# Patient Record
Sex: MALE | Race: BLACK OR AFRICAN AMERICAN | NOT HISPANIC OR LATINO | Employment: UNEMPLOYED | URBAN - METROPOLITAN AREA
[De-identification: names, ages, dates, MRNs, and addresses within clinical notes are randomized per-mention and may not be internally consistent; named-entity substitution may affect disease eponyms.]

---

## 2017-07-02 ENCOUNTER — HOSPITAL ENCOUNTER (INPATIENT)
Facility: HOSPITAL | Age: 57
LOS: 9 days | Discharge: HOME | DRG: 885 | End: 2017-07-11
Attending: PSYCHIATRY & NEUROLOGY | Admitting: PSYCHIATRY & NEUROLOGY
Payer: COMMERCIAL

## 2017-07-02 ENCOUNTER — HOSPITAL ENCOUNTER (EMERGENCY)
Facility: HOSPITAL | Age: 57
End: 2017-07-02
Attending: EMERGENCY MEDICINE | Admitting: EMERGENCY MEDICINE
Payer: COMMERCIAL

## 2017-07-02 VITALS
WEIGHT: 175.5 LBS | TEMPERATURE: 96.8 F | HEART RATE: 68 BPM | DIASTOLIC BLOOD PRESSURE: 83 MMHG | SYSTOLIC BLOOD PRESSURE: 130 MMHG | OXYGEN SATURATION: 97 % | RESPIRATION RATE: 16 BRPM

## 2017-07-02 DIAGNOSIS — T14.91XA SUICIDE ATTEMPT (HCC): Primary | ICD-10-CM

## 2017-07-02 DIAGNOSIS — F31.9 BIPOLAR AFFECTIVE DISORDER (HCC): ICD-10-CM

## 2017-07-02 DIAGNOSIS — B20 HUMAN IMMUNODEFICIENCY VIRUS (HIV) INFECTION (HCC): Primary | ICD-10-CM

## 2017-07-02 PROBLEM — Z21 HUMAN IMMUNODEFICIENCY VIRUS (HIV) INFECTION (HCC): Status: ACTIVE | Noted: 2017-02-16

## 2017-07-02 PROBLEM — Z86.73 HISTORY OF TRANSIENT CEREBRAL ISCHEMIA: Status: ACTIVE | Noted: 2017-02-16

## 2017-07-02 LAB
ALBUMIN SERPL BCP-MCNC: 3.3 G/DL (ref 3.5–5)
ALP SERPL-CCNC: 107 U/L (ref 46–116)
ALT SERPL W P-5'-P-CCNC: 19 U/L (ref 12–78)
AMPHETAMINES SERPL QL SCN: NEGATIVE
ANION GAP SERPL CALCULATED.3IONS-SCNC: 4 MMOL/L (ref 4–13)
AST SERPL W P-5'-P-CCNC: 19 U/L (ref 5–45)
BACTERIA UR QL AUTO: NORMAL /HPF
BARBITURATES UR QL: NEGATIVE
BASOPHILS # BLD AUTO: 0.02 THOUSANDS/ΜL (ref 0–0.1)
BASOPHILS NFR BLD AUTO: 1 % (ref 0–1)
BENZODIAZ UR QL: NEGATIVE
BILIRUB SERPL-MCNC: 0.33 MG/DL (ref 0.2–1)
BILIRUB UR QL STRIP: NEGATIVE
BUN SERPL-MCNC: 9 MG/DL (ref 5–25)
CALCIUM SERPL-MCNC: 9.4 MG/DL (ref 8.3–10.1)
CHLORIDE SERPL-SCNC: 103 MMOL/L (ref 100–108)
CLARITY UR: CLEAR
CO2 SERPL-SCNC: 33 MMOL/L (ref 21–32)
COCAINE UR QL: POSITIVE
COLOR UR: YELLOW
CREAT SERPL-MCNC: 1.11 MG/DL (ref 0.6–1.3)
EOSINOPHIL # BLD AUTO: 0.06 THOUSAND/ΜL (ref 0–0.61)
EOSINOPHIL NFR BLD AUTO: 2 % (ref 0–6)
ERYTHROCYTE [DISTWIDTH] IN BLOOD BY AUTOMATED COUNT: 14.1 % (ref 11.6–15.1)
ETHANOL EXG-MCNC: 0 MG/DL
GFR SERPL CREATININE-BSD FRML MDRD: >60 ML/MIN/1.73SQ M
GLUCOSE SERPL-MCNC: 83 MG/DL (ref 65–140)
GLUCOSE UR STRIP-MCNC: NEGATIVE MG/DL
HCT VFR BLD AUTO: 46.5 % (ref 36.5–49.3)
HGB BLD-MCNC: 15 G/DL (ref 12–17)
HGB UR QL STRIP.AUTO: NEGATIVE
KETONES UR STRIP-MCNC: NEGATIVE MG/DL
LEUKOCYTE ESTERASE UR QL STRIP: NEGATIVE
LYMPHOCYTES # BLD AUTO: 0.71 THOUSANDS/ΜL (ref 0.6–4.47)
LYMPHOCYTES NFR BLD AUTO: 18 % (ref 14–44)
MCH RBC QN AUTO: 29.5 PG (ref 26.8–34.3)
MCHC RBC AUTO-ENTMCNC: 32.3 G/DL (ref 31.4–37.4)
MCV RBC AUTO: 91 FL (ref 82–98)
METHADONE UR QL: NEGATIVE
MONOCYTES # BLD AUTO: 0.49 THOUSAND/ΜL (ref 0.17–1.22)
MONOCYTES NFR BLD AUTO: 13 % (ref 4–12)
MUCOUS THREADS UR QL AUTO: NORMAL
NEUTROPHILS # BLD AUTO: 2.59 THOUSANDS/ΜL (ref 1.85–7.62)
NEUTS SEG NFR BLD AUTO: 66 % (ref 43–75)
NITRITE UR QL STRIP: NEGATIVE
NON-SQ EPI CELLS URNS QL MICRO: NORMAL /HPF
NRBC BLD AUTO-RTO: 0 /100 WBCS
OPIATES UR QL SCN: NEGATIVE
PCP UR QL: NEGATIVE
PH UR STRIP.AUTO: 7 [PH] (ref 4.5–8)
PLATELET # BLD AUTO: 208 THOUSANDS/UL (ref 149–390)
PMV BLD AUTO: 8.2 FL (ref 8.9–12.7)
POTASSIUM SERPL-SCNC: 3.9 MMOL/L (ref 3.5–5.3)
PROT SERPL-MCNC: 8.1 G/DL (ref 6.4–8.2)
PROT UR STRIP-MCNC: ABNORMAL MG/DL
RBC # BLD AUTO: 5.09 MILLION/UL (ref 3.88–5.62)
RBC #/AREA URNS AUTO: NORMAL /HPF
SODIUM SERPL-SCNC: 140 MMOL/L (ref 136–145)
SP GR UR STRIP.AUTO: 1.02 (ref 1–1.03)
SPECIMEN SOURCE: NORMAL
THC UR QL: NEGATIVE
TROPONIN I BLD-MCNC: 0 NG/ML (ref 0–0.08)
TSH SERPL DL<=0.05 MIU/L-ACNC: 1.05 UIU/ML (ref 0.36–3.74)
UROBILINOGEN UR QL STRIP.AUTO: 4 E.U./DL
WBC # BLD AUTO: 3.87 THOUSAND/UL (ref 4.31–10.16)
WBC #/AREA URNS AUTO: NORMAL /HPF

## 2017-07-02 PROCEDURE — 81001 URINALYSIS AUTO W/SCOPE: CPT | Performed by: EMERGENCY MEDICINE

## 2017-07-02 PROCEDURE — 80307 DRUG TEST PRSMV CHEM ANLYZR: CPT | Performed by: EMERGENCY MEDICINE

## 2017-07-02 PROCEDURE — 82075 ASSAY OF BREATH ETHANOL: CPT | Performed by: EMERGENCY MEDICINE

## 2017-07-02 PROCEDURE — 80053 COMPREHEN METABOLIC PANEL: CPT | Performed by: EMERGENCY MEDICINE

## 2017-07-02 PROCEDURE — 99285 EMERGENCY DEPT VISIT HI MDM: CPT

## 2017-07-02 PROCEDURE — 84484 ASSAY OF TROPONIN QUANT: CPT

## 2017-07-02 PROCEDURE — 36415 COLL VENOUS BLD VENIPUNCTURE: CPT | Performed by: EMERGENCY MEDICINE

## 2017-07-02 PROCEDURE — 93005 ELECTROCARDIOGRAM TRACING: CPT | Performed by: EMERGENCY MEDICINE

## 2017-07-02 PROCEDURE — 84443 ASSAY THYROID STIM HORMONE: CPT | Performed by: EMERGENCY MEDICINE

## 2017-07-02 PROCEDURE — 85025 COMPLETE CBC W/AUTO DIFF WBC: CPT | Performed by: EMERGENCY MEDICINE

## 2017-07-02 RX ORDER — ASPIRIN 81 MG/1
81 TABLET, CHEWABLE ORAL DAILY
Status: CANCELLED | OUTPATIENT
Start: 2017-07-03

## 2017-07-02 RX ORDER — SULFAMETHOXAZOLE AND TRIMETHOPRIM 800; 160 MG/1; MG/1
1 TABLET ORAL EVERY 12 HOURS SCHEDULED
Status: ON HOLD | COMMUNITY
End: 2017-07-11

## 2017-07-02 RX ORDER — LORAZEPAM 1 MG/1
1 TABLET ORAL EVERY 6 HOURS PRN
Status: DISCONTINUED | OUTPATIENT
Start: 2017-07-02 | End: 2017-07-11 | Stop reason: HOSPADM

## 2017-07-02 RX ORDER — BENZTROPINE MESYLATE 0.5 MG/1
1 TABLET ORAL EVERY 8 HOURS PRN
Status: CANCELLED | OUTPATIENT
Start: 2017-07-02

## 2017-07-02 RX ORDER — LORAZEPAM 1 MG/1
1 TABLET ORAL EVERY 6 HOURS PRN
Status: CANCELLED | OUTPATIENT
Start: 2017-07-02

## 2017-07-02 RX ORDER — RISPERIDONE 1 MG/1
1 TABLET, ORALLY DISINTEGRATING ORAL EVERY 8 HOURS PRN
Status: CANCELLED | OUTPATIENT
Start: 2017-07-02

## 2017-07-02 RX ORDER — RISPERIDONE 1 MG/1
1 TABLET, ORALLY DISINTEGRATING ORAL EVERY 8 HOURS PRN
Status: DISCONTINUED | OUTPATIENT
Start: 2017-07-02 | End: 2017-07-11 | Stop reason: HOSPADM

## 2017-07-02 RX ORDER — HYDROXYZINE HYDROCHLORIDE 25 MG/1
25 TABLET, FILM COATED ORAL EVERY 6 HOURS PRN
Status: DISCONTINUED | OUTPATIENT
Start: 2017-07-02 | End: 2017-07-11 | Stop reason: HOSPADM

## 2017-07-02 RX ORDER — NICOTINE 21 MG/24HR
1 PATCH, TRANSDERMAL 24 HOURS TRANSDERMAL DAILY
Status: DISCONTINUED | OUTPATIENT
Start: 2017-07-03 | End: 2017-07-11 | Stop reason: HOSPADM

## 2017-07-02 RX ORDER — BENZTROPINE MESYLATE 1 MG/1
1 TABLET ORAL EVERY 8 HOURS PRN
Status: DISCONTINUED | OUTPATIENT
Start: 2017-07-02 | End: 2017-07-11 | Stop reason: HOSPADM

## 2017-07-02 RX ORDER — ACETAMINOPHEN 325 MG/1
650 TABLET ORAL EVERY 4 HOURS PRN
Status: DISCONTINUED | OUTPATIENT
Start: 2017-07-02 | End: 2017-07-11 | Stop reason: HOSPADM

## 2017-07-02 RX ORDER — NICOTINE 21 MG/24HR
1 PATCH, TRANSDERMAL 24 HOURS TRANSDERMAL DAILY
Status: CANCELLED | OUTPATIENT
Start: 2017-07-03

## 2017-07-02 RX ORDER — ASPIRIN 81 MG/1
81 TABLET, CHEWABLE ORAL DAILY
Status: DISCONTINUED | OUTPATIENT
Start: 2017-07-03 | End: 2017-07-11 | Stop reason: HOSPADM

## 2017-07-02 RX ORDER — SULFAMETHOXAZOLE AND TRIMETHOPRIM 800; 160 MG/1; MG/1
1 TABLET ORAL EVERY 12 HOURS SCHEDULED
Status: CANCELLED | OUTPATIENT
Start: 2017-07-02

## 2017-07-02 RX ORDER — ACETAMINOPHEN 325 MG/1
650 TABLET ORAL EVERY 4 HOURS PRN
Status: CANCELLED | OUTPATIENT
Start: 2017-07-02

## 2017-07-02 RX ORDER — HYDROXYZINE HYDROCHLORIDE 25 MG/1
25 TABLET, FILM COATED ORAL EVERY 6 HOURS PRN
Status: CANCELLED | OUTPATIENT
Start: 2017-07-02

## 2017-07-02 RX ORDER — TRAZODONE HYDROCHLORIDE 50 MG/1
50 TABLET ORAL
Status: DISCONTINUED | OUTPATIENT
Start: 2017-07-02 | End: 2017-07-11 | Stop reason: HOSPADM

## 2017-07-02 RX ORDER — TRAZODONE HYDROCHLORIDE 50 MG/1
50 TABLET ORAL
Status: CANCELLED | OUTPATIENT
Start: 2017-07-02

## 2017-07-02 RX ORDER — ASPIRIN 81 MG/1
81 TABLET ORAL DAILY
COMMUNITY

## 2017-07-02 RX ORDER — SULFAMETHOXAZOLE AND TRIMETHOPRIM 800; 160 MG/1; MG/1
1 TABLET ORAL EVERY 12 HOURS SCHEDULED
Status: DISCONTINUED | OUTPATIENT
Start: 2017-07-03 | End: 2017-07-03

## 2017-07-03 PROBLEM — Z21 POSITIVE LABORATORY TESTING FOR HUMAN IMMUNODEFICIENCY VIRUS (HCC): Status: ACTIVE | Noted: 2017-07-03

## 2017-07-03 PROBLEM — I10 HYPERTENSION: Status: ACTIVE | Noted: 2017-02-27

## 2017-07-03 LAB
ATRIAL RATE: 71 BPM
P AXIS: 63 DEGREES
PR INTERVAL: 162 MS
QRS AXIS: 25 DEGREES
QRSD INTERVAL: 92 MS
QT INTERVAL: 414 MS
QTC INTERVAL: 449 MS
T WAVE AXIS: -13 DEGREES
VENTRICULAR RATE: 71 BPM

## 2017-07-03 RX ORDER — SULFAMETHOXAZOLE AND TRIMETHOPRIM 800; 160 MG/1; MG/1
1 TABLET ORAL EVERY 12 HOURS SCHEDULED
Status: DISCONTINUED | OUTPATIENT
Start: 2017-07-03 | End: 2017-07-11 | Stop reason: HOSPADM

## 2017-07-03 RX ORDER — ASPIRIN 81 MG/1
81 TABLET ORAL DAILY
Status: DISCONTINUED | OUTPATIENT
Start: 2017-07-03 | End: 2017-07-04

## 2017-07-03 RX ORDER — ASPIRIN 81 MG/1
81 TABLET ORAL DAILY
COMMUNITY
Start: 2017-02-16 | End: 2017-07-11 | Stop reason: HOSPADM

## 2017-07-03 RX ORDER — QUETIAPINE FUMARATE 25 MG/1
25 TABLET, FILM COATED ORAL
Status: DISCONTINUED | OUTPATIENT
Start: 2017-07-03 | End: 2017-07-05

## 2017-07-03 RX ORDER — SULFAMETHOXAZOLE AND TRIMETHOPRIM 800; 160 MG/1; MG/1
1 TABLET ORAL DAILY
COMMUNITY
Start: 2017-02-16 | End: 2017-07-11 | Stop reason: HOSPADM

## 2017-07-03 RX ADMIN — SULFAMETHOXAZOLE AND TRIMETHOPRIM 1 TABLET: 800; 160 TABLET ORAL at 09:52

## 2017-07-03 RX ADMIN — ASPIRIN 81 MG 81 MG: 81 TABLET ORAL at 09:52

## 2017-07-03 RX ADMIN — SULFAMETHOXAZOLE AND TRIMETHOPRIM 1 TABLET: 800; 160 TABLET ORAL at 21:06

## 2017-07-03 RX ADMIN — QUETIAPINE FUMARATE 25 MG: 25 TABLET, FILM COATED ORAL at 21:06

## 2017-07-04 RX ADMIN — SULFAMETHOXAZOLE AND TRIMETHOPRIM 1 TABLET: 800; 160 TABLET ORAL at 08:54

## 2017-07-04 RX ADMIN — QUETIAPINE FUMARATE 25 MG: 25 TABLET, FILM COATED ORAL at 21:09

## 2017-07-04 RX ADMIN — ASPIRIN 81 MG 81 MG: 81 TABLET ORAL at 08:54

## 2017-07-04 RX ADMIN — SULFAMETHOXAZOLE AND TRIMETHOPRIM 1 TABLET: 800; 160 TABLET ORAL at 21:09

## 2017-07-05 RX ORDER — DULOXETIN HYDROCHLORIDE 30 MG/1
30 CAPSULE, DELAYED RELEASE ORAL DAILY
Status: DISCONTINUED | OUTPATIENT
Start: 2017-07-05 | End: 2017-07-10

## 2017-07-05 RX ORDER — LAMIVUDINE 150 MG/1
300 TABLET, FILM COATED ORAL DAILY
Status: DISCONTINUED | OUTPATIENT
Start: 2017-07-05 | End: 2017-07-11 | Stop reason: HOSPADM

## 2017-07-05 RX ORDER — ABACAVIR 300 MG/1
600 TABLET ORAL DAILY
Status: DISCONTINUED | OUTPATIENT
Start: 2017-07-05 | End: 2017-07-11 | Stop reason: HOSPADM

## 2017-07-05 RX ADMIN — ABACAVIR 600 MG: 300 TABLET, FILM COATED ORAL at 14:59

## 2017-07-05 RX ADMIN — TRAZODONE HYDROCHLORIDE 50 MG: 50 TABLET ORAL at 21:54

## 2017-07-05 RX ADMIN — LAMIVUDINE 300 MG: 150 TABLET, FILM COATED ORAL at 14:59

## 2017-07-05 RX ADMIN — ASPIRIN 81 MG 81 MG: 81 TABLET ORAL at 08:56

## 2017-07-05 RX ADMIN — SULFAMETHOXAZOLE AND TRIMETHOPRIM 1 TABLET: 800; 160 TABLET ORAL at 08:56

## 2017-07-05 RX ADMIN — DULOXETINE HYDROCHLORIDE 30 MG: 30 CAPSULE, DELAYED RELEASE ORAL at 17:55

## 2017-07-05 RX ADMIN — DOLUTEGRAVIR SODIUM 50 MG: 50 TABLET, FILM COATED ORAL at 14:59

## 2017-07-05 RX ADMIN — SULFAMETHOXAZOLE AND TRIMETHOPRIM 1 TABLET: 800; 160 TABLET ORAL at 21:53

## 2017-07-05 RX ADMIN — TUBERCULIN PURIFIED PROTEIN DERIVATIVE 5 UNITS: 5 INJECTION, SOLUTION INTRADERMAL at 15:59

## 2017-07-06 RX ORDER — ACETAMINOPHEN 325 MG/1
650 TABLET ORAL EVERY 4 HOURS PRN
Status: DISCONTINUED | OUTPATIENT
Start: 2017-07-06 | End: 2017-07-11 | Stop reason: HOSPADM

## 2017-07-06 RX ORDER — ACETAMINOPHEN 325 MG/1
975 TABLET ORAL EVERY 6 HOURS PRN
Status: DISCONTINUED | OUTPATIENT
Start: 2017-07-06 | End: 2017-07-11 | Stop reason: HOSPADM

## 2017-07-06 RX ORDER — ACETAMINOPHEN 325 MG/1
650 TABLET ORAL EVERY 6 HOURS PRN
Status: DISCONTINUED | OUTPATIENT
Start: 2017-07-06 | End: 2017-07-11 | Stop reason: HOSPADM

## 2017-07-06 RX ADMIN — DOLUTEGRAVIR SODIUM 50 MG: 50 TABLET, FILM COATED ORAL at 09:26

## 2017-07-06 RX ADMIN — ABACAVIR 600 MG: 300 TABLET, FILM COATED ORAL at 09:26

## 2017-07-06 RX ADMIN — LAMIVUDINE 300 MG: 150 TABLET, FILM COATED ORAL at 09:26

## 2017-07-06 RX ADMIN — SULFAMETHOXAZOLE AND TRIMETHOPRIM 1 TABLET: 800; 160 TABLET ORAL at 09:02

## 2017-07-06 RX ADMIN — ASPIRIN 81 MG 81 MG: 81 TABLET ORAL at 09:02

## 2017-07-06 RX ADMIN — DULOXETINE HYDROCHLORIDE 30 MG: 30 CAPSULE, DELAYED RELEASE ORAL at 09:02

## 2017-07-06 RX ADMIN — SULFAMETHOXAZOLE AND TRIMETHOPRIM 1 TABLET: 800; 160 TABLET ORAL at 21:15

## 2017-07-07 RX ADMIN — SULFAMETHOXAZOLE AND TRIMETHOPRIM 1 TABLET: 800; 160 TABLET ORAL at 21:28

## 2017-07-07 RX ADMIN — NICOTINE 1 PATCH: 14 PATCH, EXTENDED RELEASE TRANSDERMAL at 08:15

## 2017-07-07 RX ADMIN — LAMIVUDINE 300 MG: 150 TABLET, FILM COATED ORAL at 08:14

## 2017-07-07 RX ADMIN — DOLUTEGRAVIR SODIUM 50 MG: 50 TABLET, FILM COATED ORAL at 08:14

## 2017-07-07 RX ADMIN — SULFAMETHOXAZOLE AND TRIMETHOPRIM 1 TABLET: 800; 160 TABLET ORAL at 08:15

## 2017-07-07 RX ADMIN — ABACAVIR 600 MG: 300 TABLET, FILM COATED ORAL at 08:14

## 2017-07-07 RX ADMIN — DULOXETINE HYDROCHLORIDE 30 MG: 30 CAPSULE, DELAYED RELEASE ORAL at 08:14

## 2017-07-07 RX ADMIN — ASPIRIN 81 MG 81 MG: 81 TABLET ORAL at 08:14

## 2017-07-08 LAB
ATRIAL RATE: 77 BPM
P AXIS: 68 DEGREES
PR INTERVAL: 166 MS
QRS AXIS: 36 DEGREES
QRSD INTERVAL: 96 MS
QT INTERVAL: 394 MS
QTC INTERVAL: 445 MS
T WAVE AXIS: 8 DEGREES
VENTRICULAR RATE: 77 BPM

## 2017-07-08 PROCEDURE — 93005 ELECTROCARDIOGRAM TRACING: CPT | Performed by: PHYSICIAN ASSISTANT

## 2017-07-08 RX ORDER — LANOLIN ALCOHOL/MO/W.PET/CERES
3 CREAM (GRAM) TOPICAL
Status: DISCONTINUED | OUTPATIENT
Start: 2017-07-08 | End: 2017-07-09

## 2017-07-08 RX ADMIN — DULOXETINE HYDROCHLORIDE 30 MG: 30 CAPSULE, DELAYED RELEASE ORAL at 08:24

## 2017-07-08 RX ADMIN — ABACAVIR 600 MG: 300 TABLET, FILM COATED ORAL at 08:24

## 2017-07-08 RX ADMIN — TRAZODONE HYDROCHLORIDE 50 MG: 50 TABLET ORAL at 02:41

## 2017-07-08 RX ADMIN — DOLUTEGRAVIR SODIUM 50 MG: 50 TABLET, FILM COATED ORAL at 08:24

## 2017-07-08 RX ADMIN — LAMIVUDINE 300 MG: 150 TABLET, FILM COATED ORAL at 08:31

## 2017-07-08 RX ADMIN — SULFAMETHOXAZOLE AND TRIMETHOPRIM 1 TABLET: 800; 160 TABLET ORAL at 21:21

## 2017-07-08 RX ADMIN — ASPIRIN 81 MG 81 MG: 81 TABLET ORAL at 08:24

## 2017-07-08 RX ADMIN — SULFAMETHOXAZOLE AND TRIMETHOPRIM 1 TABLET: 800; 160 TABLET ORAL at 08:24

## 2017-07-08 RX ADMIN — MELATONIN TAB 3 MG 3 MG: 3 TAB at 21:21

## 2017-07-09 RX ORDER — LANOLIN ALCOHOL/MO/W.PET/CERES
6 CREAM (GRAM) TOPICAL
Status: DISCONTINUED | OUTPATIENT
Start: 2017-07-09 | End: 2017-07-11 | Stop reason: HOSPADM

## 2017-07-09 RX ADMIN — LAMIVUDINE 300 MG: 150 TABLET, FILM COATED ORAL at 08:38

## 2017-07-09 RX ADMIN — ACETAMINOPHEN 975 MG: 325 TABLET, FILM COATED ORAL at 09:18

## 2017-07-09 RX ADMIN — DULOXETINE HYDROCHLORIDE 30 MG: 30 CAPSULE, DELAYED RELEASE ORAL at 08:38

## 2017-07-09 RX ADMIN — ABACAVIR 600 MG: 300 TABLET, FILM COATED ORAL at 08:38

## 2017-07-09 RX ADMIN — SULFAMETHOXAZOLE AND TRIMETHOPRIM 1 TABLET: 800; 160 TABLET ORAL at 21:48

## 2017-07-09 RX ADMIN — DOLUTEGRAVIR SODIUM 50 MG: 50 TABLET, FILM COATED ORAL at 08:38

## 2017-07-09 RX ADMIN — SULFAMETHOXAZOLE AND TRIMETHOPRIM 1 TABLET: 800; 160 TABLET ORAL at 08:38

## 2017-07-09 RX ADMIN — MELATONIN TAB 3 MG 6 MG: 3 TAB at 21:48

## 2017-07-09 RX ADMIN — ASPIRIN 81 MG 81 MG: 81 TABLET ORAL at 08:38

## 2017-07-10 RX ORDER — DULOXETIN HYDROCHLORIDE 60 MG/1
60 CAPSULE, DELAYED RELEASE ORAL DAILY
Status: DISCONTINUED | OUTPATIENT
Start: 2017-07-11 | End: 2017-07-11 | Stop reason: HOSPADM

## 2017-07-10 RX ADMIN — LAMIVUDINE 300 MG: 150 TABLET, FILM COATED ORAL at 09:07

## 2017-07-10 RX ADMIN — ACETAMINOPHEN 650 MG: 325 TABLET, FILM COATED ORAL at 09:09

## 2017-07-10 RX ADMIN — ABACAVIR 600 MG: 300 TABLET, FILM COATED ORAL at 09:07

## 2017-07-10 RX ADMIN — NICOTINE 1 PATCH: 14 PATCH, EXTENDED RELEASE TRANSDERMAL at 09:07

## 2017-07-10 RX ADMIN — SULFAMETHOXAZOLE AND TRIMETHOPRIM 1 TABLET: 800; 160 TABLET ORAL at 09:07

## 2017-07-10 RX ADMIN — ASPIRIN 81 MG 81 MG: 81 TABLET ORAL at 09:07

## 2017-07-10 RX ADMIN — DOLUTEGRAVIR SODIUM 50 MG: 50 TABLET, FILM COATED ORAL at 09:07

## 2017-07-10 RX ADMIN — DULOXETINE HYDROCHLORIDE 30 MG: 30 CAPSULE, DELAYED RELEASE ORAL at 09:07

## 2017-07-10 RX ADMIN — SULFAMETHOXAZOLE AND TRIMETHOPRIM 1 TABLET: 800; 160 TABLET ORAL at 21:39

## 2017-07-11 VITALS
RESPIRATION RATE: 16 BRPM | SYSTOLIC BLOOD PRESSURE: 135 MMHG | TEMPERATURE: 97.8 F | WEIGHT: 183.2 LBS | HEIGHT: 70 IN | BODY MASS INDEX: 26.23 KG/M2 | OXYGEN SATURATION: 99 % | HEART RATE: 75 BPM | DIASTOLIC BLOOD PRESSURE: 64 MMHG

## 2017-07-11 RX ORDER — SULFAMETHOXAZOLE AND TRIMETHOPRIM 800; 160 MG/1; MG/1
1 TABLET ORAL EVERY 12 HOURS SCHEDULED
Qty: 60 TABLET | Refills: 0 | Status: SHIPPED | OUTPATIENT
Start: 2017-07-11 | End: 2017-08-10

## 2017-07-11 RX ORDER — DULOXETIN HYDROCHLORIDE 60 MG/1
60 CAPSULE, DELAYED RELEASE ORAL DAILY
Qty: 30 CAPSULE | Refills: 0 | Status: SHIPPED | OUTPATIENT
Start: 2017-07-11

## 2017-07-11 RX ADMIN — DULOXETINE HYDROCHLORIDE 60 MG: 60 CAPSULE, DELAYED RELEASE ORAL at 08:28

## 2017-07-11 RX ADMIN — ABACAVIR 600 MG: 300 TABLET, FILM COATED ORAL at 08:27

## 2017-07-11 RX ADMIN — LAMIVUDINE 300 MG: 150 TABLET, FILM COATED ORAL at 08:28

## 2017-07-11 RX ADMIN — SULFAMETHOXAZOLE AND TRIMETHOPRIM 1 TABLET: 800; 160 TABLET ORAL at 08:28

## 2017-07-11 RX ADMIN — DOLUTEGRAVIR SODIUM 50 MG: 50 TABLET, FILM COATED ORAL at 08:28

## 2017-07-11 RX ADMIN — ASPIRIN 81 MG 81 MG: 81 TABLET ORAL at 08:28

## 2017-08-14 ENCOUNTER — GENERIC CONVERSION - ENCOUNTER (OUTPATIENT)
Dept: OTHER | Facility: OTHER | Age: 57
End: 2017-08-14

## 2017-08-15 ENCOUNTER — ALLSCRIPTS OFFICE VISIT (OUTPATIENT)
Dept: OTHER | Facility: CLINIC | Age: 57
End: 2017-08-15

## 2017-08-15 DIAGNOSIS — B20 HUMAN IMMUNODEFICIENCY VIRUS (HIV) DISEASE (HCC): ICD-10-CM

## 2017-08-21 ENCOUNTER — APPOINTMENT (OUTPATIENT)
Dept: LAB | Facility: CLINIC | Age: 57
End: 2017-08-21
Payer: COMMERCIAL

## 2017-08-21 DIAGNOSIS — Z11.3 ENCOUNTER FOR SCREENING FOR INFECTIONS WITH PREDOMINANTLY SEXUAL MODE OF TRANSMISSION: ICD-10-CM

## 2017-08-21 DIAGNOSIS — B20 HUMAN IMMUNODEFICIENCY VIRUS (HIV) DISEASE (HCC): ICD-10-CM

## 2017-08-21 LAB
ALBUMIN SERPL BCP-MCNC: 3.9 G/DL (ref 3.5–5)
ALP SERPL-CCNC: 118 U/L (ref 46–116)
ALT SERPL W P-5'-P-CCNC: 24 U/L (ref 12–78)
ANION GAP SERPL CALCULATED.3IONS-SCNC: 9 MMOL/L (ref 4–13)
AST SERPL W P-5'-P-CCNC: 23 U/L (ref 5–45)
BASOPHILS # BLD AUTO: 0.02 THOUSANDS/ΜL (ref 0–0.1)
BASOPHILS NFR BLD AUTO: 1 % (ref 0–1)
BILIRUB SERPL-MCNC: 0.3 MG/DL (ref 0.2–1)
BUN SERPL-MCNC: 12 MG/DL (ref 5–25)
CALCIUM SERPL-MCNC: 9.3 MG/DL (ref 8.3–10.1)
CHLAMYDIA DNA CVX QL NAA+PROBE: NORMAL
CHLORIDE SERPL-SCNC: 104 MMOL/L (ref 100–108)
CO2 SERPL-SCNC: 26 MMOL/L (ref 21–32)
CREAT SERPL-MCNC: 1.1 MG/DL (ref 0.6–1.3)
EOSINOPHIL # BLD AUTO: 0.1 THOUSAND/ΜL (ref 0–0.61)
EOSINOPHIL NFR BLD AUTO: 3 % (ref 0–6)
ERYTHROCYTE [DISTWIDTH] IN BLOOD BY AUTOMATED COUNT: 14.6 % (ref 11.6–15.1)
GFR SERPL CREATININE-BSD FRML MDRD: 87 ML/MIN/1.73SQ M
GLUCOSE P FAST SERPL-MCNC: 152 MG/DL (ref 65–99)
HCT VFR BLD AUTO: 47.1 % (ref 36.5–49.3)
HGB BLD-MCNC: 15.5 G/DL (ref 12–17)
LYMPHOCYTES # BLD AUTO: 1.3 THOUSANDS/ΜL (ref 0.6–4.47)
LYMPHOCYTES NFR BLD AUTO: 33 % (ref 14–44)
MCH RBC QN AUTO: 29.5 PG (ref 26.8–34.3)
MCHC RBC AUTO-ENTMCNC: 32.9 G/DL (ref 31.4–37.4)
MCV RBC AUTO: 90 FL (ref 82–98)
MONOCYTES # BLD AUTO: 0.47 THOUSAND/ΜL (ref 0.17–1.22)
MONOCYTES NFR BLD AUTO: 12 % (ref 4–12)
N GONORRHOEA DNA GENITAL QL NAA+PROBE: NORMAL
NEUTROPHILS # BLD AUTO: 2.06 THOUSANDS/ΜL (ref 1.85–7.62)
NEUTS SEG NFR BLD AUTO: 51 % (ref 43–75)
NRBC BLD AUTO-RTO: 0 /100 WBCS
PLATELET # BLD AUTO: 308 THOUSANDS/UL (ref 149–390)
PMV BLD AUTO: 8.4 FL (ref 8.9–12.7)
POTASSIUM SERPL-SCNC: 3.8 MMOL/L (ref 3.5–5.3)
PROT SERPL-MCNC: 8.8 G/DL (ref 6.4–8.2)
RBC # BLD AUTO: 5.25 MILLION/UL (ref 3.88–5.62)
SODIUM SERPL-SCNC: 139 MMOL/L (ref 136–145)
WBC # BLD AUTO: 3.95 THOUSAND/UL (ref 4.31–10.16)

## 2017-08-21 PROCEDURE — 87901 NFCT AGT GNTYP ALYS HIV1 REV: CPT

## 2017-08-21 PROCEDURE — 87900 PHENOTYPE INFECT AGENT DRUG: CPT

## 2017-08-21 PROCEDURE — 87491 CHLMYD TRACH DNA AMP PROBE: CPT

## 2017-08-21 PROCEDURE — 86592 SYPHILIS TEST NON-TREP QUAL: CPT

## 2017-08-21 PROCEDURE — 87536 HIV-1 QUANT&REVRSE TRNSCRPJ: CPT

## 2017-08-21 PROCEDURE — 86480 TB TEST CELL IMMUN MEASURE: CPT

## 2017-08-21 PROCEDURE — 85025 COMPLETE CBC W/AUTO DIFF WBC: CPT

## 2017-08-21 PROCEDURE — 80053 COMPREHEN METABOLIC PANEL: CPT

## 2017-08-21 PROCEDURE — 86361 T CELL ABSOLUTE COUNT: CPT

## 2017-08-21 PROCEDURE — 87591 N.GONORRHOEAE DNA AMP PROB: CPT

## 2017-08-21 PROCEDURE — 36415 COLL VENOUS BLD VENIPUNCTURE: CPT

## 2017-08-21 PROCEDURE — 86803 HEPATITIS C AB TEST: CPT

## 2017-08-22 LAB
HCV AB SER QL: NORMAL
RPR SER QL: NORMAL

## 2017-08-23 LAB
ANNOTATION COMMENT IMP: NORMAL
BASOPHILS # BLD AUTO: 0.1 X10E3/UL (ref 0–0.2)
BASOPHILS NFR BLD AUTO: 2 %
CD3+CD4+ CELLS # BLD: 168 /UL (ref 359–1519)
CD3+CD4+ CELLS NFR BLD: 14 % (ref 30.8–58.5)
EOSINOPHIL # BLD AUTO: 0.1 X10E3/UL (ref 0–0.4)
EOSINOPHIL NFR BLD AUTO: 3 %
ERYTHROCYTE [DISTWIDTH] IN BLOOD BY AUTOMATED COUNT: 15.5 % (ref 12.3–15.4)
GAMMA INTERFERON BACKGROUND BLD IA-ACNC: 0.04 IU/ML
HCT VFR BLD AUTO: 54.4 % (ref 37.5–51)
HGB BLD-MCNC: 17.7 G/DL (ref 12.6–17.7)
HIV1 RNA # SERPL NAA+PROBE: NORMAL COPIES/ML
HIV1 RNA SERPL NAA+PROBE-LOG#: 4.3 LOG10COPY/ML
IMM GRANULOCYTES # BLD: 0 X10E3/UL (ref 0–0.1)
IMM GRANULOCYTES NFR BLD: 1 %
LYMPHOCYTES # BLD AUTO: 1.2 X10E3/UL (ref 0.7–3.1)
LYMPHOCYTES NFR BLD AUTO: 35 %
M TB IFN-G BLD-IMP: NEGATIVE
M TB IFN-G CD4+ BCKGRND COR BLD-ACNC: 0.01 IU/ML
M TB IFN-G CD4+ T-CELLS BLD-ACNC: 0.05 IU/ML
MCH RBC QN AUTO: 28.9 PG (ref 26.6–33)
MCHC RBC AUTO-ENTMCNC: 32.5 G/DL (ref 31.5–35.7)
MCV RBC AUTO: 89 FL (ref 79–97)
MITOGEN IGNF BLD-ACNC: 1.42 IU/ML
MONOCYTES # BLD AUTO: 0.4 X10E3/UL (ref 0.1–0.9)
MONOCYTES NFR BLD AUTO: 12 %
NEUTROPHILS # BLD AUTO: 1.7 X10E3/UL (ref 1.4–7)
NEUTROPHILS NFR BLD AUTO: 47 %
PLATELET # BLD AUTO: 223 X10E3/UL (ref 150–379)
QUANTIFERON-TB GOLD IN TUBE: NORMAL
RBC # BLD AUTO: 6.12 X10E6/UL (ref 4.14–5.8)
SERVICE CMNT-IMP: NORMAL
WBC # BLD AUTO: 3.6 X10E3/UL (ref 3.4–10.8)

## 2017-08-29 ENCOUNTER — GENERIC CONVERSION - ENCOUNTER (OUTPATIENT)
Dept: OTHER | Facility: OTHER | Age: 57
End: 2017-08-29

## 2017-08-29 LAB
HIV GENOSURE: NORMAL
HIV RT+PR MUT DET ISLT: NORMAL

## 2017-08-30 ENCOUNTER — GENERIC CONVERSION - ENCOUNTER (OUTPATIENT)
Dept: OTHER | Facility: OTHER | Age: 57
End: 2017-08-30

## 2017-09-09 ENCOUNTER — APPOINTMENT (EMERGENCY)
Dept: RADIOLOGY | Facility: HOSPITAL | Age: 57
End: 2017-09-09
Payer: COMMERCIAL

## 2017-09-09 ENCOUNTER — HOSPITAL ENCOUNTER (EMERGENCY)
Facility: HOSPITAL | Age: 57
Discharge: HOME | End: 2017-09-09
Admitting: EMERGENCY MEDICINE
Payer: COMMERCIAL

## 2017-09-09 VITALS
BODY MASS INDEX: 27.92 KG/M2 | DIASTOLIC BLOOD PRESSURE: 68 MMHG | WEIGHT: 195 LBS | HEIGHT: 70 IN | SYSTOLIC BLOOD PRESSURE: 151 MMHG | TEMPERATURE: 96.7 F | RESPIRATION RATE: 16 BRPM | OXYGEN SATURATION: 97 % | HEART RATE: 89 BPM

## 2017-09-09 DIAGNOSIS — M25.511 ACUTE PAIN OF RIGHT SHOULDER: Primary | ICD-10-CM

## 2017-09-09 PROCEDURE — 73030 X-RAY EXAM OF SHOULDER: CPT

## 2017-09-09 PROCEDURE — 99283 EMERGENCY DEPT VISIT LOW MDM: CPT

## 2017-09-09 RX ORDER — IBUPROFEN 600 MG/1
TABLET ORAL
Status: COMPLETED
Start: 2017-09-09 | End: 2017-09-09

## 2017-09-09 RX ORDER — IBUPROFEN 600 MG/1
600 TABLET ORAL ONCE
Status: COMPLETED | OUTPATIENT
Start: 2017-09-09 | End: 2017-09-09

## 2017-09-09 RX ORDER — IBUPROFEN 600 MG/1
600 TABLET ORAL EVERY 6 HOURS PRN
Qty: 30 TABLET | Refills: 0 | Status: SHIPPED | OUTPATIENT
Start: 2017-09-09 | End: 2019-10-27 | Stop reason: HOSPADM

## 2017-09-09 RX ORDER — HYDROCODONE BITARTRATE AND ACETAMINOPHEN 5; 325 MG/1; MG/1
1 TABLET ORAL EVERY 6 HOURS PRN
Qty: 12 TABLET | Refills: 0 | Status: SHIPPED | OUTPATIENT
Start: 2017-09-09 | End: 2017-09-12

## 2017-09-09 RX ADMIN — IBUPROFEN 600 MG: 600 TABLET, FILM COATED ORAL at 11:22

## 2017-09-09 RX ADMIN — IBUPROFEN 600 MG: 600 TABLET, FILM COATED ORAL at 11:24

## 2017-09-20 ENCOUNTER — ALLSCRIPTS OFFICE VISIT (OUTPATIENT)
Dept: OTHER | Facility: CLINIC | Age: 57
End: 2017-09-20

## 2017-09-20 DIAGNOSIS — B20 HUMAN IMMUNODEFICIENCY VIRUS (HIV) DISEASE (HCC): ICD-10-CM

## 2017-09-21 ENCOUNTER — GENERIC CONVERSION - ENCOUNTER (OUTPATIENT)
Dept: OTHER | Facility: OTHER | Age: 57
End: 2017-09-21

## 2017-11-06 ENCOUNTER — HOSPITAL ENCOUNTER (EMERGENCY)
Facility: HOSPITAL | Age: 57
Discharge: HOME | End: 2017-11-06
Payer: COMMERCIAL

## 2017-11-06 VITALS
BODY MASS INDEX: 27.84 KG/M2 | WEIGHT: 194 LBS | DIASTOLIC BLOOD PRESSURE: 89 MMHG | HEART RATE: 90 BPM | RESPIRATION RATE: 16 BRPM | SYSTOLIC BLOOD PRESSURE: 121 MMHG | OXYGEN SATURATION: 96 % | TEMPERATURE: 98.3 F

## 2017-11-06 DIAGNOSIS — G89.29 CHRONIC RIGHT SHOULDER PAIN: Primary | ICD-10-CM

## 2017-11-06 DIAGNOSIS — M25.511 CHRONIC RIGHT SHOULDER PAIN: Primary | ICD-10-CM

## 2017-11-06 PROCEDURE — 99283 EMERGENCY DEPT VISIT LOW MDM: CPT

## 2017-11-06 RX ORDER — LIDOCAINE 50 MG/G
1 PATCH TOPICAL ONCE
Status: DISCONTINUED | OUTPATIENT
Start: 2017-11-06 | End: 2017-11-06 | Stop reason: HOSPADM

## 2017-11-06 RX ORDER — NAPROXEN 500 MG/1
500 TABLET ORAL 2 TIMES DAILY WITH MEALS
Qty: 14 TABLET | Refills: 0 | Status: SHIPPED | OUTPATIENT
Start: 2017-11-06 | End: 2019-10-27 | Stop reason: HOSPADM

## 2017-11-06 RX ADMIN — LIDOCAINE 1 PATCH: 50 PATCH CUTANEOUS at 09:59

## 2017-11-06 NOTE — DISCHARGE INSTRUCTIONS
Arthralgia   WHAT YOU NEED TO KNOW:   Arthralgia is pain in one or more joints, with no inflammation  It may be short-term and get better within 6 to 8 weeks  Arthralgia can be an early sign of arthritis  Arthralgia may be caused by a medical condition, such as a hormone disorder or a tumor  It may also be caused by an infection or injury  DISCHARGE INSTRUCTIONS:   Medicines: The following medicines may  be ordered for you:  · Acetaminophen  decreases pain  Ask how much to take and how often to take it  Follow directions  Acetaminophen can cause liver damage if not taken correctly  · NSAIDs  decrease pain and prevent swelling  Ask your healthcare provider which medicine is right for you  Ask how much to take and when to take it  Take as directed  NSAIDs can cause stomach bleeding and kidney problems if not taken correctly  · Pain relief cream  decreases pain  Use this cream as directed  · Take your medicine as directed  Contact your healthcare provider if you think your medicine is not helping or if you have side effects  Tell him of her if you are allergic to any medicine  Keep a list of the medicines, vitamins, and herbs you take  Include the amounts, and when and why you take them  Bring the list or the pill bottles to follow-up visits  Carry your medicine list with you in case of an emergency  Follow up with your healthcare provider or specialist as directed:  Write down your questions so you remember to ask them during your visits  Self-care:   · Apply heat  to help decrease pain  Use a heating pad or heat wrap  Apply heat for 20 to 30 minutes every 2 hours for as many days as directed  · Rest  as much as possible  Avoid activities that cause joint pain  · Apply ice  to help decrease swelling and pain  Ice may also help prevent tissue damage  Use an ice pack, or put crushed ice in a plastic bag   Cover it with a towel and place it on your painful joint for 15 to 20 minutes every hour or as directed  · Support  the joint with a brace or elastic wrap as directed  · Elevate  your joint above the level of your heart as often as you can to help decrease swelling and pain  Prop your painful joint on pillows or blankets to keep it elevated comfortably  · Lose weight  if you are overweight  Extra weight can put pressure on your joints and cause more pain  Ask your healthcare provider how much you should weigh  Ask him to help you create a weight loss plan  · Exercise  regularly to help improve joint movement and to decrease pain  Ask about the best exercise plan for you  Low-impact exercises can help take the pressure off your joints  Examples are walking, swimming, and water aerobics  Physical therapy:  A physical therapist teaches you exercises to help improve movement and strength, and to decrease pain  Ask your healthcare provider if physical therapy is right for you  Contact your healthcare provider or specialist if:   · You have a fever  · You continue to have joint pain that cannot be relieved with heat, ice, or medicine  · You have pain and inflammation around your joint  · You have questions or concerns about your condition or care  Return to the emergency department if:   · You have sudden, severe pain when you move your joint  · You have a fever and shaking chills  · You cannot move your joint  · You lose feeling on the side of your body where you have the painful joint  © 2017 [employer] [employer]  Information is for End User's use only and may not be sold, redistributed or otherwise used for commercial purposes  All illustrations and images included in CareNotes® are the copyrighted property of A D A M , Inc  or Jasiel Solis  The above information is an  only  It is not intended as medical advice for individual conditions or treatments   Talk to your doctor, nurse or pharmacist before following any medical regimen to see if it is safe and effective for you  REST  ICE/HEAT  STRETCHING, MASSAGE AND PRACTICE RANGE OF MOTION OF SHOULDER      F/U WITH ORTHO

## 2017-11-07 NOTE — ED PROVIDER NOTES
History  Chief Complaint   Patient presents with    Shoulder Pain     Pt preports right shoulder for the past few months  Pt reports pain progressively getting worse  Pt denies new injury  Pt reports woke up this morning had numbness to hand  Pt with no complaints of numbness at this time        61-year-old male who presents today complaining of right shoulder pain x2 months  He states pain originated from activities he was doing at work  He was supposed to f/u with ortho specialist but did not because of his insurance  He states he has had sharp, throbbing pain along his right shoulder that radiates to the forearm  The pain is intermittent and waxes and wanes, worse with use of his right arm and overhead activities  He presents today stating the pain has worsened d/t activities he was doing about 4 days ago, requiring recurrent overhead movements  He denies any new or worsening symptoms from his chronic pain  There is no associated chest pain, shortness of breath, headache, dizziness, lightheadedness, arm weakness, facial numbness, change in speech or gait  When he awakens his right fingers feel numb but it resolves throughout the day and he has no symptoms now  He is right-hand dominant  He states he was seen previously, had Xrays with arthritis, and was given a prescription for Vicodin and motrin which did not alleviate the pain  Prior to Admission Medications   Prescriptions Last Dose Informant Patient Reported? Taking?    DULoxetine (CYMBALTA) 60 mg delayed release capsule   No No   Sig: Take 1 capsule by mouth daily   abacavir-dolutegravir-lamiVUDine (TRIUMEQ) 600- mg per tablet   Yes Yes   Sig: Take 1 tablet by mouth daily   aspirin (ECOTRIN LOW STRENGTH) 81 mg EC tablet   Yes No   Sig: Take 81 mg by mouth daily   dolutegravir (TIVICAY) 50 MG TABS   No Yes   Sig: Take 1 tablet by mouth daily for 30 days   ibuprofen (MOTRIN) 600 mg tablet   No No   Sig: Take 1 tablet by mouth every 6 (six) hours as needed for mild pain for up to 10 days      Facility-Administered Medications: None       Past Medical History:   Diagnosis Date    Anxiety     Depression     HIV disease ([facility] [address] )     Substance abuse     Suicide attempt        History reviewed  No pertinent surgical history  Family History   Problem Relation Age of Onset    Diabetes Mother     Heart attack Mother     Heart attack Father      I have reviewed and agree with the history as documented  Social History   Substance Use Topics    Smoking status: Current Some Day Smoker     Packs/day: 1 00    Smokeless tobacco: Never Used    Alcohol use Yes        Review of Systems   Constitutional: Negative for chills and fever  Respiratory: Negative for cough, chest tightness, shortness of breath and wheezing  Cardiovascular: Negative for chest pain and palpitations  Musculoskeletal: Positive for arthralgias  Negative for gait problem and joint swelling  Skin: Negative for color change and wound  Neurological: Positive for numbness (in the morning, resolved)  Negative for weakness  Physical Exam  ED Triage Vitals   Temperature Pulse Respirations Blood Pressure SpO2   11/06/17 0831 11/06/17 0829 11/06/17 0829 11/06/17 0829 11/06/17 0829   98 3 °F (36 8 °C) 90 16 121/89 96 %      Temp Source Heart Rate Source Patient Position - Orthostatic VS BP Location FiO2 (%)   11/06/17 0831 11/06/17 0829 -- 11/06/17 0829 --   Oral Monitor  Right arm       Pain Score       11/06/17 0829       9           Orthostatic Vital Signs  Vitals:    11/06/17 0829   BP: 121/89   Pulse: 90       Physical Exam   Constitutional: He is oriented to person, place, and time  Vital signs are normal  He appears well-developed and well-nourished  Non-toxic appearance  He does not have a sickly appearance  He does not appear ill  No distress  Well appearing male   HENT:   Head: Normocephalic and atraumatic     Right Ear: Hearing and external ear normal    Left Ear: Hearing and external ear normal    Nose: Nose normal    Mouth/Throat: Oropharynx is clear and moist    Eyes: Conjunctivae and EOM are normal  Pupils are equal, round, and reactive to light  Neck: Normal range of motion  Neck supple  Cardiovascular: Normal rate, regular rhythm and normal heart sounds  Exam reveals no gallop and no friction rub  No murmur heard  Pulmonary/Chest: Effort normal and breath sounds normal  No respiratory distress  He has no wheezes  He has no rales  Musculoskeletal: Normal range of motion  Right shoulder: He exhibits tenderness, bony tenderness and pain  He exhibits normal range of motion, no swelling, no effusion, no crepitus, no deformity, no laceration, no spasm, normal pulse and normal strength  Arms:  Right shoulder is normal in appearance  +TTP at [facility] [place] by [facility] joint and over anterior joint space  Reproducible pain with active ROM greater than 110 degrees  Upper extremity strength 5/5 bilaterally  Radial pulses 2+ bilaterally  Distal sensation and circulation inatct  Negative empty can test  +lift off test    Neurological: He is alert and oriented to person, place, and time  Skin: Skin is warm and dry  No rash noted  He is not diaphoretic  Psychiatric: He has a normal mood and affect  Nursing note and vitals reviewed  ED Medications  Medications - No data to display    Diagnostic Studies  Results Reviewed     None                 No orders to display              Procedures  Procedures       Phone Contacts  ED Phone Contact    ED Course  ED Course                                MDM  Number of Diagnoses or Management Options  Chronic right shoulder pain: established and worsening  Diagnosis management comments:   64 yo M presents with exacerbation of chronic right shoulder pain  Seen previously for same complaint on 9/9/17, had XR which was WNL  lidoderm patch applied, recommended rest, ice/heat and naproxen for pain  Ortho f/u given   RTED precautions discussed  Patient verbalizes understanding and agrees with plan  Amount and/or Complexity of Data Reviewed  Tests in the radiology section of CPT®: reviewed  Decide to obtain previous medical records or to obtain history from someone other than the patient: yes  Review and summarize past medical records: yes    Patient Progress  Patient progress: stable    CritCare Time    Disposition  Final diagnoses:   Chronic right shoulder pain     Time reflects when diagnosis was documented in both MDM as applicable and the Disposition within this note     Time User Action Codes Description Comment    11/6/2017  9:58 AM Jonny Us Add [I08 [id number]  G89 29] Chronic right shoulder pain       ED Disposition     ED Disposition Condition Comment    Discharge  Doar Kate discharge to home/self care      Condition at discharge: Good        Follow-up Information     Follow up With Specialties Details Why Contact Info Additional [facility]  Schedule an appointment as soon as possible for a visit SHOULDER PAIN FOLLOW UP [place] [facility]  Schedule an appointment as soon as possible for a visit  [facility] AT [address] Emergency Department Emergency Medicine  If symptoms worsen - CHEST PAIN OR [facility] [address] [facility]  [phone] [place] ED, [address], [place], [address]        Discharge Medication List as of 11/6/2017  9:59 AM      START taking these medications    Details   naproxen (EC NAPROSYN) 500 MG EC tablet Take 1 tablet by mouth 2 (two) times a day with meals for 7 days, Starting Mon 11/6/2017, Until Mon 11/13/2017, Print         CONTINUE these medications which have NOT CHANGED    Details   abacavir-dolutegravir-lamiVUDine (TRIUMEQ) 600- mg per tablet Take 1 tablet by mouth daily, Historical Med      dolutegravir (TIVICAY) 50 MG TABS Take 1 tablet by mouth daily for 30 days, Starting Tue 7/11/2017, Until Mon 11/6/2017, Print      aspirin (ECOTRIN LOW STRENGTH) 81 mg EC tablet Take 81 mg by mouth daily, Historical Med      DULoxetine (CYMBALTA) 60 mg delayed release capsule Take 1 capsule by mouth daily, Starting Tue 7/11/2017, Print      ibuprofen (MOTRIN) 600 mg tablet Take 1 tablet by mouth every 6 (six) hours as needed for mild pain for up to 10 days, Starting Sat 9/9/2017, Until Tue 9/19/2017, Print           No discharge procedures on file      ED Provider  Electronically Signed by           Pola Ni PA-C  11/07/17 3121

## 2017-11-17 ENCOUNTER — ALLSCRIPTS OFFICE VISIT (OUTPATIENT)
Dept: OTHER | Facility: CLINIC | Age: 57
End: 2017-11-17

## 2017-11-20 DIAGNOSIS — B20 HUMAN IMMUNODEFICIENCY VIRUS (HIV) DISEASE (HCC): ICD-10-CM

## 2017-11-20 DIAGNOSIS — I10 ESSENTIAL (PRIMARY) HYPERTENSION: ICD-10-CM

## 2017-12-21 ENCOUNTER — ALLSCRIPTS OFFICE VISIT (OUTPATIENT)
Dept: OTHER | Facility: OTHER | Age: 57
End: 2017-12-21

## 2017-12-21 ENCOUNTER — GENERIC CONVERSION - ENCOUNTER (OUTPATIENT)
Dept: OTHER | Facility: OTHER | Age: 57
End: 2017-12-21

## 2017-12-21 ENCOUNTER — APPOINTMENT (OUTPATIENT)
Dept: RADIOLOGY | Facility: CLINIC | Age: 57
End: 2017-12-21
Payer: COMMERCIAL

## 2017-12-21 DIAGNOSIS — M75.101 RIGHT ROTATOR CUFF TEAR: ICD-10-CM

## 2017-12-21 DIAGNOSIS — R20.2 PARESTHESIA OF SKIN: ICD-10-CM

## 2017-12-21 DIAGNOSIS — M25.511 PAIN IN RIGHT SHOULDER: ICD-10-CM

## 2017-12-21 PROCEDURE — 73030 X-RAY EXAM OF SHOULDER: CPT

## 2018-01-09 NOTE — RESULT NOTES
Verified Results  (1) HIV GENOTYPE [id number] 12:39PM [place] FORTUNE Order Number: [id number]     Test Name Result Flag Reference   HIV GENOSURE Comment     Ramakrishna gene amplicon adequate for sequencing   HIV GENOSURE(R) Comment     The HIV-1 GenoSure(R) MG for this specimen has been completed      Performed at:  [address]  [id number]  : Otf Cerna MD, Phone:  [phone]  Performed at:  [address] [address]  : Di Drummond MD, Phone:  [phone]

## 2018-01-10 NOTE — PROGRESS NOTES
Assessment    1  HIV disease (042) (B20)   2  Cocaine abuse (305 60) (F14 10)   3  Nicotine dependence (305 1) (F17 200)    Plan    1  (1) CBC/PLT/DIFF; Status:Active; Requested for:38Jju8164;    2  (1) COMPREHENSIVE METABOLIC PANEL; Status:Active; Requested for:39Gmh3174;    3  (1) HIV-1 RNA QUANTITATIVE; [Do Not Release]; Status:Active; Requested   for:37Vez7629;    4  (1) T LYMPH SUBSET (CD4); Status:Active; Requested for:56Rxw0161;     5  (1) LIPID PANEL, FASTING; Status:Active; Requested AKZ:40PTQ9056;     6  Follow-up visit in 1 month Evaluation and Treatment  Follow-up  Status: Hold For -   Scheduling  Requested for: 93UDD0570    3  *1 -  [facility] )   Co-Management  *  Status: Active  Requested for: 98NHE3200  Care Summary provided  : Yes    Discussion/Summary    HIV-patient now being restarted in clinic and has reinitiated Triumeq  He has been almost 100% adherent  He has completed almost a month of treatment  We'll recheck labs in 2 weeks and follow-up in 4 weeks  Continue Bactrim pneumocystis prophylaxis  Stressed adherence  Nicotine dependence-patient not really interested in quitting  Stressed the importance of smoking cessation  He has met with our tobacco cessation Tomas  Cocaine abuse-status post rehabilitation stay  We'll continue to have the patient may with behavioral health  Possible side effects of new medications were reviewed with the patient/guardian today  The treatment plan was reviewed with the patient/guardian  The patient/guardian understands and agrees with the treatment plan   The patient was counseled regarding diagnostic results, instructions for management, prognosis, risks and benefits of treatment options, importance of compliance with treatment  Education   general HIV education  adherence  prevention care  Chief Complaint  Patient here for an HIV f/u  Patient needs refills SPNS 13      History of Present Marielle Sanchez is here today for HIV follow-up  He has recently moved back into the area and has reestablished care with [facility]  As far as treatment he is currently on Triumeq and reports compliance with no missed doses except for approximately a one month period in July when he was without his medication due to an incorrect prescription being given to him at his [facility] discharge  Since then he reports complete compliance with no missed doses  His CD4 count is 168 and his viral load is 20,040, creatinine is 1 10  He is on Bactrim prophylaxis until his CD4 is above and remains >200  He denies fever or shaking chills, nausea, vomiting, diarrhea, night sweats, vivid dreams or other complaints  He is a current everyday smoker and is in the precontemplative phase of quitting  (HK)     Pain Assessment   the patient states they have pain  The pain is located in the r shoulder  The patient describes the pain as sharp, dull, aching, throbbing and Pain started 2 weeks ago  (on a scale of 0 to 10, the patient rates the pain at 7 )   Abuse And Domestic Violence Screen    Yes, the patient is safe at home  The patient states no one is hurting them  Depression And Suicide Screen  No, the patient has not had thoughts of hurting themself  No, the patient has not felt depressed in the past 7 days  Review of Systems    Constitutional: no fever, not feeling poorly, no chills and not feeling tired  ENT: no complaints of earache, no hearing loss, no nosebleeds, no nasal discharge, no sore throat, no hoarseness  Cardiovascular: No complaints of slow heart rate, no fast heart rate, no chest pain, no palpitations, no leg claudication, no lower extremity  Respiratory: No complaints of shortness of breath, no wheezing, no cough, no SOB on exertion, no orthopnea or PND  Genitourinary: No complaints of dysuria, no incontinence, no hesitancy, no nocturia, no genital lesion, no testicular pain     Musculoskeletal: No complaints of arthralgia, no myalgias, no joint swelling or stiffness, no limb pain or swelling  Hematologic/Lymphatic: no swollen glands and no tendency for easy bruising  ROS reviewed  Active Problems    1  Acquired immune deficiency syndrome (AIDS) (042) (B20)   2  Agitation (307 9) (R45 1)   3  Bipolar affective disorder (296 80) (F31 9)   4  Cocaine abuse (305 60) (F14 10)   5  Diarrhea (787 91) (R19 7)   6  Dizziness (780 4) (R42)   7  HIV disease (042) (B20)   8  HTN (hypertension) (401 9) (I10)   9  Need for pneumocystis prophylaxis (V07 8) (Z29 8)   10  Non-adherence to medical treatment (V15 81) (Z91 19)   11  Onychomycosis (110 1) (B35 1)   12  Pain in foot (729 5) (M79 673)   13  Peripheral neuropathy (356 9) (G62 9)   14  Plantar porokeratosis, 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)   15  Plantar warts (078 12) (B07 0)   16  Routine screening for STI (sexually transmitted infection) (V74 5) (Z11 3)   17  Smoking (305 1) (F17 200)   18  Tinea cruris (110 3) (B35 6)   19  Tinea pedis of both feet (110 4) (B35 3)    Past Medical History    1  History of Bipolar 1 disorder (296 7) (F31 9)   2  History of depression (V11 8) (Z86 59)   3  History of TIA (transient ischemic attack) (V12 54) (Z86 73)   4  History of Lithium use (V58 69) (Z79 899)   5  History of Warts of foot (078 10) (B07 9)    Family History  Mother    1  Family history of Coronary arteriosclerosis   2  Family history of diabetes mellitus (V18 0) (Z83 3)  Father    3  Family history of Coronary arteriosclerosis    Social History    · Current every day smoker (305 1) (F17 200)   · History of abuse of recreational drug (305 93)   · Smoking (305 1) (F17 200)    Current Meds   1  Aspirin EC Low Dose 81 MG Oral Tablet Delayed Release; take 1 tablet by mouth once   daily; Therapy: 37Qmv2641 to (Eder Bidding)  Requested for: 85XFH3212; Last   Rx:06Sep2016 Ordered   2  Bactrim -160 MG Oral Tablet; Therapy: 13HPW0186 to Recorded   3   Cymbalta 60 MG Oral Capsule Delayed Release Particles; Therapy: 61BMC0998 to Recorded   4  Triumeq 600- MG Oral Tablet; Take 1 tablet daily; Therapy: 27JZC8223 to (Eliezer Markel)  Requested for: 26Mii0052; Last   Rx:12Sej2490; Status: ACTIVE - Renewal Denied, Retrospective By Protocol   Authorization Ordered    Allergies    1  No Known Drug Allergies    Vitals  Signs   Recorded: 45EYZ9132 04:22PM   Temperature: 98 F  Heart Rate: 73  Systolic: 461  Diastolic: 69  Height: 5 ft 10 in  Weight: 195 lb 5 22 oz  BMI Calculated: 28 03  BSA Calculated: 2 07  O2 Saturation: 97    Physical Exam    Constitutional   General appearance: No acute distress, well appearing and well nourished  Eyes   Conjunctiva and lids: No swelling, erythema or discharge  Ears, Nose, Mouth, and Throat   External inspection of ears and nose: Normal     Nasal mucosa, septum, and turbinates: Normal without edema or erythema  Oropharynx: Normal with no erythema, edema, exudate or lesions  Pulmonary   Respiratory effort: No increased work of breathing or signs of respiratory distress  Auscultation of lungs: Clear to auscultation  Cardiovascular   Palpation of heart: Normal PMI, no thrills  Auscultation of heart: Normal rate and rhythm, normal S1 and S2, without murmurs  Examination of extremities for edema and/or varicosities: Normal     Abdomen   Abdomen: Non-tender, no masses  Liver and spleen: No hepatomegaly or splenomegaly  Lymphatic   Palpation of lymph nodes in neck: No lymphadenopathy  Musculoskeletal   Gait and station: Normal     Digits and nails: Normal without clubbing or cyanosis  Inspection/palpation of joints, bones, and muscles: Normal     Skin   Skin and subcutaneous tissue: Normal without rashes or lesions  Neurologic   Cranial nerves: Cranial nerves 2-12 intact      Psychiatric   Orientation to person, place, and time: Normal     Mood and affect: Normal     Lips, Teeth and Gums: The lips were normal with no lesions  Examination of the teeth revealed normal dentition  Examination of the gingiva showed no abnormalities  Attending Note  Attending Note: I interviewed and examined the patient, the staff discussed the patient on the day of the visit, I discussed the case with the Resident and reviewed the Resident's note, I supervised the Resident and I agree with the Resident management plan as it was presented to me  Level of Participation: I was present in clinic and examined the patient  I agree with the Resident's note        Future Appointments    Date/Time Provider Specialty Site   11/17/2017 11:30 AM Angella Martínez, [facility]     Signatures   Electronically signed by : Eliot Moon MD; Sep 20 2017  5:12PM EST                       (Author)

## 2018-01-11 NOTE — MISCELLANEOUS
Provider Comments  Provider Comments:   Patient did not call or show up for his appt        Signatures   Electronically signed by : Madison Collazo MD; Oct  5 2016  6:05PM EST                       (Author)

## 2018-01-11 NOTE — PROGRESS NOTES
Assessment    1  HIV disease (042) (B20)   2  Bipolar affective disorder (296 80) (F31 9)   3  Agitation (307 9) (R45 1)   4  Non-adherence to medical treatment (V15 81) (Z91 19)   5  Pain in foot (729 5) (M79 673)   6  HTN (hypertension) (401 9) (I10)   7  Need for pneumocystis prophylaxis (V07 8) (Z29 8)   8  Encounter for preventive health examination (V70 0) (Z00 00)   9  Cocaine abuse (305 60) (F14 10)    Plan  Health Maintenance    · *1 -  DENTAL CLINIC Co-Management  *  Status: Active  Requested for: [id number]  Care Summary provided  : Yes   · Ophthalmology Follow Up Evaluation and Treatment  Follow-up  Status: Hold For -  Scheduling  Requested for: 25Wgp8138  HIV disease    · (1) CBC/PLT/DIFF; Status:Active; Requested for:66Let5927;    · (1) COMPREHENSIVE METABOLIC PANEL; Status:Active; Requested for:76Isg8960;    · (1) HEP C ANTIBODY; Status:Active; Requested for:96Rrg3633;    · (1) HIV GENOTYPE; [Do Not Release]; Status:Active; Requested for:96Yur8385;    · (1) HIV-1 RNA QUANTITATIVE; [Do Not Release]; Status:Active; Requested  for:14Jks5274;    · (1) T LYMPH SUBSET (CD4); Status:Active; Requested for:62Spg6457;    · (Q) HIV1 INTEGRASE GENOTYPE; [Do Not Release]; Status:Active; Requested  for:22Rda8285;    · *1 - [facility] BEHAVIORAL HEALTH CONSULTANT Co-Management  *  Status: Hold For -  Scheduling  Requested for: 02GMT4619  Care Summary provided  : Yes   · 1 - Rachel Leonard RD (Registered Dietitian) Co-Management  *  Status: Hold For -  Scheduling  Requested for: 42WXK5029  Care Summary provided  : Yes   · 1 - Cristal Rogel MD  (Infectious Disease) Co-Management  HIV care  Status: Hold For -  Scheduling  Requested for: 82NYV4997  Care Summary provided  : Yes    Discussion/Summary    1  HIV - ASC team looked through patient's hospital records and no recent HIV lab work was found  Staff will call the hospital alb to confirm this   He reports his last CD4 was <200 nd that it was drawn by his [address] jules TRUONG earlier this year  He's been very intermittent with HAART adherence over the past year  Warned patient about risk for resistance due to non-adherence and he verbalized understanding  Patient's hospital scripts were not correct - he was given Tivicay instead of Triumeq  Patient has not filled scripts regardless of the hospital error  CHARLIE told patient not to fill the Tivicay as this would mean he is on mono-therapy  He reports that he doesn't think he can fill any of them because his insurance is not active  Patient's  present for visit and again told patient that this is not the case and that he needs to bring his access card to the pharmacy and they will be able to fill his prescriptions  Patient verbalizes understanding but states that he believes he still has refills left from his [facility] provider  He utilized [employer] while seeing his [facility] provider and plans to call them to see if they can just ship his medications because they already have his insurance information  He states he cannot make this call until Thursday, 8/17/17 because of his work schedule  Patient states he will call the clinic on Thursday 8/17/17 to update staff on whether his medications were shipped or not  CHARLIE also instructed patient to complete lab work  It is unclear if [facility] accepts his [employer] at this point so patient was told about [facility] on [place] [facility]  He was given a lab slip and told to complete asap  He was supposed to meet with Dr Drake Harrell tomorrow but patient has no labs completed and reports he never knew about that appointment  Instructed patient to reschedule right away  2  Need for PCP Prophylaxis - Patient reports that he was on Bactrim from his previous provider  He states the hospital restarted it for him but he was unable to fill the script  Patient has a script with him that he should be able to fill at the [address] [place] [address] right away   He states he will do this unless [facility] can ship it to him  Reminded him to continue this pill, everyday, for PCP prophylaxis, and will continue this until his CD4 is >200 x3 months  3  Bipolar Affective Disorder - Patient was given a script for Cymbalta when he left the hospital but he states he will not fill this  He states that he does not need medication now, only while he was in the hospital  He has not made a follow up appointment with outpatient mental health services because he feels he doesn't need this  He brought a paper from [facility] which he wants CHARLIE to sign stating he doesn't need Cymbalta anymore  CHARLIE did not address this paper at this time  ANDREWNP instructed patient to take the script for Cymbalta to the [facility] and fill it and take the medication  Patient is emotionally unstable and continues to have relapses of drugs  He should have both routine therapy appointments as well as routine meetings with a psychiatrist  Patient will meet with [facility] at his first Juan [facility] follow up and discuss connection to [facility] or [facility], as he was told upon his hospital discharge  Patient denies SI/HI  He should seek medical care immediately for SI/HI  4  Cocaine Abuse - Patient reports active sobriety  He continues to reside at [facility]  5  HTN - Patient has history of HTN but is stable at today's visit and was during his hospitalization as well  He reports that he hasn't taken blood pressure medications in years and doesn't feel he needs them  [address] will continue to monitor and will recommend antihypertensive medications in the future as needed  6  B/L foot pain - Did not address at today's visit  Will address with patient's next follow up  Possible side effects of new medications were reviewed with the patient/guardian today  The treatment plan was reviewed with the patient/guardian   The patient/guardian understands and agrees with the treatment plan   The patient was counseled regarding instructions for management, risk factor reductions, impressions, importance of compliance with treatment  total time of encounter was 60 minutes and 30 minutes was spent counseling  Counseling   The patient was not counseled on risk of mother to child HIV transmission  Patient reports there are people in their life who should be tested for HIV  Education   general HIV education  adherence  prevention care  Chief Complaint  Patient here for a f/u  Patient c/o of feet pain  History of Present Illness  The patient presents for re-intake into primary care with CHARLIE at [facility]  The patient was previously open to [facility] services from 1/2016-8/2016  He left services due to crack cocaine relapse and a stay in inpatient rehab  When he left rehab he was homeless and then he had a brief hospitalization in the mental health unit at [facility] (7/2/17) for unstable bipolar affective disorder  He was discharged on 7/11/17 with a prescription for Cymbalta and instructions to follow up with mental health services at [facility] or [facility]  He was also given information for the [facility] clinic  He got a bed at [facility] and continues to reside there  He would like to re-engage with [facility] for both primary care and HIV management  Since leaving our services in 2016 the patient reports he was engaged in [address] [place] [address] [address] [facility] with an ID specialist in [facility]  His last prescribed treatment from [facility]  (March 2016) was Triumeq and states his [facility] MD kept him on the same thing  He admits to being on/off treatment several times over the past year due to moving frequently and relapsing on drugs  During his recent [facility] mental health hospitalization the Triumeq was continued and he took a split dose of Tivicay/Epzicom during his hospital stay  He reports that his [facility] doctor also had him on Bactrim because his CD4 was low so the hospital also continued his Bactrim  He was given scripts for HAART and Bactrim upon his hospital discharge  He reports he's had no medication since arriving at [facility] because [facility] could not fill his scripts  He states they told him he has no insurance coverage  Patient reports he doesn't know what his insurance situation is right now  He reports in the past he got his medication through [facility] and wants his meds to come from their instead of [facility]  Patient's past HAART includes Combivir, Viramune, and Triumeq (Tivicay/Abacavir/Lamivudine)  A previous genotype run at [facility] showed resistance to Rescriptor, Sustiva, and Viramune  He tested HLA  negative at [facility] in [facility]  He does not know his previous viral load but believs his CD4 was around 160 when his [facility] MD checked it last  He has not done any updated outpatient HIV lab work since returning to [place] [address] earlier this summer  He reports his HIV labs were drawn during his mental health hospitalization  The patient reports that he is currently abstaining from all drugs  He wants to remain clean and hopes that one day his wife will allow him to move back to [place] with her and they can be reunited  He reports that staying at [facility] requires him to remain sober, or else he loses his bed  He reports that his mood has been fine since leaving the hospital  He has not been taking the prescribed Cymbalta and does not plan on filling that script  He feels that he does not need it  He has not made appointments for outpatient mental health services as he was instructed to upon his hospital discharge  Pain Assessment   the patient states they do not have pain  The pain is located in the feet  The patient describes the pain as burning and throbbing  (on a scale of 0 to 10, the patient rates the pain at 7 )   Abuse And Domestic Violence Screen    Yes, the patient is safe at home  The patient states no one is hurting them  Depression And Suicide Screen  No, the patient has not had thoughts of hurting themself     No, the patient has not felt depressed in the past 7 days  The patient is being seen for an initial evaluation of an existing diagnosis of HIV infection  The patient is currently asymptomatic  No associated symptoms are reported  (Is prescribed Triumeq but doesn't have any  Was also on Bactrim during hospitalization but states he doesn't have any of these pills left over)     CD4: unknown  VL: unknown  Time spent: 10    Strength: previously experienced no side effects on Triumeq  Weakness: unclear insurance coverage, mental health, substabce abuse, lack of committment to care  Plan of Action: patient needs to stay in contact with both his social work at [employer] and his [employer] from [facility] so everyone is aware of his medication coverage, he needs to complete lab work asap  SUBSTANCE ABUSE: not using ETOH  not using drugs  SMOKING: He is a current smoker, uses cigarettes, 1 pk daily packs per day, for 40+ years and has thought about quitting  SEXUALLY ACTIVE: He is not sexually active  HOUSING: He has stable housing  There are 38( [place]) people living in the household (including children)  The household income is $250 00/weekly  HEALTH MAINTENANCE: His last dental exam was 5/1/2016  His last eye exam was 2/1/2014  Review of Systems    Constitutional: No fever or chills, feels well, no tiredness, no recent weight gain or weight loss  Eyes: No complaints of eye pain, no red eyes, no discharge from eyes, no itchy eyes  ENT: no complaints of earache, no hearing loss, no nosebleeds, no nasal discharge, no sore throat, no hoarseness  Cardiovascular: Hx of HTN, patient does not recall last time he took blood pressure medication, but No complaints of slow heart rate, no fast heart rate, no chest pain, no palpitations, no leg claudication, no lower extremity  Respiratory: No complaints of shortness of breath, no wheezing, no cough, no SOB on exertion, no orthopnea or PND     Gastrointestinal: No complaints of abdominal pain, no constipation, no nausea or vomiting, no diarrhea or bloody stools  Genitourinary: No complaints of dysuria, no incontinence, no hesitancy, no nocturia, no genital lesion, no testicular pain  Musculoskeletal: B/L Plantar Foot Pain  Integumentary: No complaints of skin rash or skin lesions, no itching, no skin wound, no dry skin  Neurological: patient has history of peripheral neuropathy in his [address] records, but No compliants of headache, no confusion, no convulsions, no numbness or tingling, no dizziness or fainting, no limb weakness, no difficulty walking  Psychiatric: emotional problems and s/p hospitalization for unstable bipolar affective disorder, but not suicidal    Endocrine: No complaints of proptosis, no hot flashes, no muscle weakness, no erectile dysfunction, no deepening of the voice, no feelings of weakness  Hematologic/Lymphatic: No complaints of swollen glands, no swollen glands in the neck, does not bleed easily, no easy bruising  ROS reviewed  Active Problems    1  Acquired immune deficiency syndrome (AIDS) (042) (B20)   2  Diarrhea (787 91) (R19 7)   3  Dizziness (780 4) (R42)   4  HIV disease (042) (B20)   5  Onychomycosis (110 1) (B35 1)   6  Peripheral neuropathy (356 9) (G62 9)   7  Plantar porokeratosis, 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)   8  Plantar warts (078 12) (B07 0)   9  Routine screening for STI (sexually transmitted infection) (V74 5) (Z11 3)   10  Smoking (305 1) (F17 200)   11  Tinea cruris (110 3) (B35 6)   12  Tinea pedis of both feet (110 4) (B35 3)    Past Medical History    1  History of Bipolar 1 disorder (296 7) (F31 9)   2  History of depression (V11 8) (Z86 59)   3  History of TIA (transient ischemic attack) (V12 54) (Z86 73)   4  History of Lithium use (V58 69) (Z79 899)   5  History of Warts of foot (078 10) (B07 9)    The active problems and past medical history were reviewed and updated today        Surgical History    The surgical history was reviewed and updated today  Family History  Mother    1  Family history of Coronary arteriosclerosis   2  Family history of diabetes mellitus (V18 0) (Z83 3)  Father    3  Family history of Coronary arteriosclerosis    The family history was reviewed and updated today  Social History    · Current every day smoker (305 1) (F17 200)   · History of abuse of recreational drug (305 93)   · Smoking (305 1) (F17 200)  The social history was reviewed and updated today  The social history was reviewed and is unchanged  Current Meds   1  Aspirin EC Low Dose 81 MG Oral Tablet Delayed Release; take 1 tablet by mouth once   daily; Therapy: 09Qfu9620 to (Bismark Rendon)  Requested for: 62MWR9511; Last   Rx:[id number] Ordered   2  Bactrim -160 MG Oral Tablet; Therapy: 11RCH8339 to Recorded   3  Cymbalta 60 MG Oral Capsule Delayed Release Particles; Therapy: 04PAA9540 to Recorded   4  Triumeq 600- MG Oral Tablet; Take 1 tablet daily; Therapy: 26WOZ9802 to (Kerrie Nobles)  Requested for: 01Xid2266; Last   Rx:[id number]; Status: ACTIVE - Retrospective By Protocol Authorization Ordered    The medication list was reviewed and updated today  Allergies    1  No Known Drug Allergies    Vitals  Signs   Recorded: 38Baw7889 02:20PM   Temperature: 97 5 F  Heart Rate: 89  Systolic: 589  Diastolic: 74  Height: 5 ft 10 in  Weight: 208 lb 8 86 oz  BMI Calculated: 29 92  BSA Calculated: 2 12  O2 Saturation: 97    Physical Exam    Constitutional   General appearance: No acute distress, well appearing and well nourished  Ears, Nose, Mouth, and Throat   Oropharynx: Normal with no erythema, edema, exudate or lesions  Pulmonary   Respiratory effort: No increased work of breathing or signs of respiratory distress  Auscultation of lungs: Clear to auscultation  Cardiovascular   Auscultation of heart: Normal rate and rhythm, normal S1 and S2, without murmurs  Examination of extremities for edema and/or varicosities: Normal     Abdomen   Abdomen: Non-tender, no masses  Liver and spleen: No hepatomegaly or splenomegaly  Lymphatic   Palpation of lymph nodes in neck: No lymphadenopathy  Psychiatric   Orientation to person, place, and time: Normal     Mood and affect: Abnormal   Mood and Affect: frustrated  Attending Note  Collaborating Physician: I agree with the Advanced Practitioner note  Future Appointments    Date/Time Provider Specialty Site   09/20/2017 04:15 PM Kiya Ge MD Infectious Disease [facility] AT [address] [facility]   Electronically signed by : Bill Bundy;  Aug 16 2017  5:49PM EST                       (Author)    Electronically signed by : Fuad Rose DO; Aug 23 2017  5:52PM EST                       (Author)

## 2018-01-12 VITALS
OXYGEN SATURATION: 97 % | HEART RATE: 73 BPM | TEMPERATURE: 98 F | WEIGHT: 195.33 LBS | HEIGHT: 70 IN | BODY MASS INDEX: 27.96 KG/M2 | SYSTOLIC BLOOD PRESSURE: 131 MMHG | DIASTOLIC BLOOD PRESSURE: 69 MMHG

## 2018-01-12 NOTE — PROGRESS NOTES
Assessment    1  HIV disease (042) (B20)   2  Diarrhea (787 91) (R19 7)   3  Plantar warts (078 12) (B07 0)   4  Tinea cruris (110 3) (B35 6)   5  Current every day smoker (305 1) (F17 200)   6  Smoking (305 1) (F17 200)    Plan  Health Maintenance    · Follow-up visit in 3 months Evaluation and Treatment  Follow-up  Status: Hold For -  Scheduling  Requested for: [id number]   · Ophthalmology Follow Up Evaluation and Treatment  Follow-up  Status: Hold For -  Scheduling  Requested for: 41Rrz6685  Health Maintenance,     · Aspirin EC 81 MG Oral Tablet Delayed Release; Take one tablet once daily  HIV disease    · Triumeq 600- MG Oral Tablet; take 1 tablet by mouth daily   · Rafiq Rogel MD, Lily Pires  (Infectious Disease) Physician Referral  Consult  Status: Hold For  - Scheduling  Requested for: 50FEQ6883  Care Summary provided  : Yes  Plantar warts    · *[address] - [address] [place] [address] Physician Referral  Consult  Status: Hold For - Scheduling   Requested for: 38RKP7369  Care Summary provided  : Yes  Tinea cruris    · Clotrimazole 1 % External Cream; Apply to affected skin twice daily    Discussion/Summary    1  HIV/AIDS - Last CD4 stable, viral load = 760  Patient now taking Triumeq, 1 tab Po daily, stressed adherence  Patient due for labwork in 4 weeks and will see Dr Kesha Pulliam again in 6 weeks  2  Diarrhea - Encouraged patient to reconsider completing stool studies as this will help determine if the diarrhea is being cause by an infectious process  CRNP explained that infectious diarrhea needs to be treated very differently  CRNP explained options for treatment if diarrhea is not infections such as immodium which can be called into the pharmacy for him  Patient has also never had a colonoscopy  Discussed procedure and importance of screening for cancer  Patient agreed that he will do the stool studies, lab slip given, patient will return to lab in the morning to  collection kit   Patient also agreeable to completing a colonoscopy this year before he heads back to [place]  3  Plantar Warts - Patient has several large plantar warts on he right plantar feet, some of which have callused over  Will give patient podiatry referral for first available appointment      -Patient is given opthalmology referral, patient can work with patient navigator to set up a provider who is covered by his insurance      -Patient has a lot of questions about his insurance, co-pays, and prescription coverage  Patient has enrolled in case management services at [facility] and is encouraged to reach out to his  Deborah Enrique to set up a visit in which all his questions can be addressed  Possible side effects of new medications were reviewed with the patient/guardian today  The treatment plan was reviewed with the patient/guardian  The patient/guardian understands and agrees with the treatment plan   The patient was counseled regarding diagnostic results, instructions for management, risk factor reductions, prognosis, patient and family education, impressions, risks and benefits of treatment options, importance of compliance with treatment  total time of encounter was 60 minutes and 45 minutes was spent counseling  Counseling   The patient was not counseled on risk of mother to child HIV transmission  Patient reports there are no people in their life who should be tested for HIV  Education   general HIV education  adherence  prevention care  Chief Complaint  Patient here for a new appointment  Patient c/o of diarrhea for the past 2 months  History of Present Illness  The patient presents for initial intake with CHARLIE for primary care  He is transfering care to our clinic after relocating to the [facility] from [address] [place] [address]  He reports that he lived in the [facility] for most of his life  He met a [place] woman online a few years ago and  her and moved to [facility])   He reports that his marriage fell apart because of his drug abuse and he ended up coming back to [place]  He states that he has been in and out of rehab facilities but has been stable the past few months  He intends to return to [place] by the end of the year to repair his marriage now that he is not using drugs anymore  He is temporarily residing at a half way house  The patient reports that he has been very non-compliant with his medications for HIV in the past  He states that he doesn't want this to be the case anymore  He started services with Dr Chandler Yadav for HIV care last week and has been taking his new antiretroviral medication, Triumeq, correctly since then  He denies missed or late doses  He is able to receive mail at the [facility] and would like to switch to [facility]  Reports that his only medical concern is diarrhea  He reports that he's had daily diarrhea for the past 2 months  Reports that it has a strong odor and is thin liquid  He states that he hasn't tried anything OTC to manage the diarrhea because he can't spend a lot of money right now  Reports that Dr Elvia Mccain told him to do stool studies but he doesn't want to  He states that he has tried to eat different things to see if anything triggers the diarrhea or if anything makes it lessen but he reports that he hasn't found anything helpful  CHARLIE reviewed records released from [place] as well as old [facility] records from when he resided in the [facility] before  The patient had multiple psychiatric admissions and has a history of depression, bipolar 1 disorder, suicide attempts, gambling addiction, and lithium management  The patient initially denied a psychiatric history to the clinic team other than his drug addiction history  When asked about these previous admissions he became annoyed and defensive  He states that during those years he was homeless a lot because he spent his money on drugs   He states that he needed a safe place to sleep so he'd go to the ED and tell them he wanted to kill himself and then he would have a bed in the hospital for a few days  He reports that all of that was in his past and that right now he feels very stable  Reports that he goes to [facility] every day  He denies SI/HI  He denies depressed mood  He denies anxiety  Reports that he also would like CRNP to see a rash in his groin  He reports that it is intensely itchy, especially in the cold weather  He states the rash has caused his skin to darken and he's worried it's permanent  Reports that it has been present for over 6 months and at times gets flaky  He's tried to use different soaps and laundry detergents to make sure he isn't allergic to anything and he hasn't had any luck stopping the rash  -He is requesting a podiatry referral for right plantar warts      Pain Assessment   the patient states they do not have pain  Abuse And Domestic Violence Screen    Yes, the patient is safe at home  The patient states no one is hurting them  Depression And Suicide Screen  No, the patient has not had thoughts of hurting themself  No, the patient has not felt depressed in the past 7 days  The patient is being seen for a routine clinic follow-up of HIV infection  The patient is currently experiencing symptoms  The patient presents with complaints of sudden onset of frequent episodes of moderate diarrhea, described as loose, watery and foul-smelling  Risk Factors: no travel, no ill contact, no suspicious food, no antibiotic use, no food allergy, no dietary intolerance, no well water consumption, no laxative abuse and no alcohol abuse  Pertinent Medical History: HIV, but not irritable bowel syndrome, inflammatory bowel disease, cholecystitis, diverticulitis, celiac disease, diabetes, thyroid disorder, colon cancer, alcohol abuse, vagotomy, cholecystectomy, appendectomy and bowel resection  Family History: diabetes  CD4: 238  VL: 760  Time spent: 10 minutes  Strength: no side effects     Weakness: hx or drug abuse and mental health concerns  Plan of Action: stay at [facility], utilize [facility] and adherence RN PRN  SUBSTANCE ABUSE: not using ETOH  not using drugs  SMOKING: He is a current smoker, uses cigarettes, 1/2 pk packs per day, for since he was 16 yrs old years and has not thought about quitting  [facility]  SEXUALLY ACTIVE: He is not sexually active  HOUSING: He has stable housing  There are 39(care home house) people living in the household (including children)  The household income is $0    HEALTH MAINTENANCE: His last dental exam was 12/1/2015  His last eye exam was 2/1/2014  Review of Systems    Constitutional: No fever or chills, feels well, no tiredness, no recent weight gain or weight loss  Eyes: last eye exam over 5 years ago, but No complaints of eye pain, no red eyes, no discharge from eyes, no itchy eyes  ENT: reports that [facility] is working on a partial plate for his upper teeth, but no complaints of earache, no hearing loss, no nosebleeds, no nasal discharge, no sore throat, no hoarseness  Cardiovascular: No complaints of slow heart rate, no fast heart rate, no chest pain, no palpitations, no leg claudication, no lower extremity  Respiratory: No complaints of shortness of breath, no wheezing, no cough, no SOB on exertion, no orthopnea or PND  Gastrointestinal: diarrhea, but as noted in HPI  Genitourinary: No complaints of dysuria, no incontinence, no hesitancy, no nocturia, no genital lesion, no testicular pain  Musculoskeletal: No complaints of arthralgia, no myalgias, no joint swelling or stiffness, no limb pain or swelling  Psychiatric: hx of crack cocaine abuse, but as noted in HPI  Endocrine: No complaints of proptosis, no hot flashes, no muscle weakness, no erectile dysfunction, no deepening of the voice, no feelings of weakness     Hematologic/Lymphatic: No complaints of swollen glands, no swollen glands in the neck, does not bleed easily, no easy bruising  Active Problems    1  Acquired immune deficiency syndrome (AIDS) (042) (B20)   2  Diarrhea (787 91) (R19 7)   3  HIV disease (042) (B20)    Past Medical History    1  History of Bipolar 1 disorder (296 7) (F31 9)   2  History of depression (V11 8) (Z86 59)   3  History of TIA (transient ischemic attack) (V12 54) (Z86 73)   4  History of Lithium use (V58 69) (Z79 899)   5  History of Warts of foot (078 10) (B07 9)    The active problems and past medical history were reviewed and updated today  Surgical History    The surgical history was reviewed and updated today  Family History    1  Family history of Coronary arteriosclerosis   2  Family history of diabetes mellitus (V18 0) (Z83 3)    3  Family history of Coronary arteriosclerosis    The family history was reviewed and updated today  Social History    · Current every day smoker (305 1) (F17 200)   · History of abuse of recreational drug (305 93)   · Smoking (305 1) (F17 200)  The social history was reviewed and updated today  The social history was reviewed and is unchanged  Current Meds   1  Aspirin EC 81 MG Oral Tablet Delayed Release; TAKE 1 TABLET DAILY; Therapy: 65RMN0784 to Recorded   2  Triumeq 600- MG Oral Tablet; take 1 tablet by mouth daily; Therapy: 35GOQ6372 to (Meghana Ceballos)  Requested for: 32ZQJ8786; Last   Rx:07Jan2016 Ordered    The medication list was reviewed and updated today  Allergies    1  No Known Drug Allergies    Vitals  Signs [Data Includes: Current Encounter]   Recorded: 13BWC7207 03:00PM   Temperature: 92 8 F  Heart Rate: 78  Respiration: 18  Systolic: 649  Diastolic: 81  Height: 5 ft 9 in  Weight: 204 lb 9 38 oz  BMI Calculated: 30 21  BSA Calculated: 2 09  O2 Saturation: 97  Pain Scale: 0    Physical Exam    Constitutional   General appearance: No acute distress, well appearing and well nourished      Eyes   Conjunctiva and lids: No swelling, erythema or discharge  Pupils and irises: Equal, round and reactive to light  Ears, Nose, Mouth, and Throat   External inspection of ears and nose: Normal     Otoscopic examination: Tympanic membranes translucent with normal light reflex  Canals patent without erythema  Nasal mucosa, septum, and turbinates: Normal without edema or erythema  Oropharynx: Normal with no erythema, edema, exudate or lesions  Pulmonary   Respiratory effort: No increased work of breathing or signs of respiratory distress  Auscultation of lungs: Clear to auscultation  Cardiovascular   Auscultation of heart: Normal rate and rhythm, normal S1 and S2, without murmurs  Examination of extremities for edema and/or varicosities: Normal     Carotid pulses: Normal     Abdomen   Abdomen: Non-tender, no masses  Liver and spleen: No hepatomegaly or splenomegaly  Lymphatic   Palpation of lymph nodes in neck: No lymphadenopathy  Musculoskeletal   Gait and station: Normal     Skin   Skin and subcutaneous tissue: Abnormal   darkened skin in B/L groins with occasional dry flat flaky patches, darkened area very symmetric and extends from edge of the genitals, across the groin, and onto the B/L inner thighs  Neurologic   Cranial nerves: Cranial nerves 2-12 intact  Psychiatric   Orientation to person, place, and time: Normal        Attending Note  Collaborating Physician: I supervised the Advanced Practitioner and I agree with the Advanced Practitioner note        Signatures   Electronically signed by : Wilian Garcia; Jan 12 2016  3:51PM EST                       (Author)    Electronically signed by : Seb Portillo DO; Jan 13 2016  7:50AM EST                       (Review)

## 2018-01-12 NOTE — PROGRESS NOTES
Discussion/Summary    Intervention Diet Prescription   Energy 2075 kcal   25kcal /83kg   Protein 83g   1g /83kg   Fluid 2075ml   25ml /83kg Based on IBW 83kg  His current intake is meeting estimated nutrition needs  Nutrition Recommendations:   General healthy eating for HIV - balanced meals, limit soda, encourage fruits and vegetables  Goal #1 - Improve/Maintain  Healthy eating for HIV  Intervention Nutrition Education Provided  Person Educated: patient   Topics Discussed: Balanced diet and healthy eating   Barriers To Learning: none  Readiness to Learn: Receptive  Teaching Method: verbal   Evaluation Of Learning: verbalized/demonstrated understanding       History of Present Illness  Assessment: Clinical Data/Client History HIV - Yes  His socio-economic status includes  cooks and eats out  He lives in a(n) [address] [facility]   Living Environment - He has access to refrigerator, stove and microwave  Psycho-Social factors are  depression  His functional status is  able to food shop and prepares own meals  His activity level is  normal    He eats breakfast at  [employer], granola bar, coffee w/ flavored creamer  He eats lunch at  At work, sandwich, takeout  He eats dinner at   Drinks soda, ~1 qt water/day  Prefers small meals throughout day  His appetite is  good   He does not take supplements  He has problems with  Reviewed [place] questions w/ pt  Pt states no mouth pain, cavities or trouble chewing  States he has not seen a dentist in over 1 yr but cannot make a dental appt right now as he just started a new job  States once he is past his 90 days at new job, he will be open to making dental appt  His CM is Neli Lucas  Nutrition Diagnosis   Problem related to no nutrition diagnosis at this time  Pt's wt 195#, BMI 28  Lost 13# since last visit  Will continue to monitor wt and labs  Active Problems    1  Acquired immune deficiency syndrome (AIDS) (042) (B20)   2   Agitation (307  9) (R45 1)   3  Bipolar affective disorder (296 80) (F31 9)   4  Cocaine abuse (305 60) (F14 10)   5  Diarrhea (787 91) (R19 7)   6  Dizziness (780 4) (R42)   7  HIV disease (042) (B20)   8  HTN (hypertension) (401 9) (I10)   9  Need for pneumocystis prophylaxis (V07 8) (Z29 8)   10  Nicotine dependence (305 1) (F17 200)   11  Non-adherence to medical treatment (V15 81) (Z91 19)   12  Onychomycosis (110 1) (B35 1)   13  Pain in foot (729 5) (M79 673)   14  Peripheral neuropathy (356 9) (G62 9)   15  Plantar porokeratosis, 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)   16  Plantar warts (078 12) (B07 0)   17  Right shoulder pain (719 41) (M25 511)   18  Routine screening for STI (sexually transmitted infection) (V74 5) (Z11 3)   19  Smoking (305 1) (F17 200)   20  Tinea cruris (110 3) (B35 6)   21  Tinea pedis of both feet (110 4) (B35 3)    Past Medical History    1  History of Bipolar 1 disorder (296 7) (F31 9)   2  History of depression (V11 8) (Z86 59)   3  History of TIA (transient ischemic attack) (V12 54) (Z86 73)   4  History of Lithium use (V58 69) (Z79 899)   5  History of Warts of foot (078 10) (B07 9)    Family History  Mother    1  Family history of Coronary arteriosclerosis   2  Family history of diabetes mellitus (V18 0) (Z83 3)  Father    3  Family history of Coronary arteriosclerosis    Social History    · Current every day smoker (305 1) (F17 200)   · History of abuse of recreational drug (305 93)   · Smoking (305 1) (F17 200)    Current Meds   1  Aspirin EC Low Dose 81 MG Oral Tablet Delayed Release; take 1 tablet by mouth once daily; Therapy: 04Ruj5240 to (Elisa White)  Requested for: 91NPN5108; Last Rx:00Zwt2583   Ordered   2  Bactrim -160 MG Oral Tablet (Sulfamethoxazole-Trimethoprim); Therapy: 83XVZ3579 to Recorded   3  Cymbalta 60 MG Oral Capsule Delayed Release Particles (DULoxetine HCl); Therapy: 04SOJ5853 to Recorded   4  Triumeq 600- MG Oral Tablet;  Take 1 tablet daily; Therapy: 83MBI7496 to (Evaluate:12Anm4763)  Requested for: 64DTT1867; Last Rx:00Ayo5603   Ordered    Allergies    1   No Known Drug Allergies    Vitals  Vitals   Recorded: 52XEV8103 29:68NV   Systolic: 436  Diastolic: 69  Temperature: 98 F  Heart Rate: 73  Height: 5 ft 10 in  Weight: 195 lb 5 22 oz  BMI Calculated: 28 03  BSA Calculated: 2 07  O2 Saturation: 97  Recorded: 35APJ3016 66:14II   Systolic: 871  Diastolic: 74  Temperature: 97 5 F  Heart Rate: 89  Height: 5 ft 10 in  Weight: 208 lb 8 86 oz  BMI Calculated: 29 92  BSA Calculated: 2 12  O2 Saturation: 97  Recorded: 28NIY5729 46:15PS   Systolic: 618  Diastolic: 82  Temperature: 97 6 F  Heart Rate: 84  Respiration: 18  Height: 5 ft 8 in  Weight: 208 lb 15 92 oz  BMI Calculated: 31 78  BSA Calculated: 2 08  O2 Saturation: 96  Recorded: 34ZPZ5826 45:42GZ   Systolic: 263  Diastolic: 81  Temperature: 97 6 F  Heart Rate: 95  Respiration: 18  Height: 5 ft 8 in  Weight: 205 lb 0 42 oz  BMI Calculated: 31 17  BSA Calculated: 2 07  Pain Scale: 5  O2 Saturation: 96  Recorded: 16ZAL2739 42:79QN   Systolic: 521  Diastolic: 98  Temperature: 97 9 F  Heart Rate: 76  Height: 5 ft 8 90 in  Weight: 205 lb 0 42 oz  BMI Calculated: 30 37  BSA Calculated: 2 09  Recorded: 12IXM4916 37:96BO   Systolic: 084  Diastolic: 81  Temperature: 92 8 F  Heart Rate: 78  Respiration: 18  Height: 5 ft 9 in  Weight: 204 lb 9 38 oz  BMI Calculated: 30 21  BSA Calculated: 2 09  Pain Scale: 0  O2 Saturation: 97  Recorded: 58IBT6446 84:18HM   Systolic: 059  Diastolic: 83  Temperature: 97 5 F  Heart Rate: 88  Respiration: 18  Height: 5 ft 9 in  Weight: 201 lb 7 98 oz  BMI Calculated: 29 76  BSA Calculated: 2 07  Pain Scale: 0    Future Appointments    Date/Time Provider Specialty Site   11/17/2017 11:30 AM Doug Bradford, [address] [facility] AT [address] [facility]   Electronically signed by : PIO Winter; Sep 21 2017  9:44AM EST                       (Author)

## 2018-01-13 VITALS
HEART RATE: 89 BPM | BODY MASS INDEX: 29.86 KG/M2 | OXYGEN SATURATION: 97 % | SYSTOLIC BLOOD PRESSURE: 119 MMHG | WEIGHT: 208.56 LBS | TEMPERATURE: 97.5 F | DIASTOLIC BLOOD PRESSURE: 74 MMHG | HEIGHT: 70 IN

## 2018-01-13 NOTE — MISCELLANEOUS
Provider Comments  Provider Comments:   Patient did not call or show up for appt        Signatures   Electronically signed by : Kevin Grewal; Jul 25 2016  3:16PM EST                       (Author)

## 2018-01-13 NOTE — PROGRESS NOTES
History of Present Illness  [facility]:   He is being seen in follow-up  The patient is being seen regarding wellness screener, smoking cessation, depression   Support/Coping: preparing to start seeing a therapist at [employer]  The patient states the problem is mild  [facility]:         1  I like who I am  Yes, describes me exactly (12)   2  I am not an easy person to get along with    No, doesn't describe me at all (22)   3  I am basically a healthy person    Yes, describes me exactly (32)   4  I give up to easily    No, doesn't describe me at all (42)   5  I have difficulty concentrating    No, doesn't describe me at all (52)   6  I am happy with my family relationships    Yes, describes me exactly (62)   7  I am comfortable being around people    Yes, describes me exactly (72)     8  Walking up a flight of stairs    None (82)   9  Running the length of a football field    Some (91)     10  Sleeping    Natali Evelyn A Lot (100)   11  Hurting or aching in any part of your body    Natali Sh[place]sbury A Lot (110)   12  Getting tired easily    None (122)   13  Feeling depressed or sad    Some (131)   14  Nervousness    None (142)     15  Socialize with other people (talk or visit with friends or relatives A Lot ()   12  Take part in social, Alevism, or recreation activities (meetings, Gnosticism, movies, sports, parties)    Natali Evelyn A Lot (162)   17   Stay in your home, nursing home, or hospital because of sickness, injury, or other health problem None (172)   [address]     8 = 2   9 = 1   10 = 0   11 = 0   12 = 2   Sum = 5   PHYSICAL HEALTH SCORE 50      1 = 2   4 = 2   5 = 2   13 = 1 14 = 2   Sum = 9   MENTAL HEALTH SCORE 90      2 = 2   6 = 2   7 = 2   15 = 2   16 = 2   Sum = 10   SOCIAL HEALTH SCORE 100  Physical Health score = 50   Mental Health score = 90   Social Health score = 100   Sum = 240   GENERAL HEALTH SCORE 80      3 = 2   PERCEIVED HEALTH SCORE 100      1 = 2   2 = 2   4 = 2   6 = 2   7 = 2   Sum = 10   SELF-ESTEEM SCORE 100            2 = 2 and 0   5 = 2 and 0   7 = 2 and 0   10 = 0 and 2   12 = 2 and 0   14 = 2 and 0   Sum = 2   ANXIETY SCORE 16 666      4 = 2 and 0   5 = 2 and 0   10 = 0 and 2   12 = 2 and 0   13 = 1 and 1   Sum = 3   DEPRESSION SCORE 30      4 = 2, 0, 2 and 0   7 = 2 and 0   10 = 0 and 2   12 = 2 and 0   13 = 1 and 1   14 = 2 and 0   Sum = 3   ANXIETY-DEPRESSION ([facility]) SCORE 21 429      11 = 0 and 2   PAIN SCORE 100      17 = 2 and 0   DISABILITY SCORE 0  Smoking Cessation (Brief): The patient is being seen for clinic follow-up for and PT stated that he smokes 3/4 pack per day and is not ready to quit smoking  He has cut back "some" because it "tastes bad" but continues to smoke because it's "a habit " [facility] encouraged PT to work with [facility] to reduce the habit by finding replacement behaviors and/or by starting NRT  PT declined and said, "I'll quit when I'm damn well ready to and I'll do it cold turkey on my own " [facility] again offered support and PT again declined  tobacco cessation assistance  The patient has no interest in quitting  Patient perceived smoking benefits include Habit  Physical Exam    Objective: Orientation: oriented to person, oriented to place and oriented to time  Appearance: well nourished and appearance reflects stated age  Observed mood and affect: appropriate  Harm to self or others: None reported or observed  Substance abuse: None reported or observed  Assessment    1  Cocaine abuse (305 60) (F14 10)   2  HIV disease (042) (B20)   3  Nicotine dependence (305 1) (F17 200)    Plan    1   Triumeq 600- MG Oral Tablet; Take 1 tablet daily   2  (1) CBC/PLT/DIFF; Status:Active; Requested TDP:11NMK7706;    3  (1) COMPREHENSIVE METABOLIC PANEL; Status:Active; Requested VZE:[id number];    4  (1) HIV-1 RNA QUANTITATIVE; [Do Not Release]; Status:Active; Requested for:45Cxk9705;      5  (1) T LYMPH SUBSET (CD4); Status:Active; Requested [id number];     6  (1) LIPID PANEL, FASTING; Status:Active; Requested ECR:97ZAC1148;     7  Follow-up visit in 1 month Evaluation and Treatment  Follow-up  Status: Hold For -   Scheduling  Requested for: 14UPW1178    6  *1 -  [facility] ([facility] )   Co-Management  *  Status: Active  Requested for: 13DVW9837  Care Summary provided  : Yes    [address] [facility]: Today, patient presents with mild depression related to "sexual concerns"  Patient will likely benefit from following through with connecting to ongoing [place] [address] services  The stage of change is preparation  Behavioral recommendations: 1  F/U with [facility] at next visit  2  Following through with connecting to ongoing [place] [address] services  3  Consider working with [facility] on smoking cessation  Discussion Summary [facility]:   PT was seen for his behavioral health consultation with [facility] services were reviewed  PT completed his [facility] screener  PT denies any major concerns but did mention that he has called [facility] to start therapy for "sexual issues" that are causing him mild depression  PT declined to elaborate as he is "already working on getting help " [facility] encouraged PT to follow through with [facility] but also offered to help connect him elsewhere if the wait is too long or he is unhappy with their services  PT agreed to call [facility] if help is needed  PT is still smoking (see other portion of note)  He was very clear in his decision to only stop "cold turkey" and on his own  He is aware that he smokes out of habit and that it is unhealthy   He stated that he "should quit" for his health but that he will only do so when he's "damn well ready to," and nothing NADIA SUJATHA [facility] says or does will change his mind  ERNESTO SOLARES [facility] still offered to give support and/or start NRT, but PT strongly declined        Future Appointments    Date/Time Provider Specialty Site   11/17/2017 11:30 AM Jac[facility] Ruth, [facility] AT [place]     Signatures   Electronically signed by : David Moctezuma MS; Sep 21 2017 10:40AM EST                       (Author)

## 2018-01-14 NOTE — PROGRESS NOTES
Assessment    1  HIV disease (042) (B20)   2  Dizziness (780 4) (R42)   3  Current every day smoker (305 1) (F17 200)    Plan    1  Aspirin EC 81 MG Oral Tablet Delayed Release; Take one tablet once daily    2  Triumeq 600- MG Oral Tablet; take 1 tablet by mouth daily    3  Clotrimazole 1 % External Cream; Apply to affected skin twice daily    Discussion/Summary  Discussion Summary:   1  HIV - Last labs from fall 2015 showed XN2=595, VL= 760  Patient now taking Triumeq for approximately 6 weeks, will continue  Patient due for HIV RNA now, encouraged patient to complete as soon as possible so that the results are received in time for his HIV follow up appointment later this month  Stressed adherence  Patient to continue routine f/u with Dr Rogel  2  Headache/Dizziness - Patient possibly having BBPV vs orthostatic hypotension related to his dehydration  BP is stable at today's visit  Possible cause is dehydration  Recommended patient eat a salty snack when he gets home and increase his fluid intake  Patient due to CBC/CMP for Dr Les Stuart, encouraged him to have this drawn as soon as possible  Patient also on Triumeq for HIV care now, with possible side effect of headache  Discussed how medication side effects usually resolve within the first 6-10 weeks of taking an HIV medication  Patient will try Meclizine for symptoms if they continue for over a week  3  Diarrhea - Reviewed stool studies with patient, patient ok to use OTC Imodium as his diarrhea is not infectious  Counseling Documentation With Imm: The patient was counseled regarding diagnostic results, instructions for management, risk factor reductions, prognosis, patient and family education, impressions, risks and benefits of treatment options, importance of compliance with treatment  total time of encounter was 25 minutes and 15 minutes was spent counseling     Medication SE Review and Pt Understands Tx: Possible side effects of new medications were reviewed with the patient/guardian today  The treatment plan was reviewed with the patient/guardian  The patient/guardian understands and agrees with the treatment plan      Chief Complaint  Chief Complaint Free Text Note Form: Patient here for symptoms of vertigo which started 4 days ago    Patient also c/o of headaches  History of Present Illness  HPI: The patient presents for a sick visit  He reports that he has been experiencing what he thinks is vertigo for the past 4 days  The patient reports that he had vertigo a few years ago when he was living in [place]  He reports that he was unable to get medication due to being uninsured  He reports that he let it go and then after a while it corrected itself and he felt better  The patient now states the symptoms started again on Friday, March 4th  He reports a "woozy" feeling  He reports that he feels off balance but denies feeling like he is spinning or that the room is spinning around him  He states that when it happens he occasionally feels like his vision "shakes" for a minute but he denies floaters, blurry vision  He denies neck pain and stiffness  He denies problems hearing or ringing in the ears  He reports that the dizziness lasts about 45 seconds and it is happening about 4 times a day  He notices it is worse when he is laying down and goes to move  He reports that the feeling causes him to be slightly nauseous but it does not cause him to vomit  Right now he reports that he has a right headache  He states that it is right over his right eye, feels dull, like a constant ache that doesn't go away  He reports that the headache began on Friday when the vertigo did and has not really subsided  He denies past trauma to his head  When asked about hydration the patient admits to cutting way back on fluid because he feels it helps with his diarrhea  States he's noticed less diarrhea over the past month since cutting back   (Has also been on new HIV medications since mid-January 2015)  Hospital Based Practices Required Assessment:   Pain Assessment   the patient states they have pain  The pain is located in the headache  (on a scale of 0 to 10, the patient rates the pain at 5 )   Abuse And Domestic Violence Screen    Yes, the patient is safe at home  The patient states no one is hurting them  Depression And Suicide Screen  No, the patient has not had thoughts of hurting themself  No, the patient has not felt depressed in the past 7 days  Review of Systems  Focused-Male:   Constitutional: feeling tired, but as noted in HPI    ENT: no complaints of earache, no loss of hearing, no nosebleeds or nasal discharge, no sore throat or hoarseness  Cardiovascular: no complaints of slow or fast heart rate, no chest pain, no palpitations, no leg claudication or lower extremity edema  Respiratory: no complaints of shortness of breath, no wheezing or cough, no dyspnea on exertion, no orthopnea or PND  Gastrointestinal: as noted in HPI    The patient presents with complaints of sudden onset of occasional episodes of moderate diarrhea, described as watery (watery, minimal odor)  Genitourinary: no complaints of dysuria or incontinence, no hesitancy, no nocturia, no genital lesion, no inadequacy of penile erection  Musculoskeletal: no complaints of arthralgia, no myalgia, no joint swelling or stiffness, no limb pain or swelling  Integumentary: no complaints of skin rash or lesion, no itching or dry skin, no skin wounds  Neurological: as noted in HPI    The patient presents with complaints of sudden onset of occasional episodes of moderate right frontal headache, described as dull, non-radiating  Episodes started 4 days ago  His symptoms are caused by no known event  ROS Reviewed:   ROS reviewed  Active Problems    1  Acquired immune deficiency syndrome (AIDS) (042) (B20)   2  Diarrhea (257 91) (R19 7)   3   HIV disease (042) (B20)   4  Onychomycosis (110 1) (B35 1)   5  Peripheral neuropathy (356 9) (G62 9)   6  Plantar porokeratosis, 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)   7  Plantar warts (078 12) (B07 0)   8  Smoking (305 1) (F17 200)   9  Tinea cruris (110 3) (B35 6)   10  Tinea pedis of both feet (110 4) (B35 3)    Past Medical History    1  History of Bipolar 1 disorder (296 7) (F31 9)   2  History of depression (V11 8) (Z86 59)   3  History of TIA (transient ischemic attack) (V12 54) (Z86 73)   4  History of Lithium use (V58 69) (Z79 899)   5  History of Warts of foot (078 10) (B07 9)  Active Problems And Past Medical History Reviewed: The active problems and past medical history were reviewed and updated today  Family History    1  Family history of Coronary arteriosclerosis   2  Family history of diabetes mellitus (V18 0) (Z83 3)    3  Family history of Coronary arteriosclerosis  Family History Reviewed: The family history was reviewed and updated today  Social History    · Current every day smoker (305 1) (F17 200)   · History of abuse of recreational drug (305 93)   · Smoking (305 1) (F17 200)  Social History Reviewed: The social history was reviewed and updated today  Current Meds   1  Aspirin EC 81 MG Oral Tablet Delayed Release; Take one tablet once daily; Therapy: 03LRQ8715 to (Evaluate:65Vss5145)  Requested for: 12Jan2016; Last   Rx:11Jan2016 Ordered   2  Clotrimazole 1 % External Cream; Apply to affected skin twice daily; Therapy: 80DBW6067 to (Last Rx:11Jan2016)  Requested for: 29XGU7872 Ordered   3  Triumeq 600- MG Oral Tablet; take 1 tablet by mouth daily; Therapy: 89KJX6057 to (Evaluate:36Pzy0890)  Requested for: 12Jan2016; Last   Rx:11Jan2016 Ordered  Medication List Reviewed: The medication list was reviewed and updated today  Allergies    1   No Known Drug Allergies    Vitals  Signs [Data Includes: Current Encounter]   Recorded: 84TPE6003 01:46PM   Temperature: 97 6 F  Heart Rate: 95  Respiration: 18  Systolic: 021  Diastolic: 81  Height: 5 ft 8 in  Weight: 205 lb 0 42 oz  BMI Calculated: 31 17  BSA Calculated: 2 07  O2 Saturation: 96  Pain Scale: 5    Physical Exam    Constitutional   General appearance: No acute distress, well appearing and well nourished  Eyes   Pupils and irises: Equal, round and reactive to light  Neurologic   Cranial nerves: Cranial nerves 2-12 intact  Reflexes: 2+ and symmetric  Sensation: No sensory loss  Psychiatric   Orientation to person, place, and time: Normal     Mood and affect: Normal     Additional Exam:  Negative [place]  Attending Note  Collaborating Physician Note: Collaborating Physician: I supervised the Advanced Practitioner and I agree with the Advanced Practitioner note        Future Appointments    Date/Time Provider Specialty Site   03/16/2016 04:30 PM Aristeo Rogel MD Internal Medicine [facility] [place]   06/10/2016 10:30 AM Kalli Sloan [address] [place] [address] [facility] [place]     Signatures   Electronically signed by : Razia Arias; Mar  7 2016  3:00PM EST                       (Author)    Electronically signed by : Xu Gonzalez DO; Mar  7 2016  3:22PM EST                       (Review)

## 2018-01-15 NOTE — PROGRESS NOTES
Assessment    1  HIV disease (042) (B20)    Discussion/Summary    Intervention Diet Prescription   Energy 1825 kcal   24kcal /76kg   Protein 80g   1 1g /76kg   Fluid 1825ml   24ml /76kg   Snack/Supplement Recommendations: cut back on junk food Estimated Intake: 2000kcal 85g Protein   His current intake is meeting estimated nutrition needs  Goal #1 - Improve/Maintain  weight  Goal initiated  Time Frame For Accomplishment: By next follow-up  Intervention Nutrition Education Provided  Person Educated: patient   Topics Discussed: Weight management and healthy eating   Barriers To Learning: none  Readiness to Learn: Marginal Reception  Teaching Method: verbal   Evaluation Of Learning: verbalized/demonstrated understanding       History of Present Illness  Assessment: Clinical Data/Client History HIV - Yes  His socio-economic status includes  cooks  He lives in [facility] pt  reside @ a Rehab/ treatment center, residential    [address] [place] [address],[address] - He has access to refrigerator, stove and microwave  Psycho-Social factors are  substance abuse  His functional status is  ambulatory, prepares own meals and & other residents prepare meals  His activity level is  normal    He eats breakfast at  [employer], banana, milk, OJ  He eats lunch at  [employer], turkey or tuna, water or juice  He eats dinner at  [employer], water or juice   "a lot of fruit, but also there is junk food around"  His appetite is  good   He does not take supplements  Nutrition Diagnosis   Problem related to overweight/obesity  As evidenced by  excess calorie intake  Signs/Symptoms:  Patient Interview  Pt  states "I am eating a lot better now, but not getting enough exercise>"  Active Problems    1  Acquired immune deficiency syndrome (AIDS) (042) (B20)   2  Diarrhea (787 91) (R19 7)   3  Dizziness (780 4) (R42)   4  HIV disease (042) (B20)   5  Onychomycosis (110 1) (B35 1)   6   Peripheral neuropathy (356 9) (G62 9)   7  Plantar porokeratosis, 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)   8  Plantar warts (078 12) (B07 0)   9  Smoking (305 1) (F17 200)   10  Tinea cruris (110 3) (B35 6)   11  Tinea pedis of both feet (110 4) (B35 3)    Past Medical History    1  History of Bipolar 1 disorder (296 7) (F31 9)   2  History of depression (V11 8) (Z86 59)   3  History of TIA (transient ischemic attack) (V12 54) (Z86 73)   4  History of Lithium use (V58 69) (Z79 899)   5  History of Warts of foot (078 10) (B07 9)    Family History  Mother    1  Family history of Coronary arteriosclerosis   2  Family history of diabetes mellitus (V18 0) (Z83 3)  Father    3  Family history of Coronary arteriosclerosis    Social History    · Current every day smoker (305 1) (F17 200)   · History of abuse of recreational drug (305 93)   · Smoking (305 1) (F17 200)    Current Meds   1  Aspirin EC 81 MG Oral Tablet Delayed Release; Take one tablet once daily; Therapy: 98YMB7312 to (Evaluate:05Zbr5074)  Requested for: 08LOF2970; Last Rx:11Jan2016   Ordered   2  Aspirin EC Low Dose 81 MG Oral Tablet Delayed Release; take 1 tablet by mouth once daily; Therapy: 97Cln3644 to (Evaluate:77Ufm1310)  Requested for: 22Dly1341; Last Rx:14Apr2016   Ordered   3  Clotrimazole 1 % External Cream; Apply to affected skin twice daily; Therapy: 59TGK5719 to (Last Rx:11Jan2016)  Requested for: 33TDY0225 Ordered   4  Loperamide HCl - 2 MG Oral Capsule; TAKE 1 CAPSULE Daily PRN as needed after loose stool; Therapy: 66Ivm1963 to (Evaluate:70Nbh6416)  Requested for: 90Zcr6016; Last Rx:28Wwp1014   Ordered   5  Meclizine HCl - 12 5 MG Oral Tablet; TAKE 1 TABLET 3 TIMES DAILY AS NEEDED; Therapy: 63HRK9351 to (Evaluate:18Mar2016)  Requested for: 21QVV4799; Last Rx:14Mar2016   Ordered   6  Triumeq 600- MG Oral Tablet; take 1 tablet by mouth daily;    Therapy: 41LTU3192 to (Evaluate:63Ikg4607)  Requested for: 30Noo9066; Last Rx:14Apr2016;   Status: ACTIVE - Renewal Denied Ordered    Allergies    1  No Known Drug Allergies    Vitals  Vitals   Recorded: 16KCH7309 19:18RK   Systolic: 348  Diastolic: 82  Temperature: 97 6 F  Heart Rate: 84  Respiration: 18  Height: 5 ft 8 in  Weight: 208 lb 15 92 oz  BMI Calculated: 31 78  BSA Calculated: 2 08  O2 Saturation: 96  Recorded: 70VUF8456 45:33XH   Systolic: 508  Diastolic: 81  Temperature: 97 6 F  Heart Rate: 95  Respiration: 18  Height: 5 ft 8 in  Weight: 205 lb 0 42 oz  BMI Calculated: 31 17  BSA Calculated: 2 07  Pain Scale: 5  O2 Saturation: 96  Recorded: 37AHY7015 12:99RO   Systolic: 849  Diastolic: 98  Temperature: 97 9 F  Heart Rate: 76  Height: 5 ft 8 90 in  Weight: 205 lb 0 42 oz  BMI Calculated: 30 37  BSA Calculated: 2 09  Recorded: 30KXK7648 81:06XY   Systolic: 572  Diastolic: 81  Temperature: 92 8 F  Heart Rate: 78  Respiration: 18  Height: 5 ft 9 in  Weight: 204 lb 9 38 oz  BMI Calculated: 30 21  BSA Calculated: 2 09  Pain Scale: 0  O2 Saturation: 97  Recorded: 58SAH0271 53:97LM   Systolic: 487  Diastolic: 83  Temperature: 97 5 F  Heart Rate: 88  Respiration: 18  Height: 5 ft 9 in  Weight: 201 lb 7 98 oz  BMI Calculated: 29 76  BSA Calculated: 2 07  Pain Scale: 0    Future Appointments    Date/Time Provider Specialty Site   09/28/2016 04:45 PM Rossi Burgos MD Internal Medicine [facility]   11/09/2016 11:30 AM Erick Kong, [address] [place] [address] [facility] AT [facility]     Signatures   Electronically signed by : TERRY Woods,MATTHEWE,LDN;  Aug  8 2016  2:01PM EST                       (Author)

## 2018-01-15 NOTE — MISCELLANEOUS
Provider Comments  Provider Comments:   no call or show from patient      Signatures   Electronically signed by : Evon Barahona; Nov 17 2017  2:30PM EST                       (Author)

## 2018-01-15 NOTE — MISCELLANEOUS
Provider Comments  Provider Comments:   no calls from patient      Signatures   Electronically signed by : Yevgeniy Womack MD; Sep 28 2016  6:22PM EST                       (Author)

## 2018-01-17 NOTE — PROGRESS NOTES
Assessment    1  HIV disease (042) (B20)   2  Plantar warts (078 12) (B07 0)   3  Diarrhea (787 91) (R19 7)   4  Current every day smoker (305 1) (F17 200)   5  History of abuse of recreational drug (305 93)    Plan   Diarrhea    · Loperamide HCl - 2 MG Oral Capsule; TAKE 1 CAPSULE Daily PRN as needed  after loose stool   · COLONOSCOPY; Status:Active; Requested for:61Dth6246;    · *1 - 1010 [facility] Physician Referral  Consult  Status: Hold For -  Scheduling  Requested for: 57Eys5572  Care Summary provided  : Yes  Health Maintenance    · Aspirin EC Low Dose 81 MG Oral Tablet Delayed Release; take 1 tablet by  mouth once daily  HIV disease    · Triumeq 600- MG Oral Tablet; take 1 tablet by mouth daily   · (1) HIV-1 RNA QUANTITATIVE; [Do Not Release]; Status:Active; Requested  for:09Rqt8047;    · 1 - Ender MD, Sherryle El Doradoelfego  (Infectious Disease) Physician Referral  Consult  Status: Hold For  - Scheduling  Requested for: 81Soo4256  Care Summary provided  : Yes  Plantar warts    · *1 - 1501 [facility] Physician Referral  Consult  Status: Hold For - Scheduling   Requested for: 01Pzy5191  Care Summary provided  : Yes    HIV disease (042) (B20)        (1) CBC/PLT/DIFF; Status:Resulted - Requires Verification;   Done: 32UFJ0992 12:00AM  UZY:[id number];XCGLBFA;    For:HIV disease; Ordered By:Stacy Khan;   (1) COMPREHENSIVE METABOLIC PANEL; Status:Resulted - Requires Verification;   Done: 87GOG2387 12:00AM  SJY:78NZM8956;SPIUXJJ;    For:HIV disease; Ordered By:Stacy Khan;   (1) T LYMPH SUBSET (CD4); Status:Resulted - Requires Verification;   Done: 90AHV4102 12:00AM  XQT:70BQW8824;YDGRUNN;    For:HIV disease; Ordered By:Stacy Khan;   (1) LIPID PANEL, FASTING; Status:Resulted - Requires Verification;   Done: 36KOA0187 12:00AM  AHS:31JBO2888;GXJICIS;    For:HIV disease; Ordered By:Stacy Khan; Discussion/Summary    1  HIV - Last CD4 stable, viral load undetectable   However, these labs are not reflective of recent missed medications  Also, patient's fill record does not match his reports of adherence  Will have patient repeat his CBCD, CMP, Viral Load and CD4 as soon as possible  Patient has also never complete a fasted lipid panel as part of his routine HIV work up, will add to lab slip and have patient complete tomorrow am  Continue Triumeq, 1 tab PO daily  Stressed adherence  Patient will continue routine f/u with Dr Teresita Jose  2  Plantar Warts - will refer patient back to podiatry  Patient notified of new no-show policies at [facility] and to have his facility call to cancel if they cannot bring him rather than him having a no-show  3  Diarrhea - Patient refusing repeat stool studies as his studies earlier this year were negative and he doesn't feel he can complete the studies and get them back to a lab on time due to his supervised living situation  Patient can try Imodium but educated patient to use sparingly  Sent small supply to [employer] for patient to  before returning to his facility  He should monitor for abdominal pain, fever, and distention and stop the Imodium and seek medical care immediately if any of these occur  Patient should have a screening colonoscopy and is agreeable, will have him work with patient navigator on setting up  Patient should also speak to the colorectal team here at clinic, will place referral      4  Smoking - Patient requesting assistance with cessation  States he has "tons" of patches at home but doesn't feel that they will help him  He is asking for a referral to a hypnotist  Explained to patient that unfortunately this service is not offered locally and that it is not covered by his insurance  Encouraged him to meet with [address] [place] [address] to discuss this further as she can help him work on motivation and starting him on the patches  He is unsatisfied with this answer and wants to call his insurance to report this   Encouraged patient to ask insurance to provide us with a list of providers that we could refer him to and if there is a place available we will gladly refer him     -Patient is due for [facility] Wellness screener, will have him meet with [facility] today to complete  -Patient is due for nutritional screener, will have him meet with  today to complete  Possible side effects of new medications were reviewed with the patient/guardian today  The treatment plan was reviewed with the patient/guardian  The patient/guardian understands and agrees with the treatment plan   The patient was counseled regarding diagnostic results, instructions for management, risk factor reductions, prognosis, patient and family education, impressions, risks and benefits of treatment options, importance of compliance with treatment  total time of encounter was 35 minutes and 20 minutes was spent counseling  Chief Complaint  Patient here for a f/u visit  Patient is here today for follow up of chronic conditions described in HPI  History of Present Illness  The patient arrived for routine follow up  He has been out of care since March 2016 and reports this was because he went back to using drugs and has been in and out of different rehabilitation facilities  He reports that right now he is settled in a supervised living facility in [facility] and believes he'll remain living there for the next 3-6 months  He states after this he is planning on returning to [facility] to get back together with his wife  He wants to commit to care now so that he can be healthy when he meets back up with her  He reports that despite his drug use he has remained on the Triumeq  Reports he missed a week of meds last week due to a problem with his pharmacy  This does not match his medication refill orders as he's only had three bottles over the past 5 months  He missed much more than a week's worth of medication   He should not have been receiving refills while out of care but the mail order pharmacy reports they were unaware and sent him the remaining three bottles  His last labs were completed in June, this is before the time he reports that he was out of medications  He reports ongoing issues with diarrhea  He reports he cannot complete repeat stool studies because of the living situation he is in right now and states he is on "lock down" for behavioral issues and is not allowed to leave the facility on a routine basis  Reports they gave him "trouble" about his appointment today and is being accompanied by a facility representative who is out in the waiting room waiting for him to finish  He states the diarrhea is heavy at times  He reports that some days he is going 5-6 times and other days he bowel movements are completely normal  States the diarrhea causes a lot of gas and cramping pains  He wants to use Imodium but is unable to afford it because he has no income right now  He is also requesting to be reconnected to podiatry  He states the wart debridement were helping a lot but he fell out of care when he went back to using drugs  He would also like to review the blood work that he completed in June  He is very angry that nobody called him with his results  Pain Assessment   the patient states they have pain  The pain is located in the feet  (on a scale of 0 to 10, the patient rates the pain at 6 )   Abuse And Domestic Violence Screen    Yes, the patient is safe at home  The patient states no one is hurting them  Depression And Suicide Screen  No, the patient has not had thoughts of hurting themself  No, the patient has not felt depressed in the past 7 days  The patient is being seen for a routine clinic follow-up of HIV infection  The patient is currently asymptomatic  No associated symptoms are reported  Current treatment includes antiretroviral regimen  By report, there is fair compliance with treatment, good tolerance of treatment and good symptom control  (Triumeq - reports a full week of missed pills last week  However, pill fill record shows patient is two months behind on meds so multiple other doses must have been missed over the past 5 months)     CD4: 372  VL: <20  Time spent: 10 minutes  Strength: no side effects  Weakness: recent drug abuse  Plan of Action: stay connected to supervised living home, strive for sobriety  Review of Systems    Constitutional: No fever or chills, feels well, no tiredness, no recent weight gain or weight loss  Eyes: No complaints of eye pain, no red eyes, no discharge from eyes, no itchy eyes  ENT: no complaints of earache, no hearing loss, no nosebleeds, no nasal discharge, no sore throat, no hoarseness  Cardiovascular: No complaints of slow heart rate, no fast heart rate, no chest pain, no palpitations, no leg claudication, no lower extremity  Respiratory: No complaints of shortness of breath, no wheezing, no cough, no SOB on exertion, no orthopnea or PND  Gastrointestinal: no nausea and no constipation    The patient presents with complaints of gradual onset of intermittent episodes of moderate lower abdominal pain, described as crampy, non-radiating (described as "cramping", associated with his episodes of diarrhea)  The patient presents with complaints of sudden onset of intermittent episodes of moderate diarrhea, described as watery and foul-smelling  Symptoms are improved by antidiarrheals  ROS reviewed  Active Problems     1  Acquired immune deficiency syndrome (AIDS) (042) (B20)   2  Diarrhea (787 91) (R19 7)   3  Dizziness (780 4) (R42)   4  Onychomycosis (110 1) (B35 1)   5  Peripheral neuropathy (356 9) (G62 9)   6  Plantar porokeratosis, 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)   7  Plantar warts (078 12) (B07 0)   8  Smoking (305 1) (F17 200)   9  Tinea cruris (110 3) (B35 6)   10  Tinea pedis of both feet (110 4) (B35 3)    HIV disease (042) (B20)          Past Medical History    1   History of Bipolar 1 disorder (296 7) (F31 9)   2  History of depression (V11 8) (Z86 59)   3  History of TIA (transient ischemic attack) (V12 54) (Z86 73)   4  History of Lithium use (V58 69) (Z79 899)   5  History of Warts of foot (078 10) (B07 9)    The active problems and past medical history were reviewed and updated today  Surgical History    The surgical history was reviewed and updated today  Family History  Mother    1  Family history of Coronary arteriosclerosis   2  Family history of diabetes mellitus (V18 0) (Z83 3)  Father    3  Family history of Coronary arteriosclerosis    The family history was reviewed and updated today  Social History    · Current every day smoker (305 1) (F17 200)   · History of abuse of recreational drug (305 93)   · Smoking (305 1) (F17 200)  The social history was reviewed and updated today  The social history was reviewed and is unchanged  Current Meds   1  Aspirin EC Low Dose 81 MG Oral Tablet Delayed Release; take 1 tablet by mouth once   daily; Therapy: 40Idu5839 to (Evaluate:28Mti2253)  Requested for: 14Apr2016; Last   Rx:[id number] Ordered   2  Triumeq 600- MG Oral Tablet; take 1 tablet by mouth daily; Therapy: 97TSK6565 to (Evaluate:85Kqv2088)  Requested for: 12Bgl5163; Last   Rx:69Xhk4159; Status: ACTIVE - Renewal Denied Ordered    The medication list was reviewed and updated today  Allergies    1  No Known Drug Allergies    Vitals  Signs   Recorded: 65DOP3622 09:64TW   Systolic: 227  Diastolic: 82  Heart Rate: 84  Respiration: 18  Temperature: 97 6 F  O2 Saturation: 96  Height: 5 ft 8 in  Weight: 208 lb 15 92 oz  BMI Calculated: 31 78  BSA Calculated: 2 08    Physical Exam    Constitutional   General appearance: No acute distress, well appearing and well nourished  Eyes   Conjunctiva and lids: No swelling, erythema or discharge  Pupils and irises: Equal, round and reactive to light      Ears, Nose, Mouth, and Throat   External inspection of ears and nose: Normal     Otoscopic examination: Tympanic membranes translucent with normal light reflex  Canals patent without erythema  Nasal mucosa, septum, and turbinates: Normal without edema or erythema  Oropharynx: Normal with no erythema, edema, exudate or lesions  Pulmonary   Respiratory effort: No increased work of breathing or signs of respiratory distress  Auscultation of lungs: Clear to auscultation  Cardiovascular   Auscultation of heart: Normal rate and rhythm, normal S1 and S2, without murmurs  Examination of extremities for edema and/or varicosities: Normal     Abdomen   Abdomen: Non-tender, no masses  The abdomen was flat  Bowel sounds were normal  Skin findings: no collateral vein dilation and no striae  The abdomen was soft and nontender  The abdomen was not firm and not rigid  No rebound tenderness  No guarding  There was a negative Mera's sign  no masses palpated  The abdomen was normal to percussion  no CVA tenderness   Liver and spleen: No hepatomegaly or splenomegaly  Lymphatic   Palpation of lymph nodes in neck: No lymphadenopathy  Skin did not have patient remove shoes as he reported his visit was going too long  Neurologic   Cranial nerves: Cranial nerves 2-12 intact      Psychiatric   Orientation to person, place, and time: Normal     Mood and affect: Normal        Results/Data  (1) CBC/PLT/DIFF 18BQG8159 10:27AM Bethany Rogel Reasonelfego     Test Name Result Flag Reference   WBC COUNT 7 39 Thousand/uL  4 31-10 16   RBC COUNT 5 31 Million/uL  3 88-5 62   HEMOGLOBIN 15 5 g/dL  12 0-17 0   HEMATOCRIT 48 2 %  36 5-49 3   MCV 91 fL  82-98   MCH 29 2 pg  26 8-34 3   MCHC 32 2 g/dL  31 4-37 4   RDW 13 6 %  11 6-15 1   MPV 8 7 fL L 8 9-12 7   PLATELET COUNT 627 Thousands/uL  149-390   nRBC AUTOMATED 0 /100 WBCs     NEUTROPHILS RELATIVE PERCENT 55 %  43-75   LYMPHOCYTES RELATIVE PERCENT 27 %  14-44   MONOCYTES RELATIVE PERCENT 12 %  4-12   EOSINOPHILS RELATIVE PERCENT 5 %  0-6 BASOPHILS RELATIVE PERCENT 1 %  0-1   NEUTROPHILS ABSOLUTE COUNT 4 04 Thousands/?L  1 85-7 62   LYMPHOCYTES ABSOLUTE COUNT 1 97 Thousands/?L  0 60-4 47   MONOCYTES ABSOLUTE COUNT 0 92 Thousand/?L  0 17-1 22   EOSINOPHILS ABSOLUTE COUNT 0 38 Thousand/?L  0 00-0 61   BASOPHILS ABSOLUTE COUNT 0 07 Thousands/?L  0 00-0 10     (1) COMPREHENSIVE METABOLIC PANEL 07VCU9751 35:45FN Eren Rogel     Test Name Result Flag Reference   GLUCOSE,RANDM 81 mg/dL     If the patient is fasting, the ADA then defines impaired fasting glucose as > 100 mg/dL and diabetes as > or equal to 123 mg/dL  SODIUM 140 mmol/L  136-145   POTASSIUM 3 9 mmol/L  3 5-5 3   CHLORIDE 104 mmol/L  100-108   CARBON DIOXIDE 28 mmol/L  21-32   ANION GAP (CALC) 8 mmol/L  4-13   BLOOD UREA NITROGEN 9 mg/dL  5-25   CREATININE 0 97 mg/dL  0 60-1 30   Standardized to IDMS reference method   CALCIUM 9 4 mg/dL  8 3-10 1   BILI, TOTAL 0 22 mg/dL  0 20-1 00   ALK PHOSPHATAS 98 U/L     ALT (SGPT) 47 U/L  12-78   AST(SGOT) 28 U/L  5-45   ALBUMIN 3 6 g/dL  3 5-5 0   TOTAL PROTEIN 8 7 g/dL H 6 4-8 2   eGFR Non-African American      >60 0 ml/min/1 73sq m   [facility] Disease Education Program recommendations are as follows:  GFR calculation is accurate only with a steady state creatinine  Chronic Kidney disease less than 60 ml/min/1 73 sq  meters  Kidney failure less than 15 ml/min/1 73 sq  meters       (1) T LYMPH SUBSET (CD4) 89EHD0228 10:27AM Marianne Rogel     Test Name Result Flag Reference   CD4 T CELL ABSOLUTE 372 /uL  359 - 1519   CD4 % HELPER T CELL 16 9 % L 30 8 - 58 5   WBC (CD4/8) 7 4 x10E3/uL  3 4 - 10 8   RBC 5 18 x10E6/uL  4 14 - 5 80   HGB (CD4/8) 14 8 g/dL  12 6 - 17 7   HCT (CD4/8) 46 5 %  37 5 - 51 0   MCV (CD4/8) 90 fL  79 - 97   MCH (CD4/8) 28 6 pg  26 6 - 33 0   MCHC (CD4/8) 31 8 g/dL  31 5 - 35 7   RDW (CD4/8) 14 1 %  12 3 - 15 4   PLT (CD4/8) 281 x10E3/uL  150 - 379   NEUTS (CD4/8) 52 %     LYMPHS (CD4/8) 29 %     MONOS (CD4/8) 12 % EOS (CD4/8) 6 %     BASOS (CD4/8) 1 %     NEUTS,ABS  (CD4/8) 3 9 x10E3/uL  1 4 - 7 0   LYMP,ABS (CD4/8) 2 2 x10E3/uL  0 7 - 3 1   MONOS,ABS (CD4/8) 0 9 x10E3/uL  0 1 - 0 9   EOS, ABS (CD4/8) 0 4 x10E3/uL  0 0 - 0 4   BASO, ABS (CD4/8) 0 1 x10E3/uL  0 0 - 0 2   IIMM  GRANS,ABS (CD4/8) 0 0 x10E3/uL  0 0 - 0 1   IMM  GRANULOCYTES (CD4/8) 0 %     Performed at:  [facility] [address]  [id number]  : Gerson Fagan MD, Phone:  [phone]     (1) HIV-1 RNA QUANTITATIVE 41DIM9746 10:27AM Josiane Rogel     Test Name Result Flag Reference   HIV-1 RNA QUANT <20 copies/mL     HIV-1 RNA detectedThe reportable range for this assay is 20 to 10,000,000copies HIV-1 RNA/mL  HIV-1 RNA VIRAL LOAD LOG COMMNT she98csms/mL     Unable to calculate result since non-numeric result obtained forcomponent test   Performed at:  [facility] [address]  [id number]  : Gerson Fagan MD, Phone:  [phone]     Attending Note  Collaborating Physician: I agree with the Advanced Practitioner note  Future Appointments    Date/Time Provider Specialty Site   09/28/2016 04:45 PM Josiane López MD Internal Medicine ACS AT [place]   11/09/2016 11:30 AM Henry Hall, [facility] ACS AT [address] [facility]   Electronically signed by : Wilian Garcia;  Aug  9 2016  1:35PM EST                       (Author)    Electronically signed by : Shannan Hernandez DO; Aug 11 2016 12:05PM EST                       (Author)

## 2018-01-17 NOTE — PROGRESS NOTES
History of Present Illness  [facility]:   He is being seen in follow-up  The patient is being seen regarding wellness screener, smoking cessation   Support/Coping: rehab for addiction, has nicotine patches but is not currently using them  [facility]:         1  I like who I am  Somewhat describes me (11)   2  I am not an easy person to get along with    No, doesn't describe me at all (22)   3  I am basically a healthy person    Somewhat describes me (31)   4  I give up to easily    No, doesn't describe me at all (42)   5  I have difficulty concentrating    No, doesn't describe me at all (52)   6  I am happy with my family relationships    Somewhat describes me (61)   7  I am comfortable being around people    No, doesn't describe me at all (70)     8  Walking up a flight of stairs    None (82)   9  Running the length of a football field    Some (91)     10  Sleeping    Some (101)   11  Hurting or aching in any part of your body    Dora Organ A Lot (110)   12  Getting tired easily    Some (121)   13  Feeling depressed or sad    Some (131)   14  Nervousness    None (142)     15  Socialize with other people (talk or visit with friends or relatives A Lot ()   12  Take part in social, Amish, or recreation activities (meetings, Rastafari, movies, sports, parties)    Dora Organ A Lot (162)   17   Stay in your home, nursing home, or hospital because of sickness, injury, or other health problem None (172)   [address]     8 = 2   9 = 1   10 = 1   11 = 0   12 = 1   Sum = 5   PHYSICAL HEALTH SCORE 50      1 = 1   4 = 2   5 = 2   13 = 1   14 = 2   Sum = 8   MENTAL HEALTH SCORE 80      2 = 2   6 = 1   7 = 0   15 = 2   16 = 2   Sum = 7   SOCIAL HEALTH SCORE 70  Physical Health score = 50   Mental Health score = 80   Social Health score = 70   Sum = 200   GENERAL HEALTH SCORE 66 666      3 = 1   PERCEIVED HEALTH SCORE 50      1 = 1   2 = 2   4 = 2   6 = 1   7 = 0   Sum = 6   SELF-ESTEEM SCORE 60            2 = 2 and 0   5 = 2 and 0   7 = 0 and 2   10 = 1 and 1   12 = 1 and 1   14 = 2 and 0   Sum = 4   ANXIETY SCORE 33 332      4 = 2 and 0   5 = 2 and 0   10 = 1 and 1   12 = 1 and 1   13 = 1 and 1   Sum = 3   DEPRESSION SCORE 30      4 = 2, 0, 2 and 0   7 = 0 and 2   10 = 1 and 1   12 = 1 and 1   13 = 1 and 1   14 = 2 and 0   Sum = 5   ANXIETY-DEPRESSION ([facility]-AD) SCORE 35 715      11 = 0 and 2   PAIN SCORE 100      17 = 2 and 0   DISABILITY SCORE 0  Physical Exam    Objective: Orientation: oriented to person, oriented to place and oriented to time  Appearance: well developed, well nourished and appearance reflects stated age  Observed mood and affect: appropriate  Harm to self or others: None reported or observed  Substance abuse: None reported or observed  Assessment    1  HIV disease (042) (B20)   2  Plantar warts (078 12) (B07 0)   3  Diarrhea (787 91) (R19 7)   4  Current every day smoker (305 1) (F17 200)   5  History of abuse of recreational drug (305 93)    Plan    1  Loperamide HCl - 2 MG Oral Capsule; TAKE 1 CAPSULE Daily PRN as needed   after loose stool   2  COLONOSCOPY; Status:Active; Requested for:02Amv9817;    3  *1 -  CLIN[facility] COLON RECTAL Physician Referral  Consult  Status: Hold For -   Scheduling  Requested for: 75IWS7110  Care Summary provided  : Yes    4  Aspirin EC Low Dose 81 MG Oral Tablet Delayed Release; take 1 tablet by   mouth once daily    5  Triumeq 600- MG Oral Tablet; take 1 tablet by mouth daily   6  (1) CBC/PLT/DIFF; Status:Active;  Requested for:00Lao6209;    7  (1) COMPREHENSIVE METABOLIC PANEL; Status:Active; Requested for:83Pzq6841;    8  (1) HIV-1 RNA QUANTITATIVE; [Do Not Release]; Status:Active; Requested   for:40Psh7093;    9  (1) LIPID PANEL, FASTING; Status:Active; Requested for:63Cdl7128;    10  (1) T LYMPH SUBSET (CD4); Status:Active; Requested for:64Gxk5802;    6  1 - Juan F TRUONG, Nkechi Ayala  (Infectious Disease) Physician Referral  Consult  Status: Hold For    - Scheduling  Requested for: 31Itn8351  Care Summary provided  : Yes    12  *1 - [facility] Physician Referral  Consult  Status: Hold For - Scheduling     Requested for: 77Kim9863  Care Summary provided  : Yes    [address] [facility]: Today, patient presents with no major concerns but does have a desire to quit smoking  Patient will likely benefit from utilizing available nicotine patches for smoking cessation and calling the quit line for further assistance, if needed  The stage of change is contemplation     Behavioral recommendations: 1  F/U with [facility] as needed  2  Utilize available nicotine patches  3  Call quit line for further assistance with smoking cessation, if needed    Discussion Summary [facility]:   PT was seen for his behavioral health consultation with [facility] were reviewed  PT completed his yearly [facility] screener  PT denies any major concerns but did say that he is interested in quitting smoking  PT currently has nicotine patches but refuses to use them, although he couldn't explain why beyond that he "wasn't ready" right now  However, PT said he would be ready if he could get hypnotized  [facility] explained that hypnosis was outside of the [facility] scope of practice, and as CHARLIE explained, there are no known smoking cessation hypnotists within [address], as PT requested  However, [facility] advised PT to call the quit line to get free counselling sessions, supplies, and possible information re: hypnosis   However, PT was told that insurance would most likely not cover hypnosis and that he would be responsible to pay out of pocket  PT appeared hesitant to call quit line as he doesn't feel that he need counseling or supplies  However, he agreed to contact them to see if thy had local resources for hypnosis        Future Appointments    Date/Time Provider Specialty Site   09/28/2016 04:45 PM Feroz Coronado MD Internal Medicine [facility] AT [facility]   11/09/2016 11:30 AM Jeannette Granger, [address] [place] [address] Family Medicine [facility] AT [facility]     Signatures   Electronically signed by : Clotilde Cotto Jermaine 87; Aug  9 2016  2:14PM EST                       (Author)

## 2018-01-18 NOTE — MISCELLANEOUS
Provider Comments  Provider Comments:   no calls from patient      Signatures   Electronically signed by : Toribio Loredo; Jun 13 2016  1:06PM EST                       (Author)    Electronically signed by : Benjie Rodrigez DO; Jun 13 2016  3:48PM EST                       (Review)

## 2018-01-23 NOTE — MISCELLANEOUS
Message  Return to work or school:   Karen Rangel is under my professional care   He was seen in my office on 12/21/17             Signatures   Electronically signed by : Berenice Montiel DO; Dec 21 2017  1:15PM EST                       (Author)

## 2018-01-24 VITALS
SYSTOLIC BLOOD PRESSURE: 131 MMHG | DIASTOLIC BLOOD PRESSURE: 70 MMHG | WEIGHT: 195.31 LBS | HEART RATE: 70 BPM | BODY MASS INDEX: 27.96 KG/M2 | HEIGHT: 70 IN

## 2018-03-06 RX ORDER — ABACAVIR SULFATE, DOLUTEGRAVIR SODIUM, LAMIVUDINE 600; 50; 300 MG/1; MG/1; MG/1
1 TABLET, FILM COATED ORAL DAILY
Qty: 30 TABLET | OUTPATIENT
Start: 2018-03-06

## 2018-03-20 DIAGNOSIS — B20 HIV (HUMAN IMMUNODEFICIENCY VIRUS INFECTION) (HCC): Primary | ICD-10-CM

## 2018-03-20 RX ORDER — ABACAVIR SULFATE, DOLUTEGRAVIR SODIUM, LAMIVUDINE 600; 50; 300 MG/1; MG/1; MG/1
1 TABLET, FILM COATED ORAL DAILY
Qty: 30 TABLET | Refills: 3 | Status: SHIPPED | OUTPATIENT
Start: 2018-03-20

## 2018-05-29 ENCOUNTER — TELEPHONE (OUTPATIENT)
Dept: SURGERY | Facility: CLINIC | Age: 58
End: 2018-05-29

## 2018-05-29 NOTE — TELEPHONE ENCOUNTER
Returned call to Tiffanie Congress doctor Sima Aguilar [phone]  Who had called to verify Sunil's last visit at [facility]  Last visit was in 2017

## 2019-08-29 ENCOUNTER — TRANSCRIBE ORDERS (OUTPATIENT)
Dept: ADMINISTRATIVE | Age: 59
End: 2019-08-29

## 2019-08-29 ENCOUNTER — APPOINTMENT (OUTPATIENT)
Dept: LAB | Age: 59
End: 2019-08-29
Payer: COMMERCIAL

## 2019-08-29 DIAGNOSIS — R97.20 ELEVATED PSA: Primary | ICD-10-CM

## 2019-08-29 DIAGNOSIS — R97.20 ELEVATED PSA: ICD-10-CM

## 2019-08-29 PROCEDURE — 84154 ASSAY OF PSA FREE: CPT

## 2019-08-29 PROCEDURE — 36415 COLL VENOUS BLD VENIPUNCTURE: CPT

## 2019-08-29 PROCEDURE — 84153 ASSAY OF PSA TOTAL: CPT

## 2019-08-30 LAB
PSA FREE MFR SERPL: 7.3 %
PSA FREE SERPL-MCNC: 0.95 NG/ML
PSA SERPL-MCNC: 13.1 NG/ML (ref 0–4)

## 2019-10-26 ENCOUNTER — APPOINTMENT (EMERGENCY)
Dept: RADIOLOGY | Facility: HOSPITAL | Age: 59
DRG: 058 | End: 2019-10-26
Payer: COMMERCIAL

## 2019-10-26 ENCOUNTER — HOSPITAL ENCOUNTER (INPATIENT)
Facility: HOSPITAL | Age: 59
LOS: 1 days | Discharge: HOME | DRG: 058 | End: 2019-10-27
Attending: EMERGENCY MEDICINE | Admitting: INTERNAL MEDICINE
Payer: COMMERCIAL

## 2019-10-26 ENCOUNTER — APPOINTMENT (INPATIENT)
Dept: CT IMAGING | Facility: HOSPITAL | Age: 59
DRG: 058 | End: 2019-10-26
Payer: COMMERCIAL

## 2019-10-26 ENCOUNTER — APPOINTMENT (EMERGENCY)
Dept: CT IMAGING | Facility: HOSPITAL | Age: 59
DRG: 058 | End: 2019-10-26
Payer: COMMERCIAL

## 2019-10-26 DIAGNOSIS — I63.9 CEREBROVASCULAR ACCIDENT (CVA) (HCC): ICD-10-CM

## 2019-10-26 DIAGNOSIS — Z21 ASYMPTOMATIC HIV INFECTION (HCC): Primary | ICD-10-CM

## 2019-10-26 DIAGNOSIS — Z86.73 HISTORY OF TRANSIENT CEREBRAL ISCHEMIA: ICD-10-CM

## 2019-10-26 DIAGNOSIS — R56.9 SEIZURE (HCC): ICD-10-CM

## 2019-10-26 DIAGNOSIS — L03.90 CELLULITIS: ICD-10-CM

## 2019-10-26 PROBLEM — Z72.0 TOBACCO ABUSE: Status: ACTIVE | Noted: 2019-10-26

## 2019-10-26 LAB
AMPHETAMINES SERPL QL SCN: NEGATIVE
ANION GAP BLD CALC-SCNC: 13 MMOL/L (ref 4–13)
ANION GAP SERPL CALCULATED.3IONS-SCNC: 9 MMOL/L (ref 4–13)
APTT PPP: 25 SECONDS (ref 23–37)
BARBITURATES UR QL: NEGATIVE
BENZODIAZ UR QL: NEGATIVE
BUN BLD-MCNC: 12 MG/DL (ref 5–25)
BUN SERPL-MCNC: 12 MG/DL (ref 5–25)
CA-I BLD-SCNC: 1.27 MMOL/L (ref 1.12–1.32)
CALCIUM SERPL-MCNC: 9.7 MG/DL (ref 8.3–10.1)
CHLORIDE BLD-SCNC: 104 MMOL/L (ref 100–108)
CHLORIDE SERPL-SCNC: 104 MMOL/L (ref 100–108)
CO2 SERPL-SCNC: 33 MMOL/L (ref 21–32)
COCAINE UR QL: NEGATIVE
CREAT BLD-MCNC: 1 MG/DL (ref 0.6–1.3)
CREAT SERPL-MCNC: 1.09 MG/DL (ref 0.6–1.3)
ERYTHROCYTE [DISTWIDTH] IN BLOOD BY AUTOMATED COUNT: 14.7 % (ref 11.6–15.1)
GFR SERPL CREATININE-BSD FRML MDRD: 86 ML/MIN/1.73SQ M
GFR SERPL CREATININE-BSD FRML MDRD: 95 ML/MIN/1.73SQ M
GLUCOSE SERPL-MCNC: 88 MG/DL (ref 65–140)
GLUCOSE SERPL-MCNC: 90 MG/DL (ref 65–140)
HCT VFR BLD AUTO: 51.5 % (ref 36.5–49.3)
HCT VFR BLD CALC: 49 % (ref 36.5–49.3)
HGB BLD-MCNC: 15.9 G/DL (ref 12–17)
HGB BLDA-MCNC: 16.7 G/DL (ref 12–17)
INR PPP: 1.01 (ref 0.84–1.19)
MAGNESIUM SERPL-MCNC: 1.7 MG/DL (ref 1.6–2.6)
MCH RBC QN AUTO: 29 PG (ref 26.8–34.3)
MCHC RBC AUTO-ENTMCNC: 30.9 G/DL (ref 31.4–37.4)
MCV RBC AUTO: 94 FL (ref 82–98)
METHADONE UR QL: NEGATIVE
OPIATES UR QL SCN: NEGATIVE
PCO2 BLD: 33 MMOL/L (ref 21–32)
PCP UR QL: NEGATIVE
PHOSPHATE SERPL-MCNC: 3.1 MG/DL (ref 2.7–4.5)
PLATELET # BLD AUTO: 236 THOUSANDS/UL (ref 149–390)
PMV BLD AUTO: 8.2 FL (ref 8.9–12.7)
POTASSIUM BLD-SCNC: 5.6 MMOL/L (ref 3.5–5.3)
POTASSIUM SERPL-SCNC: 5.3 MMOL/L (ref 3.5–5.3)
PROTHROMBIN TIME: 12.7 SECONDS (ref 11.6–14.5)
RBC # BLD AUTO: 5.48 MILLION/UL (ref 3.88–5.62)
SODIUM BLD-SCNC: 144 MMOL/L (ref 136–145)
SODIUM SERPL-SCNC: 146 MMOL/L (ref 136–145)
SPECIMEN SOURCE: ABNORMAL
SPECIMEN SOURCE: NORMAL
THC UR QL: POSITIVE
TROPONIN I BLD-MCNC: 0 NG/ML (ref 0–0.08)
TROPONIN I SERPL-MCNC: <0.02 NG/ML
VALPROATE SERPL-MCNC: 110 UG/ML (ref 50–100)
WBC # BLD AUTO: 6.81 THOUSAND/UL (ref 4.31–10.16)

## 2019-10-26 PROCEDURE — 99222 1ST HOSP IP/OBS MODERATE 55: CPT | Performed by: INTERNAL MEDICINE

## 2019-10-26 PROCEDURE — 85014 HEMATOCRIT: CPT

## 2019-10-26 PROCEDURE — 84484 ASSAY OF TROPONIN QUANT: CPT | Performed by: EMERGENCY MEDICINE

## 2019-10-26 PROCEDURE — 99253 IP/OBS CNSLTJ NEW/EST LOW 45: CPT | Performed by: PSYCHIATRY & NEUROLOGY

## 2019-10-26 PROCEDURE — 99285 EMERGENCY DEPT VISIT HI MDM: CPT

## 2019-10-26 PROCEDURE — 93005 ELECTROCARDIOGRAM TRACING: CPT

## 2019-10-26 PROCEDURE — 70498 CT ANGIOGRAPHY NECK: CPT

## 2019-10-26 PROCEDURE — 71045 X-RAY EXAM CHEST 1 VIEW: CPT

## 2019-10-26 PROCEDURE — 83735 ASSAY OF MAGNESIUM: CPT | Performed by: EMERGENCY MEDICINE

## 2019-10-26 PROCEDURE — 84484 ASSAY OF TROPONIN QUANT: CPT

## 2019-10-26 PROCEDURE — 85610 PROTHROMBIN TIME: CPT | Performed by: EMERGENCY MEDICINE

## 2019-10-26 PROCEDURE — 80048 BASIC METABOLIC PNL TOTAL CA: CPT | Performed by: EMERGENCY MEDICINE

## 2019-10-26 PROCEDURE — 80164 ASSAY DIPROPYLACETIC ACD TOT: CPT | Performed by: EMERGENCY MEDICINE

## 2019-10-26 PROCEDURE — 70496 CT ANGIOGRAPHY HEAD: CPT

## 2019-10-26 PROCEDURE — 99285 EMERGENCY DEPT VISIT HI MDM: CPT | Performed by: EMERGENCY MEDICINE

## 2019-10-26 PROCEDURE — 85730 THROMBOPLASTIN TIME PARTIAL: CPT | Performed by: EMERGENCY MEDICINE

## 2019-10-26 PROCEDURE — 80047 BASIC METABLC PNL IONIZED CA: CPT

## 2019-10-26 PROCEDURE — 85027 COMPLETE CBC AUTOMATED: CPT | Performed by: EMERGENCY MEDICINE

## 2019-10-26 PROCEDURE — 36415 COLL VENOUS BLD VENIPUNCTURE: CPT | Performed by: EMERGENCY MEDICINE

## 2019-10-26 PROCEDURE — 84100 ASSAY OF PHOSPHORUS: CPT | Performed by: EMERGENCY MEDICINE

## 2019-10-26 PROCEDURE — 80307 DRUG TEST PRSMV CHEM ANLYZR: CPT | Performed by: EMERGENCY MEDICINE

## 2019-10-26 RX ORDER — CALCIUM CARBONATE 300MG(750)
1 TABLET,CHEWABLE ORAL
COMMUNITY

## 2019-10-26 RX ORDER — SODIUM CHLORIDE 9 MG/ML
100 INJECTION, SOLUTION INTRAVENOUS CONTINUOUS
Status: DISCONTINUED | OUTPATIENT
Start: 2019-10-26 | End: 2019-10-27 | Stop reason: HOSPADM

## 2019-10-26 RX ORDER — LANOLIN ALCOHOL/MO/W.PET/CERES
6 CREAM (GRAM) TOPICAL
Status: DISCONTINUED | OUTPATIENT
Start: 2019-10-26 | End: 2019-10-27 | Stop reason: HOSPADM

## 2019-10-26 RX ORDER — NICOTINE 21 MG/24HR
1 PATCH, TRANSDERMAL 24 HOURS TRANSDERMAL DAILY
Status: DISCONTINUED | OUTPATIENT
Start: 2019-10-27 | End: 2019-10-27 | Stop reason: HOSPADM

## 2019-10-26 RX ORDER — CLOPIDOGREL BISULFATE 75 MG/1
75 TABLET ORAL DAILY
Status: DISCONTINUED | OUTPATIENT
Start: 2019-10-27 | End: 2019-10-27 | Stop reason: HOSPADM

## 2019-10-26 RX ORDER — DIVALPROEX SODIUM 500 MG/1
500 TABLET, EXTENDED RELEASE ORAL EVERY 8 HOURS SCHEDULED
Status: DISCONTINUED | OUTPATIENT
Start: 2019-10-26 | End: 2019-10-27 | Stop reason: HOSPADM

## 2019-10-26 RX ORDER — TAMSULOSIN HYDROCHLORIDE 0.4 MG/1
0.4 CAPSULE ORAL
COMMUNITY

## 2019-10-26 RX ORDER — TAMSULOSIN HYDROCHLORIDE 0.4 MG/1
0.4 CAPSULE ORAL
Status: DISCONTINUED | OUTPATIENT
Start: 2019-10-26 | End: 2019-10-27 | Stop reason: HOSPADM

## 2019-10-26 RX ORDER — DIVALPROEX SODIUM 500 MG/1
500 TABLET, EXTENDED RELEASE ORAL EVERY 8 HOURS SCHEDULED
COMMUNITY

## 2019-10-26 RX ORDER — CLOPIDOGREL BISULFATE 75 MG/1
300 TABLET ORAL ONCE
Status: COMPLETED | OUTPATIENT
Start: 2019-10-26 | End: 2019-10-26

## 2019-10-26 RX ORDER — ASPIRIN 81 MG/1
81 TABLET, CHEWABLE ORAL DAILY
Status: DISCONTINUED | OUTPATIENT
Start: 2019-10-27 | End: 2019-10-27 | Stop reason: HOSPADM

## 2019-10-26 RX ORDER — SERTRALINE HYDROCHLORIDE 25 MG/1
25 TABLET, FILM COATED ORAL DAILY
Status: DISCONTINUED | OUTPATIENT
Start: 2019-10-27 | End: 2019-10-27 | Stop reason: HOSPADM

## 2019-10-26 RX ORDER — LOSARTAN POTASSIUM 50 MG/1
100 TABLET ORAL DAILY
Status: DISCONTINUED | OUTPATIENT
Start: 2019-10-27 | End: 2019-10-27 | Stop reason: HOSPADM

## 2019-10-26 RX ORDER — ATORVASTATIN CALCIUM 40 MG/1
80 TABLET, FILM COATED ORAL
Status: DISCONTINUED | OUTPATIENT
Start: 2019-10-26 | End: 2019-10-27 | Stop reason: HOSPADM

## 2019-10-26 RX ORDER — ATORVASTATIN CALCIUM 80 MG/1
80 TABLET, FILM COATED ORAL DAILY
COMMUNITY

## 2019-10-26 RX ORDER — LOSARTAN POTASSIUM 100 MG/1
100 TABLET ORAL DAILY
COMMUNITY

## 2019-10-26 RX ORDER — SERTRALINE HYDROCHLORIDE 25 MG/1
25 TABLET, FILM COATED ORAL DAILY
COMMUNITY

## 2019-10-26 RX ADMIN — CLOPIDOGREL BISULFATE 300 MG: 75 TABLET ORAL at 17:56

## 2019-10-26 RX ADMIN — TAMSULOSIN HYDROCHLORIDE 0.4 MG: 0.4 CAPSULE ORAL at 17:56

## 2019-10-26 RX ADMIN — IOHEXOL 85 ML: 350 INJECTION, SOLUTION INTRAVENOUS at 10:56

## 2019-10-26 RX ADMIN — MELATONIN 6 MG: at 22:13

## 2019-10-26 RX ADMIN — DIVALPROEX SODIUM 500 MG: 500 TABLET, FILM COATED, EXTENDED RELEASE ORAL at 17:56

## 2019-10-26 RX ADMIN — ATORVASTATIN CALCIUM 80 MG: 40 TABLET, FILM COATED ORAL at 17:56

## 2019-10-26 RX ADMIN — SODIUM CHLORIDE 100 ML/HR: 0.9 INJECTION, SOLUTION INTRAVENOUS at 20:48

## 2019-10-26 NOTE — ASSESSMENT & PLAN NOTE
· Patient on Losartan 100 mg daily  · Current blood pressure 133/71  · Per neurology, permissive hypertension is recommended (-200)

## 2019-10-26 NOTE — ASSESSMENT & PLAN NOTE
· Patient has a past history of cocaine abuse  · Admits to taking marijuana daily  · Urine tox positive for [facility]

## 2019-10-26 NOTE — CONSULTS
Consultation - Neurology   Zaria Kim 63 y o  male MRN: [id number]  Unit/Bed#: -01 Encounter: [id number]      Physician Requesting Consult: Myrna Oglesby MD  Consult to Neurology  Consult performed by: Sunita Cordero MD  Consult ordered by: Devon Tyler MD        Reason for Consult / Principal Problem: stroke alert    Assessment:  Strong suspicion for breakthrough seizure in context of prior cva vs from an acute left hemispheric cva  Currently appears back to neurologic baseline  No clinical evidence of encephalopathy  He states since his prior stroke he has had trouble with his speech and some rt sided weakness which per exam is expressive aphasia and some coordination difficulty with his rue as he is slower with finger to nose testing, and cannot alternative fingers with rt hand finger to nose testing  He states he had a seizure this morning, he was smoking cigarettes with his friends listening to his usual morning music when his speech suddenly worsened and his rue was shaking uncontrollably then couldn't move it for many hours  I spoke to his best friend Ruthie Bosworth who also was wondering if he just had a seizure  Interestingly ED notes don't mention any shaking  Actual types of seizures and frequency unclear  Pt has severe cognitive impairment on exam, states his seizures started after his stroke 3 months ago and he has had only 3 seizures  He doesn't know the name of his med which is depakoate  It appears to be that he is fully complying with the medication given his level of 110  Breakthrough seizure in context of a new acute cva is also possible however his cardiac loop monitor has not shown any afib and it has been in place for 6 months  No known afib history  Lower suspicion for acute cva given this information and the fact that he has clinically recovered  It is unclear how well controlled these seizures are and may need a second agent  Positive THC in urine   No evidence of leukocytosis or electrolyte abnormality  Unlikely drug use contributing to current symptomology  Plan:  Admit to SLIM on stroke pathway  Continue aspirin 81 mg daily  Load plavix 300 mg daily then 75 mg daily from tomorrow  Dual antiplatelet for 3 weeks then switch to plavix  If no acute cva then would then just continue the baby aspirin  Tele  2-D echo  Mri brain w/o contrast  Loop monitor interrogation IF acute cva on mri brain scan  He has had it for 6 months with no afib thus far  I would recommend keeping it much longer  Flp, tsh, hba1c, vit b12, vit D  sbp 140-200  Thrombosis panel for am given young age and hx of strokes unclear source  Stat CT scan with any neuro change  May consider adding a second AED, will re-evaluate tomorrow after mri scan result  Goal is to get him back to his group home as quickly as possible as he gets agitated easily and needs constant attention  HPI: Sonya Morillo is a 63 y o  male who presented as a stroke alert this morning at 10:35 am, neurology call back at 10:35 am  NIh stroke scale stated to be 6  Pt not given IV tpa as stronger suspicion for breakthrough seizure per review of information as similar episodes in the past and full recovery  Also unclear when his chronic strokes had developed and cannot exclude may have occurred in past three months  He has baseline dysarthria/dysphasia from prior cva and about and hour after waking up this morning at 7 am he developed severe rue weakness/numbness  He states he was smoking a cigarette, listening to his usual chill music and hanging out with a friend when he suddenly couldn't get the words out and his rue started shaking uncontrollably for like 30 min  He stated it was a seizure  He has normal function in rue at baseline    Hx of asymptomatic HIV diagnosed in 1992 for which he is on [address] per recent [facility] note, cocaine abuse, rt shoulder pain history from rotator cuff impingment vs partial tear per notes however currently not in pain  He takes baby aspirin at home  He has a cardiac loop monitor placed recently  Also noted to have cognitive impairment  Extensive psychiatric hx including admissions at [facility] for MDD and suicidal thoughts  ROS:  Per 12 point review, aside from those in HPI, anger, trouble speaking, rt sided coordination issues, seizures, rest negative  Historical Information   Past Medical History:   Diagnosis Date    Anxiety     Depression     [facility] disease ([facility] )     Substance abuse     Suicide attempt ([facility] )      No past surgical history on file  Social History   Social History     Tobacco Use   Smoking Status Current Some Day Smoker    Packs/day: 1 00   Smokeless Tobacco Never Used     Social History     Substance and Sexual Activity   Alcohol Use Yes     Social History     Substance and Sexual Activity   Drug Use Yes    Types: "Crack" cocaine, Marijuana       Family History: non-contributory      Meds/Allergies     Allergies   Allergen Reactions    No Active Allergies        all current active meds have been reviewed    Objective   Vitals:Blood pressure 116/61, pulse 82, temperature 97 6 °F (36 4 °C), temperature source Oral, resp  rate 18, weight 93 kg (205 lb 0 4 oz), SpO2 92 %  ,Body mass index is 29 42 kg/m²  No intake or output data in the 24 hours ending 10/26/19 1644        Physical Exam:   Physical Exam General appearance: agitated  Walking around half naked, says noone put his gown on properly and that he hasn't eaten all day  Head: Normocephalic, without obvious abnormality, atraumatic  Lungs: clear to auscultation bilaterally  Heart: regular rate and rhythm    Neurologic:  Cognitive:  Mental status: Alert, oriented to self, , knows he's in a hospital but not which one  Can describe what happened this morning and label it  Not sure of the day of the week, month, or year  He has clear expressive>receptive aphasia   Word finding difficulties, paraphrasic errors  He cannot speak a clear sentence, is speaking in phrases and easily agitated, gets worked up and speech worsens  With attention he calm down and easy to examine  Giving incorrect quarters in $1 75, responded twice with the answer "50 cents"  Right/left confusion with multistep commands and cannot complete the full command such as taking rt thumb to left ear to nose  When asking what he did for a living but is using his hands to describe actions such as sawing  He couldn't verbalize the time on the clock but correctly wrote it on the paper  Also he stated 7 when I held up 10 fingers however on the paper wrote "10"  Attention/concentration intact  CN: CNII-XII normal  Fundoscopy wnl  Motor: normal tone and bulk  5 power ue/le bilat  Sensory: distal rue is numb to light touch and temperature  Cannot properly assess proprioception as he is moving his toes  Vibration appears to be decreased distal LE bilat  Cerebellar: ue/le no dysmetria or ataxia bilat except rue finger to nose is slower  Also finger tapping speed is equal both hands however he cannot alternate fingers with the right hand  DTR's: 2 ue/le bilat  Plantars: downgoing bilat        Lab Results: I have personally reviewed pertinent reports        Imaging Studies: I have personally reviewed pertinent films in PACS    EKG, Pathology, and Other Studies: I have personally reviewed pertinent films in PACS

## 2019-10-26 NOTE — ASSESSMENT & PLAN NOTE
· Patient with a history of CVA in the L MCA territory in May 2018  · Baseline expressive aphasia  · Baseline R arm weakness (?rotator cuff syndrome, pain improved)  · Seizure in 07/2019, likely due to existing encephalomalacia in the left MCA territory consistent with his prior infarct (MRI in 07/2019)  · Following up with neurology at [facility]  · Presented with worsening R arm weakness and numbness  · CT findings of  subacute left mca cortical CVA affecting the lateral temporal and parietal lobes, therefor he was not a candidate for tPA  · History of HIV, compliant with medications, last CD4 count in May 2019 >400  · Likely acute CVA with the patients risk factors (history of stroke, hypertension, tobacco smoking, substance abuse, pre diabetes, and HIV), however, HIV related complications cannot be excluded  Plan  1  Stroke pathway  2  Lipid panel, HbA1C, BMP, CBC, thrombosis panel, vit B12, vit D, TSH  3  MRI brain  4  2D echo  5  Inpatient consult to neuro (already seen on admission)  6  PT/OP eval and treat  7  CD4 and HIV viral load  8  Depakote 500 mg tid  9  Atorvastatin 80 mg daily  10   Aspirin 81 mg daily

## 2019-10-26 NOTE — ASSESSMENT & PLAN NOTE
· Patient with a history of CVA in the L MCA territory in May 2018  · Baseline expressive aphasia  · Baseline R arm weakness (?rotator cuff syndrome, pain improved)  · Seizure in 07/2019, likely due to existing encephalomalacia in the left MCA territory consistent with his prior infarct (MRI in 07/2019)  · Following up with neurology at [facility]  · Presented with worsening R arm weakness and numbness  · CT findings of  subacute left mca cortical CVA affecting the lateral temporal and parietal lobes, therefor he was not a candidate for tPA  · History of HIV, compliant with medications, last CD4 count in May 2019 >400  · Likely acute CVA with the patients risk factors (history of stroke, hypertension, tobacco smoking, substance abuse, pre diabetes, and HIV), however, HIV related complications cannot be excluded  · Per neurology, permissive hypertension -200  Will start the patient on IVF, and if the blood pressure is still below 140, will discontinue Losartan    Plan  1  Stroke pathway/Pending MRI brain and Echo  2  Reports involuntary shaking RUE before RUE weakness   Possible Dalton's paralysis/seizure  3  Impaired glucose tolerance /A1C borderline 6 4   Outpt f/u with PCP

## 2019-10-26 NOTE — ASSESSMENT & PLAN NOTE
· Patient admits to smoking 1/2 pack of cigarettes a day  · Currently not open for cessation counseling    Plan  1   Nicotine patch

## 2019-10-26 NOTE — ASSESSMENT & PLAN NOTE
· History of HIV, was followed up by ID  · Currently on  [id number] (-25) daily  · Patient compliant with medications  · Last CD4 count was 428 on 05/20/2019, with HIV 1 RNA 32 copies per mL  · New neurological symptom could be HIV related, however, CVA is more likely due to multiple cascular risk factors    Plan  1  Pending CD4 cell count /HIV viral load  2   Known to HIV clinic in [facility]

## 2019-10-26 NOTE — QUICK NOTE
Stroke alert 10:35 am  Neurology call back 10:35 am  NIH ss 6 per ED team    Stated last normal 7 am with onset of symptoms at 8 am   Pt would've been IV tpa candidate however unclear when his chronic left mca cortical cva seen on ct scan actually occurred as this may be a contraindication if in past three months seen and he has had episodes of the speech fluctuations and rue weakness before per ED team info obtained from caretaker thus concern for breakthrough seizures is high  No hemorrage detected  cta head/neck personally reviewed, no large vessel clot  Pt is on aspirin at home  I would continue just the aspirin, add lipitor 80 mg daily  Admit to slim on stroke pathway  Tele  2-D echo  Mri brain w/o contrast  Flp, tsh, hba1c, vit b12, vit D  sbp 140-200  Patient will be evaluated by neurology team this evening

## 2019-10-26 NOTE — ED PROVIDER NOTES
History  Chief Complaint   Patient presents with    Numbness     Pt  c/o right hand/arm numbness since this morning  Pt  has hx  of aphasia, CVA, numbness  HPI   61-year-old male with history of TBI, CVA, seizure, aphasia presenting to the emergency department for acute onset of right upper extremity weakness and numbness  Caretaker states that patient awoke at his baseline at 7:00 a m  Today and suddenly at 8:00 a m , developed severe weakness and numbness of the year right upper extremity  Patient is right handed  Caretaker states that he has had episodes like this in the past, but does not know what the outcome was for those cases  History is limited due to patient's baseline dysarthria/aphasia  Patient indicates that he is not in any pain, but has numbness and weakness of the right upper extremity  To the caretakers knowledge, there has been no recent history of fevers, vomiting, abdominal pain, urinary symptoms, diarrhea, rash  No sick contacts or recent travel  Prior to Admission Medications   Prescriptions Last Dose Informant Patient Reported? Taking? DULoxetine (CYMBALTA) 60 mg delayed release capsule   No No   Sig: Take 1 capsule by mouth daily   TRIUMEQ 600- MG per tablet   No No   Sig: TAKE 1 TABLET BY MOUTH DAILY   aspirin (ECOTRIN LOW STRENGTH) 81 mg EC tablet   Yes No   Sig: Take 81 mg by mouth daily   dolutegravir (TIVICAY) 50 MG TABS   No No   Sig: Take 1 tablet by mouth daily for 30 days   ibuprofen (MOTRIN) 600 mg tablet   No No   Sig: Take 1 tablet by mouth every 6 (six) hours as needed for mild pain for up to 10 days   naproxen (EC NAPROSYN) 500 MG EC tablet   No No   Sig: Take 1 tablet by mouth 2 (two) times a day with meals for 7 days      Facility-Administered Medications: None       Past Medical History:   Diagnosis Date    Anxiety     Depression     [facility]     Substance abuse     Suicide attempt [facility] )        No past surgical history on file      Family History   Problem Relation Age of Onset    Diabetes Mother     Heart attack Mother     Heart attack Father      I have reviewed and agree with the history as documented  Social History     Tobacco Use    Smoking status: Current Some Day Smoker     Packs/day: 1 00    Smokeless tobacco: Never Used   Substance Use Topics    Alcohol use: Yes    Drug use: Yes     Types: "Crack" cocaine, Marijuana        Review of Systems   Unable to perform ROS: Other   Neurological: Positive for speech difficulty, weakness and numbness  Physical Exam  Physical Exam   Constitutional: He appears well-developed and well-nourished  He appears distressed  Crying, frustrated   HENT:   Head: Normocephalic and atraumatic  Right Ear: External ear normal    Left Ear: External ear normal    Nose: Nose normal    Mouth/Throat: Oropharynx is clear and moist  No oropharyngeal exudate  Eyes: Pupils are equal, round, and reactive to light  Conjunctivae and EOM are normal  Right eye exhibits no discharge  Left eye exhibits no discharge  Neck: Normal range of motion  Neck supple  Cardiovascular: Normal rate, regular rhythm, normal heart sounds and intact distal pulses  Exam reveals no gallop and no friction rub  Pulmonary/Chest: Effort normal and breath sounds normal  No stridor  No respiratory distress  He has no wheezes  He has no rales  He exhibits no tenderness  Abdominal: Soft  Bowel sounds are normal    Musculoskeletal: Normal range of motion  He exhibits no tenderness  Neurological: He is alert  A sensory deficit is present  No cranial nerve deficit  He exhibits abnormal muscle tone  GCS eye subscore is 4  GCS verbal subscore is 3   GCS motor subscore is 6    (+) prominent pronator drift on right upper extremity before 5 seconds, weakness in right arm, mild sensation difference in the right arm   (+) dysmetria with right hand on finger to nose testing  Unable to assess for dysdiadochokinesia  Ambulatory, steady gait  No facial droop  Normal speech when able to form words  Prominent aphasia (reportedly at baseline per caretaker)   Skin: Skin is warm and dry  Capillary refill takes less than 2 seconds  He is not diaphoretic  Psychiatric: He has a normal mood and affect  Nursing note and vitals reviewed  Vital Signs  ED Triage Vitals   Temperature Pulse Respirations BP SpO2   10/26/19 1014 10/26/19 1011 10/26/19 1011 -- 10/26/19 1011   98 1 °F (36 7 °C) 82 18  95 %      Temp Source Heart Rate Source Patient Position - Orthostatic VS BP Location FiO2 (%)   10/26/19 1014 -- -- -- --   Oral          Pain Score       10/26/19 1011       No Pain           Vitals:    10/26/19 1011   Pulse: 82         Visual Acuity      ED Medications  Medications - No data to display    Diagnostic Studies  Results Reviewed     Procedure Component Value Units Date/Time    POCT Chem 8+ [[id number]] Collected:  10/26/19 1043    Lab Status: In process Specimen:  Venous Updated:  10/26/19 1047    Rapid drug screen, urine [[id number]]     Lab Status:  No result Specimen:  Urine     CBC and Platelet [[id number]]  (Abnormal) Collected:  10/26/19 1038    Lab Status:  Final result Specimen:  Blood from Arm, Right Updated:  10/26/19 1045     WBC 6 81 Thousand/uL      RBC 5 48 Million/uL      Hemoglobin 15 9 g/dL      Hematocrit 51 5 %      MCV 94 fL      MCH 29 0 pg      MCHC 30 9 g/dL      RDW 14 7 %      Platelets 653 Thousands/uL      MPV 8 2 fL     Troponin I [[id number]] Collected:  10/26/19 1038    Lab Status: In process Specimen:  Blood from Arm, Right Updated:  10/26/19 2830    Basic metabolic panel [[id number]] Collected:  10/26/19 1038    Lab Status: In process Specimen:  Blood from Arm, Right Updated:  10/26/19 1042    Valproic acid level, total [[id number]] Collected:  10/26/19 1038    Lab Status: In process Specimen:  Blood from Arm, Right Updated:  10/26/19 [address] [[id number]] Collected:  10/26/19 1038    Lab Status:   In process Specimen:  Blood from Arm, Right Updated:  10/26/19 1042    APTT [[id number]] Collected:  10/26/19 1038    Lab Status: In process Specimen:  Blood from Arm, Right Updated:  10/26/19 1042                 CT stroke alert brain    (Results Pending)   CTA stroke alert (head/neck)    (Results Pending)   X-ray chest 1 view portable    (Results Pending)   CT head without contrast    (Results Pending)              Procedures  Procedures       ED Course  ED Course as of Oct 26 1337   Sat Oct 26, 2019   1035 Stroke Code Called for NIH Stroke Scale of 6  (+) prominent right sided pronator drift      1046 Neurology consulted and will follow patient  Labwork pending  1056 Case discussed with radiology  CT and CTA show evidence of old MCA infarct but no acute evolving findings  Neurology recommends against tPA  1130 Reassessment shows no improvement in symptoms of right arm  Patient is able to stand and ambulate  1152 Serum chemistry shows K 5 3   POTASSIUM,I-STAT(!): 5 6   1152 Noted  Sodium(!): 146   1152 EKG shows NSR 77 bpm, QRS 90, Qtc 396, no terminal R, (+) ST depression aVR (old), (-) GIOVANNA, (+) biphasic T wave V3-V5 (improved from prior)   ECG 12 lead   1316 ECG 12 lead                               MDM  Number of Diagnoses or Management Options  Diagnosis management comments: 78-year-old male with a past medical history of TBI, CVA, aphasia, presenting to the emergency department with acute onset of right upper extremity weakness, numbness at 8:00 a m  Today  Vital signs normal in triage  Physical exam notable for prominent pronator drift in right arm less than 5 seconds and right upper extremity weakness and right-sided dysmetria  Caretaker at bedside adamant that patient normally has full use of his right arm despite previous CVA  Differential diagnosis includes ischemic stroke, intracranial hemorrhage, seizure, meningitis    Code stroke activated within 25 minutes of arrival in hospital, 5 minutes after provider saw patient  Lab work drawn and sent  CT and CTA without evidence of acute clot or stroke patient  Case discussed with Neurology who agrees to inpatient admission  General medicine consulted for admission  Amount and/or Complexity of Data Reviewed  Clinical lab tests: ordered and reviewed  Tests in the radiology section of CPT®: ordered and reviewed  Tests in the medicine section of CPT®: ordered and reviewed  Discussion of test results with the performing providers: yes  Decide to obtain previous medical records or to obtain history from someone other than the patient: yes  Obtain history from someone other than the patient: yes  Review and summarize past medical records: yes  Discuss the patient with other providers: yes  Independent visualization of images, tracings, or specimens: yes    Risk of Complications, Morbidity, and/or Mortality  Presenting problems: high  Diagnostic procedures: low  Management options: minimal        Disposition  Final diagnoses:   None     ED Disposition     None      Follow-up Information    None         Patient's Medications   Discharge Prescriptions    No medications on file     No discharge procedures on file      ED Provider  Electronically Signed by           Marcus Sneed MD  10/26/19 0312

## 2019-10-26 NOTE — ASSESSMENT & PLAN NOTE
· Patient has a past history of cocaine abuse  · Admits to taking marijuana daily  · Urine tox positive for [facility]

## 2019-10-26 NOTE — ASSESSMENT & PLAN NOTE
· Patient on sertraline 75 mg daily  · Currently no symptoms of depression, denies suicidal ideations    Plan  1   Continue sertraline 75 mg daily

## 2019-10-26 NOTE — ASSESSMENT & PLAN NOTE
· History of HIV, was followed up by ID  · Currently on  [id number]-[id number]-[id number] (-25) daily  · Patient compliant with medications  · Last CD4 count was 428 on 05/20/2019, with HIV 1 RNA 32 copies per mL  · New neurological symptom could be HIV related, however, CVA is more likely due to multiple cascular risk factors    Plan  1  CD4 cell count   2  HIV viral load  3   May need ID referral if cell counts are low

## 2019-10-26 NOTE — H&P
H&P- Sonya Morillo 1960, 63 y o  male MRN: [id number]    Unit/Bed#: -01 Encounter: [id number]    Primary Care Provider: No primary care provider on file  Date and time admitted to hospital: 10/26/2019 10:04 AM        Cerebrovascular accident (CVA) [facility])  Assessment & Plan  · Patient with a history of CVA in the L MCA territory in May 2018  · Baseline expressive aphasia  · Baseline R arm weakness (?rotator cuff syndrome, pain improved)  · Seizure in 07/2019, likely due to existing encephalomalacia in the left MCA territory consistent with his prior infarct (MRI in 07/2019)  · Following up with neurology at [facility]  · Presented with worsening R arm weakness and numbness  · CT findings of  subacute left mca cortical CVA affecting the lateral temporal and parietal lobes, therefor he was not a candidate for tPA  · History of HIV, compliant with medications, last CD4 count in May 2019 >400  · Likely acute CVA with the patients risk factors (history of stroke, hypertension, tobacco smoking, substance abuse, pre diabetes, and HIV), however, HIV related complications cannot be excluded  · Per neurology, permissive hypertension -200  Will start the patient on IVF, and if the blood pressure is still below 140, will discontinue Losartan    Plan  1  Stroke pathway  2  Lipid panel, HbA1C, BMP, CBC, thrombosis panel, vit B12, vit D, TSH  3  MRI brain  4  2D echo  5  Inpatient consult to neuro (already seen on admission)  6  PT/OP eval and treat  7  CD4 and HIV viral load  8  Depakote 500 mg tid  9  Atorvastatin 80 mg daily  10  Aspirin 81 mg daily  11  Monitor blood pressure, -200  12  IV normal saline 100cc/hr  13   If SBP continuously <140 following IV fluids, will discontinue losartan    Human immunodeficiency virus (HIV) infection ([address] )  Assessment & Plan  · History of HIV, was followed up by ID  · Currently on  frwttgfi-juvccqhi-gkzjeae (-25) daily  · Patient compliant with medications  · Last CD4 count was 428 on 05/20/2019, with HIV 1 RNA 32 copies per mL  · New neurological symptom could be HIV related, however, CVA is more likely due to multiple cascular risk factors    Plan  2  CD4 cell count   3  HIV viral load  4  May need ID referral if cell counts are low    Benign essential hypertension  Assessment & Plan  · Patient on Losartan 100 mg daily  · Current blood pressure 133/71  · Per neurology, permissive hypertension is recommended (-200)    Tobacco abuse  Assessment & Plan  · Patient admits to smoking 1/2 pack of cigarettes a day  · Currently not open for cessation counseling    Plan  3  Nicotine patch     Unspecified vitamin D deficiency  Assessment & Plan  · Patient on vitamin D 10475 IU once a week    Substance abuse ([address] )  Assessment & Plan  · Patient has a past history of cocaine abuse  · Admits to taking marijuana daily  · Urine tox positive for THC      Bipolar affective disorder ([facility] )  Assessment & Plan  · Patient on sertraline 75 mg daily  · Currently no symptoms of depression, denies suicidal ideations    Plan  4  Continue sertraline 75 mg daily      VTE Prophylaxis: Enoxaparin (Lovenox)  / sequential compression device   Code Status:  Level 1 full code  POLST: POLST is not applicable to this patient    Anticipated Length of Stay:  Patient will be admitted on an Inpatient basis with an anticipated length of stay of  approximately 2 midnights  Justification for Hospital Stay:  Evaluation for right arm weakness, likely stroke, on stroke pathway    Chief Complaint:   Increased right arm weakness    History of Present Illness:    Cat Chun is a 63 y o  male with history of left MCA CVA in May 2018 (baseline expressive aphasia with right arm weakness), generalized tonic colonic seizure (July 2019, on Depakote 500 t i d ), HIV infection (on BIKTARVY), bipolar affective disorder 1, hypertension, tobacco abuse, and substance abuse    He presents to the emergency department with worsening right arm weakness  (history was limited due to patients baseline expressive aphasia)    Patient was last noted to be normal at 7:00 a m  This morning  Around 8:00 a m  While he was awake he developed a sudden worsening of his right arm  He denied facial asymmetry, droop, lower extremity weakness  Did not notice any change in his aphasia  No double vision, however he had diffuse headache  No loss of consciousness, confusion  She denies fever, chills  Patient has a previous history of stroke, and has been following up with [facility] neurology  MRI brain on 07/30/2019 done following seizures revealed encephalomalacia in the left MCA territory consistent with his prior infarct however without acute findings  EEG revealed left temporal slowing without seizures or epileptiform discharges  Patient is taking Depakote 500mg tid, increased to t i d  since she is at therapeutic level  Patient does not have a history of MI, denies chest pain, palpitations, shortness of breath, orthopnea, or paroxysmal nocturnal dyspnea  No history of DVT, peripheral arterial disease  Patient states he does not have diabetes mellitus  However reviewing his health records, he is found to be prediabetic  He is on statin therapy for hyperlipidemia  Social history  Patient smokes half a pack of cigarettes daily, smokes marijuana, and has a history of cocaine abuse  He is HIV positive, is on triple therapy with BIKTARVY, following up with Infectious Disease in [facility]  Currently denies fever, night sweats, loss of appetite, loss of weight, visual disturbances  His last CD4 count was 428 on 05/20/2019, with HIV 1 RNA 32 copies per mL  Admission to the emergency department, stroke alert was initiated  Seen by Neurology  Also patient was within the window for tPA administration, CT head revealed a subacute infarct of the left MCA territory    Therefore patient was not a candidate for tPA   His urine toxicology was positive for THC  Review of Systems:    Review of Systems   Constitutional: Negative  Negative for activity change, appetite change, diaphoresis, fatigue, fever and unexpected weight change  HENT: Negative  Negative for drooling, hearing loss, tinnitus, trouble swallowing and voice change  Eyes: Negative for photophobia and visual disturbance  Respiratory: Negative  Negative for cough, chest tightness, shortness of breath and wheezing  Cardiovascular: Negative  Negative for chest pain, palpitations and leg swelling  Gastrointestinal: Negative  Negative for abdominal distention, abdominal pain, diarrhea, nausea and vomiting  Endocrine: Negative  Genitourinary: Negative  Negative for difficulty urinating, frequency, hematuria and urgency  Musculoskeletal: Positive for arthralgias ( right shoulder pain now resolved  )  Negative for back pain, joint swelling and neck stiffness  Skin: Negative  Neurological: Positive for seizures (right arm history, seizures free since July 2019), weakness ( right arm), numbness and headaches ( on and off migraine type headaches)  Negative for dizziness, syncope and facial asymmetry  Hematological: Negative  Psychiatric/Behavioral: Negative for confusion and suicidal ideas  The patient is nervous/anxious  All other systems reviewed and are negative  Past Medical and Surgical History:     Past Medical History:   Diagnosis Date    Anxiety     Depression     HIV disease ([facility] )     Substance abuse     Suicide attempt ([facility] )        No past surgical history on file  Meds/Allergies:    Prior to Admission medications    Medication Sig Start Date End Date Taking?  Authorizing Provider   atorvastatin (LIPITOR) 80 mg tablet Take 80 mg by mouth daily   Yes Historical Provider, MD   bictegravir-emtricitab-tenofovir alafenamide (BIKTARVY) -25 MG tablet Take 1 tablet by mouth daily with breakfast   Yes Historical Provider, MD   Cholecalciferol (VITAMIN D3) 20092 units TABS Take 1 tablet by mouth once a week   Yes Historical Provider, MD   divalproex sodium (DEPAKOTE ER) 500 mg 24 hr tablet Take 500 mg by mouth every 8 (eight) hours   Yes Historical Provider, MD   losartan (COZAAR) 100 MG tablet Take 100 mg by mouth daily   Yes Historical Provider, MD   Melatonin 10 MG TBDP Take 1 tablet by mouth daily at bedtime   Yes Historical Provider, MD   sertraline (ZOLOFT) 25 mg tablet Take 25 mg by mouth daily Take with 50 mg tablet (total 75mg daily)   Yes Historical Provider, MD   sertraline (ZOLOFT) 50 mg tablet Take 50 mg by mouth daily Take with 25 mg tablet (total 75 mg daily)   Yes Historical Provider, MD   tamsulosin (FLOMAX) 0 4 mg Take 0 4 mg by mouth daily with dinner   Yes Historical Provider, MD   aspirin (ECOTRIN LOW STRENGTH) 81 mg EC tablet Take 81 mg by mouth daily    Historical Provider, MD   dolutegravir (TIVICAY) 50 MG TABS Take 1 tablet by mouth daily for 30 days 7/11/17 11/6/17  Fior Geronimo MD   DULoxetine (CYMBALTA) 60 mg delayed release capsule Take 1 capsule by mouth daily  Patient not taking: Reported on 10/26/2019 7/11/17   Fior Geronimo MD   ibuprofen (MOTRIN) 600 mg tablet Take 1 tablet by mouth every 6 (six) hours as needed for mild pain for up to 10 days 9/9/17 9/19/17  Rico Hernández MD   naproxen (EC NAPROSYN) 500 MG EC tablet Take 1 tablet by mouth 2 (two) times a day with meals for 7 days 11/6/17 11/13/17  Shan Crooked, [address] [facility]- MG per tablet TAKE 1 TABLET BY MOUTH DAILY  Patient not taking: Reported on 10/26/2019 3/20/18   Shubham Freeman MD     I have reviewed home medications with patient personally  Allergies:    Allergies   Allergen Reactions    No Active Allergies        Social History:     Marital Status: /Civil Union   Occupation:   Patient Pre-hospital Living Situation:  Group living, 2 friends  Patient Pre-hospital Level of Mobility:  Able to ambulate without restrictions  Patient Pre-hospital Diet Restrictions:  None  Substance Use History:   Social History     Substance and Sexual Activity   Alcohol Use Yes     Social History     Tobacco Use   Smoking Status Current Some Day Smoker    Packs/day: 1 00   Smokeless Tobacco Never Used     Social History     Substance and Sexual Activity   Drug Use Yes    Types: "Crack" cocaine, Marijuana       Family History:    Family History   Problem Relation Age of Onset    Diabetes Mother     Heart attack Mother     Heart attack Father        Physical Exam:     Vitals:   Blood Pressure: 133/71 (10/26/19 1730)  Pulse: 66 (10/26/19 1730)  Temperature: 97 6 °F (36 4 °C) (10/26/19 1500)  Temp Source: Oral (10/26/19 1500)  Respirations: 18 (10/26/19 1500)  Weight - Scale: 93 kg (205 lb 0 4 oz) (10/26/19 1011)  SpO2: 92 % (10/26/19 1500)    Physical Exam   Constitutional: He appears well-developed and well-nourished  No distress  HENT:   Head: Normocephalic and atraumatic  Mouth/Throat: Oropharynx is clear and moist    Eyes: Pupils are equal, round, and reactive to light  EOM are normal    Neck: Normal range of motion  Neck supple  Cardiovascular: Normal rate, regular rhythm, normal heart sounds and intact distal pulses  No murmur heard  No carotid bruits   Pulmonary/Chest: Effort normal  No stridor  No respiratory distress  He has no wheezes  He has rales (Occasional bilateral)  Abdominal: Soft  Bowel sounds are normal  He exhibits no distension  There is no tenderness  Musculoskeletal: Normal range of motion  He exhibits no edema  Neurological: He has an abnormal Finger-Nose-Finger Test (past pointing present on the right side)  Reflex Scores:       Bicep reflexes are 3+ on the right side and 2+ on the left side  Brachioradialis reflexes are 3+ on the right side and 2+ on the left side  Patellar reflexes are 3+ on the right side and 3+ on the left side         Achilles reflexes are 2+ on the right side and 2+ on the left side  Skin: Skin is warm and dry  Capillary refill takes less than 2 seconds  He is not diaphoretic  Psychiatric: He has a normal mood and affect  His speech is slurred  Nursing note and vitals reviewed  Neurologic Exam     Mental Status   Oriented to person  Disoriented to place  Disoriented to area  (Unreliable as the patient has word-finding difficulty)  Disoriented to time  Disoriented to month  (Unreliable as the patient has word-finding difficulty)  Registration: recalls 2 of 3 objects  Recall of objects at 5 minutes: Recalls 0 of 3 objects  Follows 1 step commands  Attention: normal  Concentration: decreased  Speech: slurred (With expressive aphasia)  Level of consciousness: alert  Unable to name object  Unable to read  Able to repeat  Normal comprehension  Cranial Nerves     CN II   Visual acuity: normal  Right visual field deficit: none  Left visual field deficit: none     CN III, IV, VI   Pupils are equal, round, and reactive to light  Extraocular motions are normal    CN III: no CN III palsy  CN VI: no CN VI palsy  Nystagmus: bilateral   Nystagmus type: horizontal (fine )  Diplopia: none  Ophthalmoparesis: none  Upgaze: normal  Downgaze: normal    CN V   Facial sensation intact  CN VII   Facial expression full, symmetric     Right facial weakness: none  Left facial weakness: none    CN VIII   CN VIII normal    Hearing: intact    CN XI   Right sternocleidomastoid strength: normal  Left sternocleidomastoid strength: normal  Right trapezius strength: normal  Left trapezius strength: normal    CN XII   Tongue: not atrophic  Fasciculations: absent  Tongue deviation: none    Motor Exam   Muscle bulk: normal  Overall muscle tone: normal    Strength   Right strength: Upper arm muscle power 5-, all other muscle groups 5/5    Sensory Exam   Vibration normal    Pinprick normal      Gait, Coordination, and Reflexes     Coordination   Finger to nose coordination: abnormal (past pointing present on the right side)    Reflexes   Right brachioradialis: 3+  Left brachioradialis: 2+  Right biceps: 3+  Left biceps: 2+  Right patellar: 3+  Left patellar: 3+  Right achilles: 2+  Left achilles: 2+      Additional Data:     Lab Results: I have personally reviewed pertinent reports  Results from last 7 days   Lab Units 10/26/19  1043 10/26/19  1038   WBC Thousand/uL  --  6 81   HEMOGLOBIN g/dL  --  15 9   I STAT HEMOGLOBIN g/dl 16 7  --    HEMATOCRIT %  --  51 5*   HEMATOCRIT, ISTAT % 49  --    PLATELETS Thousands/uL  --  236     Results from last 7 days   Lab Units 10/26/19  1043 10/26/19  1038   POTASSIUM mmol/L  --  5 3   CHLORIDE mmol/L  --  104   CO2 mmol/L  --  33*   CO2, I-STAT mmol/L 33*  --    BUN mg/dL  --  12   CREATININE mg/dL  --  1 09   CALCIUM mg/dL  --  9 7   GLUCOSE, ISTAT mg/dl 90  --      Results from last 7 days   Lab Units 10/26/19  1038   INR  1 01       Imaging: I have personally reviewed pertinent reports  X-ray Chest 1 View Portable    Result Date: 10/26/2019  Narrative: CHEST INDICATION:   Stroke  COMPARISON:  May 11, 2014 EXAM PERFORMED/VIEWS:  XR CHEST PORTABLE 1 FINDINGS:  Event recorder is visible on the left  Cardiomediastinal silhouette appears unremarkable  The lungs are clear  No pneumothorax or pleural effusion  Osseous structures appear within normal limits for patient age  Impression: No acute cardiopulmonary disease  Workstation performed: [id number]     Ct Stroke Alert Brain    Result Date: 10/26/2019  Narrative: CT BRAIN - STROKE ALERT PROTOCOL INDICATION:   Neuro deficit, acute, stroke suspected  History of old left MCA territory infarction, traumatic brain injury, seizure and aphasia  Presents with acute onset of right upper extremity weakness and numbness  As per the caretaker, this is new from the patient's baseline  Past medical history significant for bipolar affective disorder, cocaine abuse and HIV   COMPARISON:  Outside MRI brain report September 30, 2019 and outside CT angiogram of the head and neck report September 27, 2019  TECHNIQUE:  CT examination of the brain was performed  In addition to axial images, coronal reformatted images were created and submitted for interpretation  Radiation dose length product (DLP) for this visit:  1051 mGy-cm   This examination, like all CT scans performed in the [facility], was performed utilizing techniques to minimize radiation dose exposure, including the use of iterative reconstruction and automated exposure control  IMAGE QUALITY:  Diagnostic  FINDINGS: PARENCHYMA:  No acute intraparenchymal hemorrhage or extra-axial fluid collections  No acute infarctions  Large area of encephalomalacia and gliosis in the left temporal and parietal lobes, consistent with history of prior infarction  Decreased attenuation in the periventricular regions and centrum semiovale bilaterally, consistent with chronic small vessel ischemic change  Normal intracranial vasculature  VENTRICLES AND EXTRA-AXIAL SPACES:  Ventricles and cerebral sulci mildly dilated  VISUALIZED ORBITS AND PARANASAL SINUSES:  Unremarkable  CALVARIUM AND EXTRACRANIAL SOFT TISSUES:   Normal      Impression: 1  Chronic left MCA territory infarction  2   Mild cerebral atrophy with chronic small vessel ischemic change  If there is concern for acute ischemia, consider follow-up MRI of the brain with diffusion-weighted imaging  Findings were directly discussed with Mely Chavez on 10/26/2019 10:50 AM  Workstation performed: [id number]     Cta Stroke Alert (head/neck)    Result Date: 10/26/2019  Narrative: CTA NECK AND BRAIN WITH CONTRAST INDICATION: Neuro deficit, acute, stroke suspected History of old left MCA territory infarction, traumatic brain injury, seizure and aphasia  Presents with acute onset of right upper extremity weakness and numbness  As per the caretaker, this is new from the patient's baseline    Past medical history significant for bipolar affective disorder, cocaine abuse and HIV  COMPARISON:  Outside MRI brain report September 30, 2019 and outside CT angiogram of the head and neck report September 27, 2019  TECHNIQUE:   Post contrast imaging was performed after administration of iodinated contrast through the neck and brain  Post contrast axial 0 625 mm images timed to opacify the arterial system  3D rendering was performed on an independent workstation  MIP reconstructions performed  Coronal reconstructions were performed of the noncontrast portion of the brain  Radiation dose length product (DLP) for this visit:  588 mGy/cm  This examination, like all CT scans performed in the [facility], was performed utilizing techniques to minimize radiation dose exposure, including the use of iterative reconstruction and automated exposure control  IV Contrast:  85 mL Omnipaque 350  IMAGE QUALITY:   Beam hardening artifact and respiratory motion artifact  FINDINGS: CERVICAL VASCULATURE AORTIC ARCH AND GREAT VESSELS:  Mild athersclerotic disease of the arch, proximal great vessels and visualized subclavian vessels  No significant stenosis  RIGHT VERTEBRAL ARTERY CERVICAL SEGMENT:  Normal origin  There is segmental visualization of the proximal cervical right vertebral artery, likely due to artifact  The mid and distal cervical right vertebral artery is normal to the level of the skull base  LEFT VERTEBRAL ARTERY CERVICAL SEGMENT:  Normal origin  There is segmental visualization of the proximal cervical left vertebral artery, likely due to artifact  The mid and distal cervical left vertebral artery is normal  RIGHT EXTRACRANIAL CAROTID SEGMENT:  Mild atherosclerotic disease of the distal common carotid artery and proximal cervical internal carotid artery without significant stenosis compared to the more distal ICA   LEFT EXTRACRANIAL CAROTID SEGMENT:  Mild atherosclerotic disease of the distal common carotid artery and proximal cervical internal carotid artery without significant stenosis compared to the more distal ICA  NASCET criteria was used to determine the degree of internal carotid artery diameter stenosis  INTRACRANIAL VASCULATURE INTERNAL CAROTID ARTERIES:  Normal enhancement of the intracranial portions of the internal carotid arteries  Normal ophthalmic artery origins  Normal ICA terminus  ANTERIOR CIRCULATION:  The A1 segment of the right anterior cerebral artery is hypoplastic but patent  The right A2 segment is normal  The left anterior cerebral artery is normal  The anterior communicating artery is normal  MIDDLE CEREBRAL ARTERY CIRCULATION:  M1 segment and middle cerebral artery branches demonstrate normal enhancement bilaterally  DISTAL VERTEBRAL ARTERIES:  The intracranial segment of the right vertebral artery is hypoplastic but patent  The intracranial segment of the left vertebral artery is normal   Posterior inferior cerebellar artery origins are normal  Normal vertebral basilar junction  BASILAR ARTERY:  Basilar artery is normal in caliber  Normal superior cerebellar arteries  POSTERIOR CEREBRAL ARTERIES: The P1 segment of the right posterior cerebral artery is congenitally absent  There is a dominant right posterior communicating artery, consistent with fetal origin  The left posterior cerebral artery is normal   The left posterior communicating artery is congenitally absent  DURAL VENOUS SINUSES:  Filling defect in the left internal jugular vein, likely flow related artifact  Filling defect in the superior sagittal sinus, likely prominent pacchionian granulation  NON VASCULAR ANATOMY BONY STRUCTURES:  Straightening of the cervical lordotic curvature  Disc space narrowing diffusely throughout the cervical spine  Anterior osteophytic spur formation C4-C7  SOFT TISSUES OF THE NECK: Prominence of the palatine tonsils bilaterally    Calcifications in the palatine tonsils bilaterally, consistent with concretions from prior infection  There is a 9 mm low-density lesion arising from the inferior aspect of the left lobe of the thyroid gland  Incidental discovery of one or more thyroid nodule(s) measuring less than 1 5 cm and without suspicious features is noted in this patient who is above 28years old; according to guidelines published in the February 2015 white paper on incidental thyroid nodules in the Journal of the [employer] of Radiology [facility]), no further evaluation is recommended  THORACIC INLET:  Unremarkable  Impression: 1  Intact [place] of [place]  No evidence of aneurysm, vascular malformation or vascular cut off  Congenital variations as described above  2   Mild atherosclerotic disease of the internal carotid arteries bilaterally without evidence of flow-limiting stenosis  3   Somewhat limited examination of the proximal vertebral arteries bilaterally, secondary to respiratory motion as well as beam hardening  Findings were directly discussed with Karson Vides on 10/26/2019 10:55 AM  Workstation performed: [id number]       EKG, Pathology, and Other Studies Reviewed on Admission:       [facility] Everywhere Records Reviewed: Yes     ** Please Note: This note has been constructed using a voice recognition system   **

## 2019-10-27 VITALS
TEMPERATURE: 98 F | OXYGEN SATURATION: 94 % | DIASTOLIC BLOOD PRESSURE: 71 MMHG | SYSTOLIC BLOOD PRESSURE: 128 MMHG | HEART RATE: 68 BPM | RESPIRATION RATE: 18 BRPM | BODY MASS INDEX: 29.42 KG/M2 | WEIGHT: 205.03 LBS

## 2019-10-27 LAB
25(OH)D3 SERPL-MCNC: 38.9 NG/ML (ref 30–100)
ANION GAP SERPL CALCULATED.3IONS-SCNC: 8 MMOL/L (ref 4–13)
ATRIAL RATE: 77 BPM
BUN SERPL-MCNC: 15 MG/DL (ref 5–25)
CALCIUM SERPL-MCNC: 8.7 MG/DL (ref 8.3–10.1)
CHLORIDE SERPL-SCNC: 105 MMOL/L (ref 100–108)
CHOLEST SERPL-MCNC: 115 MG/DL (ref 50–200)
CO2 SERPL-SCNC: 27 MMOL/L (ref 21–32)
CREAT SERPL-MCNC: 0.95 MG/DL (ref 0.6–1.3)
DEPRECATED AT III PPP: 89 % OF NORMAL (ref 92–136)
EST. AVERAGE GLUCOSE BLD GHB EST-MCNC: 137 MG/DL
GFR SERPL CREATININE-BSD FRML MDRD: 102 ML/MIN/1.73SQ M
GLUCOSE SERPL-MCNC: 82 MG/DL (ref 65–140)
HBA1C MFR BLD: 6.4 % (ref 4.2–6.3)
HDLC SERPL-MCNC: 34 MG/DL
LDLC SERPL CALC-MCNC: 61 MG/DL (ref 0–100)
P AXIS: 75 DEGREES
PLATELET # BLD AUTO: 225 THOUSANDS/UL (ref 149–390)
PMV BLD AUTO: 8.4 FL (ref 8.9–12.7)
POTASSIUM SERPL-SCNC: 4.2 MMOL/L (ref 3.5–5.3)
PR INTERVAL: 152 MS
QRS AXIS: 21 DEGREES
QRSD INTERVAL: 90 MS
QT INTERVAL: 350 MS
QTC INTERVAL: 396 MS
SODIUM SERPL-SCNC: 140 MMOL/L (ref 136–145)
T WAVE AXIS: 16 DEGREES
TRIGL SERPL-MCNC: 102 MG/DL
TSH SERPL DL<=0.05 MIU/L-ACNC: 3.08 UIU/ML (ref 0.36–3.74)
VENTRICULAR RATE: 77 BPM
VIT B12 SERPL-MCNC: 1155 PG/ML (ref 100–900)

## 2019-10-27 PROCEDURE — 85303 CLOT INHIBIT PROT C ACTIVITY: CPT | Performed by: PSYCHIATRY & NEUROLOGY

## 2019-10-27 PROCEDURE — 81241 F5 GENE: CPT | Performed by: PSYCHIATRY & NEUROLOGY

## 2019-10-27 PROCEDURE — G8980 MOBILITY D/C STATUS: HCPCS

## 2019-10-27 PROCEDURE — 85732 THROMBOPLASTIN TIME PARTIAL: CPT | Performed by: PSYCHIATRY & NEUROLOGY

## 2019-10-27 PROCEDURE — 86147 CARDIOLIPIN ANTIBODY EA IG: CPT | Performed by: PSYCHIATRY & NEUROLOGY

## 2019-10-27 PROCEDURE — NC001 PR NO CHARGE: Performed by: PSYCHIATRY & NEUROLOGY

## 2019-10-27 PROCEDURE — 84443 ASSAY THYROID STIM HORMONE: CPT | Performed by: INTERNAL MEDICINE

## 2019-10-27 PROCEDURE — 85300 ANTITHROMBIN III ACTIVITY: CPT | Performed by: PSYCHIATRY & NEUROLOGY

## 2019-10-27 PROCEDURE — 97163 PT EVAL HIGH COMPLEX 45 MIN: CPT

## 2019-10-27 PROCEDURE — 93010 ELECTROCARDIOGRAM REPORT: CPT | Performed by: INTERNAL MEDICINE

## 2019-10-27 PROCEDURE — 99232 SBSQ HOSP IP/OBS MODERATE 35: CPT | Performed by: PSYCHIATRY & NEUROLOGY

## 2019-10-27 PROCEDURE — 87536 HIV-1 QUANT&REVRSE TRNSCRPJ: CPT | Performed by: INTERNAL MEDICINE

## 2019-10-27 PROCEDURE — 82607 VITAMIN B-12: CPT | Performed by: INTERNAL MEDICINE

## 2019-10-27 PROCEDURE — 85670 THROMBIN TIME PLASMA: CPT | Performed by: PSYCHIATRY & NEUROLOGY

## 2019-10-27 PROCEDURE — 85049 AUTOMATED PLATELET COUNT: CPT | Performed by: INTERNAL MEDICINE

## 2019-10-27 PROCEDURE — 85306 CLOT INHIBIT PROT S FREE: CPT | Performed by: PSYCHIATRY & NEUROLOGY

## 2019-10-27 PROCEDURE — 85613 RUSSELL VIPER VENOM DILUTED: CPT | Performed by: PSYCHIATRY & NEUROLOGY

## 2019-10-27 PROCEDURE — 99239 HOSP IP/OBS DSCHRG MGMT >30: CPT | Performed by: INTERNAL MEDICINE

## 2019-10-27 PROCEDURE — G8978 MOBILITY CURRENT STATUS: HCPCS

## 2019-10-27 PROCEDURE — 86146 BETA-2 GLYCOPROTEIN ANTIBODY: CPT | Performed by: PSYCHIATRY & NEUROLOGY

## 2019-10-27 PROCEDURE — G8979 MOBILITY GOAL STATUS: HCPCS

## 2019-10-27 PROCEDURE — 81240 F2 GENE: CPT | Performed by: PSYCHIATRY & NEUROLOGY

## 2019-10-27 PROCEDURE — 85705 THROMBOPLASTIN INHIBITION: CPT | Performed by: PSYCHIATRY & NEUROLOGY

## 2019-10-27 PROCEDURE — 80048 BASIC METABOLIC PNL TOTAL CA: CPT | Performed by: INTERNAL MEDICINE

## 2019-10-27 PROCEDURE — 83036 HEMOGLOBIN GLYCOSYLATED A1C: CPT | Performed by: INTERNAL MEDICINE

## 2019-10-27 PROCEDURE — 85305 CLOT INHIBIT PROT S TOTAL: CPT | Performed by: PSYCHIATRY & NEUROLOGY

## 2019-10-27 PROCEDURE — 82306 VITAMIN D 25 HYDROXY: CPT | Performed by: INTERNAL MEDICINE

## 2019-10-27 PROCEDURE — 80061 LIPID PANEL: CPT | Performed by: INTERNAL MEDICINE

## 2019-10-27 PROCEDURE — 86361 T CELL ABSOLUTE COUNT: CPT | Performed by: INTERNAL MEDICINE

## 2019-10-27 RX ORDER — ZONISAMIDE 100 MG/1
100 CAPSULE ORAL DAILY
Qty: 30 CAPSULE | Refills: 0 | Status: SHIPPED | OUTPATIENT
Start: 2019-10-28

## 2019-10-27 RX ORDER — ZONISAMIDE 100 MG/1
100 CAPSULE ORAL DAILY
Status: DISCONTINUED | OUTPATIENT
Start: 2019-10-28 | End: 2019-10-27 | Stop reason: HOSPADM

## 2019-10-27 RX ADMIN — DIVALPROEX SODIUM 500 MG: 500 TABLET, FILM COATED, EXTENDED RELEASE ORAL at 06:10

## 2019-10-27 RX ADMIN — LOSARTAN POTASSIUM 100 MG: 50 TABLET, FILM COATED ORAL at 08:19

## 2019-10-27 RX ADMIN — ATORVASTATIN CALCIUM 80 MG: 40 TABLET, FILM COATED ORAL at 16:13

## 2019-10-27 RX ADMIN — ENOXAPARIN SODIUM 40 MG: 40 INJECTION SUBCUTANEOUS at 08:20

## 2019-10-27 RX ADMIN — CLOPIDOGREL BISULFATE 75 MG: 75 TABLET ORAL at 08:20

## 2019-10-27 RX ADMIN — ASPIRIN 81 MG 81 MG: 81 TABLET ORAL at 08:19

## 2019-10-27 RX ADMIN — SERTRALINE HYDROCHLORIDE 50 MG: 50 TABLET ORAL at 08:20

## 2019-10-27 RX ADMIN — SERTRALINE HYDROCHLORIDE 25 MG: 25 TABLET ORAL at 08:23

## 2019-10-27 RX ADMIN — DIVALPROEX SODIUM 500 MG: 500 TABLET, FILM COATED, EXTENDED RELEASE ORAL at 14:04

## 2019-10-27 RX ADMIN — SODIUM CHLORIDE 100 ML/HR: 0.9 INJECTION, SOLUTION INTRAVENOUS at 06:10

## 2019-10-27 RX ADMIN — TAMSULOSIN HYDROCHLORIDE 0.4 MG: 0.4 CAPSULE ORAL at 16:13

## 2019-10-27 NOTE — PHYSICAL THERAPY NOTE
PHYSICAL THERAPY EVALUATION  NAME:  Crescencio Morrell  DATE: 10/27/19    AGE:   63 y o  Mrn:   [id number]  ADMIT DX:  Numbness [R20 0]    Past Medical History:   Diagnosis Date    Anxiety     Depression     HIV disease ([facility] )     Substance abuse     Suicide attempt ([facility] )    No past surgical history on file  Length Of Stay: 1  Performed at least 2 patient identifiers during session: Name and Birthday  PHYSICAL THERAPY EVALUATION :    10/27/19 1470   Note Type   Note type Eval only   Pain Assessment   Pain Assessment FLACC   Pain Type Acute pain   Pain Location Head   Pain Orientation Frontal;Bilateral   Effect of Pain on Daily Activities limits speed of mobility, reports feeling "off"   Patient's Stated Pain Goal No pain   Hospital Pain Intervention(s) Repositioned; Ambulation/increased activity; Emotional support   Pain Rating: FLACC (Rest) - Face 0   Pain Rating: FLACC (Rest) - Legs 0   Pain Rating: FLACC (Rest) - Activity 0   Pain Rating: FLACC (Rest) - Cry 0   Pain Rating: FLACC (Rest) - Consolability 0   Score: FLACC (Rest) 0   Pain Rating: FLACC (Activity) - Face 0   Pain Rating: FLACC (Activity) - Legs 0   Pain Rating: FLACC (Activity) - Activity 0   Pain Rating: FLACC (Activity) - Cry 1   Pain Rating: FLACC (Activity) - Consolability 0   Score: FLACC (Activity) 1   Home Living   Type of Home Group Home   Home Layout One level  (no GIOVANNA per pt)   Home Equipment   (none prior to admit)   Prior Function   Level of [place] Independent with ADLs and functional mobility   Lives With Other (Comment)  (2 others)   ADL Assistance Independent   Falls in the last 6 months   (Pt not consistent w/yes/no responses)   Restrictions/Precautions   Other Precautions Bed Alarm; Chair Alarm   General   Family/Caregiver Present No   Cognition   Overall Cognitive Status Impaired   Arousal/Participation Alert   Memory Within functional limits   Following Commands Follows one step commands with increased time or repetition   RUE Strength   RUE Overall Strength Within Functional Limits - able to perform ADL tasks with strength   LUE Strength   LUE Overall Strength Within Functional Limits - able to perform ADL tasks with strength   Strength RLE   R Hip Flexion 4+/5   R Knee Extension 4+/5   R Ankle Dorsiflexion 4+/5   Strength LLE   L Hip Flexion 4+/5   L Knee Extension 4+/5   L Ankle Dorsiflexion 4+/5   Coordination   Movements are Fluid and Coordinated 0   Light Touch   RLE Light Touch Grossly intact   LLE Light Touch Grossly intact   Bed Mobility   Supine to Sit 6  Modified independent   Additional items HOB elevated   Sit to Supine 6  Modified independent   Additional items HOB elevated   Transfers   Sit to Stand 6  Modified independent   Additional items Increased time required   Stand to Sit 6  Modified independent   Additional items Increased time required   Ambulation/Elevation   Gait pattern   (B LE internal rotation)   Gait Assistance 5  Supervision for impulsivity   Assistive Device None   Distance 600'   Stair Management Assistance 5  Supervision  (for IV management only)   Additional items Assist x 1;Verbal cues   Stair Management Technique One rail R;Alternating pattern; Foreward   Number of Stairs 4   Balance   Static Sitting Normal   Static Standing Good   Ambulatory Fair +   Higher level balance   (4 point DGI: 11/12)   Endurance Deficit   Endurance Deficit No   Activity Tolerance   Activity Tolerance Patient limited by pain   Medical Staff Made Aware spoke to [facility] - MEETA BRICE from case management   Nurse Made Aware spoke to Cherry Santillan RN   Assessment   Prognosis Good   Problem List Decreased endurance; Impaired balance;Decreased coordination;Decreased mobility; Impaired judgement  (limited )   Goals   Patient Goals to be DCed by tomorrow   PT Treatment Day 0   Plan   Treatment/Interventions   (DC from IPPT services)   Recommendation   Recommendation   (return to one floor set up)   Equipment Recommended   (none)   Barthel Index   Feeding 10 Bathing 0   Grooming Score 5   Dressing Score 10   Bladder Score 5   Bowels Score 10   Toilet Use Score 10   Transfers (Bed/Chair) Score 15   Mobility (Level Surface) Score 15   Stairs Score 5   Barthel Index Score 85   4 Item Dynamic Gait Index  2/3 Gait level surface  3/3 Change in gait speed  3/3 Gait with horizontal head turns  3/3 Gait with vertical head turns  11/12 total score (<10/12 indicates increased risk of fall)  (Please find full objective findings from PT assessment regarding body systems outlined above)  Assessment: Pt is a 63 y o  male seen for PT evaluation s/p admit to [facility] on 10/26/2019 w/ Cerebrovascular accident (CVA) ([facility] )  Neuro consulted w/impression of possible breakthrough seizure, MRI pending  Order placed for PT  Upon evaluation: Pt requires modified I assistance for bed mobility and transfers, no physical assistance for ambulation with out device and S for stairs for line management  Pt's clinical presentation is currently unstable/unpredictable given the functional mobility deficits above, especially impaired coordination and gait deviations, coupled with fall risks including impaired balance, impaired coordination and decreased safety awareness, and combined with medical complications of multiple readmissions, abnormal sodium values, abnormal CO2 values and impulsivity during admission  Pt to benefit from restorative ambulation w/nursing in halls while in hospital to address deficits as defined above and maximize level of functional mobility  From PT/mobility standpoint, recommendation at time of d/c would be return to one floor environment in order to maximize pt's functional independence and consistency w/ mobility in order to facilitate return to PLOF  Recommend OT consult for cognition and case management consult as pt has prior documentation that he lived in group home   No further IPPT indicated at this time as pt's mobility appears to be close to his reported baseline  Comorbidities affecting pt's physical performance at time of assessment include: obesity, CVA and HIV disease, SA  Personal factors affecting pt at time of IE include: anxiety, depression, behavioral pattern and limited insight into impairments       Sadie Mcbride, PT, DPT

## 2019-10-27 NOTE — UTILIZATION REVIEW
Initial Clinical Review    Admission: Date/Time/Statement: Inpatient Admission Orders (From admission, onward)     Ordered        10/26/19 1142  Inpatient Admission  Once                   Orders Placed This Encounter   Procedures    Inpatient Admission     Standing Status:   Standing     Number of Occurrences:   1     Order Specific Question:   Admitting Physician     Answer:   Susan Halsted [[id number]]     Order Specific Question:   Level of Care     Answer:   Med Surg [[id number]]     Order Specific Question:   Estimated length of stay     Answer:   More than 2 Midnights     Order Specific Question:   Certification     Answer:   I certify that inpatient services are medically necessary for this patient for a duration of greater than two midnights  See H&P and MD Progress Notes for additional information about the patient's course of treatment  ED Arrival Information     Expected Arrival Acuity Means of Arrival Escorted By Service Admission Type    - 10/26/2019 10:04 Emergent Ambulance [address] Emergency    Arrival Complaint    Numbness        Chief Complaint   Patient presents with    Numbness     Pt  c/o right hand/arm numbness since this morning  Pt  has hx  of aphasia, CVA, numbness  Assessment/Plan:   62 yo male, to ER via EMS,  Admitted INPT status @ [facility] level of care,  for workup/treatment  of  Possible breakthrough seizure VS new, acute CVA  Pt w/h/o  CVA in L MCA territory in May 2018 w/ residual cognitive impairment, expressive aphasia, Rt sided weakness w/coordination difficulties  H/o seizures which started May 2019, reportedly only 3 seizures since being started on Depakoate  Pt has Loop Recorder which shows no Afib & has been in place for 6 mo     HIV last CD4 count 428 in May   Pt reportedly had a seizure this am while smoking w/friends - speech suddenly worsened while RUE was shaking uncontrollable (followed by not being able to move this extremity for several hrs)  CT findings of  subacute left mca cortical CVA affecting the lateral temporal and parietal lobes, therefor he was not a candidate for tPA  Will place pt on telemetry, stroke pathway including serial neuro cks, neuro consult, MRI brain, ECHO, PT/OT evals  Will initiate IVF - Allow permissive HTN (-200)  If SBP continuously <140 following IV fluids, will discontinue losartan    10/27    Per Neurology,  Breakthrough seizure most likely  New acute cva is possible however his cardiac loop monitor has not shown any afib and pt has clinically recovered  Positive THC in urine,  Unlikely drug use contributing to current symptomology  ED Triage Vitals   Temperature Pulse Respirations Blood Pressure SpO2   10/26/19 1014 10/26/19 1011 10/26/19 1011 10/26/19 1112 10/26/19 1011   98 1 °F (36 7 °C) 82 18 114/61 95 %      Temp Source Heart Rate Source Patient Position - Orthostatic VS BP Location FiO2 (%)   10/26/19 1014 10/26/19 1406 10/26/19 1406 10/26/19 1406 --   Oral Monitor Lying Left arm       Pain Score       10/26/19 1011       No Pain        Wt Readings from Last 1 Encounters:   10/26/19 93 kg (205 lb 0 4 oz)     Additional Vital Signs:   10/27/19 0700  97 7 °F (36 5 °C)  66  18  128/84    96 %       Pertinent Labs/Diagnostic Test Results:   10/26  cxr - nad  10/26  ekg -  NSR    10/26/2019  VALPROIC ACID TOTAL  110   Reference range     10/26  CT brain -  Chronic left MCA territory infarction  Mild cerebral atrophy with chronic small vessel ischemic change  10/26  CTA head/neck -     Intact [place]   No evidence of aneurysm, vascular malformation or vascular cut off   Congenital variations as described above  Mild atherosclerotic disease of the internal carotid arteries bilaterally without evidence of flow-limiting stenosis      Results from last 7 days   Lab Units 10/27/19  0616 10/26/19  1043 10/26/19  1038   WBC Thousand/uL  --   --  6 81   HEMOGLOBIN g/dL  --   --  15 9   I STAT HEMOGLOBIN g/dl  --  16 7  --    HEMATOCRIT %  --   --  51 5*   HEMATOCRIT, ISTAT %  --  49  --    PLATELETS Thousands/uL 225  --  236         Results from last 7 days   Lab Units 10/27/19  0615 10/26/19  1043 10/26/19  1038   SODIUM mmol/L 140  --  146*   POTASSIUM mmol/L 4 2  --  5 3   CHLORIDE mmol/L 105  --  104   CO2 mmol/L 27  --  33*   CO2, I-STAT mmol/L  --  33*  --    AGAP mmol/L  --  13  --    ANION GAP mmol/L 8  --  9   BUN mg/dL 15  --  12   CREATININE mg/dL 0 95  --  1 09   EGFR ml/min/1 73sq m 102 95 86   CALCIUM mg/dL 8 7  --  9 7   CALCIUM, IONIZED, ISTAT mmol/L  --  1 27  --    MAGNESIUM mg/dL  --   --  1 7   PHOSPHORUS mg/dL  --   --  3 1     Results from last 7 days   Lab Units 10/27/19  0615 10/26/19  1038   GLUCOSE RANDOM mg/dL 82 88     Results from last 7 days   Lab Units 10/27/19  0616   HEMOGLOBIN A1C % 6 4*   EAG mg/dl 137     Results from last 7 days   Lab Units 10/26/19  1038   TROPONIN I ng/mL <0 02     Results from last 7 days   Lab Units 10/26/19  1038   PROTIME seconds 12 7   INR  1 01   PTT seconds 25     Results from last 7 days   Lab Units 10/27/19  0615   TSH 3RD GENERATON uIU/mL 3 082     Results from last 7 days   Lab Units 10/26/19  1115   AMPH/METH  Negative   BARBITURATE UR  Negative   BENZODIAZEPINE UR  Negative   COCAINE UR  Negative   METHADONE URINE  Negative   OPIATE UR  Negative   PCP UR  Negative   THC UR  Positive*       Past Medical History:   Diagnosis Date    Anxiety     Depression     HIV disease ([facility] )     Substance abuse     Suicide attempt ([place] [address] )      Present on Admission:   Bipolar affective disorder ([facility] )   Human immunodeficiency virus (HIV) infection ([facility] )   Benign essential hypertension   Unspecified vitamin D deficiency   Substance abuse ([facility] )      Admitting Diagnosis: Numbness [R20 0]  Age/Sex: 63 y o  male  Admission Orders:  Admit  Tele ,  IVF NS @ 100 cc/hr;   Consult neurology;  PT/OT/speech   evals & treat;  VS/Neuro cks q 1 hr x4;  q 2 hr x4;  q 4 hrs ;     Dysphagia assessment prior to po;   Baseline NIH scale  MRI brain;  ECHO ;  SCD's;   SEIZURE precautions      Medications:  aspirin 81 mg Oral Daily   atorvastatin 80 mg Oral Daily With Dinner   bictegravir-emtricitab-tenofovir alafenamide 1 tablet Oral Daily With Breakfast   clopidogrel 75 mg Oral Daily   divalproex sodium 500 mg Oral Q8H DAVINA   enoxaparin 40 mg Subcutaneous Daily   losartan 100 mg Oral Daily   melatonin 6 mg Oral HS   nicotine 1 patch Transdermal Daily   sertraline 25 mg Oral Daily   sertraline 50 mg Oral Daily   tamsulosin 0 4 mg Oral Daily With Dinner       sodium chloride 100 mL/hr Intravenous Continuous           Network Utilization Review Department  [email or web address]  ATTENTION: Please call with any questions or concerns to [phone] and carefully listen to the prompts so that you are directed to the right person  All voicemails are confidential   Evans Hernandez all requests for admission clinical reviews, approved or denied determinations and any other requests to dedicated fax number below belonging to the campus where the patient is receiving treatment    FACILITY NAME UR FAX NUMBER   ADMISSION DENIALS (Administrative/Medical Necessity) [address] [facility] (Maternity/NICU/Pediatrics) [phone]   [facility] [address] [facility] [phone]   [facility] 1525 [facility] [phone]   [facility] [address] [phone]

## 2019-10-27 NOTE — DISCHARGE SUMMARY
Discharge Summary - [address] Internal Medicine    Patient Information: Skip Quezada 63 y o  male MRN: [id number]  Unit/Bed#: -01 Encounter: [id number]    Discharging Physician / Practitioner: Phil Conde MD  PCP: No primary care provider on file  Admission Date: 10/26/2019  Discharge Date: 10/27/19    Disposition:     Home     Reason for Admission: RUE weakness    Discharge Diagnoses:     RUE weakness/Dalton's paralysis    Prior Cerebrovascular accident (CVA) ([facility] )    Bipolar affective disorder ([facility] )    Substance abuse ([facility] )    Human immunodeficiency virus (HIV) infection ([place] [id number] )        Consultations During Hospital Stay:  · Neurology      Hospital Course:     Skip Quezada is a 63 y o  male patient with PMHx of left MCA CVA with resultant aphasia, seizure disorder, history of substance abuse, HIV, bipolar disorder and hypertension who originally presented to the hospital on 10/26/2019 due to RUE weakness  Patient is a  somewhat history limited historian due to his chronic aphasia  Initial concern was possible stroke  However upon further investigation patient reports involuntary movement of his right upper extremity followed by weakness  This is more concerning for seizure  Unfortunately patient has a loop recorder which we do not have detailed information of  Recommend outpatient MRI  Natalie Lezama will be added  Patient is known to Dr Aleah Washington at [facility]   We recommend an outpatient MRI and close follow-up with his neurologist   Above planned is explained in detail to the patient's son  Discharge Day Visit / Exam:     * Please refer to separate progress note for these details *    Discussion with Family: Pt's son       Discharge instructions/Information to patient and family:   See after visit summary for information provided to patient and family        Provisions for Follow-Up Care:  See after visit summary for information related to follow-up care and any pertinent home health orders  Planned Readmission: No    Discharge Statement:  I spent 30  minutes discharging the patient  This time was spent on the day of discharge  I had direct contact with the patient on the day of discharge  Greater than 50% of the total time was spent examining patient, answering all patient questions, arranging and discussing plan of care with patient as well as directly providing post-discharge instructions  Additional time then spent on discharge activities  Discharge Medications:  See after visit summary for reconciled discharge medications provided to patient and family  ** Please Note: This note has been constructed using a voice recognition system   **

## 2019-10-27 NOTE — PROGRESS NOTES
Progress Note - Kulwinder Payan 1960, 63 y o  male MRN: [id number]    Unit/Bed#: -01 Encounter: [id number]    Primary Care Provider: No primary care provider on file  Date and time admitted to hospital: 10/26/2019 10:04 AM        * Cerebrovascular accident (CVA) [facility])  Assessment & Plan  · Patient with a history of CVA in the L MCA territory in May 2018  · Baseline expressive aphasia  · Baseline R arm weakness (?rotator cuff syndrome, pain improved)  · Seizure in 07/2019, likely due to existing encephalomalacia in the left MCA territory consistent with his prior infarct (MRI in 07/2019)  · Following up with neurology at [facility]  · Presented with worsening R arm weakness and numbness  · CT findings of  subacute left mca cortical CVA affecting the lateral temporal and parietal lobes, therefor he was not a candidate for tPA  · History of HIV, compliant with medications, last CD4 count in May 2019 >400  · Likely acute CVA with the patients risk factors (history of stroke, hypertension, tobacco smoking, substance abuse, pre diabetes, and HIV), however, HIV related complications cannot be excluded  · Per neurology, permissive hypertension -200  Will start the patient on IVF, and if the blood pressure is still below 140, will discontinue Losartan    Plan  1  Stroke pathway/Pending MRI brain and Echo  2  Reports involuntary shaking RUE before RUE weakness   Possible Dalton's paralysis/seizure  3  Impaired glucose tolerance /A1C borderline 6 4  Outpt f/u with PCP    Tobacco abuse  Assessment & Plan  · Patient admits to smoking 1/2 pack of cigarettes a day  · Currently not open for cessation counseling    Plan  4   Nicotine patch     Unspecified vitamin D deficiency  Assessment & Plan  · Patient on vitamin D 99074 IU once a week    Benign essential hypertension  Assessment & Plan  · Patient on Losartan 100 mg daily  · Current blood pressure 133/71  · Per neurology, permissive hypertension is recommended (SBP 140-200)    [facility] immunodeficiency virus [facility] ([facility] )  Assessment & Plan  · History of HIV, was followed up by ID  · Currently on  kqrebgyi-[id number] (-25) daily  · Patient compliant with medications  · Last CD4 count was 428 on 2019, with HIV 1 RNA 32 copies per mL  · New neurological symptom could be HIV related, however, CVA is more likely due to multiple cascular risk factors    Plan  2  Pending CD4 cell count /HIV viral load  3  Known to HIV clinic in [facility]    Substance abuse [facility])  Assessment & Plan  · Patient has a past history of cocaine abuse  · Admits to taking marijuana daily  · Urine tox positive for THC      Bipolar affective disorder ([facility] )  Assessment & Plan  · Patient on sertraline 75 mg daily  · Currently no symptoms of depression, denies suicidal ideations    Plan  3  Continue sertraline 75 mg daily          VTE Pharmacologic Prophylaxis:   Pharmacologic: Enoxaparin (Lovenox)  Mechanical VTE Prophylaxis in Place: Yes    Patient Centered Rounds:     Discussions with Specialists or Other Care Team Provider:     Education and Discussions with Family / Patient:     Time Spent for Care: 20 minutes  More than 50% of total time spent on counseling and coordination of care as described above  Current Length of Stay: 1 day(s)    Current Patient Status: Inpatient   Certification Statement: The patient will continue to require additional inpatient hospital stay due to Pending neuro workup    Discharge Plan / Estimated Discharge Date: Pending neurology workup    Code Status: Level 1 - Full Code      Subjective:   Pt denies overnight issues   States that his RUE weakness has improved but feels still not completely back to usual     Objective:     Vitals:   Temp (24hrs), Av 9 °F (36 6 °C), Min:97 6 °F (36 4 °C), Max:98 2 °F (36 8 °C)    Temp:  [97 6 °F (36 4 °C)-98 2 °F (36 8 °C)] 97 7 °F (36 5 °C)  HR:  [63-88] 66  Resp:  [18-19] 18  BP: (110-140)/(53-84) 128/84  SpO2: [92 %-98 %] 96 %  Body mass index is 29 42 kg/m²  Input and Output Summary (last 24 hours): Intake/Output Summary (Last 24 hours) at 10/27/2019 1257  Last data filed at 10/27/2019 0610  Gross per 24 hour   Intake 1116 67 ml   Output    Net 1116 67 ml       Physical Exam:     Physical Exam   Constitutional: He is oriented to person, place, and time  No distress  Eyes: Conjunctivae are normal    Cardiovascular: Normal rate and regular rhythm  Pulmonary/Chest: Effort normal and breath sounds normal  No respiratory distress  Abdominal: Soft  Bowel sounds are normal  He exhibits no distension  Musculoskeletal: He exhibits no edema  Neurological: He is alert and oriented to person, place, and time  Skin: Skin is warm and dry  He is not diaphoretic  Psychiatric: He has a normal mood and affect           Additional Data:     Labs:    Results from last 7 days   Lab Units 10/27/19  0616 10/26/19  1043 10/26/19  1038   WBC Thousand/uL  --   --  6 81   HEMOGLOBIN g/dL  --   --  15 9   I STAT HEMOGLOBIN g/dl  --  16 7  --    HEMATOCRIT %  --   --  51 5*   HEMATOCRIT, ISTAT %  --  49  --    PLATELETS Thousands/uL 225  --  236     Results from last 7 days   Lab Units 10/27/19  0615 10/26/19  1043   POTASSIUM mmol/L 4 2  --    CHLORIDE mmol/L 105  --    CO2 mmol/L 27  --    CO2, I-STAT mmol/L  --  33*   BUN mg/dL 15  --    CREATININE mg/dL 0 95  --    CALCIUM mg/dL 8 7  --    GLUCOSE, ISTAT mg/dl  --  90     Results from last 7 days   Lab Units 10/26/19  1038   INR  1 01       Recent Cultures (last 7 days):           Last 24 Hours Medication List:     Current Facility-Administered Medications:  aspirin 81 mg Oral Daily Mason Dupree MD    atorvastatin 80 mg Oral Daily With Li Billingsley MD    bictegravir-emtricitab-tenofovir alafenamide 1 tablet Oral Daily With Breakfast Mason Dupree MD    clopidogrel 75 mg Oral Daily Guanakito Cordero MD    divalproex sodium 500 mg Oral Q8H [address] [place] Harley Davis MD    enoxaparin 40 mg Subcutaneous Daily Darling Fuchs MD    losartan 100 mg Oral Daily Darling Fuchs, MD    melatonin 6 mg Oral HS Darling Fuchs MD    nicotine 1 patch Transdermal Daily Darling Fuchs MD    sertraline 25 mg Oral Daily Darling Fuchs MD    sertraline 50 mg Oral Daily Darling Fuchs MD    sodium chloride 100 mL/hr Intravenous Continuous Darling Fuchs MD Last Rate: 100 mL/hr (10/27/19 0610)   tamsulosin 0 4 mg Oral Daily With Robinson Welsh MD         Today, Patient Was Seen By: Iftikhar Pro MD    ** Please Note: This note has been constructed using a voice recognition system   **

## 2019-10-27 NOTE — PLAN OF CARE
Problem: Prexisting or High Potential for Compromised Skin Integrity  Goal: Skin integrity is maintained or improved  Description  INTERVENTIONS:  - Identify patients at risk for skin breakdown  - Assess and monitor skin integrity  - Assess and monitor nutrition and hydration status  - Monitor labs   - Assess for incontinence   - Turn and reposition patient  - Assist with mobility/ambulation  - Relieve pressure over bony prominences  - Avoid friction and shearing  - Provide appropriate hygiene as needed including keeping skin clean and dry  - Evaluate need for skin moisturizer/barrier cream  - Collaborate with interdisciplinary team   - Patient/family teaching  - Consider wound care consult   Outcome: Progressing     Problem: Potential for Falls  Goal: Patient will remain free of falls  Description  INTERVENTIONS:  - Assess patient frequently for physical needs  -  Identify cognitive and physical deficits and behaviors that affect risk of falls    -  [place] fall precautions as indicated by assessment   - Educate patient/family on patient safety including physical limitations  - Instruct patient to call for assistance with activity based on assessment  - Modify environment to reduce risk of injury  - Consider OT/PT consult to assist with strengthening/mobility  Outcome: Progressing     Problem: NEUROSENSORY - ADULT  Goal: Achieves stable or improved neurological status  Description  INTERVENTIONS  - Monitor and report changes in neurological status  - Monitor vital signs such as temperature, blood pressure, glucose, and any other labs ordered   - Initiate measures to prevent increased intracranial pressure  - Monitor for seizure activity and implement precautions if appropriate      Outcome: Progressing  Goal: Remains free of injury related to seizures activity  Description  INTERVENTIONS  - Maintain airway, patient safety  and administer oxygen as ordered  - Monitor patient for seizure activity, document and report duration and description of seizure to physician/advanced practitioner  - If seizure occurs,  ensure patient safety during seizure  - Reorient patient post seizure  - Seizure pads on all 4 side rails  - Instruct patient/family to notify RN of any seizure activity including if an aura is experienced  - Instruct patient/family to call for assistance with activity based on nursing assessment  - Administer anti-seizure medications if ordered    Outcome: Progressing  Goal: Achieves maximal functionality and self care  Description  INTERVENTIONS  - Monitor swallowing and airway patency with patient fatigue and changes in neurological status  - Encourage and assist patient to increase activity and self care     - Encourage visually impaired, hearing impaired and aphasic patients to use assistive/communication devices  Outcome: Progressing     Problem: NEUROSENSORY - ADULT  Goal: Remains free of injury related to seizures activity  Description  INTERVENTIONS  - Maintain airway, patient safety  and administer oxygen as ordered  - Monitor patient for seizure activity, document and report duration and description of seizure to physician/advanced practitioner  - If seizure occurs,  ensure patient safety during seizure  - Reorient patient post seizure  - Seizure pads on all 4 side rails  - Instruct patient/family to notify RN of any seizure activity including if an aura is experienced  - Instruct patient/family to call for assistance with activity based on nursing assessment  - Administer anti-seizure medications if ordered    Outcome: Progressing     Problem: MUSCULOSKELETAL - ADULT  Goal: Maintain or return mobility to safest level of function  Description  INTERVENTIONS:  - Assess patient's ability to carry out ADLs; assess patient's baseline for ADL function and identify physical deficits which impact ability to perform ADLs (bathing, care of mouth/teeth, toileting, grooming, dressing, etc )  - Assess/evaluate cause of self-care deficits   - Assess range of motion  - Assess patient's mobility  - Assess patient's need for assistive devices and provide as appropriate  - Encourage maximum independence but intervene and supervise when necessary  - Involve family in performance of ADLs  - Assess for home care needs following discharge   - Consider OT consult to assist with ADL evaluation and planning for discharge  - Provide patient education as appropriate  Outcome: Progressing

## 2019-10-27 NOTE — SOCIAL WORK
CM called patient's son, Myrna Perea at [phone] who has come to  patient and take him back home  CM attempted to reach out to patient's group home at [phone] but [facility] is not open (hours 8:30am-5pm)    CM called patient's  at [phone] and left  requesting return call  CM also called supports coordinator at [phone] and was unable to get a response  CM spoke to Myrna Perea and relayed above information  Myrna Perea wishes to transport patient back home  CM provided Myrna Perea with patient's  phone number  CM called nursing to make aware

## 2019-10-27 NOTE — UTILIZATION REVIEW
Notification of Inpatient Admission/Inpatient Authorization Request  This is a Notification of Inpatient Admission/Request for Inpatient Authorization for our facility [address]  Be advised that this patient was admitted to our facility under Inpatient Status  Please contact the Utilization Review Department where the patient is receiving care services for additional admission information  Facility: [address] [place] [address]  Place of Service Code: 21   Place of Service Name: [address]  Presentation Date & Time: 10/26/2019 10:04 AM  Inpatient Admission Date & Time: 10/26/19 1142  Discharge Date & Time: No discharge date for patient encounter  Discharge Disposition (if discharged): Home/Self Care  Attending Physician and NPI#: Kate Bolivar, [address] [[id number]]  Admission Orders (From admission, onward)     Ordered        10/26/19 1142  Inpatient Admission  Once                 [facility] Utilization Review Department  Phone: [phone]; Fax [phone]  [email or web address]  ATTENTION: Please call with any questions or concerns to [phone] and carefully listen to the prompts so that you are directed to the right person  All voicemails are confidential   Susan White all requests for admission clinical reviews, approved or denied determinations and any other requests to dedicated fax number below belonging to the campus where the patient is receiving treatment

## 2019-10-27 NOTE — PLAN OF CARE
Problem: Prexisting or High Potential for Compromised Skin Integrity  Goal: Skin integrity is maintained or improved  Description  INTERVENTIONS:  - Identify patients at risk for skin breakdown  - Assess and monitor skin integrity  - Assess and monitor nutrition and hydration status  - Monitor labs   - Assess for incontinence   - Turn and reposition patient  - Assist with mobility/ambulation  - Relieve pressure over bony prominences  - Avoid friction and shearing  - Provide appropriate hygiene as needed including keeping skin clean and dry  - Evaluate need for skin moisturizer/barrier cream  - Collaborate with interdisciplinary team   - Patient/family teaching  - Consider wound care consult   Outcome: Progressing     Problem: NEUROSENSORY - ADULT  Goal: Achieves stable or improved neurological status  Description  INTERVENTIONS  - Monitor and report changes in neurological status  - Monitor vital signs such as temperature, blood pressure, glucose, and any other labs ordered   - Initiate measures to prevent increased intracranial pressure  - Monitor for seizure activity and implement precautions if appropriate      Outcome: Progressing

## 2019-10-27 NOTE — SOCIAL WORK
LENY called patient's son, Vega Lux at [phone]  Patient's son will be able to  at [place]  Vega Lux will call back with group home information for LENY

## 2019-10-27 NOTE — SPEECH THERAPY NOTE
Speech Language/Pathology    Consult received  Chart reviewed  Patient admitted with worsening RUE weakness  Patient has a history of stroke and seizures with resultant expressive aphasia  Attempted to see patient for swallowing evaluation and patient was sleeping  Patient was able to awaken with verbal and tactile stimulation however verbalized "I just want to sleep  I Just want to sleep "  Spoke with RN Arik Padilla who reports patient has passed dysphagia screening and ate breakfast and took meds today without difficulty  No need for formal evaluation at this time  If there is a change in patient status please reconsult

## 2019-10-27 NOTE — PROGRESS NOTES
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  No further events  No clinical encephalopathy  At likely baseline  Low clinical suspicion for acute cva  In sinus rhythm here  Plan:  Continue home depakoate regimen  Add zonisamide 100 mg po qhs and in two weeks increase to 200 mg qhs  outpt mri brain w/o contrast at [address] [employer] neurology follow up        ROS:  Per 12 point review states rt hand is numb, baseline trouble speaking, rest negative    Vitals: Blood pressure 128/71, pulse 68, temperature 98 °F (36 7 °C), temperature source Oral, resp  rate 18, weight 93 kg (205 lb 0 4 oz), SpO2 94 %  ,Body mass index is 29 42 kg/m²  Physical Exam:    General appearance: alert, appears stated age and cooperative  Head: Normocephalic, without obvious abnormality, atraumatic  Lungs: clear to auscultation bilaterally  Heart: regular rate and rhythm    Neurologic: Mental status: Alert, oriented to self  Recognized me from yesterday  Clear expressive>receptive aphasia  thought content appropriate  He perseverated a couple times  Speaking in phrases  Left hand and able to write [facility] when looking at my badge but off a letter  Motor: full 5 power ue/le bilat    Sensory: states distal rue is slightly numb rest intact for light touch  Lab, Imaging and other studies: I have personally reviewed pertinent reports  Counseling / Coordination of Care  Total 23 min spent evaluating patient and coming up with assessment/plan

## 2019-10-27 NOTE — CONSULTS
Consultation - Neurology   Chivo Chavez 63 y o  male MRN: [id number]  Unit/Bed#: -01 Encounter: [id number]      Physician Requesting Consult: Slime Esteves MD  Inpatient consult to Neurology  Consult performed by: Sheree Pleitez MD  Consult ordered by: Stephon Castaneda MD        Consult completed

## 2019-10-28 LAB — PROT C AG ACT/NOR PPP IA: 98 % OF NORMAL (ref 60–150)

## 2019-10-29 ENCOUNTER — TELEPHONE (OUTPATIENT)
Dept: CASE MANAGEMENT | Facility: HOSPITAL | Age: 59
End: 2019-10-29

## 2019-10-29 LAB
BASOPHILS # BLD AUTO: 0 X10E3/UL (ref 0–0.2)
BASOPHILS NFR BLD AUTO: 0 %
CARDIOLIPIN IGA SER IA-ACNC: <9 APL U/ML (ref 0–11)
CARDIOLIPIN IGG SER IA-ACNC: <9 GPL U/ML (ref 0–14)
CARDIOLIPIN IGM SER IA-ACNC: <9 MPL U/ML (ref 0–12)
CD3+CD4+ CELLS # BLD: 383 /UL (ref 359–1519)
CD3+CD4+ CELLS NFR BLD: 22.5 % (ref 30.8–58.5)
EOSINOPHIL # BLD AUTO: 0.1 X10E3/UL (ref 0–0.4)
EOSINOPHIL NFR BLD AUTO: 2 %
ERYTHROCYTE [DISTWIDTH] IN BLOOD BY AUTOMATED COUNT: 15.8 % (ref 12.3–15.4)
HCT VFR BLD AUTO: 47.9 % (ref 37.5–51)
HGB BLD-MCNC: 15.1 G/DL (ref 13–17.7)
IMM GRANULOCYTES # BLD: 0 X10E3/UL (ref 0–0.1)
IMM GRANULOCYTES NFR BLD: 0 %
LYMPHOCYTES # BLD AUTO: 1.7 X10E3/UL (ref 0.7–3.1)
LYMPHOCYTES NFR BLD AUTO: 32 %
MCH RBC QN AUTO: 29.1 PG (ref 26.6–33)
MCHC RBC AUTO-ENTMCNC: 31.5 G/DL (ref 31.5–35.7)
MCV RBC AUTO: 92 FL (ref 79–97)
MONOCYTES # BLD AUTO: 0.6 X10E3/UL (ref 0.1–0.9)
MONOCYTES NFR BLD AUTO: 11 %
NEUTROPHILS # BLD AUTO: 2.9 X10E3/UL (ref 1.4–7)
NEUTROPHILS NFR BLD AUTO: 55 %
PLATELET # BLD AUTO: 211 X10E3/UL (ref 150–450)
RBC # BLD AUTO: 5.19 X10E6/UL (ref 4.14–5.8)
WBC # BLD AUTO: 5.3 X10E3/UL (ref 3.4–10.8)

## 2019-10-29 NOTE — TELEPHONE ENCOUNTER
Received call from [facility], nurse at [facility] (group home) [phone] because patient's Vinod Evans needs a prior auth  RX was sent to [employer]  [facility] also requested patient's pharmacy be changed to [facility] in [place]  Change was made in Epic for future encounters  CM called [facility] and was informed the RX needed a prior auth and there isn't a denial reason  [facility] # [phone] provided by [facility] for CM to call to initiate prior auth  Called and through automated prompts, [employer] to fax over a prior auth form to be completed and faxed back  Form received and CM attempting to locate provider to sign-off    [facility] [facility]  [id number], [address], [place] Phone:  [phone] Fax:  [phone]     LENY CAMPBELL

## 2019-10-29 NOTE — TELEPHONE ENCOUNTER
LENY s/w Deuce Schultz at [facility] office who states she received approved prior auth for patient Zonegran 100mg capsules at bedtime  CM notified group home nurse Oliver that medication was approved and could be picked up at [facility] on [address] today  LENY explained that any future fills can be transferred to patient's preferred pharmacy  Oliver agreeable  Leny s/w Ting at [facility] who states they received the auth as well and would fill the medication for  today

## 2019-10-29 NOTE — UTILIZATION REVIEW
Notification of Discharge  This is a Notification of Discharge from our facility [address]  Please be advised that this patient has been discharge from our facility  Below you will find the admission and discharge date and time including the patients disposition  PRESENTATION DATE: 10/26/2019 10:04 AM  OBS ADMISSION DATE: n/a  IP ADMISSION DATE: 10/26/19 1142   DISCHARGE DATE: 10/27/2019  7:04 PM  DISPOSITION: Home/Self Care Home/Self Care   Admission Orders listed below:  Admission Orders (From admission, onward)     Ordered        10/26/19 1142  Inpatient Admission  Once                 Please contact the UR Department if additional information is required to close this patient's authorization/case  Network Utilization Review Department  [email or web address]  Main: [phone]  ATTENTION: Please call with any questions or concerns to [phone] and carefully listen to the prompts so that you are directed to the right person  All voicemails are confidential   Fredi Partida all requests for admission clinical reviews, approved or denied determinations and any other requests to dedicated fax number below belonging to the campus where the patient is receiving treatment    FACILITY NAME UR FAX NUMBER   ADMISSION DENIALS (Administrative/Medical Necessity) [address] [facility] (Maternity/NICU/Pediatrics) [phone]   [facility] [address] [facility][note 2] [phone]   [facility] [address] [facility] [phone]   [facility]st[facility] Bitter [address] [phone]

## 2019-10-30 LAB
B2 GLYCOPROT1 IGA SER-ACNC: <9 GPI IGA UNITS (ref 0–25)
B2 GLYCOPROT1 IGG SER-ACNC: <9 GPI IGG UNITS (ref 0–20)
B2 GLYCOPROT1 IGM SER-ACNC: <9 GPI IGM UNITS (ref 0–32)
HIV1 RNA # SERPL NAA+PROBE: 230 COPIES/ML
HIV1 RNA SERPL NAA+PROBE-LOG#: 2.36 LOG10COPY/ML
PROT S ACT/NOR PPP: 114 % (ref 63–140)
PROT S ACT/NOR PPP: 140 % (ref 57–157)
PROT S PPP-ACNC: 102 % (ref 60–150)

## 2019-10-31 LAB
APTT SCREEN TO CONFIRM RATIO: 1.18 RATIO (ref 0–1.4)
CONFIRM APTT/NORMAL: 46 SEC (ref 0–55)
LA PPP-IMP: NORMAL
SCREEN APTT: 32.8 SEC (ref 0–51.9)
SCREEN DRVVT: 38.9 SEC (ref 0–47)
THROMBIN TIME: 19.4 SEC (ref 0–23)

## 2019-11-04 LAB
F2 GENE MUT ANL BLD/T: NORMAL
F5 GENE MUT ANL BLD/T: NORMAL

## 2022-05-31 ENCOUNTER — APPOINTMENT (OUTPATIENT)
Dept: LAB | Age: 62
End: 2022-05-31
Payer: COMMERCIAL

## 2022-05-31 DIAGNOSIS — N39.0 URINARY TRACT INFECTION WITHOUT HEMATURIA, SITE UNSPECIFIED: ICD-10-CM

## 2022-05-31 PROCEDURE — 87086 URINE CULTURE/COLONY COUNT: CPT

## 2022-06-01 LAB — BACTERIA UR CULT: NORMAL

## 2023-01-11 ENCOUNTER — HOSPITAL ENCOUNTER (EMERGENCY)
Facility: HOSPITAL | Age: 63
Discharge: HOME | End: 2023-01-12
Attending: EMERGENCY MEDICINE

## 2023-01-11 VITALS
TEMPERATURE: 98.6 F | WEIGHT: 203.93 LBS | OXYGEN SATURATION: 98 % | RESPIRATION RATE: 18 BRPM | BODY MASS INDEX: 29.26 KG/M2 | HEART RATE: 62 BPM | SYSTOLIC BLOOD PRESSURE: 164 MMHG | DIASTOLIC BLOOD PRESSURE: 94 MMHG

## 2023-01-11 DIAGNOSIS — Z00.00 ENCOUNTER FOR PHYSICAL EXAMINATION: Primary | ICD-10-CM

## 2023-01-11 RX ORDER — LAMOTRIGINE 25 MG/1
25 TABLET ORAL DAILY
COMMUNITY

## 2023-01-11 RX ORDER — ATORVASTATIN CALCIUM 40 MG/1
80 TABLET, FILM COATED ORAL
Status: DISCONTINUED | OUTPATIENT
Start: 2023-01-12 | End: 2023-01-12 | Stop reason: HOSPADM

## 2023-01-11 RX ORDER — MIRTAZAPINE 15 MG/1
15 TABLET, FILM COATED ORAL
COMMUNITY

## 2023-01-11 RX ORDER — MIRTAZAPINE 15 MG/1
15 TABLET, FILM COATED ORAL
Status: DISCONTINUED | OUTPATIENT
Start: 2023-01-11 | End: 2023-01-12 | Stop reason: HOSPADM

## 2023-01-11 RX ORDER — LEVOCARNITINE 1 G/10ML
330 SOLUTION ORAL ONCE
Status: COMPLETED | OUTPATIENT
Start: 2023-01-11 | End: 2023-01-12

## 2023-01-11 RX ORDER — DIVALPROEX SODIUM 250 MG/1
250 TABLET, DELAYED RELEASE ORAL DAILY
COMMUNITY

## 2023-01-11 RX ORDER — LAMOTRIGINE 25 MG/1
25 TABLET ORAL ONCE
Status: COMPLETED | OUTPATIENT
Start: 2023-01-11 | End: 2023-01-11

## 2023-01-11 RX ORDER — LEVOCARNITINE 330 MG/1
330 TABLET ORAL 3 TIMES DAILY
COMMUNITY

## 2023-01-11 RX ADMIN — MIRTAZAPINE 15 MG: 15 TABLET, FILM COATED ORAL at 23:47

## 2023-01-11 RX ADMIN — LAMOTRIGINE 25 MG: 25 TABLET ORAL at 23:47

## 2023-01-12 RX ADMIN — LEVOCARNITINE 330 MG: 1 SOLUTION ORAL at 00:53

## 2023-01-12 NOTE — ED NOTES
Transport still has not arrived  TIM Del Rosario contacted [place] at this time for a update        Karely Baez RN  01/12/23 4624

## 2023-01-12 NOTE — ED NOTES
Called Maile Raza x2 in regards to pt transport which was supposed to arrive between 0700 and 0730  No answer       Amilcar Montaño RN  01/12/23 Galileo Rios Caro, RN  01/12/23 2051

## 2023-01-12 NOTE — ED NOTES
Karine Yo called this RN back and stated that transport will be here within 30 minutes  Pt updated       Amilcar Montaño, ASHWIN  01/12/23 4952

## 2023-01-12 NOTE — ED NOTES
Kan Godwin called this RN back   Kan Godwin stated "can you just send him home because we are shot staffed and I do not have anyone right now " To which this RN stated "he has been discharged since 0100 and we were told thatr you guys were going to provide transport between 0700 and 0730 " Kan Godwin then stated "give me a few minutes to see if I can find someone "      Danny Flores, ASHWIN  01/12/23 3585

## 2023-01-12 NOTE — ED NOTES
Patient is resting comfortably  Sleeping     Felice Diamond [facility]  01/12/23 7818

## 2023-01-12 NOTE — ED NOTES
Pt in [facility] stating that he is extremely upset that he is still here and the group home hasn't picked him up yet  Updated pt and informed him that Wyatt Novoa is going to call back with pickup information        Ladon Duverney, RN  01/12/23 9247

## 2023-01-12 NOTE — ED NOTES
Margoth Chahal updated on pt status and that he will be getting discharged  Dee Dee Smallwood stated that she is arranging for a  to come and  the patient        Renetta Escamilla RN  01/12/23 8718

## 2023-01-12 NOTE — ED NOTES
Liz White called for an update on pickup time  She said her  will be able to  the pt around 7-730 and he is not allowed to take a lyft back to the facility        Uriel Morfin RN  01/12/23 4972

## 2023-01-12 NOTE — ED PROVIDER NOTES
History  Chief Complaint   Patient presents with   • Medical Problem     EMS states patient became argumentative over taking night time medication  Staff called EMS for evaluation  Patient is a 64-year-old male with history of TBI that lives at a group home and was brought to the emergency department by EMS after he became agitated with group home staff today  Group home staff reports that he recently acquired a vape pen and staff is unsure if he is vaping nicotine or cannabis  Group home staff was in control of the pen and would let him use it when he asked, however later this evening he refused to return the pen to the staff  He then subsequently refused to take his evening medications  Staff waited for the patient to calm down, but he continued to refuse to take his medications or return the pen  After this patient began to yell at staff and appear very agitated so they called EMS  Patient at this time admits to becoming frustrated but is willing to take evening medications at this time, although he refuses his magnesium oxide because gives him a headache  At this time patient denies any suicidal or homicidal ideation  He denies any visual or auditory hallucinations  Patient denies any pain or other symptoms at this time  Prior to Admission Medications   Prescriptions Last Dose Informant Patient Reported? Taking?    Cholecalciferol (VITAMIN D3) 16660 units TABS   Yes No   Sig: Take 1 tablet by mouth once a week   DULoxetine (CYMBALTA) 60 mg delayed release capsule Not Taking  No No   Sig: Take 1 capsule by mouth daily   Patient not taking: Reported on 10/26/2019   Magnesium Oxide (MAG- PO)   Yes Yes   Sig: Take 400 mg by mouth 2 (two) times a day Taken 7am and 7pm   Melatonin 10 MG TBDP   Yes No   Sig: Take 1 tablet by mouth daily at bedtime   TRIUMEQ 600- MG per tablet   No No   Sig: TAKE 1 TABLET BY MOUTH DAILY   Patient not taking: Reported on 10/26/2019   aspirin (ECOTRIN LOW STRENGTH) 81 mg EC tablet 1/11/2023  Yes Yes   Sig: Take 81 mg by mouth daily   atorvastatin (LIPITOR) 80 mg tablet 1/10/2023 at 2100  Yes Yes   Sig: Take 80 mg by mouth daily   bictegravir-emtricitab-tenofovir alafenamide (BIKTARVY) -25 MG tablet   Yes No   Sig: Take 1 tablet by mouth daily with breakfast   divalproex sodium (DEPAKOTE ER) 500 mg 24 hr tablet 1/11/2023 at 0700  Yes Yes   Sig: Take 500 mg by mouth daily Taken at 7am   divalproex sodium (DEPAKOTE) 250 mg EC tablet 1/11/2023  Yes Yes   Sig: Take 250 mg by mouth daily Taken at 1pm   dolutegravir (TIVICAY) 50 MG TABS   No No   Sig: Take 1 tablet by mouth daily for 30 days   lamoTRIgine (LaMICtal) 25 mg tablet   Yes Yes   Sig: Take 25 mg by mouth daily 7am and 7pm   levOCARNitine (CARNITOR) 330 MG tablet   Yes Yes   Sig: Take 330 mg by mouth 3 (three) times a day 7am, 1pm, and 7pm   losartan (COZAAR) 100 MG tablet 1/11/2023  Yes Yes   Sig: Take 50 mg by mouth daily   mirtazapine (REMERON) 15 mg tablet   Yes Yes   Sig: Take 15 mg by mouth daily at bedtime   sertraline (ZOLOFT) 25 mg tablet   Yes No   Sig: Take 25 mg by mouth daily Take with 50 mg tablet (total 75mg daily)    sertraline (ZOLOFT) 50 mg tablet   Yes No   Sig: Take 50 mg by mouth daily Take with 25 mg tablet (total 75 mg daily)    tamsulosin (FLOMAX) 0 4 mg   Yes No   Sig: Take 0 4 mg by mouth daily with dinner   zonisamide (ZONEGRAN) 100 mg capsule   No No   Sig: Take 1 capsule (100 mg total) by mouth daily      Facility-Administered Medications: None       Past Medical History:   Diagnosis Date   • Anxiety    • Depression    • [facility] disease [facility] )    • Substance abuse    • Suicide attempt ([facility] )        No past surgical history on file  Family History   Problem Relation Age of Onset   • Diabetes Mother    • Heart attack Mother    • Heart attack Father      I have reviewed and agree with the history as documented      E-Cigarette/Vaping     E-Cigarette/Vaping Substances     Social History Tobacco Use   • Smoking status: Some Days     Packs/day: 1 00     Types: Cigarettes   • Smokeless tobacco: Never   Substance Use Topics   • Alcohol use: Yes   • Drug use: Yes     Types: "Crack" cocaine, Marijuana        Review of Systems   Constitutional: Negative for chills and fever  Eyes: Negative for pain and visual disturbance  Respiratory: Negative for cough and shortness of breath  Cardiovascular: Negative for chest pain and palpitations  Gastrointestinal: Negative for abdominal pain, nausea and vomiting  Musculoskeletal: Negative for arthralgias and back pain  Skin: Negative for color change and rash  Neurological: Positive for seizures (Hx of)  Negative for syncope and headaches  Psychiatric/Behavioral: Positive for agitation (resolved)  Negative for decreased concentration, hallucinations and suicidal ideas  All other systems reviewed and are negative  Physical Exam  ED Triage Vitals [01/11/23 2152]   Temperature Pulse Respirations Blood Pressure SpO2   98 6 °F (37 °C) 62 18 164/94 98 %      Temp Source Heart Rate Source Patient Position - Orthostatic VS BP Location FiO2 (%)   Oral Monitor Sitting Right arm --      Pain Score       No Pain             Orthostatic Vital Signs  Vitals:    01/11/23 2152   BP: 164/94   Pulse: 62   Patient Position - Orthostatic VS: Sitting       Physical Exam  Vitals and nursing note reviewed  Constitutional:       General: He is not in acute distress  Appearance: He is well-developed  He is not ill-appearing  HENT:      Head: Normocephalic and atraumatic  Eyes:      Extraocular Movements: Extraocular movements intact  Conjunctiva/sclera: Conjunctivae normal    Cardiovascular:      Rate and Rhythm: Normal rate and regular rhythm  Pulses: Normal pulses  Heart sounds: Normal heart sounds  No murmur heard  Pulmonary:      Effort: Pulmonary effort is normal  No respiratory distress  Breath sounds: Normal breath sounds   No wheezing, rhonchi or rales  Abdominal:      General: Abdomen is flat  Palpations: Abdomen is soft  Tenderness: There is no abdominal tenderness  There is no guarding or rebound  Musculoskeletal:         General: No swelling, tenderness or deformity  Normal range of motion  Cervical back: Normal range of motion and neck supple  Right lower leg: No edema  Left lower leg: No edema  Skin:     General: Skin is warm and dry  Capillary Refill: Capillary refill takes less than 2 seconds  Neurological:      Mental Status: He is alert and oriented to person, place, and time  Mental status is at baseline  Motor: No weakness  Psychiatric:         Mood and Affect: Mood normal          ED Medications  Medications   atorvastatin (LIPITOR) tablet 80 mg (has no administration in time range)   mirtazapine (REMERON) tablet 15 mg (15 mg Oral Given 1/11/23 2347)   levOCARNitine (CARNITOR) oral solution 330 mg (330 mg Oral Given 1/12/23 0053)   lamoTRIgine (LaMICtal) tablet 25 mg (25 mg Oral Given 1/11/23 2347)       Diagnostic Studies  Results Reviewed     None                 No orders to display         Procedures  Procedures      ED Course                                       Medical Decision Making  46P with history of TBI living at group home is sent to the emergency department after he became agitated with group home staff when he refused to take his medications  Care staff also wants to make sure that the patient's hemp vape pen would not interact with his home medications  Patient has normal physical exam in the emergency department  Long discussion is had with the patient regarding importance of taking nightly medications and he reports that he will take them going forward  He does report feeling headaches when taking his magnesium oxide and he is advised to discuss this with his primary care provider for potential change in medications    Patient denies any suicidal or homicidal ideations  He denies any visual or auditory hallucinations  Patient is appropriate for discharge back to his group home at this time  Return precautions are discussed with the patient and they demonstrate understanding of the plan  The patient's questions are all answered to the their satisfaction and the patient is discharged home  Clear instructions are written on the patient's AVS for care staff  Encounter for physical examination: acute illness or injury  Risk  Prescription drug management  Disposition  Final diagnoses:   Encounter for physical examination     Time reflects when diagnosis was documented in both MDM as applicable and the Disposition within this note     Time User Action Codes Description Comment    1/12/2023  1:15 AM Selina Dobbs Add [Z00 00] Encounter for physical examination       ED Disposition     ED Disposition   Discharge    Condition   Stable    Date/Time   Thu Jan 12, 2023  1:15 AM    Comment   Isacc Mccall discharge to home/self care  Follow-up Information     Follow up With Specialties Details Why [address], [address] [facility] Nurse Practitioner Call  To discuss management of of chronic medications [address] [facility] [id number] [phone]            Patient's Medications   Discharge Prescriptions    No medications on file     No discharge procedures on file  PDMP Review     None           ED Provider  Attending physically available and evaluated Isacc Mccall I managed the patient along with the ED Attending      Electronically Signed by         Jasiel Campbell DO  01/12/23 9121

## 2023-01-12 NOTE — ED NOTES
Received update from group home staff  Per staff member patient did not receive any of his 200 and 2100 meds        Awa Henderson RN  01/11/23 0185

## 2023-01-12 NOTE — DISCHARGE INSTRUCTIONS
- Patient received all nightly medications other than magnesium oxide  - Patient will need discussion with his primary care provider for long-term management of home medications

## 2023-01-12 NOTE — ED ATTENDING ATTESTATION
1/11/2023  IPreet MD, saw and evaluated the patient  I have discussed the patient with the resident/non-physician practitioner and agree with the resident's/non-physician practitioner's findings, Plan of Care, and MDM as documented in the resident's/non-physician practitioner's note, except where noted  All available labs and Radiology studies were reviewed  I was present for key portions of any procedure(s) performed by the resident/non-physician practitioner and I was immediately available to provide assistance  At this point I agree with the current assessment done in the Emergency Department  I have conducted an independent evaluation of this patient a history and physical is as follows: This was a 52-year-old male patient resident of [facility] presenting to the ED today for a complaint of agitation, refusing to take his medications  Patient became involved in a verbal argument with staff at his group home, and refused to take his medications  Patient upon arrival is calm, cool, collected, he does have some distrust of the medical system, but is appreciative of our role  He denies any symptoms right now, and does not appear agitated to us  After our evaluation patient did not seem agitated or combative, received his medications here in the ED, and was cleared to go back to his group home after speaking with group [facility] staff      ED Course         Critical Care Time  Procedures

## 2023-01-12 NOTE — ED NOTES
Per Laurie Yates, transport to arrive within 20-30 minutes        Cristobal Lewis, ASHWIN  01/12/23 1007

## 2024-05-16 ENCOUNTER — APPOINTMENT (EMERGENCY)
Dept: CT IMAGING | Facility: HOSPITAL | Age: 64
End: 2024-05-16
Payer: MEDICARE

## 2024-05-16 ENCOUNTER — HOSPITAL ENCOUNTER (EMERGENCY)
Facility: HOSPITAL | Age: 64
Discharge: HOME | End: 2024-05-16
Attending: EMERGENCY MEDICINE
Payer: MEDICARE

## 2024-05-16 VITALS
DIASTOLIC BLOOD PRESSURE: 95 MMHG | OXYGEN SATURATION: 100 % | RESPIRATION RATE: 22 BRPM | SYSTOLIC BLOOD PRESSURE: 155 MMHG | TEMPERATURE: 97.5 F | HEART RATE: 79 BPM

## 2024-05-16 DIAGNOSIS — R25.2 JERKING MOVEMENTS OF EXTREMITIES: Primary | ICD-10-CM

## 2024-05-16 DIAGNOSIS — R56.9 CONVULSION, NON-EPILEPTIC (HCC): ICD-10-CM

## 2024-05-16 LAB
2HR DELTA HS TROPONIN: 6 NG/L
ALBUMIN SERPL BCP-MCNC: 4.5 G/DL (ref 3.5–5)
ALP SERPL-CCNC: 99 U/L (ref 34–104)
ALT SERPL W P-5'-P-CCNC: 36 U/L (ref 7–52)
ANION GAP SERPL CALCULATED.3IONS-SCNC: 15 MMOL/L (ref 4–13)
APTT PPP: 24 SECONDS (ref 23–37)
AST SERPL W P-5'-P-CCNC: 34 U/L (ref 13–39)
BACTERIA UR QL AUTO: ABNORMAL /HPF
BASOPHILS # BLD AUTO: 0.05 THOUSANDS/ÂΜL (ref 0–0.1)
BASOPHILS NFR BLD AUTO: 1 % (ref 0–1)
BILIRUB SERPL-MCNC: 0.32 MG/DL (ref 0.2–1)
BILIRUB UR QL STRIP: NEGATIVE
BUN SERPL-MCNC: 17 MG/DL (ref 5–25)
CALCIUM SERPL-MCNC: 9.7 MG/DL (ref 8.4–10.2)
CARDIAC TROPONIN I PNL SERPL HS: 13 NG/L
CARDIAC TROPONIN I PNL SERPL HS: 19 NG/L
CHLORIDE SERPL-SCNC: 106 MMOL/L (ref 96–108)
CK SERPL-CCNC: 632 U/L (ref 39–308)
CLARITY UR: CLEAR
CO2 SERPL-SCNC: 18 MMOL/L (ref 21–32)
COLOR UR: ABNORMAL
CREAT SERPL-MCNC: 1.3 MG/DL (ref 0.6–1.3)
EOSINOPHIL # BLD AUTO: 0.06 THOUSAND/ÂΜL (ref 0–0.61)
EOSINOPHIL NFR BLD AUTO: 1 % (ref 0–6)
ERYTHROCYTE [DISTWIDTH] IN BLOOD BY AUTOMATED COUNT: 13.8 % (ref 11.6–15.1)
GFR SERPL CREATININE-BSD FRML MDRD: 58 ML/MIN/1.73SQ M
GLUCOSE SERPL-MCNC: 115 MG/DL (ref 65–140)
GLUCOSE SERPL-MCNC: 122 MG/DL (ref 65–140)
GLUCOSE UR STRIP-MCNC: NEGATIVE MG/DL
HCT VFR BLD AUTO: 48.7 % (ref 36.5–49.3)
HGB BLD-MCNC: 15.5 G/DL (ref 12–17)
HGB UR QL STRIP.AUTO: ABNORMAL
HYALINE CASTS #/AREA URNS LPF: ABNORMAL /LPF
IMM GRANULOCYTES # BLD AUTO: 0.03 THOUSAND/UL (ref 0–0.2)
IMM GRANULOCYTES NFR BLD AUTO: 0 % (ref 0–2)
INR PPP: 0.98 (ref 0.84–1.19)
KETONES UR STRIP-MCNC: NEGATIVE MG/DL
LEUKOCYTE ESTERASE UR QL STRIP: NEGATIVE
LYMPHOCYTES # BLD AUTO: 1.15 THOUSANDS/ÂΜL (ref 0.6–4.47)
LYMPHOCYTES NFR BLD AUTO: 16 % (ref 14–44)
MAGNESIUM SERPL-MCNC: 2.1 MG/DL (ref 1.9–2.7)
MCH RBC QN AUTO: 28 PG (ref 26.8–34.3)
MCHC RBC AUTO-ENTMCNC: 31.8 G/DL (ref 31.4–37.4)
MCV RBC AUTO: 88 FL (ref 82–98)
MONOCYTES # BLD AUTO: 0.78 THOUSAND/ÂΜL (ref 0.17–1.22)
MONOCYTES NFR BLD AUTO: 11 % (ref 4–12)
NEUTROPHILS # BLD AUTO: 5.27 THOUSANDS/ÂΜL (ref 1.85–7.62)
NEUTS SEG NFR BLD AUTO: 71 % (ref 43–75)
NITRITE UR QL STRIP: NEGATIVE
NON-SQ EPI CELLS URNS QL MICRO: ABNORMAL /HPF
NRBC BLD AUTO-RTO: 0 /100 WBCS
PH UR STRIP.AUTO: 6 [PH]
PLATELET # BLD AUTO: 302 THOUSANDS/UL (ref 149–390)
PMV BLD AUTO: 8.4 FL (ref 8.9–12.7)
POTASSIUM SERPL-SCNC: 4 MMOL/L (ref 3.5–5.3)
PROT SERPL-MCNC: 8.5 G/DL (ref 6.4–8.4)
PROT UR STRIP-MCNC: ABNORMAL MG/DL
PROTHROMBIN TIME: 13.6 SECONDS (ref 11.6–14.5)
RBC # BLD AUTO: 5.54 MILLION/UL (ref 3.88–5.62)
RBC #/AREA URNS AUTO: ABNORMAL /HPF
SODIUM SERPL-SCNC: 139 MMOL/L (ref 135–147)
SP GR UR STRIP.AUTO: 1.04 (ref 1–1.03)
TSH SERPL DL<=0.05 MIU/L-ACNC: 3.34 UIU/ML (ref 0.45–4.5)
UROBILINOGEN UR STRIP-ACNC: <2 MG/DL
WBC # BLD AUTO: 7.34 THOUSAND/UL (ref 4.31–10.16)
WBC #/AREA URNS AUTO: ABNORMAL /HPF

## 2024-05-16 PROCEDURE — 83735 ASSAY OF MAGNESIUM: CPT

## 2024-05-16 PROCEDURE — 86480 TB TEST CELL IMMUN MEASURE: CPT

## 2024-05-16 PROCEDURE — 36415 COLL VENOUS BLD VENIPUNCTURE: CPT

## 2024-05-16 PROCEDURE — 96372 THER/PROPH/DIAG INJ SC/IM: CPT

## 2024-05-16 PROCEDURE — 80053 COMPREHEN METABOLIC PANEL: CPT

## 2024-05-16 PROCEDURE — 82948 REAGENT STRIP/BLOOD GLUCOSE: CPT

## 2024-05-16 PROCEDURE — 86361 T CELL ABSOLUTE COUNT: CPT

## 2024-05-16 PROCEDURE — 85025 COMPLETE CBC W/AUTO DIFF WBC: CPT

## 2024-05-16 PROCEDURE — 85730 THROMBOPLASTIN TIME PARTIAL: CPT

## 2024-05-16 PROCEDURE — 93005 ELECTROCARDIOGRAM TRACING: CPT

## 2024-05-16 PROCEDURE — 96360 HYDRATION IV INFUSION INIT: CPT

## 2024-05-16 PROCEDURE — 84484 ASSAY OF TROPONIN QUANT: CPT

## 2024-05-16 PROCEDURE — 84443 ASSAY THYROID STIM HORMONE: CPT

## 2024-05-16 PROCEDURE — 70470 CT HEAD/BRAIN W/O & W/DYE: CPT

## 2024-05-16 PROCEDURE — 99284 EMERGENCY DEPT VISIT MOD MDM: CPT

## 2024-05-16 PROCEDURE — 82550 ASSAY OF CK (CPK): CPT

## 2024-05-16 PROCEDURE — 81001 URINALYSIS AUTO W/SCOPE: CPT

## 2024-05-16 PROCEDURE — 99285 EMERGENCY DEPT VISIT HI MDM: CPT | Performed by: EMERGENCY MEDICINE

## 2024-05-16 PROCEDURE — 85610 PROTHROMBIN TIME: CPT

## 2024-05-16 RX ORDER — LORAZEPAM 0.5 MG/1
0.5 TABLET ORAL ONCE
Status: DISCONTINUED | OUTPATIENT
Start: 2024-05-16 | End: 2024-05-16

## 2024-05-16 RX ORDER — DROPERIDOL 2.5 MG/ML
2.5 INJECTION, SOLUTION INTRAMUSCULAR; INTRAVENOUS ONCE
Status: COMPLETED | OUTPATIENT
Start: 2024-05-16 | End: 2024-05-16

## 2024-05-16 RX ORDER — MIDAZOLAM HYDROCHLORIDE 2 MG/2ML
2 INJECTION, SOLUTION INTRAMUSCULAR; INTRAVENOUS ONCE
Status: COMPLETED | OUTPATIENT
Start: 2024-05-16 | End: 2024-05-16

## 2024-05-16 RX ADMIN — IOHEXOL 85 ML: 350 INJECTION, SOLUTION INTRAVENOUS at 15:12

## 2024-05-16 RX ADMIN — SODIUM CHLORIDE 500 ML: 0.9 INJECTION, SOLUTION INTRAVENOUS at 14:56

## 2024-05-16 RX ADMIN — DROPERIDOL 2.5 MG: 2.5 INJECTION, SOLUTION INTRAMUSCULAR; INTRAVENOUS at 12:33

## 2024-05-16 RX ADMIN — MIDAZOLAM 2 MG: 1 INJECTION INTRAMUSCULAR; INTRAVENOUS at 12:33

## 2024-05-16 NOTE — ED NOTES
Unable to obtain BP in triage due to movement from patient.         Jennifer Camarena RN  05/16/24 6031
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
## 2024-05-17 LAB
ATRIAL RATE: 133 BPM
ATRIAL RATE: 156 BPM
GAMMA INTERFERON BACKGROUND BLD IA-ACNC: 0.03 IU/ML
M TB IFN-G BLD-IMP: NEGATIVE
M TB IFN-G CD4+ BCKGRND COR BLD-ACNC: 0.01 IU/ML
M TB IFN-G CD4+ BCKGRND COR BLD-ACNC: 0.07 IU/ML
MITOGEN IGNF BCKGRD COR BLD-ACNC: 3.25 IU/ML
P AXIS: 81 DEGREES
P AXIS: 82 DEGREES
PR INTERVAL: 128 MS
PR INTERVAL: 132 MS
QRS AXIS: 60 DEGREES
QRS AXIS: 61 DEGREES
QRSD INTERVAL: 80 MS
QRSD INTERVAL: 84 MS
QT INTERVAL: 244 MS
QT INTERVAL: 304 MS
QTC INTERVAL: 393 MS
QTC INTERVAL: 452 MS
T WAVE AXIS: 13 DEGREES
T WAVE AXIS: 56 DEGREES
VENTRICULAR RATE: 133 BPM
VENTRICULAR RATE: 156 BPM

## 2024-05-17 PROCEDURE — 93010 ELECTROCARDIOGRAM REPORT: CPT | Performed by: INTERNAL MEDICINE

## 2024-05-20 LAB
BASOPHILS # BLD AUTO: NORMAL X10E3/UL
BASOPHILS NFR BLD AUTO: NORMAL %
CD3+CD4+ CELLS # BLD: NORMAL /UL
CD3+CD4+ CELLS NFR BLD: NORMAL %
EOSINOPHIL # BLD AUTO: NORMAL X10E3/UL
EOSINOPHIL NFR BLD AUTO: NORMAL %
ERYTHROCYTE [DISTWIDTH] IN BLOOD BY AUTOMATED COUNT: NORMAL %
HCT VFR BLD AUTO: NORMAL %
HGB BLD-MCNC: NORMAL G/DL
IMM GRANULOCYTES # BLD: NORMAL X10E3/UL
IMM GRANULOCYTES NFR BLD: NORMAL %
LYMPHOCYTES # BLD AUTO: NORMAL X10E3/UL
LYMPHOCYTES NFR BLD AUTO: NORMAL %
MCH RBC QN AUTO: NORMAL PG
MCHC RBC AUTO-ENTMCNC: NORMAL G/DL
MCV RBC AUTO: NORMAL FL
MONOCYTES # BLD AUTO: NORMAL X10E3/UL
MONOCYTES NFR BLD AUTO: NORMAL %
NEUTROPHILS # BLD AUTO: NORMAL X10E3/UL
NEUTROPHILS NFR BLD AUTO: NORMAL %
PLATELET # BLD AUTO: NORMAL X10E3/UL
RBC # BLD AUTO: NORMAL X10E6/UL
WBC # BLD AUTO: NORMAL X10E3/UL

## 2024-06-07 ENCOUNTER — HOSPITAL ENCOUNTER (EMERGENCY)
Facility: HOSPITAL | Age: 64
Discharge: STILL ADMITTED OR ADMINISTRATIVE | End: 2024-06-07
Attending: EMERGENCY MEDICINE
Payer: OTHER GOVERNMENT

## 2024-06-07 ENCOUNTER — ANESTHESIA EVENT (OUTPATIENT)
Dept: PERIOP | Facility: HOSPITAL | Age: 64
DRG: 169 | End: 2024-06-07
Payer: OTHER GOVERNMENT

## 2024-06-07 ENCOUNTER — ANESTHESIA (OUTPATIENT)
Dept: PERIOP | Facility: HOSPITAL | Age: 64
DRG: 169 | End: 2024-06-07
Payer: OTHER GOVERNMENT

## 2024-06-07 ENCOUNTER — HOSPITAL ENCOUNTER (INPATIENT)
Facility: HOSPITAL | Age: 64
LOS: 29 days | DRG: 169 | End: 2024-07-06
Attending: SURGERY | Admitting: SURGERY
Payer: OTHER GOVERNMENT

## 2024-06-07 ENCOUNTER — APPOINTMENT (EMERGENCY)
Dept: CT IMAGING | Facility: HOSPITAL | Age: 64
End: 2024-06-07
Payer: OTHER GOVERNMENT

## 2024-06-07 VITALS
DIASTOLIC BLOOD PRESSURE: 78 MMHG | SYSTOLIC BLOOD PRESSURE: 151 MMHG | TEMPERATURE: 97.4 F | HEART RATE: 96 BPM | BODY MASS INDEX: 27.14 KG/M2 | OXYGEN SATURATION: 92 % | WEIGHT: 189.15 LBS | RESPIRATION RATE: 18 BRPM

## 2024-06-07 DIAGNOSIS — F31.9 BIPOLAR AFFECTIVE DISORDER, REMISSION STATUS UNSPECIFIED (HCC): ICD-10-CM

## 2024-06-07 DIAGNOSIS — I10 HYPERTENSION, UNSPECIFIED TYPE: ICD-10-CM

## 2024-06-07 DIAGNOSIS — Z72.0 TOBACCO ABUSE: ICD-10-CM

## 2024-06-07 DIAGNOSIS — E71.40 CARNITINE DEFICIENCY (HCC): ICD-10-CM

## 2024-06-07 DIAGNOSIS — I51.3 LEFT VENTRICULAR THROMBUS: ICD-10-CM

## 2024-06-07 DIAGNOSIS — I63.9 CEREBROVASCULAR ACCIDENT (CVA), UNSPECIFIED MECHANISM (HCC): ICD-10-CM

## 2024-06-07 DIAGNOSIS — I99.8 ACUTE LOWER LIMB ISCHEMIA: ICD-10-CM

## 2024-06-07 DIAGNOSIS — B20 HIV INFECTION, UNSPECIFIED SYMPTOM STATUS (HCC): ICD-10-CM

## 2024-06-07 DIAGNOSIS — R41.89 COGNITIVE DECLINE: ICD-10-CM

## 2024-06-07 DIAGNOSIS — I74.5 EMBOLISM AND THROMBOSIS OF ILIAC ARTERY (HCC): Primary | ICD-10-CM

## 2024-06-07 DIAGNOSIS — F19.10 SUBSTANCE ABUSE (HCC): ICD-10-CM

## 2024-06-07 DIAGNOSIS — L89.310 PRESSURE INJURY OF RIGHT BUTTOCK, UNSTAGEABLE (HCC): ICD-10-CM

## 2024-06-07 DIAGNOSIS — I99.8 ACUTE LOWER LIMB ISCHEMIA: Primary | ICD-10-CM

## 2024-06-07 DIAGNOSIS — I10 HYPERTENSION: ICD-10-CM

## 2024-06-07 DIAGNOSIS — R56.9 SEIZURES (HCC): ICD-10-CM

## 2024-06-07 LAB
ABO GROUP BLD: NORMAL
ALBUMIN SERPL BCP-MCNC: 3.3 G/DL (ref 3.5–5)
ALP SERPL-CCNC: 104 U/L (ref 34–104)
ALT SERPL W P-5'-P-CCNC: 209 U/L (ref 7–52)
ANION GAP SERPL CALCULATED.3IONS-SCNC: 11 MMOL/L (ref 4–13)
APTT PPP: 103 SECONDS (ref 23–37)
APTT PPP: 28 SECONDS (ref 23–37)
AST SERPL W P-5'-P-CCNC: 187 U/L (ref 13–39)
ATRIAL RATE: 94 BPM
BASOPHILS # BLD AUTO: 0.05 THOUSANDS/ÂΜL (ref 0–0.1)
BASOPHILS NFR BLD AUTO: 0 % (ref 0–1)
BILIRUB SERPL-MCNC: 0.44 MG/DL (ref 0.2–1)
BLD GP AB SCN SERPL QL: NEGATIVE
BLD GP AB SCN SERPL QL: NEGATIVE
BUN SERPL-MCNC: 58 MG/DL (ref 5–25)
CALCIUM ALBUM COR SERPL-MCNC: 9.4 MG/DL (ref 8.3–10.1)
CALCIUM SERPL-MCNC: 8.8 MG/DL (ref 8.4–10.2)
CHLORIDE SERPL-SCNC: 97 MMOL/L (ref 96–108)
CK SERPL-CCNC: ABNORMAL U/L (ref 39–308)
CO2 SERPL-SCNC: 24 MMOL/L (ref 21–32)
CREAT SERPL-MCNC: 1.33 MG/DL (ref 0.6–1.3)
EOSINOPHIL # BLD AUTO: 0.02 THOUSAND/ÂΜL (ref 0–0.61)
EOSINOPHIL NFR BLD AUTO: 0 % (ref 0–6)
ERYTHROCYTE [DISTWIDTH] IN BLOOD BY AUTOMATED COUNT: 13 % (ref 11.6–15.1)
ERYTHROCYTE [DISTWIDTH] IN BLOOD BY AUTOMATED COUNT: 13.2 % (ref 11.6–15.1)
GFR SERPL CREATININE-BSD FRML MDRD: 56 ML/MIN/1.73SQ M
GLUCOSE SERPL-MCNC: 172 MG/DL (ref 65–140)
HCT VFR BLD AUTO: 35.9 % (ref 36.5–49.3)
HCT VFR BLD AUTO: 42.9 % (ref 36.5–49.3)
HGB BLD-MCNC: 11.3 G/DL (ref 12–17)
HGB BLD-MCNC: 13.7 G/DL (ref 12–17)
HOLD SPECIMEN: NORMAL
IMM GRANULOCYTES # BLD AUTO: 0.15 THOUSAND/UL (ref 0–0.2)
IMM GRANULOCYTES NFR BLD AUTO: 1 % (ref 0–2)
INR PPP: 1.08 (ref 0.84–1.19)
INR PPP: 1.35 (ref 0.84–1.19)
LACTATE SERPL-SCNC: 2.2 MMOL/L (ref 0.5–2)
LYMPHOCYTES # BLD AUTO: 1.29 THOUSANDS/ÂΜL (ref 0.6–4.47)
LYMPHOCYTES NFR BLD AUTO: 7 % (ref 14–44)
MCH RBC QN AUTO: 28.2 PG (ref 26.8–34.3)
MCH RBC QN AUTO: 28.3 PG (ref 26.8–34.3)
MCHC RBC AUTO-ENTMCNC: 31.5 G/DL (ref 31.4–37.4)
MCHC RBC AUTO-ENTMCNC: 31.9 G/DL (ref 31.4–37.4)
MCV RBC AUTO: 89 FL (ref 82–98)
MCV RBC AUTO: 90 FL (ref 82–98)
MONOCYTES # BLD AUTO: 0.89 THOUSAND/ÂΜL (ref 0.17–1.22)
MONOCYTES NFR BLD AUTO: 5 % (ref 4–12)
NEUTROPHILS # BLD AUTO: 17.17 THOUSANDS/ÂΜL (ref 1.85–7.62)
NEUTS SEG NFR BLD AUTO: 87 % (ref 43–75)
NRBC BLD AUTO-RTO: 0 /100 WBCS
P AXIS: 68 DEGREES
PLATELET # BLD AUTO: 338 THOUSANDS/UL (ref 149–390)
PLATELET # BLD AUTO: 384 THOUSANDS/UL (ref 149–390)
PMV BLD AUTO: 8.7 FL (ref 8.9–12.7)
PMV BLD AUTO: 8.9 FL (ref 8.9–12.7)
POTASSIUM SERPL-SCNC: 4.5 MMOL/L (ref 3.5–5.3)
PR INTERVAL: 142 MS
PROT SERPL-MCNC: 8 G/DL (ref 6.4–8.4)
PROTHROMBIN TIME: 14.7 SECONDS (ref 11.6–14.5)
PROTHROMBIN TIME: 16.5 SECONDS (ref 11.6–14.5)
QRS AXIS: 38 DEGREES
QRSD INTERVAL: 98 MS
QT INTERVAL: 452 MS
QTC INTERVAL: 565 MS
RBC # BLD AUTO: 4 MILLION/UL (ref 3.88–5.62)
RBC # BLD AUTO: 4.85 MILLION/UL (ref 3.88–5.62)
RH BLD: POSITIVE
SODIUM SERPL-SCNC: 132 MMOL/L (ref 135–147)
SPECIMEN EXPIRATION DATE: NORMAL
SPECIMEN EXPIRATION DATE: NORMAL
T WAVE AXIS: 168 DEGREES
VENTRICULAR RATE: 94 BPM
WBC # BLD AUTO: 19.57 THOUSAND/UL (ref 4.31–10.16)
WBC # BLD AUTO: 20.99 THOUSAND/UL (ref 4.31–10.16)

## 2024-06-07 PROCEDURE — 99223 1ST HOSP IP/OBS HIGH 75: CPT | Performed by: SURGERY

## 2024-06-07 PROCEDURE — 96365 THER/PROPH/DIAG IV INF INIT: CPT

## 2024-06-07 PROCEDURE — 96375 TX/PRO/DX INJ NEW DRUG ADDON: CPT

## 2024-06-07 PROCEDURE — 27590 AMPUTATE LEG AT THIGH: CPT | Performed by: SURGERY

## 2024-06-07 PROCEDURE — 85027 COMPLETE CBC AUTOMATED: CPT | Performed by: STUDENT IN AN ORGANIZED HEALTH CARE EDUCATION/TRAINING PROGRAM

## 2024-06-07 PROCEDURE — 0Y6D0Z3 DETACHMENT AT LEFT UPPER LEG, LOW, OPEN APPROACH: ICD-10-PCS | Performed by: SURGERY

## 2024-06-07 PROCEDURE — 85730 THROMBOPLASTIN TIME PARTIAL: CPT | Performed by: STUDENT IN AN ORGANIZED HEALTH CARE EDUCATION/TRAINING PROGRAM

## 2024-06-07 PROCEDURE — 75635 CT ANGIO ABDOMINAL ARTERIES: CPT

## 2024-06-07 PROCEDURE — 99291 CRITICAL CARE FIRST HOUR: CPT | Performed by: EMERGENCY MEDICINE

## 2024-06-07 PROCEDURE — 88304 TISSUE EXAM BY PATHOLOGIST: CPT | Performed by: PATHOLOGY

## 2024-06-07 PROCEDURE — 04CJ0ZZ EXTIRPATION OF MATTER FROM LEFT EXTERNAL ILIAC ARTERY, OPEN APPROACH: ICD-10-PCS | Performed by: SURGERY

## 2024-06-07 PROCEDURE — 36415 COLL VENOUS BLD VENIPUNCTURE: CPT | Performed by: EMERGENCY MEDICINE

## 2024-06-07 PROCEDURE — 83605 ASSAY OF LACTIC ACID: CPT

## 2024-06-07 PROCEDURE — 85610 PROTHROMBIN TIME: CPT | Performed by: STUDENT IN AN ORGANIZED HEALTH CARE EDUCATION/TRAINING PROGRAM

## 2024-06-07 PROCEDURE — 88300 SURGICAL PATH GROSS: CPT | Performed by: PATHOLOGY

## 2024-06-07 PROCEDURE — 93005 ELECTROCARDIOGRAM TRACING: CPT

## 2024-06-07 PROCEDURE — 86850 RBC ANTIBODY SCREEN: CPT | Performed by: SURGERY

## 2024-06-07 PROCEDURE — 93010 ELECTROCARDIOGRAM REPORT: CPT | Performed by: INTERNAL MEDICINE

## 2024-06-07 PROCEDURE — 85730 THROMBOPLASTIN TIME PARTIAL: CPT

## 2024-06-07 PROCEDURE — 34201 REMOVAL OF ARTERY CLOT: CPT | Performed by: SURGERY

## 2024-06-07 PROCEDURE — 85025 COMPLETE CBC W/AUTO DIFF WBC: CPT | Performed by: EMERGENCY MEDICINE

## 2024-06-07 PROCEDURE — 86900 BLOOD TYPING SEROLOGIC ABO: CPT | Performed by: SURGERY

## 2024-06-07 PROCEDURE — 86901 BLOOD TYPING SEROLOGIC RH(D): CPT

## 2024-06-07 PROCEDURE — 86850 RBC ANTIBODY SCREEN: CPT

## 2024-06-07 PROCEDURE — 86901 BLOOD TYPING SEROLOGIC RH(D): CPT | Performed by: SURGERY

## 2024-06-07 PROCEDURE — 99285 EMERGENCY DEPT VISIT HI MDM: CPT

## 2024-06-07 PROCEDURE — 82550 ASSAY OF CK (CPK): CPT

## 2024-06-07 PROCEDURE — 86900 BLOOD TYPING SEROLOGIC ABO: CPT

## 2024-06-07 PROCEDURE — 99204 OFFICE O/P NEW MOD 45 MIN: CPT | Performed by: PHYSICIAN ASSISTANT

## 2024-06-07 PROCEDURE — 85347 COAGULATION TIME ACTIVATED: CPT

## 2024-06-07 PROCEDURE — 80053 COMPREHEN METABOLIC PANEL: CPT | Performed by: EMERGENCY MEDICINE

## 2024-06-07 PROCEDURE — 04CL0ZZ EXTIRPATION OF MATTER FROM LEFT FEMORAL ARTERY, OPEN APPROACH: ICD-10-PCS | Performed by: SURGERY

## 2024-06-07 PROCEDURE — NC001 PR NO CHARGE: Performed by: SURGERY

## 2024-06-07 PROCEDURE — 96376 TX/PRO/DX INJ SAME DRUG ADON: CPT

## 2024-06-07 PROCEDURE — 85610 PROTHROMBIN TIME: CPT

## 2024-06-07 RX ORDER — ONDANSETRON 2 MG/ML
4 INJECTION INTRAMUSCULAR; INTRAVENOUS EVERY 6 HOURS PRN
Status: DISCONTINUED | OUTPATIENT
Start: 2024-06-07 | End: 2024-07-06 | Stop reason: HOSPADM

## 2024-06-07 RX ORDER — HEPARIN SODIUM 1000 [USP'U]/ML
6800 INJECTION, SOLUTION INTRAVENOUS; SUBCUTANEOUS ONCE
Status: COMPLETED | OUTPATIENT
Start: 2024-06-07 | End: 2024-06-07

## 2024-06-07 RX ORDER — DIVALPROEX SODIUM 250 MG/1
250 TABLET, DELAYED RELEASE ORAL DAILY
Status: DISCONTINUED | OUTPATIENT
Start: 2024-06-07 | End: 2024-06-07 | Stop reason: SDUPTHER

## 2024-06-07 RX ORDER — MAGNESIUM HYDROXIDE 1200 MG/15ML
LIQUID ORAL AS NEEDED
Status: DISCONTINUED | OUTPATIENT
Start: 2024-06-07 | End: 2024-06-07 | Stop reason: HOSPADM

## 2024-06-07 RX ORDER — OXYCODONE HYDROCHLORIDE 5 MG/1
5 TABLET ORAL EVERY 6 HOURS PRN
Status: DISCONTINUED | OUTPATIENT
Start: 2024-06-07 | End: 2024-07-06 | Stop reason: HOSPADM

## 2024-06-07 RX ORDER — LAMOTRIGINE 25 MG/1
25 TABLET ORAL DAILY
Status: DISCONTINUED | OUTPATIENT
Start: 2024-06-08 | End: 2024-06-09

## 2024-06-07 RX ORDER — SODIUM CHLORIDE, SODIUM LACTATE, POTASSIUM CHLORIDE, CALCIUM CHLORIDE 600; 310; 30; 20 MG/100ML; MG/100ML; MG/100ML; MG/100ML
INJECTION, SOLUTION INTRAVENOUS CONTINUOUS PRN
Status: DISCONTINUED | OUTPATIENT
Start: 2024-06-07 | End: 2024-06-07

## 2024-06-07 RX ORDER — HEPARIN SODIUM 10000 [USP'U]/100ML
3-30 INJECTION, SOLUTION INTRAVENOUS
Status: DISCONTINUED | OUTPATIENT
Start: 2024-06-07 | End: 2024-06-11

## 2024-06-07 RX ORDER — CEFAZOLIN SODIUM 1 G/3ML
INJECTION, POWDER, FOR SOLUTION INTRAMUSCULAR; INTRAVENOUS AS NEEDED
Status: DISCONTINUED | OUTPATIENT
Start: 2024-06-07 | End: 2024-06-07

## 2024-06-07 RX ORDER — MIDAZOLAM HYDROCHLORIDE 2 MG/2ML
INJECTION, SOLUTION INTRAMUSCULAR; INTRAVENOUS AS NEEDED
Status: DISCONTINUED | OUTPATIENT
Start: 2024-06-07 | End: 2024-06-07

## 2024-06-07 RX ORDER — OXYCODONE HYDROCHLORIDE 10 MG/1
10 TABLET ORAL EVERY 6 HOURS PRN
Status: DISCONTINUED | OUTPATIENT
Start: 2024-06-07 | End: 2024-07-06 | Stop reason: HOSPADM

## 2024-06-07 RX ORDER — DEXAMETHASONE SODIUM PHOSPHATE 10 MG/ML
INJECTION, SOLUTION INTRAMUSCULAR; INTRAVENOUS AS NEEDED
Status: DISCONTINUED | OUTPATIENT
Start: 2024-06-07 | End: 2024-06-07

## 2024-06-07 RX ORDER — KETAMINE HCL IN NACL, ISO-OSM 100MG/10ML
SYRINGE (ML) INJECTION AS NEEDED
Status: DISCONTINUED | OUTPATIENT
Start: 2024-06-07 | End: 2024-06-07

## 2024-06-07 RX ORDER — SODIUM CHLORIDE, SODIUM LACTATE, POTASSIUM CHLORIDE, CALCIUM CHLORIDE 600; 310; 30; 20 MG/100ML; MG/100ML; MG/100ML; MG/100ML
75 INJECTION, SOLUTION INTRAVENOUS CONTINUOUS
Status: DISCONTINUED | OUTPATIENT
Start: 2024-06-07 | End: 2024-06-08

## 2024-06-07 RX ORDER — HEPARIN SODIUM 1000 [USP'U]/ML
6800 INJECTION, SOLUTION INTRAVENOUS; SUBCUTANEOUS EVERY 6 HOURS PRN
Status: DISCONTINUED | OUTPATIENT
Start: 2024-06-07 | End: 2024-06-07 | Stop reason: HOSPADM

## 2024-06-07 RX ORDER — ROCURONIUM BROMIDE 10 MG/ML
INJECTION, SOLUTION INTRAVENOUS AS NEEDED
Status: DISCONTINUED | OUTPATIENT
Start: 2024-06-07 | End: 2024-06-07

## 2024-06-07 RX ORDER — FENTANYL CITRATE 50 UG/ML
50 INJECTION, SOLUTION INTRAMUSCULAR; INTRAVENOUS ONCE
Status: COMPLETED | OUTPATIENT
Start: 2024-06-07 | End: 2024-06-07

## 2024-06-07 RX ORDER — ONDANSETRON 2 MG/ML
INJECTION INTRAMUSCULAR; INTRAVENOUS AS NEEDED
Status: DISCONTINUED | OUTPATIENT
Start: 2024-06-07 | End: 2024-06-07

## 2024-06-07 RX ORDER — HEPARIN SODIUM 1000 [USP'U]/ML
INJECTION, SOLUTION INTRAVENOUS; SUBCUTANEOUS AS NEEDED
Status: DISCONTINUED | OUTPATIENT
Start: 2024-06-07 | End: 2024-06-07

## 2024-06-07 RX ORDER — CALCIUM CHLORIDE 100 MG/ML
INJECTION INTRAVENOUS; INTRAVENTRICULAR AS NEEDED
Status: DISCONTINUED | OUTPATIENT
Start: 2024-06-07 | End: 2024-06-07

## 2024-06-07 RX ORDER — FENTANYL CITRATE 50 UG/ML
INJECTION, SOLUTION INTRAMUSCULAR; INTRAVENOUS AS NEEDED
Status: DISCONTINUED | OUTPATIENT
Start: 2024-06-07 | End: 2024-06-07

## 2024-06-07 RX ORDER — LANOLIN ALCOHOL/MO/W.PET/CERES
6 CREAM (GRAM) TOPICAL
Status: DISCONTINUED | OUTPATIENT
Start: 2024-06-07 | End: 2024-07-06 | Stop reason: HOSPADM

## 2024-06-07 RX ORDER — HYDROMORPHONE HCL/PF 1 MG/ML
SYRINGE (ML) INJECTION AS NEEDED
Status: DISCONTINUED | OUTPATIENT
Start: 2024-06-07 | End: 2024-06-07

## 2024-06-07 RX ORDER — SODIUM CHLORIDE 9 MG/ML
INJECTION, SOLUTION INTRAVENOUS CONTINUOUS PRN
Status: DISCONTINUED | OUTPATIENT
Start: 2024-06-07 | End: 2024-06-07

## 2024-06-07 RX ORDER — PROPOFOL 10 MG/ML
INJECTION, EMULSION INTRAVENOUS AS NEEDED
Status: DISCONTINUED | OUTPATIENT
Start: 2024-06-07 | End: 2024-06-07

## 2024-06-07 RX ORDER — HYDROMORPHONE HCL/PF 1 MG/ML
0.5 SYRINGE (ML) INJECTION
Status: DISCONTINUED | OUTPATIENT
Start: 2024-06-07 | End: 2024-06-07 | Stop reason: HOSPADM

## 2024-06-07 RX ORDER — DIVALPROEX SODIUM 500 MG/1
500 TABLET, EXTENDED RELEASE ORAL DAILY
Status: DISCONTINUED | OUTPATIENT
Start: 2024-06-08 | End: 2024-06-09

## 2024-06-07 RX ORDER — SUCCINYLCHOLINE/SOD CL,ISO/PF 100 MG/5ML
SYRINGE (ML) INTRAVENOUS AS NEEDED
Status: DISCONTINUED | OUTPATIENT
Start: 2024-06-07 | End: 2024-06-07

## 2024-06-07 RX ORDER — SERTRALINE HYDROCHLORIDE 25 MG/1
25 TABLET, FILM COATED ORAL DAILY
Status: DISCONTINUED | OUTPATIENT
Start: 2024-06-08 | End: 2024-06-27

## 2024-06-07 RX ORDER — HEPARIN SODIUM 1000 [USP'U]/ML
3400 INJECTION, SOLUTION INTRAVENOUS; SUBCUTANEOUS EVERY 6 HOURS PRN
Status: DISCONTINUED | OUTPATIENT
Start: 2024-06-07 | End: 2024-06-07 | Stop reason: HOSPADM

## 2024-06-07 RX ORDER — ATORVASTATIN CALCIUM 80 MG/1
80 TABLET, FILM COATED ORAL
Status: DISCONTINUED | OUTPATIENT
Start: 2024-06-07 | End: 2024-07-06 | Stop reason: HOSPADM

## 2024-06-07 RX ORDER — ONDANSETRON 2 MG/ML
4 INJECTION INTRAMUSCULAR; INTRAVENOUS ONCE AS NEEDED
Status: DISCONTINUED | OUTPATIENT
Start: 2024-06-07 | End: 2024-06-07 | Stop reason: HOSPADM

## 2024-06-07 RX ORDER — HEPARIN SODIUM 10000 [USP'U]/100ML
3-30 INJECTION, SOLUTION INTRAVENOUS
Status: DISCONTINUED | OUTPATIENT
Start: 2024-06-07 | End: 2024-06-07 | Stop reason: HOSPADM

## 2024-06-07 RX ORDER — SENNOSIDES 8.6 MG
1 TABLET ORAL DAILY
Status: DISCONTINUED | OUTPATIENT
Start: 2024-06-08 | End: 2024-06-08

## 2024-06-07 RX ORDER — ZONISAMIDE 100 MG/1
100 CAPSULE ORAL DAILY
Status: DISCONTINUED | OUTPATIENT
Start: 2024-06-08 | End: 2024-06-09

## 2024-06-07 RX ORDER — ACETAMINOPHEN 325 MG/1
975 TABLET ORAL EVERY 8 HOURS SCHEDULED
Status: DISCONTINUED | OUTPATIENT
Start: 2024-06-07 | End: 2024-07-06 | Stop reason: HOSPADM

## 2024-06-07 RX ORDER — DIVALPROEX SODIUM 250 MG/1
250 TABLET, DELAYED RELEASE ORAL DAILY
Status: DISCONTINUED | OUTPATIENT
Start: 2024-06-08 | End: 2024-06-09

## 2024-06-07 RX ORDER — LIDOCAINE HYDROCHLORIDE 10 MG/ML
INJECTION, SOLUTION EPIDURAL; INFILTRATION; INTRACAUDAL; PERINEURAL AS NEEDED
Status: DISCONTINUED | OUTPATIENT
Start: 2024-06-07 | End: 2024-06-07

## 2024-06-07 RX ORDER — MIRTAZAPINE 15 MG/1
15 TABLET, FILM COATED ORAL
Status: DISCONTINUED | OUTPATIENT
Start: 2024-06-07 | End: 2024-07-06 | Stop reason: HOSPADM

## 2024-06-07 RX ORDER — FENTANYL CITRATE/PF 50 MCG/ML
25 SYRINGE (ML) INJECTION
Status: DISCONTINUED | OUTPATIENT
Start: 2024-06-07 | End: 2024-06-07 | Stop reason: HOSPADM

## 2024-06-07 RX ORDER — GLYCOPYRROLATE 0.2 MG/ML
INJECTION INTRAMUSCULAR; INTRAVENOUS AS NEEDED
Status: DISCONTINUED | OUTPATIENT
Start: 2024-06-07 | End: 2024-06-07

## 2024-06-07 RX ORDER — TAMSULOSIN HYDROCHLORIDE 0.4 MG/1
0.4 CAPSULE ORAL
Status: DISCONTINUED | OUTPATIENT
Start: 2024-06-07 | End: 2024-07-06 | Stop reason: HOSPADM

## 2024-06-07 RX ORDER — FENTANYL CITRATE 50 UG/ML
100 INJECTION, SOLUTION INTRAMUSCULAR; INTRAVENOUS ONCE
Status: COMPLETED | OUTPATIENT
Start: 2024-06-07 | End: 2024-06-07

## 2024-06-07 RX ORDER — ALBUMIN, HUMAN INJ 5% 5 %
SOLUTION INTRAVENOUS CONTINUOUS PRN
Status: DISCONTINUED | OUTPATIENT
Start: 2024-06-07 | End: 2024-06-07

## 2024-06-07 RX ADMIN — NOREPINEPHRINE BITARTRATE 16 MCG: 1 INJECTION, SOLUTION, CONCENTRATE INTRAVENOUS at 15:43

## 2024-06-07 RX ADMIN — HEPARIN SODIUM 6800 UNITS: 1000 INJECTION INTRAVENOUS; SUBCUTANEOUS at 13:01

## 2024-06-07 RX ADMIN — MIDAZOLAM 2 MG: 1 INJECTION INTRAMUSCULAR; INTRAVENOUS at 15:26

## 2024-06-07 RX ADMIN — SUGAMMADEX 200 MG: 100 INJECTION, SOLUTION INTRAVENOUS at 17:42

## 2024-06-07 RX ADMIN — Medication 6 MG: at 22:18

## 2024-06-07 RX ADMIN — HYDROMORPHONE HYDROCHLORIDE 0.5 MG: 1 INJECTION, SOLUTION INTRAMUSCULAR; INTRAVENOUS; SUBCUTANEOUS at 17:42

## 2024-06-07 RX ADMIN — FENTANYL CITRATE 50 MCG: 50 INJECTION INTRAMUSCULAR; INTRAVENOUS at 15:25

## 2024-06-07 RX ADMIN — ROCURONIUM BROMIDE 50 MG: 10 INJECTION, SOLUTION INTRAVENOUS at 15:50

## 2024-06-07 RX ADMIN — Medication 100 MG: at 15:29

## 2024-06-07 RX ADMIN — SODIUM CHLORIDE, SODIUM LACTATE, POTASSIUM CHLORIDE, AND CALCIUM CHLORIDE: .6; .31; .03; .02 INJECTION, SOLUTION INTRAVENOUS at 15:11

## 2024-06-07 RX ADMIN — HEPARIN SODIUM 4000 UNITS: 1000 INJECTION INTRAVENOUS; SUBCUTANEOUS at 16:11

## 2024-06-07 RX ADMIN — GLYCOPYRROLATE 0.1 MG: 0.2 INJECTION, SOLUTION INTRAMUSCULAR; INTRAVENOUS at 15:26

## 2024-06-07 RX ADMIN — ONDANSETRON 4 MG: 2 INJECTION INTRAMUSCULAR; INTRAVENOUS at 17:28

## 2024-06-07 RX ADMIN — HYDROMORPHONE HYDROCHLORIDE 0.5 MG: 1 INJECTION, SOLUTION INTRAMUSCULAR; INTRAVENOUS; SUBCUTANEOUS at 17:18

## 2024-06-07 RX ADMIN — NOREPINEPHRINE BITARTRATE 4 MCG/MIN: 1 INJECTION, SOLUTION, CONCENTRATE INTRAVENOUS at 16:26

## 2024-06-07 RX ADMIN — IOHEXOL 120 ML: 350 INJECTION, SOLUTION INTRAVENOUS at 12:44

## 2024-06-07 RX ADMIN — OXYCODONE HYDROCHLORIDE 10 MG: 10 TABLET ORAL at 22:17

## 2024-06-07 RX ADMIN — SODIUM CHLORIDE: 0.9 INJECTION, SOLUTION INTRAVENOUS at 16:32

## 2024-06-07 RX ADMIN — SODIUM CHLORIDE: 0.9 INJECTION, SOLUTION INTRAVENOUS at 15:31

## 2024-06-07 RX ADMIN — ATORVASTATIN CALCIUM 80 MG: 80 TABLET, FILM COATED ORAL at 20:21

## 2024-06-07 RX ADMIN — ACETAMINOPHEN 975 MG: 325 TABLET, FILM COATED ORAL at 22:18

## 2024-06-07 RX ADMIN — ALBUMIN (HUMAN): 12.5 INJECTION, SOLUTION INTRAVENOUS at 17:03

## 2024-06-07 RX ADMIN — DEXAMETHASONE SODIUM PHOSPHATE 10 MG: 10 INJECTION, SOLUTION INTRAMUSCULAR; INTRAVENOUS at 16:54

## 2024-06-07 RX ADMIN — TAMSULOSIN HYDROCHLORIDE 0.4 MG: 0.4 CAPSULE ORAL at 20:21

## 2024-06-07 RX ADMIN — ONDANSETRON 4 MG: 2 INJECTION INTRAMUSCULAR; INTRAVENOUS at 15:26

## 2024-06-07 RX ADMIN — FENTANYL CITRATE 50 MCG: 50 INJECTION INTRAMUSCULAR; INTRAVENOUS at 12:48

## 2024-06-07 RX ADMIN — HEPARIN SODIUM 18 UNITS/KG/HR: 10000 INJECTION, SOLUTION INTRAVENOUS at 13:01

## 2024-06-07 RX ADMIN — MIRTAZAPINE 15 MG: 15 TABLET, FILM COATED ORAL at 22:18

## 2024-06-07 RX ADMIN — SODIUM CHLORIDE, SODIUM LACTATE, POTASSIUM CHLORIDE, AND CALCIUM CHLORIDE: .6; .31; .03; .02 INJECTION, SOLUTION INTRAVENOUS at 16:47

## 2024-06-07 RX ADMIN — HEPARIN SODIUM 18 UNITS/KG/HR: 10000 INJECTION, SOLUTION INTRAVENOUS at 22:34

## 2024-06-07 RX ADMIN — Medication 30 MG: at 15:29

## 2024-06-07 RX ADMIN — SODIUM CHLORIDE, SODIUM LACTATE, POTASSIUM CHLORIDE, AND CALCIUM CHLORIDE 75 ML/HR: .6; .31; .03; .02 INJECTION, SOLUTION INTRAVENOUS at 19:26

## 2024-06-07 RX ADMIN — Medication 20 MG: at 15:57

## 2024-06-07 RX ADMIN — PROPOFOL 150 MG: 10 INJECTION, EMULSION INTRAVENOUS at 15:29

## 2024-06-07 RX ADMIN — FENTANYL CITRATE 50 MCG: 50 INJECTION INTRAMUSCULAR; INTRAVENOUS at 15:27

## 2024-06-07 RX ADMIN — CALCIUM CHLORIDE 1 G: 100 INJECTION INTRAVENOUS; INTRAVENTRICULAR at 15:42

## 2024-06-07 RX ADMIN — HEPARIN SODIUM 2000 UNITS: 1000 INJECTION INTRAVENOUS; SUBCUTANEOUS at 16:22

## 2024-06-07 RX ADMIN — CEFAZOLIN 2000 MG: 1 INJECTION, POWDER, FOR SOLUTION INTRAMUSCULAR; INTRAVENOUS at 15:44

## 2024-06-07 RX ADMIN — LIDOCAINE HYDROCHLORIDE 50 MG: 10 INJECTION, SOLUTION EPIDURAL; INFILTRATION; INTRACAUDAL; PERINEURAL at 15:29

## 2024-06-07 RX ADMIN — FENTANYL CITRATE 100 MCG: 50 INJECTION INTRAMUSCULAR; INTRAVENOUS at 13:44

## 2024-06-07 NOTE — ED ATTENDING ATTESTATION
6/7/2024  IClementina DO, saw and evaluated the patient. I have discussed the patient with the resident/non-physician practitioner and agree with the resident's/non-physician practitioner's findings, Plan of Care, and MDM as documented in the resident's/non-physician practitioner's note, except where noted. All available labs and Radiology studies were reviewed.  I was present for key portions of any procedure(s) performed by the resident/non-physician practitioner and I was immediately available to provide assistance.       At this point I agree with the current assessment done in the Emergency Department.  I have conducted an independent evaluation of this patient a history and physical is as follows:    ED Course   63-year-old incarcerated male male presents to the emergency department from longterm accompanied by longterm guards staff with complaints of left lower extremity pain.  Patient is a poor historian and is unable to provide consistent history.  He reports 3 days of lower extremity pain though at other times states pain could have been present longer.  Patient has decreased sensation from the knee to the foot.  He is having difficulty moving the leg and has not been bearing weight on the leg for the past few days.  No reported fevers.  Swelling and blisters noted on the lower leg.    Past medical history: HIV, cocaine use, TBI, mood disorder, right upper extremity weakness/Dalton's paralysis, left MCA CVA with resultant aphasia, bipolar disorder, substance abuse, hypertension    Physical exam: Patient is awake and alert, moderate distress due to pain.  Pupils are equal reactive.  Mucous membranes are slightly dry.  Neck is supple.  Heart is regular with frequent ectopy.  Lungs have decreased breath sounds at both bases.  Abdomen soft, nontender, nondistended with normoactive bowel sounds.  2+ palpable right femoral pulse, no palpable left femoral pulse, popliteal pulse, or dorsal pedal/posterior tibial  pulses on the left leg.  The left lower leg is edematous, cold to touch.  There is blistering of the skin along the lateral aspect of the left lower leg.  Left leg is insensate from mid thigh to toes, right lower extremity is warm to touch, no edema or tenderness noted.  Mild aphasic speech.    Assessment/plan: 63-year-old male presents with cold painful insensate left lower extremity.  Differential diagnosis includes but is not limited to acute ischemia of the left lower leg, may be thrombotic or embolic in etiology, acute compartment syndrome, less likely acute CVA.    Patient has extremity pulses.  Will send for CTA with runoff, will have left to initiate heparin infusion, case will be discussed with vascular surgery team suspect patient will need to be transferred to [facility] for further evaluation.  Patient provided with pain medication.    Critical Care Time  CriticalCare Time    Date/Time: 6/7/2024 5:31 PM    Performed by: Clementina Blankenship DO  Authorized by: Clementina Blankenship DO    Critical care provider statement:     Critical care time (minutes):  45    Critical care time was exclusive of:  Separately billable procedures and treating other patients and teaching time    Critical care was necessary to treat or prevent imminent or life-threatening deterioration of the following conditions:  Circulatory failure    Critical care was time spent personally by me on the following activities:  Blood draw for specimens, obtaining history from patient or surrogate, discussions with consultants, evaluation of patient's response to treatment, examination of patient, development of treatment plan with patient or surrogate, ordering and performing treatments and interventions, ordering and review of laboratory studies, ordering and review of radiographic studies, re-evaluation of patient's condition and review of old charts    I assumed direction of critical care for this patient from another provider in my specialty: no

## 2024-06-07 NOTE — ANESTHESIA POSTPROCEDURE EVALUATION
Post-Op Assessment Note    CV Status:  Stable  Pain Score: 0    Pain management: adequate       Mental Status:  Sleepy   Hydration Status:  Stable   PONV Controlled:  None   Airway Patency:  Patent     Post Op Vitals Reviewed: Yes    No anethesia notable event occurred.    Staff: CRNA           /74 (06/07/24 1819)    Temp 97.6 °F (36.4 °C) (06/07/24 1819)    Pulse 87 (06/07/24 1819)   Resp (!) 26 (06/07/24 1819)    SpO2 98 % (06/07/24 1819)

## 2024-06-07 NOTE — ANESTHESIA PREPROCEDURE EVALUATION
Procedure:  ENDARTERECTOMY ARTERIAL FEMORAL (Left: Leg Upper)    Relevant Problems   CARDIO   (+) Benign essential hypertension   (+) Hypertension      NEURO/PSYCH   (+) Cerebrovascular accident (CVA) (HCC)      H/o HIV  Mood d/o  H/o TBMI  Ischemic LLE  Presenting from residential w/ >1 week of lower extremity swelling/pain after a fall    Lab Results   Component Value Date    WBC 19.57 (H) 06/07/2024    HGB 13.7 06/07/2024    HCT 42.9 06/07/2024    MCV 89 06/07/2024     06/07/2024     Lab Results   Component Value Date     12/10/2015    K 4.5 06/07/2024    CO2 24 06/07/2024    CL 97 06/07/2024    BUN 58 (H) 06/07/2024    CREATININE 1.33 (H) 06/07/2024     Lab Results   Component Value Date    INR 1.08 06/07/2024    INR 0.98 05/16/2024    INR 1.0 05/27/2021    PROTIME 14.7 (H) 06/07/2024    PROTIME 13.6 05/16/2024    PROTIME 12.7 10/26/2019     Lab Results   Component Value Date    PTT 28 06/07/2024       Lab Results   Component Value Date    GLUCOSE 90 10/26/2019    GLUCOSE 92 12/10/2015    GLUCOSE 87 05/13/2014       Lab Results   Component Value Date    HGBA1C 6.2 (H) 12/05/2023       Type and Screen:  B      Physical Exam    Airway    Mallampati score: II  TM Distance: >3 FB  Neck ROM: full     Dental   Comment: Multiple missing and broken teeth     Cardiovascular      Pulmonary      Other Findings        Anesthesia Plan  ASA Score- 3     Anesthesia Type- general with ASA Monitors.         Additional Monitors:     Airway Plan: ETT.           Plan Factors-Exercise tolerance (METS): >4 METS.    Chart reviewed.   Existing labs reviewed. Patient summary reviewed.                  Induction- intravenous.    Postoperative Plan- Plan for postoperative opioid use. Planned trial extubation        Informed Consent- Anesthetic plan and risks discussed with patient.  I personally reviewed this patient with the CRNA. Discussed and agreed on the Anesthesia Plan with the CRNA..

## 2024-06-07 NOTE — OP NOTE
DATE OF PROCEDURE: 06/07/24    PERFORMING SERVICE: Vascular and Endovascular Surgery    ATTENDING SURGEON: Juan Luis Rdz MD    PREOPERATIVE DIAGNOSIS: Left lower extremity Wolf 3 acute limb ischemia with paralysis     POSTOPERATIVE DIAGNOSIS: Same    PROCEDURE NAME:   Left femoral artery exploration  Thromboembolectomy of the left iliac system  Thromboembolectomy of the left profunda femoris  Thromboembolectomy of the left superficial femoral artery  Left above knee amputation    ANESTHESIA: General endotracheal    EBL: 25 cc    UOP:  250 cc    IVF:  1,900 cc     SPECIMENS:  Left leg including knee     COMPLICATIONS: None    DRAINS: None    INDICATION:  Please see H&P     INTRAOPERATIVE FINDINGS: Healthy tissue for closure of amputation.  Pulsatile flow re-established to the SFA and profunda    ASSISTING SURGEON: Dr. Stone Garrido DO    DESCRIPTION OF PROCEDURE:   The patient was transported to the operative suite where a timeout was performed confirming the patient, procedure and laterality.  Preoperative antibiotics were administered.  The patient was prepped and draped in usual sterile surgical fashion.  A second timeout was again performed.  Anesthesia was initiated.      The patient had been on heparin prior to the OR however ACT's were checked and heparin rebolused for goal of greater than 250 throughout the case.    A vertical incision was made with a #15 blade over the left groin where the left femoral artery was identified.  Dissection was carried down through the skin and subcutaneous tissue. The inguinal ligament was identified and subcutaneous tissue was dissected from the inferior edge medially and laterally.  The common femoral artery was identified after further dissection and the anterior aspect was cleared of subcutaneous tissue moving from proximal to distal.  The region of vessel caliber change was identified indicating the CFA bifurcation.  Circumferential dissection of the  distal EIA, CFA, SFA, and profunda was then performed.  The vessels were then each encircled with silastic vessel loops.    Control was established at the common femoral artery.  A transverse incision was made at the anterior surface of the common femoral artery.  A #4 then #5 Homar catheter was passed proximally to remove a significant burden of fresh thrombus from the iliac system.  Vigorous pulsatile inflow was established.  Subsequent passes of Homar catheters returned no further thrombus.  Control was placed proximally.  Thromboembolectomy then proceeded in a similar fashion of the profunda femoris with a #3 Homar.  Again return of fresh thrombus was noted and passes were completed until nor thrombus was obtained.  Backbleeding was noted to be vigorous.  The same process was repeated with the superficial femoral artery again the return of copious fresh appearing thrombus was noted.  The transverse arteriotomy was closed with interrupted 6-0 Prolene suture.  The incision was flushed retrograde and antegrade.  Hemostasis was appreciated.    Control was maintained of the vessels at this time as the patient was therapeutically heparinized.    An incision was created in a fishmouth shape with the corners of the mouth centered over the groove between the quadriceps and biceps femoris at a point one hands breadth above the patella. Dissection was carried down to the muscular fascia which was opened with the cautery. The muscles of the quadriceps were divided with the cautery. The femur was stripped of its periosteum. The femur was divided and beveled with an oscillating bone saw. The posterior amputation flap was then completed with electrocautery. The femoral/popliteal vessels were identified and ligated with transfixing ligatures of 2-0 silk. The sciatic nerve was identified and ligated high within the wound. The wound was irrigated.  The proximal vascular control was released.  Again hemostasis was obtained  from small bleeding perforating vessels.  The wound was dry.  The muscles were approximated over the femur with buried sutures of 0 Vicryl. The investing fascia was then closed with interrupted buried sutures of 0 Vicryl. The skin was approximated with surgical clips. A sterile occlusive dressing was applied. The patient was then transferred to recovery room in good condition.    All counts were correct at the end of the case. The patient tolerated the procedure well, awoke from anesthesia uneventfully and was transported to the PACU in good condition.    Juan Luis Rdz MD  06/07/24  5:27 PM

## 2024-06-07 NOTE — H&P
H&P Exam - Vascular Surgery   Sunil Patel 63 y.o. male MRN: [id number]  Unit/Bed#: OR [place] Encounter: [id number]    Assessment & Plan     Assessment:  63-year-old male with hx of HIV, mood disorder, TBI presenting from incarceration with complaints of left lower extremity swelling and pain.  CT imaging with acute occlusion of left external iliac artery without visualization of distal vessels even on delayed phase.  Patient presenting with Dallas's class III acute limb ischemia.    Plan:  Left lower extremity with prolonged ischemia, R3, absent motor and sensory function, and unfortunately not salvageable  Plan for left iliofemoral thromboembolectomy to restore inline flow to profunda  Given extent of ischemia patient will require a left above-knee amputation  Risk, benefits, alternatives, and description of procedure discussed with the patient and he did verbalize understanding and gave verbal consent  Cardioembolic workup given findings of filling defect in left ventricular apex  Further plan pending surgical invention/clinical course    History of Present Illness     HPI:  Sunil Patel is a 63 y.o. male who presents with left lower extremity pain reported for approximately 3 days although exact extent unknown patient unable to provide adequate history.  Majority of history obtained from chart review.  Per chart patient had mechanical fall 10 days ago and subsequently developed left lower extremity pain and swelling over the past few days.  Admits to not being amatory secondary to his discomfort.  Admits to numbness remainder of review of systems limited given patient's, inability to answer questions.     Review of Systems   Reason unable to perform ROS: Mental status.       Historical Information   Past Medical History:   Diagnosis Date    Anxiety     Depression     HIV disease (HCC)     Substance abuse (HCC)     Suicide attempt (HCC)      Past Surgical History:   Procedure Laterality Date    US GUIDED  "THYROID BIOPSY  3/15/2022    US GUIDED THYROID BIOPSY  11/26/2019     Social History   Social History     Substance and Sexual Activity   Alcohol Use Yes     Social History     Substance and Sexual Activity   Drug Use Yes    Types: \"Crack\" cocaine, Marijuana     Social History     Tobacco Use   Smoking Status Some Days    Current packs/day: 1.00    Types: Cigarettes   Smokeless Tobacco Never     E-Cigarette/Vaping     E-Cigarette/Vaping Substances     Family History:   Family History   Problem Relation Age of Onset    Diabetes Mother     Heart attack Mother     Heart attack Father        Meds/Allergies   all current active meds have been reviewed  Allergies   Allergen Reactions    No Active Allergies        Objective   Vitals: There were no vitals taken for this visit.,There is no height or weight on file to calculate BMI.  No intake or output data in the 24 hours ending 06/07/24 1436  Invasive Devices       Peripheral Intravenous Line  Duration             Peripheral IV 06/07/24 Left Antecubital <1 day    Peripheral IV 06/07/24 Right;Ventral (anterior) Forearm <1 day                    Physical Exam  Vitals reviewed.   HENT:      Head: Normocephalic.      Right Ear: External ear normal.      Left Ear: External ear normal.      Nose: Nose normal.   Eyes:      Extraocular Movements: Extraocular movements intact.   Cardiovascular:      Rate and Rhythm: Normal rate.      Comments: Nonpalpable left common femoral, 2+ right femoral  Pulmonary:      Effort: Pulmonary effort is normal.   Abdominal:      General: Abdomen is flat.      Palpations: Abdomen is soft.      Tenderness: There is no abdominal tenderness.      Comments: Diastases   Musculoskeletal:      Cervical back: Normal range of motion.      Comments: Left lower extremity with extensive swelling, mottling, and blistering below the knee, insensate, no motor function in regards to plantar dorsiflexion of foot   Skin:     General: Skin is warm.   Neurological:     "  Mental Status: He is alert.      Comments: Alert however unable to fully answer questions, fixated on specific topics         Lab Results: Coags:   Lab Results   Component Value Date    PTT 28 06/07/2024    INR 1.08 06/07/2024   , CBC with diff:   Lab Results   Component Value Date    WBC 19.57 (H) 06/07/2024    HGB 13.7 06/07/2024    HCT 42.9 06/07/2024    MCV 89 06/07/2024     06/07/2024    RBC 4.85 06/07/2024    MCH 28.2 06/07/2024    MCHC 31.9 06/07/2024    RDW 13.2 06/07/2024    MPV 8.9 06/07/2024    NRBC 0 06/07/2024   , BMP/CMP:   Lab Results   Component Value Date    SODIUM 132 (L) 06/07/2024    K 4.5 06/07/2024    CL 97 06/07/2024    CO2 24 06/07/2024    BUN 58 (H) 06/07/2024    CREATININE 1.33 (H) 06/07/2024    CALCIUM 8.8 06/07/2024     (H) 06/07/2024     (H) 06/07/2024    ALKPHOS 104 06/07/2024    EGFR 56 06/07/2024     Imaging: I have personally reviewed pertinent reports.   and I have personally reviewed pertinent films in PACS  EKG, Pathology, and Other Studies: I have personally reviewed pertinent reports.      Code Status: Prior  Advance Directive and Living Will:      Power of :    POLST:    \

## 2024-06-07 NOTE — ED PROVIDER NOTES
History  Chief Complaint   Patient presents with    Leg Pain     Pt states he's had LLE pain/cold extremity x 3 days. Provider at long term told EMS that the extremity did not have a pulse     Patient is a 63-year-old male inmate coming from long term for left lower extremity numbness, cold, pain.  History was obtained from both the patient as well as the officers that came with him.  Officer stated that the nurse and physician at the long term stated that he started having the symptoms of left lower extremity numbness, cold extremity, pain this morning.  However, the patient states that he has been having pain in the extremity for approximately 3 days.  He is unsure whether it has been cold/numb since then as well.  He endorses 10/10 pain of the left lower extremity but does not endorse any other symptoms.  On initial examination, patient appears in acute distress due to severe pain.  Left lower extremity appears grossly edematous, cold.  The patient does not have pulses on the left side from the femoral pulse downwards distally.        Prior to Admission Medications   Prescriptions Last Dose Informant Patient Reported? Taking?   Cholecalciferol (VITAMIN D3) 52935 units TABS  Outside Facility (Specify) Yes No   Sig: Take 1 tablet by mouth once a week   DULoxetine (CYMBALTA) 60 mg delayed release capsule   No No   Sig: Take 1 capsule by mouth daily   Patient not taking: Reported on 10/26/2019   Magnesium Oxide (MAG- PO)  Outside Facility (Specify) Yes No   Sig: Take 400 mg by mouth 2 (two) times a day Taken 7am and 7pm   Melatonin 10 MG TBDP  Outside Facility (Specify) Yes No   Sig: Take 1 tablet by mouth daily at bedtime   TRIUMEQ 600- MG per tablet   No No   Sig: TAKE 1 TABLET BY MOUTH DAILY   Patient not taking: Reported on 10/26/2019   aspirin (ECOTRIN LOW STRENGTH) 81 mg EC tablet   Yes No   Sig: Take 81 mg by mouth daily   atorvastatin (LIPITOR) 80 mg tablet  Outside Facility (Specify) Yes No   Sig: Take 80  mg by mouth daily   bictegravir-emtricitab-tenofovir alafenamide (BIKTARVY) -25 MG tablet  Outside Facility (Specify) Yes No   Sig: Take 1 tablet by mouth daily with breakfast   divalproex sodium (DEPAKOTE ER) 500 mg 24 hr tablet  Outside Facility (Specify) Yes No   Sig: Take 500 mg by mouth daily Taken at 7am   divalproex sodium (DEPAKOTE) 250 mg EC tablet  Outside Facility (Specify) Yes No   Sig: Take 250 mg by mouth daily Taken at 1pm   dolutegravir (TIVICAY) 50 MG TABS   No No   Sig: Take 1 tablet by mouth daily for 30 days   lamoTRIgine (LaMICtal) 25 mg tablet  Outside Facility (Specify) Yes No   Sig: Take 25 mg by mouth daily 7am and 7pm   levOCARNitine (CARNITOR) 330 MG tablet  Outside Facility (Specify) Yes No   Sig: Take 330 mg by mouth 3 (three) times a day 7am, 1pm, and 7pm   losartan (COZAAR) 100 MG tablet  Outside Facility (Specify) Yes No   Sig: Take 50 mg by mouth daily   mirtazapine (REMERON) 15 mg tablet  Outside Facility (Specify) Yes No   Sig: Take 15 mg by mouth daily at bedtime   sertraline (ZOLOFT) 25 mg tablet  Outside Facility (Specify) Yes No   Sig: Take 25 mg by mouth daily Take with 50 mg tablet (total 75mg daily)    sertraline (ZOLOFT) 50 mg tablet  Outside Facility (Specify) Yes No   Sig: Take 50 mg by mouth daily Take with 25 mg tablet (total 75 mg daily)    tamsulosin (FLOMAX) 0.4 mg  Outside Facility (Specify) Yes No   Sig: Take 0.4 mg by mouth daily with dinner   zonisamide (ZONEGRAN) 100 mg capsule   No No   Sig: Take 1 capsule (100 mg total) by mouth daily      Facility-Administered Medications: None       Past Medical History:   Diagnosis Date    Anxiety     Depression     HIV disease (HCC)     Substance abuse (HCC)     Suicide attempt (HCC)        Past Surgical History:   Procedure Laterality Date    US GUIDED THYROID BIOPSY  3/15/2022    US GUIDED THYROID BIOPSY  11/26/2019       Family History   Problem Relation Age of Onset    Diabetes Mother     Heart attack Mother      "Heart attack Father      I have reviewed and agree with the history as documented.    E-Cigarette/Vaping     E-Cigarette/Vaping Substances     Social History     Tobacco Use    Smoking status: Some Days     Current packs/day: 1.00     Types: Cigarettes    Smokeless tobacco: Never   Substance Use Topics    Alcohol use: Yes    Drug use: Yes     Types: \"Crack\" cocaine, Marijuana        Review of Systems   Constitutional:  Negative for chills, fatigue and fever.   HENT: Negative.     Respiratory:  Negative for cough and shortness of breath.    Cardiovascular:  Negative for chest pain and palpitations.   Gastrointestinal:  Negative for abdominal pain, nausea and vomiting.   Genitourinary:  Negative for dysuria and urgency.   Skin:  Negative for color change.   Neurological:  Negative for facial asymmetry.   Psychiatric/Behavioral: Negative.     All other systems reviewed and are negative.      Physical Exam  ED Triage Vitals   Temperature Pulse Respirations Blood Pressure SpO2   06/07/24 1228 06/07/24 1215 06/07/24 1227 06/07/24 1215 06/07/24 1215   (!) 97.4 °F (36.3 °C) (!) 49 18 133/82 97 %      Temp Source Heart Rate Source Patient Position - Orthostatic VS BP Location FiO2 (%)   06/07/24 1228 06/07/24 1228 -- -- --   Oral Monitor         Pain Score       06/07/24 1248       7             Orthostatic Vital Signs  Vitals:    06/07/24 1215 06/07/24 1228 06/07/24 1330   BP: 133/82  151/78   Pulse: (!) 49 93 96       Physical Exam  Vitals and nursing note reviewed.   Constitutional:       General: He is in acute distress.   HENT:      Head: Normocephalic and atraumatic.      Nose: Nose normal.      Mouth/Throat:      Mouth: Mucous membranes are moist.      Pharynx: Oropharynx is clear.   Eyes:      Extraocular Movements: Extraocular movements intact.      Conjunctiva/sclera: Conjunctivae normal.      Pupils: Pupils are equal, round, and reactive to light.   Cardiovascular:      Rate and Rhythm: Normal rate and regular " rhythm.      Pulses: Normal pulses.      Heart sounds: Normal heart sounds.   Pulmonary:      Effort: Pulmonary effort is normal.      Breath sounds: Normal breath sounds.   Abdominal:      General: Abdomen is flat. Bowel sounds are normal.   Musculoskeletal:      Comments: Left lower extremity cyanotic, cold, highly edematous, and pulseless from left femoral pulse downwards distally.   Skin:     General: Skin is warm and dry.      Capillary Refill: Capillary refill takes less than 2 seconds.   Neurological:      General: No focal deficit present.      Mental Status: He is alert and oriented to person, place, and time.   Psychiatric:         Mood and Affect: Mood normal.         Behavior: Behavior normal.         Thought Content: Thought content normal.         ED Medications  Medications   fentaNYL injection 50 mcg (50 mcg Intravenous Given 6/7/24 1248)   iohexol (OMNIPAQUE) 350 MG/ML injection (MULTI-DOSE) 120 mL (120 mL Intravenous Given 6/7/24 1244)   heparin (porcine) injection 6,800 Units (6,800 Units Intravenous Given 6/7/24 1301)   fentaNYL injection 100 mcg (100 mcg Intravenous Given 6/7/24 1344)       Diagnostic Studies  Results Reviewed       Procedure Component Value Units Date/Time    [place] draw [[id number]] Collected: 06/07/24 1236    Lab Status: Final result Specimen: Blood from Arm, Left Updated: 06/07/24 1401    Narrative:      The following orders were created for panel order [place] draw.  Procedure                               Abnormality         Status                     ---------                               -----------         ------                     Light Blue Top on hold[[id number]]                           Final result               Gold top on hold[[id number]]                                 Final result               Green / Yellow tube on hold[[id number]]                      Final result               Green / Black tube on hold[[id number]]                       Final result                Lavender Top 7ml on hold[[id number]]                         Final result                 Please view results for these tests on the individual orders.    CK [[id number]]  (Abnormal) Collected: 06/07/24 1239    Lab Status: Final result Specimen: Blood from Arm, Left Updated: 06/07/24 1347     Total CK 11,295 U/L     Comprehensive metabolic panel [[id number]]  (Abnormal) Collected: 06/07/24 1236    Lab Status: Final result Specimen: Blood from Arm, Left Updated: 06/07/24 1303     Sodium 132 mmol/L      Potassium 4.5 mmol/L      Chloride 97 mmol/L      CO2 24 mmol/L      ANION GAP 11 mmol/L      BUN 58 mg/dL      Creatinine 1.33 mg/dL      Glucose 172 mg/dL      Calcium 8.8 mg/dL      Corrected Calcium 9.4 mg/dL       U/L       U/L      Alkaline Phosphatase 104 U/L      Total Protein 8.0 g/dL      Albumin 3.3 g/dL      Total Bilirubin 0.44 mg/dL      eGFR 56 ml/min/1.73sq m     Narrative:      National Kid[employer] Foundation guidelines for Chronic Kidney Disease (CKD):     Stage 1 with normal or high GFR (GFR > 90 mL/min/1.73 square meters)    Stage 2 Mild CKD (GFR = 60-89 mL/min/1.73 square meters)    Stage 3A Moderate CKD (GFR = 45-59 mL/min/1.73 square meters)    Stage 3B Moderate CKD (GFR = 30-44 mL/min/1.73 square meters)    Stage 4 Severe CKD (GFR = 15-29 mL/min/1.73 square meters)    Stage 5 End Stage CKD (GFR <15 mL/min/1.73 square meters)  Note: GFR calculation is accurate only with a steady state creatinine    Lactic acid, plasma (w/reflex if result > 2.0) [[id number]]  (Abnormal) Collected: 06/07/24 1239    Lab Status: Final result Specimen: Blood from Arm, Left Updated: 06/07/24 1302     LACTIC ACID 2.2 mmol/L     Narrative:      Result may be elevated if tourniquet was used during collection.    APTT [[id number]]  (Normal) Collected: 06/07/24 1239    Lab Status: Final result Specimen: Blood from Arm, Left Updated: 06/07/24 1300     PTT 28 seconds     Protime-INR [[id number]]  (Abnormal)  Collected: 06/07/24 1239    Lab Status: Final result Specimen: Blood from Arm, Left Updated: 06/07/24 1300     Protime 14.7 seconds      INR 1.08    CBC and differential [[id number]]  (Abnormal) Collected: 06/07/24 1236    Lab Status: Final result Specimen: Blood from Arm, Left Updated: 06/07/24 1248     WBC 19.57 Thousand/uL      RBC 4.85 Million/uL      Hemoglobin 13.7 g/dL      Hematocrit 42.9 %      MCV 89 fL      MCH 28.2 pg      MCHC 31.9 g/dL      RDW 13.2 %      MPV 8.9 fL      Platelets 384 Thousands/uL      nRBC 0 /100 WBCs      Segmented % 87 %      Immature Grans % 1 %      Lymphocytes % 7 %      Monocytes % 5 %      Eosinophils Relative 0 %      Basophils Relative 0 %      Absolute Neutrophils 17.17 Thousands/µL      Absolute Immature Grans 0.15 Thousand/uL      Absolute Lymphocytes 1.29 Thousands/µL      Absolute Monocytes 0.89 Thousand/µL      Eosinophils Absolute 0.02 Thousand/µL      Basophils Absolute 0.05 Thousands/µL                    CTA abdominal w run off w wo contrast   Final Result by Toni Mike MD (06/07 1328)      Vascular:   Left lower extremity:   Acute arterial occlusion extending from the proximal left external iliac artery through at least the distal superficial femoral artery. Nonopacification of the popliteal artery and tibial vasculature on delayed phase of imaging. Asymmetric enlargement of    the left calf with significant subcutaneous infiltration should be correlated for compartment syndrome.      Right lower extremity:   1.  Age-indeterminate severe narrowing versus focal occlusion involving the P3 segment and tibioperoneal trunk ostia.   2.  Relative abrupt nonopacification of the posterior tibial artery at the level of the ankle.      Filling defect in the left ventricular apex suggests left ventricular thrombus and the source of the embolic disease.      Nonvascular:   Left greater than right renal cortical scarring and atrophy, likely relating to prior embolic disease  "given the clinical history.      The study was marked in EPIC for immediate notification. Urgent consultation with vascular surgery is recommended. I communicated findings with Bertha Leary PA-C of the vascular surgical service prior to final report generation.      Workstation performed: [id number]               Procedures  Procedures      ED Course                             SBIRT 20yo+      Flowsheet Row Most Recent Value   Initial Alcohol Screen: US AUDIT-C     1. How often do you have a drink containing alcohol? 0 Filed at: 06/07/2024 1225   2. How many drinks containing alcohol do you have on a typical day you are drinking?  0 Filed at: 06/07/2024 1225   3a. Male UNDER 65: How often do you have five or more drinks on one occasion? 0 Filed at: 06/07/2024 1225   Audit-C Score 0 Filed at: 06/07/2024 1225   KENDRICK: How many times in the past year have you...    Used an illegal drug or used a prescription medication for non-medical reasons? Never Filed at: 06/07/2024 1225                  Medical Decision Making  Patient is a 63-year-old male inmate coming from shelter for left lower extremity numbness, cold, pain.  History was obtained from both the patient as well as the officers that came with him.  Officer stated that the nurse and physician at the shelter stated that he started having the symptoms of left lower extremity numbness, cold extremity, pain this morning.  However, the patient states that he has been having pain in the extremity for approximately 3 days.  He is unsure whether it has been cold/numb since then as well.  He endorses 10/10 pain of the left lower extremity but does not endorse any other symptoms.  On initial examination, patient appears in acute distress due to severe pain.  Left lower extremity appears grossly edematous, cold.  The patient does not have pulses on the left side from the femoral pulse downwards distally.    CTA abdomen with run off was performed which revealed \"Acute " "arterial occlusion extending from the proximal left external iliac artery through at least the distal superficial femoral artery.\" Given this, patient was made priority 1 transfer to [facility] for vascular surgery. Lactic 2.2, CK>11,000, which is consistent with acute limb ischemia with long duration.  Patient required multiple doses of fentanyl for pain control.  He remained hemodynamically stable and without major changes during his stay and was ultimately transported to [facility].    Amount and/or Complexity of Data Reviewed  Labs: ordered.  Radiology: ordered.    Risk  Prescription drug management.          Disposition  Final diagnoses:   Acute lower limb ischemia     Time reflects when diagnosis was documented in both MDM as applicable and the Disposition within this note       Time User Action Codes Description Comment    6/7/2024 12:48 PM Toni Simmons [I99.8] Acute lower limb ischemia           ED Disposition       ED Disposition   Transfer to Another Facility-In Network    Condition   --    Date/Time   Fri Jun 7, 2024 1248    Comment   Sunil Patel should be transferred out to [facility].               MD Documentation      Flowsheet Row Most Recent Value   Patient Condition The patient has been stabilized such that within reasonable medical probability, no material deterioration of the patient condition or the condition of the unborn child(rafa) is likely to result from the transfer   Reason for Transfer Level of Care needed not available at this facility   Benefits of Transfer Specialized equipment and/or services available at the receiving facility (Include comment)________________________  [Vascular Surgery]   Risks of Transfer Potential for delay in receiving treatment, Potential deterioration of medical condition, Loss of IV, Possible worsening of condition or death during transfer, Increased discomfort during transfer   Accepting Physician Dr. Garrido   Accepting Facility Name, [facility] - " [place], [place]    (Name & Tel number) Marisa Jacob   Sending MD Toni Simmons   Provider Certification General risk, such as traffic hazards, adverse weather conditions, rough terrain or turbulence, possible failure of equipment (including vehicle or aircraft), or consequences of actions of persons outside the control of the transport personnel, Unanticipated needs of medical equipment and personnel during transport, Risk of worsening condition, The possibility of a transport vehicle being unavailable          RN Documentation      Flowsheet Row Most Recent Value   Accepting Facility Name, [facility] [place]    (Name & Tel number) Marisa Jacob          Follow-up Information    None         Discharge Medication List as of 6/7/2024  1:56 PM        CONTINUE these medications which have NOT CHANGED    Details   aspirin (ECOTRIN LOW STRENGTH) 81 mg EC tablet Take 81 mg by mouth daily, Historical Med      atorvastatin (LIPITOR) 80 mg tablet Take 80 mg by mouth daily, Historical Med      bictegravir-emtricitab-tenofovir alafenamide (BIKTARVY) -25 MG tablet Take 1 tablet by mouth daily with breakfast, Historical Med      Cholecalciferol (VITAMIN D3) 47060 units TABS Take 1 tablet by mouth once a week, Historical Med      divalproex sodium (DEPAKOTE ER) 500 mg 24 hr tablet Take 500 mg by mouth daily Taken at 7am, Historical Med      divalproex sodium (DEPAKOTE) 250 mg EC tablet Take 250 mg by mouth daily Taken at 1pm, Historical Med      dolutegravir (TIVICAY) 50 MG TABS Take 1 tablet by mouth daily for 30 days, Starting Tue 7/11/2017, Until Mon 11/6/2017, Print      DULoxetine (CYMBALTA) 60 mg delayed release capsule Take 1 capsule by mouth daily, Starting Tue 7/11/2017, Print      lamoTRIgine (LaMICtal) 25 mg tablet Take 25 mg by mouth daily 7am and 7pm, Historical Med      levOCARNitine (CARNITOR) 330 MG tablet Take 330 mg by mouth 3 (three) times  a day 7am, 1pm, and 7pm, Historical Med      losartan (COZAAR) 100 MG tablet Take 50 mg by mouth daily, Historical Med      Magnesium Oxide (MAG- PO) Take 400 mg by mouth 2 (two) times a day Taken 7am and 7pm, Historical Med      Melatonin 10 MG TBDP Take 1 tablet by mouth daily at bedtime, Historical Med      mirtazapine (REMERON) 15 mg tablet Take 15 mg by mouth daily at bedtime, Historical Med      !! sertraline (ZOLOFT) 25 mg tablet Take 25 mg by mouth daily Take with 50 mg tablet (total 75mg daily) , Historical Med      !! sertraline (ZOLOFT) 50 mg tablet Take 50 mg by mouth daily Take with 25 mg tablet (total 75 mg daily) , Historical Med      tamsulosin (FLOMAX) 0.4 mg Take 0.4 mg by mouth daily with dinner, Historical Med      TRIUMEQ 600- MG per tablet TAKE 1 TABLET BY MOUTH DAILY, Starting Tue 3/20/2018, Normal      zonisamide (ZONEGRAN) 100 mg capsule Take 1 capsule (100 mg total) by mouth daily, Starting Mon 10/28/2019, Normal       !! - Potential duplicate medications found. Please discuss with provider.        No discharge procedures on file.    PDMP Review       None             ED Provider  Attending physically available and evaluated Sunil Patel. I managed the patient along with the ED Attending.    Electronically Signed by           Toni Simmons MD  06/08/24 0534       Toni Simmons MD  06/08/24 2643       Toni Simmons MD  06/08/24 6410

## 2024-06-07 NOTE — CONSULTS
Consultation - Vascular Surgery   Sunil Patel 63 y.o. male MRN: [id number]  Unit/Bed#: ED-06 Encounter: [id number]        Assessment:  Patient is a 63 year old male with PMH of HIV, mood disorder, TBI with ischemic left lower extremity    CTA abdomen with runoff-   Left lower extremity:  Acute arterial occlusion extending from the proximal left external iliac artery through at least the distal superficial femoral artery. Nonopacification of the popliteal artery and tibial vasculature on delayed phase of imaging. Asymmetric enlargement of   the left calf with significant subcutaneous infiltration should be correlated for compartment syndrome.     Right lower extremity:  1.  Age-indeterminate severe narrowing versus focal occlusion involving the P3 segment and tibioperoneal trunk ostia.  2.  Relative abrupt nonopacification of the posterior tibial artery at the level of the ankle.     Filling defect in the left ventricular apex suggests left ventricular thrombus and the source of the embolic disease.     Nonvascular:  Left greater than right renal cortical scarring and atrophy, likely relating to prior embolic disease given the clinical history.      Plan:  - case discussed with Dr. Wright, patient to [facility] for vascular evaluation and definitive treatment  - continue heparin drip  - pain control      _______________________________________________________________  Physician Requesting Consult: Clementina Blankenship DO    HPI: Sunil Patel is a 63 y.o. year old male who presents with left leg pain for at least 3 days.  Patient is a poor historian, some history obtained from corrections officers.  Per corrections officers, patient fell in the shower about 10 days ago.  For the past 3 days, he has been complaining of left leg increasing pain and swelling.  Patient states that he has not been able to ambulate for several days due to his leg pain.  Patient denies previous history of thrombus.  Patient  "states that he has numbness in his left leg below his knee.    Historical Information   Past Medical History:   Diagnosis Date    Anxiety     Depression     HIV disease (HCC)     Substance abuse (HCC)     Suicide attempt (HCC)      Past Surgical History:   Procedure Laterality Date    US GUIDED THYROID BIOPSY  3/15/2022    US GUIDED THYROID BIOPSY  11/26/2019     Social History   Social History     Substance and Sexual Activity   Alcohol Use Yes     Social History     Substance and Sexual Activity   Drug Use Yes    Types: \"Crack\" cocaine, Marijuana     Social History     Tobacco Use   Smoking Status Some Days    Current packs/day: 1.00    Types: Cigarettes   Smokeless Tobacco Never     Family History:   Family History   Problem Relation Age of Onset    Diabetes Mother     Heart attack Mother     Heart attack Father    }    Meds/Allergies   Home meds:   Prior to Admission medications    Medication Sig Start Date End Date Taking? Authorizing Provider   aspirin (ECOTRIN LOW STRENGTH) 81 mg EC tablet Take 81 mg by mouth daily    Historical Provider, MD   atorvastatin (LIPITOR) 80 mg tablet Take 80 mg by mouth daily    Historical Provider, MD   bictegravir-emtricitab-tenofovir alafenamide (BIKTARVY) -25 MG tablet Take 1 tablet by mouth daily with breakfast    Historical Provider, MD   Cholecalciferol (VITAMIN D3) 84857 units TABS Take 1 tablet by mouth once a week    Historical Provider, MD   divalproex sodium (DEPAKOTE ER) 500 mg 24 hr tablet Take 500 mg by mouth daily Taken at 7am    Historical Provider, MD   divalproex sodium (DEPAKOTE) 250 mg EC tablet Take 250 mg by mouth daily Taken at 1pm    Historical Provider, MD   dolutegravir (TIVICAY) 50 MG TABS Take 1 tablet by mouth daily for 30 days 7/11/17 11/6/17  Zeeshan Iverson MD   DULoxetine (CYMBALTA) 60 mg delayed release capsule Take 1 capsule by mouth daily  Patient not taking: Reported on 10/26/2019 7/11/17   Zeeshan Iverson MD   lamoTRIgine (LaMICtal) " 25 mg tablet Take 25 mg by mouth daily 7am and 7pm    Historical Provider, MD   levOCARNitine (CARNITOR) 330 MG tablet Take 330 mg by mouth 3 (three) times a day 7am, 1pm, and 7pm    Historical Provider, MD   losartan (COZAAR) 100 MG tablet Take 50 mg by mouth daily    Historical Provider, MD   Magnesium Oxide (MAG- PO) Take 400 mg by mouth 2 (two) times a day Taken 7am and 7pm    Historical Provider, MD   Melatonin 10 MG TBDP Take 1 tablet by mouth daily at bedtime    Historical Provider, MD   mirtazapine (REMERON) 15 mg tablet Take 15 mg by mouth daily at bedtime    Historical Provider, MD   sertraline (ZOLOFT) 25 mg tablet Take 25 mg by mouth daily Take with 50 mg tablet (total 75mg daily)     Historical Provider, MD   sertraline (ZOLOFT) 50 mg tablet Take 50 mg by mouth daily Take with 25 mg tablet (total 75 mg daily)     Historical Provider, MD   tamsulosin (FLOMAX) 0.4 mg Take 0.4 mg by mouth daily with dinner    Historical Provider, MD   TRIUMEQ 600- MG per tablet TAKE 1 TABLET BY MOUTH DAILY  Patient not taking: Reported on 10/26/2019 3/20/18   Eren Rogel MD   zonisamide (ZONEGRAN) 100 mg capsule Take 1 capsule (100 mg total) by mouth daily 10/28/19   Jacinta Chamorro MD     Scheduled Meds:  Current Facility-Administered Medications   Medication Dose Route Frequency Provider Last Rate    heparin (porcine)  3-30 Units/kg/hr (Order-Specific) Intravenous Titrated Toni Simmons MD 18 Units/kg/hr (06/07/24 1301)    heparin (porcine)  3,400 Units Intravenous Q6H PRN Toni Simmons MD      heparin (porcine)  6,800 Units Intravenous Q6H PRN Toni Simmons MD       Continuous Infusions:heparin (porcine), 3-30 Units/kg/hr (Order-Specific), Last Rate: 18 Units/kg/hr (06/07/24 1301)      PRN Meds:    heparin (porcine)    heparin (porcine)    ALLERGIES:   Allergies   Allergen Reactions    No Active Allergies        Review of Systems:  General: negative  Cardiovascular: no chest pain or dyspnea on  exertion  Respiratory: no cough, shortness of breath, or wheezing  Gastrointestinal: no abdominal pain, change in bowel habits, or black or bloody stools  Genitourinary: no dysuria, trouble voiding, or hematuria  Musculoskeletal: left leg pain and swelling  Neurological: left leg numbness  Hematological and Lymphatic: negative  Dermatological : negative  Psychological: negative  Ophthalmic: negative  ENT: negative      Objective   Vitals:  Blood pressure 133/82, pulse 93, temperature (!) 97.4 °F (36.3 °C), temperature source Oral, resp. rate 18, weight 85.8 kg (189 lb 2.5 oz), SpO2 97%.  Body mass index is 27.14 kg/m².    I/Os:  I/O       None            Invasive Lines/Tubes:  Invasive Devices       Peripheral Intravenous Line  Duration             Peripheral IV 06/07/24 Left Antecubital <1 day    Peripheral IV 06/07/24 Right;Ventral (anterior) Forearm <1 day                    Physical Exam  Vitals reviewed.   Constitutional:       Appearance: He is ill-appearing.   HENT:      Head: Normocephalic and atraumatic.      Right Ear: External ear normal.      Left Ear: External ear normal.      Nose: Nose normal.      Mouth/Throat:      Mouth: Mucous membranes are moist.   Eyes:      Extraocular Movements: Extraocular movements intact.   Cardiovascular:      Rate and Rhythm: Normal rate and regular rhythm.      Pulses: Normal pulses.      Heart sounds: Normal heart sounds.   Pulmonary:      Effort: Pulmonary effort is normal.      Breath sounds: Normal breath sounds.   Abdominal:      Palpations: Abdomen is soft.   Musculoskeletal:      Comments: Left lower extremity with significant swelling below the knee into the foot.  Ecchymosis noted with blistering.  No sensation to light touch below the knee. Sensation intact midthigh.   No active motor function ankle, toes.  Patient able to extend and flex left knee and hip.   Skin:     Capillary Refill: Capillary refill takes less than 2 seconds.   Neurological:      General:  No focal deficit present.      Mental Status: He is alert.   Psychiatric:         Mood and Affect: Mood normal.         Behavior: Behavior normal.         Thought Content: Thought content normal.         Judgment: Judgment normal.         Wound/Incision:      Pulse exam:  Femoral: Right: doppler signal                   Left:  no signal  Popliteal: Right: doppler signal                    Left:  no signal  DP: Right: doppler signal          Left:  no signal  PT: Right: doppler signal         Left:  no signal    Lab Results and Cultures:   COVID:   Last COVID19 Screening Values       None           CBC:   Results from last 7 days   Lab Units 06/07/24  1236   WBC Thousand/uL 19.57*   HEMOGLOBIN g/dL 13.7   HEMATOCRIT % 42.9   PLATELETS Thousands/uL 384     BMP/CMP:  Results from last 7 days   Lab Units 06/07/24  1236   POTASSIUM mmol/L 4.5   CHLORIDE mmol/L 97   CO2 mmol/L 24   BUN mg/dL 58*   CREATININE mg/dL 1.33*   CALCIUM mg/dL 8.8     Coags:   Results from last 7 days   Lab Units 06/07/24  1239   INR  1.08   PTT seconds 28     Lipid panel:     HgbA1c:   Lab Results   Component Value Date    HGBA1C 6.2 (H) 12/05/2023    HGBA1C 6.1 (H) 08/09/2023    HGBA1C 6.3 (H) 04/03/2023    HGBA1C 6.3 (H) 11/28/2022    HGBA1C 5.9 (H) 12/21/2021       Urinalysis:   Lab Results   Component Value Date    COLORU Light Yellow 05/16/2024    COLORU Dk Yellow 12/10/2015    CLARITYU Clear 05/16/2024    CLARITYU Clear 12/10/2015    SPECGRAV 1.045 (H) 05/16/2024    SPECGRAV 1.016 12/10/2015    PHUR 6.0 05/16/2024    PHUR 7.0 07/02/2017    PHUR 6.0 12/10/2015    LEUKOCYTESUR Negative 05/16/2024    LEUKOCYTESUR Negative 12/10/2015    NITRITE Negative 05/16/2024    NITRITE Negative 12/10/2015    PROTEINUA 100 (2+) (A) 12/10/2015    GLUCOSEU Negative 05/16/2024    GLUCOSEU Negative 12/10/2015    KETONESU Negative 05/16/2024    KETONESU Negative 12/10/2015    BILIRUBINUR Negative 05/16/2024    BILIRUBINUR Negative 12/10/2015    BLOODU Trace  "(A) 05/16/2024    BLOODU Negative 12/10/2015   ,   Urine Culture:   Lab Results   Component Value Date    URINECX No Growth <1000 cfu/mL 05/31/2022     Wound Culure: No results found for: \"WOUNDCULT\"  Blood Culture: No results found for: \"BLOODCX\"    Imaging Studies: I have personally reviewed pertinent reports.    EKG, Pathology, and Other Studies: I have personally reviewed pertinent reports.    VTE Prophylaxis: Heparin     Code Status: Prior  Advance Directive and Living Will:      Power of :    POLST:    Bertha Leary PA-C  6/7/2024    "

## 2024-06-07 NOTE — H&P
Mr. Patel is accepted in transfer with an ischemic left leg.  We are currently evaluating him for operative considerations.  The procedure is to be determined.      After assessment of the patient he demonstrates a nonviable left lower leg.  We have shared the unfortunate news that he will require amputation.  With concern for embolic event and more proximal iliac artery occlusion he will also require a left femoral arterial exposure and likely thromboembolectomy in order to adequately heal the above knee amputation site.      I asked the patient if he would like me to discuss his case with any family or loved ones and he replied that he does not want me to communicate with anyone.      Juan Luis Rdz MD

## 2024-06-07 NOTE — ED NOTES
Pt picked up for transport to [facility] OR for Vas Surgery. Report called to Catherine in [facility]. PUMP w/ Heparin sent with pt     Palak Dan RN  06/07/24 7284

## 2024-06-07 NOTE — EMTALA/ACUTE CARE TRANSFER
[facility] EMERG[facility]  [address]  [place] [address]  Dept: [phone]      EMTALA TRANSFER CONSENT    NAME Sunil Patel                                         1960                              MRN [id number]    I have been informed of my rights regarding examination, treatment, and transfer   by Dr. Clementina Blankenship DO    Benefits: Specialized equipment and/or services available at the receiving facility (Include comment)________________________ (Vascular Surgery)    Risks: Potential for delay in receiving treatment, Potential deterioration of medical condition, Loss of IV, Possible worsening of condition or death during transfer, Increased discomfort during transfer      Consent for Transfer:  I acknowledge that my medical condition has been evaluated and explained to me by the emergency department physician or other qualified medical person and/or my attending physician, who has recommended that I be transferred to the service of  Accepting Physician: Dr. Garrido at Accepting Facility Name, [place] & [place] : [facility] [place] The above potential benefits of such transfer, the potential risks associated with such transfer, and the probable risks of not being transferred have been explained to me, and I fully understand them.  The doctor has explained that, in my case, the benefits of transfer outweigh the risks.  I agree to be transferred.    I authorize the performance of emergency medical procedures and treatments upon me in both transit and upon arrival at the receiving facility.  Additionally, I authorize the release of any and all medical records to the receiving facility and request they be transported with me, if possible.  I understand that the safest mode of transportation during a medical emergency is an ambulance and that the Hospital advocates the use of this mode of transport. Risks of traveling to the receiving facility by car, including  absence of medical control, life sustaining equipment, such as oxygen, and medical personnel has been explained to me and I fully understand them.    (NICOLETTE CORRECT BOX BELOW)  [  ]  I consent to the stated transfer and to be transported by ambulance/helicopter.  [  ]  I consent to the stated transfer, but refuse transportation by ambulance and accept full responsibility for my transportation by car.  I understand the risks of non-ambulance transfers and I exonerate the Hospital and its staff from any deterioration in my condition that results from this refusal.    X___________________________________________    DATE  24  TIME________  Signature of patient or legally responsible individual signing on patient behalf           RELATIONSHIP TO PATIENT_________________________          Provider Certification    NAME Sunil Patel                                         1960                              MRN [id number]    A medical screening exam was performed on the above named patient.  Based on the examination:    Condition Necessitating Transfer The encounter diagnosis was Acute lower limb ischemia.    Patient Condition: The patient has been stabilized such that within reasonable medical probability, no material deterioration of the patient condition or the condition of the unborn child(rafa) is likely to result from the transfer    Reason for Transfer: Level of Care needed not available at this facility    Transfer Requirements: Facility [place], [place]   Space available and qualified personnel available for treatment as acknowledged by Marisa Jacob  Agreed to accept transfer and to provide appropriate medical treatment as acknowledged by       Dr. Garrido  Appropriate medical records of the examination and treatment of the patient are provided at the time of transfer   STAFF INITIAL WHEN COMPLETED _______  Transfer will be performed by qualified personnel from    and appropriate  transfer equipment as required, including the use of necessary and appropriate life support measures.    Provider Certification: I have examined the patient and explained the following risks and benefits of being transferred/refusing transfer to the patient/family:  General risk, such as traffic hazards, adverse weather conditions, rough terrain or turbulence, possible failure of equipment (including vehicle or aircraft), or consequences of actions of persons outside the control of the transport personnel, Unanticipated needs of medical equipment and personnel during transport, Risk of worsening condition, The possibility of a transport vehicle being unavailable      Based on these reasonable risks and benefits to the patient and/or the unborn child(rafa), and based upon the information available at the time of the patient’s examination, I certify that the medical benefits reasonably to be expected from the provision of appropriate medical treatments at another medical facility outweigh the increasing risks, if any, to the individual’s medical condition, and in the case of labor to the unborn child, from effecting the transfer.    X____________________________________________ DATE 06/07/24        TIME_______      ORIGINAL - SEND TO MEDICAL RECORDS   COPY - SEND WITH PATIENT DURING TRANSFER

## 2024-06-08 PROBLEM — I99.8 ACUTE LOWER LIMB ISCHEMIA: Status: ACTIVE | Noted: 2024-06-08

## 2024-06-08 PROBLEM — R56.9 SEIZURES (HCC): Status: ACTIVE | Noted: 2024-06-08

## 2024-06-08 PROBLEM — I51.3 LEFT VENTRICULAR THROMBUS: Status: ACTIVE | Noted: 2024-06-08

## 2024-06-08 PROBLEM — Z79.899 MEDICATION MANAGEMENT: Status: ACTIVE | Noted: 2024-06-08

## 2024-06-08 LAB
ANION GAP SERPL CALCULATED.3IONS-SCNC: 9 MMOL/L (ref 4–13)
APTT PPP: 57 SECONDS (ref 23–37)
APTT PPP: 84 SECONDS (ref 23–37)
APTT PPP: 89 SECONDS (ref 23–37)
BUN SERPL-MCNC: 36 MG/DL (ref 5–25)
CALCIUM SERPL-MCNC: 8 MG/DL (ref 8.4–10.2)
CHLORIDE SERPL-SCNC: 103 MMOL/L (ref 96–108)
CO2 SERPL-SCNC: 23 MMOL/L (ref 21–32)
CREAT SERPL-MCNC: 1.09 MG/DL (ref 0.6–1.3)
ERYTHROCYTE [DISTWIDTH] IN BLOOD BY AUTOMATED COUNT: 13.1 % (ref 11.6–15.1)
GFR SERPL CREATININE-BSD FRML MDRD: 72 ML/MIN/1.73SQ M
GLUCOSE SERPL-MCNC: 151 MG/DL (ref 65–140)
HCT VFR BLD AUTO: 35.7 % (ref 36.5–49.3)
HGB BLD-MCNC: 11.1 G/DL (ref 12–17)
MCH RBC QN AUTO: 27.7 PG (ref 26.8–34.3)
MCHC RBC AUTO-ENTMCNC: 31.1 G/DL (ref 31.4–37.4)
MCV RBC AUTO: 89 FL (ref 82–98)
PLATELET # BLD AUTO: 358 THOUSANDS/UL (ref 149–390)
PMV BLD AUTO: 8.5 FL (ref 8.9–12.7)
POTASSIUM SERPL-SCNC: 4.9 MMOL/L (ref 3.5–5.3)
RBC # BLD AUTO: 4.01 MILLION/UL (ref 3.88–5.62)
SODIUM SERPL-SCNC: 135 MMOL/L (ref 135–147)
WBC # BLD AUTO: 18.74 THOUSAND/UL (ref 4.31–10.16)

## 2024-06-08 PROCEDURE — 99222 1ST HOSP IP/OBS MODERATE 55: CPT | Performed by: ANESTHESIOLOGY

## 2024-06-08 PROCEDURE — 80048 BASIC METABOLIC PNL TOTAL CA: CPT | Performed by: SURGERY

## 2024-06-08 PROCEDURE — 85027 COMPLETE CBC AUTOMATED: CPT | Performed by: SURGERY

## 2024-06-08 PROCEDURE — 99222 1ST HOSP IP/OBS MODERATE 55: CPT | Performed by: STUDENT IN AN ORGANIZED HEALTH CARE EDUCATION/TRAINING PROGRAM

## 2024-06-08 PROCEDURE — 85730 THROMBOPLASTIN TIME PARTIAL: CPT | Performed by: SURGERY

## 2024-06-08 PROCEDURE — 99024 POSTOP FOLLOW-UP VISIT: CPT | Performed by: SURGERY

## 2024-06-08 RX ORDER — GABAPENTIN 100 MG/1
100 CAPSULE ORAL 3 TIMES DAILY
Status: DISCONTINUED | OUTPATIENT
Start: 2024-06-08 | End: 2024-07-06 | Stop reason: HOSPADM

## 2024-06-08 RX ORDER — HYDROMORPHONE HCL/PF 1 MG/ML
0.5 SYRINGE (ML) INJECTION
Status: DISCONTINUED | OUTPATIENT
Start: 2024-06-08 | End: 2024-07-06 | Stop reason: HOSPADM

## 2024-06-08 RX ORDER — POLYETHYLENE GLYCOL 3350 17 G/17G
17 POWDER, FOR SOLUTION ORAL DAILY PRN
Status: DISCONTINUED | OUTPATIENT
Start: 2024-06-08 | End: 2024-07-06 | Stop reason: HOSPADM

## 2024-06-08 RX ORDER — SENNOSIDES 8.6 MG
2 TABLET ORAL 2 TIMES DAILY
Status: DISCONTINUED | OUTPATIENT
Start: 2024-06-08 | End: 2024-07-06 | Stop reason: HOSPADM

## 2024-06-08 RX ADMIN — ASPIRIN 81 MG: 81 TABLET, COATED ORAL at 08:57

## 2024-06-08 RX ADMIN — SERTRALINE HYDROCHLORIDE 50 MG: 50 TABLET ORAL at 08:57

## 2024-06-08 RX ADMIN — Medication 6 MG: at 21:26

## 2024-06-08 RX ADMIN — BICTEGRAVIR SODIUM, EMTRICITABINE, AND TENOFOVIR ALAFENAMIDE FUMARATE 1 TABLET: 50; 200; 25 TABLET ORAL at 09:01

## 2024-06-08 RX ADMIN — SERTRALINE HYDROCHLORIDE 25 MG: 50 TABLET ORAL at 08:57

## 2024-06-08 RX ADMIN — DOLUTEGRAVIR SODIUM 50 MG: 50 TABLET, FILM COATED ORAL at 09:00

## 2024-06-08 RX ADMIN — MIRTAZAPINE 15 MG: 15 TABLET, FILM COATED ORAL at 21:26

## 2024-06-08 RX ADMIN — ACETAMINOPHEN 975 MG: 325 TABLET, FILM COATED ORAL at 05:37

## 2024-06-08 RX ADMIN — GABAPENTIN 100 MG: 100 CAPSULE ORAL at 15:39

## 2024-06-08 RX ADMIN — SENNOSIDES 17.2 MG: 8.6 TABLET, FILM COATED ORAL at 17:00

## 2024-06-08 RX ADMIN — ATORVASTATIN CALCIUM 80 MG: 80 TABLET, FILM COATED ORAL at 15:39

## 2024-06-08 RX ADMIN — HYDROMORPHONE HYDROCHLORIDE 0.5 MG: 1 INJECTION, SOLUTION INTRAMUSCULAR; INTRAVENOUS; SUBCUTANEOUS at 02:21

## 2024-06-08 RX ADMIN — GABAPENTIN 100 MG: 100 CAPSULE ORAL at 08:52

## 2024-06-08 RX ADMIN — HEPARIN SODIUM 20 UNITS/KG/HR: 10000 INJECTION, SOLUTION INTRAVENOUS at 13:25

## 2024-06-08 RX ADMIN — TAMSULOSIN HYDROCHLORIDE 0.4 MG: 0.4 CAPSULE ORAL at 15:39

## 2024-06-08 RX ADMIN — SENNOSIDES 8.6 MG: 8.6 TABLET, FILM COATED ORAL at 08:57

## 2024-06-08 RX ADMIN — DIVALPROEX SODIUM 500 MG: 500 TABLET, EXTENDED RELEASE ORAL at 08:57

## 2024-06-08 RX ADMIN — ZONISAMIDE 100 MG: 100 CAPSULE ORAL at 09:00

## 2024-06-08 RX ADMIN — GABAPENTIN 100 MG: 100 CAPSULE ORAL at 21:26

## 2024-06-08 RX ADMIN — DIVALPROEX SODIUM 250 MG: 250 TABLET, DELAYED RELEASE ORAL at 13:24

## 2024-06-08 RX ADMIN — LAMOTRIGINE 25 MG: 25 TABLET ORAL at 08:57

## 2024-06-08 RX ADMIN — ACETAMINOPHEN 975 MG: 325 TABLET, FILM COATED ORAL at 21:26

## 2024-06-08 RX ADMIN — ACETAMINOPHEN 975 MG: 325 TABLET, FILM COATED ORAL at 13:23

## 2024-06-08 RX ADMIN — OXYCODONE HYDROCHLORIDE 10 MG: 10 TABLET ORAL at 13:23

## 2024-06-08 NOTE — ASSESSMENT & PLAN NOTE
Patient with a history of CVA in the L MCA territory in May 2018. Baseline expressive aphasia.  Continue aspirin and statin.

## 2024-06-08 NOTE — ASSESSMENT & PLAN NOTE
S/p left above-knee amputation with left iliofemoral and profunda thromboembolectomies    - Patient states that his pain is well tolerated with his current pain regimen, discussed that we can consider nerve blocks if his pain gets worse    Continue current multimodal pain regimen:   - PO tylenol 975mg Q8H  - Gabapentin 100mg TID Estimated Creatinine Clearance: 72.6 mL/min (by C-G formula based on SCr of 1.09 mg/dL).   - Oxycodone 5/10mg Q4H prn for mod/severe pain  - IV dilaudid 0.5mg Q3H prn for breakthrough pain    - Can consider adding PO robaxin 500mg Q6H for muscle spasms

## 2024-06-08 NOTE — PROGRESS NOTES
Pastoral Care Progress Note    2024  Patient: Sunil Patel : 1960  Admission Date & Time: 2024 1421  MRN: [id number] [facility]: [id number]                     Chaplaincy Interventions Utilized:      24 1300   Clinical Encounter Type   Visited With Patient   Crisis Visit Critical Care     Patient is asking to speak with the care manager. Provided support to the patient and the medical team.

## 2024-06-08 NOTE — ASSESSMENT & PLAN NOTE
Seizure disorder started 07/2019. follows with [facility] neurology.   On last epileptologist notes his regimen is Depakote 1250 daily, Lamictal 50 mg twice daily and zonisamide 400 mg daily.  Currently not receiving any antiepileptic medications at the correctional facility.  Will need to verify dosing with PCP/neurology on Monday.

## 2024-06-08 NOTE — PLAN OF CARE
Problem: PAIN - ADULT  Goal: Verbalizes/displays adequate comfort level or baseline comfort level  Description: Interventions:  - Encourage patient to monitor pain and request assistance  - Assess pain using appropriate pain scale  - Administer analgesics based on type and severity of pain and evaluate response  - Implement non-pharmacological measures as appropriate and evaluate response  - Consider cultural and social influences on pain and pain management  - Notify physician/advanced practitioner if interventions unsuccessful or patient reports new pain  Outcome: Progressing     Problem: INFECTION - ADULT  Goal: Absence or prevention of progression during hospitalization  Description: INTERVENTIONS:  - Assess and monitor for signs and symptoms of infection  - Monitor lab/diagnostic results  - Monitor all insertion sites, i.e. indwelling lines, tubes, and drains  - Monitor endotracheal if appropriate and nasal secretions for changes in amount and color  - [place] appropriate cooling/warming therapies per order  - Administer medications as ordered  - Instruct and encourage patient and family to use good hand hygiene technique  - Identify and instruct in appropriate isolation precautions for identified infection/condition  Outcome: Progressing  Goal: Absence of fever/infection during neutropenic period  Description: INTERVENTIONS:  - Monitor WBC    Outcome: Progressing      Chart/telemetry/device interrogation reviewed. Patient now noted to have slow VT as underlying rhythm disturbance. Will ask interventional cardiology to evaluate for ischemic evaluation. INR is 5.6. Will need to hold coumadin and plan for heparin bridge to perform LHC (if planned). Will give oral vitamin K today x 1 given he has been started on amiodarone. Started amiodarone for now. May require VT ablation in future. Will see in person following completion of procedures at [facility].        Kathryn Wright MD

## 2024-06-08 NOTE — PROGRESS NOTES
Progress Note - Vascular Surgery   Sunil Patel 63 y.o. male [id number]  Unit/Bed#:[facility] 507-01 Encounter: [id number]      Assessment:    63 y.o. with PMH HIV, Mood disorder, TBI presenting with Left LE acute limb ischemia (r3), left ventricular thrombus.     Vascular surgery consulted for left LE acute limb ischemia (R3) with extensive left iliofemoral and left infra-inguinal embolism with non-viable LLE limb.     6/7: Left femoral thrombectomy c AKA    Plan:  - Doing well post-op. Requiring PRN pain medication   - APS Consult  - DC jacklyn/fernandes  - Will require cardioembolic work-up  - Cont Hep gtt  - Diet as tolerated  - Abx: SCIPs  - VTEppx: Hep gtt  - PT/OT  - Stable for downgrade      Subjective:    Pt s/e. No acute events overnight. Pt awake, alert.    Pt complains of pain at incision site. Tolerating diet. Not OOB.      No new complaints. Denies n/v, sob, chest pain, f/c. No sensory change, numbness, or weakness of lower extremities    I/Os:  I/O last 3 completed shifts:  In: 2650 [I.V.:2400; IV Piggyback:250]  Out: 450 [Urine:425; Blood:25]  I/O this shift:  In: 992.6 [P.O.:960; I.V.:32.6]  Out: 1300 [Urine:1300]    Review of Systems   All other systems reviewed and are negative.      Vitals:    06/07/24 2316   BP: 139/70   Pulse: 91   Resp: 20   Temp: 98.2 °F (36.8 °C)   SpO2: 100%        Physical Exam  Vitals and nursing note reviewed.   Constitutional:       General: He is not in acute distress.     Appearance: He is well-developed. He is not diaphoretic.   HENT:      Head: Normocephalic and atraumatic.   Eyes:      Conjunctiva/sclera: Conjunctivae normal.   Cardiovascular:      Rate and Rhythm: Normal rate and regular rhythm.      Heart sounds: No murmur heard.  Pulmonary:      Effort: Pulmonary effort is normal. No respiratory distress.      Breath sounds: Normal breath sounds.   Abdominal:      Palpations: Abdomen is soft.      Tenderness: There is no abdominal tenderness.   Musculoskeletal:          "General: No swelling.      Cervical back: Neck supple.      Comments: Left AKA stump dressing c/d/I  Left groin incision soft, no hematoma, no bleeding    Skin:     General: Skin is warm and dry.      Capillary Refill: Capillary refill takes less than 2 seconds.   Neurological:      General: No focal deficit present.      Mental Status: He is alert and oriented to person, place, and time. Mental status is at baseline.   Psychiatric:         Mood and Affect: Mood normal.         Imaging:I have personally reviewed pertinent reports.          Lab Results and Cultures:   Lab Results   Component Value Date    WBC 18.74 (H) 06/08/2024    HGB 11.1 (L) 06/08/2024    HCT 35.7 (L) 06/08/2024    MCV 89 06/08/2024     06/08/2024     Lab Results   Component Value Date    GLUCOSE 90 10/26/2019    CALCIUM 8.0 (L) 06/08/2024     12/10/2015    K 4.9 06/08/2024    CO2 23 06/08/2024     06/08/2024    BUN 36 (H) 06/08/2024    CREATININE 1.09 06/08/2024     Lab Results   Component Value Date    INR 1.35 (H) 06/07/2024    INR 1.08 06/07/2024    INR 0.98 05/16/2024    PROTIME 16.5 (H) 06/07/2024    PROTIME 14.7 (H) 06/07/2024    PROTIME 13.6 05/16/2024        Blood Culture: No results found for: \"BLOODCX\",   Urinalysis:   Lab Results   Component Value Date    COLORU Light Yellow 05/16/2024    COLORU Dk Yellow 12/10/2015    CLARITYU Clear 05/16/2024    CLARITYU Clear 12/10/2015    SPECGRAV 1.045 (H) 05/16/2024    SPECGRAV 1.016 12/10/2015    PHUR 6.0 05/16/2024    PHUR 7.0 07/02/2017    PHUR 6.0 12/10/2015    LEUKOCYTESUR Negative 05/16/2024    LEUKOCYTESUR Negative 12/10/2015    NITRITE Negative 05/16/2024    NITRITE Negative 12/10/2015    PROTEINUA 100 (2+) (A) 12/10/2015    GLUCOSEU Negative 05/16/2024    GLUCOSEU Negative 12/10/2015    KETONESU Negative 05/16/2024    KETONESU Negative 12/10/2015    BILIRUBINUR Negative 05/16/2024    BILIRUBINUR Negative 12/10/2015    BLOODU Trace (A) 05/16/2024    BLOODU Negative " "12/10/2015   ,   Urine Culture:   Lab Results   Component Value Date    URINECX No Growth <1000 cfu/mL 05/31/2022   ,   Wound Culure: No results found for: \"WOUNDCULT\"    Medications:  Current Facility-Administered Medications   Medication Dose Route Frequency    acetaminophen (TYLENOL) tablet 975 mg  975 mg Oral Q8H DAVINA    aspirin (ECOTRIN LOW STRENGTH) EC tablet 81 mg  81 mg Oral Daily    atorvastatin (LIPITOR) tablet 80 mg  80 mg Oral Daily With Dinner    bictegravir-emtricitab-tenofovir alafenamide (BIKTARVY) -25 MG tablet 1 tablet  1 tablet Oral Daily With Breakfast    divalproex sodium (DEPAKOTE ER) 24 hr tablet 500 mg  500 mg Oral Daily    divalproex sodium (DEPAKOTE) DR tablet 250 mg  250 mg Oral Daily    dolutegravir (TIVICAY) tablet 50 mg  50 mg Oral Daily    heparin (porcine) 25,000 units in 0.45% NaCl 250 mL infusion (premix)  3-30 Units/kg/hr (Order-Specific) Intravenous Titrated    HYDROmorphone (DILAUDID) injection 0.5 mg  0.5 mg Intravenous Q3H PRN    lactated ringers bolus 500 mL  500 mL Intravenous Once PRN    And    lactated ringers bolus 500 mL  500 mL Intravenous Once PRN    lamoTRIgine (LaMICtal) tablet 25 mg  25 mg Oral Daily    melatonin tablet 6 mg  6 mg Oral HS    mirtazapine (REMERON) tablet 15 mg  15 mg Oral HS    ondansetron (ZOFRAN) injection 4 mg  4 mg Intravenous Q6H PRN    oxyCODONE (ROXICODONE) immediate release tablet 10 mg  10 mg Oral Q6H PRN    oxyCODONE (ROXICODONE) IR tablet 5 mg  5 mg Oral Q6H PRN    senna (SENOKOT) tablet 8.6 mg  1 tablet Oral Daily    sertraline (ZOLOFT) tablet 25 mg  25 mg Oral Daily    sertraline (ZOLOFT) tablet 50 mg  50 mg Oral Daily    sodium chloride 0.9 % bolus 500 mL  500 mL Intravenous Once PRN    And    sodium chloride 0.9 % bolus 500 mL  500 mL Intravenous Once PRN    tamsulosin (FLOMAX) capsule 0.4 mg  0.4 mg Oral Daily With Dinner    zonisamide (ZONEGRAN) capsule 100 mg  100 mg Oral Daily       Patient Active Problem List   Diagnosis    " "Bipolar affective disorder (HCC)    Substance abuse (HCC)    Human immunodeficiency virus (HIV) infection (HCC)    History of transient cerebral ischemia    Benign essential hypertension    Positive laboratory testing for human immunodeficiency virus (HCC)    Hypertension    Unspecified vitamin D deficiency    Tobacco abuse    Cerebrovascular accident (CVA) (HCC)       Past Surgical History:   Procedure Laterality Date    US GUIDED THYROID BIOPSY  3/15/2022    US GUIDED THYROID BIOPSY  11/26/2019       Family History   Problem Relation Age of Onset    Diabetes Mother     Heart attack Mother     Heart attack Father        Social History     Socioeconomic History    Marital status: /Civil Union     Spouse name: Not on file    Number of children: Not on file    Years of education: Not on file    Highest education level: Not on file   Occupational History    Not on file   Tobacco Use    Smoking status: Some Days     Current packs/day: 1.00     Types: Cigarettes    Smokeless tobacco: Never   Substance and Sexual Activity    Alcohol use: Yes    Drug use: Yes     Types: \"Crack\" cocaine, Marijuana    Sexual activity: Not Currently   Other Topics Concern    Not on file   Social History Narrative    Not on file     Social Determinants of Health     Financial Resource Strain: Not on file   Food Insecurity: Not on file   Transportation Needs: Not on file   Physical Activity: Not on file   Stress: Not on file   Social Connections: Not on file   Intimate Partner Violence: Not on file   Housing Stability: Not on file       Allergies   Allergen Reactions    No Active Allergies                "

## 2024-06-08 NOTE — CONSULTS
Consultation - Acute Pain Service  Sunil Patel 63 y.o. male MRN: [id number]  Unit/Bed#: [facility] 507-01 Encounter: [id number]               Sunil Patel is a 63 y.o. male PMH HIV, Mood disorder, TBI presenting with Left LE acute limb ischemia and LV thrombus. S/p left above-knee amputation with left iliofemoral and profunda thromboembolectomies on 6/7/24. APS team consulted for management of post op pain.     Patient was seen sitting up in bed, appears comfortable. States that his pain is well controlled with the pain medication, he is more concerned about his limb healing. Discussed that if his pain were to get worse despite current pain regimen, we can plan for nerve blocks to help with post op analgesia. Patient in agreement.     Acute lower limb ischemia  Assessment & Plan  S/p left above-knee amputation with left iliofemoral and profunda thromboembolectomies    - Patient states that his pain is well tolerated with his current pain regimen, discussed that we can consider nerve blocks if his pain gets worse    Continue current multimodal pain regimen:   - PO tylenol 975mg Q8H  - Gabapentin 100mg TID Estimated Creatinine Clearance: 72.6 mL/min (by C-G formula based on SCr of 1.09 mg/dL).   - Oxycodone 5/10mg Q4H prn for mod/severe pain  - IV dilaudid 0.5mg Q3H prn for breakthrough pain    - Can consider adding PO robaxin 500mg Q6H for muscle spasms             APS will sign off at this time. Thank you for the consult. All opioids and other analgesics to be written at discretion of primary team. Please contact Acute Pain Service - via [employer] from [phone] with additional questions or concerns. See [employer] or [employer] for additional contacts and after hours information.    History of Present Illness    Admit Date:  6/7/2024  Hospital Day:  1 day  Primary Service:  Vascular Surgery  Attending Provider:  Juan Luis Multani*  Physician Requesting Consult: Juan Luis Multani*  Reason for Consult /  "Principal Problem: Post op pain   HPI: See above     Current pain location(s): Pain Score: 8  Pain Location/Orientation: Orientation: Left, Location: Leg  Pain Scale: Pain Assessment Tool: 0-10  Current Analgesic regimen:  Tylenol, iv dilaudid prn, oxycodone prn, gabapentin     Pain History: None   Pain Management Physician:  N/A    I have reviewed the patient's controlled substance dispensing history in the Prescription Drug Monitoring Program in compliance with the [facility] regulations before prescribing any controlled substances.     Inpatient consult to Acute Pain Service  Consult performed by: Krishna Andersen MD  Consult ordered by: Iliana Cuevas PA-C          Review of Systems    Historical Information   Past Medical History:   Diagnosis Date    Anxiety     Depression     HIV disease (HCC)     Substance abuse (HCC)     Suicide attempt (HCC)      Past Surgical History:   Procedure Laterality Date    US GUIDED THYROID BIOPSY  3/15/2022    US GUIDED THYROID BIOPSY  11/26/2019     Social History   Social History     Substance and Sexual Activity   Alcohol Use Yes     Social History     Substance and Sexual Activity   Drug Use Yes    Types: \"Crack\" cocaine, Marijuana     Social History     Tobacco Use   Smoking Status Some Days    Current packs/day: 1.00    Types: Cigarettes   Smokeless Tobacco Never     Family History: non-contributory    Meds/Allergies   all current active meds have been reviewed    Allergies   Allergen Reactions    No Active Allergies        Objective   Vitals:    06/08/24 0500 06/08/24 1053 06/08/24 1053 06/08/24 1500   BP: 118/60   127/77   BP Location: Left arm      Pulse: 83 85  97   Resp: 20 20 20 18   Temp:   98.6 °F (37 °C) 98.7 °F (37.1 °C)   TempSrc:  Oral  Oral   SpO2:  96%  96%         Intake/Output Summary (Last 24 hours) at 6/8/2024 1632  Last data filed at 6/8/2024 0042  Gross per 24 hour   Intake 2642.64 ml   Output 1750 ml   Net 892.64 ml       Physical Exam    Lab " Results:  Estimated Creatinine Clearance: 72.6 mL/min (by C-G formula based on SCr of 1.09 mg/dL).  Lab Results   Component Value Date    WBC 18.74 (H) 06/08/2024    WBC 4.43 12/10/2015    HGB 11.1 (L) 06/08/2024    HGB 15.3 12/10/2015    HCT 35.7 (L) 06/08/2024    HCT 47.3 12/10/2015     06/08/2024     12/10/2015         Component Value Date/Time     12/10/2015 0837    K 4.9 06/08/2024 0512    K 4.9 02/04/2024 1908     06/08/2024 0512     02/04/2024 1908    CO2 23 06/08/2024 0512    CO2 26 02/04/2024 1908    BUN 36 (H) 06/08/2024 0512    BUN 15 02/04/2024 1908    CREATININE 1.09 06/08/2024 0512    CREATININE 1.25 02/04/2024 1908         Component Value Date/Time    CALCIUM 8.0 (L) 06/08/2024 0512    CALCIUM 10.2 (H) 02/04/2024 1908    ALKPHOS 104 06/07/2024 1236    ALKPHOS 90 02/04/2024 1908     (H) 06/07/2024 1236    AST 20 02/04/2024 1908     (H) 06/07/2024 1236    ALT 19 02/04/2024 1908    BILITOT 0.38 12/10/2015 0837    TP 8.0 06/07/2024 1236    TP 8.3 02/04/2024 1908    ALB 3.3 (L) 06/07/2024 1236    ALB 4.3 02/04/2024 1908       Imaging Studies/EKG: I have personally reviewed pertinent reports.      Counseling / Coordination of Care  Total floor / unit time spent today 60 minutes. Greater than 50% of total time was spent with the patient and / or family counseling and / or coordination of care.     Please note that the APS provides consultative services regarding pain management only.  With the exception of ketamine and epidural infusions and except when indicated, final decisions regarding starting or changing doses of analgesic medications are at the discretion of the consulting service.  Krishna Andersen MD  Acute Pain Service

## 2024-06-08 NOTE — CASE MANAGEMENT
Case Management Assessment & Discharge Planning Note    Patient name Sunil Patel  Location [facility] 507/[facility] 507-01 MRN [id number]  : 1960 Date 2024       Current Admission Date: 2024  Current Admission Diagnosis:  Patient Active Problem List    Diagnosis Date Noted Date Diagnosed    Tobacco abuse 10/26/2019     Cerebrovascular accident (CVA) ([facility]) 10/26/2019     Positive laboratory testing for human immunodeficiency virus (HCC) 2017     Bipolar affective disorder (HCC) 2017     Hypertension 2017     Human immunodeficiency virus (HIV) infection ([facility]) 2017     History of transient cerebral ischemia 2017     Substance abuse ([facility]) 2013     Unspecified vitamin D deficiency 2010     Benign essential hypertension 2010       LOS (days): 1  Geometric Mean LOS (GMLOS) (days):   Days to GMLOS:     OBJECTIVE:    Risk of Unplanned Readmission Score: 28.97         Current admission status: Inpatient       Preferred Pharmacy:   [facility] [place] [place] [facility] [address]  [facility] [place] [address]  Phone: [phone] Fax: [phone]    Primary Care Provider: CHARLIE Trevino    Primary Insurance: MEDICARE  Secondary Insurance: correction    ASSESSMENT:  Active Health Care Proxies    There are no active Health Care Proxies on file.                 Readmission Root Cause  30 Day Readmission: No    Patient Information  Admitted from:: Other (comment) ([facility])  Mental Status: Confused  During Assessment patient was accompanied by: Other-Comment ([facility] guards)  Assessment information provided by:: Other - please comment (chart review)  Primary Caregiver: Self  Support Systems: Son  What city do you live in?: [place]  Home entry access options. Select all that apply.: Other access (Comment) ([facility])  Type of Current Residence: Other (Comment) ([facility])  Living Arrangements: Other  "(Comment) (inmate at [facility])  Is patient a ?: No    Activities of Daily Living Prior to Admission  Functional Status: Assistance  Completes ADLs independently?: No  Level of ADL dependence: Assistance  Ambulates independently?: Yes  Does patient use assisted devices?: No  Does patient currently own DME?: No  Does patient have a history of Outpatient Therapy (PT/OT)?: No  Does the patient have a history of Short-Term Rehab?: No  Does patient have a history of HHC?: No  Does patient currently have HHC?: No         Patient Information Continued  Income Source: Unemployed  Does patient have prescription coverage?: Yes  Does patient receive dialysis treatments?: No  Does patient have a history of substance abuse?: Yes  Historical substance use preference: \"Crack\" cocaine  History of Withdrawal Symptoms: Seizures  Is patient currently in treatment for substance abuse?: N/A - sober  Does patient have a history of Mental Health Diagnosis?: Yes (mood disorder, anxiety)  Is patient receiving treatment for mental health?: Yes (prescribed psych meds)  Has patient received inpatient treatment related to mental health in the last 2 years?: No         Means of Transportation  Means of Transport to Appts:: Other (Comment) ([facility] inmate)      Social Determinants of Health (SDOH)      Flowsheet Row Most Recent Value   Housing Stability    In the last 12 months, was there a time when you were not able to pay the mortgage or rent on time? Pt Unable   In the past 12 months, how many times have you moved where you were living? 0   At any time in the past 12 months, were you homeless or living in a shelter (including now)? Pt Unable   Transportation Needs    In the past 12 months, has lack of transportation kept you from medical appointments or from getting medications? Pt Unable   In the past 12 months, has lack of transportation kept you from meetings, work, or from getting things needed for daily living? Pt " Unable   Food Insecurity    Within the past 12 months, you worried that your food would run out before you got the money to buy more. Pt Unable   Within the past 12 months, the food you bought just didn't last and you didn't have money to get more. Pt Unable   Utilities    In the past 12 months has the electric, gas, oil, or water company threatened to shut off services in your home? Pt Unable            DISCHARGE DETAILS:       Additional Comments: SW resident completed chart review. Pt currently inmate in assisted.

## 2024-06-08 NOTE — QUICK NOTE
Post Op Check Note - Vascular Surgery  Sunil Patel 63 y.o. male MRN: [id number]  Unit/Bed#: [facility] 507-01 Encounter: [id number]    ASSESSMENT:  Sunil Patel is a 63 y.o. male who is status post thromboembolectomy of L iliac, profunda and SFA, L AKA.  Restart hep gtt at 10:30, pain control.  APS consult.    Subjective: Patient complains of sharp, shooting pain.    Physical Exam:  GEN: NAD  CV: RRR  Lung: Normal effort  Ab: Soft, NT/ND  Neuro: A+Ox3  Incisions: dressing c/d/i    Blood pressure 159/96, pulse 87, temperature 97.6 °F (36.4 °C), temperature source Skin, resp. rate 20, SpO2 94%.,There is no height or weight on file to calculate BMI.      Intake/Output Summary (Last 24 hours) at 6/7/2024 2232  Last data filed at 6/7/2024 1942  Gross per 24 hour   Intake 2650 ml   Output 1050 ml   Net 1600 ml       Invasive Devices       Peripheral Intravenous Line  Duration             Peripheral IV 06/07/24 Left Antecubital <1 day    Peripheral IV 06/07/24 Right;Ventral (anterior) Forearm <1 day              Drain  Duration             Urethral Catheter Latex 16 Fr. <1 day                    VTE Pharmacologic Prophylaxis: Reason for no pharmacologic prophylaxis hep gtt

## 2024-06-09 PROBLEM — E71.40 CARNITINE DEFICIENCY (HCC): Status: ACTIVE | Noted: 2024-06-09

## 2024-06-09 LAB
APTT PPP: 90 SECONDS (ref 23–37)
APTT PPP: 91 SECONDS (ref 23–37)
APTT PPP: 99 SECONDS (ref 23–37)
BASOPHILS # BLD AUTO: 0.03 THOUSANDS/ÂΜL (ref 0–0.1)
BASOPHILS NFR BLD AUTO: 0 % (ref 0–1)
EOSINOPHIL # BLD AUTO: 0.12 THOUSAND/ÂΜL (ref 0–0.61)
EOSINOPHIL NFR BLD AUTO: 1 % (ref 0–6)
ERYTHROCYTE [DISTWIDTH] IN BLOOD BY AUTOMATED COUNT: 13.3 % (ref 11.6–15.1)
HCT VFR BLD AUTO: 35.2 % (ref 36.5–49.3)
HGB BLD-MCNC: 10.9 G/DL (ref 12–17)
IMM GRANULOCYTES # BLD AUTO: 0.17 THOUSAND/UL (ref 0–0.2)
IMM GRANULOCYTES NFR BLD AUTO: 1 % (ref 0–2)
LYMPHOCYTES # BLD AUTO: 2.13 THOUSANDS/ÂΜL (ref 0.6–4.47)
LYMPHOCYTES NFR BLD AUTO: 18 % (ref 14–44)
MCH RBC QN AUTO: 28.2 PG (ref 26.8–34.3)
MCHC RBC AUTO-ENTMCNC: 31 G/DL (ref 31.4–37.4)
MCV RBC AUTO: 91 FL (ref 82–98)
MONOCYTES # BLD AUTO: 0.69 THOUSAND/ÂΜL (ref 0.17–1.22)
MONOCYTES NFR BLD AUTO: 6 % (ref 4–12)
NEUTROPHILS # BLD AUTO: 8.93 THOUSANDS/ÂΜL (ref 1.85–7.62)
NEUTS SEG NFR BLD AUTO: 74 % (ref 43–75)
NRBC BLD AUTO-RTO: 0 /100 WBCS
PLATELET # BLD AUTO: 373 THOUSANDS/UL (ref 149–390)
PMV BLD AUTO: 8.5 FL (ref 8.9–12.7)
RBC # BLD AUTO: 3.87 MILLION/UL (ref 3.88–5.62)
WBC # BLD AUTO: 12.07 THOUSAND/UL (ref 4.31–10.16)

## 2024-06-09 PROCEDURE — 85730 THROMBOPLASTIN TIME PARTIAL: CPT | Performed by: SURGERY

## 2024-06-09 PROCEDURE — 99024 POSTOP FOLLOW-UP VISIT: CPT | Performed by: SURGERY

## 2024-06-09 PROCEDURE — 99232 SBSQ HOSP IP/OBS MODERATE 35: CPT | Performed by: INTERNAL MEDICINE

## 2024-06-09 PROCEDURE — 85025 COMPLETE CBC W/AUTO DIFF WBC: CPT | Performed by: STUDENT IN AN ORGANIZED HEALTH CARE EDUCATION/TRAINING PROGRAM

## 2024-06-09 PROCEDURE — 99222 1ST HOSP IP/OBS MODERATE 55: CPT | Performed by: INTERNAL MEDICINE

## 2024-06-09 PROCEDURE — 85730 THROMBOPLASTIN TIME PARTIAL: CPT | Performed by: INTERNAL MEDICINE

## 2024-06-09 RX ORDER — ZONISAMIDE 100 MG/1
400 CAPSULE ORAL DAILY
Status: DISCONTINUED | OUTPATIENT
Start: 2024-06-10 | End: 2024-07-06 | Stop reason: HOSPADM

## 2024-06-09 RX ORDER — DIVALPROEX SODIUM 500 MG/1
500 TABLET, DELAYED RELEASE ORAL ONCE
Status: COMPLETED | OUTPATIENT
Start: 2024-06-09 | End: 2024-06-09

## 2024-06-09 RX ORDER — DIPHENHYDRAMINE HCL 25 MG
25 TABLET ORAL EVERY 6 HOURS PRN
Status: DISCONTINUED | OUTPATIENT
Start: 2024-06-09 | End: 2024-07-06 | Stop reason: HOSPADM

## 2024-06-09 RX ORDER — LAMOTRIGINE 25 MG/1
50 TABLET ORAL 2 TIMES DAILY
Status: DISCONTINUED | OUTPATIENT
Start: 2024-06-09 | End: 2024-07-06 | Stop reason: HOSPADM

## 2024-06-09 RX ORDER — LOSARTAN POTASSIUM 50 MG/1
50 TABLET ORAL DAILY
Status: DISCONTINUED | OUTPATIENT
Start: 2024-06-10 | End: 2024-07-06 | Stop reason: HOSPADM

## 2024-06-09 RX ORDER — LEVOCARNITINE 1 G/10ML
1000 SOLUTION ORAL
Status: DISCONTINUED | OUTPATIENT
Start: 2024-06-09 | End: 2024-07-06 | Stop reason: HOSPADM

## 2024-06-09 RX ADMIN — DIPHENHYDRAMINE HCL 25 MG: 25 TABLET ORAL at 22:59

## 2024-06-09 RX ADMIN — LAMOTRIGINE 50 MG: 25 TABLET ORAL at 17:06

## 2024-06-09 RX ADMIN — TAMSULOSIN HYDROCHLORIDE 0.4 MG: 0.4 CAPSULE ORAL at 17:06

## 2024-06-09 RX ADMIN — LAMOTRIGINE 25 MG: 25 TABLET ORAL at 08:01

## 2024-06-09 RX ADMIN — SENNOSIDES 17.2 MG: 8.6 TABLET, FILM COATED ORAL at 17:06

## 2024-06-09 RX ADMIN — HEPARIN SODIUM 20 UNITS/KG/HR: 10000 INJECTION, SOLUTION INTRAVENOUS at 04:56

## 2024-06-09 RX ADMIN — SENNOSIDES 17.2 MG: 8.6 TABLET, FILM COATED ORAL at 08:02

## 2024-06-09 RX ADMIN — GABAPENTIN 100 MG: 100 CAPSULE ORAL at 15:01

## 2024-06-09 RX ADMIN — DOLUTEGRAVIR SODIUM 50 MG: 50 TABLET, FILM COATED ORAL at 08:02

## 2024-06-09 RX ADMIN — DIVALPROEX SODIUM 500 MG: 500 TABLET, DELAYED RELEASE ORAL at 15:01

## 2024-06-09 RX ADMIN — Medication 6 MG: at 21:27

## 2024-06-09 RX ADMIN — ACETAMINOPHEN 975 MG: 325 TABLET, FILM COATED ORAL at 13:36

## 2024-06-09 RX ADMIN — ACETAMINOPHEN 975 MG: 325 TABLET, FILM COATED ORAL at 21:27

## 2024-06-09 RX ADMIN — ATORVASTATIN CALCIUM 80 MG: 80 TABLET, FILM COATED ORAL at 17:06

## 2024-06-09 RX ADMIN — LEVOCARNITINE 330 MG: 1 SOLUTION ORAL at 21:27

## 2024-06-09 RX ADMIN — ACETAMINOPHEN 975 MG: 325 TABLET, FILM COATED ORAL at 05:07

## 2024-06-09 RX ADMIN — DIVALPROEX SODIUM 500 MG: 500 TABLET, EXTENDED RELEASE ORAL at 08:01

## 2024-06-09 RX ADMIN — BICTEGRAVIR SODIUM, EMTRICITABINE, AND TENOFOVIR ALAFENAMIDE FUMARATE 1 TABLET: 50; 200; 25 TABLET ORAL at 08:03

## 2024-06-09 RX ADMIN — ZONISAMIDE 100 MG: 100 CAPSULE ORAL at 08:02

## 2024-06-09 RX ADMIN — MIRTAZAPINE 15 MG: 15 TABLET, FILM COATED ORAL at 21:27

## 2024-06-09 RX ADMIN — SERTRALINE HYDROCHLORIDE 25 MG: 50 TABLET ORAL at 08:01

## 2024-06-09 RX ADMIN — DIPHENHYDRAMINE HCL 25 MG: 25 TABLET ORAL at 04:33

## 2024-06-09 RX ADMIN — SERTRALINE HYDROCHLORIDE 50 MG: 50 TABLET ORAL at 08:01

## 2024-06-09 RX ADMIN — GABAPENTIN 100 MG: 100 CAPSULE ORAL at 08:02

## 2024-06-09 RX ADMIN — ASPIRIN 81 MG: 81 TABLET, COATED ORAL at 08:01

## 2024-06-09 RX ADMIN — LEVOCARNITINE 330 MG: 1 SOLUTION ORAL at 15:01

## 2024-06-09 RX ADMIN — HEPARIN SODIUM 18 UNITS/KG/HR: 10000 INJECTION, SOLUTION INTRAVENOUS at 21:32

## 2024-06-09 RX ADMIN — GABAPENTIN 100 MG: 100 CAPSULE ORAL at 21:27

## 2024-06-09 RX ADMIN — OXYCODONE HYDROCHLORIDE 10 MG: 10 TABLET ORAL at 15:04

## 2024-06-09 NOTE — ASSESSMENT & PLAN NOTE
Patient presented with left lower extremity acute limb ischemia with extensive left iliofemoral and left infrainguinal embolism with nonviable left lower extremity  Status post left femoral thrombectomy and AKA on 6/7  States postoperative pain is relatively controlled - pain management service following  PT/OT evaluation   Further management per vascular surgery

## 2024-06-09 NOTE — ASSESSMENT & PLAN NOTE
"Per medical provider on 6/8:  \"Patient is currently incarcerated. I called the facility at [phone] and spoke to the GUTIERREZ Cid.  No provider is available over the weekend. Patient was admitted to the facility on 5/9.  Prior to that he was homeless. He is a poor historian and told the facility that he is only on Biktarvy. Facility currently have him on Biktarvy, Cabenuva injection, and Tylenol.  They have no records of his medical history or med list. Per chart review, patient used to live in a group home and appears to have been receiving 24/7 care. Will need to verify patient's medication list with PCP, neurology office or pharmacy on Monday, 6/10. Staff at the correctional facility recommended to call on Monday to speak with evgeny medical provider.\"  "

## 2024-06-09 NOTE — ASSESSMENT & PLAN NOTE
Filling defect in the left ventricular apex suggests left ventricular thrombus and the source of the embolic disease  Remains on a heparin drip for anticoagulation  Echocardiogram pending  Await cardiology input

## 2024-06-09 NOTE — ASSESSMENT & PLAN NOTE
On Biktarvy daily and Cabenuva monthly injections per facility records.  Will need to verify with patient's PCP office on Monday.

## 2024-06-09 NOTE — ASSESSMENT & PLAN NOTE
Filling defect in the left ventricular apex suggests left ventricular thrombus and the source of the embolic disease.   Echocardiogram pending.  Cardiology consulted.  Presently on heparin drip.

## 2024-06-09 NOTE — PROGRESS NOTES
[facility]  Progress Note  Name: Sunil Patel I  MRN: [id number]  Unit/Bed#: [facility] 507-01 I Date of Admission: 6/7/2024   Date of Service: 6/9/2024 I Hospital Day: 2      Assessment & Plan:    * Acute lower limb ischemia  Assessment & Plan  Patient presented with left lower extremity acute limb ischemia with extensive left iliofemoral and left infrainguinal embolism with nonviable left lower extremity  Status post left femoral thrombectomy and AKA on 6/7  States postoperative pain is relatively controlled - pain management service following  PT/OT evaluation   Further management per vascular surgery    Benign essential hypertension  Assessment & Plan  Blood pressures relatively stable/improved -> resume home Cozaar    Cerebrovascular accident (CVA) (Formerly [facility])  Assessment & Plan  History of CVA in the left MCA territory in May 2018 -  residual expressive aphasia  Continue ASA/statin    Human immunodeficiency virus (HIV) infection (Formerly [facility])  Assessment & Plan  Continue Biktarvy/Tivicay daily and Cabenuva monthly injections (per facility records)  Will need to verify with patient's PCP office on Monday, 6/10    Left ventricular thrombus  Assessment & Plan  Filling defect in the left ventricular apex suggests left ventricular thrombus and the source of the embolic disease  Remains on a heparin drip for anticoagulation  Echocardiogram pending  Await cardiology input    Seizures (Formerly [facility])  Assessment & Plan  Diagnosed in July 2019 - follows with [facility] neurology  Continue Depakote/Lamictal/Zonisamide/Gabapentin -> doses now adjusted per most recent outpatient neurology changes on record -> Depakote 1250 mg daily, Zonisamide 400 mg daily, and Lamictal 50 mg BID   Unfortunately patient is currently incarcerated and was homeless beforehand, hence, will need to verify accurate medication regimen with medical provider at correctional facility on Monday, 6/10 -> medical provider yesterday spoke with  "medical assistant at facility, who recommended to do so    Carnitine deficiency (HCC)  Assessment & Plan  Continue Levocarnitine replacement therapy TID    Medication management  Assessment & Plan  Per medical provider on 6/8:  \"Patient is currently incarcerated. I called the facility at [phone] and spoke to the GUTIERREZ Cid.  No provider is available over the weekend. Patient was admitted to the facility on 5/9.  Prior to that he was homeless. He is a poor historian and told the facility that he is only on Biktarvy. Facility currently have him on Biktarvy, Cabenuva injection, and Tylenol.  They have no records of his medical history or med list. Per chart review, patient used to live in a group home and appears to have been receiving 24/7 care. Will need to verify patient's medication list with PCP, neurology office or pharmacy on Monday, 6/10. Staff at the correctional facility recommended to call on Monday to speak with evgeny medical provider.\"      DVT Prophylaxis:  Heparin drip    AM-PAC Basic Mobility:  Basic Mobility Inpatient Raw Score: 13  -HLM Achieved: 2: Bed activities/Dependent transfer  -HL Goal: 4: Move to chair/commode    Patient Centered Rounds:  I have performed bedside rounds and discussed plan of care with nursing today.  Discussions with Specialists or Other Care Team Provider:  see above assessments if applicable    Education and Discussions with Family / Patient:  Patient at bedside, along with correctional officers    Time Spent for Care:  35 minutes. More than 50% of total time spent on counseling and coordination of care, on one or more of the following: performing physical exam; counseling and coordination of care, obtaining or reviewing history, documenting in the medical record, reviewing/ordering tests/medications/procedures, and communicating with other healthcare professionals.    Current Length of Stay: 2 day(s)  Current Patient Status: Inpatient   Certification Statement:  " Patient will continue to require additional hospital stay due to assessments as noted above.    Code Status: Level 1 - Full Code        Subjective:     Encountered earlier today.  States postoperative pain is relatively controlled at this time.  Other than some mild weakness/fatigue, denies new complaints at this time.         Objective:     Vitals:   Temp (24hrs), Av °F (37.2 °C), Min:98.7 °F (37.1 °C), Max:99.3 °F (37.4 °C)    Temp:  [98.7 °F (37.1 °C)-99.3 °F (37.4 °C)] 98.9 °F (37.2 °C)  HR:  [90-97] 90  Resp:  [18] 18  BP: (115-127)/(63-77) 127/77  SpO2:  [93 %-96 %] 93 %  There is no height or weight on file to calculate BMI.     Input and Output Summary (last 24 hours):       Intake/Output Summary (Last 24 hours) at 2024 1333  Last data filed at 2024 0456  Gross per 24 hour   Intake 960 ml   Output --   Net 960 ml       Physical Exam:     GENERAL Weak/fatigued   HEAD   Normocephalic - atraumatic   EYES Nonicteric   MOUTH   Mucosa moist   NECK   Supple - full range of motion   CARDIAC Rate controlled   PULMONARY    Clear breath sounds bilaterally - nonlabored respirations   ABDOMEN Currently nontender/nondistended   MUSCULOSKELETAL   Motor strength/range of motion deconditioned status post left AKA with stump dressed/wrapped   NEUROLOGIC   Alert/oriented at baseline   PSYCHIATRIC   Mood/affect stable         Additional Data:     Labs & Recent Cultures:    Results from last 7 days   Lab Units 24  0453   WBC Thousand/uL 12.07*   HEMOGLOBIN g/dL 10.9*   HEMATOCRIT % 35.2*   PLATELETS Thousands/uL 373   SEGS PCT % 74   LYMPHO PCT % 18   MONO PCT % 6   EOS PCT % 1     Results from last 7 days   Lab Units 24  0512 24  1236   POTASSIUM mmol/L 4.9 4.5   CHLORIDE mmol/L 103 97   CO2 mmol/L 23 24   BUN mg/dL 36* 58*   CREATININE mg/dL 1.09 1.33*   CALCIUM mg/dL 8.0* 8.8   ALK PHOS U/L  --  104   ALT U/L  --  209*   AST U/L  --  187*     Results from last 7 days   Lab Units 24  4957    INR  1.35*             Results from last 7 days   Lab Units 06/07/24  1239   LACTIC ACID mmol/L 2.2*                 Lines/Drains:  Invasive Devices       Peripheral Intravenous Line  Duration             Peripheral IV 06/07/24 Left Antecubital 2 days    Peripheral IV 06/07/24 Right;Ventral (anterior) Forearm 2 days                      Last 24 Hours Medication List:   Current Facility-Administered Medications   Medication Dose Route Frequency Provider Last Rate    acetaminophen  975 mg Oral Q8H DAVINA Karen Guerrero PA-C      aspirin  81 mg Oral Daily Karen Guerrero PA-C      atorvastatin  80 mg Oral Daily With Dinner Karen Guerrero PA-C      bictegravir-emtricitab-tenofovir alafenamide  1 tablet Oral Daily With Breakfast Karen Guerrero PA-C      diphenhydrAMINE  25 mg Oral Q6H PRN Iliana Cuevas PA-C      [START ON 6/10/2024] divalproex sodium  1,250 mg Oral Daily Ida Hodges MD      dolutegravir  50 mg Oral Daily Karen Guerrero PA-C      gabapentin  100 mg Oral TID Karen Guerrero PA-C      heparin (porcine)  3-30 Units/kg/hr (Order-Specific) Intravenous Titrated Karen Guerrero PA-C 18 Units/kg/hr (06/09/24 0548)    HYDROmorphone  0.5 mg Intravenous Q3H PRN Karen Guerrero PA-C      lamoTRIgine  50 mg Oral BID Ida Hodges MD      levOCARNitine  1,000 mg/day Oral TID With Meals Ida Hodges MD      [START ON 6/10/2024] losartan  50 mg Oral Daily Ida Hodges MD      melatonin  6 mg Oral HS Karen Guerrero PA-C      mirtazapine  15 mg Oral HS Karen Guerrero PA-C      ondansetron  4 mg Intravenous Q6H PRN Karen Guerrero PA-C      oxyCODONE  10 mg Oral Q6H PRN Karen Guerrero PA-C      oxyCODONE  5 mg Oral Q6H PRN Karen Guerrero PA-C      polyethylene glycol  17 g Oral Daily PRN Shruti Correa MD      senna  2 tablet Oral BID Shruti Correa MD      sertraline  25 mg Oral Daily Karen Mauro Beers, PA-C      sertraline  50 mg Oral Daily Karen Guerrero PA-C       tamsulosin  0.4 mg Oral Daily With Dinner Karen Guerrero PA-C      [START ON 6/10/2024] zonisamide  400 mg Oral Daily MD CLOTILDE Venegas MD   Hospitalist - [facility]        ** Please Note: This note is constructed using a voice recognition dictation system.  An occasional wrong word/phrase or “sound-a-like” substitution may have been picked up by dictation device due to the inherent limitations of voice recognition software.  Read the chart carefully and recognize, using reasonable context, where substitutions may have occurred.**

## 2024-06-09 NOTE — ASSESSMENT & PLAN NOTE
Patient is currently incarcerated.  I called the facility at [phone] and spoke to the GUTIERREZ Cid.  No provider is available over the weekend.  Patient was admitted to the facility on 5/9.  Prior to that he was homeless.  He is a poor historian and told the facility that he is only on Biktarvy.  Facility currently have him on Biktarvy, Cabenuva injection and Tylenol.  They have no records of his medical history or med list.  Per chart review, patient used to live in a group home and appears to have been receiving 24/7 care.  Will need to verify patient's medication list with PCP, neurology office or pharmacy on Monday.  Staff at the correctional facility recommended to call on Monday to speak with [facility] medical provider.

## 2024-06-09 NOTE — QUICK NOTE
Vascular surgery team requesting transferring patient to [facility] service.  Patient is accepted, will take over as primary tomorrow 6/9.

## 2024-06-09 NOTE — PLAN OF CARE
Problem: PAIN - ADULT  Goal: Verbalizes/displays adequate comfort level or baseline comfort level  Description: Interventions:  - Encourage patient to monitor pain and request assistance  - Assess pain using appropriate pain scale  - Administer analgesics based on type and severity of pain and evaluate response  - Implement non-pharmacological measures as appropriate and evaluate response  - Consider cultural and social influences on pain and pain management  - Notify physician/advanced practitioner if interventions unsuccessful or patient reports new pain  Outcome: Progressing     Problem: INFECTION - ADULT  Goal: Absence or prevention of progression during hospitalization  Description: INTERVENTIONS:  - Assess and monitor for signs and symptoms of infection  - Monitor lab/diagnostic results  - Monitor all insertion sites, i.e. indwelling lines, tubes, and drains  - Monitor endotracheal if appropriate and nasal secretions for changes in amount and color  - [place] appropriate cooling/warming therapies per order  - Administer medications as ordered  - Instruct and encourage patient and family to use good hand hygiene technique  - Identify and instruct in appropriate isolation precautions for identified infection/condition  Outcome: Progressing  Goal: Absence of fever/infection during neutropenic period  Description: INTERVENTIONS:  - Monitor WBC    Outcome: Progressing

## 2024-06-09 NOTE — ASSESSMENT & PLAN NOTE
Diagnosed in July 2019 - follows with [facility] neurology  Continue Depakote/Lamictal/Zonisamide/Gabapentin -> doses now adjusted per most recent outpatient neurology changes on record -> Depakote 1250 mg daily, Zonisamide 400 mg daily, and Lamictal 50 mg BID   Unfortunately patient is currently incarcerated and was homeless beforehand, hence, will need to verify accurate medication regimen with medical provider at correctional facility on Monday, 6/10 -> medical provider yesterday spoke with medical assistant at facility, who recommended to do so

## 2024-06-09 NOTE — PROGRESS NOTES
Progress Note - Vascular Surgery   Sunil Patel 63 y.o. male [id number]  Unit/Bed#:[facility] 507-01 Encounter: [id number]      Assessment:    63 y.o. with PMH HIV, Mood disorder, TBI presenting with Left LE acute limb ischemia (r3), left ventricular thrombus.     Vascular surgery consulted for left LE acute limb ischemia (R3) with extensive left iliofemoral and left infra-inguinal embolism with non-viable LLE limb.     6/7: Left femoral thrombectomy c AKA    Plan:  - Doing well post-op  - APS recs for pain control  - Will require cardioembolic work-up; recommend cards work-up (ECHO, CTA Chest prior to DC)  - Cont Hep gtt  - Cont NV Chks  - Stump check 6/10  - Diet as tolerated  - Abx: none  - VTEppx: Hep gtt  - PT/OT  - Stable for downgrade; will transfer to [facility] today      Subjective:    Pt s/e. No acute events overnight. Pt awake, alert.    Pt complains of pain at incision site. Tolerating diet. Not OOB.      No new complaints. Denies n/v, sob, chest pain, f/c. No sensory change, numbness, or weakness of lower extremities    I/Os:  I/O last 3 completed shifts:  In: 2160.8 [P.O.:1920; I.V.:240.8]  Out: 1800 [Urine:1800]  No intake/output data recorded.    Review of Systems   All other systems reviewed and are negative.      Vitals:    06/08/24 2207   BP: 115/63   Pulse: 96   Resp: 18   Temp: 98.9 °F (37.2 °C)   SpO2: 94%        Physical Exam  Vitals and nursing note reviewed.   Constitutional:       General: He is not in acute distress.     Appearance: He is well-developed. He is not diaphoretic.   HENT:      Head: Normocephalic and atraumatic.   Eyes:      Conjunctiva/sclera: Conjunctivae normal.   Cardiovascular:      Rate and Rhythm: Normal rate and regular rhythm.      Heart sounds: No murmur heard.  Pulmonary:      Effort: Pulmonary effort is normal. No respiratory distress.      Breath sounds: Normal breath sounds.   Abdominal:      Palpations: Abdomen is soft.      Tenderness: There is no abdominal tenderness.  "  Musculoskeletal:         General: No swelling.      Cervical back: Neck supple.      Comments: Left AKA stump dressing c/d/I  Left groin incision soft, no hematoma, no bleeding    Skin:     General: Skin is warm and dry.      Capillary Refill: Capillary refill takes less than 2 seconds.   Neurological:      General: No focal deficit present.      Mental Status: He is alert and oriented to person, place, and time. Mental status is at baseline.   Psychiatric:         Mood and Affect: Mood normal.         Imaging:I have personally reviewed pertinent reports.          Lab Results and Cultures:   Lab Results   Component Value Date    WBC 12.07 (H) 06/09/2024    HGB 10.9 (L) 06/09/2024    HCT 35.2 (L) 06/09/2024    MCV 91 06/09/2024     06/09/2024     Lab Results   Component Value Date    GLUCOSE 90 10/26/2019    CALCIUM 8.0 (L) 06/08/2024     12/10/2015    K 4.9 06/08/2024    CO2 23 06/08/2024     06/08/2024    BUN 36 (H) 06/08/2024    CREATININE 1.09 06/08/2024     Lab Results   Component Value Date    INR 1.35 (H) 06/07/2024    INR 1.08 06/07/2024    INR 0.98 05/16/2024    PROTIME 16.5 (H) 06/07/2024    PROTIME 14.7 (H) 06/07/2024    PROTIME 13.6 05/16/2024        Blood Culture: No results found for: \"BLOODCX\",   Urinalysis:   Lab Results   Component Value Date    COLORU Light Yellow 05/16/2024    COLORU Dk Yellow 12/10/2015    CLARITYU Clear 05/16/2024    CLARITYU Clear 12/10/2015    SPECGRAV 1.045 (H) 05/16/2024    SPECGRAV 1.016 12/10/2015    PHUR 6.0 05/16/2024    PHUR 7.0 07/02/2017    PHUR 6.0 12/10/2015    LEUKOCYTESUR Negative 05/16/2024    LEUKOCYTESUR Negative 12/10/2015    NITRITE Negative 05/16/2024    NITRITE Negative 12/10/2015    PROTEINUA 100 (2+) (A) 12/10/2015    GLUCOSEU Negative 05/16/2024    GLUCOSEU Negative 12/10/2015    KETONESU Negative 05/16/2024    KETONESU Negative 12/10/2015    BILIRUBINUR Negative 05/16/2024    BILIRUBINUR Negative 12/10/2015    BLOODU Trace (A) " "05/16/2024    BLOODU Negative 12/10/2015   ,   Urine Culture:   Lab Results   Component Value Date    URINECX No Growth <1000 cfu/mL 05/31/2022   ,   Wound Culure: No results found for: \"WOUNDCULT\"    Medications:  Current Facility-Administered Medications   Medication Dose Route Frequency    acetaminophen (TYLENOL) tablet 975 mg  975 mg Oral Q8H DAVINA    aspirin (ECOTRIN LOW STRENGTH) EC tablet 81 mg  81 mg Oral Daily    atorvastatin (LIPITOR) tablet 80 mg  80 mg Oral Daily With Dinner    bictegravir-emtricitab-tenofovir alafenamide (BIKTARVY) -25 MG tablet 1 tablet  1 tablet Oral Daily With Breakfast    diphenhydrAMINE (BENADRYL) tablet 25 mg  25 mg Oral Q6H PRN    divalproex sodium (DEPAKOTE ER) 24 hr tablet 500 mg  500 mg Oral Daily    divalproex sodium (DEPAKOTE) DR tablet 250 mg  250 mg Oral Daily    dolutegravir (TIVICAY) tablet 50 mg  50 mg Oral Daily    gabapentin (NEURONTIN) capsule 100 mg  100 mg Oral TID    heparin (porcine) 25,000 units in 0.45% NaCl 250 mL infusion (premix)  3-30 Units/kg/hr (Order-Specific) Intravenous Titrated    HYDROmorphone (DILAUDID) injection 0.5 mg  0.5 mg Intravenous Q3H PRN    lamoTRIgine (LaMICtal) tablet 25 mg  25 mg Oral Daily    melatonin tablet 6 mg  6 mg Oral HS    mirtazapine (REMERON) tablet 15 mg  15 mg Oral HS    ondansetron (ZOFRAN) injection 4 mg  4 mg Intravenous Q6H PRN    oxyCODONE (ROXICODONE) immediate release tablet 10 mg  10 mg Oral Q6H PRN    oxyCODONE (ROXICODONE) IR tablet 5 mg  5 mg Oral Q6H PRN    polyethylene glycol (MIRALAX) packet 17 g  17 g Oral Daily PRN    senna (SENOKOT) tablet 17.2 mg  2 tablet Oral BID    sertraline (ZOLOFT) tablet 25 mg  25 mg Oral Daily    sertraline (ZOLOFT) tablet 50 mg  50 mg Oral Daily    tamsulosin (FLOMAX) capsule 0.4 mg  0.4 mg Oral Daily With Dinner    zonisamide (ZONEGRAN) capsule 100 mg  100 mg Oral Daily       Patient Active Problem List   Diagnosis    Bipolar affective disorder (HCC)    Substance abuse (HCC) " "   Human immunodeficiency virus (HIV) infection (HCC)    History of transient cerebral ischemia    Benign essential hypertension    Positive laboratory testing for human immunodeficiency virus (HCC)    Hypertension    Unspecified vitamin D deficiency    Tobacco abuse    Cerebrovascular accident (CVA) (HCC)    Acute lower limb ischemia    Left ventricular thrombus    Seizures (HCC)    Medication management       Past Surgical History:   Procedure Laterality Date    US GUIDED THYROID BIOPSY  3/15/2022    US GUIDED THYROID BIOPSY  11/26/2019       Family History   Problem Relation Age of Onset    Diabetes Mother     Heart attack Mother     Heart attack Father        Social History     Socioeconomic History    Marital status: /Civil Union     Spouse name: Not on file    Number of children: Not on file    Years of education: Not on file    Highest education level: Not on file   Occupational History    Not on file   Tobacco Use    Smoking status: Some Days     Current packs/day: 1.00     Types: Cigarettes    Smokeless tobacco: Never   Substance and Sexual Activity    Alcohol use: Yes    Drug use: Yes     Types: \"Crack\" cocaine, Marijuana    Sexual activity: Not Currently   Other Topics Concern    Not on file   Social History Narrative    Not on file     Social Determinants of Health     Financial Resource Strain: Not on file   Food Insecurity: Patient Unable To Answer (6/8/2024)    Hunger Vital Sign     Worried About Running Out of Food in the Last Year: Patient unable to answer     Ran Out of Food in the Last Year: Patient unable to answer   Transportation Needs: Patient Unable To Answer (6/8/2024)    PRAPARE - Transportation     Lack of Transportation (Medical): Patient unable to answer     Lack of Transportation (Non-Medical): Patient unable to answer   Physical Activity: Not on file   Stress: Not on file   Social Connections: Not on file   Intimate Partner Violence: Not on file   Housing Stability: Patient " Unable To Answer (6/8/2024)    Housing Stability Vital Sign     Unable to Pay for Housing in the Last Year: Patient unable to answer     Number of Times Moved in the Last Year: 0     Homeless in the Last Year: Patient unable to answer       Allergies   Allergen Reactions    No Active Allergies

## 2024-06-09 NOTE — CONSULTS
[facility]  Consult  Name: Sunil Patel 63 y.o. male I MRN: [id number]  Unit/Bed#: [facility] 507-01 I Date of Admission: 6/7/2024   Date of Service: 6/8/2024 I Hospital Day: 1    Inpatient consult to Internal Medicine  Consult performed by: Shruti Correa MD  Consult ordered by: Karen Guerrero PA-C          Assessment & Plan   * Acute lower limb ischemia  Assessment & Plan  Patient presented with left lower extremity acute limb ischemia with extensive left iliofemoral and left infrainguinal embolism with nonviable left lower extremity.  Status post left femoral thrombectomy and AKA on 6/7.  Doing well postoperatively.  Continue postop care per vascular surgery.  APS consulted for pain management.  PT/OT and rehab.    Medication management  Assessment & Plan  Patient is currently incarcerated.  I called the facility at [phone] and spoke to the GUTIERREZ Cid.  No provider is available over the weekend.  Patient was admitted to the facility on 5/9.  Prior to that he was homeless.  He is a poor historian and told the facility that he is only on Biktarvy.  Facility currently have him on Biktarvy, Cabenuva injection and Tylenol.  They have no records of his medical history or med list.  Per chart review, patient used to live in a group home and appears to have been receiving 24/7 care.  Will need to verify patient's medication list with PCP, neurology office or pharmacy on Monday.  Staff at the correctional facility recommended to call on Monday to speak with [facility] medical provider.    Seizures ([facility])  Assessment & Plan  Seizure disorder started 07/2019. follows with [facility] neurology.   On last epileptologist notes his regimen is Depakote 1250 daily, Lamictal 50 mg twice daily and zonisamide 400 mg daily.  Currently not receiving any antiepileptic medications at the correctional facility.  Will need to verify dosing with PCP/neurology on Monday.    Left ventricular thrombus  Assessment &  Plan  Filling defect in the left ventricular apex suggests left ventricular thrombus and the source of the embolic disease.   Echocardiogram pending.  Cardiology consulted.  Presently on heparin drip.    Cerebrovascular accident (CVA) ([facility])  Assessment & Plan  Patient with a history of CVA in the L MCA territory in May 2018. Baseline expressive aphasia.  Continue aspirin and statin.    Benign essential hypertension  Assessment & Plan  On losartan PTA.  Currently held.  Blood pressure is acceptable.  Can likely resume tomorrow.    Human immunodeficiency virus (HIV) infection ([facility])  Assessment & Plan  On Biktarvy daily and Cabenuva monthly injections per facility records.  Will need to verify with patient's PCP office on Monday.             VTE Prophylaxis:    Heparin drip    Mobility:   Basic Mobility Inpatient Raw Score: 13  JH-HLM Goal: 4: Move to chair/commode  JH-HLM Achieved: 2: Bed activities/Dependent transfer  JH-HLM Goal NOT achieved. Continue with multidisciplinary rounding and encourage appropriate mobility to improve upon JH-HLM goals.    Recommendations for Discharge:  TBD    Total Time Spent on Date of Encounter in care of patient: 89 mins. This time was spent on one or more of the following: performing physical exam; counseling and coordination of care; obtaining or reviewing history; documenting in the medical record; reviewing/ordering tests, medications or procedures; communicating with other healthcare professionals and discussing with patient's family/caregivers.      History of Present Illness:  Sunil Patel is a 63 y.o. male who is originally admitted to the vascular surgery service due to acute limb ischemia. We are consulted for medical management.  Patient has past medical history of CVA with baseline dysarthria, seizure disorder, HIV.  He is currently incarcerated.  He presented from halfway with left lower extremity swelling and pain.  Imaging showed acute occlusion of the left external  "iliac artery without visualization of distal vessels.  Unfortunately limb deemed nonsalvageable.  Patient underwent thrombectomy and AKA on 6/7.  Workup also showed LV filling defect suggestive of thrombus and embolization process.  Patient was started on heparin drip.  Upon evaluating patient at bedside.  He reports headache, pain at the surgical site and constipation.  He is overall poor historian.    Review of Systems:  Review of Systems   Gastrointestinal:  Positive for constipation.   Neurological:  Positive for headaches.   All other systems reviewed and are negative.      Past Medical and Surgical History:   Past Medical History:   Diagnosis Date    Anxiety     Depression     HIV disease (HCC)     Substance abuse (HCC)     Suicide attempt (HCC)        Past Surgical History:   Procedure Laterality Date    US GUIDED THYROID BIOPSY  3/15/2022    US GUIDED THYROID BIOPSY  11/26/2019       Meds/Allergies:  all medications and allergies reviewed    Allergies:   Allergies   Allergen Reactions    No Active Allergies        Social History:  Marital Status: /Civil Union  Substance Use History:   Social History     Substance and Sexual Activity   Alcohol Use Yes     Social History     Tobacco Use   Smoking Status Some Days    Current packs/day: 1.00    Types: Cigarettes   Smokeless Tobacco Never     Social History     Substance and Sexual Activity   Drug Use Yes    Types: \"Crack\" cocaine, Marijuana       Family History:  Family History   Problem Relation Age of Onset    Diabetes Mother     Heart attack Mother     Heart attack Father        Physical Exam:   Vitals:   Blood Pressure: 115/63 (06/08/24 2207)  Pulse: 96 (06/08/24 2207)  Temperature: 98.9 °F (37.2 °C) (06/08/24 2207)  Temp Source: Oral (06/08/24 1917)  Respirations: 18 (06/08/24 1500)  SpO2: 94 % (06/08/24 2207)    Physical Exam  Vitals and nursing note reviewed.   Cardiovascular:      Rate and Rhythm: Normal rate.      Heart sounds: Normal heart " sounds. No murmur heard.  Pulmonary:      Breath sounds: Normal breath sounds. No wheezing.   Abdominal:      Palpations: Abdomen is soft.      Tenderness: There is no abdominal tenderness.   Musculoskeletal:      Comments: Status post left AKA   Neurological:      Mental Status: He is alert.      Comments: Dysarthria          Additional Data:   Lab Results:    Results from last 7 days   Lab Units 06/08/24  0451 06/07/24  1829 06/07/24  1236   WBC Thousand/uL 18.74*   < > 19.57*   HEMOGLOBIN g/dL 11.1*   < > 13.7   HEMATOCRIT % 35.7*   < > 42.9   PLATELETS Thousands/uL 358   < > 384   SEGS PCT %  --   --  87*   LYMPHO PCT %  --   --  7*   MONO PCT %  --   --  5   EOS PCT %  --   --  0    < > = values in this interval not displayed.     Results from last 7 days   Lab Units 06/08/24  0512 06/07/24  1236   SODIUM mmol/L 135 132*   POTASSIUM mmol/L 4.9 4.5   CHLORIDE mmol/L 103 97   CO2 mmol/L 23 24   BUN mg/dL 36* 58*   CREATININE mg/dL 1.09 1.33*   ANION GAP mmol/L 9 11   CALCIUM mg/dL 8.0* 8.8   ALBUMIN g/dL  --  3.3*   TOTAL BILIRUBIN mg/dL  --  0.44   ALK PHOS U/L  --  104   ALT U/L  --  209*   AST U/L  --  187*   GLUCOSE RANDOM mg/dL 151* 172*     Results from last 7 days   Lab Units 06/07/24  1829   INR  1.35*         Lab Results   Component Value Date/Time    HGBA1C 6.2 (H) 12/05/2023 12:00 PM    HGBA1C 6.1 (H) 08/09/2023 10:18 AM    HGBA1C 6.3 (H) 04/03/2023 10:10 AM         Results from last 7 days   Lab Units 06/07/24  1239   LACTIC ACID mmol/L 2.2*       Imaging: Reviewed radiology reports from this admission including: procedure reports and CTA  No orders to display       EKG, Pathology, and Other Studies Reviewed on Admission:   EKG: NSR. HR 94.    ** Please Note: This note may have been constructed using a voice recognition system. **

## 2024-06-09 NOTE — ASSESSMENT & PLAN NOTE
Continue Biktarvy/Tivicay daily and Cabenuva monthly injections (per facility records)  Will need to verify with patient's PCP office on Monday, 6/10

## 2024-06-09 NOTE — CONSULTS
Consultation - General Cardiology Team 2  Sunil Patel 63 y.o. male MRN: [id number]  Unit/Bed#: [facility] 507-01 Encounter: [id number]          Inpatient consult to Cardiology     Date/Time  6/9/2024 9:00 AM     Performed by  Rajani Live DO   Authorized by  Karen Guerrero PA-C           PCP: CHARLIE Trevino   Outpatient Cardiologist: Dr. Naik (last seen in 2018)    History of Present Illness   Physician Requesting Consult: Juan Luis Mutlani*  Reason for Consult / Principal Problem: LV thrombus on CTA    HPI: Sunil Patel is a 63 y.o. year old male with a history of anxiety, depression, HIV, previous substance abuse and previous suicide attempt who presented to the emergency department from half-way due to left lower extremity pain and numbness.  Patient states that the pain was present for about 3 days prior to presenting to the emergency department.  Per chart review appears that patient had a mechanical fall approximately 10 days ago and developed left lower extremity pain and swelling in the following days.  He states he was not able to walk due to the pain.    On arrival a CTA abdominal with runoff with and without contrast was performed and demonstrated acute arterial occlusion extending from the proximal left external iliac artery through at least the distal superficial femoral artery as well as asymmetric enlargement of the left calf with significant subcutaneous infiltration and concern for compartment syndrome.  There is also noted to be a filling defect in the LV apex suggestive of LV thrombus.  Patient was taken to the OR for left femoral artery exploration, thromboembolectomy of left iliac system, left profunda femoris and left superficial femoral artery as well as left above-the-knee amputation.  Patient remains on heparin drip and echocardiogram was ordered.        Review of Systems   Constitutional:  Negative for chills and fever.   HENT:  Negative for ear pain and sore throat.   "  Eyes:  Negative for pain and visual disturbance.   Respiratory:  Negative for cough and shortness of breath.    Cardiovascular:  Negative for chest pain and palpitations.   Gastrointestinal:  Negative for abdominal pain and vomiting.   Genitourinary:  Negative for dysuria and hematuria.   Musculoskeletal:  Negative for arthralgias and back pain.        Leg pain   Skin:  Negative for color change and rash.   Neurological:  Negative for seizures and syncope.   All other systems reviewed and are negative.      Historical Information   Past Medical History:   Diagnosis Date    Anxiety     Depression     HIV disease (HCC)     Substance abuse (HCC)     Suicide attempt (HCC)      Past Surgical History:   Procedure Laterality Date    US GUIDED THYROID BIOPSY  3/15/2022    US GUIDED THYROID BIOPSY  11/26/2019     Social History     Substance and Sexual Activity   Alcohol Use Yes     Social History     Substance and Sexual Activity   Drug Use Yes    Types: \"Crack\" cocaine, Marijuana     Social History     Tobacco Use   Smoking Status Some Days    Current packs/day: 1.00    Types: Cigarettes   Smokeless Tobacco Never     Family History:   Family History   Problem Relation Age of Onset    Diabetes Mother     Heart attack Mother     Heart attack Father        Meds/Allergies   Hospital Medications:   Current Facility-Administered Medications   Medication Dose Route Frequency    acetaminophen (TYLENOL) tablet 975 mg  975 mg Oral Q8H DAVINA    aspirin (ECOTRIN LOW STRENGTH) EC tablet 81 mg  81 mg Oral Daily    atorvastatin (LIPITOR) tablet 80 mg  80 mg Oral Daily With Dinner    bictegravir-emtricitab-tenofovir alafenamide (BIKTARVY) -25 MG tablet 1 tablet  1 tablet Oral Daily With Breakfast    diphenhydrAMINE (BENADRYL) tablet 25 mg  25 mg Oral Q6H PRN    divalproex sodium (DEPAKOTE ER) 24 hr tablet 500 mg  500 mg Oral Daily    divalproex sodium (DEPAKOTE) DR tablet 250 mg  250 mg Oral Daily    dolutegravir (TIVICAY) tablet 50 " mg  50 mg Oral Daily    gabapentin (NEURONTIN) capsule 100 mg  100 mg Oral TID    heparin (porcine) 25,000 units in 0.45% NaCl 250 mL infusion (premix)  3-30 Units/kg/hr (Order-Specific) Intravenous Titrated    HYDROmorphone (DILAUDID) injection 0.5 mg  0.5 mg Intravenous Q3H PRN    lamoTRIgine (LaMICtal) tablet 25 mg  25 mg Oral Daily    melatonin tablet 6 mg  6 mg Oral HS    mirtazapine (REMERON) tablet 15 mg  15 mg Oral HS    ondansetron (ZOFRAN) injection 4 mg  4 mg Intravenous Q6H PRN    oxyCODONE (ROXICODONE) immediate release tablet 10 mg  10 mg Oral Q6H PRN    oxyCODONE (ROXICODONE) IR tablet 5 mg  5 mg Oral Q6H PRN    polyethylene glycol (MIRALAX) packet 17 g  17 g Oral Daily PRN    senna (SENOKOT) tablet 17.2 mg  2 tablet Oral BID    sertraline (ZOLOFT) tablet 25 mg  25 mg Oral Daily    sertraline (ZOLOFT) tablet 50 mg  50 mg Oral Daily    tamsulosin (FLOMAX) capsule 0.4 mg  0.4 mg Oral Daily With Dinner    zonisamide (ZONEGRAN) capsule 100 mg  100 mg Oral Daily     Home Medications:   Medications Prior to Admission:     aspirin (ECOTRIN LOW STRENGTH) 81 mg EC tablet    atorvastatin (LIPITOR) 80 mg tablet    bictegravir-emtricitab-tenofovir alafenamide (BIKTARVY) -25 MG tablet    Cholecalciferol (VITAMIN D3) 21228 units TABS    divalproex sodium (DEPAKOTE ER) 500 mg 24 hr tablet    divalproex sodium (DEPAKOTE) 250 mg EC tablet    dolutegravir (TIVICAY) 50 MG TABS    DULoxetine (CYMBALTA) 60 mg delayed release capsule    lamoTRIgine (LaMICtal) 25 mg tablet    levOCARNitine (CARNITOR) 330 MG tablet    losartan (COZAAR) 100 MG tablet    Magnesium Oxide (MAG- PO)    Melatonin 10 MG TBDP    mirtazapine (REMERON) 15 mg tablet    sertraline (ZOLOFT) 25 mg tablet    sertraline (ZOLOFT) 50 mg tablet    tamsulosin (FLOMAX) 0.4 mg    TRIUMEQ 600- MG per tablet    zonisamide (ZONEGRAN) 100 mg capsule    Allergies   Allergen Reactions    No Active Allergies        Objective   Vitals: Blood pressure  115/63, pulse 96, temperature 98.9 °F (37.2 °C), temperature source Oral, resp. rate 18, SpO2 94%.  Orthostatic Blood Pressures      Flowsheet Row Most Recent Value   Blood Pressure 115/63 filed at 06/08/2024 2207   Patient Position - Orthostatic VS Lying filed at 06/08/2024 2207              Invasive Devices       Peripheral Intravenous Line  Duration             Peripheral IV 06/07/24 Left Antecubital 1 day    Peripheral IV 06/07/24 Right;Ventral (anterior) Forearm 1 day                    Physical Exam    GEN: Sunil Patel appears well, alert and oriented x 3, pleasant and cooperative   HEENT:  Normocephalic, atraumatic, anicteric, moist mucous membranes  NECK: No JVD or carotid bruits   HEART: Regular rate and rhythm.  Normal S1 and S2, no murmurs, clicks, gallops or rubs   LUNGS: Clear to auscultation bilaterally; no wheezes, rales, or rhonchi; respiration nonlabored   ABDOMEN:  Normoactive bowel sounds, soft, no tenderness, no distention  EXTREMITIES: peripheral pulses palpable; no edema.  Left AKA noted with dressing clean dry and intact.  NEURO: no gross focal findings; cranial nerves grossly intact   SKIN:  Dry, intact, warm to touch    Lab Results: I have personally reviewed pertinent lab results.    CBC with diff:   Results from last 7 days   Lab Units 06/09/24  0453   WBC Thousand/uL 12.07*   RBC Million/uL 3.87*   HEMOGLOBIN g/dL 10.9*   HEMATOCRIT % 35.2*   MCV fL 91   MCH pg 28.2   MCHC g/dL 31.0*   RDW % 13.3   MPV fL 8.5*   PLATELETS Thousands/uL 373     CMP:   Results from last 7 days   Lab Units 06/08/24  0512 06/07/24  1236   SODIUM mmol/L 135 132*   CHLORIDE mmol/L 103 97   CO2 mmol/L 23 24   BUN mg/dL 36* 58*   CREATININE mg/dL 1.09 1.33*   CALCIUM mg/dL 8.0* 8.8   AST U/L  --  187*   ALT U/L  --  209*   ALK PHOS U/L  --  104   EGFR ml/min/1.73sq m 72 56     HS Troponin:   0   Lab Value Date/Time    HSTNI0 13 05/16/2024 1257    HSTNI2 19 05/16/2024 1526     BNP:   Results from last 7 days    Lab Units 06/08/24  0512   POTASSIUM mmol/L 4.9   CHLORIDE mmol/L 103   CO2 mmol/L 23   BUN mg/dL 36*   CREATININE mg/dL 1.09   CALCIUM mg/dL 8.0*   EGFR ml/min/1.73sq m 72     Coags:   Results from last 7 days   Lab Units 06/09/24  0453 06/08/24  0434 06/07/24  1829   PTT seconds 99*   < > 103*   INR   --   --  1.35*    < > = values in this interval not displayed.     TSH:     Magnesium:     Lipid Profile:         Results from last 7 days   Lab Units 06/08/24  0512 06/07/24  1236   POTASSIUM mmol/L 4.9 4.5   CO2 mmol/L 23 24   CHLORIDE mmol/L 103 97   BUN mg/dL 36* 58*   CREATININE mg/dL 1.09 1.33*     Results from last 7 days   Lab Units 06/09/24  0453 06/08/24  0451 06/07/24  1829   HEMOGLOBIN g/dL 10.9* 11.1* 11.3*   HEMATOCRIT % 35.2* 35.7* 35.9*   PLATELETS Thousands/uL 373 358 338     Results from last 7 days   Lab Units 06/09/24  0453 06/08/24  1844 06/08/24  1247   PTT seconds 99* 84* 89*             Imaging: I have personally reviewed pertinent reports.   and I have personally reviewed pertinent films in PACS    Telemetry:   Normal sinus rhythm.  No evidence of arrhythmias on telemetry.    EKG:   Date: 6/8/2024  Interpretation: Normal sinus rhythm with rate of 94, VA interval 142, QTc 565 T wave inversions in anterior lateral leads      Previous STRESS TEST:  No results found for this or any previous visit.     No results found for this or any previous visit.    No results found for this or any previous visit.      Previous Cath/PCI:  No results found for this or any previous visit.      ECHO:  No results found for this or any previous visit.    No results found for this or any previous visit.      HOLTER  No results found for this or any previous visit.    Assessment & Plan     Assessment:    Acute lower limb ischemia  - Patient presented with left lower extremity acute limb ischemia with extensive left iliofemoral and left in for inguinal embolism with a nonviable left lower extremity  - POD2 s/p Left  femoral artery exploration, thromboembolectomy of left iliac system, left profunda femoris and left superficial femoral artery as well as left above-the-knee amputation  - Continues on heparin drip   - Acute pain is following for pain management   - management per vascular surgeyr     LV thrombus  - LV apical thrombus noted on CT from admission  - Patient states that he has no cardiac history.  Most recent echo from 2020 demonstrated EF 60%  - No recent symptoms of heart failure  - Echo pending to evaluate for EF and wall motion abnormalities that would predispose to LV thrombus  - Currently on heparin drip  - Patient has history of HIV which he is on Biktarvy/Tivicay daily and Cabenuva monthly.  HIV can cause hypercoagulable state especially in the presence of antiphospholipid antibodies, anticardiolipin antibodies, decreased protein S and increased platelet activation    Hypertension  - Well-controlled on current regimen  - Continue losartan 50 mg daily    History of CVA  - CVA in left MCA territory in May 2018 with residual expressive aphasia  - Continue aspirin and statin    HIV  - Continue Biktarvy/Tivicay daily and cabenuva monthly     Seizures  - Diagnosed in 2019 and follows with [facility] neurology  Continues on Depakote, Lamictal, denosumab, gabapentin    Carnitine deficiency  - continues on levocarnitine replacement therapy       Plan:  - Follow-up echo.  If reduced EF and/or wall motion abnormalities will need to consider cardiac catheterization for ischemic workup  Continue heparin drip for now, eventually patient will need to be placed on Coumadin for anticoagulation  - Patient will need hypercoagulable workup however as he is currently on heparin and has acute thrombus most of these tests will not be accurate.  If echo is normal would recommend hematology evaluation.      Case discussed and reviewed with Dr. Rodriguez and is pending their agreement of the assessment and plan.     Thank you for involving us  in the care of your patient.  ==========================================================================================    ** Please Note: Fluency DirectDictation voice to text software may have been used in the creation of this document. **

## 2024-06-09 NOTE — ASSESSMENT & PLAN NOTE
History of CVA in the left MCA territory in May 2018 -  residual expressive aphasia  Continue ASA/statin

## 2024-06-09 NOTE — ASSESSMENT & PLAN NOTE
Patient presented with left lower extremity acute limb ischemia with extensive left iliofemoral and left infrainguinal embolism with nonviable left lower extremity.  Status post left femoral thrombectomy and AKA on 6/7.  Doing well postoperatively.  Continue postop care per vascular surgery.  APS consulted for pain management.  PT/OT and rehab.

## 2024-06-10 ENCOUNTER — APPOINTMENT (INPATIENT)
Dept: NON INVASIVE DIAGNOSTICS | Facility: HOSPITAL | Age: 64
DRG: 169 | End: 2024-06-10
Payer: OTHER GOVERNMENT

## 2024-06-10 ENCOUNTER — APPOINTMENT (INPATIENT)
Dept: RADIOLOGY | Facility: HOSPITAL | Age: 64
DRG: 169 | End: 2024-06-10
Payer: OTHER GOVERNMENT

## 2024-06-10 ENCOUNTER — DOCUMENTATION (OUTPATIENT)
Dept: VASCULAR SURGERY | Facility: CLINIC | Age: 64
End: 2024-06-10

## 2024-06-10 LAB
ANION GAP SERPL CALCULATED.3IONS-SCNC: 11 MMOL/L (ref 4–13)
AORTIC ROOT: 3.7 CM
AORTIC VALVE MEAN VELOCITY: 8.3 M/S
APTT PPP: 81 SECONDS (ref 23–37)
APTT PPP: 83 SECONDS (ref 23–37)
ASCENDING AORTA: 3.5 CM
AV LVOT MEAN GRADIENT: 2 MMHG
AV LVOT PEAK GRADIENT: 4 MMHG
AV MEAN GRADIENT: 3 MMHG
AV PEAK GRADIENT: 5 MMHG
AV VELOCITY RATIO: 0.91
BASOPHILS # BLD AUTO: 0.07 THOUSANDS/ÂΜL (ref 0–0.1)
BASOPHILS NFR BLD AUTO: 1 % (ref 0–1)
BSA FOR ECHO PROCEDURE: 2.04 M2
BUN SERPL-MCNC: 14 MG/DL (ref 5–25)
CALCIUM SERPL-MCNC: 7.9 MG/DL (ref 8.4–10.2)
CHLORIDE SERPL-SCNC: 106 MMOL/L (ref 96–108)
CO2 SERPL-SCNC: 24 MMOL/L (ref 21–32)
CREAT SERPL-MCNC: 0.96 MG/DL (ref 0.6–1.3)
DOP CALC AO PEAK VEL: 1.11 M/S
DOP CALC AO VTI: 19.83 CM
DOP CALC LVOT PEAK VEL VTI: 18.62 CM
DOP CALC LVOT PEAK VEL: 1.01 M/S
E WAVE DECELERATION TIME: 202 MS
E/A RATIO: 0.78
EOSINOPHIL # BLD AUTO: 0.22 THOUSAND/ÂΜL (ref 0–0.61)
EOSINOPHIL NFR BLD AUTO: 2 % (ref 0–6)
ERYTHROCYTE [DISTWIDTH] IN BLOOD BY AUTOMATED COUNT: 13.6 % (ref 11.6–15.1)
FRACTIONAL SHORTENING: 33 % (ref 28–44)
GFR SERPL CREATININE-BSD FRML MDRD: 84 ML/MIN/1.73SQ M
GLUCOSE SERPL-MCNC: 89 MG/DL (ref 65–140)
HCT VFR BLD AUTO: 36.7 % (ref 36.5–49.3)
HGB BLD-MCNC: 11.2 G/DL (ref 12–17)
IMM GRANULOCYTES # BLD AUTO: 0.17 THOUSAND/UL (ref 0–0.2)
IMM GRANULOCYTES NFR BLD AUTO: 2 % (ref 0–2)
INTERVENTRICULAR SEPTUM IN DIASTOLE (PARASTERNAL SHORT AXIS VIEW): 1 CM
INTERVENTRICULAR SEPTUM: 1.2 CM (ref 0.6–1.1)
LAAS-AP2: 17.6 CM2
LAAS-AP4: 18 CM2
LEFT ATRIUM SIZE: 2.6 CM
LEFT ATRIUM VOLUME (MOD BIPLANE): 44 ML
LEFT ATRIUM VOLUME INDEX (MOD BIPLANE): 21.6 ML/M2
LEFT INTERNAL DIMENSION IN SYSTOLE: 3.5 CM (ref 2.1–4)
LEFT VENTRICULAR INTERNAL DIMENSION IN DIASTOLE: 5.2 CM (ref 3.5–6)
LEFT VENTRICULAR POSTERIOR WALL IN END DIASTOLE: 0.9 CM
LEFT VENTRICULAR STROKE VOLUME: 68 ML
LVSV (TEICH): 68 ML
LYMPHOCYTES # BLD AUTO: 2.09 THOUSANDS/ÂΜL (ref 0.6–4.47)
LYMPHOCYTES NFR BLD AUTO: 20 % (ref 14–44)
MCH RBC QN AUTO: 28 PG (ref 26.8–34.3)
MCHC RBC AUTO-ENTMCNC: 30.5 G/DL (ref 31.4–37.4)
MCV RBC AUTO: 92 FL (ref 82–98)
MONOCYTES # BLD AUTO: 0.5 THOUSAND/ÂΜL (ref 0.17–1.22)
MONOCYTES NFR BLD AUTO: 5 % (ref 4–12)
MV E'TISSUE VEL-LAT: 13 CM/S
MV E'TISSUE VEL-SEP: 8 CM/S
MV PEAK A VEL: 0.87 M/S
MV PEAK E VEL: 68 CM/S
MV STENOSIS PRESSURE HALF TIME: 59 MS
MV VALVE AREA P 1/2 METHOD: 3.73
NEUTROPHILS # BLD AUTO: 7.38 THOUSANDS/ÂΜL (ref 1.85–7.62)
NEUTS SEG NFR BLD AUTO: 70 % (ref 43–75)
NRBC BLD AUTO-RTO: 0 /100 WBCS
PLATELET # BLD AUTO: 429 THOUSANDS/UL (ref 149–390)
PMV BLD AUTO: 8.5 FL (ref 8.9–12.7)
POTASSIUM SERPL-SCNC: 4.2 MMOL/L (ref 3.5–5.3)
RBC # BLD AUTO: 4 MILLION/UL (ref 3.88–5.62)
RIGHT ATRIUM AREA SYSTOLE A4C: 14.9 CM2
RIGHT VENTRICLE ID DIMENSION: 3.7 CM
SL CV LEFT ATRIUM LENGTH A2C: 5.7 CM
SL CV LV EF: 45
SL CV PED ECHO LEFT VENTRICLE DIASTOLIC VOLUME (MOD BIPLANE) 2D: 119 ML
SL CV PED ECHO LEFT VENTRICLE SYSTOLIC VOLUME (MOD BIPLANE) 2D: 51 ML
SODIUM SERPL-SCNC: 141 MMOL/L (ref 135–147)
TR MAX PG: 23 MMHG
TR PEAK VELOCITY: 2.4 M/S
TRICUSPID ANNULAR PLANE SYSTOLIC EXCURSION: 2.3 CM
TRICUSPID VALVE PEAK REGURGITATION VELOCITY: 2.39 M/S
WBC # BLD AUTO: 10.43 THOUSAND/UL (ref 4.31–10.16)

## 2024-06-10 PROCEDURE — 85025 COMPLETE CBC W/AUTO DIFF WBC: CPT | Performed by: INTERNAL MEDICINE

## 2024-06-10 PROCEDURE — 71275 CT ANGIOGRAPHY CHEST: CPT

## 2024-06-10 PROCEDURE — 99232 SBSQ HOSP IP/OBS MODERATE 35: CPT | Performed by: INTERNAL MEDICINE

## 2024-06-10 PROCEDURE — 97167 OT EVAL HIGH COMPLEX 60 MIN: CPT

## 2024-06-10 PROCEDURE — 85730 THROMBOPLASTIN TIME PARTIAL: CPT | Performed by: INTERNAL MEDICINE

## 2024-06-10 PROCEDURE — NC001 PR NO CHARGE: Performed by: SURGERY

## 2024-06-10 PROCEDURE — C8929 TTE W OR WO FOL WCON,DOPPLER: HCPCS

## 2024-06-10 PROCEDURE — 93306 TTE W/DOPPLER COMPLETE: CPT | Performed by: INTERNAL MEDICINE

## 2024-06-10 PROCEDURE — 97163 PT EVAL HIGH COMPLEX 45 MIN: CPT

## 2024-06-10 PROCEDURE — 80048 BASIC METABOLIC PNL TOTAL CA: CPT | Performed by: INTERNAL MEDICINE

## 2024-06-10 RX ORDER — MICONAZOLE NITRATE 20 MG/G
CREAM TOPICAL 2 TIMES DAILY
Status: DISCONTINUED | OUTPATIENT
Start: 2024-06-10 | End: 2024-07-06 | Stop reason: HOSPADM

## 2024-06-10 RX ADMIN — ZONISAMIDE 400 MG: 100 CAPSULE ORAL at 08:59

## 2024-06-10 RX ADMIN — LAMOTRIGINE 50 MG: 25 TABLET ORAL at 08:56

## 2024-06-10 RX ADMIN — LEVOCARNITINE 330 MG: 1 SOLUTION ORAL at 18:42

## 2024-06-10 RX ADMIN — IOHEXOL 60 ML: 350 INJECTION, SOLUTION INTRAVENOUS at 15:53

## 2024-06-10 RX ADMIN — PERFLUTREN 0.8 ML/MIN: 6.52 INJECTION, SUSPENSION INTRAVENOUS at 10:20

## 2024-06-10 RX ADMIN — ACETAMINOPHEN 975 MG: 325 TABLET, FILM COATED ORAL at 21:24

## 2024-06-10 RX ADMIN — LEVOCARNITINE 330 MG: 1 SOLUTION ORAL at 11:14

## 2024-06-10 RX ADMIN — DOLUTEGRAVIR SODIUM 50 MG: 50 TABLET, FILM COATED ORAL at 08:59

## 2024-06-10 RX ADMIN — GABAPENTIN 100 MG: 100 CAPSULE ORAL at 17:20

## 2024-06-10 RX ADMIN — TAMSULOSIN HYDROCHLORIDE 0.4 MG: 0.4 CAPSULE ORAL at 17:20

## 2024-06-10 RX ADMIN — MICONAZOLE NITRATE: 20 CREAM TOPICAL at 17:20

## 2024-06-10 RX ADMIN — SERTRALINE HYDROCHLORIDE 25 MG: 50 TABLET ORAL at 08:57

## 2024-06-10 RX ADMIN — LEVOCARNITINE 330 MG: 1 SOLUTION ORAL at 16:10

## 2024-06-10 RX ADMIN — GABAPENTIN 100 MG: 100 CAPSULE ORAL at 08:57

## 2024-06-10 RX ADMIN — ASPIRIN 81 MG: 81 TABLET, COATED ORAL at 08:56

## 2024-06-10 RX ADMIN — BICTEGRAVIR SODIUM, EMTRICITABINE, AND TENOFOVIR ALAFENAMIDE FUMARATE 1 TABLET: 50; 200; 25 TABLET ORAL at 08:59

## 2024-06-10 RX ADMIN — GABAPENTIN 100 MG: 100 CAPSULE ORAL at 21:24

## 2024-06-10 RX ADMIN — ATORVASTATIN CALCIUM 80 MG: 80 TABLET, FILM COATED ORAL at 17:20

## 2024-06-10 RX ADMIN — Medication 6 MG: at 21:24

## 2024-06-10 RX ADMIN — SERTRALINE HYDROCHLORIDE 50 MG: 50 TABLET ORAL at 08:56

## 2024-06-10 RX ADMIN — SENNOSIDES 17.2 MG: 8.6 TABLET, FILM COATED ORAL at 08:57

## 2024-06-10 RX ADMIN — LOSARTAN POTASSIUM 50 MG: 50 TABLET, FILM COATED ORAL at 08:57

## 2024-06-10 RX ADMIN — DIVALPROEX SODIUM 1250 MG: 500 TABLET, EXTENDED RELEASE ORAL at 08:56

## 2024-06-10 RX ADMIN — MIRTAZAPINE 15 MG: 15 TABLET, FILM COATED ORAL at 21:24

## 2024-06-10 RX ADMIN — LAMOTRIGINE 50 MG: 25 TABLET ORAL at 17:20

## 2024-06-10 RX ADMIN — HEPARIN SODIUM 18 UNITS/KG/HR: 10000 INJECTION, SOLUTION INTRAVENOUS at 14:22

## 2024-06-10 RX ADMIN — ACETAMINOPHEN 975 MG: 325 TABLET, FILM COATED ORAL at 05:11

## 2024-06-10 RX ADMIN — ACETAMINOPHEN 975 MG: 325 TABLET, FILM COATED ORAL at 14:22

## 2024-06-10 NOTE — PROGRESS NOTES
[facility]  Progress Note  Name: Sunil Patel I  MRN: [id number]  [facility] I Date of Admission: 6/7/2024   Date of Service: 6/10/2024 I Hospital Day: 3    Assessment & Plan   * Acute lower limb ischemia  Assessment & Plan  Patient presented with left lower extremity acute limb ischemia with extensive left iliofemoral and left infrainguinal embolism with nonviable left lower extremity  Status post left femoral thrombectomy and AKA on 6/7  States postoperative pain is relatively controlled - pain management service following  CTA chest ordered today, pending result  Continue heparin drip  PT/OT evaluation   Further management per vascular surgery    Left ventricular thrombus  Assessment & Plan  Filling defect in the left ventricular apex suggests left ventricular thrombus and the source of the embolic disease  Echo showed ejection fraction 40 to 45%, mild global hypokinesis, LV thrombus  Remains on a heparin drip for anticoagulation, eventually transition to Coumadin  Per cardiology you have reduced ejection fraction and wall motion abnormality, they will consider ischemic workup  Follow-up further cardiology recommendation    Seizures (HCC)  Assessment & Plan  Diagnosed in July 2019 - follows with [facility] neurology  Continue Depakote/Lamictal/Zonisamide/Gabapentin -> doses now adjusted per most recent outpatient neurology changes on record -> Depakote 1250 mg daily, Zonisamide 400 mg daily, and Lamictal 50 mg BID   Facility was called on Saturday to verify his medications.  According to the nurse he was not on any antiseizure medications  Called back today, physician not there but again discussed with our nurse who looked into the chart and patient is not on any antiseizure medications  Per patient he has been taking these medications and according to pharmacy refills, he failed them in April before he was incarcerated    Carnitine deficiency (HCC)  Assessment &  "Plan  Continue Levocarnitine replacement therapy TID    Medication management  Assessment & Plan  Per medical provider on 6/8:  \"Patient is currently incarcerated. I called the facility at [phone] and spoke to the GUTIERREZ Cid.  No provider is available over the weekend. Patient was admitted to the facility on 5/9.  Prior to that he was homeless. He is a poor historian and told the facility that he is only on Biktarvy. Facility currently have him on Biktarvy, Cabenuva injection, and Tylenol.  They have no records of his medical history or med list. Per chart review, patient used to live in a group home and appears to have been receiving 24/7 care. Will need to verify patient's medication list with PCP, neurology office or pharmacy on Monday, 6/10. Staff at the correctional facility recommended to call on Monday to speak with [facility] medical provider.\"  I called the facility to talk to the medical provider unfortunately they were not there I discussed with the nurse.  Trying to call tomorrow    Cerebrovascular accident (CVA) ([facility])  Assessment & Plan  History of CVA in the left MCA territory in May 2018 -  residual expressive aphasia  Continue ASA/statin    Benign essential hypertension  Assessment & Plan  Blood pressures relatively stable/improved -> resume home Cozaar    Human immunodeficiency virus (HIV) infection ([facility])  Assessment & Plan  Continue Biktarvy/Tivicay daily and Cabenuva monthly injections (per facility records)  According to the nurse from the facility today he is on Biktarvy and Cabenuva monthly injection.                VTE Pharmacologic Prophylaxis:   Moderate Risk (Score 3-4) - Pharmacological DVT Prophylaxis Ordered: heparin drip.    Mobility:   Basic Mobility Inpatient Raw Score: 6  JH-HLM Goal: 2: Bed activities/Dependent transfer  JH-HLM Achieved: 5: Stand (1 or more minutes)  JH-HLM Goal NOT achieved. Continue with multidisciplinary rounding and encourage appropriate mobility to improve upon " -[facility] goals.    Patient Centered Rounds: I performed bedside rounds with nursing staff today.   Discussions with Specialists or Other Care Team Provider: Nursing    Education and Discussions with Family / Patient:       Current Length of Stay: 3 day(s)  Current Patient Status: Inpatient   Certification Statement: The patient will continue to require additional inpatient hospital stay due to LV thrombus  Discharge Plan: Anticipate discharge in >72 hrs to discharge location to be determined pending rehab evaluations.    Code Status: Level 1 - Full Code    Subjective:   Patient was seen and evaluated bedside, he is feeling well denies chest pain palpitation shortness of breath    Objective:     Vitals:   Temp (24hrs), Av.3 °F (36.8 °C), Min:97.2 °F (36.2 °C), Max:98.7 °F (37.1 °C)    Temp:  [97.2 °F (36.2 °C)-98.7 °F (37.1 °C)] 98.5 °F (36.9 °C)  HR:  [] 90  Resp:  [17-18] 17  BP: (120-138)/(69-79) 133/73  SpO2:  [92 %-98 %] 92 %  Body mass index is 27.12 kg/m².     Input and Output Summary (last 24 hours):     Intake/Output Summary (Last 24 hours) at 6/10/2024 1644  Last data filed at 6/10/2024 1601  Gross per 24 hour   Intake 1282.03 ml   Output 4550 ml   Net -3267.97 ml       Physical Exam:   Physical Exam  Constitutional:       General: He is not in acute distress.  HENT:      Head: Atraumatic.   Cardiovascular:      Rate and Rhythm: Normal rate and regular rhythm.      Heart sounds: No murmur heard.  Pulmonary:      Effort: Pulmonary effort is normal. No respiratory distress.      Breath sounds: Normal breath sounds. No wheezing.   Abdominal:      General: Bowel sounds are normal. There is no distension.      Palpations: Abdomen is soft.      Tenderness: There is no abdominal tenderness.   Musculoskeletal:      Cervical back: Neck supple.      Comments: Left AKA   Neurological:      Mental Status: He is alert.      Comments: Some expressive aphasia   Psychiatric:         Mood and Affect: Mood normal.           Additional Data:     Labs:  Results from last 7 days   Lab Units 06/10/24  0512   WBC Thousand/uL 10.43*   HEMOGLOBIN g/dL 11.2*   HEMATOCRIT % 36.7   PLATELETS Thousands/uL 429*   SEGS PCT % 70   LYMPHO PCT % 20   MONO PCT % 5   EOS PCT % 2     Results from last 7 days   Lab Units 06/10/24  0512 06/08/24  0512 06/07/24  1236   SODIUM mmol/L 141   < > 132*   POTASSIUM mmol/L 4.2   < > 4.5   CHLORIDE mmol/L 106   < > 97   CO2 mmol/L 24   < > 24   BUN mg/dL 14   < > 58*   CREATININE mg/dL 0.96   < > 1.33*   ANION GAP mmol/L 11   < > 11   CALCIUM mg/dL 7.9*   < > 8.8   ALBUMIN g/dL  --   --  3.3*   TOTAL BILIRUBIN mg/dL  --   --  0.44   ALK PHOS U/L  --   --  104   ALT U/L  --   --  209*   AST U/L  --   --  187*   GLUCOSE RANDOM mg/dL 89   < > 172*    < > = values in this interval not displayed.     Results from last 7 days   Lab Units 06/07/24  1829   INR  1.35*             Results from last 7 days   Lab Units 06/07/24  1239   LACTIC ACID mmol/L 2.2*       Lines/Drains:  Invasive Devices       Peripheral Intravenous Line  Duration             Peripheral IV 06/07/24 Left Antecubital 3 days    Peripheral IV 06/07/24 Right;Ventral (anterior) Forearm 3 days              Drain  Duration             External Urinary Catheter Large 1 day                      Telemetry:  Telemetry Orders (From admission, onward)               Telemetry Monitoring  Continuous x 24 Hours (Telem)        Comments: Left ventricular thrombus on CTA   Question:  Reason for 24 Hour Telemetry  Answer:  Alcohol withdrawal and CIWA >7, electrolyte abnormalities, abnormal ECG and/or heart disease                     Telemetry Reviewed: Normal Sinus Rhythm  Indication for Continued Telemetry Use: LV thrombus           Imaging: Reviewed radiology reports from this admission including: chest CT scan and ECHO    Recent Cultures (last 7 days):         Last 24 Hours Medication List:   Current Facility-Administered Medications   Medication Dose Route  Frequency Provider Last Rate    acetaminophen  975 mg Oral Q8H DAVINA Karen Guerrero PA-C      aspirin  81 mg Oral Daily Karen Guerrero PA-C      atorvastatin  80 mg Oral Daily With Dinner Karen Guerrero PA-C      bictegravir-emtricitab-tenofovir alafenamide  1 tablet Oral Daily With Breakfast Karen Guerrero PA-C      diphenhydrAMINE  25 mg Oral Q6H PRN Iliana Cuevas PA-C      divalproex sodium  1,250 mg Oral Daily Ida Hodges MD      dolutegravir  50 mg Oral Daily Karen Guerrero PA-C      gabapentin  100 mg Oral TID Karen Guerrero PA-C      heparin (porcine)  3-30 Units/kg/hr (Order-Specific) Intravenous Titrated Karen Guerrero PA-C 18 Units/kg/hr (06/10/24 1422)    HYDROmorphone  0.5 mg Intravenous Q3H PRN Karen Guerrero PA-C      lamoTRIgine  50 mg Oral BID Ida Hodges MD      levOCARNitine  1,000 mg/day Oral TID With Meals Ida Hodges MD      losartan  50 mg Oral Daily Ida Hodges MD      melatonin  6 mg Oral HS Karen Guerrero PA-C      mirtazapine  15 mg Oral HS Karen Guerrero PA-C      ELVA ANTIFUNGAL   Topical BID Dana Rodríguez MD      ondansetron  4 mg Intravenous Q6H PRN Karen Guerrero PA-C      oxyCODONE  10 mg Oral Q6H PRN Karen Guerrero PA-C      oxyCODONE  5 mg Oral Q6H PRN Karen Guerrero PA-C      polyethylene glycol  17 g Oral Daily PRN Shruti Correa MD      senna  2 tablet Oral BID Shruti Correa MD      sertraline  25 mg Oral Daily Karen Guerrero PA-C      sertraline  50 mg Oral Daily Karen Guerrero PA-C      tamsulosin  0.4 mg Oral Daily With Dinner Karen Guerrero PA-C      zonisamide  400 mg Oral Daily Ida Hodges MD          Today, Patient Was Seen By: Dana Rodríguez MD    **Please Note: This note may have been constructed using a voice recognition system.**

## 2024-06-10 NOTE — OCCUPATIONAL THERAPY NOTE
Occupational Therapy Evaluation     Patient Name: Sunil Patel  Today's Date: 6/10/2024  Problem List  Principal Problem:    Acute lower limb ischemia  Active Problems:    Human immunodeficiency virus (HIV) infection (HCC)    Benign essential hypertension    Cerebrovascular accident (CVA) (HCC)    Left ventricular thrombus    Seizures (HCC)    Medication management    Carnitine deficiency (HCC)    Past Medical History  Past Medical History:   Diagnosis Date    Anxiety     Depression     HIV disease (HCC)     Substance abuse (HCC)     Suicide attempt (HCC)      Past Surgical History  Past Surgical History:   Procedure Laterality Date    AMPUTATION ABOVE KNEE (AKA) Left 6/7/2024    Procedure: AMPUTATION ABOVE KNEE (AKA);  Surgeon: Juan Luis Rdz MD;  [facility];  Service: Vascular    KY TEAEC W/WO PATCH GRAFT COMMON FEMORAL Left 6/7/2024    Procedure: left femoral ileofemoral thromectomy;  Surgeon: Juan Luis Rdz MD;  [facility];  Service: Vascular    US GUIDED THYROID BIOPSY  3/15/2022    US GUIDED THYROID BIOPSY  11/26/2019         06/10/24 0846   OT Last Visit   OT Visit Date 06/10/24   Note Type   Note type Evaluation   Pain Assessment   Pain Assessment Tool 0-10   Pain Score 7  (Pt required increased time to complete FM and transfers 2* pain/discomfort. Pt was able to self-regulate with deep breathing, engaged in all OT eval tasks.)   Pain Location/Orientation Orientation: Left;Location: Leg   Pain Onset/Description Onset: Ongoing;Descriptor: Sore;Frequency: Intermittent  (Pain with movement, pain managed at rest)   Effect of Pain on Daily Activities limits pts ability to complete ADL tasks   Patient's Stated Pain Goal No pain   Hospital Pain Intervention(s) Repositioned;Ambulation/increased activity;Emotional support   Restrictions/Precautions   Weight Bearing Precautions Per Order Yes   LLE Weight Bearing Per Order (S)  NWB   Other Precautions (S)   "Cognitive;WBS;Telemetry;Fall Risk;Pain;Chair Alarm;Bed Alarm  (PMH- CVA, TBI, mood disorder, HIV. Pt R wrist handcuffed to bedrail. , L AKA 6/7/24)   Home Living   Type of Home Other (Comment)  (Pt incarcerated [facility])   Home Layout   ([facility])   Additional Comments Pt incarcerated at [facility], pt poor historian. Per chart review, pt needs A with ADLs and IADLs.   Prior Function   Level of [place] Needs assistance with ADLs;Needs assistance with IADLS  (Pt poor historian, needs A ADLs, A IADLs, FM I? per pt chart, pt had been NWB for a few days 2* to pain)   Lives With Facility staff   Receives Help From   (Facility staff [facility])   IADLs Family/Friend/Other provides transportation;Family/Friend/Other provides meals;Family/Friend/Other provides medication management  ([facility] provides A IADLs)   Comments Pt poor historian, per pts chart review, pt needs A for ADLs, IADLs.   Lifestyle   Autonomy per chart review, pta, pt A ADLs, A IADLs, I FM  (AD unknown,  status unknown.)   Reciprocal Relationships facility staff are able to A when needed.   Service to Others incarcerated, pt reports not having any family   Intrinsic Gratification drawing/art   General   Additional Pertinent History TBI, CVA, HIV, Mood disorder,   Family/Caregiver Present   ([facility] guards present)   Subjective   Subjective I need time to \"woosah\"   ADL   Where Assessed Edge of bed   Eating Assistance 5  Supervision/Setup   Grooming Assistance 4  Minimal Assistance   UB Bathing Assistance 4  Minimal Assistance   LB Bathing Assistance 3  Moderate Assistance   UB Dressing Assistance 4  Minimal Assistance   LB Dressing Assistance 3  Moderate Assistance   Toileting Assistance  3  Moderate Assistance   Functional Assistance 3  Moderate Assistance "   Bed Mobility   Supine to Sit 3  Moderate assistance   Additional items Assist x 2;HOB elevated;Bedrails;Increased time required;Verbal cues;LE management   Sit to Supine 3  Moderate assistance   Additional items Assist x 2;HOB elevated;Bedrails;Increased time required;Verbal cues;LE management   Additional Comments Pt required mod ax2 to sit EOB. Pt repositioned closer to head while sitting edge of bed with bilateral UE with min ax1 requiring increased time. Pt was found supine in bed, pt left supine in bed with all needs in reach.   Transfers   Sit to Stand 3  Moderate assistance   Additional items Assist x 2;Increased time required;Verbal cues   Stand to Sit 3  Moderate assistance   Additional items Assist x 2;Increased time required;Verbal cues  (bilateral HHA)   Additional Comments pt required mod ax2 with bilateral HHA with R knee block to complete sit <> stand transfer.   Balance   Static Sitting Fair -   Dynamic Sitting Poor +   Static Standing Poor   Dynamic Standing Poor -   Ambulatory Zero   Activity Tolerance   Activity Tolerance Patient limited by fatigue;Patient limited by pain   Medical Staff Made Aware PT 2* to pts med complexities, comorbidities, and regression from baseline.   Nurse Made Aware rn cleared pt for therapy   RUE Assessment   RUE Assessment WFL   LUE Assessment   LUE Assessment WFL   Hand Function   Gross Motor Coordination Functional   Fine Motor Coordination Functional   Psychosocial   Psychosocial (WDL) WDL  (Pt reports not having any family/supports. Pt poor historian so unclear if any psychosocial limitations. Pt able to use coping strategies to self regulate pain.)   Cognition   Overall Cognitive Status Impaired   Arousal/Participation Alert;Cooperative   Attention Difficulty attending to directions   Orientation Level Oriented to person;Disoriented to place;Disoriented to time;Disoriented to situation  (Pt was aware L AKA, deficits with full understanding)   Memory Decreased  "short term memory;Decreased recall of recent events;Decreased recall of precautions   Following Commands Follows one step commands with increased time or repetition   Comments Pt is alert and cooperative for OT eval. Pt with poor insight to condition and safety awareness. Pt required increased time and repetition to follow commands. Pt asked to lean forward, and pt leaned back. Pt displayed good self-regulation skills through utilizing \"woosah\" deep breathing technique.   Assessment   Limitation Decreased ADL status;Decreased Safe judgement during ADL;Decreased cognition;Decreased endurance;Decreased sensation;Decreased self-care trans;Decreased high-level ADLs   Prognosis Good   Assessment Pt is a 63 y.o. male admitted 6/7/2024 to [facility] for LLE pain. Pt underwent LLE Thromboembolectomy of L Iliac Profunda and SFA, LAKA on 6/7/2024. Pt is 3 days post op, NWB LLE. Pt with active OT eval and treat orders. Pt  has a past medical history of Anxiety, Depression, HIV disease ([facility]), Substance abuse ([facility]), and Suicide attempt ([facility]).  Pt is incarcerated at [facility]. Pt Pt is a poor historian and unable to communicate effectively regarding PLOF. Per pt chart review, Pt required A for ADLs, A for IADLs, and I FM. Currently, Pt is Min UB ADL, Mod A LB ADL, and performs sit<>stand transfers with Mod ax2 with bilateral HHA.  Limitations include decreased ADL/ higher-level ADL status, decreased cognition, decreased safe judgement, decreased sensation, decreased self-care trans, and decreased balance. This impacts pt's ability to complete UB and LB dressing and bathing, toileting, transfers, functional mobility, community mobility, home and health maintenance, safe engagement in typical daily routine, and any other activities previously participated in at baseline. The patient's raw score on the AM-PAC Daily Activity Inpatient Short Form is 15. A raw score of less than 19 suggests the patient " may benefit from discharge to post-acute rehabilitation services. Please refer to the recommendation of the Occupational Therapist for safe discharge planning. From OT standpoint, pt should discharge to level I   services. OT will continue to follow 2-3x per week for 10-14 days to meet goals.   Goals   Patient Goals to get stronger   LTG Time Frame 10-14   Long Term Goal #1 see below   Plan   Treatment Interventions ADL retraining;Functional transfer training;Endurance training;Cognitive reorientation;Equipment evaluation/education;Compensatory technique education;Continued evaluation;Energy conservation;Activityengagement   Goal Expiration Date 06/24/24   OT Frequency 2-3x/wk   Discharge Recommendation   Rehab Resource Intensity Level, OT I (Maximum Resource Intensity)   AM-PAC Daily Activity Inpatient   Lower Body Dressing 2   Bathing 2   Toileting 2   Upper Body Dressing 3   Grooming 3   Eating 3   Daily Activity Raw Score 15   Daily Activity Standardized Score (Calc for Raw Score >=11) 34.69   AM-PAC Applied Cognition Inpatient   Following a Speech/Presentation 3   Understanding Ordinary Conversation 3   Taking Medications 1   Remembering Where Things Are Placed or Put Away 2   Remembering List of 4-5 Errands 2   Taking Care of Complicated Tasks 1   Applied Cognition Raw Score 12   Applied Cognition Standardized Score 28.82   Modified Citlaly Scale   Modified Citlaly Scale 4   End of Consult   Education Provided Yes;Family or social support of family present for education by provider  ([facility] guards)   Patient Position at End of Consult Supine;All needs within reach   Nurse Communication Nurse aware of consult   Patient will verbalize and demonstrate use of energy conservation techniques during functional activity with no verbal cues.     Patient will complete ADL UB tasks with Mod I.     Patient will be Mod I with toileting with appropriate AD as needed.    Patient will be AO x4 for all  treatment sessions without additional verbal cues.    Patient will engage in cognitive assessment as needed to assist with safe discharge planning.     Patient will complete ADL LB tasks with Mod I.    Patient will improve UB strength +1 MMT score to increase ability to complete daily tasks.      Patient will perform sit to stand transfers with Mod I with appropriate AD as needed.     Patient will increase EOB sitting tolerance to 30 minutes to increase participation ADL tasks.     Patient will increase standing tolerance to 10 minutes with Mod I to increase participation with ADL tasks with appropriate AD as needed.     Patient will perform bed mobility with Mod I with appropriate AD as needed.     Patient will demonstrate adherence to safety precautions through carry over with ADL tasks without verbal cues.     Patient will complete IADL tasks with Mod I with appropriate AD as needed.     Patient will adhere to % throughout regular routine to promote good healing.     Patient will complete FM Mod I with appropriate AD as need for a short community distance.          Liang Shah OTS

## 2024-06-10 NOTE — PROGRESS NOTES
"Progress Note - Cardiology   Sunil Patel 63 y.o. male MRN: [id number]  Unit/Bed#: [facility] 507-01 Encounter: [id number]    Assessment:  Principal Problem:    Acute lower limb ischemia  Active Problems:    Human immunodeficiency virus (HIV) infection (HCC)    Benign essential hypertension    Cerebrovascular accident (CVA) (HCC)    Left ventricular thrombus    Seizures (HCC)    Medication management    Carnitine deficiency (HCC)    Acute left limb ischemia secondary to embolism. LV apical thrombus noted. PAD found, as well. EF 40 to 45% with apical hypokinesis.    Plan:  For ischemic evaluation, favor pharmacologic nuclear stress test. Although expressive aphasia makes it difficult to ascertain, no symptoms of chest pain or prior cardiovascular symptoms present. He does have significant risk factors. If no major ischemia, then favor medical management.    Volume status currently stable. He is on losartan for his cardiomyopathy. Will eventually add beta-blocker after the cath.    LV apical thrombus noted on echo. Currently on heparin infusion. Given social constraints, Coumadin may be very difficult. Cost may be an issue for DOAC, but at least in the short-term while he is incarcerated I may favor using daily Xarelto.    For PAD, aspirin, atorvastatin, blood pressure control.    Subjective/Objective     Subjective:  Difficult to communicate with patient secondary to his expressive aphasia.    I reviewed the findings with him. His EF is 40 to 45%. He has the apical thrombus. He has HIV, history of PAD, substance use. Status post left AKA for critical limb ischemia nonsalvageable.    Objective:    Vitals: /73   Pulse 90   Temp 98.5 °F (36.9 °C) (Oral)   Resp 17   Ht 5' 10\" (1.778 m)   Wt 85.7 kg (189 lb)   SpO2 92%   BMI 27.12 kg/m²   Vitals:    06/10/24 1020   Weight: 85.7 kg (189 lb)     Orthostatic Blood Pressures      Flowsheet Row Most Recent Value   Blood Pressure 133/73 filed at 06/10/2024 1104 "   Patient Position - Orthostatic VS Lying filed at 06/09/2024 2248            Intake/Output Summary (Last 24 hours) at 6/10/2024 1722  Last data filed at 6/10/2024 1601  Gross per 24 hour   Intake 1282.03 ml   Output 4550 ml   Net -3267.97 ml     Physical Exam:   General appearance: Laying in bed.  Head: Normocephalic, without obvious abnormality, atraumatic  Neck: no carotid bruit, no JVD and supple, symmetrical, trachea midline  Lungs: clear to auscultation bilaterally. Normal air entry. Normal effort.  Heart: S1, S2 normal and no S3 or S4. No murmurs.  Abdomen: soft, non-tender; bowel sounds normal; no masses,  no organomegaly  Extremities: L AKA  Skin: Skin color, texture, turgor normal. No rashes or lesions  Neurologic: expressive aphasia.    Medications:    Current Facility-Administered Medications:     acetaminophen (TYLENOL) tablet 975 mg, 975 mg, Oral, Q8H DAVINA, Karen Guerrero PA-C, 975 mg at 06/10/24 1422    aspirin (ECOTRIN LOW STRENGTH) EC tablet 81 mg, 81 mg, Oral, Daily, Karen Guerrero PA-C, 81 mg at 06/10/24 0856    atorvastatin (LIPITOR) tablet 80 mg, 80 mg, Oral, Daily With Dinner, Karen Guerrero PA-C, 80 mg at 06/10/24 1720    bictegravir-emtricitab-tenofovir alafenamide (BIKTARVY) -25 MG tablet 1 tablet, 1 tablet, Oral, Daily With Breakfast, Karen Guerrero PA-C, 1 tablet at 06/10/24 0859    diphenhydrAMINE (BENADRYL) tablet 25 mg, 25 mg, Oral, Q6H PRN, Iliana Cuevas PA-C, 25 mg at 06/09/24 2259    divalproex sodium (DEPAKOTE ER) 24 hr tablet 1,250 mg, 1,250 mg, Oral, Daily, Ida Hodges MD, 1,250 mg at 06/10/24 0856    dolutegravir (TIVICAY) tablet 50 mg, 50 mg, Oral, Daily, Karen Guerrero PA-C, 50 mg at 06/10/24 0859    gabapentin (NEURONTIN) capsule 100 mg, 100 mg, Oral, TID, Karen Guerrero PA-C, 100 mg at 06/10/24 1720    heparin (porcine) 25,000 units in 0.45% NaCl 250 mL infusion (premix), 3-30 Units/kg/hr (Order-Specific), Intravenous, Titrated, Karen  Mauro Guerrero PA-C, Last Rate: 15.3 mL/hr at 06/10/24 1422, 18 Units/kg/hr at 06/10/24 1422    HYDROmorphone (DILAUDID) injection 0.5 mg, 0.5 mg, Intravenous, Q3H PRN, Karen Guerrero PA-C, 0.5 mg at 06/08/24 0221    lamoTRIgine (LaMICtal) tablet 50 mg, 50 mg, Oral, BID, Ida Hodges MD, 50 mg at 06/10/24 1720    levOCARNitine (CARNITOR) oral solution 330 mg, 1,000 mg/day, Oral, TID With Meals, Ida Hodges MD, 330 mg at 06/10/24 1610    losartan (COZAAR) tablet 50 mg, 50 mg, Oral, Daily, Ida Hodges MD, 50 mg at 06/10/24 0857    melatonin tablet 6 mg, 6 mg, Oral, HS, Karen Guerrero PA-C, 6 mg at 06/09/24 2127    mirtazapine (REMERON) tablet 15 mg, 15 mg, Oral, HS, Karen Guerrero PA-C, 15 mg at 06/09/24 2127    moisture barrier miconazole 2% cream (aka ELVA MOISTURE BARRIER ANTIFUNGAL CREAM), , Topical, BID, Dana Rodríguez MD, Given at 06/10/24 1720    ondansetron (ZOFRAN) injection 4 mg, 4 mg, Intravenous, Q6H PRN, Karen Guerrero PA-C    oxyCODONE (ROXICODONE) immediate release tablet 10 mg, 10 mg, Oral, Q6H PRN, Karen Guerrero PA-C, 10 mg at 06/09/24 1504    oxyCODONE (ROXICODONE) IR tablet 5 mg, 5 mg, Oral, Q6H PRN, Karen Guerrero PA-C    polyethylene glycol (MIRALAX) packet 17 g, 17 g, Oral, Daily PRN, Shruti Correa MD    senna (SENOKOT) tablet 17.2 mg, 2 tablet, Oral, BID, Shruti Correa MD, 17.2 mg at 06/10/24 0857    sertraline (ZOLOFT) tablet 25 mg, 25 mg, Oral, Daily, Karen Guerrero PA-C, 25 mg at 06/10/24 0857    sertraline (ZOLOFT) tablet 50 mg, 50 mg, Oral, Daily, Karen Guerrero PA-C, 50 mg at 06/10/24 0856    tamsulosin (FLOMAX) capsule 0.4 mg, 0.4 mg, Oral, Daily With Dinner, Karen Guerrero PA-C, 0.4 mg at 06/10/24 1720    zonisamide (ZONEGRAN) capsule 400 mg, 400 mg, Oral, Daily, Ida Hodges MD, 400 mg at 06/10/24 0859    Lab Results:  Results from last 7 days   Lab Units 06/07/24  1239   CK TOTAL U/L 11,295*     Results from last 7 days   Lab Units 06/10/24  0525  06/09/24  0453 06/08/24  0451   WBC Thousand/uL 10.43* 12.07* 18.74*   HEMOGLOBIN g/dL 11.2* 10.9* 11.1*   HEMATOCRIT % 36.7 35.2* 35.7*   PLATELETS Thousands/uL 429* 373 358         Results from last 7 days   Lab Units 06/10/24  0512 06/08/24  0512 06/07/24  1236   SODIUM mmol/L 141 135 132*   POTASSIUM mmol/L 4.2 4.9 4.5   CHLORIDE mmol/L 106 103 97   CO2 mmol/L 24 23 24   BUN mg/dL 14 36* 58*   CREATININE mg/dL 0.96 1.09 1.33*   CALCIUM mg/dL 7.9* 8.0* 8.8   ALK PHOS U/L  --   --  104   ALT U/L  --   --  209*   AST U/L  --   --  187*     Results from last 7 days   Lab Units 06/10/24  0837 06/10/24  0155 06/09/24  1803 06/08/24  0434 06/07/24  1829 06/07/24  1239   INR   --   --   --   --  1.35* 1.08   PTT seconds 81* 83* 91*   < > 103* 28    < > = values in this interval not displayed.         Telemetry: Personally reviewed.     Echo:  Left Ventricle: Left ventricular cavity size is normal. Wall thickness is normal. The left ventricular ejection fraction is 40-45%. Systolic function is mildly reduced. There is mild global hypokinesis with specific regional wall abnormalities: moderate-severe hypokinesis of the apical segments. There is a spherical 1.5 x 1.3 cm thrombus at the LV apex.    Right Ventricle: Right ventricular cavity size is normal. Systolic function is normal.

## 2024-06-10 NOTE — ASSESSMENT & PLAN NOTE
Continue Biktarvy/Tivicay daily and Cabenuva monthly injections (per facility records)  According to the nurse from the facility today he is on Biktarvy and Cabenuva monthly injection.

## 2024-06-10 NOTE — PHYSICAL THERAPY NOTE
Physical Therapy Evaluation     Patient's Name: Sunil Patel    Admitting Diagnosis  No admission diagnoses are documented for this encounter.    Problem List  Patient Active Problem List   Diagnosis    Bipolar affective disorder (HCC)    Substance abuse (HCC)    Human immunodeficiency virus (HIV) infection (HCC)    History of transient cerebral ischemia    Benign essential hypertension    Positive laboratory testing for human immunodeficiency virus (HCC)    Hypertension    Unspecified vitamin D deficiency    Tobacco abuse    Cerebrovascular accident (CVA) (HCC)    Acute lower limb ischemia    Left ventricular thrombus    Seizures (HCC)    Medication management    Carnitine deficiency (HCC)       Past Medical History  Past Medical History:   Diagnosis Date    Anxiety     Depression     HIV disease (HCC)     Substance abuse (HCC)     Suicide attempt (HCC)        Past Surgical History  Past Surgical History:   Procedure Laterality Date    US GUIDED THYROID BIOPSY  3/15/2022    US GUIDED THYROID BIOPSY  11/26/2019          06/10/24 0847   PT Last Visit   PT Visit Date 06/10/24   Note Type   Note type Evaluation   Pain Assessment   Pain Assessment Tool 0-10   Pain Score 7   Pain Location/Orientation Orientation: Left;Location: Leg   Pain Onset/Description Onset: Ongoing;Frequency: Constant/Continuous;Descriptor: Discomfort;Descriptor: Sore   Effect of Pain on Daily Activities limits comfort and mobility   Patient's Stated Pain Goal No pain   Hospital Pain Intervention(s) Repositioned;Ambulation/increased activity;Emotional support   Restrictions/Precautions   Weight Bearing Precautions Per Order (S)  Yes   LLE Weight Bearing Per Order (S)  NWB  (S/P L AKA)   Other Precautions Fall Risk;Cognitive;Bed Alarm;Chair Alarm;Pain;Multiple lines  (handcuffed to bed with x2 officers present)   Home Living   Type of Home Other (Comment)  (assisted)   Additional Comments due to patient cog status, unable to obtain info regarding  set up while in [facility]   Prior Function   Comments per EMR, patient was previously requiring assist for ADL/IADL/mobility - patient unable to provide PLOF d/t cog   General   Family/Caregiver Present No  (x2 police officers present)   Cognition   Overall Cognitive Status (S)  Impaired   Arousal/Participation Alert   Orientation Level Oriented to person;Disoriented to place;Disoriented to time;Disoriented to situation   Memory Decreased short term memory;Decreased recall of recent events;Decreased recall of precautions;Decreased recall of biographical information   Following Commands Follows one step commands with increased time or repetition   Comments Patient pleasant and cooperative. Hx TBI - grossly confused, oriented to self only. Poor historian   Subjective   Subjective Patient agreeable to PT eval   RUE Assessment   RUE Assessment WFL   LUE Assessment   LUE Assessment WFL   RLE Assessment   RLE Assessment X   Strength RLE   RLE Overall Strength 4-/5   LLE Assessment   LLE Assessment X   Strength LLE   LLE Overall Strength 3-/5  (hip only - L AKA)   Bed Mobility   Supine to Sit 3  Moderate assistance   Additional items Assist x 2;Increased time required;Verbal cues;LE management;HOB elevated;Bedrails   Sit to Supine 3  Moderate assistance   Additional items Assist x 2;Increased time required;Verbal cues;LE management;HOB elevated   Transfers   Sit to Stand 3  Moderate assistance   Additional items Assist x 2;Increased time required;Verbal cues   Stand to Sit 3  Moderate assistance   Additional items Assist x 2;Increased time required;Verbal cues   Sit pivot 4  Minimal assistance   Additional items Assist x 1;Increased time required;Verbal cues  (lateral scooting toward HOB)   Additional Comments B/L HHA   Ambulation/Elevation   Ambulation/Elevation Additional Comments no ambulation at this time   Balance   Static Sitting Fair -   Dynamic Sitting Poor +   Static Standing Poor -   Dynamic Standing Poor -    Ambulatory Zero   Endurance Deficit   Endurance Deficit Yes   Endurance Deficit Description fatigue, weakness   Activity Tolerance   Activity Tolerance Patient limited by fatigue;Patient limited by pain  (+cog)   Medical Staff Made Aware OT, OTS   Nurse Made Aware RN cleared   Assessment   Prognosis Good   Problem List Decreased strength;Decreased endurance;Impaired balance;Decreased mobility;Pain;Decreased cognition;Impaired judgement;Decreased safety awareness   Assessment Pt is a 63 y.o. male seen for a high complexity PT evaluation due to Ongoing medical management for primary dx, Increased reliance on more restrictive AD compared to baseline, Decreased activity tolerance compared to baseline, Fall risk, Increased assistance needed from caregiver at current time, Cog status, s/p surgical intervention. Patient is s/p admit to [facility] on 6/7/2024 for No admission diagnoses are documented for this encounter.. Patient  has a past medical history of Anxiety, Depression, HIV disease ([facility]), Substance abuse ([facility]), and Suicide attempt ([facility])..     PT now consulted to assess functional mobility and needs for safe d/c planning. Prior to admission, pt needs assistance with functional mobility, needs assistance ADLs, and needs assistance IADLs. Patient is a poor historian, will continue to follow with CM to obtain PLOF and halfway set up.  Personal factors affecting status include fall risk, limited caregiver/social support, assist for ADLs, assist for IADLs, assist for functional mobility, decreased insight / safety awareness, decreased recall of precautions, cognitive deficits, and medical status     Currently pt requires moderate assistance x2 for bed mobility, moderate assistance x2 for functional transfers with two hand hold. Pt presents functioning below baseline and w/ overall mobility deficits 2* to: decreased strength, decreased endurance, decreased mobility, impaired balance. These impairments place pt  at risk for falls.     Pt will continue to benefit from skilled PT interventions to address stated impairments; to maximize functional potential; for ongoing pt/family education; and DME needs. The patient's AM-PAC Basic Mobility Inpatient Short Form Raw Score Is 6. PT is currently recommending Level 1 - Maximum Resource Intensity on d/c from hospital. Will continue to follow as able.   Barriers to Discharge Decreased caregiver support   Goals   Patient Goals to feel better   STG Expiration Date 06/24/24   Short Term Goal #1 In 14 days, patient will 1) increase strength in BUE/BLE by 1/2 to 1 full grade for increased strength and stability needed for functional mobility 2) improve bed mobility to MI for improved mobility and decreased need for assist 3) sit EOB x30' with MI to facilitate trunk stability and safety for completion of ADL tasks 4) increase functional transfers to MI for improved safety and functional mobility 5) ambulate at least 50ft with MI using rolling walker for increased endurance and safety ambulating home and community environments 6) improve balance by 1 grade for improved safety and stability and decreased risk for falls.   PT Treatment Day 0   Plan   Treatment/Interventions ADL retraining;Functional transfer training;LE strengthening/ROM;Therapeutic exercise;Endurance training;Patient/family training;Equipment eval/education;Bed mobility;Gait training;Spoke to nursing;Spoke to case management;OT;Elevations;Cognitive reorientation   PT Frequency 3-5x/wk   Discharge Recommendation   Rehab Resource Intensity Level, PT I (Maximum Resource Intensity)   Equipment Recommended   (tbd)   AM-PAC Basic Mobility Inpatient   Turning in Flat Bed Without Bedrails 1   Lying on Back to Sitting on Edge of Flat Bed Without Bedrails 1   Moving Bed to Chair 1   Standing Up From Chair Using Arms 1   Walk in Room 1   Climb 3-5 Stairs With Railing 1   Basic Mobility Inpatient Raw Score 6   [facility]  Level Of Mobility   -M Goal 2: Bed activities/Dependent transfer   -HLM Achieved 5: Stand (1 or more minutes)   Modified Carlton Scale   Modified Carlton Scale 4   End of Consult   Patient Position at End of Consult All needs within reach;Supine;Bed/Chair alarm activated  (officers present)         Stacy Mehta, PT, DPT

## 2024-06-10 NOTE — OCCUPATIONAL THERAPY NOTE
Occupational Therapy Evaluation     Patient Name: Sunil Patel  Today's Date: 6/10/2024  Problem List  Principal Problem:    Acute lower limb ischemia  Active Problems:    Human immunodeficiency virus (HIV) infection (HCC)    Benign essential hypertension    Cerebrovascular accident (CVA) (HCC)    Left ventricular thrombus    Seizures (HCC)    Medication management    Carnitine deficiency (HCC)    Past Medical History  Past Medical History:   Diagnosis Date    Anxiety     Depression     HIV disease (HCC)     Substance abuse (HCC)     Suicide attempt (HCC)      Past Surgical History  Past Surgical History:   Procedure Laterality Date    AMPUTATION ABOVE KNEE (AKA) Left 6/7/2024    Procedure: AMPUTATION ABOVE KNEE (AKA);  Surgeon: Juan Luis Rdz MD;  [facility]: [facility];  Service: Vascular    NV TEAEC W/WO PATCH GRAFT COMMON FEMORAL Left 6/7/2024    Procedure: left femoral ileofemoral thromectomy;  Surgeon: Juan Luis Rdz MD;  Location: [facility];  Service: Vascular    US GUIDED THYROID BIOPSY  3/15/2022    US GUIDED THYROID BIOPSY  11/26/2019         06/10/24 0847   Note Type   Note type Evaluation   Pain Assessment   Pain Assessment Tool 0-10   Pain Score 7   Pain Location/Orientation Orientation: Left;Location: Leg   Pain Onset/Description Descriptor: Discomfort;Descriptor: Sore;Frequency: Intermittent;Onset: Ongoing   Effect of Pain on Daily Activities limits comfort and mobility   Patient's Stated Pain Goal No pain   Hospital Pain Intervention(s) Repositioned;Ambulation/increased activity;Emotional support   Restrictions/Precautions   Weight Bearing Precautions Per Order (S)  Yes   LLE Weight Bearing Per Order (S)  NWB  (S/P L AKA)   Other Precautions Fall Risk;Cognitive;Bed Alarm;Chair Alarm;Pain;Multiple lines  (handcuffed to bed with x2 officers present, L AKA 6/7/24)   Home Living   Type of Home Other (Comment)  ([facility])   Additional Comments due to patient cog status, unable  to obtain info regarding set up while in custodial   Prior Function   Comments per EMR, patient was previously requiring assist for ADL/IADL/mobility - patient unable to provide PLOF d/t cog   Bed Mobility   Supine to Sit 3  Moderate assistance   Additional items Assist x 2;Increased time required;Verbal cues;LE management;HOB elevated;Bedrails   Sit to Supine 3  Moderate assistance   Additional items Assist x 2;Increased time required;Verbal cues;LE management;HOB elevated   Transfers   Sit to Stand 3  Moderate assistance   Additional items Assist x 2;Increased time required;Verbal cues   Stand to Sit 3  Moderate assistance   Additional items Assist x 2;Increased time required;Verbal cues   Sit pivot 4  Minimal assistance   Additional items Assist x 1;Increased time required;Verbal cues  (lateral scooting toward HOB)   Additional Comments B/L HHA   Balance   Static Sitting Fair -   Dynamic Sitting Poor +   Static Standing Poor -   Dynamic Standing Poor -   Ambulatory Zero   Activity Tolerance   Activity Tolerance Patient limited by fatigue;Patient limited by pain  (+cog)   Medical Staff Made Aware OT, OTS   Nurse Made Aware RN cleared   RUE Assessment   RUE Assessment WFL   LUE Assessment   LUE Assessment WFL   Cognition   Overall Cognitive Status (S)  Impaired   Orientation Level Oriented to person;Disoriented to place;Disoriented to time;Disoriented to situation   Memory Decreased short term memory;Decreased recall of recent events;Decreased recall of precautions;Decreased recall of biographical information   Following Commands Follows one step commands with increased time or repetition   Comments Patient pleasant and cooperative. Hx TBI - grossly confused, oriented to self only. Poor historian   Goals   Patient Goals to feel better   Modified Bledsoe Scale   Modified Bledsoe Scale 4     Patient will verbalize and demonstrate use of energy conservation techniques during functional activity with no verbal cues.      Patient will complete ADL UB tasks with Mod I.     Patient will be Mod I with toileting with appropriate AD as needed.    Patient will be AO x4 for all treatment sessions without additional verbal cues.    Patient will engage in cognitive assessment as needed to assist with safe discharge planning.     Patient will complete ADL LB tasks with Mod I.    Patient will improve UB strength +1 MMT score to increase ability to complete daily tasks.      Patient will perform sit to stand transfers with Mod I with appropriate AD as needed.     Patient will increase EOB sitting tolerance to 30 minutes to increase participation ADL tasks.     Patient will increase standing tolerance to 10 minutes with Mod I to increase participation with ADL tasks with appropriate AD as needed.     Patient will perform bed mobility with Mod I with appropriate AD as needed.     Patient will demonstrate adherence to safety precautions through carry over with ADL tasks without verbal cues.     Patient will complete IADL tasks with Mod I with appropriate AD as needed.     Patient will adhere to % throughout regular routine to promote good healing.     Patient will complete FM Mod I with appropriate AD as need for a short community distance.             Liang Shah OTS

## 2024-06-10 NOTE — PROGRESS NOTES
Progress Note - Vascular Surgery   Sunil Patel 63 y.o. male MRN: [id number]  Unit/Bed#: [facility] 507-01 Encounter: [id number]    Assessment:  63 y.o. with PMH HIV, Mood disorder, TBI presenting with Left LE acute limb ischemia (r3), left ventricular thrombus.     Vascular surgery consulted for left LE acute limb ischemia (R3) with extensive left iliofemoral and left infra-inguinal embolism with non-viable LLE limb.      6/7: Left femoral thrombectomy c AKA    Plan:  Dressing changed today- well healed, no hematoma- see below for image  Heparin gtt  Aspirin, statin  Appreciate Cards input for thomboembolic workup  Please message [facility] Vascular Surgery Resident/AP if any questions    Subjective/Objective     Subjective:   No acute events overnight. Pain with dressing change today    Pertinent review of systems as above.All other review of systems negative.    Objective:    Blood pressure 125/78, pulse 90, temperature (!) 97.2 °F (36.2 °C), temperature source Oral, resp. rate 18, SpO2 94%.,There is no height or weight on file to calculate BMI.      Intake/Output Summary (Last 24 hours) at 6/10/2024 0735  Last data filed at 6/10/2024 0201  Gross per 24 hour   Intake 999.24 ml   Output 2350 ml   Net -1350.76 ml       Invasive Devices       Peripheral Intravenous Line  Duration             Peripheral IV 06/07/24 Left Antecubital 2 days    Peripheral IV 06/07/24 Right;Ventral (anterior) Forearm 2 days              Drain  Duration             External Urinary Catheter Large <1 day                    Physical Exam:   Gen:  NAD.  HEENT: NCAT. MMM  CV: well perfused  Lungs: Normal respiratory effort  Abd: soft, nt/nd  Skin: warm/ dry  Extremities: no peripheral edema, no clubbing or cyanosis, dressing cdi  Neuro: AxO x3, sensorimotor intact        Results from last 7 days   Lab Units 06/10/24  0512 06/09/24  0453 06/08/24  0451   WBC Thousand/uL 10.43* 12.07* 18.74*   HEMOGLOBIN g/dL 11.2* 10.9* 11.1*   HEMATOCRIT % 36.7 35.2*  35.7*   PLATELETS Thousands/uL 429* 373 358     Results from last 7 days   Lab Units 06/10/24  0512 06/08/24  0512 06/07/24  1236   POTASSIUM mmol/L 4.2 4.9 4.5   CHLORIDE mmol/L 106 103 97   CO2 mmol/L 24 23 24   BUN mg/dL 14 36* 58*   CREATININE mg/dL 0.96 1.09 1.33*   CALCIUM mg/dL 7.9* 8.0* 8.8     Results from last 7 days   Lab Units 06/10/24  0155 06/09/24  1803 06/09/24  1216 06/08/24  0434 06/07/24  1829 06/07/24  1239   INR   --   --   --   --  1.35* 1.08   PTT seconds 83* 91* 90*   < > 103* 28    < > = values in this interval not displayed.        I have personally reviewed pertinent films in PACS.  VTE Pharmacologic Prophylaxis: Heparin Drip  VTE Mechanical Prophylaxis: sequential compression device

## 2024-06-10 NOTE — WOUND OSTOMY CARE
Consult Note - Wound   Sunil Patel 63 y.o. male MRN: [id number]  Unit/Bed#: [facility] 507-01 Encounter: [id number]        History and Present Illness:  Patient is 62 yo male admitted to [facility] with acute lower limb ischemia. Patient has history of HIV, tobacco use, and fall. Patient is incontinent of bowel and bladder. Patient is independent with meals. Wound care consulted for sacral wound. Ordered P-500 specialty bed for off-loading.     Assessment Findings:     POA unstageable B/L buttocks- wound is full thickness with 95% brown/yellow slough and 5% pink tissue, periwound skin is pink and fragile, small serosanguineous drainage. Recommending Triad paste due to frequent incontinent bowel movements.  Groin- present with fungal rash, ELVA ordered.    No induration, fluctuance, odor, warmth/temperature differences, redness, or purulence noted to the above noted wounds and skin areas assessed. New dressings applied per orders listed below. Patient tolerated well- no s/s of non-verbal pain or discomfort observed during the encounter. Bedside nurse aware of plan of care. See flow sheets for more detailed assessment findings. Wound care will continue to follow.     Skin care plans:  1-Hydraguard to right heel BID and PRN  2-Elevate heels to offload pressure.  3-Ehob cushion in chair when out of bed.  4-Moisturize skin daily with skin nourishing cream.  5-Turn/reposition q2h or when medically stable for pressure re-distribution on skin.   6-Cleanse sacral/buttocks area with soap and water. Apply Triad paste to wound bed TID and PRN incontinence care  7-Apply ELVA to groin BID  8-P-500 specialty bed  Wounds:  Wound 06/08/24 Pressure Injury Buttocks Bilateral (Active)   Wound Image   06/10/24 1040   Wound Description Brown;Yellow;Slough;Pink 06/10/24 1040   Pressure Injury Stage U 06/10/24 1040   Elsi-wound Assessment Fragile;Pink 06/10/24 1040   Wound Length (cm) 6 cm 06/10/24 1040   Wound Width (cm) 11 cm 06/10/24 1040   Wound  Depth (cm) 0.2 cm 06/10/24 1040   Wound Surface Area (cm^2) 66 cm^2 06/10/24 1040   Wound Volume (cm^3) 13.2 cm^3 06/10/24 1040   Calculated Wound Volume (cm^3) 13.2 cm^3 06/10/24 1040   Wound Site Closure WARD 06/10/24 1040   Drainage Amount Small 06/10/24 1040   Drainage Description Serosanguineous 06/10/24 1040   Non-staged Wound Description Full thickness 06/10/24 1040   Treatments Cleansed 06/10/24 1040   Dressing Moisture barrier 06/10/24 1040   Wound packed? No 06/10/24 1040   Packing- # removed 0 06/10/24 1040   Packing- # inserted 0 06/10/24 1040   Dressing Changed Changed 06/10/24 1421   Patient Tolerance Tolerated well 06/10/24 1040   Dressing Status Clean;Intact;Dry 06/10/24 1040         Nafisa Hooper BSN, RN, CWOCN

## 2024-06-10 NOTE — ASSESSMENT & PLAN NOTE
Filling defect in the left ventricular apex suggests left ventricular thrombus and the source of the embolic disease  Echo showed ejection fraction 40 to 45%, mild global hypokinesis, LV thrombus  Remains on a heparin drip for anticoagulation, eventually transition to Coumadin  Per cardiology you have reduced ejection fraction and wall motion abnormality, they will consider ischemic workup  Follow-up further cardiology recommendation

## 2024-06-10 NOTE — DISCHARGE INSTR - OTHER ORDERS
L groin incisional care:  Shower dailiy  Wash incision daily w/ soap and water. Pat dry thoroughly.  Place clean, dry gauze to cover incision to keep area clean and dry and to prevent skin- skin contact    L AKA:  Shower daily  Wash incision daily w/ soap and water. Pat dry thoroughly.  Betadine paint  ABD pad  ACE wrap to assist w/ edema control/ stump shrinkage        Skin Care Plan:  1-Cleanse sacro-buttocks with soap and water. Apply Triad Paste to Sacro-Buttocks Wound Beds Only. Apply Hydraguard to Elsi-wounds and all other intact aspects of sacro-buttocks. Apply both creams TID and PRN episodes of incontinence.   2-Turn/reposition q2h or when medically stable for pressure re-distribution on skin .  3-Elevate right heel to offload pressure  4-Moisturize skin daily with skin nourishing cream  5-Ehob cushion in chair when out of bed.  6-Preventative Hydraguard to right heel BID and PRN.  7-P-500 Low Air Loss Mattress   8-Apply ELVA to groin BID per order.

## 2024-06-10 NOTE — DISCHARGE INSTR - AVS FIRST PAGE
DISCHARGE INSTRUCTIONS  LEG/BYPASS SURGERY    ACTIVITY:   Limit your physical activity to walking for the first week and then increase your activity as tolerated.  If you become short of breath or tired, stop and rest.  You may require help with walking or feel more secure with something to lean on.  Walking up steps and normal activities may be resumed as you feel ready.  Most people tire easily for the first few weeks following leg surgery.  This improves as conditioning returns.  Avoid strenuous activity such as vigorous exercise.  Avoid heavy lifting (do not lift more than 15 pounds) for the first four weeks after surgery.  You should not drive a car for at least two weeks following discharge from the hospital and you are off all narcotic pain medication.  You may ride in a car.    DIET:  Resume your normal diet.  Good nutrition is important for healing of your incision.  If you are discharged on narcotics for pain control, continue taking your stool softeners until you are having regular bowel movements.    INCISION:  You should shower daily.  Wash incision daily with soap and water, but do not rub or scrub the incision; rinse thoroughly and pat dry.  You may have stitches or staples to close your incision and it is okay for these to get wet.  Do not bathe in a tub or swim for the first 4 week following surgery or if you have any open wounds.  It is normal to have swelling or discoloration around the incision.   If increasing redness or pain develops, call our office immediately.  Numbness in the region of the incision may occur following the surgery.  This normally improves over six to twelve months.    You may have surgical glue over your incisions. There are stitches present under the skin which will absorb on their own.   The glue is used to cover the access, assist in closure, and prevent contamination. This adhesive will darken and peel away on its own within one to two weeks. Do not pick at it.    If you  have a groin wound/incision, place a clean dry piece of gauze to cover your groin incision to keep incision clean and dry and prevent your skin from sticking together.  Change gauze daily.  You may have staples or stitches at your incisions.  These will be removed at your follow-up appointment or when they are ready to come out.  If you have a dressing over your surgical site, remove this on the second day after surgery.  If you have foot or leg wounds, please follow your podiatrist/wound care doctor's instructions for care.  If any of your incisions are open and require dressing changes, you will be given instructions for your daily incision care. If you are not able to change the dressings, a visiting nurse will be arranged.    DO NOT put any powders, creams, ointments, or lotions on your incision.    LEG SWELLING: Most patients have noticeable leg swelling after leg surgery. This usually improves within a few weeks. If swelling is present, elevate the leg whenever possible. Avoid sitting with the leg hanging down for prolonged periods of time.  Walking is beneficial.  An ACE bandage or support stocking may be helpful, but this should be discussed with your physician prior to use if you have a bypass.    FOLLOW UP STUDIES:  Doppler ultrasound studies are very important for long-term management.  Your surgeon will arrange this at your first postoperative visit.  Repeat studies are then scheduled every three months for the first year and periodically after this.    FOLLOW UP APPOINTMENTS:  Making and keeping follow up appointments and ultrasound tests are important to your recovery.  If you have difficulty making it to or keeping your follow up appointments, call the office.    If you have increased pain, fever >101.5, increased drainage, redness or a bad smell at your surgery site, new coldness/numbness of your arm or leg, please call us immediately and GO directly to the ER.    Appt w/ Izzy Solano PA-C:  6/24/2024 at 8:30am, [place] office      PLEASE CALL THE OFFICE IF YOU HAVE ANY QUESTIONS  [phone]  [phone] [address], [place] [address]  [address] [facility], [address], [place] [address]  [address] [facility], [place] [address]  [address] [facility], [address], [place] [address]  [address] [facility], 1st Floor, [place] [address]  [address] [facility], 2nd Floor, [address], [place] [address]  [address] [facility], [address], [place] [address]  [address] [facility], 1st Floor, [address], [place] [address]  [address] [facility], [place] [address]  [address] [address]   ____________________________________________________________________    DISCHARGE INSTRUCTIONS  AMPUTATION    REHABILITATION:   You will require some form of rehabilitation after this procedure. This will assist with your return to daily activities by incorporating exercise and balance training. This may be in the form of visiting nurses and physical therapy in your home or more commonly, admission to a rehabilitation facility for a period of time before you return home.     ACTIVITY:  You cannot put any weight on the bottom of your residual limb. Physical therapy and rehab staff will direct progression of your activity based on your progress. Please follow their recommendations closely.    It is incredibly important to avoid falling on your residual limb as this can cause bleeding and the wound to open up.  Your surgeon will decide when you are ready to be referred to the prosthetist for a fitting.  This will occur sometime after your 4-week appointment and often later.    DIET:  Resume your normal diet.  Good nutrition is important for healing of your incision.    INCISION:  Wash incision daily with soap and water and thoroughly pat dry.  Apply clean, dry gauze and an ACE wrap daily to decrease swelling and get your stump ready to be fit for a prosthetic.  It is  normal to have swelling or discoloration around the incision. If increasing redness or pain develops, call our office immediately.    You will have stitches or staples and these will be evaluated for removal at your first post-operative visit around 4 weeks after surgery. If any of your incisions are open and require dressing changes, you will be given instructions for your daily incision care. If you are not able to change the dressings, a visiting nurse will be arranged.  Do not bathe in a tub or swim until your incision is completely healed.  DO NOT put any powders, creams, ointments, or lotions on your incision.    FOLLOW UP APPOINTMENTS:  Making and keeping follow up appointments and ultrasound tests are important to your recovery.  If you have difficulty making it to or keeping your follow up appointments, call the office.    If you have increased pain, fever >101.5, increased drainage, redness or a bad smell at your surgery site, new coldness/numbness of your arm or leg, please call us immediately and GO directly to the ER.    Appt w/ Izzy Solano PA-C: 6/24/2024 at 8:30am, [place] office      PLEASE CALL THE OFFICE IF YOU HAVE ANY QUESTIONS  [phone]  [phone] [address], [address], [place] [address]  [address] [facility], [address], [place] [address]  [address] [place] [address]  [address] [place] [address], [address] [address]  [address] [facility], [place] [address]  [address] [facility], 2nd Floor, [address], [place] [address]  [address] [facility], [address], [place] [address]  [address] [facility], 1st Floor, [address], [place] [address]  [address], [place] [address]  [address] [address]

## 2024-06-10 NOTE — ASSESSMENT & PLAN NOTE
Patient presented with left lower extremity acute limb ischemia with extensive left iliofemoral and left infrainguinal embolism with nonviable left lower extremity  Status post left femoral thrombectomy and AKA on 6/7  States postoperative pain is relatively controlled - pain management service following  CTA chest ordered today, pending result  Continue heparin drip  PT/OT evaluation   Further management per vascular surgery

## 2024-06-10 NOTE — ASSESSMENT & PLAN NOTE
"Per medical provider on 6/8:  \"Patient is currently incarcerated. I called the facility at [phone] and spoke to the GUTIERREZ Cid.  No provider is available over the weekend. Patient was admitted to the facility on 5/9.  Prior to that he was homeless. He is a poor historian and told the facility that he is only on Biktarvy. Facility currently have him on Biktarvy, Cabenuva injection, and Tylenol.  They have no records of his medical history or med list. Per chart review, patient used to live in a group home and appears to have been receiving 24/7 care. Will need to verify patient's medication list with PCP, neurology office or pharmacy on Monday, 6/10. Staff at the correctional facility recommended to call on Monday to speak with evgeny medical provider.\"  I called the facility to talk to the medical provider unfortunately they were not there I discussed with the nurse.  Trying to call tomorrow  "

## 2024-06-10 NOTE — PROGRESS NOTES
Vascular Nurse Navigator Post Op Education    Met with patient and Correction officers at bedside to introduce myself as Vascular Nurse Navigator and explained my role.  Patient is appropriate and accepting to education. Patient was educated with Review of written materials provided, Teachback, Explanation, Demonstration, and Question & Answer on expectations of post op care and recovery on Left femoral thrombectomy with Left AKA. Patient is a smoker  (1 ppd x 30 yrs), as such Smoking effects on the lungs, tobacco triggers, and Smoking cessation was reviewed. Education provided to patient and corrections officers on infection prevention, activity limitations, when to call the office, importance of follow up, and incisional care.  Discharge instruction handout provided to patient to review.  Provided patient with a list of prosthetic suppliers and information on outpatient therapy.        Tobacco use is a significant patient-modifiable risk factor for this patient’s vascular disease with multiple vascular comorbidities, and a significant risk factor for failure of and complications from any endovascular or surgical interventions.    I explained to the patient the effects of smoking including peripheral artery disease, coronary artery disease, cerebrovascular disease as well as cancer and chronic obstructive pulmonary disease. I asked the patient to stop smoking immediately. It is never too late to quit, and many studies show significant health benefits as well as economical savings after smoking cessation. I offered to the patient nicotine replacement therapy as well as referral to the smoking cessation program and access to the quit line 3-532-BTWVLLW or ambulatory referral to our network smoking cessation program.    Based on our conversation, this patient does not appear ready to quit    And declined my offer of nicotine replacement or tobacco cessation medications    The patient did not set a quit date.     I  spent approximately 5 minutes on tobacco cessation counseling with this patient.

## 2024-06-10 NOTE — ARC ADMISSION
Referral received for consideration of patient for inpatient acute rehab. Will review patient's case with ARC physician and update CM as able.    Additionally, will need clarification regarding home dispo plan/support (ie. will patient be returning to alf). CM has been updated.

## 2024-06-10 NOTE — PLAN OF CARE
Problem: PAIN - ADULT  Goal: Verbalizes/displays adequate comfort level or baseline comfort level  Description: Interventions:  - Encourage patient to monitor pain and request assistance  - Assess pain using appropriate pain scale  - Administer analgesics based on type and severity of pain and evaluate response  - Implement non-pharmacological measures as appropriate and evaluate response  - Consider cultural and social influences on pain and pain management  - Notify physician/advanced practitioner if interventions unsuccessful or patient reports new pain  Outcome: Progressing     Problem: INFECTION - ADULT  Goal: Absence or prevention of progression during hospitalization  Description: INTERVENTIONS:  - Assess and monitor for signs and symptoms of infection  - Monitor lab/diagnostic results  - Monitor all insertion sites, i.e. indwelling lines, tubes, and drains  - Monitor endotracheal if appropriate and nasal secretions for changes in amount and color  - [place] appropriate cooling/warming therapies per order  - Administer medications as ordered  - Instruct and encourage patient and family to use good hand hygiene technique  - Identify and instruct in appropriate isolation precautions for identified infection/condition  Outcome: Progressing  Goal: Absence of fever/infection during neutropenic period  Description: INTERVENTIONS:  - Monitor WBC    Outcome: Progressing

## 2024-06-10 NOTE — PLAN OF CARE
Problem: OCCUPATIONAL THERAPY ADULT  Goal: Performs self-care activities at highest level of function for planned discharge setting.  See evaluation for individualized goals.  Description: Treatment Interventions: ADL retraining, Functional transfer training, Endurance training, Cognitive reorientation, Equipment evaluation/education, Compensatory technique education, Continued evaluation, Energy conservation, Activityengagement          See flowsheet documentation for full assessment, interventions and recommendations.   6/10/2024 1450 by Liang Shah  Note: Limitation: Decreased ADL status, Decreased Safe judgement during ADL, Decreased cognition, Decreased endurance, Decreased sensation, Decreased self-care trans, Decreased high-level ADLs  Prognosis: Good  Assessment: Pt is a 63 y.o. male admitted 6/7/2024 to [facility] for LLE pain. Pt underwent LLE Thromboembolectomy of L Iliac Profunda and SFA, LAKA on 6/7/2024. Pt is 3 days post op, NWB LLE. Pt with active OT eval and treat orders. Pt  has a past medical history of Anxiety, Depression, HIV disease ([facility]), Substance abuse ([facility]), and Suicide attempt ([facility]).  [facility] [place]. Pt Pt is a poor historian and unable to communicate effectively regarding PLOF. Per pt chart review, Pt required A for ADLs, A for IADLs, and I FM. Currently, Pt is Min UB ADL, Mod A LB ADL, and performs sit<>stand transfers with Mod ax2 with bilateral HHA.  Limitations include decreased ADL/ higher-level ADL status, decreased cognition, decreased safe judgement, decreased sensation, decreased self-care trans, and decreased balance. This impacts pt's ability to complete UB and LB dressing and bathing, toileting, transfers, functional mobility, community mobility, home and health maintenance, safe engagement in typical daily routine, and any other activities previously participated in at baseline. The patient's raw score on the AM-PAC Daily  Activity Inpatient Short Form is 15. A raw score of less than 19 suggests the patient may benefit from discharge to post-acute rehabilitation services. Please refer to the recommendation of the Occupational Therapist for safe discharge planning. From OT standpoint, pt should discharge to level I   services. OT will continue to follow 2-3x per week for 10-14 days to meet goals.     Rehab Resource Intensity Level, OT: I (Maximum Resource Intensity)       6/10/2024 1450 by Linag Shah  Note: Limitation: Decreased ADL status, Decreased Safe judgement during ADL, Decreased cognition, Decreased endurance, Decreased sensation, Decreased self-care trans, Decreased high-level ADLs  Prognosis: Good  Assessment: Pt is a 63 y.o. male admitted 6/7/2024 to [facility] for LLE pain. Pt underwent LLE Thromboembolectomy of L Iliac Profunda and SFA, LAKA on 6/7/2024. Pt is 3 days post op, NWB LLE. Pt with active OT eval and treat orders. Pt  has a past medical history of Anxiety, Depression, HIV disease ([facility]), Substance abuse ([facility]), and Suicide attempt ([facility]).  Pt is incarcerated at [facility]. Pt Pt is a poor historian and unable to communicate effectively regarding PLOF. Per pt chart review, Pt required A for ADLs, A for IADLs, and I FM. Currently, Pt is Min UB ADL, Mod A LB ADL, and performs sit<>stand transfers with Mod ax2 with bilateral HHA.  Limitations include decreased ADL/ higher-level ADL status, decreased cognition, decreased safe judgement, decreased sensation, decreased self-care trans, and decreased balance. This impacts pt's ability to complete UB and LB dressing and bathing, toileting, transfers, functional mobility, community mobility, home and health maintenance, safe engagement in typical daily routine, and any other activities previously participated in at baseline. The patient's raw score on the AM-PAC Daily Activity Inpatient Short Form is 15. A raw score of less than 19 suggests  the patient may benefit from discharge to post-acute rehabilitation services. Please refer to the recommendation of the Occupational Therapist for safe discharge planning. From OT standpoint, pt should discharge to level I   services. OT will continue to follow 2-3x per week for 10-14 days to meet goals.     Rehab Resource Intensity Level, OT: I (Maximum Resource Intensity)

## 2024-06-10 NOTE — ASSESSMENT & PLAN NOTE
Diagnosed in July 2019 - follows with [facility] neurology  Continue Depakote/Lamictal/Zonisamide/Gabapentin -> doses now adjusted per most recent outpatient neurology changes on record -> Depakote 1250 mg daily, Zonisamide 400 mg daily, and Lamictal 50 mg BID   Facility was called on Saturday to verify his medications.  According to the nurse he was not on any antiseizure medications  Called back today, physician not there but again discussed with our nurse who looked into the chart and patient is not on any antiseizure medications  Per patient he has been taking these medications and according to pharmacy refills, he failed them in April before he was incarcerated

## 2024-06-11 ENCOUNTER — APPOINTMENT (INPATIENT)
Dept: NON INVASIVE DIAGNOSTICS | Facility: HOSPITAL | Age: 64
DRG: 169 | End: 2024-06-11
Payer: OTHER GOVERNMENT

## 2024-06-11 ENCOUNTER — APPOINTMENT (INPATIENT)
Dept: RADIOLOGY | Facility: HOSPITAL | Age: 64
DRG: 169 | End: 2024-06-11
Payer: OTHER GOVERNMENT

## 2024-06-11 LAB
ANION GAP SERPL CALCULATED.3IONS-SCNC: 8 MMOL/L (ref 4–13)
APTT PPP: 67 SECONDS (ref 23–37)
APTT PPP: >210 SECONDS (ref 23–37)
BASOPHILS # BLD AUTO: 0.08 THOUSANDS/ÂΜL (ref 0–0.1)
BASOPHILS NFR BLD AUTO: 1 % (ref 0–1)
BUN SERPL-MCNC: 12 MG/DL (ref 5–25)
CALCIUM SERPL-MCNC: 7.9 MG/DL (ref 8.4–10.2)
CHLORIDE SERPL-SCNC: 107 MMOL/L (ref 96–108)
CO2 SERPL-SCNC: 24 MMOL/L (ref 21–32)
CREAT SERPL-MCNC: 0.97 MG/DL (ref 0.6–1.3)
EOSINOPHIL # BLD AUTO: 0.22 THOUSAND/ÂΜL (ref 0–0.61)
EOSINOPHIL NFR BLD AUTO: 2 % (ref 0–6)
ERYTHROCYTE [DISTWIDTH] IN BLOOD BY AUTOMATED COUNT: 13.8 % (ref 11.6–15.1)
GFR SERPL CREATININE-BSD FRML MDRD: 83 ML/MIN/1.73SQ M
GLUCOSE SERPL-MCNC: 95 MG/DL (ref 65–140)
HCT VFR BLD AUTO: 39.1 % (ref 36.5–49.3)
HGB BLD-MCNC: 11.7 G/DL (ref 12–17)
IMM GRANULOCYTES # BLD AUTO: 0.22 THOUSAND/UL (ref 0–0.2)
IMM GRANULOCYTES NFR BLD AUTO: 2 % (ref 0–2)
LYMPHOCYTES # BLD AUTO: 1.87 THOUSANDS/ÂΜL (ref 0.6–4.47)
LYMPHOCYTES NFR BLD AUTO: 18 % (ref 14–44)
MAX HR PERCENT: 65 %
MAX HR: 104 BPM
MCH RBC QN AUTO: 28 PG (ref 26.8–34.3)
MCHC RBC AUTO-ENTMCNC: 29.9 G/DL (ref 31.4–37.4)
MCV RBC AUTO: 94 FL (ref 82–98)
MONOCYTES # BLD AUTO: 0.44 THOUSAND/ÂΜL (ref 0.17–1.22)
MONOCYTES NFR BLD AUTO: 4 % (ref 4–12)
NEUTROPHILS # BLD AUTO: 7.61 THOUSANDS/ÂΜL (ref 1.85–7.62)
NEUTS SEG NFR BLD AUTO: 73 % (ref 43–75)
NRBC BLD AUTO-RTO: 0 /100 WBCS
NUC STRESS EJECTION FRACTION: 46 %
PLATELET # BLD AUTO: 483 THOUSANDS/UL (ref 149–390)
PMV BLD AUTO: 8.4 FL (ref 8.9–12.7)
POTASSIUM SERPL-SCNC: 4.2 MMOL/L (ref 3.5–5.3)
RATE PRESSURE PRODUCT: NORMAL
RBC # BLD AUTO: 4.18 MILLION/UL (ref 3.88–5.62)
SL CV REST NUCLEAR ISOTOPE DOSE: 9.5 MCI
SL CV STRESS NUCLEAR ISOTOPE DOSE: 31.8 MCI
SL CV STRESS RECOVERY BP: NORMAL MMHG
SL CV STRESS RECOVERY HR: 95 BPM
SL CV STRESS RECOVERY O2 SAT: 97 %
SODIUM SERPL-SCNC: 139 MMOL/L (ref 135–147)
STRESS ANGINA INDEX: 0
STRESS BASELINE BP: NORMAL MMHG
STRESS BASELINE HR: 91 BPM
STRESS O2 SAT REST: 99 %
STRESS PEAK HR: 104 BPM
STRESS POST EXERCISE DUR MIN: 3 MIN
STRESS POST EXERCISE DUR SEC: 0 SEC
STRESS POST O2 SAT PEAK: 97 %
STRESS POST PEAK BP: 108 MMHG
STRESS/REST PERFUSION RATIO: 1.04
WBC # BLD AUTO: 10.44 THOUSAND/UL (ref 4.31–10.16)

## 2024-06-11 PROCEDURE — A9502 TC99M TETROFOSMIN: HCPCS

## 2024-06-11 PROCEDURE — 78452 HT MUSCLE IMAGE SPECT MULT: CPT | Performed by: INTERNAL MEDICINE

## 2024-06-11 PROCEDURE — 99232 SBSQ HOSP IP/OBS MODERATE 35: CPT | Performed by: INTERNAL MEDICINE

## 2024-06-11 PROCEDURE — 93016 CV STRESS TEST SUPVJ ONLY: CPT | Performed by: INTERNAL MEDICINE

## 2024-06-11 PROCEDURE — 97112 NEUROMUSCULAR REEDUCATION: CPT

## 2024-06-11 PROCEDURE — 97535 SELF CARE MNGMENT TRAINING: CPT

## 2024-06-11 PROCEDURE — 85025 COMPLETE CBC W/AUTO DIFF WBC: CPT | Performed by: INTERNAL MEDICINE

## 2024-06-11 PROCEDURE — 80048 BASIC METABOLIC PNL TOTAL CA: CPT | Performed by: INTERNAL MEDICINE

## 2024-06-11 PROCEDURE — 93017 CV STRESS TEST TRACING ONLY: CPT

## 2024-06-11 PROCEDURE — 97530 THERAPEUTIC ACTIVITIES: CPT

## 2024-06-11 PROCEDURE — 78452 HT MUSCLE IMAGE SPECT MULT: CPT

## 2024-06-11 PROCEDURE — 99222 1ST HOSP IP/OBS MODERATE 55: CPT | Performed by: NURSE PRACTITIONER

## 2024-06-11 PROCEDURE — 85730 THROMBOPLASTIN TIME PARTIAL: CPT | Performed by: INTERNAL MEDICINE

## 2024-06-11 PROCEDURE — 93018 CV STRESS TEST I&R ONLY: CPT | Performed by: INTERNAL MEDICINE

## 2024-06-11 RX ORDER — METOPROLOL SUCCINATE 25 MG/1
25 TABLET, EXTENDED RELEASE ORAL EVERY 12 HOURS
Status: DISCONTINUED | OUTPATIENT
Start: 2024-06-11 | End: 2024-07-06 | Stop reason: HOSPADM

## 2024-06-11 RX ORDER — REGADENOSON 0.08 MG/ML
0.4 INJECTION, SOLUTION INTRAVENOUS ONCE
Status: COMPLETED | OUTPATIENT
Start: 2024-06-11 | End: 2024-06-11

## 2024-06-11 RX ORDER — AMINOPHYLLINE 25 MG/ML
INJECTION, SOLUTION INTRAVENOUS
Status: DISCONTINUED
Start: 2024-06-11 | End: 2024-06-11 | Stop reason: WASHOUT

## 2024-06-11 RX ORDER — REGADENOSON 0.08 MG/ML
INJECTION, SOLUTION INTRAVENOUS
Status: DISPENSED
Start: 2024-06-11 | End: 2024-06-12

## 2024-06-11 RX ADMIN — DIVALPROEX SODIUM 1250 MG: 500 TABLET, EXTENDED RELEASE ORAL at 08:27

## 2024-06-11 RX ADMIN — ACETAMINOPHEN 975 MG: 325 TABLET, FILM COATED ORAL at 05:47

## 2024-06-11 RX ADMIN — OXYCODONE HYDROCHLORIDE 10 MG: 10 TABLET ORAL at 08:27

## 2024-06-11 RX ADMIN — RIVAROXABAN 20 MG: 20 TABLET, FILM COATED ORAL at 18:39

## 2024-06-11 RX ADMIN — METOPROLOL SUCCINATE 25 MG: 25 TABLET, EXTENDED RELEASE ORAL at 18:39

## 2024-06-11 RX ADMIN — REGADENOSON 0.4 MG: 0.08 INJECTION, SOLUTION INTRAVENOUS at 15:07

## 2024-06-11 RX ADMIN — LOSARTAN POTASSIUM 50 MG: 50 TABLET, FILM COATED ORAL at 08:28

## 2024-06-11 RX ADMIN — LEVOCARNITINE 330 MG: 1 SOLUTION ORAL at 10:53

## 2024-06-11 RX ADMIN — LAMOTRIGINE 50 MG: 25 TABLET ORAL at 17:30

## 2024-06-11 RX ADMIN — HYDROMORPHONE HYDROCHLORIDE 0.5 MG: 1 INJECTION, SOLUTION INTRAMUSCULAR; INTRAVENOUS; SUBCUTANEOUS at 09:17

## 2024-06-11 RX ADMIN — SERTRALINE HYDROCHLORIDE 25 MG: 50 TABLET ORAL at 08:28

## 2024-06-11 RX ADMIN — HEPARIN SODIUM 18 UNITS/KG/HR: 10000 INJECTION, SOLUTION INTRAVENOUS at 05:48

## 2024-06-11 RX ADMIN — LEVOCARNITINE 330 MG: 1 SOLUTION ORAL at 16:44

## 2024-06-11 RX ADMIN — ASPIRIN 81 MG: 81 TABLET, COATED ORAL at 08:28

## 2024-06-11 RX ADMIN — BICTEGRAVIR SODIUM, EMTRICITABINE, AND TENOFOVIR ALAFENAMIDE FUMARATE 1 TABLET: 50; 200; 25 TABLET ORAL at 08:29

## 2024-06-11 RX ADMIN — GABAPENTIN 100 MG: 100 CAPSULE ORAL at 08:28

## 2024-06-11 RX ADMIN — Medication 6 MG: at 22:30

## 2024-06-11 RX ADMIN — SENNOSIDES 17.2 MG: 8.6 TABLET, FILM COATED ORAL at 17:30

## 2024-06-11 RX ADMIN — GABAPENTIN 100 MG: 100 CAPSULE ORAL at 16:44

## 2024-06-11 RX ADMIN — ACETAMINOPHEN 975 MG: 325 TABLET, FILM COATED ORAL at 22:30

## 2024-06-11 RX ADMIN — ZONISAMIDE 400 MG: 100 CAPSULE ORAL at 08:27

## 2024-06-11 RX ADMIN — DOLUTEGRAVIR SODIUM 50 MG: 50 TABLET, FILM COATED ORAL at 08:27

## 2024-06-11 RX ADMIN — TAMSULOSIN HYDROCHLORIDE 0.4 MG: 0.4 CAPSULE ORAL at 16:44

## 2024-06-11 RX ADMIN — GABAPENTIN 100 MG: 100 CAPSULE ORAL at 22:30

## 2024-06-11 RX ADMIN — LAMOTRIGINE 50 MG: 25 TABLET ORAL at 08:28

## 2024-06-11 RX ADMIN — LEVOCARNITINE 330 MG: 1 SOLUTION ORAL at 22:32

## 2024-06-11 RX ADMIN — MIRTAZAPINE 15 MG: 15 TABLET, FILM COATED ORAL at 22:30

## 2024-06-11 RX ADMIN — MICONAZOLE NITRATE 1 APPLICATION: 20 CREAM TOPICAL at 17:32

## 2024-06-11 RX ADMIN — HYDROMORPHONE HYDROCHLORIDE 0.5 MG: 1 INJECTION, SOLUTION INTRAMUSCULAR; INTRAVENOUS; SUBCUTANEOUS at 13:06

## 2024-06-11 RX ADMIN — SERTRALINE HYDROCHLORIDE 50 MG: 50 TABLET ORAL at 08:29

## 2024-06-11 RX ADMIN — MICONAZOLE NITRATE 1 APPLICATION: 20 CREAM TOPICAL at 09:14

## 2024-06-11 RX ADMIN — ATORVASTATIN CALCIUM 80 MG: 80 TABLET, FILM COATED ORAL at 16:44

## 2024-06-11 NOTE — ARC ADMISSION
Reviewed patient's case with [facility] physician - requesting PM&R consult for further review/recommendations. CM has been updated. Will continue to follow at this time.

## 2024-06-11 NOTE — ASSESSMENT & PLAN NOTE
Patient presented with left lower extremity acute limb ischemia with extensive left iliofemoral and left infrainguinal embolism with nonviable left lower extremity  Status post left femoral thrombectomy and AKA on 6/7  States postoperative pain is relatively controlled - pain management service following  Continue heparin drip  PT/OT evaluation   Further management per vascular surgery

## 2024-06-11 NOTE — PLAN OF CARE
Problem: PHYSICAL THERAPY ADULT  Goal: Performs mobility at highest level of function for planned discharge setting.  See evaluation for individualized goals.  Description: Treatment/Interventions: ADL retraining, Functional transfer training, LE strengthening/ROM, Therapeutic exercise, Endurance training, Patient/family training, Equipment eval/education, Bed mobility, Gait training, Spoke to nursing, Spoke to case management, OT, Elevations, Cognitive reorientation  Equipment Recommended:  (tbd)       See flowsheet documentation for full assessment, interventions and recommendations.  Outcome: Progressing  Note: Prognosis: Good  Problem List: Decreased strength, Decreased endurance, Impaired balance, Decreased mobility, Pain, Decreased cognition, Impaired judgement, Decreased safety awareness  Assessment: Pt seen for PT treatment session this date. Therapy session focused on transfer training, bed mobility, standing balance/tolerance in order to improve overall mobility and independence. Pt requires mod Ax2 for STS transfers w/ RW and demonstrated improved tolerance and endurance by increasing standing time to 1-2 min w/ good overall balance noted. Pt completed SPT w/ BL HHA and MAX Ax2, pt requires continuous VC for hand placement, proper use of RW and safety. Pt making good progress toward goals. Pt was left sitting at the end of PT session with all needs in reach. Pt would benefit from continued PT services while in hospital to address remaining limitations. PT to continue treating pt and recommends acute rehab. The patient's AM-PAC Basic Mobility Inpatient Short Form Raw Score is 11. A Raw score of less than or equal to 16 suggests the patient may benefit from discharge to post-acute rehabilitation services. Please also refer to the recommendation of the Physical Therapist for safe discharge planning.  Barriers to Discharge: Decreased caregiver support     Rehab Resource Intensity Level, PT: I (Maximum  Resource Intensity)    See flowsheet documentation for full assessment.

## 2024-06-11 NOTE — PLAN OF CARE
Problem: PAIN - ADULT  Goal: Verbalizes/displays adequate comfort level or baseline comfort level  Description: Interventions:  - Encourage patient to monitor pain and request assistance  - Assess pain using appropriate pain scale  - Administer analgesics based on type and severity of pain and evaluate response  - Implement non-pharmacological measures as appropriate and evaluate response  - Consider cultural and social influences on pain and pain management  - Notify physician/advanced practitioner if interventions unsuccessful or patient reports new pain  Outcome: Progressing     Problem: INFECTION - ADULT  Goal: Absence or prevention of progression during hospitalization  Description: INTERVENTIONS:  - Assess and monitor for signs and symptoms of infection  - Monitor lab/diagnostic results  - Monitor all insertion sites, i.e. indwelling lines, tubes, and drains  - Monitor endotracheal if appropriate and nasal secretions for changes in amount and color  - [place] appropriate cooling/warming therapies per order  - Administer medications as ordered  - Instruct and encourage patient and family to use good hand hygiene technique  - Identify and instruct in appropriate isolation precautions for identified infection/condition  Outcome: Progressing  Goal: Absence of fever/infection during neutropenic period  Description: INTERVENTIONS:  - Monitor WBC    Outcome: Progressing     Problem: SAFETY ADULT  Goal: Patient will remain free of falls  Description: INTERVENTIONS:  - Educate patient/family on patient safety including physical limitations  - Instruct patient to call for assistance with activity   - Consult OT/PT to assist with strengthening/mobility   - Keep Call bell within reach  - Keep bed low and locked with side rails adjusted as appropriate  - Keep care items and personal belongings within reach  - Initiate and maintain comfort rounds  - Make Fall Risk Sign visible to staff  - Offer Toileting every  Hours,  in advance of need  - Initiate/Maintain alarm  - Obtain necessary fall risk management equipment:   - Apply yellow socks and bracelet for high fall risk patients  - Consider moving patient to room near nurses station  Outcome: Progressing  Goal: Maintain or return to baseline ADL function  Description: INTERVENTIONS:  -  Assess patient's ability to carry out ADLs; assess patient's baseline for ADL function and identify physical deficits which impact ability to perform ADLs (bathing, care of mouth/teeth, toileting, grooming, dressing, etc.)  - Assess/evaluate cause of self-care deficits   - Assess range of motion  - Assess patient's mobility; develop plan if impaired  - Assess patient's need for assistive devices and provide as appropriate  - Encourage maximum independence but intervene and supervise when necessary  - Involve family in performance of ADLs  - Assess for home care needs following discharge   - Consider OT consult to assist with ADL evaluation and planning for discharge  - Provide patient education as appropriate  Outcome: Progressing  Goal: Maintains/Returns to pre admission functional level  Description: INTERVENTIONS:  - Perform AM-PAC 6 Click Basic Mobility/ Daily Activity assessment daily.  - Set and communicate daily mobility goal to care team and patient/family/caregiver.   - Collaborate with rehabilitation services on mobility goals if consulted  - Perform Range of Motion times a day.  - Reposition patient every  hours.  - Dangle patient  times a day  - Stand patient  times a day  - Ambulate patient  times a day  - Out of bed to chair  times a day   - Out of bed for meals times a day  - Out of bed for toileting  - Record patient progress and toleration of activity level   Outcome: Progressing     Problem: DISCHARGE PLANNING  Goal: Discharge to home or other facility with appropriate resources  Description: INTERVENTIONS:  - Identify barriers to discharge w/patient and caregiver  - Arrange for  needed discharge resources and transportation as appropriate  - Identify discharge learning needs (meds, wound care, etc.)  - Arrange for interpretive services to assist at discharge as needed  - Refer to Case Management Department for coordinating discharge planning if the patient needs post-hospital services based on physician/advanced practitioner order or complex needs related to functional status, cognitive ability, or social support system  Outcome: Progressing     Problem: Knowledge Deficit  Goal: Patient/family/caregiver demonstrates understanding of disease process, treatment plan, medications, and discharge instructions  Description: Complete learning assessment and assess knowledge base.  Interventions:  - Provide teaching at level of understanding  - Provide teaching via preferred learning methods  Outcome: Progressing

## 2024-06-11 NOTE — OCCUPATIONAL THERAPY NOTE
Occupational Therapy Progress Note     Patient Name: Sunil Patel  Today's Date: 6/11/2024  Problem List  Principal Problem:    Acute lower limb ischemia  Active Problems:    Human immunodeficiency virus (HIV) infection (HCC)    Benign essential hypertension    Cerebrovascular accident (CVA) (HCC)    Left ventricular thrombus    Seizures (HCC)    Medication management    Carnitine deficiency (HCC)           06/11/24 1054   OT Last Visit   OT Visit Date 06/11/24   Note Type   Note Type Treatment   Pain Assessment   Pain Assessment Tool FLACC   Pain Rating: FLACC (Rest) - Face 0   Pain Rating: FLACC (Rest) - Legs 0   Pain Rating: FLACC (Rest) - Activity 0   Pain Rating: FLACC (Rest) - Cry 0   Pain Rating: FLACC (Rest) - Consolability 0   Score: FLACC (Rest) 0   Pain Rating: FLACC (Activity) - Face 2   Pain Rating: FLACC (Activity) - Legs 2   Pain Rating: FLACC (Activity) - Activity 2   Pain Rating: FLACC (Activity) - Cry 2   Pain Rating: FLACC (Activity) - Consolability 2   Score: FLACC (Activity) 10   Restrictions/Precautions   Weight Bearing Precautions Per Order (S)  Yes   LLE Weight Bearing Per Order (S)  NWB  (s/p L AKA)   Other Precautions Cognitive;Chair Alarm;Bed Alarm;Fall Risk;Pain  (pt w 2 [employer] in room, handcuffs/)   ADL   Where Assessed Edge of bed   LB Dressing Assistance 2  Maximal Assistance   LB Dressing Deficit Don/doff L sock;Don/doff R sock   Bed Mobility   Rolling R 4  Minimal assistance   Additional items Assist x 1;Increased time required;Verbal cues   Supine to Sit 4  Minimal assistance   Additional items Assist x 1;Increased time required;LE management;Verbal cues   Additional Comments pt rolled to r Side w min A, performed sup>sit w min A as well. able to sit EOB w S w/o UE support.   Transfers   Sit to Stand 3  Moderate assistance   Additional items Assist x 2;Increased time required;Verbal cues   Stand to Sit 3  Moderate assistance   Additional items Assist x 2;Increased  time required;Verbal cues   Stand pivot 2  Maximal assistance   Additional items Assist x 2;Increased time required;Verbal cues   Additional Comments pt benefits from one step commands. He also requires [place] A for proper hand placement during transfers. required max ax2 for stand pivot to chair. practiced sts transfers at window for change of scenery/benefit of pt's mental health;  required mod Ax2 to perform 4x.   Cognition   Overall Cognitive Status (S)  Impaired   Arousal/Participation Alert;Responsive   Attention Attends with cues to redirect   Orientation Level Oriented to person;Oriented to place;Disoriented to time;Disoriented to situation   Memory Decreased recall of precautions;Decreased short term memory;Decreased recall of recent events   Following Commands Follows one step commands with increased time or repetition   Comments pt cooperative. requires one step, simple commands to execute tasks and benefits from visual demonstration as well as [place] A. Pt also requires inc time between each task to rest himself.   Activity Tolerance   Activity Tolerance Patient tolerated treatment well   Medical Staff Made Aware DPT, RN   Assessment   Assessment Pt seen on this date for OT session focusing on ADL retraining, cognitive reorientation, body mechanics, transfer retraining, increasing activity tolerance/endurance and EOB sitting to increase ability to participate in ADL/functional tasks. Pt was found in bed and was left in chair w/ all needs within reach, chair alarm on. Pt completed bed mob w min ax1, sts w mod ax2 and stand pivot to chair with max ax2 c rw for support. Pt practiced further STS transfers with mod Ax2, rw for support. Does require visual and verbal cues to properly execute tasks, and benefits from simple, one step commands. Pt w/ improvements in activity tolerance, transfer ability, adl task completion however is still limited 2* decreased ADL/High-level ADL status, decreased activity  tolerance/endurance, decreased cognition, decreased self-care trans, decreased safety awareness and insight to condition.   The patient's raw score on the AM-PAC Daily Activity Inpatient Short Form is 16. A raw score of less than 19 suggests the patient may benefit from discharge to post-acute rehabilitation services. Please refer to the recommendation of the Occupational Therapist for safe discharge planning.   Recommending pt D/C to level I  when medically stable. Pt will continue to benefit from acute OT services to meet goals.   Plan   Treatment Interventions ADL retraining;Functional transfer training;Endurance training;Cognitive reorientation;Energy conservation;Activityengagement   Goal Expiration Date 06/24/24   OT Treatment Day 1   OT Frequency 2-3x/wk   Discharge Recommendation   Rehab Resource Intensity Level, OT I (Maximum Resource Intensity)   AM-PAC Daily Activity Inpatient   Lower Body Dressing 2   Bathing 2   Toileting 2   Upper Body Dressing 3   Grooming 3   Eating 4   Daily Activity Raw Score 16   Daily Activity Standardized Score (Calc for Raw Score >=11) 35.96   AM-PAC Applied Cognition Inpatient   Following a Speech/Presentation 3   Understanding Ordinary Conversation 3   Taking Medications 1   Remembering Where Things Are Placed or Put Away 2   Remembering List of 4-5 Errands 2   Taking Care of Complicated Tasks 1   Applied Cognition Raw Score 12   Applied Cognition Standardized Score 28.82   Modified [place] Scale   Modified [place] Scale 4   End of Consult   Education Provided Yes  ( present t/o session.)   Patient Position at End of Consult Bedside chair;All needs within reach;Bed/Chair alarm activated   Nurse Communication Nurse aware of consult       YUKI Brady, OTR/L

## 2024-06-11 NOTE — PROGRESS NOTES
Progress Note - Cardiology   Sunil Patel 63 y.o. male MRN: [id number]  Unit/Bed#: [facility] 507-01 Encounter: [id number]    Assessment:  Principal Problem:    Acute lower limb ischemia  Active Problems:    Human immunodeficiency virus (HIV) infection (HCC)    Benign essential hypertension    Cerebrovascular accident (CVA) (HCC)    Left ventricular thrombus    Seizures (HCC)    Medication management    Carnitine deficiency (HCC)    Acute left limb ischemia secondary to embolism. LV apical thrombus noted. PAD found, as well. EF 40 to 45% with apical hypokinesis.  Stress test performed without any significant ischemia. Area of fixed defect noted.    He also had a CTA done on 6/10 which did redemonstrate the LV apical thrombus. No significant coronary artery calcification present    Plan:  I reviewed his stress test. I do not see any significant areas of ischemia. My recommendation would be to continue with medical management as is for his PAD. I do not think he needs a cardiac catheterization at this time given the lack of any significant ischemia, and no current cardiac symptoms.    For the LV apical thrombus, continue anticoagulation. Continue evaluation regarding which optimal anticoagulant to use. If he is going to be incarcerated or in rehab, then using daily Xarelto would be favorable. If this is going to not be affordable in the future, then he would need to transition to Coumadin.    On his SPECT scan, he has a low normal EF. On his echo he was at 40 to 45%. Given the overall situation, it is very possible that he could have had a stress-induced cardiomyopathy and had apical thrombus at that time and is now recovering from this overall. Continue losartan. I am adding metoprolol today. Not needing diuretics.      Stable for rehab from cardiac standpoint once anticoagulation specifics are determined.    Subjective/Objective     Subjective:  Denies any complaints. Had a stress test which I reviewed  "personally.    Objective:    Vitals: /73   Pulse 91   Temp 98.4 °F (36.9 °C) (Oral)   Resp 20   Ht 5' 10\" (1.778 m)   Wt 85.7 kg (189 lb)   SpO2 99%   BMI 27.12 kg/m²   Vitals:    06/10/24 1020 06/11/24 1509   Weight: 85.7 kg (189 lb) 85.7 kg (189 lb)     Orthostatic Blood Pressures      Flowsheet Row Most Recent Value   Blood Pressure 111/73 filed at 06/11/2024 1509   Patient Position - Orthostatic VS Lying filed at 06/11/2024 0218            Intake/Output Summary (Last 24 hours) at 6/11/2024 1807  Last data filed at 6/11/2024 1319  Gross per 24 hour   Intake 120 ml   Output 1500 ml   Net -1380 ml     Physical Exam:  GEN: Sunil Patel NAD  HEENT: pupils equal, round, and reactive to light; extraocular muscles intact  NECK: supple, no carotid bruits   HEART: regular rhythm, normal S1 and S2, no murmurs, clicks, gallops or rubs   LUNGS: clear to auscultation bilaterally; no wheezes, rales, or rhonchi   ABDOMEN: normal bowel sounds, soft, no tenderness, no distention  EXTREMITIES: R AKA  NEURO: expressive aphasia  SKIN: normal without suspicious lesions on exposed skin    Medications:    Current Facility-Administered Medications:     acetaminophen (TYLENOL) tablet 975 mg, 975 mg, Oral, Q8H DAVINA, Karen Guerrero PA-C, 975 mg at 06/11/24 0547    aspirin (ECOTRIN LOW STRENGTH) EC tablet 81 mg, 81 mg, Oral, Daily, Karen Guerrero PA-C, 81 mg at 06/11/24 0828    atorvastatin (LIPITOR) tablet 80 mg, 80 mg, Oral, Daily With Dinner, Karen Guerrero PA-C, 80 mg at 06/11/24 1644    bictegravir-emtricitab-tenofovir alafenamide (BIKTARVY) -25 MG tablet 1 tablet, 1 tablet, Oral, Daily With Breakfast, Karen Guerrero PA-C, 1 tablet at 06/11/24 0829    diphenhydrAMINE (BENADRYL) tablet 25 mg, 25 mg, Oral, Q6H PRN, Iliana Cuevas PA-C, 25 mg at 06/09/24 2300    divalproex sodium (DEPAKOTE ER) 24 hr tablet 1,250 mg, 1,250 mg, Oral, Daily, Ida Hodges MD, 1,250 mg at 06/11/24 0829    dolutegravir " (TIVICAY) tablet 50 mg, 50 mg, Oral, Daily, Karen Guerrero PA-C, 50 mg at 06/11/24 0827    gabapentin (NEURONTIN) capsule 100 mg, 100 mg, Oral, TID, Karen Guerrero PA-C, 100 mg at 06/11/24 1644    heparin (porcine) 25,000 units in 0.45% NaCl 250 mL infusion (premix), 3-30 Units/kg/hr (Order-Specific), Intravenous, Titrated, Karen Guerrero PA-C, Last Rate: 15.3 mL/hr at 06/11/24 0548, 18 Units/kg/hr at 06/11/24 0548    HYDROmorphone (DILAUDID) injection 0.5 mg, 0.5 mg, Intravenous, Q3H PRN, Karen Guerrero PA-C, 0.5 mg at 06/11/24 1306    lamoTRIgine (LaMICtal) tablet 50 mg, 50 mg, Oral, BID, Ida Hodges MD, 50 mg at 06/11/24 1730    levOCARNitine (CARNITOR) oral solution 330 mg, 1,000 mg/day, Oral, TID With Meals, Ida Hodges MD, 330 mg at 06/11/24 1644    losartan (COZAAR) tablet 50 mg, 50 mg, Oral, Daily, Ida Hodges MD, 50 mg at 06/11/24 0828    melatonin tablet 6 mg, 6 mg, Oral, HS, Karen Guerrero PA-C, 6 mg at 06/10/24 2124    mirtazapine (REMERON) tablet 15 mg, 15 mg, Oral, HS, Karen Guerrero PA-C, 15 mg at 06/10/24 2124    moisture barrier miconazole 2% cream (aka ELVA MOISTURE BARRIER ANTIFUNGAL CREAM), , Topical, BID, Dana Rodríguez MD, 1 Application at 06/11/24 1732    ondansetron (ZOFRAN) injection 4 mg, 4 mg, Intravenous, Q6H PRN, Karen Guerrero PA-C    oxyCODONE (ROXICODONE) immediate release tablet 10 mg, 10 mg, Oral, Q6H PRN, Karen Guerrero PA-C, 10 mg at 06/11/24 0827    oxyCODONE (ROXICODONE) IR tablet 5 mg, 5 mg, Oral, Q6H PRN, Karen Guerrero PA-C    polyethylene glycol (MIRALAX) packet 17 g, 17 g, Oral, Daily PRN, Shruti Correa MD    regadenoson (LEXISCAN) 0.4 mg/5 mL injection **ADS Override Pull**, , , ,     senna (SENOKOT) tablet 17.2 mg, 2 tablet, Oral, BID, Shruti Correa MD, 17.2 mg at 06/11/24 1730    sertraline (ZOLOFT) tablet 25 mg, 25 mg, Oral, Daily, Karen Guerrero PA-C, 25 mg at 06/11/24 0828    sertraline (ZOLOFT) tablet 50 mg, 50 mg, Oral,  Daily, Karen Guerrero PA-C, 50 mg at 06/11/24 0829    tamsulosin (FLOMAX) capsule 0.4 mg, 0.4 mg, Oral, Daily With Dinner, Karen Guerrero PA-C, 0.4 mg at 06/11/24 1644    zonisamide (ZONEGRAN) capsule 400 mg, 400 mg, Oral, Daily, Ida Hodges MD, 400 mg at 06/11/24 0827    Lab Results:  Results from last 7 days   Lab Units 06/07/24  1239   CK TOTAL U/L 11,295*     Results from last 7 days   Lab Units 06/11/24  0433 06/10/24  0512 06/09/24  0453   WBC Thousand/uL 10.44* 10.43* 12.07*   HEMOGLOBIN g/dL 11.7* 11.2* 10.9*   HEMATOCRIT % 39.1 36.7 35.2*   PLATELETS Thousands/uL 483* 429* 373         Results from last 7 days   Lab Units 06/11/24  0433 06/10/24  0512 06/08/24  0512 06/07/24  1236   SODIUM mmol/L 139 141 135 132*   POTASSIUM mmol/L 4.2 4.2 4.9 4.5   CHLORIDE mmol/L 107 106 103 97   CO2 mmol/L 24 24 23 24   BUN mg/dL 12 14 36* 58*   CREATININE mg/dL 0.97 0.96 1.09 1.33*   CALCIUM mg/dL 7.9* 7.9* 8.0* 8.8   ALK PHOS U/L  --   --   --  104   ALT U/L  --   --   --  209*   AST U/L  --   --   --  187*     Results from last 7 days   Lab Units 06/11/24  0540 06/11/24  0433 06/10/24  0837 06/08/24  0434 06/07/24  1829 06/07/24  1239   INR   --   --   --   --  1.35* 1.08   PTT seconds 67* >210* 81*   < > 103* 28    < > = values in this interval not displayed.         Telemetry: Personally reviewed.     Echo:  Left Ventricle: Left ventricular cavity size is normal. Wall thickness is normal. The left ventricular ejection fraction is 40-45%. Systolic function is mildly reduced. There is mild global hypokinesis with specific regional wall abnormalities: moderate-severe hypokinesis of the apical segments. There is a spherical 1.5 x 1.3 cm thrombus at the LV apex.    Right Ventricle: Right ventricular cavity size is normal. Systolic function is normal.    Stress Test:  Reviewed by me personally. There appears to be an inferoapical fixed defect consistent with prior infarct. No significant ischemia is seen. LVEF is  low normal.

## 2024-06-11 NOTE — PROGRESS NOTES
[facility]  Progress Note  Name: Sunil Patel I  MRN: [id number]  [facility] Date of Admission: 6/7/2024   Date of Service: 6/11/2024 I Hospital Day: 4    Assessment & Plan   * Acute lower limb ischemia  Assessment & Plan  Patient presented with left lower extremity acute limb ischemia with extensive left iliofemoral and left infrainguinal embolism with nonviable left lower extremity  Status post left femoral thrombectomy and AKA on 6/7  States postoperative pain is relatively controlled - pain management service following  Continue heparin drip  PT/OT evaluation   Further management per vascular surgery    Carnitine deficiency ([facility])  Assessment & Plan  Continue Levocarnitine replacement therapy TID    Seizures ([facility])  Assessment & Plan  Diagnosed in July 2019 - follows with [facility] neurology  Continue Depakote/Lamictal/Zonisamide > doses now adjusted per most recent outpatient neurology changes on record -> Depakote 1250 mg daily, Zonisamide 400 mg daily, and Lamictal 50 mg BID   Previous providers tried to call his facililty, [facility] to confirm medications but no medical records were found to confirm if he's taking his medications  Called his neurologist office and they will return call later to confirm his meds    Left ventricular thrombus  Assessment & Plan  Filling defect in the left ventricular apex suggests left ventricular thrombus and the source of the embolic disease  Echo showed ejection fraction 40 to 45%, mild global hypokinesis, LV thrombus  Remains on a heparin drip for anticoagulation, eventually transition to DOAC or warfarin.   Ischemic workup per cardiology  Follow-up further cardiology recommendation    Cerebrovascular accident (CVA) ([facility])  Assessment & Plan  History of CVA in the left MCA territory in May 2018 -  residual expressive aphasia  Continue ASA/statin    Benign essential hypertension  Assessment & Plan  Blood pressures relatively  stable/improved -> resume home Cozaar    Human immunodeficiency virus (HIV) infection (H[facility])  Assessment & Plan  Continue Biktarvy/Tivicay daily and Cabenuva monthly injections (per facility records)               VTE Pharmacologic Prophylaxis:   Moderate Risk (Score 3-4) - Pharmacological DVT Prophylaxis Ordered: heparin drip.    Mobility:   Basic Mobility Inpatient Raw Score: 6  JH-HLM Goal: 2: Bed activities/Dependent transfer  JH-HLM Achieved: 2: Bed activities/Dependent transfer  JH-HLM Goal achieved. Continue to encourage appropriate mobility.    Patient Centered Rounds: I performed bedside rounds with nursing staff today.   Discussions with Specialists or Other Care Team Provider: CM    Education and Discussions with Family / Patient: patient    Total Time Spent on Date of Encounter in care of patient: 35 mins. This time was spent on one or more of the following: performing physical exam; counseling and coordination of care; obtaining or reviewing history; documenting in the medical record; reviewing/ordering tests, medications or procedures; communicating with other healthcare professionals and discussing with patient's family/caregivers.    Current Length of Stay: 4 day(s)  Current Patient Status: Inpatient   Certification Statement: The patient will continue to require additional inpatient hospital stay due to above  Discharge Plan: Anticipate discharge in 24-48 hrs to rehab facility.    Code Status: Level 1 - Full Code    Subjective:   Pain is controlled  Confused, possible baseline  No CP/SOB    Objective:     Vitals:   Temp (24hrs), Av.6 °F (37 °C), Min:97.7 °F (36.5 °C), Max:99.1 °F (37.3 °C)    Temp:  [97.7 °F (36.5 °C)-99.1 °F (37.3 °C)] 98.4 °F (36.9 °C)  HR:  [86] 86  Resp:  [17-20] 20  BP: (110-152)/(70-84) 132/70  Body mass index is 27.12 kg/m².     Input and Output Summary (last 24 hours):     Intake/Output Summary (Last 24 hours) at 2024 1310  Last data filed at 2024 0054  Gross  per 24 hour   Intake 944.04 ml   Output 2375 ml   Net -1430.96 ml       Physical Exam:   Physical Exam  Constitutional:       General: He is not in acute distress.  Cardiovascular:      Rate and Rhythm: Normal rate and regular rhythm.   Pulmonary:      Effort: Pulmonary effort is normal.      Breath sounds: Normal breath sounds.   Abdominal:      General: Bowel sounds are normal.      Palpations: Abdomen is soft.      Tenderness: There is no abdominal tenderness.   Musculoskeletal:      Comments: Left aka   Neurological:      Mental Status: He is alert.      Motor: No weakness.      Comments: Not fully oriented           Additional Data:     Labs:  Results from last 7 days   Lab Units 06/11/24  0433   WBC Thousand/uL 10.44*   HEMOGLOBIN g/dL 11.7*   HEMATOCRIT % 39.1   PLATELETS Thousands/uL 483*   SEGS PCT % 73   LYMPHO PCT % 18   MONO PCT % 4   EOS PCT % 2     Results from last 7 days   Lab Units 06/11/24  0433 06/08/24  0512 06/07/24  1236   SODIUM mmol/L 139   < > 132*   POTASSIUM mmol/L 4.2   < > 4.5   CHLORIDE mmol/L 107   < > 97   CO2 mmol/L 24   < > 24   BUN mg/dL 12   < > 58*   CREATININE mg/dL 0.97   < > 1.33*   ANION GAP mmol/L 8   < > 11   CALCIUM mg/dL 7.9*   < > 8.8   ALBUMIN g/dL  --   --  3.3*   TOTAL BILIRUBIN mg/dL  --   --  0.44   ALK PHOS U/L  --   --  104   ALT U/L  --   --  209*   AST U/L  --   --  187*   GLUCOSE RANDOM mg/dL 95   < > 172*    < > = values in this interval not displayed.     Results from last 7 days   Lab Units 06/07/24  1829   INR  1.35*             Results from last 7 days   Lab Units 06/07/24  1239   LACTIC ACID mmol/L 2.2*       Lines/Drains:  Invasive Devices       Peripheral Intravenous Line  Duration             Peripheral IV 06/11/24 Distal;Dorsal (posterior);Right Forearm <1 day              Drain  Duration             External Urinary Catheter Large 2 days                      Telemetry:  Telemetry Orders (From admission, onward)               Telemetry Monitoring   Continuous x 24 Hours (Telem)        Comments: Left ventricular thrombus on CTA   Question:  Reason for 24 Hour Telemetry  Answer:  Alcohol withdrawal and CIWA >7, electrolyte abnormalities, abnormal ECG and/or heart disease                              Imaging: Reviewed radiology reports from this admission including: procedure reports    Recent Cultures (last 7 days):         Last 24 Hours Medication List:   Current Facility-Administered Medications   Medication Dose Route Frequency Provider Last Rate    acetaminophen  975 mg Oral Q8H DAVINA Karen Guerrero PA-C      aspirin  81 mg Oral Daily Karen Guerrero PA-C      atorvastatin  80 mg Oral Daily With Dinner Karen Guerrero PA-C      bictegravir-emtricitab-tenofovir alafenamide  1 tablet Oral Daily With Breakfast Karen Guerrero PA-C      diphenhydrAMINE  25 mg Oral Q6H PRN Iliana Cuevas PA-C      divalproex sodium  1,250 mg Oral Daily Ida Hodges MD      dolutegravir  50 mg Oral Daily Karen Guerrero PA-C      gabapentin  100 mg Oral TID Karen Guerrero PA-C      heparin (porcine)  3-30 Units/kg/hr (Order-Specific) Intravenous Titrated Karen Guerrero PA-C 18 Units/kg/hr (06/11/24 0548)    HYDROmorphone  0.5 mg Intravenous Q3H PRN Karen Guerrero PA-C      lamoTRIgine  50 mg Oral BID Ida Hodges MD      levOCARNitine  1,000 mg/day Oral TID With Meals Ida Hodges MD      losartan  50 mg Oral Daily Ida Hodges MD      melatonin  6 mg Oral HS Karen Guerrero PA-C      mirtazapine  15 mg Oral HS Karen Guerrero PA-C      ELVA ANTIFUNGAL   Topical BID Dana Rodríguez MD      ondansetron  4 mg Intravenous Q6H PRN Karen Guerrero PA-C      oxyCODONE  10 mg Oral Q6H PRN Karen Guerrero PA-C      oxyCODONE  5 mg Oral Q6H PRN Karen Guerrero PA-C      polyethylene glycol  17 g Oral Daily PRN Shruti Correa MD      senna  2 tablet Oral BID Shruti Correa MD      sertraline  25 mg Oral Daily Karen Guerrero PA-C       sertraline  50 mg Oral Daily Karen Guerrero PA-C      tamsulosin  0.4 mg Oral Daily With Dinner Karen Guerrero PA-C      zonisamide  400 mg Oral Daily Ida Hodges MD          Today, Patient Was Seen By: Stan Whitney MD    **Please Note: This note may have been constructed using a voice recognition system.**

## 2024-06-11 NOTE — PLAN OF CARE
Problem: OCCUPATIONAL THERAPY ADULT  Goal: Performs self-care activities at highest level of function for planned discharge setting.  See evaluation for individualized goals.  Description: Treatment Interventions: ADL retraining, Functional transfer training, Endurance training, Cognitive reorientation, Energy conservation, Activityengagement          See flowsheet documentation for full assessment, interventions and recommendations.   Outcome: Progressing  Note: Limitation: Decreased ADL status, Decreased Safe judgement during ADL, Decreased cognition, Decreased endurance, Decreased sensation, Decreased self-care trans, Decreased high-level ADLs  Prognosis: Good  Assessment: Pt seen on this date for OT session focusing on ADL retraining, cognitive reorientation, body mechanics, transfer retraining, increasing activity tolerance/endurance and EOB sitting to increase ability to participate in ADL/functional tasks. Pt was found in bed and was left in chair w/ all needs within reach, chair alarm on. Pt completed bed mob w min ax1, sts w mod ax2 and stand pivot to chair with max ax2 c rw for support. Pt practiced further STS transfers with mod Ax2, rw for support. Does require visual and verbal cues to properly execute tasks, and benefits from simple, one step commands. Pt w/ improvements in activity tolerance, transfer ability, adl task completion however is still limited 2* decreased ADL/High-level ADL status, decreased activity tolerance/endurance, decreased cognition, decreased self-care trans, decreased safety awareness and insight to condition.   The patient's raw score on the AM-PAC Daily Activity Inpatient Short Form is 16. A raw score of less than 19 suggests the patient may benefit from discharge to post-acute rehabilitation services. Please refer to the recommendation of the Occupational Therapist for safe discharge planning.   Recommending pt D/C to level I  when medically stable. Pt will continue to  benefit from acute OT services to meet goals.     Rehab Resource Intensity Level, OT: I (Maximum Resource Intensity)

## 2024-06-11 NOTE — PHYSICAL THERAPY NOTE
PHYSICAL THERAPY NOTE      Patient Name: Sunil Patel  Today's Date: 6/11/2024 06/11/24 1014   PT Last Visit   PT Visit Date 06/11/24   Note Type   Note Type Treatment   Pain Assessment   Pain Assessment Tool FLACC   Pain Location/Orientation Orientation: Left;Location: Incision;Location: Leg  (residual limb)   Hospital Pain Intervention(s) Repositioned;Ambulation/increased activity;Relaxation technique   Pain Rating: FLACC (Rest) - Face 0   Pain Rating: FLACC (Rest) - Legs 0   Pain Rating: FLACC (Rest) - Activity 0   Pain Rating: FLACC (Rest) - Cry 0   Pain Rating: FLACC (Rest) - Consolability 0   Score: FLACC (Rest) 0   Pain Rating: FLACC (Activity) - Face 1   Pain Rating: FLACC (Activity) - Legs 1   Pain Rating: FLACC (Activity) - Activity 1   Pain Rating: FLACC (Activity) - Cry 1   Pain Rating: FLACC (Activity) - Consolability 1   Score: FLACC (Activity) 5   Restrictions/Precautions   Weight Bearing Precautions Per Order (S)  Yes   LLE Weight Bearing Per Order (S)  NWB  (s/p AKA)   Other Precautions Cognitive;Chair Alarm;Bed Alarm;WBS;Fall Risk;Pain  (L AKA, hx of TBI, correctional officers present)   General   Chart Reviewed Yes   Response to Previous Treatment Patient with no complaints from previous session.   Family/Caregiver Present No  (2 correctional officers present)   Cognition   Overall Cognitive Status Impaired   Arousal/Participation Alert;Cooperative   Attention Difficulty attending to directions   Orientation Level Oriented to person;Oriented to place;Disoriented to time;Disoriented to situation   Memory Decreased recall of recent events;Decreased recall of precautions;Decreased short term memory   Following Commands Follows one step commands with increased time or repetition   Comments Pt pleasant and cooperative t/o PT session. Receptive to all cues and education w/ repetition and simple, one step commands.  Benefits from visual demonstration   Subjective   Subjective enjoys listening to Blues music   Bed Mobility   Rolling R 4  Minimal assistance   Additional items Assist x 1;Bedrails;Increased time required;Verbal cues   Supine to Sit 4  Minimal assistance   Additional items Assist x 1;HOB elevated;Increased time required;Verbal cues   Additional Comments OOB to R side. Pt left sitting in chair post-PT session   Transfers   Sit to Stand 3  Moderate assistance   Additional items Assist x 2;Increased time required;Verbal cues   Stand to Sit 3  Moderate assistance   Additional items Assist x 2;Increased time required;Verbal cues   Stand pivot 2  Maximal assistance   Additional items Assist x 2;Increased time required;Verbal cues   Additional Comments x3 STS w/ RW, x1 STS/SPT w/ HHA. Pt requires frequent VC for proper hand placement and simple commands. Practiced multiple STS at window for change of scenery, pt enjoys outdoors   Ambulation/Elevation   Gait pattern Not appropriate;Not tested   Ambulation/Elevation Additional Comments Pt able to take small shuffles/hops from bed to chair for SPT.   Balance   Static Sitting Fair -   Dynamic Sitting Poor +   Static Standing Poor   Dynamic Standing Poor -   Ambulatory Zero   Endurance Deficit   Endurance Deficit Yes   Endurance Deficit Description fatigue, L AKA, pain, cognition   Activity Tolerance   Activity Tolerance Patient limited by fatigue;Patient limited by pain;Other (Comment)  (cognition)   Medical Staff Made Aware JAMAR Crawford   Nurse Made Aware RN cleared   Exercises   Balance training  Pt stood for x2 trials of 1-2 min w/ RW for support at window. VC for upright posture and glute contraction w/ good return demonstration   Assessment   Prognosis Good   Problem List Decreased strength;Decreased endurance;Impaired balance;Decreased mobility;Pain;Decreased cognition;Impaired judgement;Decreased safety awareness   Assessment Pt seen for PT treatment session  this date. Therapy session focused on transfer training, bed mobility, standing balance/tolerance in order to improve overall mobility and independence. Pt requires mod Ax2 for STS transfers w/ RW and demonstrated improved tolerance and endurance by increasing standing time to 1-2 min w/ good overall balance noted. Pt completed SPT w/ BL HHA and MAX Ax2, pt requires continuous VC for hand placement, proper use of RW and safety. Pt making good progress toward goals. Pt was left sitting at the end of PT session with all needs in reach. Pt would benefit from continued PT services while in hospital to address remaining limitations. PT to continue treating pt and recommends acute rehab. The patient's AM-[facility] Basic Mobility Inpatient Short Form Raw Score is 11. A Raw score of less than or equal to 16 suggests the patient may benefit from discharge to post-acute rehabilitation services. Please also refer to the recommendation of the Physical Therapist for safe discharge planning.   Barriers to Discharge Decreased caregiver support   Goals   Patient Goals to go outside   STG Expiration Date 06/24/24   PT Treatment Day 1   Plan   Treatment/Interventions Functional transfer training;LE strengthening/ROM;Therapeutic exercise;Endurance training;Cognitive reorientation;Elevations;Equipment eval/education;Patient/family training;Bed mobility;Gait training;Spoke to nursing;Spoke to case management;OT   Progress Progressing toward goals   PT Frequency 3-5x/wk   Discharge Recommendation   Rehab Resource Intensity Level, PT I (Maximum Resource Intensity)   Equipment Recommended Walker   Walker Package Recommended Wheeled walker   AM-[facility] Basic Mobility Inpatient   Turning in Flat Bed Without Bedrails 3   Lying on Back to Sitting on Edge of Flat Bed Without Bedrails 2   Moving Bed to Chair 2   Standing Up From Chair Using Arms 2   Walk in Room 1   Climb 3-5 Stairs With Railing 1   Basic Mobility Inpatient Raw Score 11   Basic Mobility  Standardized Score 30.25   [facility] Highest Level Of Mobility   -[facility] Goal 4: Move to chair/commode   -[facility] Achieved 5: Stand (1 or more minutes)   Education   Education Provided Mobility training;Assistive device   Patient Reinforcement needed   End of Consult   Patient Position at End of Consult Bedside chair;Bed/Chair alarm activated;All needs within reach  (correctional officers present)     Bianca Womack PT, DPT

## 2024-06-11 NOTE — ASSESSMENT & PLAN NOTE
Diagnosed in July 2019 - follows with [facility] neurology  Continue Depakote/Lamictal/Zonisamide > doses now adjusted per most recent outpatient neurology changes on record -> Depakote 1250 mg daily, Zonisamide 400 mg daily, and Lamictal 50 mg BID   Previous providers tried to call his facililty, [facility] to confirm medications but no medical records were found to confirm if he's taking his medications  Called his neurologist office and they will return call later to confirm his meds

## 2024-06-11 NOTE — ASSESSMENT & PLAN NOTE
Filling defect in the left ventricular apex suggests left ventricular thrombus and the source of the embolic disease  Echo showed ejection fraction 40 to 45%, mild global hypokinesis, LV thrombus  Remains on a heparin drip for anticoagulation, eventually transition to DOAC or warfarin.   Ischemic workup per cardiology  Follow-up further cardiology recommendation

## 2024-06-11 NOTE — CONSULTS
PHYSICAL MEDICINE AND REHABILITATION CONSULT NOTE  Sunil Patel 63 y.o. male MRN: [id number]  Unit/Bed#: [facility] 507-01 Encounter: [id number]    Requested by (Physician/Service): Stan Whitney MD  Reason for Consultation:  Assessment of rehabilitation needs    Assessment:  Rehabilitation Diagnosis:   Acute left lower limb ischemia s/p left femoral thrombectomy and AKA 6/7/2024  Impaired mobility and self care  Impaired cognition     Recommendations:  Rehabilitation Plan:  Continue PT/OT ([facility]) while on acute care.  Follow up with PM&R amputee clinic for possible prosthetic management.   If patient is to return to [facility] will need clarification on level of function the patient will need to be in order to return. He may require sub-acute inpatient rehabilitation for longer length of inpatient rehabilitation.        Medical Co-morbidities Plan:  Left ventricular thrombus on heparin   Hx of seizures  Carnitine deficiency on Levocarnitine   Hx of CVA  Hx of TBI  Hypertension   Hx of HIV  Bowel plan: incontinent LBM 6/10/2024  Bladder plan: condom catheter   DVT ppx: heparin gtt     Thank you for this consultation.  Do not hesitate to contact service with further questions.      CHARLIE Rivera  PM&R    I have spent a total time of 30 minutes on 06/11/24 in caring for this patient including Patient and family education, Counseling / Coordination of care, Documenting in the medical record, Reviewing / ordering tests, medicine, procedures  , Obtaining or reviewing history  , and Communicating with other healthcare professionals .    History of Present Illness:  Sunil Patel is a 63 y.o. male with a PMH of HIT, mood disorder, TBI, left MCA CVA who presented to the [facility] on 6/7/2024 from incarceration with left lower extremity swelling and pain. He was found to have acute occlusion of the left external iliac artery without visualization of distal vessels even on delayed phase. Left AKA was  "recommended. He underwent left femoral thrombectomy and AKA on 6/7/2024. ECHO showed EF 40-45 %, mild global hypokinesis, LV thrombus. He was placed on a heparin drip and will be transitioned to coumadin. PM&R are consulted for rehabilitation recommendations.     The patient was seen in his room with nursing home guards present. He does appear to have some mild expressive aphasia and at times has difficulty getting out what he wants to say. He reports good pain control currently and denies any phantom sensations. When asked about going back to nursing home after rehabilitation he states that he didn't do anything wrong. He states he was previously in a group home but can not go back there. He is interested in finding an apartment and states he has money to hire caregivers to assist him. He also wants a longer course of inpatient rehabilitation than two weeks.      Review of Systems: 10 point ROS negative except for what is noted in HPI    Function:  Prior level of function and living situation: The patient was incarcerated at [facility]. Prior to that he was in a group home and required assistance which he states he can not return to.       Current level of function:  Physical Therapy: Moderate assist x 2 for bed mobility, transfers   Occupational Therapy: Supervision for eating, minimal assist for grooming, UB bathing/dressing, moderate assist for LB bathing/dressing and toileting     Physical Exam:  /70   Pulse 86   Temp 98.4 °F (36.9 °C) (Oral)   Resp 20   Ht 5' 10\" (1.778 m)   Wt 85.7 kg (189 lb)   SpO2 92%   BMI 27.12 kg/m²        Intake/Output Summary (Last 24 hours) at 6/11/2024 1042  Last data filed at 6/11/2024 0752  Gross per 24 hour   Intake 976.68 ml   Output 2825 ml   Net -1848.32 ml       Body mass index is 27.12 kg/m².      Physical Exam  Constitutional:       General: He is not in acute distress.     Appearance: He is not toxic-appearing.   HENT:      Head: " "Normocephalic and atraumatic.      Right Ear: External ear normal.      Left Ear: External ear normal.      Nose: Nose normal.      Mouth/Throat:      Mouth: Mucous membranes are moist.      Pharynx: Oropharynx is clear.   Eyes:      Extraocular Movements: Extraocular movements intact.   Pulmonary:      Effort: Pulmonary effort is normal. No respiratory distress.   Abdominal:      General: There is no distension.   Musculoskeletal:      Right lower leg: No edema.      Comments: Left AKA with staples in place    Skin:     General: Skin is warm and dry.   Neurological:      Mental Status: He is alert and oriented to person, place, and time.      Comments: Mild expressive > receptive aphasia    Psychiatric:         Behavior: Behavior is cooperative.         Cognition and Memory: Cognition is impaired.        Social History:    Social History     Socioeconomic History    Marital status: /Civil Union     Spouse name: Not on file    Number of children: Not on file    Years of education: Not on file    Highest education level: Not on file   Occupational History    Not on file   Tobacco Use    Smoking status: Some Days     Current packs/day: 1.00     Types: Cigarettes    Smokeless tobacco: Never   Substance and Sexual Activity    Alcohol use: Yes    Drug use: Yes     Types: \"Crack\" cocaine, Marijuana    Sexual activity: Not Currently   Other Topics Concern    Not on file   Social History Narrative    Not on file     Social Determinants of Health     Financial Resource Strain: Not on file   Food Insecurity: Patient Unable To Answer (6/8/2024)    Hunger Vital Sign     Worried About Running Out of Food in the Last Year: Patient unable to answer     Ran Out of Food in the Last Year: Patient unable to answer   Transportation Needs: Patient Unable To Answer (6/8/2024)    PRAPARE - Transportation     Lack of Transportation (Medical): Patient unable to answer     Lack of Transportation (Non-Medical): Patient unable to answer "   Physical Activity: Not on file   Stress: Not on file   Social Connections: Not on file   Intimate Partner Violence: Not on file   Housing Stability: Patient Unable To Answer (6/8/2024)    Housing Stability Vital Sign     Unable to Pay for Housing in the Last Year: Patient unable to answer     Number of Times Moved in the Last Year: 0     Homeless in the Last Year: Patient unable to answer        Family History:    Family History   Problem Relation Age of Onset    Diabetes Mother     Heart attack Mother     Heart attack Father          Medications:     Current Facility-Administered Medications:     acetaminophen (TYLENOL) tablet 975 mg, 975 mg, Oral, Q8H DAVINA, Karen Guerrero PA-C, 975 mg at 06/11/24 0547    aspirin (ECOTRIN LOW STRENGTH) EC tablet 81 mg, 81 mg, Oral, Daily, Karen Guerrero PA-C, 81 mg at 06/11/24 0828    atorvastatin (LIPITOR) tablet 80 mg, 80 mg, Oral, Daily With Dinner, Karen Guerrero PA-C, 80 mg at 06/10/24 1720    bictegravir-emtricitab-tenofovir alafenamide (BIKTARVY) -25 MG tablet 1 tablet, 1 tablet, Oral, Daily With Breakfast, Karen Guerrero PA-C, 1 tablet at 06/11/24 0829    diphenhydrAMINE (BENADRYL) tablet 25 mg, 25 mg, Oral, Q6H PRN, Iliana Cuevas PA-C, 25 mg at 06/09/24 2259    divalproex sodium (DEPAKOTE ER) 24 hr tablet 1,250 mg, 1,250 mg, Oral, Daily, Ida Hodges MD, 1,250 mg at 06/11/24 0827    dolutegravir (TIVICAY) tablet 50 mg, 50 mg, Oral, Daily, Karen Guerrero PA-C, 50 mg at 06/11/24 0827    gabapentin (NEURONTIN) capsule 100 mg, 100 mg, Oral, TID, Karen Guerrero PA-C, 100 mg at 06/11/24 0828    heparin (porcine) 25,000 units in 0.45% NaCl 250 mL infusion (premix), 3-30 Units/kg/hr (Order-Specific), Intravenous, Titrated, Karen Mauro Beers, PA-C, Last Rate: 15.3 mL/hr at 06/11/24 0548, 18 Units/kg/hr at 06/11/24 0548    HYDROmorphone (DILAUDID) injection 0.5 mg, 0.5 mg, Intravenous, Q3H PRN, Karen Guerrero PA-C, 0.5 mg at 06/11/24 0917     lamoTRIgine (LaMICtal) tablet 50 mg, 50 mg, Oral, BID, Ida Hodges MD, 50 mg at 06/11/24 0828    levOCARNitine (CARNITOR) oral solution 330 mg, 1,000 mg/day, Oral, TID With Meals, Ida Hodges MD, 330 mg at 06/10/24 1842    losartan (COZAAR) tablet 50 mg, 50 mg, Oral, Daily, Ida Hodges MD, 50 mg at 06/11/24 0828    melatonin tablet 6 mg, 6 mg, Oral, HS, Karen Guerrero PA-C, 6 mg at 06/10/24 2124    mirtazapine (REMERON) tablet 15 mg, 15 mg, Oral, HS, Karen Guerrero PA-C, 15 mg at 06/10/24 2124    moisture barrier miconazole 2% cream (aka ELVA MOISTURE BARRIER ANTIFUNGAL CREAM), , Topical, BID, Dana Rodríguez MD, 1 Application at 06/11/24 0914    ondansetron (ZOFRAN) injection 4 mg, 4 mg, Intravenous, Q6H PRN, Karen Guerrero PA-C    oxyCODONE (ROXICODONE) immediate release tablet 10 mg, 10 mg, Oral, Q6H PRN, Karen Guerrero PA-C, 10 mg at 06/11/24 0827    oxyCODONE (ROXICODONE) IR tablet 5 mg, 5 mg, Oral, Q6H PRN, Karen Guerrero PA-C    polyethylene glycol (MIRALAX) packet 17 g, 17 g, Oral, Daily PRN, Shruti Correa MD    senna (SENOKOT) tablet 17.2 mg, 2 tablet, Oral, BID, Shruti Correa MD, 17.2 mg at 06/10/24 0857    sertraline (ZOLOFT) tablet 25 mg, 25 mg, Oral, Daily, Karen Guerrero PA-C, 25 mg at 06/11/24 0828    sertraline (ZOLOFT) tablet 50 mg, 50 mg, Oral, Daily, Karen Guerrero PA-C, 50 mg at 06/11/24 0829    tamsulosin (FLOMAX) capsule 0.4 mg, 0.4 mg, Oral, Daily With Dinner, Karen Guerrero PA-C, 0.4 mg at 06/10/24 1720    zonisamide (ZONEGRAN) capsule 400 mg, 400 mg, Oral, Daily, Ida Hodges MD, 400 mg at 06/11/24 0827    Past Medical History:     Past Medical History:   Diagnosis Date    Anxiety     Depression     HIV disease (HCC)     Substance abuse (HCC)     Suicide attempt (HCC)         Past Surgical History:     Past Surgical History:   Procedure Laterality Date    AMPUTATION ABOVE KNEE (AKA) Left 6/7/2024    Procedure: AMPUTATION ABOVE KNEE (AKA);  Surgeon:  Jua nLuis Rdz MD;  Location: [facility];  Service: Vascular    WI TEAEC W/WO PATCH GRAFT COMMON FEMORAL Left 6/7/2024    Procedure: left femoral ileofemoral thromectomy;  Surgeon: Juan Luis Rdz MD;  Location: [facility];  Service: Vascular    US GUIDED THYROID BIOPSY  3/15/2022    US GUIDED THYROID BIOPSY  11/26/2019         Allergies:     Allergies   Allergen Reactions    No Active Allergies            LABORATORY RESULTS:      Lab Results   Component Value Date    HGB 11.7 (L) 06/11/2024    HGB 15.3 12/10/2015    HCT 39.1 06/11/2024    HCT 47.3 12/10/2015    WBC 10.44 (H) 06/11/2024    WBC 4.43 12/10/2015     Lab Results   Component Value Date    BUN 12 06/11/2024    BUN 15 02/04/2024     12/10/2015    K 4.2 06/11/2024    K 4.9 02/04/2024     06/11/2024     02/04/2024    GLUCOSE 90 10/26/2019    GLUCOSE 92 12/10/2015    CREATININE 0.97 06/11/2024    CREATININE 1.25 02/04/2024     Lab Results   Component Value Date    PROTIME 16.5 (H) 06/07/2024    INR 1.35 (H) 06/07/2024    INR 1.0 05/27/2021        DIAGNOSTIC STUDIES: Reviewed  CTA chest wo w contrast    Result Date: 6/11/2024  Impression: 1. No evidence of left atrial thrombus. 2. No acute cardiopulmonary process. Workstation performed: [id number]     CTA abdominal w run off w wo contrast    Result Date: 6/7/2024  Impression: Vascular: Left lower extremity: Acute arterial occlusion extending from the proximal left external iliac artery through at least the distal superficial femoral artery. Nonopacification of the popliteal artery and tibial vasculature on delayed phase of imaging. Asymmetric enlargement of  the left calf with significant subcutaneous infiltration should be correlated for compartment syndrome. Right lower extremity: 1.  Age-indeterminate severe narrowing versus focal occlusion involving the P3 segment and tibioperoneal trunk ostia. 2.  Relative abrupt nonopacification of the posterior tibial artery at  the level of the ankle. Filling defect in the left ventricular apex suggests left ventricular thrombus and the source of the embolic disease. Nonvascular: Left greater than right renal cortical scarring and atrophy, likely relating to prior embolic disease given the clinical history. The study was marked in EPIC for immediate notification. Urgent consultation with vascular surgery is recommended. I communicated findings with Bertha Leary PA-C of the vascular surgical service prior to final report generation. Workstation performed: [id number]

## 2024-06-11 NOTE — CASE MANAGEMENT
Case Management Discharge Planning Note    Patient name Sunil Patel  Location [facility] 507/[facility] 507-01 MRN [id number]  : 1960 Date 2024       Current Admission Date: 2024  Current Admission Diagnosis:Acute lower limb ischemia   Patient Active Problem List    Diagnosis Date Noted Date Diagnosed    Carnitine deficiency (HCC) 2024     Acute lower limb ischemia 2024     Left ventricular thrombus 2024     Seizures (HCC) 2024     Medication management 2024     Tobacco abuse 10/26/2019     Cerebrovascular accident (CVA) ([facility]) 10/26/2019     Positive laboratory testing for human immunodeficiency virus (HCC) 2017     Bipolar affective disorder ([facility]) 2017     Hypertension 2017     Human immunodeficiency virus (HIV) infection ([facility]) 2017     History of transient cerebral ischemia 2017     Substance abuse ([facility]) 2013     Unspecified vitamin D deficiency 2010     Benign essential hypertension 2010       LOS (days): 4  Geometric Mean LOS (GMLOS) (days): 4  Days to GMLOS:0.2     OBJECTIVE:  Risk of Unplanned Readmission Score: 23.66         Current admission status: Inpatient   Preferred Pharmacy:   [facility] & [address] [address]  Phone: [phone] Fax: [phone]    Primary Care Provider: CHARLIE Trevino    Primary Insurance: MEDICARE  Secondary Insurance: [employer]    DISCHARGE DETAILS:    CM spoke w/ Katia in the Medical Dept at [facility] (PH: [phone]) to discuss the Pts eventual discharge plan. Pt is currently recommended for acute rehab.  [facility] is reviewing.     Per Katia, inmates that return to the custodial typically only receive therapy 2-3 times per week.  Katia is to speak with the case management dept there to figure out if there is a sub-acute rehab that they have worked with in the past or that  they prefer this CM to send referrals to. CM will await a call back from Katia.     Pt is s/p L AKA. Per Katia, Pt would not be able to return with medical equipment and would need to be almost fully independent w/ adls.

## 2024-06-12 LAB
ANION GAP SERPL CALCULATED.3IONS-SCNC: 10 MMOL/L (ref 4–13)
BASOPHILS # BLD AUTO: 0.05 THOUSANDS/ÂΜL (ref 0–0.1)
BASOPHILS NFR BLD AUTO: 1 % (ref 0–1)
BUN SERPL-MCNC: 14 MG/DL (ref 5–25)
CALCIUM SERPL-MCNC: 8.8 MG/DL (ref 8.4–10.2)
CHEST PAIN STATEMENT: NORMAL
CHLORIDE SERPL-SCNC: 107 MMOL/L (ref 96–108)
CO2 SERPL-SCNC: 23 MMOL/L (ref 21–32)
CREAT SERPL-MCNC: 1.12 MG/DL (ref 0.6–1.3)
EOSINOPHIL # BLD AUTO: 0.26 THOUSAND/ÂΜL (ref 0–0.61)
EOSINOPHIL NFR BLD AUTO: 3 % (ref 0–6)
ERYTHROCYTE [DISTWIDTH] IN BLOOD BY AUTOMATED COUNT: 13.8 % (ref 11.6–15.1)
GFR SERPL CREATININE-BSD FRML MDRD: 70 ML/MIN/1.73SQ M
GLUCOSE SERPL-MCNC: 86 MG/DL (ref 65–140)
HCT VFR BLD AUTO: 37.1 % (ref 36.5–49.3)
HGB BLD-MCNC: 11.2 G/DL (ref 12–17)
IMM GRANULOCYTES # BLD AUTO: 0.14 THOUSAND/UL (ref 0–0.2)
IMM GRANULOCYTES NFR BLD AUTO: 1 % (ref 0–2)
LYMPHOCYTES # BLD AUTO: 1.52 THOUSANDS/ÂΜL (ref 0.6–4.47)
LYMPHOCYTES NFR BLD AUTO: 16 % (ref 14–44)
MAGNESIUM SERPL-MCNC: 1.7 MG/DL (ref 1.9–2.7)
MAX DIASTOLIC BP: 76 MMHG
MAX PREDICTED HEART RATE: 158 BPM
MCH RBC QN AUTO: 28.1 PG (ref 26.8–34.3)
MCHC RBC AUTO-ENTMCNC: 30.2 G/DL (ref 31.4–37.4)
MCV RBC AUTO: 93 FL (ref 82–98)
MONOCYTES # BLD AUTO: 0.54 THOUSAND/ÂΜL (ref 0.17–1.22)
MONOCYTES NFR BLD AUTO: 6 % (ref 4–12)
NEUTROPHILS # BLD AUTO: 7.3 THOUSANDS/ÂΜL (ref 1.85–7.62)
NEUTS SEG NFR BLD AUTO: 73 % (ref 43–75)
NRBC BLD AUTO-RTO: 0 /100 WBCS
PLATELET # BLD AUTO: 492 THOUSANDS/UL (ref 149–390)
PMV BLD AUTO: 8.3 FL (ref 8.9–12.7)
POTASSIUM SERPL-SCNC: 4.8 MMOL/L (ref 3.5–5.3)
PROTOCOL NAME: NORMAL
RBC # BLD AUTO: 3.98 MILLION/UL (ref 3.88–5.62)
REASON FOR TERMINATION: NORMAL
SODIUM SERPL-SCNC: 140 MMOL/L (ref 135–147)
STRESS POST EXERCISE DUR MIN: 7 MIN
STRESS POST EXERCISE DUR SEC: 8 SEC
STRESS POST PEAK HR: 104 BPM
STRESS POST PEAK SYSTOLIC BP: 133 MMHG
TARGET HR FORMULA: NORMAL
TEST INDICATION: NORMAL
WBC # BLD AUTO: 9.81 THOUSAND/UL (ref 4.31–10.16)

## 2024-06-12 PROCEDURE — 88304 TISSUE EXAM BY PATHOLOGIST: CPT | Performed by: PATHOLOGY

## 2024-06-12 PROCEDURE — 88300 SURGICAL PATH GROSS: CPT | Performed by: PATHOLOGY

## 2024-06-12 PROCEDURE — 85025 COMPLETE CBC W/AUTO DIFF WBC: CPT | Performed by: INTERNAL MEDICINE

## 2024-06-12 PROCEDURE — 87081 CULTURE SCREEN ONLY: CPT | Performed by: INTERNAL MEDICINE

## 2024-06-12 PROCEDURE — 83735 ASSAY OF MAGNESIUM: CPT | Performed by: INTERNAL MEDICINE

## 2024-06-12 PROCEDURE — 87147 CULTURE TYPE IMMUNOLOGIC: CPT | Performed by: INTERNAL MEDICINE

## 2024-06-12 PROCEDURE — 99232 SBSQ HOSP IP/OBS MODERATE 35: CPT | Performed by: INTERNAL MEDICINE

## 2024-06-12 PROCEDURE — 80048 BASIC METABOLIC PNL TOTAL CA: CPT | Performed by: INTERNAL MEDICINE

## 2024-06-12 RX ORDER — MAGNESIUM SULFATE HEPTAHYDRATE 40 MG/ML
2 INJECTION, SOLUTION INTRAVENOUS ONCE
Status: COMPLETED | OUTPATIENT
Start: 2024-06-12 | End: 2024-06-13

## 2024-06-12 RX ADMIN — MAGNESIUM SULFATE HEPTAHYDRATE 2 G: 40 INJECTION, SOLUTION INTRAVENOUS at 09:54

## 2024-06-12 RX ADMIN — GABAPENTIN 100 MG: 100 CAPSULE ORAL at 17:29

## 2024-06-12 RX ADMIN — ONDANSETRON 4 MG: 2 INJECTION INTRAMUSCULAR; INTRAVENOUS at 11:46

## 2024-06-12 RX ADMIN — MIRTAZAPINE 15 MG: 15 TABLET, FILM COATED ORAL at 21:40

## 2024-06-12 RX ADMIN — DIVALPROEX SODIUM 1250 MG: 500 TABLET, EXTENDED RELEASE ORAL at 10:14

## 2024-06-12 RX ADMIN — MICONAZOLE NITRATE: 20 CREAM TOPICAL at 17:27

## 2024-06-12 RX ADMIN — LOSARTAN POTASSIUM 50 MG: 50 TABLET, FILM COATED ORAL at 10:14

## 2024-06-12 RX ADMIN — LAMOTRIGINE 50 MG: 25 TABLET ORAL at 10:14

## 2024-06-12 RX ADMIN — MICONAZOLE NITRATE: 20 CREAM TOPICAL at 11:58

## 2024-06-12 RX ADMIN — DOLUTEGRAVIR SODIUM 50 MG: 50 TABLET, FILM COATED ORAL at 10:17

## 2024-06-12 RX ADMIN — ASPIRIN 81 MG: 81 TABLET, COATED ORAL at 10:15

## 2024-06-12 RX ADMIN — LEVOCARNITINE 330 MG: 1 SOLUTION ORAL at 10:15

## 2024-06-12 RX ADMIN — ACETAMINOPHEN 975 MG: 325 TABLET, FILM COATED ORAL at 14:40

## 2024-06-12 RX ADMIN — ATORVASTATIN CALCIUM 80 MG: 80 TABLET, FILM COATED ORAL at 17:29

## 2024-06-12 RX ADMIN — ZONISAMIDE 400 MG: 100 CAPSULE ORAL at 10:15

## 2024-06-12 RX ADMIN — METOPROLOL SUCCINATE 25 MG: 25 TABLET, EXTENDED RELEASE ORAL at 17:29

## 2024-06-12 RX ADMIN — GABAPENTIN 100 MG: 100 CAPSULE ORAL at 10:15

## 2024-06-12 RX ADMIN — OXYCODONE HYDROCHLORIDE 10 MG: 10 TABLET ORAL at 06:23

## 2024-06-12 RX ADMIN — LEVOCARNITINE 330 MG: 1 SOLUTION ORAL at 17:33

## 2024-06-12 RX ADMIN — LEVOCARNITINE 330 MG: 1 SOLUTION ORAL at 14:41

## 2024-06-12 RX ADMIN — LAMOTRIGINE 50 MG: 25 TABLET ORAL at 17:28

## 2024-06-12 RX ADMIN — ACETAMINOPHEN 975 MG: 325 TABLET, FILM COATED ORAL at 06:23

## 2024-06-12 RX ADMIN — RIVAROXABAN 20 MG: 20 TABLET, FILM COATED ORAL at 17:29

## 2024-06-12 RX ADMIN — BICTEGRAVIR SODIUM, EMTRICITABINE, AND TENOFOVIR ALAFENAMIDE FUMARATE 1 TABLET: 50; 200; 25 TABLET ORAL at 10:18

## 2024-06-12 RX ADMIN — ACETAMINOPHEN 975 MG: 325 TABLET, FILM COATED ORAL at 21:40

## 2024-06-12 RX ADMIN — SERTRALINE HYDROCHLORIDE 50 MG: 50 TABLET ORAL at 10:12

## 2024-06-12 RX ADMIN — GABAPENTIN 100 MG: 100 CAPSULE ORAL at 21:40

## 2024-06-12 RX ADMIN — SERTRALINE HYDROCHLORIDE 25 MG: 50 TABLET ORAL at 10:13

## 2024-06-12 RX ADMIN — TAMSULOSIN HYDROCHLORIDE 0.4 MG: 0.4 CAPSULE ORAL at 17:29

## 2024-06-12 RX ADMIN — Medication 6 MG: at 21:40

## 2024-06-12 NOTE — CASE MANAGEMENT
Case Management Discharge Planning Note    Patient name Sunil Patel  Location [facility] 507/[facility] 507-01 MRN [id number]  : 1960 Date 2024       Current Admission Date: 2024  Current Admission Diagnosis:Acute lower limb ischemia   Patient Active Problem List    Diagnosis Date Noted Date Diagnosed    Carnitine deficiency (HCC) 2024     Acute lower limb ischemia 2024     Left ventricular thrombus 2024     Seizures (HCC) 2024     Medication management 2024     Tobacco abuse 10/26/2019     Cerebrovascular accident (CVA) ([facility]) 10/26/2019     Positive laboratory testing for human immunodeficiency virus (HCC) 2017     Bipolar affective disorder ([facility]) 2017     Hypertension 2017     Human immunodeficiency virus (HIV) infection ([facility]) 2017     History of transient cerebral ischemia 2017     Substance abuse ([facility]) 2013     Unspecified vitamin D deficiency 2010     Benign essential hypertension 2010       LOS (days): 5  Geometric Mean LOS (GMLOS) (days): 4  Days to GMLOS:-0.8     OBJECTIVE:  Risk of Unplanned Readmission Score: 21.29         Current admission status: Inpatient   Preferred Pharmacy:   [facility] & [address] [address]  Phone: [phone] Fax: [phone]    Primary Care Provider: CHARLIE Trevino    Primary Insurance: MEDICARE  Secondary Insurance: [employer]    DISCHARGE DETAILS:    Per [facility] provider, Pt is medically ready for discharge.     Pt is rec'd for rehab. Pt has been denied by [facility] and [facility]. [facility] referrals placed in [facility].     CM spoke w Nalini Ontiveros, , [facility] who states that the Pt was living in a  group home until 24. He has been incarcerated since then as he is awaiting a dispo hearing.    Per Nalini, Pt is eligible for unsecured bail, but a  would need a solid housing plan before the  would approve bailChun      TC to [facility] dept to find out if the Pt has a CM. Pt is not currently open w the [facility] dept.      CM is awaiting an response from [facility] legal team re: Pts discharge plan.

## 2024-06-12 NOTE — PLAN OF CARE
Problem: PAIN - ADULT  Goal: Verbalizes/displays adequate comfort level or baseline comfort level  Description: Interventions:  - Encourage patient to monitor pain and request assistance  - Assess pain using appropriate pain scale  - Administer analgesics based on type and severity of pain and evaluate response  - Implement non-pharmacological measures as appropriate and evaluate response  - Consider cultural and social influences on pain and pain management  - Notify physician/advanced practitioner if interventions unsuccessful or patient reports new pain  Outcome: Progressing     Problem: INFECTION - ADULT  Goal: Absence or prevention of progression during hospitalization  Description: INTERVENTIONS:  - Assess and monitor for signs and symptoms of infection  - Monitor lab/diagnostic results  - Monitor all insertion sites, i.e. indwelling lines, tubes, and drains  - Monitor endotracheal if appropriate and nasal secretions for changes in amount and color  - Brandon appropriate cooling/warming therapies per order  - Administer medications as ordered  - Instruct and encourage patient and family to use good hand hygiene technique  - Identify and instruct in appropriate isolation precautions for identified infection/condition  Outcome: Progressing     Problem: SAFETY ADULT  Goal: Patient will remain free of falls  Description: INTERVENTIONS:  - Educate patient/family on patient safety including physical limitations  - Instruct patient to call for assistance with activity   - Consult OT/PT to assist with strengthening/mobility   - Keep Call bell within reach  - Keep bed low and locked with side rails adjusted as appropriate  - Keep care items and personal belongings within reach  - Initiate and maintain comfort rounds  - Make Fall Risk Sign visible to staff  - Offer Toileting every  Hours, in advance of need  - Initiate/Maintain alarm  - Obtain necessary fall risk management equipment:   - Apply yellow socks and  bracelet for high fall risk patients  - Consider moving patient to room near nurses station  Outcome: Progressing  Goal: Maintain or return to baseline ADL function  Description: INTERVENTIONS:  -  Assess patient's ability to carry out ADLs; assess patient's baseline for ADL function and identify physical deficits which impact ability to perform ADLs (bathing, care of mouth/teeth, toileting, grooming, dressing, etc.)  - Assess/evaluate cause of self-care deficits   - Assess range of motion  - Assess patient's mobility; develop plan if impaired  - Assess patient's need for assistive devices and provide as appropriate  - Encourage maximum independence but intervene and supervise when necessary  - Involve family in performance of ADLs  - Assess for home care needs following discharge   - Consider OT consult to assist with ADL evaluation and planning for discharge  - Provide patient education as appropriate  Outcome: Progressing  Goal: Maintains/Returns to pre admission functional level  Description: INTERVENTIONS:  - Perform AM-PAC 6 Click Basic Mobility/ Daily Activity assessment daily.  - Set and communicate daily mobility goal to care team and patient/family/caregiver.   - Collaborate with rehabilitation services on mobility goals if consulted  - Perform Range of Motion  times a day.  - Reposition patient every  hours.  - Dangle patient  times a day  - Stand patient times a day  - Ambulate patient  times a day  - Out of bed to chair  times a day   - Out of bed for meals  times a day  - Out of bed for toileting  - Record patient progress and toleration of activity level   Outcome: Progressing     Problem: DISCHARGE PLANNING  Goal: Discharge to home or other facility with appropriate resources  Description: INTERVENTIONS:  - Identify barriers to discharge w/patient and caregiver  - Arrange for needed discharge resources and transportation as appropriate  - Identify discharge learning needs (meds, wound care, etc.)  -  Arrange for interpretive services to assist at discharge as needed  - Refer to Case Management Department for coordinating discharge planning if the patient needs post-hospital services based on physician/advanced practitioner order or complex needs related to functional status, cognitive ability, or social support system  Outcome: Progressing     Problem: Knowledge Deficit  Goal: Patient/family/caregiver demonstrates understanding of disease process, treatment plan, medications, and discharge instructions  Description: Complete learning assessment and assess knowledge base.  Interventions:  - Provide teaching at level of understanding  - Provide teaching via preferred learning methods  Outcome: Progressing

## 2024-06-12 NOTE — ASSESSMENT & PLAN NOTE
Patient presented with left lower extremity acute limb ischemia with extensive left iliofemoral and left infrainguinal embolism with nonviable left lower extremity  Status post left femoral thrombectomy and AKA on 6/7  Pain management  AC with Xarelto  PT/OT evaluation   Further management per vascular surgery

## 2024-06-12 NOTE — ASSESSMENT & PLAN NOTE
Filling defect in the left ventricular apex suggests left ventricular thrombus and the source of the embolic disease  Echo showed ejection fraction 40 to 45%, mild global hypokinesis, LV thrombus  Underwent pharmacological stress test. Per cardiology, no significant areas of ischemia and recommended for medical treatment. Possible stress induced cardiomyopathy as an etiology of his reduced EF. Continue cozaar. Metoprolol added.  Continue Xarelto

## 2024-06-12 NOTE — ASSESSMENT & PLAN NOTE
Diagnosed in July 2019 - follows with [facility] neurology  Continue Depakote/Lamictal/Zonisamide > doses now adjusted per most recent outpatient neurology changes on record -> Depakote 1250 mg daily, Zonisamide 400 mg daily, and Lamictal 50 mg BID   Called his neurologist office at [facility] who confirmed above doses with out any recent change or discontinuation.

## 2024-06-12 NOTE — [facility] ADMISSION
Reviewed updates with [facility] physician - patient is recommended for SNF/subacute rehab for longer therapy needs, as patient needs to be essentially Independent without use of AD for return to [facility]. CM has been updated.

## 2024-06-12 NOTE — PROGRESS NOTES
[facility]  Progress Note  Name: Sunil Patel I  MRN: [id number]  [facility] Date of Admission: 6/7/2024   Date of Service: 6/12/2024 I Hospital Day: 5    Assessment & Plan   * Acute lower limb ischemia  Assessment & Plan  Patient presented with left lower extremity acute limb ischemia with extensive left iliofemoral and left infrainguinal embolism with nonviable left lower extremity  Status post left femoral thrombectomy and AKA on 6/7  Pain management  AC with Xarelto  PT/OT evaluation   Further management per vascular surgery    Carnitine deficiency ([facility])  Assessment & Plan  Continue Levocarnitine replacement therapy TID    Seizures ([facility])  Assessment & Plan  Diagnosed in July 2019 - follows with [facility] neurology  Continue Depakote/Lamictal/Zonisamide > doses now adjusted per most recent outpatient neurology changes on record -> Depakote 1250 mg daily, Zonisamide 400 mg daily, and Lamictal 50 mg BID   Called his neurologist office at [facility] who confirmed above doses with out any recent change or discontinuation.     Left ventricular thrombus  Assessment & Plan  Filling defect in the left ventricular apex suggests left ventricular thrombus and the source of the embolic disease  Echo showed ejection fraction 40 to 45%, mild global hypokinesis, LV thrombus  Underwent pharmacological stress test. Per cardiology, no significant areas of ischemia and recommended for medical treatment. Possible stress induced cardiomyopathy as an etiology of his reduced EF. Continue cozaar. Metoprolol added.  Continue Xarelto    Cerebrovascular accident (CVA) ([facility])  Assessment & Plan  History of CVA in the left MCA territory in May 2018 -  residual expressive aphasia  Continue ASA/statin    Benign essential hypertension  Assessment & Plan  BP stable  Continue current antihypertensives     Human immunodeficiency virus (HIV) infection ([facility])  Assessment & Plan  Continue Biktarvy/Tivicay daily  Staff message sent colon and rectal staff to arrange appt with Keira.   and Cabenuva monthly injections (per facility records)             VTE Pharmacologic Prophylaxis: VTE Score: 3 Moderate Risk (Score 3-4) - Pharmacological DVT Prophylaxis Ordered: rivaroxaban (Xarelto).    Mobility:   Basic Mobility Inpatient Raw Score: 11  JH-HLM Goal: 4: Move to chair/commode  JH-HLM Achieved: 5: Stand (1 or more minutes)  JH-HLM Goal achieved. Continue to encourage appropriate mobility.    Patient Centered Rounds: I performed bedside rounds with nursing staff today.   Discussions with Specialists or Other Care Team Provider: LENY    Education and Discussions with Family / Patient:  patient .     Total Time Spent on Date of Encounter in care of patient: 35 mins. This time was spent on one or more of the following: performing physical exam; counseling and coordination of care; obtaining or reviewing history; documenting in the medical record; reviewing/ordering tests, medications or procedures; communicating with other healthcare professionals and discussing with patient's family/caregivers.    Current Length of Stay: 5 day(s)  Current Patient Status: Inpatient   Certification Statement: The patient will continue to require additional inpatient hospital stay due to rehab  Discharge Plan: medically stable    Code Status: Level 1 - Full Code    Subjective:   No acute complaints  Pain controlled  No CP/SOB    Objective:     Vitals:   Temp (24hrs), Av.7 °F (37.1 °C), Min:98.3 °F (36.8 °C), Max:99.2 °F (37.3 °C)    Temp:  [98.3 °F (36.8 °C)-99.2 °F (37.3 °C)] 98.5 °F (36.9 °C)  HR:  [79-98] 88  Resp:  [15-20] 18  BP: (102-127)/(62-76) 118/62  SpO2:  [92 %-99 %] 99 %  Body mass index is 27.12 kg/m².     Input and Output Summary (last 24 hours):     Intake/Output Summary (Last 24 hours) at 2024 1338  Last data filed at 2024 1143  Gross per 24 hour   Intake 780 ml   Output 2150 ml   Net -1370 ml       Physical Exam:   Physical Exam  Vitals and nursing note reviewed.   Constitutional:        General: He is not in acute distress.     Appearance: He is well-developed.   HENT:      Head: Normocephalic and atraumatic.   Eyes:      Conjunctiva/sclera: Conjunctivae normal.   Cardiovascular:      Rate and Rhythm: Normal rate and regular rhythm.      Heart sounds: No murmur heard.  Pulmonary:      Effort: Pulmonary effort is normal. No respiratory distress.      Breath sounds: Normal breath sounds.   Abdominal:      Palpations: Abdomen is soft.      Tenderness: There is no abdominal tenderness.   Musculoskeletal:         General: No swelling.      Cervical back: Neck supple.      Comments: Left aka   Skin:     General: Skin is warm and dry.      Capillary Refill: Capillary refill takes less than 2 seconds.   Neurological:      Mental Status: He is alert.   Psychiatric:         Mood and Affect: Mood normal.          Additional Data:     Labs:  Results from last 7 days   Lab Units 06/12/24  0443   WBC Thousand/uL 9.81   HEMOGLOBIN g/dL 11.2*   HEMATOCRIT % 37.1   PLATELETS Thousands/uL 492*   SEGS PCT % 73   LYMPHO PCT % 16   MONO PCT % 6   EOS PCT % 3     Results from last 7 days   Lab Units 06/12/24  0443 06/08/24  0512 06/07/24  1236   SODIUM mmol/L 140   < > 132*   POTASSIUM mmol/L 4.8   < > 4.5   CHLORIDE mmol/L 107   < > 97   CO2 mmol/L 23   < > 24   BUN mg/dL 14   < > 58*   CREATININE mg/dL 1.12   < > 1.33*   ANION GAP mmol/L 10   < > 11   CALCIUM mg/dL 8.8   < > 8.8   ALBUMIN g/dL  --   --  3.3*   TOTAL BILIRUBIN mg/dL  --   --  0.44   ALK PHOS U/L  --   --  104   ALT U/L  --   --  209*   AST U/L  --   --  187*   GLUCOSE RANDOM mg/dL 86   < > 172*    < > = values in this interval not displayed.     Results from last 7 days   Lab Units 06/07/24  1829   INR  1.35*             Results from last 7 days   Lab Units 06/07/24  1239   LACTIC ACID mmol/L 2.2*       Lines/Drains:  Invasive Devices       Peripheral Intravenous Line  Duration             Peripheral IV 06/11/24 Distal;Dorsal (posterior);Right Forearm  1 day              Drain  Duration             External Urinary Catheter Large 3 days                          Imaging: Reviewed radiology reports from this admission including: ECHO    Recent Cultures (last 7 days):         Last 24 Hours Medication List:   Current Facility-Administered Medications   Medication Dose Route Frequency Provider Last Rate    acetaminophen  975 mg Oral Q8H DAVINA Karen Guerrero PA-C      aspirin  81 mg Oral Daily Karen Guerrero PA-C      atorvastatin  80 mg Oral Daily With Dinner Karen Guerrero PA-C      bictegravir-emtricitab-tenofovir alafenamide  1 tablet Oral Daily With Breakfast Karen Guerrero PA-C      diphenhydrAMINE  25 mg Oral Q6H PRN Iliana Cuevas PA-C      divalproex sodium  1,250 mg Oral Daily Ida Hodges MD      dolutegravir  50 mg Oral Daily Karen Guerrero PA-C      gabapentin  100 mg Oral TID Karen Guerrero PA-C      HYDROmorphone  0.5 mg Intravenous Q3H PRN Karen Guerrero PA-C      lamoTRIgine  50 mg Oral BID Ida Hodges MD      levOCARNitine  1,000 mg/day Oral TID With Meals Ida Hodges MD      losartan  50 mg Oral Daily Ida Hodges MD      melatonin  6 mg Oral HS Karen Guerrero PA-C      metoprolol succinate  25 mg Oral Q12H Leonardo Bruno MD      mirtazapine  15 mg Oral HS Karen Guerrero PA-C      ELVA ANTIFUNGAL   Topical BID Dana Rodríguez MD      ondansetron  4 mg Intravenous Q6H PRN Karen Guerrero PA-C      oxyCODONE  10 mg Oral Q6H PRN Karen Guerrero PA-C      oxyCODONE  5 mg Oral Q6H PRN Karen Guerrero PA-C      polyethylene glycol  17 g Oral Daily PRN Shruti Correa MD      rivaroxaban  20 mg Oral Daily With Dinner Stan Whitney MD      senna  2 tablet Oral BID Shruti Correa MD      sertraline  25 mg Oral Daily Karen Guerrero PA-C      sertraline  50 mg Oral Daily Karen Guerrero PA-C      tamsulosin  0.4 mg Oral Daily With Dinner Karen Guerrero PA-C      zonisamide  400 mg Oral Daily Ida  MD Carroll          Today, Patient Was Seen By: Stan Whitney MD    **Please Note: This note may have been constructed using a voice recognition system.**

## 2024-06-12 NOTE — RESTORATIVE TECHNICIAN NOTE
Restorative Technician Note      Patient Name: Sunil Patel     [facility] Tech Visit Date: 06/12/24  Note Type: Mobility  Patient Position Upon Consult: Supine  Mobility / Activity Provided: assisted RN with repositioning/changing blue pad  Activity Performed: Repositioned  Patient Position at End of Consult: Supine; All needs within reach; Bed/Chair alarm activated

## 2024-06-13 LAB
ALBUMIN SERPL BCP-MCNC: 2.8 G/DL (ref 3.5–5)
ALP SERPL-CCNC: 82 U/L (ref 34–104)
ALT SERPL W P-5'-P-CCNC: 49 U/L (ref 7–52)
ANION GAP SERPL CALCULATED.3IONS-SCNC: 6 MMOL/L (ref 4–13)
AST SERPL W P-5'-P-CCNC: 31 U/L (ref 13–39)
BASOPHILS # BLD AUTO: 0.03 THOUSANDS/ÂΜL (ref 0–0.1)
BASOPHILS NFR BLD AUTO: 0 % (ref 0–1)
BILIRUB SERPL-MCNC: 0.19 MG/DL (ref 0.2–1)
BUN SERPL-MCNC: 14 MG/DL (ref 5–25)
CALCIUM ALBUM COR SERPL-MCNC: 10.1 MG/DL (ref 8.3–10.1)
CALCIUM SERPL-MCNC: 9.1 MG/DL (ref 8.4–10.2)
CHLORIDE SERPL-SCNC: 106 MMOL/L (ref 96–108)
CO2 SERPL-SCNC: 27 MMOL/L (ref 21–32)
CREAT SERPL-MCNC: 1.19 MG/DL (ref 0.6–1.3)
EOSINOPHIL # BLD AUTO: 0.2 THOUSAND/ÂΜL (ref 0–0.61)
EOSINOPHIL NFR BLD AUTO: 2 % (ref 0–6)
ERYTHROCYTE [DISTWIDTH] IN BLOOD BY AUTOMATED COUNT: 13.9 % (ref 11.6–15.1)
GFR SERPL CREATININE-BSD FRML MDRD: 65 ML/MIN/1.73SQ M
GLUCOSE SERPL-MCNC: 100 MG/DL (ref 65–140)
HCT VFR BLD AUTO: 39.1 % (ref 36.5–49.3)
HGB BLD-MCNC: 11.7 G/DL (ref 12–17)
IMM GRANULOCYTES # BLD AUTO: 0.12 THOUSAND/UL (ref 0–0.2)
IMM GRANULOCYTES NFR BLD AUTO: 1 % (ref 0–2)
LYMPHOCYTES # BLD AUTO: 1.69 THOUSANDS/ÂΜL (ref 0.6–4.47)
LYMPHOCYTES NFR BLD AUTO: 16 % (ref 14–44)
MAGNESIUM SERPL-MCNC: 2 MG/DL (ref 1.9–2.7)
MCH RBC QN AUTO: 28.3 PG (ref 26.8–34.3)
MCHC RBC AUTO-ENTMCNC: 29.9 G/DL (ref 31.4–37.4)
MCV RBC AUTO: 95 FL (ref 82–98)
MONOCYTES # BLD AUTO: 0.62 THOUSAND/ÂΜL (ref 0.17–1.22)
MONOCYTES NFR BLD AUTO: 6 % (ref 4–12)
MRSA NOSE QL CULT: ABNORMAL
MRSA NOSE QL CULT: ABNORMAL
NEUTROPHILS # BLD AUTO: 7.73 THOUSANDS/ÂΜL (ref 1.85–7.62)
NEUTS SEG NFR BLD AUTO: 75 % (ref 43–75)
NRBC BLD AUTO-RTO: 0 /100 WBCS
PLATELET # BLD AUTO: 521 THOUSANDS/UL (ref 149–390)
PMV BLD AUTO: 8.2 FL (ref 8.9–12.7)
POTASSIUM SERPL-SCNC: 4.9 MMOL/L (ref 3.5–5.3)
PROT SERPL-MCNC: 6.6 G/DL (ref 6.4–8.4)
RBC # BLD AUTO: 4.13 MILLION/UL (ref 3.88–5.62)
SODIUM SERPL-SCNC: 139 MMOL/L (ref 135–147)
WBC # BLD AUTO: 10.39 THOUSAND/UL (ref 4.31–10.16)

## 2024-06-13 PROCEDURE — 97116 GAIT TRAINING THERAPY: CPT

## 2024-06-13 PROCEDURE — 80053 COMPREHEN METABOLIC PANEL: CPT | Performed by: NURSE PRACTITIONER

## 2024-06-13 PROCEDURE — 97530 THERAPEUTIC ACTIVITIES: CPT

## 2024-06-13 PROCEDURE — 99232 SBSQ HOSP IP/OBS MODERATE 35: CPT | Performed by: INTERNAL MEDICINE

## 2024-06-13 PROCEDURE — 99223 1ST HOSP IP/OBS HIGH 75: CPT | Performed by: PSYCHIATRY & NEUROLOGY

## 2024-06-13 PROCEDURE — 97535 SELF CARE MNGMENT TRAINING: CPT

## 2024-06-13 PROCEDURE — 85025 COMPLETE CBC W/AUTO DIFF WBC: CPT | Performed by: INTERNAL MEDICINE

## 2024-06-13 PROCEDURE — 83735 ASSAY OF MAGNESIUM: CPT | Performed by: NURSE PRACTITIONER

## 2024-06-13 RX ORDER — GABAPENTIN 100 MG/1
100 CAPSULE ORAL ONCE
Status: COMPLETED | OUTPATIENT
Start: 2024-06-13 | End: 2024-06-13

## 2024-06-13 RX ADMIN — SERTRALINE HYDROCHLORIDE 25 MG: 50 TABLET ORAL at 09:41

## 2024-06-13 RX ADMIN — LEVOCARNITINE 330 MG: 1 SOLUTION ORAL at 09:43

## 2024-06-13 RX ADMIN — LOSARTAN POTASSIUM 50 MG: 50 TABLET, FILM COATED ORAL at 09:40

## 2024-06-13 RX ADMIN — ASPIRIN 81 MG: 81 TABLET, COATED ORAL at 09:40

## 2024-06-13 RX ADMIN — OXYCODONE HYDROCHLORIDE 10 MG: 10 TABLET ORAL at 07:38

## 2024-06-13 RX ADMIN — GABAPENTIN 100 MG: 100 CAPSULE ORAL at 09:39

## 2024-06-13 RX ADMIN — ACETAMINOPHEN 975 MG: 325 TABLET, FILM COATED ORAL at 05:25

## 2024-06-13 RX ADMIN — DIVALPROEX SODIUM 1250 MG: 500 TABLET, EXTENDED RELEASE ORAL at 09:40

## 2024-06-13 RX ADMIN — METOPROLOL SUCCINATE 25 MG: 25 TABLET, EXTENDED RELEASE ORAL at 05:25

## 2024-06-13 RX ADMIN — LAMOTRIGINE 50 MG: 25 TABLET ORAL at 09:40

## 2024-06-13 RX ADMIN — OXYCODONE HYDROCHLORIDE 10 MG: 10 TABLET ORAL at 00:14

## 2024-06-13 RX ADMIN — ONDANSETRON 4 MG: 2 INJECTION INTRAMUSCULAR; INTRAVENOUS at 10:13

## 2024-06-13 RX ADMIN — GABAPENTIN 100 MG: 100 CAPSULE ORAL at 09:41

## 2024-06-13 RX ADMIN — MICONAZOLE NITRATE 1 APPLICATION: 20 CREAM TOPICAL at 09:39

## 2024-06-13 RX ADMIN — MICONAZOLE NITRATE: 20 CREAM TOPICAL at 17:42

## 2024-06-13 RX ADMIN — ACETAMINOPHEN 975 MG: 325 TABLET, FILM COATED ORAL at 14:00

## 2024-06-13 RX ADMIN — SENNOSIDES 17.2 MG: 8.6 TABLET, FILM COATED ORAL at 09:40

## 2024-06-13 RX ADMIN — RIVAROXABAN 20 MG: 20 TABLET, FILM COATED ORAL at 16:16

## 2024-06-13 RX ADMIN — LEVOCARNITINE 330 MG: 1 SOLUTION ORAL at 14:01

## 2024-06-13 RX ADMIN — ATORVASTATIN CALCIUM 80 MG: 80 TABLET, FILM COATED ORAL at 16:16

## 2024-06-13 RX ADMIN — ACETAMINOPHEN 975 MG: 325 TABLET, FILM COATED ORAL at 21:24

## 2024-06-13 RX ADMIN — METOPROLOL SUCCINATE 25 MG: 25 TABLET, EXTENDED RELEASE ORAL at 17:42

## 2024-06-13 RX ADMIN — LEVOCARNITINE 330 MG: 1 SOLUTION ORAL at 17:42

## 2024-06-13 RX ADMIN — Medication 6 MG: at 21:24

## 2024-06-13 RX ADMIN — SERTRALINE HYDROCHLORIDE 50 MG: 50 TABLET ORAL at 09:40

## 2024-06-13 RX ADMIN — SENNOSIDES 17.2 MG: 8.6 TABLET, FILM COATED ORAL at 17:42

## 2024-06-13 RX ADMIN — ZONISAMIDE 400 MG: 100 CAPSULE ORAL at 09:43

## 2024-06-13 RX ADMIN — DOLUTEGRAVIR SODIUM 50 MG: 50 TABLET, FILM COATED ORAL at 09:44

## 2024-06-13 RX ADMIN — LAMOTRIGINE 50 MG: 25 TABLET ORAL at 17:42

## 2024-06-13 RX ADMIN — MIRTAZAPINE 15 MG: 15 TABLET, FILM COATED ORAL at 21:24

## 2024-06-13 RX ADMIN — TAMSULOSIN HYDROCHLORIDE 0.4 MG: 0.4 CAPSULE ORAL at 16:16

## 2024-06-13 RX ADMIN — BICTEGRAVIR SODIUM, EMTRICITABINE, AND TENOFOVIR ALAFENAMIDE FUMARATE 1 TABLET: 50; 200; 25 TABLET ORAL at 07:32

## 2024-06-13 RX ADMIN — GABAPENTIN 100 MG: 100 CAPSULE ORAL at 21:24

## 2024-06-13 RX ADMIN — GABAPENTIN 100 MG: 100 CAPSULE ORAL at 16:15

## 2024-06-13 NOTE — PROGRESS NOTES
[facility]  Progress Note  Name: Sunil Patel I  MRN: [id number]  [facility] Date of Admission: 6/7/2024   Date of Service: 6/13/2024 I Hospital Day: 6    Assessment & Plan   * Acute lower limb ischemia  Assessment & Plan  Patient presented with left lower extremity acute limb ischemia with extensive left iliofemoral and left infrainguinal embolism with nonviable left lower extremity  Status post left femoral thrombectomy and AKA on 6/7  Pain management  AC with Xarelto  PT/OT evaluation > needs inpatient rehab  Further management per vascular surgery    Carnitine deficiency ([facility])  Assessment & Plan  Continue Levocarnitine replacement therapy TID    Seizures ([facility])  Assessment & Plan  Diagnosed in July 2019 - follows with [facility] neurology  Continue Depakote/Lamictal/Zonisamide > doses now adjusted per most recent outpatient neurology changes on record -> Depakote 1250 mg daily, Zonisamide 400 mg daily, and Lamictal 50 mg BID   Called his neurologist office at [facility] who confirmed above doses with out any recent change or discontinuation.     Left ventricular thrombus  Assessment & Plan  Filling defect in the left ventricular apex suggests left ventricular thrombus and the source of the embolic disease  Echo showed ejection fraction 40 to 45%, mild global hypokinesis, LV thrombus  Underwent pharmacological stress test. Per cardiology, no significant areas of ischemia and recommended for medical treatment. Possible stress induced cardiomyopathy as an etiology of his reduced EF. Continue cozaar. Metoprolol added.  Continue Xarelto    Cerebrovascular accident (CVA) ([facility])  Assessment & Plan  History of CVA in the left MCA territory in May 2018 -  residual expressive aphasia  Continue ASA/statin    Benign essential hypertension  Assessment & Plan  BP stable  Continue current antihypertensives     Human immunodeficiency virus (HIV) infection ([facility])  Assessment & Plan  Continue  Biktarvy/Tivicay daily and Cabenuva monthly injections (per facility records)               VTE Pharmacologic Prophylaxis: VTE Score: 3  xarelto    Mobility:   Basic Mobility Inpatient Raw Score: 11  -HLM Goal: 4: Move to chair/commode  JH-HLM Achieved: 2: Bed activities/Dependent transfer  JH-HLM Goal NOT achieved. Continue with multidisciplinary rounding and encourage appropriate mobility to improve upon JH-HLM goals.    Patient Centered Rounds: I performed bedside rounds with nursing staff today.   Discussions with Specialists or Other Care Team Provider: psychiatry     Education and Discussions with Family / Patient: patient     Total Time Spent on Date of Encounter in care of patient: 35 mins. This time was spent on one or more of the following: performing physical exam; counseling and coordination of care; obtaining or reviewing history; documenting in the medical record; reviewing/ordering tests, medications or procedures; communicating with other healthcare professionals and discussing with patient's family/caregivers.    Current Length of Stay: 6 day(s)  Current Patient Status: Inpatient   Certification Statement: The patient will continue to require additional inpatient hospital stay due to above  Discharge Plan: Anticipate discharge in >72 hrs to rehab facility.    Code Status: Level 1 - Full Code    Subjective:   No acute complaints     Objective:     Vitals:   Temp (24hrs), Av.4 °F (36.9 °C), Min:98.3 °F (36.8 °C), Max:98.6 °F (37 °C)    Temp:  [98.3 °F (36.8 °C)-98.6 °F (37 °C)] 98.6 °F (37 °C)  HR:  [75-83] 75  Resp:  [17-18] 17  BP: (112-140)/(66-92) 124/70  SpO2:  [95 %-98 %] 96 %  Body mass index is 27.12 kg/m².     Input and Output Summary (last 24 hours):     Intake/Output Summary (Last 24 hours) at 2024 1426  Last data filed at 2024 1331  Gross per 24 hour   Intake 1440 ml   Output 3550 ml   Net -2110 ml       Physical Exam:   Physical Exam  Vitals and nursing note reviewed.    Constitutional:       General: He is not in acute distress.     Appearance: He is well-developed.   HENT:      Head: Normocephalic and atraumatic.   Eyes:      Conjunctiva/sclera: Conjunctivae normal.   Cardiovascular:      Rate and Rhythm: Normal rate and regular rhythm.      Heart sounds: No murmur heard.  Pulmonary:      Effort: Pulmonary effort is normal. No respiratory distress.      Breath sounds: Normal breath sounds.   Abdominal:      Palpations: Abdomen is soft.      Tenderness: There is no abdominal tenderness.   Musculoskeletal:         General: No swelling.      Cervical back: Neck supple.      Comments: Left aka   Skin:     General: Skin is warm and dry.      Capillary Refill: Capillary refill takes less than 2 seconds.   Neurological:      Mental Status: He is alert.      Motor: No weakness.   Psychiatric:         Mood and Affect: Mood normal.          Additional Data:     Labs:  Results from last 7 days   Lab Units 06/13/24  0704   WBC Thousand/uL 10.39*   HEMOGLOBIN g/dL 11.7*   HEMATOCRIT % 39.1   PLATELETS Thousands/uL 521*   SEGS PCT % 75   LYMPHO PCT % 16   MONO PCT % 6   EOS PCT % 2     Results from last 7 days   Lab Units 06/13/24  0518   SODIUM mmol/L 139   POTASSIUM mmol/L 4.9   CHLORIDE mmol/L 106   CO2 mmol/L 27   BUN mg/dL 14   CREATININE mg/dL 1.19   ANION GAP mmol/L 6   CALCIUM mg/dL 9.1   ALBUMIN g/dL 2.8*   TOTAL BILIRUBIN mg/dL 0.19*   ALK PHOS U/L 82   ALT U/L 49   AST U/L 31   GLUCOSE RANDOM mg/dL 100     Results from last 7 days   Lab Units 06/07/24  1829   INR  1.35*             Results from last 7 days   Lab Units 06/07/24  1239   LACTIC ACID mmol/L 2.2*       Lines/Drains:  Invasive Devices       Peripheral Intravenous Line  Duration             Peripheral IV 06/11/24 Distal;Dorsal (posterior);Right Forearm 2 days              Drain  Duration             External Urinary Catheter Large 4 days                          Imaging: Reviewed radiology reports from this admission  including: procedure reports    Recent Cultures (last 7 days):         Last 24 Hours Medication List:   Current Facility-Administered Medications   Medication Dose Route Frequency Provider Last Rate    acetaminophen  975 mg Oral Q8H DAVINA Karen Guerrero PA-C      aspirin  81 mg Oral Daily Karen Guerrero PA-C      atorvastatin  80 mg Oral Daily With Dinner Karen Guerrero PA-C      bictegravir-emtricitab-tenofovir alafenamide  1 tablet Oral Daily With Breakfast Karen Guerrero PA-C      diphenhydrAMINE  25 mg Oral Q6H PRN Iliana Cuevas PA-C      divalproex sodium  1,250 mg Oral Daily Ida Hodges MD      dolutegravir  50 mg Oral Daily Karen Guerrero PA-C      gabapentin  100 mg Oral TID Karen Guerrero PA-C      HYDROmorphone  0.5 mg Intravenous Q3H PRN Karen Guerrero PA-C      lamoTRIgine  50 mg Oral BID Ida Hodges MD      levOCARNitine  1,000 mg/day Oral TID With Meals Ida Hodges MD      losartan  50 mg Oral Daily Ida Hodges MD      melatonin  6 mg Oral HS Karen Guerrero PA-C      metoprolol succinate  25 mg Oral Q12H Leonardo Bruno MD      mirtazapine  15 mg Oral HS Karen Guerrero PA-C      ELVA ANTIFUNGAL   Topical BID Dana Rodríguez MD      ondansetron  4 mg Intravenous Q6H PRN Karen Guerrero PA-C      oxyCODONE  10 mg Oral Q6H PRN Karen Guerrero PA-C      oxyCODONE  5 mg Oral Q6H PRN Karen Guerrero PA-C      polyethylene glycol  17 g Oral Daily PRN Shruti Correa MD      rivaroxaban  20 mg Oral Daily With Dinner Stan Whitney MD      senna  2 tablet Oral BID Shruti Correa MD      sertraline  25 mg Oral Daily Karen Guerrero PA-C      sertraline  50 mg Oral Daily Karen Guerrero PA-C      tamsulosin  0.4 mg Oral Daily With Dinner Karen Guerrero PA-C      zonisamide  400 mg Oral Daily Ida Hodges MD          Today, Patient Was Seen By: Stan Whitney MD    **Please Note: This note may have been constructed using a voice recognition system.**

## 2024-06-13 NOTE — PLAN OF CARE
Problem: OCCUPATIONAL THERAPY ADULT  Goal: Performs self-care activities at highest level of function for planned discharge setting.  See evaluation for individualized goals.  Description: Treatment Interventions: ADL retraining, Functional transfer training, Endurance training, Cognitive reorientation, Patient/family training, Equipment evaluation/education, Compensatory technique education, Activityengagement, Energy conservation          See flowsheet documentation for full assessment, interventions and recommendations.   Outcome: Progressing  Note: Limitation: Decreased ADL status, Decreased Safe judgement during ADL, Decreased cognition, Decreased endurance, Decreased sensation, Decreased self-care trans, Decreased high-level ADLs  Prognosis: Good  Assessment: Pt seen on this date for OT session focusing on ADL retraining, cognitive reorientation, body mechanics, transfer retraining, increasing activity tolerance/endurance and EOB sitting to increase ability to participate in ADL/functional tasks. Pt was found in bed and was left in chair w/ all needs within reach, chair alarm on, correctional officers present. Pt completed bed mob w min Ax1, STS w mod Ax2 c rw and vc for proper hand placement during transfers. Pt completed grooming with S while sitting EOB. LB ADL completed w max A. Pt w/ improvements in cognition, attn to task, transfer ability and sequencing/processing speeds, however is still limited 2* decreased ADL/High-level ADL status, decreased activity tolerance/endurance, decreased cognition, decreased self-care trans, decreased safety awareness and insight to condition.   The patient's raw score on the AM-PAC Daily Activity Inpatient Short Form is 16. A raw score of less than 19 suggests the patient may benefit from discharge to post-acute rehabilitation services. Please refer to the recommendation of the Occupational Therapist for safe discharge planning. Pt will continue to benefit from acute OT  services to meet goals.     Rehab Resource Intensity Level, OT: I (Maximum Resource Intensity)

## 2024-06-13 NOTE — OCCUPATIONAL THERAPY NOTE
Occupational Therapy Progress Note     Patient Name: Sunil Patel  Today's Date: 6/13/2024  Problem List  Principal Problem:    Acute lower limb ischemia  Active Problems:    Human immunodeficiency virus (HIV) infection ([facility])    Benign essential hypertension    Cerebrovascular accident (CVA) ([facility])    Left ventricular thrombus    Seizures ([facility])    Medication management    Carnitine deficiency ([facility])         06/13/24 1225   OT Last Visit   OT Visit Date 06/13/24   Note Type   Note Type Treatment   Pain Assessment   Pain Assessment Tool 0-10   Pain Score No Pain   Restrictions/Precautions   Weight Bearing Precautions Per Order Yes   LLE Weight Bearing Per Order (S)  NWB  (s/p L AKA)   Other Precautions Cognitive;Chair Alarm;Bed Alarm;Fall Risk;Pain   ADL   Where Assessed Edge of bed   Grooming Assistance 5  Supervision/Setup   Grooming Deficit Teeth care   LB Dressing Assistance 2  Maximal Assistance   LB Dressing Deficit Don/doff L sock;Don/doff R sock   LB Dressing Comments re-wrapped amp site.   Bed Mobility   Supine to Sit 4  Minimal assistance   Additional items Assist x 1;Increased time required;Verbal cues;LE management   Additional Comments found supine in bed, left in chair w all needs in reach and alarm on. Correctional officers present.   Transfers   Sit to Stand 3  Moderate assistance   Additional items Assist x 2;Increased time required;Verbal cues   Stand to Sit 3  Moderate assistance   Additional items Assist x 2;Increased time required;Verbal cues   Additional Comments c rw, consistent vc for hand placment during transfers. several sts transfers practiced during session today to inc endurance, activity tolerance, and sequencing.   Functional Mobility   Functional Mobility 3  Moderate assistance   Additional Comments ax2, small hops from BED>CHAIR   Additional items Rolling walker   Cognition   Overall Cognitive Status Impaired   Arousal/Participation Alert;Responsive   Attention Attends with cues to  redirect   Orientation Level Oriented to person;Oriented to place;Disoriented to time;Disoriented to situation   Memory Decreased recall of precautions;Decreased recall of recent events   Following Commands Follows one step commands with increased time or repetition   Comments pt cooperative with overall fair safety awareness, insight to condition. slightly decreased processing speeds noted, but pt w overall fair safety awareness and insight to condition t/o session.   Activity Tolerance   Activity Tolerance Patient tolerated treatment well   Medical Staff Made Aware DPT, RN 2' pts med complexity, comorbidities and regression from baseline.   Assessment   Assessment Pt seen on this date for OT session focusing on ADL retraining, cognitive reorientation, body mechanics, transfer retraining, increasing activity tolerance/endurance and EOB sitting to increase ability to participate in ADL/functional tasks. Pt was found in bed and was left in chair w/ all needs within reach, chair alarm on, correctional officers present. Pt completed bed mob w min Ax1, STS w mod Ax2 c rw and vc for proper hand placement during transfers. Pt completed grooming with S while sitting EOB. LB ADL completed w max A. Pt w/ improvements in cognition, attn to task, transfer ability and sequencing/processing speeds, however is still limited 2* decreased ADL/High-level ADL status, decreased activity tolerance/endurance, decreased cognition, decreased self-care trans, decreased safety awareness and insight to condition.   The patient's raw score on the AM-PAC Daily Activity Inpatient Short Form is 16. A raw score of less than 19 suggests the patient may benefit from discharge to post-acute rehabilitation services. Please refer to the recommendation of the Occupational Therapist for safe discharge planning. Pt will continue to benefit from acute OT services to meet goals.   Plan   Treatment Interventions ADL retraining;Functional transfer  training;Endurance training;Cognitive reorientation;Patient/family training;Equipment evaluation/education;Compensatory technique education;Activityengagement;Energy conservation   Goal Expiration Date 06/24/24   OT Treatment Day 2   OT Frequency 2-3x/wk   Discharge Recommendation   Rehab Resource Intensity Level, OT I (Maximum Resource Intensity)   AM-PAC Daily Activity Inpatient   Lower Body Dressing 2   Bathing 2   Toileting 2   Upper Body Dressing 3   Grooming 3   Eating 4   Daily Activity Raw Score 16   Daily Activity Standardized Score (Calc for Raw Score >=11) 35.96   AM-PAC Applied Cognition Inpatient   Following a Speech/Presentation 3   Understanding Ordinary Conversation 3   Taking Medications 1   Remembering Where Things Are Placed or Put Away 2   Remembering List of 4-5 Errands 2   Taking Care of Complicated Tasks 1   Applied Cognition Raw Score 12   Applied Cognition Standardized Score 28.82   Modified [place] Scale   Modified [place] Scale 4   End of Consult   Education Provided Yes   Patient Position at End of Consult Bedside chair;Bed/Chair alarm activated;All needs within reach   Nurse Communication Nurse aware of consult   End of Consult Comments correctional officers present t/o session.         YUKI Brady, OTR/L

## 2024-06-13 NOTE — CASE MANAGEMENT
Case Management Progress Note    Patient name Sunil Patel  Location [facility] 507/[facility] 507-01 MRN [id number]  : 1960 Date 2024       LOS (days): 6  Geometric Mean LOS (GMLOS) (days): 4  Days to GMLOS:-1.9        OBJECTIVE:        Current admission status: Inpatient  Preferred Pharmacy:   [facility] [address]  [address] [place] [address]  [facility] [address]  Phone: [phone] Fax: [phone]    Primary Care Provider: CHARLIE Trevino    Primary Insurance: MEDICARE  Secondary Insurance: USP    PROGRESS NOTE:      CM spoke w/ Pts Awilda (PH: [phone]) re: Pts DCP. Per Awilda, the Pt is able to be released on bail once he has an accepting rehab facility. At this time, the Pt does not have any accepting facilities.     TC received from Nalini Ontiveros,  at [facility], who states that she is working with the [facility] to help with placing this Pt in rehab and potentially arranging housing afterwards. VA benefits form completed with the Pt and sent back to Nalini via email.     TC to Felice Johnson,  at [facility]. Per Felice, [facility] does not assist with MH or housing services.  Per Felice, Pt does have an Intensive  (ICM) through [facility]. CM left a VM for [facility] to confirm this information and see what they can assist with.     Optioning paperwork completed and sent to [facility] via email @: [email or web address].

## 2024-06-13 NOTE — PLAN OF CARE
Problem: PHYSICAL THERAPY ADULT  Goal: Performs mobility at highest level of function for planned discharge setting.  See evaluation for individualized goals.  Description: Treatment/Interventions: ADL retraining, Functional transfer training, LE strengthening/ROM, Therapeutic exercise, Endurance training, Patient/family training, Equipment eval/education, Bed mobility, Gait training, Spoke to nursing, Spoke to case management, OT, Elevations, Cognitive reorientation  Equipment Recommended:  (tbd)       See flowsheet documentation for full assessment, interventions and recommendations.  Outcome: Progressing  Note: Prognosis: Good  Problem List: Decreased strength, Decreased endurance, Impaired balance, Decreased mobility, Pain, Decreased cognition, Impaired judgement, Decreased safety awareness  Assessment: Patient received in bed. Patient agreeable to therapy. Patient performs bed mobility with minimal assistance x1. Patient performs functional transfers with moderate assistance x2 using rolling walker, patient performs x6 STS transfers throughout session with focus on standing balance and tolerance.  Patient performs static standing balance with rolling walker. Please see flowsheet for balance activities performed. Patient requires Mod Ax2 to stand, but once standing able to progress to Mod Ax1 for static stance.  Patient performs ambulation with moderate assistance x2 using rolling walker, 2' to chair using hop to gait pattern.  Patient left in bedside chair with all needs met and call bell/personal items within reach. The patient's AM-PAC Basic Mobility Inpatient Short Form Raw Score is 10 showing further need for skilled PT services in order to improve functional mobility, decrease need for assistance, and return to PLOF. PT is recommending Level 1 - Maximum Resource Intensity on d/c from hospital.  Will continue to follow as able.  Barriers to Discharge: Decreased caregiver support     Rehab Resource  Intensity Level, PT: I (Maximum Resource Intensity)    See flowsheet documentation for full assessment.

## 2024-06-13 NOTE — PLAN OF CARE
Problem: PAIN - ADULT  Goal: Verbalizes/displays adequate comfort level or baseline comfort level  Description: Interventions:  - Encourage patient to monitor pain and request assistance  - Assess pain using appropriate pain scale  - Administer analgesics based on type and severity of pain and evaluate response  - Implement non-pharmacological measures as appropriate and evaluate response  - Consider cultural and social influences on pain and pain management  - Notify physician/advanced practitioner if interventions unsuccessful or patient reports new pain  Outcome: Progressing     Problem: INFECTION - ADULT  Goal: Absence or prevention of progression during hospitalization  Description: INTERVENTIONS:  - Assess and monitor for signs and symptoms of infection  - Monitor lab/diagnostic results  - Monitor all insertion sites, i.e. indwelling lines, tubes, and drains  - Monitor endotracheal if appropriate and nasal secretions for changes in amount and color  - [place] appropriate cooling/warming therapies per order  - Administer medications as ordered  - Instruct and encourage patient and family to use good hand hygiene technique  - Identify and instruct in appropriate isolation precautions for identified infection/condition  Outcome: Progressing  Goal: Absence of fever/infection during neutropenic period  Description: INTERVENTIONS:  - Monitor WBC    Outcome: Progressing     Problem: SAFETY ADULT  Goal: Patient will remain free of falls  Description: INTERVENTIONS:  - Educate patient/family on patient safety including physical limitations  - Instruct patient to call for assistance with activity   - Consult OT/PT to assist with strengthening/mobility   - Keep Call bell within reach  - Keep bed low and locked with side rails adjusted as appropriate  - Keep care items and personal belongings within reach  - Initiate and maintain comfort rounds  - Make Fall Risk Sign visible to staff  - Offer Toileting every 2 Hours,  in advance of need  - Initiate/Maintain bed alarm  - Obtain necessary fall risk management equipment: non-slip socks  - Apply yellow socks and bracelet for high fall risk patients  - Consider moving patient to room near nurses station  Outcome: Progressing  Goal: Maintain or return to baseline ADL function  Description: INTERVENTIONS:  -  Assess patient's ability to carry out ADLs; assess patient's baseline for ADL function and identify physical deficits which impact ability to perform ADLs (bathing, care of mouth/teeth, toileting, grooming, dressing, etc.)  - Assess/evaluate cause of self-care deficits   - Assess range of motion  - Assess patient's mobility; develop plan if impaired  - Assess patient's need for assistive devices and provide as appropriate  - Encourage maximum independence but intervene and supervise when necessary  - Involve family in performance of ADLs  - Assess for home care needs following discharge   - Consider OT consult to assist with ADL evaluation and planning for discharge  - Provide patient education as appropriate  Outcome: Progressing  Goal: Maintains/Returns to pre admission functional level  Description: INTERVENTIONS:  - Perform AM-PAC 6 Click Basic Mobility/ Daily Activity assessment daily.  - Set and communicate daily mobility goal to care team and patient/family/caregiver.   - Collaborate with rehabilitation services on mobility goals if consulted  - Perform Range of Motion 4 times a day.  - Reposition patient every 2 hours.  - Dangle patient 3 times a day  - Stand patient 3 times a day  - Ambulate patient 3 times a day  - Out of bed to chair 3 times a day   - Out of bed for meals 3 times a day  - Out of bed for toileting  - Record patient progress and toleration of activity level   Outcome: Progressing     Problem: DISCHARGE PLANNING  Goal: Discharge to home or other facility with appropriate resources  Description: INTERVENTIONS:  - Identify barriers to discharge w/patient and  caregiver  - Arrange for needed discharge resources and transportation as appropriate  - Identify discharge learning needs (meds, wound care, etc.)  - Arrange for interpretive services to assist at discharge as needed  - Refer to Case Management Department for coordinating discharge planning if the patient needs post-hospital services based on physician/advanced practitioner order or complex needs related to functional status, cognitive ability, or social support system  Outcome: Progressing     Problem: Knowledge Deficit  Goal: Patient/family/caregiver demonstrates understanding of disease process, treatment plan, medications, and discharge instructions  Description: Complete learning assessment and assess knowledge base.  Interventions:  - Provide teaching at level of understanding  - Provide teaching via preferred learning methods  Outcome: Progressing     Problem: Prexisting or High Potential for Compromised Skin Integrity  Goal: Skin integrity is maintained or improved  Description: INTERVENTIONS:  - Identify patients at risk for skin breakdown  - Assess and monitor skin integrity  - Assess and monitor nutrition and hydration status  - Monitor labs   - Assess for incontinence   - Turn and reposition patient  - Assist with mobility/ambulation  - Relieve pressure over bony prominences  - Avoid friction and shearing  - Provide appropriate hygiene as needed including keeping skin clean and dry  - Evaluate need for skin moisturizer/barrier cream  - Collaborate with interdisciplinary team   - Patient/family teaching  - Consider wound care consult   Outcome: Progressing

## 2024-06-13 NOTE — CONSULTS
Consultation - Behavioral Health   Sunil Patel 63 y.o. male MRN: [id number]  Unit/Bed#: [facility] 507-01 Encounter: [id number]      Chief Complaint: I am depressed because I cannot go outside and smoke marijuana, I have marijuana card    History of Present Illness   Physician Requesting Consult: Stan Whitney MD  Reason for Consult / Principal Problem: Bipolar affective disorder, remission status unspecified if    Sunil Patel is a 63 y.o. male with a history of HIV, TBI, mood disorder, history of substance abuse presents from incarceration with complaint of left lower extremity swelling and pain, have occlusion of the left external iliac artery without visualization of the distal vessels even on delayed phase he was admitted to the hospital and required left above-knee amputation.  He has been evaluated for option.  According to the record patient have a history of mood disorder induced by substance abuse, he has multiple admission and involved that he was actively using cocaine and alcohol.  According to the record he has been living in a group home prior to go to intermediate.  The records have been reviewed.  When I saw the patient he states that he is depressed because he cannot go outside and smoke marijuana, he has a marijuana card.  He also states that he has been placed in the basement and he was cold, sometime he does not make sense.  He  stated that he does not take any medications because we do not treat his symptoms because he is HIV.  He does not feel that he needed any medication, but he has been taking the medications every days without any issues.  He does recall that he had  a TBI.  He does denies any hallucinations, does not have any suicidal or homicidal thoughts plan or intent.         Psychiatric Review Of Systems:  sleep: no  appetite changes: no  weight changes: no  energy/anergy: no  interest/pleasure/anhedonia: no  somatic symptoms: no  anxiety/panic: no  florentin: no  guilty/hopeless: no  self  injurious behavior/risky behavior: no    Historical Information   Past Psychiatric History:   In Patient prior inpatient psych admission at [facility], [facility], [facility].  He also has been doing rehabilitation at [facility].   Currently in treatment with none.  Past Suicide attempts: He had the thoughts but never had an attempt  Past Violent behavior: none  Past Psychiatric medication trial: Cymbalta, Seroquel, lithium, he also has been on Depakote, and Lamictal for seizures and mood disorder    Substance Abuse History:  Evidence and history of crack cocaine, marijuana and alcohol, has been sober for some time      I have assessed this patient for substance use within the past 12 months     History of IP/OP rehabilitation program: He has been in multiple rehabilitation  Smoking history: Someday smoker  Family Psychiatric History:   The patient and multiple family members, according to the record no history of drug or alcohol in the family or suicide attempt    Social History  Education: high school diploma/GED  Learning Disabilities:  None  Marital history: Unknown if officially   Living arrangement, social support:  At this time he is incarcerated.  Occupational History: unemployed  Functioning Relationships: poor support system.  Other Pertinent History:  Was in the [employer] but have no honorable discharge secondary to drug use, at this time he is incarcerated    Traumatic History:   Abuse:  Denies  Other Traumatic Events:  Denies    Past Medical History:   Diagnosis Date    Anxiety     Depression     HIV disease ([facility])     Substance abuse ([facility])     Suicide attempt ([facility])        Medical Review Of Systems:  Review of Systems -depressed, pain in the leg area, all other systems reviewed and are negative    Meds/Allergies   current meds:   Current Facility-Administered Medications   Medication Dose Route Frequency    acetaminophen (TYLENOL) tablet 975 mg  975 mg Oral  Q8H DAVINA    aspirin (ECOTRIN LOW STRENGTH) EC tablet 81 mg  81 mg Oral Daily    atorvastatin (LIPITOR) tablet 80 mg  80 mg Oral Daily With Dinner    bictegravir-emtricitab-tenofovir alafenamide (BIKTARVY) -25 MG tablet 1 tablet  1 tablet Oral Daily With Breakfast    diphenhydrAMINE (BENADRYL) tablet 25 mg  25 mg Oral Q6H PRN    divalproex sodium (DEPAKOTE ER) 24 hr tablet 1,250 mg  1,250 mg Oral Daily    dolutegravir (TIVICAY) tablet 50 mg  50 mg Oral Daily    gabapentin (NEURONTIN) capsule 100 mg  100 mg Oral TID    HYDROmorphone (DILAUDID) injection 0.5 mg  0.5 mg Intravenous Q3H PRN    lamoTRIgine (LaMICtal) tablet 50 mg  50 mg Oral BID    levOCARNitine (CARNITOR) oral solution 330 mg  1,000 mg/day Oral TID With Meals    losartan (COZAAR) tablet 50 mg  50 mg Oral Daily    melatonin tablet 6 mg  6 mg Oral HS    metoprolol succinate (TOPROL-XL) 24 hr tablet 25 mg  25 mg Oral Q12H    mirtazapine (REMERON) tablet 15 mg  15 mg Oral HS    moisture barrier miconazole 2% cream (aka ELVA MOISTURE BARRIER ANTIFUNGAL CREAM)   Topical BID    ondansetron (ZOFRAN) injection 4 mg  4 mg Intravenous Q6H PRN    oxyCODONE (ROXICODONE) immediate release tablet 10 mg  10 mg Oral Q6H PRN    oxyCODONE (ROXICODONE) IR tablet 5 mg  5 mg Oral Q6H PRN    polyethylene glycol (MIRALAX) packet 17 g  17 g Oral Daily PRN    rivaroxaban (XARELTO) tablet 20 mg  20 mg Oral Daily With Dinner    senna (SENOKOT) tablet 17.2 mg  2 tablet Oral BID    sertraline (ZOLOFT) tablet 25 mg  25 mg Oral Daily    sertraline (ZOLOFT) tablet 50 mg  50 mg Oral Daily    tamsulosin (FLOMAX) capsule 0.4 mg  0.4 mg Oral Daily With Dinner    zonisamide (ZONEGRAN) capsule 400 mg  400 mg Oral Daily     Allergies   Allergen Reactions    No Active Allergies        Objective   Vital signs in last 24 hours:  Temp:  [98.3 °F (36.8 °C)-98.6 °F (37 °C)] 98.6 °F (37 °C)  HR:  [75-83] 75  Resp:  [17-18] 17  BP: (112-140)/(66-92) 124/70      Intake/Output Summary (Last 24  hours) at 6/13/2024 1443  Last data filed at 6/13/2024 1331  Gross per 24 hour   Intake 1440 ml   Output 3550 ml   Net -2110 ml       Mental Status Evaluation:  Appearance:  age appropriate and disheveled   Behavior:  normal   Speech:  normal pitch and normal volume   Mood:  depressed   Affect:  constricted   Language: Residual aphasia   Thought Process:  circumstantial   Associations: circumstantial associations   Thought Content:  normal   Perceptual Disturbances: None   Risk Potential: Suicidal Ideations none, Homicidal Ideations none, and Potential for Aggression No   Sensorium:  person and place   Memory:  patient does not answer   Cognition:  patient does not answer   Consciousness:  alert and awake    Attention: attention span appeared shorter than expected for age   Intellect: not examined   Fund of Knowledge: awareness of current events: Unable to assess does not answer   Insight:  limited   Judgment: limited   Muscle Strength and Tone: Within normal limits in the upper extremities   Gait/Station: Unable to assess patient is in bed   Motor Activity: no abnormal movements     Lab Results:  I have personally reviewed all pertinent laboratory/tests results.  Labs in last 72 hours:   Recent Labs     06/13/24  0518 06/13/24  0704   WBC  --  10.39*   RBC  --  4.13   HGB  --  11.7*   HCT  --  39.1   PLT  --  521*   RDW  --  13.9   NEUTROABS  --  7.73*   SODIUM 139  --    K 4.9  --      --    CO2 27  --    BUN 14  --    CREATININE 1.19  --    GLUC 100  --    CALCIUM 9.1  --    AST 31  --    ALT 49  --    ALKPHOS 82  --    TP 6.6  --    ALB 2.8*  --    TBILI 0.19*  --        Code Status: )Level 1 - Full Code    Assessment & Plan     Assessment:  Sunil Patel is a 63 y.o. male with a history of HIV, TBI, mood disorder, history of substance abuse presents from incarceration with complaint of left lower extremity swelling and pain, have occlusion of the left external iliac artery without visualization of the  distal vessels even on delayed phase he was admitted to the hospital and required left above-knee amputation.  He has been evaluated for option.  Patient denies any suicidal or homicidal ideation plan or intent he does not have any active psychotic symptoms, he appears at baseline  Diagnosis:  Mood disorder unspecified  Plan:   Continue medical management  Continue current psychotropic medications  Patient is psychiatrically cleared to go to rehab  Discussed with the primary team  No intervention at this time I will sign off but call me back if necessary  Risks, benefits and possible side effects of Medications:   Risks, benefits, and possible side effects of medications explained to patient and patient verbalizes understanding.           Tereza Mireles MD

## 2024-06-13 NOTE — ASSESSMENT & PLAN NOTE
Patient presented with left lower extremity acute limb ischemia with extensive left iliofemoral and left infrainguinal embolism with nonviable left lower extremity  Status post left femoral thrombectomy and AKA on 6/7  Pain management  AC with Xarelto  PT/OT evaluation > needs inpatient rehab  Further management per vascular surgery

## 2024-06-13 NOTE — PHYSICAL THERAPY NOTE
PHYSICAL THERAPY NOTE          Patient Name: Sunil Patel  Today's Date: 6/13/2024 06/13/24 1226   PT Last Visit   PT Visit Date 06/13/24   Note Type   Note Type Treatment   Pain Assessment   Pain Assessment Tool 0-10   Pain Score No Pain   Restrictions/Precautions   Weight Bearing Precautions Per Order Yes   LLE Weight Bearing Per Order NWB  (AKA)   Other Precautions Cognitive;Chair Alarm;Bed Alarm;Fall Risk;Pain  (x2 correctional officers present)   General   Chart Reviewed Yes   Family/Caregiver Present No  (2 correctional officers present)   Cognition   Overall Cognitive Status Impaired   Arousal/Participation Alert;Cooperative   Attention Attends with cues to redirect   Orientation Level Oriented to person;Oriented to place;Disoriented to time;Disoriented to situation   Memory Decreased recall of precautions;Decreased short term memory;Decreased recall of recent events   Following Commands Follows one step commands with increased time or repetition   Comments Patient is pleasant and cooperative   Subjective   Subjective Patient agreeable to PT tx   Bed Mobility   Supine to Sit 4  Minimal assistance   Additional items Assist x 1;Increased time required;Verbal cues   Transfers   Sit to Stand 3  Moderate assistance   Additional items Assist x 2;Increased time required;Verbal cues   Stand to Sit 3  Moderate assistance   Additional items Assist x 2;Increased time required;Verbal cues   Stand pivot 3  Moderate assistance   Additional items Assist x 2;Increased time required;Verbal cues   Additional Comments RW - x6 STS performed   Ambulation/Elevation   Gait pattern   (hop to gait pattern)   Gait Assistance 3  Moderate assist   Additional items Assist x 2;Verbal cues   Assistive Device Rolling walker   Distance 2' to chair   Balance   Static Sitting Fair -   Dynamic Sitting Poor +   Static Standing Poor   Dynamic Standing Poor -    Ambulatory Poor   Endurance Deficit   Endurance Deficit Yes   Endurance Deficit Description fatigue, L AKA, pain, cognition   Activity Tolerance   Activity Tolerance Patient tolerated treatment well   Medical Staff Made Aware OT, OTS   Nurse Made Aware RN cleared   Exercises   Balance training  standing balance trials at RW - patient performs x6 STS throughout session with ability to maintain stance at least 1 minute per trial - patient able to perform very short distance hopping while usign RW Mod Ax2 - will continue to progress ambulation and dynamic stability   Assessment   Prognosis Good   Problem List Decreased strength;Decreased endurance;Impaired balance;Decreased mobility;Pain;Decreased cognition;Impaired judgement;Decreased safety awareness   Assessment Patient received in bed. Patient agreeable to therapy. Patient performs bed mobility with minimal assistance x1. Patient performs functional transfers with moderate assistance x2 using rolling walker, patient performs x6 STS transfers throughout session with focus on standing balance and tolerance.  Patient performs static standing balance with rolling walker. Please see flowsheet for balance activities performed. Patient requires Mod Ax2 to stand, but once standing able to progress to Mod Ax1 for static stance.  Patient performs ambulation with moderate assistance x2 using rolling walker, 2' to chair using hop to gait pattern.  Patient left in bedside chair with all needs met and call bell/personal items within reach. The patient's AM-PAC Basic Mobility Inpatient Short Form Raw Score is 10 showing further need for skilled PT services in order to improve functional mobility, decrease need for assistance, and return to PLOF. PT is recommending Level 1 - Maximum Resource Intensity on d/c from hospital.  Will continue to follow as able.   Barriers to Discharge Decreased caregiver support   Goals   Patient Goals to go outside   PT Treatment Day 2   Plan    Treatment/Interventions Functional transfer training;LE strengthening/ROM;Therapeutic exercise;Endurance training;Cognitive reorientation;Elevations;Equipment eval/education;Patient/family training;Bed mobility;Gait training;Spoke to nursing;Spoke to case management;OT   Progress Progressing toward goals   PT Frequency 3-5x/wk   Discharge Recommendation   Rehab Resource Intensity Level, PT I (Maximum Resource Intensity)   Equipment Recommended Walker   Walker Package Recommended Wheeled walker   AM-PAC Basic Mobility Inpatient   Turning in Flat Bed Without Bedrails 3   Lying on Back to Sitting on Edge of Flat Bed Without Bedrails 3   Moving Bed to Chair 1   Standing Up From Chair Using Arms 1   Walk in Room 1   Climb 3-5 Stairs With Railing 1   Basic Mobility Inpatient Raw Score 10   Turning Head Towards Sound 3   Follow Simple Instructions 3   Low Function Basic Mobility Raw Score  16   Low Function Basic Mobility Standardized Score  25.72   [facility] Highest Level Of Mobility   -[facility] Goal 4: Move to chair/commode   -HLM Achieved 5: Stand (1 or more minutes)   End of Consult   Patient Position at End of Consult All needs within reach;Bedside chair;Bed/Chair alarm activated  (x2 CO's present)       Stacy Mehta, PT, DPT

## 2024-06-13 NOTE — ASSESSMENT & PLAN NOTE
Diagnosed in July 2019 - follows with [facility] neurology  Continue Depakote/Lamictal/Zonisamide > doses now adjusted per most recent outpatient neurology changes on record -> Depakote 1250 mg daily, Zonisamide 400 mg daily, and Lamictal 50 mg BID   Called his neurologist office at [facility] who confirmed above doses with out any recent change or discontinuation.

## 2024-06-13 NOTE — QUICK NOTE
Dressing was removed at bedside and incision was examined. Incision is C/D/I without signs of infection. Staple line is well approximated. No purulent drainage or surrounding erythema. L groin incision was C/D/I and well approximated with no signs of infection or hematoma. Dressing was changed for L AKA stump with ABD's and ACE wrap. Nursing dressing change orders are in. Please reach out to Vascular surgery on Epic chat if there are questions or concerns.           Oleksandr Wells, DO  Surgery PGY-2

## 2024-06-14 PROBLEM — Z79.899 MEDICATION MANAGEMENT: Status: RESOLVED | Noted: 2024-06-08 | Resolved: 2024-06-14

## 2024-06-14 LAB
KCT BLD-ACNC: 210 SEC (ref 89–137)
KCT BLD-ACNC: 223 SEC (ref 89–137)
SPECIMEN SOURCE: ABNORMAL
SPECIMEN SOURCE: ABNORMAL

## 2024-06-14 PROCEDURE — 99232 SBSQ HOSP IP/OBS MODERATE 35: CPT | Performed by: INTERNAL MEDICINE

## 2024-06-14 RX ADMIN — LAMOTRIGINE 50 MG: 25 TABLET ORAL at 09:14

## 2024-06-14 RX ADMIN — ACETAMINOPHEN 975 MG: 325 TABLET, FILM COATED ORAL at 06:06

## 2024-06-14 RX ADMIN — BICTEGRAVIR SODIUM, EMTRICITABINE, AND TENOFOVIR ALAFENAMIDE FUMARATE 1 TABLET: 50; 200; 25 TABLET ORAL at 07:16

## 2024-06-14 RX ADMIN — ACETAMINOPHEN 975 MG: 325 TABLET, FILM COATED ORAL at 13:18

## 2024-06-14 RX ADMIN — RIVAROXABAN 20 MG: 20 TABLET, FILM COATED ORAL at 17:29

## 2024-06-14 RX ADMIN — OXYCODONE HYDROCHLORIDE 10 MG: 10 TABLET ORAL at 22:06

## 2024-06-14 RX ADMIN — MIRTAZAPINE 15 MG: 15 TABLET, FILM COATED ORAL at 22:02

## 2024-06-14 RX ADMIN — TAMSULOSIN HYDROCHLORIDE 0.4 MG: 0.4 CAPSULE ORAL at 17:29

## 2024-06-14 RX ADMIN — ZONISAMIDE 400 MG: 100 CAPSULE ORAL at 09:18

## 2024-06-14 RX ADMIN — METOPROLOL SUCCINATE 25 MG: 25 TABLET, EXTENDED RELEASE ORAL at 17:29

## 2024-06-14 RX ADMIN — SERTRALINE HYDROCHLORIDE 25 MG: 50 TABLET ORAL at 09:15

## 2024-06-14 RX ADMIN — LAMOTRIGINE 50 MG: 25 TABLET ORAL at 17:28

## 2024-06-14 RX ADMIN — GABAPENTIN 100 MG: 100 CAPSULE ORAL at 22:02

## 2024-06-14 RX ADMIN — GABAPENTIN 100 MG: 100 CAPSULE ORAL at 09:15

## 2024-06-14 RX ADMIN — LEVOCARNITINE 330 MG: 1 SOLUTION ORAL at 17:30

## 2024-06-14 RX ADMIN — Medication 6 MG: at 22:02

## 2024-06-14 RX ADMIN — ATORVASTATIN CALCIUM 80 MG: 80 TABLET, FILM COATED ORAL at 17:28

## 2024-06-14 RX ADMIN — LOSARTAN POTASSIUM 50 MG: 50 TABLET, FILM COATED ORAL at 09:14

## 2024-06-14 RX ADMIN — LEVOCARNITINE 330 MG: 1 SOLUTION ORAL at 09:18

## 2024-06-14 RX ADMIN — OXYCODONE HYDROCHLORIDE 10 MG: 10 TABLET ORAL at 00:11

## 2024-06-14 RX ADMIN — MICONAZOLE NITRATE: 20 CREAM TOPICAL at 09:19

## 2024-06-14 RX ADMIN — MICONAZOLE NITRATE: 20 CREAM TOPICAL at 17:30

## 2024-06-14 RX ADMIN — SENNOSIDES 17.2 MG: 8.6 TABLET, FILM COATED ORAL at 17:28

## 2024-06-14 RX ADMIN — SENNOSIDES 17.2 MG: 8.6 TABLET, FILM COATED ORAL at 09:15

## 2024-06-14 RX ADMIN — DIVALPROEX SODIUM 1250 MG: 500 TABLET, EXTENDED RELEASE ORAL at 09:14

## 2024-06-14 RX ADMIN — ASPIRIN 81 MG: 81 TABLET, COATED ORAL at 09:15

## 2024-06-14 RX ADMIN — GABAPENTIN 100 MG: 100 CAPSULE ORAL at 17:29

## 2024-06-14 RX ADMIN — SERTRALINE HYDROCHLORIDE 50 MG: 50 TABLET ORAL at 09:16

## 2024-06-14 RX ADMIN — METOPROLOL SUCCINATE 25 MG: 25 TABLET, EXTENDED RELEASE ORAL at 06:05

## 2024-06-14 RX ADMIN — LEVOCARNITINE 330 MG: 1 SOLUTION ORAL at 13:18

## 2024-06-14 RX ADMIN — DOLUTEGRAVIR SODIUM 50 MG: 50 TABLET, FILM COATED ORAL at 09:18

## 2024-06-14 RX ADMIN — ACETAMINOPHEN 975 MG: 325 TABLET, FILM COATED ORAL at 22:02

## 2024-06-14 NOTE — CASE MANAGEMENT
Case Management Progress Note    Patient name Sunil Patel  Location [facility]-01 MRN [id number]  : 1960 Date 2024       LOS (days): 7  Geometric Mean LOS (GMLOS) (days): 4  Days to GMLOS:-2.7        OBJECTIVE:        Current admission status: Inpatient  Preferred Pharmacy:   [facility] - [place], [place] [facility] & [address]  [place] [address]  Phone: [phone] Fax: [phone]    Primary Care Provider: CHARLIE Trevino    Primary Insurance: MEDICARE  Secondary Insurance: longterm    PROGRESS NOTE:    [place] with [facility] at bedside w/ Pt to complete the Level of Care Determination Assessment.

## 2024-06-14 NOTE — ASSESSMENT & PLAN NOTE
"Per medical provider on 6/8:  \"Patient is currently incarcerated. I called the facility at [phone] and spoke to the GUTIERREZ Cid.  No provider is available over the weekend. Patient was admitted to the facility on 5/9.  Prior to that he was homeless. He is a poor historian and told the facility that he is only on Biktarvy. Facility currently have him on Biktarvy, Cabenuva injection, and Tylenol.  They have no records of his medical history or med list. Per chart review, patient used to live in a group home and appears to have been receiving 24/7 care. Will need to verify patient's medication list with PCP, neurology office or pharmacy on Monday, 6/10. Staff at the correctional facility recommended to call on Monday to speak with evgeny medical provider.\"  I called the facility to talk to the medical provider unfortunately they were not there I discussed with the nurse.  Trying to call tomorrow  "

## 2024-06-14 NOTE — ASSESSMENT & PLAN NOTE
Diagnosed in July 2019 - follows with [facility] neurology  Continue Depakote/Lamictal/Zonisamide > doses now adjusted per most recent outpatient neurology changes on record -> Depakote 1250 mg daily, Zonisamide 400 mg daily, and Lamictal 50 mg BID   Called his neurologist office at [facility] who confirmed above doses with out any recent change or discontinuation.

## 2024-06-14 NOTE — PROGRESS NOTES
[facility]  Progress Note  Name: Sunil Patel I  MRN: [id number]  [facility] I Date of Admission: 6/7/2024   Date of Service: 6/14/2024 I Hospital Day: 7    Assessment & Plan   * Acute lower limb ischemia  Assessment & Plan  Patient presented with left lower extremity acute limb ischemia with extensive left iliofemoral and left infrainguinal embolism with nonviable left lower extremity  Status post left femoral thrombectomy and AKA on 6/7  Pain management  AC with Xarelto  PT/OT evaluation > needs inpatient rehab  Further management per vascular surgery    Carnitine deficiency ([facility])  Assessment & Plan  Continue Levocarnitine replacement therapy TID    Seizures ([facility])  Assessment & Plan  Diagnosed in July 2019 - follows with [facility] neurology  Continue Depakote/Lamictal/Zonisamide > doses now adjusted per most recent outpatient neurology changes on record -> Depakote 1250 mg daily, Zonisamide 400 mg daily, and Lamictal 50 mg BID   Called his neurologist office at [facility] who confirmed above doses with out any recent change or discontinuation.     Left ventricular thrombus  Assessment & Plan  Filling defect in the left ventricular apex suggests left ventricular thrombus and the source of the embolic disease  Echo showed ejection fraction 40 to 45%, mild global hypokinesis, LV thrombus  Underwent pharmacological stress test. Per cardiology, no significant areas of ischemia and recommended for medical treatment. Possible stress induced cardiomyopathy as an etiology of his reduced EF. Continue cozaar. Metoprolol added.  Continue Xarelto    Cerebrovascular accident (CVA) ([facility])  Assessment & Plan  History of CVA in the left MCA territory in May 2018 -  residual expressive aphasia  Continue ASA/statin    Benign essential hypertension  Assessment & Plan  BP stable  Continue current antihypertensives     Human immunodeficiency virus (HIV) infection ([facility])  Assessment & Plan  Continue  "Biktarvy/Tivicay daily and Cabenuva monthly injections (per facility records)      Medication management-resolved as of 6/14/2024  Assessment & Plan  Per medical provider on 6/8:  \"Patient is currently incarcerated. I called the facility at [phone] and spoke to the GUTIERREZ Silvaie.  No provider is available over the weekend. Patient was admitted to the facility on 5/9.  Prior to that he was homeless. He is a poor historian and told the facility that he is only on Biktarvy. Facility currently have him on Biktarvy, Cabenuva injection, and Tylenol.  They have no records of his medical history or med list. Per chart review, patient used to live in a group home and appears to have been receiving 24/7 care. Will need to verify patient's medication list with PCP, neurology office or pharmacy on Monday, 6/10. Staff at the correctional facility recommended to call on Monday to speak with evgeny medical provider.\"  I called the facility to talk to the medical provider unfortunately they were not there I discussed with the nurse.  Trying to call tomorrow             VTE Pharmacologic Prophylaxis: VTE Score: 3 Moderate Risk (Score 3-4) - Pharmacological DVT Prophylaxis Ordered: rivaroxaban (Xarelto).    Mobility:   Basic Mobility Inpatient Raw Score: 10  JH-HLM Goal: 4: Move to chair/commode  JH-HLM Achieved: 2: Bed activities/Dependent transfer  JH-HLM Goal NOT achieved. Continue with multidisciplinary rounding and encourage appropriate mobility to improve upon JH-HLM goals.    Patient Centered Rounds: I performed bedside rounds with nursing staff today.   Discussions with Specialists or Other Care Team Provider: CM    Education and Discussions with Family / Patient: No answer from son    Total Time Spent on Date of Encounter in care of patient: 20 mins. This time was spent on one or more of the following: performing physical exam; counseling and coordination of care; obtaining or reviewing history; documenting in the medical " record; reviewing/ordering tests, medications or procedures; communicating with other healthcare professionals and discussing with patient's family/caregivers.    Current Length of Stay: 7 day(s)  Current Patient Status: Inpatient   Certification Statement: The patient will continue to require additional inpatient hospital stay due to placement  Discharge Plan: medically stable    Code Status: Level 1 - Full Code    Subjective:   Nad    Objective:     Vitals:   Temp (24hrs), Av.3 °F (36.8 °C), Min:97.8 °F (36.6 °C), Max:98.6 °F (37 °C)    Temp:  [97.8 °F (36.6 °C)-98.6 °F (37 °C)] 97.8 °F (36.6 °C)  HR:  [81-85] 83  Resp:  [15-17] 15  BP: (114-131)/(63-96) 131/77  SpO2:  [95 %] 95 %  Body mass index is 27.12 kg/m².     Input and Output Summary (last 24 hours):     Intake/Output Summary (Last 24 hours) at 2024 1312  Last data filed at 2024 1100  Gross per 24 hour   Intake 1080 ml   Output 3700 ml   Net -2620 ml       Physical Exam:   Physical Exam  Constitutional:       General: He is not in acute distress.  Cardiovascular:      Rate and Rhythm: Normal rate and regular rhythm.   Pulmonary:      Effort: Pulmonary effort is normal.   Musculoskeletal:      Comments: R AKA          Additional Data:     Labs:  Results from last 7 days   Lab Units 24  0704   WBC Thousand/uL 10.39*   HEMOGLOBIN g/dL 11.7*   HEMATOCRIT % 39.1   PLATELETS Thousands/uL 521*   SEGS PCT % 75   LYMPHO PCT % 16   MONO PCT % 6   EOS PCT % 2     Results from last 7 days   Lab Units 24  0518   SODIUM mmol/L 139   POTASSIUM mmol/L 4.9   CHLORIDE mmol/L 106   CO2 mmol/L 27   BUN mg/dL 14   CREATININE mg/dL 1.19   ANION GAP mmol/L 6   CALCIUM mg/dL 9.1   ALBUMIN g/dL 2.8*   TOTAL BILIRUBIN mg/dL 0.19*   ALK PHOS U/L 82   ALT U/L 49   AST U/L 31   GLUCOSE RANDOM mg/dL 100     Results from last 7 days   Lab Units 24  1829   INR  1.35*                   Lines/Drains:  Invasive Devices       Peripheral Intravenous Line   Duration             Peripheral IV 06/11/24 Distal;Dorsal (posterior);Right Forearm 3 days              Drain  Duration             External Urinary Catheter Large 5 days                          Imaging: Reviewed radiology reports from this admission including: procedure reports    Recent Cultures (last 7 days):         Last 24 Hours Medication List:   Current Facility-Administered Medications   Medication Dose Route Frequency Provider Last Rate    acetaminophen  975 mg Oral Q8H DAVINA Karen Guerrero PA-C      aspirin  81 mg Oral Daily Karen Guerrero PA-C      atorvastatin  80 mg Oral Daily With Dinner Karen Guerrero PA-C      bictegravir-emtricitab-tenofovir alafenamide  1 tablet Oral Daily With Breakfast Karen Guerrero PA-C      diphenhydrAMINE  25 mg Oral Q6H PRN Iliana Cuevas PA-C      divalproex sodium  1,250 mg Oral Daily Ida Hodges MD      dolutegravir  50 mg Oral Daily Karen Guerrero PA-C      gabapentin  100 mg Oral TID Karen Guerrero PA-C      HYDROmorphone  0.5 mg Intravenous Q3H PRN Karen Guerrero PA-C      lamoTRIgine  50 mg Oral BID Ida Hodges MD      levOCARNitine  1,000 mg/day Oral TID With Meals Ida Hodges MD      losartan  50 mg Oral Daily Ida Hodges MD      melatonin  6 mg Oral HS Karen Guerrero PA-C      metoprolol succinate  25 mg Oral Q12H Leonardo Bruno MD      mirtazapine  15 mg Oral HS Karen Guerrero PA-C      ELVA ANTIFUNGAL   Topical BID Dana Rodríguez MD      ondansetron  4 mg Intravenous Q6H PRN Karen Guerrero PA-C      oxyCODONE  10 mg Oral Q6H PRN Karen Guerrero PA-C      oxyCODONE  5 mg Oral Q6H PRN Karen Guerrero PA-C      polyethylene glycol  17 g Oral Daily PRN Shruti Correa MD      rivaroxaban  20 mg Oral Daily With Dinner Stan Whitney MD      senna  2 tablet Oral BID Shruti Correa MD      sertraline  25 mg Oral Daily Karen Guerrero PA-C      sertraline  50 mg Oral Daily Karen Guerrero PA-C      tamsulosin  0.4  mg Oral Daily With Dinner Karen Guerrero PA-C      zonisamide  400 mg Oral Daily Ida Hodges MD          Today, Patient Was Seen By: Stan Whitney MD    **Please Note: This note may have been constructed using a voice recognition system.**

## 2024-06-15 PROCEDURE — 99232 SBSQ HOSP IP/OBS MODERATE 35: CPT | Performed by: INTERNAL MEDICINE

## 2024-06-15 RX ORDER — LIDOCAINE 50 MG/G
1 PATCH TOPICAL DAILY
Status: DISCONTINUED | OUTPATIENT
Start: 2024-06-15 | End: 2024-07-06 | Stop reason: HOSPADM

## 2024-06-15 RX ADMIN — OXYCODONE HYDROCHLORIDE 10 MG: 10 TABLET ORAL at 18:03

## 2024-06-15 RX ADMIN — MIRTAZAPINE 15 MG: 15 TABLET, FILM COATED ORAL at 22:11

## 2024-06-15 RX ADMIN — SERTRALINE HYDROCHLORIDE 25 MG: 50 TABLET ORAL at 09:05

## 2024-06-15 RX ADMIN — LAMOTRIGINE 50 MG: 25 TABLET ORAL at 16:54

## 2024-06-15 RX ADMIN — ATORVASTATIN CALCIUM 80 MG: 80 TABLET, FILM COATED ORAL at 16:54

## 2024-06-15 RX ADMIN — LAMOTRIGINE 50 MG: 25 TABLET ORAL at 09:04

## 2024-06-15 RX ADMIN — DIVALPROEX SODIUM 1250 MG: 500 TABLET, EXTENDED RELEASE ORAL at 09:03

## 2024-06-15 RX ADMIN — SENNOSIDES 17.2 MG: 8.6 TABLET, FILM COATED ORAL at 09:04

## 2024-06-15 RX ADMIN — OXYCODONE HYDROCHLORIDE 10 MG: 10 TABLET ORAL at 05:20

## 2024-06-15 RX ADMIN — LEVOCARNITINE 330 MG: 1 SOLUTION ORAL at 09:07

## 2024-06-15 RX ADMIN — Medication 6 MG: at 22:12

## 2024-06-15 RX ADMIN — LOSARTAN POTASSIUM 50 MG: 50 TABLET, FILM COATED ORAL at 09:05

## 2024-06-15 RX ADMIN — ACETAMINOPHEN 975 MG: 325 TABLET, FILM COATED ORAL at 05:20

## 2024-06-15 RX ADMIN — TAMSULOSIN HYDROCHLORIDE 0.4 MG: 0.4 CAPSULE ORAL at 16:55

## 2024-06-15 RX ADMIN — LEVOCARNITINE 330 MG: 1 SOLUTION ORAL at 13:35

## 2024-06-15 RX ADMIN — MICONAZOLE NITRATE: 20 CREAM TOPICAL at 09:07

## 2024-06-15 RX ADMIN — RIVAROXABAN 20 MG: 20 TABLET, FILM COATED ORAL at 16:54

## 2024-06-15 RX ADMIN — SERTRALINE HYDROCHLORIDE 50 MG: 50 TABLET ORAL at 09:06

## 2024-06-15 RX ADMIN — ASPIRIN 81 MG: 81 TABLET, COATED ORAL at 09:06

## 2024-06-15 RX ADMIN — GABAPENTIN 100 MG: 100 CAPSULE ORAL at 22:11

## 2024-06-15 RX ADMIN — BICTEGRAVIR SODIUM, EMTRICITABINE, AND TENOFOVIR ALAFENAMIDE FUMARATE 1 TABLET: 50; 200; 25 TABLET ORAL at 09:03

## 2024-06-15 RX ADMIN — MICONAZOLE NITRATE: 20 CREAM TOPICAL at 16:55

## 2024-06-15 RX ADMIN — DOLUTEGRAVIR SODIUM 50 MG: 50 TABLET, FILM COATED ORAL at 09:03

## 2024-06-15 RX ADMIN — SENNOSIDES 17.2 MG: 8.6 TABLET, FILM COATED ORAL at 16:56

## 2024-06-15 RX ADMIN — GABAPENTIN 100 MG: 100 CAPSULE ORAL at 16:55

## 2024-06-15 RX ADMIN — ZONISAMIDE 400 MG: 100 CAPSULE ORAL at 09:02

## 2024-06-15 RX ADMIN — ACETAMINOPHEN 975 MG: 325 TABLET, FILM COATED ORAL at 13:35

## 2024-06-15 RX ADMIN — GABAPENTIN 100 MG: 100 CAPSULE ORAL at 09:06

## 2024-06-15 RX ADMIN — METOPROLOL SUCCINATE 25 MG: 25 TABLET, EXTENDED RELEASE ORAL at 05:20

## 2024-06-15 NOTE — ASSESSMENT & PLAN NOTE
Diagnosed in July 2019 - follows with [facility] neurology  Continue Depakote/Lamictal/Zonisamide > doses now adjusted per most recent outpatient neurology changes on record -> Depakote 1250 mg daily, Zonisamide 400 mg daily, and Lamictal 50 mg BID   Called his neurologist office at [facility] who confirmed above doses with out any recent change or discontinuation.

## 2024-06-15 NOTE — PROGRESS NOTES
[facility]  Progress Note  Name: Sunil Patel I  MRN: [id number]  [facility] Date of Admission: 6/7/2024   Date of Service: 6/15/2024 I Hospital Day: 8    Assessment & Plan   * Acute lower limb ischemia  Assessment & Plan  Patient presented with left lower extremity acute limb ischemia with extensive left iliofemoral and left infrainguinal embolism with nonviable left lower extremity  Status post left femoral thrombectomy and AKA on 6/7  Pain management  AC with Xarelto  PT/OT evaluation > needs inpatient rehab  Further management per vascular surgery    Carnitine deficiency ([facility])  Assessment & Plan  Continue Levocarnitine replacement therapy TID    Seizures ([facility])  Assessment & Plan  Diagnosed in July 2019 - follows with [facility] neurology  Continue Depakote/Lamictal/Zonisamide > doses now adjusted per most recent outpatient neurology changes on record -> Depakote 1250 mg daily, Zonisamide 400 mg daily, and Lamictal 50 mg BID   Called his neurologist office at [facility] who confirmed above doses with out any recent change or discontinuation.     Left ventricular thrombus  Assessment & Plan  Filling defect in the left ventricular apex suggests left ventricular thrombus and the source of the embolic disease  Echo showed ejection fraction 40 to 45%, mild global hypokinesis, LV thrombus  Underwent pharmacological stress test. Per cardiology, no significant areas of ischemia and recommended for medical treatment. Possible stress induced cardiomyopathy as an etiology of his reduced EF. Continue cozaar. Metoprolol added.  Continue Xarelto    Cerebrovascular accident (CVA) ([facility])  Assessment & Plan  History of CVA in the left MCA territory in May 2018 -  residual expressive aphasia  Continue ASA/statin    Benign essential hypertension  Assessment & Plan  BP stable  Continue current antihypertensives     Human immunodeficiency virus (HIV) infection ([facility])  Assessment & Plan  Continue  Biktarvy/Tivicay daily and Cabenuva monthly injections (per facility records)               VTE Pharmacologic Prophylaxis: VTE Score: 3 Moderate Risk (Score 3-4) - Pharmacological DVT Prophylaxis Ordered: rivaroxaban (Xarelto).    Mobility:   Basic Mobility Inpatient Raw Score: 10  -HLM Goal: 4: Move to chair/commode  JH-HLM Achieved: 2: Bed activities/Dependent transfer  JH-HLM Goal NOT achieved. Continue with multidisciplinary rounding and encourage appropriate mobility to improve upon -HLM goals.    Patient Centered Rounds: I performed bedside rounds with nursing staff today.   Discussions with Specialists or Other Care Team Provider: RN    Education and Discussions with Family / Patient: patient     Total Time Spent on Date of Encounter in care of patient: 15 mins. This time was spent on one or more of the following: performing physical exam; counseling and coordination of care; obtaining or reviewing history; documenting in the medical record; reviewing/ordering tests, medications or procedures; communicating with other healthcare professionals and discussing with patient's family/caregivers.    Current Length of Stay: 8 day(s)  Current Patient Status: Inpatient   Certification Statement: The patient will continue to require additional inpatient hospital stay due to placement  Discharge Plan: medically stable    Code Status: Level 1 - Full Code    Subjective:   No acute complaints  Wants to get out    Objective:     Vitals:   Temp (24hrs), Av.6 °F (37 °C), Min:98.2 °F (36.8 °C), Max:99 °F (37.2 °C)    Temp:  [98.2 °F (36.8 °C)-99 °F (37.2 °C)] 98.2 °F (36.8 °C)  HR:  [79-84] 79  Resp:  [16-18] 18  BP: (107-111)/(58-70) 108/70  SpO2:  [92 %-97 %] 97 %  Body mass index is 27.12 kg/m².     Input and Output Summary (last 24 hours):     Intake/Output Summary (Last 24 hours) at 6/15/2024 1240  Last data filed at 6/15/2024 1226  Gross per 24 hour   Intake 810 ml   Output 2800 ml   Net -1990 ml       Physical  Exam:   Physical Exam  Vitals and nursing note reviewed.   Constitutional:       General: He is not in acute distress.     Appearance: He is well-developed.   HENT:      Head: Normocephalic and atraumatic.   Eyes:      Conjunctiva/sclera: Conjunctivae normal.   Cardiovascular:      Rate and Rhythm: Normal rate and regular rhythm.      Heart sounds: No murmur heard.  Pulmonary:      Effort: Pulmonary effort is normal. No respiratory distress.      Breath sounds: Normal breath sounds.   Abdominal:      Palpations: Abdomen is soft.      Tenderness: There is no abdominal tenderness.   Musculoskeletal:         General: No swelling.      Cervical back: Neck supple.      Comments: left aka   Skin:     General: Skin is warm and dry.      Capillary Refill: Capillary refill takes less than 2 seconds.   Neurological:      Mental Status: He is alert.   Psychiatric:         Mood and Affect: Mood normal.          Additional Data:     Labs:  Results from last 7 days   Lab Units 06/13/24  0704   WBC Thousand/uL 10.39*   HEMOGLOBIN g/dL 11.7*   HEMATOCRIT % 39.1   PLATELETS Thousands/uL 521*   SEGS PCT % 75   LYMPHO PCT % 16   MONO PCT % 6   EOS PCT % 2     Results from last 7 days   Lab Units 06/13/24  0518   SODIUM mmol/L 139   POTASSIUM mmol/L 4.9   CHLORIDE mmol/L 106   CO2 mmol/L 27   BUN mg/dL 14   CREATININE mg/dL 1.19   ANION GAP mmol/L 6   CALCIUM mg/dL 9.1   ALBUMIN g/dL 2.8*   TOTAL BILIRUBIN mg/dL 0.19*   ALK PHOS U/L 82   ALT U/L 49   AST U/L 31   GLUCOSE RANDOM mg/dL 100                       Lines/Drains:  Invasive Devices       Peripheral Intravenous Line  Duration             Peripheral IV 06/15/24 Dorsal (posterior);Left Forearm <1 day              Drain  Duration             External Urinary Catheter Large 6 days                          Imaging: Reviewed radiology reports from this admission including: procedure reports    Recent Cultures (last 7 days):         Last 24 Hours Medication List:   Current  Facility-Administered Medications   Medication Dose Route Frequency Provider Last Rate    acetaminophen  975 mg Oral Q8H DAVINA Karen Guerrero PA-C      aspirin  81 mg Oral Daily Karen Guerrero PA-C      atorvastatin  80 mg Oral Daily With Dinner Karen Guerrero PA-C      bictegravir-emtricitab-tenofovir alafenamide  1 tablet Oral Daily With Breakfast Karen Guerrero PA-C      diphenhydrAMINE  25 mg Oral Q6H PRN Iliana Cuevas PA-C      divalproex sodium  1,250 mg Oral Daily Ida Hodges MD      dolutegravir  50 mg Oral Daily Karen Guerrero PA-C      gabapentin  100 mg Oral TID Karen Guerrero PA-C      HYDROmorphone  0.5 mg Intravenous Q3H PRN Karen Guerrero PA-C      lamoTRIgine  50 mg Oral BID Ida Hodges MD      levOCARNitine  1,000 mg/day Oral TID With Meals Ida Hodges MD      losartan  50 mg Oral Daily Ida Hodges MD      melatonin  6 mg Oral HS Karen Guerrero PA-C      metoprolol succinate  25 mg Oral Q12H Leonardo Bruno MD      mirtazapine  15 mg Oral HS Karen Guererro PA-C      ELVA ANTIFUNGAL   Topical BID Dana Rodríguez MD      ondansetron  4 mg Intravenous Q6H PRN Karen Guerrero PA-C      oxyCODONE  10 mg Oral Q6H PRN Karen Guerrero PA-C      oxyCODONE  5 mg Oral Q6H PRN Karen Guerrero PA-C      polyethylene glycol  17 g Oral Daily PRN Shruti Correa MD      rivaroxaban  20 mg Oral Daily With Dinner Stan Whitney MD      senna  2 tablet Oral BID Shruti Correa MD      sertraline  25 mg Oral Daily Karen Guerrero PA-C      sertraline  50 mg Oral Daily Karen Guerrero PA-C      tamsulosin  0.4 mg Oral Daily With Dinner Karen Guerrero PA-C      zonisamide  400 mg Oral Daily Ida Hodges MD          Today, Patient Was Seen By: Stan Whitney MD    **Please Note: This note may have been constructed using a voice recognition system.**

## 2024-06-15 NOTE — PLAN OF CARE
Problem: PAIN - ADULT  Goal: Verbalizes/displays adequate comfort level or baseline comfort level  Description: Interventions:  - Encourage patient to monitor pain and request assistance  - Assess pain using appropriate pain scale  - Administer analgesics based on type and severity of pain and evaluate response  - Implement non-pharmacological measures as appropriate and evaluate response  - Consider cultural and social influences on pain and pain management  - Notify physician/advanced practitioner if interventions unsuccessful or patient reports new pain  Outcome: Progressing     Problem: INFECTION - ADULT  Goal: Absence or prevention of progression during hospitalization  Description: INTERVENTIONS:  - Assess and monitor for signs and symptoms of infection  - Monitor lab/diagnostic results  - Monitor all insertion sites, i.e. indwelling lines, tubes, and drains  - Monitor endotracheal if appropriate and nasal secretions for changes in amount and color  - [place] appropriate cooling/warming therapies per order  - Administer medications as ordered  - Instruct and encourage patient and family to use good hand hygiene technique  - Identify and instruct in appropriate isolation precautions for identified infection/condition  Outcome: Progressing  Goal: Absence of fever/infection during neutropenic period  Description: INTERVENTIONS:  - Monitor WBC    Outcome: Progressing     Problem: SAFETY ADULT  Goal: Patient will remain free of falls  Description: INTERVENTIONS:  - Educate patient/family on patient safety including physical limitations  - Instruct patient to call for assistance with activity   - Consult OT/PT to assist with strengthening/mobility   - Keep Call bell within reach  - Keep bed low and locked with side rails adjusted as appropriate  - Keep care items and personal belongings within reach  - Initiate and maintain comfort rounds  - Make Fall Risk Sign visible to staff  - Apply yellow socks and bracelet  for high fall risk patients  - Consider moving patient to room near nurses station  Outcome: Progressing  Goal: Maintain or return to baseline ADL function  Description: INTERVENTIONS:  -  Assess patient's ability to carry out ADLs; assess patient's baseline for ADL function and identify physical deficits which impact ability to perform ADLs (bathing, care of mouth/teeth, toileting, grooming, dressing, etc.)  - Assess/evaluate cause of self-care deficits   - Assess range of motion  - Assess patient's mobility; develop plan if impaired  - Assess patient's need for assistive devices and provide as appropriate  - Encourage maximum independence but intervene and supervise when necessary  - Involve family in performance of ADLs  - Assess for home care needs following discharge   - Consider OT consult to assist with ADL evaluation and planning for discharge  - Provide patient education as appropriate  Outcome: Progressing  Goal: Maintains/Returns to pre admission functional level  Description: INTERVENTIONS:  - Perform AM-PAC 6 Click Basic Mobility/ Daily Activity assessment daily.  - Set and communicate daily mobility goal to care team and patient/family/caregiver.   - Collaborate with rehabilitation services on mobility goals if consulted  - Perform Range of Motion 3 times a day.  - Reposition patient every 3 hours.  - Dangle patient 3 times a day  - Stand patient 3 times a day  - Ambulate patient 3 times a day  - Out of bed to chair 3 times a day   - Out of bed for meals 3 times a day  - Out of bed for toileting  - Record patient progress and toleration of activity level   Outcome: Progressing     Problem: DISCHARGE PLANNING  Goal: Discharge to home or other facility with appropriate resources  Description: INTERVENTIONS:  - Identify barriers to discharge w/patient and caregiver  - Arrange for needed discharge resources and transportation as appropriate  - Identify discharge learning needs (meds, wound care, etc.)  -  Arrange for interpretive services to assist at discharge as needed  - Refer to Case Management Department for coordinating discharge planning if the patient needs post-hospital services based on physician/advanced practitioner order or complex needs related to functional status, cognitive ability, or social support system  Outcome: Progressing     Problem: Knowledge Deficit  Goal: Patient/family/caregiver demonstrates understanding of disease process, treatment plan, medications, and discharge instructions  Description: Complete learning assessment and assess knowledge base.  Interventions:  - Provide teaching at level of understanding  - Provide teaching via preferred learning methods  Outcome: Progressing     Problem: Prexisting or High Potential for Compromised Skin Integrity  Goal: Skin integrity is maintained or improved  Description: INTERVENTIONS:  - Identify patients at risk for skin breakdown  - Assess and monitor skin integrity  - Assess and monitor nutrition and hydration status  - Monitor labs   - Assess for incontinence   - Turn and reposition patient  - Assist with mobility/ambulation  - Relieve pressure over bony prominences  - Avoid friction and shearing  - Provide appropriate hygiene as needed including keeping skin clean and dry  - Evaluate need for skin moisturizer/barrier cream  - Collaborate with interdisciplinary team   - Patient/family teaching  - Consider wound care consult   Outcome: Progressing     Problem: Nutrition/Hydration-ADULT  Goal: Nutrient/Hydration intake appropriate for improving, restoring or maintaining nutritional needs  Description: Monitor and assess patient's nutrition/hydration status for malnutrition. Collaborate with interdisciplinary team and initiate plan and interventions as ordered.  Monitor patient's weight and dietary intake as ordered or per policy. Utilize nutrition screening tool and intervene as necessary. Determine patient's food preferences and provide  high-protein, high-caloric foods as appropriate.     INTERVENTIONS:  - Monitor oral intake, urinary output, labs, and treatment plans  - Assess nutrition and hydration status and recommend course of action  - Evaluate amount of meals eaten  - Assist patient with eating if necessary   - Allow adequate time for meals  - Recommend/ encourage appropriate diets, oral nutritional supplements, and vitamin/mineral supplements  - Order, calculate, and assess calorie counts as needed  - Recommend, monitor, and adjust tube feedings and TPN/PPN based on assessed needs  - Assess need for intravenous fluids  - Provide specific nutrition/hydration education as appropriate  - Include patient/family/caregiver in decisions related to nutrition  Outcome: Progressing

## 2024-06-16 PROCEDURE — 99231 SBSQ HOSP IP/OBS SF/LOW 25: CPT | Performed by: INTERNAL MEDICINE

## 2024-06-16 RX ADMIN — ZONISAMIDE 400 MG: 100 CAPSULE ORAL at 13:27

## 2024-06-16 RX ADMIN — OXYCODONE HYDROCHLORIDE 10 MG: 10 TABLET ORAL at 06:50

## 2024-06-16 RX ADMIN — ACETAMINOPHEN 975 MG: 325 TABLET, FILM COATED ORAL at 13:26

## 2024-06-16 RX ADMIN — ATORVASTATIN CALCIUM 80 MG: 80 TABLET, FILM COATED ORAL at 17:13

## 2024-06-16 RX ADMIN — MIRTAZAPINE 15 MG: 15 TABLET, FILM COATED ORAL at 21:39

## 2024-06-16 RX ADMIN — DOLUTEGRAVIR SODIUM 50 MG: 50 TABLET, FILM COATED ORAL at 09:28

## 2024-06-16 RX ADMIN — LAMOTRIGINE 50 MG: 25 TABLET ORAL at 17:13

## 2024-06-16 RX ADMIN — ACETAMINOPHEN 975 MG: 325 TABLET, FILM COATED ORAL at 06:11

## 2024-06-16 RX ADMIN — RIVAROXABAN 20 MG: 20 TABLET, FILM COATED ORAL at 17:13

## 2024-06-16 RX ADMIN — Medication 6 MG: at 21:39

## 2024-06-16 RX ADMIN — MICONAZOLE NITRATE: 20 CREAM TOPICAL at 09:46

## 2024-06-16 RX ADMIN — LEVOCARNITINE 330 MG: 1 SOLUTION ORAL at 13:28

## 2024-06-16 RX ADMIN — LEVOCARNITINE 330 MG: 1 SOLUTION ORAL at 09:32

## 2024-06-16 RX ADMIN — GABAPENTIN 100 MG: 100 CAPSULE ORAL at 09:28

## 2024-06-16 RX ADMIN — GABAPENTIN 100 MG: 100 CAPSULE ORAL at 17:19

## 2024-06-16 RX ADMIN — ACETAMINOPHEN 975 MG: 325 TABLET, FILM COATED ORAL at 21:39

## 2024-06-16 RX ADMIN — SERTRALINE HYDROCHLORIDE 25 MG: 50 TABLET ORAL at 09:30

## 2024-06-16 RX ADMIN — DIVALPROEX SODIUM 1250 MG: 500 TABLET, EXTENDED RELEASE ORAL at 09:28

## 2024-06-16 RX ADMIN — GABAPENTIN 100 MG: 100 CAPSULE ORAL at 21:39

## 2024-06-16 RX ADMIN — BICTEGRAVIR SODIUM, EMTRICITABINE, AND TENOFOVIR ALAFENAMIDE FUMARATE 1 TABLET: 50; 200; 25 TABLET ORAL at 09:28

## 2024-06-16 RX ADMIN — SERTRALINE HYDROCHLORIDE 50 MG: 50 TABLET ORAL at 09:32

## 2024-06-16 RX ADMIN — LIDOCAINE 1 PATCH: 700 PATCH TOPICAL at 00:10

## 2024-06-16 RX ADMIN — ASPIRIN 81 MG: 81 TABLET, COATED ORAL at 09:28

## 2024-06-16 RX ADMIN — LAMOTRIGINE 50 MG: 25 TABLET ORAL at 09:29

## 2024-06-16 RX ADMIN — METOPROLOL SUCCINATE 25 MG: 25 TABLET, EXTENDED RELEASE ORAL at 06:11

## 2024-06-16 RX ADMIN — LOSARTAN POTASSIUM 50 MG: 50 TABLET, FILM COATED ORAL at 09:30

## 2024-06-16 RX ADMIN — TAMSULOSIN HYDROCHLORIDE 0.4 MG: 0.4 CAPSULE ORAL at 17:13

## 2024-06-16 RX ADMIN — LEVOCARNITINE 330 MG: 1 SOLUTION ORAL at 17:14

## 2024-06-16 NOTE — CASE MANAGEMENT
Case Management Discharge Planning Note    Patient name Sunil Patel  Location [facility] 507-01 MRN [id number]  : 1960 Date 2024       Current Admission Date: 2024  Current Admission Diagnosis:Acute lower limb ischemia   Patient Active Problem List    Diagnosis Date Noted Date Diagnosed    Carnitine deficiency (HCC) 2024     Acute lower limb ischemia 2024     Left ventricular thrombus 2024     Seizures (HCC) 2024     Tobacco abuse 10/26/2019     Cerebrovascular accident (CVA) ([facility]) 10/26/2019     Positive laboratory testing for human immunodeficiency virus (HCC) 2017     Bipolar affective disorder ([facility]) 2017     Hypertension 2017     Human immunodeficiency virus (HIV) infection ([facility]) 2017     History of transient cerebral ischemia 2017     Substance abuse ([facility]) 2013     Unspecified vitamin D deficiency 2010     Benign essential hypertension 2010       LOS (days): 9  Geometric Mean LOS (GMLOS) (days): 4  Days to GMLOS:-4.7     OBJECTIVE:  Risk of Unplanned Readmission Score: 20.19         Current admission status: Inpatient   [facility] Pharmacy:   [facility] [address] [address]  Phone: [phone] Fax: [phone]    Primary Care Provider: CHARLIE Trevino    Primary Insurance: MEDICARE  Secondary Insurance: alf    DISCHARGE DETAILS:     CM received Optioning paperwork in CM office.  Uploaded to [employer].  Pt is Nursing Facility Eligibility. .

## 2024-06-16 NOTE — PLAN OF CARE
Problem: PAIN - ADULT  Goal: Verbalizes/displays adequate comfort level or baseline comfort level  Description: Interventions:  - Encourage patient to monitor pain and request assistance  - Assess pain using appropriate pain scale  - Administer analgesics based on type and severity of pain and evaluate response  - Implement non-pharmacological measures as appropriate and evaluate response  - Consider cultural and social influences on pain and pain management  - Notify physician/advanced practitioner if interventions unsuccessful or patient reports new pain  Outcome: Progressing     Problem: INFECTION - ADULT  Goal: Absence or prevention of progression during hospitalization  Description: INTERVENTIONS:  - Assess and monitor for signs and symptoms of infection  - Monitor lab/diagnostic results  - Monitor all insertion sites, i.e. indwelling lines, tubes, and drains  - Monitor endotracheal if appropriate and nasal secretions for changes in amount and color  - [place] appropriate cooling/warming therapies per order  - Administer medications as ordered  - Instruct and encourage patient and family to use good hand hygiene technique  - Identify and instruct in appropriate isolation precautions for identified infection/condition  Outcome: Progressing  Goal: Absence of fever/infection during neutropenic period  Description: INTERVENTIONS:  - Monitor WBC    Outcome: Progressing     Problem: SAFETY ADULT  Goal: Patient will remain free of falls  Description: INTERVENTIONS:  - Educate patient/family on patient safety including physical limitations  - Instruct patient to call for assistance with activity   - Consult OT/PT to assist with strengthening/mobility   - Keep Call bell within reach  - Keep bed low and locked with side rails adjusted as appropriate  - Keep care items and personal belongings within reach  - Initiate and maintain comfort rounds  - Make Fall Risk Sign visible to staff  - Offer Toileting every   Hours,  in advance of need  - Initiate/Maintain alarm  - Obtain necessary fall risk management equipment:   - Apply yellow socks and bracelet for high fall risk patients  - Consider moving patient to room near nurses station  Outcome: Progressing  Goal: Maintain or return to baseline ADL function  Description: INTERVENTIONS:  -  Assess patient's ability to carry out ADLs; assess patient's baseline for ADL function and identify physical deficits which impact ability to perform ADLs (bathing, care of mouth/teeth, toileting, grooming, dressing, etc.)  - Assess/evaluate cause of self-care deficits   - Assess range of motion  - Assess patient's mobility; develop plan if impaired  - Assess patient's need for assistive devices and provide as appropriate  - Encourage maximum independence but intervene and supervise when necessary  - Involve family in performance of ADLs  - Assess for home care needs following discharge   - Consider OT consult to assist with ADL evaluation and planning for discharge  - Provide patient education as appropriate  Outcome: Progressing  Goal: Maintains/Returns to pre admission functional level  Description: INTERVENTIONS:  - Perform AM-PAC 6 Click Basic Mobility/ Daily Activity assessment daily.  - Set and communicate daily mobility goal to care team and patient/family/caregiver.   - Collaborate with rehabilitation services on mobility goals if consulted  - Perform Range of Motion  times a day.  - Reposition patient every  hours.  - Dangle patient times a day  - Stand patient  times a day  - Ambulate patient  times a day  - Out of bed to chair  times a day   - Out of bed for meals  times a day  - Out of bed for toileting  - Record patient progress and toleration of activity level   Outcome: Progressing     Problem: DISCHARGE PLANNING  Goal: Discharge to home or other facility with appropriate resources  Description: INTERVENTIONS:  - Identify barriers to discharge w/patient and caregiver  - Arrange for  needed discharge resources and transportation as appropriate  - Identify discharge learning needs (meds, wound care, etc.)  - Arrange for interpretive services to assist at discharge as needed  - Refer to Case Management Department for coordinating discharge planning if the patient needs post-hospital services based on physician/advanced practitioner order or complex needs related to functional status, cognitive ability, or social support system  Outcome: Progressing     Problem: Knowledge Deficit  Goal: Patient/family/caregiver demonstrates understanding of disease process, treatment plan, medications, and discharge instructions  Description: Complete learning assessment and assess knowledge base.  Interventions:  - Provide teaching at level of understanding  - Provide teaching via preferred learning methods  Outcome: Progressing     Problem: Prexisting or High Potential for Compromised Skin Integrity  Goal: Skin integrity is maintained or improved  Description: INTERVENTIONS:  - Identify patients at risk for skin breakdown  - Assess and monitor skin integrity  - Assess and monitor nutrition and hydration status  - Monitor labs   - Assess for incontinence   - Turn and reposition patient  - Assist with mobility/ambulation  - Relieve pressure over bony prominences  - Avoid friction and shearing  - Provide appropriate hygiene as needed including keeping skin clean and dry  - Evaluate need for skin moisturizer/barrier cream  - Collaborate with interdisciplinary team   - Patient/family teaching  - Consider wound care consult   Outcome: Progressing     Problem: Nutrition/Hydration-ADULT  Goal: Nutrient/Hydration intake appropriate for improving, restoring or maintaining nutritional needs  Description: Monitor and assess patient's nutrition/hydration status for malnutrition. Collaborate with interdisciplinary team and initiate plan and interventions as ordered.  Monitor patient's weight and dietary intake as ordered or  per policy. Utilize nutrition screening tool and intervene as necessary. Determine patient's food preferences and provide high-protein, high-caloric foods as appropriate.     INTERVENTIONS:  - Monitor oral intake, urinary output, labs, and treatment plans  - Assess nutrition and hydration status and recommend course of action  - Evaluate amount of meals eaten  - Assist patient with eating if necessary   - Allow adequate time for meals  - Recommend/ encourage appropriate diets, oral nutritional supplements, and vitamin/mineral supplements  - Order, calculate, and assess calorie counts as needed  - Recommend, monitor, and adjust tube feedings and TPN/PPN based on assessed needs  - Assess need for intravenous fluids  - Provide specific nutrition/hydration education as appropriate  - Include patient/family/caregiver in decisions related to nutrition  Outcome: Progressing

## 2024-06-16 NOTE — PROGRESS NOTES
[facility]  Progress Note  Name: Sunil Patel I  MRN: [id number]  [facility] I Date of Admission: 6/7/2024   Date of Service: 6/16/2024 I Hospital Day: 9    Assessment & Plan   * Acute lower limb ischemia  Assessment & Plan  Patient presented with left lower extremity acute limb ischemia with extensive left iliofemoral and left infrainguinal embolism with nonviable left lower extremity  Status post left femoral thrombectomy and AKA on 6/7  Pain management  AC with Xarelto  PT/OT evaluation > needs inpatient rehab  Further management per vascular surgery    Carnitine deficiency ([facility])  Assessment & Plan  Continue Levocarnitine replacement therapy TID    Seizures ([facility])  Assessment & Plan  Diagnosed in July 2019 - follows with [facility] neurology  Continue Depakote/Lamictal/Zonisamide > doses now adjusted per most recent outpatient neurology changes on record -> Depakote 1250 mg daily, Zonisamide 400 mg daily, and Lamictal 50 mg BID   Called his neurologist office at [facility] who confirmed above doses with out any recent change or discontinuation.     Left ventricular thrombus  Assessment & Plan  Filling defect in the left ventricular apex suggests left ventricular thrombus and the source of the embolic disease  Echo showed ejection fraction 40 to 45%, mild global hypokinesis, LV thrombus  Underwent pharmacological stress test. Per cardiology, no significant areas of ischemia and recommended for medical treatment. Possible stress induced cardiomyopathy as an etiology of his reduced EF. Continue cozaar. Metoprolol added.  Continue Xarelto    Cerebrovascular accident (CVA) ([facility])  Assessment & Plan  History of CVA in the left MCA territory in May 2018 -  residual expressive aphasia  Continue ASA/statin    Benign essential hypertension  Assessment & Plan  BP stable  Continue current antihypertensives     Human immunodeficiency virus (HIV) infection ([facility])  Assessment & Plan  Continue  "Biktarvy/Tivicay daily and Cabenuva monthly injections (per facility records)               VTE Pharmacologic Prophylaxis: VTE Score: 3 Moderate Risk (Score 3-4) - Pharmacological DVT Prophylaxis Ordered: rivaroxaban (Xarelto).    Mobility:   Basic Mobility Inpatient Raw Score: 10  JH-HLM Goal: 4: Move to chair/commode  JH-HLM Achieved: 4: Move to chair/commode  JH-HLM Goal achieved. Continue to encourage appropriate mobility.    Patient Centered Rounds: I performed bedside rounds with nursing staff today.   Discussions with Specialists or Other Care Team Provider: rn    Education and Discussions with Family / Patient:   .     Total Time Spent on Date of Encounter in care of patient: 10 mins. This time was spent on one or more of the following: performing physical exam; counseling and coordination of care; obtaining or reviewing history; documenting in the medical record; reviewing/ordering tests, medications or procedures; communicating with other healthcare professionals and discussing with patient's family/caregivers.    Current Length of Stay: 9 day(s)  Current Patient Status: Inpatient   Certification Statement: The patient will continue to require additional inpatient hospital stay due to rehab placement  Discharge Plan: Medically stable.  Discharge planning per CM    Code Status: Level 1 - Full Code    Subjective:   Wants to \"get out of here\"    Objective:     Vitals:   Temp (24hrs), Av.1 °F (37.3 °C), Min:98.6 °F (37 °C), Max:99.8 °F (37.7 °C)    Temp:  [98.6 °F (37 °C)-99.8 °F (37.7 °C)] 99.8 °F (37.7 °C)  HR:  [] 90  Resp:  [18-22] 18  BP: (105-132)/(63-75) 124/71  SpO2:  [94 %-96 %] 95 %  Body mass index is 27.12 kg/m².     Input and Output Summary (last 24 hours):     Intake/Output Summary (Last 24 hours) at 2024 1038  Last data filed at 2024 0139  Gross per 24 hour   Intake 1530 ml   Output 2250 ml   Net -720 ml       Physical Exam:   Vital signs are stable.  Left AKA.  Physical exam " deferred    Additional Data:     Labs:  Results from last 7 days   Lab Units 06/13/24  0704   WBC Thousand/uL 10.39*   HEMOGLOBIN g/dL 11.7*   HEMATOCRIT % 39.1   PLATELETS Thousands/uL 521*   SEGS PCT % 75   LYMPHO PCT % 16   MONO PCT % 6   EOS PCT % 2     Results from last 7 days   Lab Units 06/13/24  0518   SODIUM mmol/L 139   POTASSIUM mmol/L 4.9   CHLORIDE mmol/L 106   CO2 mmol/L 27   BUN mg/dL 14   CREATININE mg/dL 1.19   ANION GAP mmol/L 6   CALCIUM mg/dL 9.1   ALBUMIN g/dL 2.8*   TOTAL BILIRUBIN mg/dL 0.19*   ALK PHOS U/L 82   ALT U/L 49   AST U/L 31   GLUCOSE RANDOM mg/dL 100                       Lines/Drains:  Invasive Devices       Peripheral Intravenous Line  Duration             Peripheral IV 06/15/24 Dorsal (posterior);Left Forearm 1 day                          Imaging: Reviewed radiology reports from this admission including: procedure reports    Recent Cultures (last 7 days):         Last 24 Hours Medication List:   Current Facility-Administered Medications   Medication Dose Route Frequency Provider Last Rate    acetaminophen  975 mg Oral Q8H DAVINA Karen Guerrero PA-C      aspirin  81 mg Oral Daily Karen Guerrero PA-C      atorvastatin  80 mg Oral Daily With Dinner Karen Guerrero PA-C      bictegravir-emtricitab-tenofovir alafenamide  1 tablet Oral Daily With Breakfast Karen Guerrero PA-C      diphenhydrAMINE  25 mg Oral Q6H PRN Iliana Cuevas PA-C      divalproex sodium  1,250 mg Oral Daily Ida Hodges MD      dolutegravir  50 mg Oral Daily Karen Guerrero PA-C      gabapentin  100 mg Oral TID Karen Guerrero PA-C      HYDROmorphone  0.5 mg Intravenous Q3H PRN Karen Guerrero PA-C      lamoTRIgine  50 mg Oral BID Ida Hodges MD      levOCARNitine  1,000 mg/day Oral TID With Meals Ida Hodges MD      lidocaine  1 patch Topical Daily Tho Laguna PA-C      losartan  50 mg Oral Daily Ida Hodges MD      melatonin  6 mg Oral HS Karen Guerrero PA-C       metoprolol succinate  25 mg Oral Q12H Leonardo Bruno MD      mirtazapine  15 mg Oral HS Karen Guerrero, EJ      ELVA ANTIFUNGAL   Topical BID Dana Rodríguez MD      ondansetron  4 mg Intravenous Q6H PRN Karen Guerrero, EJ      oxyCODONE  10 mg Oral Q6H PRN Karen Guerrero, EJ      oxyCODONE  5 mg Oral Q6H PRN Karen Guerrero, EJ      polyethylene glycol  17 g Oral Daily PRN Shruti Correa MD      rivaroxaban  20 mg Oral Daily With Dinner Stan Whitney MD      senna  2 tablet Oral BID Shruti Correa MD      sertraline  25 mg Oral Daily Karen Guerrero PA-C      sertraline  50 mg Oral Daily Karen Guerrero PA-C      tamsulosin  0.4 mg Oral Daily With Dinner Karen Guerrero PA-C      zonisamide  400 mg Oral Daily Ida Hodges MD          Today, Patient Was Seen By: Stan Whitney MD    **Please Note: This note may have been constructed using a voice recognition system.**

## 2024-06-16 NOTE — ASSESSMENT & PLAN NOTE
Diagnosed in July 2019 - follows with [facility] neurology  Continue Depakote/Lamictal/Zonisamide > doses now adjusted per most recent outpatient neurology changes on record -> Depakote 1250 mg daily, Zonisamide 400 mg daily, and Lamictal 50 mg BID   Called his neurologist office at [facility] who confirmed above doses with out any recent change or discontinuation.

## 2024-06-16 NOTE — PLAN OF CARE
Problem: PAIN - ADULT  Goal: Verbalizes/displays adequate comfort level or baseline comfort level  Description: Interventions:  - Encourage patient to monitor pain and request assistance  - Assess pain using appropriate pain scale  - Administer analgesics based on type and severity of pain and evaluate response  - Implement non-pharmacological measures as appropriate and evaluate response  - Consider cultural and social influences on pain and pain management  - Notify physician/advanced practitioner if interventions unsuccessful or patient reports new pain  Outcome: Progressing     Problem: INFECTION - ADULT  Goal: Absence or prevention of progression during hospitalization  Description: INTERVENTIONS:  - Assess and monitor for signs and symptoms of infection  - Monitor lab/diagnostic results  - Monitor all insertion sites, i.e. indwelling lines, tubes, and drains  - Monitor endotracheal if appropriate and nasal secretions for changes in amount and color  - [place] appropriate cooling/warming therapies per order  - Administer medications as ordered  - Instruct and encourage patient and family to use good hand hygiene technique  - Identify and instruct in appropriate isolation precautions for identified infection/condition  Outcome: Progressing  Goal: Absence of fever/infection during neutropenic period  Description: INTERVENTIONS:  - Monitor WBC    Outcome: Progressing     Problem: SAFETY ADULT  Goal: Patient will remain free of falls  Description: INTERVENTIONS:  - Educate patient/family on patient safety including physical limitations  - Instruct patient to call for assistance with activity   - Consult OT/PT to assist with strengthening/mobility   - Keep Call bell within reach  - Keep bed low and locked with side rails adjusted as appropriate  - Keep care items and personal belongings within reach  - Initiate and maintain comfort rounds  - Make Fall Risk Sign visible to staff  - Apply yellow socks and bracelet  for high fall risk patients  - Consider moving patient to room near nurses station  Outcome: Progressing  Goal: Maintain or return to baseline ADL function  Description: INTERVENTIONS:  -  Assess patient's ability to carry out ADLs; assess patient's baseline for ADL function and identify physical deficits which impact ability to perform ADLs (bathing, care of mouth/teeth, toileting, grooming, dressing, etc.)  - Assess/evaluate cause of self-care deficits   - Assess range of motion  - Assess patient's mobility; develop plan if impaired  - Assess patient's need for assistive devices and provide as appropriate  - Encourage maximum independence but intervene and supervise when necessary  - Involve family in performance of ADLs  - Assess for home care needs following discharge   - Consider OT consult to assist with ADL evaluation and planning for discharge  - Provide patient education as appropriate  Outcome: Progressing  Goal: Maintains/Returns to pre admission functional level  Description: INTERVENTIONS:  - Perform AM-PAC 6 Click Basic Mobility/ Daily Activity assessment daily.  - Set and communicate daily mobility goal to care team and patient/family/caregiver.   - Collaborate with rehabilitation services on mobility goals if consulted  - Perform Range of Motion 3 times a day.  - Reposition patient every 3 hours.  - Dangle patient 3 times a day  - Stand patient 3 times a day  - Ambulate patient 3 times a day  - Out of bed to chair 3 times a day   - Out of bed for meals 3 times a day  - Out of bed for toileting  - Record patient progress and toleration of activity level   Outcome: Progressing     Problem: DISCHARGE PLANNING  Goal: Discharge to home or other facility with appropriate resources  Description: INTERVENTIONS:  - Identify barriers to discharge w/patient and caregiver  - Arrange for needed discharge resources and transportation as appropriate  - Identify discharge learning needs (meds, wound care, etc.)  -  Arrange for interpretive services to assist at discharge as needed  - Refer to Case Management Department for coordinating discharge planning if the patient needs post-hospital services based on physician/advanced practitioner order or complex needs related to functional status, cognitive ability, or social support system  Outcome: Progressing     Problem: Knowledge Deficit  Goal: Patient/family/caregiver demonstrates understanding of disease process, treatment plan, medications, and discharge instructions  Description: Complete learning assessment and assess knowledge base.  Interventions:  - Provide teaching at level of understanding  - Provide teaching via preferred learning methods  Outcome: Progressing     Problem: Prexisting or High Potential for Compromised Skin Integrity  Goal: Skin integrity is maintained or improved  Description: INTERVENTIONS:  - Identify patients at risk for skin breakdown  - Assess and monitor skin integrity  - Assess and monitor nutrition and hydration status  - Monitor labs   - Assess for incontinence   - Turn and reposition patient  - Assist with mobility/ambulation  - Relieve pressure over bony prominences  - Avoid friction and shearing  - Provide appropriate hygiene as needed including keeping skin clean and dry  - Evaluate need for skin moisturizer/barrier cream  - Collaborate with interdisciplinary team   - Patient/family teaching  - Consider wound care consult   Outcome: Progressing     Problem: Nutrition/Hydration-ADULT  Goal: Nutrient/Hydration intake appropriate for improving, restoring or maintaining nutritional needs  Description: Monitor and assess patient's nutrition/hydration status for malnutrition. Collaborate with interdisciplinary team and initiate plan and interventions as ordered.  Monitor patient's weight and dietary intake as ordered or per policy. Utilize nutrition screening tool and intervene as necessary. Determine patient's food preferences and provide  high-protein, high-caloric foods as appropriate.     INTERVENTIONS:  - Monitor oral intake, urinary output, labs, and treatment plans  - Assess nutrition and hydration status and recommend course of action  - Evaluate amount of meals eaten  - Assist patient with eating if necessary   - Allow adequate time for meals  - Recommend/ encourage appropriate diets, oral nutritional supplements, and vitamin/mineral supplements  - Order, calculate, and assess calorie counts as needed  - Recommend, monitor, and adjust tube feedings and TPN/PPN based on assessed needs  - Assess need for intravenous fluids  - Provide specific nutrition/hydration education as appropriate  - Include patient/family/caregiver in decisions related to nutrition  Outcome: Progressing

## 2024-06-17 LAB
ANION GAP SERPL CALCULATED.3IONS-SCNC: 9 MMOL/L (ref 4–13)
BASOPHILS # BLD AUTO: 0.04 THOUSANDS/ÂΜL (ref 0–0.1)
BASOPHILS NFR BLD AUTO: 0 % (ref 0–1)
BUN SERPL-MCNC: 23 MG/DL (ref 5–25)
CALCIUM SERPL-MCNC: 9.1 MG/DL (ref 8.4–10.2)
CHLORIDE SERPL-SCNC: 103 MMOL/L (ref 96–108)
CO2 SERPL-SCNC: 26 MMOL/L (ref 21–32)
CREAT SERPL-MCNC: 1.04 MG/DL (ref 0.6–1.3)
EOSINOPHIL # BLD AUTO: 0.11 THOUSAND/ÂΜL (ref 0–0.61)
EOSINOPHIL NFR BLD AUTO: 1 % (ref 0–6)
ERYTHROCYTE [DISTWIDTH] IN BLOOD BY AUTOMATED COUNT: 13.6 % (ref 11.6–15.1)
GFR SERPL CREATININE-BSD FRML MDRD: 76 ML/MIN/1.73SQ M
GLUCOSE SERPL-MCNC: 97 MG/DL (ref 65–140)
HCT VFR BLD AUTO: 38.2 % (ref 36.5–49.3)
HGB BLD-MCNC: 11.6 G/DL (ref 12–17)
IMM GRANULOCYTES # BLD AUTO: 0.04 THOUSAND/UL (ref 0–0.2)
IMM GRANULOCYTES NFR BLD AUTO: 0 % (ref 0–2)
LYMPHOCYTES # BLD AUTO: 1.59 THOUSANDS/ÂΜL (ref 0.6–4.47)
LYMPHOCYTES NFR BLD AUTO: 15 % (ref 14–44)
MAGNESIUM SERPL-MCNC: 2 MG/DL (ref 1.9–2.7)
MCH RBC QN AUTO: 28 PG (ref 26.8–34.3)
MCHC RBC AUTO-ENTMCNC: 30.4 G/DL (ref 31.4–37.4)
MCV RBC AUTO: 92 FL (ref 82–98)
MONOCYTES # BLD AUTO: 0.82 THOUSAND/ÂΜL (ref 0.17–1.22)
MONOCYTES NFR BLD AUTO: 8 % (ref 4–12)
NEUTROPHILS # BLD AUTO: 8.09 THOUSANDS/ÂΜL (ref 1.85–7.62)
NEUTS SEG NFR BLD AUTO: 76 % (ref 43–75)
NRBC BLD AUTO-RTO: 0 /100 WBCS
PLATELET # BLD AUTO: 609 THOUSANDS/UL (ref 149–390)
PMV BLD AUTO: 8 FL (ref 8.9–12.7)
POTASSIUM SERPL-SCNC: 4.4 MMOL/L (ref 3.5–5.3)
RBC # BLD AUTO: 4.15 MILLION/UL (ref 3.88–5.62)
SODIUM SERPL-SCNC: 138 MMOL/L (ref 135–147)
WBC # BLD AUTO: 10.69 THOUSAND/UL (ref 4.31–10.16)

## 2024-06-17 PROCEDURE — 97530 THERAPEUTIC ACTIVITIES: CPT

## 2024-06-17 PROCEDURE — 99232 SBSQ HOSP IP/OBS MODERATE 35: CPT | Performed by: INTERNAL MEDICINE

## 2024-06-17 PROCEDURE — 97542 WHEELCHAIR MNGMENT TRAINING: CPT

## 2024-06-17 PROCEDURE — 83735 ASSAY OF MAGNESIUM: CPT

## 2024-06-17 PROCEDURE — 97112 NEUROMUSCULAR REEDUCATION: CPT

## 2024-06-17 PROCEDURE — 80048 BASIC METABOLIC PNL TOTAL CA: CPT

## 2024-06-17 PROCEDURE — 97535 SELF CARE MNGMENT TRAINING: CPT

## 2024-06-17 PROCEDURE — 85025 COMPLETE CBC W/AUTO DIFF WBC: CPT

## 2024-06-17 RX ORDER — HYDROMORPHONE HCL IN WATER/PF 6 MG/30 ML
0.2 PATIENT CONTROLLED ANALGESIA SYRINGE INTRAVENOUS ONCE
Status: DISCONTINUED | OUTPATIENT
Start: 2024-06-17 | End: 2024-07-06 | Stop reason: HOSPADM

## 2024-06-17 RX ORDER — GABAPENTIN 100 MG/1
200 CAPSULE ORAL ONCE
Status: COMPLETED | OUTPATIENT
Start: 2024-06-17 | End: 2024-06-17

## 2024-06-17 RX ADMIN — GABAPENTIN 200 MG: 100 CAPSULE ORAL at 16:01

## 2024-06-17 RX ADMIN — ACETAMINOPHEN 975 MG: 325 TABLET, FILM COATED ORAL at 13:21

## 2024-06-17 RX ADMIN — LEVOCARNITINE 330 MG: 1 SOLUTION ORAL at 16:02

## 2024-06-17 RX ADMIN — METOPROLOL SUCCINATE 25 MG: 25 TABLET, EXTENDED RELEASE ORAL at 05:55

## 2024-06-17 RX ADMIN — ATORVASTATIN CALCIUM 80 MG: 80 TABLET, FILM COATED ORAL at 16:01

## 2024-06-17 RX ADMIN — GABAPENTIN 100 MG: 100 CAPSULE ORAL at 08:10

## 2024-06-17 RX ADMIN — LEVOCARNITINE 330 MG: 1 SOLUTION ORAL at 13:23

## 2024-06-17 RX ADMIN — LAMOTRIGINE 50 MG: 25 TABLET ORAL at 16:01

## 2024-06-17 RX ADMIN — DOLUTEGRAVIR SODIUM 50 MG: 50 TABLET, FILM COATED ORAL at 08:08

## 2024-06-17 RX ADMIN — ACETAMINOPHEN 975 MG: 325 TABLET, FILM COATED ORAL at 05:55

## 2024-06-17 RX ADMIN — ASPIRIN 81 MG: 81 TABLET, COATED ORAL at 08:08

## 2024-06-17 RX ADMIN — GABAPENTIN 100 MG: 100 CAPSULE ORAL at 21:43

## 2024-06-17 RX ADMIN — SENNOSIDES 17.2 MG: 8.6 TABLET, FILM COATED ORAL at 08:09

## 2024-06-17 RX ADMIN — BICTEGRAVIR SODIUM, EMTRICITABINE, AND TENOFOVIR ALAFENAMIDE FUMARATE 1 TABLET: 50; 200; 25 TABLET ORAL at 08:08

## 2024-06-17 RX ADMIN — RIVAROXABAN 20 MG: 20 TABLET, FILM COATED ORAL at 16:01

## 2024-06-17 RX ADMIN — LEVOCARNITINE 330 MG: 1 SOLUTION ORAL at 08:10

## 2024-06-17 RX ADMIN — Medication 6 MG: at 21:43

## 2024-06-17 RX ADMIN — METOPROLOL SUCCINATE 25 MG: 25 TABLET, EXTENDED RELEASE ORAL at 16:02

## 2024-06-17 RX ADMIN — LOSARTAN POTASSIUM 50 MG: 50 TABLET, FILM COATED ORAL at 08:10

## 2024-06-17 RX ADMIN — SERTRALINE HYDROCHLORIDE 25 MG: 50 TABLET ORAL at 08:09

## 2024-06-17 RX ADMIN — GABAPENTIN 100 MG: 100 CAPSULE ORAL at 16:01

## 2024-06-17 RX ADMIN — OXYCODONE HYDROCHLORIDE 10 MG: 10 TABLET ORAL at 14:44

## 2024-06-17 RX ADMIN — LAMOTRIGINE 50 MG: 25 TABLET ORAL at 08:09

## 2024-06-17 RX ADMIN — ZONISAMIDE 400 MG: 100 CAPSULE ORAL at 08:10

## 2024-06-17 RX ADMIN — TAMSULOSIN HYDROCHLORIDE 0.4 MG: 0.4 CAPSULE ORAL at 16:01

## 2024-06-17 RX ADMIN — DIVALPROEX SODIUM 1250 MG: 500 TABLET, EXTENDED RELEASE ORAL at 08:08

## 2024-06-17 RX ADMIN — MIRTAZAPINE 15 MG: 15 TABLET, FILM COATED ORAL at 21:43

## 2024-06-17 RX ADMIN — SENNOSIDES 17.2 MG: 8.6 TABLET, FILM COATED ORAL at 16:02

## 2024-06-17 RX ADMIN — SERTRALINE HYDROCHLORIDE 50 MG: 50 TABLET ORAL at 08:12

## 2024-06-17 RX ADMIN — ACETAMINOPHEN 975 MG: 325 TABLET, FILM COATED ORAL at 21:43

## 2024-06-17 RX ADMIN — OXYCODONE HYDROCHLORIDE 10 MG: 10 TABLET ORAL at 21:50

## 2024-06-17 NOTE — PLAN OF CARE
Problem: OCCUPATIONAL THERAPY ADULT  Goal: Performs self-care activities at highest level of function for planned discharge setting.  See evaluation for individualized goals.  Description: Treatment Interventions: ADL retraining, Functional transfer training, Endurance training, Cognitive reorientation, Compensatory technique education, Energy conservation, Activityengagement          See flowsheet documentation for full assessment, interventions and recommendations.   Outcome: Progressing  Note: Limitation: Decreased ADL status, Decreased Safe judgement during ADL, Decreased cognition, Decreased endurance, Decreased sensation, Decreased self-care trans, Decreased high-level ADLs  Prognosis: Good  Assessment: Pt seen on this date for OT session focusing on ADL retraining, cognitive reorientation, body mechanics, transfer retraining, increasing activity tolerance/endurance and EOB sitting to increase ability to participate in ADL/functional tasks. Pt was found in bed and was left in bed w/ all needs within reach, bed alarm on. Pt completed bed mob w mod Ax1 to achieve EOB sitting, min Ax1 to return to bed. Required total A for toileting, which was completed while pt in stance w mod Ax1 and rw for support. Hops to wc completed w mod Ax2 c rw and consistent vc for proper hand placement and rw mgmt. Pt requires vc for redirection t/o session, but is very cooperative with therapy. Pt w/ improvements in transfer ability, endurance, however is still limited 2* decreased ADL/High-level ADL status, decreased activity tolerance/endurance, decreased cognition, decreased self-care trans, decreased safety awareness and insight to condition. The patient's raw score on the AM-PAC Daily Activity Inpatient Short Form is 15. A raw score of less than 19 suggests the patient may benefit from discharge to post-acute rehabilitation services. Please refer to the recommendation of the Occupational Therapist for safe discharge planning.  Recommending pt D/C to level 2 services when medically stable. Pt will continue to benefit from acute OT services to meet goals.     Rehab Resource Intensity Level, OT: I (Maximum Resource Intensity)

## 2024-06-17 NOTE — PHYSICAL THERAPY NOTE
PHYSICAL THERAPY NOTE          Patient Name: Sunil Patel  Today's Date: 6/17/2024 06/17/24 1423   PT Last Visit   PT Visit Date 06/17/24   Note Type   Note Type Treatment   Pain Assessment   Pain Assessment Tool 0-10   Pain Score 8   Pain Location/Orientation Location: Buttocks   Pain Onset/Description Onset: Ongoing;Frequency: Constant/Continuous;Descriptor: Discomfort   Effect of Pain on Daily Activities limits comfort and mobility   Patient's Stated Pain Goal No pain   Hospital Pain Intervention(s) Repositioned;Ambulation/increased activity;Emotional support   Restrictions/Precautions   Weight Bearing Precautions Per Order (S)  Yes   LLE Weight Bearing Per Order (S)  NWB  (2* AKA)   Other Precautions Cognitive;Chair Alarm;Bed Alarm;Fall Risk;WBS   General   Chart Reviewed Yes   Family/Caregiver Present No   Cognition   Overall Cognitive Status Impaired   Arousal/Participation Alert;Cooperative   Attention Attends with cues to redirect   Orientation Level Oriented to person;Disoriented to place;Disoriented to time;Disoriented to situation   Memory Decreased recall of precautions;Decreased short term memory;Decreased recall of recent events   Following Commands Follows one step commands with increased time or repetition   Comments Patient is pleasant and cooperative. Patient tearful regarding amputation, however responds well to positive talk and encouragement.   Subjective   Subjective Patient agreeable to PT tx   Bed Mobility   Supine to Sit 3  Moderate assistance   Additional items Assist x 1;Increased time required;Verbal cues;LE management;Bedrails;HOB elevated   Sit to Supine 4  Minimal assistance   Additional items Assist x 1;Increased time required;Verbal cues   Transfers   Sit to Stand 3  Moderate assistance   Additional items Assist x 2;Increased time required;Verbal cues   Stand to Sit 3  Moderate assistance    Additional items Assist x 2;Increased time required;Verbal cues   Stand pivot 3  Moderate assistance   Additional items Assist x 2;Increased time required;Verbal cues   Additional Comments RW   Ambulation/Elevation   Gait pattern   (hop to gait pattern)   Gait Assistance 3  Moderate assist   Additional items Assist x 2;Verbal cues   Assistive Device Rolling walker   Distance 2'x2   Ambulation/Elevation Additional Comments <> w/c   Wheelchair Activities   Wheelchair Cushion None   Pressure Relief Type Push up   Level of Assistance for Pressure Relief Activities Close supervision   Wheelchair Parts Management No   Propulsion Yes   Propulsion Type 1 Manual   Level 1 Level tile   Method 1 Right upper extremity;Left upper extremity   Level of Assistance 1 Close supervision   Description/ Details 1 50'   Balance   Static Sitting Fair   Dynamic Sitting Fair -   Static Standing Poor   Dynamic Standing Poor -   Ambulatory Poor -   Endurance Deficit   Endurance Deficit Yes   Endurance Deficit Description fatigue, weakness   Activity Tolerance   Activity Tolerance Patient tolerated treatment well;Patient limited by fatigue;Patient limited by pain   Medical Staff Made Aware OT, SPT   Nurse Made Aware RN cleared   Exercises   Balance training  standing balance training at RW - patient performs x4 STS - able to stand apx 2' at RW with Min-Mod Ax1. Patient requires Mod Ax2 to stand.   Assessment   Prognosis Good   Problem List Decreased strength;Decreased endurance;Impaired balance;Decreased mobility;Pain;Decreased cognition;Impaired judgement;Decreased safety awareness   Assessment Patient received in bed. Patient agreeable to therapy. Patient performs bed mobility with moderate assistance x1. Patient performs functional transfers with moderate assistance x2 using rolling walker. Patient performs x4 STS throughout session. Focus on standing balance and tolerance. Patient performs static/dynamic standing balance with rolling  walker. Please see flowsheet for balance activities performed. Patient performs ambulation with moderate assistance x2 using rolling walker, hop to gait pattern on LLE 2'x2.  Patient returns to bed following activity with Min Ax1. Patient left in bed with all needs met and call bell/personal items within reach. The patient's AM-PAC Basic Mobility Inpatient Short Form Raw Score is 9 showing further need for skilled PT services in order to improve functional mobility, decrease need for assistance, and return to PLOF. PT is recommending Level 1 - Maximum Resource Intensity on d/c from hospital.  Will continue to follow as able.   Barriers to Discharge Decreased caregiver support   Goals   Patient Goals to get stronger   PT Treatment Day 3   Plan   Treatment/Interventions Functional transfer training;LE strengthening/ROM;Therapeutic exercise;Endurance training;Cognitive reorientation;Elevations;Equipment eval/education;Patient/family training;Bed mobility;Gait training;Spoke to nursing;Spoke to case management;OT   Progress Progressing toward goals   PT Frequency 3-5x/wk   Discharge Recommendation   Rehab Resource Intensity Level, PT I (Maximum Resource Intensity)   Equipment Recommended Walker   Walker Package Recommended Wheeled walker   AM-PAC Basic Mobility Inpatient   Turning in Flat Bed Without Bedrails 3   Lying on Back to Sitting on Edge of Flat Bed Without Bedrails 2   Moving Bed to Chair 1   Standing Up From Chair Using Arms 1   Walk in Room 1   Climb 3-5 Stairs With Railing 1   Basic Mobility Inpatient Raw Score 9   Turning Head Towards Sound 3   Follow Simple Instructions 3   Low Function Basic Mobility Raw Score  15   Low Function Basic Mobility Standardized Score  23.9   [facility] Highest Level Of Mobility   -[facility] Goal 3: Sit at edge of bed   -HL Achieved 5: Stand (1 or more minutes)   End of Consult   Patient Position at End of Consult All needs within reach;Supine;Bed/Chair alarm activated  (x2 CO  present)     Stacy Mehta, PT, DPT

## 2024-06-17 NOTE — PROGRESS NOTES
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 No need for price check per CM  PT/OT evaluation > needs inpatient rehab  Further management per vascular surgery    Carnitine deficiency (formerly [facility])  Assessment & Plan  Continue Levocarnitine replacement therapy TID    Seizures (formerly [facility])  Assessment & Plan  Diagnosed in July 2019 - follows with [facility] neurology  Continue Depakote/Lamictal/Zonisamide > doses now adjusted per most recent outpatient neurology changes on record -> Depakote 1250 mg daily, Zonisamide 400 mg daily, and Lamictal 50 mg BID   Called his neurologist office at [facility] who confirmed above doses with out any recent change or discontinuation.     Left ventricular thrombus  Assessment & Plan  Filling defect in the left ventricular apex suggests left ventricular thrombus and the source of the embolic disease  Echo showed ejection fraction 40 to 45%, mild global hypokinesis, LV thrombus  Underwent pharmacological stress test. Per cardiology, no significant areas of ischemia and recommended for medical treatment. Possible stress induced cardiomyopathy as an etiology of his reduced EF. Continue cozaar. Metoprolol added.  Continue Xarelto    Cerebrovascular accident (CVA) (formerly [facility])  Assessment & Plan  History of CVA in the left MCA territory in May 2018 -  residual expressive aphasia  Continue ASA/statin    Benign essential hypertension  Assessment & Plan  BP stable  Continue current antihypertensives     Human immunodeficiency virus (HIV) infection  ([facility])  Assessment & Plan  Continue Biktarvy/Tivicay daily and Cabenuva monthly injections (per facility records)               VTE Pharmacologic Prophylaxis: VTE Score: 3 Moderate Risk (Score 3-4) - Pharmacological DVT Prophylaxis Ordered: rivaroxaban (Xarelto).    Mobility:   Basic Mobility Inpatient Raw Score: 10  JH-HLM Goal: 4: Move to chair/commode  JH-HLM Achieved: 2: Bed activities/Dependent transfer  JH-HLM Goal NOT achieved. Continue with multidisciplinary rounding and encourage appropriate mobility to improve upon JH-HLM goals.    Patient Centered Rounds: I performed bedside rounds with nursing staff today.   Discussions with Specialists or Other Care Team Provider: CM    Education and Discussions with Family / Patient: Patient     Total Time Spent on Date of Encounter in care of patient: 15 mins. This time was spent on one or more of the following: performing physical exam; counseling and coordination of care; obtaining or reviewing history; documenting in the medical record; reviewing/ordering tests, medications or procedures; communicating with other healthcare professionals and discussing with patient's family/caregivers.    Current Length of Stay: 10 day(s)  Current Patient Status: Inpatient   Certification Statement: The patient will continue to require additional inpatient hospital stay due to rehab placement   Discharge Plan: Medically stable    Code Status: Level 1 - Full Code    Subjective:   Patient is upset from being in hospital and refused to engage in further conversation.     Objective:     Vitals:   Temp (24hrs), Av.1 °F (37.3 °C), Min:98.6 °F (37 °C), Max:99.8 °F (37.7 °C)    Temp:  [98.6 °F (37 °C)-99.8 °F (37.7 °C)] 99.8 °F (37.7 °C)  HR:  [81-89] 87  Resp:  [18-20] 18  BP: ()/(57-79) 103/77  SpO2:  [95 %-97 %] 95 %  Body mass index is 27.12 kg/m².     Input and Output Summary (last 24 hours):     Intake/Output Summary (Last 24 hours) at 2024 1137  Last data filed at  6/17/2024 0501  Gross per 24 hour   Intake 1010 ml   Output 740 ml   Net 270 ml       Physical Exam:   Physical Exam Patient declined. Confused, appears at baseline.     Additional Data:     Labs:  Results from last 7 days   Lab Units 06/17/24  0444   WBC Thousand/uL 10.69*   HEMOGLOBIN g/dL 11.6*   HEMATOCRIT % 38.2   PLATELETS Thousands/uL 609*   SEGS PCT % 76*   LYMPHO PCT % 15   MONO PCT % 8   EOS PCT % 1     Results from last 7 days   Lab Units 06/17/24  0444 06/13/24  0518   SODIUM mmol/L 138 139   POTASSIUM mmol/L 4.4 4.9   CHLORIDE mmol/L 103 106   CO2 mmol/L 26 27   BUN mg/dL 23 14   CREATININE mg/dL 1.04 1.19   ANION GAP mmol/L 9 6   CALCIUM mg/dL 9.1 9.1   ALBUMIN g/dL  --  2.8*   TOTAL BILIRUBIN mg/dL  --  0.19*   ALK PHOS U/L  --  82   ALT U/L  --  49   AST U/L  --  31   GLUCOSE RANDOM mg/dL 97 100                       Lines/Drains:  Invasive Devices       Peripheral Intravenous Line  Duration             Peripheral IV 06/15/24 Dorsal (posterior);Left Forearm 2 days              Drain  Duration             External Urinary Catheter <1 day                          Imaging: Reviewed radiology reports from this admission including: procedure reports    Recent Cultures (last 7 days):         Last 24 Hours Medication List:   Current Facility-Administered Medications   Medication Dose Route Frequency Provider Last Rate    acetaminophen  975 mg Oral Q8H DAVINA Karen Guerrero PA-C      aspirin  81 mg Oral Daily Karen Guerrero PA-C      atorvastatin  80 mg Oral Daily With Dinner Karen Guerrero PA-C      bictegravir-emtricitab-tenofovir alafenamide  1 tablet Oral Daily With Breakfast Karen Guerrero PA-C      diphenhydrAMINE  25 mg Oral Q6H PRN Iliana Cuevas PA-C      divalproex sodium  1,250 mg Oral Daily Ida Hodges MD      dolutegravir  50 mg Oral Daily Karen Guerrero PA-C      gabapentin  100 mg Oral TID Karen Guerrero PA-C      HYDROmorphone  0.5 mg Intravenous Q3H PRN Karen  Mauro Guerrero PA-C      lamoTRIgine  50 mg Oral BID Ida Hodges MD      levOCARNitine  1,000 mg/day Oral TID With Meals Ida Hodges MD      lidocaine  1 patch Topical Daily Philipelfego RomeroLuz ElenaEJ      losartan  50 mg Oral Daily Ida Hodges MD      melatonin  6 mg Oral HS Karen Guerrero PA-C      metoprolol succinate  25 mg Oral Q12H Leonardo Bruno MD      mirtazapine  15 mg Oral HS Karen Guerrero PA-C      ELVA ANTIFUNGAL   Topical BID Dana Rodríguez MD      ondansetron  4 mg Intravenous Q6H PRN Karen Guerrero PA-C      oxyCODONE  10 mg Oral Q6H PRN Karen Guerrero PA-C      oxyCODONE  5 mg Oral Q6H PRN Karen Guerrero PA-C      polyethylene glycol  17 g Oral Daily PRN Shruti Correa MD      rivaroxaban  20 mg Oral Daily With Dinner Stan Whitney MD      senna  2 tablet Oral BID Shruti Correa MD      sertraline  25 mg Oral Daily Karen Guerrero PA-C      sertraline  50 mg Oral Daily Karen Guerrero PA-C      tamsulosin  0.4 mg Oral Daily With Dinner Karen Guerreor PA-C      zonisamide  400 mg Oral Daily Ida Hodges MD          Today, Patient Was Seen By: Stan Whitney MD    **Please Note: This note may have been constructed using a voice recognition system.**

## 2024-06-17 NOTE — PLAN OF CARE
Problem: PAIN - ADULT  Goal: Verbalizes/displays adequate comfort level or baseline comfort level  Description: Interventions:  - Encourage patient to monitor pain and request assistance  - Assess pain using appropriate pain scale  - Administer analgesics based on type and severity of pain and evaluate response  - Implement non-pharmacological measures as appropriate and evaluate response  - Consider cultural and social influences on pain and pain management  - Notify physician/advanced practitioner if interventions unsuccessful or patient reports new pain  Outcome: Progressing     Problem: INFECTION - ADULT  Goal: Absence or prevention of progression during hospitalization  Description: INTERVENTIONS:  - Assess and monitor for signs and symptoms of infection  - Monitor lab/diagnostic results  - Monitor all insertion sites, i.e. indwelling lines, tubes, and drains  - Monitor endotracheal if appropriate and nasal secretions for changes in amount and color  - [place] appropriate cooling/warming therapies per order  - Administer medications as ordered  - Instruct and encourage patient and family to use good hand hygiene technique  - Identify and instruct in appropriate isolation precautions for identified infection/condition  Outcome: Progressing     Problem: SAFETY ADULT  Goal: Patient will remain free of falls  Description: INTERVENTIONS:  - Educate patient/family on patient safety including physical limitations  - Instruct patient to call for assistance with activity   - Consult OT/PT to assist with strengthening/mobility   - Keep Call bell within reach  - Keep bed low and locked with side rails adjusted as appropriate  - Keep care items and personal belongings within reach  - Initiate and maintain comfort rounds  - Make Fall Risk Sign visible to staff  - Apply yellow socks and bracelet for high fall risk patients  - Consider moving patient to room near nurses station  Outcome: Progressing  Goal: Maintain or  return to baseline ADL function  Description: INTERVENTIONS:  -  Assess patient's ability to carry out ADLs; assess patient's baseline for ADL function and identify physical deficits which impact ability to perform ADLs (bathing, care of mouth/teeth, toileting, grooming, dressing, etc.)  - Assess/evaluate cause of self-care deficits   - Assess range of motion  - Assess patient's mobility; develop plan if impaired  - Assess patient's need for assistive devices and provide as appropriate  - Encourage maximum independence but intervene and supervise when necessary  - Involve family in performance of ADLs  - Assess for home care needs following discharge   - Consider OT consult to assist with ADL evaluation and planning for discharge  - Provide patient education as appropriate  Outcome: Progressing  Goal: Maintains/Returns to pre admission functional level  Description: INTERVENTIONS:  - Perform AM-PAC 6 Click Basic Mobility/ Daily Activity assessment daily.  - Set and communicate daily mobility goal to care team and patient/family/caregiver.   - Collaborate with rehabilitation services on mobility goals if consulted  - Perform Range of Motion 3 times a day.  - Reposition patient every 3 hours.  - Dangle patient 3 times a day  - Stand patient 3 times a day  - Ambulate patient 3 times a day  - Out of bed to chair 3 times a day   - Out of bed for meals 3 times a day  - Out of bed for toileting  - Record patient progress and toleration of activity level   Outcome: Progressing     Problem: DISCHARGE PLANNING  Goal: Discharge to home or other facility with appropriate resources  Description: INTERVENTIONS:  - Identify barriers to discharge w/patient and caregiver  - Arrange for needed discharge resources and transportation as appropriate  - Identify discharge learning needs (meds, wound care, etc.)  - Arrange for interpretive services to assist at discharge as needed  - Refer to Case Management Department for coordinating  discharge planning if the patient needs post-hospital services based on physician/advanced practitioner order or complex needs related to functional status, cognitive ability, or social support system  Outcome: Progressing     Problem: Knowledge Deficit  Goal: Patient/family/caregiver demonstrates understanding of disease process, treatment plan, medications, and discharge instructions  Description: Complete learning assessment and assess knowledge base.  Interventions:  - Provide teaching at level of understanding  - Provide teaching via preferred learning methods  Outcome: Progressing     Problem: Prexisting or High Potential for Compromised Skin Integrity  Goal: Skin integrity is maintained or improved  Description: INTERVENTIONS:  - Identify patients at risk for skin breakdown  - Assess and monitor skin integrity  - Assess and monitor nutrition and hydration status  - Monitor labs   - Assess for incontinence   - Turn and reposition patient  - Assist with mobility/ambulation  - Relieve pressure over bony prominences  - Avoid friction and shearing  - Provide appropriate hygiene as needed including keeping skin clean and dry  - Evaluate need for skin moisturizer/barrier cream  - Collaborate with interdisciplinary team   - Patient/family teaching  - Consider wound care consult   Outcome: Progressing     Problem: Nutrition/Hydration-ADULT  Goal: Nutrient/Hydration intake appropriate for improving, restoring or maintaining nutritional needs  Description: Monitor and assess patient's nutrition/hydration status for malnutrition. Collaborate with interdisciplinary team and initiate plan and interventions as ordered.  Monitor patient's weight and dietary intake as ordered or per policy. Utilize nutrition screening tool and intervene as necessary. Determine patient's food preferences and provide high-protein, high-caloric foods as appropriate.     INTERVENTIONS:  - Monitor oral intake, urinary output, labs, and treatment  plans  - Assess nutrition and hydration status and recommend course of action  - Evaluate amount of meals eaten  - Assist patient with eating if necessary   - Allow adequate time for meals  - Recommend/ encourage appropriate diets, oral nutritional supplements, and vitamin/mineral supplements  - Order, calculate, and assess calorie counts as needed  - Recommend, monitor, and adjust tube feedings and TPN/PPN based on assessed needs  - Assess need for intravenous fluids  - Provide specific nutrition/hydration education as appropriate  - Include patient/family/caregiver in decisions related to nutrition  Outcome: Progressing

## 2024-06-17 NOTE — OCCUPATIONAL THERAPY NOTE
Occupational Therapy Progress Note     Patient Name: Sunil Patel  Today's Date: 6/17/2024  Problem List  Principal Problem:    Acute lower limb ischemia  Active Problems:    Human immunodeficiency virus (HIV) infection (HCC)    Benign essential hypertension    Cerebrovascular accident (CVA) (HCC)    Left ventricular thrombus    Seizures (HCC)    Carnitine deficiency (HCC)            06/17/24 1424   OT Last Visit   OT Visit Date 06/17/24   Note Type   Note Type Treatment   Pain Assessment   Pain Assessment Tool 0-10   Pain Score 8   Pain Location/Orientation Location: Buttocks   Patient's Stated Pain Goal No pain   Hospital Pain Intervention(s) Repositioned;Ambulation/increased activity;Emotional support   Restrictions/Precautions   Weight Bearing Precautions Per Order Yes   LLE Weight Bearing Per Order NWB   Other Precautions Cognitive;Chair Alarm;Bed Alarm;Fall Risk;WBS   ADL   Where Assessed Edge of bed   Toileting Assistance  2  Maximal Assistance   Toileting Deficit Perineal hygiene   Bed Mobility   Supine to Sit 3  Moderate assistance   Additional items Assist x 1   Sit to Supine 4  Minimal assistance   Additional items Assist x 1;Increased time required;LE management   Additional Comments pt found and left in bed w all needs in reach and alarm on. sat EOB w S. requried mod A to achieve EOB sitting, along w vc for proper hand placement, however with time was able to return to bed at end of session w min ax1.   Transfers   Sit to Stand 3  Moderate assistance   Additional items Assist x 2   Stand to Sit 3  Moderate assistance   Additional items Assist x 2;Increased time required;Verbal cues   Stand pivot 3  Moderate assistance   Additional items Assist x 2;Verbal cues;Increased time required   Additional Comments c rw, vc for hand placement and weight shifting.   Functional Mobility   Functional Mobility 3  Moderate assistance   Additional Comments ax2, small hops to wc   Additional items Rolling walker    Cognition   Overall Cognitive Status Impaired   Arousal/Participation Alert;Responsive   Attention Attends with cues to redirect   Orientation Level Oriented to person;Disoriented to place;Disoriented to time;Disoriented to situation   Memory Decreased recall of precautions;Decreased short term memory;Decreased recall of recent events   Following Commands Follows one step commands without difficulty   Comments pt cooperative and a bit tearful regarding leg amputation today. continues to require vc for redirection during session.   Activity Tolerance   Activity Tolerance Patient tolerated treatment well   Medical Staff Made Aware JENNIFER, RN. [employer] present t/o session.   Assessment   Assessment Pt seen on this date for OT session focusing on ADL retraining, cognitive reorientation, body mechanics, transfer retraining, increasing activity tolerance/endurance and EOB sitting to increase ability to participate in ADL/functional tasks. Pt was found in bed and was left in bed w/ all needs within reach, bed alarm on. Pt completed bed mob w mod Ax1 to achieve EOB sitting, min Ax1 to return to bed. Required total A for toileting, which was completed while pt in stance w mod Ax1 and rw for support. Hops to wc completed w mod Ax2 c rw and consistent vc for proper hand placement and rw mgmt. Pt requires vc for redirection t/o session, but is very cooperative with therapy. Pt w/ improvements in transfer ability, endurance, however is still limited 2* decreased ADL/High-level ADL status, decreased activity tolerance/endurance, decreased cognition, decreased self-care trans, decreased safety awareness and insight to condition. The patient's raw score on the AM-PAC Daily Activity Inpatient Short Form is 15. A raw score of less than 19 suggests the patient may benefit from discharge to post-acute rehabilitation services. Please refer to the recommendation of the Occupational Therapist for safe discharge planning.  Recommending pt D/C to level 2 services when medically stable. Pt will continue to benefit from acute OT services to meet goals.   Plan   Treatment Interventions ADL retraining;Functional transfer training;Endurance training;Cognitive reorientation;Compensatory technique education;Energy conservation;Activityengagement   Goal Expiration Date 06/24/24   OT Treatment Day 3   OT Frequency 2-3x/wk   Discharge Recommendation   Rehab Resource Intensity Level, OT I (Maximum Resource Intensity)   AM-PAC Daily Activity Inpatient   Lower Body Dressing 2   Bathing 2   Toileting 1   Upper Body Dressing 3   Grooming 3   Eating 4   Daily Activity Raw Score 15   Daily Activity Standardized Score (Calc for Raw Score >=11) 34.69   AM-PAC Applied Cognition Inpatient   Following a Speech/Presentation 4   Understanding Ordinary Conversation 3   Taking Medications 1   Remembering Where Things Are Placed or Put Away 2   Remembering List of 4-5 Errands 2   Taking Care of Complicated Tasks 1   Applied Cognition Raw Score 13   Applied Cognition Standardized Score 30.46   Modified [place] Scale   Modified [place] Scale 4   End of Consult   Education Provided Yes   Patient Position at End of Consult Supine;Bed/Chair alarm activated;All needs within reach   Nurse Communication Nurse aware of consult   End of Consult Comments CO present at end of session.       Willa Haas, YUKI, OTR/L

## 2024-06-17 NOTE — PLAN OF CARE
Problem: PAIN - ADULT  Goal: Verbalizes/displays adequate comfort level or baseline comfort level  Description: Interventions:  - Encourage patient to monitor pain and request assistance  - Assess pain using appropriate pain scale  - Administer analgesics based on type and severity of pain and evaluate response  - Implement non-pharmacological measures as appropriate and evaluate response  - Consider cultural and social influences on pain and pain management  - Notify physician/advanced practitioner if interventions unsuccessful or patient reports new pain  Outcome: Progressing     Problem: INFECTION - ADULT  Goal: Absence or prevention of progression during hospitalization  Description: INTERVENTIONS:  - Assess and monitor for signs and symptoms of infection  - Monitor lab/diagnostic results  - Monitor all insertion sites, i.e. indwelling lines, tubes, and drains  - Monitor endotracheal if appropriate and nasal secretions for changes in amount and color  - [place] appropriate cooling/warming therapies per order  - Administer medications as ordered  - Instruct and encourage patient and family to use good hand hygiene technique  - Identify and instruct in appropriate isolation precautions for identified infection/condition  Outcome: Progressing     Problem: SAFETY ADULT  Goal: Patient will remain free of falls  Description: INTERVENTIONS:  - Educate patient/family on patient safety including physical limitations  - Instruct patient to call for assistance with activity   - Consult OT/PT to assist with strengthening/mobility   - Keep Call bell within reach  - Keep bed low and locked with side rails adjusted as appropriate  - Keep care items and personal belongings within reach  - Initiate and maintain comfort rounds  - Make Fall Risk Sign visible to staff  - Apply yellow socks and bracelet for high fall risk patients  - Consider moving patient to room near nurses station  Outcome: Progressing  Goal: Maintain or  return to baseline ADL function  Description: INTERVENTIONS:  -  Assess patient's ability to carry out ADLs; assess patient's baseline for ADL function and identify physical deficits which impact ability to perform ADLs (bathing, care of mouth/teeth, toileting, grooming, dressing, etc.)  - Assess/evaluate cause of self-care deficits   - Assess range of motion  - Assess patient's mobility; develop plan if impaired  - Assess patient's need for assistive devices and provide as appropriate  - Encourage maximum independence but intervene and supervise when necessary  - Involve family in performance of ADLs  - Assess for home care needs following discharge   - Consider OT consult to assist with ADL evaluation and planning for discharge  - Provide patient education as appropriate  Outcome: Progressing  Goal: Maintains/Returns to pre admission functional level  Description: INTERVENTIONS:  - Perform AM-PAC 6 Click Basic Mobility/ Daily Activity assessment daily.  - Set and communicate daily mobility goal to care team and patient/family/caregiver.   - Collaborate with rehabilitation services on mobility goals if consulted  - Perform Range of Motion 2 times a day.  - Reposition patient every 2 hours.  - Dangle patient 2 times a day  - Stand patient 2 times a day  - Ambulate patient 2 times a day  - Out of bed to chair 2 times a day   - Out of bed for meals 2 times a day  - Out of bed for toileting  - Record patient progress and toleration of activity level   Outcome: Progressing     Problem: DISCHARGE PLANNING  Goal: Discharge to home or other facility with appropriate resources  Description: INTERVENTIONS:  - Identify barriers to discharge w/patient and caregiver  - Arrange for needed discharge resources and transportation as appropriate  - Identify discharge learning needs (meds, wound care, etc.)  - Arrange for interpretive services to assist at discharge as needed  - Refer to Case Management Department for coordinating  discharge planning if the patient needs post-hospital services based on physician/advanced practitioner order or complex needs related to functional status, cognitive ability, or social support system  Outcome: Progressing     Problem: Knowledge Deficit  Goal: Patient/family/caregiver demonstrates understanding of disease process, treatment plan, medications, and discharge instructions  Description: Complete learning assessment and assess knowledge base.  Interventions:  - Provide teaching at level of understanding  - Provide teaching via preferred learning methods  Outcome: Progressing     Problem: Prexisting or High Potential for Compromised Skin Integrity  Goal: Skin integrity is maintained or improved  Description: INTERVENTIONS:  - Identify patients at risk for skin breakdown  - Assess and monitor skin integrity  - Assess and monitor nutrition and hydration status  - Monitor labs   - Assess for incontinence   - Turn and reposition patient  - Assist with mobility/ambulation  - Relieve pressure over bony prominences  - Avoid friction and shearing  - Provide appropriate hygiene as needed including keeping skin clean and dry  - Evaluate need for skin moisturizer/barrier cream  - Collaborate with interdisciplinary team   - Patient/family teaching  - Consider wound care consult   Outcome: Progressing     Problem: Nutrition/Hydration-ADULT  Goal: Nutrient/Hydration intake appropriate for improving, restoring or maintaining nutritional needs  Description: Monitor and assess patient's nutrition/hydration status for malnutrition. Collaborate with interdisciplinary team and initiate plan and interventions as ordered.  Monitor patient's weight and dietary intake as ordered or per policy. Utilize nutrition screening tool and intervene as necessary. Determine patient's food preferences and provide high-protein, high-caloric foods as appropriate.     INTERVENTIONS:  - Monitor oral intake, urinary output, labs, and treatment  plans  - Assess nutrition and hydration status and recommend course of action  - Evaluate amount of meals eaten  - Assist patient with eating if necessary   - Allow adequate time for meals  - Recommend/ encourage appropriate diets, oral nutritional supplements, and vitamin/mineral supplements  - Order, calculate, and assess calorie counts as needed  - Recommend, monitor, and adjust tube feedings and TPN/PPN based on assessed needs  - Assess need for intravenous fluids  - Provide specific nutrition/hydration education as appropriate  - Include patient/family/caregiver in decisions related to nutrition  Outcome: Progressing     Problem: INFECTION - ADULT  Goal: Absence of fever/infection during neutropenic period  Description: INTERVENTIONS:  - Monitor WBC    Outcome: Completed

## 2024-06-17 NOTE — WOUND OSTOMY CARE
Progress Note - Wound   Sunil Patel 63 y.o. male MRN: [id number]  Unit/Bed#: [facility] 507-01 Encounter: [id number]        Assessment:   Patient seen today for wound care follow up assessment. Patient is incontinent of bowel and bladder. Patient is independent with meals. Wound care consulted for sacral wound. Patient on P-500 specialty bed for off-loading.      Assessment Findings:      POA unstageable B/L buttocks- wound is full thickness with 95% brown/yellow slough and 5% pink tissue, periwound skin is pink and fragile, small serosanguineous drainage. Wound has improved with this week's assessment.  Groin- present with fungal rash, intact.    No induration, fluctuance, odor, warmth/temperature differences, redness, or purulence noted to the above noted wounds and skin areas assessed. New dressings applied per orders listed below. Patient tolerated well- no s/s of non-verbal pain or discomfort observed during the encounter. Bedside nurse aware of plan of care. See flow sheets for more detailed assessment findings. Wound care will continue to follow.      Skin care plans:  1-Hydraguard to right heel BID and PRN  2-Elevate heels to offload pressure.  3-Ehob cushion in chair when out of bed.  4-Moisturize skin daily with skin nourishing cream.  5-Turn/reposition q2h or when medically stable for pressure re-distribution on skin.   6-Cleanse sacral/buttocks area with soap and water. Apply Triad paste to wound bed TID and PRN incontinence care  7-Apply ELVA to groin BID  8-P-500 specialty bed    Wound 06/08/24 Pressure Injury Buttocks Bilateral (Active)   Wound Image   06/17/24 1130   Wound Description Beefy red;Pink;Slough;Fragile;Epithelialization 06/17/24 1512   Pressure Injury Stage U 06/17/24 1512   Elsi-wound Assessment Pink;Intact 06/17/24 1512   Wound Length (cm) 5 cm 06/17/24 1512   Wound Width (cm) 11 cm 06/17/24 1512   Wound Depth (cm) 0.2 cm 06/17/24 1512   Wound Surface Area (cm^2) 55 cm^2 06/17/24 1512    Wound Volume (cm^3) 11 cm^3 06/17/24 1512   Calculated Wound Volume (cm^3) 11 cm^3 06/17/24 1512   Change in Wound Size % 16.67 06/17/24 1512   Wound Site Closure WARD 06/17/24 1512   Drainage Amount Small 06/17/24 1512   Drainage Description Tan 06/17/24 1512   Non-staged Wound Description Full thickness 06/17/24 1512   Treatments Cleansed 06/17/24 1512   Dressing Moisture barrier 06/17/24 1512   Wound packed? No 06/17/24 1512   Packing- # removed 0 06/17/24 1512   Packing- # inserted 0 06/17/24 1512   Dressing Changed New 06/17/24 1512   Patient Tolerance Tolerated well 06/17/24 1512   Dressing Status Clean;Dry;Intact 06/17/24 1512           Nafisa Hooper BSN, RN, CWOCN

## 2024-06-17 NOTE — CASE MANAGEMENT
Case Management Progress Note    Patient name Sunil Patel  Location [facility] 507/[facility] 507-01 MRN [id number]  : 1960 Date 2024       LOS (days): 10  Geometric Mean LOS (GMLOS) (days): 4  Days to GMLOS:-5.7        OBJECTIVE:        Current admission status: Inpatient  Preferred Pharmacy:   [facility] [place], [facility] & [address]  [place] [address]  Phone: [phone] Fax: [phone]    Primary Care Provider: CHARLIE Trevino    Primary Insurance: MEDICARE  Secondary Insurance: custodial    PROGRESS NOTE:    CM left a VM for Liz Riley, [facility]  at the [place] (PH: [phone]), requesting a call back.     CM left a VM for Toni ([phone])  in Admissions at the [facility] requesting a call back.

## 2024-06-17 NOTE — ASSESSMENT & PLAN NOTE
Diagnosed in July 2019 - follows with [facility] neurology  Continue Depakote/Lamictal/Zonisamide > doses now adjusted per most recent outpatient neurology changes on record -> Depakote 1250 mg daily, Zonisamide 400 mg daily, and Lamictal 50 mg BID   Called his neurologist office at [facility] who confirmed above doses with out any recent change or discontinuation.

## 2024-06-17 NOTE — ASSESSMENT & PLAN NOTE
Patient presented with left lower extremity acute limb ischemia with extensive left iliofemoral and left infrainguinal embolism with nonviable left lower extremity  Status post left femoral thrombectomy and AKA on 6/7  Pain management  AC with Xarelto. No need for price check per CM  PT/OT evaluation > needs inpatient rehab  Further management per vascular surgery

## 2024-06-17 NOTE — PLAN OF CARE
Problem: PHYSICAL THERAPY ADULT  Goal: Performs mobility at highest level of function for planned discharge setting.  See evaluation for individualized goals.  Description: Treatment/Interventions: ADL retraining, Functional transfer training, LE strengthening/ROM, Therapeutic exercise, Endurance training, Patient/family training, Equipment eval/education, Bed mobility, Gait training, Spoke to nursing, Spoke to case management, OT, Elevations, Cognitive reorientation  Equipment Recommended:  (tbd)       See flowsheet documentation for full assessment, interventions and recommendations.  Outcome: Progressing  Note: Prognosis: Good  Problem List: Decreased strength, Decreased endurance, Impaired balance, Decreased mobility, Pain, Decreased cognition, Impaired judgement, Decreased safety awareness  Assessment: Patient received in bed. Patient agreeable to therapy. Patient performs bed mobility with moderate assistance x1. Patient performs functional transfers with moderate assistance x2 using rolling walker. Patient performs x4 STS throughout session. Focus on standing balance and tolerance. Patient performs static/dynamic standing balance with rolling walker. Please see flowsheet for balance activities performed. Patient performs ambulation with moderate assistance x2 using rolling walker, hop to gait pattern on LLE 2'x2.  Patient returns to bed following activity with Min Ax1. Patient left in bed with all needs met and call bell/personal items within reach. The patient's AM-PAC Basic Mobility Inpatient Short Form Raw Score is 9 showing further need for skilled PT services in order to improve functional mobility, decrease need for assistance, and return to PLOF. PT is recommending Level 1 - Maximum Resource Intensity on d/c from hospital.  Will continue to follow as able.  Barriers to Discharge: Decreased caregiver support     Rehab Resource Intensity Level, PT: I (Maximum Resource Intensity)    See flowsheet  documentation for full assessment.

## 2024-06-18 LAB — GLUCOSE SERPL-MCNC: 116 MG/DL (ref 65–140)

## 2024-06-18 PROCEDURE — 99232 SBSQ HOSP IP/OBS MODERATE 35: CPT | Performed by: INTERNAL MEDICINE

## 2024-06-18 PROCEDURE — 82948 REAGENT STRIP/BLOOD GLUCOSE: CPT

## 2024-06-18 RX ADMIN — GABAPENTIN 100 MG: 100 CAPSULE ORAL at 21:33

## 2024-06-18 RX ADMIN — OXYCODONE HYDROCHLORIDE 10 MG: 10 TABLET ORAL at 11:36

## 2024-06-18 RX ADMIN — DOLUTEGRAVIR SODIUM 50 MG: 50 TABLET, FILM COATED ORAL at 08:13

## 2024-06-18 RX ADMIN — ASPIRIN 81 MG: 81 TABLET, COATED ORAL at 08:10

## 2024-06-18 RX ADMIN — MIRTAZAPINE 15 MG: 15 TABLET, FILM COATED ORAL at 21:33

## 2024-06-18 RX ADMIN — Medication 6 MG: at 21:32

## 2024-06-18 RX ADMIN — LEVOCARNITINE 330 MG: 1 SOLUTION ORAL at 13:01

## 2024-06-18 RX ADMIN — LAMOTRIGINE 50 MG: 25 TABLET ORAL at 17:20

## 2024-06-18 RX ADMIN — ZONISAMIDE 400 MG: 100 CAPSULE ORAL at 08:12

## 2024-06-18 RX ADMIN — DIVALPROEX SODIUM 1250 MG: 500 TABLET, EXTENDED RELEASE ORAL at 08:10

## 2024-06-18 RX ADMIN — ACETAMINOPHEN 975 MG: 325 TABLET, FILM COATED ORAL at 21:32

## 2024-06-18 RX ADMIN — METOPROLOL SUCCINATE 25 MG: 25 TABLET, EXTENDED RELEASE ORAL at 17:20

## 2024-06-18 RX ADMIN — RIVAROXABAN 20 MG: 20 TABLET, FILM COATED ORAL at 17:21

## 2024-06-18 RX ADMIN — LEVOCARNITINE 330 MG: 1 SOLUTION ORAL at 17:22

## 2024-06-18 RX ADMIN — SENNOSIDES 17.2 MG: 8.6 TABLET, FILM COATED ORAL at 17:21

## 2024-06-18 RX ADMIN — ACETAMINOPHEN 975 MG: 325 TABLET, FILM COATED ORAL at 06:20

## 2024-06-18 RX ADMIN — TAMSULOSIN HYDROCHLORIDE 0.4 MG: 0.4 CAPSULE ORAL at 17:20

## 2024-06-18 RX ADMIN — GABAPENTIN 100 MG: 100 CAPSULE ORAL at 17:22

## 2024-06-18 RX ADMIN — BICTEGRAVIR SODIUM, EMTRICITABINE, AND TENOFOVIR ALAFENAMIDE FUMARATE 1 TABLET: 50; 200; 25 TABLET ORAL at 08:13

## 2024-06-18 RX ADMIN — LAMOTRIGINE 50 MG: 25 TABLET ORAL at 08:09

## 2024-06-18 RX ADMIN — OXYCODONE HYDROCHLORIDE 5 MG: 5 TABLET ORAL at 06:22

## 2024-06-18 RX ADMIN — SENNOSIDES 17.2 MG: 8.6 TABLET, FILM COATED ORAL at 08:11

## 2024-06-18 RX ADMIN — LEVOCARNITINE 330 MG: 1 SOLUTION ORAL at 08:11

## 2024-06-18 RX ADMIN — OXYCODONE HYDROCHLORIDE 10 MG: 10 TABLET ORAL at 20:24

## 2024-06-18 RX ADMIN — LOSARTAN POTASSIUM 50 MG: 50 TABLET, FILM COATED ORAL at 08:10

## 2024-06-18 RX ADMIN — ACETAMINOPHEN 975 MG: 325 TABLET, FILM COATED ORAL at 13:01

## 2024-06-18 RX ADMIN — SERTRALINE HYDROCHLORIDE 50 MG: 50 TABLET ORAL at 08:11

## 2024-06-18 RX ADMIN — MICONAZOLE NITRATE: 20 CREAM TOPICAL at 08:26

## 2024-06-18 RX ADMIN — GABAPENTIN 100 MG: 100 CAPSULE ORAL at 08:10

## 2024-06-18 RX ADMIN — MICONAZOLE NITRATE: 20 CREAM TOPICAL at 17:19

## 2024-06-18 RX ADMIN — SERTRALINE HYDROCHLORIDE 25 MG: 50 TABLET ORAL at 08:09

## 2024-06-18 RX ADMIN — ATORVASTATIN CALCIUM 80 MG: 80 TABLET, FILM COATED ORAL at 17:20

## 2024-06-18 NOTE — PLAN OF CARE
Problem: PAIN - ADULT  Goal: Verbalizes/displays adequate comfort level or baseline comfort level  Description: Interventions:  - Encourage patient to monitor pain and request assistance  - Assess pain using appropriate pain scale  - Administer analgesics based on type and severity of pain and evaluate response  - Implement non-pharmacological measures as appropriate and evaluate response  - Consider cultural and social influences on pain and pain management  - Notify physician/advanced practitioner if interventions unsuccessful or patient reports new pain  Outcome: Progressing     Problem: INFECTION - ADULT  Goal: Absence or prevention of progression during hospitalization  Description: INTERVENTIONS:  - Assess and monitor for signs and symptoms of infection  - Monitor lab/diagnostic results  - Monitor all insertion sites, i.e. indwelling lines, tubes, and drains  - Monitor endotracheal if appropriate and nasal secretions for changes in amount and color  - Story appropriate cooling/warming therapies per order  - Administer medications as ordered  - Instruct and encourage patient and family to use good hand hygiene technique  - Identify and instruct in appropriate isolation precautions for identified infection/condition  Outcome: Progressing     Problem: SAFETY ADULT  Goal: Patient will remain free of falls  Description: INTERVENTIONS:  - Educate patient/family on patient safety including physical limitations  - Instruct patient to call for assistance with activity   - Consult OT/PT to assist with strengthening/mobility   - Keep Call bell within reach  - Keep bed low and locked with side rails adjusted as appropriate  - Keep care items and personal belongings within reach  - Initiate and maintain comfort rounds  - Make Fall Risk Sign visible to staff  - Offer Toileting every 2 Hours, in advance of need  - Initiate/Maintain 24/7 alarm  - Obtain necessary fall risk management equipment: wc  - Apply yellow socks  and bracelet for high fall risk patients  - Consider moving patient to room near nurses station  Outcome: Progressing

## 2024-06-18 NOTE — PROGRESS NOTES
[facility]  Progress Note  Name: Sunil Patel I  MRN: [id number]  [facility] Date of Admission: 6/7/2024   Date of Service: 6/18/2024 I Hospital Day: 11    Assessment & Plan   * Acute lower limb ischemia  Assessment & Plan  Patient presented with left lower extremity acute limb ischemia with extensive left iliofemoral and left infrainguinal embolism with nonviable left lower extremity  Status post left femoral thrombectomy and AKA on 6/7  Vascular surgery inputs noted  Analgesics  Dilaudid for anticoagulation  Disposition planning case management following      Carnitine deficiency (HCC)  Assessment & Plan  Continue Levocarnitine replacement therapy TID    Seizures ([facility])  Assessment & Plan  Diagnosed in July 2019 - follows with [facility] neurology  Continue Depakote/Lamictal/Zonisamide > doses now adjusted per most recent outpatient neurology changes on record -> Depakote 1250 mg daily, Zonisamide 400 mg daily, and Lamictal 50 mg BID     Left ventricular thrombus  Assessment & Plan  Filling defect in the left ventricular apex suggests left ventricular thrombus and the source of the embolic disease  Echo showed ejection fraction 40 to 45%, mild global hypokinesis, LV thrombus  Underwent pharmacological stress test. Per cardiology, no significant areas of ischemia and recommended for medical treatment. Possible stress induced cardiomyopathy as an etiology of his reduced EF. Continue cozaar. Metoprolol added.  Continue Xarelto  Cardiology inputs noted    Cerebrovascular accident (CVA) ([facility])  Assessment & Plan  History of CVA in the left MCA territory in May 2018  Continue aspirin, statin    Benign essential hypertension  Assessment & Plan  Blood pressures reviewed, acceptable  Continue losartan    Human immunodeficiency virus (HIV) infection ([facility])  Assessment & Plan  Continue Biktarvy/Tivicay daily and Cabenuva monthly injections                    VTE Pharmacologic  Prophylaxis: VTE Score: 3 Moderate Risk (Score 3-4) - Pharmacological DVT Prophylaxis Ordered: rivaroxaban (Xarelto).    Mobility:   Basic Mobility Inpatient Raw Score: 9  -HLM Goal: 3: Sit at edge of bed  JH-HLM Achieved: 4: Move to chair/commode  JH-HLM Goal NOT achieved. Continue with multidisciplinary rounding and encourage appropriate mobility to improve upon JH-HLM goals.    Patient Centered Rounds: I performed bedside rounds with nursing staff today.   Discussions with Specialists or Other Care Team Provider: Case management    Education and Discussions with Family / Patient:  Discussed with the patient.     Total Time Spent on Date of Encounter in care of patient: 32 mins. This time was spent on one or more of the following: performing physical exam; counseling and coordination of care; obtaining or reviewing history; documenting in the medical record; reviewing/ordering tests, medications or procedures; communicating with other healthcare professionals and discussing with patient's family/caregivers.    Current Length of Stay: 11 day(s)  Current Patient Status: Inpatient   Certification Statement: The patient will continue to require additional inpatient hospital stay due to as outlined  Discharge Plan:  Disposition planning case management following    Code Status: Level 1 - Full Code    Subjective:     Comfortably in bed  Reports feeling okay  Discussed with RN  History chart labs medications reviewed    Objective:     Vitals:   Temp (24hrs), Av.4 °F (36.9 °C), Min:97.7 °F (36.5 °C), Max:99 °F (37.2 °C)    Temp:  [97.7 °F (36.5 °C)-99 °F (37.2 °C)] 99 °F (37.2 °C)  HR:  [82-90] 89  Resp:  [17-20] 20  BP: (104-120)/(59-91) 118/66  SpO2:  [95 %-98 %] 95 %  Body mass index is 27.12 kg/m².     Input and Output Summary (last 24 hours):     Intake/Output Summary (Last 24 hours) at 2024 1447  Last data filed at 2024 0900  Gross per 24 hour   Intake 120 ml   Output 750 ml   Net -630 ml        Physical Exam:   Physical Exam     Comfortably in bed  Neck supple  Lungs diminished breath sounds bilaterally  Heart sounds S1-S2 noted  Abdomen soft  Awake follows commands  Left AKA noted  No rash    Additional Data:     Labs:  Results from last 7 days   Lab Units 06/17/24  0444   WBC Thousand/uL 10.69*   HEMOGLOBIN g/dL 11.6*   HEMATOCRIT % 38.2   PLATELETS Thousands/uL 609*   SEGS PCT % 76*   LYMPHO PCT % 15   MONO PCT % 8   EOS PCT % 1     Results from last 7 days   Lab Units 06/17/24  0444 06/13/24  0518   SODIUM mmol/L 138 139   POTASSIUM mmol/L 4.4 4.9   CHLORIDE mmol/L 103 106   CO2 mmol/L 26 27   BUN mg/dL 23 14   CREATININE mg/dL 1.04 1.19   ANION GAP mmol/L 9 6   CALCIUM mg/dL 9.1 9.1   ALBUMIN g/dL  --  2.8*   TOTAL BILIRUBIN mg/dL  --  0.19*   ALK PHOS U/L  --  82   ALT U/L  --  49   AST U/L  --  31   GLUCOSE RANDOM mg/dL 97 100         Results from last 7 days   Lab Units 06/18/24  1127   POC GLUCOSE mg/dl 116               Lines/Drains:  Invasive Devices       Peripheral Intravenous Line  Duration             Peripheral IV 06/15/24 Dorsal (posterior);Left Forearm 3 days              Drain  Duration             External Urinary Catheter 1 day                          Imaging: Reviewed radiology reports from this admission including: chest CT scan, procedure reports, and ECHO    Recent Cultures (last 7 days):         Last 24 Hours Medication List:   Current Facility-Administered Medications   Medication Dose Route Frequency Provider Last Rate    acetaminophen  975 mg Oral Q8H DAVINA Karen Guerrero PA-C      aspirin  81 mg Oral Daily Karen Guerrero PA-C      atorvastatin  80 mg Oral Daily With Dinner Karen Guerrero PA-C      bictegravir-emtricitab-tenofovir alafenamide  1 tablet Oral Daily With Breakfast Karen Guerrero PA-C      diphenhydrAMINE  25 mg Oral Q6H PRN Iliana Cuevas PA-C      divalproex sodium  1,250 mg Oral Daily Ida Hodges MD      dolutegravir  50 mg Oral Daily  Karen Guerrero PA-C      gabapentin  100 mg Oral TID Karen Guerrero PA-C      HYDROmorphone  0.5 mg Intravenous Q3H PRN Karen Guerrero PA-C      HYDROmorphone  0.2 mg Intravenous Once Stan Whitney MD      lamoTRIgine  50 mg Oral BID Ida Hodges MD      levOCARNitine  1,000 mg/day Oral TID With Meals Ida Hodges MD      lidocaine  1 patch Topical Daily Tho Laguna PA-C      losartan  50 mg Oral Daily Ida Hodges MD      melatonin  6 mg Oral HS Karen Guerrero PA-C      metoprolol succinate  25 mg Oral Q12H Leonardo Bruno MD      mirtazapine  15 mg Oral HS Karen Guerrero PA-C      ELVA ANTIFUNGAL   Topical BID Dana Rodríguez MD      ondansetron  4 mg Intravenous Q6H PRN Karen Guerrero PA-C      oxyCODONE  10 mg Oral Q6H PRN Karen Guerrero PA-C      oxyCODONE  5 mg Oral Q6H PRN Karen Guerrero PA-C      polyethylene glycol  17 g Oral Daily PRN Shruti Correa MD      rivaroxaban  20 mg Oral Daily With Dinner Stan Whitney MD      senna  2 tablet Oral BID Shruti Correa MD      sertraline  25 mg Oral Daily Karen Guerrero PA-C      sertraline  50 mg Oral Daily Karen Guerrero PA-C      tamsulosin  0.4 mg Oral Daily With Dinner Karen Guerrero PA-C      zonisamide  400 mg Oral Daily Ida Hodges MD          Today, Patient Was Seen By: Kenny Castro MD    **Please Note: This note may have been constructed using a voice recognition system.**

## 2024-06-18 NOTE — ASSESSMENT & PLAN NOTE
Patient presented with left lower extremity acute limb ischemia with extensive left iliofemoral and left infrainguinal embolism with nonviable left lower extremity  Status post left femoral thrombectomy and AKA on 6/7  Vascular surgery inputs noted  Analgesics  Dilaudid for anticoagulation  Disposition planning case management following

## 2024-06-18 NOTE — ASSESSMENT & PLAN NOTE
Diagnosed in July 2019 - follows with [facility] neurology  Continue Depakote/Lamictal/Zonisamide > doses now adjusted per most recent outpatient neurology changes on record -> Depakote 1250 mg daily, Zonisamide 400 mg daily, and Lamictal 50 mg BID

## 2024-06-18 NOTE — CASE MANAGEMENT
Case Management Progress Note    Patient name Sunil Patel  Location [facility] 507/[facility] 507-01 MRN [id number]  : 1960 Date 2024       LOS (days): 11  Geometric Mean LOS (GMLOS) (days): 4  Days to GMLOS:-6.9        OBJECTIVE:        Current admission status: Inpatient  [facility] [address]  [facility] [place] [address]  Phone: [phone] Fax: [phone]    Primary Care Provider: CHARLIE Trevino    Primary Insurance: MEDICARE  Secondary Insurance: [employer]    PROGRESS NOTE:      CM received a VM from tiffanie Jiménez at [facility] stating that she will be out of the office until Friday, . In her absence, please contact Iliana at PH: [phone]

## 2024-06-18 NOTE — ASSESSMENT & PLAN NOTE
Filling defect in the left ventricular apex suggests left ventricular thrombus and the source of the embolic disease  Echo showed ejection fraction 40 to 45%, mild global hypokinesis, LV thrombus  Underwent pharmacological stress test. Per cardiology, no significant areas of ischemia and recommended for medical treatment. Possible stress induced cardiomyopathy as an etiology of his reduced EF. Continue cozaar. Metoprolol added.  Continue Xarelto  Cardiology inputs noted

## 2024-06-19 PROCEDURE — 99232 SBSQ HOSP IP/OBS MODERATE 35: CPT | Performed by: INTERNAL MEDICINE

## 2024-06-19 RX ADMIN — OXYCODONE HYDROCHLORIDE 10 MG: 10 TABLET ORAL at 22:13

## 2024-06-19 RX ADMIN — LAMOTRIGINE 50 MG: 25 TABLET ORAL at 17:35

## 2024-06-19 RX ADMIN — ACETAMINOPHEN 975 MG: 325 TABLET, FILM COATED ORAL at 05:38

## 2024-06-19 RX ADMIN — ACETAMINOPHEN 975 MG: 325 TABLET, FILM COATED ORAL at 13:42

## 2024-06-19 RX ADMIN — LEVOCARNITINE 330 MG: 1 SOLUTION ORAL at 08:17

## 2024-06-19 RX ADMIN — METOPROLOL SUCCINATE 25 MG: 25 TABLET, EXTENDED RELEASE ORAL at 17:35

## 2024-06-19 RX ADMIN — METOPROLOL SUCCINATE 25 MG: 25 TABLET, EXTENDED RELEASE ORAL at 05:39

## 2024-06-19 RX ADMIN — TAMSULOSIN HYDROCHLORIDE 0.4 MG: 0.4 CAPSULE ORAL at 17:35

## 2024-06-19 RX ADMIN — ATORVASTATIN CALCIUM 80 MG: 80 TABLET, FILM COATED ORAL at 17:35

## 2024-06-19 RX ADMIN — LEVOCARNITINE 330 MG: 1 SOLUTION ORAL at 13:42

## 2024-06-19 RX ADMIN — MICONAZOLE NITRATE: 20 CREAM TOPICAL at 17:36

## 2024-06-19 RX ADMIN — GABAPENTIN 100 MG: 100 CAPSULE ORAL at 17:35

## 2024-06-19 RX ADMIN — SENNOSIDES 17.2 MG: 8.6 TABLET, FILM COATED ORAL at 17:35

## 2024-06-19 RX ADMIN — LEVOCARNITINE 330 MG: 1 SOLUTION ORAL at 17:36

## 2024-06-19 RX ADMIN — RIVAROXABAN 20 MG: 20 TABLET, FILM COATED ORAL at 17:35

## 2024-06-19 NOTE — PROGRESS NOTES
[facility]  Progress Note  Name: Sunil Patel I  MRN: [id number]  [facility] Date of Admission: 6/7/2024   Date of Service: 6/19/2024 I Hospital Day: 12    Assessment & Plan   * Acute lower limb ischemia  Assessment & Plan  Patient presented with left lower extremity acute limb ischemia with extensive left iliofemoral and left infrainguinal embolism with nonviable left lower extremity  Status post left femoral thrombectomy and AKA on 6/7  Xarelto for anticoagulation  Vascular surgery input noted  Analgesics  Disposition planning case management following    Carnitine deficiency (HCC)  Assessment & Plan  Continue Levocarnitine replacement therapy TID    Seizures ([facility])  Assessment & Plan  Diagnosed in July 2019 - follows with [facility] neurology  Continue Depakote/Lamictal/Zonisamide > doses now adjusted per most recent outpatient neurology changes on record -> Depakote 1250 mg daily, Zonisamide 400 mg daily, and Lamictal 50 mg BID     Left ventricular thrombus  Assessment & Plan  Filling defect in the left ventricular apex suggests left ventricular thrombus and the source of the embolic disease  Echo showed ejection fraction 40 to 45%, mild global hypokinesis, LV thrombus  Underwent pharmacological stress test. Per cardiology, no significant areas of ischemia and recommended for medical treatment. Possible stress induced cardiomyopathy as an etiology of his reduced EF. Continue cozaar. Metoprolol added.  Cardiology inputs noted  Xarelto for anticoagulation    Cerebrovascular accident (CVA) ([facility])  Assessment & Plan  History of CVA in the left MCA territory in May 2018  Continue aspirin, atorvastatin    Benign essential hypertension  Assessment & Plan  Blood pressures reviewed, acceptable  Continue losartan    Human immunodeficiency virus (HIV) infection ([facility])  Assessment & Plan  Continue Biktarvy/Tivicay daily and Cabenuva monthly injections                    VTE  Pharmacologic Prophylaxis: VTE Score: 3 Moderate Risk (Score 3-4) - Pharmacological DVT Prophylaxis Ordered: rivaroxaban (Xarelto).    Mobility:   Basic Mobility Inpatient Raw Score: 9  -HLM Goal: 3: Sit at edge of bed  JH-HLM Achieved: 2: Bed activities/Dependent transfer  JH-HLM Goal NOT achieved. Continue with multidisciplinary rounding and encourage appropriate mobility to improve upon JH-HLM goals.    Patient Centered Rounds: I performed bedside rounds with nursing staff today.   Discussions with Specialists or Other Care Team Provider: Case management    Education and Discussions with Family / Patient:  Discussed with the patient.     Total Time Spent on Date of Encounter in care of patient: 31 mins. This time was spent on one or more of the following: performing physical exam; counseling and coordination of care; obtaining or reviewing history; documenting in the medical record; reviewing/ordering tests, medications or procedures; communicating with other healthcare professionals and discussing with patient's family/caregivers.    Current Length of Stay: 12 day(s)  Current Patient Status: Inpatient   Certification Statement: The patient will continue to require additional inpatient hospital stay due to as outlined  Discharge Plan:  Disposition planning case management following    Code Status: Level 1 - Full Code    Subjective:     Comfortably in bed  Get out of bed into chair  Discussed with RN  Encourage incentive spirometry      Objective:     Vitals:   Temp (24hrs), Av.2 °F (36.8 °C), Min:97.4 °F (36.3 °C), Max:98.7 °F (37.1 °C)    Temp:  [97.4 °F (36.3 °C)-98.7 °F (37.1 °C)] 98.7 °F (37.1 °C)  HR:  [85-96] 85  Resp:  [18-20] 18  BP: (110-127)/(58-86) 127/63  SpO2:  [94 %-97 %] 97 %  Body mass index is 27.12 kg/m².     Input and Output Summary (last 24 hours):     Intake/Output Summary (Last 24 hours) at 2024 1611  Last data filed at 2024 0501  Gross per 24 hour   Intake 480 ml   Output  2535 ml   Net -2055 ml       Physical Exam:   Physical Exam       Comfortably in bed  Neck supple  Lungs diminished breath sounds bilaterally  Heart sounds S1-S2 noted  Abdomen soft nontender  Awake follows commands  Left AKA noted  No rash    Additional Data:     Labs:  Results from last 7 days   Lab Units 06/17/24  0444   WBC Thousand/uL 10.69*   HEMOGLOBIN g/dL 11.6*   HEMATOCRIT % 38.2   PLATELETS Thousands/uL 609*   SEGS PCT % 76*   LYMPHO PCT % 15   MONO PCT % 8   EOS PCT % 1     Results from last 7 days   Lab Units 06/17/24  0444 06/13/24  0518   SODIUM mmol/L 138 139   POTASSIUM mmol/L 4.4 4.9   CHLORIDE mmol/L 103 106   CO2 mmol/L 26 27   BUN mg/dL 23 14   CREATININE mg/dL 1.04 1.19   ANION GAP mmol/L 9 6   CALCIUM mg/dL 9.1 9.1   ALBUMIN g/dL  --  2.8*   TOTAL BILIRUBIN mg/dL  --  0.19*   ALK PHOS U/L  --  82   ALT U/L  --  49   AST U/L  --  31   GLUCOSE RANDOM mg/dL 97 100         Results from last 7 days   Lab Units 06/18/24  1127   POC GLUCOSE mg/dl 116               Lines/Drains:  Invasive Devices       Peripheral Intravenous Line  Duration             Peripheral IV 06/19/24 Left;Ventral (anterior) Forearm <1 day              Drain  Duration             External Urinary Catheter 2 days                          Imaging: Reviewed radiology reports from this admission including: procedure reports    Recent Cultures (last 7 days):         Last 24 Hours Medication List:   Current Facility-Administered Medications   Medication Dose Route Frequency Provider Last Rate    acetaminophen  975 mg Oral Q8H DAVINA Karen Guerrero PA-C      aspirin  81 mg Oral Daily Karen Guerrero PA-C      atorvastatin  80 mg Oral Daily With Dinner Karen Guerrero PA-C      bictegravir-emtricitab-tenofovir alafenamide  1 tablet Oral Daily With Breakfast Karen Guerrero PA-C      diphenhydrAMINE  25 mg Oral Q6H PRN Iliana Cuevas PA-C      divalproex sodium  1,250 mg Oral Daily Ida Hodges MD      dolutegravir  50 mg  Oral Daily Karen Guerrero PA-C      gabapentin  100 mg Oral TID Karen Guerrero PA-C      HYDROmorphone  0.5 mg Intravenous Q3H PRN Karen Guerrero PA-C      HYDROmorphone  0.2 mg Intravenous Once Stan Whitney MD      lamoTRIgine  50 mg Oral BID Ida Hodges MD      levOCARNitine  1,000 mg/day Oral TID With Meals Ida Hodges MD      lidocaine  1 patch Topical Daily Toh Laguna PA-C      losartan  50 mg Oral Daily Ida Hodges MD      melatonin  6 mg Oral HS Karen Guerrero PA-C      metoprolol succinate  25 mg Oral Q12H Leonardo Bruno MD      mirtazapine  15 mg Oral HS Karen Guerrero PA-C      ELVA ANTIFUNGAL   Topical BID Dana Rodríguez MD      ondansetron  4 mg Intravenous Q6H PRN Karen Guerrero PA-C      oxyCODONE  10 mg Oral Q6H PRN Karen Guerrero PA-C      oxyCODONE  5 mg Oral Q6H PRN Karen Guerrero PA-C      polyethylene glycol  17 g Oral Daily PRN Shruti Correa MD      rivaroxaban  20 mg Oral Daily With Dinner Stan Whitney MD      senna  2 tablet Oral BID Shruti Correa MD      sertraline  25 mg Oral Daily Karen Guerrero PA-C      sertraline  50 mg Oral Daily Karen Guerrero PA-C      tamsulosin  0.4 mg Oral Daily With Dinner Karen Guerrero PA-C      zonisamide  400 mg Oral Daily Ida Hodges MD          Today, Patient Was Seen By: Kenny Castro MD    **Please Note: This note may have been constructed using a voice recognition system.**

## 2024-06-19 NOTE — CASE MANAGEMENT
Case Management Discharge Planning Note    Patient name Sunil Patel  Location [facility] 507/[facility] 507-01 MRN [id number]  : 1960 Date 2024       Current Admission Date: 2024  Current Admission Diagnosis:Acute lower limb ischemia   Patient Active Problem List    Diagnosis Date Noted Date Diagnosed    Carnitine deficiency (HCC) 2024     Acute lower limb ischemia 2024     Left ventricular thrombus 2024     Seizures (HCC) 2024     Tobacco abuse 10/26/2019     Cerebrovascular accident (CVA) ([facility]) 10/26/2019     Positive laboratory testing for human immunodeficiency virus (HCC) 2017     Bipolar affective disorder (HCC) 2017     Hypertension 2017     Human immunodeficiency virus (HIV) infection ([facility]) 2017     History of transient cerebral ischemia 2017     Substance abuse ([facility]) 2013     Unspecified vitamin D deficiency 2010     Benign essential hypertension 2010       LOS (days): 12  Geometric Mean LOS (GMLOS) (days): 4  Days to GMLOS:-7.8     OBJECTIVE:  Risk of Unplanned Readmission Score: 23.19         Current admission status: Inpatient   Preferred Pharmacy:   [facility] & [address] [address]  Phone: [phone] Fax: [phone]    Primary Care Provider: CHARLIE Trevino    Primary Insurance: MEDICARE  Secondary Insurance: [employer]    DISCHARGE DETAILS:    CM met w/ Pt at bedside to discuss his discharge plan, per his request. Pt doesn’t understand why he hasn’t been d/c’d yet. CM explained that I am in the process of finding an appropriate setting for him.  Pt is requesting to call his “friends” so that they can assist. Pt is also requesting to call his . [facility] room, Pt is not allowed to call out on his room phone.  LENY left a VM for Pts  informing her that the Pt would  like to speak with her.     LENY spoke w/ Blanca Hahn ([phone], ext.[phone]), Assistant  at [facility] ([employer] [facility] [place]) to discuss who was involved in the Pts care while he was residing there. Per Blanca, she states that the Pts , Edmond Wellington at [facility] placed the Pt there nearly 4 years ago and would be able to assist with finding him somewhere else to reside.  LENY is awaiting a return email from Edmond- he is expected to return from vacation on 6/20.  Per Blanca, she will discuss w/  Patricia ([phone]),  at [facility]  potentially reconsidering letting this Pt return there.  LENY is awaiting a call back from Blanca or Patricia.

## 2024-06-19 NOTE — ASSESSMENT & PLAN NOTE
Patient presented with left lower extremity acute limb ischemia with extensive left iliofemoral and left infrainguinal embolism with nonviable left lower extremity  Status post left femoral thrombectomy and AKA on 6/7  Xarelto for anticoagulation  Vascular surgery input noted  Analgesics  Disposition planning case management following

## 2024-06-19 NOTE — ASSESSMENT & PLAN NOTE
Filling defect in the left ventricular apex suggests left ventricular thrombus and the source of the embolic disease  Echo showed ejection fraction 40 to 45%, mild global hypokinesis, LV thrombus  Underwent pharmacological stress test. Per cardiology, no significant areas of ischemia and recommended for medical treatment. Possible stress induced cardiomyopathy as an etiology of his reduced EF. Continue cozaar. Metoprolol added.  Cardiology inputs noted  Xarelto for anticoagulation

## 2024-06-19 NOTE — PLAN OF CARE
Problem: PAIN - ADULT  Goal: Verbalizes/displays adequate comfort level or baseline comfort level  Description: Interventions:  - Encourage patient to monitor pain and request assistance  - Assess pain using appropriate pain scale  - Administer analgesics based on type and severity of pain and evaluate response  - Implement non-pharmacological measures as appropriate and evaluate response  - Consider cultural and social influences on pain and pain management  - Notify physician/advanced practitioner if interventions unsuccessful or patient reports new pain  Outcome: Progressing     Problem: INFECTION - ADULT  Goal: Absence or prevention of progression during hospitalization  Description: INTERVENTIONS:  - Assess and monitor for signs and symptoms of infection  - Monitor lab/diagnostic results  - Monitor all insertion sites, i.e. indwelling lines, tubes, and drains  - Monitor endotracheal if appropriate and nasal secretions for changes in amount and color  - [place] appropriate cooling/warming therapies per order  - Administer medications as ordered  - Instruct and encourage patient and family to use good hand hygiene technique  - Identify and instruct in appropriate isolation precautions for identified infection/condition  Outcome: Progressing

## 2024-06-20 PROCEDURE — 99232 SBSQ HOSP IP/OBS MODERATE 35: CPT | Performed by: INTERNAL MEDICINE

## 2024-06-20 RX ADMIN — METOPROLOL SUCCINATE 25 MG: 25 TABLET, EXTENDED RELEASE ORAL at 06:10

## 2024-06-20 RX ADMIN — METOPROLOL SUCCINATE 25 MG: 25 TABLET, EXTENDED RELEASE ORAL at 18:07

## 2024-06-20 RX ADMIN — OXYCODONE HYDROCHLORIDE 10 MG: 10 TABLET ORAL at 18:07

## 2024-06-20 RX ADMIN — LAMOTRIGINE 50 MG: 25 TABLET ORAL at 18:07

## 2024-06-20 RX ADMIN — Medication 6 MG: at 20:21

## 2024-06-20 RX ADMIN — ACETAMINOPHEN 975 MG: 325 TABLET, FILM COATED ORAL at 20:21

## 2024-06-20 RX ADMIN — MICONAZOLE NITRATE: 20 CREAM TOPICAL at 20:22

## 2024-06-20 RX ADMIN — MIRTAZAPINE 15 MG: 15 TABLET, FILM COATED ORAL at 20:21

## 2024-06-20 RX ADMIN — SENNOSIDES 17.2 MG: 8.6 TABLET, FILM COATED ORAL at 18:07

## 2024-06-20 RX ADMIN — LEVOCARNITINE 330 MG: 1 SOLUTION ORAL at 18:07

## 2024-06-20 RX ADMIN — GABAPENTIN 100 MG: 100 CAPSULE ORAL at 20:21

## 2024-06-20 NOTE — PLAN OF CARE
Problem: PAIN - ADULT  Goal: Verbalizes/displays adequate comfort level or baseline comfort level  Description: Interventions:  - Encourage patient to monitor pain and request assistance  - Assess pain using appropriate pain scale  - Administer analgesics based on type and severity of pain and evaluate response  - Implement non-pharmacological measures as appropriate and evaluate response  - Consider cultural and social influences on pain and pain management  - Notify physician/advanced practitioner if interventions unsuccessful or patient reports new pain  6/20/2024 1300 by Emiliana Machuca RN  Outcome: Not Progressing  6/20/2024 1300 by Emiliana Machuca RN  Outcome: Not Progressing     Problem: INFECTION - ADULT  Goal: Absence or prevention of progression during hospitalization  Description: INTERVENTIONS:  - Assess and monitor for signs and symptoms of infection  - Monitor lab/diagnostic results  - Monitor all insertion sites, i.e. indwelling lines, tubes, and drains  - Monitor endotracheal if appropriate and nasal secretions for changes in amount and color  - [place] appropriate cooling/warming therapies per order  - Administer medications as ordered  - Instruct and encourage patient and family to use good hand hygiene technique  - Identify and instruct in appropriate isolation precautions for identified infection/condition  6/20/2024 1300 by Emiliana Machuca RN  Outcome: Not Progressing  6/20/2024 1300 by Emiliana Machuca RN  Outcome: Not Progressing     Problem: SAFETY ADULT  Goal: Patient will remain free of falls  Description: INTERVENTIONS:  - Educate patient/family on patient safety including physical limitations  - Instruct patient to call for assistance with activity   - Consult OT/PT to assist with strengthening/mobility   - Keep Call bell within reach  - Keep bed low and locked with side rails adjusted as appropriate  - Keep care items and personal belongings within reach  - Initiate and maintain comfort  rounds  - Make Fall Risk Sign visible to staff  - Offer Toileting every 2 Hours, in advance of need  - Initiate/Maintain bed/chair alarm  - Obtain necessary fall risk management equipment: alarms, non skid footwear when OOB  - Apply yellow socks and bracelet for high fall risk patients  - Consider moving patient to room near nurses station  6/20/2024 1300 by Emiliana Machuca RN  Outcome: Not Progressing  6/20/2024 1300 by Emiliana Machuca RN  Outcome: Not Progressing  Goal: Maintain or return to baseline ADL function  Description: INTERVENTIONS:  -  Assess patient's ability to carry out ADLs; assess patient's baseline for ADL function and identify physical deficits which impact ability to perform ADLs (bathing, care of mouth/teeth, toileting, grooming, dressing, etc.)  - Assess/evaluate cause of self-care deficits   - Assess range of motion  - Assess patient's mobility; develop plan if impaired  - Assess patient's need for assistive devices and provide as appropriate  - Encourage maximum independence but intervene and supervise when necessary  - Involve family in performance of ADLs  - Assess for home care needs following discharge   - Consider OT consult to assist with ADL evaluation and planning for discharge  - Provide patient education as appropriate  6/20/2024 1300 by Emiliana Machuca RN  Outcome: Not Progressing  6/20/2024 1300 by Emiliana Machuca RN  Outcome: Not Progressing  Goal: Maintains/Returns to pre admission functional level  Description: INTERVENTIONS:  - Perform AM-PAC 6 Click Basic Mobility/ Daily Activity assessment daily.  - Set and communicate daily mobility goal to care team and patient/family/caregiver.   - Collaborate with rehabilitation services on mobility goals if consulted  - Perform Range of Motion 3 times a day.  - Reposition patient every 2 hours.  - Dangle patient 3 times a day  - Stand patient 3 times a day  - Ambulate patient 3 times a day  - Out of bed to chair 3 times a day   - Out of bed  for meals 3 times a day  - Out of bed for toileting  - Record patient progress and toleration of activity level   6/20/2024 1300 by Emiliana Machuca RN  Outcome: Not Progressing  6/20/2024 1300 by Emiliana Machuca RN  Outcome: Not Progressing     Problem: DISCHARGE PLANNING  Goal: Discharge to home or other facility with appropriate resources  Description: INTERVENTIONS:  - Identify barriers to discharge w/patient and caregiver  - Arrange for needed discharge resources and transportation as appropriate  - Identify discharge learning needs (meds, wound care, etc.)  - Arrange for interpretive services to assist at discharge as needed  - Refer to Case Management Department for coordinating discharge planning if the patient needs post-hospital services based on physician/advanced practitioner order or complex needs related to functional status, cognitive ability, or social support system  6/20/2024 1300 by Emiliana Machuca RN  Outcome: Not Progressing  6/20/2024 1300 by Emiliana Machuac RN  Outcome: Not Progressing     Problem: Knowledge Deficit  Goal: Patient/family/caregiver demonstrates understanding of disease process, treatment plan, medications, and discharge instructions  Description: Complete learning assessment and assess knowledge base.  Interventions:  - Provide teaching at level of understanding  - Provide teaching via preferred learning methods  6/20/2024 1300 by Emiliana Machuca RN  Outcome: Not Progressing  6/20/2024 1300 by Emiliana Machuca RN  Outcome: Not Progressing     Problem: Prexisting or High Potential for Compromised Skin Integrity  Goal: Skin integrity is maintained or improved  Description: INTERVENTIONS:  - Identify patients at risk for skin breakdown  - Assess and monitor skin integrity  - Assess and monitor nutrition and hydration status  - Monitor labs   - Assess for incontinence   - Turn and reposition patient  - Assist with mobility/ambulation  - Relieve pressure over bony prominences  - Avoid  friction and shearing  - Provide appropriate hygiene as needed including keeping skin clean and dry  - Evaluate need for skin moisturizer/barrier cream  - Collaborate with interdisciplinary team   - Patient/family teaching  - Consider wound care consult   6/20/2024 1300 by Emiliana Machuca RN  Outcome: Not Progressing  6/20/2024 1300 by Emiliana Machuca RN  Outcome: Not Progressing     Problem: Nutrition/Hydration-ADULT  Goal: Nutrient/Hydration intake appropriate for improving, restoring or maintaining nutritional needs  Description: Monitor and assess patient's nutrition/hydration status for malnutrition. Collaborate with interdisciplinary team and initiate plan and interventions as ordered.  Monitor patient's weight and dietary intake as ordered or per policy. Utilize nutrition screening tool and intervene as necessary. Determine patient's food preferences and provide high-protein, high-caloric foods as appropriate.     INTERVENTIONS:  - Monitor oral intake, urinary output, labs, and treatment plans  - Assess nutrition and hydration status and recommend course of action  - Evaluate amount of meals eaten  - Assist patient with eating if necessary   - Allow adequate time for meals  - Recommend/ encourage appropriate diets, oral nutritional supplements, and vitamin/mineral supplements  - Order, calculate, and assess calorie counts as needed  - Recommend, monitor, and adjust tube feedings and TPN/PPN based on assessed needs  - Assess need for intravenous fluids  - Provide specific nutrition/hydration education as appropriate  - Include patient/family/caregiver in decisions related to nutrition  6/20/2024 1300 by Emiliana Machuca RN  Outcome: Not Progressing  6/20/2024 1300 by Emiliana Machuca RN  Outcome: Not Progressing

## 2024-06-20 NOTE — PROGRESS NOTES
[facility]  Progress Note  Name: Sunil Patel I  MRN: [id number]  [facility] I Date of Admission: 6/7/2024   Date of Service: 6/20/2024 I Hospital Day: 13    Assessment & Plan   * Acute lower limb ischemia  Assessment & Plan  Patient presented with left lower extremity acute limb ischemia with extensive left iliofemoral and left infrainguinal embolism with nonviable left lower extremity  Status post left femoral thrombectomy and AKA on 6/7  Continue Xarelto for anticoagulation  Vascular surgery inputs noted  Analgesics, supportive cares  Disposition planning case management following      Carnitine deficiency ([facility])  Assessment & Plan  Continue Levocarnitine replacement therapy TID    Seizures ([facility])  Assessment & Plan  Diagnosed in July 2019 - follows with [facility] neurology  Continue Depakote/Lamictal/Zonisamide > doses now adjusted per most recent outpatient neurology changes on record -> Depakote 1250 mg daily, Zonisamide 400 mg daily, and Lamictal 50 mg BID     Left ventricular thrombus  Assessment & Plan  Filling defect in the left ventricular apex suggests left ventricular thrombus and the source of the embolic disease  Echo showed ejection fraction 40 to 45%, mild global hypokinesis, LV thrombus  Underwent pharmacological stress test. Per cardiology, no significant areas of ischemia and recommended for medical treatment. Possible stress induced cardiomyopathy as an etiology of his reduced EF. Continue cozaar. Metoprolol added.  Cardiology inputs noted  Xarelto for anticoagulation    Cerebrovascular accident (CVA) ([facility])  Assessment & Plan  History of CVA in the left MCA territory in May 2018  Continue aspirin, atorvastatin    Benign essential hypertension  Assessment & Plan  Blood pressures reviewed, acceptable  Continue losartan    Human immunodeficiency virus (HIV) infection ([facility])  Assessment & Plan  Continue Biktarvy/Tivicay daily and Cabenuva monthly injections                   VTE Pharmacologic Prophylaxis: VTE Score: 3 Moderate Risk (Score 3-4) - Pharmacological DVT Prophylaxis Ordered: rivaroxaban (Xarelto).    Mobility:   Basic Mobility Inpatient Raw Score: 9  -HLM Goal: 3: Sit at edge of bed  JH-HLM Achieved: 2: Bed activities/Dependent transfer  JH-HLM Goal NOT achieved. Continue with multidisciplinary rounding and encourage appropriate mobility to improve upon JH-HLM goals.    Patient Centered Rounds: I performed bedside rounds with nursing staff today.   Discussions with Specialists or Other Care Team Provider: Case management    Education and Discussions with Family / Patient:  Discussed with the patient, updated Eliel Elizondo in detail, questions answered .     Total Time Spent on Date of Encounter in care of patient: 30 mins. This time was spent on one or more of the following: performing physical exam; counseling and coordination of care; obtaining or reviewing history; documenting in the medical record; reviewing/ordering tests, medications or procedures; communicating with other healthcare professionals and discussing with patient's family/caregivers.    Current Length of Stay: 13 day(s)  Current Patient Status: Inpatient   Certification Statement: The patient will continue to require additional inpatient hospital stay due to as outlined  Discharge Plan:  Disposition planning case management following    Code Status: Level 1 - Full Code    Subjective:     Comfortably in bed  Reports feeling okay  Encourage out of bed into a chair  Encouraged incentive spirometry    Objective:     Vitals:   Temp (24hrs), Av.4 °F (36.9 °C), Min:97.7 °F (36.5 °C), Max:98.7 °F (37.1 °C)    Temp:  [97.7 °F (36.5 °C)-98.7 °F (37.1 °C)] 97.7 °F (36.5 °C)  HR:  [] 100  Resp:  [18-19] 19  BP: (124-133)/(63-74) 129/69  SpO2:  [96 %-98 %] 96 %  Body mass index is 27.12 kg/m².     Input and Output Summary (last 24 hours):     Intake/Output Summary (Last 24 hours) at 2024  1340  Last data filed at 6/20/2024 0611  Gross per 24 hour   Intake 1440 ml   Output 1700 ml   Net -260 ml       Physical Exam:   Physical Exam     Comfortably in bed  Neck supple  Lungs diminished breath sounds bilateral  Heart sounds S1 and S2 noted  Abdomen soft  Awake follows commands  Left AKA noted  No rash    Additional Data:     Labs:  Results from last 7 days   Lab Units 06/17/24  0444   WBC Thousand/uL 10.69*   HEMOGLOBIN g/dL 11.6*   HEMATOCRIT % 38.2   PLATELETS Thousands/uL 609*   SEGS PCT % 76*   LYMPHO PCT % 15   MONO PCT % 8   EOS PCT % 1     Results from last 7 days   Lab Units 06/17/24  0444   SODIUM mmol/L 138   POTASSIUM mmol/L 4.4   CHLORIDE mmol/L 103   CO2 mmol/L 26   BUN mg/dL 23   CREATININE mg/dL 1.04   ANION GAP mmol/L 9   CALCIUM mg/dL 9.1   GLUCOSE RANDOM mg/dL 97         Results from last 7 days   Lab Units 06/18/24  1127   POC GLUCOSE mg/dl 116               Lines/Drains:  Invasive Devices       Peripheral Intravenous Line  Duration             Peripheral IV 06/19/24 Left;Ventral (anterior) Forearm 1 day              Drain  Duration             External Urinary Catheter 3 days                          Imaging: Reviewed radiology reports from this admission including: procedure reports    Recent Cultures (last 7 days):         Last 24 Hours Medication List:   Current Facility-Administered Medications   Medication Dose Route Frequency Provider Last Rate    acetaminophen  975 mg Oral Q8H DAVINA Karen Guerrero PA-C      aspirin  81 mg Oral Daily Karen Guerrero PA-C      atorvastatin  80 mg Oral Daily With Dinner Karen Guerrero PA-C      bictegravir-emtricitab-tenofovir alafenamide  1 tablet Oral Daily With Breakfast Karen Guerrero PA-C      diphenhydrAMINE  25 mg Oral Q6H PRN Iliana Cuevas PA-C      divalproex sodium  1,250 mg Oral Daily Ida Hodges MD      dolutegravir  50 mg Oral Daily Karen Guerrero PA-C      gabapentin  100 mg Oral TID Karen Guerrero PA-C       HYDROmorphone  0.5 mg Intravenous Q3H PRN Karen Guerrero PA-C      HYDROmorphone  0.2 mg Intravenous Once Stan Whitney MD      lamoTRIgine  50 mg Oral BID Ida Hodges MD      levOCARNitine  1,000 mg/day Oral TID With Meals Ida Hodges MD      lidocaine  1 patch Topical Daily LUPE ChiangC      losartan  50 mg Oral Daily Ida Hodges MD      melatonin  6 mg Oral HS Karen Guerrero PA-C      metoprolol succinate  25 mg Oral Q12H Leonardo Bruno MD      mirtazapine  15 mg Oral HS Karen Guerrero PA-C      ELVA ANTIFUNGAL   Topical BID Dana Rodríguez MD      ondansetron  4 mg Intravenous Q6H PRN Karen Guerrero PA-C      oxyCODONE  10 mg Oral Q6H PRN Karen Guerrero PA-C      oxyCODONE  5 mg Oral Q6H PRN Karen Guerrero PA-C      polyethylene glycol  17 g Oral Daily PRN Shruti Correa MD      rivaroxaban  20 mg Oral Daily With Dinner Stan Whitney MD      senna  2 tablet Oral BID Shruti Correa MD      sertraline  25 mg Oral Daily Karen Guerrero PA-C      sertraline  50 mg Oral Daily Karen Guerrero PA-C      tamsulosin  0.4 mg Oral Daily With Dinner Karen Guerrero PA-C      zonisamide  400 mg Oral Daily Ida Hodges MD          Today, Patient Was Seen By: Kenny Castro MD    **Please Note: This note may have been constructed using a voice recognition system.**
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
## 2024-06-20 NOTE — OCCUPATIONAL THERAPY NOTE
Occupational Therapy cx        Patient Name: Sunil Patel  Today's Date: 6/20/2024 06/20/24 0918   OT Last Visit   OT Visit Date 06/20/24   Note Type   Note Type Cancelled Session   Cancel Reasons Refusal         Willa Haas, YUKI, OTR/L

## 2024-06-20 NOTE — ASSESSMENT & PLAN NOTE
Patient presented with left lower extremity acute limb ischemia with extensive left iliofemoral and left infrainguinal embolism with nonviable left lower extremity  Status post left femoral thrombectomy and AKA on 6/7  Continue Xarelto for anticoagulation  Vascular surgery inputs noted  Analgesics, supportive cares  Disposition planning case management following

## 2024-06-20 NOTE — PHYSICAL THERAPY NOTE
"                                                                                  PHYSICAL THERAPY NOTE          Patient Name: Sunil Patel  Today's Date: 6/20/2024 06/20/24 0920   PT Last Visit   PT Visit Date 06/20/24   Note Type   Note Type Cancelled Session   Cancel Reasons Refusal       PT attempt to see patient at stated time. Patient declining PT services at this time due to \"not feeling well\". Patient educated on importance of mobility, but patient continues to decline. PT will continue to follow as able.    Stacy Mehta, PT, DPT    "

## 2024-06-20 NOTE — PLAN OF CARE
Patient has no change in assessment. Two senior living guards rooming in at the bedside.            Problem: PAIN - ADULT  Goal: Verbalizes/displays adequate comfort level or baseline comfort level  Description: Interventions:  - Encourage patient to monitor pain and request assistance  - Assess pain using appropriate pain scale  - Administer analgesics based on type and severity of pain and evaluate response  - Implement non-pharmacological measures as appropriate and evaluate response  - Consider cultural and social influences on pain and pain management  - Notify physician/advanced practitioner if interventions unsuccessful or patient reports new pain  Outcome: Progressing

## 2024-06-21 PROCEDURE — 97542 WHEELCHAIR MNGMENT TRAINING: CPT

## 2024-06-21 PROCEDURE — 97116 GAIT TRAINING THERAPY: CPT

## 2024-06-21 PROCEDURE — 99232 SBSQ HOSP IP/OBS MODERATE 35: CPT | Performed by: INTERNAL MEDICINE

## 2024-06-21 PROCEDURE — 97530 THERAPEUTIC ACTIVITIES: CPT

## 2024-06-21 PROCEDURE — 97535 SELF CARE MNGMENT TRAINING: CPT

## 2024-06-21 RX ADMIN — METOPROLOL SUCCINATE 25 MG: 25 TABLET, EXTENDED RELEASE ORAL at 06:15

## 2024-06-21 RX ADMIN — DOLUTEGRAVIR SODIUM 50 MG: 50 TABLET, FILM COATED ORAL at 08:10

## 2024-06-21 RX ADMIN — OXYCODONE HYDROCHLORIDE 10 MG: 10 TABLET ORAL at 14:21

## 2024-06-21 RX ADMIN — BICTEGRAVIR SODIUM, EMTRICITABINE, AND TENOFOVIR ALAFENAMIDE FUMARATE 1 TABLET: 50; 200; 25 TABLET ORAL at 08:10

## 2024-06-21 RX ADMIN — LAMOTRIGINE 50 MG: 25 TABLET ORAL at 08:10

## 2024-06-21 RX ADMIN — OXYCODONE HYDROCHLORIDE 10 MG: 10 TABLET ORAL at 01:50

## 2024-06-21 RX ADMIN — MICONAZOLE NITRATE: 20 CREAM TOPICAL at 18:30

## 2024-06-21 RX ADMIN — LEVOCARNITINE 330 MG: 1 SOLUTION ORAL at 14:20

## 2024-06-21 RX ADMIN — SENNOSIDES 17.2 MG: 8.6 TABLET, FILM COATED ORAL at 08:10

## 2024-06-21 RX ADMIN — ATORVASTATIN CALCIUM 80 MG: 80 TABLET, FILM COATED ORAL at 18:23

## 2024-06-21 RX ADMIN — MIRTAZAPINE 15 MG: 15 TABLET, FILM COATED ORAL at 21:42

## 2024-06-21 RX ADMIN — GABAPENTIN 100 MG: 100 CAPSULE ORAL at 08:10

## 2024-06-21 RX ADMIN — GABAPENTIN 100 MG: 100 CAPSULE ORAL at 21:42

## 2024-06-21 RX ADMIN — SERTRALINE HYDROCHLORIDE 50 MG: 50 TABLET ORAL at 08:11

## 2024-06-21 RX ADMIN — SERTRALINE HYDROCHLORIDE 25 MG: 50 TABLET ORAL at 08:10

## 2024-06-21 RX ADMIN — OXYCODONE HYDROCHLORIDE 10 MG: 10 TABLET ORAL at 21:43

## 2024-06-21 RX ADMIN — ACETAMINOPHEN 975 MG: 325 TABLET, FILM COATED ORAL at 06:14

## 2024-06-21 RX ADMIN — ACETAMINOPHEN 975 MG: 325 TABLET, FILM COATED ORAL at 14:19

## 2024-06-21 RX ADMIN — LOSARTAN POTASSIUM 50 MG: 50 TABLET, FILM COATED ORAL at 08:10

## 2024-06-21 RX ADMIN — ASPIRIN 81 MG: 81 TABLET, COATED ORAL at 08:11

## 2024-06-21 RX ADMIN — Medication 6 MG: at 21:42

## 2024-06-21 RX ADMIN — OXYCODONE HYDROCHLORIDE 10 MG: 10 TABLET ORAL at 08:14

## 2024-06-21 RX ADMIN — TAMSULOSIN HYDROCHLORIDE 0.4 MG: 0.4 CAPSULE ORAL at 18:22

## 2024-06-21 RX ADMIN — RIVAROXABAN 20 MG: 20 TABLET, FILM COATED ORAL at 18:23

## 2024-06-21 RX ADMIN — LAMOTRIGINE 50 MG: 25 TABLET ORAL at 18:23

## 2024-06-21 RX ADMIN — ZONISAMIDE 400 MG: 100 CAPSULE ORAL at 08:09

## 2024-06-21 RX ADMIN — GABAPENTIN 100 MG: 100 CAPSULE ORAL at 18:22

## 2024-06-21 RX ADMIN — LEVOCARNITINE 330 MG: 1 SOLUTION ORAL at 18:27

## 2024-06-21 RX ADMIN — ACETAMINOPHEN 975 MG: 325 TABLET, FILM COATED ORAL at 21:43

## 2024-06-21 RX ADMIN — METOPROLOL SUCCINATE 25 MG: 25 TABLET, EXTENDED RELEASE ORAL at 18:23

## 2024-06-21 RX ADMIN — LEVOCARNITINE 330 MG: 1 SOLUTION ORAL at 08:09

## 2024-06-21 RX ADMIN — DIVALPROEX SODIUM 1250 MG: 500 TABLET, EXTENDED RELEASE ORAL at 08:10

## 2024-06-21 RX ADMIN — LIDOCAINE 1 PATCH: 700 PATCH TOPICAL at 08:11

## 2024-06-21 RX ADMIN — SENNOSIDES 17.2 MG: 8.6 TABLET, FILM COATED ORAL at 18:22

## 2024-06-21 NOTE — CASE MANAGEMENT
Case Management Progress Note    Patient name Sunil Patel  Location [facility] 507-01 MRN [id number]  : 1960 Date 2024       LOS (days): 14  Geometric Mean LOS (GMLOS) (days): 4  Days to GMLOS:-9.9        OBJECTIVE:        Current admission status: Inpatient  Preferred Pharmacy:   [facility]  [address] [place] [id number]  Phone: [phone] Fax: [phone]    Primary Care Provider: CHARLIE Trevino    Primary Insurance: MEDICARE  Secondary Insurance: [employer]    PROGRESS NOTE:      Pt is requesting to have his son's phone number so that he can contact him.     LENY spoke with, Ole Dumont Esq., VP of Legal Services at [facility] who states that the Pt can make phone calls does not have to be considered a confidential Pt now that he is no longer in custody.     LENY updated P5 RN Manager that CM will be providing the Pt with his son's phone number.       Pt is still pending placement to a rehab facility.

## 2024-06-21 NOTE — PLAN OF CARE
Problem: OCCUPATIONAL THERAPY ADULT  Goal: Performs self-care activities at highest level of function for planned discharge setting.  See evaluation for individualized goals.  Description: Treatment Interventions: ADL retraining, Functional transfer training, Endurance training, Patient/family training, Compensatory technique education, Continued evaluation, Energy conservation          See flowsheet documentation for full assessment, interventions and recommendations.   Outcome: Progressing  Note: Limitation: Decreased ADL status, Decreased Safe judgement during ADL, Decreased cognition, Decreased endurance, Decreased sensation, Decreased self-care trans, Decreased high-level ADLs  Prognosis: Good  Assessment: Patient participated in Skilled OT session this date with interventions consisting of ADL retraining, rolling walker modification (consisting of applying foam pads), and increasing activity tolerance . Patient agreeable to OT treatment session, upon arrival patient was found supine in bed. Pt was left in w/c w/ officers present.  In comparison to previous session, patient with improvements in activity tolerance . Patient requiring verbal cues and pacing t/o activity. Patient continues to be functioning below baseline level, occupational performance remains limited secondary to factors listed above and increased risk for falls and injury.   From OT standpoint, recommendation at time of d/c would be level  I discharge resources.  Patient to benefit from continued Occupational Therapy treatment while in the hospital to address deficits as defined above and maximize level of functional independence with ADLs and functional mobility.   The patient's raw score on the AM-PAC Daily Activity Inpatient Short Form is 15. A raw score of less than 19 suggests the patient may benefit from discharge to post-acute rehabilitation services. Please refer to the recommendation of the Occupational Therapist for safe discharge  planning.     Rehab Resource Intensity Level, OT: I (Maximum Resource Intensity)

## 2024-06-21 NOTE — PROGRESS NOTES
[facility]  Progress Note  Name: Sunil Patel I  MRN: [id number]  Unit/Bed#: [facility] 507-01 I Date of Admission: 6/7/2024   Date of Service: 6/21/2024 I Hospital Day: 14    Assessment & Plan   * Acute lower limb ischemia  Assessment & Plan  Patient presented with left lower extremity acute limb ischemia with extensive left iliofemoral and left infrainguinal embolism with nonviable left lower extremity  Status post left femoral thrombectomy and AKA on 6/7  Continue Xarelto for anticoagulation  Vascular surgery inputs noted  Allergies, supportive cares  Disposition planning case management following        Carnitine deficiency (HCC)  Assessment & Plan  Continue Levocarnitine replacement therapy TID    Seizures ([facility])  Assessment & Plan  Diagnosed in July 2019 - follows with [facility] neurology  Continue Depakote/Lamictal/Zonisamide > doses now adjusted per most recent outpatient neurology changes on record -> Depakote 1250 mg daily, Zonisamide 400 mg daily, and Lamictal 50 mg BID     Left ventricular thrombus  Assessment & Plan  Filling defect in the left ventricular apex suggests left ventricular thrombus and the source of the embolic disease  Echo showed ejection fraction 40 to 45%, mild global hypokinesis, LV thrombus  Underwent pharmacological stress test. Per cardiology, no significant areas of ischemia and recommended for medical treatment. Possible stress induced cardiomyopathy as an etiology of his reduced EF. Continue cozaar. Metoprolol added.  Xarelto for anticoagulation  Cardiology inputs noted    Cerebrovascular accident (CVA) ([facility])  Assessment & Plan  History of CVA in the left MCA territory in May 2018  Continue aspirin, atorvastatin    Benign essential hypertension  Assessment & Plan  Blood pressures reviewed, acceptable  Continue losartan    Human immunodeficiency virus (HIV) infection ([facility])  Assessment & Plan  Continue Biktarvy/Tivicay daily and Cabenuva monthly  injections                    VTE Pharmacologic Prophylaxis: VTE Score: 3 Moderate Risk (Score 3-4) - Pharmacological DVT Prophylaxis Ordered: rivaroxaban (Xarelto).    Mobility:   Basic Mobility Inpatient Raw Score: 9  -HLM Goal: 3: Sit at edge of bed  JH-HLM Achieved: 6: Walk 10 steps or more  JH-HLM Goal NOT achieved. Continue with multidisciplinary rounding and encourage appropriate mobility to improve upon JH-HLM goals.    Patient Centered Rounds: I performed bedside rounds with nursing staff today.   Discussions with Specialists or Other Care Team Provider: Case management    Education and Discussions with Family / Patient:  Discussed with the patient.     Total Time Spent on Date of Encounter in care of patient: 30 mins. This time was spent on one or more of the following: performing physical exam; counseling and coordination of care; obtaining or reviewing history; documenting in the medical record; reviewing/ordering tests, medications or procedures; communicating with other healthcare professionals and discussing with patient's family/caregivers.    Current Length of Stay: 14 day(s)  Current Patient Status: Inpatient   Certification Statement: The patient will continue to require additional inpatient hospital stay due to as outlined  Discharge Plan:  Disposition planning case management following    Code Status: Level 1 - Full Code    Subjective:     Comfortably sitting up in chair  Working with physical therapy  Reports feeling okay      Objective:     Vitals:   Temp (24hrs), Av.2 °F (37.3 °C), Min:98.3 °F (36.8 °C), Max:100 °F (37.8 °C)    Temp:  [98.3 °F (36.8 °C)-100 °F (37.8 °C)] 100 °F (37.8 °C)  HR:  [60-89] 87  Resp:  [18] 18  BP: (109-124)/(70-84) 109/73  SpO2:  [94 %-98 %] 94 %  Body mass index is 27.12 kg/m².     Input and Output Summary (last 24 hours):     Intake/Output Summary (Last 24 hours) at 2024 1318  Last data filed at 2024 0800  Gross per 24 hour   Intake 1140 ml    Output 910 ml   Net 230 ml       Physical Exam:   Physical Exam     Comfortably sitting up in chair  Features of protein calorie malnutrition noted  Neck supple  Lungs diminished breath sounds  Vesicular breath sounds  Heart sounds S1-S2 noted  Abdomen soft nontender  Awake follows commands  Left AKA noted  No rash        Additional Data:     Labs:  Results from last 7 days   Lab Units 06/17/24  0444   WBC Thousand/uL 10.69*   HEMOGLOBIN g/dL 11.6*   HEMATOCRIT % 38.2   PLATELETS Thousands/uL 609*   SEGS PCT % 76*   LYMPHO PCT % 15   MONO PCT % 8   EOS PCT % 1     Results from last 7 days   Lab Units 06/17/24  0444   SODIUM mmol/L 138   POTASSIUM mmol/L 4.4   CHLORIDE mmol/L 103   CO2 mmol/L 26   BUN mg/dL 23   CREATININE mg/dL 1.04   ANION GAP mmol/L 9   CALCIUM mg/dL 9.1   GLUCOSE RANDOM mg/dL 97         Results from last 7 days   Lab Units 06/18/24  1127   POC GLUCOSE mg/dl 116               Lines/Drains:  Invasive Devices       Peripheral Intravenous Line  Duration             Peripheral IV 06/19/24 Left;Ventral (anterior) Forearm 2 days              Drain  Duration             External Urinary Catheter 4 days                          Imaging: Reviewed radiology reports from this admission including: procedure reports    Recent Cultures (last 7 days):         Last 24 Hours Medication List:   Current Facility-Administered Medications   Medication Dose Route Frequency Provider Last Rate    acetaminophen  975 mg Oral Q8H DAVINA Karen Guerrero PA-C      aspirin  81 mg Oral Daily Karen Guerrero PA-C      atorvastatin  80 mg Oral Daily With Dinner Karen Guerrero PA-C      bictegravir-emtricitab-tenofovir alafenamide  1 tablet Oral Daily With Breakfast Karen Guerrero PA-C      diphenhydrAMINE  25 mg Oral Q6H PRN Iliana Cuevas PA-C      divalproex sodium  1,250 mg Oral Daily Ida Hodges MD      dolutegravir  50 mg Oral Daily Karen Guerrero PA-C      gabapentin  100 mg Oral TID Karen Wade  EJ Guerrero      HYDROmorphone  0.5 mg Intravenous Q3H PRN Karen Guerrero PA-C      HYDROmorphone  0.2 mg Intravenous Once Stan Whitney MD      lamoTRIgine  50 mg Oral BID Ida Hodges MD      levOCARNitine  1,000 mg/day Oral TID With Meals Ida Hodges MD      lidocaine  1 patch Topical Daily Tho Laguna PA-C      losartan  50 mg Oral Daily Ida Hodges MD      melatonin  6 mg Oral HS Karen Guerrero PA-C      metoprolol succinate  25 mg Oral Q12H Leonardo Bruno MD      mirtazapine  15 mg Oral HS Karen Guerrero PA-C      ELVA ANTIFUNGAL   Topical BID Dana Rodríguez MD      ondansetron  4 mg Intravenous Q6H PRN Karen Guerrero PA-C      oxyCODONE  10 mg Oral Q6H PRN Karen Guerrero PA-C      oxyCODONE  5 mg Oral Q6H PRN Karen Guerrero PA-C      polyethylene glycol  17 g Oral Daily PRN Shruti Correa MD      rivaroxaban  20 mg Oral Daily With Dinner Stan Whitney MD      senna  2 tablet Oral BID Shruti Correa MD      sertraline  25 mg Oral Daily Karen Guerrero PA-C      sertraline  50 mg Oral Daily Karen Guerrero PA-C      tamsulosin  0.4 mg Oral Daily With Dinner Karen Guerrero PA-C      zonisamide  400 mg Oral Daily Ida Hodges MD          Today, Patient Was Seen By: Kenny Castro MD    **Please Note: This note may have been constructed using a voice recognition system.**

## 2024-06-21 NOTE — OCCUPATIONAL THERAPY NOTE
"  Occupational Therapy Progress Note     Patient Name: Sunil Patel  Today's Date: 6/21/2024  Problem List  Principal Problem:    Acute lower limb ischemia  Active Problems:    Human immunodeficiency virus (HIV) infection (HCC)    Benign essential hypertension    Cerebrovascular accident (CVA) (HCC)    Left ventricular thrombus    Seizures (HCC)    Carnitine deficiency (HCC)        06/21/24 1053   OT Last Visit   OT Visit Date 06/21/24   Note Type   Note Type Treatment   Pain Assessment   Pain Assessment Tool 0-10   Pain Score 8   Hospital Pain Intervention(s) Repositioned;Ambulation/increased activity   Restrictions/Precautions   Weight Bearing Precautions Per Order Yes   LLE Weight Bearing Per Order (S)  NWB   Other Precautions Cognitive;Chair Alarm;Bed Alarm   Lifestyle   Autonomy per chart review, pta, pt A ADLs, A IADLs, I FM   Reciprocal Relationships facility staff are able to A when needed.   Service to Others incarcerated, pt reports not having any family   Intrinsic Gratification drawing/art   ADL   Where Assessed Edge of bed   Grooming Assistance 5  Supervision/Setup   Grooming Deficit Wash/dry hands;Wash/dry face   Bed Mobility   Supine to Sit 3  Moderate assistance   Additional items Assist x 1;Increased time required   Transfers   Sit to Stand 3  Moderate assistance   Additional items Assist x 2;Increased time required;Verbal cues   Stand to Sit 3  Moderate assistance   Additional items Assist x 2;Increased time required;Verbal cues   Stand pivot 3  Moderate assistance   Additional items Assist x 2;Increased time required;Verbal cues   Subjective   Subjective \"Who are you again?\"   Cognition   Overall Cognitive Status Impaired   Arousal/Participation Cooperative   Attention Attends with cues to redirect   Orientation Level Oriented X4   Memory Decreased recall of precautions;Decreased recall of recent events   Following Commands Follows one step commands with increased time or repetition   Comments " requires repetition of cues at times   Activity Tolerance   Activity Tolerance Patient tolerated treatment well   Medical Staff Made Aware okay to see per RN. PT Stacy for medical complexity/comorbidities   Assessment   Assessment Patient participated in Skilled OT session this date with interventions consisting of ADL retraining, rolling walker modification (consisting of applying foam pads), and increasing activity tolerance . Patient agreeable to OT treatment session, upon arrival patient was found supine in bed. Pt was left in w/c w/ officers present.  In comparison to previous session, patient with improvements in activity tolerance . Patient requiring verbal cues and pacing t/o activity. Patient continues to be functioning below baseline level, occupational performance remains limited secondary to factors listed above and increased risk for falls and injury.   From OT standpoint, recommendation at time of d/c would be level  I discharge resources.  Patient to benefit from continued Occupational Therapy treatment while in the hospital to address deficits as defined above and maximize level of functional independence with ADLs and functional mobility.   The patient's raw score on the AM-PAC Daily Activity Inpatient Short Form is 15. A raw score of less than 19 suggests the patient may benefit from discharge to post-acute rehabilitation services. Please refer to the recommendation of the Occupational Therapist for safe discharge planning.   Plan   Treatment Interventions ADL retraining;Functional transfer training;Endurance training;Patient/family training;Compensatory technique education;Continued evaluation;Energy conservation   Goal Expiration Date 06/24/24   OT Treatment Day 4   OT Frequency 2-3x/wk   Discharge Recommendation   Rehab Resource Intensity Level, OT I (Maximum Resource Intensity)   AM-PAC Daily Activity Inpatient   Lower Body Dressing 2   Bathing 2   Toileting 1   Upper Body Dressing 3    Grooming 3   Eating 4   Daily Activity Raw Score 15   Daily Activity Standardized Score (Calc for Raw Score >=11) 34.69   AM-PAC Applied Cognition Inpatient   Following a Speech/Presentation 3   Understanding Ordinary Conversation 3   Taking Medications 1   Remembering Where Things Are Placed or Put Away 2   Remembering List of 4-5 Errands 2   Taking Care of Complicated Tasks 1   Applied Cognition Raw Score 12   Applied Cognition Standardized Score 28.82         Selina Crews MS, OTR/L

## 2024-06-21 NOTE — ASSESSMENT & PLAN NOTE
Patient presented with left lower extremity acute limb ischemia with extensive left iliofemoral and left infrainguinal embolism with nonviable left lower extremity  Status post left femoral thrombectomy and AKA on 6/7  Continue Xarelto for anticoagulation  Vascular surgery inputs noted  Allergies, supportive cares  Disposition planning case management following

## 2024-06-21 NOTE — CASE MANAGEMENT
Case Management Progress Note    Patient name Sunil Patel  Location [facility] 507/[facility] 507-01 MRN [id number]  : 1960 Date 2024       LOS (days): 14  Geometric Mean LOS (GMLOS) (days): 4  Days to GMLOS:-9.8        OBJECTIVE:        Current admission status: Inpatient  Preferred Pharmacy:   [facility] [address] [address]  Phone: [phone] Fax: [phone]    Primary Care Provider: CHARLIE Trevino    Primary Insurance: MEDICARE  Secondary Insurance: [employer]    PROGRESS NOTE:    Per Awilda Zavala, ,  this Pt has had his bail modified and has now been released on his own recognizance. This means he is no longer “in custody” or an inmate of [facility] longterm.”    P5 RN Manager, RN Charge Nurse, and SLIM provider updated.

## 2024-06-21 NOTE — ASSESSMENT & PLAN NOTE
Filling defect in the left ventricular apex suggests left ventricular thrombus and the source of the embolic disease  Echo showed ejection fraction 40 to 45%, mild global hypokinesis, LV thrombus  Underwent pharmacological stress test. Per cardiology, no significant areas of ischemia and recommended for medical treatment. Possible stress induced cardiomyopathy as an etiology of his reduced EF. Continue cozaar. Metoprolol added.  Xarelto for anticoagulation  Cardiology inputs noted

## 2024-06-21 NOTE — PLAN OF CARE
Problem: PAIN - ADULT  Goal: Verbalizes/displays adequate comfort level or baseline comfort level  Description: Interventions:  - Encourage patient to monitor pain and request assistance  - Assess pain using appropriate pain scale  - Administer analgesics based on type and severity of pain and evaluate response  - Implement non-pharmacological measures as appropriate and evaluate response  - Consider cultural and social influences on pain and pain management  - Notify physician/advanced practitioner if interventions unsuccessful or patient reports new pain  Outcome: Progressing     Problem: INFECTION - ADULT  Goal: Absence or prevention of progression during hospitalization  Description: INTERVENTIONS:  - Assess and monitor for signs and symptoms of infection  - Monitor lab/diagnostic results  - Monitor all insertion sites, i.e. indwelling lines, tubes, and drains  - Monitor endotracheal if appropriate and nasal secretions for changes in amount and color  - Girard appropriate cooling/warming therapies per order  - Administer medications as ordered  - Instruct and encourage patient and family to use good hand hygiene technique  - Identify and instruct in appropriate isolation precautions for identified infection/condition  Outcome: Progressing     Problem: SAFETY ADULT  Goal: Patient will remain free of falls  Description: INTERVENTIONS:  - Educate patient/family on patient safety including physical limitations  - Instruct patient to call for assistance with activity   - Consult OT/PT to assist with strengthening/mobility   - Keep Call bell within reach  - Keep bed low and locked with side rails adjusted as appropriate  - Keep care items and personal belongings within reach  - Initiate and maintain comfort rounds  - Make Fall Risk Sign visible to staff  - Offer Toileting every  Hours, in advance of need  - Initiate/Maintain alarm  - Obtain necessary fall risk management equipment:   - Apply yellow socks and  bracelet for high fall risk patients  - Consider moving patient to room near nurses station  Outcome: Progressing  Goal: Maintain or return to baseline ADL function  Description: INTERVENTIONS:  -  Assess patient's ability to carry out ADLs; assess patient's baseline for ADL function and identify physical deficits which impact ability to perform ADLs (bathing, care of mouth/teeth, toileting, grooming, dressing, etc.)  - Assess/evaluate cause of self-care deficits   - Assess range of motion  - Assess patient's mobility; develop plan if impaired  - Assess patient's need for assistive devices and provide as appropriate  - Encourage maximum independence but intervene and supervise when necessary  - Involve family in performance of ADLs  - Assess for home care needs following discharge   - Consider OT consult to assist with ADL evaluation and planning for discharge  - Provide patient education as appropriate  Outcome: Progressing  Goal: Maintains/Returns to pre admission functional level  Description: INTERVENTIONS:  - Perform AM-PAC 6 Click Basic Mobility/ Daily Activity assessment daily.  - Set and communicate daily mobility goal to care team and patient/family/caregiver.   - Collaborate with rehabilitation services on mobility goals if consulted  - Perform Range of Motion  times a day.  - Reposition patient every  hours.  - Dangle patient  times a day  - Stand patient  times a day  - Ambulate patient  times a day  - Out of bed to chair  times a day   - Out of bed for meals  times a day  - Out of bed for toileting  - Record patient progress and toleration of activity level   Outcome: Progressing     Problem: DISCHARGE PLANNING  Goal: Discharge to home or other facility with appropriate resources  Description: INTERVENTIONS:  - Identify barriers to discharge w/patient and caregiver  - Arrange for needed discharge resources and transportation as appropriate  - Identify discharge learning needs (meds, wound care, etc.)  -  Arrange for interpretive services to assist at discharge as needed  - Refer to Case Management Department for coordinating discharge planning if the patient needs post-hospital services based on physician/advanced practitioner order or complex needs related to functional status, cognitive ability, or social support system  Outcome: Progressing     Problem: Knowledge Deficit  Goal: Patient/family/caregiver demonstrates understanding of disease process, treatment plan, medications, and discharge instructions  Description: Complete learning assessment and assess knowledge base.  Interventions:  - Provide teaching at level of understanding  - Provide teaching via preferred learning methods  Outcome: Progressing     Problem: Prexisting or High Potential for Compromised Skin Integrity  Goal: Skin integrity is maintained or improved  Description: INTERVENTIONS:  - Identify patients at risk for skin breakdown  - Assess and monitor skin integrity  - Assess and monitor nutrition and hydration status  - Monitor labs   - Assess for incontinence   - Turn and reposition patient  - Assist with mobility/ambulation  - Relieve pressure over bony prominences  - Avoid friction and shearing  - Provide appropriate hygiene as needed including keeping skin clean and dry  - Evaluate need for skin moisturizer/barrier cream  - Collaborate with interdisciplinary team   - Patient/family teaching  - Consider wound care consult   Outcome: Progressing     Problem: Nutrition/Hydration-ADULT  Goal: Nutrient/Hydration intake appropriate for improving, restoring or maintaining nutritional needs  Description: Monitor and assess patient's nutrition/hydration status for malnutrition. Collaborate with interdisciplinary team and initiate plan and interventions as ordered.  Monitor patient's weight and dietary intake as ordered or per policy. Utilize nutrition screening tool and intervene as necessary. Determine patient's food preferences and provide  high-protein, high-caloric foods as appropriate.     INTERVENTIONS:  - Monitor oral intake, urinary output, labs, and treatment plans  - Assess nutrition and hydration status and recommend course of action  - Evaluate amount of meals eaten  - Assist patient with eating if necessary   - Allow adequate time for meals  - Recommend/ encourage appropriate diets, oral nutritional supplements, and vitamin/mineral supplements  - Order, calculate, and assess calorie counts as needed  - Recommend, monitor, and adjust tube feedings and TPN/PPN based on assessed needs  - Assess need for intravenous fluids  - Provide specific nutrition/hydration education as appropriate  - Include patient/family/caregiver in decisions related to nutrition  Outcome: Progressing

## 2024-06-21 NOTE — PLAN OF CARE
Problem: PHYSICAL THERAPY ADULT  Goal: Performs mobility at highest level of function for planned discharge setting.  See evaluation for individualized goals.  Description: Treatment/Interventions: ADL retraining, Functional transfer training, LE strengthening/ROM, Therapeutic exercise, Endurance training, Patient/family training, Equipment eval/education, Bed mobility, Gait training, Spoke to nursing, Spoke to case management, OT, Elevations, Cognitive reorientation  Equipment Recommended:  (tbd)       See flowsheet documentation for full assessment, interventions and recommendations.  Outcome: Progressing  Note: Prognosis: Good  Problem List: Decreased strength, Decreased endurance, Impaired balance, Decreased mobility, Pain, Decreased cognition, Impaired judgement, Decreased safety awareness  Assessment: Patient received in bed. Patient agreeable to therapy. Patient performs bed mobility with moderate assistance x1. Patient performs functional transfers with moderate assistance x2 using rolling walker. Patient performs ambulation with moderate assistance x2 using rolling walker, 2'+5'. Patient utilizes a hop to gait pattern, with very slow gait speed. Patient requires cues for RW management. Patient also performs w/c propulsion using BUE with close supervision, 100'x2. Patient requires cues for w/c brake use bilaterally. Following mobility, patient reports wanting to stay seated in w/c.  Patient left in w/c with all needs met and call bell/personal items within reach. The patient's AM-PAC Basic Mobility Inpatient Short Form Raw Score is 9 showing further need for skilled PT services in order to improve functional mobility, decrease need for assistance, and return to PLOF. PT is recommending Level 1 - Maximum Resource Intensity on d/c from hospital.  Will continue to follow as able.  Barriers to Discharge: Decreased caregiver support     Rehab Resource Intensity Level, PT: I (Maximum Resource Intensity)    See  flowsheet documentation for full assessment.

## 2024-06-21 NOTE — RESTORATIVE TECHNICIAN NOTE
Restorative Technician Note      Patient Name: Sunil Patel     Restorative Tech Visit Date: 06/21/24  Note Type: Mobility  Patient Position Upon Consult: Supine  Mobility / Activity Provided: pt declined getting into BSC but was aggreeable to get OOB later on  Patient Position at End of Consult: Supine; All needs within reach; Bed/Chair alarm activated

## 2024-06-21 NOTE — PHYSICAL THERAPY NOTE
PHYSICAL THERAPY NOTE          Patient Name: Sunil Patel  Today's Date: 6/21/2024 06/21/24 1054   PT Last Visit   PT Visit Date 06/21/24   Note Type   Note Type Treatment   Pain Assessment   Pain Assessment Tool 0-10   Pain Score 8   Pain Location/Orientation Location: Buttocks   Pain Onset/Description Onset: Ongoing;Frequency: Constant/Continuous;Descriptor: Discomfort   Effect of Pain on Daily Activities limits comfort and mobility   Patient's Stated Pain Goal No pain   Hospital Pain Intervention(s) Repositioned;Ambulation/increased activity;Emotional support   Restrictions/Precautions   Weight Bearing Precautions Per Order Yes   LLE Weight Bearing Per Order NWB   Other Precautions Cognitive;Chair Alarm;Bed Alarm;Fall Risk;WBS   General   Chart Reviewed Yes   Family/Caregiver Present No   Cognition   Overall Cognitive Status Impaired   Arousal/Participation Alert;Cooperative   Attention Attends with cues to redirect   Orientation Level Oriented X4   Memory Decreased recall of precautions;Decreased short term memory;Decreased recall of recent events   Following Commands Follows one step commands without difficulty   Comments Patient is pleasant and cooperative   Subjective   Subjective Patient agreeable to PT tx   Bed Mobility   Supine to Sit 3  Moderate assistance   Additional items Assist x 1;Increased time required;Verbal cues;HOB elevated;Bedrails;LE management   Transfers   Sit to Stand 3  Moderate assistance   Additional items Assist x 2;Increased time required;Verbal cues   Stand to Sit 3  Moderate assistance   Additional items Assist x 2;Increased time required;Verbal cues   Stand pivot 3  Moderate assistance   Additional items Assist x 2;Increased time required;Verbal cues   Additional Comments RW   Ambulation/Elevation   Gait pattern   (hop to gait pattern)   Gait Assistance 3  Moderate assist   Additional items  Assist x 2;Verbal cues   Assistive Device Rolling walker   Distance 2'+5'   Ambulation/Elevation Additional Comments built up handles using foam to increase pt comfort - RW used   Wheelchair Activities   Wheelchair Cushion   (x2 pillows)   Pressure Relief Type Push up   Level of Assistance for Pressure Relief Activities Close supervision   Wheelchair Parts Management Yes   Left Brakes Level of Assistance Moderate verbal cues   Right Brakes Level of Assistance Moderate verbal cues   Propulsion Yes   Propulsion Type 1 Manual   Level 1 Level tile   Method 1 Right upper extremity;Left upper extremity   Level of Assistance 1 Close supervision   Description/ Details 1 100'x2   Balance   Static Sitting Fair +   Dynamic Sitting Fair   Static Standing Poor   Dynamic Standing Poor -   Ambulatory Poor   Endurance Deficit   Endurance Deficit Yes   Endurance Deficit Description fatigue, weakness   Activity Tolerance   Activity Tolerance Patient tolerated treatment well   Medical Staff Made Aware SPT, OT   Nurse Made Aware RN cleared   Assessment   Prognosis Good   Problem List Decreased strength;Decreased endurance;Impaired balance;Decreased mobility;Pain;Decreased cognition;Impaired judgement;Decreased safety awareness   Assessment Patient received in bed. Patient agreeable to therapy. Patient performs bed mobility with moderate assistance x1. Patient performs functional transfers with moderate assistance x2 using rolling walker. Patient performs ambulation with moderate assistance x2 using rolling walker, 2'+5'. Patient utilizes a hop to gait pattern, with very slow gait speed. Patient requires cues for RW management. Patient also performs w/c propulsion using BUE with close supervision, 100'x2. Patient requires cues for w/c brake use bilaterally. Following mobility, patient reports wanting to stay seated in w/c.  Patient left in w/c with all needs met and call bell/personal items within reach. The patient's AM-PAC Basic  Mobility Inpatient Short Form Raw Score is 9 showing further need for skilled PT services in order to improve functional mobility, decrease need for assistance, and return to PLOF. PT is recommending Level 1 - Maximum Resource Intensity on d/c from hospital.  Will continue to follow as able.   Barriers to Discharge Decreased caregiver support   Goals   Patient Goals to sit in wheelchair for a while   PT Treatment Day 4   Plan   Treatment/Interventions Functional transfer training;LE strengthening/ROM;Therapeutic exercise;Endurance training;Cognitive reorientation;Elevations;Equipment eval/education;Patient/family training;Bed mobility;Gait training;Spoke to nursing;Spoke to case management;OT   Progress Progressing toward goals   PT Frequency 3-5x/wk   Discharge Recommendation   Rehab Resource Intensity Level, PT I (Maximum Resource Intensity)   Equipment Recommended Wheelchair;Walker   Wheelchair Package Recommended Standard   Change or Add item to Wheelchair Package? No   Walker Package Recommended Wheeled walker   AM-PAC Basic Mobility Inpatient   Turning in Flat Bed Without Bedrails 3   Lying on Back to Sitting on Edge of Flat Bed Without Bedrails 2   Moving Bed to Chair 1   Standing Up From Chair Using Arms 1   Walk in Room 1   Climb 3-5 Stairs With Railing 1   Basic Mobility Inpatient Raw Score 9   Turning Head Towards Sound 3   Follow Simple Instructions 3   Low Function Basic Mobility Raw Score  15   Low Function Basic Mobility Standardized Score  23.9   [facility] Highest Level Of Mobility   -HLM Goal 3: Sit at edge of bed   -HLM Achieved 6: Walk 10 steps or more   End of Consult   Patient Position at End of Consult All needs within reach  (seated in w/c with x2 CO present)     Stacy Mehta, PT, DPT

## 2024-06-22 PROCEDURE — 99232 SBSQ HOSP IP/OBS MODERATE 35: CPT | Performed by: INTERNAL MEDICINE

## 2024-06-22 RX ADMIN — ASPIRIN 81 MG: 81 TABLET, COATED ORAL at 09:37

## 2024-06-22 RX ADMIN — MICONAZOLE NITRATE: 20 CREAM TOPICAL at 17:00

## 2024-06-22 RX ADMIN — ATORVASTATIN CALCIUM 80 MG: 80 TABLET, FILM COATED ORAL at 16:57

## 2024-06-22 RX ADMIN — GABAPENTIN 100 MG: 100 CAPSULE ORAL at 20:15

## 2024-06-22 RX ADMIN — LEVOCARNITINE 330 MG: 1 SOLUTION ORAL at 16:59

## 2024-06-22 RX ADMIN — ACETAMINOPHEN 975 MG: 325 TABLET, FILM COATED ORAL at 14:03

## 2024-06-22 RX ADMIN — ACETAMINOPHEN 975 MG: 325 TABLET, FILM COATED ORAL at 20:15

## 2024-06-22 RX ADMIN — OXYCODONE HYDROCHLORIDE 10 MG: 10 TABLET ORAL at 20:16

## 2024-06-22 RX ADMIN — LEVOCARNITINE 330 MG: 1 SOLUTION ORAL at 09:44

## 2024-06-22 RX ADMIN — LEVOCARNITINE 330 MG: 1 SOLUTION ORAL at 14:04

## 2024-06-22 RX ADMIN — MICONAZOLE NITRATE: 20 CREAM TOPICAL at 14:04

## 2024-06-22 RX ADMIN — SERTRALINE HYDROCHLORIDE 50 MG: 50 TABLET ORAL at 09:49

## 2024-06-22 RX ADMIN — LAMOTRIGINE 50 MG: 25 TABLET ORAL at 09:38

## 2024-06-22 RX ADMIN — ZONISAMIDE 400 MG: 100 CAPSULE ORAL at 09:43

## 2024-06-22 RX ADMIN — ACETAMINOPHEN 975 MG: 325 TABLET, FILM COATED ORAL at 05:43

## 2024-06-22 RX ADMIN — MIRTAZAPINE 15 MG: 15 TABLET, FILM COATED ORAL at 20:16

## 2024-06-22 RX ADMIN — DIVALPROEX SODIUM 1250 MG: 500 TABLET, EXTENDED RELEASE ORAL at 09:37

## 2024-06-22 RX ADMIN — Medication 6 MG: at 20:16

## 2024-06-22 RX ADMIN — SENNOSIDES 17.2 MG: 8.6 TABLET, FILM COATED ORAL at 16:59

## 2024-06-22 RX ADMIN — GABAPENTIN 100 MG: 100 CAPSULE ORAL at 09:37

## 2024-06-22 RX ADMIN — DOLUTEGRAVIR SODIUM 50 MG: 50 TABLET, FILM COATED ORAL at 09:41

## 2024-06-22 RX ADMIN — BICTEGRAVIR SODIUM, EMTRICITABINE, AND TENOFOVIR ALAFENAMIDE FUMARATE 1 TABLET: 50; 200; 25 TABLET ORAL at 09:40

## 2024-06-22 RX ADMIN — LIDOCAINE 1 PATCH: 700 PATCH TOPICAL at 09:38

## 2024-06-22 RX ADMIN — TAMSULOSIN HYDROCHLORIDE 0.4 MG: 0.4 CAPSULE ORAL at 16:56

## 2024-06-22 RX ADMIN — GABAPENTIN 100 MG: 100 CAPSULE ORAL at 16:56

## 2024-06-22 RX ADMIN — RIVAROXABAN 20 MG: 20 TABLET, FILM COATED ORAL at 16:57

## 2024-06-22 RX ADMIN — SERTRALINE HYDROCHLORIDE 25 MG: 50 TABLET ORAL at 09:49

## 2024-06-22 RX ADMIN — METOPROLOL SUCCINATE 25 MG: 25 TABLET, EXTENDED RELEASE ORAL at 17:00

## 2024-06-22 RX ADMIN — LAMOTRIGINE 50 MG: 25 TABLET ORAL at 16:57

## 2024-06-22 NOTE — PROGRESS NOTES
[facility]  Progress Note  Name: Sunil Patel I  MRN: [id number]  Unit/Bed#: [facility] 507-01 I Date of Admission: 6/7/2024   Date of Service: 6/22/2024 I Hospital Day: 15    Assessment & Plan   * Acute lower limb ischemia  Assessment & Plan  Patient presented with left lower extremity acute limb ischemia with extensive left iliofemoral and left infrainguinal embolism with nonviable left lower extremity  Status post left femoral thrombectomy and AKA on 6/7  Continue Xarelto for anticoagulation  Vascular surgery inputs noted  Analgesics, supportive cares  Disposition planning case management following      Carnitine deficiency (HCC)  Assessment & Plan  Continue Levocarnitine replacement therapy TID    Seizures ([facility])  Assessment & Plan  Diagnosed in July 2019 - follows with [facility] neurology  Continue Depakote/Lamictal/Zonisamide > doses now adjusted per most recent outpatient neurology changes on record -> Depakote 1250 mg daily, Zonisamide 400 mg daily, and Lamictal 50 mg BID     Left ventricular thrombus  Assessment & Plan  Filling defect in the left ventricular apex suggests left ventricular thrombus and the source of the embolic disease  Echo showed ejection fraction 40 to 45%, mild global hypokinesis, LV thrombus  Underwent pharmacological stress test. Per cardiology, no significant areas of ischemia and recommended for medical treatment. Possible stress induced cardiomyopathy as an etiology of his reduced EF. Continue cozaar. Metoprolol added.  Xarelto for anticoagulation  Cardiology inputs noted    Cerebrovascular accident (CVA) ([facility])  Assessment & Plan  History of CVA in the left MCA territory in May 2018  Continue aspirin, atorvastatin    Benign essential hypertension  Assessment & Plan  Blood pressures reviewed, acceptable  Continue losartan    Human immunodeficiency virus (HIV) infection ([facility])  Assessment & Plan  Continue Biktarvy/Tivicay daily and Cabenuva monthly injections                     VTE Pharmacologic Prophylaxis: VTE Score: 3 Moderate Risk (Score 3-4) - Pharmacological DVT Prophylaxis Ordered: rivaroxaban (Xarelto).    Mobility:   Basic Mobility Inpatient Raw Score: 9  JH-HLM Goal: 3: Sit at edge of bed  JH-HLM Achieved: 3: Sit at edge of bed  JH-HLM Goal NOT achieved. Continue with multidisciplinary rounding and encourage appropriate mobility to improve upon JH-HLM goals.    Patient Centered Rounds: I performed bedside rounds with nursing staff today.   Discussions with Specialists or Other Care Team Provider: case Management    Education and Discussions with Family / Patient:  Discussed with the patient.     Total Time Spent on Date of Encounter in care of patient: 30 mins. This time was spent on one or more of the following: performing physical exam; counseling and coordination of care; obtaining or reviewing history; documenting in the medical record; reviewing/ordering tests, medications or procedures; communicating with other healthcare professionals and discussing with patient's family/caregivers.    Current Length of Stay: 15 day(s)  Current Patient Status: Inpatient   Certification Statement: The patient will continue to require additional inpatient hospital stay due to as outlined  Discharge Plan:  Disposition planning case management following    Code Status: Level 1 - Full Code    Subjective:     Comfortably in bed  Reports feeling better  Encourage out of bed into chair  Discussed with RN      Objective:     Vitals:   Temp (24hrs), Av.8 °F (37.1 °C), Min:98.4 °F (36.9 °C), Max:99.3 °F (37.4 °C)    Temp:  [98.4 °F (36.9 °C)-99.3 °F (37.4 °C)] 98.4 °F (36.9 °C)  HR:  [77-92] 82  Resp:  [18-20] 20  BP: (104-112)/(57-69) 105/65  SpO2:  [92 %-98 %] 96 %  Body mass index is 23.98 kg/m².     Input and Output Summary (last 24 hours):     Intake/Output Summary (Last 24 hours) at 2024 1333  Last data filed at 2024 0900  Gross per 24 hour   Intake 540 ml    Output 1671 ml   Net -1131 ml       Physical Exam:   Physical Exam     Comfortably in bed  Neck supple  Lungs diminished breath sounds  Heart sounds S1-S2 noted  Abdomen soft  Awake follows commands  Left AKA noted  No rash    Additional Data:     Labs:  Results from last 7 days   Lab Units 06/17/24  0444   WBC Thousand/uL 10.69*   HEMOGLOBIN g/dL 11.6*   HEMATOCRIT % 38.2   PLATELETS Thousands/uL 609*   SEGS PCT % 76*   LYMPHO PCT % 15   MONO PCT % 8   EOS PCT % 1     Results from last 7 days   Lab Units 06/17/24  0444   SODIUM mmol/L 138   POTASSIUM mmol/L 4.4   CHLORIDE mmol/L 103   CO2 mmol/L 26   BUN mg/dL 23   CREATININE mg/dL 1.04   ANION GAP mmol/L 9   CALCIUM mg/dL 9.1   GLUCOSE RANDOM mg/dL 97         Results from last 7 days   Lab Units 06/18/24  1127   POC GLUCOSE mg/dl 116               Lines/Drains:  Invasive Devices       Peripheral Intravenous Line  Duration             Peripheral IV 06/19/24 Left;Ventral (anterior) Forearm 3 days              Drain  Duration             External Urinary Catheter 5 days                          Imaging: Reviewed radiology reports from this admission including: procedure reports    Recent Cultures (last 7 days):         Last 24 Hours Medication List:   Current Facility-Administered Medications   Medication Dose Route Frequency Provider Last Rate    acetaminophen  975 mg Oral Q8H DAVINA Karen Guerrero PA-C      aspirin  81 mg Oral Daily Karen Guerrero PA-C      atorvastatin  80 mg Oral Daily With Dinner Karen Guerrero PA-C      bictegravir-emtricitab-tenofovir alafenamide  1 tablet Oral Daily With Breakfast Karen Guerrero PA-C      diphenhydrAMINE  25 mg Oral Q6H PRN Iliana Cuevas PA-C      divalproex sodium  1,250 mg Oral Daily Ida Hodges MD      dolutegravir  50 mg Oral Daily Karen Guerrero PA-C      gabapentin  100 mg Oral TID Karen Guerrero PA-C      HYDROmorphone  0.5 mg Intravenous Q3H PRN Karen Guerrero PA-C      HYDROmorphone   0.2 mg Intravenous Once Stan Whitney MD      lamoTRIgine  50 mg Oral BID Ida Hodges MD      levOCARNitine  1,000 mg/day Oral TID With Meals Ida Hodges MD      lidocaine  1 patch Topical Daily Tho Laguna PA-C      losartan  50 mg Oral Daily Ida Hodges MD      melatonin  6 mg Oral HS Karen Guerrero PA-C      metoprolol succinate  25 mg Oral Q12H Leonardo Bruno MD      mirtazapine  15 mg Oral HS Karen Guerrero PA-C      ELVA ANTIFUNGAL   Topical BID Dana Rodríguez MD      ondansetron  4 mg Intravenous Q6H PRN Karen Guerrero PA-C      oxyCODONE  10 mg Oral Q6H PRN Karen Guerrero PA-C      oxyCODONE  5 mg Oral Q6H PRN Karen Guerrero PA-C      polyethylene glycol  17 g Oral Daily PRN Shruti Correa MD      rivaroxaban  20 mg Oral Daily With Dinner Stan Whitney MD      senna  2 tablet Oral BID Shruti Correa MD      sertraline  25 mg Oral Daily Karen Guerrero PA-C      sertraline  50 mg Oral Daily Karen Guerrero PA-C      tamsulosin  0.4 mg Oral Daily With Dinner Karen Guerrero PA-C      zonisamide  400 mg Oral Daily Ida Hodges MD          Today, Patient Was Seen By: Kenny Castro MD    **Please Note: This note may have been constructed using a voice recognition system.**

## 2024-06-22 NOTE — PLAN OF CARE
Problem: PAIN - ADULT  Goal: Verbalizes/displays adequate comfort level or baseline comfort level  Description: Interventions:  - Encourage patient to monitor pain and request assistance  - Assess pain using appropriate pain scale  - Administer analgesics based on type and severity of pain and evaluate response  - Implement non-pharmacological measures as appropriate and evaluate response  - Consider cultural and social influences on pain and pain management  - Notify physician/advanced practitioner if interventions unsuccessful or patient reports new pain  Outcome: Progressing     Problem: INFECTION - ADULT  Goal: Absence or prevention of progression during hospitalization  Description: INTERVENTIONS:  - Assess and monitor for signs and symptoms of infection  - Monitor lab/diagnostic results  - Monitor all insertion sites, i.e. indwelling lines, tubes, and drains  - Monitor endotracheal if appropriate and nasal secretions for changes in amount and color  - [place] appropriate cooling/warming therapies per order  - Administer medications as ordered  - Instruct and encourage patient and family to use good hand hygiene technique  - Identify and instruct in appropriate isolation precautions for identified infection/condition  Outcome: Progressing     Problem: SAFETY ADULT  Goal: Patient will remain free of falls  Description: INTERVENTIONS:  - Educate patient/family on patient safety including physical limitations  - Instruct patient to call for assistance with activity   - Consult OT/PT to assist with strengthening/mobility   - Keep Call bell within reach  - Keep bed low and locked with side rails adjusted as appropriate  - Keep care items and personal belongings within reach  - Initiate and maintain comfort rounds  - Make Fall Risk Sign visible to staff  - Offer Toileting every 2 Hours, in advance of need  - Initiate/Maintain bed alarm  - Obtain necessary fall risk management equipment:    - Apply yellow socks  and bracelet for high fall risk patients  - Consider moving patient to room near nurses station  Outcome: Progressing  Goal: Maintain or return to baseline ADL function  Description: INTERVENTIONS:  -  Assess patient's ability to carry out ADLs; assess patient's baseline for ADL function and identify physical deficits which impact ability to perform ADLs (bathing, care of mouth/teeth, toileting, grooming, dressing, etc.)  - Assess/evaluate cause of self-care deficits   - Assess range of motion  - Assess patient's mobility; develop plan if impaired  - Assess patient's need for assistive devices and provide as appropriate  - Encourage maximum independence but intervene and supervise when necessary  - Involve family in performance of ADLs  - Assess for home care needs following discharge   - Consider OT consult to assist with ADL evaluation and planning for discharge  - Provide patient education as appropriate  Outcome: Progressing     Problem: DISCHARGE PLANNING  Goal: Discharge to home or other facility with appropriate resources  Description: INTERVENTIONS:  - Identify barriers to discharge w/patient and caregiver  - Arrange for needed discharge resources and transportation as appropriate  - Identify discharge learning needs (meds, wound care, etc.)  - Arrange for interpretive services to assist at discharge as needed  - Refer to Case Management Department for coordinating discharge planning if the patient needs post-hospital services based on physician/advanced practitioner order or complex needs related to functional status, cognitive ability, or social support system  Outcome: Progressing     Problem: Knowledge Deficit  Goal: Patient/family/caregiver demonstrates understanding of disease process, treatment plan, medications, and discharge instructions  Description: Complete learning assessment and assess knowledge base.  Interventions:  - Provide teaching at level of understanding  - Provide teaching via  preferred learning methods  Outcome: Progressing     Problem: Prexisting or High Potential for Compromised Skin Integrity  Goal: Skin integrity is maintained or improved  Description: INTERVENTIONS:  - Identify patients at risk for skin breakdown  - Assess and monitor skin integrity  - Assess and monitor nutrition and hydration status  - Monitor labs   - Assess for incontinence   - Turn and reposition patient  - Assist with mobility/ambulation  - Relieve pressure over bony prominences  - Avoid friction and shearing  - Provide appropriate hygiene as needed including keeping skin clean and dry  - Evaluate need for skin moisturizer/barrier cream  - Collaborate with interdisciplinary team   - Patient/family teaching  - Consider wound care consult   Outcome: Progressing     Problem: Nutrition/Hydration-ADULT  Goal: Nutrient/Hydration intake appropriate for improving, restoring or maintaining nutritional needs  Description: Monitor and assess patient's nutrition/hydration status for malnutrition. Collaborate with interdisciplinary team and initiate plan and interventions as ordered.  Monitor patient's weight and dietary intake as ordered or per policy. Utilize nutrition screening tool and intervene as necessary. Determine patient's food preferences and provide high-protein, high-caloric foods as appropriate.     INTERVENTIONS:  - Monitor oral intake, urinary output, labs, and treatment plans  - Assess nutrition and hydration status and recommend course of action  - Evaluate amount of meals eaten  - Assist patient with eating if necessary   - Allow adequate time for meals  - Recommend/ encourage appropriate diets, oral nutritional supplements, and vitamin/mineral supplements  - Order, calculate, and assess calorie counts as needed  - Recommend, monitor, and adjust tube feedings and TPN/PPN based on assessed needs  - Assess need for intravenous fluids  - Provide specific nutrition/hydration education as appropriate  -  Include patient/family/caregiver in decisions related to nutrition  Outcome: Progressing

## 2024-06-22 NOTE — PLAN OF CARE
Problem: PAIN - ADULT  Goal: Verbalizes/displays adequate comfort level or baseline comfort level  Description: Interventions:  - Encourage patient to monitor pain and request assistance  - Assess pain using appropriate pain scale  - Administer analgesics based on type and severity of pain and evaluate response  - Implement non-pharmacological measures as appropriate and evaluate response  - Consider cultural and social influences on pain and pain management  - Notify physician/advanced practitioner if interventions unsuccessful or patient reports new pain  Outcome: Progressing     Problem: INFECTION - ADULT  Goal: Absence or prevention of progression during hospitalization  Description: INTERVENTIONS:  - Assess and monitor for signs and symptoms of infection  - Monitor lab/diagnostic results  - Monitor all insertion sites, i.e. indwelling lines, tubes, and drains  - Monitor endotracheal if appropriate and nasal secretions for changes in amount and color  - [place] appropriate cooling/warming therapies per order  - Administer medications as ordered  - Instruct and encourage patient and family to use good hand hygiene technique  - Identify and instruct in appropriate isolation precautions for identified infection/condition  Outcome: Progressing     Problem: SAFETY ADULT  Goal: Patient will remain free of falls  Description: INTERVENTIONS:  - Educate patient/family on patient safety including physical limitations  - Instruct patient to call for assistance with activity   - Consult OT/PT to assist with strengthening/mobility   - Keep Call bell within reach  - Keep bed low and locked with side rails adjusted as appropriate  - Keep care items and personal belongings within reach  - Initiate and maintain comfort rounds  - Make Fall Risk Sign visible to staff  - Apply yellow socks and bracelet for high fall risk patients  - Consider moving patient to room near nurses station  Outcome: Progressing  Goal: Maintain or  return to baseline ADL function  Description: INTERVENTIONS:  -  Assess patient's ability to carry out ADLs; assess patient's baseline for ADL function and identify physical deficits which impact ability to perform ADLs (bathing, care of mouth/teeth, toileting, grooming, dressing, etc.)  - Assess/evaluate cause of self-care deficits   - Assess range of motion  - Assess patient's mobility; develop plan if impaired  - Assess patient's need for assistive devices and provide as appropriate  - Encourage maximum independence but intervene and supervise when necessary  - Involve family in performance of ADLs  - Assess for home care needs following discharge   - Consider OT consult to assist with ADL evaluation and planning for discharge  - Provide patient education as appropriate  Outcome: Progressing  Goal: Maintains/Returns to pre admission functional level  Description: INTERVENTIONS:  - Perform AM-PAC 6 Click Basic Mobility/ Daily Activity assessment daily.  - Set and communicate daily mobility goal to care team and patient/family/caregiver.   - Collaborate with rehabilitation services on mobility goals if consulted  - Out of bed for toileting  - Record patient progress and toleration of activity level   Outcome: Progressing     Problem: DISCHARGE PLANNING  Goal: Discharge to home or other facility with appropriate resources  Description: INTERVENTIONS:  - Identify barriers to discharge w/patient and caregiver  - Arrange for needed discharge resources and transportation as appropriate  - Identify discharge learning needs (meds, wound care, etc.)  - Arrange for interpretive services to assist at discharge as needed  - Refer to Case Management Department for coordinating discharge planning if the patient needs post-hospital services based on physician/advanced practitioner order or complex needs related to functional status, cognitive ability, or social support system  Outcome: Progressing     Problem: Knowledge  Deficit  Goal: Patient/family/caregiver demonstrates understanding of disease process, treatment plan, medications, and discharge instructions  Description: Complete learning assessment and assess knowledge base.  Interventions:  - Provide teaching at level of understanding  - Provide teaching via preferred learning methods  Outcome: Progressing     Problem: Prexisting or High Potential for Compromised Skin Integrity  Goal: Skin integrity is maintained or improved  Description: INTERVENTIONS:  - Identify patients at risk for skin breakdown  - Assess and monitor skin integrity  - Assess and monitor nutrition and hydration status  - Monitor labs   - Assess for incontinence   - Turn and reposition patient  - Assist with mobility/ambulation  - Relieve pressure over bony prominences  - Avoid friction and shearing  - Provide appropriate hygiene as needed including keeping skin clean and dry  - Evaluate need for skin moisturizer/barrier cream  - Collaborate with interdisciplinary team   - Patient/family teaching  - Consider wound care consult   Outcome: Progressing     Problem: Nutrition/Hydration-ADULT  Goal: Nutrient/Hydration intake appropriate for improving, restoring or maintaining nutritional needs  Description: Monitor and assess patient's nutrition/hydration status for malnutrition. Collaborate with interdisciplinary team and initiate plan and interventions as ordered.  Monitor patient's weight and dietary intake as ordered or per policy. Utilize nutrition screening tool and intervene as necessary. Determine patient's food preferences and provide high-protein, high-caloric foods as appropriate.     INTERVENTIONS:  - Monitor oral intake, urinary output, labs, and treatment plans  - Assess nutrition and hydration status and recommend course of action  - Evaluate amount of meals eaten  - Assist patient with eating if necessary   - Allow adequate time for meals  - Recommend/ encourage appropriate diets, oral  nutritional supplements, and vitamin/mineral supplements  - Order, calculate, and assess calorie counts as needed  - Recommend, monitor, and adjust tube feedings and TPN/PPN based on assessed needs  - Assess need for intravenous fluids  - Provide specific nutrition/hydration education as appropriate  - Include patient/family/caregiver in decisions related to nutrition  Outcome: Progressing

## 2024-06-23 PROCEDURE — 99232 SBSQ HOSP IP/OBS MODERATE 35: CPT | Performed by: INTERNAL MEDICINE

## 2024-06-23 RX ADMIN — MIRTAZAPINE 15 MG: 15 TABLET, FILM COATED ORAL at 22:01

## 2024-06-23 RX ADMIN — SENNOSIDES 17.2 MG: 8.6 TABLET, FILM COATED ORAL at 10:23

## 2024-06-23 RX ADMIN — SERTRALINE HYDROCHLORIDE 50 MG: 50 TABLET ORAL at 10:28

## 2024-06-23 RX ADMIN — LEVOCARNITINE 330 MG: 1 SOLUTION ORAL at 10:25

## 2024-06-23 RX ADMIN — LOSARTAN POTASSIUM 50 MG: 50 TABLET, FILM COATED ORAL at 10:22

## 2024-06-23 RX ADMIN — GABAPENTIN 100 MG: 100 CAPSULE ORAL at 10:23

## 2024-06-23 RX ADMIN — METOPROLOL SUCCINATE 25 MG: 25 TABLET, EXTENDED RELEASE ORAL at 17:44

## 2024-06-23 RX ADMIN — METOPROLOL SUCCINATE 25 MG: 25 TABLET, EXTENDED RELEASE ORAL at 05:06

## 2024-06-23 RX ADMIN — ZONISAMIDE 400 MG: 100 CAPSULE ORAL at 10:25

## 2024-06-23 RX ADMIN — LIDOCAINE 1 PATCH: 700 PATCH TOPICAL at 10:25

## 2024-06-23 RX ADMIN — OXYCODONE HYDROCHLORIDE 10 MG: 10 TABLET ORAL at 10:28

## 2024-06-23 RX ADMIN — ATORVASTATIN CALCIUM 80 MG: 80 TABLET, FILM COATED ORAL at 16:28

## 2024-06-23 RX ADMIN — LEVOCARNITINE 330 MG: 1 SOLUTION ORAL at 21:59

## 2024-06-23 RX ADMIN — TAMSULOSIN HYDROCHLORIDE 0.4 MG: 0.4 CAPSULE ORAL at 16:28

## 2024-06-23 RX ADMIN — LEVOCARNITINE 330 MG: 1 SOLUTION ORAL at 16:21

## 2024-06-23 RX ADMIN — ASPIRIN 81 MG: 81 TABLET, COATED ORAL at 10:22

## 2024-06-23 RX ADMIN — GABAPENTIN 100 MG: 100 CAPSULE ORAL at 16:28

## 2024-06-23 RX ADMIN — SERTRALINE HYDROCHLORIDE 25 MG: 50 TABLET ORAL at 10:23

## 2024-06-23 RX ADMIN — OXYCODONE HYDROCHLORIDE 10 MG: 10 TABLET ORAL at 22:01

## 2024-06-23 RX ADMIN — ACETAMINOPHEN 975 MG: 325 TABLET, FILM COATED ORAL at 22:02

## 2024-06-23 RX ADMIN — DIVALPROEX SODIUM 1250 MG: 500 TABLET, EXTENDED RELEASE ORAL at 10:22

## 2024-06-23 RX ADMIN — RIVAROXABAN 20 MG: 20 TABLET, FILM COATED ORAL at 16:28

## 2024-06-23 RX ADMIN — LAMOTRIGINE 50 MG: 25 TABLET ORAL at 10:22

## 2024-06-23 RX ADMIN — DOLUTEGRAVIR SODIUM 50 MG: 50 TABLET, FILM COATED ORAL at 10:54

## 2024-06-23 RX ADMIN — ACETAMINOPHEN 975 MG: 325 TABLET, FILM COATED ORAL at 05:05

## 2024-06-23 RX ADMIN — LAMOTRIGINE 50 MG: 25 TABLET ORAL at 17:44

## 2024-06-23 RX ADMIN — GABAPENTIN 100 MG: 100 CAPSULE ORAL at 22:00

## 2024-06-23 RX ADMIN — Medication 6 MG: at 22:02

## 2024-06-23 NOTE — ASSESSMENT & PLAN NOTE
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

## 2024-06-23 NOTE — PROGRESS NOTES
[facility]  Progress Note  Name: Sunil Patel I  MRN: [id number]  Unit/Bed#: [facility] 507-01 I Date of Admission: 6/7/2024   Date of Service: 6/23/2024 I Hospital Day: 16    Assessment & Plan   * Acute lower limb ischemia  Assessment & Plan  Patient presented with left lower extremity acute limb ischemia with extensive left iliofemoral and left infrainguinal embolism with nonviable left lower extremity  Status post left femoral thrombectomy and AKA on 6/7  Continue Xarelto for anticoagulation  Vascular surgery input noted  Analgesics, supportive cares  Disposition planning case management following        Carnitine deficiency (HCC)  Assessment & Plan  Continue Levocarnitine replacement therapy TID    Seizures ([facility])  Assessment & Plan  Diagnosed in July 2019 - follows with [facility] neurology  Continue Depakote/Lamictal/Zonisamide > doses now adjusted per most recent outpatient neurology changes on record -> Depakote 1250 mg daily, Zonisamide 400 mg daily, and Lamictal 50 mg BID     Left ventricular thrombus  Assessment & Plan  Filling defect in the left ventricular apex suggests left ventricular thrombus and the source of the embolic disease  Echo showed ejection fraction 40 to 45%, mild global hypokinesis, LV thrombus  Underwent pharmacological stress test. Per cardiology, no significant areas of ischemia and recommended for medical treatment. Possible stress induced cardiomyopathy as an etiology of his reduced EF. Continue cozaar. Metoprolol added.  Xarelto for anticoagulation  Cardiology inputs noted    Cerebrovascular accident (CVA) ([facility])  Assessment & Plan  History of CVA in the left MCA territory in May 2018  Continue aspirin, atorvastatin    Benign essential hypertension  Assessment & Plan  Blood pressures reviewed, acceptable  Continue losartan    Human immunodeficiency virus (HIV) infection ([facility])  Assessment & Plan  Continue Biktarvy/Tivicay daily and Cabenuva monthly  injections                    VTE Pharmacologic Prophylaxis: VTE Score: 3 Moderate Risk (Score 3-4) - Pharmacological DVT Prophylaxis Ordered: rivaroxaban (Xarelto).    Mobility:   Basic Mobility Inpatient Raw Score: 9  JH-HLM Goal: 3: Sit at edge of bed  JH-HLM Achieved: 3: Sit at edge of bed  JH-HLM Goal NOT achieved. Continue with multidisciplinary rounding and encourage appropriate mobility to improve upon JH-HLM goals.    Patient Centered Rounds: I performed bedside rounds with nursing staff today.   Discussions with Specialists or Other Care Team Provider: Case management    Education and Discussions with Family / Patient:  Discussed with the patient.     Total Time Spent on Date of Encounter in care of patient: 30 mins. This time was spent on one or more of the following: performing physical exam; counseling and coordination of care; obtaining or reviewing history; documenting in the medical record; reviewing/ordering tests, medications or procedures; communicating with other healthcare professionals and discussing with patient's family/caregivers.    Current Length of Stay: 16 day(s)  Current Patient Status: Inpatient   Certification Statement: The patient will continue to require additional inpatient hospital stay due to as outlined  Discharge Plan:  Disposition planning case management following    Code Status: Level 1 - Full Code    Subjective:     Comfortably in bed   reports feeling okay  Encourage out of bed into chair  Encourage incentive spirometry  Discussed with RN      Objective:     Vitals:   Temp (24hrs), Av.6 °F (37 °C), Min:98 °F (36.7 °C), Max:99.1 °F (37.3 °C)    Temp:  [98 °F (36.7 °C)-99.1 °F (37.3 °C)] 98 °F (36.7 °C)  HR:  [79-97] 79  Resp:  [16-18] 18  BP: (124-144)/(71-86) 138/72  SpO2:  [91 %-96 %] 95 %  Body mass index is 23.98 kg/m².     Input and Output Summary (last 24 hours):     Intake/Output Summary (Last 24 hours) at 2024 1254  Last data filed at 2024 1127  Gross  per 24 hour   Intake 780 ml   Output 1800 ml   Net -1020 ml       Physical Exam:   Physical Exam       Comfortably in bed  Neck supple  Lungs diminished breath sounds bilateral  Heart sounds S1 and S2 noted  Abdomen soft nontender  Awake follows commands  Left AKA noted  No rash    Additional Data:     Labs:  Results from last 7 days   Lab Units 06/17/24  0444   WBC Thousand/uL 10.69*   HEMOGLOBIN g/dL 11.6*   HEMATOCRIT % 38.2   PLATELETS Thousands/uL 609*   SEGS PCT % 76*   LYMPHO PCT % 15   MONO PCT % 8   EOS PCT % 1     Results from last 7 days   Lab Units 06/17/24  0444   SODIUM mmol/L 138   POTASSIUM mmol/L 4.4   CHLORIDE mmol/L 103   CO2 mmol/L 26   BUN mg/dL 23   CREATININE mg/dL 1.04   ANION GAP mmol/L 9   CALCIUM mg/dL 9.1   GLUCOSE RANDOM mg/dL 97         Results from last 7 days   Lab Units 06/18/24  1127   POC GLUCOSE mg/dl 116               Lines/Drains:  Invasive Devices       Drain  Duration             External Urinary Catheter 6 days                          Imaging: Reviewed radiology reports from this admission including: procedure reports    Recent Cultures (last 7 days):         Last 24 Hours Medication List:   Current Facility-Administered Medications   Medication Dose Route Frequency Provider Last Rate    acetaminophen  975 mg Oral Q8H DAVINA Karen Guerrero PA-C      aspirin  81 mg Oral Daily Karen Guerrero PA-C      atorvastatin  80 mg Oral Daily With Dinner Karen Guerrero PA-C      bictegravir-emtricitab-tenofovir alafenamide  1 tablet Oral Daily With Breakfast Karen Guerrero PA-C      diphenhydrAMINE  25 mg Oral Q6H PRN Iliana Cuevas PA-C      divalproex sodium  1,250 mg Oral Daily Ida Hodges MD      dolutegravir  50 mg Oral Daily Karen Guerrero PA-C      gabapentin  100 mg Oral TID Karen Guerrero PA-C      HYDROmorphone  0.5 mg Intravenous Q3H PRN Karen Guerrero PA-C      HYDROmorphone  0.2 mg Intravenous Once Stan Whitney MD      lamoTRIgine  50 mg Oral  BID Ida Hodges MD      levOCARNitine  1,000 mg/day Oral TID With Meals Ida Hodges MD      lidocaine  1 patch Topical Daily Tho Laguna PA-C      losartan  50 mg Oral Daily Ida Hodges MD      melatonin  6 mg Oral HS Karen Guerrero PA-C      metoprolol succinate  25 mg Oral Q12H Leonardo Bruno MD      mirtazapine  15 mg Oral HS Karen Guerrero PA-C      ELVA ANTIFUNGAL   Topical BID Dana Rodríguez MD      ondansetron  4 mg Intravenous Q6H PRN Karen Guerrero PA-C      oxyCODONE  10 mg Oral Q6H PRN Karen Guerrero PA-C      oxyCODONE  5 mg Oral Q6H PRN Karen Guerrero PA-C      polyethylene glycol  17 g Oral Daily PRN Shruti Correa MD      rivaroxaban  20 mg Oral Daily With Dinner Stan Whitney MD      senna  2 tablet Oral BID Shruti Correa MD      sertraline  25 mg Oral Daily Karen Guerrero PA-C      sertraline  50 mg Oral Daily Karen Guerrero PA-C      tamsulosin  0.4 mg Oral Daily With Dinner Karen Guerrero PA-C      zonisamide  400 mg Oral Daily Ida Hodges MD          Today, Patient Was Seen By: Kenny Castro MD    **Please Note: This note may have been constructed using a voice recognition system.**

## 2024-06-24 PROCEDURE — 97542 WHEELCHAIR MNGMENT TRAINING: CPT

## 2024-06-24 PROCEDURE — 97535 SELF CARE MNGMENT TRAINING: CPT

## 2024-06-24 PROCEDURE — 99232 SBSQ HOSP IP/OBS MODERATE 35: CPT | Performed by: INTERNAL MEDICINE

## 2024-06-24 PROCEDURE — 97530 THERAPEUTIC ACTIVITIES: CPT

## 2024-06-24 RX ADMIN — RIVAROXABAN 20 MG: 20 TABLET, FILM COATED ORAL at 17:20

## 2024-06-24 RX ADMIN — LEVOCARNITINE 330 MG: 1 SOLUTION ORAL at 08:19

## 2024-06-24 RX ADMIN — TAMSULOSIN HYDROCHLORIDE 0.4 MG: 0.4 CAPSULE ORAL at 17:20

## 2024-06-24 RX ADMIN — LAMOTRIGINE 50 MG: 25 TABLET ORAL at 08:15

## 2024-06-24 RX ADMIN — ASPIRIN 81 MG: 81 TABLET, COATED ORAL at 08:15

## 2024-06-24 RX ADMIN — LEVOCARNITINE 330 MG: 1 SOLUTION ORAL at 13:45

## 2024-06-24 RX ADMIN — METOPROLOL SUCCINATE 25 MG: 25 TABLET, EXTENDED RELEASE ORAL at 17:20

## 2024-06-24 RX ADMIN — SERTRALINE HYDROCHLORIDE 25 MG: 50 TABLET ORAL at 08:14

## 2024-06-24 RX ADMIN — LAMOTRIGINE 50 MG: 25 TABLET ORAL at 17:20

## 2024-06-24 RX ADMIN — ACETAMINOPHEN 975 MG: 325 TABLET, FILM COATED ORAL at 05:18

## 2024-06-24 RX ADMIN — DIVALPROEX SODIUM 1250 MG: 500 TABLET, EXTENDED RELEASE ORAL at 08:16

## 2024-06-24 RX ADMIN — MICONAZOLE NITRATE: 20 CREAM TOPICAL at 08:28

## 2024-06-24 RX ADMIN — SENNOSIDES 17.2 MG: 8.6 TABLET, FILM COATED ORAL at 17:20

## 2024-06-24 RX ADMIN — SENNOSIDES 17.2 MG: 8.6 TABLET, FILM COATED ORAL at 08:15

## 2024-06-24 RX ADMIN — ACETAMINOPHEN 975 MG: 325 TABLET, FILM COATED ORAL at 13:46

## 2024-06-24 RX ADMIN — ZONISAMIDE 400 MG: 100 CAPSULE ORAL at 08:19

## 2024-06-24 RX ADMIN — MIRTAZAPINE 15 MG: 15 TABLET, FILM COATED ORAL at 21:03

## 2024-06-24 RX ADMIN — GABAPENTIN 100 MG: 100 CAPSULE ORAL at 21:03

## 2024-06-24 RX ADMIN — LIDOCAINE 1 PATCH: 700 PATCH TOPICAL at 08:16

## 2024-06-24 RX ADMIN — METOPROLOL SUCCINATE 25 MG: 25 TABLET, EXTENDED RELEASE ORAL at 05:18

## 2024-06-24 RX ADMIN — LEVOCARNITINE 330 MG: 1 SOLUTION ORAL at 17:21

## 2024-06-24 RX ADMIN — ACETAMINOPHEN 975 MG: 325 TABLET, FILM COATED ORAL at 21:03

## 2024-06-24 RX ADMIN — Medication 6 MG: at 21:03

## 2024-06-24 RX ADMIN — SERTRALINE HYDROCHLORIDE 50 MG: 50 TABLET ORAL at 08:16

## 2024-06-24 RX ADMIN — BICTEGRAVIR SODIUM, EMTRICITABINE, AND TENOFOVIR ALAFENAMIDE FUMARATE 1 TABLET: 50; 200; 25 TABLET ORAL at 08:19

## 2024-06-24 RX ADMIN — GABAPENTIN 100 MG: 100 CAPSULE ORAL at 17:20

## 2024-06-24 RX ADMIN — GABAPENTIN 100 MG: 100 CAPSULE ORAL at 08:15

## 2024-06-24 RX ADMIN — DOLUTEGRAVIR SODIUM 50 MG: 50 TABLET, FILM COATED ORAL at 08:20

## 2024-06-24 RX ADMIN — MICONAZOLE NITRATE: 20 CREAM TOPICAL at 17:22

## 2024-06-24 RX ADMIN — ATORVASTATIN CALCIUM 80 MG: 80 TABLET, FILM COATED ORAL at 17:21

## 2024-06-24 NOTE — OCCUPATIONAL THERAPY NOTE
Occupational Therapy Progress Note     Patient Name: Sunil Patel  Today's Date: 6/24/2024  Problem List  Principal Problem:    Acute lower limb ischemia  Active Problems:    Human immunodeficiency virus (HIV) infection (HCC)    Benign essential hypertension    Cerebrovascular accident (CVA) (HCC)    Left ventricular thrombus    Seizures (HCC)    Carnitine deficiency (HCC)          06/24/24 1050   OT Last Visit   OT Visit Date 06/24/24   Note Type   Note Type Treatment   Pain Assessment   Pain Assessment Tool 0-10   Pain Score 10 - Worst Possible Pain   Pain Location/Orientation Location: Buttocks  (full thickness buttocks wounds)   Hospital Pain Intervention(s) Repositioned;Ambulation/increased activity  (RN present during rolling and applied pt's cream)   Restrictions/Precautions   Weight Bearing Precautions Per Order Yes   LLE Weight Bearing Per Order NWB  (s/p AKA)   Other Precautions Cognitive;Chair Alarm;Bed Alarm;Fall Risk;Multiple lines;WBS   ADL   Where Assessed Edge of bed   Grooming Assistance 5  Supervision/Setup   Grooming Deficit Setup;Supervision/safety;Increased time to complete   Grooming Comments brushed teeth   UB Bathing Assistance 4  Minimal Assistance   UB Bathing Deficit Setup;Supervision/safety;Increased time to complete   UB Bathing Comments assist for thoroughness & 2' pain in buttocks (decreased sitting tolerance)   LB Bathing Assistance 2  Maximal Assistance   LB Bathing Deficit Buttocks;Right upper leg;Left upper leg;Left lower leg including foot;Right lower leg including foot   LB Bathing Comments buttocks in sidelying, remainder seated   UB Dressing Assistance 4  Minimal Assistance   UB Dressing Deficit Setup;Supervision/safety;Increased time to complete;Pull around back   UB Dressing Comments hospital gown   Bed Mobility   Rolling R 4  Minimal assistance   Additional items Assist x 1;Bedrails;Increased time required   Supine to Sit 3  Moderate assistance   Additional items Assist  x 1;Increased time required;Verbal cues   Transfers   Sit to Stand 3  Moderate assistance   Additional items Assist x 2;Increased time required;Verbal cues   Stand to Sit 3  Moderate assistance   Additional items Assist x 2;Increased time required;Verbal cues   Additional Comments RW   Functional Mobility   Functional Mobility 3  Moderate assistance   Additional Comments Ax2, ~6 hops from EOB to w/c   Additional items Rolling walker   Therapeutic Excerise-Strength   UE Strength Yes  (pt provided with stress ball for hand strengthening and performed 10 reps)   Cognition   Overall Cognitive Status Impaired   Arousal/Participation Alert;Cooperative   Attention Attends with cues to redirect   Orientation Level Oriented to person;Oriented to place;Oriented to time   Memory Decreased recall of recent events   Following Commands Follows one step commands without difficulty   Comments Pleasant and cooperative. Requires increased time to process. At times, mild difficulty expressing needs (stating mouth wash when he wanted toothbrush, etc)   Activity Tolerance   Activity Tolerance Patient tolerated treatment well;Patient limited by pain   Medical Staff Made Aware RN. PT, SPT   Assessment   Assessment Patient participated in Skilled OT session this date with interventions consisting of ADL re-training, functional transfers/mobility, sitting balance, standing balance. Patient agreeable to OT treatment session, upon arrival patient was found supine in bed.  Pt performing grooming at SUP/set up level, UB bathing/dressing MIN A, and LB bathing MAX A. Patient requiring re-direction to tasks and benefits from simple 1 step directions. Patient continues to be functioning below baseline level, occupational performance remains limited secondary to factors listed above and increased risk for falls and injury.   From OT standpoint, recommendation at time of d/c would be LEVEL I resources. Goals from OT IE remain appropriate - OT to  extend +21 days.  Patient to benefit from continued Occupational Therapy treatment while in the hospital to address deficits as defined above and maximize level of functional independence with ADLs and functional mobility.   Plan   Treatment Interventions ADL retraining;Functional transfer training;UE strengthening/ROM;Endurance training;Cognitive reorientation;Patient/family training;Equipment evaluation/education;Activityengagement;Compensatory technique education   Goal Expiration Date 07/15/24  (goals from OT IE (6/10) remain appropriate - extended +21 days)   OT Treatment Day 5   OT Frequency 2-3x/wk   Discharge Recommendation   Rehab Resource Intensity Level, OT I (Maximum Resource Intensity)   AM-PAC Daily Activity Inpatient   Lower Body Dressing 2   Bathing 2   Toileting 2   Upper Body Dressing 3   Grooming 3   Eating 4   Daily Activity Raw Score 16   Daily Activity Standardized Score (Calc for Raw Score >=11) 35.96   AM-PAC Applied Cognition Inpatient   Following a Speech/Presentation 3   Understanding Ordinary Conversation 3   Taking Medications 2   Remembering Where Things Are Placed or Put Away 2   Remembering List of 4-5 Errands 2   Taking Care of Complicated Tasks 1   Applied Cognition Raw Score 13   Applied Cognition Standardized Score 30.46   End of Consult   Patient Position at End of Consult Other (comment)  (pt left in w/c with PT and SPT present)   Nurse Communication Nurse aware of consult       The patient's raw score on the AM-PAC Daily Activity Inpatient Short Form is 16. A raw score of less than 19 suggests the patient may benefit from discharge to post-acute rehabilitation services. Please refer to the recommendation of the Occupational Therapist for safe discharge planning.          Dinah Fonseca, OTR/L

## 2024-06-24 NOTE — CASE MANAGEMENT
Case Management Progress Note    Patient name Sunil Patel  Location [facility] 507/[facility] 507-01 MRN [id number]  : 1960 Date 2024       LOS (days): 17  Geometric Mean LOS (GMLOS) (days): 4  Days to GMLOS:-12.8        OBJECTIVE:        Current admission status: Inpatient  [facility] [address] [address]  Phone: [phone] Fax: [phone]    Primary Care Provider: CHARLIE Trevino    Primary Insurance: MEDICARE  Secondary Insurance: [facility]    PROGRESS NOTE:    Pt requesting CM to contact his son to assist with obtaining his cell phone and money. TC to Pts son, unable to leave . Pt updated at bedside.

## 2024-06-24 NOTE — PLAN OF CARE
Problem: PHYSICAL THERAPY ADULT  Goal: Performs mobility at highest level of function for planned discharge setting.  See evaluation for individualized goals.  Description: Treatment/Interventions: ADL retraining, Functional transfer training, LE strengthening/ROM, Therapeutic exercise, Endurance training, Patient/family training, Equipment eval/education, Bed mobility, Gait training, Spoke to nursing, Spoke to case management, OT, Elevations, Cognitive reorientation  Equipment Recommended:  (tbd)       See flowsheet documentation for full assessment, interventions and recommendations.  Outcome: Progressing  Note: Prognosis: Good  Problem List: Decreased strength, Decreased endurance, Impaired balance, Decreased mobility, Pain, Decreased cognition, Impaired judgement, Decreased safety awareness  Assessment: Patient received in bed. Patient agreeable to therapy. Patient performs rolling in bed to R with Min A x1. RN present to assess wounds on buttock and applies cream to area. Patient performs bed mobility to EOB with moderate assistance x1. Patient performs functional transfers with moderate assistance x2 using rolling walker. Patient performs ambulation with moderate assistance x2 using rolling walker, 3'x2. Patient transfers to w/c and completes w/c mobility with close supervision. Minimal cues needed for safety and brake use. Patient propels 50'+100'+50' using BUE.  Patient transfers back to bed following activity with Mod Ax2 using RW. Patient left in bed with all needs met and call bell/personal items within reach. The patient's AM-PAC Basic Mobility Inpatient Short Form Raw Score is 10 showing further need for skilled PT services in order to improve functional mobility, decrease need for assistance, and return to PLOF. PT is recommending Level 1 - Maximum Resource Intensity on d/c from hospital.  Will continue to follow as able.  Barriers to Discharge: Decreased caregiver support     Rehab Resource  Intensity Level, PT: I (Maximum Resource Intensity)    See flowsheet documentation for full assessment.

## 2024-06-24 NOTE — CASE MANAGEMENT
Case Management Discharge Planning Note    Patient name Sunil Patel  Location [facility] 507/[facility] 507-01 MRN [id number]  : 1960 Date 2024       Current Admission Date: 2024  Current Admission Diagnosis:Acute lower limb ischemia   Patient Active Problem List    Diagnosis Date Noted Date Diagnosed    Carnitine deficiency ([facility]) 2024     Acute lower limb ischemia 2024     Left ventricular thrombus 2024     Seizures ([facility]) 2024     Tobacco abuse 10/26/2019     Cerebrovascular accident (CVA) ([facility]) 10/26/2019     Positive laboratory testing for human immunodeficiency virus ([facility]) 2017     Bipolar affective disorder ([facility]) 2017     Hypertension 2017     Human immunodeficiency virus (HIV) infection ([facility]) 2017     History of transient cerebral ischemia 2017     Substance abuse ([facility]) 2013     Unspecified vitamin D deficiency 2010     Benign essential hypertension 2010       LOS (days): 17  Geometric Mean LOS (GMLOS) (days): 4  Days to GMLOS:-12.9     OBJECTIVE:  Risk of Unplanned Readmission Score: 22.81         Current admission status: Inpatient   [facility] [address] [address]  Phone: [phone] Fax: [phone]    Primary Care Provider: CHARLIE Trevino    Primary Insurance: MEDICARE  Secondary Insurance: [facility]    DISCHARGE DETAILS:    Complex discharge planning meeting held via Teams with the following people in attendance:    Angélica Diallo; Director at [facility]  Edmond Wellington; ; [facility]  YEVGENIY Grant Supervisor; [facility]  Samantha Riley RN; Office of Long Term Living  Triny Arceo,  Inpatient Care Management; SHERRYHAROLDO Diaz; Inpatient Care Management Manager; ELENA Kay,  ; ELENA    Meeting was held to discuss this Pts complex discharge needs. Pt is s/p L AKA and in need of acute rehab. Due to his social constraints and lack of discharge plan once rehab is complete, Pt has been denied acceptance to over 40 facilities.  Per Angélica, Pt is unable to return to his group home where he was previously residing.     This CM continues to refer the Pt to acute and sub-acute rehabs, while his team at [facility] is searching for long-term placement.     This team will reconvene on Thursday, June 27th at 9:00am via Teams.

## 2024-06-24 NOTE — ASSESSMENT & PLAN NOTE
Patient presented with left lower extremity acute limb ischemia with extensive left iliofemoral and left infrainguinal embolism with nonviable left lower extremity  Status post left femoral thrombectomy and AKA on 6/7  Xarelto for anticoagulation  Vascular surgery inputs noted  Analgesics, supportive cares  Disposition planning case management following

## 2024-06-24 NOTE — PHYSICAL THERAPY NOTE
PHYSICAL THERAPY NOTE          Patient Name: Sunil Patel  Today's Date: 6/24/2024 06/24/24 1102   PT Last Visit   PT Visit Date 06/24/24   Pain Assessment   Pain Assessment Tool 0-10   Pain Score 10 - Worst Possible Pain   Pain Location/Orientation Location: Buttocks  (RN present to assess buttock wounds)   Pain Onset/Description Onset: Ongoing;Frequency: Constant/Continuous;Descriptor: Discomfort;Descriptor: Sore   Effect of Pain on Daily Activities limits comfort and mobility   Patient's Stated Pain Goal No pain   Hospital Pain Intervention(s) Repositioned;Ambulation/increased activity;Emotional support   Restrictions/Precautions   Weight Bearing Precautions Per Order Yes   LLE Weight Bearing Per Order NWB  (AKA)   Other Precautions Cognitive;Chair Alarm;Bed Alarm;Fall Risk;Multiple lines;WBS;Pain   General   Family/Caregiver Present No   Cognition   Overall Cognitive Status Impaired   Attention Attends with cues to redirect   Orientation Level Oriented to person;Oriented to place;Oriented to time   Memory Decreased recall of recent events   Following Commands Follows one step commands without difficulty   Comments Patient is pleasant and cooperative. Increased time needed throughout for expressing intentions and needs. Increased time needed to complete tasks.   Subjective   Subjective Patient agreeable to PT tx   Bed Mobility   Rolling R 4  Minimal assistance   Additional items Assist x 1;Increased time required;Verbal cues   Supine to Sit 3  Moderate assistance   Additional items Assist x 1;Increased time required;Verbal cues   Transfers   Sit to Stand 3  Moderate assistance   Additional items Assist x 2;Increased time required;Verbal cues   Stand to Sit 3  Moderate assistance   Additional items Assist x 2;Increased time required;Verbal cues   Additional Comments RW   Ambulation/Elevation   Gait pattern   (hop to gait  pattern)   Gait Assistance 3  Moderate assist   Additional items Assist x 2;Verbal cues   Assistive Device Rolling walker   Distance 3'x2   Wheelchair Activities   Wheelchair Cushion None   Pressure Relief Type Push up   Level of Assistance for Pressure Relief Activities Close supervision   Wheelchair Parts Management Yes   Left Brakes Level of Assistance Minimal verbal cues   Right Brakes Level of Assistance Minimal verbal cues   Propulsion Yes   Propulsion Type 1 Manual   Level 1 Level tile   Method 1 Right upper extremity;Left upper extremity   Level of Assistance 1 Close supervision   Description/ Details 1 50'+100'+50'   Balance   Static Sitting Fair +   Dynamic Sitting Fair   Static Standing Poor   Dynamic Standing Poor -   Ambulatory Poor   Endurance Deficit   Endurance Deficit Yes   Endurance Deficit Description fatigue, weakness, pain   Activity Tolerance   Activity Tolerance Patient tolerated treatment well   Medical Staff Made Aware SPT, OT   Nurse Made Aware RN cleared   Assessment   Prognosis Good   Problem List Decreased strength;Decreased endurance;Impaired balance;Decreased mobility;Pain;Decreased cognition;Impaired judgement;Decreased safety awareness   Assessment Patient received in bed. Patient agreeable to therapy. Patient performs rolling in bed to R with Min A x1. RN present to assess wounds on buttock and applies cream to area. Patient performs bed mobility to EOB with moderate assistance x1. Patient performs functional transfers with moderate assistance x2 using rolling walker. Patient performs ambulation with moderate assistance x2 using rolling walker, 3'x2. Patient transfers to w/c and completes w/c mobility with close supervision. Minimal cues needed for safety and brake use. Patient propels 50'+100'+50' using BUE.  Patient transfers back to bed following activity with Mod Ax2 using RW. Patient left in bed with all needs met and call bell/personal items within reach. The patient's AM-PAC  Basic Mobility Inpatient Short Form Raw Score is 10 showing further need for skilled PT services in order to improve functional mobility, decrease need for assistance, and return to PLOF. PT is recommending Level 1 - Maximum Resource Intensity on d/c from hospital.  Will continue to follow as able.   Barriers to Discharge Decreased caregiver support   Goals   Patient Goals to go to rehab   STG Expiration Date 07/08/24   Short Term Goal #1 Goals extended x14 days as they are still appropriate +w/c mobility goal -- In 14 days, patient will 1) increase strength in BUE/BLE by 1/2 to 1 full grade for increased strength and stability needed for functional mobility 2) improve bed mobility to MI for improved mobility and decreased need for assist 3) sit EOB x30' with MI to facilitate trunk stability and safety for completion of ADL tasks 4) increase functional transfers to MI for improved safety and functional mobility 5) ambulate at least 50ft with MI using rolling walker for increased endurance and safety ambulating home and community environments 6) improve balance by 1 grade for improved safety and stability and decreased risk for falls 7) self propel manual w/c 250' using BUE on level surfaces with MI and good safety awareness in order to improve mobility and independence.   Plan   Treatment/Interventions Functional transfer training;LE strengthening/ROM;Therapeutic exercise;Endurance training;Cognitive reorientation;Elevations;Equipment eval/education;Patient/family training;Bed mobility;Gait training;Spoke to nursing;Spoke to case management;OT   PT Frequency 3-5x/wk   Discharge Recommendation   Rehab Resource Intensity Level, PT I (Maximum Resource Intensity)   Equipment Recommended Wheelchair;Walker   AM-PAC Basic Mobility Inpatient   Turning in Flat Bed Without Bedrails 3   Lying on Back to Sitting on Edge of Flat Bed Without Bedrails 3   Moving Bed to Chair 1   Standing Up From Chair Using Arms 1   Walk in Room 1    Climb 3-5 Stairs With Railing 1   Basic Mobility Inpatient Raw Score 10   Turning Head Towards Sound 3   Follow Simple Instructions 3   Low Function Basic Mobility Raw Score  16   Low Function Basic Mobility Standardized Score  25.72   [facility] Level Of Mobility   [facility] Goal 4: Move to chair/commode   -[facility] Achieved 5: Stand (1 or more minutes)   End of Consult   Patient Position at End of Consult All needs within reach;Supine;Bed/Chair alarm activated       Stacy Mehta, PT, DPT

## 2024-06-24 NOTE — PLAN OF CARE
Problem: OCCUPATIONAL THERAPY ADULT  Goal: Performs self-care activities at highest level of function for planned discharge setting.  See evaluation for individualized goals.  Description: Treatment Interventions: ADL retraining, Functional transfer training, UE strengthening/ROM, Endurance training, Cognitive reorientation, Patient/family training, Equipment evaluation/education, Activityengagement, Compensatory technique education          See flowsheet documentation for full assessment, interventions and recommendations.   Outcome: Progressing  Note: Limitation: Decreased ADL status, Decreased Safe judgement during ADL, Decreased cognition, Decreased endurance, Decreased sensation, Decreased self-care trans, Decreased high-level ADLs  Prognosis: Good  Assessment: Patient participated in Skilled OT session this date with interventions consisting of ADL re-training, functional transfers/mobility, sitting balance, standing balance. Patient agreeable to OT treatment session, upon arrival patient was found supine in bed.  Pt performing grooming at SUP/set up level, UB bathing/dressing MIN A, and LB bathing MAX A. Patient requiring re-direction to tasks and benefits from simple 1 step directions. Patient continues to be functioning below baseline level, occupational performance remains limited secondary to factors listed above and increased risk for falls and injury.   From OT standpoint, recommendation at time of d/c would be LEVEL I resources. Goals from OT IE remain appropriate - OT to extend +21 days.  Patient to benefit from continued Occupational Therapy treatment while in the hospital to address deficits as defined above and maximize level of functional independence with ADLs and functional mobility.     Rehab Resource Intensity Level, OT: I (Maximum Resource Intensity)

## 2024-06-24 NOTE — ASSESSMENT & PLAN NOTE
Filling defect in the left ventricular apex suggests left ventricular thrombus and the source of the embolic disease  Echo showed ejection fraction 40 to 45%, mild global hypokinesis, LV thrombus  Underwent pharmacological stress test. Per cardiology, no significant areas of ischemia and recommended for medical treatment. Possible stress induced cardiomyopathy as an etiology of his reduced EF. Continue cozaar. Metoprolol added.  Xarelto for anticoagulation  Cardiology input noted

## 2024-06-24 NOTE — PROGRESS NOTES
[facility]  Progress Note  Name: Sunil Patel I  MRN: [id number]  Unit/Bed#: [facility] 507-01 I Date of Admission: 6/7/2024   Date of Service: 6/24/2024 I Hospital Day: 17    Assessment & Plan   * Acute lower limb ischemia  Assessment & Plan  Patient presented with left lower extremity acute limb ischemia with extensive left iliofemoral and left infrainguinal embolism with nonviable left lower extremity  Status post left femoral thrombectomy and AKA on 6/7  Xarelto for anticoagulation  Vascular surgery inputs noted  Analgesics, supportive cares  Disposition planning case management following          Carnitine deficiency (HCC)  Assessment & Plan  Continue Levocarnitine replacement therapy TID    Seizures ([facility])  Assessment & Plan  Diagnosed in July 2019 - follows with [facility] neurology  Continue Depakote/Lamictal/Zonisamide > doses now adjusted per most recent outpatient neurology changes on record -> Depakote 1250 mg daily, Zonisamide 400 mg daily, and Lamictal 50 mg BID     Left ventricular thrombus  Assessment & Plan  Filling defect in the left ventricular apex suggests left ventricular thrombus and the source of the embolic disease  Echo showed ejection fraction 40 to 45%, mild global hypokinesis, LV thrombus  Underwent pharmacological stress test. Per cardiology, no significant areas of ischemia and recommended for medical treatment. Possible stress induced cardiomyopathy as an etiology of his reduced EF. Continue cozaar. Metoprolol added.  Xarelto for anticoagulation  Cardiology input noted    Cerebrovascular accident (CVA) ([facility])  Assessment & Plan  History of CVA in the left MCA territory in May 2018  Continue aspirin, atorvastatin    Benign essential hypertension  Assessment & Plan  Blood pressures reviewed, acceptable  Continue losartan    Human immunodeficiency virus (HIV) infection ([facility])  Assessment & Plan  Continue Biktarvy/Tivicay daily and Cabenuva monthly injections                     VTE Pharmacologic Prophylaxis: VTE Score: 3 Moderate Risk (Score 3-4) - Pharmacological DVT Prophylaxis Ordered: rivaroxaban (Xarelto).    Mobility:   Basic Mobility Inpatient Raw Score: 9  -HLM Goal: 3: Sit at edge of bed  JH-HLM Achieved: 4: Move to chair/commode  JH-HLM Goal NOT achieved. Continue with multidisciplinary rounding and encourage appropriate mobility to improve upon JH-HLM goals.    Patient Centered Rounds: I performed bedside rounds with nursing staff today.   Discussions with Specialists or Other Care Team Provider: Case management, physical therapy    Education and Discussions with Family / Patient:  Discussed with the patient.     Total Time Spent on Date of Encounter in care of patient: 31 mins. This time was spent on one or more of the following: performing physical exam; counseling and coordination of care; obtaining or reviewing history; documenting in the medical record; reviewing/ordering tests, medications or procedures; communicating with other healthcare professionals and discussing with patient's family/caregivers.    Current Length of Stay: 17 day(s)  Current Patient Status: Inpatient   Certification Statement: The patient will continue to require additional inpatient hospital stay due to as outlined  Discharge Plan:  Disposition planning case management for    Code Status: Level 1 - Full Code    Subjective:     Comfortably in bed  Reports feeling okay  Agreeable to out of bed into chair  Encourage incentive spirometry      Objective:     Vitals:   Temp (24hrs), Av.8 °F (37.1 °C), Min:98.4 °F (36.9 °C), Max:99.4 °F (37.4 °C)    Temp:  [98.4 °F (36.9 °C)-99.4 °F (37.4 °C)] 98.9 °F (37.2 °C)  HR:  [78-96] 78  Resp:  [16-17] 17  BP: ()/(59-71) 108/59  SpO2:  [91 %-96 %] 96 %  Body mass index is 23.98 kg/m².     Input and Output Summary (last 24 hours):     Intake/Output Summary (Last 24 hours) at 2024 1244  Last data filed at 2024 2209  Gross per  24 hour   Intake 720 ml   Output 1200 ml   Net -480 ml       Physical Exam:   Physical Exam       Comfortably in bed  Features of protein calorie malnutrition noted  Neck supple  Lungs diminished breath sounds bilateral  Heart sounds S1-S2 noted  Abdomen soft  Awake follows commands  Left AKA noted  No rash    Additional Data:     Labs:              Results from last 7 days   Lab Units 06/18/24  1127   POC GLUCOSE mg/dl 116               Lines/Drains:  Invasive Devices       Drain  Duration             External Urinary Catheter 7 days                          Imaging: Reviewed radiology reports from this admission including: procedure reports    Recent Cultures (last 7 days):         Last 24 Hours Medication List:   Current Facility-Administered Medications   Medication Dose Route Frequency Provider Last Rate    acetaminophen  975 mg Oral Q8H DAVINA Karen Guerrero PA-C      aspirin  81 mg Oral Daily Karen Guerrero PA-C      atorvastatin  80 mg Oral Daily With Dinner Karen Guerrero PA-C      bictegravir-emtricitab-tenofovir alafenamide  1 tablet Oral Daily With Breakfast Karen Guerrero PA-C      diphenhydrAMINE  25 mg Oral Q6H PRN Iliana Cuevas PA-C      divalproex sodium  1,250 mg Oral Daily Ida Hodges MD      dolutegravir  50 mg Oral Daily Karen Guerrero PA-C      gabapentin  100 mg Oral TID Karen Guerrero PA-C      HYDROmorphone  0.5 mg Intravenous Q3H PRN Karen Guerrero PA-C      HYDROmorphone  0.2 mg Intravenous Once Stan Whitney MD      lamoTRIgine  50 mg Oral BID Ida Hodges MD      levOCARNitine  1,000 mg/day Oral TID With Meals Ida Hodges MD      lidocaine  1 patch Topical Daily Tho Laguna PA-C      losartan  50 mg Oral Daily Ida Hodges MD      melatonin  6 mg Oral HS Karen Guerrero PA-C      metoprolol succinate  25 mg Oral Q12H Leonardo Bruno MD      mirtazapine  15 mg Oral HS Karen Guerrero PA-C      ELVA ANTIFUNGAL   Topical BID Dana  MD Anne      ondansetron  4 mg Intravenous Q6H PRN Karen Guerrero PA-C      oxyCODONE  10 mg Oral Q6H PRN Karen Guerrero, EJ      oxyCODONE  5 mg Oral Q6H PRN Karen Guerrero, PA-GERALDO      polyethylene glycol  17 g Oral Daily PRN Shruti Correa MD      rivaroxaban  20 mg Oral Daily With Dinner Stan Whitney MD      senna  2 tablet Oral BID Shruti Correa MD      sertraline  25 mg Oral Daily Karen Guerrero PA-C      sertraline  50 mg Oral Daily Karen Guerrero PA-C      tamsulosin  0.4 mg Oral Daily With Dinner Karen Guerrero PA-C      zonisamide  400 mg Oral Daily Ida Hodges MD          Today, Patient Was Seen By: Kenny Castro MD    **Please Note: This note may have been constructed using a voice recognition system.**

## 2024-06-25 LAB
BACTERIA UR QL AUTO: ABNORMAL /HPF
BILIRUB UR QL STRIP: NEGATIVE
CLARITY UR: ABNORMAL
COLOR UR: YELLOW
GLUCOSE UR STRIP-MCNC: NEGATIVE MG/DL
HGB UR QL STRIP.AUTO: ABNORMAL
KETONES UR STRIP-MCNC: NEGATIVE MG/DL
LEUKOCYTE ESTERASE UR QL STRIP: ABNORMAL
NITRITE UR QL STRIP: POSITIVE
NON-SQ EPI CELLS URNS QL MICRO: ABNORMAL /HPF
PH UR STRIP.AUTO: 7.5 [PH]
PROT UR STRIP-MCNC: ABNORMAL MG/DL
RBC #/AREA URNS AUTO: ABNORMAL /HPF
SP GR UR STRIP.AUTO: 1.02 (ref 1–1.03)
UROBILINOGEN UR STRIP-ACNC: <2 MG/DL
WBC #/AREA URNS AUTO: ABNORMAL /HPF

## 2024-06-25 PROCEDURE — 99232 SBSQ HOSP IP/OBS MODERATE 35: CPT | Performed by: STUDENT IN AN ORGANIZED HEALTH CARE EDUCATION/TRAINING PROGRAM

## 2024-06-25 PROCEDURE — 87077 CULTURE AEROBIC IDENTIFY: CPT | Performed by: STUDENT IN AN ORGANIZED HEALTH CARE EDUCATION/TRAINING PROGRAM

## 2024-06-25 PROCEDURE — 99222 1ST HOSP IP/OBS MODERATE 55: CPT

## 2024-06-25 PROCEDURE — 87086 URINE CULTURE/COLONY COUNT: CPT | Performed by: STUDENT IN AN ORGANIZED HEALTH CARE EDUCATION/TRAINING PROGRAM

## 2024-06-25 PROCEDURE — 81001 URINALYSIS AUTO W/SCOPE: CPT | Performed by: STUDENT IN AN ORGANIZED HEALTH CARE EDUCATION/TRAINING PROGRAM

## 2024-06-25 PROCEDURE — 87186 SC STD MICRODIL/AGAR DIL: CPT | Performed by: STUDENT IN AN ORGANIZED HEALTH CARE EDUCATION/TRAINING PROGRAM

## 2024-06-25 RX ADMIN — MICONAZOLE NITRATE: 20 CREAM TOPICAL at 13:15

## 2024-06-25 RX ADMIN — LAMOTRIGINE 50 MG: 25 TABLET ORAL at 17:05

## 2024-06-25 RX ADMIN — TAMSULOSIN HYDROCHLORIDE 0.4 MG: 0.4 CAPSULE ORAL at 17:05

## 2024-06-25 RX ADMIN — LEVOCARNITINE 330 MG: 1 SOLUTION ORAL at 08:36

## 2024-06-25 RX ADMIN — ACETAMINOPHEN 975 MG: 325 TABLET, FILM COATED ORAL at 21:26

## 2024-06-25 RX ADMIN — ZONISAMIDE 400 MG: 100 CAPSULE ORAL at 08:36

## 2024-06-25 RX ADMIN — OXYCODONE HYDROCHLORIDE 10 MG: 10 TABLET ORAL at 08:33

## 2024-06-25 RX ADMIN — LEVOCARNITINE 330 MG: 1 SOLUTION ORAL at 17:05

## 2024-06-25 RX ADMIN — METOPROLOL SUCCINATE 25 MG: 25 TABLET, EXTENDED RELEASE ORAL at 05:18

## 2024-06-25 RX ADMIN — LEVOCARNITINE 330 MG: 1 SOLUTION ORAL at 13:11

## 2024-06-25 RX ADMIN — BICTEGRAVIR SODIUM, EMTRICITABINE, AND TENOFOVIR ALAFENAMIDE FUMARATE 1 TABLET: 50; 200; 25 TABLET ORAL at 08:36

## 2024-06-25 RX ADMIN — LAMOTRIGINE 50 MG: 25 TABLET ORAL at 08:34

## 2024-06-25 RX ADMIN — SERTRALINE HYDROCHLORIDE 50 MG: 50 TABLET ORAL at 08:33

## 2024-06-25 RX ADMIN — MICONAZOLE NITRATE: 20 CREAM TOPICAL at 17:05

## 2024-06-25 RX ADMIN — ACETAMINOPHEN 975 MG: 325 TABLET, FILM COATED ORAL at 13:11

## 2024-06-25 RX ADMIN — ATORVASTATIN CALCIUM 80 MG: 80 TABLET, FILM COATED ORAL at 17:04

## 2024-06-25 RX ADMIN — GABAPENTIN 100 MG: 100 CAPSULE ORAL at 08:34

## 2024-06-25 RX ADMIN — Medication 6 MG: at 21:26

## 2024-06-25 RX ADMIN — LIDOCAINE 1 PATCH: 700 PATCH TOPICAL at 08:35

## 2024-06-25 RX ADMIN — ASPIRIN 81 MG: 81 TABLET, COATED ORAL at 08:34

## 2024-06-25 RX ADMIN — GABAPENTIN 100 MG: 100 CAPSULE ORAL at 21:26

## 2024-06-25 RX ADMIN — RIVAROXABAN 20 MG: 20 TABLET, FILM COATED ORAL at 17:05

## 2024-06-25 RX ADMIN — LOSARTAN POTASSIUM 50 MG: 50 TABLET, FILM COATED ORAL at 08:30

## 2024-06-25 RX ADMIN — GABAPENTIN 100 MG: 100 CAPSULE ORAL at 17:04

## 2024-06-25 RX ADMIN — DIVALPROEX SODIUM 1250 MG: 500 TABLET, EXTENDED RELEASE ORAL at 08:34

## 2024-06-25 RX ADMIN — OXYCODONE HYDROCHLORIDE 10 MG: 10 TABLET ORAL at 22:37

## 2024-06-25 RX ADMIN — SENNOSIDES 17.2 MG: 8.6 TABLET, FILM COATED ORAL at 08:34

## 2024-06-25 RX ADMIN — SERTRALINE HYDROCHLORIDE 25 MG: 50 TABLET ORAL at 08:34

## 2024-06-25 RX ADMIN — MIRTAZAPINE 15 MG: 15 TABLET, FILM COATED ORAL at 21:26

## 2024-06-25 RX ADMIN — ACETAMINOPHEN 975 MG: 325 TABLET, FILM COATED ORAL at 05:17

## 2024-06-25 RX ADMIN — DOLUTEGRAVIR SODIUM 50 MG: 50 TABLET, FILM COATED ORAL at 08:36

## 2024-06-25 NOTE — PROGRESS NOTES
[facility]  Progress Note  Name: Sunil Patel I  MRN: [id number]  [facility] I Date of Admission: 6/7/2024   Date of Service: 6/25/2024 I Hospital Day: 18    Assessment & Plan   Carnitine deficiency (HCC)  Assessment & Plan  Continue Levocarnitine replacement therapy TID    Seizures (HCC)  Assessment & Plan  Diagnosed in July 2019 - follows with [facility] neurology  Continue Depakote/Lamictal/Zonisamide > doses now adjusted per most recent outpatient neurology changes on record -> Depakote 1250 mg daily, Zonisamide 400 mg daily, and Lamictal 50 mg BID     Left ventricular thrombus  Assessment & Plan  Filling defect in the left ventricular apex suggests left ventricular thrombus and the source of the embolic disease  Echo showed ejection fraction 40 to 45%, mild global hypokinesis, LV thrombus  Underwent pharmacological stress test. Per cardiology, no significant areas of ischemia and recommended for medical treatment. Possible stress induced cardiomyopathy as an etiology of his reduced EF.   Continue metoprolol 25 mg twice daily, Cozaar 50 mg daily and Xarelto 20 mg daily  Cardiology input noted    Cerebrovascular accident (CVA) ([facility])  Assessment & Plan  History of CVA in the left MCA territory in May 2018  Continue aspirin, atorvastatin    Benign essential hypertension  Assessment & Plan  Blood pressures reviewed and acceptable  Continue losartan and metoprolol    Human immunodeficiency virus (HIV) infection ([facility])  Assessment & Plan  Continue Biktarvy/Tivicay daily and Cabenuva monthly injections       Bipolar affective disorder (HCC)  Assessment & Plan  Continue Zoloft 75 mg daily and Remeron 15 mg at bedtime    * Acute lower limb ischemia  Assessment & Plan  Patient presented with left lower extremity acute limb ischemia with extensive left iliofemoral and left infrainguinal embolism with nonviable left lower extremity  Status post left femoral thrombectomy and AKA on    Xarelto for anticoagulation  Vascular surgery inputs noted  Analgesics, supportive cares  Continue gabapentin 100 mg 3 times daily  Disposition planning case management following                     VTE Pharmacologic Prophylaxis: VTE Score: 3 Moderate Risk (Score 3-4) - Pharmacological DVT Prophylaxis Ordered: rivaroxaban (Xarelto).    Mobility:   Basic Mobility Inpatient Raw Score: 10  JH-HLM Goal: 4: Move to chair/commode  JH-HLM Achieved: 5: Stand (1 or more minutes)  JH-HLM Goal achieved. Continue to encourage appropriate mobility.    Patient Centered Rounds: I performed bedside rounds with nursing staff today.   Discussions with Specialists or Other Care Team Provider:     Education and Discussions with Family / Patient:  Updated patient.     Total Time Spent on Date of Encounter in care of patient: 35 mins. This time was spent on one or more of the following: performing physical exam; counseling and coordination of care; obtaining or reviewing history; documenting in the medical record; reviewing/ordering tests, medications or procedures; communicating with other healthcare professionals and discussing with patient's family/caregivers.    Current Length of Stay: 18 day(s)  Current Patient Status: Inpatient   Certification Statement: The patient will continue to require additional inpatient hospital stay due to awaiting placement  Discharge Plan: Anticipate discharge in 24-48 hrs to LTAC    Code Status: Level 1 - Full Code    Subjective:   No events overnight.  Patient reports feeling well this morning.  No complaints.  Tolerating oral intake.  Reports normal bowel movements.    Objective:     Vitals:   Temp (24hrs), Av.2 °F (36.8 °C), Min:97.7 °F (36.5 °C), Max:98.9 °F (37.2 °C)    Temp:  [97.7 °F (36.5 °C)-98.9 °F (37.2 °C)] 98.1 °F (36.7 °C)  HR:  [71-80] 76  Resp:  [15-18] 18  BP: ()/(51-74) 110/62  SpO2:  [96 %-98 %] 96 %  Body mass index is 23.98 kg/m².     Input and Output  Summary (last 24 hours):     Intake/Output Summary (Last 24 hours) at 6/25/2024 1018  Last data filed at 6/25/2024 0730  Gross per 24 hour   Intake 1705 ml   Output 1800 ml   Net -95 ml       Physical Exam:   Physical Exam  Vitals and nursing note reviewed.   Constitutional:       General: He is not in acute distress.     Appearance: He is well-developed.   HENT:      Head: Normocephalic and atraumatic.   Eyes:      Conjunctiva/sclera: Conjunctivae normal.   Cardiovascular:      Rate and Rhythm: Normal rate and regular rhythm.   Pulmonary:      Effort: Pulmonary effort is normal. No respiratory distress.      Breath sounds: No wheezing.   Musculoskeletal:         General: No swelling.      Cervical back: Neck supple.   Skin:     General: Skin is warm and dry.   Neurological:      Mental Status: He is alert.          Additional Data:     Labs:              Results from last 7 days   Lab Units 06/18/24  1127   POC GLUCOSE mg/dl 116               Lines/Drains:  Invasive Devices       Drain  Duration             External Urinary Catheter 8 days                          Imaging: No pertinent imaging reviewed.    Recent Cultures (last 7 days):         Last 24 Hours Medication List:   Current Facility-Administered Medications   Medication Dose Route Frequency Provider Last Rate    acetaminophen  975 mg Oral Q8H DAVINA Karen Guerrero PA-C      aspirin  81 mg Oral Daily Karen Guerrero PA-C      atorvastatin  80 mg Oral Daily With Dinner Karen Guerrero PA-C      bictegravir-emtricitab-tenofovir alafenamide  1 tablet Oral Daily With Breakfast Karen Guerrero PA-C      diphenhydrAMINE  25 mg Oral Q6H PRN Iliana Cuevas PA-C      divalproex sodium  1,250 mg Oral Daily Ida Hodges MD      dolutegravir  50 mg Oral Daily Karen Guerrero PA-C      gabapentin  100 mg Oral TID Karen Guerrero PA-C      HYDROmorphone  0.5 mg Intravenous Q3H PRN Karen Guerrero PA-C      HYDROmorphone  0.2 mg Intravenous Once  Stan Whitney MD      lamoTRIgine  50 mg Oral BID Ida Hodges MD      levOCARNitine  1,000 mg/day Oral TID With Meals Ida Hodges MD      lidocaine  1 patch Topical Daily Tho Laguna PA-C      losartan  50 mg Oral Daily Ida Hodges MD      melatonin  6 mg Oral HS Karen Guerrero PA-C      metoprolol succinate  25 mg Oral Q12H Leonardo Bruno MD      mirtazapine  15 mg Oral HS Karen Guerrero PA-C      ELVA ANTIFUNGAL   Topical BID Dana Rodríguez MD      ondansetron  4 mg Intravenous Q6H PRN Karen Guerrero PA-C      oxyCODONE  10 mg Oral Q6H PRN Karen Guerrero PA-C      oxyCODONE  5 mg Oral Q6H PRN Karen Guerrero, EJ      polyethylene glycol  17 g Oral Daily PRN Shruti Correa MD      rivaroxaban  20 mg Oral Daily With Dinner Stan Whitney MD      senna  2 tablet Oral BID Shruti Correa MD      sertraline  25 mg Oral Daily Karen Guerrero PA-C      sertraline  50 mg Oral Daily Karen Guererro PA-C      tamsulosin  0.4 mg Oral Daily With Dinner Karen Guerrero PA-C      zonisamide  400 mg Oral Daily Ida Hodges MD          Today, Patient Was Seen By: Aleja Dhaliwal MD    **Please Note: This note may have been constructed using a voice recognition system.**

## 2024-06-25 NOTE — ASSESSMENT & PLAN NOTE
Patient presented with left lower extremity acute limb ischemia with extensive left iliofemoral and left infrainguinal embolism with nonviable left lower extremity  Status post left femoral thrombectomy and AKA on 6/7  Xarelto for anticoagulation  Vascular surgery inputs noted  Analgesics, supportive cares  Continue gabapentin 100 mg 3 times daily  Disposition planning case management following

## 2024-06-25 NOTE — ASSESSMENT & PLAN NOTE
Filling defect in the left ventricular apex suggests left ventricular thrombus and the source of the embolic disease  Echo showed ejection fraction 40 to 45%, mild global hypokinesis, LV thrombus  Underwent pharmacological stress test. Per cardiology, no significant areas of ischemia and recommended for medical treatment. Possible stress induced cardiomyopathy as an etiology of his reduced EF.   Continue metoprolol 25 mg twice daily, Cozaar 50 mg daily and Xarelto 20 mg daily  Cardiology input noted

## 2024-06-25 NOTE — CONSULTS
Consultation - Wound Care   Sunil Patel 63 y.o. male MRN: [id number]  Unit/Bed#: [facility] 507-01 Encounter: [id number]    Assessment:  Pressure injury of the left buttock, unstageable  Pressure injury of the right buttock, unstageable  Ambulatory dysfunction  Urinary and fecal incontinence  L AKA     Plan:  Bilateral buttocks with unstageable pressure injury-present on admission.  Wound bed appears improved on exam today.  Recommend to continue with current plan in place with [facility] paste due to incontinence.  No clinical s/s of infection present  Recommend to continue with preventative nursing skin care measures in place as per WOCN team  Continue with low-air-loss P500 mattress  Pressure relief- offloading of pressure with turning/repositioning as patient medically tolerates, heel elevation, foam wedges for offloading/repositioning, and waffle cushion to chair.  Nutrition is following  [facility] wound care team with questions or concerns.   Routine wound care follow-up while admitted. AVS updated.   Follow-up with the [facility] wound care center as an outpatient, ambulatory referral placed.     History of Present Illness:  Patient is a 63-year-old male who was admitted to the hospital with acute lower limb ischemia.  Patient has a history of HIV, bipolar, TBI, anxiety, depression, suicide attempt, and substance abuse. Patient seen today for follow-up visit of bilateral buttock pressure injuries. Patient has been followed by WOCN team during this hospital stay, with current wound management of triad paste. Patient cooperative and agreeable for the assessment, patient unable to provide much wound history, per chart review wounds are POA and per H&P patient was not ambulating much prior to hospitalization due to lower extremity discomfort. Patient is s/p L AKA this admission, which vascular surgery is managing. No reported fever, chills, or increased pain related to the b/l buttock  "wounds.    Subjective:    Review of Systems   Reason unable to perform ROS: limited d/t to TBI.   Constitutional:  Negative for chills and fever.   Musculoskeletal:  Positive for gait problem.        L AKA   Skin:  Positive for wound.        B/l buttocks    Psychiatric/Behavioral:  Negative for agitation.        Historical Information   Past Medical History:   Diagnosis Date    Anxiety     Depression     HIV disease (HCC)     Substance abuse (HCC)     Suicide attempt (HCC)      Past Surgical History:   Procedure Laterality Date    AMPUTATION ABOVE KNEE (AKA) Left 6/7/2024    Procedure: AMPUTATION ABOVE KNEE (AKA);  Surgeon: Juan Luis Rdz MD;  Location: [facility];  Service: Vascular    HI TEAEC W/WO PATCH GRAFT COMMON FEMORAL Left 6/7/2024    Procedure: left femoral ileofemoral thromectomy;  Surgeon: Juan Luis Rdz MD;  Location: [facility];  Service: Vascular    US GUIDED THYROID BIOPSY  3/15/2022    US GUIDED THYROID BIOPSY  11/26/2019     Social History   Social History     Substance and Sexual Activity   Alcohol Use Yes     Social History     Substance and Sexual Activity   Drug Use Yes    Types: \"Crack\" cocaine, Marijuana     E-Cigarette/Vaping     E-Cigarette/Vaping Substances     Social History     Tobacco Use   Smoking Status Some Days    Current packs/day: 1.00    Types: Cigarettes   Smokeless Tobacco Never     Family History:   Family History   Problem Relation Age of Onset    Diabetes Mother     Heart attack Mother     Heart attack Father        Meds/Allergies   current meds:   Current Facility-Administered Medications   Medication Dose Route Frequency    acetaminophen (TYLENOL) tablet 975 mg  975 mg Oral Q8H [facility]    aspirin (ECOTRIN LOW STRENGTH) EC tablet 81 mg  81 mg Oral Daily    atorvastatin (LIPITOR) tablet 80 mg  80 mg Oral Daily With Dinner    bictegravir-emtricitab-tenofovir alafenamide (BIKTARVY) -25 MG tablet 1 tablet  1 tablet Oral Daily With Breakfast    " "diphenhydrAMINE (BENADRYL) tablet 25 mg  25 mg Oral Q6H PRN    divalproex sodium (DEPAKOTE ER) 24 hr tablet 1,250 mg  1,250 mg Oral Daily    dolutegravir (TIVICAY) tablet 50 mg  50 mg Oral Daily    gabapentin (NEURONTIN) capsule 100 mg  100 mg Oral TID    HYDROmorphone (DILAUDID) injection 0.5 mg  0.5 mg Intravenous Q3H PRN    HYDROmorphone HCl (DILAUDID) injection 0.2 mg  0.2 mg Intravenous Once    lamoTRIgine (LaMICtal) tablet 50 mg  50 mg Oral BID    levOCARNitine (CARNITOR) oral solution 330 mg  1,000 mg/day Oral TID With Meals    lidocaine (LIDODERM) 5 % patch 1 patch  1 patch Topical Daily    losartan (COZAAR) tablet 50 mg  50 mg Oral Daily    melatonin tablet 6 mg  6 mg Oral HS    metoprolol succinate (TOPROL-XL) 24 hr tablet 25 mg  25 mg Oral Q12H    mirtazapine (REMERON) tablet 15 mg  15 mg Oral HS    moisture barrier miconazole 2% cream (aka ELVA MOISTURE BARRIER ANTIFUNGAL CREAM)   Topical BID    ondansetron (ZOFRAN) injection 4 mg  4 mg Intravenous Q6H PRN    oxyCODONE (ROXICODONE) immediate release tablet 10 mg  10 mg Oral Q6H PRN    oxyCODONE (ROXICODONE) IR tablet 5 mg  5 mg Oral Q6H PRN    polyethylene glycol (MIRALAX) packet 17 g  17 g Oral Daily PRN    rivaroxaban (XARELTO) tablet 20 mg  20 mg Oral Daily With Dinner    senna (SENOKOT) tablet 17.2 mg  2 tablet Oral BID    sertraline (ZOLOFT) tablet 25 mg  25 mg Oral Daily    sertraline (ZOLOFT) tablet 50 mg  50 mg Oral Daily    tamsulosin (FLOMAX) capsule 0.4 mg  0.4 mg Oral Daily With Dinner    zonisamide (ZONEGRAN) capsule 400 mg  400 mg Oral Daily     Allergies   Allergen Reactions    No Active Allergies        Objective   Vitals: Blood pressure 110/62, pulse 76, temperature 98.1 °F (36.7 °C), temperature source Oral, resp. rate 18, height 5' 10\" (1.778 m), weight 75.8 kg (167 lb 1.7 oz), SpO2 96%.     Wounds:   Bilateral buttock unstageable pressure injuries measured together in flowsheets below.  Wounds present as oval shaped full-thickness " wounds with approximately 50% moist yellow slough and 50% moist pink/red tissue.  Wounds are producing small amount of serosanguineous/bloody drainage.  Edges fragile intact with slight maceration.  Periwound's are dry and intact with blanchable hyperpigmented skin.  R heel is dry and intact without redness or wounds.  B/l groin is dry and intact with resolving fungal rash. No open aspect, redness or drainage. Continue with ELVA as ordered.      No induration, fluctuance, odor, warmth/temperature differences, redness, or purulence noted to the above mentioned wounds and skin areas assessed.  Patient tolerated assessment well- denies pain and no s/s of non-verbal pain or discomfort observed during the encounter.        Wound 06/08/24 Pressure Injury Buttocks Bilateral (Active)   Wound Image   06/25/24 1237   Wound Length (cm) 5 cm 06/25/24 1237   Wound Width (cm) 13 cm 06/25/24 1237   Wound Depth (cm) 0.2 cm 06/25/24 1237   Wound Surface Area (cm^2) 65 cm^2 06/25/24 1237   Wound Volume (cm^3) 13 cm^3 06/25/24 1237   Calculated Wound Volume (cm^3) 13 cm^3 06/25/24 1237   Change in Wound Size % 1.52 06/25/24 1237       Physical Exam  Constitutional:       General: He is awake. He is not in acute distress.     Appearance: He is not diaphoretic.   HENT:      Head: Normocephalic and atraumatic.      Right Ear: External ear normal.      Left Ear: External ear normal.   Eyes:      Conjunctiva/sclera: Conjunctivae normal.   Pulmonary:      Effort: Pulmonary effort is normal. No respiratory distress.   Genitourinary:     Comments: Incontinent of bowel at time of the assessment, PeriCare rendered.  Condom catheter in place for urinary management.  Musculoskeletal:      Comments: Patient seen in bed on low-air-loss P500 mattress.  Moderate assist of 1 with turning for the assessment.   Skin:     General: Skin is warm and dry.      Findings: Wound present.      Comments: Refer to wound section for assessment details.   "  Neurological:      Mental Status: He is alert.      Gait: Gait abnormal.   Psychiatric:         Behavior: Behavior is cooperative.           Lab, Imaging and other studies: I have personally reviewed pertinent reports.      Code Status: Level 1 - Full Code      Counseling / Coordination of Care  Total time spent today:    Total time (face-to-face and non-face-to-face) spent on today's visit was 22 minutes. This includes preparation for the visits (H&P on 6/7/24, wound care notes from 6/10/24 and 6/17/24, SLIM note from 6/24/24) performance of a medically appropriate history and examination, and orders for medications/treatments or testing.  Discussed assessment findings, and plan of care/recommendations with patients RN.      CHARLIE Green, FNP-C, CWON      Portions of the record may have been created with voice recognition software.  Occasional wrong word or \"sound a like\" substitutions may have occurred due to the inherent limitations of voice recognition software.  Read the chart carefully and recognize, using context, where substitutions have occurred      "

## 2024-06-25 NOTE — CASE MANAGEMENT
Case Management Discharge Planning Note    Patient name Sunil Patel  Location [facility] 507/[facility] 507-01 MRN [id number]  : 1960 Date 2024       Current Admission Date: 2024  Current Admission Diagnosis:Acute lower limb ischemia   Patient Active Problem List    Diagnosis Date Noted Date Diagnosed    Carnitine deficiency (HCC) 2024     Acute lower limb ischemia 2024     Left ventricular thrombus 2024     Seizures (HCC) 2024     Tobacco abuse 10/26/2019     Cerebrovascular accident (CVA) ([facility]) 10/26/2019     Positive laboratory testing for human immunodeficiency virus (HCC) 2017     Bipolar affective disorder ([facility]) 2017     Hypertension 2017     Human immunodeficiency virus (HIV) infection ([facility]) 2017     History of transient cerebral ischemia 2017     Substance abuse ([facility]) 2013     Unspecified vitamin D deficiency 2010     Benign essential hypertension 2010       LOS (days): 18  Geometric Mean LOS (GMLOS) (days): 4  Days to GMLOS:-13.9     OBJECTIVE:  Risk of Unplanned Readmission Score: 23.18         Current admission status: Inpatient   Preferred Pharmacy:   [facility] & [address] [address]  Phone: [phone] Fax: [phone]    Primary Care Provider: CHARLIE Trevino    Primary Insurance: MEDICARE  Secondary Insurance: [facility]    DISCHARGE DETAILS:    Referral form and clinical documentation faxed to Garett Gasca @ [facility] @fax# [phone].  CM will await a response re: Admission.

## 2024-06-25 NOTE — PLAN OF CARE
Problem: PAIN - ADULT  Goal: Verbalizes/displays adequate comfort level or baseline comfort level  Description: Interventions:  - Encourage patient to monitor pain and request assistance  - Assess pain using appropriate pain scale  - Administer analgesics based on type and severity of pain and evaluate response  - Implement non-pharmacological measures as appropriate and evaluate response  - Consider cultural and social influences on pain and pain management  - Notify physician/advanced practitioner if interventions unsuccessful or patient reports new pain  Outcome: Progressing     Problem: INFECTION - ADULT  Goal: Absence or prevention of progression during hospitalization  Description: INTERVENTIONS:  - Assess and monitor for signs and symptoms of infection  - Monitor lab/diagnostic results  - Monitor all insertion sites, i.e. indwelling lines, tubes, and drains  - Monitor endotracheal if appropriate and nasal secretions for changes in amount and color  - [place] appropriate cooling/warming therapies per order  - Administer medications as ordered  - Instruct and encourage patient and family to use good hand hygiene technique  - Identify and instruct in appropriate isolation precautions for identified infection/condition  Outcome: Progressing     Problem: SAFETY ADULT  Goal: Patient will remain free of falls  Description: INTERVENTIONS:  - Educate patient/family on patient safety including physical limitations  - Instruct patient to call for assistance with activity   - Consult OT/PT to assist with strengthening/mobility   - Keep Call bell within reach  - Keep bed low and locked with side rails adjusted as appropriate  - Keep care items and personal belongings within reach  - Initiate and maintain comfort rounds  - Make Fall Risk Sign visible to staff  - Offer Toileting every 2 Hours, in advance of need  - Initiate/Maintain bed/chair alarm  - Obtain necessary fall risk management equipment: call bell within  reach  - Apply yellow socks and bracelet for high fall risk patients  - Consider moving patient to room near nurses station  Outcome: Progressing     Problem: DISCHARGE PLANNING  Goal: Discharge to home or other facility with appropriate resources  Description: INTERVENTIONS:  - Identify barriers to discharge w/patient and caregiver  - Arrange for needed discharge resources and transportation as appropriate  - Identify discharge learning needs (meds, wound care, etc.)  - Arrange for interpretive services to assist at discharge as needed  - Refer to Case Management Department for coordinating discharge planning if the patient needs post-hospital services based on physician/advanced practitioner order or complex needs related to functional status, cognitive ability, or social support system  Outcome: Progressing     Problem: Knowledge Deficit  Goal: Patient/family/caregiver demonstrates understanding of disease process, treatment plan, medications, and discharge instructions  Description: Complete learning assessment and assess knowledge base.  Interventions:  - Provide teaching at level of understanding  - Provide teaching via preferred learning methods  Outcome: Progressing     Problem: Nutrition/Hydration-ADULT  Goal: Nutrient/Hydration intake appropriate for improving, restoring or maintaining nutritional needs  Description: Monitor and assess patient's nutrition/hydration status for malnutrition. Collaborate with interdisciplinary team and initiate plan and interventions as ordered.  Monitor patient's weight and dietary intake as ordered or per policy. Utilize nutrition screening tool and intervene as necessary. Determine patient's food preferences and provide high-protein, high-caloric foods as appropriate.     INTERVENTIONS:  - Monitor oral intake, urinary output, labs, and treatment plans  - Assess nutrition and hydration status and recommend course of action  - Evaluate amount of meals eaten  - Assist patient  with eating if necessary   - Allow adequate time for meals  - Recommend/ encourage appropriate diets, oral nutritional supplements, and vitamin/mineral supplements  - Order, calculate, and assess calorie counts as needed  - Recommend, monitor, and adjust tube feedings and TPN/PPN based on assessed needs  - Assess need for intravenous fluids  - Provide specific nutrition/hydration education as appropriate  - Include patient/family/caregiver in decisions related to nutrition  Outcome: Progressing     Problem: Prexisting or High Potential for Compromised Skin Integrity  Goal: Skin integrity is maintained or improved  Description: INTERVENTIONS:  - Identify patients at risk for skin breakdown  - Assess and monitor skin integrity  - Assess and monitor nutrition and hydration status  - Monitor labs   - Assess for incontinence   - Turn and reposition patient  - Assist with mobility/ambulation  - Relieve pressure over bony prominences  - Avoid friction and shearing  - Provide appropriate hygiene as needed including keeping skin clean and dry  - Evaluate need for skin moisturizer/barrier cream  - Collaborate with interdisciplinary team   - Patient/family teaching  - Consider wound care consult   Outcome: Progressing

## 2024-06-25 NOTE — PLAN OF CARE
Problem: PAIN - ADULT  Goal: Verbalizes/displays adequate comfort level or baseline comfort level  Description: Interventions:  - Encourage patient to monitor pain and request assistance  - Assess pain using appropriate pain scale  - Administer analgesics based on type and severity of pain and evaluate response  - Implement non-pharmacological measures as appropriate and evaluate response  - Consider cultural and social influences on pain and pain management  - Notify physician/advanced practitioner if interventions unsuccessful or patient reports new pain  6/25/2024 1446 by Alison Jacobo RN  Outcome: Progressing  6/25/2024 1446 by Alison Jacobo RN  Outcome: Progressing     Problem: INFECTION - ADULT  Goal: Absence or prevention of progression during hospitalization  Description: INTERVENTIONS:  - Assess and monitor for signs and symptoms of infection  - Monitor lab/diagnostic results  - Monitor all insertion sites, i.e. indwelling lines, tubes, and drains  - Monitor endotracheal if appropriate and nasal secretions for changes in amount and color  - Randle appropriate cooling/warming therapies per order  - Administer medications as ordered  - Instruct and encourage patient and family to use good hand hygiene technique  - Identify and instruct in appropriate isolation precautions for identified infection/condition  6/25/2024 1446 by Alison Jacobo RN  Outcome: Progressing  6/25/2024 1446 by Alison Jacobo RN  Outcome: Progressing     Problem: SAFETY ADULT  Goal: Patient will remain free of falls  Description: INTERVENTIONS:  - Educate patient/family on patient safety including physical limitations  - Instruct patient to call for assistance with activity   - Consult OT/PT to assist with strengthening/mobility   - Keep Call bell within reach  - Keep bed low and locked with side rails adjusted as appropriate  - Keep care items and personal belongings within reach  - Initiate and maintain comfort  rounds  - Make Fall Risk Sign visible to staff  - Offer Toileting every  Hours, in advance of need  - Initiate/Maintain alarm  - Obtain necessary fall risk management equipment:   - Apply yellow socks and bracelet for high fall risk patients  - Consider moving patient to room near nurses station  6/25/2024 1446 by Alison Jacobo RN  Outcome: Progressing  6/25/2024 1446 by Alison Jacobo RN  Outcome: Progressing  Goal: Maintain or return to baseline ADL function  Description: INTERVENTIONS:  -  Assess patient's ability to carry out ADLs; assess patient's baseline for ADL function and identify physical deficits which impact ability to perform ADLs (bathing, care of mouth/teeth, toileting, grooming, dressing, etc.)  - Assess/evaluate cause of self-care deficits   - Assess range of motion  - Assess patient's mobility; develop plan if impaired  - Assess patient's need for assistive devices and provide as appropriate  - Encourage maximum independence but intervene and supervise when necessary  - Involve family in performance of ADLs  - Assess for home care needs following discharge   - Consider OT consult to assist with ADL evaluation and planning for discharge  - Provide patient education as appropriate  6/25/2024 1446 by Alison Jacobo RN  Outcome: Progressing  6/25/2024 1446 by Alison Jacobo RN  Outcome: Progressing  Goal: Maintains/Returns to pre admission functional level  Description: INTERVENTIONS:  - Perform AM-PAC 6 Click Basic Mobility/ Daily Activity assessment daily.  - Set and communicate daily mobility goal to care team and patient/family/caregiver.   - Collaborate with rehabilitation services on mobility goals if consulted  - Perform Range of Motion  times a day.  - Reposition patient every  hours.  - Dangle patient  times a day  - Stand patient  times a day  - Ambulate patient  times a day  - Out of bed to chair  times a day   - Out of bed for meals  times a day  - Out of bed for  toileting  - Record patient progress and toleration of activity level   6/25/2024 1446 by Alison Jacobo RN  Outcome: Progressing  6/25/2024 1446 by Alison Jacobo RN  Outcome: Progressing     Problem: DISCHARGE PLANNING  Goal: Discharge to home or other facility with appropriate resources  Description: INTERVENTIONS:  - Identify barriers to discharge w/patient and caregiver  - Arrange for needed discharge resources and transportation as appropriate  - Identify discharge learning needs (meds, wound care, etc.)  - Arrange for interpretive services to assist at discharge as needed  - Refer to Case Management Department for coordinating discharge planning if the patient needs post-hospital services based on physician/advanced practitioner order or complex needs related to functional status, cognitive ability, or social support system  6/25/2024 1446 by Alison Jacobo RN  Outcome: Progressing  6/25/2024 1446 by Alison Jacobo RN  Outcome: Progressing     Problem: Knowledge Deficit  Goal: Patient/family/caregiver demonstrates understanding of disease process, treatment plan, medications, and discharge instructions  Description: Complete learning assessment and assess knowledge base.  Interventions:  - Provide teaching at level of understanding  - Provide teaching via preferred learning methods  6/25/2024 1446 by Alison Jacobo RN  Outcome: Progressing  6/25/2024 1446 by Alison Jacobo RN  Outcome: Progressing     Problem: Prexisting or High Potential for Compromised Skin Integrity  Goal: Skin integrity is maintained or improved  Description: INTERVENTIONS:  - Identify patients at risk for skin breakdown  - Assess and monitor skin integrity  - Assess and monitor nutrition and hydration status  - Monitor labs   - Assess for incontinence   - Turn and reposition patient  - Assist with mobility/ambulation  - Relieve pressure over bony prominences  - Avoid friction and shearing  - Provide appropriate  hygiene as needed including keeping skin clean and dry  - Evaluate need for skin moisturizer/barrier cream  - Collaborate with interdisciplinary team   - Patient/family teaching  - Consider wound care consult   6/25/2024 1446 by Alison Jacobo RN  Outcome: Progressing  6/25/2024 1446 by Alison Jacobo RN  Outcome: Progressing     Problem: Nutrition/Hydration-ADULT  Goal: Nutrient/Hydration intake appropriate for improving, restoring or maintaining nutritional needs  Description: Monitor and assess patient's nutrition/hydration status for malnutrition. Collaborate with interdisciplinary team and initiate plan and interventions as ordered.  Monitor patient's weight and dietary intake as ordered or per policy. Utilize nutrition screening tool and intervene as necessary. Determine patient's food preferences and provide high-protein, high-caloric foods as appropriate.     INTERVENTIONS:  - Monitor oral intake, urinary output, labs, and treatment plans  - Assess nutrition and hydration status and recommend course of action  - Evaluate amount of meals eaten  - Assist patient with eating if necessary   - Allow adequate time for meals  - Recommend/ encourage appropriate diets, oral nutritional supplements, and vitamin/mineral supplements  - Order, calculate, and assess calorie counts as needed  - Recommend, monitor, and adjust tube feedings and TPN/PPN based on assessed needs  - Assess need for intravenous fluids  - Provide specific nutrition/hydration education as appropriate  - Include patient/family/caregiver in decisions related to nutrition  6/25/2024 1446 by Alison Jacobo RN  Outcome: Progressing  6/25/2024 1446 by Alison Jacobo RN  Outcome: Progressing

## 2024-06-26 LAB
ANION GAP SERPL CALCULATED.3IONS-SCNC: 9 MMOL/L (ref 4–13)
BASOPHILS # BLD AUTO: 0.04 THOUSANDS/ÂΜL (ref 0–0.1)
BASOPHILS NFR BLD AUTO: 1 % (ref 0–1)
BUN SERPL-MCNC: 28 MG/DL (ref 5–25)
CALCIUM SERPL-MCNC: 9 MG/DL (ref 8.4–10.2)
CHLORIDE SERPL-SCNC: 104 MMOL/L (ref 96–108)
CO2 SERPL-SCNC: 26 MMOL/L (ref 21–32)
CREAT SERPL-MCNC: 1.02 MG/DL (ref 0.6–1.3)
EOSINOPHIL # BLD AUTO: 0.2 THOUSAND/ÂΜL (ref 0–0.61)
EOSINOPHIL NFR BLD AUTO: 3 % (ref 0–6)
ERYTHROCYTE [DISTWIDTH] IN BLOOD BY AUTOMATED COUNT: 13.6 % (ref 11.6–15.1)
GFR SERPL CREATININE-BSD FRML MDRD: 78 ML/MIN/1.73SQ M
GLUCOSE SERPL-MCNC: 93 MG/DL (ref 65–140)
HCT VFR BLD AUTO: 33.8 % (ref 36.5–49.3)
HGB BLD-MCNC: 10.3 G/DL (ref 12–17)
IMM GRANULOCYTES # BLD AUTO: 0.05 THOUSAND/UL (ref 0–0.2)
IMM GRANULOCYTES NFR BLD AUTO: 1 % (ref 0–2)
LYMPHOCYTES # BLD AUTO: 1.65 THOUSANDS/ÂΜL (ref 0.6–4.47)
LYMPHOCYTES NFR BLD AUTO: 22 % (ref 14–44)
MCH RBC QN AUTO: 27.9 PG (ref 26.8–34.3)
MCHC RBC AUTO-ENTMCNC: 30.5 G/DL (ref 31.4–37.4)
MCV RBC AUTO: 92 FL (ref 82–98)
MONOCYTES # BLD AUTO: 0.84 THOUSAND/ÂΜL (ref 0.17–1.22)
MONOCYTES NFR BLD AUTO: 11 % (ref 4–12)
NEUTROPHILS # BLD AUTO: 4.87 THOUSANDS/ÂΜL (ref 1.85–7.62)
NEUTS SEG NFR BLD AUTO: 62 % (ref 43–75)
NRBC BLD AUTO-RTO: 0 /100 WBCS
PLATELET # BLD AUTO: 477 THOUSANDS/UL (ref 149–390)
PMV BLD AUTO: 7.9 FL (ref 8.9–12.7)
POTASSIUM SERPL-SCNC: 4 MMOL/L (ref 3.5–5.3)
RBC # BLD AUTO: 3.69 MILLION/UL (ref 3.88–5.62)
SODIUM SERPL-SCNC: 139 MMOL/L (ref 135–147)
WBC # BLD AUTO: 7.65 THOUSAND/UL (ref 4.31–10.16)

## 2024-06-26 PROCEDURE — 97535 SELF CARE MNGMENT TRAINING: CPT

## 2024-06-26 PROCEDURE — 99232 SBSQ HOSP IP/OBS MODERATE 35: CPT | Performed by: STUDENT IN AN ORGANIZED HEALTH CARE EDUCATION/TRAINING PROGRAM

## 2024-06-26 PROCEDURE — 97530 THERAPEUTIC ACTIVITIES: CPT

## 2024-06-26 PROCEDURE — 85025 COMPLETE CBC W/AUTO DIFF WBC: CPT | Performed by: STUDENT IN AN ORGANIZED HEALTH CARE EDUCATION/TRAINING PROGRAM

## 2024-06-26 PROCEDURE — 97542 WHEELCHAIR MNGMENT TRAINING: CPT

## 2024-06-26 PROCEDURE — 97116 GAIT TRAINING THERAPY: CPT

## 2024-06-26 PROCEDURE — 80048 BASIC METABOLIC PNL TOTAL CA: CPT | Performed by: STUDENT IN AN ORGANIZED HEALTH CARE EDUCATION/TRAINING PROGRAM

## 2024-06-26 RX ADMIN — SERTRALINE HYDROCHLORIDE 50 MG: 50 TABLET ORAL at 08:34

## 2024-06-26 RX ADMIN — ACETAMINOPHEN 975 MG: 325 TABLET, FILM COATED ORAL at 05:56

## 2024-06-26 RX ADMIN — TAMSULOSIN HYDROCHLORIDE 0.4 MG: 0.4 CAPSULE ORAL at 17:11

## 2024-06-26 RX ADMIN — GABAPENTIN 100 MG: 100 CAPSULE ORAL at 08:33

## 2024-06-26 RX ADMIN — LEVOCARNITINE 330 MG: 1 SOLUTION ORAL at 17:13

## 2024-06-26 RX ADMIN — LOSARTAN POTASSIUM 50 MG: 50 TABLET, FILM COATED ORAL at 08:34

## 2024-06-26 RX ADMIN — SERTRALINE HYDROCHLORIDE 25 MG: 50 TABLET ORAL at 08:33

## 2024-06-26 RX ADMIN — DIVALPROEX SODIUM 1250 MG: 500 TABLET, EXTENDED RELEASE ORAL at 08:32

## 2024-06-26 RX ADMIN — MICONAZOLE NITRATE: 20 CREAM TOPICAL at 08:34

## 2024-06-26 RX ADMIN — METOPROLOL SUCCINATE 25 MG: 25 TABLET, EXTENDED RELEASE ORAL at 05:56

## 2024-06-26 RX ADMIN — SENNOSIDES 17.2 MG: 8.6 TABLET, FILM COATED ORAL at 08:34

## 2024-06-26 RX ADMIN — MIRTAZAPINE 15 MG: 15 TABLET, FILM COATED ORAL at 21:00

## 2024-06-26 RX ADMIN — ACETAMINOPHEN 975 MG: 325 TABLET, FILM COATED ORAL at 13:04

## 2024-06-26 RX ADMIN — ASPIRIN 81 MG: 81 TABLET, COATED ORAL at 08:34

## 2024-06-26 RX ADMIN — LEVOCARNITINE 330 MG: 1 SOLUTION ORAL at 13:05

## 2024-06-26 RX ADMIN — ZONISAMIDE 400 MG: 100 CAPSULE ORAL at 08:33

## 2024-06-26 RX ADMIN — SENNOSIDES 17.2 MG: 8.6 TABLET, FILM COATED ORAL at 17:13

## 2024-06-26 RX ADMIN — ATORVASTATIN CALCIUM 80 MG: 80 TABLET, FILM COATED ORAL at 17:12

## 2024-06-26 RX ADMIN — MICONAZOLE NITRATE: 20 CREAM TOPICAL at 17:14

## 2024-06-26 RX ADMIN — LAMOTRIGINE 50 MG: 25 TABLET ORAL at 17:12

## 2024-06-26 RX ADMIN — DOLUTEGRAVIR SODIUM 50 MG: 50 TABLET, FILM COATED ORAL at 08:33

## 2024-06-26 RX ADMIN — BICTEGRAVIR SODIUM, EMTRICITABINE, AND TENOFOVIR ALAFENAMIDE FUMARATE 1 TABLET: 50; 200; 25 TABLET ORAL at 07:25

## 2024-06-26 RX ADMIN — Medication 6 MG: at 21:00

## 2024-06-26 RX ADMIN — RIVAROXABAN 20 MG: 20 TABLET, FILM COATED ORAL at 17:12

## 2024-06-26 RX ADMIN — GABAPENTIN 100 MG: 100 CAPSULE ORAL at 21:00

## 2024-06-26 RX ADMIN — LEVOCARNITINE 330 MG: 1 SOLUTION ORAL at 08:32

## 2024-06-26 RX ADMIN — LAMOTRIGINE 50 MG: 25 TABLET ORAL at 08:33

## 2024-06-26 RX ADMIN — GABAPENTIN 100 MG: 100 CAPSULE ORAL at 17:11

## 2024-06-26 RX ADMIN — ACETAMINOPHEN 975 MG: 325 TABLET, FILM COATED ORAL at 21:00

## 2024-06-26 RX ADMIN — LIDOCAINE 1 PATCH: 700 PATCH TOPICAL at 08:34

## 2024-06-26 NOTE — PLAN OF CARE
Problem: OCCUPATIONAL THERAPY ADULT  Goal: Performs self-care activities at highest level of function for planned discharge setting.  See evaluation for individualized goals.  Description: Treatment Interventions: ADL retraining, Functional transfer training, UE strengthening/ROM, Endurance training, Cognitive reorientation, Equipment evaluation/education, Compensatory technique education, Continued evaluation, Energy conservation, Activityengagement          See flowsheet documentation for full assessment, interventions and recommendations.   Outcome: Progressing  Note: Limitation: Decreased ADL status, Decreased Safe judgement during ADL, Decreased cognition, Decreased endurance, Decreased sensation, Decreased self-care trans, Decreased high-level ADLs  Prognosis: Good  Assessment: Pt seen today for OT session focusing on training ADL tasks, transfers, body mechanics, improving endurance and tolerance during functional ADL activities. Pt was found supine in bed and was left supine in bed with bed/chair alarm on and all needs in reach. Pt completed bed mobility with S for R/L rolling, min ax1 for supine to sit, sit to stand transfers with min Ax2 with rw for support, and FM with min ax2 progressing to mod ax2 with RW for support. Pt S for grooming while washing face EOB.  Pt required min a for UB bathing with wipes, required vc for remembering that CHG wipes are not to be applied to face or groin. Pt required min a to don/doff hospital gown. Pt required total A for toileting with bed pan and perineal hygiene.  Pt has improvements in activity tolerance, endurance, and task completion; however, is still limited 2* decreased ADL/High-level ADL status, decreased activity tolerance/endurance, decreased self-care trans, decreased safety awareness, impaired balance, impaired cognition, and poor insight to condition. The patient's raw score on the -PAC Daily Activity Inpatient Short Form is 16. A raw score of less than  19 suggests the patient may benefit from discharge to post-acute rehabilitation services. Please refer to the recommendation of the Occupational Therapist for safe discharge planning. From OT perspective, pt should discharge Level II.     Rehab Resource Intensity Level, OT: I (Maximum Resource Intensity)

## 2024-06-26 NOTE — PHYSICAL THERAPY NOTE
PHYSICAL THERAPY NOTE          Patient Name: Sunil Patel  Today's Date: 6/26/2024 06/26/24 1011   PT Last Visit   PT Visit Date 06/26/24   Pain Assessment   Pain Assessment Tool 0-10   Pain Score 9   Pain Location/Orientation Location: Buttocks   Pain Onset/Description Onset: Ongoing   Effect of Pain on Daily Activities Limits comfort and mobility.   Patient's Stated Pain Goal No pain   Hospital Pain Intervention(s) Repositioned;Ambulation/increased activity;Emotional support   Restrictions/Precautions   Weight Bearing Precautions Per Order Yes   LLE Weight Bearing Per Order NWB  (AKA)   Other Precautions Fall Risk;Pain;WBS;Bed Alarm;Chair Alarm;Cognitive   Cognition   Overall Cognitive Status Impaired   Attention Attends with cues to redirect   Orientation Level Oriented to person;Oriented to place;Disoriented to time;Disoriented to situation   Memory Decreased recall of recent events   Following Commands Follows one step commands with increased time or repetition   Comments Patient is pleasant and cooperative.   Subjective   Subjective Patient is agreeable to PT.   Bed Mobility   Rolling R 5  Supervision   Additional items Increased time required   Rolling L 5  Supervision   Additional items Increased time required   Supine to Sit 4  Minimal assistance   Additional items Assist x 1;Increased time required   Sit to Supine 4  Minimal assistance   Additional items Assist x 1;Increased time required   Transfers   Sit to Stand 3  Moderate assistance   Additional items Increased time required;Assist x 2   Stand to Sit 3  Moderate assistance   Additional items Increased time required;Assist x 2   Stand pivot 3  Moderate assistance   Additional items Assist x 2;Increased time required   Additional Comments RW - SPT x2, STS x6, Standing tolerance approximately 2 minuites   Ambulation/Elevation   Gait pattern   (Hop to gait pattern)  "  Gait Assistance 3  Moderate assist   Additional items Assist x 2;Verbal cues   Assistive Device Rolling walker   Distance 3'+3'+20'   Wheelchair Activities   Wheelchair Cushion None   Pressure Relief Type Push up   Level of Assistance for Pressure Relief Activities Close supervision   Wheelchair Parts Management Yes   Left Brakes Level of Assistance Minimal verbal cues   Right Brakes Level of Assistance Minimal verbal cues   Propulsion Yes   Propulsion Type 1 Manual   Level 1 Level tile   Method 1 Right upper extremity;Left upper extremity   Level of Assistance 1 Close supervision   Description/ Details 1 100'+80\"   Balance   Static Sitting Fair +   Dynamic Sitting Fair   Static Standing Poor +   Dynamic Standing Poor   Ambulatory Poor +   Endurance Deficit   Endurance Deficit Yes   Endurance Deficit Description Weakness, fatigue, pain   Activity Tolerance   Activity Tolerance Patient tolerated treatment well   Medical Staff Made Aware SPT, OT, OTS   Nurse Made Aware RN cleared   Assessment   Prognosis Good   Problem List Decreased endurance;Decreased strength;Decreased mobility;Decreased cognition;Decreased safety awareness;Pain   Assessment Patient received in bed. Patient agreeable to therapy. Patient performs bed mobility with supervision for rolling. Patient performs supine >sit with Min A. Patient performs functional transfers with moderate assistance x2 using rolling walker, SPT x2 + STS x6 throughout. Patient performs w/c mobility with close supervision using BUE, 100'+80'. Patient requires minimal cues for brakes. Patient standing tolerance about 2 minutes. Patient with fair stability at RW. Patient performs ambulation with moderate assistance x2 using rolling walker, 20'+3'+3'. Patient transfers back to bed with Mod Ax2. Patient returns to supine positioning with Min Ax1.  Patient left in bed with all needs met and call bell/personal items within reach. The patient's AM-PAC Basic Mobility Inpatient Short " Form Raw Score is 10 showing further need for skilled PT services in order to improve functional mobility, decrease need for assistance, and return to PLOF. PT is recommending Level 1 - Maximum Resource Intensity on d/c from hospital.  Will continue to follow as able.   Barriers to Discharge Decreased caregiver support   Goals   Patient Goals To get stronger and to go to rehab.   PT Treatment Day 6   Plan   Treatment/Interventions Functional transfer training;LE strengthening/ROM;ADL retraining;Therapeutic exercise;Endurance training;Equipment eval/education;Bed mobility;Gait training;Compensatory technique education;Spoke to nursing;Spoke to case management;OT;Cognitive reorientation   PT Frequency 3-5x/wk   Discharge Recommendation   Rehab Resource Intensity Level, PT I (Maximum Resource Intensity)   Equipment Recommended Wheelchair;Walker   Wheelchair Package Recommended Standard   Change or Add item to Wheelchair Package? No   Walker Package Recommended Wheeled walker   AM-PAC Basic Mobility Inpatient   Turning in Flat Bed Without Bedrails 3   Lying on Back to Sitting on Edge of Flat Bed Without Bedrails 3   Moving Bed to Chair 1   Standing Up From Chair Using Arms 1   Walk in Room 1   Climb 3-5 Stairs With Railing 1   Basic Mobility Inpatient Raw Score 10   Turning Head Towards Sound 4   Follow Simple Instructions 3   Low Function Basic Mobility Raw Score  17   Low Function Basic Mobility Standardized Score  27.46   [facility] Highest Level Of Mobility   -[facility] Goal 4: Move to chair/commode   -[facility] Achieved 6: Walk 10 steps or more   End of Consult   Patient Position at End of Consult Bed/Chair alarm activated;Supine;All needs within reach       Stacy Mehta, PT, DPT

## 2024-06-26 NOTE — PLAN OF CARE
Problem: PHYSICAL THERAPY ADULT  Goal: Performs mobility at highest level of function for planned discharge setting.  See evaluation for individualized goals.  Description: Treatment/Interventions: ADL retraining, Functional transfer training, LE strengthening/ROM, Therapeutic exercise, Endurance training, Patient/family training, Equipment eval/education, Bed mobility, Gait training, Spoke to nursing, Spoke to case management, OT, Elevations, Cognitive reorientation  Equipment Recommended:  (tbd)       See flowsheet documentation for full assessment, interventions and recommendations.  Outcome: Progressing  Note: Prognosis: Good  Problem List: Decreased endurance, Decreased strength, Decreased mobility, Decreased cognition, Decreased safety awareness, Pain  Assessment: Patient received in bed. Patient agreeable to therapy. Patient performs bed mobility with supervision for rolling. Patient performs supine >sit with Min A. Patient performs functional transfers with moderate assistance x2 using rolling walker, SPT x2 + STS x6 throughout. Patient performs w/c mobility with close supervision using BUE, 100'+80'. Patient requires minimal cues for brakes. Patient standing tolerance about 2 minutes. Patient with fair stability at RW. Patient performs ambulation with moderate assistance x2 using rolling walker, 20'+3'+3'. Patient transfers back to bed with Mod Ax2. Patient returns to supine positioning with Min Ax1.  Patient left in bed with all needs met and call bell/personal items within reach. The patient's AM-PAC Basic Mobility Inpatient Short Form Raw Score is 10 showing further need for skilled PT services in order to improve functional mobility, decrease need for assistance, and return to PLOF. PT is recommending Level 1 - Maximum Resource Intensity on d/c from hospital.  Will continue to follow as able.  Barriers to Discharge: Decreased caregiver support     Rehab Resource Intensity Level, PT: I (Maximum  Resource Intensity)    See flowsheet documentation for full assessment.

## 2024-06-26 NOTE — PLAN OF CARE
Problem: PAIN - ADULT  Goal: Verbalizes/displays adequate comfort level or baseline comfort level  Description: Interventions:  - Encourage patient to monitor pain and request assistance  - Assess pain using appropriate pain scale  - Administer analgesics based on type and severity of pain and evaluate response  - Implement non-pharmacological measures as appropriate and evaluate response  - Consider cultural and social influences on pain and pain management  - Notify physician/advanced practitioner if interventions unsuccessful or patient reports new pain  Outcome: Progressing     Problem: INFECTION - ADULT  Goal: Absence or prevention of progression during hospitalization  Description: INTERVENTIONS:  - Assess and monitor for signs and symptoms of infection  - Monitor lab/diagnostic results  - Monitor all insertion sites, i.e. indwelling lines, tubes, and drains  - Monitor endotracheal if appropriate and nasal secretions for changes in amount and color  - [place] appropriate cooling/warming therapies per order  - Administer medications as ordered  - Instruct and encourage patient and family to use good hand hygiene technique  - Identify and instruct in appropriate isolation precautions for identified infection/condition  Outcome: Progressing     Problem: SAFETY ADULT  Goal: Patient will remain free of falls  Description: INTERVENTIONS:  - Educate patient/family on patient safety including physical limitations  - Instruct patient to call for assistance with activity   - Consult OT/PT to assist with strengthening/mobility   - Keep Call bell within reach  - Keep bed low and locked with side rails adjusted as appropriate  - Keep care items and personal belongings within reach  - Initiate and maintain comfort rounds  - Make Fall Risk Sign visible to staff  - Offer Toileting every  Hours, in advance of need  - Initiate/Maintain alarm  - Obtain necessary fall risk management equipment:   - Apply yellow socks and  bracelet for high fall risk patients  - Consider moving patient to room near nurses station  Outcome: Progressing  Goal: Maintain or return to baseline ADL function  Description: INTERVENTIONS:  -  Assess patient's ability to carry out ADLs; assess patient's baseline for ADL function and identify physical deficits which impact ability to perform ADLs (bathing, care of mouth/teeth, toileting, grooming, dressing, etc.)  - Assess/evaluate cause of self-care deficits   - Assess range of motion  - Assess patient's mobility; develop plan if impaired  - Assess patient's need for assistive devices and provide as appropriate  - Encourage maximum independence but intervene and supervise when necessary  - Involve family in performance of ADLs  - Assess for home care needs following discharge   - Consider OT consult to assist with ADL evaluation and planning for discharge  - Provide patient education as appropriate  Outcome: Progressing  Goal: Maintains/Returns to pre admission functional level  Description: INTERVENTIONS:  - Perform AM-PAC 6 Click Basic Mobility/ Daily Activity assessment daily.  - Set and communicate daily mobility goal to care team and patient/family/caregiver.   - Collaborate with rehabilitation services on mobility goals if consulted  - Perform Range of Motion  times a day.  - Reposition patient every  hours.  - Dangle patient  times a day  - Stand patient  times a day  - Ambulate patient  times a day  - Out of bed to chair times a day   - Out of bed for meals times a day  - Out of bed for toileting  - Record patient progress and toleration of activity level   Outcome: Progressing     Problem: DISCHARGE PLANNING  Goal: Discharge to home or other facility with appropriate resources  Description: INTERVENTIONS:  - Identify barriers to discharge w/patient and caregiver  - Arrange for needed discharge resources and transportation as appropriate  - Identify discharge learning needs (meds, wound care, etc.)  -  Arrange for interpretive services to assist at discharge as needed  - Refer to Case Management Department for coordinating discharge planning if the patient needs post-hospital services based on physician/advanced practitioner order or complex needs related to functional status, cognitive ability, or social support system  Outcome: Progressing     Problem: Knowledge Deficit  Goal: Patient/family/caregiver demonstrates understanding of disease process, treatment plan, medications, and discharge instructions  Description: Complete learning assessment and assess knowledge base.  Interventions:  - Provide teaching at level of understanding  - Provide teaching via preferred learning methods  Outcome: Progressing     Problem: Prexisting or High Potential for Compromised Skin Integrity  Goal: Skin integrity is maintained or improved  Description: INTERVENTIONS:  - Identify patients at risk for skin breakdown  - Assess and monitor skin integrity  - Assess and monitor nutrition and hydration status  - Monitor labs   - Assess for incontinence   - Turn and reposition patient  - Assist with mobility/ambulation  - Relieve pressure over bony prominences  - Avoid friction and shearing  - Provide appropriate hygiene as needed including keeping skin clean and dry  - Evaluate need for skin moisturizer/barrier cream  - Collaborate with interdisciplinary team   - Patient/family teaching  - Consider wound care consult   Outcome: Progressing     Problem: Nutrition/Hydration-ADULT  Goal: Nutrient/Hydration intake appropriate for improving, restoring or maintaining nutritional needs  Description: Monitor and assess patient's nutrition/hydration status for malnutrition. Collaborate with interdisciplinary team and initiate plan and interventions as ordered.  Monitor patient's weight and dietary intake as ordered or per policy. Utilize nutrition screening tool and intervene as necessary. Determine patient's food preferences and provide  high-protein, high-caloric foods as appropriate.     INTERVENTIONS:  - Monitor oral intake, urinary output, labs, and treatment plans  - Assess nutrition and hydration status and recommend course of action  - Evaluate amount of meals eaten  - Assist patient with eating if necessary   - Allow adequate time for meals  - Recommend/ encourage appropriate diets, oral nutritional supplements, and vitamin/mineral supplements  - Order, calculate, and assess calorie counts as needed  - Recommend, monitor, and adjust tube feedings and TPN/PPN based on assessed needs  - Assess need for intravenous fluids  - Provide specific nutrition/hydration education as appropriate  - Include patient/family/caregiver in decisions related to nutrition  Outcome: Progressing

## 2024-06-26 NOTE — PROGRESS NOTES
[facility]  Progress Note  Name: Sunil Patel I  MRN: [id number]  [facility] Date of Admission: 6/7/2024   Date of Service: 6/26/2024 I Hospital Day: 19    Assessment & Plan   Carnitine deficiency (HCC)  Assessment & Plan  Continue Levocarnitine replacement therapy TID    Seizures (HCC)  Assessment & Plan  Diagnosed in July 2019 - follows with [facility] neurology  Continue Depakote/Lamictal/Zonisamide > doses now adjusted per most recent outpatient neurology changes on record -> Depakote 1250 mg daily, Zonisamide 400 mg daily, and Lamictal 50 mg BID     Left ventricular thrombus  Assessment & Plan  Filling defect in the left ventricular apex suggests left ventricular thrombus and the source of the embolic disease  Echo showed ejection fraction 40 to 45%, mild global hypokinesis, LV thrombus  Underwent pharmacological stress test. Per cardiology, no significant areas of ischemia and recommended for medical treatment. Possible stress induced cardiomyopathy as an etiology of his reduced EF.   Continue metoprolol 25 mg twice daily, Cozaar 50 mg daily and Xarelto 20 mg daily  Cardiology input noted    Cerebrovascular accident (CVA) ([facility])  Assessment & Plan  History of CVA in the left MCA territory in May 2018  Continue aspirin, atorvastatin    Benign essential hypertension  Assessment & Plan  Blood pressures reviewed and acceptable  Continue losartan and metoprolol    Human immunodeficiency virus (HIV) infection ([facility])  Assessment & Plan  Continue Biktarvy/Tivicay daily and Cabenuva monthly injections       Bipolar affective disorder (HCC)  Assessment & Plan  Continue Zoloft 75 mg daily and Remeron 15 mg at bedtime    * Acute lower limb ischemia  Assessment & Plan  Patient presented with left lower extremity acute limb ischemia with extensive left iliofemoral and left infrainguinal embolism with nonviable left lower extremity  Status post left femoral thrombectomy and AKA on    Xarelto for anticoagulation  Vascular surgery inputs noted  Analgesics, supportive cares  Continue gabapentin 100 mg 3 times daily  Disposition planning case management following                     VTE Pharmacologic Prophylaxis: VTE Score: 3 Moderate Risk (Score 3-4) - Pharmacological DVT Prophylaxis Ordered: rivaroxaban (Xarelto).    Mobility:   Basic Mobility Inpatient Raw Score: 10  JH-HLM Goal: 4: Move to chair/commode  JH-HLM Achieved: 4: Move to chair/commode  JH-HLM Goal achieved. Continue to encourage appropriate mobility.    Patient Centered Rounds: I performed bedside rounds with nursing staff today.   Discussions with Specialists or Other Care Team Provider:     Education and Discussions with Family / Patient:  Updated patient.     Total Time Spent on Date of Encounter in care of patient: 35 mins. This time was spent on one or more of the following: performing physical exam; counseling and coordination of care; obtaining or reviewing history; documenting in the medical record; reviewing/ordering tests, medications or procedures; communicating with other healthcare professionals and discussing with patient's family/caregivers.    Current Length of Stay: 19 day(s)  Current Patient Status: Inpatient   Certification Statement: The patient will continue to require additional inpatient hospital stay due to dispo planning  Discharge Plan: Anticipate discharge in >72 hrs to SNF    Code Status: Level 1 - Full Code    Subjective:   No events overnight.  Patient reports feeling well this morning.  Tolerating oral intake.  Denies chest pain, shortness of breath or abdominal pain.  Reports normal bowel movements.    Objective:     Vitals:   Temp (24hrs), Av.8 °F (37.1 °C), Min:98 °F (36.7 °C), Max:99.7 °F (37.6 °C)    Temp:  [98 °F (36.7 °C)-99.7 °F (37.6 °C)] 98.8 °F (37.1 °C)  HR:  [75-91] 88  Resp:  [12-20] 18  BP: (108-129)/(58-71) 129/71  SpO2:  [96 %] 96 %  Body mass index is 23.98 kg/m².      Input and Output Summary (last 24 hours):     Intake/Output Summary (Last 24 hours) at 6/26/2024 1438  Last data filed at 6/26/2024 0601  Gross per 24 hour   Intake 1180 ml   Output 505 ml   Net 675 ml       Physical Exam:   Physical Exam  Vitals and nursing note reviewed.   Constitutional:       General: He is not in acute distress.     Appearance: He is well-developed.   HENT:      Head: Normocephalic and atraumatic.   Eyes:      Conjunctiva/sclera: Conjunctivae normal.   Cardiovascular:      Rate and Rhythm: Normal rate and regular rhythm.   Pulmonary:      Effort: Pulmonary effort is normal. No respiratory distress.      Breath sounds: Normal breath sounds.   Musculoskeletal:         General: No swelling.      Cervical back: Neck supple.   Skin:     General: Skin is warm and dry.   Neurological:      Mental Status: He is alert.          Additional Data:     Labs:  Results from last 7 days   Lab Units 06/26/24  0449   WBC Thousand/uL 7.65   HEMOGLOBIN g/dL 10.3*   HEMATOCRIT % 33.8*   PLATELETS Thousands/uL 477*   SEGS PCT % 62   LYMPHO PCT % 22   MONO PCT % 11   EOS PCT % 3     Results from last 7 days   Lab Units 06/26/24  0449   SODIUM mmol/L 139   POTASSIUM mmol/L 4.0   CHLORIDE mmol/L 104   CO2 mmol/L 26   BUN mg/dL 28*   CREATININE mg/dL 1.02   ANION GAP mmol/L 9   CALCIUM mg/dL 9.0   GLUCOSE RANDOM mg/dL 93                       Lines/Drains:  Invasive Devices       Drain  Duration             External Urinary Catheter 9 days                          Imaging: No pertinent imaging reviewed.    Recent Cultures (last 7 days):         Last 24 Hours Medication List:   Current Facility-Administered Medications   Medication Dose Route Frequency Provider Last Rate    acetaminophen  975 mg Oral Q8H DAVINA Karen Guerrero PA-C      aspirin  81 mg Oral Daily Karen Guerrero PA-C      atorvastatin  80 mg Oral Daily With Dinner Karen Guerrero PA-C      bictegravir-emtricitab-tenofovir alafenamide  1 tablet  Oral Daily With Breakfast Karen Guerrero PA-C      diphenhydrAMINE  25 mg Oral Q6H PRN Iliana Cuevas PA-C      divalproex sodium  1,250 mg Oral Daily Ida Hodges MD      dolutegravir  50 mg Oral Daily Karen Guerrero PA-C      gabapentin  100 mg Oral TID Karen Guerrero PA-C      HYDROmorphone  0.5 mg Intravenous Q3H PRN Karen Guerrero PA-C      HYDROmorphone  0.2 mg Intravenous Once Stan Whitney MD      lamoTRIgine  50 mg Oral BID Ida Hodges MD      levOCARNitine  1,000 mg/day Oral TID With Meals Ida Hodges MD      lidocaine  1 patch Topical Daily Tho Laguna PA-C      losartan  50 mg Oral Daily Ida Hodges MD      melatonin  6 mg Oral HS Karen Guerrero PA-C      metoprolol succinate  25 mg Oral Q12H Leonardo Bruno MD      mirtazapine  15 mg Oral HS Karen Guerrero PA-C      ELVA ANTIFUNGAL   Topical BID Dana Rodríguez MD      ondansetron  4 mg Intravenous Q6H PRN Karen Guerrero PA-C      oxyCODONE  10 mg Oral Q6H PRN Karen Guerrero PA-C      oxyCODONE  5 mg Oral Q6H PRN Karen Guerrero PA-C      polyethylene glycol  17 g Oral Daily PRN Shruti Correa MD      rivaroxaban  20 mg Oral Daily With Dinner Stan Whitney MD      senna  2 tablet Oral BID Shruti Correa MD      sertraline  25 mg Oral Daily Karen Guerrero PA-C      sertraline  50 mg Oral Daily Karen Guerrero PA-C      tamsulosin  0.4 mg Oral Daily With Dinner Karen Guerrero PA-C      zonisamide  400 mg Oral Daily Ida Hodges MD          Today, Patient Was Seen By: Aleja Dhaliwal MD    **Please Note: This note may have been constructed using a voice recognition system.**

## 2024-06-26 NOTE — OCCUPATIONAL THERAPY NOTE
Occupational Therapy Progress Note     Patient Name: Sunil Patel  Today's Date: 6/26/2024  Problem List  Principal Problem:    Acute lower limb ischemia  Active Problems:    Bipolar affective disorder (HCC)    Human immunodeficiency virus (HIV) infection (HCC)    Benign essential hypertension    Cerebrovascular accident (CVA) (HCC)    Left ventricular thrombus    Seizures (HCC)    Carnitine deficiency (HCC)        06/26/24 1010   OT Last Visit   OT Visit Date 06/26/24   Note Type   Note Type Treatment   Pain Assessment   Pain Assessment Tool 0-10   Pain Score No Pain   Restrictions/Precautions   Weight Bearing Precautions Per Order Yes   LLE Weight Bearing Per Order NWB  (AKA)   Other Precautions Cognitive;Chair Alarm;Bed Alarm;WBS;Fall Risk;Pain   Lifestyle   Autonomy pta, pt A ADLs, A IADLs, I FM   Reciprocal Relationships facility staff are able to A when needed.   Service to Others pt reports not having any family   Intrinsic Gratification drawing/art   ADL   Where Assessed Edge of bed   Grooming Assistance 5  Supervision/Setup   Grooming Deficit Supervision/safety;Increased time to complete;Wash/dry face;Verbal cueing   Grooming Comments Pt S for grooming while washing face EOB   UB Bathing Assistance 4  Minimal Assistance   UB Bathing Deficit Supervision/safety;Increased time to complete;Verbal cueing;Right arm;Left arm;Chest   UB Bathing Comments Pt required min a for UB bathing with wipes.   LB Bathing Assistance 4  Minimal Assistance   LB Bathing Deficit Verbal cueing;Supervision/safety;Increased time to complete   LB Bathing Comments Pt min a for LB bathing, pt required vc to rememeber that CHG wipes are not to be applied to the face and groin.   UB Dressing Assistance 4  Minimal Assistance   UB Dressing Deficit Verbal cueing;Supervision/safety;Increased time to complete;Thread RUE;Thread LUE   UB Dressing Comments Pt required min a to don/[facility] gown.   Toileting Assistance  1  Total  "Assistance   Toileting Deficit Verbal cueing;Supervison/safety;Increased time to complete;Use of bedpan/urinal setup;Perineal hygiene   Toileting Comments Pt required total A for toileting with bed pan and perineal hygiene.   Functional Standing Tolerance   Time 5 mins   Activity sit>stand transfers over 1 min each   Comments Pt overall functional standing tolerance 5 minutes over 3 sit>stand transfers.   Bed Mobility   Rolling R 5  Supervision   Additional items Bedrails;Increased time required;Verbal cues   Rolling L 5  Supervision   Additional items Bedrails;Increased time required;Verbal cues   Supine to Sit 4  Minimal assistance   Additional items Assist x 1;Increased time required;Verbal cues   Sit to Supine 4  Minimal assistance   Additional items Assist x 1;Increased time required;Verbal cues   Additional Comments Pt found supine in bed, pt left supine in bed with all needs in reach. Pt required S for rolling R/L in bed with the use of bed rails. Pt required min ax1 for supine<>sit.   Transfers   Sit to Stand 4  Minimal assistance   Additional items Assist x 2;Increased time required;Verbal cues  (RW for support)   Stand to Sit 4  Minimal assistance   Additional items Assist x 2;Increased time required;Verbal cues  (rw for support)   Additional Comments Pt required min ax2 with rw for support for transfers.   Functional Mobility   Functional Mobility 4  Minimal assistance   Additional Comments Pt required min ax2 FM with rw for support. Pt progressed to mod ax2 with rw for support 2* to fatigue.   Additional items Rolling walker   Subjective   Subjective \"you can bet your back pack Im ready for therapy\"   Cognition   Overall Cognitive Status WFL   Arousal/Participation Alert;Cooperative   Attention Attends with cues to redirect   Orientation Level Disoriented to situation   Memory Decreased recall of recent events   Following Commands Follows one step commands without difficulty   Comments Pt is alert and " cooperative throughout OT treatment session. Pt with poor insight to condition, pt displays fair safety awareness and is continuing to progress deomonstrating understanding of safety precautions throughout session.   Activity Tolerance   Activity Tolerance Patient tolerated treatment well;Patient limited by fatigue   Medical Staff Made Aware PT 2* to pts med complexity, comorbidities, and regression from baseline.   Assessment   Assessment Pt seen today for OT session focusing on training ADL tasks, transfers, body mechanics, improving endurance and tolerance during functional ADL activities. Pt was found supine in bed and was left supine in bed with bed/chair alarm on and all needs in reach. Pt completed bed mobility with S for R/L rolling, min ax1 for supine to sit, sit to stand transfers with min Ax2 with rw for support, and FM with min ax2 progressing to mod ax2 with RW for support. Pt S for grooming while washing face EOB.  Pt required min a for UB bathing with wipes, required vc for remembering that CHG wipes are not to be applied to face or groin. Pt required min a to don/[facility] gown. Pt required total A for toileting with bed pan and perineal hygiene.  Pt has improvements in activity tolerance, endurance, and task completion; however, is still limited 2* decreased ADL/High-level ADL status, decreased activity tolerance/endurance, decreased self-care trans, decreased safety awareness, impaired balance, impaired cognition, and poor insight to condition. The patient's raw score on the AM-PAC Daily Activity Inpatient Short Form is 16. A raw score of less than 19 suggests the patient may benefit from discharge to post-acute rehabilitation services. Please refer to the recommendation of the Occupational Therapist for safe discharge planning. From OT perspective, pt should discharge Level II.   Plan   Treatment Interventions ADL retraining;Functional transfer training;UE strengthening/ROM;Endurance  training;Cognitive reorientation;Equipment evaluation/education;Compensatory technique education;Continued evaluation;Energy conservation;Activityengagement   Goal Expiration Date 07/15/24   OT Treatment Day 6   OT Frequency 2-3x/wk   Discharge Recommendation   Rehab Resource Intensity Level, OT I (Maximum Resource Intensity)   AM-PAC Daily Activity Inpatient   Lower Body Dressing 2   Bathing 3   Toileting 1   Upper Body Dressing 3   Grooming 3   Eating 4   Daily Activity Raw Score 16   Daily Activity Standardized Score (Calc for Raw Score >=11) 35.96   AM-PAC Applied Cognition Inpatient   Following a Speech/Presentation 3   Understanding Ordinary Conversation 3   Taking Medications 2   Remembering Where Things Are Placed or Put Away 2   Remembering List of 4-5 Errands 2   Taking Care of Complicated Tasks 1   Applied Cognition Raw Score 13   Applied Cognition Standardized Score 30.46   Modified [place] Scale   Modified [place] Scale 4   End of Consult   Education Provided Yes   Patient Position at End of Consult Supine;All needs within reach   Nurse Communication Nurse aware of consult   JAMAR Ch[place]

## 2024-06-27 LAB
BACTERIA UR CULT: ABNORMAL
BACTERIA UR CULT: ABNORMAL

## 2024-06-27 PROCEDURE — 97530 THERAPEUTIC ACTIVITIES: CPT

## 2024-06-27 PROCEDURE — 99232 SBSQ HOSP IP/OBS MODERATE 35: CPT | Performed by: STUDENT IN AN ORGANIZED HEALTH CARE EDUCATION/TRAINING PROGRAM

## 2024-06-27 PROCEDURE — 97542 WHEELCHAIR MNGMENT TRAINING: CPT

## 2024-06-27 PROCEDURE — 97535 SELF CARE MNGMENT TRAINING: CPT

## 2024-06-27 PROCEDURE — 99233 SBSQ HOSP IP/OBS HIGH 50: CPT

## 2024-06-27 PROCEDURE — 97116 GAIT TRAINING THERAPY: CPT

## 2024-06-27 RX ADMIN — ACETAMINOPHEN 975 MG: 325 TABLET, FILM COATED ORAL at 14:13

## 2024-06-27 RX ADMIN — GABAPENTIN 100 MG: 100 CAPSULE ORAL at 09:27

## 2024-06-27 RX ADMIN — MICONAZOLE NITRATE: 20 CREAM TOPICAL at 17:35

## 2024-06-27 RX ADMIN — MIRTAZAPINE 15 MG: 15 TABLET, FILM COATED ORAL at 21:20

## 2024-06-27 RX ADMIN — ZONISAMIDE 400 MG: 100 CAPSULE ORAL at 09:26

## 2024-06-27 RX ADMIN — SERTRALINE HYDROCHLORIDE 50 MG: 50 TABLET ORAL at 09:59

## 2024-06-27 RX ADMIN — LEVOCARNITINE 330 MG: 1 SOLUTION ORAL at 09:28

## 2024-06-27 RX ADMIN — GABAPENTIN 100 MG: 100 CAPSULE ORAL at 15:45

## 2024-06-27 RX ADMIN — BICTEGRAVIR SODIUM, EMTRICITABINE, AND TENOFOVIR ALAFENAMIDE FUMARATE 1 TABLET: 50; 200; 25 TABLET ORAL at 09:00

## 2024-06-27 RX ADMIN — ACETAMINOPHEN 975 MG: 325 TABLET, FILM COATED ORAL at 05:45

## 2024-06-27 RX ADMIN — METOPROLOL SUCCINATE 25 MG: 25 TABLET, EXTENDED RELEASE ORAL at 05:45

## 2024-06-27 RX ADMIN — ATORVASTATIN CALCIUM 80 MG: 80 TABLET, FILM COATED ORAL at 15:45

## 2024-06-27 RX ADMIN — LAMOTRIGINE 50 MG: 25 TABLET ORAL at 17:35

## 2024-06-27 RX ADMIN — MICONAZOLE NITRATE 1 APPLICATION: 20 CREAM TOPICAL at 09:37

## 2024-06-27 RX ADMIN — SERTRALINE HYDROCHLORIDE 25 MG: 50 TABLET ORAL at 09:27

## 2024-06-27 RX ADMIN — SENNOSIDES 17.2 MG: 8.6 TABLET, FILM COATED ORAL at 17:35

## 2024-06-27 RX ADMIN — GABAPENTIN 100 MG: 100 CAPSULE ORAL at 21:20

## 2024-06-27 RX ADMIN — LAMOTRIGINE 50 MG: 25 TABLET ORAL at 09:27

## 2024-06-27 RX ADMIN — LIDOCAINE 1 PATCH: 700 PATCH TOPICAL at 09:29

## 2024-06-27 RX ADMIN — DIVALPROEX SODIUM 1250 MG: 500 TABLET, EXTENDED RELEASE ORAL at 09:27

## 2024-06-27 RX ADMIN — ASPIRIN 81 MG: 81 TABLET, COATED ORAL at 09:26

## 2024-06-27 RX ADMIN — Medication 6 MG: at 21:20

## 2024-06-27 RX ADMIN — ACETAMINOPHEN 975 MG: 325 TABLET, FILM COATED ORAL at 21:20

## 2024-06-27 RX ADMIN — LEVOCARNITINE 330 MG: 1 SOLUTION ORAL at 14:12

## 2024-06-27 RX ADMIN — SENNOSIDES 17.2 MG: 8.6 TABLET, FILM COATED ORAL at 09:27

## 2024-06-27 RX ADMIN — LEVOCARNITINE 330 MG: 1 SOLUTION ORAL at 17:36

## 2024-06-27 RX ADMIN — DOLUTEGRAVIR SODIUM 50 MG: 50 TABLET, FILM COATED ORAL at 09:26

## 2024-06-27 RX ADMIN — RIVAROXABAN 20 MG: 20 TABLET, FILM COATED ORAL at 15:45

## 2024-06-27 RX ADMIN — TAMSULOSIN HYDROCHLORIDE 0.4 MG: 0.4 CAPSULE ORAL at 15:45

## 2024-06-27 NOTE — PLAN OF CARE
Problem: PHYSICAL THERAPY ADULT  Goal: Performs mobility at highest level of function for planned discharge setting.  See evaluation for individualized goals.  Description: Treatment/Interventions: ADL retraining, Functional transfer training, LE strengthening/ROM, Therapeutic exercise, Endurance training, Patient/family training, Equipment eval/education, Bed mobility, Gait training, Spoke to nursing, Spoke to case management, OT, Elevations, Cognitive reorientation  Equipment Recommended:  (tbd)       See flowsheet documentation for full assessment, interventions and recommendations.  6/27/2024 1454 by Stacy Reagan, PT  Outcome: Progressing  Note: Prognosis: Good  Problem List: Decreased endurance, Decreased strength, Decreased mobility, Decreased cognition, Decreased safety awareness, Pain  Assessment: Patient received in bed with call bell ringing. Patient approached and patient reports needing help due to having had BM, and requires assistance for hygiene. Patient performs rolling L/R x2 in bed with supervision. Increased time needed for linen change and hygiene. Patient with +BM. Patient able to mobilize to EOB with Min Ax1 and returns to supine with Min Ax1. Patient left in bed with all needs met and call bell/personal items within reach. The patient's AM-PAC Basic Mobility Inpatient Short Form Raw Score is 10 showing further need for skilled PT services in order to improve functional mobility, decrease need for assistance, and return to PLOF. PT is recommending Level 1 - Maximum Resource Intensity on d/c from hospital.  Will continue to follow as able.  Barriers to Discharge: Decreased caregiver support     Rehab Resource Intensity Level, PT: I (Maximum Resource Intensity)    See flowsheet documentation for full assessment.

## 2024-06-27 NOTE — OCCUPATIONAL THERAPY NOTE
Occupational Therapy Progress Note     Patient Name: Sunil Patel  Today's Date: 6/27/2024  Problem List  Principal Problem:    Acute lower limb ischemia  Active Problems:    Bipolar affective disorder (HCC)    Human immunodeficiency virus (HIV) infection (HCC)    Benign essential hypertension    Cerebrovascular accident (CVA) (HCC)    Left ventricular thrombus    Seizures (HCC)    Carnitine deficiency (HCC)           06/27/24 1044   OT Last Visit   OT Visit Date 06/27/24   Note Type   Note Type Treatment   Pain Assessment   Pain Assessment Tool 0-10   Pain Score No Pain   Restrictions/Precautions   Weight Bearing Precautions Per Order Yes   LLE Weight Bearing Per Order NWB  (aka)   Other Precautions Cognitive;Chair Alarm;Bed Alarm;WBS;Fall Risk   ADL   Where Assessed Edge of bed   Grooming Assistance 5  Supervision/Setup   Grooming Deficit Teeth care   UB Bathing Assistance 4  Minimal Assistance   UB Bathing Deficit Chest;Left arm;Abdomen;Right arm   UB Bathing Comments mostly S, occasional min A for following commands thoroughly.   LB Bathing Assistance 4  Minimal Assistance   LB Bathing Deficit Right upper leg;Left upper leg;Buttocks;Perineal area;Left lower leg including foot;Right lower leg including foot   LB Bathing Comments min A for lower LE, but while pt sitting EOB he is able to manage with min A overall   UB Dressing Assistance 5  Supervision/Setup   UB Dressing Deficit Thread LUE;Thread RUE   LB Dressing Assistance 2  Maximal Assistance   LB Dressing Deficit Don/doff R sock   Bed Mobility   Supine to Sit 4  Minimal assistance   Additional items Assist x 1   Sit to Supine 4  Minimal assistance   Additional items Assist x 1   Additional Comments sat EOB about 10-15 mins while completing adl's.   Transfers   Sit to Stand 3  Moderate assistance   Additional items Assist x 2   Stand to Sit 3  Moderate assistance   Additional items Assist x 2;Increased time required   Additional Comments progressed to min  ax2 with time and repetition   Functional Mobility   Functional Mobility 4  Minimal assistance   Additional Comments ax1, hopping with close standby and chair follow.   Additional items Rolling walker   Toilet Transfers   Toilet Transfer From Rolling walker   Toilet Transfer Type To and from   Toilet Transfer to Standard bedside commode   Toilet Transfer Technique Ambulating   Toilet Transfers Minimal assistance   Cognition   Overall Cognitive Status Impaired   Arousal/Participation Responsive;Alert;Cooperative   Attention Attends with cues to redirect   Orientation Level Oriented to person;Oriented to place   Memory Decreased recall of recent events;Decreased recall of precautions   Following Commands Follows one step commands with increased time or repetition   Comments pt very pleasant, cooperative and motivated to engage in therapy session. does require occasional vc for redirection, though this is improved as compared to previous session.   Activity Tolerance   Activity Tolerance Patient tolerated treatment well   Medical Staff Made Aware DPT 2' pts med complexity, comorbidities and regression from baseline.   Assessment   Assessment Pt seen today at request of [facility] physician. Focused on ADL retraining, cognitive reorientation, body mechanics, transfer retraining, increasing activity tolerance/endurance and EOB sitting to increase ability to participate in ADL/functional tasks. Pt was found in bed and was left in bed w/ all needs within reach, bed alarm on. Pt completed bed mob w min ax1, sat eob while completing adls, which were completed with S/min A, please see above for further detail. STS completed w mod ax2 c rw, progressing to min ax2. Hopping performed with min ax1 c rw for support. Pt w/ improvements in activity tolerance, transfer ability, adl task completion, however is still limited 2* decreased ADL/High-level ADL status, decreased activity tolerance/endurance, decreased cognition, decreased  self-care trans, decreased safety awareness and insight to condition. The patient's raw score on the -PAC Daily Activity Inpatient Short Form is 16. A raw score of less than 19 suggests the patient may benefit from discharge to post-acute rehabilitation services. Please refer to the recommendation of the Occupational Therapist for safe discharge planning. Recommending pt D/C to level 1 services  when medically stable. Pt will continue to benefit from acute OT services to meet goals.   Plan   Treatment Interventions ADL retraining;Functional transfer training;Endurance training;Cognitive reorientation;Energy conservation;Activityengagement   Goal Expiration Date 07/15/24   OT Treatment Day 7   OT Frequency 2-3x/wk   Discharge Recommendation   Rehab Resource Intensity Level, OT I (Maximum Resource Intensity)   -PAC Daily Activity Inpatient   Lower Body Dressing 2   Bathing 3   Toileting 2   Upper Body Dressing 3   Grooming 3   Eating 3   Daily Activity Raw Score 16   Daily Activity Standardized Score (Calc for Raw Score >=11) 35.96   -PAC Applied Cognition Inpatient   Following a Speech/Presentation 3   Understanding Ordinary Conversation 3   Taking Medications 2   Remembering Where Things Are Placed or Put Away 2   Remembering List of 4-5 Errands 2   Taking Care of Complicated Tasks 1   Applied Cognition Raw Score 13   Applied Cognition Standardized Score 30.46   Modified Shasta Scale   Modified [place]asta Scale 4   End of Consult   Education Provided Yes   Patient Position at End of Consult All needs within reach;Bed/Chair alarm activated;Supine   Nurse Communication Nurse aware of consult       YUKI Brady, OTR/L

## 2024-06-27 NOTE — CASE MANAGEMENT
Case Management Discharge Planning Note    Patient name Sunil Patel  [facility] MRN [id number]  : 1960 Date 2024       Current Admission Date: 2024  Current Admission Diagnosis:Acute lower limb ischemia   Patient Active Problem List    Diagnosis Date Noted Date Diagnosed    Carnitine deficiency (HCC) 2024     Acute lower limb ischemia 2024     Left ventricular thrombus 2024     Seizures ([facility]) 2024     Tobacco abuse 10/26/2019     Cerebrovascular accident (CVA) ([facility]) 10/26/2019     Positive laboratory testing for human immunodeficiency virus ([facility]) 2017     Bipolar affective disorder ([facility]) 2017     Hypertension 2017     Human immunodeficiency virus (HIV) infection ([facility]) 2017     History of transient cerebral ischemia 2017     Substance abuse ([facility]) 2013     Unspecified vitamin D deficiency 2010     Benign essential hypertension 2010       LOS (days): 20  Geometric Mean LOS (GMLOS) (days): 4  Days to GMLOS:-15.7     OBJECTIVE:  Risk of Unplanned Readmission Score: 28.55         Current admission status: Inpatient   Preferred Pharmacy:   [facility] & [address] [address]  Phone: [phone] Fax: [phone]    Primary Care Provider: CHARLIE Trevino    Primary Insurance: MEDICARE  Secondary Insurance: [facility]    DISCHARGE DETAILS:    Follow meeting was held today to discuss this Pts complex discharge. The following participants were in attendance:     Kika Edmond, LTSS Triage Manager; [facility] [place]; ; [facility] YEVEGNIY Mcgee Supervisor; [facility], RN; Office of Long Term Living  Triny Arceo,  Inpatient Care Management; ELENA Diaz; Inpatient Care  Management Manager; ELENA Kay, ; ELENA      At this time, this Pt has a pending PM&R consult pending. Pt is rec’d for acute rehab.     Pt also has  Neuropsych consult pending.  CM anticipates this Pt may need a formal guardian to make decision on his behalf in the future.     CM dept will continue to follow.

## 2024-06-27 NOTE — PHYSICAL THERAPY NOTE
PHYSICAL THERAPY NOTE          Patient Name: Sunil Patel  Today's Date: 6/27/2024 06/27/24 1045   PT Last Visit   PT Visit Date 06/27/24   Note Type   Note Type Treatment   Pain Assessment   Pain Assessment Tool 0-10   Pain Score No Pain   Restrictions/Precautions   Weight Bearing Precautions Per Order Yes   LLE Weight Bearing Per Order NWB  (AKA)   Other Precautions Fall Risk;Pain;WBS;Bed Alarm;Chair Alarm;Cognitive   General   Chart Reviewed Yes   Family/Caregiver Present No   Cognition   Overall Cognitive Status Impaired   Arousal/Participation Alert;Cooperative   Attention Attends with cues to redirect   Orientation Level Oriented to person;Oriented to place;Disoriented to time;Disoriented to situation   Memory Decreased recall of recent events   Following Commands Follows one step commands with increased time or repetition   Comments Patient is pleasant and cooperative   Subjective   Subjective Patient agreeable to PT tx   Bed Mobility   Rolling R 5  Supervision   Rolling L 5  Supervision   Supine to Sit 4  Minimal assistance   Additional items Assist x 1;Increased time required;Verbal cues   Sit to Supine 4  Minimal assistance   Additional items Assist x 1;Increased time required;Verbal cues   Transfers   Sit to Stand 3  Moderate assistance   Additional items Assist x 2;Increased time required;Verbal cues   Stand to Sit 3  Moderate assistance   Additional items Assist x 2;Increased time required;Verbal cues   Stand pivot 3  Moderate assistance   Additional items Assist x 2;Increased time required;Verbal cues   Additional Comments Mod Ax2 progressing to Min Ax2 - RW - STS x5 - SPT x3   Ambulation/Elevation   Gait pattern   (Hop to gait pattern)   Gait Assistance 4  Minimal assist   Additional items Verbal cues;Assist x 1  (SBA of another for safety and chair follow)   Assistive Device Rolling walker   Distance  3'+3'+25'+25'+3'   Wheelchair Activities   Wheelchair Cushion Standard   Pressure Relief Type Push up   Level of Assistance for Pressure Relief Activities Close supervision   Wheelchair Parts Management Yes   Left Brakes Level of Assistance Minimal verbal cues   Right Brakes Level of Assistance Minimal verbal cues   Propulsion Yes   Propulsion Type 1 Manual   Level 1 Level tile   Method 1 Right upper extremity;Left upper extremity   Level of Assistance 1 Close supervision   Description/ Details 1 100'+45'   Balance   Static Sitting Fair +   Dynamic Sitting Fair   Static Standing Poor +   Dynamic Standing Poor   Ambulatory Poor +   Endurance Deficit   Endurance Deficit Yes   Endurance Deficit Description fatigue, weakness   Activity Tolerance   Activity Tolerance Patient tolerated treatment well   Medical Staff Made Aware SPT, OT, OTS   Nurse Made Aware RN cleared   Assessment   Prognosis Good   Problem List Decreased endurance;Decreased strength;Decreased mobility;Decreased cognition;Decreased safety awareness;Pain   Assessment Patient received in bed. Patient agreeable to therapy. Patient performs bed mobility with supervision to complete rolling L/R. Patient performs bed mobility to EOB with Min Ax1. At EOB patient with fair sitting balance, and able to perform reaching activities and weight shifting without LOB. Patient performs functional transfers with moderate assistance x2 progressing to Min Ax2 using rolling walker. Patient performs x5 STS throughout session and x3 SPT throughout session. Patient performs wheel chair mobility using BUE for propulsion, 100'+45' on level surface with close supervision, min verbal cues for brake use. Patient performs ambulation with minimal assistance x1 using rolling walker, 3'+3'+25'+25'+3', utilizing hop to gait pattern.  Patient returns to bed following activity with Min A. Patient left in bed with all needs met and call bell/personal items within reach. The patient's  AM-PAC Basic Mobility Inpatient Short Form Raw Score is 10 showing further need for skilled PT services in order to improve functional mobility, decrease need for assistance, and return to PLOF. PT is recommending Level 1 - Maximum Resource Intensity on d/c from hospital.  Will continue to follow as able.   Barriers to Discharge Decreased caregiver support   Goals   Patient Goals to keep improving   PT Treatment Day 7   Plan   Treatment/Interventions Functional transfer training;LE strengthening/ROM;ADL retraining;Therapeutic exercise;Endurance training;Equipment eval/education;Bed mobility;Gait training;Compensatory technique education;Spoke to nursing;Spoke to case management;OT;Cognitive reorientation   Progress Progressing toward goals   PT Frequency 3-5x/wk   Discharge Recommendation   Rehab Resource Intensity Level, PT I (Maximum Resource Intensity)   Equipment Recommended Wheelchair;Walker   Wheelchair Package Recommended Standard   Change or Add item to Wheelchair Package? No   Walker Package Recommended Wheeled walker   AM-[facility] Basic Mobility Inpatient   Turning in Flat Bed Without Bedrails 3   Lying on Back to Sitting on Edge of Flat Bed Without Bedrails 3   Moving Bed to Chair 1   Standing Up From Chair Using Arms 1   Walk in Room 1   Climb 3-5 Stairs With Railing 1   Basic Mobility Inpatient Raw Score 10   Turning Head Towards Sound 4   Follow Simple Instructions 3   Low Function Basic Mobility Raw Score  17   Low Function Basic Mobility Standardized Score  27.46   [facility] Highest Level Of Mobility   -[facility] Goal 4: Move to chair/commode   -[facility] Achieved 7: Walk 25 feet or more   End of Consult   Patient Position at End of Consult All needs within reach;Supine;Bed/Chair alarm activated     Stacy Mehta, PT, DPT

## 2024-06-27 NOTE — PHYSICAL THERAPY NOTE
"                                                                                  PHYSICAL THERAPY NOTE          Patient Name: Sunil Patel  Today's Date: 6/27/2024 06/27/24 1356   PT Last Visit   PT Visit Date 06/27/24   Note Type   Note Type Treatment   Pain Assessment   Pain Assessment Tool 0-10   Pain Score No Pain   Restrictions/Precautions   Weight Bearing Precautions Per Order Yes   LLE Weight Bearing Per Order NWB  (AKA)   Other Precautions Fall Risk;Pain;WBS;Bed Alarm;Chair Alarm;Cognitive   General   Chart Reviewed Yes   Family/Caregiver Present No   Cognition   Overall Cognitive Status Impaired   Arousal/Participation Alert;Cooperative   Attention Attends with cues to redirect   Orientation Level Oriented to person;Oriented to place;Disoriented to time;Disoriented to situation   Memory Decreased recall of recent events   Following Commands Follows one step commands with increased time or repetition   Comments Patient is pleasant and cooperative   Subjective   Subjective \"I need help\"   Bed Mobility   Rolling R 5  Supervision   Rolling L 5  Supervision   Supine to Sit 4  Minimal assistance   Additional items Assist x 1;Increased time required;Verbal cues   Sit to Supine 4  Minimal assistance   Additional items Assist x 1;Increased time required;Verbal cues   Additional Comments rolling L/R x2   Balance   Static Sitting Fair +   Dynamic Sitting Fair   Static Standing Poor +   Dynamic Standing Poor   Ambulatory Poor +   Endurance Deficit   Endurance Deficit No   Activity Tolerance   Activity Tolerance Patient tolerated treatment well   Medical Staff Made Aware SPT   Nurse Made Aware RN cleared and updated   Assessment   Prognosis Good   Problem List Decreased endurance;Decreased strength;Decreased mobility;Decreased cognition;Decreased safety awareness;Pain   Assessment Patient received in bed with call bell ringing. Patient approached and patient reports needing help due to having had BM, and " requires assistance for hygiene. Patient performs rolling L/R x2 in bed with supervision. Increased time needed for linen change and hygiene. Patient with +BM. Patient able to mobilize to EOB with Min Ax1 and returns to supine with Min Ax1. Patient left in bed with all needs met and call bell/personal items within reach. The patient's AM-PAC Basic Mobility Inpatient Short Form Raw Score is 10 showing further need for skilled PT services in order to improve functional mobility, decrease need for assistance, and return to PLOF. PT is recommending Level 1 - Maximum Resource Intensity on d/c from hospital.  Will continue to follow as able.   Barriers to Discharge Decreased caregiver support   Goals   Patient Goals to hopefully leave the hospital soon   PT Treatment Day 7   Plan   Treatment/Interventions Functional transfer training;LE strengthening/ROM;ADL retraining;Therapeutic exercise;Endurance training;Equipment eval/education;Bed mobility;Gait training;Compensatory technique education;Spoke to nursing;Spoke to case management;OT;Cognitive reorientation   Progress Progressing toward goals   PT Frequency 3-5x/wk   Discharge Recommendation   Rehab Resource Intensity Level, PT I (Maximum Resource Intensity)   Equipment Recommended Walker;Wheelchair   Wheelchair Package Recommended Standard   Change or Add item to Wheelchair Package? No   Walker Package Recommended Wheeled walker   AM-PAC Basic Mobility Inpatient   Turning in Flat Bed Without Bedrails 3   Lying on Back to Sitting on Edge of Flat Bed Without Bedrails 3   Moving Bed to Chair 1   Standing Up From Chair Using Arms 1   Walk in Room 1   Climb 3-5 Stairs With Railing 1   Basic Mobility Inpatient Raw Score 10   Turning Head Towards Sound 4   Follow Simple Instructions 3   Low Function Basic Mobility Raw Score  17   Low Function Basic Mobility Standardized Score  27.46   [facility] Highest Level Of Mobility   [facility] Goal 4: Move to chair/commode   [facility]  Achieved 3: Sit at edge of bed   End of Consult   Patient Position at End of Consult All needs within reach;Supine;Bed/Chair alarm activated       Stacy Mehta, PT, DPT

## 2024-06-27 NOTE — PROGRESS NOTES
[facility]  Progress Note  Name: Sunil Patel I  MRN: [id number]  [facility] I Date of Admission: 6/7/2024   Date of Service: 6/27/2024 I Hospital Day: 20    Assessment & Plan   Carnitine deficiency (HCC)  Assessment & Plan  Continue Levocarnitine replacement therapy TID    Seizures (HCC)  Assessment & Plan  Diagnosed in July 2019 - follows with [facility] neurology  Continue Depakote/Lamictal/Zonisamide > doses now adjusted per most recent outpatient neurology changes on record -> Depakote 1250 mg daily, Zonisamide 400 mg daily, and Lamictal 50 mg BID     Left ventricular thrombus  Assessment & Plan  Filling defect in the left ventricular apex suggests left ventricular thrombus and the source of the embolic disease  Echo showed ejection fraction 40 to 45%, mild global hypokinesis, LV thrombus  Underwent pharmacological stress test. Per cardiology, no significant areas of ischemia and recommended for medical treatment. Possible stress induced cardiomyopathy as an etiology of his reduced EF.   Continue metoprolol 25 mg twice daily, Cozaar 50 mg daily and Xarelto 20 mg daily    Cerebrovascular accident (CVA) ([facility])  Assessment & Plan  History of CVA in the left MCA territory in May 2018  Continue aspirin, atorvastatin    Benign essential hypertension  Assessment & Plan  Blood pressures reviewed and acceptable  Continue losartan and metoprolol    Human immunodeficiency virus (HIV) infection ([facility])  Assessment & Plan  Continue Biktarvy/Tivicay daily and Cabenuva monthly injections       Bipolar affective disorder (HCC)  Assessment & Plan  Continue Zoloft 75 mg daily and Remeron 15 mg at bedtime  Neuropsych consulted as concern of impaired capacity    * Acute lower limb ischemia  Assessment & Plan  Patient presented with left lower extremity acute limb ischemia with extensive left iliofemoral and left infrainguinal embolism with nonviable left lower extremity  Status post left  femoral thrombectomy and AKA on   Continue Xarelto   Continue scheduled Tylenol and as needed oxycodone  Continue gabapentin 100 mg 3 times daily  Plan for rehab at discharge.  PMNR consult reviewed, appreciate input                     VTE Pharmacologic Prophylaxis: VTE Score: 3 Moderate Risk (Score 3-4) - Pharmacological DVT Prophylaxis Ordered: rivaroxaban (Xarelto).    Mobility:   Basic Mobility Inpatient Raw Score: 10  JH-HLM Goal: 4: Move to chair/commode  JH-HLM Achieved: 3: Sit at edge of bed  JH-HLM Goal NOT achieved. Continue with multidisciplinary rounding and encourage appropriate mobility to improve upon JH-HLM goals.    Patient Centered Rounds: I performed bedside rounds with nursing staff today.   Discussions with Specialists or Other Care Team Provider: CM, PM&R    Education and Discussions with Family / Patient:  Updated patient.     Total Time Spent on Date of Encounter in care of patient: 35 mins. This time was spent on one or more of the following: performing physical exam; counseling and coordination of care; obtaining or reviewing history; documenting in the medical record; reviewing/ordering tests, medications or procedures; communicating with other healthcare professionals and discussing with patient's family/caregivers.    Current Length of Stay: 20 day(s)  Current Patient Status: Inpatient   Certification Statement: The patient will continue to require additional inpatient hospital stay due to dispo planning, neuropsych evaluation  Discharge Plan: Anticipate discharge in 24-48 hrs to rehab facility.    Code Status: Level 1 - Full Code    Subjective:   No events overnight.  Patient reports feeling well this morning.  No complaints.  Tolerating oral intake.  No abdominal pain.    Objective:     Vitals:   Temp (24hrs), Av.1 °F (37.3 °C), Min:98.7 °F (37.1 °C), Max:99.4 °F (37.4 °C)    Temp:  [98.7 °F (37.1 °C)-99.4 °F (37.4 °C)] 99.2 °F (37.3 °C)  HR:  [91-94] 91  Resp:  [17] 17  BP:  ()/(55-73) 104/66  SpO2:  [97 %] 97 %  Body mass index is 23.98 kg/m².     Input and Output Summary (last 24 hours):     Intake/Output Summary (Last 24 hours) at 6/27/2024 1543  Last data filed at 6/27/2024 0930  Gross per 24 hour   Intake 960 ml   Output 645 ml   Net 315 ml       Physical Exam:   Physical Exam  Vitals and nursing note reviewed.   Constitutional:       General: He is not in acute distress.     Appearance: He is well-developed.   HENT:      Head: Normocephalic and atraumatic.   Eyes:      Conjunctiva/sclera: Conjunctivae normal.   Cardiovascular:      Rate and Rhythm: Normal rate and regular rhythm.   Pulmonary:      Effort: Pulmonary effort is normal. No respiratory distress.      Breath sounds: Normal breath sounds. No wheezing.   Abdominal:      General: Bowel sounds are normal. There is no distension.      Palpations: Abdomen is soft.      Tenderness: There is no abdominal tenderness.   Musculoskeletal:      Cervical back: Neck supple.   Skin:     General: Skin is warm and dry.   Neurological:      Mental Status: He is alert.          Additional Data:     Labs:  Results from last 7 days   Lab Units 06/26/24  0449   WBC Thousand/uL 7.65   HEMOGLOBIN g/dL 10.3*   HEMATOCRIT % 33.8*   PLATELETS Thousands/uL 477*   SEGS PCT % 62   LYMPHO PCT % 22   MONO PCT % 11   EOS PCT % 3     Results from last 7 days   Lab Units 06/26/24  0449   SODIUM mmol/L 139   POTASSIUM mmol/L 4.0   CHLORIDE mmol/L 104   CO2 mmol/L 26   BUN mg/dL 28*   CREATININE mg/dL 1.02   ANION GAP mmol/L 9   CALCIUM mg/dL 9.0   GLUCOSE RANDOM mg/dL 93                       Lines/Drains:  Invasive Devices       None                         Imaging: No pertinent imaging reviewed.    Recent Cultures (last 7 days):   Results from last 7 days   Lab Units 06/25/24  1834   URINE CULTURE  >100,000 cfu/ml Proteus mirabilis*       Last 24 Hours Medication List:   Current Facility-Administered Medications   Medication Dose Route Frequency  Provider Last Rate    acetaminophen  975 mg Oral Q8H [facility] Karen Guerrero PA-C      aspirin  81 mg Oral Daily Karen Guerrero PA-C      atorvastatin  80 mg Oral Daily With Dinner Karen Guerrero PA-C      bictegravir-emtricitab-tenofovir alafenamide  1 tablet Oral Daily With Breakfast Karen Guerrero PA-C      diphenhydrAMINE  25 mg Oral Q6H PRN Iliana Cuevas PA-C      divalproex sodium  1,250 mg Oral Daily Ida Hodges MD      dolutegravir  50 mg Oral Daily Karen Guerrero PA-C      gabapentin  100 mg Oral TID Karen Guerrero PA-C      HYDROmorphone  0.5 mg Intravenous Q3H PRN Karen Guerrero PA-C      HYDROmorphone  0.2 mg Intravenous Once Stan Whitney MD      lamoTRIgine  50 mg Oral BID Ida Hodges MD      levOCARNitine  1,000 mg/day Oral TID With Meals Ida Hodges MD      lidocaine  1 patch Topical Daily Tho Laguna PA-C      losartan  50 mg Oral Daily Ida Hodges MD      melatonin  6 mg Oral HS Karen Guerrero PA-C      metoprolol succinate  25 mg Oral Q12H Leonardo Bruno MD      mirtazapine  15 mg Oral HS Karen Guerrero PA-C      ELVA ANTIFUNGAL   Topical BID Dana Rodríguez MD      ondansetron  4 mg Intravenous Q6H PRN Karen Guerrero PA-C      oxyCODONE  10 mg Oral Q6H PRN Karen Guerrero PA-C      oxyCODONE  5 mg Oral Q6H PRN Karen Guerrero PA-C      polyethylene glycol  17 g Oral Daily PRN Shruti Correa MD      rivaroxaban  20 mg Oral Daily With Dinner Stan Whitney MD      senna  2 tablet Oral BID Shruti Correa MD      [START ON 6/28/2024] sertraline  75 mg Oral Daily Aleja Dhaliwal MD      tamsulosin  0.4 mg Oral Daily With Dinner Karen Guerrero PA-C      zonisamide  400 mg Oral Daily Ida Hodges MD          Today, Patient Was Seen By: Aleja Dhaliwal MD    **Please Note: This note may have been constructed using a voice recognition system.**

## 2024-06-27 NOTE — PLAN OF CARE
Problem: OCCUPATIONAL THERAPY ADULT  Goal: Performs self-care activities at highest level of function for planned discharge setting.  See evaluation for individualized goals.  Description: Treatment Interventions: ADL retraining, Functional transfer training, Endurance training, Cognitive reorientation, Energy conservation, Activityengagement          See flowsheet documentation for full assessment, interventions and recommendations.   Outcome: Progressing  Note: Limitation: Decreased ADL status, Decreased Safe judgement during ADL, Decreased cognition, Decreased endurance, Decreased sensation, Decreased self-care trans, Decreased high-level ADLs  Prognosis: Good  Assessment: Pt seen today at request of [facility] physician. Focused on ADL retraining, cognitive reorientation, body mechanics, transfer retraining, increasing activity tolerance/endurance and EOB sitting to increase ability to participate in ADL/functional tasks. Pt was found in bed and was left in bed w/ all needs within reach, bed alarm on. Pt completed bed mob w min ax1, sat eob while completing adls, which were completed with S/min A, please see above for further detail. STS completed w mod ax2 c rw, progressing to min ax2. Hopping performed with min ax1 c rw for support. Pt w/ improvements in activity tolerance, transfer ability, adl task completion, however is still limited 2* decreased ADL/High-level ADL status, decreased activity tolerance/endurance, decreased cognition, decreased self-care trans, decreased safety awareness and insight to condition. The patient's raw score on the AM-PAC Daily Activity Inpatient Short Form is 16. A raw score of less than 19 suggests the patient may benefit from discharge to post-acute rehabilitation services. Please refer to the recommendation of the Occupational Therapist for safe discharge planning. Recommending pt D/C to level 1 services  when medically stable. Pt will continue to benefit from acute OT services to  meet goals.     Rehab Resource Intensity Level, OT: I (Maximum Resource Intensity)

## 2024-06-27 NOTE — ASSESSMENT & PLAN NOTE
Filling defect in the left ventricular apex suggests left ventricular thrombus and the source of the embolic disease  Echo showed ejection fraction 40 to 45%, mild global hypokinesis, LV thrombus  Underwent pharmacological stress test. Per cardiology, no significant areas of ischemia and recommended for medical treatment. Possible stress induced cardiomyopathy as an etiology of his reduced EF.   Continue metoprolol 25 mg twice daily, Cozaar 50 mg daily and Xarelto 20 mg daily

## 2024-06-27 NOTE — PLAN OF CARE
Problem: PHYSICAL THERAPY ADULT  Goal: Performs mobility at highest level of function for planned discharge setting.  See evaluation for individualized goals.  Description: Treatment/Interventions: ADL retraining, Functional transfer training, LE strengthening/ROM, Therapeutic exercise, Endurance training, Patient/family training, Equipment eval/education, Bed mobility, Gait training, Spoke to nursing, Spoke to case management, OT, Elevations, Cognitive reorientation  Equipment Recommended:  (tbd)       See flowsheet documentation for full assessment, interventions and recommendations.  Outcome: Progressing  Note: Prognosis: Good  Problem List: Decreased endurance, Decreased strength, Decreased mobility, Decreased cognition, Decreased safety awareness, Pain  Assessment: Patient received in bed. Patient agreeable to therapy. Patient performs bed mobility with supervision to complete rolling L/R. Patient performs bed mobility to EOB with Min Ax1. At EOB patient with fair sitting balance, and able to perform reaching activities and weight shifting without LOB. Patient performs functional transfers with moderate assistance x2 progressing to Min Ax2 using rolling walker. Patient performs x5 STS throughout session and x3 SPT throughout session. Patient performs wheel chair mobility using BUE for propulsion, 100'+45' on level surface with close supervision, min verbal cues for brake use. Patient performs ambulation with minimal assistance x1 using rolling walker, 3'+3'+25'+25'+3', utilizing hop to gait pattern.  Patient returns to bed following activity with Min A. Patient left in bed with all needs met and call bell/personal items within reach. The patient's AM-PAC Basic Mobility Inpatient Short Form Raw Score is 10 showing further need for skilled PT services in order to improve functional mobility, decrease need for assistance, and return to PLOF. PT is recommending Level 1 - Maximum Resource Intensity on d/c from  hospital.  Will continue to follow as able.  Barriers to Discharge: Decreased caregiver support     Rehab Resource Intensity Level, PT: I (Maximum Resource Intensity)    See flowsheet documentation for full assessment.

## 2024-06-27 NOTE — ASSESSMENT & PLAN NOTE
Continue Zoloft 75 mg daily and Remeron 15 mg at bedtime  Neuropsych consulted as concern of impaired capacity

## 2024-06-27 NOTE — ASSESSMENT & PLAN NOTE
Patient presented with left lower extremity acute limb ischemia with extensive left iliofemoral and left infrainguinal embolism with nonviable left lower extremity  Status post left femoral thrombectomy and AKA on 6/7  Continue Xarelto   Continue scheduled Tylenol and as needed oxycodone  Continue gabapentin 100 mg 3 times daily  Plan for rehab at discharge.  PMNR consult reviewed, appreciate input

## 2024-06-27 NOTE — CONSULTS
"Consultation - Neuropsychology/Psychology Department  Sunil Patel 63 y.o. male MRN: [id number]  Unit/Bed#: [facility] 507-01 Encounter: [id number]        Reason for Consultation:  Sunil Patel is a 63 y.o. year old male who was referred for a Neuropsychological Exam to assess cognitive functioning and comment on capacity to make informed medical decisions.      History of Present Illness  Acute lower limb ischemia  Physician Requesting Consult: Aleja Dhaliwal MD    PROBLEM LIST:  Patient Active Problem List   Diagnosis    Bipolar affective disorder (HCC)    Substance abuse (HCC)    Human immunodeficiency virus (HIV) infection (HCC)    History of transient cerebral ischemia    Benign essential hypertension    Positive laboratory testing for human immunodeficiency virus (HCC)    Hypertension    Unspecified vitamin D deficiency    Tobacco abuse    Cerebrovascular accident (CVA) (HCC)    Acute lower limb ischemia    Left ventricular thrombus    Seizures (HCC)    Carnitine deficiency (HCC)         Historical Information   Past Medical History:   Diagnosis Date    Anxiety     Depression     HIV disease (HCC)     Substance abuse (HCC)     Suicide attempt (HCC)      Past Surgical History:   Procedure Laterality Date    AMPUTATION ABOVE KNEE (AKA) Left 6/7/2024    Procedure: AMPUTATION ABOVE KNEE (AKA);  Surgeon: Juan Luis Rdz MD;  Location: [facility];  Service: Vascular    NM TEAEC W/WO PATCH GRAFT COMMON FEMORAL Left 6/7/2024    Procedure: left femoral ileofemoral thromectomy;  Surgeon: Juan Luis Rdz MD;  Location: [facility];  Service: Vascular    US GUIDED THYROID BIOPSY  3/15/2022    US GUIDED THYROID BIOPSY  11/26/2019     Social History   Social History     Substance and Sexual Activity   Alcohol Use Yes     Social History     Substance and Sexual Activity   Drug Use Yes    Types: \"Crack\" cocaine, Marijuana     Social History     Tobacco Use   Smoking Status Some Days    Current " packs/day: 1.00    Types: Cigarettes   Smokeless Tobacco Never     Family History:   Family History   Problem Relation Age of Onset    Diabetes Mother     Heart attack Mother     Heart attack Father        Meds/Allergies   current meds:   Current Facility-Administered Medications   Medication Dose Route Frequency    acetaminophen (TYLENOL) tablet 975 mg  975 mg Oral Q8H DAVINA    aspirin (ECOTRIN LOW STRENGTH) EC tablet 81 mg  81 mg Oral Daily    atorvastatin (LIPITOR) tablet 80 mg  80 mg Oral Daily With Dinner    bictegravir-emtricitab-tenofovir alafenamide (BIKTARVY) -25 MG tablet 1 tablet  1 tablet Oral Daily With Breakfast    diphenhydrAMINE (BENADRYL) tablet 25 mg  25 mg Oral Q6H PRN    divalproex sodium (DEPAKOTE ER) 24 hr tablet 1,250 mg  1,250 mg Oral Daily    dolutegravir (TIVICAY) tablet 50 mg  50 mg Oral Daily    gabapentin (NEURONTIN) capsule 100 mg  100 mg Oral TID    HYDROmorphone (DILAUDID) injection 0.5 mg  0.5 mg Intravenous Q3H PRN    HYDROmorphone HCl (DILAUDID) injection 0.2 mg  0.2 mg Intravenous Once    lamoTRIgine (LaMICtal) tablet 50 mg  50 mg Oral BID    levOCARNitine (CARNITOR) oral solution 330 mg  1,000 mg/day Oral TID With Meals    lidocaine (LIDODERM) 5 % patch 1 patch  1 patch Topical Daily    losartan (COZAAR) tablet 50 mg  50 mg Oral Daily    melatonin tablet 6 mg  6 mg Oral HS    metoprolol succinate (TOPROL-XL) 24 hr tablet 25 mg  25 mg Oral Q12H    mirtazapine (REMERON) tablet 15 mg  15 mg Oral HS    moisture barrier miconazole 2% cream (aka ELVA MOISTURE BARRIER ANTIFUNGAL CREAM)   Topical BID    ondansetron (ZOFRAN) injection 4 mg  4 mg Intravenous Q6H PRN    oxyCODONE (ROXICODONE) immediate release tablet 10 mg  10 mg Oral Q6H PRN    oxyCODONE (ROXICODONE) IR tablet 5 mg  5 mg Oral Q6H PRN    polyethylene glycol (MIRALAX) packet 17 g  17 g Oral Daily PRN    rivaroxaban (XARELTO) tablet 20 mg  20 mg Oral Daily With Dinner    senna (SENOKOT) tablet 17.2 mg  2 tablet Oral BID     [START ON 6/28/2024] sertraline (ZOLOFT) tablet 75 mg  75 mg Oral Daily    tamsulosin (FLOMAX) capsule 0.4 mg  0.4 mg Oral Daily With Dinner    zonisamide (ZONEGRAN) capsule 400 mg  400 mg Oral Daily       Allergies   Allergen Reactions    No Active Allergies          Family and Social Support:   No data recorded    Behavioral Observations: Patient was alert, UNABLE to accurately state the year, season, month, day/week, day/month, state, city and name of hospital; thoughts were highly disorganized, tangential; patient was unable to describe reason for hospitalization, medical history and what he is being treated for in hospital    Cognitive Examination    General Cognitive Functioning MMSE = Severely Impaired4/28;     Attention/Concentration Auditory Selective Attention = Impaired;  Auditory Vigilance = Impaired;     Frontal Systems/Executive Functioning Mental Flexibility/Cognitive Control = Impaired; Working Memory = Impaired Abstract Reasoning = Impaired; Generative Ability = Impaired,     Language Functioning Confrontation naming = Impaired, Phonemic Fluency = Impaired; Semantic Retrieval = Impaired; Comprehension of Complex Ideational Material = Impaired; Repetition = Impaired; Basic Reading = Impaired;  Following Commands = Impaired    Memory Functioning Narrative Recall - Short Delay = Impaired; Long Delay Narrative Recall = Impaired; Three word recall = Impaired 0/3    Visuo-Spatial Abilities Not Assessed    Functional Knowledge  Health & Safety Knowledge = Impaired;     Summary/Impression:  Results of Neuropsychological Exam revealed diffuse cognitive dysfunction and on a measure assessing awareness of personal health status and ability to evaluate health problems, handle medical emergencies and take safety precautions, patient performed in the IMPAIRED rang eof functioning. During this encounter, patient does not appear to have capacity to make fully informed medical decisions.

## 2024-06-27 NOTE — PROGRESS NOTES
Physical Medicine and Rehabilitation Progress Note  Sunil Patel 63 y.o. male MRN: [id number]  Unit/Bed#: [facility] 507-01 Encounter: [id number]      Chief Complaints:  S/p L BKA      Subjective:     On eval, patient with adequate wakefulness with some expressive and possible some receptive aphasia.  He can be difficult to follow at times likely due to a mixture of aphasia, tangentiality, mild lability, and impaired memory.   He is able to follow fair amount of simple 1 step commands at times requiring a fair amount of redirection.  He is overall poor historian making accuracy of history and validity of ROS in question.  Nevertheless, he seems to report fair amount of intermittent L residual limb pain with at times LLE phantom sensation.  He denies significant other pain, focal weakness, sensory changes, radiating pain down arms or legs or other complaints but does state he wants to get going with rehab.       ROS: A 10 point ROS was performed; negative except as noted above but validity in question related to impaired communication/cognition.     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 Patient had TTE on 6/10 showing LV apex thrombus.  On 6/11 patient had pharmacologic nuclear stress test/SPECT scan which did not show any significant areas of ischemia with EF 40-45% and recommended continuing A/C for LV apical thrombus.  Course also notable for b/l buttock unstageable pressure ulcers, urinary  and fecal incontinence, pain, and significant decline in ADLs and mobility.  Patient has been released by corrections but he has had difficulty with placement without clear place to go after rehab.       PLOF:  The patient was incarcerated at [facility]. Prior to that he was in a group home and required some degree of assistance at least for iADLs. He states he was able to walk and do ADLs.     CLOF:  Transfers mod assist x2, Ambulation min assist 3-20ft;   Min assist LBB, UBD, UBB, UBD    Disposition recommendation:  ARC/IRF  Patient appears to be able to tolerate intensive rehab setting and has significant medical complexity at this time and is expected to significantly benefit from this setting; I am still concerned regarding ultimate dispo as he may plateau at or near supervision.   - He does have fair amount of neurocognitive deficits and I would recommend neuropsych capacity eval to determine if he has medical decision making capacity particularly when it comes to dispo planning and to ensure he has adequate oversight and care should he not    S/P L AKA 2/2 LLE critical limb ischemia 2/2 L EICA occlusion, PAD, cardiac thrombosis hx  - Also underwent L fem artery exploration, thromboembolectomy of the left iliac system, left profunda femoris, and left superficial femoral arteries  - Aspirin, Xarelto per vasc/IM  - Statin  - Optimal BP control   - NWB LLE   - Optimal ROM to avoid/decrease risk of contractures  - Monitor incision/area for infection or dehiscence/impaired healing   - It is normal to have some swelling or discoloration around the incision initially post-operatively. If develops increasing redness, tenderness/pain, discharge, or dehiscence develops contact surgical service   - Wound care of incision site closely - healing adequately   - Monitor for signs/symptoms of VTE, atelectasis, acute blood loss anemia, or other infection   - Patient (if applicable caregiver)  training and education on amputation, possible phantom symptoms, recovery process  - Neuropsychology consult, supportive counseling  - Optimal pain control  - Monitor contralateral limb closely for concerning s/s   - Fall Precautions   - Orthotic order for residual limb protector if appropriate   - Recommend acute comprehensive interdisciplinary inpatient rehabilitation to include intensive skilled therapies (PT, OT) as outlined with oversight and management by rehabilitation physician as well as inpatient rehab level nursing, case management and weekly interdisciplinary team meetings.   - Follow-up with PMR, vasc sx after discharge    LV apical thrombosis, cardiomyopathy with EF 40-45%  - Overall management at discretion of primary team now with optimal monitoring and mgmt during rehab process and after d/c  - Optimal mgmt recommended during rehab course  - Compensation:  Appears adequate  - Monitor weights, vitals, and physical exam  - Medications: Xarelto, MTP, losartan    Neurocognitive impairment/neuropsychiatric disorder - hx of TBI and L MCA CVA, psychiatric d/o, seizure d/o  - recommend neuropsych capacity eval to determine if he has medical decision making capacity particularly when it comes to dispo planning and to ensure he has adequate oversight and care should he not  - patient may need guardianship if unable to care for his needs   - Status: some expressive and possible some receptive aphasia.  He can be difficult to follow at times likely due to a mixture of aphasia, tangentiality, mild lability, and impaired memory; lability with hx of possible bipolar d/o  - Overall mgmt per primary team currently, recommend optimal mgmt during rehab process as well  - Remains at risk for increased confusion/delirium, restlessness, agitation, and fall - continue to monitor for concerns/changes  - Med review: Depakote, Lamictal, Remeron, Zoloft   - Monitor neuro-exam, wakefulness, mood, cognition, insight into  deficits and safety awareness   - Monitor and ensure optimal management electrolytes, nutrition, and hydration  - Monitor for signs or symptoms of infection, medication intolerances, other systemic etiologies  - Additional labs, imaging, specialist follow-up as needed per primary team currently   - Overstimulation precautions, frequent re-orientation, re-direction, re-assurance  - Optimal mood, pain, and sleep management  - If impaired sleep or behavior recommend sleep log and agitation monitoring    - Limit sedating medications when possible  - Fall precautions - if needed increase rounding or consider virtual sitter or in-person sitter  - For routine restlessness, anxiety, irritability focus on non-pharmacologic management    - Hold benzo's with increased risk paradoxical reaction and possibility of limiting cognitive recovery  - Continue PT, OT in acute setting   - If ST able for cog tx in acute setting consider ordering   - Recommend acute comprehensive interdisciplinary inpatient rehabilitation to include intensive skilled therapies (PT, OT, ST) with oversight and management by rehabilitation physician.  Inpatient rehab level nursing, case management and weekly interdisciplinary team meetings.     - Can be done in ARC setting:  - Obtain MOCA when able possibly in ARC settingand if needed ACLS during ARC course   - Assess iADLs - ability to manage medications, meal prep, finances, obtaining appropriate transport from community, managing emergencies, and overall safety in home environment.  - Provide therapy, compensatory strategies, and if available and necessary provide caregiver training.   - Aspirin, Xarelto, statin, optimal BP control for stroke prevention   - Keppra   - OP neuro and PCP     Bowel/bladder dysfunction  - No focal weakness, denies numbness   - Monitor neuro exam, overall exam, labs and monitor for etiology   - Toileting program: Toilet approx Q2-4H during day (provide bedpan only if too  immobile for bedside commode or toilet but OOB preferred) and Q4-6H overnight  - monitor urinary output volumes, bladder scans  - monitor for overflow incontinence, signs/symptoms of UTI  - if necessary consider pharmacologic management   - Ensure adequate hydration, adjust bowel meds as indicated, monitor for infectious diarrhea  - Monitor and optimize skin care, nursing to assist with monitor skin closely for erythema, breakdown, or rashes (or worsening of prior)      Carnitine def  - Continue levocarnitine     HIV  - Overall management at discretion of primary team now with optimal monitoring and mgmt during rehab process and after d/c  - Biktarvy/Tivicay daily and Cabenuva monthly injections   - Monitor for complications     VTE prophylaxis   As outlined by primary team      Other medical issues:     Per primary service (and relevant consultants if applicable)        Objective:    Allergies per EMR    Physical Exam:  Temp:  [98.8 °F (37.1 °C)-99.7 °F (37.6 °C)] 99.6 °F (37.6 °C)  HR:  [86-93] 93  Resp:  [12-20] 12  BP: ()/(55-71) 92/55  SpO2:  [94 %] 94 %    GEN:  Lying in bed somewhat disheveled appearance in NAD   HEENT/NECK: MMM  CARDIAC: Regular rate rhythm, no murmers, no rubs, no gallops  LUNGS:  clear to auscultation, no wheezes, rales, or rhonchi  ABDOMEN: Soft, non-tender, non-distended, normal active bowel sounds  EXTREMITIES/SKIN:  RLE without calf edema or TTP; foot skin intact; LLE residual limb incision healing adequately  NEURO:   MENTAL STATUS:  adequate wakefulness with some expressive and possible some receptive aphasia.  He can be difficult to follow at times likely due to a mixture of aphasia, tangentiality, mild lability, and impaired memory.   He is able to follow fair amount of simple 1 step commands at times requiring a fair amount of redirection and Strength/MMT:  R side 5-/5 to 5/5; LUE and prox LLE near 5/5  PSYCH:  Affect:  Mildly labile    Diagnostic Studies: Reviewed, no new  imaging  CTA chest wo w contrast   Final Result by Curtis Herrera MD (06/11 0024)         1. No evidence of left atrial thrombus.   2. No acute cardiopulmonary process.               Workstation performed: [id number]             Laboratory: Labs reviewed  Results from last 7 days   Lab Units 06/26/24  0449   HEMOGLOBIN g/dL 10.3*   HEMATOCRIT % 33.8*   WBC Thousand/uL 7.65     Results from last 7 days   Lab Units 06/26/24  0449   BUN mg/dL 28*   SODIUM mmol/L 139   POTASSIUM mmol/L 4.0   CHLORIDE mmol/L 104   CREATININE mg/dL 1.02            Drug regimen reviewed, all potential adverse effects identified and addressed:    Scheduled Meds:  Current Facility-Administered Medications   Medication Dose Route Frequency Provider Last Rate    acetaminophen  975 mg Oral Q8H DAVINA Karen Guerrero PA-C      aspirin  81 mg Oral Daily Karen Guerrero PA-C      atorvastatin  80 mg Oral Daily With Dinner Karen Guerrero PA-C      bictegravir-emtricitab-tenofovir alafenamide  1 tablet Oral Daily With Breakfast Karen Guerrero PA-C      diphenhydrAMINE  25 mg Oral Q6H PRN lIiana Cuevas PA-C      divalproex sodium  1,250 mg Oral Daily Ida Hodges MD      dolutegravir  50 mg Oral Daily Karen Guerrero PA-C      gabapentin  100 mg Oral TID Karen Guerrero PA-C      HYDROmorphone  0.5 mg Intravenous Q3H PRN Karen Guerrero PA-C      HYDROmorphone  0.2 mg Intravenous Once Stan Whitney MD      lamoTRIgine  50 mg Oral BID Ida Hodges MD      levOCARNitine  1,000 mg/day Oral TID With Meals Ida Hodges MD      lidocaine  1 patch Topical Daily Tho Laguna PA-C      losartan  50 mg Oral Daily Ida Hodges MD      melatonin  6 mg Oral HS Karen Guerrero PA-C      metoprolol succinate  25 mg Oral Q12H Leonardo Bruno MD      mirtazapine  15 mg Oral HS Karen Guerrero PA-C      ELVA ANTIFUNGAL   Topical BID Dana Rodríguez MD      ondansetron  4 mg Intravenous Q6H PRN Karen Guerrero PA-C       oxyCODONE  10 mg Oral Q6H PRN Karen Guerrero PA-C      oxyCODONE  5 mg Oral Q6H PRN Karen Guerrero PA-C      polyethylene glycol  17 g Oral Daily PRN Shruti Correa MD      rivaroxaban  20 mg Oral Daily With Dinner Stan Whitney MD      senna  2 tablet Oral BID Shruti Correa MD      sertraline  25 mg Oral Daily Karen Guerrero PA-C      sertraline  50 mg Oral Daily Karen Guerrero PA-C      tamsulosin  0.4 mg Oral Daily With Dinner Karen Guerrero PA-C      zonisamide  400 mg Oral Daily Ida Hodges MD         ** Please Note: Fluency Direct voice to text software may have been used in the creation of this document. **    50 minutes or greater spent for this encounter which included a combination of face-to-face time with Sunil Patel and non-face-to-face time which in part specifically includes management of BKA, TBI/CVA, pain.  Face-to-face time included extended discussion with patient regarding current condition, medical history, mood, medical/rehabilitation management, and disposition.  Non face-to-face time included coordination of care with patient's co-managing AP and/or physician(s) thru communication and review of their recent documentation as well as reviewing vitals, bowel/bladder function, recent labs, and notes from therapy, CM, and nursing.        Thank you for allowing the PM&R service to participate in the care of this patient. We will continue to follow. Please do not hesitate to call with questions or concerns.     José Salcido MD, MS  [facility]  Physical Medicine and Rehabilitation  Brain Injury Medicine

## 2024-06-28 PROCEDURE — 99232 SBSQ HOSP IP/OBS MODERATE 35: CPT | Performed by: STUDENT IN AN ORGANIZED HEALTH CARE EDUCATION/TRAINING PROGRAM

## 2024-06-28 RX ADMIN — METOPROLOL SUCCINATE 25 MG: 25 TABLET, EXTENDED RELEASE ORAL at 06:24

## 2024-06-28 RX ADMIN — ZONISAMIDE 400 MG: 100 CAPSULE ORAL at 08:23

## 2024-06-28 RX ADMIN — GABAPENTIN 100 MG: 100 CAPSULE ORAL at 17:23

## 2024-06-28 RX ADMIN — Medication 6 MG: at 21:27

## 2024-06-28 RX ADMIN — LIDOCAINE 1 PATCH: 700 PATCH TOPICAL at 08:20

## 2024-06-28 RX ADMIN — LEVOCARNITINE 330 MG: 1 SOLUTION ORAL at 13:47

## 2024-06-28 RX ADMIN — ACETAMINOPHEN 975 MG: 325 TABLET, FILM COATED ORAL at 21:27

## 2024-06-28 RX ADMIN — OXYCODONE HYDROCHLORIDE 10 MG: 10 TABLET ORAL at 17:27

## 2024-06-28 RX ADMIN — LAMOTRIGINE 50 MG: 25 TABLET ORAL at 08:19

## 2024-06-28 RX ADMIN — DOLUTEGRAVIR SODIUM 50 MG: 50 TABLET, FILM COATED ORAL at 08:23

## 2024-06-28 RX ADMIN — RIVAROXABAN 20 MG: 20 TABLET, FILM COATED ORAL at 17:23

## 2024-06-28 RX ADMIN — DIVALPROEX SODIUM 1250 MG: 500 TABLET, EXTENDED RELEASE ORAL at 08:19

## 2024-06-28 RX ADMIN — LAMOTRIGINE 50 MG: 25 TABLET ORAL at 17:23

## 2024-06-28 RX ADMIN — OXYCODONE HYDROCHLORIDE 10 MG: 10 TABLET ORAL at 08:19

## 2024-06-28 RX ADMIN — METOPROLOL SUCCINATE 25 MG: 25 TABLET, EXTENDED RELEASE ORAL at 17:23

## 2024-06-28 RX ADMIN — MICONAZOLE NITRATE: 20 CREAM TOPICAL at 08:24

## 2024-06-28 RX ADMIN — MICONAZOLE NITRATE: 20 CREAM TOPICAL at 17:27

## 2024-06-28 RX ADMIN — TAMSULOSIN HYDROCHLORIDE 0.4 MG: 0.4 CAPSULE ORAL at 17:23

## 2024-06-28 RX ADMIN — GABAPENTIN 100 MG: 100 CAPSULE ORAL at 08:19

## 2024-06-28 RX ADMIN — ATORVASTATIN CALCIUM 80 MG: 80 TABLET, FILM COATED ORAL at 17:23

## 2024-06-28 RX ADMIN — BICTEGRAVIR SODIUM, EMTRICITABINE, AND TENOFOVIR ALAFENAMIDE FUMARATE 1 TABLET: 50; 200; 25 TABLET ORAL at 08:23

## 2024-06-28 RX ADMIN — ACETAMINOPHEN 975 MG: 325 TABLET, FILM COATED ORAL at 06:24

## 2024-06-28 RX ADMIN — LOSARTAN POTASSIUM 50 MG: 50 TABLET, FILM COATED ORAL at 08:19

## 2024-06-28 RX ADMIN — LEVOCARNITINE 330 MG: 1 SOLUTION ORAL at 17:28

## 2024-06-28 RX ADMIN — ASPIRIN 81 MG: 81 TABLET, COATED ORAL at 08:19

## 2024-06-28 RX ADMIN — GABAPENTIN 100 MG: 100 CAPSULE ORAL at 21:27

## 2024-06-28 RX ADMIN — LEVOCARNITINE 330 MG: 1 SOLUTION ORAL at 08:23

## 2024-06-28 RX ADMIN — SERTRALINE HYDROCHLORIDE 75 MG: 50 TABLET ORAL at 08:19

## 2024-06-28 RX ADMIN — MIRTAZAPINE 15 MG: 15 TABLET, FILM COATED ORAL at 21:27

## 2024-06-28 RX ADMIN — ACETAMINOPHEN 975 MG: 325 TABLET, FILM COATED ORAL at 13:47

## 2024-06-28 NOTE — ASSESSMENT & PLAN NOTE
Patient presented with left lower extremity acute limb ischemia with extensive left iliofemoral and left infrainguinal embolism with nonviable left lower extremity  Status post left femoral thrombectomy and AKA on 6/7  Continue Xarelto   Continue scheduled Tylenol and as needed oxycodone  Continue gabapentin 100 mg 3 times daily  Plan for rehab at discharge.  Patient was evaluated by physical medicine and rehab on 6/27, appreciate input.  Discussing with  dispo planning

## 2024-06-28 NOTE — PHYSICIAN ADVISOR
Current patient class: Inpatient  The patient is currently on Hospital Day: 22      The patient was admitted to the hospital at  6:10 PM on 6/7/24 for the following diagnosis:  No admission diagnoses are documented for this encounter.     CMS OUTLIER STAY REVIEW    After review of the relevant documentation, labs, vital signs and test results, the patient is appropriate for CONTINUED INPATIENT ADMISSION.     The patient continues to remain hospitalized receiving acute medical care.  The patient has surpassed the expected duration of stay, however given the clinical condition, need for further acute care management, the patient is appropriate to remain in an inpatient status.  The patient still being actively managed, and does have unresolved medical issues requiring further hospitalization.      This review is conducted at 10 day intervals, to help satisfy the requirements for significant outlier stay review as per CMS.  Given the current condition of this patient, the patient satisfies this review was determination for continued inpatient stay.    Rationale is as follows:The patient is a 63 yrs old Male who presented 6/7/24. Patient presented with left lower extremity acute limb ischemia with extensive left iliofemoral and left infrainguinal embolism with nonviable left lower extremity. Status post left femoral thrombectomy and AKA on 6/7  Plan for rehab at discharge. Pt underwent neuropsychology eval yesterday and lacks decision making capacity. Discharge planning is pending.        The patient’s vitals on arrival were   ED Triage Vitals   Temperature Pulse Respirations Blood Pressure SpO2   06/07/24 1815 06/07/24 1815 06/07/24 1815 06/07/24 1815 06/07/24 1815   97.6 °F (36.4 °C) 86 20 (!) 171/88 97 %      Temp Source Heart Rate Source Patient Position - Orthostatic VS BP Location FiO2 (%)   06/07/24 1819 06/07/24 1819 06/07/24 2316 06/07/24 2215 --   Skin Monitor Lying Left arm       Pain Score       06/07/24  1433       No Pain           Past Medical History:   Diagnosis Date    Anxiety     Depression     HIV disease (HCC)     Substance abuse (HCC)     Suicide attempt (HCC)      Past Surgical History:   Procedure Laterality Date    AMPUTATION ABOVE KNEE (AKA) Left 6/7/2024    Procedure: AMPUTATION ABOVE KNEE (AKA);  Surgeon: Juan Luis Rdz MD;  Location: [facility];  Service: Vascular    UT TEAEC W/WO PATCH GRAFT COMMON FEMORAL Left 6/7/2024    Procedure: left femoral ileofemoral thromectomy;  Surgeon: Juan Luis Rdz MD;  Location: [facility];  Service: Vascular    US GUIDED THYROID BIOPSY  3/15/2022    US GUIDED THYROID BIOPSY  11/26/2019           Consults have been placed to:   IP CONSULT TO ACUTE PAIN SERVICE  IP CONSULT TO INTERNAL MEDICINE  IP CONSULT TO CARDIOLOGY  IP CONSULT TO CASE MANAGEMENT  IP CONSULT TO CASE MANAGEMENT  IP CONSULT TO PHYSICAL MEDICINE REHAB  IP CONSULT TO PSYCHIATRY  IP CONSULT TO NEUROPSYCHOLOGY    Vitals:    06/27/24 0545 06/27/24 0909 06/27/24 1539 06/28/24 0627   BP: 139/72 91/59 104/66 134/75   BP Location:   Left arm    Pulse: 94 91     Resp:   17    Temp:  98.7 °F (37.1 °C) 99.2 °F (37.3 °C)    TempSrc:   Oral    SpO2:  97% 97%    Weight:       Height:           Most recent labs:    Recent Labs     06/26/24  0449   WBC 7.65   HGB 10.3*   HCT 33.8*   *   K 4.0   CALCIUM 9.0   BUN 28*   CREATININE 1.02       Scheduled Meds:  Current Facility-Administered Medications   Medication Dose Route Frequency Provider Last Rate    acetaminophen  975 mg Oral Q8H [facility] Karen Guerrero PA-C      aspirin  81 mg Oral Daily Karen Guerrero PA-C      atorvastatin  80 mg Oral Daily With Dinner Karen Guerrero PA-C      bictegravir-emtricitab-tenofovir alafenamide  1 tablet Oral Daily With Breakfast Karen Guerrero PA-C      diphenhydrAMINE  25 mg Oral Q6H PRN Iliana Cuevas PA-C      divalproex sodium  1,250 mg Oral Daily Ida Hodges MD      dolutegravir   50 mg Oral Daily Karen Guerrero PA-C      gabapentin  100 mg Oral TID Karen Guerrero PA-C      HYDROmorphone  0.5 mg Intravenous Q3H PRN Karen Guerrero PA-C      HYDROmorphone  0.2 mg Intravenous Once Stan Whitney MD      lamoTRIgine  50 mg Oral BID Ida Hodges MD      levOCARNitine  1,000 mg/day Oral TID With Meals Ida Hodges MD      lidocaine  1 patch Topical Daily Neoopher EJ Laguna      losartan  50 mg Oral Daily Ida Hodges MD      melatonin  6 mg Oral HS Karen Guerrero PA-C      metoprolol succinate  25 mg Oral Q12H Leonardo Bruno MD      mirtazapine  15 mg Oral HS Karen Guerrero PA-C      ELVA ANTIFUNGAL   Topical BID Dana Rodríguez MD      ondansetron  4 mg Intravenous Q6H PRN Karen Guerrero PA-C      oxyCODONE  10 mg Oral Q6H PRN Karen Guerrero PA-C      oxyCODONE  5 mg Oral Q6H PRN Karen Guerrero PA-C      polyethylene glycol  17 g Oral Daily PRN Shruti Correa MD      rivaroxaban  20 mg Oral Daily With Dinner Stan Whitney MD      senna  2 tablet Oral BID Shruti Correa MD      sertraline  75 mg Oral Daily Aleja Dhaliwal MD      tamsulosin  0.4 mg Oral Daily With Dinner Karen Guerrero PA-C      zonisamide  400 mg Oral Daily Ida Hodges MD       Continuous Infusions:   PRN Meds:.  diphenhydrAMINE    HYDROmorphone    ondansetron    oxyCODONE    oxyCODONE    polyethylene glycol    Surgical procedures (if appropriate):  Procedure(s):  left femoral ileofemoral thromectomy  AMPUTATION ABOVE KNEE (AKA)

## 2024-06-28 NOTE — PROGRESS NOTES
[facility]  Progress Note  Name: Sunil Patel I  MRN: [id number]  [facility] I Date of Admission: 6/7/2024   Date of Service: 6/28/2024 I Hospital Day: 21    Assessment & Plan   Carnitine deficiency (HCC)  Assessment & Plan  Continue Levocarnitine replacement therapy TID    Seizures (HCC)  Assessment & Plan  Diagnosed in July 2019 - follows with [facility] neurology  Continue Depakote/Lamictal/Zonisamide > doses now adjusted per most recent outpatient neurology changes on record -> Depakote 1250 mg daily, Zonisamide 400 mg daily, and Lamictal 50 mg BID     Left ventricular thrombus  Assessment & Plan  Filling defect in the left ventricular apex suggests left ventricular thrombus and the source of the embolic disease  Echo showed ejection fraction 40 to 45%, mild global hypokinesis, LV thrombus  Underwent pharmacological stress test. Per cardiology, no significant areas of ischemia and recommended for medical treatment. Possible stress induced cardiomyopathy as an etiology of his reduced EF.   Continue metoprolol 25 mg twice daily, Cozaar 50 mg daily and Xarelto 20 mg daily    Cerebrovascular accident (CVA) ([facility])  Assessment & Plan  History of CVA in the left MCA territory in May 2018  Continue aspirin, atorvastatin    Benign essential hypertension  Assessment & Plan  Blood pressures reviewed and acceptable  Continue losartan and metoprolol    Human immunodeficiency virus (HIV) infection ([facility])  Assessment & Plan  Continue Biktarvy/Tivicay daily and Cabenuva monthly injections       Bipolar affective disorder ([facility])  Assessment & Plan  Continue Zoloft 75 mg daily and Remeron 15 mg at bedtime  Neuropsych consulted patient deemed to have impaired capacity.  Discussed with  and plan for guardianship    * Acute lower limb ischemia  Assessment & Plan  Patient presented with left lower extremity acute limb ischemia with extensive left iliofemoral and left infrainguinal  embolism with nonviable left lower extremity  Status post left femoral thrombectomy and AKA on   Continue Xarelto   Continue scheduled Tylenol and as needed oxycodone  Continue gabapentin 100 mg 3 times daily  Plan for rehab at discharge.  Patient was evaluated by physical medicine and rehab on , appreciate input.  Discussing with  dispo planning                     VTE Pharmacologic Prophylaxis: VTE Score: 3 Moderate Risk (Score 3-4) - Pharmacological DVT Prophylaxis Ordered: rivaroxaban (Xarelto).    Mobility:   Basic Mobility Inpatient Raw Score: 10  -HLM Goal: 4: Move to chair/commode  JH-HLM Achieved: 3: Sit at edge of bed  JH-HLM Goal NOT achieved. Continue with multidisciplinary rounding and encourage appropriate mobility to improve upon JH-HLM goals.    Patient Centered Rounds: I performed bedside rounds with nursing staff today.   Discussions with Specialists or Other Care Team Provider:     Education and Discussions with Family / Patient:  Updated patient.     Total Time Spent on Date of Encounter in care of patient: 25 mins. This time was spent on one or more of the following: performing physical exam; counseling and coordination of care; obtaining or reviewing history; documenting in the medical record; reviewing/ordering tests, medications or procedures; communicating with other healthcare professionals and discussing with patient's family/caregivers.    Current Length of Stay: 21 day(s)  Current Patient Status: Inpatient   Certification Statement: The patient will continue to require additional inpatient hospital stay due to dispo planning  Discharge Plan: Anticipate discharge in 48-72 hrs to rehab facility.    Code Status: Level 1 - Full Code    Subjective:   No events overnight.  Patient reports feeling well this morning.  Has no complaints.  No abdominal pain.    Objective:     Vitals:   Temp (24hrs), Av.2 °F (37.3 °C), Min:99.2 °F (37.3 °C), Max:99.2 °F (37.3  °C)    Temp:  [99.2 °F (37.3 °C)] 99.2 °F (37.3 °C)  Resp:  [17] 17  BP: (104-134)/(66-75) 134/75  SpO2:  [97 %] 97 %  Body mass index is 23.98 kg/m².     Input and Output Summary (last 24 hours):     Intake/Output Summary (Last 24 hours) at 6/28/2024 1057  Last data filed at 6/28/2024 0900  Gross per 24 hour   Intake 1075 ml   Output 1450 ml   Net -375 ml       Physical Exam:   Physical Exam  Vitals and nursing note reviewed.   Constitutional:       General: He is not in acute distress.     Appearance: He is well-developed.   HENT:      Head: Normocephalic and atraumatic.   Eyes:      Conjunctiva/sclera: Conjunctivae normal.   Cardiovascular:      Rate and Rhythm: Normal rate and regular rhythm.   Pulmonary:      Effort: No respiratory distress.   Musculoskeletal:         General: No swelling.      Cervical back: Neck supple.   Skin:     General: Skin is warm and dry.   Neurological:      Mental Status: He is alert.          Additional Data:     Labs:  Results from last 7 days   Lab Units 06/26/24  0449   WBC Thousand/uL 7.65   HEMOGLOBIN g/dL 10.3*   HEMATOCRIT % 33.8*   PLATELETS Thousands/uL 477*   SEGS PCT % 62   LYMPHO PCT % 22   MONO PCT % 11   EOS PCT % 3     Results from last 7 days   Lab Units 06/26/24  0449   SODIUM mmol/L 139   POTASSIUM mmol/L 4.0   CHLORIDE mmol/L 104   CO2 mmol/L 26   BUN mg/dL 28*   CREATININE mg/dL 1.02   ANION GAP mmol/L 9   CALCIUM mg/dL 9.0   GLUCOSE RANDOM mg/dL 93                       Lines/Drains:  Invasive Devices       None                         Imaging: No pertinent imaging reviewed.    Recent Cultures (last 7 days):   Results from last 7 days   Lab Units 06/25/24  1834   URINE CULTURE  >100,000 cfu/ml Proteus mirabilis*  Enterococcus faecalis*       Last 24 Hours Medication List:   Current Facility-Administered Medications   Medication Dose Route Frequency Provider Last Rate    acetaminophen  975 mg Oral Q8H DAVINA Karen Guerrero PA-C      aspirin  81 mg Oral Daily  Karen Guerrero PA-C      atorvastatin  80 mg Oral Daily With Dinner Karen Guerrero PA-C      bictegravir-emtricitab-tenofovir alafenamide  1 tablet Oral Daily With Breakfast Karen Guerrero PA-C      diphenhydrAMINE  25 mg Oral Q6H PRN Iliana Cuevas PA-C      divalproex sodium  1,250 mg Oral Daily Ida Hodges MD      dolutegravir  50 mg Oral Daily Karen Guerrero PA-C      gabapentin  100 mg Oral TID Karen Guerrero PA-C      HYDROmorphone  0.5 mg Intravenous Q3H PRN Karen Guerrero PA-C      HYDROmorphone  0.2 mg Intravenous Once Stan Whitney MD      lamoTRIgine  50 mg Oral BID Ida Hodges MD      levOCARNitine  1,000 mg/day Oral TID With Meals Ida Hodges MD      lidocaine  1 patch Topical Daily Tho Laguna PA-C      losartan  50 mg Oral Daily Ida Hodges MD      melatonin  6 mg Oral HS Karen Guerrero PA-C      metoprolol succinate  25 mg Oral Q12H Leonardo Bruno MD      mirtazapine  15 mg Oral HS Karen Guerrero PA-C      ELVA ANTIFUNGAL   Topical BID Dana Rodríguez MD      ondansetron  4 mg Intravenous Q6H PRN Karen Guerrero PA-C      oxyCODONE  10 mg Oral Q6H PRN Karen Guerrero PA-C      oxyCODONE  5 mg Oral Q6H PRN Karen Guerrero PA-C      polyethylene glycol  17 g Oral Daily PRN Shruti Correa MD      rivaroxaban  20 mg Oral Daily With Dinner Stan Whitney MD      senna  2 tablet Oral BID Shruti Correa MD      sertraline  75 mg Oral Daily Aleja Dhaliwal MD      tamsulosin  0.4 mg Oral Daily With Dinner Karen Guerrero PA-C      zonisamide  400 mg Oral Daily Ida Hodges MD          Today, Patient Was Seen By: Aleja Dhaliwal MD    **Please Note: This note may have been constructed using a voice recognition system.**

## 2024-06-28 NOTE — PLAN OF CARE
Problem: PAIN - ADULT  Goal: Verbalizes/displays adequate comfort level or baseline comfort level  Description: Interventions:  - Encourage patient to monitor pain and request assistance  - Assess pain using appropriate pain scale  - Administer analgesics based on type and severity of pain and evaluate response  - Implement non-pharmacological measures as appropriate and evaluate response  - Consider cultural and social influences on pain and pain management  - Notify physician/advanced practitioner if interventions unsuccessful or patient reports new pain  Outcome: Progressing     Problem: INFECTION - ADULT  Goal: Absence or prevention of progression during hospitalization  Description: INTERVENTIONS:  - Assess and monitor for signs and symptoms of infection  - Monitor lab/diagnostic results  - Monitor all insertion sites, i.e. indwelling lines, tubes, and drains  - Monitor endotracheal if appropriate and nasal secretions for changes in amount and color  - [place] appropriate cooling/warming therapies per order  - Administer medications as ordered  - Instruct and encourage patient and family to use good hand hygiene technique  - Identify and instruct in appropriate isolation precautions for identified infection/condition  Outcome: Progressing     Problem: SAFETY ADULT  Goal: Patient will remain free of falls  Description: INTERVENTIONS:  - Educate patient/family on patient safety including physical limitations  - Instruct patient to call for assistance with activity   - Consult OT/PT to assist with strengthening/mobility   - Keep Call bell within reach  - Keep bed low and locked with side rails adjusted as appropriate  - Keep care items and personal belongings within reach  - Initiate and maintain comfort rounds  - Make Fall Risk Sign visible to staff  - Offer Toileting every 2 Hours, in advance of need  - Initiate/Maintain bed alarm  - Obtain necessary fall risk management equipment: non skid socks  - Apply  yellow socks and bracelet for high fall risk patients  - Consider moving patient to room near nurses station  Outcome: Progressing  Goal: Maintain or return to baseline ADL function  Description: INTERVENTIONS:  -  Assess patient's ability to carry out ADLs; assess patient's baseline for ADL function and identify physical deficits which impact ability to perform ADLs (bathing, care of mouth/teeth, toileting, grooming, dressing, etc.)  - Assess/evaluate cause of self-care deficits   - Assess range of motion  - Assess patient's mobility; develop plan if impaired  - Assess patient's need for assistive devices and provide as appropriate  - Encourage maximum independence but intervene and supervise when necessary  - Involve family in performance of ADLs  - Assess for home care needs following discharge   - Consider OT consult to assist with ADL evaluation and planning for discharge  - Provide patient education as appropriate  Outcome: Progressing  Goal: Maintains/Returns to pre admission functional level  Description: INTERVENTIONS:  - Perform AM-PAC 6 Click Basic Mobility/ Daily Activity assessment daily.  - Set and communicate daily mobility goal to care team and patient/family/caregiver.   - Collaborate with rehabilitation services on mobility goals if consulted  - Perform Range of Motion 3 times a day.  - Reposition patient every 2 hours.  - Dangle patient 3 times a day  - Stand patient 3 times a day  - Ambulate patient 3 times a day  - Out of bed to chair 3 times a day   - Out of bed for meals 3 times a day  - Out of bed for toileting  - Record patient progress and toleration of activity level   Outcome: Progressing     Problem: DISCHARGE PLANNING  Goal: Discharge to home or other facility with appropriate resources  Description: INTERVENTIONS:  - Identify barriers to discharge w/patient and caregiver  - Arrange for needed discharge resources and transportation as appropriate  - Identify discharge learning needs  (meds, wound care, etc.)  - Arrange for interpretive services to assist at discharge as needed  - Refer to Case Management Department for coordinating discharge planning if the patient needs post-hospital services based on physician/advanced practitioner order or complex needs related to functional status, cognitive ability, or social support system  Outcome: Progressing     Problem: Knowledge Deficit  Goal: Patient/family/caregiver demonstrates understanding of disease process, treatment plan, medications, and discharge instructions  Description: Complete learning assessment and assess knowledge base.  Interventions:  - Provide teaching at level of understanding  - Provide teaching via preferred learning methods  Outcome: Progressing     Problem: Prexisting or High Potential for Compromised Skin Integrity  Goal: Skin integrity is maintained or improved  Description: INTERVENTIONS:  - Identify patients at risk for skin breakdown  - Assess and monitor skin integrity  - Assess and monitor nutrition and hydration status  - Monitor labs   - Assess for incontinence   - Turn and reposition patient  - Assist with mobility/ambulation  - Relieve pressure over bony prominences  - Avoid friction and shearing  - Provide appropriate hygiene as needed including keeping skin clean and dry  - Evaluate need for skin moisturizer/barrier cream  - Collaborate with interdisciplinary team   - Patient/family teaching  - Consider wound care consult   Outcome: Progressing     Problem: Nutrition/Hydration-ADULT  Goal: Nutrient/Hydration intake appropriate for improving, restoring or maintaining nutritional needs  Description: Monitor and assess patient's nutrition/hydration status for malnutrition. Collaborate with interdisciplinary team and initiate plan and interventions as ordered.  Monitor patient's weight and dietary intake as ordered or per policy. Utilize nutrition screening tool and intervene as necessary. Determine patient's food  preferences and provide high-protein, high-caloric foods as appropriate.     INTERVENTIONS:  - Monitor oral intake, urinary output, labs, and treatment plans  - Assess nutrition and hydration status and recommend course of action  - Evaluate amount of meals eaten  - Assist patient with eating if necessary   - Allow adequate time for meals  - Recommend/ encourage appropriate diets, oral nutritional supplements, and vitamin/mineral supplements  - Order, calculate, and assess calorie counts as needed  - Recommend, monitor, and adjust tube feedings and TPN/PPN based on assessed needs  - Assess need for intravenous fluids  - Provide specific nutrition/hydration education as appropriate  - Include patient/family/caregiver in decisions related to nutrition  Outcome: Progressing

## 2024-06-28 NOTE — ASSESSMENT & PLAN NOTE
Continue Zoloft 75 mg daily and Remeron 15 mg at bedtime  Neuropsych consulted patient deemed to have impaired capacity.  Discussed with  and plan for guardianship

## 2024-06-28 NOTE — PROGRESS NOTES
Patient is AxOx3; disoriented to time. Patient is on RA & VSS. Patient complains of buttock pain, scheduled Tylenol & PRN Oxycodone were given. Patient is an assist x2 for care. Male purewick is patent. Bed is in lowest position. All needs are within reach.

## 2024-06-29 PROCEDURE — 99232 SBSQ HOSP IP/OBS MODERATE 35: CPT | Performed by: STUDENT IN AN ORGANIZED HEALTH CARE EDUCATION/TRAINING PROGRAM

## 2024-06-29 RX ADMIN — ACETAMINOPHEN 975 MG: 325 TABLET, FILM COATED ORAL at 05:14

## 2024-06-29 RX ADMIN — DOLUTEGRAVIR SODIUM 50 MG: 50 TABLET, FILM COATED ORAL at 08:37

## 2024-06-29 RX ADMIN — LAMOTRIGINE 50 MG: 25 TABLET ORAL at 16:50

## 2024-06-29 RX ADMIN — RIVAROXABAN 20 MG: 20 TABLET, FILM COATED ORAL at 16:51

## 2024-06-29 RX ADMIN — LEVOCARNITINE 330 MG: 1 SOLUTION ORAL at 13:26

## 2024-06-29 RX ADMIN — LIDOCAINE 1 PATCH: 700 PATCH TOPICAL at 08:38

## 2024-06-29 RX ADMIN — GABAPENTIN 100 MG: 100 CAPSULE ORAL at 21:53

## 2024-06-29 RX ADMIN — MIRTAZAPINE 15 MG: 15 TABLET, FILM COATED ORAL at 21:53

## 2024-06-29 RX ADMIN — LEVOCARNITINE 330 MG: 1 SOLUTION ORAL at 16:54

## 2024-06-29 RX ADMIN — DIVALPROEX SODIUM 1250 MG: 500 TABLET, EXTENDED RELEASE ORAL at 08:34

## 2024-06-29 RX ADMIN — ACETAMINOPHEN 975 MG: 325 TABLET, FILM COATED ORAL at 21:53

## 2024-06-29 RX ADMIN — Medication 6 MG: at 21:53

## 2024-06-29 RX ADMIN — LEVOCARNITINE 330 MG: 1 SOLUTION ORAL at 08:37

## 2024-06-29 RX ADMIN — METOPROLOL SUCCINATE 25 MG: 25 TABLET, EXTENDED RELEASE ORAL at 16:54

## 2024-06-29 RX ADMIN — GABAPENTIN 100 MG: 100 CAPSULE ORAL at 16:52

## 2024-06-29 RX ADMIN — ASPIRIN 81 MG: 81 TABLET, COATED ORAL at 08:35

## 2024-06-29 RX ADMIN — SERTRALINE HYDROCHLORIDE 75 MG: 50 TABLET ORAL at 08:34

## 2024-06-29 RX ADMIN — SENNOSIDES 17.2 MG: 8.6 TABLET, FILM COATED ORAL at 08:34

## 2024-06-29 RX ADMIN — TAMSULOSIN HYDROCHLORIDE 0.4 MG: 0.4 CAPSULE ORAL at 16:51

## 2024-06-29 RX ADMIN — LAMOTRIGINE 50 MG: 25 TABLET ORAL at 08:34

## 2024-06-29 RX ADMIN — ACETAMINOPHEN 975 MG: 325 TABLET, FILM COATED ORAL at 13:26

## 2024-06-29 RX ADMIN — ATORVASTATIN CALCIUM 80 MG: 80 TABLET, FILM COATED ORAL at 16:50

## 2024-06-29 RX ADMIN — BICTEGRAVIR SODIUM, EMTRICITABINE, AND TENOFOVIR ALAFENAMIDE FUMARATE 1 TABLET: 50; 200; 25 TABLET ORAL at 08:37

## 2024-06-29 RX ADMIN — SENNOSIDES 17.2 MG: 8.6 TABLET, FILM COATED ORAL at 16:55

## 2024-06-29 RX ADMIN — ZONISAMIDE 400 MG: 100 CAPSULE ORAL at 08:37

## 2024-06-29 RX ADMIN — GABAPENTIN 100 MG: 100 CAPSULE ORAL at 08:33

## 2024-06-29 NOTE — PROGRESS NOTES
[facility]  Progress Note  Name: Sunil Patel I  MRN: [id number]  Unit[facility]Bed#: [facility] 507-01 I Date of Admission: 6/7/2024   Date of Service: 6/29/2024 I Hospital Day: 22    Assessment & Plan   Carnitine deficiency (HCC)  Assessment & Plan  Continue Levocarnitine replacement therapy TID    Seizures (HCC)  Assessment & Plan  Diagnosed in July 2019 - follows with [facility] neurology  Continue Depakote/Lamictal/Zonisamide > doses now adjusted per most recent outpatient neurology changes on record -> Depakote 1250 mg daily, Zonisamide 400 mg daily, and Lamictal 50 mg BID     Left ventricular thrombus  Assessment & Plan  Filling defect in the left ventricular apex suggests left ventricular thrombus and the source of the embolic disease  Echo showed ejection fraction 40 to 45%, mild global hypokinesis, LV thrombus  Underwent pharmacological stress test. Per cardiology, no significant areas of ischemia and recommended for medical treatment. Possible stress induced cardiomyopathy as an etiology of his reduced EF.   Continue metoprolol 25 mg twice daily, Cozaar 50 mg daily and Xarelto 20 mg daily    Cerebrovascular accident (CVA) ([facility])  Assessment & Plan  History of CVA in the left MCA territory in May 2018  Continue aspirin, atorvastatin    Benign essential hypertension  Assessment & Plan  Blood pressures reviewed and acceptable  Continue losartan and metoprolol    Human immunodeficiency virus (HIV) infection ([facility])  Assessment & Plan  Continue Biktarvy/Tivicay daily and Cabenuva monthly injections       Bipolar affective disorder ([facility])  Assessment & Plan  Continue Zoloft 75 mg daily and Remeron 15 mg at bedtime  Neuropsych consulted patient deemed to have impaired capacity.  Discussed with  and plan for guardianship    * Acute lower limb ischemia  Assessment & Plan  Patient presented with left lower extremity acute limb ischemia with extensive left iliofemoral and left infrainguinal  embolism with nonviable left lower extremity  Status post left femoral thrombectomy and AKA on   Continue Xarelto   Continue scheduled Tylenol and as needed oxycodone  Continue gabapentin 100 mg 3 times daily  Plan for rehab at discharge.  Patient was evaluated by physical medicine and rehab on , appreciate input.  Discussing with  dispo planning                     VTE Pharmacologic Prophylaxis: VTE Score: 3 Moderate Risk (Score 3-4) - Pharmacological DVT Prophylaxis Ordered: rivaroxaban (Xarelto).    Mobility:   Basic Mobility Inpatient Raw Score: 10  JH-HLM Goal: 4: Move to chair/commode  JH-HLM Achieved: 4: Move to chair/commode  JH-HLM Goal achieved. Continue to encourage appropriate mobility.    Patient Centered Rounds: I performed bedside rounds with nursing staff today.   Discussions with Specialists or Other Care Team Provider: None    Education and Discussions with Family / Patient:  patient .     Total Time Spent on Date of Encounter in care of patient: 35 mins. This time was spent on one or more of the following: performing physical exam; counseling and coordination of care; obtaining or reviewing history; documenting in the medical record; reviewing/ordering tests, medications or procedures; communicating with other healthcare professionals and discussing with patient's family/caregivers.    Current Length of Stay: 22 day(s)  Current Patient Status: Inpatient   Certification Statement: The patient will continue to require additional inpatient hospital stay due to dispo planning  Discharge Plan: Anticipate discharge in >72 hrs to rehab facility.    Code Status: Level 1 - Full Code    Subjective:   No events overnight. Mr. Patel has no complaints this morning. Eating well.    Objective:     Vitals:   Temp (24hrs), Av.6 °F (37.6 °C), Min:99.1 °F (37.3 °C), Max:100 °F (37.8 °C)    Temp:  [99.1 °F (37.3 °C)-100 °F (37.8 °C)] 100 °F (37.8 °C)  HR:  [72-87] 72  Resp:  [18] 18  BP:  ()/(55-64) 96/55  SpO2:  [97 %] 97 %  Body mass index is 23.98 kg/m².     Input and Output Summary (last 24 hours):     Intake/Output Summary (Last 24 hours) at 6/29/2024 1028  Last data filed at 6/29/2024 0543  Gross per 24 hour   Intake 222 ml   Output 1800 ml   Net -1578 ml       Physical Exam:   Physical Exam  Vitals and nursing note reviewed.   Constitutional:       General: He is not in acute distress.     Appearance: He is well-developed.   HENT:      Head: Normocephalic and atraumatic.   Eyes:      Conjunctiva/sclera: Conjunctivae normal.   Cardiovascular:      Rate and Rhythm: Normal rate and regular rhythm.   Pulmonary:      Effort: No respiratory distress.   Musculoskeletal:         General: No swelling.      Cervical back: Neck supple.   Skin:     General: Skin is warm and dry.   Neurological:      Mental Status: He is alert.              Additional Data:     Labs:  Results from last 7 days   Lab Units 06/26/24  0449   WBC Thousand/uL 7.65   HEMOGLOBIN g/dL 10.3*   HEMATOCRIT % 33.8*   PLATELETS Thousands/uL 477*   SEGS PCT % 62   LYMPHO PCT % 22   MONO PCT % 11   EOS PCT % 3     Results from last 7 days   Lab Units 06/26/24  0449   SODIUM mmol/L 139   POTASSIUM mmol/L 4.0   CHLORIDE mmol/L 104   CO2 mmol/L 26   BUN mg/dL 28*   CREATININE mg/dL 1.02   ANION GAP mmol/L 9   CALCIUM mg/dL 9.0   GLUCOSE RANDOM mg/dL 93                       Lines/Drains:  Invasive Devices       Drain  Duration             External Urinary Catheter 1 day                          Imaging: No pertinent imaging reviewed.    Recent Cultures (last 7 days):   Results from last 7 days   Lab Units 06/25/24  1834   URINE CULTURE  >100,000 cfu/ml Proteus mirabilis*  Enterococcus faecalis*       Last 24 Hours Medication List:   Current Facility-Administered Medications   Medication Dose Route Frequency Provider Last Rate    acetaminophen  975 mg Oral Q8H [facility] Karen Guerrero PA-C      aspirin  81 mg Oral Daily Karen Wade  EJ Guerrero      atorvastatin  80 mg Oral Daily With Dinner Karen Guerrero PA-C      bictegravir-emtricitab-tenofovir alafenamide  1 tablet Oral Daily With Breakfast Karen Guerrero PA-C      diphenhydrAMINE  25 mg Oral Q6H PRN Iliana Cuevas PA-C      divalproex sodium  1,250 mg Oral Daily Ida Hodges MD      dolutegravir  50 mg Oral Daily Karen Guerrero PA-C      gabapentin  100 mg Oral TID Karen Guerrero PA-C      HYDROmorphone  0.5 mg Intravenous Q3H PRN Karen Guerrero PA-C      HYDROmorphone  0.2 mg Intravenous Once Stan Whitney MD      lamoTRIgine  50 mg Oral BID Ida Hodges MD      levOCARNitine  1,000 mg/day Oral TID With Meals Ida Hodges MD      lidocaine  1 patch Topical Daily Tho Laguna PA-C      losartan  50 mg Oral Daily Ida Hodges MD      melatonin  6 mg Oral HS Karen Guerrero PA-C      metoprolol succinate  25 mg Oral Q12H Leonardo Bruno MD      mirtazapine  15 mg Oral HS Karen Guerrero PA-C      ELVA ANTIFUNGAL   Topical BID Dana Rodríguez MD      ondansetron  4 mg Intravenous Q6H PRN Karen Guerrero PA-C      oxyCODONE  10 mg Oral Q6H PRN Karen Guerrero PA-C      oxyCODONE  5 mg Oral Q6H PRN Karen Guerrero PA-C      polyethylene glycol  17 g Oral Daily PRN Shruti Correa MD      rivaroxaban  20 mg Oral Daily With Dinner Stan Whitney MD      senna  2 tablet Oral BID Shruti Correa MD      sertraline  75 mg Oral Daily Aleja Dhaliwal MD      tamsulosin  0.4 mg Oral Daily With Dinner Karen Guerrero PA-C      zonisamide  400 mg Oral Daily Ida Hodges MD          Today, Patient Was Seen By: Aleja Dhaliwal MD    **Please Note: This note may have been constructed using a voice recognition system.**

## 2024-06-29 NOTE — PLAN OF CARE
Problem: PAIN - ADULT  Goal: Verbalizes/displays adequate comfort level or baseline comfort level  Description: Interventions:  - Encourage patient to monitor pain and request assistance  - Assess pain using appropriate pain scale  - Administer analgesics based on type and severity of pain and evaluate response  - Implement non-pharmacological measures as appropriate and evaluate response  - Consider cultural and social influences on pain and pain management  - Notify physician/advanced practitioner if interventions unsuccessful or patient reports new pain  Outcome: Progressing     Problem: INFECTION - ADULT  Goal: Absence or prevention of progression during hospitalization  Description: INTERVENTIONS:  - Assess and monitor for signs and symptoms of infection  - Monitor lab/diagnostic results  - Monitor all insertion sites, i.e. indwelling lines, tubes, and drains  - Monitor endotracheal if appropriate and nasal secretions for changes in amount and color  - [place] appropriate cooling/warming therapies per order  - Administer medications as ordered  - Instruct and encourage patient and family to use good hand hygiene technique  - Identify and instruct in appropriate isolation precautions for identified infection/condition  Outcome: Progressing     Problem: SAFETY ADULT  Goal: Patient will remain free of falls  Description: INTERVENTIONS:  - Educate patient/family on patient safety including physical limitations  - Instruct patient to call for assistance with activity   - Consult OT/PT to assist with strengthening/mobility   - Keep Call bell within reach  - Keep bed low and locked with side rails adjusted as appropriate  - Keep care items and personal belongings within reach  - Initiate and maintain comfort rounds  - Make Fall Risk Sign visible to staff  - Offer Toileting every  Hours, in advance of need  - Initiate/Maintain alarm  - Obtain necessary fall risk management equipment:   - Apply yellow socks and  bracelet for high fall risk patients  - Consider moving patient to room near nurses station  Outcome: Progressing

## 2024-06-29 NOTE — PROGRESS NOTES
Pastoral Care Progress Note    2024  Patient: Sunil Patel : 1960  Admission Date & Time: 2024 1421  MRN: [id number] CSN: [id number]         was requested by the floor nurse as patient want ed to talk to someone.   responded and sat with the patient and listened empathetically to him.  He recounted his experiences that lead him to admission to this hospital.  He is concerned about having a place to live after discharge and would like to go outside and see the sun.   offered to have a  visit to talk about a placement upon discharge and to have the weekend  visit also. Patient welcomes both visitors and seemed much happier and calmer when  left.       24 2200   Clinical Encounter Type   Visited With Patient   Referral From Nurse                   Chaplaincy Interventions Utilized:   Empowerment: Clarified, confirmed, or reviewed information from treatment team , Encouraged focus on present, Encouraged self-care, and Provided anxiety containment    Exploration: Explored alternatives, Explored hope, Explored emotional needs & resources, and Identified, evaluated & reinforced appropriate coping strategies    Collaboration: Advocated for patient/family    Relationship Building: Cultivated a relationship of care and support and Listened empathically    Chaplaincy Outcomes Achieved:  Expressed gratitude

## 2024-06-29 NOTE — PROGRESS NOTES
Pastoral Care Progress Note    2024  Patient: Sunil Patel : 1960  Admission Date & Time: 2024 1421  MRN: [id number] CSN: [id number]         24 0900   Clinical Encounter Type   Visited With Patient   Referral From    Buddhist Encounters   Buddhist Needs Prayer   Patient Spiritual Encounters   Spiritual Assessment 2   Child Adaptation to Hospital 3   Suffering Severity 2   Fear Level 3   Feelings of Loneliness Visible   Feelings of Hopelessness Sad, but not hopeless   Coping 3   Social Interaction Cooperates in daily activities   Dignified Life Closure 4   Grief Resolution 3                    Chaplaincy Interventions Utilized:   Empowerment: Encouraged focus on present, Provided anticipatory guidance, and Provided anxiety containment    Exploration: Explored hope    Collaboration: Consulted with interdisciplinary team    Relationship Building: Facilitated reconciliation with the transcendent, Listened empathically, and Provided silent and supportive presence    Ritual: Provided prayer    Silent prayer was encouraged by patient.  Patient very appreciative of the prayer.        Chaplaincy Outcomes Achieved:  Expressed gratitude, Expressed humor, Expressed intermediate hope, Expressed ultimate hope, Progressed toward focus on present, Progressed toward meaning, and Tearfully processed emotions      Spiritual Coping Strategies Utilized:   Connectedness and Spiritual comfort         visited patient from referral from overnight .  Provided empathic listening and encouraged patient to share.  Patient seems to struggle with verbal communication and chronological storytelling.  Patient appears to suffer from emotional and mental trauma due to past and current life circumstances.  Patient looks forward to future and could use help from  to give him direction.  Also, though not stated directly, patient appears to have a drug addiction history, so peer or  substance abuse counseling could benefit him.  Per patient's approval, provided silent prayer, which patient really appreciated.  Patient appears to have a general spiritual framework and past Hindu history (maybe [facility]?), but nothing substantiated.  Recommend regular visits from spiritual care team.      Nothing further at this time.  BONIFACIO, 6/29/24 @1000hrs.

## 2024-06-29 NOTE — PLAN OF CARE
Problem: PAIN - ADULT  Goal: Verbalizes/displays adequate comfort level or baseline comfort level  Description: Interventions:  - Encourage patient to monitor pain and request assistance  - Assess pain using appropriate pain scale  - Administer analgesics based on type and severity of pain and evaluate response  - Implement non-pharmacological measures as appropriate and evaluate response  - Consider cultural and social influences on pain and pain management  - Notify physician/advanced practitioner if interventions unsuccessful or patient reports new pain  Outcome: Progressing     Problem: INFECTION - ADULT  Goal: Absence or prevention of progression during hospitalization  Description: INTERVENTIONS:  - Assess and monitor for signs and symptoms of infection  - Monitor lab/diagnostic results  - Monitor all insertion sites, i.e. indwelling lines, tubes, and drains  - Monitor endotracheal if appropriate and nasal secretions for changes in amount and color  - [place] appropriate cooling/warming therapies per order  - Administer medications as ordered  - Instruct and encourage patient and family to use good hand hygiene technique  - Identify and instruct in appropriate isolation precautions for identified infection/condition  Outcome: Progressing     Problem: SAFETY ADULT  Goal: Patient will remain free of falls  Description: INTERVENTIONS:  - Educate patient/family on patient safety including physical limitations  - Instruct patient to call for assistance with activity   - Consult OT/PT to assist with strengthening/mobility   - Keep Call bell within reach  - Keep bed low and locked with side rails adjusted as appropriate  - Keep care items and personal belongings within reach  - Initiate and maintain comfort rounds  - Make Fall Risk Sign visible to staff  - Apply yellow socks and bracelet for high fall risk patients  - Consider moving patient to room near nurses station  Outcome: Progressing  Goal: Maintain or  return to baseline ADL function  Description: INTERVENTIONS:  -  Assess patient's ability to carry out ADLs; assess patient's baseline for ADL function and identify physical deficits which impact ability to perform ADLs (bathing, care of mouth/teeth, toileting, grooming, dressing, etc.)  - Assess/evaluate cause of self-care deficits   - Assess range of motion  - Assess patient's mobility; develop plan if impaired  - Assess patient's need for assistive devices and provide as appropriate  - Encourage maximum independence but intervene and supervise when necessary  - Involve family in performance of ADLs  - Assess for home care needs following discharge   - Consider OT consult to assist with ADL evaluation and planning for discharge  - Provide patient education as appropriate  Outcome: Progressing  Goal: Maintains/Returns to pre admission functional level  Description: INTERVENTIONS:  - Perform AM-PAC 6 Click Basic Mobility/ Daily Activity assessment daily.  - Set and communicate daily mobility goal to care team and patient/family/caregiver.   - Collaborate with rehabilitation services on mobility goals if consulted  - Perform Range of Motion 3 times a day.  - Reposition patient every 3 hours.  - Dangle patient 3 times a day  - Stand patient 3 times a day  - Ambulate patient 3 times a day  - Out of bed to chair 3 times a day   - Out of bed for meals 3 times a day  - Out of bed for toileting  - Record patient progress and toleration of activity level   Outcome: Progressing     Problem: DISCHARGE PLANNING  Goal: Discharge to home or other facility with appropriate resources  Description: INTERVENTIONS:  - Identify barriers to discharge w/patient and caregiver  - Arrange for needed discharge resources and transportation as appropriate  - Identify discharge learning needs (meds, wound care, etc.)  - Arrange for interpretive services to assist at discharge as needed  - Refer to Case Management Department for coordinating  discharge planning if the patient needs post-hospital services based on physician/advanced practitioner order or complex needs related to functional status, cognitive ability, or social support system  Outcome: Progressing     Problem: Knowledge Deficit  Goal: Patient/family/caregiver demonstrates understanding of disease process, treatment plan, medications, and discharge instructions  Description: Complete learning assessment and assess knowledge base.  Interventions:  - Provide teaching at level of understanding  - Provide teaching via preferred learning methods  Outcome: Progressing     Problem: Prexisting or High Potential for Compromised Skin Integrity  Goal: Skin integrity is maintained or improved  Description: INTERVENTIONS:  - Identify patients at risk for skin breakdown  - Assess and monitor skin integrity  - Assess and monitor nutrition and hydration status  - Monitor labs   - Assess for incontinence   - Turn and reposition patient  - Assist with mobility/ambulation  - Relieve pressure over bony prominences  - Avoid friction and shearing  - Provide appropriate hygiene as needed including keeping skin clean and dry  - Evaluate need for skin moisturizer/barrier cream  - Collaborate with interdisciplinary team   - Patient/family teaching  - Consider wound care consult   Outcome: Progressing     Problem: Nutrition/Hydration-ADULT  Goal: Nutrient/Hydration intake appropriate for improving, restoring or maintaining nutritional needs  Description: Monitor and assess patient's nutrition/hydration status for malnutrition. Collaborate with interdisciplinary team and initiate plan and interventions as ordered.  Monitor patient's weight and dietary intake as ordered or per policy. Utilize nutrition screening tool and intervene as necessary. Determine patient's food preferences and provide high-protein, high-caloric foods as appropriate.     INTERVENTIONS:  - Monitor oral intake, urinary output, labs, and treatment  plans  - Assess nutrition and hydration status and recommend course of action  - Evaluate amount of meals eaten  - Assist patient with eating if necessary   - Allow adequate time for meals  - Recommend/ encourage appropriate diets, oral nutritional supplements, and vitamin/mineral supplements  - Order, calculate, and assess calorie counts as needed  - Recommend, monitor, and adjust tube feedings and TPN/PPN based on assessed needs  - Assess need for intravenous fluids  - Provide specific nutrition/hydration education as appropriate  - Include patient/family/caregiver in decisions related to nutrition  Outcome: Progressing

## 2024-06-30 PROCEDURE — 99232 SBSQ HOSP IP/OBS MODERATE 35: CPT | Performed by: STUDENT IN AN ORGANIZED HEALTH CARE EDUCATION/TRAINING PROGRAM

## 2024-06-30 RX ADMIN — TAMSULOSIN HYDROCHLORIDE 0.4 MG: 0.4 CAPSULE ORAL at 16:15

## 2024-06-30 RX ADMIN — Medication 6 MG: at 21:04

## 2024-06-30 RX ADMIN — ATORVASTATIN CALCIUM 80 MG: 80 TABLET, FILM COATED ORAL at 16:15

## 2024-06-30 RX ADMIN — GABAPENTIN 100 MG: 100 CAPSULE ORAL at 21:04

## 2024-06-30 RX ADMIN — ACETAMINOPHEN 975 MG: 325 TABLET, FILM COATED ORAL at 21:04

## 2024-06-30 RX ADMIN — LEVOCARNITINE 330 MG: 1 SOLUTION ORAL at 13:17

## 2024-06-30 RX ADMIN — BICTEGRAVIR SODIUM, EMTRICITABINE, AND TENOFOVIR ALAFENAMIDE FUMARATE 1 TABLET: 50; 200; 25 TABLET ORAL at 08:20

## 2024-06-30 RX ADMIN — LEVOCARNITINE 330 MG: 1 SOLUTION ORAL at 16:19

## 2024-06-30 RX ADMIN — METOPROLOL SUCCINATE 25 MG: 25 TABLET, EXTENDED RELEASE ORAL at 16:23

## 2024-06-30 RX ADMIN — LEVOCARNITINE 330 MG: 1 SOLUTION ORAL at 08:19

## 2024-06-30 RX ADMIN — GABAPENTIN 100 MG: 100 CAPSULE ORAL at 16:15

## 2024-06-30 RX ADMIN — LIDOCAINE 1 PATCH: 700 PATCH TOPICAL at 08:21

## 2024-06-30 RX ADMIN — ACETAMINOPHEN 975 MG: 325 TABLET, FILM COATED ORAL at 13:17

## 2024-06-30 RX ADMIN — LAMOTRIGINE 50 MG: 25 TABLET ORAL at 08:20

## 2024-06-30 RX ADMIN — SENNOSIDES 17.2 MG: 8.6 TABLET, FILM COATED ORAL at 08:20

## 2024-06-30 RX ADMIN — MIRTAZAPINE 15 MG: 15 TABLET, FILM COATED ORAL at 21:04

## 2024-06-30 RX ADMIN — MICONAZOLE NITRATE: 20 CREAM TOPICAL at 08:21

## 2024-06-30 RX ADMIN — RIVAROXABAN 20 MG: 20 TABLET, FILM COATED ORAL at 16:15

## 2024-06-30 RX ADMIN — ZONISAMIDE 400 MG: 100 CAPSULE ORAL at 08:19

## 2024-06-30 RX ADMIN — SERTRALINE HYDROCHLORIDE 75 MG: 50 TABLET ORAL at 08:20

## 2024-06-30 RX ADMIN — ASPIRIN 81 MG: 81 TABLET, COATED ORAL at 08:21

## 2024-06-30 RX ADMIN — LAMOTRIGINE 50 MG: 25 TABLET ORAL at 16:23

## 2024-06-30 RX ADMIN — DIVALPROEX SODIUM 1250 MG: 500 TABLET, EXTENDED RELEASE ORAL at 08:20

## 2024-06-30 RX ADMIN — DOLUTEGRAVIR SODIUM 50 MG: 50 TABLET, FILM COATED ORAL at 08:21

## 2024-06-30 RX ADMIN — LOSARTAN POTASSIUM 50 MG: 50 TABLET, FILM COATED ORAL at 08:20

## 2024-06-30 RX ADMIN — GABAPENTIN 100 MG: 100 CAPSULE ORAL at 08:20

## 2024-06-30 NOTE — PROGRESS NOTES
[facility]  Progress Note  Name: Sunil Patel I  MRN: [id number]  Unit/Bed#: [facility] 507-01 I Date of Admission: 6/7/2024   Date of Service: 6/30/2024 I Hospital Day: 23    Assessment & Plan   Carnitine deficiency (HCC)  Assessment & Plan  Continue Levocarnitine replacement therapy TID    Seizures (HCC)  Assessment & Plan  Diagnosed in July 2019 - follows with [facility] neurology  Continue Depakote/Lamictal/Zonisamide > doses now adjusted per most recent outpatient neurology changes on record -> Depakote 1250 mg daily, Zonisamide 400 mg daily, and Lamictal 50 mg BID     Left ventricular thrombus  Assessment & Plan  Filling defect in the left ventricular apex suggests left ventricular thrombus and the source of the embolic disease  Echo showed ejection fraction 40 to 45%, mild global hypokinesis, LV thrombus  Underwent pharmacological stress test. Per cardiology, no significant areas of ischemia and recommended for medical treatment. Possible stress induced cardiomyopathy as an etiology of his reduced EF.   Continue metoprolol 25 mg twice daily, Cozaar 50 mg daily and Xarelto 20 mg daily    Cerebrovascular accident (CVA) ([facility])  Assessment & Plan  History of CVA in the left MCA territory in May 2018  Continue aspirin, atorvastatin    Benign essential hypertension  Assessment & Plan  Blood pressures reviewed and acceptable  Continue losartan and metoprolol    Human immunodeficiency virus (HIV) infection ([facility])  Assessment & Plan  Continue Biktarvy/Tivicay daily and Cabenuva monthly injections       Bipolar affective disorder ([facility])  Assessment & Plan  Continue Zoloft 75 mg daily and Remeron 15 mg at bedtime  Neuropsych consulted patient deemed to have impaired capacity.  Discussed with  and plan for guardianship    * Acute lower limb ischemia  Assessment & Plan  Patient presented with left lower extremity acute limb ischemia with extensive left iliofemoral and left infrainguinal  embolism with nonviable left lower extremity  Status post left femoral thrombectomy and AKA on   Continue Xarelto   Continue scheduled Tylenol and as needed oxycodone  Continue gabapentin 100 mg 3 times daily  Plan for rehab at discharge.  Patient was evaluated by physical medicine and rehab on , appreciate input.  Discussing with  dispo planning                     VTE Pharmacologic Prophylaxis: VTE Score: 3 Moderate Risk (Score 3-4) - Pharmacological DVT Prophylaxis Ordered: rivaroxaban (Xarelto).    Mobility:   Basic Mobility Inpatient Raw Score: 12  JH-HLM Goal: 4: Move to chair/commode  JH-HLM Achieved: 2: Bed activities/Dependent transfer  JH-HLM Goal achieved. Continue to encourage appropriate mobility.    Patient Centered Rounds: I performed bedside rounds with nursing staff today.   Discussions with Specialists or Other Care Team Provider: None    Education and Discussions with Family / Patient:  Updated patient.     Total Time Spent on Date of Encounter in care of patient: 35 mins. This time was spent on one or more of the following: performing physical exam; counseling and coordination of care; obtaining or reviewing history; documenting in the medical record; reviewing/ordering tests, medications or procedures; communicating with other healthcare professionals and discussing with patient's family/caregivers.    Current Length of Stay: 23 day(s)  Current Patient Status: Inpatient   Certification Statement: The patient will continue to require additional inpatient hospital stay due to dispo planning  Discharge Plan: Anticipate discharge in >72 hrs to rehab facility.    Code Status: Level 1 - Full Code    Subjective:   No events overnight.  Patient reports feeling well this morning.  Tolerating oral intake.  Has no pain.    Objective:     Vitals:   Temp (24hrs), Av.7 °F (37.6 °C), Min:99 °F (37.2 °C), Max:100.1 °F (37.8 °C)    Temp:  [99 °F (37.2 °C)-100.1 °F (37.8 °C)] 100.1 °F (37.8  °C)  HR:  [87-89] 87  Resp:  [18] 18  BP: (115-118)/(63-71) 116/71  SpO2:  [96 %-98 %] 98 %  Body mass index is 23.98 kg/m².     Input and Output Summary (last 24 hours):     Intake/Output Summary (Last 24 hours) at 6/30/2024 1024  Last data filed at 6/29/2024 2237  Gross per 24 hour   Intake 1491 ml   Output 1650 ml   Net -159 ml       Physical Exam:   Physical Exam  Vitals and nursing note reviewed.   Constitutional:       General: He is not in acute distress.     Appearance: He is well-developed.   HENT:      Head: Normocephalic and atraumatic.   Eyes:      Conjunctiva/sclera: Conjunctivae normal.   Cardiovascular:      Rate and Rhythm: Normal rate and regular rhythm.   Pulmonary:      Effort: Pulmonary effort is normal. No respiratory distress.      Breath sounds: Normal breath sounds.   Musculoskeletal:         General: No swelling.      Cervical back: Neck supple.   Skin:     General: Skin is warm and dry.   Neurological:      Mental Status: He is alert.   Psychiatric:         Mood and Affect: Mood normal.         Behavior: Behavior normal.          Additional Data:     Labs:  Results from last 7 days   Lab Units 06/26/24  0449   WBC Thousand/uL 7.65   HEMOGLOBIN g/dL 10.3*   HEMATOCRIT % 33.8*   PLATELETS Thousands/uL 477*   SEGS PCT % 62   LYMPHO PCT % 22   MONO PCT % 11   EOS PCT % 3     Results from last 7 days   Lab Units 06/26/24  0449   SODIUM mmol/L 139   POTASSIUM mmol/L 4.0   CHLORIDE mmol/L 104   CO2 mmol/L 26   BUN mg/dL 28*   CREATININE mg/dL 1.02   ANION GAP mmol/L 9   CALCIUM mg/dL 9.0   GLUCOSE RANDOM mg/dL 93                       Lines/Drains:  Invasive Devices       Drain  Duration             External Urinary Catheter 2 days                          Imaging: No pertinent imaging reviewed.    Recent Cultures (last 7 days):   Results from last 7 days   Lab Units 06/25/24  1834   URINE CULTURE  >100,000 cfu/ml Proteus mirabilis*  Enterococcus faecalis*       Last 24 Hours Medication List:    Current Facility-Administered Medications   Medication Dose Route Frequency Provider Last Rate    acetaminophen  975 mg Oral Q8H DAVINA Karen Guerrero PA-C      aspirin  81 mg Oral Daily Karen Guerrero PA-C      atorvastatin  80 mg Oral Daily With Dinner Karen Guerrero PA-C      bictegravir-emtricitab-tenofovir alafenamide  1 tablet Oral Daily With Breakfast Karen Guerrero PA-C      diphenhydrAMINE  25 mg Oral Q6H PRN Iliana Cuevas PA-C      divalproex sodium  1,250 mg Oral Daily Ida Hodges MD      dolutegravir  50 mg Oral Daily Karen Guerrero PA-C      gabapentin  100 mg Oral TID Karen Guerrero PA-C      HYDROmorphone  0.5 mg Intravenous Q3H PRN Karen Guerrero PA-C      HYDROmorphone  0.2 mg Intravenous Once Stan Whitney MD      lamoTRIgine  50 mg Oral BID Ida Hodges MD      levOCARNitine  1,000 mg/day Oral TID With Meals Ida Hodges MD      lidocaine  1 patch Topical Daily Tho Laguna PA-C      losartan  50 mg Oral Daily Ida Hodges MD      melatonin  6 mg Oral HS Karen Guerrero PA-C      metoprolol succinate  25 mg Oral Q12H Leonardo Bruno MD      mirtazapine  15 mg Oral HS Karen Guerrero PA-C      ELVA ANTIFUNGAL   Topical BID Dana Rodríguez MD      ondansetron  4 mg Intravenous Q6H PRN Karen Guerrero PA-C      oxyCODONE  10 mg Oral Q6H PRN Karen Guerrero PA-C      oxyCODONE  5 mg Oral Q6H PRN Karen Guerrero PA-C      polyethylene glycol  17 g Oral Daily PRN Shruti Correa MD      rivaroxaban  20 mg Oral Daily With Dinner Stan Whitney MD      senna  2 tablet Oral BID Shruti Correa MD      sertraline  75 mg Oral Daily Aleja Dhaliwal MD      tamsulosin  0.4 mg Oral Daily With Dinner Karen Guerrero PA-C      zonisamide  400 mg Oral Daily Ida Hodges MD          Today, Patient Was Seen By: Aleja Dhaliwal MD    **Please Note: This note may have been constructed using a voice recognition system.**

## 2024-06-30 NOTE — RESTORATIVE TECHNICIAN NOTE
Restorative Technician Note      Patient Name: Sunil Patel     Restorative Tech Visit Date: 06/30/24  Note Type: Mobility  Patient Position Upon Consult: Supine  Mobility / Activity Provided: Ax2, wheeled up and down hallway  Activity Performed: Transferred  Assistive Device: Wheelchair; Roller walker  Range of Motion: Left arm; Right arm  Patient Position at End of Consult: Bedside chair; All needs within reach; Bed/Chair alarm activated

## 2024-06-30 NOTE — PLAN OF CARE
Problem: PAIN - ADULT  Goal: Verbalizes/displays adequate comfort level or baseline comfort level  Description: Interventions:  - Encourage patient to monitor pain and request assistance  - Assess pain using appropriate pain scale  - Administer analgesics based on type and severity of pain and evaluate response  - Implement non-pharmacological measures as appropriate and evaluate response  - Consider cultural and social influences on pain and pain management  - Notify physician/advanced practitioner if interventions unsuccessful or patient reports new pain  Outcome: Progressing     Problem: INFECTION - ADULT  Goal: Absence or prevention of progression during hospitalization  Description: INTERVENTIONS:  - Assess and monitor for signs and symptoms of infection  - Monitor lab/diagnostic results  - Monitor all insertion sites, i.e. indwelling lines, tubes, and drains  - Monitor endotracheal if appropriate and nasal secretions for changes in amount and color  - [place] appropriate cooling/warming therapies per order  - Administer medications as ordered  - Instruct and encourage patient and family to use good hand hygiene technique  - Identify and instruct in appropriate isolation precautions for identified infection/condition  Outcome: Progressing     Problem: SAFETY ADULT  Goal: Patient will remain free of falls  Description: INTERVENTIONS:  - Educate patient/family on patient safety including physical limitations  - Instruct patient to call for assistance with activity   - Consult OT/PT to assist with strengthening/mobility   - Keep Call bell within reach  - Keep bed low and locked with side rails adjusted as appropriate  - Keep care items and personal belongings within reach  - Initiate and maintain comfort rounds  - Make Fall Risk Sign visible to staff  - Apply yellow socks and bracelet for high fall risk patients  - Consider moving patient to room near nurses station  Outcome: Progressing  Goal: Maintain or  return to baseline ADL function  Description: INTERVENTIONS:  -  Assess patient's ability to carry out ADLs; assess patient's baseline for ADL function and identify physical deficits which impact ability to perform ADLs (bathing, care of mouth/teeth, toileting, grooming, dressing, etc.)  - Assess/evaluate cause of self-care deficits   - Assess range of motion  - Assess patient's mobility; develop plan if impaired  - Assess patient's need for assistive devices and provide as appropriate  - Encourage maximum independence but intervene and supervise when necessary  - Involve family in performance of ADLs  - Assess for home care needs following discharge   - Consider OT consult to assist with ADL evaluation and planning for discharge  - Provide patient education as appropriate  Outcome: Progressing  Goal: Maintains/Returns to pre admission functional level  Description: INTERVENTIONS:  - Perform AM-PAC 6 Click Basic Mobility/ Daily Activity assessment daily.  - Set and communicate daily mobility goal to care team and patient/family/caregiver.   - Collaborate with rehabilitation services on mobility goals if consulted  - Perform Range of Motion 3 times a day.  - Reposition patient every 3 hours.  - Dangle patient 3 times a day  - Stand patient 3 times a day  - Ambulate patient 3 times a day  - Out of bed to chair 3 times a day   - Out of bed for meals 3 times a day  - Out of bed for toileting  - Record patient progress and toleration of activity level   Outcome: Progressing     Problem: DISCHARGE PLANNING  Goal: Discharge to home or other facility with appropriate resources  Description: INTERVENTIONS:  - Identify barriers to discharge w/patient and caregiver  - Arrange for needed discharge resources and transportation as appropriate  - Identify discharge learning needs (meds, wound care, etc.)  - Arrange for interpretive services to assist at discharge as needed  - Refer to Case Management Department for coordinating  discharge planning if the patient needs post-hospital services based on physician/advanced practitioner order or complex needs related to functional status, cognitive ability, or social support system  Outcome: Progressing     Problem: Knowledge Deficit  Goal: Patient/family/caregiver demonstrates understanding of disease process, treatment plan, medications, and discharge instructions  Description: Complete learning assessment and assess knowledge base.  Interventions:  - Provide teaching at level of understanding  - Provide teaching via preferred learning methods  Outcome: Progressing     Problem: Prexisting or High Potential for Compromised Skin Integrity  Goal: Skin integrity is maintained or improved  Description: INTERVENTIONS:  - Identify patients at risk for skin breakdown  - Assess and monitor skin integrity  - Assess and monitor nutrition and hydration status  - Monitor labs   - Assess for incontinence   - Turn and reposition patient  - Assist with mobility/ambulation  - Relieve pressure over bony prominences  - Avoid friction and shearing  - Provide appropriate hygiene as needed including keeping skin clean and dry  - Evaluate need for skin moisturizer/barrier cream  - Collaborate with interdisciplinary team   - Patient/family teaching  - Consider wound care consult   Outcome: Progressing     Problem: Nutrition/Hydration-ADULT  Goal: Nutrient/Hydration intake appropriate for improving, restoring or maintaining nutritional needs  Description: Monitor and assess patient's nutrition/hydration status for malnutrition. Collaborate with interdisciplinary team and initiate plan and interventions as ordered.  Monitor patient's weight and dietary intake as ordered or per policy. Utilize nutrition screening tool and intervene as necessary. Determine patient's food preferences and provide high-protein, high-caloric foods as appropriate.     INTERVENTIONS:  - Monitor oral intake, urinary output, labs, and treatment  plans  - Assess nutrition and hydration status and recommend course of action  - Evaluate amount of meals eaten  - Assist patient with eating if necessary   - Allow adequate time for meals  - Recommend/ encourage appropriate diets, oral nutritional supplements, and vitamin/mineral supplements  - Order, calculate, and assess calorie counts as needed  - Recommend, monitor, and adjust tube feedings and TPN/PPN based on assessed needs  - Assess need for intravenous fluids  - Provide specific nutrition/hydration education as appropriate  - Include patient/family/caregiver in decisions related to nutrition  Outcome: Progressing

## 2024-06-30 NOTE — PLAN OF CARE
Problem: PAIN - ADULT  Goal: Verbalizes/displays adequate comfort level or baseline comfort level  Description: Interventions:  - Encourage patient to monitor pain and request assistance  - Assess pain using appropriate pain scale  - Administer analgesics based on type and severity of pain and evaluate response  - Implement non-pharmacological measures as appropriate and evaluate response  - Consider cultural and social influences on pain and pain management  - Notify physician/advanced practitioner if interventions unsuccessful or patient reports new pain  Outcome: Progressing     Problem: INFECTION - ADULT  Goal: Absence or prevention of progression during hospitalization  Description: INTERVENTIONS:  - Assess and monitor for signs and symptoms of infection  - Monitor lab/diagnostic results  - Monitor all insertion sites, i.e. indwelling lines, tubes, and drains  - Monitor endotracheal if appropriate and nasal secretions for changes in amount and color  - [place] appropriate cooling/warming therapies per order  - Administer medications as ordered  - Instruct and encourage patient and family to use good hand hygiene technique  - Identify and instruct in appropriate isolation precautions for identified infection/condition  Outcome: Completed     Problem: SAFETY ADULT  Goal: Patient will remain free of falls  Description: INTERVENTIONS:  - Educate patient/family on patient safety including physical limitations  - Instruct patient to call for assistance with activity   - Consult OT/PT to assist with strengthening/mobility   - Keep Call bell within reach  - Keep bed low and locked with side rails adjusted as appropriate  - Keep care items and personal belongings within reach  - Initiate and maintain comfort rounds  - Make Fall Risk Sign visible to staff  - Apply yellow socks and bracelet for high fall risk patients  - Consider moving patient to room near nurses station  Outcome: Progressing  Goal: Maintain or  return to baseline ADL function  Description: INTERVENTIONS:  -  Assess patient's ability to carry out ADLs; assess patient's baseline for ADL function and identify physical deficits which impact ability to perform ADLs (bathing, care of mouth/teeth, toileting, grooming, dressing, etc.)  - Assess/evaluate cause of self-care deficits   - Assess range of motion  - Assess patient's mobility; develop plan if impaired  - Assess patient's need for assistive devices and provide as appropriate  - Encourage maximum independence but intervene and supervise when necessary  - Involve family in performance of ADLs  - Assess for home care needs following discharge   - Consider OT consult to assist with ADL evaluation and planning for discharge  - Provide patient education as appropriate  Outcome: Progressing  Goal: Maintains/Returns to pre admission functional level  Description: INTERVENTIONS:  - Perform AM-PAC 6 Click Basic Mobility/ Daily Activity assessment daily.  - Set and communicate daily mobility goal to care team and patient/family/caregiver.   - Collaborate with rehabilitation services on mobility goals if consulted  Problem: DISCHARGE PLANNING  Goal: Discharge to home or other facility with appropriate resources  Description: INTERVENTIONS:  - Identify barriers to discharge w/patient and caregiver  - Arrange for needed discharge resources and transportation as appropriate  - Identify discharge learning needs (meds, wound care, etc.)  - Arrange for interpretive services to assist at discharge as needed  - Refer to Case Management Department for coordinating discharge planning if the patient needs post-hospital services based on physician/advanced practitioner order or complex needs related to functional status, cognitive ability, or social support system  Outcome: Progressing     Problem: Prexisting or High Potential for Compromised Skin Integrity  Goal: Skin integrity is maintained or improved  Description:  INTERVENTIONS:  - Identify patients at risk for skin breakdown  - Assess and monitor skin integrity  - Assess and monitor nutrition and hydration status  - Monitor labs   - Assess for incontinence   - Turn and reposition patient  - Assist with mobility/ambulation  - Relieve pressure over bony prominences  - Avoid friction and shearing  - Provide appropriate hygiene as needed including keeping skin clean and dry  - Evaluate need for skin moisturizer/barrier cream  - Collaborate with interdisciplinary team   - Patient/family teaching  - Consider wound care consult   Outcome: Progressing     Problem: Nutrition/Hydration-ADULT  Goal: Nutrient/Hydration intake appropriate for improving, restoring or maintaining nutritional needs  Description: Monitor and assess patient's nutrition/hydration status for malnutrition. Collaborate with interdisciplinary team and initiate plan and interventions as ordered.  Monitor patient's weight and dietary intake as ordered or per policy. Utilize nutrition screening tool and intervene as necessary. Determine patient's food preferences and provide high-protein, high-caloric foods as appropriate.     INTERVENTIONS:  - Monitor oral intake, urinary output, labs, and treatment plans  - Assess nutrition and hydration status and recommend course of action  - Evaluate amount of meals eaten  - Assist patient with eating if necessary   - Allow adequate time for meals  - Recommend/ encourage appropriate diets, oral nutritional supplements, and vitamin/mineral supplements  - Order, calculate, and assess calorie counts as needed  - Recommend, monitor, and adjust tube feedings and TPN/PPN based on assessed needs  - Assess need for intravenous fluids  - Provide specific nutrition/hydration education as appropriate  - Include patient/family/caregiver in decisions related to nutrition  Outcome: Progressing     - Out of bed for toileting  - Record patient progress and toleration of activity level    Outcome: Progressing

## 2024-07-01 PROCEDURE — 99231 SBSQ HOSP IP/OBS SF/LOW 25: CPT | Performed by: STUDENT IN AN ORGANIZED HEALTH CARE EDUCATION/TRAINING PROGRAM

## 2024-07-01 RX ADMIN — SENNOSIDES 17.2 MG: 8.6 TABLET, FILM COATED ORAL at 17:23

## 2024-07-01 RX ADMIN — MICONAZOLE NITRATE: 20 CREAM TOPICAL at 08:18

## 2024-07-01 RX ADMIN — DIVALPROEX SODIUM 1250 MG: 500 TABLET, EXTENDED RELEASE ORAL at 08:17

## 2024-07-01 RX ADMIN — DOLUTEGRAVIR SODIUM 50 MG: 50 TABLET, FILM COATED ORAL at 08:19

## 2024-07-01 RX ADMIN — LEVOCARNITINE 330 MG: 1 SOLUTION ORAL at 13:20

## 2024-07-01 RX ADMIN — MICONAZOLE NITRATE 1 APPLICATION: 20 CREAM TOPICAL at 17:20

## 2024-07-01 RX ADMIN — SERTRALINE HYDROCHLORIDE 75 MG: 50 TABLET ORAL at 08:17

## 2024-07-01 RX ADMIN — METOPROLOL SUCCINATE 25 MG: 25 TABLET, EXTENDED RELEASE ORAL at 17:22

## 2024-07-01 RX ADMIN — BICTEGRAVIR SODIUM, EMTRICITABINE, AND TENOFOVIR ALAFENAMIDE FUMARATE 1 TABLET: 50; 200; 25 TABLET ORAL at 08:20

## 2024-07-01 RX ADMIN — ASPIRIN 81 MG: 81 TABLET, COATED ORAL at 08:17

## 2024-07-01 RX ADMIN — LOSARTAN POTASSIUM 50 MG: 50 TABLET, FILM COATED ORAL at 08:18

## 2024-07-01 RX ADMIN — GABAPENTIN 100 MG: 100 CAPSULE ORAL at 21:58

## 2024-07-01 RX ADMIN — Medication 6 MG: at 21:58

## 2024-07-01 RX ADMIN — GABAPENTIN 100 MG: 100 CAPSULE ORAL at 08:17

## 2024-07-01 RX ADMIN — TAMSULOSIN HYDROCHLORIDE 0.4 MG: 0.4 CAPSULE ORAL at 17:22

## 2024-07-01 RX ADMIN — ACETAMINOPHEN 975 MG: 325 TABLET, FILM COATED ORAL at 21:58

## 2024-07-01 RX ADMIN — RIVAROXABAN 20 MG: 20 TABLET, FILM COATED ORAL at 17:23

## 2024-07-01 RX ADMIN — ZONISAMIDE 400 MG: 100 CAPSULE ORAL at 08:19

## 2024-07-01 RX ADMIN — LEVOCARNITINE 330 MG: 1 SOLUTION ORAL at 08:19

## 2024-07-01 RX ADMIN — SENNOSIDES 17.2 MG: 8.6 TABLET, FILM COATED ORAL at 08:17

## 2024-07-01 RX ADMIN — MIRTAZAPINE 15 MG: 15 TABLET, FILM COATED ORAL at 21:58

## 2024-07-01 RX ADMIN — LAMOTRIGINE 50 MG: 25 TABLET ORAL at 08:17

## 2024-07-01 RX ADMIN — ACETAMINOPHEN 975 MG: 325 TABLET, FILM COATED ORAL at 13:20

## 2024-07-01 RX ADMIN — GABAPENTIN 100 MG: 100 CAPSULE ORAL at 17:23

## 2024-07-01 RX ADMIN — LAMOTRIGINE 50 MG: 25 TABLET ORAL at 17:22

## 2024-07-01 RX ADMIN — ATORVASTATIN CALCIUM 80 MG: 80 TABLET, FILM COATED ORAL at 17:22

## 2024-07-01 RX ADMIN — LEVOCARNITINE 330 MG: 1 SOLUTION ORAL at 17:22

## 2024-07-01 NOTE — PROGRESS NOTES
[facility]  Progress Note  Name: Sunil Patel I  MRN: [id number]  [facility] Date of Admission: 6/7/2024   Date of Service: 7/1/2024 I Hospital Day: 24    Assessment & Plan   Carnitine deficiency (HCC)  Assessment & Plan  Continue Levocarnitine replacement therapy TID    Seizures (HCC)  Assessment & Plan  Diagnosed in July 2019 - follows with [facility] neurology  Continue Depakote/Lamictal/Zonisamide > doses now adjusted per most recent outpatient neurology changes on record -> Depakote 1250 mg daily, Zonisamide 400 mg daily, and Lamictal 50 mg BID     Left ventricular thrombus  Assessment & Plan  Filling defect in the left ventricular apex suggests left ventricular thrombus and the source of the embolic disease  Echo showed ejection fraction 40 to 45%, mild global hypokinesis, LV thrombus  Underwent pharmacological stress test. Per cardiology, no significant areas of ischemia and recommended for medical treatment. Possible stress induced cardiomyopathy as an etiology of his reduced EF.   Continue metoprolol 25 mg twice daily, Cozaar 50 mg daily and Xarelto 20 mg daily    Cerebrovascular accident (CVA) (Formerly [facility])  Assessment & Plan  History of CVA in the left MCA territory in May 2018  Continue aspirin, atorvastatin    Benign essential hypertension  Assessment & Plan  Blood pressures reviewed and acceptable  Continue losartan and metoprolol    Human immunodeficiency virus (HIV) infection (Formerly [facility])  Assessment & Plan  Continue Biktarvy/Tivicay daily and Cabenuva monthly injections       Bipolar affective disorder (HCC)  Assessment & Plan  Continue Zoloft 75 mg daily and Remeron 15 mg at bedtime  Neuropsych consulted patient deemed to have impaired capacity.  Discussed with  and plan for guardianship    * Acute lower limb ischemia  Assessment & Plan  Patient presented with left lower extremity acute limb ischemia with extensive left iliofemoral and left infrainguinal  embolism with nonviable left lower extremity  Status post left femoral thrombectomy and AKA on   Continue Xarelto   Continue scheduled Tylenol and as needed oxycodone  Continue gabapentin 100 mg 3 times daily  Plan for rehab at discharge.  Patient was evaluated by physical medicine and rehab on , appreciate input.  Discussing with  dispo planning                     VTE Pharmacologic Prophylaxis: VTE Score: 3 Moderate Risk (Score 3-4) - Pharmacological DVT Prophylaxis Ordered: rivaroxaban (Xarelto).    Mobility:   Basic Mobility Inpatient Raw Score: 12  -HLM Goal: 4: Move to chair/commode  JH-HLM Achieved: 3: Sit at edge of bed  JH-HLM Goal NOT achieved. Continue with multidisciplinary rounding and encourage appropriate mobility to improve upon JH-HLM goals.    Patient Centered Rounds: I performed bedside rounds with nursing staff today.   Discussions with Specialists or Other Care Team Provider:     Education and Discussions with Family / Patient:  Patient.     Total Time Spent on Date of Encounter in care of patient: 35 mins. This time was spent on one or more of the following: performing physical exam; counseling and coordination of care; obtaining or reviewing history; documenting in the medical record; reviewing/ordering tests, medications or procedures; communicating with other healthcare professionals and discussing with patient's family/caregivers.    Current Length of Stay: 24 day(s)  Current Patient Status: Inpatient   Certification Statement: The patient will continue to require additional inpatient hospital stay due to dispo planning  Discharge Plan: Anticipate discharge in >72 hrs to rehab facility.    Code Status: Level 1 - Full Code    Subjective:   No events overnight.  Patient has no complaints this morning.  Reports feeling well.  Tolerating oral intake.    Objective:     Vitals:   Temp (24hrs), Av.7 °F (37.1 °C), Min:98.3 °F (36.8 °C), Max:99 °F (37.2  °C)    Temp:  [98.3 °F (36.8 °C)-99 °F (37.2 °C)] 99 °F (37.2 °C)  HR:  [80-95] 80  Resp:  [16-22] 17  BP: ()/(57-99) 116/99  SpO2:  [97 %-99 %] 97 %  Body mass index is 23.98 kg/m².     Input and Output Summary (last 24 hours):     Intake/Output Summary (Last 24 hours) at 7/1/2024 1046  Last data filed at 7/1/2024 1020  Gross per 24 hour   Intake 2250 ml   Output 1100 ml   Net 1150 ml       Physical Exam:   Physical Exam  Vitals and nursing note reviewed.   Constitutional:       General: He is not in acute distress.     Appearance: He is well-developed.   HENT:      Head: Normocephalic and atraumatic.   Eyes:      Conjunctiva/sclera: Conjunctivae normal.   Pulmonary:      Effort: No respiratory distress.   Musculoskeletal:         General: No swelling.      Cervical back: Neck supple.   Skin:     General: Skin is warm and dry.   Neurological:      Mental Status: He is alert.   Psychiatric:         Mood and Affect: Mood normal.         Behavior: Behavior normal.          Additional Data:     Labs:  Results from last 7 days   Lab Units 06/26/24  0449   WBC Thousand/uL 7.65   HEMOGLOBIN g/dL 10.3*   HEMATOCRIT % 33.8*   PLATELETS Thousands/uL 477*   SEGS PCT % 62   LYMPHO PCT % 22   MONO PCT % 11   EOS PCT % 3     Results from last 7 days   Lab Units 06/26/24  0449   SODIUM mmol/L 139   POTASSIUM mmol/L 4.0   CHLORIDE mmol/L 104   CO2 mmol/L 26   BUN mg/dL 28*   CREATININE mg/dL 1.02   ANION GAP mmol/L 9   CALCIUM mg/dL 9.0   GLUCOSE RANDOM mg/dL 93                       Lines/Drains:  Invasive Devices       Drain  Duration             External Urinary Catheter 3 days                          Imaging: No pertinent imaging reviewed.    Recent Cultures (last 7 days):   Results from last 7 days   Lab Units 06/25/24  1834   URINE CULTURE  >100,000 cfu/ml Proteus mirabilis*  Enterococcus faecalis*       Last 24 Hours Medication List:   Current Facility-Administered Medications   Medication Dose Route Frequency  Provider Last Rate    acetaminophen  975 mg Oral Q8H DAVINA Karen Guerrero PA-C      aspirin  81 mg Oral Daily Karen Guerrero PA-C      atorvastatin  80 mg Oral Daily With Dinner Karen Guerrero PA-C      bictegravir-emtricitab-tenofovir alafenamide  1 tablet Oral Daily With Breakfast Karen Guerrero PA-C      diphenhydrAMINE  25 mg Oral Q6H PRN Iliana Cuevas PA-C      divalproex sodium  1,250 mg Oral Daily Ida Hodges MD      dolutegravir  50 mg Oral Daily Karen Guerrero PA-C      gabapentin  100 mg Oral TID Karen Guerrero PA-C      HYDROmorphone  0.5 mg Intravenous Q3H PRN Karen Guerrero PA-C      HYDROmorphone  0.2 mg Intravenous Once Stan Whitney MD      lamoTRIgine  50 mg Oral BID Ida Hodges MD      levOCARNitine  1,000 mg/day Oral TID With Meals Ida Hodges MD      lidocaine  1 patch Topical Daily Tho Laguna PA-C      losartan  50 mg Oral Daily Ida Hodges MD      melatonin  6 mg Oral HS Karen Guerrero PA-C      metoprolol succinate  25 mg Oral Q12H Leonardo Bruno MD      mirtazapine  15 mg Oral HS Karen Guerrero PA-C      ELVA ANTIFUNGAL   Topical BID Dana Rodríguez MD      ondansetron  4 mg Intravenous Q6H PRN Karen Guerrero PA-C      oxyCODONE  10 mg Oral Q6H PRN Karen Guerrero PA-C      oxyCODONE  5 mg Oral Q6H PRN Karen Gurerero PA-C      polyethylene glycol  17 g Oral Daily PRN Shruti Correa MD      rivaroxaban  20 mg Oral Daily With Dinner Stan Whitney MD      senna  2 tablet Oral BID Shruti Correa MD      sertraline  75 mg Oral Daily Aleja Dhaliwal MD      tamsulosin  0.4 mg Oral Daily With Dinner Karen Guerrero PA-C      zonisamide  400 mg Oral Daily Ida Hodges MD          Today, Patient Was Seen By: Aleja Dhaliwal MD    **Please Note: This note may have been constructed using a voice recognition system.**

## 2024-07-01 NOTE — WOUND OSTOMY CARE
Progress Note - Wound   Sunil Patel 63 y.o. male MRN: [id number]  Unit/Bed#: [facility] 507-01 Encounter: [id number]        Assessment:   Patient is seen for wound care follow-up. Seen lying on p-500 low air loss mattress. Left AKA site remains intact. Min assist for turning and repositioning. Agreeable for assessment.  Incontinent of bowel and bladder.     Findings:  Right Heel dry intact and shelley with no skin loss or wounds present. Recommend preventative Hydraguard Cream and proper offloading/ repositioning.  B/L groin folds with intact, red/pink in color, fungal rash - recommend use of ELVA anti-fungal cream.     POA B/L Sacro-Buttocks Unstageable Pressure Injury: 2 areas of full thickness skin loss on b/l sacro-buttocks measured together. Wound beds are 100% yellow slough tissue- cannot appreciate true depth related to slough tissue covering wound bed. Elsi-wounds are fragile and hyperpigmented. Scant sanguinous drainage noted. Recommend triad paste to wound beds only and application of hydraguard to elsi-wounds and all other aspects of sacro-buttocks.       No induration, fluctuance, odor, warmth/temperature differences, redness, or purulence noted to the above noted wounds and skin areas assessed. New dressings applied per orders listed below. Patient tolerated well- no s/s of non-verbal pain or discomfort observed during the encounter. Bedside nurse aware of plan of care. See flow sheets for more detailed assessment findings.      Orders listed below and wound care will continue to follow, call or Secure Chat Message with questions.           Skin Care Plan:  1-Cleanse sacro-buttocks with soap and water. Apply Triad Paste to Sacro-Buttocks Wound Beds Only. Apply Hydraguard to Elsi-wounds and all other intact aspects of sacro-buttocks. Apply both creams TID and PRN episodes of incontinence.   2-Turn/reposition q2h or when medically stable for pressure re-distribution on skin .  3-Elevate right heel to offload  pressure  4-Moisturize skin daily with skin nourishing cream  5-Ehob cushion in chair when out of bed.  6-Preventative Hydraguard to right heel BID and PRN.  7-P-500 Low Air Loss Mattress   8-Apply ELVA to groin BID per order.         WOUNDS:  Wound 06/08/24 Pressure Injury Buttocks Bilateral (Active)   Wound Image   07/01/24 1400   Wound Description Yellow;Slough 07/01/24 1400   Pressure Injury Stage U 07/01/24 1400   Elsi-wound Assessment Fragile;Hyperpigmented 07/01/24 1400   Wound Length (cm) 4 cm 07/01/24 1400   Wound Width (cm) 14 cm 07/01/24 1400   Wound Depth (cm) 0.2 cm 07/01/24 1400   Wound Surface Area (cm^2) 56 cm^2 07/01/24 1400   Wound Volume (cm^3) 11.2 cm^3 07/01/24 1400   Calculated Wound Volume (cm^3) 11.2 cm^3 07/01/24 1400   Change in Wound Size % 15.15 07/01/24 1400   Wound Site Closure WARD 06/23/24 2200   Drainage Amount Scant 07/01/24 1400   Drainage Description Sanguineous 07/01/24 1400   Non-staged Wound Description Full thickness 07/01/24 1400   Treatments Cleansed;Site care 07/01/24 1400   Dressing Other (Comment) 07/01/24 1400   Wound packed? No 07/01/24 1400   Packing- # removed 0 07/01/24 1400   Packing- # inserted 0 07/01/24 1400   Dressing Changed Changed 07/01/24 1400   Patient Tolerance Tolerated well 07/01/24 1400   Dressing Status Intact 07/01/24 1400                Willa Oakes RN, BSN, CWOCN

## 2024-07-02 PROCEDURE — 99232 SBSQ HOSP IP/OBS MODERATE 35: CPT | Performed by: FAMILY MEDICINE

## 2024-07-02 PROCEDURE — 97116 GAIT TRAINING THERAPY: CPT

## 2024-07-02 PROCEDURE — 97535 SELF CARE MNGMENT TRAINING: CPT

## 2024-07-02 PROCEDURE — 97530 THERAPEUTIC ACTIVITIES: CPT

## 2024-07-02 RX ADMIN — GABAPENTIN 100 MG: 100 CAPSULE ORAL at 09:32

## 2024-07-02 RX ADMIN — ASPIRIN 81 MG: 81 TABLET, COATED ORAL at 09:32

## 2024-07-02 RX ADMIN — LIDOCAINE 1 PATCH: 700 PATCH TOPICAL at 09:31

## 2024-07-02 RX ADMIN — GABAPENTIN 100 MG: 100 CAPSULE ORAL at 21:56

## 2024-07-02 RX ADMIN — LEVOCARNITINE 330 MG: 1 SOLUTION ORAL at 15:11

## 2024-07-02 RX ADMIN — ACETAMINOPHEN 975 MG: 325 TABLET, FILM COATED ORAL at 06:39

## 2024-07-02 RX ADMIN — DIVALPROEX SODIUM 1250 MG: 500 TABLET, EXTENDED RELEASE ORAL at 09:32

## 2024-07-02 RX ADMIN — MIRTAZAPINE 15 MG: 15 TABLET, FILM COATED ORAL at 21:56

## 2024-07-02 RX ADMIN — RIVAROXABAN 20 MG: 20 TABLET, FILM COATED ORAL at 16:27

## 2024-07-02 RX ADMIN — LEVOCARNITINE 330 MG: 1 SOLUTION ORAL at 09:32

## 2024-07-02 RX ADMIN — DOLUTEGRAVIR SODIUM 50 MG: 50 TABLET, FILM COATED ORAL at 09:32

## 2024-07-02 RX ADMIN — ZONISAMIDE 400 MG: 100 CAPSULE ORAL at 09:32

## 2024-07-02 RX ADMIN — LAMOTRIGINE 50 MG: 25 TABLET ORAL at 09:32

## 2024-07-02 RX ADMIN — MICONAZOLE NITRATE: 20 CREAM TOPICAL at 09:36

## 2024-07-02 RX ADMIN — MICONAZOLE NITRATE: 20 CREAM TOPICAL at 17:46

## 2024-07-02 RX ADMIN — LAMOTRIGINE 50 MG: 25 TABLET ORAL at 17:46

## 2024-07-02 RX ADMIN — SERTRALINE HYDROCHLORIDE 75 MG: 50 TABLET ORAL at 09:32

## 2024-07-02 RX ADMIN — ACETAMINOPHEN 975 MG: 325 TABLET, FILM COATED ORAL at 15:11

## 2024-07-02 RX ADMIN — ATORVASTATIN CALCIUM 80 MG: 80 TABLET, FILM COATED ORAL at 15:51

## 2024-07-02 RX ADMIN — SENNOSIDES 17.2 MG: 8.6 TABLET, FILM COATED ORAL at 17:46

## 2024-07-02 RX ADMIN — METOPROLOL SUCCINATE 25 MG: 25 TABLET, EXTENDED RELEASE ORAL at 06:39

## 2024-07-02 RX ADMIN — SENNOSIDES 17.2 MG: 8.6 TABLET, FILM COATED ORAL at 09:32

## 2024-07-02 RX ADMIN — OXYCODONE HYDROCHLORIDE 10 MG: 10 TABLET ORAL at 01:48

## 2024-07-02 RX ADMIN — Medication 6 MG: at 21:56

## 2024-07-02 RX ADMIN — LOSARTAN POTASSIUM 50 MG: 50 TABLET, FILM COATED ORAL at 09:32

## 2024-07-02 RX ADMIN — GABAPENTIN 100 MG: 100 CAPSULE ORAL at 15:51

## 2024-07-02 RX ADMIN — LEVOCARNITINE 330 MG: 1 SOLUTION ORAL at 17:46

## 2024-07-02 RX ADMIN — METOPROLOL SUCCINATE 25 MG: 25 TABLET, EXTENDED RELEASE ORAL at 17:46

## 2024-07-02 RX ADMIN — TAMSULOSIN HYDROCHLORIDE 0.4 MG: 0.4 CAPSULE ORAL at 16:27

## 2024-07-02 RX ADMIN — ACETAMINOPHEN 975 MG: 325 TABLET, FILM COATED ORAL at 21:56

## 2024-07-02 RX ADMIN — BICTEGRAVIR SODIUM, EMTRICITABINE, AND TENOFOVIR ALAFENAMIDE FUMARATE 1 TABLET: 50; 200; 25 TABLET ORAL at 09:32

## 2024-07-02 NOTE — ASSESSMENT & PLAN NOTE
Patient presented with left lower extremity acute limb ischemia with extensive left iliofemoral and left infrainguinal embolism with nonviable left lower extremity  Status post left femoral thrombectomy and AKA on 6/7  Continue Xarelto   Continue scheduled Tylenol and as needed oxycodone  Continue gabapentin 100 mg 3 times daily  Plan for rehab at discharge.  Patient was evaluated by physical medicine and rehab on 6/27, appreciate input.    Pending placement to [facility]

## 2024-07-02 NOTE — OCCUPATIONAL THERAPY NOTE
"  Occupational Therapy Progress Note     Patient Name: Sunil Patel  Today's Date: 7/2/2024  Problem List  Principal Problem:    Acute lower limb ischemia  Active Problems:    Bipolar affective disorder (HCC)    Human immunodeficiency virus (HIV) infection (HCC)    Benign essential hypertension    Cerebrovascular accident (CVA) (HCC)    Left ventricular thrombus    Seizures (HCC)    Carnitine deficiency (HCC)        07/02/24 1027   OT Last Visit   OT Visit Date 07/02/24   Pain Assessment   Pain Assessment Tool 0-10   Pain Score No Pain   Restrictions/Precautions   Weight Bearing Precautions Per Order Yes   LLE Weight Bearing Per Order NWB  (L AKA)   Other Precautions Cognitive;Chair Alarm;Bed Alarm;WBS;Fall Risk;Pain   Lifestyle   Autonomy pta, pt A ADLs, A IADLs, I FM   Reciprocal Relationships facility staff are able to A when needed.   Service to Others pt reports not having any family   Intrinsic Gratification drawing/art   Subjective   Subjective \"Let's go\"  (Pt's response to being asked to  make it down the rehman to the window.)   Bed Mobility   Supine to Sit 4  Minimal assistance   Additional items Assist x 1;Bedrails;Increased time required;Verbal cues   Additional Comments Pt found supine in bed, pt left in WC with alarm on and all needs in reach. Pt required min ax1 to complete bed mobility supine>sit   Transfers   Sit to Stand 3  Moderate assistance   Additional items Assist x 2;Increased time required;Verbal cues   Stand to Sit 3  Moderate assistance   Additional items Assist x 2;Increased time required;Verbal cues   Additional Comments Pt fluctuated between min and mod ax2 to complete sit>stand transfers. A levels dependent on fatigue, and pt's body awareness.   Functional Mobility   Functional Mobility 4  Minimal assistance   Additional Comments Pt required min ax1 with rw to complete FM, second person required CGA for safety. Pt completed FM community distance, requiring 3 breaks.   Additional items " Rolling walker   Cognition   Overall Cognitive Status Impaired   Arousal/Participation Alert;Cooperative   Attention Attends with cues to redirect   Orientation Level Oriented to person;Oriented to place;Oriented to situation;Disoriented to time   Memory Decreased recall of recent events   Following Commands Follows one step commands with increased time or repetition   Comments Pt is alert, cooperative, and motivated to engage in therapy session. Pt requires increased time to process while problem solving, and occaisional VC; however, Pt continues to improve safety awareness with AD during transfers and FM.   Assessment   Assessment Pt seen today for OT session focusing on training ADL tasks, transfers, body mechanics, improving endurance and tolerance during functional ADL activities. Pt was found supine in bed and was left seated in WC with bed/chair alarm on and all needs in reach. Pt completed bed mobility with min ax1, sit to stand transfers with min<>mod x2, and FM with min Ax1 with 2nd person CGA for safety. Pt fluctuated between min and mod ax2 to complete 7 sit<>stand transfers. A levels dependent on fatigue, and pt's body awareness. Pt completed FM with min ax1 with 2nd person CGA for safety community distance. Pt is alert, cooperative, and motivated to engage in therapy session. Pt requires increased time to process while problem solving, and occaisional VC; however, Pt continues to improve safety awareness with AD during transfers and FM. Pt continues to have improvements in activity tolerance, endurance, and task completion; however, is still limited 2* decreased ADL/High-level ADL status, decreased activity tolerance/endurance, decreased self-care trans, decreased safety awareness, impaired balance, impaired cognition, and poor insight to condition. The patient's raw score on the -PAC Daily Activity Inpatient Short Form is 16. A raw score of less than 19 suggests the patient may benefit from  discharge to post-acute rehabilitation services. Please refer to the recommendation of the Occupational Therapist for safe discharge planning. From OT perspective, pt should discharge level I.   Plan   Treatment Interventions ADL retraining;Functional transfer training;Endurance training;Cognitive reorientation;Equipment evaluation/education;Compensatory technique education;Energy conservation;Activityengagement   Goal Expiration Date 07/15/24   OT Treatment Day 8   OT Frequency 2-3x/wk   Discharge Recommendation   Rehab Resource Intensity Level, OT I (Maximum Resource Intensity)   AM-PAC Daily Activity Inpatient   Lower Body Dressing 2   Bathing 3   Toileting 2   Upper Body Dressing 3   Grooming 3   Eating 3   Daily Activity Raw Score 16   Daily Activity Standardized Score (Calc for Raw Score >=11) 35.96   AM-PAC Applied Cognition Inpatient   Following a Speech/Presentation 3   Understanding Ordinary Conversation 3   Taking Medications 2   Remembering Where Things Are Placed or Put Away 2   Remembering List of 4-5 Errands 2   Taking Care of Complicated Tasks 1   Applied Cognition Raw Score 13   Applied Cognition Standardized Score 30.46   Modified [place] Scale   Modified [place] Scale 4   End of Consult   Education Provided Yes   Patient Position at End of Consult Other (comment);Bed/Chair alarm activated;All needs within reach  (Pt left seated in WC with alarm on and all needs in reach.)   Nurse Communication Nurse aware of consult     JAMAR Chino

## 2024-07-02 NOTE — CASE MANAGEMENT
Case Management Discharge Planning Note    Patient name Sunil Patel  Location [facility] 507/[facility] 507-01 MRN [id number]  : 1960 Date 2024       Current Admission Date: 2024  Current Admission Diagnosis:Acute lower limb ischemia   Patient Active Problem List    Diagnosis Date Noted Date Diagnosed    Carnitine deficiency (HCC) 2024     Acute lower limb ischemia 2024     Left ventricular thrombus 2024     Seizures ([facility]) 2024     Tobacco abuse 10/26/2019     Cerebrovascular accident (CVA) ([facility]) 10/26/2019     Positive laboratory testing for human immunodeficiency virus (HCC) 2017     Bipolar affective disorder ([facility]) 2017     Hypertension 2017     Human immunodeficiency virus (HIV) infection ([facility]) 2017     History of transient cerebral ischemia 2017     Substance abuse ([facility]) 2013     Unspecified vitamin D deficiency 2010     Benign essential hypertension 2010       LOS (days): 25  Geometric Mean LOS (GMLOS) (days): 4  Days to GMLOS:-20.8     OBJECTIVE:  Risk of Unplanned Readmission Score: 25.14         Current admission status: Inpatient   Preferred Pharmacy:   [facility] [place], [facility] & [address] [address]  Phone: [phone] Fax: [phone]    Primary Care Provider: CHARLIE Trevino    Primary Insurance: MEDICARE  Secondary Insurance: [facility]    DISCHARGE DETAILS:    Discharge planning discussed with:: Patient at bedside  Freedom of Choice: Yes        Were Treatment Team discharge recommendations reviewed with patient/caregiver?: Yes  Did patient/caregiver verbalize understanding of patient care needs?: Yes       Contacts  Reason/Outcome: Discharge Planning    Other Referral/Resources/Interventions Provided:  Referral Comments: CM met w/ Pt at bedside to discuss his eventual discharge plan. Pt is aware  that he is still currently being considered for [facility]'s acute rehab. Pt is aware that after he completes rehab, he will need somewhere to live as he cannot return to his previous group home.

## 2024-07-02 NOTE — ASSESSMENT & PLAN NOTE
Continue Biktarvy/Tivicay daily and Cabenuva monthly injections      [FreeTextEntry1] : Annual\par comprehensive labs\par believes he is up to date with vaccines - will get records from previous PCP\par Afib\par  INR checked subtherapeutic\par - takes coumadin 1mg dialy\par increase to 1.5 mg daily\par Recheck in 2 weeks\par HTN: on amlodipine for BP controlled\par following with cardiology regularly\par Dr Martinez\par

## 2024-07-02 NOTE — PROGRESS NOTES
[facility]  Progress Note  Name: Sunil Patel I  MRN: [id number]  Unit/Bed#: [facility] 507-01 I Date of Admission: 6/7/2024   Date of Service: 7/2/2024 I Hospital Day: 25    Assessment & Plan   * Acute lower limb ischemia  Assessment & Plan  Patient presented with left lower extremity acute limb ischemia with extensive left iliofemoral and left infrainguinal embolism with nonviable left lower extremity  Status post left femoral thrombectomy and AKA on 6/7  Continue Xarelto   Continue scheduled Tylenol and as needed oxycodone  Continue gabapentin 100 mg 3 times daily  Plan for rehab at discharge.  Patient was evaluated by physical medicine and rehab on 6/27, appreciate input.    Pending placement to [facility]          Carnitine deficiency ([facility])  Assessment & Plan  Continue Levocarnitine replacement therapy TID    Seizures ([facility])  Assessment & Plan  Diagnosed in July 2019 - follows with [facility] neurology  Continue Depakote/Lamictal/Zonisamide > doses now adjusted per most recent outpatient neurology changes on record -> Depakote 1250 mg daily, Zonisamide 400 mg daily, and Lamictal 50 mg BID     Left ventricular thrombus  Assessment & Plan  Filling defect in the left ventricular apex suggests left ventricular thrombus and the source of the embolic disease  Echo showed ejection fraction 40 to 45%, mild global hypokinesis, LV thrombus  Underwent pharmacological stress test. Per cardiology, no significant areas of ischemia and recommended for medical treatment. Possible stress induced cardiomyopathy as an etiology of his reduced EF.   Continue metoprolol 25 mg twice daily, Cozaar 50 mg daily and Xarelto 20 mg daily    Cerebrovascular accident (CVA) ([facility])  Assessment & Plan  History of CVA in the left MCA territory in May 2018  Continue aspirin, atorvastatin    Benign essential hypertension  Assessment & Plan  Blood pressures reviewed and acceptable  Continue losartan and metoprolol    Human  immunodeficiency virus (HIV) infection (HCC)  Assessment & Plan  Continue Biktarvy/Tivicay daily and Cabenuva monthly injections       Bipolar affective disorder (HCC)  Assessment & Plan  Continue Zoloft 75 mg daily and Remeron 15 mg at bedtime  Neuropsych consulted patient deemed to have impaired capacity.  Discussed with  and plan for guardianship               VTE Pharmacologic Prophylaxis: VTE Score: 3 Moderate Risk (Score 3-4) - Pharmacological DVT Prophylaxis Ordered: rivaroxaban (Xarelto).    Mobility:   Basic Mobility Inpatient Raw Score: 12  -HLM Goal: 4: Move to chair/commode  JH-HLM Achieved: 1: Laying in bed  JH-HLM Goal NOT achieved. Continue with multidisciplinary rounding and encourage appropriate mobility to improve upon -HLM goals.    Patient Centered Rounds: I performed bedside rounds with nursing staff today.   Discussions with Specialists or Other Care Team Provider: CM    Education and Discussions with Family / Patient: Patient declined call to .     Total Time Spent on Date of Encounter in care of patient: 45 mins. This time was spent on one or more of the following: performing physical exam; counseling and coordination of care; obtaining or reviewing history; documenting in the medical record; reviewing/ordering tests, medications or procedures; communicating with other healthcare professionals and discussing with patient's family/caregivers.    Current Length of Stay: 25 day(s)  Current Patient Status: Inpatient   Certification Statement: The patient will continue to require additional inpatient hospital stay due to Pending placement   Discharge Plan: Anticipate discharge in 24-48 hrs to rehab facility.    Code Status: Level 1 - Full Code    Subjective:   Is a very pleasant 63-year-old gentleman who was seen and evaluated at bedside.  Patient denies any acute complaints at the time.    Objective:     Vitals:   Temp (24hrs), Av.7 °F (36.5 °C), Min:97.4 °F  (36.3 °C), Max:98.4 °F (36.9 °C)    Temp:  [97.4 °F (36.3 °C)-98.4 °F (36.9 °C)] 97.4 °F (36.3 °C)  HR:  [75-94] 85  Resp:  [18-20] 20  BP: (104-145)/(66-71) 116/66  SpO2:  [98 %] 98 %  Body mass index is 23.98 kg/m².     Input and Output Summary (last 24 hours):     Intake/Output Summary (Last 24 hours) at 7/2/2024 1211  Last data filed at 7/2/2024 0903  Gross per 24 hour   Intake 996 ml   Output 1350 ml   Net -354 ml       Physical Exam:   Physical Exam  Vitals reviewed.   HENT:      Head: Normocephalic.      Nose: No congestion.      Mouth/Throat:      Pharynx: No oropharyngeal exudate or posterior oropharyngeal erythema.   Eyes:      Conjunctiva/sclera: Conjunctivae normal.   Cardiovascular:      Rate and Rhythm: Normal rate and regular rhythm.   Pulmonary:      Effort: Pulmonary effort is normal.   Abdominal:      General: Abdomen is flat.      Palpations: Abdomen is soft.   Musculoskeletal:      Comments: L AKA   Skin:     General: Skin is warm and dry.   Neurological:      Mental Status: He is alert and oriented to person, place, and time. Mental status is at baseline.          Additional Data:     Labs:  Results from last 7 days   Lab Units 06/26/24  0449   WBC Thousand/uL 7.65   HEMOGLOBIN g/dL 10.3*   HEMATOCRIT % 33.8*   PLATELETS Thousands/uL 477*   SEGS PCT % 62   LYMPHO PCT % 22   MONO PCT % 11   EOS PCT % 3     Results from last 7 days   Lab Units 06/26/24  0449   SODIUM mmol/L 139   POTASSIUM mmol/L 4.0   CHLORIDE mmol/L 104   CO2 mmol/L 26   BUN mg/dL 28*   CREATININE mg/dL 1.02   ANION GAP mmol/L 9   CALCIUM mg/dL 9.0   GLUCOSE RANDOM mg/dL 93                       Lines/Drains:  Invasive Devices       Drain  Duration             External Urinary Catheter 4 days                          Imaging: No pertinent imaging reviewed.    Recent Cultures (last 7 days):   Results from last 7 days   Lab Units 06/25/24  1834   URINE CULTURE  >100,000 cfu/ml Proteus mirabilis*  Enterococcus faecalis*       Last  24 Hours Medication List:   Current Facility-Administered Medications   Medication Dose Route Frequency Provider Last Rate    acetaminophen  975 mg Oral Q8H DAVINA Karen Guerrero PA-C      aspirin  81 mg Oral Daily Karen Guerrero PA-C      atorvastatin  80 mg Oral Daily With Dinner Karen Guerrero PA-C      bictegravir-emtricitab-tenofovir alafenamide  1 tablet Oral Daily With Breakfast Karen Guerrero PA-C      diphenhydrAMINE  25 mg Oral Q6H PRN Iliana Cuevas PA-C      divalproex sodium  1,250 mg Oral Daily Ida Hodges MD      dolutegravir  50 mg Oral Daily Karen Guerrero PA-C      gabapentin  100 mg Oral TID Karen Guerrero PA-C      HYDROmorphone  0.5 mg Intravenous Q3H PRN Karen Guerrero PA-C      HYDROmorphone  0.2 mg Intravenous Once Stan Whitney MD      lamoTRIgine  50 mg Oral BID Ida Hodges MD      levOCARNitine  1,000 mg/day Oral TID With Meals Ida Hodges MD      lidocaine  1 patch Topical Daily Tho Laguna PA-C      losartan  50 mg Oral Daily Ida Hodges MD      melatonin  6 mg Oral HS Karen Guerrero PA-C      metoprolol succinate  25 mg Oral Q12H Leonardo Bruno MD      mirtazapine  15 mg Oral HS Karen Guerrero PA-C      ELVA ANTIFUNGAL   Topical BID Dana Rodríguez MD      ondansetron  4 mg Intravenous Q6H PRN Karen Guerrero PA-C      oxyCODONE  10 mg Oral Q6H PRN Karen Guerrero PA-C      oxyCODONE  5 mg Oral Q6H PRN Karen Guerrero PA-C      polyethylene glycol  17 g Oral Daily PRN Shruti Correa MD      rivaroxaban  20 mg Oral Daily With Dinner Stan Whitney MD      senna  2 tablet Oral BID Shruti Correa MD      sertraline  75 mg Oral Daily Aleja Dhaliwal MD      tamsulosin  0.4 mg Oral Daily With Dinner Karen Guerrero PA-C      zonisamide  400 mg Oral Daily Ida Hodges MD          Today, Patient Was Seen By: Kavon Mojica MD    **Please Note: This note may have been constructed using a voice recognition system.**

## 2024-07-02 NOTE — PLAN OF CARE
Problem: PHYSICAL THERAPY ADULT  Goal: Performs mobility at highest level of function for planned discharge setting.  See evaluation for individualized goals.  Description: Treatment/Interventions: ADL retraining, Functional transfer training, LE strengthening/ROM, Therapeutic exercise, Endurance training, Patient/family training, Equipment eval/education, Bed mobility, Gait training, Spoke to nursing, Spoke to case management, OT, Elevations, Cognitive reorientation  Equipment Recommended:  (tbd)       See flowsheet documentation for full assessment, interventions and recommendations.  Outcome: Progressing  Note: Prognosis: Good  Problem List: Decreased endurance, Decreased strength, Decreased mobility, Decreased cognition, Decreased safety awareness, Pain  Assessment: Patient received in bed. Patient agreeable to therapy. Patient performs bed mobility with minimal assistance x1. Patient performs functional transfers with Min Ax2 - moderate assistance x2 using rolling walker, x8 STS throughout session. Patient performs ambulation with minimal assistance x1 + CGA/SBA of another for safety using rolling walker, 3'+20'+40'+40'.  Patient left in wheelchair with all needs met and call bell/personal items within reach. The patient's AM-PAC Basic Mobility Inpatient Short Form Raw Score is 12 showing further need for skilled PT services in order to improve functional mobility, decrease need for assistance, and return to PLOF. PT is recommending Level 1 - Maximum Resource Intensity on d/c from hospital.  Will continue to follow as able.  Barriers to Discharge: Decreased caregiver support     Rehab Resource Intensity Level, PT: I (Maximum Resource Intensity)    See flowsheet documentation for full assessment.

## 2024-07-02 NOTE — PLAN OF CARE
Problem: OCCUPATIONAL THERAPY ADULT  Goal: Performs self-care activities at highest level of function for planned discharge setting.  See evaluation for individualized goals.  Description: Treatment Interventions: ADL retraining, Functional transfer training, Endurance training, Cognitive reorientation, Equipment evaluation/education, Compensatory technique education, Energy conservation, Activityengagement          See flowsheet documentation for full assessment, interventions and recommendations.   Outcome: Progressing  Note: Limitation: Decreased ADL status, Decreased Safe judgement during ADL, Decreased cognition, Decreased endurance, Decreased sensation, Decreased self-care trans, Decreased high-level ADLs  Prognosis: Good  Assessment: Pt seen today for OT session focusing on training ADL tasks, transfers, body mechanics, improving endurance and tolerance during functional ADL activities. Pt was found supine in bed and was left seated in WC with bed/chair alarm on and all needs in reach. Pt completed bed mobility with min ax1, sit to stand transfers with min<>mod x2, and FM with min Ax1 with 2nd person CGA for safety. Pt fluctuated between min and mod ax2 to complete 7 sit<>stand transfers. A levels dependent on fatigue, and pt's body awareness. Pt completed FM with min ax1 with 2nd person CGA for safety community distance. Pt is alert, cooperative, and motivated to engage in therapy session. Pt requires increased time to process while problem solving, and occaisional VC; however, Pt continues to improve safety awareness with AD during transfers and FM. Pt continues to have improvements in activity tolerance, endurance, and task completion; however, is still limited 2* decreased ADL/High-level ADL status, decreased activity tolerance/endurance, decreased self-care trans, decreased safety awareness, impaired balance, impaired cognition, and poor insight to condition. The patient's raw score on the AM-PAC Daily  Activity Inpatient Short Form is 16. A raw score of less than 19 suggests the patient may benefit from discharge to post-acute rehabilitation services. Please refer to the recommendation of the Occupational Therapist for safe discharge planning. From OT perspective, pt should discharge level I.     Rehab Resource Intensity Level, OT: I (Maximum Resource Intensity)

## 2024-07-02 NOTE — RESTORATIVE TECHNICIAN NOTE
Restorative Technician Note      Patient Name: Sunil Patel     Restorative Tech Visit Date: 07/02/24  Note Type: Mobility  Patient Position Upon Consult: Other (Comment) (in wheelchair)  Mobility / Activity Provided: Ax2  Activity Performed: Transferred  Assistive Device: Roller walker  Patient Position at End of Consult: Supine; All needs within reach; Bed/Chair alarm activated

## 2024-07-02 NOTE — Clinical Note
Pt seen today for OT session focusing on training ADL tasks, transfers, body mechanics, improving endurance and tolerance during functional ADL activities. Pt was found supine in bed and was left seated in WC with bed/chair alarm on and all needs in reach. Pt completed bed mobility with min ax1, sit to stand transfers with min<>mod x2, and FM with min Ax1 with 2nd person CGA for safety. Pt fluctuated between min and mod ax2 to complete 7 sit<>stand transfers. A levels dependent on fatigue, and pt's body awareness. Pt completed FM with min ax1 with 2nd person CGA for safety community distance. Pt is alert, cooperative, and motivated to engage in therapy session. Pt requires increased time to process while problem solving, and occaisional VC; however, Pt continues to improve safety awareness with AD during transfers and FM. Pt continues to have improvements in activity tolerance, endurance, and task completion; however, is still limited 2* decreased ADL/High-level ADL status, decreased activity tolerance/endurance, decreased self-care trans, decreased safety awareness, impaired balance, impaired cognition, and poor insight to condition. The patient's raw score on the AM-PAC Daily Activity Inpatient Short Form is 16. A raw score of less than 19 suggests the patient may benefit from discharge to post-acute rehabilitation services. Please refer to the recommendation of the Occupational Therapist for safe discharge planning. From OT perspective, pt should discharge level I.

## 2024-07-02 NOTE — PHYSICAL THERAPY NOTE
PHYSICAL THERAPY NOTE          Patient Name: Sunil Patel  Today's Date: 7/2/2024 07/02/24 1028   PT Last Visit   PT Visit Date 07/02/24   Note Type   Note Type Treatment   Pain Assessment   Pain Assessment Tool 0-10   Pain Score No Pain   Restrictions/Precautions   Weight Bearing Precautions Per Order Yes   LLE Weight Bearing Per Order NWB  (AKA)   Other Precautions Cognitive;Chair Alarm;Bed Alarm;WBS;Fall Risk;Pain   General   Chart Reviewed Yes   Family/Caregiver Present No   Cognition   Overall Cognitive Status Impaired   Arousal/Participation Alert;Cooperative   Attention Attends with cues to redirect   Orientation Level Oriented to person;Oriented to place;Oriented to situation;Disoriented to time   Memory Decreased recall of recent events   Following Commands Follows one step commands with increased time or repetition   Comments Patient is pleasant and cooperative   Subjective   Subjective Patient agreeable to PT tx   Bed Mobility   Supine to Sit 4  Minimal assistance   Additional items Assist x 1;Increased time required;Verbal cues;LE management;Bedrails   Transfers   Sit to Stand 3  Moderate assistance   Additional items Assist x 2;Increased time required;Verbal cues   Stand to Sit 3  Moderate assistance   Additional items Assist x 2;Increased time required;Verbal cues   Additional Comments fluctuating need for assist - MinAx2 vs Mod Ax2 for transfers throughout session - x8 STS throughout   Ambulation/Elevation   Gait pattern   (Hop to gait pattern)   Gait Assistance 4  Minimal assist   Additional items Assist x 1;Verbal cues  (+CGA/SBA of another for safety)   Assistive Device Rolling walker   Distance 3'+20'+40'+40'  (seated rest periods between each gait trial)   Balance   Static Sitting Fair +   Dynamic Sitting Fair   Static Standing Poor +   Dynamic Standing Poor   Ambulatory Poor +   Endurance Deficit    Endurance Deficit Yes   Endurance Deficit Description fatigue, weakness   Activity Tolerance   Activity Tolerance Patient tolerated treatment well   Medical Staff Made Aware SPT, OT, OTS   Nurse Made Aware RN cleared   Assessment   Prognosis Good   Problem List Decreased endurance;Decreased strength;Decreased mobility;Decreased cognition;Decreased safety awareness;Pain   Assessment Patient received in bed. Patient agreeable to therapy. Patient performs bed mobility with minimal assistance x1. Patient performs functional transfers with Min Ax2 - moderate assistance x2 using rolling walker, x8 STS throughout session. Patient performs ambulation with minimal assistance x1 + CGA/SBA of another for safety using rolling walker, 3'+20'+40'+40'.  Patient left in wheelchair with all needs met and call bell/personal items within reach. The patient's [facility] Basic Mobility Inpatient Short Form Raw Score is 12 showing further need for skilled PT services in order to improve functional mobility, decrease need for assistance, and return to PLOF. PT is recommending Level 1 - Maximum Resource Intensity on d/c from hospital.  Will continue to follow as able.   Barriers to Discharge Decreased caregiver support   Goals   Patient Goals to keep improving   PT Treatment Day 8   Plan   Treatment/Interventions Functional transfer training;LE strengthening/ROM;ADL retraining;Therapeutic exercise;Endurance training;Equipment eval/education;Bed mobility;Gait training;Compensatory technique education;Spoke to nursing;Spoke to case management;OT;Cognitive reorientation   Progress Progressing toward goals   PT Frequency 3-5x/wk   Discharge Recommendation   Rehab Resource Intensity Level, PT I (Maximum Resource Intensity)   Equipment Recommended Walker;Wheelchair   [facility]   Turning in Flat Bed Without Bedrails 4   Lying on Back to Sitting on Edge of Flat Bed Without Bedrails 3   Moving Bed to Chair 1   Standing Up From  Chair Using Arms 1   Walk in Room 2   Climb 3-5 Stairs With Railing 1   Basic Mobility Inpatient Raw Score 12   Basic Mobility Standardized Score 32.23   [facility] Highest Level Of Mobility   -[facility] Goal 4: Move to chair/commode   -HLM Achieved 7: Walk 25 feet or more   End of Consult   Patient Position at End of Consult All needs within reach;Bed/Chair alarm activated  (wheelchair)       Stacy Mehta, PT, DPT

## 2024-07-02 NOTE — [facility] ADMISSION
Re-referral received for consideration of patient for inpatient acute rehab.    Patient's case currently with ongoing discussion with [facility]/CM upper management, as patient needs guardianship established, and has unknown disposition plan after rehab. CM has been updated. Will continue to follow at this time.

## 2024-07-02 NOTE — CASE MANAGEMENT
Case Management Progress Note    Patient name Sunil Patel  Location [facility] 507/[facility] 507-01 MRN [id number]  : 1960 Date 2024       LOS (days): 25  Geometric Mean LOS (GMLOS) (days): 4  Days to GMLOS:-20.9        OBJECTIVE:        Current admission status: Inpatient  Preferred Pharmacy:   [facility] & [address] [address]  Phone: [phone] Fax: [phone]    Primary Care Provider: CHARLIE Trevino    Primary Insurance: MEDICARE  Secondary Insurance: [facility]    PROGRESS NOTE:    Securechat sent via Epic to Dr. Tatum informing him that [facility] will be pursuing guardianship for this Pt.  Per Dr. Tatum’s request, the expert report packet was sent via email to: [email or web address] for him to complete. CM will await the return of the completed packet.

## 2024-07-03 PROCEDURE — 99232 SBSQ HOSP IP/OBS MODERATE 35: CPT | Performed by: FAMILY MEDICINE

## 2024-07-03 RX ADMIN — ZONISAMIDE 400 MG: 100 CAPSULE ORAL at 08:47

## 2024-07-03 RX ADMIN — DOLUTEGRAVIR SODIUM 50 MG: 50 TABLET, FILM COATED ORAL at 08:44

## 2024-07-03 RX ADMIN — ACETAMINOPHEN 975 MG: 325 TABLET, FILM COATED ORAL at 06:03

## 2024-07-03 RX ADMIN — MIRTAZAPINE 15 MG: 15 TABLET, FILM COATED ORAL at 22:03

## 2024-07-03 RX ADMIN — LIDOCAINE 1 PATCH: 700 PATCH TOPICAL at 08:44

## 2024-07-03 RX ADMIN — MICONAZOLE NITRATE: 20 CREAM TOPICAL at 08:48

## 2024-07-03 RX ADMIN — TAMSULOSIN HYDROCHLORIDE 0.4 MG: 0.4 CAPSULE ORAL at 17:08

## 2024-07-03 RX ADMIN — METOPROLOL SUCCINATE 25 MG: 25 TABLET, EXTENDED RELEASE ORAL at 06:03

## 2024-07-03 RX ADMIN — LOSARTAN POTASSIUM 50 MG: 50 TABLET, FILM COATED ORAL at 08:43

## 2024-07-03 RX ADMIN — LAMOTRIGINE 50 MG: 25 TABLET ORAL at 17:08

## 2024-07-03 RX ADMIN — LEVOCARNITINE 330 MG: 1 SOLUTION ORAL at 17:09

## 2024-07-03 RX ADMIN — ACETAMINOPHEN 975 MG: 325 TABLET, FILM COATED ORAL at 15:01

## 2024-07-03 RX ADMIN — ATORVASTATIN CALCIUM 80 MG: 80 TABLET, FILM COATED ORAL at 17:08

## 2024-07-03 RX ADMIN — BICTEGRAVIR SODIUM, EMTRICITABINE, AND TENOFOVIR ALAFENAMIDE FUMARATE 1 TABLET: 50; 200; 25 TABLET ORAL at 08:44

## 2024-07-03 RX ADMIN — SERTRALINE HYDROCHLORIDE 75 MG: 50 TABLET ORAL at 08:43

## 2024-07-03 RX ADMIN — RIVAROXABAN 20 MG: 20 TABLET, FILM COATED ORAL at 17:08

## 2024-07-03 RX ADMIN — LAMOTRIGINE 50 MG: 25 TABLET ORAL at 08:43

## 2024-07-03 RX ADMIN — ASPIRIN 81 MG: 81 TABLET, COATED ORAL at 08:44

## 2024-07-03 RX ADMIN — LEVOCARNITINE 330 MG: 1 SOLUTION ORAL at 08:44

## 2024-07-03 RX ADMIN — GABAPENTIN 100 MG: 100 CAPSULE ORAL at 22:03

## 2024-07-03 RX ADMIN — MICONAZOLE NITRATE: 20 CREAM TOPICAL at 17:08

## 2024-07-03 RX ADMIN — GABAPENTIN 100 MG: 100 CAPSULE ORAL at 08:44

## 2024-07-03 RX ADMIN — ACETAMINOPHEN 975 MG: 325 TABLET, FILM COATED ORAL at 22:03

## 2024-07-03 RX ADMIN — DIVALPROEX SODIUM 1250 MG: 500 TABLET, EXTENDED RELEASE ORAL at 08:43

## 2024-07-03 RX ADMIN — METOPROLOL SUCCINATE 25 MG: 25 TABLET, EXTENDED RELEASE ORAL at 17:50

## 2024-07-03 RX ADMIN — LEVOCARNITINE 330 MG: 1 SOLUTION ORAL at 15:02

## 2024-07-03 RX ADMIN — SENNOSIDES 17.2 MG: 8.6 TABLET, FILM COATED ORAL at 08:43

## 2024-07-03 RX ADMIN — Medication 6 MG: at 22:03

## 2024-07-03 RX ADMIN — GABAPENTIN 100 MG: 100 CAPSULE ORAL at 15:01

## 2024-07-03 NOTE — CASE MANAGEMENT
Case Management Discharge Planning Note    Patient name Sunil Patel  Location [facility] 507/[facility] 507-01 MRN [id number]  : 1960 Date 7/3/2024       Current Admission Date: 2024  Current Admission Diagnosis:Acute lower limb ischemia   Patient Active Problem List    Diagnosis Date Noted Date Diagnosed    Carnitine deficiency (HCC) 2024     Acute lower limb ischemia 2024     Left ventricular thrombus 2024     Seizures ([facility]) 2024     Tobacco abuse 10/26/2019     Cerebrovascular accident (CVA) ([facility]) 10/26/2019     Positive laboratory testing for human immunodeficiency virus ([facility]) 2017     Bipolar affective disorder ([facility]) 2017     Hypertension 2017     Human immunodeficiency virus (HIV) infection ([facility]) 2017     History of transient cerebral ischemia 2017     Substance abuse ([facility]) 2013     Unspecified vitamin D deficiency 2010     Benign essential hypertension 2010       LOS (days): 26  Geometric Mean LOS (GMLOS) (days): 4  Days to GMLOS:-21.7     OBJECTIVE:  Risk of Unplanned Readmission Score: 25.41         Current admission status: Inpatient   Preferred Pharmacy:   [facility] [place] & [address] [address]  Phone: [phone] Fax: [phone]    Primary Care Provider: CHARLIE Trevino    Primary Insurance: MEDICARE  Secondary Insurance: [facility]    DISCHARGE DETAILS:    Other Referral/Resources/Interventions Provided:  Referral Comments: Additional SNF referrals submitted in Aidin to the following facilities:    [facility]  [facility]  [facility]  [facility]

## 2024-07-03 NOTE — CASE MANAGEMENT
Case Management Discharge Planning Note    Patient name Sunil Patel  Location [facility] 507/[facility] 507-01 MRN [id number]  : 1960 Date 7/3/2024       Current Admission Date: 2024  Current Admission Diagnosis:Acute lower limb ischemia   Patient Active Problem List    Diagnosis Date Noted Date Diagnosed    Carnitine deficiency (HCC) 2024     Acute lower limb ischemia 2024     Left ventricular thrombus 2024     Seizures (HCC) 2024     Tobacco abuse 10/26/2019     Cerebrovascular accident (CVA) ([facility]) 10/26/2019     Positive laboratory testing for human immunodeficiency virus (HCC) 2017     Bipolar affective disorder (HCC) 2017     Hypertension 2017     Human immunodeficiency virus (HIV) infection ([facility]) 2017     History of transient cerebral ischemia 2017     Substance abuse ([facility]) 2013     Unspecified vitamin D deficiency 2010     Benign essential hypertension 2010       LOS (days): 26  Geometric Mean LOS (GMLOS) (days): 4  Days to GMLOS:-21.8     OBJECTIVE:  Risk of Unplanned Readmission Score: 25.47         Current admission status: Inpatient   Preferred Pharmacy:   [facility] & [address] [address]  Phone: [phone] Fax: [phone]    Primary Care Provider: CHARLIE Trevino    Primary Insurance: MEDICARE  Secondary Insurance: [facility]    DISCHARGE DETAILS:    TC to Pts brother, Gael Patel (PH: [phone]). Per Gael, he lives in [place] and was unaware that the Pt was in the hospital.  CM updated Gael on barriers prohibiting Pts dispo plan. Gael is aware that the Pt needs acute rehab as well as long-term placement as he is unable to return to his previous group home.      CM also informed Gael that the Pt had a neuro-psych evaluation completed while hospitalized and was deemed not to have capacity.   Gael states that he is unable/willing to make decisions for Sadiq due to his own medical complications/needs.  Gael states that Sunil only has one son, Caro who he is basically estranged from.   Gael states that Sunil has 2 close friends- Mireya Nash (PH: [phone]) and Autumn ([phone]), but he does not believe that they would be able to make decisions for Sunil.

## 2024-07-03 NOTE — PROGRESS NOTES
[facility]  Progress Note  Name: Sunil Patel I  MRN: [id number]  Unit/Bed#: [facility] 507-01 I Date of Admission: 6/7/2024   Date of Service: 7/3/2024 I Hospital Day: 26    Assessment & Plan   * Acute lower limb ischemia  Assessment & Plan  Patient presented with left lower extremity acute limb ischemia with extensive left iliofemoral and left infrainguinal embolism with nonviable left lower extremity  Status post left femoral thrombectomy and AKA on 6/7  Continue Xarelto   Continue scheduled Tylenol and as needed oxycodone  Continue gabapentin 100 mg 3 times daily  Plan for rehab at discharge.  Patient was evaluated by physical medicine and rehab on 6/27, appreciate input.    Pending placement to [facility]          Carnitine deficiency ([facility])  Assessment & Plan  Continue Levocarnitine replacement therapy TID    Seizures ([facility])  Assessment & Plan  Diagnosed in July 2019 - follows with [facility] neurology  Continue Depakote/Lamictal/Zonisamide > doses now adjusted per most recent outpatient neurology changes on record -> Depakote 1250 mg daily, Zonisamide 400 mg daily, and Lamictal 50 mg BID     Left ventricular thrombus  Assessment & Plan  Filling defect in the left ventricular apex suggests left ventricular thrombus and the source of the embolic disease  Echo showed ejection fraction 40 to 45%, mild global hypokinesis, LV thrombus  Underwent pharmacological stress test. Per cardiology, no significant areas of ischemia and recommended for medical treatment. Possible stress induced cardiomyopathy as an etiology of his reduced EF.   Continue metoprolol 25 mg twice daily, Cozaar 50 mg daily and Xarelto 20 mg daily    Cerebrovascular accident (CVA) ([facility])  Assessment & Plan  History of CVA in the left MCA territory in May 2018  Continue aspirin, atorvastatin    Benign essential hypertension  Assessment & Plan  Blood pressures reviewed and acceptable  Continue losartan and metoprolol    Human  immunodeficiency virus (HIV) infection (HCC)  Assessment & Plan  Continue Biktarvy/Tivicay daily and Cabenuva monthly injections       Bipolar affective disorder (HCC)  Assessment & Plan  Continue Zoloft 75 mg daily and Remeron 15 mg at bedtime  Neuropsych consulted patient deemed to have impaired capacity.  Discussed with  and plan for guardianship               VTE Pharmacologic Prophylaxis: VTE Score: 3 Moderate Risk (Score 3-4) - Pharmacological DVT Prophylaxis Ordered: rivaroxaban (Xarelto).    Mobility:   Basic Mobility Inpatient Raw Score: 12  JH-HLM Goal: 4: Move to chair/commode  JH-HLM Achieved: 4: Move to chair/commode  JH-HLM Goal achieved. Continue to encourage appropriate mobility.    Patient Centered Rounds: I performed bedside rounds with nursing staff today.   Discussions with Specialists or Other Care Team Provider: LENY    Education and Discussions with Family / Patient: Patient declined call to .     Total Time Spent on Date of Encounter in care of patient: 45 mins. This time was spent on one or more of the following: performing physical exam; counseling and coordination of care; obtaining or reviewing history; documenting in the medical record; reviewing/ordering tests, medications or procedures; communicating with other healthcare professionals and discussing with patient's family/caregivers.    Current Length of Stay: 26 day(s)  Current Patient Status: Inpatient   Certification Statement: The patient will continue to require additional inpatient hospital stay due to Pending placement   Discharge Plan:  Pending placement to [facility]    Code Status: Level 1 - Full Code    Subjective:   Is a pleasant 63-year-old gentleman who was seen and evaluated at bedside.  Patient has no acute complaints at this time.    Objective:     Vitals:   Temp (24hrs), Av.6 °F (37 °C), Min:98.3 °F (36.8 °C), Max:99 °F (37.2 °C)    Temp:  [98.3 °F (36.8 °C)-99 °F (37.2 °C)] 98.3 °F (36.8 °C)  HR:   [82-86] 85  Resp:  [16-20] 16  BP: ()/(62-75) 127/75  SpO2:  [96 %-99 %] 97 %  Body mass index is 23.98 kg/m².     Input and Output Summary (last 24 hours):     Intake/Output Summary (Last 24 hours) at 7/3/2024 1233  Last data filed at 7/3/2024 1221  Gross per 24 hour   Intake 1256 ml   Output 1500 ml   Net -244 ml       Physical Exam:   Physical Exam  Vitals reviewed.   HENT:      Head: Normocephalic.      Nose: No congestion.      Mouth/Throat:      Pharynx: No oropharyngeal exudate or posterior oropharyngeal erythema.   Eyes:      Conjunctiva/sclera: Conjunctivae normal.   Cardiovascular:      Rate and Rhythm: Normal rate and regular rhythm.   Pulmonary:      Effort: Pulmonary effort is normal.   Abdominal:      General: Abdomen is flat.      Palpations: Abdomen is soft.   Musculoskeletal:      Comments: L AKA   Skin:     General: Skin is warm and dry.   Neurological:      Mental Status: He is alert and oriented to person, place, and time. Mental status is at baseline.          Additional Data:     Labs:                            Lines/Drains:  Invasive Devices       Drain  Duration             External Urinary Catheter 5 days                        Imaging: No pertinent imaging reviewed.    Recent Cultures (last 7 days):         Last 24 Hours Medication List:   Current Facility-Administered Medications   Medication Dose Route Frequency Provider Last Rate    acetaminophen  975 mg Oral Q8H DAVINA Karen Guerrero PA-C      aspirin  81 mg Oral Daily Karen Guerrero PA-C      atorvastatin  80 mg Oral Daily With Dinner Karen Guerrero PA-C      bictegravir-emtricitab-tenofovir alafenamide  1 tablet Oral Daily With Breakfast Karen Guerrero PA-C      diphenhydrAMINE  25 mg Oral Q6H PRN Iliana Cuevas PA-C      divalproex sodium  1,250 mg Oral Daily Ida Hodges MD      dolutegravir  50 mg Oral Daily Karen Guerrero PA-C      gabapentin  100 mg Oral TID Karen Guerrero PA-C       HYDROmorphone  0.5 mg Intravenous Q3H PRN Karen Guerrero PA-C      HYDROmorphone  0.2 mg Intravenous Once Stan Whitney MD      lamoTRIgine  50 mg Oral BID Ida Hodges MD      levOCARNitine  1,000 mg/day Oral TID With Meals Ida Hodges MD      lidocaine  1 patch Topical Daily LUPE ChiangC      losartan  50 mg Oral Daily Ida Hodges MD      melatonin  6 mg Oral HS Karen Guerrero PA-C      metoprolol succinate  25 mg Oral Q12H Leonardo Bruno MD      mirtazapine  15 mg Oral HS Karen Guerrero PA-C      ELVA ANTIFUNGAL   Topical BID Dana Rodríguez MD      ondansetron  4 mg Intravenous Q6H PRN Karen Guerrero PA-C      oxyCODONE  10 mg Oral Q6H PRN Karen Guerrero PA-C      oxyCODONE  5 mg Oral Q6H PRN Karen Guerrero PA-C      polyethylene glycol  17 g Oral Daily PRN Shruti Correa MD      rivaroxaban  20 mg Oral Daily With Dinner Stan Whitney MD      senna  2 tablet Oral BID Shruti Correa MD      sertraline  75 mg Oral Daily Aleja Dhaliwal MD      tamsulosin  0.4 mg Oral Daily With Dinner aKren Guerrero PA-C      zonisamide  400 mg Oral Daily Ida Hodges MD          Today, Patient Was Seen By: Kavon Mojica MD    **Please Note: This note may have been constructed using a voice recognition system.**

## 2024-07-04 PROCEDURE — 99232 SBSQ HOSP IP/OBS MODERATE 35: CPT | Performed by: FAMILY MEDICINE

## 2024-07-04 RX ADMIN — LEVOCARNITINE 330 MG: 1 SOLUTION ORAL at 08:13

## 2024-07-04 RX ADMIN — LEVOCARNITINE 330 MG: 1 SOLUTION ORAL at 15:25

## 2024-07-04 RX ADMIN — DOLUTEGRAVIR SODIUM 50 MG: 50 TABLET, FILM COATED ORAL at 08:13

## 2024-07-04 RX ADMIN — GABAPENTIN 100 MG: 100 CAPSULE ORAL at 21:19

## 2024-07-04 RX ADMIN — GABAPENTIN 100 MG: 100 CAPSULE ORAL at 08:12

## 2024-07-04 RX ADMIN — ASPIRIN 81 MG: 81 TABLET, COATED ORAL at 08:12

## 2024-07-04 RX ADMIN — TAMSULOSIN HYDROCHLORIDE 0.4 MG: 0.4 CAPSULE ORAL at 17:19

## 2024-07-04 RX ADMIN — LAMOTRIGINE 50 MG: 25 TABLET ORAL at 08:12

## 2024-07-04 RX ADMIN — LOSARTAN POTASSIUM 50 MG: 50 TABLET, FILM COATED ORAL at 08:12

## 2024-07-04 RX ADMIN — Medication 6 MG: at 21:18

## 2024-07-04 RX ADMIN — LEVOCARNITINE 330 MG: 1 SOLUTION ORAL at 17:20

## 2024-07-04 RX ADMIN — ACETAMINOPHEN 975 MG: 325 TABLET, FILM COATED ORAL at 21:19

## 2024-07-04 RX ADMIN — LAMOTRIGINE 50 MG: 25 TABLET ORAL at 17:19

## 2024-07-04 RX ADMIN — SERTRALINE HYDROCHLORIDE 75 MG: 50 TABLET ORAL at 08:12

## 2024-07-04 RX ADMIN — GABAPENTIN 100 MG: 100 CAPSULE ORAL at 15:25

## 2024-07-04 RX ADMIN — DIVALPROEX SODIUM 1250 MG: 500 TABLET, EXTENDED RELEASE ORAL at 08:12

## 2024-07-04 RX ADMIN — BICTEGRAVIR SODIUM, EMTRICITABINE, AND TENOFOVIR ALAFENAMIDE FUMARATE 1 TABLET: 50; 200; 25 TABLET ORAL at 08:13

## 2024-07-04 RX ADMIN — ACETAMINOPHEN 975 MG: 325 TABLET, FILM COATED ORAL at 13:49

## 2024-07-04 RX ADMIN — METOPROLOL SUCCINATE 25 MG: 25 TABLET, EXTENDED RELEASE ORAL at 08:15

## 2024-07-04 RX ADMIN — MICONAZOLE NITRATE: 20 CREAM TOPICAL at 08:14

## 2024-07-04 RX ADMIN — LIDOCAINE 1 PATCH: 700 PATCH TOPICAL at 08:12

## 2024-07-04 RX ADMIN — ATORVASTATIN CALCIUM 80 MG: 80 TABLET, FILM COATED ORAL at 17:19

## 2024-07-04 RX ADMIN — RIVAROXABAN 20 MG: 20 TABLET, FILM COATED ORAL at 17:19

## 2024-07-04 RX ADMIN — ZONISAMIDE 400 MG: 100 CAPSULE ORAL at 08:14

## 2024-07-04 RX ADMIN — MIRTAZAPINE 15 MG: 15 TABLET, FILM COATED ORAL at 21:19

## 2024-07-04 RX ADMIN — MICONAZOLE NITRATE 1 APPLICATION: 20 CREAM TOPICAL at 17:24

## 2024-07-04 NOTE — PROGRESS NOTES
[facility], [place]  Progress Note  Name: Sunil Patel I  MRN: [id number]  Unit/Bed#: [facility] 507-01 I Date of Admission: 6/7/2024   Date of Service: 7/4/2024 I Hospital Day: 27    Assessment & Plan   * Acute lower limb ischemia  Assessment & Plan  Patient presented with left lower extremity acute limb ischemia with extensive left iliofemoral and left infrainguinal embolism with nonviable left lower extremity  Status post left femoral thrombectomy and AKA on 6/7  Continue Xarelto   Continue scheduled Tylenol and as needed oxycodone  Continue gabapentin 100 mg 3 times daily  Plan for rehab at discharge.  Patient was evaluated by physical medicine and rehab on 6/27, appreciate input.    Pending placement to [facility]          Carnitine deficiency ([facility])  Assessment & Plan  Continue Levocarnitine replacement therapy TID    Seizures ([facility])  Assessment & Plan  Diagnosed in July 2019 - follows with [facility] neurology  Continue Depakote/Lamictal/Zonisamide > doses now adjusted per most recent outpatient neurology changes on record -> Depakote 1250 mg daily, Zonisamide 400 mg daily, and Lamictal 50 mg BID     Left ventricular thrombus  Assessment & Plan  Filling defect in the left ventricular apex suggests left ventricular thrombus and the source of the embolic disease  Echo showed ejection fraction 40 to 45%, mild global hypokinesis, LV thrombus  Underwent pharmacological stress test. Per cardiology, no significant areas of ischemia and recommended for medical treatment. Possible stress induced cardiomyopathy as an etiology of his reduced EF.   Continue metoprolol 25 mg twice daily, Cozaar 50 mg daily and Xarelto 20 mg daily    Cerebrovascular accident (CVA) ([facility])  Assessment & Plan  History of CVA in the left MCA territory in May 2018  Continue aspirin, atorvastatin    Benign essential hypertension  Assessment & Plan  Blood pressures reviewed and acceptable  Continue losartan and metoprolol    Human  "immunodeficiency virus (HIV) infection (HCC)  Assessment & Plan  Continue Biktarvy/Tivicay daily and Cabenuva monthly injections       Bipolar affective disorder (HCC)  Assessment & Plan  Continue Zoloft 75 mg daily and Remeron 15 mg at bedtime  Neuropsych consulted patient deemed to have impaired capacity.  Discussed with  and plan for guardianship    Medication management-resolved as of 6/14/2024  Assessment & Plan  Per medical provider on 6/8:  \"Patient is currently incarcerated. I called the facility at [phone] and spoke to the G[facility]IERRE[facility] Cid.  No provider is available over the weekend. Patient was admitted to the facility on 5/9.  Prior to that he was homeless. He is a poor historian and told the facility that he is only on Biktarvy. Facility currently have him on Biktarvy, Cabenuva injection, and Tylenol.  They have no records of his medical history or med list. Per chart review, patient used to live in a group home and appears to have been receiving 24/7 care. Will need to verify patient's medication list with PCP, neurology office or pharmacy on Monday, 6/10. Staff at the correctional facility recommended to call on Monday to speak with [facility] medical provider.\"  I called the facility to talk to the medical provider unfortunately they were not there I discussed with the nurse.  Trying to call tomorrow               VTE Pharmacologic Prophylaxis: VTE Score: 3 Moderate Risk (Score 3-4) - Pharmacological DVT Prophylaxis Ordered: rivaroxaban (Xarelto).    Mobility:   Basic Mobility Inpatient Raw Score: 12  JH-HLM Goal: 4: Move to chair/commode  JH-HLM Achieved: 4: Move to chair/commode  JH-HLM Goal achieved. Continue to encourage appropriate mobility.    Patient Centered Rounds: I performed bedside rounds with nursing staff today.   Discussions with Specialists or Other Care Team Provider: None    Education and Discussions with Family / Patient: Patient declined call to .     Total Time " Spent on Date of Encounter in care of patient: 40 mins. This time was spent on one or more of the following: performing physical exam; counseling and coordination of care; obtaining or reviewing history; documenting in the medical record; reviewing/ordering tests, medications or procedures; communicating with other healthcare professionals and discussing with patient's family/caregivers.    Current Length of Stay: 27 day(s)  Current Patient Status: Inpatient   Certification Statement: The patient will continue to require additional inpatient hospital stay due to Pending placement  Discharge Plan:  Pending placement     Code Status: Level 1 - Full Code    Subjective:   Patient was seen and evaluated today at bedside.  Patient has no acute complaints at this time.    Objective:     Vitals:   Temp (24hrs), Av.6 °F (37 °C), Min:98.4 °F (36.9 °C), Max:99.1 °F (37.3 °C)    Temp:  [98.4 °F (36.9 °C)-99.1 °F (37.3 °C)] 98.5 °F (36.9 °C)  HR:  [78-85] 78  Resp:  [16-17] 17  BP: (109-122)/(55-64) 119/64  SpO2:  [95 %-99 %] 98 %  Body mass index is 23.98 kg/m².     Input and Output Summary (last 24 hours):     Intake/Output Summary (Last 24 hours) at 2024 1228  Last data filed at 2024 0724  Gross per 24 hour   Intake 840 ml   Output 2300 ml   Net -1460 ml       Physical Exam:   Physical Exam  Vitals reviewed.   HENT:      Head: Normocephalic.      Nose: No congestion.      Mouth/Throat:      Pharynx: No oropharyngeal exudate or posterior oropharyngeal erythema.   Eyes:      Conjunctiva/sclera: Conjunctivae normal.   Cardiovascular:      Rate and Rhythm: Normal rate and regular rhythm.   Pulmonary:      Effort: Pulmonary effort is normal.   Abdominal:      General: Abdomen is flat.      Palpations: Abdomen is soft.   Musculoskeletal:      Comments: L AKA   Skin:     General: Skin is warm and dry.   Neurological:      Mental Status: He is alert and oriented to person, place, and time. Mental status is at baseline.           Additional Data:     Labs:                            Lines/Drains:  Invasive Devices       Drain  Duration             External Urinary Catheter 6 days                    Imaging: No pertinent imaging reviewed.    Recent Cultures (last 7 days):         Last 24 Hours Medication List:   Current Facility-Administered Medications   Medication Dose Route Frequency Provider Last Rate    acetaminophen  975 mg Oral Q8H DAVINA Karen Guerrero PA-C      aspirin  81 mg Oral Daily Karen Guerrero PA-C      atorvastatin  80 mg Oral Daily With Dinner Karen Guerrero PA-C      bictegravir-emtricitab-tenofovir alafenamide  1 tablet Oral Daily With Breakfast Karen Guerrero PA-C      diphenhydrAMINE  25 mg Oral Q6H PRN Iliana Cuevas PA-C      divalproex sodium  1,250 mg Oral Daily Ida Hodges MD      dolutegravir  50 mg Oral Daily Karen Guerrero PA-C      gabapentin  100 mg Oral TID Karen Guerrero PA-C      HYDROmorphone  0.5 mg Intravenous Q3H PRN Karen Guerrero PA-C      HYDROmorphone  0.2 mg Intravenous Once Stan Whitney MD      lamoTRIgine  50 mg Oral BID Ida Hodges MD      levOCARNitine  1,000 mg/day Oral TID With Meals Ida Hodges MD      lidocaine  1 patch Topical Daily Tho Laguna PA-C      losartan  50 mg Oral Daily Ida Hodges MD      melatonin  6 mg Oral HS Karen Guerrero PA-C      metoprolol succinate  25 mg Oral Q12H Leonardo Bruno MD      mirtazapine  15 mg Oral HS Karen Guerrero PA-C      ELVA ANTIFUNGAL   Topical BID Dana Rodríguez MD      ondansetron  4 mg Intravenous Q6H PRN Karen Guerrero PA-C      oxyCODONE  10 mg Oral Q6H PRN Karen Guerrero PA-C      oxyCODONE  5 mg Oral Q6H PRN Karen Guerrero PA-C      polyethylene glycol  17 g Oral Daily PRN Shruti Correa MD      rivaroxaban  20 mg Oral Daily With Dinner Stan Whitney MD      senna  2 tablet Oral BID Shruti Correa MD      sertraline  75 mg Oral Daily Aleja Dhaliwal MD       tamsulosin  0.4 mg Oral Daily With Dinner Karen Guerrero PA-C      zonisamide  400 mg Oral Daily Ida Hodges MD          Today, Patient Was Seen By: Kavon Mojica MD    **Please Note: This note may have been constructed using a voice recognition system.**

## 2024-07-04 NOTE — ASSESSMENT & PLAN NOTE
"Per medical provider on 6/8:  \"Patient is currently incarcerated. I called the facility at [phone] and spoke to the GUTIERREZ Cid.  No provider is available over the weekend. Patient was admitted to the facility on 5/9.  Prior to that he was homeless. He is a poor historian and told the facility that he is only on Biktarvy. Facility currently have him on Biktarvy, Cabenuva injection, and Tylenol.  They have no records of his medical history or med list. Per chart review, patient used to live in a group home and appears to have been receiving 24/7 care. Will need to verify patient's medication list with PCP, neurology office or pharmacy on Monday, 6/10. Staff at the correctional facility recommended to call on Monday to speak with evgeny medical provider.\"  I called the facility to talk to the medical provider unfortunately they were not there I discussed with the nurse.  Trying to call tomorrow  "

## 2024-07-04 NOTE — PROGRESS NOTES
"Pt's brother, Gael, came to visit pt and was asking for an update on pts status. Stating no one had called him to tell him why his brother was in the hospital, or that he \"had his leg cut off\".   Gael asking RN status on where pt was being discharged to, RN stating that case management was currently looking for a safe d/c and that RN would leave a note for case management to call him. RN confirmed Gael's number in the computer was accurate ([phone]).  "

## 2024-07-05 LAB
ALBUMIN SERPL BCG-MCNC: 3.7 G/DL (ref 3.5–5)
ALP SERPL-CCNC: 141 U/L (ref 34–104)
ALT SERPL W P-5'-P-CCNC: 19 U/L (ref 7–52)
ANION GAP SERPL CALCULATED.3IONS-SCNC: 10 MMOL/L (ref 4–13)
AST SERPL W P-5'-P-CCNC: 14 U/L (ref 13–39)
BASOPHILS # BLD AUTO: 0.05 THOUSANDS/ÂΜL (ref 0–0.1)
BASOPHILS NFR BLD AUTO: 1 % (ref 0–1)
BILIRUB SERPL-MCNC: 0.18 MG/DL (ref 0.2–1)
BUN SERPL-MCNC: 25 MG/DL (ref 5–25)
CALCIUM SERPL-MCNC: 9.6 MG/DL (ref 8.4–10.2)
CHLORIDE SERPL-SCNC: 103 MMOL/L (ref 96–108)
CO2 SERPL-SCNC: 25 MMOL/L (ref 21–32)
CREAT SERPL-MCNC: 1 MG/DL (ref 0.6–1.3)
EOSINOPHIL # BLD AUTO: 0.24 THOUSAND/ÂΜL (ref 0–0.61)
EOSINOPHIL NFR BLD AUTO: 3 % (ref 0–6)
ERYTHROCYTE [DISTWIDTH] IN BLOOD BY AUTOMATED COUNT: 14.9 % (ref 11.6–15.1)
GFR SERPL CREATININE-BSD FRML MDRD: 80 ML/MIN/1.73SQ M
GLUCOSE SERPL-MCNC: 115 MG/DL (ref 65–140)
HCT VFR BLD AUTO: 40.5 % (ref 36.5–49.3)
HGB BLD-MCNC: 12.1 G/DL (ref 12–17)
IMM GRANULOCYTES # BLD AUTO: 0.03 THOUSAND/UL (ref 0–0.2)
IMM GRANULOCYTES NFR BLD AUTO: 0 % (ref 0–2)
LYMPHOCYTES # BLD AUTO: 1.73 THOUSANDS/ÂΜL (ref 0.6–4.47)
LYMPHOCYTES NFR BLD AUTO: 19 % (ref 14–44)
MCH RBC QN AUTO: 28 PG (ref 26.8–34.3)
MCHC RBC AUTO-ENTMCNC: 29.9 G/DL (ref 31.4–37.4)
MCV RBC AUTO: 94 FL (ref 82–98)
MONOCYTES # BLD AUTO: 0.59 THOUSAND/ÂΜL (ref 0.17–1.22)
MONOCYTES NFR BLD AUTO: 7 % (ref 4–12)
NEUTROPHILS # BLD AUTO: 6.28 THOUSANDS/ÂΜL (ref 1.85–7.62)
NEUTS SEG NFR BLD AUTO: 70 % (ref 43–75)
NRBC BLD AUTO-RTO: 0 /100 WBCS
PLATELET # BLD AUTO: 543 THOUSANDS/UL (ref 149–390)
PMV BLD AUTO: 7.6 FL (ref 8.9–12.7)
POTASSIUM SERPL-SCNC: 4 MMOL/L (ref 3.5–5.3)
PROT SERPL-MCNC: 8.5 G/DL (ref 6.4–8.4)
RBC # BLD AUTO: 4.32 MILLION/UL (ref 3.88–5.62)
SODIUM SERPL-SCNC: 138 MMOL/L (ref 135–147)
WBC # BLD AUTO: 8.92 THOUSAND/UL (ref 4.31–10.16)

## 2024-07-05 PROCEDURE — 97530 THERAPEUTIC ACTIVITIES: CPT

## 2024-07-05 PROCEDURE — 99232 SBSQ HOSP IP/OBS MODERATE 35: CPT | Performed by: FAMILY MEDICINE

## 2024-07-05 PROCEDURE — 97542 WHEELCHAIR MNGMENT TRAINING: CPT

## 2024-07-05 PROCEDURE — 80053 COMPREHEN METABOLIC PANEL: CPT | Performed by: FAMILY MEDICINE

## 2024-07-05 PROCEDURE — 85025 COMPLETE CBC W/AUTO DIFF WBC: CPT | Performed by: FAMILY MEDICINE

## 2024-07-05 PROCEDURE — 97535 SELF CARE MNGMENT TRAINING: CPT

## 2024-07-05 PROCEDURE — 97112 NEUROMUSCULAR REEDUCATION: CPT

## 2024-07-05 RX ADMIN — LAMOTRIGINE 50 MG: 25 TABLET ORAL at 08:27

## 2024-07-05 RX ADMIN — METOPROLOL SUCCINATE 25 MG: 25 TABLET, EXTENDED RELEASE ORAL at 06:27

## 2024-07-05 RX ADMIN — LAMOTRIGINE 50 MG: 25 TABLET ORAL at 17:21

## 2024-07-05 RX ADMIN — SERTRALINE HYDROCHLORIDE 75 MG: 50 TABLET ORAL at 08:27

## 2024-07-05 RX ADMIN — GABAPENTIN 100 MG: 100 CAPSULE ORAL at 15:20

## 2024-07-05 RX ADMIN — DIVALPROEX SODIUM 1250 MG: 500 TABLET, EXTENDED RELEASE ORAL at 08:27

## 2024-07-05 RX ADMIN — LOSARTAN POTASSIUM 50 MG: 50 TABLET, FILM COATED ORAL at 08:28

## 2024-07-05 RX ADMIN — RIVAROXABAN 20 MG: 20 TABLET, FILM COATED ORAL at 17:21

## 2024-07-05 RX ADMIN — DOLUTEGRAVIR SODIUM 50 MG: 50 TABLET, FILM COATED ORAL at 08:28

## 2024-07-05 RX ADMIN — LEVOCARNITINE 330 MG: 1 SOLUTION ORAL at 17:21

## 2024-07-05 RX ADMIN — BICTEGRAVIR SODIUM, EMTRICITABINE, AND TENOFOVIR ALAFENAMIDE FUMARATE 1 TABLET: 50; 200; 25 TABLET ORAL at 08:28

## 2024-07-05 RX ADMIN — MICONAZOLE NITRATE: 20 CREAM TOPICAL at 17:23

## 2024-07-05 RX ADMIN — ACETAMINOPHEN 975 MG: 325 TABLET, FILM COATED ORAL at 13:30

## 2024-07-05 RX ADMIN — MICONAZOLE NITRATE: 20 CREAM TOPICAL at 08:36

## 2024-07-05 RX ADMIN — LEVOCARNITINE 330 MG: 1 SOLUTION ORAL at 09:05

## 2024-07-05 RX ADMIN — ZONISAMIDE 400 MG: 100 CAPSULE ORAL at 08:29

## 2024-07-05 RX ADMIN — ASPIRIN 81 MG: 81 TABLET, COATED ORAL at 08:27

## 2024-07-05 RX ADMIN — ATORVASTATIN CALCIUM 80 MG: 80 TABLET, FILM COATED ORAL at 17:21

## 2024-07-05 RX ADMIN — TAMSULOSIN HYDROCHLORIDE 0.4 MG: 0.4 CAPSULE ORAL at 17:21

## 2024-07-05 RX ADMIN — LEVOCARNITINE 330 MG: 1 SOLUTION ORAL at 15:20

## 2024-07-05 RX ADMIN — ACETAMINOPHEN 975 MG: 325 TABLET, FILM COATED ORAL at 06:27

## 2024-07-05 RX ADMIN — ACETAMINOPHEN 975 MG: 325 TABLET, FILM COATED ORAL at 21:18

## 2024-07-05 RX ADMIN — LIDOCAINE 1 PATCH: 700 PATCH TOPICAL at 08:28

## 2024-07-05 RX ADMIN — MIRTAZAPINE 15 MG: 15 TABLET, FILM COATED ORAL at 21:18

## 2024-07-05 RX ADMIN — GABAPENTIN 100 MG: 100 CAPSULE ORAL at 08:27

## 2024-07-05 RX ADMIN — Medication 6 MG: at 21:18

## 2024-07-05 RX ADMIN — GABAPENTIN 100 MG: 100 CAPSULE ORAL at 21:18

## 2024-07-05 RX ADMIN — SENNOSIDES 17.2 MG: 8.6 TABLET, FILM COATED ORAL at 08:28

## 2024-07-05 NOTE — ARC ADMISSION
Reviewed updates with [facility] physician - requesting repeat BMP and CBC, as no labs completed since 6/26, and also will need updated PT/OT notes prior to [facility]. Additionally, [facility] leadership is going to follow-up regarding if guardianship process has been initiated. CM has been updated. Will continue to follow patient's case at this time.

## 2024-07-05 NOTE — PLAN OF CARE
Problem: PHYSICAL THERAPY ADULT  Goal: Performs mobility at highest level of function for planned discharge setting.  See evaluation for individualized goals.  Description: Treatment/Interventions: ADL retraining, Functional transfer training, LE strengthening/ROM, Therapeutic exercise, Endurance training, Patient/family training, Equipment eval/education, Bed mobility, Gait training, Spoke to nursing, Spoke to case management, OT, Elevations, Cognitive reorientation  Equipment Recommended:  (tbd)       See flowsheet documentation for full assessment, interventions and recommendations.  Outcome: Progressing  Note: Prognosis: Good  Problem List: Decreased strength, Decreased range of motion, Decreased endurance, Impaired balance, Decreased mobility, Decreased coordination, Impaired judgement, Decreased safety awareness, Orthopedic restrictions  Assessment: Pt seen for PT treatment session with focus on bed mobility, functional transfers, functional mobility.  Pt making steady progress toward goals this session.  Pt continues to present with decreased safety awareness and insight, resulting in increased risk for falls during functional mobility.  Pt demonstrating improving endurance evident by ability to perform WC mobility prolonged distance this session.  Pt remains motivated to participate and get stronger.  Pt left upright in WC with chair alarm intact and with all needs in reach.  Pt will benefit from skilled therapy in order to address current impairments and functional limitations. PT to follow pt and recommending level I.  The patient's AM-PAC Basic Mobility Inpatient Short Form Raw Score is 14. A Raw score of less than or equal to 16 suggests the patient may benefit from discharge to post-acute rehabilitation services. Please also refer to the recommendation of the Physical Therapist for safe discharge planning.  Barriers to Discharge: Inaccessible home environment, Decreased caregiver support     Rehab  Resource Intensity Level, PT: I (Maximum Resource Intensity)    See flowsheet documentation for full assessment.

## 2024-07-05 NOTE — PROGRESS NOTES
Patient:  RAPHAEL GRISSOM    MRN:  [id number]    [place] Request ID:  [id number]    Level of care reserved:  Inpatient Rehab Facility    Partner Reserved:  [facility] - ([place]), [place] Patel [id number] [phone]    Clinical needs requested:    Geography searched:  10 miles around [address]    Start of Service:    Request sent:  8:46am EDT on 7/2/2024 by Katia Kay    Partner reserved:  1:54pm EDT on 7/5/2024 by Katia Kay    Choice list shared:  1:54pm EDT on 7/5/2024 by Katia Kay

## 2024-07-05 NOTE — CASE MANAGEMENT
Case Management Discharge Planning Note    Patient name Sunil Patel  Location [facility] 507-01 MRN [id number]  : 1960 Date 2024       Current Admission Date: 2024  Current Admission Diagnosis:Acute lower limb ischemia   Patient Active Problem List    Diagnosis Date Noted Date Diagnosed    Carnitine deficiency (HCC) 2024     Acute lower limb ischemia 2024     Left ventricular thrombus 2024     Seizures (HCC) 2024     Tobacco abuse 10/26/2019     Cerebrovascular accident (CVA) ([facility]) 10/26/2019     Positive laboratory testing for human immunodeficiency virus ([facility]) 2017     Bipolar affective disorder ([facility]) 2017     Hypertension 2017     Human immunodeficiency virus (HIV) infection ([facility]) 2017     History of transient cerebral ischemia 2017     Substance abuse ([facility]) 2013     Unspecified vitamin D deficiency 2010     Benign essential hypertension 2010       LOS (days): 28  Geometric Mean LOS (GMLOS) (days): 4  Days to GMLOS:-23.8     OBJECTIVE:  Risk of Unplanned Readmission Score: 26.14         Current admission status: Inpatient   Preferred Pharmacy:   [facility] [place] [facility] [address]  [place] [address]  Phone: [phone] Fax: [phone]    Primary Care Provider: CHARLIE Trevino    Primary Insurance: MEDICARE  Secondary Insurance: [facility]    DISCHARGE DETAILS:    Additional Comments: Pt accepted to [facility], pending labs and bed availabilty

## 2024-07-05 NOTE — OCCUPATIONAL THERAPY NOTE
"  Occupational Therapy Progress Note     Patient Name: Sunil Patel  Today's Date: 7/5/2024  Problem List  Principal Problem:    Acute lower limb ischemia  Active Problems:    Bipolar affective disorder (HCC)    Human immunodeficiency virus (HIV) infection (HCC)    Benign essential hypertension    Cerebrovascular accident (CVA) (HCC)    Left ventricular thrombus    Seizures (HCC)    Carnitine deficiency (HCC)        07/05/24 1050   OT Last Visit   OT Visit Date 07/05/24   Pain Assessment   Pain Assessment Tool 0-10   Pain Score No Pain   Restrictions/Precautions   Weight Bearing Precautions Per Order Yes   LLE Weight Bearing Per Order NWB   Other Precautions Cognitive;Chair Alarm;Bed Alarm;WBS  (L AKA, NWB LLE)   Lifestyle   Autonomy pta, pt A ADLs, A IADLs, I FM   Reciprocal Relationships facility staff are able to A when needed.   Service to Others pt reports not having any family   Intrinsic Gratification drawing/art   Subjective   Subjective \"It feels weird to stand at the sink\"   Bed Mobility   Sit to Supine 4  Minimal assistance   Additional items Assist x 1;Increased time required;LE management;Bedrails   Additional Comments Pt found supine in bed, pt left in WC with alarm on and all needs in reach. Pt required min ax1 to complete bed mobility supine>sit.   Transfers   Sit to Stand 3  Moderate assistance   Additional items Assist x 1;Increased time required;Verbal cues   Stand to Sit 3  Moderate assistance   Additional items Assist x 1;Increased time required;Verbal cues   Additional Comments Pt mod ax1 for sit<>stand transfers with RW for support   Functional Mobility   Functional Mobility 3  Moderate assistance   Additional Comments Pt mod ax1 FM with RW for support household distance bed>sink   Additional items Rolling walker   Balance   Static Sitting Fair +   Dynamic Sitting Fair   Static Standing Fair -   Dynamic Standing Poor +   Ambulatory Poor   Activity Tolerance   Activity Tolerance Patient " tolerated treatment well   Medical Staff Made Aware PT 2* to pt's med complexities, comorbidities, and regression from baseline   Nurse Made Aware RN cleared pt for therapy   Psychosocial   Psychosocial (WDL) WDL   Cognition   Overall Cognitive Status Impaired   Arousal/Participation Alert;Arousable   Attention Attends with cues to redirect   Orientation Level Oriented to place;Oriented to person;Oriented to situation;Disoriented to time   Memory Decreased recall of recent events   Following Commands Follows one step commands with increased time or repetition   Comments Pt is alert and cooperative throughout OT treatment session. Pt improving insight to condition and safety awareness. Pt motiviated to learn new exercises to improve functiong with FM.   Assessment   Limitation Decreased ADL status;Decreased Safe judgement during ADL;Decreased cognition;Decreased endurance;Decreased sensation;Decreased self-care trans;Decreased high-level ADLs   Prognosis Good   Assessment Pt seen today for OT session focusing on training ADL tasks, transfers, body mechanics, improving endurance and tolerance during functional ADL activities. Pt was found supine in bed and was left seated in wheel chair with bed/chair alarm on and all needs in reach. Pt completed bed mobility with mod ax1, sit to stand transfers with mod ax2, and FM with mod ax2. Pt required min a to brush teeth while standing at sink. Pt was S for facewashing EOB. Pt mostly S, min a for UB bathing for vc and washing back. Pt required min A for LB bathing for stability while wiping R lower leg. Pt required S to don/[facility] gown. Pt S during 4 minute functional standing at sink while completing teeth brushing. Pt CGA for steadying while applying toothpaste to toothbrush for support. Pt has improvements in activity tolerance, endurance, and task completion; however, is still limited 2* decreased ADL/High-level ADL status, decreased activity tolerance/endurance,  decreased self-care trans, decreased safety awareness, impaired balance, impaired cognition, and poor insight to condition. The patient's raw score on the AM-PAC Daily Activity Inpatient Short Form is 16. A raw score of less than 19 suggests the patient may benefit from discharge to post-acute rehabilitation services. Please refer to the recommendation of the Occupational Therapist for safe discharge planning. From OT perspective, pt should discharge level I.   Goals   Patient Goals to leave the hosptial   Plan   Treatment Interventions ADL retraining;Functional transfer training;Endurance training;Cognitive reorientation;Equipment evaluation/education;Compensatory technique education;Energy conservation;Activityengagement   Goal Expiration Date 07/15/24   OT Treatment Day 9   OT Frequency 2-3x/wk   Discharge Recommendation   Rehab Resource Intensity Level, OT I (Maximum Resource Intensity)   AM-PAC Daily Activity Inpatient   Lower Body Dressing 2   Bathing 3   Toileting 2   Upper Body Dressing 3   Grooming 3   Eating 4   Daily Activity Raw Score 17   Daily Activity Standardized Score (Calc for Raw Score >=11) 37.26   AM-PAC Applied Cognition Inpatient   Following a Speech/Presentation 3   Understanding Ordinary Conversation 3   Taking Medications 2   Remembering Where Things Are Placed or Put Away 2   Remembering List of 4-5 Errands 2   Taking Care of Complicated Tasks 1   Applied Cognition Raw Score 13   Applied Cognition Standardized Score 30.46   Modified Citlaly Scale   Modified Citlaly Scale 4   End of Consult   Education Provided Yes   Patient Position at End of Consult Bed/Chair alarm activated;All needs within reach;Bedside chair  (Pt left seated in WC with alarm on and all needs in reach.)   Nurse Communication Nurse aware of consult   JAMAR Chino

## 2024-07-05 NOTE — PROGRESS NOTES
[facility]  Progress Note  Name: Sunil Patel I  MRN: [id number]  Unit/Bed#: [facility] 507-01 I Date of Admission: 6/7/2024   Date of Service: 7/5/2024 I Hospital Day: 28    Assessment & Plan   * Acute lower limb ischemia  Assessment & Plan  Patient presented with left lower extremity acute limb ischemia with extensive left iliofemoral and left infrainguinal embolism with nonviable left lower extremity  Status post left femoral thrombectomy and AKA on 6/7  Continue Xarelto   Continue scheduled Tylenol and as needed oxycodone  Continue gabapentin 100 mg 3 times daily  Plan for rehab at discharge.  Patient was evaluated by physical medicine and rehab on 6/27, appreciate input.    Pending placement to [facility]          Carnitine deficiency ([facility])  Assessment & Plan  Continue Levocarnitine replacement therapy TID    Seizures ([facility])  Assessment & Plan  Diagnosed in July 2019 - follows with [facility] neurology  Continue Depakote/Lamictal/Zonisamide > doses now adjusted per most recent outpatient neurology changes on record -> Depakote 1250 mg daily, Zonisamide 400 mg daily, and Lamictal 50 mg BID     Left ventricular thrombus  Assessment & Plan  Filling defect in the left ventricular apex suggests left ventricular thrombus and the source of the embolic disease  Echo showed ejection fraction 40 to 45%, mild global hypokinesis, LV thrombus  Underwent pharmacological stress test. Per cardiology, no significant areas of ischemia and recommended for medical treatment. Possible stress induced cardiomyopathy as an etiology of his reduced EF.   Continue metoprolol 25 mg twice daily, Cozaar 50 mg daily and Xarelto 20 mg daily    Cerebrovascular accident (CVA) ([facility])  Assessment & Plan  History of CVA in the left MCA territory in May 2018  Continue aspirin, atorvastatin    Benign essential hypertension  Assessment & Plan  Blood pressures reviewed and acceptable  Continue losartan and metoprolol    Human  immunodeficiency virus (HIV) infection (HCC)  Assessment & Plan  Continue Biktarvy/Tivicay daily and Cabenuva monthly injections       Bipolar affective disorder (HCC)  Assessment & Plan  Continue Zoloft 75 mg daily and Remeron 15 mg at bedtime  Neuropsych consulted patient deemed to have impaired capacity.  Discussed with  and plan for guardianship               VTE Pharmacologic Prophylaxis: VTE Score: 3 Moderate Risk (Score 3-4) - Pharmacological DVT Prophylaxis Ordered: rivaroxaban (Xarelto).    Mobility:   Basic Mobility Inpatient Raw Score: 14  -[facility] Goal: 4: Move to chair/commode  JH-HLM Achieved: 5: Stand (1 or more minutes)  JH-HLM Goal achieved. Continue to encourage appropriate mobility.    Patient Centered Rounds: I performed bedside rounds with nursing staff today.   Discussions with Specialists or Other Care Team Provider: none    Education and Discussions with Family / Patient: Patient declined call to .     Total Time Spent on Date of Encounter in care of patient: 45 mins. This time was spent on one or more of the following: performing physical exam; counseling and coordination of care; obtaining or reviewing history; documenting in the medical record; reviewing/ordering tests, medications or procedures; communicating with other healthcare professionals and discussing with patient's family/caregivers.    Current Length of Stay: 28 day(s)  Current Patient Status: Inpatient   Certification Statement: The patient will continue to require additional inpatient hospital stay due to pending placement  Discharge Plan: Pending placement    Code Status: Level 1 - Full Code    Subjective:   Is a very pleasant 63-year-old gentleman who was seen evaluate today at bedside.  Patient denies any acute complaints at this time.    Objective:     Vitals:   Temp (24hrs), Av.1 °F (36.7 °C), Min:97.7 °F (36.5 °C), Max:98.4 °F (36.9 °C)    Temp:  [97.7 °F (36.5 °C)-98.4 °F (36.9 °C)] 98.4 °F  (36.9 °C)  HR:  [77-88] 80  Resp:  [16-18] 18  BP: (100-128)/(54-67) 100/59  SpO2:  [95 %-96 %] 96 %  Body mass index is 23.98 kg/m².     Input and Output Summary (last 24 hours):     Intake/Output Summary (Last 24 hours) at 7/5/2024 1524  Last data filed at 7/5/2024 1335  Gross per 24 hour   Intake 930 ml   Output 1750 ml   Net -820 ml       Physical Exam:   Physical Exam  Vitals reviewed.   HENT:      Head: Normocephalic.      Nose: No congestion.      Mouth/Throat:      Pharynx: No oropharyngeal exudate or posterior oropharyngeal erythema.   Eyes:      Conjunctiva/sclera: Conjunctivae normal.   Cardiovascular:      Rate and Rhythm: Normal rate and regular rhythm.   Pulmonary:      Effort: Pulmonary effort is normal.   Abdominal:      General: Abdomen is flat.      Palpations: Abdomen is soft.   Musculoskeletal:      Comments: L AKA   Skin:     General: Skin is warm and dry.   Neurological:      Mental Status: He is alert and oriented to person, place, and time. Mental status is at baseline.          Additional Data:     Labs:  Results from last 7 days   Lab Units 07/05/24  1058   WBC Thousand/uL 8.92   HEMOGLOBIN g/dL 12.1   HEMATOCRIT % 40.5   PLATELETS Thousands/uL 543*   SEGS PCT % 70   LYMPHO PCT % 19   MONO PCT % 7   EOS PCT % 3     Results from last 7 days   Lab Units 07/05/24  1058   SODIUM mmol/L 138   POTASSIUM mmol/L 4.0   CHLORIDE mmol/L 103   CO2 mmol/L 25   BUN mg/dL 25   CREATININE mg/dL 1.00   ANION GAP mmol/L 10   CALCIUM mg/dL 9.6   ALBUMIN g/dL 3.7   TOTAL BILIRUBIN mg/dL 0.18*   ALK PHOS U/L 141*   ALT U/L 19   AST U/L 14   GLUCOSE RANDOM mg/dL 115                       Lines/Drains:  Invasive Devices       Drain  Duration             External Urinary Catheter 7 days                      Imaging: No pertinent imaging reviewed.    Recent Cultures (last 7 days):         Last 24 Hours Medication List:   Current Facility-Administered Medications   Medication Dose Route Frequency Provider Last Rate     acetaminophen  975 mg Oral Q8H DAVINA Karen Guerrero PA-C      aspirin  81 mg Oral Daily Karen Guerrero PA-C      atorvastatin  80 mg Oral Daily With Dinner Karen Guerrero PA-C      bictegravir-emtricitab-tenofovir alafenamide  1 tablet Oral Daily With Breakfast Karen Guerrero PA-C      diphenhydrAMINE  25 mg Oral Q6H PRN Iliana Cuevas PA-C      divalproex sodium  1,250 mg Oral Daily Ida Hodges MD      dolutegravir  50 mg Oral Daily Karen Guerrero PA-C      gabapentin  100 mg Oral TID Karen Guerrero PA-C      HYDROmorphone  0.5 mg Intravenous Q3H PRN Karen Guerrero PA-C      HYDROmorphone  0.2 mg Intravenous Once Stan Whitney MD      lamoTRIgine  50 mg Oral BID Ida Hodges MD      levOCARNitine  1,000 mg/day Oral TID With Meals Ida Hodges MD      lidocaine  1 patch Topical Daily Tho Laguna PA-C      losartan  50 mg Oral Daily Ida Hodges MD      melatonin  6 mg Oral HS Karen Guerrero PA-C      metoprolol succinate  25 mg Oral Q12H Leonardo Bruno MD      mirtazapine  15 mg Oral HS Karen Guerrero PA-C      ELVA ANTIFUNGAL   Topical BID Dana Rodríguez MD      ondansetron  4 mg Intravenous Q6H PRN Karen Guerrero PA-C      oxyCODONE  10 mg Oral Q6H PRN Karen Guerrero PA-C      oxyCODONE  5 mg Oral Q6H PRN Karen Guerrero PA-C      polyethylene glycol  17 g Oral Daily PRN Shruti Correa MD      rivaroxaban  20 mg Oral Daily With Dinner Stan Whitney MD      senna  2 tablet Oral BID Shruti Correa MD      sertraline  75 mg Oral Daily Aleja Dhaliwal MD      tamsulosin  0.4 mg Oral Daily With Dinner Karen Guerrero PA-C      zonisamide  400 mg Oral Daily Ida Hodges MD          Today, Patient Was Seen By: Kavon Mojica MD    **Please Note: This note may have been constructed using a voice recognition system.**

## 2024-07-05 NOTE — PHYSICAL THERAPY NOTE
PHYSICAL THERAPY NOTE          Patient Name: Sunil Patel  Today's Date: 7/5/2024 07/05/24 1049   PT Last Visit   PT Visit Date 07/05/24   Note Type   Note Type Treatment   Pain Assessment   Pain Assessment Tool 0-10   Pain Score No Pain   Restrictions/Precautions   Weight Bearing Precautions Per Order Yes   LLE Weight Bearing Per Order (S)  NWB  (s/p AKA)   Other Precautions Cognitive;Chair Alarm;Bed Alarm;WBS  (L AKA)   General   Chart Reviewed Yes   Response to Previous Treatment Patient with no complaints from previous session.   Family/Caregiver Present No   Cognition   Overall Cognitive Status Impaired   Arousal/Participation Alert   Attention Attends with cues to redirect   Memory Decreased recall of precautions;Decreased recall of recent events   Following Commands Follows one step commands with increased time or repetition   Comments Decreased safety awareness and insight.  Very agreeable and cooperative and engaging.   Subjective   Subjective Very pleasant and agreeable to participate in therapy session.   Bed Mobility   Supine to Sit 4  Minimal assistance   Additional items Assist x 1;Increased time required;Verbal cues   Additional Comments Left OOB in WC with chair alarm intact and all needs in reach.   Transfers   Sit to Stand 3  Moderate assistance   Additional items Assist x 1;Increased time required;Verbal cues   Stand to Sit 3  Moderate assistance   Additional items Assist x 1;Increased time required;Verbal cues   Additional Comments performed multiple times throughout session   Ambulation/Elevation   Gait pattern   (hop to)   Gait Assistance 4  Minimal assist   Additional items Assist x 1;Verbal cues   Assistive Device Rolling walker   Distance 10 ft x 1  (additional deferred 2/2 focus on endurance with WC mobility)   Wheelchair Activities   Propulsion Yes   Propulsion Type 1 Manual   Level 1 Level tile    Method 1 Right upper extremity;Left upper extremity   Level of Assistance 1 Distant supervision   Description/ Details 1 360 ft x 1   Balance   Static Sitting Fair +   Dynamic Sitting Fair   Static Standing Fair -   Dynamic Standing Poor +   Ambulatory Poor +   Activity Tolerance   Activity Tolerance Patient tolerated treatment well   Medical Staff Made Aware OT, OTS   Nurse Made Aware RN cleared pt to be seen by PT   Exercises   Balance training  dynamic standing 3-4 min with Min-Mod A for stability while performing ADLs with OT   Assessment   Prognosis Good   Problem List Decreased strength;Decreased range of motion;Decreased endurance;Impaired balance;Decreased mobility;Decreased coordination;Impaired judgement;Decreased safety awareness;Orthopedic restrictions   Assessment Pt seen for PT treatment session with focus on bed mobility, functional transfers, functional mobility.  Pt making steady progress toward goals this session.  Pt continues to present with decreased safety awareness and insight, resulting in increased risk for falls during functional mobility.  Pt demonstrating improving endurance evident by ability to perform WC mobility prolonged distance this session.  Pt remains motivated to participate and get stronger.  Pt left upright in WC with chair alarm intact and with all needs in reach.  Pt will benefit from skilled therapy in order to address current impairments and functional limitations. PT to follow pt and recommending level I.  The patient's AM-[facility] Basic Mobility Inpatient Short Form Raw Score is 14. A Raw score of less than or equal to 16 suggests the patient may benefit from discharge to post-acute rehabilitation services. Please also refer to the recommendation of the Physical Therapist for safe discharge planning.   Barriers to Discharge Inaccessible home environment;Decreased caregiver support   Goals   Patient Goals to get better   STG Expiration Date 07/08/24   Plan    Treatment/Interventions Functional transfer training;LE strengthening/ROM;Therapeutic exercise;Endurance training;Cognitive reorientation;Patient/family training;Equipment eval/education;Bed mobility;Gait training;Spoke to nursing   Progress Progressing toward goals   PT Frequency 3-5x/wk   Discharge Recommendation   Rehab Resource Intensity Level, PT I (Maximum Resource Intensity)   AM-PAC Basic Mobility Inpatient   Turning in Flat Bed Without Bedrails 4   Lying on Back to Sitting on Edge of Flat Bed Without Bedrails 3   Moving Bed to Chair 2   Standing Up From Chair Using Arms 2   Walk in Room 2   Climb 3-5 Stairs With Railing 1   Basic Mobility Inpatient Raw Score 14   Basic Mobility Standardized Score 35.55   [facility] Highest Level Of Mobility   -[facility] Goal 4: Move to chair/commode   -HLM Achieved 5: Stand (1 or more minutes)     Doretha Golden, PT, DPT

## 2024-07-05 NOTE — CASE MANAGEMENT
Case Management Progress Note    Patient name Sunil Patel  Location [facility] 507/[facility] 507-01 MRN [id number]  : 1960 Date 2024       LOS (days): 28  Geometric Mean LOS (GMLOS) (days): 4  Days to GMLOS:-23.7        OBJECTIVE:        Current admission status: Inpatient  Preferred Pharmacy:   [facility] & [address]  [place] [facility] [address]  Phone: [phone] Fax: [phone]    Primary Care Provider: CHARLIE Trevino    Primary Insurance: MEDICARE  Secondary Insurance: [facility]    PROGRESS NOTE:    Guardianship Data Form, Completed Expert Report, and supporting clinical documentation sent to Vicente Chapman via email.

## 2024-07-05 NOTE — OCCUPATIONAL THERAPY NOTE
Pt seen today for OT session focusing on training ADL tasks, transfers, body mechanics, improving endurance and tolerance during functional ADL activities. Pt was found supine in bed and was left seated in wheel chair with bed/chair alarm on and all needs in reach. Pt completed bed mobility with mod ax1, sit to stand transfers with mod ax2, and FM with mod ax2. Pt required min a to brush teeth while standing at sink. Pt was S for facewashing EOB. Pt mostly S, min a for UB bathing for vc and washing back. Pt required min A for LB bathing for stability while wiping R lower leg. Pt required S to don/[facility] gown. Pt S during 4 minute functional standing at sink while completing teeth brushing. Pt CGA for steadying while applying toothpaste to toothbrush for support. Pt has improvements in activity tolerance, endurance, and task completion; however, is still limited 2* decreased ADL/High-level ADL status, decreased activity tolerance/endurance, decreased self-care trans, decreased safety awareness, impaired balance, impaired cognition, and poor insight to condition. The patient's raw score on the -PAC Daily Activity Inpatient Short Form is 16. A raw score of {greater than or equal to less/than:76886} 19 suggests the patient may benefit from discharge to post-acute rehabilitation services. Please refer to the recommendation of the Occupational Therapist for safe discharge planning. From OT perspective, pt should discharge level I.

## 2024-07-06 ENCOUNTER — HOSPITAL ENCOUNTER (INPATIENT)
Facility: HOSPITAL | Age: 64
LOS: 55 days | DRG: 559 | End: 2024-08-30
Attending: INTERNAL MEDICINE | Admitting: INTERNAL MEDICINE
Payer: MEDICARE

## 2024-07-06 VITALS
TEMPERATURE: 98.7 F | WEIGHT: 167.11 LBS | BODY MASS INDEX: 23.92 KG/M2 | SYSTOLIC BLOOD PRESSURE: 121 MMHG | DIASTOLIC BLOOD PRESSURE: 63 MMHG | HEIGHT: 70 IN | HEART RATE: 85 BPM | RESPIRATION RATE: 16 BRPM | OXYGEN SATURATION: 95 %

## 2024-07-06 DIAGNOSIS — I99.8 ACUTE LOWER LIMB ISCHEMIA: Primary | ICD-10-CM

## 2024-07-06 DIAGNOSIS — F43.23 ADJUSTMENT DISORDER WITH MIXED ANXIETY AND DEPRESSED MOOD: ICD-10-CM

## 2024-07-06 DIAGNOSIS — I10 HYPERTENSION: ICD-10-CM

## 2024-07-06 DIAGNOSIS — R10.84 GENERALIZED ABDOMINAL PAIN: ICD-10-CM

## 2024-07-06 DIAGNOSIS — Z91.89 AT RISK FOR CONSTIPATION: ICD-10-CM

## 2024-07-06 DIAGNOSIS — I51.3 LEFT VENTRICULAR THROMBUS: ICD-10-CM

## 2024-07-06 DIAGNOSIS — Z21 HUMAN IMMUNODEFICIENCY VIRUS (HIV) INFECTION (HCC): ICD-10-CM

## 2024-07-06 DIAGNOSIS — I63.449: ICD-10-CM

## 2024-07-06 DIAGNOSIS — S78.112A ABOVE-KNEE AMPUTATION OF LEFT LOWER EXTREMITY (HCC): ICD-10-CM

## 2024-07-06 PROBLEM — S06.9XAA TRAUMATIC BRAIN INJURY (HCC): Status: ACTIVE | Noted: 2024-07-06

## 2024-07-06 PROCEDURE — 99222 1ST HOSP IP/OBS MODERATE 55: CPT | Performed by: INTERNAL MEDICINE

## 2024-07-06 PROCEDURE — 99223 1ST HOSP IP/OBS HIGH 75: CPT | Performed by: PHYSICAL MEDICINE & REHABILITATION

## 2024-07-06 PROCEDURE — NC001 PR NO CHARGE: Performed by: PHYSICAL MEDICINE & REHABILITATION

## 2024-07-06 PROCEDURE — 87081 CULTURE SCREEN ONLY: CPT | Performed by: PHYSICAL MEDICINE & REHABILITATION

## 2024-07-06 PROCEDURE — 99239 HOSP IP/OBS DSCHRG MGMT >30: CPT | Performed by: FAMILY MEDICINE

## 2024-07-06 PROCEDURE — 97535 SELF CARE MNGMENT TRAINING: CPT

## 2024-07-06 PROCEDURE — 87147 CULTURE TYPE IMMUNOLOGIC: CPT | Performed by: PHYSICAL MEDICINE & REHABILITATION

## 2024-07-06 RX ORDER — METOPROLOL SUCCINATE 25 MG/1
25 TABLET, EXTENDED RELEASE ORAL EVERY 12 HOURS
Status: DISCONTINUED | OUTPATIENT
Start: 2024-07-06 | End: 2024-07-09

## 2024-07-06 RX ORDER — SENNOSIDES 8.6 MG
1 TABLET ORAL
Status: DISCONTINUED | OUTPATIENT
Start: 2024-07-06 | End: 2024-07-08

## 2024-07-06 RX ORDER — POLYETHYLENE GLYCOL 3350 17 G/17G
17 POWDER, FOR SOLUTION ORAL DAILY
Status: DISCONTINUED | OUTPATIENT
Start: 2024-07-07 | End: 2024-07-08

## 2024-07-06 RX ORDER — ZONISAMIDE 100 MG/1
400 CAPSULE ORAL DAILY
Qty: 120 CAPSULE | Refills: 0 | Status: ON HOLD | OUTPATIENT
Start: 2024-07-07

## 2024-07-06 RX ORDER — GABAPENTIN 100 MG/1
100 CAPSULE ORAL 3 TIMES DAILY
Status: DISCONTINUED | OUTPATIENT
Start: 2024-07-06 | End: 2024-07-19

## 2024-07-06 RX ORDER — SENNOSIDES 8.6 MG
17.2 TABLET ORAL 2 TIMES DAILY
Qty: 60 TABLET | Refills: 0 | Status: ON HOLD | OUTPATIENT
Start: 2024-07-06

## 2024-07-06 RX ORDER — LEVOCARNITINE 1 G/10ML
1000 SOLUTION ORAL
Status: DISCONTINUED | OUTPATIENT
Start: 2024-07-06 | End: 2024-08-30 | Stop reason: HOSPADM

## 2024-07-06 RX ORDER — DIPHENHYDRAMINE HCL 25 MG
25 TABLET ORAL EVERY 6 HOURS PRN
Status: DISCONTINUED | OUTPATIENT
Start: 2024-07-06 | End: 2024-08-30 | Stop reason: HOSPADM

## 2024-07-06 RX ORDER — SENNOSIDES 8.6 MG
2 TABLET ORAL 2 TIMES DAILY
Status: DISCONTINUED | OUTPATIENT
Start: 2024-07-06 | End: 2024-07-08

## 2024-07-06 RX ORDER — LOSARTAN POTASSIUM 50 MG/1
50 TABLET ORAL DAILY
Status: DISCONTINUED | OUTPATIENT
Start: 2024-07-07 | End: 2024-08-30 | Stop reason: HOSPADM

## 2024-07-06 RX ORDER — DIVALPROEX SODIUM 250 MG/1
1250 TABLET, EXTENDED RELEASE ORAL DAILY
Qty: 140 TABLET | Refills: 0 | Status: ON HOLD | OUTPATIENT
Start: 2024-07-07

## 2024-07-06 RX ORDER — ATORVASTATIN CALCIUM 80 MG/1
80 TABLET, FILM COATED ORAL
Qty: 30 TABLET | Refills: 0 | Status: ON HOLD | OUTPATIENT
Start: 2024-07-06

## 2024-07-06 RX ORDER — ACETAMINOPHEN 325 MG/1
975 TABLET ORAL EVERY 8 HOURS SCHEDULED
Status: DISCONTINUED | OUTPATIENT
Start: 2024-07-06 | End: 2024-07-09

## 2024-07-06 RX ORDER — POLYETHYLENE GLYCOL 3350 17 G/17G
17 POWDER, FOR SOLUTION ORAL DAILY PRN
Status: DISCONTINUED | OUTPATIENT
Start: 2024-07-06 | End: 2024-07-08

## 2024-07-06 RX ORDER — LIDOCAINE 50 MG/G
1 PATCH TOPICAL DAILY
Qty: 30 PATCH | Refills: 0 | Status: ON HOLD | OUTPATIENT
Start: 2024-07-07

## 2024-07-06 RX ORDER — MIRTAZAPINE 15 MG/1
15 TABLET, FILM COATED ORAL
Status: DISCONTINUED | OUTPATIENT
Start: 2024-07-06 | End: 2024-08-30 | Stop reason: HOSPADM

## 2024-07-06 RX ORDER — ZONISAMIDE 100 MG/1
400 CAPSULE ORAL DAILY
Status: DISCONTINUED | OUTPATIENT
Start: 2024-07-07 | End: 2024-08-30 | Stop reason: HOSPADM

## 2024-07-06 RX ORDER — LAMOTRIGINE 25 MG/1
50 TABLET ORAL 2 TIMES DAILY
Status: DISCONTINUED | OUTPATIENT
Start: 2024-07-06 | End: 2024-08-30 | Stop reason: HOSPADM

## 2024-07-06 RX ORDER — OXYCODONE HYDROCHLORIDE 10 MG/1
10 TABLET ORAL EVERY 6 HOURS PRN
Status: DISCONTINUED | OUTPATIENT
Start: 2024-07-06 | End: 2024-07-07

## 2024-07-06 RX ORDER — ATORVASTATIN CALCIUM 80 MG/1
80 TABLET, FILM COATED ORAL
Status: DISCONTINUED | OUTPATIENT
Start: 2024-07-06 | End: 2024-08-30 | Stop reason: HOSPADM

## 2024-07-06 RX ORDER — POLYETHYLENE GLYCOL 3350 17 G/17G
17 POWDER, FOR SOLUTION ORAL DAILY PRN
Qty: 30 EACH | Refills: 0 | Status: ON HOLD | OUTPATIENT
Start: 2024-07-06

## 2024-07-06 RX ORDER — LANOLIN ALCOHOL/MO/W.PET/CERES
6 CREAM (GRAM) TOPICAL
Qty: 60 TABLET | Refills: 0 | Status: ON HOLD | OUTPATIENT
Start: 2024-07-06

## 2024-07-06 RX ORDER — LIDOCAINE 50 MG/G
1 PATCH TOPICAL DAILY
Status: DISCONTINUED | OUTPATIENT
Start: 2024-07-07 | End: 2024-07-07

## 2024-07-06 RX ORDER — OXYCODONE HYDROCHLORIDE 5 MG/1
5 TABLET ORAL EVERY 6 HOURS PRN
Status: DISCONTINUED | OUTPATIENT
Start: 2024-07-06 | End: 2024-07-07

## 2024-07-06 RX ORDER — LAMOTRIGINE 25 MG/1
50 TABLET ORAL 2 TIMES DAILY
Qty: 120 TABLET | Refills: 0 | Status: ON HOLD | OUTPATIENT
Start: 2024-07-06

## 2024-07-06 RX ORDER — ONDANSETRON 4 MG/1
4 TABLET, ORALLY DISINTEGRATING ORAL EVERY 6 HOURS PRN
Status: DISCONTINUED | OUTPATIENT
Start: 2024-07-06 | End: 2024-08-30 | Stop reason: HOSPADM

## 2024-07-06 RX ORDER — BISACODYL 10 MG
10 SUPPOSITORY, RECTAL RECTAL DAILY PRN
Status: DISCONTINUED | OUTPATIENT
Start: 2024-07-06 | End: 2024-07-08

## 2024-07-06 RX ORDER — LEVOCARNITINE 1 G/10ML
1000 SOLUTION ORAL
Qty: 118 ML | Refills: 0 | Status: ON HOLD | OUTPATIENT
Start: 2024-07-06

## 2024-07-06 RX ORDER — MICONAZOLE NITRATE 20 MG/G
CREAM TOPICAL 2 TIMES DAILY
Status: DISCONTINUED | OUTPATIENT
Start: 2024-07-06 | End: 2024-07-16

## 2024-07-06 RX ORDER — GABAPENTIN 100 MG/1
100 CAPSULE ORAL 3 TIMES DAILY
Qty: 90 CAPSULE | Refills: 0 | Status: ON HOLD | OUTPATIENT
Start: 2024-07-06

## 2024-07-06 RX ORDER — TAMSULOSIN HYDROCHLORIDE 0.4 MG/1
0.4 CAPSULE ORAL
Status: DISCONTINUED | OUTPATIENT
Start: 2024-07-06 | End: 2024-08-30 | Stop reason: HOSPADM

## 2024-07-06 RX ORDER — METOPROLOL SUCCINATE 25 MG/1
25 TABLET, EXTENDED RELEASE ORAL EVERY 12 HOURS
Qty: 60 TABLET | Refills: 0 | Status: ON HOLD | OUTPATIENT
Start: 2024-07-06

## 2024-07-06 RX ADMIN — DIVALPROEX SODIUM 1250 MG: 500 TABLET, EXTENDED RELEASE ORAL at 08:51

## 2024-07-06 RX ADMIN — LEVOCARNITINE 330 MG: 1 SOLUTION ORAL at 18:44

## 2024-07-06 RX ADMIN — MIRTAZAPINE 15 MG: 15 TABLET, FILM COATED ORAL at 21:19

## 2024-07-06 RX ADMIN — ACETAMINOPHEN 975 MG: 325 TABLET, FILM COATED ORAL at 06:29

## 2024-07-06 RX ADMIN — ACETAMINOPHEN 975 MG: 325 TABLET, FILM COATED ORAL at 21:19

## 2024-07-06 RX ADMIN — BICTEGRAVIR SODIUM, EMTRICITABINE, AND TENOFOVIR ALAFENAMIDE FUMARATE 1 TABLET: 50; 200; 25 TABLET ORAL at 08:54

## 2024-07-06 RX ADMIN — METOPROLOL SUCCINATE 25 MG: 25 TABLET, EXTENDED RELEASE ORAL at 06:29

## 2024-07-06 RX ADMIN — ZONISAMIDE 400 MG: 100 CAPSULE ORAL at 08:54

## 2024-07-06 RX ADMIN — LAMOTRIGINE 50 MG: 25 TABLET ORAL at 18:45

## 2024-07-06 RX ADMIN — LAMOTRIGINE 50 MG: 25 TABLET ORAL at 08:52

## 2024-07-06 RX ADMIN — Medication 6 MG: at 21:19

## 2024-07-06 RX ADMIN — SERTRALINE HYDROCHLORIDE 75 MG: 50 TABLET ORAL at 08:51

## 2024-07-06 RX ADMIN — MICONAZOLE NITRATE: 20 CREAM TOPICAL at 08:54

## 2024-07-06 RX ADMIN — DOLUTEGRAVIR SODIUM 50 MG: 50 TABLET, FILM COATED ORAL at 08:54

## 2024-07-06 RX ADMIN — GABAPENTIN 100 MG: 100 CAPSULE ORAL at 21:19

## 2024-07-06 RX ADMIN — ASPIRIN 81 MG: 81 TABLET, COATED ORAL at 08:54

## 2024-07-06 RX ADMIN — ACETAMINOPHEN 975 MG: 325 TABLET, FILM COATED ORAL at 15:44

## 2024-07-06 RX ADMIN — ATORVASTATIN CALCIUM 80 MG: 80 TABLET, FILM COATED ORAL at 15:44

## 2024-07-06 RX ADMIN — LEVOCARNITINE 330 MG: 1 SOLUTION ORAL at 08:54

## 2024-07-06 RX ADMIN — TAMSULOSIN HYDROCHLORIDE 0.4 MG: 0.4 CAPSULE ORAL at 15:44

## 2024-07-06 RX ADMIN — RIVAROXABAN 20 MG: 20 TABLET, FILM COATED ORAL at 15:44

## 2024-07-06 RX ADMIN — GABAPENTIN 100 MG: 100 CAPSULE ORAL at 08:51

## 2024-07-06 RX ADMIN — LOSARTAN POTASSIUM 50 MG: 50 TABLET, FILM COATED ORAL at 08:52

## 2024-07-06 RX ADMIN — GABAPENTIN 100 MG: 100 CAPSULE ORAL at 15:44

## 2024-07-06 NOTE — ASSESSMENT & PLAN NOTE
Continue ASA and atorvastatin.  Outpt follow-up with Neurology - follows at [facility] for seizure disorder.

## 2024-07-06 NOTE — ASSESSMENT & PLAN NOTE
Continue Depakote ER and lamotrigine.  He was on zonesamide in the past.  No seizures in 4 years.  Follows at [facility].

## 2024-07-06 NOTE — CASE MANAGEMENT
Case Management Discharge Planning Note    Patient name Sunil Patel  Location [facility] 507/[facility] 507-01 MRN [id number]  : 1960 Date 2024       Current Admission Date: 2024  Current Admission Diagnosis:Acute lower limb ischemia   Patient Active Problem List    Diagnosis Date Noted Date Diagnosed    Carnitine deficiency (HCC) 2024     Acute lower limb ischemia 2024     Left ventricular thrombus 2024     Seizures (HCC) 2024     Tobacco abuse 10/26/2019     Cerebrovascular accident (CVA) ([facility]) 10/26/2019     Positive laboratory testing for human immunodeficiency virus ([facility]) 2017     Bipolar affective disorder ([facility]) 2017     Hypertension 2017     Human immunodeficiency virus (HIV) infection ([facility]) 2017     History of transient cerebral ischemia 2017     Substance abuse ([facility]) 2013     Unspecified vitamin D deficiency 2010     Benign essential hypertension 2010       LOS (days): 29  Geometric Mean LOS (GMLOS) (days): 4  Days to GMLOS:-24.7     OBJECTIVE:  Risk of Unplanned Readmission Score: 26.42         Current admission status: Inpatient   Preferred Pharmacy:   [facility] [address] [address]  Phone: [phone] Fax: [phone]    Primary Care Provider: CHARLIE Trevino    Primary Insurance: MEDICARE  Secondary Insurance: [facility]    DISCHARGE DETAILS:  CM received update that ARC room 451 is ready.  Pt will be picked up by 1pm.  Nurse to reach out to RN supervisor of [facility] for report.  Provider and patient also updated.

## 2024-07-06 NOTE — OCCUPATIONAL THERAPY NOTE
Occupational Therapy Progress Note     Patient Name: Sunil Patel  Today's Date: 7/6/2024  Problem List  Principal Problem:    Acute lower limb ischemia  Active Problems:    Bipolar affective disorder (HCC)    Human immunodeficiency virus (HIV) infection (HCC)    Benign essential hypertension    Cerebrovascular accident (CVA) (HCC)    Left ventricular thrombus    Seizures (HCC)    Carnitine deficiency (HCC)        07/06/24 0836   OT Last Visit   OT Visit Date 07/06/24   Note Type   Note Type Treatment   Pain Assessment   Pain Assessment Tool 0-10   Pain Score No Pain   Restrictions/Precautions   Weight Bearing Precautions Per Order Yes   LLE Weight Bearing Per Order NWB   Other Precautions Cognitive;Chair Alarm;Bed Alarm;WBS;Fall Risk  (L AKA)   Lifestyle   Autonomy pta, pt A ADLs, A IADLs, I FM   Reciprocal Relationships facility staff are able to A when needed.   Service to Others pt reports not having any family   Intrinsic Gratification drawing/art   ADL   Where Assessed Edge of bed   Grooming Assistance 5  Supervision/Setup   Grooming Deficit Supervision/safety;Teeth care;Wash/dry face   Grooming Comments Pt requires supervision for safety to wash face seated EOB   UB Bathing Assistance 5  Supervision/Setup   UB Bathing Deficit Supervision/safety;Increased time to complete;Chest;Right arm;Left arm;Abdomen   UB Bathing Comments Pt requires supervision for safety to wash UB seated EOB   LB Bathing Assistance 3  Moderate Assistance   LB Bathing Deficit Increased time to complete;Right upper leg;Left upper leg;Right lower leg including foot;Left lower leg including foot   LB Bathing Comments Pt requires MOD A to reach lower legs and feet to complete LB bathing seated EOB   UB Dressing Assistance 5  Supervision/Setup   UB Dressing Deficit Increased time to complete;Thread RUE;Thread LUE   UB Dressing Comments Pt requries supervision for safety to don hosptial gown seated EOB   LB Dressing Assistance 2  Maximal  Assistance   LB Dressing Deficit Don/doff R sock   LB Dressing Comments Pt requires MAX A to don R sock seated EOB   Bed Mobility   Supine to Sit 5  Supervision   Additional items HOB elevated;Increased time required;Verbal cues   Transfers   Sit to Stand 4  Minimal assistance   Additional items Assist x 1;Increased time required;Verbal cues   Stand to Sit 4  Minimal assistance   Additional items Assist x 1;Increased time required   Functional Mobility   Functional Mobility 4  Minimal assistance   Additional Comments Pt requires MIN Ax1 to hop from bed to bedside chair with rw   Additional items Rolling walker   Cognition   Overall Cognitive Status Impaired   Arousal/Participation Alert;Responsive;Cooperative   Attention Attends with cues to redirect   Orientation Level Oriented to person;Oriented to place;Oriented to situation;Disoriented to time   Memory Decreased recall of recent events   Following Commands Follows one step commands with increased time or repetition   Comments Pt agreeable to therapy. Requires verbal cues for safety   Activity Tolerance   Activity Tolerance Patient tolerated treatment well   Medical Staff Made Aware RN Cleared   Assessment   Assessment Pt was seen on 7/6/2024 to address ADL retraining, functional transfer training, and activity tolerance/endurance. Pt demonstrating improvements and currently requires supervision to complete UB ADLs and seated grooming tasks. Pt requires MIN A to complete functional transfers and to hop to chair with rw. Pt requires MOD A to complete LB bathing and MAX A to don R sock seated EOB. Pt is limited by decreased ADL status, functional transfers, functional mobility, and activity tolerance. Pt supine in bed at beginning of session and seated in bedside chair at end of session with alarm set and all items within reach. The patient's raw score on the -PAC Daily Activity Inpatient Short Form is 18. A raw score of less than 19 suggests the patient may  benefit from discharge to post-acute rehabilitation services. Please refer to the recommendation of the Occupational Therapist for safe discharge planning. Recommend Level I maximum intensity OT services at d/c to maximize pt function.   Plan   Treatment Interventions ADL retraining;UE strengthening/ROM;Functional transfer training;Endurance training;Cognitive reorientation;Patient/family training;Equipment evaluation/education;Compensatory technique education;Continued evaluation;Energy conservation;Activityengagement   Goal Expiration Date 07/15/24   OT Treatment Day 10   OT Frequency 2-3x/wk   Discharge Recommendation   Rehab Resource Intensity Level, OT I (Maximum Resource Intensity)   AM-PAC Daily Activity Inpatient   Lower Body Dressing 2   Bathing 2   Toileting 2   Upper Body Dressing 4   Grooming 4   Eating 4   Daily Activity Raw Score 18   Daily Activity Standardized Score (Calc for Raw Score >=11) 38.66   AM-PAC Applied Cognition Inpatient   Following a Speech/Presentation 3   Understanding Ordinary Conversation 3   Taking Medications 2   Remembering Where Things Are Placed or Put Away 2   Remembering List of 4-5 Errands 2   Taking Care of Complicated Tasks 1   Applied Cognition Raw Score 13   Applied Cognition Standardized Score 30.46   End of Consult   Education Provided Yes   Patient Position at End of Consult Bedside chair;Bed/Chair alarm activated;All needs within reach   Nurse Communication Nurse aware of consult     JOSE Ceballos, OTR/L

## 2024-07-06 NOTE — DISCHARGE SUMMARY
[facility]  Discharge- Sunil Patel 1960, 63 y.o. male MRN: [id number]  Unit/Bed#: [facility] 507-01 Encounter: [id number]  Primary Care Provider: CHARLIE Trevino   Date and time admitted to hospital: 6/7/2024  2:21 PM    * Acute lower limb ischemia  Assessment & Plan  Patient presented with left lower extremity acute limb ischemia with extensive left iliofemoral and left infrainguinal embolism with nonviable left lower extremity  Status post left femoral thrombectomy and AKA on 6/7  Continue Xarelto   Continue scheduled Tylenol and as needed oxycodone  Continue gabapentin 100 mg 3 times daily  Plan for rehab at discharge.  Patient was evaluated by physical medicine and rehab on 6/27, appreciate input.    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[facility])  Assessment & Plan  Continue Levocarnitine replacement therapy TID    Seizures ([facility])  Assessment & Plan  Diagnosed in July 2019 - follows with [facility] neurology  Continue Depakote/Lamictal/Zonisamide > doses now adjusted per most recent outpatient neurology changes on record -> Depakote 1250 mg daily, Zonisamide 400 mg daily, and Lamictal 50 mg BID     Left ventricular thrombus  Assessment & Plan  Filling defect in the left ventricular apex suggests left ventricular thrombus and the source of the embolic disease  Echo showed ejection fraction 40 to 45%, mild global hypokinesis, LV thrombus  Underwent pharmacological stress test. Per cardiology, no significant areas of ischemia and recommended for medical treatment. Possible stress induced cardiomyopathy as an etiology of his reduced EF.   Continue metoprolol 25 mg twice daily, Cozaar 50 mg daily and Xarelto 20 mg daily    Cerebrovascular accident (CVA) ([facility])  Assessment & Plan  History of CVA in the left MCA territory in May 2018  Continue aspirin, atorvastatin    Benign essential hypertension  Assessment & Plan  Blood pressures reviewed and acceptable  Continue  losartan and metoprolol    Human immunodeficiency virus (HIV) infection ([facility])  Assessment & Plan  Continue Biktarvy/Tivicay daily and Cabenuva monthly injections       Bipolar affective disorder ([facility])  Assessment & Plan  Continue Zoloft 75 mg daily and Remeron 15 mg at bedtime  Neuropsych consulted patient deemed to have impaired capacity.  Discussed with  and plan for guardianship        Medical Problems       Resolved Problems  Date Reviewed: 7/6/2024            Resolved    Medication management 6/14/2024     Resolved by  Stan Whitney MD        Discharging Physician / Practitioner: Kavon Mojica MD  PCP: CHARLIE Trevino  Admission Date:   Admission Orders (From admission, onward)       Ordered        06/07/24 1810  Inpatient Admission  Once                          Discharge Date: 07/06/24    Consultations During Hospital Stay:  Behavioral health  Wound care  PMR  Cardiology  Acute pain  Vascular     Procedures Performed:   Left femoral thrombectomy followed by a left AKA on June 7    Significant Findings / Test Results:   Acute limb ischemia with extensive left iliofemoral embolism with nonviable left lower extremity    Incidental Findings:   None     Test Results Pending at Discharge (will require follow up):   None     Outpatient Tests Requested:  None    Complications:  None    Reason for Admission: Acute lower limb ischemia    Hospital Course:   Sunil Patel is a 63 y.o. male patient who originally presented to the hospital on 6/7/2024 due to acute lower limb ischemia.  Patient was found to have an ischemic left lower extremity.  Vascular surgery evaluated patient and conducted a left femoral thrombectomy.  Unfortunately patient had nonviable lower extremity which required an above-the-knee amputation on June 7.  Patient was found to have a left ventricular thrombus which she was started on anticoagulation for.  Patient was evaluated by physical therapy and Occupational Therapy and  "was found to be a good candidate for acute rehab.  Patient will be discharged to acute rehab.        Please see above list of diagnoses and related plan for additional information.     Condition at Discharge: stable    Discharge Day Visit / Exam:   Subjective: Is a very pleasant 63-year-old gentleman who was seen and evaluated today at bedside.  Patient denies any acute complaints this time.  Vitals: Blood Pressure: 121/63 (07/06/24 0718)  Pulse: 85 (07/06/24 0629)  Temperature: 98.7 °F (37.1 °C) (07/06/24 0718)  Temp Source: Axillary (07/05/24 2303)  Respirations: 16 (07/06/24 0718)  Height: 5' 10\" (177.8 cm) (06/11/24 1509)  Weight - Scale: 75.8 kg (167 lb 1.7 oz) (06/22/24 0558)  SpO2: 95 % (07/05/24 2303)  Exam:   Physical Exam  Vitals reviewed.   HENT:      Head: Normocephalic.      Nose: No congestion.      Mouth/Throat:      Pharynx: No oropharyngeal exudate or posterior oropharyngeal erythema.   Eyes:      Conjunctiva/sclera: Conjunctivae normal.   Cardiovascular:      Rate and Rhythm: Normal rate and regular rhythm.   Pulmonary:      Effort: Pulmonary effort is normal.   Abdominal:      General: Abdomen is flat.      Palpations: Abdomen is soft.   Musculoskeletal:      Comments: L AKA   Skin:     General: Skin is warm and dry.   Neurological:      Mental Status: He is alert and oriented to person, place, and time. Mental status is at baseline.          Discussion with Family: Patient declined call to .     Discharge instructions/Information to patient and family:   See after visit summary for information provided to patient and family.      Provisions for Follow-Up Care:  See after visit summary for information related to follow-up care and any pertinent home health orders.      Mobility at time of Discharge:   Basic Mobility Inpatient Raw Score: 14  JH-HLM Goal: 4: Move to chair/commode  JH-HLM Achieved: 4: Move to chair/commode  HLM Goal achieved. Continue to encourage appropriate " mobility.     Disposition:   Acute Rehab at [facility]    Planned Readmission: None     Discharge Statement:  I spent 50 minutes discharging the patient. This time was spent on the day of discharge. I had direct contact with the patient on the day of discharge. Greater than 50% of the total time was spent examining patient, answering all patient questions, arranging and discussing plan of care with patient as well as directly providing post-discharge instructions.  Additional time then spent on discharge activities.    Discharge Medications:  See after visit summary for reconciled discharge medications provided to patient and/or family.      **Please Note: This note may have been constructed using a voice recognition system**   no
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
## 2024-07-06 NOTE — ASSESSMENT & PLAN NOTE
S/p Lt femoral thrombectomy and AKA 6/7/24.  Monitor incision.  Continue Xarelto - will need price check.  Rehab and pain control per PMR

## 2024-07-06 NOTE — H&P
[facility]  H&P  Name: Sunil Patel 63 y.o. male I MRN: [id number]  Unit/Bed#: -01 I Date of Admission: 7/6/2024   Date of Service: 7/6/2024 I Hospital Day: 0      Assessment & Plan   * Above-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)  Assessment & Plan  S/p Lt femoral thrombectomy and AKA 6/7/24.  Monitor incision.  Continue Xarelto - will need price check.  Rehab and pain control per PMR    Traumatic brain injury (HCC)  Assessment & Plan  Pt determined to not have capacity.  Will need a guardian.    Carnitine deficiency ([facility])  Assessment & Plan  Continue levocarnitine 1000mg 3x daily with meals.    Seizures ([facility])  Assessment & Plan  Continue Depakote ER and lamotrigine.  He was on zonesamide in the past.  No seizures in 4 years.  Follows at [facility].    Left ventricular thrombus  Assessment & Plan  Continue Xarelto.  Echo showed ejection fraction 40 to 45%, mild global hypokinesis     Benign essential hypertension  Assessment & Plan  Continue losartan and Toprol XL.  Monitor BP and adjust medication as necessary.    History of stroke  Assessment & Plan  Continue ASA and atorvastatin.  Outpt follow-up with Neurology - follows at [facility] for seizure disorder.    Human immunodeficiency virus (HIV) infection ([facility])  Assessment & Plan  Continue Biktarvy and Tivicay.    Bipolar affective disorder ([facility])  Assessment & Plan  Continue psychiatric medications.  Outpt follow-up with Psychiatry.                History of Present Illness:   Sunil Patel is a 63 y.o. male, with HIT, mood disorder, TBI, seizure disorder, HV +, carnitine deficiency, and history of Lt MCA CVA, who presented to [facility] with Lt LE swelling and pain. He was found to have an acute occlusion of the Lt external iliac artery. He underwent Lt femoral thrombectomy and AKA 6/7/24. Prior to surgery he was found to have a LV thrombus as well. He was started on Xarelto. He was evaluated by Neuropsychology and  "found to have impaired capacity. He will need guardianship. He was determined to be stable for admission to the [facility] 7/6/24.    Review of Systems: A 10 point ROS was performed; negative except as noted above.       Social History:    Substance Use History:   Social History     Substance and Sexual Activity   Alcohol Use Yes     Social History     Tobacco Use   Smoking Status Some Days    Current packs/day: 1.00    Types: Cigarettes   Smokeless Tobacco Never     Social History     Substance and Sexual Activity   Drug Use Yes    Types: \"Crack\" cocaine, Marijuana       Past Medical History:    Past Medical History:   Diagnosis Date    Anxiety     Depression     HIV disease ([facility])     Substance abuse ([facility])     Suicide attempt ([facility])          Review of Scheduled Meds:  Current Facility-Administered Medications   Medication Dose Route Frequency Provider Last Rate    acetaminophen  975 mg Oral Q8H DAVINA Lorrie Barillas PA-C      [START ON 7/7/2024] aspirin  81 mg Oral Daily Lorrie Barillas PA-C      atorvastatin  80 mg Oral Daily With Dinner Lorrie Barillas PA-C      [START ON 7/7/2024] bictegravir-emtricitab-tenofovir alafenamide  1 tablet Oral Daily With Breakfast Lorrie Barillas PA-C      bisacodyl  10 mg Rectal Daily PRN Raimundo Riley MD      diphenhydrAMINE  25 mg Oral Q6H PRN Lorrie Barillas PA-C      [START ON 7/7/2024] divalproex sodium  1,250 mg Oral Daily Lorrie Barillas PA-C      [START ON 7/7/2024] dolutegravir  50 mg Oral Daily Lorrie Barillas PA-C      gabapentin  100 mg Oral TID Lorrie Barillas PA-C      lamoTRIgine  50 mg Oral BID Lorrie Barillas PA-C      levOCARNitine  1,000 mg/day Oral TID With Meals Lorrie Barillas PA-C      [START ON 7/7/2024] lidocaine  1 patch Topical Daily Lorrie Barillas PA-C      [START ON 7/7/2024] losartan  50 mg Oral Daily Lorrie Barillas PA-C      melatonin  6 mg Oral HS Lorrie Barillas PA-C      metoprolol " succinate  25 mg Oral Q12H Lorrie Barillas PA-C      mirtazapine  15 mg Oral HS Lorrie Barillas PA-C      ELVA ANTIFUNGAL   Topical BID Lorrie Barillas PA-C      ondansetron  4 mg Oral Q6H PRN Lorrie Barillas PA-C      oxyCODONE  10 mg Oral Q6H PRN Lorrie Barillas PA-C      oxyCODONE  5 mg Oral Q6H PRN Lorrie Barillas PA-C      [START ON 7/7/2024] polyethylene glycol  17 g Oral Daily Raimundo Riley MD      polyethylene glycol  17 g Oral Daily PRN Lorrie Barillas PA-C      rivaroxaban  20 mg Oral Daily With Dinner Lorrie Barillas PA-C      senna  2 tablet Oral BID Lorrie Barillas PA-C      senna  1 tablet Oral HS PRN Raimundo Riley MD      [START ON 7/7/2024] sertraline  75 mg Oral Daily Lorrie Barillas PA-C      tamsulosin  0.4 mg Oral Daily With Dinner Lorrie Barillas PA-C      [START ON 7/7/2024] zonisamide  400 mg Oral Daily Lorrie Barillas PA-C         Physical Exam:  Temp:  [97.4 °F (36.3 °C)-98.7 °F (37.1 °C)] 97.4 °F (36.3 °C)  HR:  [85-89] 87  Resp:  [16-18] 16  BP: (106-121)/(60-77) 106/67  SpO2:  [95 %-96 %] 96 %    General: alert, no apparent distress, cooperative, and comfortable  HEENT:  Head: Normocephalic, no lesions, without obvious abnormality.  Eye: Normal external eye, conjunctiva, lidsc cornea  Ears: Normal external ears  Nose: Normal external nose, mucus membranes  CARDIAC:  regular rate and rhythm, S1, S2 normal, no murmur, click, rub or gallop  LUNGS:  no abnormal respiratory pattern, no retractions noted, non-labored breathing   ABDOMEN:  soft, non-tender, non-distended  EXTREMITIES:  Lt AKA  NEURO:  Awake and alert. +word finding difficulty during conversations  PSYCH:  Appropriate  INCISION:  dressings present    Laboratory:    Results from last 7 days   Lab Units 07/05/24  1058   HEMOGLOBIN g/dL 12.1   HEMATOCRIT % 40.5   WBC Thousand/uL 8.92     Results from last 7 days   Lab Units 07/05/24  1058   BUN mg/dL 25   SODIUM  "mmol/L 138   POTASSIUM mmol/L 4.0   CHLORIDE mmol/L 103   CREATININE mg/dL 1.00   AST U/L 14   ALT U/L 19            Wt Readings from Last 1 Encounters:   07/06/24 72.6 kg (160 lb)     Estimated body mass index is 22.96 kg/m² as calculated from the following:    Height as of this encounter: 5' 10\" (1.778 m).    Weight as of this encounter: 72.6 kg (160 lb).    Imaging:  No orders to display       Level 1 - Full Code    Counseling / Coordination of Care:   Total floor / unit time spent today 60 minutes. and Greater than 50% of total time was spent with the patient and / or family counseling and / or coordination of care.      ** Please Note: Fluency Direct voice to text software may have been used in the creation of this document. **        "

## 2024-07-06 NOTE — PLAN OF CARE
Problem: PAIN - ADULT  Goal: Verbalizes/displays adequate comfort level or baseline comfort level  Description: Interventions:  - Encourage patient to monitor pain and request assistance  - Assess pain using appropriate pain scale  - Administer analgesics based on type and severity of pain and evaluate response  - Implement non-pharmacological measures as appropriate and evaluate response  - Consider cultural and social influences on pain and pain management  - Notify physician/advanced practitioner if interventions unsuccessful or patient reports new pain  Outcome: Progressing     Problem: INFECTION - ADULT  Goal: Absence or prevention of progression during hospitalization  Description: INTERVENTIONS:  - Assess and monitor for signs and symptoms of infection  - Monitor lab/diagnostic results  - Monitor all insertion sites, i.e. indwelling lines, tubes, and drains  - Monitor endotracheal if appropriate and nasal secretions for changes in amount and color  - [place] appropriate cooling/warming therapies per order  - Administer medications as ordered  - Instruct and encourage patient and family to use good hand hygiene technique  - Identify and instruct in appropriate isolation precautions for identified infection/condition  Outcome: Progressing  Goal: Absence of fever/infection during neutropenic period  Description: INTERVENTIONS:  - Monitor WBC    Outcome: Progressing     Problem: SAFETY ADULT  Goal: Patient will remain free of falls  Description: INTERVENTIONS:  - Educate patient/family on patient safety including physical limitations  - Instruct patient to call for assistance with activity   - Consult OT/PT to assist with strengthening/mobility   - Keep Call bell within reach  - Keep bed low and locked with side rails adjusted as appropriate  - Keep care items and personal belongings within reach  - Initiate and maintain comfort rounds  - Make Fall Risk Sign visible to staff  - Offer Toileting every 2 Hours,  in advance of need  - Initiate/Maintain bed alarm  - Obtain necessary fall risk management equipment:    - Apply yellow socks and bracelet for high fall risk patients  - Consider moving patient to room near nurses station  Outcome: Progressing  Goal: Maintain or return to baseline ADL function  Description: INTERVENTIONS:  -  Assess patient's ability to carry out ADLs; assess patient's baseline for ADL function and identify physical deficits which impact ability to perform ADLs (bathing, care of mouth/teeth, toileting, grooming, dressing, etc.)  - Assess/evaluate cause of self-care deficits   - Assess range of motion  - Assess patient's mobility; develop plan if impaired  - Assess patient's need for assistive devices and provide as appropriate  - Encourage maximum independence but intervene and supervise when necessary  - Involve family in performance of ADLs  - Assess for home care needs following discharge   - Consider OT consult to assist with ADL evaluation and planning for discharge  - Provide patient education as appropriate  Outcome: Progressing  Goal: Maintains/Returns to pre admission functional level  Description: INTERVENTIONS:  - Perform AM-PAC 6 Click Basic Mobility/ Daily Activity assessment daily.  - Set and communicate daily mobility goal to care team and patient/family/caregiver.   - Collaborate with rehabilitation services on mobility goals if consulted  - Perform Range of Motion several times a day.  - Reposition patient every 2 hours.  - Dangle patient 3+ times a day  - Stand patient 1+ times a day  - Ambulate patient  times a day  - Out of bed to chair  times a day   - Out of bed for meals  times a day  - Out of bed for toileting  - Record patient progress and toleration of activity level   Outcome: Progressing     Problem: DISCHARGE PLANNING  Goal: Discharge to home or other facility with appropriate resources  Description: INTERVENTIONS:  - Identify barriers to discharge w/patient and  caregiver  - Arrange for needed discharge resources and transportation as appropriate  - Identify discharge learning needs (meds, wound care, etc.)  - Arrange for interpretive services to assist at discharge as needed  - Refer to Case Management Department for coordinating discharge planning if the patient needs post-hospital services based on physician/advanced practitioner order or complex needs related to functional status, cognitive ability, or social support system  Outcome: Progressing

## 2024-07-06 NOTE — ARC ADMISSION
Reviewed updates - patient remains acute rehab appropriate, and is medically stable for discharge.    Patient will admit to [facility] ARC room 451 with pickup at 12pm. RN can reach out to ARC charge RN role via EPIC securechat for report. CM has been updated.

## 2024-07-06 NOTE — PLAN OF CARE
Problem: OCCUPATIONAL THERAPY ADULT  Goal: Performs self-care activities at highest level of function for planned discharge setting.  See evaluation for individualized goals.  Description: Treatment Interventions: ADL retraining, UE strengthening/ROM, Functional transfer training, Endurance training, Cognitive reorientation, Patient/family training, Equipment evaluation/education, Compensatory technique education, Continued evaluation, Energy conservation, Activityengagement          See flowsheet documentation for full assessment, interventions and recommendations.   Note: Limitation: Decreased ADL status, Decreased Safe judgement during ADL, Decreased cognition, Decreased endurance, Decreased sensation, Decreased self-care trans, Decreased high-level ADLs  Prognosis: Good  Assessment: Pt was seen on 7/6/2024 to address ADL retraining, functional transfer training, and activity tolerance/endurance. Pt demonstrating improvements and currently requires supervision to complete UB ADLs and seated grooming tasks. Pt requires MIN A to complete functional transfers and to hop to chair with rw. Pt requires MOD A to complete LB bathing and MAX A to don R sock seated EOB. Pt is limited by decreased ADL status, functional transfers, functional mobility, and activity tolerance. Pt supine in bed at beginning of session and seated in bedside chair at end of session with alarm set and all items within reach. The patient's raw score on the -PAC Daily Activity Inpatient Short Form is 18. A raw score of less than 19 suggests the patient may benefit from discharge to post-acute rehabilitation services. Please refer to the recommendation of the Occupational Therapist for safe discharge planning. Recommend Level I maximum intensity OT services at d/c to maximize pt function.     Rehab Resource Intensity Level, OT: I (Maximum Resource Intensity)

## 2024-07-07 PROBLEM — Z78.9 PATIENT INCAPABLE OF MAKING INFORMED DECISIONS: Status: ACTIVE | Noted: 2024-07-07

## 2024-07-07 PROBLEM — I99.8 ACUTE LOWER LIMB ISCHEMIA: Status: RESOLVED | Noted: 2024-06-08 | Resolved: 2024-07-07

## 2024-07-07 PROBLEM — Z91.89 AT RISK FOR CONSTIPATION: Status: ACTIVE | Noted: 2024-07-07

## 2024-07-07 PROBLEM — Z91.89 AT RISK FOR VENOUS THROMBOEMBOLISM (VTE): Status: ACTIVE | Noted: 2024-07-07

## 2024-07-07 PROBLEM — Z91.89 AT RISK FOR RETENTION OF URINE: Status: ACTIVE | Noted: 2024-07-07

## 2024-07-07 PROBLEM — Z78.9 IMPAIRED MOBILITY AND ACTIVITIES OF DAILY LIVING: Status: ACTIVE | Noted: 2024-07-07

## 2024-07-07 PROBLEM — Z74.09 IMPAIRED MOBILITY AND ACTIVITIES OF DAILY LIVING: Status: ACTIVE | Noted: 2024-07-07

## 2024-07-07 PROBLEM — L89.300 PRESSURE INJURY OF BUTTOCK, UNSTAGEABLE (HCC): Status: ACTIVE | Noted: 2024-07-07

## 2024-07-07 PROBLEM — R52 ACUTE PAIN: Status: ACTIVE | Noted: 2024-07-07

## 2024-07-07 PROCEDURE — 99232 SBSQ HOSP IP/OBS MODERATE 35: CPT | Performed by: PHYSICIAN ASSISTANT

## 2024-07-07 PROCEDURE — 97163 PT EVAL HIGH COMPLEX 45 MIN: CPT

## 2024-07-07 PROCEDURE — 97535 SELF CARE MNGMENT TRAINING: CPT

## 2024-07-07 PROCEDURE — 97530 THERAPEUTIC ACTIVITIES: CPT

## 2024-07-07 PROCEDURE — 97167 OT EVAL HIGH COMPLEX 60 MIN: CPT

## 2024-07-07 RX ORDER — OXYCODONE HYDROCHLORIDE 5 MG/1
5 TABLET ORAL EVERY 6 HOURS PRN
Status: DISCONTINUED | OUTPATIENT
Start: 2024-07-07 | End: 2024-07-08

## 2024-07-07 RX ADMIN — LOSARTAN POTASSIUM 50 MG: 50 TABLET, FILM COATED ORAL at 08:11

## 2024-07-07 RX ADMIN — LEVOCARNITINE 330 MG: 1 SOLUTION ORAL at 08:13

## 2024-07-07 RX ADMIN — BICTEGRAVIR SODIUM, EMTRICITABINE, AND TENOFOVIR ALAFENAMIDE FUMARATE 1 TABLET: 50; 200; 25 TABLET ORAL at 08:13

## 2024-07-07 RX ADMIN — ACETAMINOPHEN 975 MG: 325 TABLET, FILM COATED ORAL at 21:00

## 2024-07-07 RX ADMIN — ASPIRIN 81 MG: 81 TABLET, COATED ORAL at 08:11

## 2024-07-07 RX ADMIN — LEVOCARNITINE 330 MG: 1 SOLUTION ORAL at 18:06

## 2024-07-07 RX ADMIN — MIRTAZAPINE 15 MG: 15 TABLET, FILM COATED ORAL at 21:00

## 2024-07-07 RX ADMIN — TAMSULOSIN HYDROCHLORIDE 0.4 MG: 0.4 CAPSULE ORAL at 16:01

## 2024-07-07 RX ADMIN — LEVOCARNITINE 330 MG: 1 SOLUTION ORAL at 13:36

## 2024-07-07 RX ADMIN — LAMOTRIGINE 50 MG: 25 TABLET ORAL at 08:12

## 2024-07-07 RX ADMIN — SERTRALINE HYDROCHLORIDE 75 MG: 50 TABLET ORAL at 08:12

## 2024-07-07 RX ADMIN — ATORVASTATIN CALCIUM 80 MG: 80 TABLET, FILM COATED ORAL at 16:01

## 2024-07-07 RX ADMIN — GABAPENTIN 100 MG: 100 CAPSULE ORAL at 16:01

## 2024-07-07 RX ADMIN — ACETAMINOPHEN 975 MG: 325 TABLET, FILM COATED ORAL at 13:35

## 2024-07-07 RX ADMIN — DIVALPROEX SODIUM 1250 MG: 500 TABLET, EXTENDED RELEASE ORAL at 08:12

## 2024-07-07 RX ADMIN — ZONISAMIDE 400 MG: 100 CAPSULE ORAL at 08:14

## 2024-07-07 RX ADMIN — METOPROLOL SUCCINATE 25 MG: 25 TABLET, FILM COATED, EXTENDED RELEASE ORAL at 06:12

## 2024-07-07 RX ADMIN — GABAPENTIN 100 MG: 100 CAPSULE ORAL at 08:12

## 2024-07-07 RX ADMIN — DOLUTEGRAVIR SODIUM 50 MG: 50 TABLET, FILM COATED ORAL at 08:13

## 2024-07-07 RX ADMIN — ACETAMINOPHEN 975 MG: 325 TABLET, FILM COATED ORAL at 06:12

## 2024-07-07 RX ADMIN — GABAPENTIN 100 MG: 100 CAPSULE ORAL at 20:56

## 2024-07-07 RX ADMIN — RIVAROXABAN 20 MG: 20 TABLET, FILM COATED ORAL at 16:01

## 2024-07-07 RX ADMIN — Medication 6 MG: at 21:00

## 2024-07-07 RX ADMIN — LAMOTRIGINE 50 MG: 25 TABLET ORAL at 18:06
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
## 2024-07-07 NOTE — ASSESSMENT & PLAN NOTE
Resolved as of 8/7.   - Wound care consulted and following weekly  - POA L buttock pressure injury unstageable has healed.  - POA R buttock pressure injury  evolved from unstageable to Stage 3, and is now resolved.   - Recommend ROHO cushion in chair when out of bed instead   - Preventative hydraguard to bilateral heels BID and PRN.   - P500  - Monitor clinically for breakdown, frequent turns  - reviewed picture of wound today. It is healing well. Continue to monitor

## 2024-07-07 NOTE — PROGRESS NOTES
07/07/24 0830   Rehab Team Goals   ADL Team Goal Patient will be independent with ADLs with least restrictive device upon completion of rehab program   Transfer Team Goal Patient will be independent with transfers with least restrictive device upon completion of rehab program   Cognitive Team Goal Patient will return to premorbid level of cognitive activity upon completion of rehab program   Rehab Team Interventions   OT Interventions Self Care;Home Management;Therapeutic Exercise;Community Reintegration;Cognitive Retraining;Energy Conservation;Patient/Family Education   Eating Goal   Eating Goal 06. Independent - Patient completes the activity by him/herself with no assistance from a helper.   Meal Complete All meals   Diet Level Regular   Status Ongoing;Target goal - one week;Target goal - two weeks   Grooming Goal   Oral Hygiene Goal 06. Independent - Patient completes the activity by him/herself with no assistance from a helper.   Task Complete Groom   Environment Seated in Chair   Safety Precautions NWB  (L Residual Limb)   Status Ongoing;Target goal - one week;Target goal - two weeks   Intervention Balance Work;Therapeutic Exercise;Tolerance Work   Tub/Shower Transfer Goal   Method Shower Stall  (pending pt DC location)   Assist Device Seat with Back;Grab Bar;Hand Held Shower   Status Ongoing;Target goal - one week;Target goal - two weeks  (supervision)   Interventions ADL Training;Assistive Device   Bathing Goal   Shower/bathe self Goal 06. Independent - Patient completes the activity by him/herself with no assistance from a helper.   Environment Seated;Shower;Sponge Bath   Adaptive Equipment Seat with back   Safety Precautions NWB  (L residual limb)   Status Ongoing;Target goal - one week;Target goal - two weeks   Intervention ADL Training;Assistive Device;Therapeutic Exercise   Upper Body Dressing Goal   Upper body dressing Goal 06. Independent - Patient completes the activity by him/herself with no  assistance from a helper.   Task Upper Body;Arms in/out;Over Head   Environment Seated   Status Ongoing;Target goal - one week;Target goal - two weeks   Intervention Balance Work;Therapeutic Exercise;Tolerance Work   Lower Body Dressing Goal   Lower body dressing Goal 06. Independent - Patient completes the activity by him/herself with no assistance from a helper.   Putting on/taking off footwear Goal 06. Independent - Patient completes the activity by him/herself with no assistance from a helper.   Task Lower Body   Environment Seated   Safety Precautions NWB  (L residual limb)   Status Ongoing;Target goal - one week;Target goal - two weeks   Intervention Balance Work;Therapeutic Exercise;Tolerance Work;Neuromuscular Education   Toileting Transfer Goal   Toilet transfer Goal 06. Independent - Patient completes the activity by him/herself with no assistance from a helper.   Assistive Device Bedside Commode   Status Ongoing;Target goal - one week;Target goal - two weeks   Intervention Balance Work;ADL Training;Assistive Device   Toileting Goal   Toileting hygiene Goal 06. Independent - Patient completes the activity by him/herself with no assistance from a helper.   Task Pants Up;Pants Down;Hygiene   Status Ongoing;Target goal - one week;Target goal - two weeks   Intervention ADL Training;Assistive Device;Balance Work   PT Transfer Goal   Roll left and right Goal 06. Independent - Patient completes the activity by him/herself with no assistance from a helper.   Sit to lying Goal 06. Independent - Patient completes the activity by him/herself with no assistance from a helper.   Lying to sitting on side of bed Goal 06. Independent - Patient completes the activity by him/herself with no assistance from a helper.   Sit to stand Goal 06. Independent - Patient completes the activity by him/herself with no assistance from a helper.   Chair/bed-to-chair transfer Goal 06. Independent - Patient completes the activity by  him/herself with no assistance from a helper.   Assistive Device Wheelchair   Environment Level Surface;Well Lit   Status Ongoing;Target goal - one week;Target goal - two weeks   Community Reintegration Goal   Goal   (mod I WC level)   Status Ongoing;Target - one week;Target - two weeks   Interventions Activity Tolerance;Community Outing;Leisure Activity   Medication Management   Assist Level Minimum Assist  (pending pt DC location and who will manage medications)   Status Ongoing;Target goal - one week;Target goal - two weeks

## 2024-07-07 NOTE — ASSESSMENT & PLAN NOTE
- Stooling adequately recently; did have some incontinence on acute now improved  - Close continent care given buttock wounds  - Last BM 8/8 and continent  - PRN miralax, Senna and suppository

## 2024-07-07 NOTE — ASSESSMENT & PLAN NOTE
- Visualized on echocardiogram on 6/10/24: spherical 1.5 x 1.3 cm thrombus at the LV apex.   - Was started on rivaroxaban, but patient does not appear to have insurance coverage for prescriptions   - Xarelto, eliquis, and pradaxa likely cost prohibitive per IM   - 7/29 transitioned to warfarin with lovenox bridge.   - Now off lovenox, and fully anticoagulated on warfarin.   - Management as per IM.   - 8/12 INR 2.74. Continue 5mg daily. Recheck 8/14 or per IM  - Outpatient follow-up with cardiology

## 2024-07-07 NOTE — ASSESSMENT & PLAN NOTE
- Continue anti-retroviral treatment  - Appreciate medicine management during [facility]C course  - OP ID follow-up

## 2024-07-07 NOTE — TREATMENT PLAN
Individualized Plan of Care - [facility]  Sunil Patel 63 y.o. male MRN: [id number]  Unit/Bed#: -01 Encounter: [id number]     PATIENT INFORMATION  ADMISSION DATE: 7/6/2024  1:30 PM YANE CATEGORY: 05.3  Unilateral Lower Limb Above the Knee     ADMISSION DIAGNOSIS: Hx of AKA (above knee amputation) ([facility]) [Z89.619]  EXPECTED LOS: 10-14 days     MEDICAL/FUNCTIONAL PROGNOSIS  Based on my assessment of the patient's medical conditions and current functional status, the prognosis for attaining medical and functional goals or the IRF stay is:  Fair    Medical Goals: Patient will be medically stable for discharge to [facility] upon completion of rehab program and Patient will be able to manage medical conditions and comorbid conditions with medications and follow up upon completion of rehab program    ANTICIPATED DISCHARGE DISPOSITION AND SERVICES  INSTITUTIONAL SETTING: Alternate Level of Care Unit  Discussed plan is to admit to ARC/inpatient rehabilitation with ELOS 10-14 days for post-amputation functional mobility and ADL rehabilitation and when rehabilitation goals are met, will discharge back to acute care pending final establishment of court-appointed guardianship given history of chronic cognitive impairments related to stroke and TBI.      ANTICIPATED FOLLOW-UP SERVICE:   Other:TBD, pending placement; will discharge to acute care    DISCIPLINE SPECIFIC PLANS:  Required Disciplines & Services: Rehabillitation Nursing, Case Management, Dietay/Nutrition, Prosthetics/Orthostics, and Psychology    REQUIRED THERAPY:  Therapy Hours per Day Days per Week Total Days   Physical Therapy 1.5 5-6 10-14   Occupational Therapy 1.5 5-6 10-14   NOTE: Additional therapy time(s) may be added as appropriate to meet patient needs and to achieve functional goals.    ANTICIPATED FUNCTIONAL OUTCOMES:  ADL:  Supervision with the least restrictive device   Bladder/Bowel:  Supervision with the  least restrictive device   Transfers:  Supervision with the least restrictive device   Locomotion:  Supervision with the least restrictive device   Cognitive:  Min assist to Supervision with the least restrictive device     DISCHARGE PLANNING NEEDS  Equipment needs: Discharge needs to be reviewed with team    REHAB ANTICIPATED PARTICIPATION RESTRICTIONS:  NWB LLE  Impaired decision making/impaired competency  Decreased safety awareness, impaired insight    Raimundo Riley MD  Attending Physician  Physical Medicine and Rehabilitation  [facility]

## 2024-07-07 NOTE — ASSESSMENT & PLAN NOTE
- Rehabilitation medicine physician for daily monitoring of care, 24 hour availability for acute medical issues, medication management, and therapeutic and diagnostic assessments.  - 24 hour rehabilitation nursing 7 days per week for: management/teaching of medications, bowel/bladder routine, skin care.  - PT, OT for 2-3 hours per day, 5 to 6 days per week; 15 hours per week  - Rehabilitation Psychology as needed for adjustment and coping  - MSW for barriers to discharge, community resources, and family support  - Discharge planning following to help ensure a safe and efficient discharge  -7/30 teams: Patient is getting agitated with PT therapies, as he is reaching a plateau. Pt enjoys SLP and cognitive exercises with tablet. Discussions are ongoing for his dispo planning- [facility] admin have meetings with a few SNFs to discuss his case. CM is actively working on retrieving his belongings from prison. Guardianship court date still set for 8/27.  - Had a meeting with [employer] leadership and [place] leadership, as well as Case Management. They will have their therapists come and evaluate the patient for appropriateness. Awaiting final decision

## 2024-07-07 NOTE — PROGRESS NOTES
[facility]  Progress Note  Name: Sunil Patel I  MRN: [id number]  Unit/Bed#: -01 I Date of Admission: 7/6/2024   Date of Service: 7/7/2024 I Hospital Day: 1    Assessment & Plan   * Above-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)  Assessment & Plan  S/p Lt femoral thrombectomy and AKA 6/7/24.  Monitor incision.  Continue Xarelto - will need price check.  Rehab and pain control per PMR    Traumatic brain injury (HCC)  Assessment & Plan  Pt determined to not have capacity.  Will need a guardian.    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    Seizures ([facility])  Assessment & Plan  Continue Depakote ER and lamotrigine.  He was also on zonesamide in the past.  No seizures in 4 years.  Follows at [facility].    Left ventricular thrombus  Assessment & Plan  Continue Xarelto.  Echo showed ejection fraction 40 to 45%, mild global hypokinesis     Benign essential hypertension  Assessment & Plan  Continue losartan and Toprol XL.  Monitor BP and adjust medication as necessary.    History of stroke  Assessment & Plan  Continue ASA and atorvastatin.  Outpt follow-up with Neurology - follows at [facility] for seizure disorder.    Human immunodeficiency virus (HIV) infection ([facility])  Assessment & Plan  Continue Biktarvy and Tivicay.    Bipolar affective disorder ([facility])  Assessment & Plan  Continue psychiatric medications.  Outpt follow-up with Psychiatry.             The above assessment and plan was reviewed and updated as determined by my evaluation of the patient on 7/7/2024.    Labs:   Results from last 7 days   Lab Units 07/05/24  1058   WBC Thousand/uL 8.92   HEMOGLOBIN g/dL 12.1   HEMATOCRIT % 40.5   PLATELETS Thousands/uL 543*     Results from last 7 days   Lab Units 07/05/24  1058   SODIUM mmol/L 138   POTASSIUM mmol/L 4.0   CHLORIDE mmol/L 103   CO2 mmol/L 25   BUN mg/dL 25   CREATININE mg/dL 1.00   CALCIUM mg/dL 9.6                    Imaging  No orders to display       Review of Scheduled Meds:  Current Facility-Administered Medications   Medication Dose Route Frequency Provider Last Rate    acetaminophen  975 mg Oral Q8H DAVINA Lorrie Barillas PA-C      aspirin  81 mg Oral Daily Lorrie Barillas PA-C      atorvastatin  80 mg Oral Daily With Dinner Lorrie Barillas PA-C      bictegravir-emtricitab-tenofovir alafenamide  1 tablet Oral Daily With Breakfast Lorrie Barillas PA-C      bisacodyl  10 mg Rectal Daily PRN Raimundo Riley MD      diphenhydrAMINE  25 mg Oral Q6H PRN Lorrie Barillas PA-C      divalproex sodium  1,250 mg Oral Daily Lorrie Barillas PA-C      dolutegravir  50 mg Oral Daily Lorrie Barillas PA-C      gabapentin  100 mg Oral TID Lorrie Barillas PA-C      lamoTRIgine  50 mg Oral BID Lorrie Barillas PA-C      levOCARNitine  1,000 mg/day Oral TID With Meals Lorrie Barillas PA-C      lidocaine  1 patch Topical Daily Lorrie Barillas PA-C      losartan  50 mg Oral Daily Lorrie Barillas PA-C      melatonin  6 mg Oral HS Lorrie Barillas PA-C      metoprolol succinate  25 mg Oral Q12H Lorrie Barillas PA-C      mirtazapine  15 mg Oral HS Lorrie Barillas PA-C      ELVA ANTIFUNGAL   Topical BID Lorrie Barillas PA-C      ondansetron  4 mg Oral Q6H PRN Lorrie Barillas PA-C      oxyCODONE  10 mg Oral Q6H PRN Lorrie Barillas PA-C      oxyCODONE  5 mg Oral Q6H PRN Lorrie Barillas PA-C      polyethylene glycol  17 g Oral Daily Raimundo Riley MD      polyethylene glycol  17 g Oral Daily PRN Lorrie Barillas PA-C      rivaroxaban  20 mg Oral Daily With Dinner Lorrie Barillas PA-C      senna  2 tablet Oral BID Lorrie Barillas PA-C      senna  1 tablet Oral HS PRN Raimundo Riley MD      sertraline  75 mg Oral Daily Lorrie Barillas PA-C      tamsulosin  0.4 mg Oral Daily With Dinner Lorrie Barillas PA-C      zonisamide  400 mg Oral  "Daily Lorrie Barillas PA-C         Subjective/ HPI: Patient seen and examined. Patients overnight issues or events were reviewed with nursing staff. New or overnight issues include the following:     Pt seen in his room after OT. He is concerned about what happens after he is done with rehab. Pt was reassured that a plan will be developed with him and the team. He denies any other complaints.    ROS:   A 10 point ROS was performed; negative except as noted above.        *Labs /Radiology studies Reviewed  *Medications  reviewed and reconciled as needed  *Please refer to order section for additional ordered labs studies      Physical Examination:  Vitals:   Vitals:    07/06/24 1332 07/06/24 1841 07/06/24 2100 07/07/24 0535   BP: 106/67 108/62 105/66 117/73   BP Location: Left arm Left arm Left arm Left arm   Pulse: 87  81 84   Resp: 16  20 19   Temp: (!) 97.4 °F (36.3 °C)  98.7 °F (37.1 °C) 97.6 °F (36.4 °C)   TempSrc: Oral  Oral Oral   SpO2: 96%  95% 98%   Weight: 72.6 kg (160 lb)      Height: 5' 10\" (1.778 m)          General Appearance: NAD; pleasant  HEENT: PERRLA, conjuctiva normal; mucous membranes moist; face symmetrical  Neck:  Supple  Lungs: clear bilaterally, normal respiratory effort, no retractions, expiratory effort normal, on room air  CV: regular rate and rhythm, no murmurs rubs or gallops noted   ABD: soft non tender, +BS x4  EXT: Lt AKA dressed.  Skin: normal turgor, normal texture, no rash  Psych: affect normal, mood normal  Neuro: Awake and alert.     The above physical exam was reviewed and updated as determined by my evaluation of the patient on 7/7/2024.    Invasive Devices       None                      VTE Pharmacologic Prophylaxis: Xarelto  Code Status: Level 1 - Full Code  Current Length of Stay: 1 day(s)    Total floor / unit time spent today  35 minutes   Coordination of patient's care was performed in conjunction with primary service. Time invested included review of patient's labs, " vitals, and management of their comorbidities with continued monitoring, examination of patient as well as answering patient questions, documenting her findings and creating progress note in electronic medical record,  ordering appropriate diagnostic testing.       ** Please Note:  voice to text software may have been used in the creation of this document. Although proof errors in transcription or interpretation are a potential of such software**

## 2024-07-07 NOTE — ASSESSMENT & PLAN NOTE
Controlled   - Tylenol 975 mg q8h PRN  - Resumed Gabapentin 100mg Q8hr due to increased headaches and irritability since discontinuing.  - Able to discontinue oxycodone. Has not used since 7/19.

## 2024-07-07 NOTE — PROGRESS NOTES
"   07/07/24 8785   Patient Data   Rehab Impairment Impairment of mobility, safety and Activities of Daily Living (ADLs) due to Amputation:  05.3  Unilateral Lower Limb Above the Knee   Etiologic Diagnosis LLE acute limb ischemia   Date of Onset 06/07/24  (Sx 6/7)   Home Setup   Type of Home (S)    (pt currently does not have DC plan or home to DC to, pt previously incarcerated)   Baseline Information   Prior Device(s) Used   (none)   Prior IADL Participation   Money Management   (N/A)   Meal Preparation Other  (provided by penitentiary)   Laundry Other  (provided by penitentiary)   Home Cleaning   (N/A)   Prior Level of Function   Self-Care 3. Independent - Patient completed the activities by him/herself, with or without an assistive device, with no assistance from a helper.   Indoor-Mobility (Ambulation) 3. Independent - Patient completed the activities by him/herself, with or without an assistive device, with no assistance from a helper.   Stairs 3. Independent - Patient completed the activities by him/herself, with or without an assistive device, with no assistance from a helper.   Functional Cognition 2. Needed Some Help - Patient needed a partial assistance from another person to complete activities.   Prior Device Used Z. None of the above   Falls in the Last Year   Number of falls in the past 12 months 0   Patient Preference   Nickname (Patient Preference) \"Kieran\"   Psychosocial   Psychosocial (WDL) WDL   Patient Behaviors/Mood Calm;Anxious   Ability to Express Feelings Needs assistance   Ability to Express Needs Needs assistance   Ability to Express Thoughts Needs assistance   Ability to Understand Others Usually understands   Restrictions/Precautions   Precautions Bed/chair alarms;Supervision on toilet/commode;Fall Risk;Aphasia  (skin integrity)   LLE Weight Bearing Per Order NWB   Pain Assessment   Pain Assessment Tool 0-10   Pain Score No Pain   Oral Hygiene   Type of Assistance Needed Physical assistance   Physical " Assistance Level 26%-50%   Comment clinical reasoning would indicate pt would require modA to maintain standing balance on RLE for oral hygiene if pt were to complete in stance as per his baseline, for safety pt completed seated 2* recent L AKA   Oral Hygiene CARE Score 3   Grooming   Findings seated EOB for shampoo cap and to wash face   Tub/Shower Transfer   Reason Not Assessed Safety;Sponge Bath;Medical;Endurance;Balance   Shower/Bathe Self   Type of Assistance Needed Physical assistance   Physical Assistance Level 76% or more   Comment clinical reasoning would dictate pt would require maxA if completing shower in stance as per baseline; OT provided education to complete sponge bathing seated EOB with edu for seated lateral weight shift to bathe buttocks/anthony with pt able to complete with CGA for safety and inc time warranted, pt able to bathe RLE by bringing foot up to bed   Shower/Bathe Self CARE Score 2   Bathing   Assessed Bath Style Sponge Bath   Anticipated D/C Bath Style   (unknown d/t no clear DC plan/place to DC to as pt previously in custodial)   Able to Adjust Water Temperature No   Able to Wash/Rinse/Dry (body part) Left Arm;L Upper Leg;Right Arm;R Upper Leg;R Lower Leg/Foot;Chest;Abdomen;Perineal Area;Buttocks   Limitations Noted in Balance;Endurance;Safety;Strength;Timeliness   Positioning Seated   Dressing/Undressing Clothing   Remove UB Clothes   (hospital gown)   Don UB Clothes Pullover Shirt   Type of Assistance Needed Supervision   Physical Assistance Level No physical assistance   Comment seated EOB, inc time to manage down in back 2* hx of limited ROM in R shoulder, OT provided pt with short sleeve polo from donation bin   Upper Body Dressing CARE Score 4   Remove LB Clothes Socks   Don LB Clothes Pants;Socks;Other  (tab brief)   Type of Assistance Needed Physical assistance   Physical Assistance Level 51%-75%   Comment pt seated EOB to thread pants over RLE and cues to thread over L RL with OT  tying excess up to dec fall risk. Pt trialed seated lateral weight shift per OT instruction; however, cont to require A for CM up over R/L hip, pt dependent for OT to don tab brief with brief applied 2* RN Jaime report pt with incontinence and difficulty managing urinal without spilling urine all over even with female urinal provided by RN.   Lower Body Dressing CARE Score 2   Limitations Noted In Balance;Endurance;Safety;Strength;Timeliness   Positioning Sit Edge Of Bed   Putting On/Taking Off Footwear   Type of Assistance Needed Incidental touching   Physical Assistance Level No physical assistance   Comment CGA for safety, pt seated EOB and able to don/doff R sock with inc time, OT completed figure 8 ACE wrapping to L residual limb with 2 abdominal pads applied to protect incision staples   Putting On/Taking Off Footwear CARE Score 4   Toileting Hygiene   Type of Assistance Needed Physical assistance   Physical Assistance Level 51%-75%   Comment pt requires A for CM and A to correctly position female urinal to void urine, pt noted to be incontinent in new brief and OT assisting pt to don another clean brief rolling in bed side to side following voiding into urinal. OT took extensive time to provide edu and visual demo/instruction for pt to complete half stand/push up to allow staff to assist with CM w/ pt demo G carryover and ability to complete half stand/push up from commode   Toileting Hygiene CARE Score 2   Toilet Transfer   Surface Assessed Platform Commode  (drop arm)   Transfer Technique Sit Pivot;Slide Board   Limitations Noted In Balance;Confidence;Endurance;Safety;UE Strength;LE Strength   Positioning Concerns Safety;Arm Rest;Other  (skin integrity)   Type of Assistance Needed Physical assistance   Physical Assistance Level 51%-75%   Comment OT provided initial edu/demo for SB transfer w/ use of soumya pad and emphasis on pushing buttocks up to protect skin integrity 2* open sore on L buttock and area on  sacrum and small area on R buttock, pt modAx1 for SB txfer bed to [facility], OT then had pt trial sit pivot w/ modA initially and pt benefitting/preferring sit pivot with gait belt applied   Toilet Transfer CARE Score 2   Toileting   Able to Pull Clothing down no, up no.   Able to Manage Clothing Closures No   Manage Hygiene Bladder   Limitations Noted In Balance;Safety;UE Strength;LE Strength   Transfer Bed/Chair/Wheelchair   Positioning Concerns Skin Integrity;Cognition   Limitations Noted In Balance;Confidence;Endurance;UE Strength;LE Strength   Adaptive Equipment Transfer Board;None   Findings pt modAx1 for SB transfer and Min/modAx1 for sit pivot transfer with gait belt applied for safety and extensive edu to push through RLE, through BL UE and for fwd lean with OT providing visual demo of technique and emphasis on forward lean to offload buttocks easily during transfer, pt better for sit pivot or modified stand pivot d/t skin integrity concerns with SB   Type of Assistance Needed Physical assistance   Physical Assistance Level 51%-75%   Chair/Bed-to-Chair Transfer CARE Score 2   Roll Left and Right   Type of Assistance Needed Physical assistance   Physical Assistance Level 25% or less   Comment Min with inc time and use of bed rails to roll R/L to assist with changing tab brief after soiling the first one   Roll Left and Right CARE Score 3   Sit to Lying   Type of Assistance Needed Physical assistance   Physical Assistance Level 25% or less   Comment use of bed rail, inc time   Sit to Lying CARE Score 3   Lying to Sitting on Side of Bed   Type of Assistance Needed Physical assistance   Physical Assistance Level 25% or less   Comment use of bed rail, inc time warranted   Lying to Sitting on Side of Bed CARE Score 3   Sit to Stand   Comment edu to limit standing on RLE as much as able to maintain integrity of non amputated leg with pt receptive to education   Reason if not Attempted Safety concerns   Sit to Stand  "CARE Score 88   Comprehension   QI: Comprehension 3. Usually Understands: Understands most conversations, but misses some part/intent of message. Requires cues at times to understand.   Expression   QI: Expression 2. Frequently exhibits difficulty with expressing needs and ideas   RUE Assessment   RUE Assessment X  (limited to ~95 degrees at baseline with 4-/5 strength)   LUE Assessment   LUE Assessment WFL  (4+/5 strength)   Cognition   Overall Cognitive Status Impaired   Arousal/Participation Alert;Responsive   Attention Attends with cues to redirect   Orientation Level Oriented to person;Oriented to time;Oriented to situation   Memory Decreased recall of recent events   Following Commands Follows one step commands with increased time or repetition   Comments Hx of CVA w/ residual aphasia (expressive>receptive) with pt a poor historian of time line, medical hx, etc. Pt mostly non sensical and tangential in answering all of OT questions. When asked why pt is here pt stating \"that jess.. he started it... he started.. and they handcuffed me... and then this... ya know this and they messed me up\". Pt presents anxious at start of session and benefits from education on rehab process and OT role in care   Discharge Information   Patient's Discharge Plan unknown d/t no appropriate housing and pt coming from incarceration   Patient's Rehab Expectations to get stronger   Impressions Pt is a 63 y.o.male seen for OT evaluation following admit to [facility] s/p L AKA which pt underwent on 6/7/2024. Pt was brought to hospital from [facility] for increased LLE swelling and pain and found to have L EICA occlusion and cute limb ischemia requiring L AKA. Pt significant PMH includes: current open area on L buttock/small area on R buttock and decreased skin integrity at sacrum, HIV, TBI, L MCA territory CVA with residual aphasia, carnitine def, HTN, psychiatry disorder, R CTS, drug abuse (cocaine), and possible medical non " "compliance. Pt living in [facility] for several years and recently incarcerated. Pt is a poor historian of time line of events 2* expressive aphasia though notes being independent for all bathing, dressing, and toileting. Pt meals/cleaning provided by halfway. Pt was evaluated by neuropsych on 6/27 and deemed to not have capacity to make fully informed medical decisions and will require guardianship with process started as of 7/5. Upon evaluation pt presents with decreased safety awareness, decreased knowledge of rehab process following amputation, decreased problem solving for safe transfers, requires modA for LB dressing/CM, requires assist for toileting, and demonstrates decreased overall endurance. Pt also w/ hx of limited R shoulder ROM to ~95 degrees and some weakness in RUE. Pt benefits from simple 1 step commands to better follow instruction 2* aphasia. Pt currently on a cardiac diet and perseverating throughout eval on not wanting to eat salad anymore stating \"no more of this health stuff\". Pt reports enjoying painting/artwork as a leisure pursuit. Based on OT evaluation, pt demonstrates good rehab potential and would benefit from skilled OT services to reach pt goals of mod I WC level w/ ELOS 10-14 days with DC location unknown at this time.   OT Therapy Minutes   OT Time In 0830   OT Time Out 1030   OT Total Time (minutes) 120   OT Mode of treatment - Individual (minutes) 120   OT Mode of treatment - Concurrent (minutes) 0   OT Mode of treatment - Group (minutes) 0   OT Mode of treatment - Co-treat (minutes) 0   OT Mode of Treatment - Total time(minutes) 120 minutes   OT Cumulative Minutes 120   Cumulative Minutes   Cumulative therapy minutes 120     "

## 2024-07-07 NOTE — PLAN OF CARE
Problem: SAFETY ADULT  Goal: Patient will remain free of falls  Description: INTERVENTIONS:  - Educate patient/family on patient safety including physical limitations  - Instruct patient to call for assistance with activity   - Consult OT/PT to assist with strengthening/mobility   - Keep Call bell within reach  - Keep bed low and locked with side rails adjusted as appropriate  - Keep care items and personal belongings within reach  - Initiate and maintain comfort rounds  - Make Fall Risk Sign visible to staff  - Offer Toileting every 2 Hours, in advance of need  - Initiate/Maintain bed/chair alarm  - Obtain necessary fall risk management equipment: non skid footwear  - Apply yellow socks and bracelet for high fall risk patients  - Consider moving patient to room near nurses station  Outcome: Progressing     Problem: Prexisting or High Potential for Compromised Skin Integrity  Goal: Skin integrity is maintained or improved  Description: INTERVENTIONS:  - Identify patients at risk for skin breakdown  - Assess and monitor skin integrity  - Assess and monitor nutrition and hydration status  - Monitor labs   - Assess for incontinence   - Turn and reposition patient  - Assist with mobility/ambulation  - Relieve pressure over bony prominences  - Avoid friction and shearing  - Provide appropriate hygiene as needed including keeping skin clean and dry  - Evaluate need for skin moisturizer/barrier cream  - Collaborate with interdisciplinary team   - Patient/family teaching  - Consider wound care consult   Outcome: Progressing

## 2024-07-07 NOTE — PROGRESS NOTES
07/07/24 1430   Patient Data   Rehab Impairment mpairment of mobility, safety and Activities of Daily Living (ADLs) due to Amputation:  05.3  Unilateral Lower Limb Above the Knee   Etiologic Diagnosis LLE Acute Limb Ischemia   Date of Onset 06/07/24   Home Setup   Type of Home (S)    (currently does not have a d/c plan or place to live.)   Prior Level of Function   Indoor-Mobility (Ambulation) 3. Independent - Patient completed the activities by him/herself, with or without an assistive device, with no assistance from a helper.   Stairs 3. Independent - Patient completed the activities by him/herself, with or without an assistive device, with no assistance from a helper.   Functional Cognition 2. Needed Some Help - Patient needed a partial assistance from another person to complete activities.   Prior Device Used Z. None of the above   Falls in the Last Year   Number of falls in the past 12 months 0   Restrictions/Precautions   Precautions Bed/chair alarms;Supervision on toilet/commode;Fall Risk;Aphasia   Pain Assessment   Pain Assessment Tool 0-10  (reports intermittent phantom pain)   Pain Score No Pain   Toilet Transfer   Surface Assessed Platform Commode   Transfer Technique Stand Pivot   Positioning Concerns Safety   Findings Also practiced on standard toilet(TOO LOW). Did not need drop arm, nor platform> can use Standard commode if available. Able to Partially stand for clothing management. Performed SPT with one hand contact on commode and chair   Type of Assistance Needed Physical assistance;Verbal cues   Physical Assistance Level 26%-50%   Toilet Transfer CARE Score 3   Transfer Bed/Chair/Wheelchair   Positioning Concerns Cognition   Type of Assistance Needed Physical assistance;Verbal cues   Physical Assistance Level 26%-50%   Comment cueing for setup and hand placement. Deffered slide board due to wonds and pt has strength to perform SPT using B UE support. One hand on each surface, performing to his  left and right   Chair/Bed-to-Chair Transfer CARE Score 3   Roll Left and Right   Type of Assistance Needed Independent   Roll Left and Right CARE Score 6   Sit to Lying   Type of Assistance Needed Independent   Sit to Lying CARE Score 6   Lying to Sitting on Side of Bed   Type of Assistance Needed Independent   Lying to Sitting on Side of Bed CARE Score 6   Sit to Stand   Type of Assistance Needed Physical assistance   Physical Assistance Level 25% or less   Sit to Stand CARE Score 3   Picking Up Object   Type of Assistance Needed Physical assistance   Physical Assistance Level Total assistance   Picking Up Object CARE Score 1   Car Transfer   Type of Assistance Needed Physical assistance   Physical Assistance Level 26%-50%   Car Transfer CARE Score 3   Ambulation   Primary Mode of Locomotion Prior to Admission Walk   Findings Walking not attempted and NO goals set at this time for safety due to Pt's hx of TBI, aphasia and cognition. Will focus on transfers and w/c lvl mobility   Walk 10 Feet   Reason if not Attempted Safety concerns   Walk 10 Feet CARE Score 88   Walk 50 Feet with Two Turns   Reason if not Attempted Safety concerns   Walk 50 Feet with Two Turns CARE Score 88   Walk 150 Feet   Reason if not Attempted Safety concerns   Walk 150 Feet CARE Score 88   Walking 10 Feet on Uneven Surfaces   Reason if not Attempted Safety concerns   Walking 10 Feet on Uneven Surfaces CARE Score 88   Wheelchair mobility   Type of Wheelchair Used 1. Manual   Method Right upper extremity;Left upper extremity   Distance Level Surface (feet) 200 ft   Findings Pt would like some gloves for his hands.   Wheel 50 Feet with Two Turns   Type of Assistance Needed Verbal cues;Supervision   Wheel 50 Feet with Two Turns CARE Score 4   Wheel 150 Feet   Type of Assistance Needed Verbal cues;Supervision   Wheel 150 Feet CARE Score 4   Curb or Single Stair   Reason if not Attempted Safety concerns   1 Step (Curb) CARE Score 88   4 Steps  "  Reason if not Attempted Safety concerns   4 Steps CARE Score 88   12 Steps   Reason if not Attempted Safety concerns   12 Steps CARE Score 88   Comprehension   QI: Comprehension 3. Usually Understands: Understands most conversations, but misses some part/intent of message. Requires cues at times to understand.   Expression   QI: Expression 2. Frequently exhibits difficulty with expressing needs and ideas   RLE Assessment   RLE Assessment WFL  (Grossly 4/5)   LLE Assessment   LLE Assessment   (Not formally tested. Able t move his hip in all directions, tight hip flexors)   RUE Assessment   RUE Assessment WFL   LUE Assessment   LUE Assessment WFL   Cognition   Overall Cognitive Status Impaired   Following Commands Follows one step commands with increased time or repetition   Comments Pt cooperative and able to follow simple commands. Hx of TBI and aphasia. Reommend demo PRN, one step instructions with repeated block task practice for safety. Per Chart review he has been found to not have capacity to make medical decisions, awaiting guardianship   Therapeutic Exercise   Therapeutic Exercise/Activity Waffle cushion provided for w/c and recliner chair for added comfort and pressure relief  of wounds. Pt in 18x 16 w/c with full length flipback armsrests. Practiced repeated transfers with PCA and RN in room to observe setup. Determined Bedside commode best for toileting performing SPT with assist. 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 Patient had TTE on 6/10 showing LV apex thrombus.  On 6/11 patient had pharmacologic nuclear stress test/SPECT scan which did not show any significant areas of ischemia with EF 40-45% and recommended continuing A/C for LV apical thrombus.  Course also notable for b/l buttock unstageable pressure ulcers, urinary and fecal incontinence, pain, and significant decline in ADLs and mobility.\" Patient has been continued on Xarelto for anticoagulation, as well as ASA and statin. Patient has a history of TBI, with baseline expressive aphasia and forgetfulness. He was evaluated by Neuropsych on 6/27, and deemed to not have capacity to make fully informed medical decisions. Therefore, the guardianship process was initiated as of 7/5. On eval he presents with decreased safey, problem solving for performing safe transfers, requiring cueing and assist each time. Residual limb is healing very well, with surgery being 1 month ago today. Will f/u when staples will be removed and inquire obtaining order for . Ace wrap has difficulty staying in place at this time. Pt not a safe candidate for hopping due to his Hx of decreased safety and cognitive deficits. Plan to use w/c primarily. He has good strength and potential to improve to eventually using a prosthesis. D/c plan to be determined.   PT Therapy Minutes   PT Time In 1330   PT Time Out 1500   PT Total Time (minutes) 90   PT Mode of treatment - Individual (minutes) 90   PT Mode of treatment - Concurrent (minutes) 0   PT Mode of " treatment - Group (minutes) 0   PT Mode of treatment - Co-treat (minutes) 0   PT Mode of Treatment - Total time(minutes) 90 minutes   PT Cumulative Minutes 90   Cumulative Minutes   Cumulative therapy minutes 90

## 2024-07-07 NOTE — ASSESSMENT & PLAN NOTE
Mood acceptable; continue meds as outlined   Supportive counseling  NeuroPsychology consult while in [facility] if available for support  Counseled on and continue to encourage deep breathing/relaxation/behavioral management techniques  - Mirtazapine 15 mg qHs  - Sertraline 75 mg daily   - Lamictal 50mg BID  - Depakote ER 1250mg qday   - Outpatient psychiatry follow-up  - Would consult Psych before changing medications

## 2024-07-07 NOTE — ASSESSMENT & PLAN NOTE
Continue Depakote ER and lamotrigine.  He was also on zonesamide in the past.  No seizures in 4 years.  Follows at [facility].

## 2024-07-07 NOTE — ASSESSMENT & PLAN NOTE
- Did have some incontinence on acute - improved with timed voids and consistent assistance with his urinal  - Described as urgency  - Marked improvement on timed voids.   - monitor for retention, incontinence (including overflow incontinence), signs/symptoms of UTI  - Timed Voids and PVRs Q4hrs initiated earlier. And PVR <150 x3, so scans discontinued.    - He has some difficulty managing the urinal    - needs assistance but is able to transfer to [facility]    - Still uses condom catheter at night, in part for continence care/to protect his skin given his buttock wounds. However now note that buttocks wound has healed  - Continue timed voids

## 2024-07-07 NOTE — PLAN OF CARE
Problem: PAIN - ADULT  Goal: Verbalizes/displays adequate comfort level or baseline comfort level  Description: Interventions:  - Encourage patient to monitor pain and request assistance  - Assess pain using appropriate pain scale  - Administer analgesics based on type and severity of pain and evaluate response  - Implement non-pharmacological measures as appropriate and evaluate response  - Consider cultural and social influences on pain and pain management  - Notify physician/advanced practitioner if interventions unsuccessful or patient reports new pain  Outcome: Progressing     Problem: INFECTION - ADULT  Goal: Absence or prevention of progression during hospitalization  Description: INTERVENTIONS:  - Assess and monitor for signs and symptoms of infection  - Monitor lab/diagnostic results  - Monitor all insertion sites, i.e. indwelling lines, tubes, and drains  - Monitor endotracheal if appropriate and nasal secretions for changes in amount and color  - [place] appropriate cooling/warming therapies per order  - Administer medications as ordered  - Instruct and encourage patient and family to use good hand hygiene technique  - Identify and instruct in appropriate isolation precautions for identified infection/condition  Outcome: Progressing  Goal: Absence of fever/infection during neutropenic period  Description: INTERVENTIONS:  - Monitor WBC    Outcome: Progressing     Problem: SAFETY ADULT  Goal: Patient will remain free of falls  Description: INTERVENTIONS:  - Educate patient/family on patient safety including physical limitations  - Instruct patient to call for assistance with activity   - Consult OT/PT to assist with strengthening/mobility   - Keep Call bell within reach  - Keep bed low and locked with side rails adjusted as appropriate  - Keep care items and personal belongings within reach  - Initiate and maintain comfort rounds  - Make Fall Risk Sign visible to staff  - Offer Toileting every   Hours,  in advance of need  - Initiate/Maintain  alarm  - Obtain necessary fall risk management equipment:    - Apply yellow socks and bracelet for high fall risk patients  - Consider moving patient to room near nurses station  Outcome: Progressing  Goal: Maintain or return to baseline ADL function  Description: INTERVENTIONS:  -  Assess patient's ability to carry out ADLs; assess patient's baseline for ADL function and identify physical deficits which impact ability to perform ADLs (bathing, care of mouth/teeth, toileting, grooming, dressing, etc.)  - Assess/evaluate cause of self-care deficits   - Assess range of motion  - Assess patient's mobility; develop plan if impaired  - Assess patient's need for assistive devices and provide as appropriate  - Encourage maximum independence but intervene and supervise when necessary  - Involve family in performance of ADLs  - Assess for home care needs following discharge   - Consider OT consult to assist with ADL evaluation and planning for discharge  - Provide patient education as appropriate  Outcome: Progressing  Goal: Maintains/Returns to pre admission functional level  Description: INTERVENTIONS:  - Perform AM-PAC 6 Click Basic Mobility/ Daily Activity assessment daily.  - Set and communicate daily mobility goal to care team and patient/family/caregiver.   - Collaborate with rehabilitation services on mobility goals if consulted  - Perform Range of Motion   times a day.  - Reposition patient every   hours.  - Dangle patient   times a day  - Stand patient   times a day  - Ambulate patient   times a day  - Out of bed to chair   times a day   - Out of bed for meals   times a day  - Out of bed for toileting  - Record patient progress and toleration of activity level   Outcome: Progressing     Problem: DISCHARGE PLANNING  Goal: Discharge to home or other facility with appropriate resources  Description: INTERVENTIONS:  - Identify barriers to discharge w/patient and caregiver  -  Arrange for needed discharge resources and transportation as appropriate  - Identify discharge learning needs (meds, wound care, etc.)  - Arrange for interpretive services to assist at discharge as needed  - Refer to Case Management Department for coordinating discharge planning if the patient needs post-hospital services based on physician/advanced practitioner order or complex needs related to functional status, cognitive ability, or social support system  Outcome: Progressing     Problem: Prexisting or High Potential for Compromised Skin Integrity  Goal: Skin integrity is maintained or improved  Description: INTERVENTIONS:  - Identify patients at risk for skin breakdown  - Assess and monitor skin integrity  - Assess and monitor nutrition and hydration status  - Monitor labs   - Assess for incontinence   - Turn and reposition patient  - Assist with mobility/ambulation  - Relieve pressure over bony prominences  - Avoid friction and shearing  - Provide appropriate hygiene as needed including keeping skin clean and dry  - Evaluate need for skin moisturizer/barrier cream  - Collaborate with interdisciplinary team   - Patient/family teaching  - Consider wound care consult   Outcome: Progressing

## 2024-07-07 NOTE — ASSESSMENT & PLAN NOTE
"- History of remote TBI and prior strokes with comorbid psychiatric history.  - Evaluated by neuropsychology, Dr. Dilshad Tatum, PhD on 6/27/24, found to have \"diffuse cognitive dysfunction and on a measure assessing awareness of personal health status and ability to evaluate health problems, handle medical emergencies and take safety precautions, patient performed in the IMPAIRED range of functioning. During this encounter, patient does not appear to have capacity to make fully informed medical decisions.\"  - Court-appointed guardianship process has been initiated by acute care CM Katia Kay.    - We have identified two friends who are interested in being patient's guardians and have been involved and invested in patient's care on the ARC.   - They will need to be interviewed by the  involved in this process.    - He has to undergo his hearing on 8/6/2024 first.  -- now rescheduled to 8/27   - He would ultimately benefit from [facility]/nursing home/memory care unit - he does very well with structure and supervision.    8/2- Ongoing discussions with CM, [facility] admin and social work to dispo patient to stable long-term housing. Process continues, with evaluation by therapy managers from [facility]/[place].   8/12- unfortunately have not made much progress with SNF placement per [facility]'s facilities. Have not heard back from residential community that met with him on 8/6  "

## 2024-07-07 NOTE — ASSESSMENT & PLAN NOTE
6/7 with acute occlusion of L EIA, and not salvageable. Underwent L femoral thrombectomy and AKA with vascular surgery   - [facility] sx follow-up 7/10 - L AKA incision site well-healed, staples taken out at bedside  - [facility] will be vendor - Patient now has .  - Monitor for hip flexion/abduction tightness. Stretches in therapy  - Now on Coumadin with hx of LV thrombus and PAOD. Checking INR's as above   - PT/OT/SLP (to work on carry over strategies) 3-5 hours/day, 5-7 days/week.    - Goals are Ind-Sup at a wheelchair level.   - Outpatient f/u with [facility] and Vascular  - Continue patient training with  placement  - Continue gabapentin - doesn't do too much for phantom limb sensation, but that doesn't bother him or cause him pain currently.   - Patient still frustrated given circumstances; will continue gabapentin for anxiety

## 2024-07-07 NOTE — ASSESSMENT & PLAN NOTE
- History of old left temporal and parietal lobe cortical infarcts, also involving the insula and left occipital lobe as well as small right posterior parietal lobe. Stable on most recent CT head on 5/16/24.   - ASA, and statin for secondary prevention  - Optimal BP control  - Monitor neuro exam - hx of LV thrombus    - See that entry  - OP neuro follow-up

## 2024-07-08 PROBLEM — F43.23 ADJUSTMENT DISORDER WITH MIXED ANXIETY AND DEPRESSED MOOD: Status: ACTIVE | Noted: 2024-07-08

## 2024-07-08 LAB
MRSA NOSE QL CULT: ABNORMAL
MRSA NOSE QL CULT: ABNORMAL

## 2024-07-08 PROCEDURE — 99232 SBSQ HOSP IP/OBS MODERATE 35: CPT | Performed by: PHYSICAL MEDICINE & REHABILITATION

## 2024-07-08 PROCEDURE — 97530 THERAPEUTIC ACTIVITIES: CPT

## 2024-07-08 PROCEDURE — 99232 SBSQ HOSP IP/OBS MODERATE 35: CPT | Performed by: PHYSICIAN ASSISTANT

## 2024-07-08 PROCEDURE — 97110 THERAPEUTIC EXERCISES: CPT

## 2024-07-08 PROCEDURE — 97112 NEUROMUSCULAR REEDUCATION: CPT

## 2024-07-08 RX ORDER — SENNOSIDES 8.6 MG
1 TABLET ORAL
Status: DISCONTINUED | OUTPATIENT
Start: 2024-07-08 | End: 2024-08-14

## 2024-07-08 RX ORDER — POLYETHYLENE GLYCOL 3350 17 G/17G
17 POWDER, FOR SOLUTION ORAL DAILY PRN
Status: DISCONTINUED | OUTPATIENT
Start: 2024-07-08 | End: 2024-08-30 | Stop reason: HOSPADM

## 2024-07-08 RX ORDER — BISACODYL 10 MG
10 SUPPOSITORY, RECTAL RECTAL DAILY PRN
Status: DISCONTINUED | OUTPATIENT
Start: 2024-07-08 | End: 2024-08-14

## 2024-07-08 RX ADMIN — RIVAROXABAN 20 MG: 20 TABLET, FILM COATED ORAL at 15:58

## 2024-07-08 RX ADMIN — LOSARTAN POTASSIUM 50 MG: 50 TABLET, FILM COATED ORAL at 08:03

## 2024-07-08 RX ADMIN — ONDANSETRON 4 MG: 4 TABLET, ORALLY DISINTEGRATING ORAL at 08:02

## 2024-07-08 RX ADMIN — GABAPENTIN 100 MG: 100 CAPSULE ORAL at 08:04

## 2024-07-08 RX ADMIN — SERTRALINE HYDROCHLORIDE 75 MG: 50 TABLET ORAL at 08:04

## 2024-07-08 RX ADMIN — LEVOCARNITINE 330 MG: 1 SOLUTION ORAL at 14:21

## 2024-07-08 RX ADMIN — LAMOTRIGINE 50 MG: 25 TABLET ORAL at 18:43

## 2024-07-08 RX ADMIN — GABAPENTIN 100 MG: 100 CAPSULE ORAL at 15:58

## 2024-07-08 RX ADMIN — ZONISAMIDE 400 MG: 100 CAPSULE ORAL at 08:13

## 2024-07-08 RX ADMIN — DOLUTEGRAVIR SODIUM 50 MG: 50 TABLET, FILM COATED ORAL at 08:13

## 2024-07-08 RX ADMIN — TAMSULOSIN HYDROCHLORIDE 0.4 MG: 0.4 CAPSULE ORAL at 15:58

## 2024-07-08 RX ADMIN — ATORVASTATIN CALCIUM 80 MG: 80 TABLET, FILM COATED ORAL at 15:58

## 2024-07-08 RX ADMIN — DIVALPROEX SODIUM 1250 MG: 500 TABLET, EXTENDED RELEASE ORAL at 08:03

## 2024-07-08 RX ADMIN — LEVOCARNITINE 330 MG: 1 SOLUTION ORAL at 18:44

## 2024-07-08 RX ADMIN — LEVOCARNITINE 330 MG: 1 SOLUTION ORAL at 08:13

## 2024-07-08 RX ADMIN — MIRTAZAPINE 15 MG: 15 TABLET, FILM COATED ORAL at 21:14

## 2024-07-08 RX ADMIN — BICTEGRAVIR SODIUM, EMTRICITABINE, AND TENOFOVIR ALAFENAMIDE FUMARATE 1 TABLET: 50; 200; 25 TABLET ORAL at 08:12

## 2024-07-08 RX ADMIN — ASPIRIN 81 MG: 81 TABLET, COATED ORAL at 08:03

## 2024-07-08 RX ADMIN — GABAPENTIN 100 MG: 100 CAPSULE ORAL at 21:14

## 2024-07-08 RX ADMIN — ACETAMINOPHEN 975 MG: 325 TABLET, FILM COATED ORAL at 21:13

## 2024-07-08 RX ADMIN — LAMOTRIGINE 50 MG: 25 TABLET ORAL at 08:04

## 2024-07-08 RX ADMIN — Medication 6 MG: at 21:13

## 2024-07-08 RX ADMIN — ACETAMINOPHEN 975 MG: 325 TABLET, FILM COATED ORAL at 05:50

## 2024-07-08 RX ADMIN — ACETAMINOPHEN 975 MG: 325 TABLET, FILM COATED ORAL at 14:20

## 2024-07-08 NOTE — PCC OCCUPATIONAL THERAPY
Pt continues to present with impairments in endurance, standing balance/tolerance, memory, insight, safety, and judgement. Additional functional barriers include fatigue, decreased caregiver support, risk for falls, and home environment. Pt is functioning at overall S for ADLs and S fxnl sit pivot xfers, and independent for w/c mobility. Pt will continue to benefit from skilled OT services to address above mentioned barriers and maximize functional independence in baseline areas of occupation, utilizing the following interventions: ADL retraining, DME/adaptive equipment assessment, functional transfer training, repetitive safety training, activity tolerance/edurance training, UB/core strengthening, phase 1 amputee education, and energy conservation education. OT D/C recommendation is for 24 hour S due to baseline cognitive deficits, pt would benefit from [facility].

## 2024-07-08 NOTE — WOUND OSTOMY CARE
Consult Note - Wound   Sunil Patel 63 y.o. male MRN: [id number]  Unit/Bed#: [facility] 451-01 Encounter: [id number]        History and Present Illness:  Patient is a 64 yo male that was admitted to [facility] for rehab post Left AKA.  Patient has a PMH of HIV, mood disorder, TBI, seizures, Left MCA CVA. Patient is a moderate assist for turning and repositioning. Patient is incontinent of bowel and bladder. On assessment, patient is seen sitting OOB in recliner. Regular mattress in room- p-500 low air loss mattress re-ordered for patient .     Wound Care was re-consulted for continued care while at [facility]     Assessment Findings:   Right Heel dry intact and shelley with no skin loss or wounds present. Recommend preventative Hydraguard Cream and proper offloading/ repositioning.     POA B/L Sacro-buttocks Unstageable Pressure Injury: wound now presents as a POA Right Buttocks Stage 3 Pressure Injury. Wound was previously 2 areas of full thickness skin loss on b/l sacro-buttock. Wound is now one area of full thickness on right buttock. Left buttock is now intact, hyperpigmented and blanches. Right buttock wound with mix of 80% pink tissue with scattered 20% yellow slough tissue. Anthony-wound is fragile and hyperpigmented. Scant serosanguineous drainage noted. Recommend continuing with triad paste and hydraguard to anthony-wounds    No induration, fluctuance, odor, warmth/temperature differences, redness, or purulence noted to the above noted wounds and skin areas assessed. New dressings applied per orders listed below. Patient tolerated well- no s/s of non-verbal pain or discomfort observed during the encounter. Bedside nurse aware of plan of care. See flow sheets for more detailed assessment findings.      Orders listed below and wound care will continue to follow, call or Secure Chat Message with questions.     Skin Care Plan:  1-Cleanse sacro-buttocks with soap and water. Apply Triad Paste to Sacro-Buttocks Wound Beds Only. Apply  Hydraguard to Elsi-wounds and all other intact aspects of sacro-buttocks. Apply both creams TID and PRN episodes of incontinence.   2-Turn/reposition q2h or when medically stable for pressure re-distribution on skin .  3-Elevate right heel to offload pressure  4-Moisturize skin daily with skin nourishing cream  5-Ehob cushion in chair when out of bed.  6-Preventative Hydraguard to right heel BID and PRN.  7-P-500 Low Air Loss Mattress   8-Apply ELVA to groin BID per order.          Wounds:  Wound 06/08/24 Pressure Injury Buttocks Bilateral (Active)   Wound Image   07/08/24 1205   Wound Description Pink;Yellow;Slough 07/08/24 1206   Pressure Injury Stage 3 07/08/24 1206   Elsi-wound Assessment Fragile;Hyperpigmented 07/08/24 1206   Wound Length (cm) 3.5 cm 07/08/24 1206   Wound Width (cm) 2.5 cm 07/08/24 1206   Wound Depth (cm) 0.2 cm 07/08/24 1206   Wound Surface Area (cm^2) 8.75 cm^2 07/08/24 1206   Wound Volume (cm^3) 1.75 cm^3 07/08/24 1206   Calculated Wound Volume (cm^3) 1.75 cm^3 07/08/24 1206   Change in Wound Size % 86.74 07/08/24 1206   Wound Site Closure WARD 07/08/24 1206   Drainage Amount Scant 07/08/24 1206   Drainage Description Serosanguineous 07/08/24 1206   Non-staged Wound Description Full thickness 07/08/24 1206   Treatments Cleansed;Irrigation with NSS;Site care 07/08/24 1206   Dressing Other (Comment) 07/08/24 1206   Wound packed? No 07/08/24 1206   Packing- # removed 0 07/08/24 1206   Packing- # inserted 0 07/08/24 1206   Dressing Changed Changed 07/08/24 1206   Patient Tolerance Tolerated well 07/08/24 1206   Dressing Status Intact;Dry;Clean 07/08/24 1206               Willa Oakes RN, BSN, CWOCN

## 2024-07-08 NOTE — PROGRESS NOTES
"   07/08/24 0830   Pain Assessment   Pain Assessment Tool 0-10   Pain Score No Pain   Restrictions/Precautions   Precautions Aphasia;Bed/chair alarms;Cognitive;Fall Risk;Pressure Ulcer;Seizure;Supervision on toilet/commode  (B/L unstageable buttocks wound)   LLE Weight Bearing Per Order NWB   ROM Restrictions No   Cognition   Overall Cognitive Status Impaired   Arousal/Participation Alert;Responsive;Cooperative   Attention Attends with cues to redirect   Orientation Level Oriented to person;Oriented to place;Oriented to situation;Disoriented to time   Memory Decreased recall of precautions;Decreased recall of recent events   Following Commands Follows one step commands with increased time or repetition   Subjective   Subjective Pt reports of no pain this AM, 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\", 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   Roll Left and Right   Type of Assistance Needed Independent   Physical Assistance Level No physical assistance   Comment bed rail, increased time   Roll Left and Right CARE Score 6   Sit to Lying   Comment Pt did not return supine   Lying to Sitting on Side of Bed   Type of Assistance Needed Independent   Physical Assistance Level No physical assistance   Comment increased time, HOB flat no bed rail   Lying to Sitting on Side of Bed CARE Score 6   Sit to Stand   Type of Assistance Needed Physical assistance   Physical Assistance Level 25% or less   Comment CGA/Min from w/c, recliner, and pull-to-stand at kitchen counter   Sit to Stand CARE Score 3   Bed-Chair Transfer   Type of Assistance Needed Physical assistance;Verbal cues   Physical Assistance Level 26%-50%   Comment Min SPT between surfaces without AD; VC for setup and sequencing   Chair/Bed-to-Chair Transfer CARE Score 3   Car Transfer   Type of Assistance Needed Physical assistance;Verbal cues   Physical Assistance Level 26%-50%   Comment modA 2/2 LOB from sliding R foot, VC for sequencing   Car Transfer CARE Score 3   Walk 10 Feet   Reason if not " Attempted Safety concerns   Walk 10 Feet CARE Score 88   Walk 50 Feet with Two Turns   Reason if not Attempted Safety concerns   Walk 50 Feet with Two Turns CARE Score 88   Walk 150 Feet   Reason if not Attempted Safety concerns   Walk 150 Feet CARE Score 88   Walking 10 Feet on Uneven Surfaces   Reason if not Attempted Safety concerns   Walking 10 Feet on Uneven Surfaces CARE Score 88   Ambulation   Findings Will continue to focus on transfers and w/c level mobility   Does the patient walk? 0. No, and walking goal is not clinically indicated.   Wheel 50 Feet with Two Turns   Type of Assistance Needed Supervision   Physical Assistance Level No physical assistance   Wheel 50 Feet with Two Turns CARE Score 4   Wheel 150 Feet   Type of Assistance Needed Supervision   Physical Assistance Level No physical assistance   Wheel 150 Feet CARE Score 4   Wheelchair mobility   Does the patient use a wheelchair? 1. Yes   Type of Wheelchair Used 1. Manual   Method Right upper extremity;Left upper extremity   Assistance Provided For Remove armrests;Replace armrests   Distance Level Surface (feet) 300 ft   Curb or Single Stair   Reason if not Attempted Safety concerns   1 Step (Curb) CARE Score 88   4 Steps   Reason if not Attempted Safety concerns   4 Steps CARE Score 88   12 Steps   Reason if not Attempted Safety concerns   12 Steps CARE Score 88   Toilet Transfer   Comment Pt did not require during session   Therapeutic Interventions   Strengthening Supine/sidelying/prone LE TE   Flexibility Prone lying passive L hip flexor stretching   Balance Standing tolerance with B/L UE on kitchen counter with unliateral UE reaching for objects in immediate environment ~3 min   Other bed mobility, transfer training, car transfer, w/c mobility, residual limb wrapping   Other Comments   Comments (S)  AMPnoPRO: 9/43   Assessment   Treatment Assessment Pt agreeable to PT this AM, received supine in bed. Pt requires increased time, but able to  abisai/doff pants in supine with rolling/bridging method, as well as in sitting with posterior lean/sway. Pt tolerated prone lying well with no c/o pain/discomfort, required increased time to achieve position. Pt tolerated supine/sidelying/prone L LE TE well, with increased VC for technique/sequencing. Educated pt on purpose of residual limb wrapping, importance of residual limb skin checks, desensitization techniques, preventing L hip flexor contracture, and safety with mobility at w/c level, verbalized understanding. Pt will require repeated education/training 2/2 cognitive/memory deficits. Pt transfers are improving, but continues to require VC for setup/sequencing. Pt demonstrates good R LE strength for prolonged standing and good balance in standing with unilateral UE support for reaching activities. Will continue with current PT POC to improve deficits and promote return to PLOF.   Problem List Decreased strength;Decreased range of motion;Decreased endurance;Impaired balance;Decreased mobility;Decreased coordination;Impaired judgement;Decreased safety awareness;Orthopedic restrictions   PT Barriers   Physical Impairment Decreased strength;Decreased range of motion;Decreased endurance;Impaired balance;Decreased cognition;Impaired judgement;Decreased safety awareness;Decreased skin integrity;Orthopedic restrictions;Pain   Functional Limitation Wheelchair management;Walking;Transfers;Standing;Stair negotiation;Ramp negotiation;Car transfers   Plan   Treatment/Interventions Functional transfer training;LE strengthening/ROM;Therapeutic exercise;Endurance training;Cognitive reorientation;Patient/family training;Equipment eval/education;Bed mobility;Gait training   Progress Progressing toward goals   PT Therapy Minutes   PT Time In 0830   PT Time Out 1000   PT Total Time (minutes) 90   PT Mode of treatment - Individual (minutes) 90   PT Mode of treatment - Concurrent (minutes) 0   PT Mode of treatment - Group (minutes)  0   PT Mode of treatment - Co-treat (minutes) 0   PT Mode of Treatment - Total time(minutes) 90 minutes   PT Cumulative Minutes 180     Sitting Balance: 1    0 = Cannot sit upright independently for 60s     1 = Can sit upright independently for 60s     2. Sitting Reach: 2    0 = Does not attempt     1 = Cannot grasp or requires arm support     2 = Reaches forward and successfully grasps items     3. Chair to Chair Transfer 90: 0    0 = Cannot do or requires physical assist     1 = Performs task but unsteady or needs contact guarding     2 = Performs independently     4. Arises from Chair - Single Effort: 1    0 = Unable without physical assist     1 = Able, uses arms / assistive device to help     2 = Able without arms     5. Arises from Chair - Multiple Efforts: 0    0 = Unable without physical assist     1 = Able but requires >1 attempt     2 = Able to rise in one attempt     6. Immediate Standing Balance (1st - 5 sec): 1    0 = Unable     1 = Able, but requires use of arms for support     2 = Able without arm support     7. Standing Balance: 30 sec: 1    0 = Unable     1 = Able, but requires use of arms for support     2 = Able without arm support     8. AMPPro Only     9. Standing Balance: Standing Reach: 1    0 = Unable     1 = Able, but requires use of arms for support     2 = Able without arm support     10. Standing Balance: Nudge Test: 0    0 = Begins to fall, needs catching     1 = Catches self using arms for support     2 = Steady, toes come up for equilibrium reaction     11. Standing Balance: Eyes Closed: 0    0 = Unsteady or uses arm for support     1 = Steady without arm support     12. Standing Balance: Picking Object Off the Floor: 0    0 = Unable     1 = Able, but requires the use of arms for support     2 = Able without arm support     13. Stand to Sit: 1    0 = Unable, or falls into chair     1 = Able, but uses arms for support     2 = Able, without use of arms     14. Initiation of Gait: 0    0  = Any hesitancy or multiple attempts to start     1 = No hesitancy     15. Hopping 8 Meters: 0    0 = Does not advance 30 cm on each hop     1 = Advances minimum of 30 cm each hop     0 = Unable to clear foot without deviations     1 = Clears foot on every step     16. Step Continuity: 0     0 = Stopping or discontinuity between hops     1 = Hops appear continuous      17. Turnin    0 = Unable to turn without physical assist     1 = No assist, 4 or more hops to turn     2 = No assist, 3 or less hops to turn     18. Variable Ruperto: 0    0 = Unable to vary ruperto     1 = Able to vary ruperto, but asymmetrical step lengths used or balance compromised     2 = Able without asymmetry or balance compromise     19. Hopping Over an Obstacle: 0    0 = Unable     1 = Able, but catches foot or appears unsafe     2 = Able, safe and independent     20. Stairs: 0    Ascending     0 = Unable     1 = Able using the rail     2 = Able without the rail     Descending     0 = Unable     1 = Able using the rail     2 = Able without the rail     21. Assistive Device Selection: 1    0 = Bed bound     1 = Wheelchair     2 = Walker (Frame)     3 = Crutches          K0 = 0-8     K1 = 9-20     K2 = 21-28     K3 = 29-36     K4 = 37-43     Patient remains OOB in chair, all needs in reach.  Alarm in place and activated.  Encouraged use of call bell, patient verbalizes understanding.

## 2024-07-08 NOTE — NURSING NOTE
Pt expressed concerns about feeling anxious due to having to undergo both his amputation and his transition to civilian life simultaneously and not knowing where he is going after he is discharged. Pt states that his finances and housing are being coordinated by two friends and his brother.

## 2024-07-08 NOTE — PCC PHYSICAL THERAPY
7/15/24  Pt has plateaued functionally for he already met S/CGA goals at w/c level mobilities. Due to baseline cognition, aphasia, impaired safety with STM impairment impacting consistent carry over of new learning so does not anticipate pt to progress beyond S level at this time. Also for the past week pt also demos inconsistent participation with skilled PT interventions. Goals for this week to complete/review Phase 1 AMP education, cont with w/c level transfer training keesha with car transfer, cont with strengthening/flexibility exercises including reviewing HEP packet provided while awaiting placement pending guardianship decision.     8/5/2024    Pt cont at this time with skilled PT and pt overall at S lvl d/t baseline cognition, aphasia, impaired safety with STM impairment. Pt at this time cont awaiting for guarding ship/placement , Dr Depadua speaking with Kieran and his fiend Donna about DC places and overall outcome. Pt will cont to benefit with skilled PT to keep functional mobility, LE /core/UE strengthening and overall better outcome . Cont working on sit pivot transfers setup and overall limb care with safety awareness.    08/13/2024: Pt cont at this time with skilled PT and pt overall at S lvl d/t baseline cognition, aphasia, impaired safety with STM impairment. Pt at this time cont awaiting for guarding ship/placement , Dr Depadua speaking with Kieran and his fiend Donna about DC places and overall outcome. Pt will cont to benefit with skilled PT to keep functional mobility, LE /core/UE strengthening and overall better outcome . Cont working on sit pivot transfers setup and overall limb care with safety awareness. Pt has been ambulating within parallel bars and progressing well, requiring VC for proper weight shifting and sequencing.    08/20/2024: Pt cont at this time with skilled PT and pt overall at S lvl d/t baseline cognition, aphasia, impaired safety with STM impairment. Pt at this time cont awaiting for  guarding ship/placement. Pt is limited by decreased willingness/motivation to participate in skilled PT/OT services, with multiple refusals over the last week. Pt will cont to benefit with skilled PT to keep functional mobility, LE /core/UE strengthening and overall better outcome . Cont working on sit pivot transfers setup and overall limb care with safety awareness. Pt has been ambulating within parallel bars and progressing well, requiring VC for proper weight shifting and sequencing.    8/26/2024:Pt cont at this time with skilled PT and pt overall at S lvl d/t baseline cognition, aphasia, impaired safety with STM impairment. Pt at this time cont awaiting for guarding ship/placement. Pt is limited by decreased willingness/motivation to participate in skilled PT/OT services, with multiple refusals over the weekend. Pt will cont to benefit with skilled PT to keep functional mobility, LE /core/UE strengthening and overall better outcome. He is becoming more consistent with setting up transfers independently and communicating set-up needs to staff. Sit pivot transfers are consistent and safe. He has practiced hopping with RW for about 10-15 ft although this is not functional for him and he will primarily be w/c level.

## 2024-07-08 NOTE — PROGRESS NOTES
"   07/08/24 1030   Pain Assessment   Pain Assessment Tool 0-10   Pain Score No Pain   Restrictions/Precautions   Precautions Bed/chair alarms;Cognitive;Fall Risk;Aphasia;Pressure Ulcer;Supervision on toilet/commode   LLE Weight Bearing Per Order NWB   Lifestyle   Autonomy \"I don't cry like this.\"   Eating   Type of Assistance Needed Independent   Eating CARE Score 6   Cognition   Overall Cognitive Status Impaired   Comments impaired safety awareness, impaired insight, dec judgement, dec fxnl problem-solving, dec STM, and dec cognitive flexibility.   Additional Activities   Additional Activities Other (Comment)  (Phase 1 Amputee Education)   Additional Activities Comments Provided the following skilled education to patient regarding phase 1 amputation: Your clinical care team, After your surgery, What to expect in rehab, understanding/taking about pain/types of pain, Secondary conditions, and resources for emotional support: neuropsychology, spiritual/pastoral care, support groups. Extended time required for education due to pt's baseline cognitive deficits. Pt tearful midway through education, reporting inc frustration w/ medical complications. Actively listened and provided w/ extended emotional support and encouragement w/ pt thankful.   Assessment   Treatment Assessment Pt seen for skilled OT session focusing on phase 1 amputee education. Time spent rapport building, pt enjoys being outside and painting. Pt reports having 2 local friends, a brother that lives in [place]. [place] through education, pt became quite tearful and expressed frustration w/ medical course and difficulty adjusting to several recent transitions. Pt worried about disposition. Provided w/ extended emotional support and encouragement to maximize participation, pt will benefit from consistent therapy team. Cont OT POC: endurance work, fxnl modified squat pivot xfers, BUE strengthening, sacral offloading strategies, repetitive safety training, " alternating lateral weightshifting, ADL retraining, and ongoing phase 1 amputee education. Pt agreeable to rest in recliner, all needs within reach and alarm activated.   Prognosis Good   Problem List Decreased strength;Decreased range of motion;Decreased endurance;Impaired balance;Decreased mobility;Decreased coordination;Impaired judgement;Decreased safety awareness;Orthopedic restrictions;Decreased cognition   Discharge Recommendation   Rehab Resource Intensity Level, OT   (pending progress)   OT Therapy Minutes   OT Time In 1030   OT Time Out 1130   OT Total Time (minutes) 60   OT Mode of treatment - Individual (minutes) 60   OT Mode of treatment - Concurrent (minutes) 0   OT Mode of treatment - Group (minutes) 0   OT Mode of treatment - Co-treat (minutes) 0   OT Mode of Treatment - Total time(minutes) 60 minutes   OT Cumulative Minutes 180   Therapy Time missed   Time missed? No

## 2024-07-08 NOTE — PROGRESS NOTES
Physical Medicine and Rehabilitation Progress Note  Sunil Patel 63 y.o. male MRN: [id number]  Unit/Bed#: [facility] 451-01 Encounter: [id number]    Rehabilitation Diagnosis: 05.3  Unilateral Lower Limb Above the Knee      Interval History/Subjective: Seen face-to-face at bedside. He is currently feeling well. Pain is currently well controlled. No other concerns currently. Vital signs reviewed, stable and WNL. Voiding spontaneously, continent. Last BM 7/7/24, continent.    Assessment/Plan:    * Above-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)  Assessment & Plan  - Presented on 6/7/24 with acute LLE pain and progressive weakness. On imaging he was found to have an acute occlusion of the left external iliac artery without any visualizations of distal vessels, consistent with [place] class III acute limb ischemia and it was determined by vascular surgery that the LLE was not salvageable. He underwent left femoral thrombectomy and AKA on 6/7/24 with vascular surgery.   - Monitor closely for phantom limb pain/sensation. Conservative modalities such as gentle massage along the residual limb (avoiding deep pressure or direct contact with the incision site) can be utilized to reduce severity and frequency of phantom pain. Topical capsaicin and lidocaine can be considered for phantom pain.   - Rivaroxaban  - Analgesia as below  - Avoid pillows/wedging posterior to the residual limb to avoid development of hip flexion contracture.   - Amputation education on [facility]  - Local incision care, monitor for breakdown, frequent turns; incision care per vascular surgery  - Limb-shaping with ACE wrapping for now  - Will follow-up with vascular surgery during ARC admission for potential staple removal and assessment of weight-bearing status  - He will require outpatient PM&R follow-up for assessment for and consideration of prosthesis fabrication when medically cleared by surgery to do so.      Adjustment disorder with mixed anxiety and  "depressed mood  Assessment & Plan  - Related to recent release from incarceration, new AKA and uncertainty of future disposition, all in the setting of impaired cognition related to prior strokes and TBI  - Behavioral techniques for symptom management  - Neuropsychology consult as available    Pressure injury of buttock, unstageable and at high risk for breakdown  Assessment & Plan  - Bilateral sacral/buttock unstageable pressure injury - triad paste to wound beds, hydraguard to anthony-wound and remaining area of buttocks  - Wound care consulted and following  - Moisturize skin daily with skin nourishing cream  - Ehob cushion in chair when out of bed.  - Preventative hydraguard to bilateral heels BID and PRN.   - Monitor clinically for breakdown, frequent turns      Patient incapable of making informed decisions  Assessment & Plan  - History of remote TBI and prior strokes with comorbid psychiatric history.  - Evaluated by Dr. Dilshad Tatum, PhD on 6/27/24, found to have \"diffuse cognitive dysfunction and on a measure assessing awareness of personal health status and ability to evaluate health problems, handle medical emergencies and take safety precautions, patient performed in the IMPAIRED range of functioning. During this encounter, patient does not appear to have capacity to make fully informed medical decisions.\"  - Court-appointed guardianship process has been initiated by acute care CM Katia Kay. Patient does not have family available or willing to take on medical decision making at this time.   - Patient will transition back to acute care when functional rehabilitation goals are met to follow-up with court-appointed guardianship for placement.    At risk for retention of urine  Assessment & Plan  - Monitor closely for urinary retention. Will follow UOP and encourage spontaneous voids. If unable to void spontaneously, will monitor with PVR bladder scans and initiate ISC regimen.      At risk for " constipation  Assessment & Plan  - Monitor closely for opioid and immobility-induced constipation. Scheduled bowel regimen. Will follow BMs and adjust bowel regimen to target soft, formed stools q1-2 days, per pt's regular schedule.      Acute pain  Assessment & Plan  - Tylenol 975 mg q8h scheduled  - Gabapentin 100 mg TID scheduled for phantom pain/sensation  - Oxycodone 2.5 mg q8h PRN, wean as possible    Impaired mobility and activities of daily living  Assessment & Plan  - Rehabilitation medicine physician for daily monitoring of care, 24 hour availability for acute medical issues, medication management, and therapeutic and diagnostic assessments.  - 24 hour rehabilitation nursing 7 days per week for: management/teaching of medications, bowel/bladder routine, skin care.  - PT, OT for 2-3 hours per day, 5 to 6 days per week; 15 hours per week  - Rehabilitation Psychology as needed for adjustment and coping  - MSW for barriers to discharge, community resources, and family support  - Discharge planning following to help ensure a safe and efficient discharge        Carnitine deficiency (HCC)  Assessment & Plan  - Carnitine replacement  - Appreciate medicine management      Seizures (HCC)  Assessment & Plan  - Depakote ER, lamotrigine, zonisamide  - [facility]    Left ventricular thrombus  Assessment & Plan  - Visualized on echocardiogram on 6/10/24: spherical 1.5 x 1.3 cm thrombus at the LV apex.   - Rivaroxaban  - Outpatient follow-up with cardiology    Benign essential hypertension  Assessment & Plan  - Metoprolol, losartan  - Appreciate medicine management    History of stroke  Assessment & Plan  - History of old left temporal and parietal lobe cortical infarcts, also involving the insula and left occipital lobe as well as small right posterior parietal lobe. Stable on most recent CT head on 5/16/24.   - ASA and statin for secondary prevention    Human immunodeficiency virus (HIV)  infection (HCC)  Assessment & Plan  - Continue anti-retroviral treatment  - Appreciate medicine management      Bipolar affective disorder (HCC)  Assessment & Plan  - Mirtazapine 15 mg qHs  - Sertraline 75 mg daily  - Outpatient psychiatry follow-up    At risk for venous thromboembolism (VTE)  Assessment & Plan  - On rivaroxaban    #Code Status: Level 1 - Full  #FEN: Cardiac diet  #Disposition: Discussed plan is to admit to [facility]/inpatient rehabilitation with [facility]OS 10-14 days for post-amputation functional mobility and ADL rehabilitation and when rehabilitation goals are met, will discharge back to acute care pending final establishment of court-appointed guardianship given history of chronic cognitive impairments related to stroke and TBI.      Objective:    Functional Update: Pending    Allergies per EMR    Physical Exam:  Temp:  [97.9 °F (36.6 °C)-99.1 °F (37.3 °C)] 97.9 °F (36.6 °C)  HR:  [74-88] 74  Resp:  [18-20] 20  BP: (101-118)/(58-72) 101/58  Oxygen Therapy  SpO2: 96 %    GEN: Patient seen resting comfortably in bedside chair, NAD  HEENT: NCAT; mucous membranes moist  CV: No extremity edema  PULM: Breathing comfortably on room air  ABD: Non-distended  SKIN: No obvious rashes or lesions on exposed skin  MSK: Left lower extremity trans-femoral amputation. ACE wrapping donned.  NEURO:  Awake and alert, answering questions appropriately to context; follows simple commands consistently  Speech is nonfluent without significant dysarthria, there are many paraphasic errors noted  CN II-XII grossly intact  Moving all extremities spontaneously in chair    Diagnostic Studies: reviewed, no new imaging    Laboratory:    Results from last 7 days   Lab Units 07/05/24  1058   HEMOGLOBIN g/dL 12.1   HEMATOCRIT % 40.5   WBC Thousand/uL 8.92     Results from last 7 days   Lab Units 07/05/24  1058   BUN mg/dL 25   POTASSIUM mmol/L 4.0   CHLORIDE mmol/L 103   CREATININE mg/dL 1.00   AST U/L 14   ALT U/L 19            Patient  Active Problem List   Diagnosis    Bipolar affective disorder (HCC)    Substance abuse (HCC)    Human immunodeficiency virus (HIV) infection (HCC)    History of stroke    Benign essential hypertension    Positive laboratory testing for human immunodeficiency virus (HCC)    Hypertension    Unspecified vitamin D deficiency    Tobacco abuse    Cerebrovascular accident (CVA) (HCC)    Left ventricular thrombus    Seizures (HCC)    Carnitine deficiency (HCC)    Above-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)    Traumatic brain injury (HCC)    Impaired mobility and activities of daily living    At risk for venous thromboembolism (VTE)    Acute pain    At risk for constipation    At risk for retention of urine    Patient incapable of making informed decisions    Pressure injury of buttock, unstageable and at high risk for breakdown    Adjustment disorder with mixed anxiety and depressed mood     Medications  Current Facility-Administered Medications   Medication Dose Route Frequency Provider Last Rate    acetaminophen  975 mg Oral Q8H DAVINA Lorrie Barillas PA-C      aspirin  81 mg Oral Daily Lorrie Barillas PA-C      atorvastatin  80 mg Oral Daily With Dinner Lorrie Barillas PA-C      bictegravir-emtricitab-tenofovir alafenamide  1 tablet Oral Daily With Breakfast Lorrie Barillas PA-C      bisacodyl  10 mg Rectal Daily PRN Lorrie Barillas PA-C      diphenhydrAMINE  25 mg Oral Q6H PRN Lorrie Barillas PA-C      divalproex sodium  1,250 mg Oral Daily Lorrie Barillas PA-C      dolutegravir  50 mg Oral Daily Lorrie Barillas PA-C      gabapentin  100 mg Oral TID Lorrie Barillas PA-C      lamoTRIgine  50 mg Oral BID Lorrie Barillas PA-C      levOCARNitine  1,000 mg/day Oral TID With Meals Lorrie Barillas PA-C      losartan  50 mg Oral Daily Lorrie Barillas PA-C      melatonin  6 mg Oral HS Lorrie Barillas PA-C      metoprolol succinate  25 mg Oral Q12H Lorrie  EJ Barillas      mirtazapine  15 mg Oral HS Lorriejacky Barillas PA-C      ELVA ANTIFUNGAL   Topical BID Lorriejacky Barillas PA-C      ondansetron  4 mg Oral Q6H PRN Lorrie Barillas PA-C      oxyCODONE  2.5 mg Oral Q8H PRN Raimundo Riley MD      polyethylene glycol  17 g Oral Daily PRN Lorrie Barillas PA-C      rivaroxaban  20 mg Oral Daily With Dinner Lorrie Barillas PA-C      senna  1 tablet Oral HS PRN Lorrie Barillas PA-C      sertraline  75 mg Oral Daily Lorrie Barillas PA-C      tamsulosin  0.4 mg Oral Daily With Dinner Lorrie Barillas PA-C      zonisamide  400 mg Oral Daily Lorriejacky Barillas PA-C        I have spent a total time of 40 minutes on 07/08/24 in caring for this patient including Patient and family education, Counseling / Coordination of care, Documenting in the medical record, Reviewing / ordering tests, medicine, procedures  , Obtaining or reviewing history  , and Communicating with other healthcare professionals .    Raimundo Riley MD  Attending Physician  Physical Medicine and Rehabilitation  [facility]

## 2024-07-08 NOTE — ASSESSMENT & PLAN NOTE
S/p Lt femoral thrombectomy and AKA 6/7/24.  Monitor incision.  Continue Xarelto - will need price check.  Continue ASA and statin as well.  Rehab and pain control per PMR

## 2024-07-08 NOTE — ASSESSMENT & PLAN NOTE
Continue Depakote ER and lamotrigine.  He was also on zonesamide in the past.  No seizures in 4 years.  Follows at [facility].

## 2024-07-08 NOTE — PROGRESS NOTES
Updated OT LTGs: 10-14 day ELOS     07/08/24 1030   Rehab Team Goals   ADL Team Goal Patient will require supervision with ADLs with least restrictive device upon completion of rehab program   Rehab Team Interventions   OT Interventions Self Care;Home Management;Therapeutic Exercise;Cognitive Retraining;Energy Conservation;Patient/Family Education   Eating Goal   Eating Goal 06. Independent - Patient completes the activity by him/herself with no assistance from a helper.   Meal Complete All meals   Status Target goal - two weeks  (10-14 days)   Interventions Optimal Position   Grooming Goal   Oral Hygiene Goal 06. Independent - Patient completes the activity by him/herself with no assistance from a helper.   Task Wash/Dry Face;Wash/Dry Hands;Brush Teeth;Comb Hair;Shave;Initiate Task;Complete Groom   Environment Seated in Chair;Seated at Sink;Unsupported sit   Status Target goal - two weeks  (10-14 days)   Intervention Assistive Device;Balance Work;Therapeutic Exercise;Tolerance Work;Neuromuscular Education   Tub/Shower Transfer Goal   Method Shower Stall  (GOAL: CGA)   Assist Device Seat with Back;Grab Bar;Hand Held Shower;Tub Bench   Status Target goal - two weeks  (10-14 days)   Interventions ADL Training;Assistive Device;Neuromuscular Education   Bathing Goal   Shower/bathe self Goal 05. Setup or clean-up assistance - [place] SETS UP or CLEANS UP, patient completes activity. [place] assists only prior to or following the activity.   Environment Seated;Sponge Bath;In Bed   Adaptive Equipment Grab Bar;Hand Held Shower;Seat with back;Transfer bench;Long Handle Sponge   Status Target goal - two weeks  (10-14 days)   Intervention ADL Training;Assistive Device;Therapeutic Exercise;Neuromuscular Education   Upper Body Dressing Goal   Upper body dressing Goal 05. Setup or clean-up assistance - [place] SETS UP or CLEANS UP, patient completes activity. [place] assists only prior to or following the activity.   Task Upper  Body;Arms in/out;Over Head   Environment Seated   Status Target goal - two weeks  (10-14 days)   Intervention Assistive Device;Balance Work;Neuromuscular Education;Therapeutic Exercise;Tolerance Work   Lower Body Dressing Goal   Lower body dressing Goal 03. Partial/moderate assistance - [place] does less than half the effort. [place] lifts or holds trunk or limbs and provides more than half the effort.   Putting on/taking off footwear Goal 05. Setup or clean-up assistance - [place] SETS UP or CLEANS UP, patient completes activity. [place] assists only prior to or following the activity.   Task Lower Body;Shoe/Slipper;Socks;Pants;Undergarment;Other  (LLE residual limb wrapping)   Environment Supine;Seated;Standing   Status Target goal - two weeks  (10-14 days)   Intervention Assistive Device;Balance Work;Neuromuscular Education;Therapeutic Exercise;Tolerance Work   Toileting Transfer Goal   Toilet transfer Goal 04. Supervision or touching assistance- [place] provides VERBAL CUES or supervision throughout activity.   Assistive Device Grab Bar;Wheelchair;Bedside Commode;Drop Arm Commode;Raised Toilet Seat   Status Target goal - two weeks  (10-14 days)   Intervention ADL Training;Balance Work;Assistive Device   Toileting Goal   Toileting hygiene Goal 04. Supervision or touching assistance- [place] provides VERBAL CUES or supervision throughout activity.   Task Pants Up;Pants Down;Hygiene   Safety Balance;Use a Bedside Commode at Night;Use a Bedside Commode during day   Status Target goal - two weeks  (10-14 days)   Intervention ADL Training;Balance Work;Assistive Device     Katia Gallagher MS, OTR/L

## 2024-07-08 NOTE — ASSESSMENT & PLAN NOTE
Continue ASA and atorvastatin.  Outpt follow-up with Neurology - follows at [facility] for seizure disorder.

## 2024-07-08 NOTE — ASSESSMENT & PLAN NOTE
- Related to recent release from incarceration, new AKA and uncertainty of future disposition, all in the setting of impaired cognition related to prior strokes and TBI  - Behavioral techniques for symptom management  - Neuropsychology consult as available  - Given his circumstances, increased frustration is expected. Can consider Psych consult if symptoms continue to worsen to adjust mood stabilizing medications. Will try nonpharmacologic approaches first with more frequent conversations/redirections
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
## 2024-07-08 NOTE — PROGRESS NOTES
[facility]  Progress Note  Name: Sunil Patel I  MRN: [id number]  Unit/Bed#: -01 I Date of Admission: 7/6/2024   Date of Service: 7/8/2024 I Hospital Day: 2    Assessment & Plan   * Above-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)  Assessment & Plan  S/p Lt femoral thrombectomy and AKA 6/7/24.  Monitor incision.  Continue Xarelto - will need price check.  Continue ASA and statin as well.  Rehab and pain control per PMR    Traumatic brain injury (HCC)  Assessment & Plan  Pt determined to not have capacity.  Will need a guardian. Process started 7/5/24.    Carnitine deficiency ([facility])  Assessment & Plan  Continue levocarnitine 1000mg 3x daily with meals.    Seizures ([facility])  Assessment & Plan  Continue Depakote ER and lamotrigine.  He was also on zonesamide in the past.  No seizures in 4 years.  Follows at [facility].    Left ventricular thrombus  Assessment & Plan  Continue Xarelto.  Echo showed ejection fraction 40 to 45%, mild global hypokinesis     Benign essential hypertension  Assessment & Plan  Continue losartan and Toprol XL.  Monitor BP and adjust medication as necessary.    History of stroke  Assessment & Plan  Continue ASA and atorvastatin.  Outpt follow-up with Neurology - follows at [facility] for seizure disorder.    Human immunodeficiency virus (HIV) infection ([facility])  Assessment & Plan  Continue Biktarvy and Tivicay.    Bipolar affective disorder ([facility])  Assessment & Plan  Continue psychiatric medications.  Outpt follow-up with Psychiatry.             The above assessment and plan was reviewed and updated as determined by my evaluation of the patient on 7/8/2024.    Labs:   Results from last 7 days   Lab Units 07/05/24  1058   WBC Thousand/uL 8.92   HEMOGLOBIN g/dL 12.1   HEMATOCRIT % 40.5   PLATELETS Thousands/uL 543*     Results from last 7 days   Lab Units 07/05/24  1058   SODIUM mmol/L 138   POTASSIUM mmol/L 4.0   CHLORIDE mmol/L 103   CO2 mmol/L 25   BUN mg/dL 25    CREATININE mg/dL 1.00   CALCIUM mg/dL 9.6                   Imaging  No orders to display       Review of Scheduled Meds:  Current Facility-Administered Medications   Medication Dose Route Frequency Provider Last Rate    acetaminophen  975 mg Oral Q8H DAVINA Lorrie Barillas PA-C      aspirin  81 mg Oral Daily Lorrie Barillas PA-C      atorvastatin  80 mg Oral Daily With Dinner Lorrie Barillas PA-C      bictegravir-emtricitab-tenofovir alafenamide  1 tablet Oral Daily With Breakfast Lorrie Barillas PA-C      bisacodyl  10 mg Rectal Daily PRN Lorrie Barillas PA-C      diphenhydrAMINE  25 mg Oral Q6H PRN Lorrie Barillas PA-C      divalproex sodium  1,250 mg Oral Daily Lorrie Barillas PA-C      dolutegravir  50 mg Oral Daily Lorrie Barillas PA-C      gabapentin  100 mg Oral TID Lorrie Barillas PA-C      lamoTRIgine  50 mg Oral BID Lorrie Barillas PA-C      levOCARNitine  1,000 mg/day Oral TID With Meals Lorrie Barillas PA-C      losartan  50 mg Oral Daily Lorrie Barillas PA-C      melatonin  6 mg Oral HS Lorrie Barillas PA-C      metoprolol succinate  25 mg Oral Q12H Lorrie Barillas PA-C      mirtazapine  15 mg Oral HS Lorrie Barillas PA-C      ELVA ANTIFUNGAL   Topical BID Lorrie Barillas PA-C      ondansetron  4 mg Oral Q6H PRN Lorrie Barillas PA-C      oxyCODONE  5 mg Oral Q6H PRN Raimundo Riley MD      polyethylene glycol  17 g Oral Daily Raimundo Riley MD      polyethylene glycol  17 g Oral Daily PRN Lorrie Barillas PA-C      rivaroxaban  20 mg Oral Daily With Dinner Lorrie Barillas PA-C      senna  2 tablet Oral BID Lorrie Barillas PA-C      senna  1 tablet Oral HS PRN Lorrie Barillas PA-C      sertraline  75 mg Oral Daily Lorrie Barillas PA-C      tamsulosin  0.4 mg Oral Daily With Dinner Lorrie Barillas PA-C      zonisamide  400 mg Oral Daily Lorrie Barillas PA-C         Subjective/ HPI:  Patient seen and examined. Patients overnight issues or events were reviewed with nursing staff. New or overnight issues include the following:     Pt seen in his room. He states that he is doing well. He is happier with his food choices. He denies any other complaints.    ROS:   A 10 point ROS was performed; negative except as noted above.        *Labs /Radiology studies Reviewed  *Medications  reviewed and reconciled as needed  *Please refer to order section for additional ordered labs studies      Physical Examination:  Vitals:   Vitals:    07/07/24 1806 07/07/24 1951 07/08/24 0548 07/08/24 0700   BP: 108/58 111/58 106/67 118/72   BP Location:  Left arm Left arm Left arm   Pulse:  88 76    Resp:  18 18    Temp:  99.1 °F (37.3 °C) 98.2 °F (36.8 °C)    TempSrc:  Oral Oral    SpO2:  94% 95%    Weight:       Height:           General Appearance: NAD; pleasant  HEENT: PERRLA, conjuctiva normal; mucous membranes moist; face symmetrical  Neck:  Supple  Lungs: clear bilaterally, normal respiratory effort, no retractions, expiratory effort normal, on room air  CV: regular rate and rhythm, no murmurs rubs or gallops noted   ABD: soft non tender, +BS x4  EXT: Lt AKA dressed.  Skin: normal turgor, normal texture, no rash  Psych: affect normal, mood normal  Neuro: Awake and alert. +paraphasic errors.     The above physical exam was reviewed and updated as determined by my evaluation of the patient on 7/8/2024.    Invasive Devices       None                      VTE Pharmacologic Prophylaxis: Xarelto  Code Status: Level 1 - Full Code  Current Length of Stay: 2 day(s)    Total floor / unit time spent today 30 minutes  Coordination of patient's care was performed in conjunction with primary service. Time invested included review of patient's labs, vitals, and management of their comorbidities with continued monitoring, examination of patient as well as answering patient questions, documenting her findings and creating progress  note in electronic medical record,  ordering appropriate diagnostic testing.       ** Please Note:  voice to text software may have been used in the creation of this document. Although proof errors in transcription or interpretation are a potential of such software**

## 2024-07-08 NOTE — DISCHARGE INSTR - OTHER ORDERS
Skin Care Plan:  1-Preventative Hydraguard to right heel and B/L Sacro-Buttocks BID and PRN.  2-Turn/reposition q2h or when medically stable for pressure re-distribution on skin .  3-Elevate right heel to offload pressure  4-Moisturize skin daily with skin nourishing cream  5-Ehob cushion in chair when out of bed.  6-P-500 Low Air Loss Mattress

## 2024-07-08 NOTE — PLAN OF CARE
Problem: PAIN - ADULT  Goal: Verbalizes/displays adequate comfort level or baseline comfort level  Description: Interventions:  - Encourage patient to monitor pain and request assistance  - Assess pain using appropriate pain scale  - Administer analgesics based on type and severity of pain and evaluate response  - Implement non-pharmacological measures as appropriate and evaluate response  - Consider cultural and social influences on pain and pain management  - Notify physician/advanced practitioner if interventions unsuccessful or patient reports new pain  Outcome: Progressing     Problem: INFECTION - ADULT  Goal: Absence or prevention of progression during hospitalization  Description: INTERVENTIONS:  - Assess and monitor for signs and symptoms of infection  - Monitor lab/diagnostic results  - Monitor all insertion sites, i.e. indwelling lines, tubes, and drains  - Monitor endotracheal if appropriate and nasal secretions for changes in amount and color  - [place] appropriate cooling/warming therapies per order  - Administer medications as ordered  - Instruct and encourage patient and family to use good hand hygiene technique  - Identify and instruct in appropriate isolation precautions for identified infection/condition  Outcome: Progressing  Goal: Absence of fever/infection during neutropenic period  Description: INTERVENTIONS:  - Monitor WBC    Outcome: Progressing     Problem: SAFETY ADULT  Goal: Patient will remain free of falls  Description: INTERVENTIONS:  - Educate patient/family on patient safety including physical limitations  - Instruct patient to call for assistance with activity   - Consult OT/PT to assist with strengthening/mobility   - Keep Call bell within reach  - Keep bed low and locked with side rails adjusted as appropriate  - Keep care items and personal belongings within reach  - Initiate and maintain comfort rounds  - Make Fall Risk Sign visible to staff  - Offer Toileting every   Hours,  in advance of need  - Initiate/Maintain  alarm  - Obtain necessary fall risk management equipment:    - Apply yellow socks and bracelet for high fall risk patients  - Consider moving patient to room near nurses station  Outcome: Progressing  Goal: Maintain or return to baseline ADL function  Description: INTERVENTIONS:  -  Assess patient's ability to carry out ADLs; assess patient's baseline for ADL function and identify physical deficits which impact ability to perform ADLs (bathing, care of mouth/teeth, toileting, grooming, dressing, etc.)  - Assess/evaluate cause of self-care deficits   - Assess range of motion  - Assess patient's mobility; develop plan if impaired  - Assess patient's need for assistive devices and provide as appropriate  - Encourage maximum independence but intervene and supervise when necessary  - Involve family in performance of ADLs  - Assess for home care needs following discharge   - Consider OT consult to assist with ADL evaluation and planning for discharge  - Provide patient education as appropriate  Outcome: Progressing  Goal: Maintains/Returns to pre admission functional level  Description: INTERVENTIONS:  - Perform AM-PAC 6 Click Basic Mobility/ Daily Activity assessment daily.  - Set and communicate daily mobility goal to care team and patient/family/caregiver.   - Collaborate with rehabilitation services on mobility goals if consulted  - Perform Range of Motion   times a day.  - Reposition patient every   hours.  - Dangle patient   times a day  - Stand patient   times a day  - Ambulate patient   times a day  - Out of bed to chair   times a day   - Out of bed for meals  3 times a day  - Out of bed for toileting  - Record patient progress and toleration of activity level   Outcome: Progressing     Problem: DISCHARGE PLANNING  Goal: Discharge to home or other facility with appropriate resources  Description: INTERVENTIONS:  - Identify barriers to discharge w/patient and caregiver  -  Arrange for needed discharge resources and transportation as appropriate  - Identify discharge learning needs (meds, wound care, etc.)  - Arrange for interpretive services to assist at discharge as needed  - Refer to Case Management Department for coordinating discharge planning if the patient needs post-hospital services based on physician/advanced practitioner order or complex needs related to functional status, cognitive ability, or social support system  Outcome: Progressing     Problem: Prexisting or High Potential for Compromised Skin Integrity  Goal: Skin integrity is maintained or improved  Description: INTERVENTIONS:  - Identify patients at risk for skin breakdown  - Assess and monitor skin integrity  - Assess and monitor nutrition and hydration status  - Monitor labs   - Assess for incontinence   - Turn and reposition patient  - Assist with mobility/ambulation  - Relieve pressure over bony prominences  - Avoid friction and shearing  - Provide appropriate hygiene as needed including keeping skin clean and dry  - Evaluate need for skin moisturizer/barrier cream  - Collaborate with interdisciplinary team   - Patient/family teaching  - Consider wound care consult   Outcome: Progressing

## 2024-07-08 NOTE — PCC CARE MANAGEMENT
7/23- CM continues to follow with d/c planning needs. Process of pursuing guardianship as well as sending SNF referrals. CM continues to work with acute care CM to assist with d/c planning process.     7/30- Cm continues to follow and search for dispo plan    8/6- Team still working on dispo plan, [facility] come to unit and meet with pt this week to determine if they can accept.     8/12- [facility] to [facility] pt this week, awaiting dispo    8/19- Team continues to search for diso plans for pt, awaiting guardianship trial     8/26- Guardianship trial today 8/27 at 10am, awaiting determination and continue to explore dispo plans

## 2024-07-08 NOTE — PROGRESS NOTES
"   07/08/24 1230   Pain Assessment   Pain Assessment Tool 0-10   Pain Score No Pain   Restrictions/Precautions   Precautions Bed/chair alarms;Cognitive;Fall Risk;Aphasia;Pressure Ulcer;Supervision on toilet/commode   Weight Bearing Restrictions Yes   LLE Weight Bearing Per Order NWB   ROM Restrictions No   Braces or Orthoses Other (Comment)  (LLE residual limb wrap)   Bed-Chair Transfer   Type of Assistance Needed Physical assistance;Verbal cues;Set-up / clean-up   Physical Assistance Level 25% or less   Comment Min A modified squat pivot recliner>EOB to pt's left. cues needed for proper hand placement.   Chair/Bed-to-Chair Transfer CARE Score 3   Exercise Tools   Other Exercise Tool 1 BUE ther ex to maximize strength and endurance for ADLs and fxnl mobility. LUE completed w/ 2# DB, 3x10 bicep curls, chest press, and OH shoulder press. Pt w/ h/o CVA w/ residual RUE weakness, therefore 1#DB used for bicep curls, AROM for chest press and front arm raises. Pt tolerated well w/ Min vc's and rest breaks between sets to manage fatigue.   Cognition   Overall Cognitive Status Impaired   Additional Activities   Additional Activities Comments Pt expressed, \"My bassem might come and we can go outside.\" Extended edu provided on need for staff to be present when outside w/ pt stating \"Oh really, then I'll just go back to bed.\" RN and PCA made aware of same.   Assessment   Treatment Assessment Pt seen for skilled OT session focusing on BUE strengthening and fxnl modified squat pivot. Pt reporting fatigue \"because I'm stressed.\" Pt reporting friend may visit later and hoping to go outside at that time, edu provided on need for staff w/ time outside and nursing staff made aware of same. Cont OT POC: endurance work, fxnl modified squat pivot xfers, BUE strengthening, sacral offloading strategies, repetitive safety training, alternating lateral weightshifting, ADL retraining, and ongoing phase 1 amputee education. Pt agreeable to rest " in recliner, all needs within reach and alarm activated.   Prognosis Good   Problem List Decreased strength;Decreased range of motion;Decreased endurance;Impaired balance;Decreased mobility;Decreased coordination;Impaired judgement;Decreased safety awareness;Orthopedic restrictions;Decreased cognition   Plan   Treatment/Interventions Functional transfer training;Therapeutic exercise;Endurance training;Patient/family training   Progress Progressing toward goals   Discharge Recommendation   Rehab Resource Intensity Level, OT   (pending progress)   OT Therapy Minutes   OT Time In 1330   OT Time Out 1400   OT Total Time (minutes) 30   OT Mode of treatment - Individual (minutes) 30   OT Mode of treatment - Concurrent (minutes) 0   OT Mode of treatment - Group (minutes) 0   OT Mode of treatment - Co-treat (minutes) 0   OT Mode of Treatment - Total time(minutes) 30 minutes   OT Cumulative Minutes 210   Therapy Time missed   Time missed? No

## 2024-07-08 NOTE — UTILIZATION REVIEW
NOTIFICATION OF INPATIENT ADMISSION   AUTHORIZATION REQUEST   SERVICING FACILITY:   [facility]  Address: [address] [place] [address]  Tax ID: [id number]  NPI: [id number] ATTENDING PROVIDER:  Attending Name and NPI#: Kavon Mojica Md [[id number]]  Address: [address] [place] [address]  Phone: [phone]   ADMISSION INFORMATION:  Place of Service: Inpatient [facility]  Place of Service Code: 21  Inpatient Admission Date/Time: 6/7/24  6:10 PM  Discharge Date/Time: 7/6/2024 12:35 PM  Admitting Diagnosis Code/Description:  No admission diagnoses are documented for this encounter.     UTILIZATION REVIEW CONTACT:  Benjamin Valdez Utilization   Network Utilization Review Department  Phone: [phone]  Fax: [phone]  Email: [email or web address]  Contact for approvals/pending authorizations, clinical reviews, and discharge.     PHYSICIAN ADVISORY SERVICES:  Medical Necessity Denial & Znpk-je-Dmrk Review  Phone: [phone]  Fax: [phone]  Email: [email or web address]     DISCHARGE SUPPORT TEAM:  For Patients Discharge Needs & Updates  Phone: [phone] opt. 2 Fax: [phone]  Email: [email or web address]      NOTIFICATION OF ADMISSION DISCHARGE   This is a Notification of Discharge from [facility]. Please be advised that this patient has been discharge from our facility. Below you will find the admission and discharge date and time including the patient’s disposition.   UTILIZATION REVIEW CONTACT:  Jasvir Esteban  Utilization   Network Utilization Review Department  Phone: [phone] x carefully listen to the prompts. All voicemails are confidential.  Email: [email or web address]     ADMISSION INFORMATION  PRESENTATION DATE: 6/7/2024  2:21 PM  OBERVATION ADMISSION DATE: N/A  INPATIENT ADMISSION DATE: 6/7/24  6:10 PM   DISCHARGE DATE: 7/6/2024 12:35 PM    DISPOSITION:[facility]    Network Utilization Review Department  ATTENTION: Please call with any questions or concerns to [phone] and carefully listen to the prompts so that you are directed to the right person. All voicemails are confidential.   For Discharge needs, contact Care Management DC Support Team at [phone] opt. 2  Send all requests for admission clinical reviews, approved or denied determinations and any other requests to dedicated fax number below belonging to the campus where the patient is receiving treatment. List of dedicated fax numbers for the Facilities:  FACILITY NAME UR FAX NUMBER   ADMISSION DENIALS (Administrative/Medical Necessity) [phone]   DISCHARGE SUPPORT TEAM (NETWORK) [phone]   PARENT CHILD HEALTH (Maternity/NICU/Pediatrics) [phone]   [facility] [phone]   [facility] [phone]   [facility] [phone]   [facility] [phone]   [facility] [phone]   [facility] [phone]   [facility] [phone]   [facility] [phone]   [facility] [phone]   [facility] [phone]   [facility] [phone]   [facility] [phone]

## 2024-07-09 ENCOUNTER — TELEPHONE (OUTPATIENT)
Dept: VASCULAR SURGERY | Facility: CLINIC | Age: 64
End: 2024-07-09

## 2024-07-09 PROBLEM — R41.9 NEUROCOGNITIVE DISORDER: Status: ACTIVE | Noted: 2024-07-09

## 2024-07-09 PROBLEM — I42.9 CARDIOMYOPATHY (HCC): Status: ACTIVE | Noted: 2024-07-09

## 2024-07-09 PROBLEM — R39.198 URINARY DYSFUNCTION: Status: ACTIVE | Noted: 2024-07-07

## 2024-07-09 PROBLEM — R41.89 COGNITIVE IMPAIRMENT: Status: ACTIVE | Noted: 2024-07-09

## 2024-07-09 PROCEDURE — 97110 THERAPEUTIC EXERCISES: CPT

## 2024-07-09 PROCEDURE — 97530 THERAPEUTIC ACTIVITIES: CPT

## 2024-07-09 PROCEDURE — 99233 SBSQ HOSP IP/OBS HIGH 50: CPT

## 2024-07-09 PROCEDURE — 97542 WHEELCHAIR MNGMENT TRAINING: CPT

## 2024-07-09 PROCEDURE — 99232 SBSQ HOSP IP/OBS MODERATE 35: CPT | Performed by: NURSE PRACTITIONER

## 2024-07-09 PROCEDURE — 97535 SELF CARE MNGMENT TRAINING: CPT

## 2024-07-09 RX ORDER — ACETAMINOPHEN 325 MG/1
975 TABLET ORAL 3 TIMES DAILY PRN
Status: DISCONTINUED | OUTPATIENT
Start: 2024-07-09 | End: 2024-08-30 | Stop reason: HOSPADM

## 2024-07-09 RX ORDER — METOPROLOL SUCCINATE 25 MG/1
25 TABLET, EXTENDED RELEASE ORAL DAILY
Status: DISCONTINUED | OUTPATIENT
Start: 2024-07-10 | End: 2024-08-30 | Stop reason: HOSPADM

## 2024-07-09 RX ADMIN — LEVOCARNITINE 330 MG: 1 SOLUTION ORAL at 15:04

## 2024-07-09 RX ADMIN — BICTEGRAVIR SODIUM, EMTRICITABINE, AND TENOFOVIR ALAFENAMIDE FUMARATE 1 TABLET: 50; 200; 25 TABLET ORAL at 08:34

## 2024-07-09 RX ADMIN — LEVOCARNITINE 330 MG: 1 SOLUTION ORAL at 08:35

## 2024-07-09 RX ADMIN — GABAPENTIN 100 MG: 100 CAPSULE ORAL at 18:21

## 2024-07-09 RX ADMIN — DOLUTEGRAVIR SODIUM 50 MG: 50 TABLET, FILM COATED ORAL at 08:34

## 2024-07-09 RX ADMIN — LAMOTRIGINE 50 MG: 25 TABLET ORAL at 18:21

## 2024-07-09 RX ADMIN — ACETAMINOPHEN 975 MG: 325 TABLET, FILM COATED ORAL at 06:03

## 2024-07-09 RX ADMIN — ASPIRIN 81 MG: 81 TABLET, COATED ORAL at 08:30

## 2024-07-09 RX ADMIN — ZONISAMIDE 400 MG: 100 CAPSULE ORAL at 08:33

## 2024-07-09 RX ADMIN — MIRTAZAPINE 15 MG: 15 TABLET, FILM COATED ORAL at 21:45

## 2024-07-09 RX ADMIN — GABAPENTIN 100 MG: 100 CAPSULE ORAL at 08:30

## 2024-07-09 RX ADMIN — ACETAMINOPHEN 975 MG: 325 TABLET, FILM COATED ORAL at 21:45

## 2024-07-09 RX ADMIN — DIVALPROEX SODIUM 1250 MG: 500 TABLET, EXTENDED RELEASE ORAL at 08:29

## 2024-07-09 RX ADMIN — Medication 2.5 MG: at 23:41

## 2024-07-09 RX ADMIN — MICONAZOLE NITRATE: 20 CREAM TOPICAL at 18:24

## 2024-07-09 RX ADMIN — MICONAZOLE NITRATE: 20 CREAM TOPICAL at 08:33

## 2024-07-09 RX ADMIN — SERTRALINE HYDROCHLORIDE 75 MG: 50 TABLET ORAL at 08:29

## 2024-07-09 RX ADMIN — LOSARTAN POTASSIUM 50 MG: 50 TABLET, FILM COATED ORAL at 08:30

## 2024-07-09 RX ADMIN — GABAPENTIN 100 MG: 100 CAPSULE ORAL at 21:44

## 2024-07-09 RX ADMIN — RIVAROXABAN 20 MG: 20 TABLET, FILM COATED ORAL at 18:21

## 2024-07-09 RX ADMIN — ATORVASTATIN CALCIUM 80 MG: 80 TABLET, FILM COATED ORAL at 18:21

## 2024-07-09 RX ADMIN — LEVOCARNITINE 330 MG: 1 SOLUTION ORAL at 18:24

## 2024-07-09 RX ADMIN — METOPROLOL SUCCINATE 25 MG: 25 TABLET, FILM COATED, EXTENDED RELEASE ORAL at 06:03

## 2024-07-09 RX ADMIN — ACETAMINOPHEN 975 MG: 325 TABLET, FILM COATED ORAL at 15:04

## 2024-07-09 RX ADMIN — LAMOTRIGINE 50 MG: 25 TABLET ORAL at 08:29

## 2024-07-09 RX ADMIN — TAMSULOSIN HYDROCHLORIDE 0.4 MG: 0.4 CAPSULE ORAL at 18:21

## 2024-07-09 RX ADMIN — Medication 6 MG: at 21:45

## 2024-07-09 NOTE — CASE MANAGEMENT
Cm met with pt at bedside. Pt recently in [facility] and prior to that lived at TBI unit. Pt was released from [facility] while in the hospital. Due to pt's cognition, cm on acute side has begun guardianship process 7/5. Cm will continue to assist process moving forward until dc back to acute after his therapy goals.    Pt's long term friend Joanna Nash present at bedside, [phone] and reporting she would like to be pt's guardian and also gave pt's other friend Autumn's number [phone]. Cm reached out to Nain Mark and Katia Kay on acute side as they began guardianship process so they are aware.

## 2024-07-09 NOTE — ASSESSMENT & PLAN NOTE
"Has been appropriate and cooperative throughout this stay without any behavioral issues on the rehab unit to date.    - Does have decreased frustration tolerance   - So far redirectable.  Hx of TBI and L MCA CVA, psychiatric d/o, seizure d/o  - Neuropsych assessment 6/27/24 - \"diffuse cognitive dysfunction and on a measure assessing awareness of personal health status and ability to evaluate health problems, handle medical emergencies and take safety precautions, patient performed in the IMPAIRED range of functioning. During this encounter, patient does not appear to have capacity to make fully informed medical decisions.\"  MMSE 4/28 - severely impaired  - Guardianship process initiated on acute - follow-up by CM/Admin  - Status: some expressive and possible some receptive aphasia.  He can be difficult to follow at times likely due to a mixture of aphasia, tangentiality, mild lability, and impaired memory; lability with hx of possible bipolar d/o  - Neuropsych Med review: Depakote, Lamictal, Remeron, Zoloft, Melatonin, gabapentin, PRN oxy 2.5mg TID   - Monitor neuro-exam, wakefulness, mood, cognition, insight into deficits and safety awareness   - Monitor and ensure optimal management electrolytes, nutrition, and hydration  - Monitor for signs or symptoms of infection, medication intolerances, other systemic etiologies  - Additional labs, imaging, specialist follow-up as needed per primary team currently   - Overstimulation precautions, frequent re-orientation, re-direction, re-assurance  - Optimal mood, pain, and sleep management  - If impaired sleep or behavior recommend sleep log and agitation monitoring    - Limit sedating medications when possible  - Fall precautions - if needed increase rounding or consider virtual sitter or in-person sitter  - For routine restlessness, anxiety, irritability focus on non-pharmacologic management    - Hold benzo's with increased risk paradoxical reaction and possibility of " limiting cognitive recovery  - Continue SLP and interdisciplinary care  - OP neuro and PCP

## 2024-07-09 NOTE — PROGRESS NOTES
"   07/09/24 0830   Pain Assessment   Pain Assessment Tool 0-10   Pain Score No Pain   Restrictions/Precautions   Precautions Aphasia;Bed/chair alarms;Cognitive;Fall Risk;Pressure Ulcer;Supervision on toilet/commode;Seizure   Weight Bearing Restrictions Yes   LLE Weight Bearing Per Order NWB   ROM Restrictions No   Braces or Orthoses Other (Comment)  (LLE residual limb wrapping)   Lifestyle   Autonomy \"Thank you so much for letting me do that (shave).\"   Oral Hygiene   Type of Assistance Needed Set-up / clean-up   Physical Assistance Level No physical assistance   Comment seated EOB.   Oral Hygiene CARE Score 5   Grooming   Able To Comb/Brush Hair;Wash/Dry Face;Brush/Clean Teeth;Wash/Dry Hands;Shave   Findings grooming completed while seated EOB, electric razor used to shave req inc time.   Shower/Bathe Self   Type of Assistance Needed Physical assistance;Verbal cues;Set-up / clean-up;Adaptive equipment   Physical Assistance Level 26%-50%   Comment While supine, pt bathed groin and upper legs and R lower leg. CS-CGA for logrolling req A to thoroughly bathe buttocks. While seated EOB, pt bathed UB.   Shower/Bathe Self CARE Score 3   Tub/Shower Transfer   Reason Not Assessed Sponge Bath;Safety   Upper Body Dressing   Type of Assistance Needed Set-up / clean-up;Supervision   Physical Assistance Level No physical assistance   Comment seated EOB   Upper Body Dressing CARE Score 4   Lower Body Dressing   Type of Assistance Needed Physical assistance;Verbal cues;Set-up / clean-up   Physical Assistance Level 26%-50%   Comment A for LLE residual limb wrapping w/ 6\" ace wrap x3 w/ waist anchor. A to don brief at bed level w/ alternating logrolling. Pt able to thread BLE while supine, alternating logrolling for clothing management req A to fully manage in back.   Lower Body Dressing CARE Score 3   Putting On/Taking Off Footwear   Type of Assistance Needed Incidental touching   Physical Assistance Level No physical assistance "   Comment able to don/doff sock while seated EOB w/ CS-CGA.   Putting On/Taking Off Footwear CARE Score 4   Sit to Lying   Type of Assistance Needed Set-up / clean-up   Physical Assistance Level No physical assistance   Sit to Lying CARE Score 5   Lying to Sitting on Side of Bed   Type of Assistance Needed Set-up / clean-up   Physical Assistance Level No physical assistance   Lying to Sitting on Side of Bed CARE Score 5   Bed-Chair Transfer   Type of Assistance Needed Physical assistance;Verbal cues;Set-up / clean-up   Physical Assistance Level 25% or less   Comment Min A modified squat pivot EOB>recliner.   Chair/Bed-to-Chair Transfer CARE Score 3   Toileting Hygiene   Type of Assistance Needed Physical assistance;Verbal cues;Set-up / clean-up   Physical Assistance Level 26%-50%   Comment able to manage hygiene w/ set-up of urinal. Cues for alternating lateral weightshifting for clothing management, however pt preferred to complete while supine 2* urgency to void. Req partial A for clothing management down and alternating logrolling for clothing  management up req A to fully manage in back.   Toileting Hygiene CARE Score 3   Toilet Transfer   Comment pt utilized urinal twice during session.   Exercise Tools   Other Exercise Tool 1 BUE ther ex to maximize strength and endurance for ADLs and fxnl mobility. LUE completed w/ 2# DB, 3x10 bicep curls, chest press, front arm raises, and OH shoulder press. Pt w/ h/o CVA w/ residual RUE weakness, therefore 1#DB used for bicep curls, chest press, and front arm raises. Pt tolerated well w/ Min vc's and rest breaks between sets to manage fatigue.   Cognition   Overall Cognitive Status Impaired   Activity Tolerance   Medical Staff Made Aware RN present to assess LLE incision.   Assessment   Treatment Assessment Pt seen for skilled OT session focusing on self-care management and BUE strengthening. Details on sponge bath ADL noted above, improvements noted w/ pacing and safety  awareness compared to yesterday. Pt asked appropriate questions regarding LLE residual limb wrapping and requested to shave. Pt denied pain at rest or w/ activity. Cont OT POC: endurance work, fxnl modified squat pivot xfers, BUE strengthening, sacral offloading strategies, repetitive safety training, alternating lateral weightshifting, core strengthening, ADL retraining, and ongoing phase 1 amputee education. Pt agreeable to rest in recliner, all needs within reach and alarm activated. Pt reports friend, Mireya, will be visiting this afternoon.   Prognosis Good   Problem List Decreased strength;Decreased range of motion;Decreased endurance;Impaired balance;Decreased mobility;Decreased coordination;Impaired judgement;Decreased safety awareness;Orthopedic restrictions;Decreased cognition   Plan   Treatment/Interventions ADL retraining;Functional transfer training;Therapeutic exercise;Endurance training;Patient/family training   Progress Progressing toward goals   Discharge Recommendation   Rehab Resource Intensity Level, OT   (pending progress)   OT Therapy Minutes   OT Time In 0837   OT Time Out 1030   OT Total Time (minutes) 113   OT Mode of treatment - Individual (minutes) 113   OT Mode of treatment - Concurrent (minutes) 0   OT Mode of treatment - Group (minutes) 0   OT Mode of treatment - Co-treat (minutes) 0   OT Mode of Treatment - Total time(minutes) 113 minutes   OT Cumulative Minutes 323   Therapy Time missed   Time missed? No

## 2024-07-09 NOTE — ASSESSMENT & PLAN NOTE
Continue Losartan 50mg qd and Toprol XL 25mg q12hrs.  He is missing >50% of the Toprol doses for SBP <110 so will decrease the Toprol dose to 25mg qd starting today 7/9/24

## 2024-07-09 NOTE — PROGRESS NOTES
[facility]  Progress Note  Name: Sunil Patel I  MRN: [id number]  Unit/Bed#: -01 I Date of Admission: 7/6/2024   Date of Service: 7/9/2024 I Hospital Day: 3    Assessment & Plan   * Above-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)  Assessment & Plan  S/p Lt femoral thrombectomy and AKA 6/7/24.  Monitor incision.  Continue Xarelto - will need price check.  Continue ASA and statin as well.  Rehab and pain control per PMR    Traumatic brain injury (HCC)  Assessment & Plan  This is not recent  Pt determined to not have capacity.  Will need a guardian. Process started 7/5/24.    Cardiomyopathy ([facility])  Assessment & Plan  ECHO showed LVEF 40-45% with mild global hypokinesis   Stress test done without any significant ischemia.  There was an area of fixed defect noted   Cardiology recommended to continue with medical management same as is for his PAD. They felt that did not need a cardiac catheterization at this time given the lack of any significant ischemia, and no current cardiac symptoms.   Felt that it was possible that he could have had a stress-induced cardiomyopathy and had apical thrombus at that time and is now recovering from this overall. Continue Losartan/Toprol  Has not needed diuretics     Carnitine deficiency ([facility])  Assessment & Plan  Continue levocarnitine 330 mg 3x daily with meals.    Seizures ([facility])  Assessment & Plan  Continue Depakote ER, Lamotrigine and Zonegran  No seizures in 4 years.  Follows at [facility].    Left ventricular thrombus  Assessment & Plan  Continue Xarelto.    Benign essential hypertension  Assessment & Plan  Continue Losartan 50mg qd and Toprol XL 25mg q12hrs.  He is missing >50% of the Toprol doses for SBP <110 so will decrease the Toprol dose to 25mg qd starting today 7/9/24    History of stroke  Assessment & Plan  Continue ASA and atorvastatin.  Outpt follow-up with Neurology - follows at [facility] for seizure disorder.    Human immunodeficiency  virus (HIV) infection (HCC)  Assessment & Plan  Continue Biktarvy and Tivicay.    Bipolar affective disorder (HCC)  Assessment & Plan  Continue Zoloft and Remeron  Outpt follow-up with Psychiatry.           The above assessment and plan was reviewed and updated as determined by my evaluation of the patient on 7/9/2024.    Labs:   Results from last 7 days   Lab Units 07/05/24  1058   WBC Thousand/uL 8.92   HEMOGLOBIN g/dL 12.1   HEMATOCRIT % 40.5   PLATELETS Thousands/uL 543*     Results from last 7 days   Lab Units 07/05/24  1058   SODIUM mmol/L 138   POTASSIUM mmol/L 4.0   CHLORIDE mmol/L 103   CO2 mmol/L 25   BUN mg/dL 25   CREATININE mg/dL 1.00   CALCIUM mg/dL 9.6                   Imaging  No orders to display       Review of Scheduled Meds:  Current Facility-Administered Medications   Medication Dose Route Frequency Provider Last Rate    acetaminophen  975 mg Oral Q8H DAVINA Lorrie Barillas PA-C      aspirin  81 mg Oral Daily Lorrie Barillas PA-C      atorvastatin  80 mg Oral Daily With Dinner Lorrie Barillas PA-C      bictegravir-emtricitab-tenofovir alafenamide  1 tablet Oral Daily With Breakfast Lorrie Barillas PA-C      bisacodyl  10 mg Rectal Daily PRN Lorrie Barillas PA-C      diphenhydrAMINE  25 mg Oral Q6H PRN Lorrie Barillas PA-C      divalproex sodium  1,250 mg Oral Daily Lorrie Barillas PA-C      dolutegravir  50 mg Oral Daily Lorrie Barillas PA-C      gabapentin  100 mg Oral TID Lorrie Barillas PA-C      lamoTRIgine  50 mg Oral BID Lorrie Barillas PA-C      levOCARNitine  1,000 mg/day Oral TID With Meals Lorrie Barillas PA-C      losartan  50 mg Oral Daily Lorrie Barillas PA-C      melatonin  6 mg Oral HS Lorrie Barillas PA-C      [START ON 7/10/2024] metoprolol succinate  25 mg Oral Daily CHARLIE Mcclelland      mirtazapine  15 mg Oral HS Lorrie Barillas PA-C      ELVA ANTIFUNGAL   Topical BID Lorrie Barillas PA-C       ondansetron  4 mg Oral Q6H PRN Lorrie Barillas PA-C      oxyCODONE  2.5 mg Oral Q8H PRN Raimundo Riley MD      polyethylene glycol  17 g Oral Daily PRN Lorrie EJ Barillas      rivaroxaban  20 mg Oral Daily With Dinner Lorriejacky Barillas PA-C      senna  1 tablet Oral HS PRN Lorriejacky Barillas PA-C      sertraline  75 mg Oral Daily Lorrie EJ Barillas      tamsulosin  0.4 mg Oral Daily With Dinner Lorrie EJ Barillas      zonisamide  400 mg Oral Daily Lorrie EJ Barillas         Subjective/ HPI: Patient seen and examined. Patients overnight issues or events were reviewed with nursing staff. New or overnight issues include the following:     No new or overnight issues.  Offers no complaints    ROS:   A 10 point ROS was performed; negative except as noted above.        *Labs /Radiology studies Reviewed  *Medications  reviewed and reconciled as needed  *Please refer to order section for additional ordered labs studies      Physical Examination:  Vitals:   Vitals:    07/08/24 1858 07/08/24 2028 07/09/24 0502 07/09/24 0830   BP: 106/64 122/61 123/69 120/68   BP Location:  Left arm Left arm    Pulse:  86 95 92   Resp:  18 20    Temp:  98.6 °F (37 °C) 98.3 °F (36.8 °C)    TempSrc:  Oral Oral    SpO2:  95% 95%    Weight:       Height:           General Appearance: no distress, nontoxic appearing  HEENT:  External ear normal.  Nose normal w/o drainage. Mucous membranes are moist. Oropharynx is clear. Conjunctiva clear w/o icterus or redness.  Neck:  Supple, normal ROM  Lungs: BBS without crackles/wheeze/rhonchi; respirations unlabored with normal inspiratory/expiratory effort.  No retractions noted.  On RA  CV: regular rate and rhythm; no rubs/murmurs/gallops, PMI normal   ABD: Abdomen is soft.  Bowel sounds all quadrants.  Nontender with no distention.    EXT: no edema  Skin: normal turgor, normal texture, no rashes  Psych: affect normal, mood normal  Neuro: AAO          The above physical  exam was reviewed and updated as determined by my evaluation of the patient on 7/9/2024.    Invasive Devices       None                      VTE Pharmacologic Prophylaxis: Xarelto  Code Status: Level 1 - Full Code  Current Length of Stay: 3 day(s)    Total floor / unit time spent today 30 minutes  Coordination of patient's care was performed in conjunction with primary service. Time invested included review of patient's labs, vitals, and management of their comorbidities with continued monitoring, examination of patient as well as answering patient questions, documenting her findings and creating progress note in electronic medical record,  ordering appropriate diagnostic testing.       ** Please Note:  voice to text software may have been used in the creation of this document. Although proof errors in transcription or interpretation are a potential of such software**

## 2024-07-09 NOTE — TEAM CONFERENCE
Acute RehabilitationTeam Conference Note  Date: 7/9/2024   Time: 12:21 PM       Patient Name:  Sunil Patel       Medical Record Number: [id number]   YOB: 1960  Sex: Male          Room/Bed:  [place] [address]/[facility] 451-01  Payor Info:  Payor: MEDICARE / Plan: MEDICARE A AND B / Product Type: Medicare A & B Fee for Service /      Admitting Diagnosis: Hx of AKA (above knee amputation) ([facility]) [Z89.619]   Admit Date/Time:  7/6/2024  1:30 PM  Admission Comments: No comment available     Primary Diagnosis:  Above-knee amputation of left lower extremity ([facility])  Principal Problem: Above-knee amputation of left lower extremity ([facility])    Patient Active Problem List    Diagnosis Date Noted    Neurocognitive disorder 07/09/2024    Cardiomyopathy ([facility]) 07/09/2024    Adjustment disorder with mixed anxiety and depressed mood 07/08/2024    Impaired mobility and activities of daily living 07/07/2024    At risk for venous thromboembolism (VTE) 07/07/2024    Acute pain 07/07/2024    At risk for constipation 07/07/2024    At risk for altered urinary elimination 07/07/2024    Patient incapable of making informed decisions 07/07/2024    Pressure injury of buttock, unstageable and at high risk for breakdown 07/07/2024    Above-knee amputation of left lower extremity ([facility]) 07/06/2024    Traumatic brain injury ([facility]) 07/06/2024    Carnitine deficiency ([facility]) 06/09/2024    Left ventricular thrombus 06/08/2024    Seizures ([facility]) 06/08/2024    Tobacco abuse 10/26/2019    Cerebrovascular accident (CVA) ([facility]) 10/26/2019    Positive laboratory testing for human immunodeficiency virus ([facility]) 07/03/2017    Bipolar affective disorder ([facility]) 07/02/2017    Hypertension 02/27/2017    Human immunodeficiency virus (HIV) infection ([facility]) 02/16/2017    History of stroke 02/16/2017    Substance abuse ([facility]) 12/21/2013    Unspecified vitamin D deficiency 05/28/2010    Benign essential hypertension 02/25/2010       Physical Therapy:    Weight Bearing Status:  Non-weight bearing (NWB L LE)  Transfers: Minimal Assistance (Min SPT without AD)  Bed Mobility: Supervision, Independent  Amulation Distance (ft):  (TBA)  Ambulation:  (TBA)  Assistive Device for Ambulation:  (TBA)  Wheelchair Mobility Distance: 300 ft  Wheelchair Mobility: Supervision  Number of Stairs:  (TBA)  Assistive Device for Stairs:  (TBA)  Stair Assistance:  (TBA)  Ramp:  (TBA)  Assistive Device for Ramp:  (TBA)  Discharge Recommendations:  (d/c recommendations pending on further information about family/friend support system; if no place of residence or improper support system, recommend SNF/FPC)    07/09/2024:  Patient seen for IE 07/07/2024, making steady progress.  Presents, following L AKA, now NWB L LE, with decreased cognition, aphasia, decreased safety awareness, decreased skin integrity (wound B/L buttocks), decreased memory, decreased ROM/strength, decreased balance and safety, decreased endurance, and pain.   Patient S/mod I bed mobility, min A transfers without AD, VC required for proper setup and sequencing, hopping TBA, no goals set for ambulation 2/2 cognitive deficits, will focus on promoting functional independence at transfers and w/c level. S w/c mobility up to 300'+ on level surfaces. Pt demonstrates good R LE strength for prolonged standing, as well as good balance for unilateral UE reaching in standing. Barriers to return home include decreased family support (brother lives out of state, pt reports two friends who may help), no place of residence, decreased cognition for independent functional/medical management of new L residual limb. Patient would benefit from continued inpatient [facility] PT to increase function, safety, and increased independence in prep for safe d/c to home. D/c recommendation pending on new information/plan for home setup with increased family/friend support. STG: mod I w/c mobility on level surfaces, S unlevel surfaces outside. Mod I bed mobility, S transfers  without AD without VC for setup/sequencing, Min stand/sit on chair on curb step. Prolonged standing of 5+ min with UE support.      Occupational Therapy:  Eating: Independent  Grooming: Supervision  Bathing: Moderate Assistance  Bathing: Moderate Assistance  Upper Body Dressing: Supervision  Lower Body Dressing: Moderate Assistance  Toileting: Moderate Assistance  Toilet Transfer: Moderate Assistance  Cognition: Exceptions to WNL  Cognition: Decreased Memory, Decreased Safety, Decreased Executive Functions, Impulsive, Decreased Comprehension  Orientation: Person, Place, Time, Situation  Discharge Recommendations:  (TBD)       Pt continues to present with impairments in activity tolerance, endurance, standing balance/tolerance, dec RUE strength, memory, insight, safety, judgement, and coping skills . Additional functional barriers include fatigue, LLE NWBS, dec skin integrity, no social support, risk for falls, and home environment. Pt is functioning at overall Mod A for ADLs and fxnl modified squat pivot xfers. Pt will continue to benefit from skilled OT services to address above mentioned barriers and maximize functional independence in baseline areas of occupation. OT D/C recommendation is for reteam, ELOS 10-14 days to achieve S goals.    Speech Therapy:           ST orders received this AM for evaluation and treatment of speech/language/communication skills. Evaluation results and recommendations to follow.     Nursing Notes:  Appetite: Good  Diet Type: Cardiac                           Type of Wound (LDA): Wound                                            Pain Score: 0                                  No notes on file    Case Management:     Discharge Planning  Living Arrangements: Other (Comment)  Support Systems: Son, Family members  Assistance Needed: Family members are currently arranging housing for pt after d/c  Type of Current Residence: Other (Comment)  Current Home Care Services: No  7/8- Pt new admit,  cm to open.     Is the patient actively participating in therapies? yes  List any modifications to the treatment plan: None    Barriers Interventions   Pressure Injury of buttocks, unstageable Wound care consult, shahrzad, ordered p500, off loading    Lack capacity Gaurdianship began 7/5   Aphagia SLP Services   Bipolar Supportive counseling   Neuro cog impaired, carry over of new learning, safety awareness  Repetition, guardianship   Pain Med mgmt   Dispo planning CM working with acute side on guardianship    Poor health literacy               Education                         Is the patient making expected progress toward goals? yes  List any update or changes to goals: None    Medical Goals: Patient will be medically stable for discharge to [facility] restrictive envrionment upon completion of rehab program and Patient will be able to manage medical conditions and comorbid conditions with medications and follow up upon completion of rehab program    Weekly Team Goals:   Rehab Team Goals  ADL Team Goal: Patient will require supervision with ADLs with least restrictive device upon completion of rehab program  Transfer Team Goal: Patient will be independent with transfers with least restrictive device upon completion of rehab program  Locomotion Team Goal: Patient will be independent with locomotion with least restrictive device upon completion of rehab program  Cognitive Team Goal: Patient will return to premorbid level of cognitive activity upon completion of rehab program    Discussion: Pt functioning at mod assist with wc level transfers, mod squat pivots. Team working on increasing functional inpt and overall safety, downgraded goals to sup level both pt ot. ST received orders this AM for cog, to eval.     Reteam, plan is to reach sup levels or plateau within 2 weeks and transfer back to acute side to continue guardianship.     Anticipated Discharge Date:  reteam- 2 weeks [facility] Team Members Present:  The following  team members are supervising care for this patient and were present during this Weekly Team Conference.    Physician: Dr. Omari MD  : Berta George MSW  Registered Nurse: Palak Zavala, RN, BSN, CRRN  Physical Therapist: CIARA RodríguezT  Occupational Therapist: Yola Dykes MS, OTR/L  Speech Therapist: Erin Roe MS, CCC-SLP

## 2024-07-09 NOTE — ASSESSMENT & PLAN NOTE
This is not recent  Pt determined to not have capacity.  Will need a guardian. Process started 7/5/24.

## 2024-07-09 NOTE — [facility] SPEECH THERAPY
Pt is currently being followed for skilled SLP services targeting language therapy. Pt with hx stroke, resulting in expressive and receptive aphasia but noting expressive communication skills to be greater impacted. SLP was consulted to support pt's communication skills for improved ability to express his needs with the team. Pt presenting with overall moderately impaired expressive and receptive language deficits, impacting functional communication skills. Pt has trialed written communication, however, this was found to be an ineffective communication modality. Pt has also been introduced to a low tech manual communication board, which he has demonstrated ability to utilize in order to convey basic wants/needs more easily and effectively. Currently, pt is functioning at min A for comprehension, problem solving and memory and mod A for expression. Plan this week to continue to attempt to establish a more functional mode of communication which is both effective and easy for pt to utilize to support his communication attempts with staff. Recommending brief follow up skilled SLP services to continue to support pt in building a more functional communication modality to improve effectiveness of communication and to ease frustrations with communication attempts.     Update week of 7/22/2024: Pt continues to be seen for skilled SLP services focusing on language and communication skills with primary reason for consultation to support communication needs at this time. Pt remains with deficits in expressive and receptive language skills,which is pt's baseline prior to admission to this unit, but which impact all domains of language (auditory & reading comprehension; verbal & written expression). This week, sessions have focused on introducing a high tech communication device as per pt's request. Pt's friend provided a tablet and recent session has focused on exploring different communication apps which fit pt's needs  (function, cost, etc), as well as apps which pt can then complete language tasks in his free time. Pt is demonstrating desire to utilize device, but will benefit from additional training and practice with device, especially due to continued deficits in comprehension, ST memory/recall, problem solving and attention. This week, plan to continue to focus on determining appropriate apps for language drills/activities, as well as identifying an appropriate alejandra to support expressive language/communication, while also programming alejandra for pt's specific needs. Currently, pt is functioning at mod A for expression, comprehension, problem solving and memory and supervision for social interaction. Recommending short term follow up of skilled SLP services to continue to support pt in building a more functional communication modality to improve effectiveness of communication and to ease frustrations with communication attempts during acute rehab stay. Will also benefit from engaging pt's friend in education/training with device.     Update week of 7/29/2024: Pt continues to be followed for language and communication therapy where he is making slow progress, however, ST primarily being implemented to support current functional language as pt with baseline aphasia. Pt is limited by deficits in expressive and receptive language skills, to include verbal expression, written expression, auditory comprehension and reading comprehension, which impact overall functional communication skills. Additionally, a cognitive linguistic evaluation was completed this week, as per medical team request. Pt completed the SLUMS assessment with a score of 1/30 which as compared to those with high school education correlates with severe neurocognitive disorder, however, this score is likely highly impacted by pt's language deficits and should be interpreted with caution. Cognitive linguistic deficits include decreased: attention, STM recall, problem  solving, executive functions, safety awareness, reasoning, and visuospatial skills. The following interventions are used to target these barriers, including visual orientation checklist, verbal problem solving task, verbal working memory tasks, categorization tasks , picture problem solving activities, verbal reasoning tasks, visual attention tasks, and family education/training. basic communication boards, verbal command following activities, written command following activities, biographical yes/no questions, simple yes/no questions, moderate yes/no questions, visual/receptive object ID tasks, picture object ID tasks, automatic speech: counting, automatic speech: COURTNEY, automatic speech: CORRY, object naming tasks, simple phrase completion tasks, reading comprehension at word level, reading comprehension at phrase level , written expression of biographical information, written expression at word level, and word-picture matching.     Plan to target cognitive linguistic skills (basic problem solving, memory, safety) and language skills across all language domains this coming week. Currently, pt is functioning at mod assist for comprehension, expression, problem solving and memory, as well as supervision for social interaction. Recommending continued skilled SLP services to continue to support pt in building a more functional communication modality to improve effectiveness of communication and to ease frustrations with communication attempts during acute rehab stay, as well as to target cognitive linguistic skills to maximize safety and independence on discharge.     Update week 8/5/2024: Pt continues to be followed for language and communication therapy where is making slow progress, however, ST continues to implement support with a focus on baseline aphasia.In regards to aphasia, pt is limited by deficits in expressive and receptive language skills, to include verbal expression, written expression, auditory  comprehension and reading comprehension, which impacts overall functional communication skills. In recent sessions, implementation of constant therapy application on pt's tablet has been completed and discussed w/ pt's friend to help pt practice outside of therapy and during therapy with expressive-receptive language. Pt voiced engagement with application and continued focus with application will be used in future sessions as well as additional interventions to target these barriers such as basic communication boards, verbal command following activities, written command following activities, biographical yes/no questions, simple yes/no questions, moderate yes/no questions, visual/receptive object ID tasks, picture object ID tasks, automatic speech: counting, automatic speech: COURTNEY, automatic speech: CORRY, object naming tasks, simple phrase completion tasks, reading comprehension at word level, reading comprehension at phrase level , written expression of biographical information, written expression at word level, and word-picture matching. In regards to cognition, recent sessions have not been targeting due to pt's signifiant expressive language deficits. The following interventions are to be used to target these barriers, visual orientation checklist, verbal problem solving task, verbal working memory tasks, categorization tasks , picture problem solving activities, verbal reasoning tasks, visual attention tasks, and family education/training. Plan to target cognitive linguistic skills (basic problem solving, memory, safety) and language skills across all language domains this coming week.     Current level of functioning:    Comprehension: Mod A  Expression: Mod A  Social Interaction: Supervision  Problem Solving: Mod A  Memory: Mod A      Update from week: 8/13/2024 . Pt is being followed for language tx  sessions to where pt is making slower progress this week. Continued language barriers which present include:  decreased higher level auditory comprehension skills, expressive language skills including decreased overall communicative intent, reading comprehension skills including lengthier written information, and written language skills including difficulty in basic written expression including biographical information which still impacts pt's overall safety, functional cognitive communication skills as well as functional mobility. However, still incorporating the use of tablet w/ free apps which have been provided by ST team. Of SLP had introduced [employer] website and practive therapy materials for pt. Unfortunately this is not allowed on tablet but is for computer use. SLP did have pt complete tasks w/ use of laptop which pt responded favorably. Will plan to reach out to company to determine if the website can be utilized to pt's personal tablet. Continued cognitive barriers which present include: decreased visual attention, ST memory recall , reasoning, sequencing, judgement, and insight, which still impacts pt's overall safety, functional cognitive communication skills as well as functional mobility. The following interventions are used to target these barriers, including visual orientation checklist, drawing conclusions activities, categorization tasks , picture problem solving activities, visual retraining activities., visual attention tasks, and functional reading tasks .     Current level of functioning:   Comprehension: min-mod A   Expression: mod A  Social Interaction: supervision  Executive functions: mod A   Memory: mod A    This week will continue to target independence of use of tablet and language/cognitive apps to use as a supplement during pt's downtime. Pt to benefit from skilled language tx  session up to 3x per week maintenance given overall communication skills in attempts to decreased caregiver burden over time.           Update from week: 8/19/2024: Pt continues to be followed for language and  communication therapy where he is making slow progress. Pt is limited by deficits in both expressive and receptive language skills, to include verbal expression, written expression, auditory comprehension and reading comprehension, which impact overall functional communication skills.     Continued speech/language barriers which present include: decreased auditory comprehension skill including yes/no questions, command following and information with specific content such as prepositions, expressive language skills including word finding which impacts semantic content, reading comprehension skills including at the word and sentence levels, and written language skills including ability to record his own biographical info or for use as a communication modality. Continued cognitive barriers which present include: decreased attention, visual attention, ST memory recall , problem solving, reasoning, sequencing, organization of thoughts, judgement, and insight, which still impacts pt's overall safety, functional cognitive communication skills as well as functional mobility. These deficits are barriers to pt's overall functional independence and safety with mobility, as well as ability to effectively communicate.     The following interventions are used to target these barriers, including basic communication boards, yes/no visual sheet, verbal command following activities, written command following activities, biographical yes/no questions, simple yes/no questions, moderate yes/no questions, visual/receptive object ID tasks, picture object ID tasks, object naming tasks, opposite phrase completion tasks, simple phrase completion tasks, reading comprehension at word level, written expression of biographical information, word-picture matching, divergent naming: concrete, and word deduction with phrase. While pt's language deficits are primarily being targeted, the following interventions are also being used to address these  cognitive linguistic deficits.     Pt is currently functioning at min A for comprehension, supervision for social interaction and mod A for expression, memory and problem solving. This week, will plan on continuing to incorporate pt's tablet for use of communication modality and for language tasks. Plan to primarily target verbal expression, auditory comprehension and reading comprehension skills. Pt to benefit from skilled language tx session up to 3x per week maintenance for overall communication skills in attempts to decreased caregiver burden over time and to maximize functional communication abilities.    Update from week: 8/26/2024: Pt continues to be followed for skilled ST focusing on expressive and receptive language skills where pt is making slow progress but continues to participate. Pt is limited by deficits in both expressive and receptive language skills, which impact all domains of communication including verbal and written expression and auditory and reading comprehension. These deficits continue to impact pt's overall functional communication skills at the conversational level for expression and at the sentence level for comprehension.     Continued speech/language barriers which present include: decreased auditory comprehension skills of more complex ideas, reading comprehension at the word and sentence levels, verbal expression at the sentence and conversational levels and written expression.  Continued cognitive barriers which present include: decreased attention, visual attention, ST memory recall , problem solving, reasoning, sequencing, organization of thoughts, judgement, and insight, which still impacts pt's overall safety, functional cognitive communication skills as well as functional mobility. These deficits are barriers to pt's overall functional independence and safety with mobility, as well as ability to effectively communicate.     The following interventions are used to target these  barriers, including basic communication boards, verbal command following activities, biographical yes/no questions, simple yes/no questions, moderate yes/no questions, visual/receptive object ID tasks, picture object ID tasks, object naming tasks, opposite phrase completion tasks, simple phrase completion tasks, reading comprehension at word level, written expression of biographical information, word-picture matching, divergent naming: concrete, and word deduction with phrase, along with listening comprehension tasks. Though pt continues to present with cognitive linguistic deficits, the main focus remains language skills.     Pt is currently functioning at min A for comprehension, supervision for social interaction and mod A for expression, memory and problem solving. This week, will plan on increasing pt's knowledge and use of word retrieval strategies to facilitate improved expression. Plan to primarily target verbal expression and auditory comprehension. Pt to benefit from skilled language tx session up to 3x per week maintenance for overall communication skills in attempts to decreased caregiver burden over time and to maximize functional communication abilities.

## 2024-07-09 NOTE — PROGRESS NOTES
Physical Medicine and Rehabilitation Progress Note  Sunil Patel 63 y.o. male MRN: [id number]  Unit/Bed#: [facility] 451-01 Encounter: [id number]      Assessment & Plan:     Decline in ADLs and mobility: Functional assessment        FIM  Care Score  Admit Score Recent Score    Total assist  1-100% or 2p    Tot     Max assist 2-51-75%    Sub To hygiene, bathing, LBD    Mod assist 3- 26-50%  Par     Min assist 3- 25% or < Par     CG assist 4  TA     Sup/Setup 4-5 Sup UBD    Mod-I/Indep 6 MI      Transfers  Significant assist  Min-mod assist    Ambulation   Total      WC mob  150 ft supervision     Stairs   Total assist/NT      Goal: Supervision for most ADLs, transfers and for WC mobility  Major barriers:  L AKA, neurocog d/o, dispo  Dispo: Possibly acute care with [facility] 14 days from admission unless alternative viable dispo becomes available pending guardianship      * Above-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)  Assessment & Plan  - Presented on 6/7/24 with acute LLE pain and progressive weakness. On imaging he was found to have an acute occlusion of the left external iliac artery without any visualizations of distal vessels, consistent with [place] class III acute limb ischemia and it was determined by vascular surgery that the LLE was not salvageable. He underwent left femoral thrombectomy and AKA on 6/7/24 with vascular surgery.   - Monitor closely for phantom limb pain/sensation. Conservative modalities such as gentle massage along the residual limb (avoiding deep pressure or direct contact with the incision site) can be utilized to reduce severity and frequency of phantom pain. Topical capsaicin and lidocaine can be considered for phantom pain.   - On Rivaroxaban with hx of LV thrombus and PAOD   - Analgesia as below  - Avoid pillows/wedging posterior to the residual limb to avoid development of hip flexion contracture.   - Amputation education on ARC  - Local incision care, monitor for breakdown, frequent  "turns; incision care per vascular surgery  - Limb-shaping with ACE wrapping for now  - Has op vasc sx follow-up scheduled from 7/10 but will be in [facility] - will request IP post-sx follow-up here   - He will require outpatient PM&R follow-up for assessment for and consideration of prosthesis fabrication when medically cleared by surgery to do so.  - Tolerating therapy adequately thus far in ARC - continue PT, OT in ARC setting     Pressure injury of buttock, unstageable and at high risk for breakdown  Assessment & Plan  - Wound care consulted and following  Per wound care specialist 7/8  \"- Wound care consulted and following  \"Right Heel dry intact and shelley with no skin loss or wounds present. Recommend preventative Hydraguard Cream and proper offloading/ repositioning.    POA B/L Sacro-buttocks Unstageable Pressure Injury: wound now presents as a POA Right Buttocks Stage 3 Pressure Injury. Wound was previously 2 areas of full thickness skin loss on b/l sacro-buttock. Wound is now one area of full thickness on right buttock. Left buttock is now intact, hyperpigmented and blanches. Right buttock wound with mix of 80% pink tissue with scattered 20% yellow slough tissue. Anthony-wound is fragile and hyperpigmented. Scant serosanguineous drainage noted. Recommend continuing with triad paste and hydraguard to anthony-wounds   - No induration, fluctuance, odor, warmth/temperature differences, redness, or purulence noted to the above noted wounds and skin areas assessed. New dressings applied per orders listed below. Patient tolerated well- no s/s of non-verbal pain or discomfort observed during the encounter. Bedside nurse aware of plan of care. See flow sheets for more detailed assessment findings.     Skin Care Plan:  1-Cleanse sacro-buttocks with soap and water. Apply Triad Paste to Sacro-Buttocks Wound Beds Only. Apply Hydraguard to Anthony-wounds and all other intact aspects of sacro-buttocks. Apply both creams TID and PRN " "episodes of incontinence.   2-Turn/reposition q2h or when medically stable for pressure re-distribution on skin .  3-Elevate right heel to offload pressure  4-Moisturize skin daily with skin nourishing cream  5-Ehob cushion in chair when out of bed.  6-Preventative Hydraguard to right heel BID and PRN.  7-P-500 Low Air Loss Mattress   8-Apply ELVA to groin BID per order. \"    - Recommend ROHO cushion in chair when out of bed instead   - Preventative hydraguard to bilateral heels BID and PRN.   - Monitor clinically for breakdown, frequent turns      Patient incapable of making informed decisions  Assessment & Plan  - History of remote TBI and prior strokes with comorbid psychiatric history.  - Evaluated by neuropsychology, Dr. Dilshad Tatum, PhD on 6/27/24, found to have \"diffuse cognitive dysfunction and on a measure assessing awareness of personal health status and ability to evaluate health problems, handle medical emergencies and take safety precautions, patient performed in the IMPAIRED range of functioning. During this encounter, patient does not appear to have capacity to make fully informed medical decisions.\"  - Court-appointed guardianship process has been initiated by acute care CM Katia Kay. Patient does not have family available or willing to take on medical decision making at this time.   - Patient will transition back to acute care when functional rehabilitation goals are met to follow-up with court-appointed guardianship for placement.    Impaired mobility and activities of daily living  Assessment & Plan  - Rehabilitation medicine physician for daily monitoring of care, 24 hour availability for acute medical issues, medication management, and therapeutic and diagnostic assessments.  - 24 hour rehabilitation nursing 7 days per week for: management/teaching of medications, bowel/bladder routine, skin care.  - PT, OT for 2-3 hours per day, 5 to 6 days per week; 15 hours per week  - Rehabilitation " "Psychology as needed for adjustment and coping  - MSW for barriers to discharge, community resources, and family support  - Discharge planning following to help ensure a safe and efficient discharge        Left ventricular thrombus  Assessment & Plan  - Visualized on echocardiogram on 6/10/24: spherical 1.5 x 1.3 cm thrombus at the LV apex.   - Rivaroxaban  - Outpatient follow-up with cardiology    Neurocognitive disorder  Assessment & Plan  Hx of TBI and L MCA CVA, psychiatric d/o, seizure d/o  - Neuropsych assessment 6/27/24 - \"diffuse cognitive dysfunction and on a measure assessing awareness of personal health status and ability to evaluate health problems, handle medical emergencies and take safety precautions, patient performed in the IMPAIRED range of functioning. During this encounter, patient does not appear to have capacity to make fully informed medical decisions.\"  MMSE 4/28 - severely impaired  - Guardianship process initiated on acute - follow-up by CM/Admin  - Status: some expressive and possible some receptive aphasia.  He can be difficult to follow at times likely due to a mixture of aphasia, tangentiality, mild lability, and impaired memory; lability with hx of possible bipolar d/o  - Neuropsych Med review: Depakote, Lamictal, Remeron, Zoloft, Melatonin, gabapentin, PRN oxy 2.5mg TID   - Monitor neuro-exam, wakefulness, mood, cognition, insight into deficits and safety awareness   - Monitor and ensure optimal management electrolytes, nutrition, and hydration  - Monitor for signs or symptoms of infection, medication intolerances, other systemic etiologies  - Additional labs, imaging, specialist follow-up as needed per primary team currently   - Overstimulation precautions, frequent re-orientation, re-direction, re-assurance  - Optimal mood, pain, and sleep management  - If impaired sleep or behavior recommend sleep log and agitation monitoring    - Limit sedating medications when possible  - Fall " precautions - if needed increase rounding or consider virtual sitter or in-person sitter  - For routine restlessness, anxiety, irritability focus on non-pharmacologic management    - Hold benzo's with increased risk paradoxical reaction and possibility of limiting cognitive recovery  - Recommend acute comprehensive interdisciplinary inpatient rehabilitation to include intensive skilled therapies (PT, OT, ST) with oversight and management by rehabilitation physician.  Inpatient rehab level nursing, case management and weekly interdisciplinary team meetings.          - Obtain Aphasia cog assessment such as WAB  - Assess iADLs - ability to manage medications, meal prep, finances, obtaining appropriate transport from community, managing emergencies, and overall safety in home environment.  - Provide therapy, compensatory strategies, and if available and necessary provide caregiver training.   - OP neuro and PCP     Traumatic brain injury (HCC)  Assessment & Plan  See neurocog impairment     History of stroke  Assessment & Plan  - History of old left temporal and parietal lobe cortical infarcts, also involving the insula and left occipital lobe as well as small right posterior parietal lobe. Stable on most recent CT head on 5/16/24.   - ASA, Xarelto and statin for secondary prevention  - Optimal BP control  - Monitor neuro exam - hx of LV thrombus   - OP neuro follow-up     Bipolar affective disorder (HCC)  Assessment & Plan  Supportive counseling  NeuroPsychology consult while in [facility] if available for support  Counseled on and continue to encourage deep breathing/relaxation/behavioral management techniques  - Mirtazapine 15 mg qHs  - Sertraline 75 mg daily  - Lamictal 50mg BID  - Depakote ER 1250mg qday   - Outpatient psychiatry follow-up    At risk for altered urinary elimination  Assessment & Plan  - Did have some incontinence on acute   - monitor for retention, incontinence (including overflow incontinence),  signs/symptoms of UTI  - recommend toileting program Q2-4 H during day and Q4-6H overnight   - nursing prompt to void  - if needed - bladder scan Q4H standing, record all voided amounts (if able to collect), record additional bladder scans as needed after all voids (ie. PVRs)  - Straight cath PRN >450 cc; or if has urge to void and cannot 250-449 cc  - If post void residual bladder scans are <150 cc x3 consecutive voids can stop scans          At risk for constipation  Assessment & Plan  - Stooling adequately recently; did have some incontinence on acute now improved  Toileting program: Toilet approx Q2-4H during day (provide bedpan only if too immobile for bedside commode or toilet but OOB preferred) and Q4-6H overnight  - Ensure adequate hydration, adjust bowel meds as indicated, monitor for infectious diarrhea  - Monitor and optimize skin care, nursing to assist with monitor skin closely for erythema, breakdown, or rashes (or worsening of prior)  - Monitor closely for opioid and immobility-induced constipation. Scheduled bowel regimen. Will follow BMs and adjust bowel regimen to target soft, formed stools q1-2 days, per pt's regular schedule.      Acute pain  Assessment & Plan  - Tylenol 975 mg q8h scheduled > wean to PRN   - Gabapentin 100 mg TID scheduled for phantom pain/sensation  - Oxycodone 2.5 mg q8h PRN, wean as possible    Adjustment disorder with mixed anxiety and depressed mood  Assessment & Plan  - Related to recent release from incarceration, new AKA and uncertainty of future disposition, all in the setting of impaired cognition related to prior strokes and TBI  - Behavioral techniques for symptom management  - Neuropsychology consult as available    At risk for venous thromboembolism (VTE)  Assessment & Plan  - On rivaroxaban    Carnitine deficiency (HCC)  Assessment & Plan  - Carnitine replacement  - Appreciate medicine management      Seizures (HCC)  Assessment & Plan  - Depakote ER, lamotrigine,  zonisamide  - Outpatient follow-up with [facility] neurology    Benign essential hypertension  Assessment & Plan  - Metoprolol, losartan  - Appreciate medicine management    Human immunodeficiency virus (HIV) infection (HCC)  Assessment & Plan  - Continue anti-retroviral treatment  - Appreciate medicine management during ARC course  - OP ID follow-up           Other Medical Issues:  Monitor for     Follow-up providers and other issues to be followed up after discharge  PCP  Cards  ID  PMR  Vasc sx       Restrictions include:  Fall precautions    Objective:    Allergies per EMR  Diagnostic Studies: Reviewed, no new imaging  No orders to display     See above as well    Laboratory: Labs reviewed  Results from last 7 days   Lab Units 07/05/24  1058   HEMOGLOBIN g/dL 12.1   HEMATOCRIT % 40.5   WBC Thousand/uL 8.92     Results from last 7 days   Lab Units 07/05/24  1058   BUN mg/dL 25   SODIUM mmol/L 138   POTASSIUM mmol/L 4.0   CHLORIDE mmol/L 103   CREATININE mg/dL 1.00   AST U/L 14   ALT U/L 19            Drug regimen reviewed, all potential adverse effects identified and addressed:    Scheduled Meds:  Current Facility-Administered Medications   Medication Dose Route Frequency Provider Last Rate    acetaminophen  975 mg Oral TID PRN José Salcido MD      aspirin  81 mg Oral Daily Lorrie Barillas PA-C      atorvastatin  80 mg Oral Daily With Dinner Lorrie Barillas PA-C      bictegravir-emtricitab-tenofovir alafenamide  1 tablet Oral Daily With Breakfast Lorrie Barillas PA-C      bisacodyl  10 mg Rectal Daily PRN Lorrie Barillas PA-C      diphenhydrAMINE  25 mg Oral Q6H PRN Lorrie Barillas PA-C      divalproex sodium  1,250 mg Oral Daily Lorrie Barillas PA-C      dolutegravir  50 mg Oral Daily Lorrie Barillas PA-C      gabapentin  100 mg Oral TID Lorrie Barillas PA-C      lamoTRIgine  50 mg Oral BID Lorrie Barillas PA-C      levOCARNitine  1,000 mg/day Oral TID With Meals  "Lorrie Barillas PA-C      losartan  50 mg Oral Daily Lorrie Barillas PA-C      melatonin  6 mg Oral HS Lorrie Barillas PA-C      [START ON 7/10/2024] metoprolol succinate  25 mg Oral Daily ChelitaCHARLIE Shay      mirtazapine  15 mg Oral HS Lorrie Barillas PA-C      ELVA ANTIFUNGAL   Topical BID Lorrie Barillas PA-C      ondansetron  4 mg Oral Q6H PRN Lorrie Barillas PA-C      oxyCODONE  2.5 mg Oral Q8H PRN Raimundo Riley MD      polyethylene glycol  17 g Oral Daily PRN Lorrie Barillas PA-C      rivaroxaban  20 mg Oral Daily With Dinner Lorrie Barillas PA-C      senna  1 tablet Oral HS PRN Lorrie Barillas PA-C      sertraline  75 mg Oral Daily Lorrie Barillas PA-C      tamsulosin  0.4 mg Oral Daily With Dinner Lorrie Barillas PA-C      zonisamide  400 mg Oral Daily Lorrie Barillas PA-C         Chief Complaints:  S/P L AKA     Subjective:     On eval, patient with expressive>receptive aphasia limiting some communication but appears to communicate adequately that his pain is controlled well and he denies SOB, lightheadedness, constipation, worsening bowel/bladder function, uncontrolled mood or other complaints.     ROS: A 10 point ROS was performed; negative except as noted above.       Physical Exam:  Temp:  [98.1 °F (36.7 °C)-98.4 °F (36.9 °C)] 98.4 °F (36.9 °C)  HR:  [84-95] 84  Resp:  [18-20] 18  BP: (116-132)/(63-69) 132/63  SpO2:  [95 %-98 %] 98 %    GEN:  Sitting in NAD   HEENT/NECK: MMM  CARDIAC: Regular rate rhythm, no murmers, no rubs, no gallops  LUNGS:  clear to auscultation, no wheezes, rales, or rhonchi  ABDOMEN: Soft, non-tender, non-distended, normal active bowel sounds  EXTREMITIES/SKIN:  RLE no calf edema or TTP; LLE incision healing adequately  NEURO:   MENTAL STATUS: expressive>receptive aphasia limiting some communication; able to follow most simple commands  PSYCH:  Affect:  Euthymic     HPI:  \"Sunil Patel is a 63 y.o. male " "who  has a past medical history of Acute lower limb ischemia (06/08/2024), Anxiety, Depression, HIV disease (HCC), Substance abuse (HCC), and Suicide attempt (HCC). who presented to the [facility] on 6/7/24 from snf for increased LLE swelling and pain and was found to have a left external iliac artery occlusion and acute limb ischemia. 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 Patient had TTE on 6/10/24 showing LV apex thrombus. On 6/11/24 patient had pharmacologic nuclear stress test/SPECT scan which did not show any significant areas of ischemia with EF 40-45% and recommended continuing A/C for LV apical thrombus. His course was complicated by b/l buttock unstageable pressure ulcers, urinary and fecal incontinence, pain, and significant decline in ADLs and mobility. Patient has been continued on Xarelto for anticoagulation, as well as ASA and statin. Patient has a history of TBI, with baseline nonfluent aphasia and forgetfulness. He was evaluated by neuropsychology on 6/27/24, and deemed to not have capacity to make fully informed medical decisions. Therefore, the guardianship process was initiated as of 7/5/24.  \"    ** Please Note: Fluency Direct voice to text software may have been used in the creation of this document. **    50 minutes or greater spent for this encounter which included a combination of face-to-face time with the patient and non-face-to-face time which in part specifically includes management of AKA, pain, dispo.  Face-to-face time included extended discussion with patient regarding current condition, medical history, mood, medical/rehabilitation management, and disposition.  Non face-to-face time included coordination of care with patient's co-managing AP and/or " physician(s) thru communication and review of their recent documentation as well as reviewing vitals, bowel/bladder function, recent labs, and notes from therapy, CM, and nursing.  In addition, this patient was discussed by the interdisciplinary team in weekly case conference today. The care of the patient was extensively discussed with all care providers and an appropriate rehabilitation plan was formulated unique for this patient. Barriers were identified preventing progression of therapy and appropriate interventions were discussed with each discipline. Please see the team note for input from all disciplines regarding barriers, intervention, and discharge planning

## 2024-07-09 NOTE — TELEPHONE ENCOUNTER
Pt is in arc and will need to be r/s once discharged from arc   Post op Embolectomy 6/7/24 CAP s/p AKA , groin wound check per Chastity Scaff

## 2024-07-09 NOTE — PROGRESS NOTES
"   07/09/24 1330   Pain Assessment   Pain Assessment Tool 0-10   Pain Score No Pain   Restrictions/Precautions   Precautions Aphasia;Bed/chair alarms;Cognitive;Fall Risk;Pain;Supervision on toilet/commode;Seizure;Pressure Ulcer   LLE Weight Bearing Per Order NWB   ROM Restrictions No   Braces or Orthoses   (L LE residual limb wrapping anchored at hips)   Cognition   Overall Cognitive Status Impaired   Arousal/Participation Alert;Responsive;Cooperative   Subjective   Subjective Pt reported 10/10 pain but declined to take pain meds \" I'm ok\". also repeatedly expressed desire to go outside for air.   Roll Left and Right   Type of Assistance Needed Independent;Adaptive equipment   Comment bedrail for repeated rolling for hygiene + LB dressing including putting on brief due to noted bowel smear on pants and during residual limb rewrapping with trialed 4 and 6\" ace wraps.Post tx pt using 4\" ace wraps.    Roll Left and Right CARE Score 6   Sit to Lying   Type of Assistance Needed Supervision   Comment (S)  bedrail + extra time to complete HOB flat - will reassess without bedrail tomorrow   Sit to Lying CARE Score 4   Lying to Sitting on Side of Bed   Comment (S)  reassess without bedrail   Sit to Stand   Type of Assistance Needed Physical assistance;Verbal cues;Adaptive equipment   Physical Assistance Level 25% or less   Sit to Stand CARE Score 3   Bed-Chair Transfer   Type of Assistance Needed Physical assistance;Verbal cues;Adaptive equipment   Physical Assistance Level 25% or less   Comment min A modified squat pivot with UE support using furniture. He requires VC for safer hand placement about 100% of the time. also requires cueing for sequencing/set up pre/post transfers. expressed difficulty with w/c brake and pt assured PT is looking for another w/c with similar measurements and better brakes   Chair/Bed-to-Chair Transfer CARE Score 3   Wheelchair mobility   Distance Wheeled 3% Grade 2 ft  (outdoor ramp + sidewalk) " "  Findings mod descending, max ascending outdoor ramp. min A on sidewalk - demo difficulty propelling w/c with R LE and bilat UE at the same time so pt prefers to propel using UE while keeping LE elevated. pt requires max A to manage w/c parts keesha R leg rest so will benefit from additional training.    Therapeutic Interventions   Other pt educated on proper positioning with emphasis on daily 2-3x prone on positioning in bed to prevent hip flexor contracture. Also reviewed with pt PT therapy goals during ARC stay - handouts provided to the pt. pt also asked to brush his teeth with noted task fixation on wiping sink off water with noted slight agitation so reassurred to take his time to descalate. Pt also requested a new toothbrush and was given and placed by the [facility] sill as requested.   Equipment Use   NuStep lvl 1 x 10 mins, SPM> 25 using bilat UE and R LE only. Additional TE prone hips flexor stretching x 10 mins with gluteal sets and L hip extension x 10 reps x 3 sets   Other Comments   Comments pt educated on choosing prosthetic company for his future prosthetic needs. at this time pt's first choice is [employer] for have seen their commercial on TV and they have an office in [place]. pt's second choice is [employer]. PT have left a message to [employer] during PT session and awaiting a call back to inquire if pt's insurance is accepted and to schedule a meet and greet appt. 4\" ace wrap used for L residual limb rewrapping at end of tx   Assessment   Treatment Assessment Pt seen for 105 mins skilled PT intervention focusing on choosing prosthetic company with edu on proper residual limb positioning and PT goals while in ARC. Pt expressed understanding. However due to expressive aphasia sometimes becomes frustrated but easily be redirected. at one point started to become emotional due to limb loss and was offered pastoral care but declined citing \" I distract myself, I am ok\".   Pt also " tolerated w/c propulsion training outdoor however demo's inc difficulties with dec safety on the ramp. Pt very appreciated for given opportunity to go outside for fresh air. Pt demo's improvement  with SPT however due to ongoing cognitive deficits demos difficulties with problem solving and overall safety keesha with hand placements and task sequencing. Hence PT goals downgraded to S w/c level mobilities for anticipate pt will require oversight. PT will f/u with [facility], cont phase 1 Amp education and work on increasing functional indep with repeated block practice to promote carry over.   PT Barriers   Functional Limitation Wheelchair management;Walking;Transfers;Standing;Stair negotiation;Ramp negotiation;Car transfers   Plan   Treatment/Interventions Functional transfer training;LE strengthening/ROM;Therapeutic exercise;Endurance training;Bed mobility;Gait training;Equipment eval/education;Spoke to nursing;OT;Spoke to MD;Spoke to case management;Compensatory technique education;Cognitive reorientation   Progress Progressing toward goals   PT Therapy Minutes   PT Time In 1330   PT Time Out 1515   PT Total Time (minutes) 105   PT Mode of treatment - Individual (minutes) 90   PT Mode of treatment - Concurrent (minutes) 15   PT Mode of treatment - Group (minutes) 0   PT Mode of treatment - Co-treat (minutes) 0   PT Mode of Treatment - Total time(minutes) 105 minutes   PT Cumulative Minutes 285   Therapy Time missed   Time missed? No

## 2024-07-09 NOTE — PLAN OF CARE
Problem: SAFETY ADULT  Goal: Patient will remain free of falls  Description: INTERVENTIONS:  - Educate patient/family on patient safety including physical limitations  - Instruct patient to call for assistance with activity   - Consult OT/PT to assist with strengthening/mobility   - Keep Call bell within reach  - Keep bed low and locked with side rails adjusted as appropriate  - Keep care items and personal belongings within reach  - Initiate and maintain comfort rounds  - Make Fall Risk Sign visible to staff  - Offer Toileting every 2 Hours, in advance of need  - Initiate/Maintain bed/chair alarm  - Obtain necessary fall risk management equipment: no skid footwear  - Apply yellow socks and bracelet for high fall risk patients  - Consider moving patient to room near nurses station  Outcome: Progressing     Problem: Prexisting or High Potential for Compromised Skin Integrity  Goal: Skin integrity is maintained or improved  Description: INTERVENTIONS:  - Identify patients at risk for skin breakdown  - Assess and monitor skin integrity  - Assess and monitor nutrition and hydration status  - Monitor labs   - Assess for incontinence   - Turn and reposition patient  - Assist with mobility/ambulation  - Relieve pressure over bony prominences  - Avoid friction and shearing  - Provide appropriate hygiene as needed including keeping skin clean and dry  - Evaluate need for skin moisturizer/barrier cream  - Collaborate with interdisciplinary team   - Patient/family teaching  - Consider wound care consult   Outcome: Progressing

## 2024-07-09 NOTE — ASSESSMENT & PLAN NOTE
Continue ASA and atorvastatin.  Outpt follow-up with Neurology - follows at [facility] for seizure disorder.

## 2024-07-09 NOTE — ASSESSMENT & PLAN NOTE
ECHO showed LVEF 40-45% with mild global hypokinesis   Stress test done without any significant ischemia.  There was an area of fixed defect noted   Cardiology recommended to continue with medical management same as is for his PAD. They felt that did not need a cardiac catheterization at this time given the lack of any significant ischemia, and no current cardiac symptoms.   Felt that it was possible that he could have had a stress-induced cardiomyopathy and had apical thrombus at that time and is now recovering from this overall. Continue Losartan/Toprol  Has not needed diuretics

## 2024-07-09 NOTE — CASE MANAGEMENT
Case Management Progress Note    Patient name Sunil Patel  Location /-01 MRN [id number]  : 1960 Date 2024       LOS (days): 3  Geometric Mean LOS (GMLOS) (days):   Days to GMLOS:        OBJECTIVE:        Current admission status: Inpatient Rehab  Preferred Pharmacy:   [facility] [place], [facility] [address]  [address] [place] [address]  [place] [address]  Phone: [phone] Fax: [phone]    Primary Care Provider: CHARLIE Trevino    Primary Insurance: MEDICARE  Secondary Insurance: [facility]    PROGRESS NOTE:    TC to Pts friend, Mireya Nash (PH: [phone])  who has expressed interest in becoming Sunil’s legal guardian.  Mireya states that she has known Sunil for over 30 years. They were romantically involved for 17 years, but have remained friends after their romantic relationship ended.  Mireya states that she cannot have Sunil live with her, but she is interested in making medical decisions for him and being involved in his care.    CM informed Mireya that this CM would pass her contact information along to the law firm that will be overseeing Sunil’ s guardianship case.

## 2024-07-09 NOTE — ASSESSMENT & PLAN NOTE
Continue Depakote ER, Lamotrigine and Zonegran  No seizures in 4 years.  Follows at [facility].

## 2024-07-10 PROBLEM — Z87.898 HISTORY OF SEIZURES: Status: ACTIVE | Noted: 2024-06-08

## 2024-07-10 PROCEDURE — 97530 THERAPEUTIC ACTIVITIES: CPT

## 2024-07-10 PROCEDURE — 92523 SPEECH SOUND LANG COMPREHEN: CPT

## 2024-07-10 PROCEDURE — 92507 TX SP LANG VOICE COMM INDIV: CPT

## 2024-07-10 PROCEDURE — 97110 THERAPEUTIC EXERCISES: CPT

## 2024-07-10 PROCEDURE — 99232 SBSQ HOSP IP/OBS MODERATE 35: CPT | Performed by: PHYSICIAN ASSISTANT

## 2024-07-10 PROCEDURE — 99024 POSTOP FOLLOW-UP VISIT: CPT

## 2024-07-10 PROCEDURE — 97535 SELF CARE MNGMENT TRAINING: CPT

## 2024-07-10 RX ADMIN — METOPROLOL SUCCINATE 25 MG: 25 TABLET, EXTENDED RELEASE ORAL at 08:29

## 2024-07-10 RX ADMIN — MIRTAZAPINE 15 MG: 15 TABLET, FILM COATED ORAL at 21:26

## 2024-07-10 RX ADMIN — MICONAZOLE NITRATE: 20 CREAM TOPICAL at 17:04

## 2024-07-10 RX ADMIN — MICONAZOLE NITRATE: 20 CREAM TOPICAL at 08:35

## 2024-07-10 RX ADMIN — LEVOCARNITINE 330 MG: 1 SOLUTION ORAL at 14:33

## 2024-07-10 RX ADMIN — LEVOCARNITINE 330 MG: 1 SOLUTION ORAL at 08:33

## 2024-07-10 RX ADMIN — DOLUTEGRAVIR SODIUM 50 MG: 50 TABLET, FILM COATED ORAL at 08:33

## 2024-07-10 RX ADMIN — GABAPENTIN 100 MG: 100 CAPSULE ORAL at 21:26

## 2024-07-10 RX ADMIN — LOSARTAN POTASSIUM 50 MG: 50 TABLET, FILM COATED ORAL at 08:29

## 2024-07-10 RX ADMIN — GABAPENTIN 100 MG: 100 CAPSULE ORAL at 08:35

## 2024-07-10 RX ADMIN — TAMSULOSIN HYDROCHLORIDE 0.4 MG: 0.4 CAPSULE ORAL at 17:02

## 2024-07-10 RX ADMIN — ZONISAMIDE 400 MG: 100 CAPSULE ORAL at 08:36

## 2024-07-10 RX ADMIN — GABAPENTIN 100 MG: 100 CAPSULE ORAL at 17:02

## 2024-07-10 RX ADMIN — DIVALPROEX SODIUM 1250 MG: 500 TABLET, EXTENDED RELEASE ORAL at 08:29

## 2024-07-10 RX ADMIN — BICTEGRAVIR SODIUM, EMTRICITABINE, AND TENOFOVIR ALAFENAMIDE FUMARATE 1 TABLET: 50; 200; 25 TABLET ORAL at 08:29

## 2024-07-10 RX ADMIN — LAMOTRIGINE 50 MG: 25 TABLET ORAL at 08:29

## 2024-07-10 RX ADMIN — ATORVASTATIN CALCIUM 80 MG: 80 TABLET, FILM COATED ORAL at 17:02

## 2024-07-10 RX ADMIN — Medication 6 MG: at 21:26

## 2024-07-10 RX ADMIN — LAMOTRIGINE 50 MG: 25 TABLET ORAL at 17:02

## 2024-07-10 RX ADMIN — LEVOCARNITINE 330 MG: 1 SOLUTION ORAL at 18:00

## 2024-07-10 RX ADMIN — SERTRALINE HYDROCHLORIDE 75 MG: 50 TABLET ORAL at 08:29

## 2024-07-10 RX ADMIN — RIVAROXABAN 20 MG: 20 TABLET, FILM COATED ORAL at 17:02

## 2024-07-10 RX ADMIN — ASPIRIN 81 MG: 81 TABLET, COATED ORAL at 08:29

## 2024-07-10 NOTE — PLAN OF CARE
Problem: PAIN - ADULT  Goal: Verbalizes/displays adequate comfort level or baseline comfort level  Description: Interventions:  - Encourage patient to monitor pain and request assistance  - Assess pain using appropriate pain scale  - Administer analgesics based on type and severity of pain and evaluate response  - Implement non-pharmacological measures as appropriate and evaluate response  - Consider cultural and social influences on pain and pain management  - Notify physician/advanced practitioner if interventions unsuccessful or patient reports new pain  Outcome: Progressing     Problem: INFECTION - ADULT  Goal: Absence or prevention of progression during hospitalization  Description: INTERVENTIONS:  - Assess and monitor for signs and symptoms of infection  - Monitor lab/diagnostic results  - Monitor all insertion sites, i.e. indwelling lines, tubes, and drains  - Monitor endotracheal if appropriate and nasal secretions for changes in amount and color  - [place] appropriate cooling/warming therapies per order  - Administer medications as ordered  - Instruct and encourage patient and family to use good hand hygiene technique  - Identify and instruct in appropriate isolation precautions for identified infection/condition  Outcome: Progressing     Problem: INFECTION - ADULT  Goal: Absence of fever/infection during neutropenic period  Description: INTERVENTIONS:  - Monitor WBC    Outcome: Progressing     Problem: SAFETY ADULT  Goal: Patient will remain free of falls  Description: INTERVENTIONS:  - Educate patient/family on patient safety including physical limitations  - Instruct patient to call for assistance with activity   - Consult OT/PT to assist with strengthening/mobility   - Keep Call bell within reach  - Keep bed low and locked with side rails adjusted as appropriate  - Keep care items and personal belongings within reach  - Initiate and maintain comfort rounds  - Make Fall Risk Sign visible to staff  -  Offer Toileting every 2 Hours, in advance of need  - Initiate/Maintain bed/chair alarm  - Obtain necessary fall risk management equipment: alarm   - Apply yellow socks and bracelet for high fall risk patients  - Consider moving patient to room near nurses station  Outcome: Progressing

## 2024-07-10 NOTE — ASSESSMENT & PLAN NOTE
Home regimen: Losartan 50mg daily, Toprol XL 25 mg BID  Current regimen: Losartan 50 mg daily, Toprol-XL 25 mg daily  Pressure low normal on review, monitor routinely and adjust regimen as needed

## 2024-07-10 NOTE — PROGRESS NOTES
"   07/10/24 1000   Pain Assessment   Pain Assessment Tool 0-10   Pain Score 3   Restrictions/Precautions   Precautions Aphasia;Bed/chair alarms;Cognitive;Fall Risk;Pain;Supervision on toilet/commode;Seizure;Pressure Ulcer   Comprehension   Auditory Basic   Visual Basic   Findings Refer to  daily note.   QI: Comprehension 2. Sometimes Understands: Understands only basic conversations or simple, direct phrases. Frequently requires cues to understand   Comprehension (FIM) 3 - Understands basic info/conversation 50-74% of time   Expression   Verbal Basic   Non-Verbal Basic   Intelligibility Phrase   Findings Refer to ST daily note.   QI: Expression 2. Frequently exhibits difficulty with expressing needs and ideas   Expression (FIM) 3 - Expresses basic info/needs 50-74% of time   Social Interaction   Participation Individual   Medications needed to control mood/behavior? No   Behaviors observed Agitated;Foul language   Findings Refer to ST daily note.   Social Interaction (FIM) 2 - Needs frequent redirection   Problem Solving   Routine Manages call bell   Findings Refer to  daily note.   Problem solving (FIM) 2 - Solves basic problems 25-49% of time   Memory   Recognize People Yes   Remember Routine Yes   Initiates Tasks Yes   Short-Term Other  (difficult to assess given expressive impairments)   Long Term Intact   Recalls Precaution Yes   Findings Refer to ST daily note.   Memory (FIM) 3 - Recognizes, recalls/performs 50-74%   Language Assessments   Informal Speech Language Assessment Pt completing portions of the Informal Language Assessment. Results are as follows with the below sections addressed:      Orientation Information:    Person: +   Place: + for place, city, country, - for state, room (literal paraphasias)   Time: -   Situation: - \"because I hit my head\"    LTM Biographical Information:              : +              Age: - \"65\" (answer: 63)              Marital Status/Name of Significant Other: + " "N/A              Children/Names: + Caro               Address: N/A \"I don't have one.\"       Auditory Comprehension Tasks:     Yes/No Questions:  Biographical Yes/No Questions: /10  Simple Yes/No Questions: 5/10  Moderate Yes/No Questions: 4/10     Verbal Command Followin-step: 5/5  2-step: 1/5  3-step:  0/5    Body ID: NOT ATTEMPTED, TBD    Left vs Right Discrimination: NOT ATTEMPTED, TBD    Receptive Picture/Object Identification: NOT ATTEMPTED, TBD        Reading Comprehension: NOT ATTEMPTED, TBD   1, 2, 3 steps:   Simple words:   Phrases:    Verbal Expression Tasks:     Automatic Speech:   Countin-13, jargon after 13.  COURTNEY: -  CORRY: \"I'm done.\"  Melodic Intonation: NOT ATTEMPTED, TBD    Responsive Naming: NOT ATTEMPTED, TBD    Confrontation Naming: NOT ATTEMPTED, TBD    Phrase Completion: NOT ATTEMPTED, TBD  Opposites:    Simple Phrases:     Spontaneous Speech/Language: NOT ATTEMPTED, TBD  Tell me about work/home:  Tell me 3 things you did today:  Tell me about this picture ([employer]ft):       Written Expression: NOT ATTEMPTED, TBD  Name/Address:   Copying words, phrases, sentences:         Overall pt is demonstrating moderately impaired deficits noted in the following areas expressive language for orientation and biographical/LTM information, as well as automatic speech, and auditory comprehension for yes/no questions and commands greater than one-step. Pt presents with greater understanding than expressive abilities, albeit still moderately impaired for comprehension. He is observed to present with literal paraphasias, occasionally jargon. His speech is characterized by frequent circumlocution, lacking content words, using primarily articles and demonstrative pronouns (\"this, that\") during conversational speech. He is easily overwhelmed by his performance and requests to end or move on when completing difficult structured assessment subtests. SLP educated pt on difficulty of assessment but " that it's necessary for understanding what strategies can be provided in skilled ST sessions. Pt nodding initially as if in agreement, though requesting to end mid evaluation. Pt first was emotional given his poor performance, then evolving to increased frustration and anger, with increased requests to end session. SLP gave pt option to complete today, or finish tomorrow with other SLP. Pt asking to end and thus evaluation ended.  Pt will benefit from SLP services at this time to maximize overall language skills, specifically attempting to incorporate strategies during informal tasks and conversations for increasing chances of receptiveness to therapy and carryover of learned skills.    SLP Therapy Minutes   SLP Time In 1000   SLP Time Out 1035   SLP Total Time (minutes) 35   SLP Mode of treatment - Individual (minutes) 35   SLP Mode of treatment - Concurrent (minutes) 0   SLP Mode of treatment - Group (minutes) 0   SLP Mode of treatment - Co-treat (minutes) 0   SLP Mode of Treatment - Total time(minutes) 35 minutes   SLP Cumulative Minutes 35

## 2024-07-10 NOTE — ASSESSMENT & PLAN NOTE
Diagnosed in July 2019, follows with [facility] neurology outpatient  Continue home regimen with Depakote ER, Lamotrigine and Zonegran

## 2024-07-10 NOTE — ASSESSMENT & PLAN NOTE
Noted on echocardiogram 6/10/2024: spherical 1.5 x 1.3 cm thrombus at the LV apex   Initiated on AC with Xarelto, continue  Rx sent to [facility] for price check

## 2024-07-10 NOTE — ASSESSMENT & PLAN NOTE
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

## 2024-07-10 NOTE — ASSESSMENT & PLAN NOTE
ECHO on 6/10/2024 showed LVEF 40-45% with mild global hypokinesis   Status post NM stress test on 6/11/2024 which showed: a large, mild, fixed defect in the inferior wall, possibly due to diaphragmatic attenuation artifact, there is a small area of partial reversibility in the inferior apical wall suggestive of ischemia    Elevated by cardiology, etiology felt to be possibly secondary to stress-induced cardiomyopathy, with apical thrombus  Cardiac catheterization deferred given the lack of any significant ischemia, and no current cardiac symptoms  Continue with medical management with aspirin, statin, beta-blocker, ARB  Monitor volume status, remains euvolemic off of diuretics   Outpatient follow-up with cardiology

## 2024-07-10 NOTE — PROGRESS NOTES
"   07/10/24 1305   Pain Assessment   Pain Assessment Tool 0-10   Pain Score 3   Pain Location/Orientation Orientation: Left;Location: Leg   Restrictions/Precautions   Precautions Aphasia;Bed/chair alarms;Cognitive;Fall Risk;Pain;Pressure Ulcer;Seizure;Supervision on toilet/commode   Weight Bearing Restrictions Yes   LLE Weight Bearing Per Order NWB   ROM Restrictions No   Braces or Orthoses Other (Comment)  (LLE residual limb wrapping)   Lifestyle   Autonomy \"I am so tired today.\"   Eating   Type of Assistance Needed Independent   Eating CARE Score 6   Oral Hygiene   Type of Assistance Needed Independent   Physical Assistance Level No physical assistance   Comment seated in w/c at sink.   Oral Hygiene CARE Score 6   Putting On/Taking Off Footwear   Type of Assistance Needed Supervision;Set-up / clean-up   Physical Assistance Level No physical assistance   Comment CS while seated EOB to don R shoe.   Putting On/Taking Off Footwear CARE Score 4   Lying to Sitting on Side of Bed   Type of Assistance Needed Set-up / clean-up   Physical Assistance Level No physical assistance   Comment inc time   Lying to Sitting on Side of Bed CARE Score 5   Bed-Chair Transfer   Type of Assistance Needed Physical assistance;Set-up / clean-up;Verbal cues   Physical Assistance Level 25% or less   Comment Min A modified squat pivot EOB>w/c, w/c>recliner, cues for proper hand placement. improvements noted w/ pacing.   Chair/Bed-to-Chair Transfer CARE Score 3   Exercise Tools   Other Exercise Tool 1 BUE ther ex to maximize strength and endurance for ADLs and fxnl mobility. LUE completed w/ 3# DB, 3x10 bicep curls, 2# for chest press, front arm raises, and OH shoulder press. Pt w/ h/o CVA w/ residual RUE weakness, therefore 1#DB used for bicep curls, chest press, and front arm raises. Pt tolerated well w/ occ vc's and rest breaks between sets to manage generalized fatigue.   Other Exercise Tool 2 While seated in w/c, engaged in core ther ex " "w/ 5# medicine ball 3x10 trunk rotations and seated crunches. Pt tolerated well w/ Min vc's to improve technique to further engage core. Add'lly completed 3x5 w/c push-ups w/ 1-2 second hold. Rest breaks provided between sets to manage generalized fatigue.   Cognition   Overall Cognitive Status Impaired   Comments impaired safety awareness, dec fxnl problem-solving, dec insight, dec cognitive flexibility, poor benjamín literacy and benefits from repetitive training.   Additional Activities   Additional Activities Comments Pt observed pt w/ h/o BKA ambulating w/ prosthetic. Time spent discussing interest in speaking w/ someone who has a prosthetic. Pt did not wish to speak w/ pt seen ambulating, however open to talking w/ someone who is \"cool\".   Assessment   Treatment Assessment Pt seen for skilled OT session focusing on fxnl xfer training, w/c mobility, BUE and core strengthening. Pt finishing lunch at start of session, pt reporting inc generalized fatigue. Although improvements noted w/ pacing, cont to req cues for proper hand placement, w/c management, and sequencing to inc safety w/ fxnl xfers. Pt will cont to benefit from repetitive daily safety training, especially w/ xfer set-up, to maximize skill carryover and dec fall risk. Cont OT POC: endurance work, fxnl modified squat pivot xfers, BUE strengthening, sacral offloading strategies, repetitive safety training, alternating lateral weightshifting, core strengthening, ADL retraining, and ongoing phase 1 amputee education. Pt agreeable to rest in recliner, all needs within reach and alarm activated.   Prognosis Good   Problem List Decreased strength;Decreased range of motion;Decreased endurance;Impaired balance;Decreased mobility;Decreased coordination;Impaired judgement;Decreased safety awareness;Orthopedic restrictions;Decreased cognition   Plan   Treatment/Interventions ADL retraining;Functional transfer training;Therapeutic exercise;Endurance " training;Patient/family training   Progress Progressing toward goals   Discharge Recommendation   Rehab Resource Intensity Level, OT   (pending progress)   OT Therapy Minutes   OT Time In 1305   OT Time Out 1428   OT Total Time (minutes) 83   OT Mode of treatment - Individual (minutes) 83   OT Mode of treatment - Concurrent (minutes) 0   OT Mode of treatment - Group (minutes) 0   OT Mode of treatment - Co-treat (minutes) 0   OT Mode of Treatment - Total time(minutes) 83 minutes   OT Cumulative Minutes 406   Therapy Time missed   Time missed? No

## 2024-07-10 NOTE — CONSULTS
Consultation - Vascular Surgery   Sunil Patel 63 y.o. male MRN: [id number]  Unit/Bed#: [facility] 451-01 Encounter: [id number]    Assessment:  63yoM known to vascular surgery service s/p L femoral cutdown, iliofemoral and profunda thromboembolectomy, L AKA 6/7/24 ([place]) in setting of Amber 3 ALI with significant history of L ventricular thrombus.     Vascular surgery consulted for continuity of care as patient remains in inpatient rehab 1 month post-op    Recommendations:  - Patient doing well post-operatively, L AKA incision site well-healed, staples taken out at the bedside this morning, ACE wrap replaced  - Continue Xarelto/ASA/Statin  - Noted patient choosing prosthetic company for future prosthetic needs  - Remainder of care per primary team    Consulting Service: SLIM    HPI: Sunil Patel is a 63 y.o. male known to the vascular surgery service for the above has additional notable h/o history of mood disorder, TBI, CVA (expressive aphasia), sz, (Found to be without capacity for informed medical decision making 6/27/24 neuropsych eval; ongoing coordination of legal guardianship), HIV (Biktarvy/Tivicay), sacral pressure wound, who originally presented from skilled nursing 10 days s/p fall to [facility] with acute limb ischemia and nonviable LLE, transferred to \[facility]\"" for surgical intervention 6/7/24.     With LV thrombus, underwent cardioembolic workup with demonstration of LV apical thrombus on CTA and PALOMA, stress test with concern from stress-induced cardiomyopathy. He has subsequently been maintained on Xarelto/ASA/Atorvastatin 80mg. He was released on his own recognizance, and accepted to [facility] ([place]).     Review of Systems:  General: negative   Respiratory: negative for - cough    Past Medical History:  Past Medical History:   Diagnosis Date    Acute lower limb ischemia 06/08/2024    Anxiety     Depression     HIV disease (HCC)     Substance abuse (HCC)     Suicide attempt (HCC)        Past Surgical History:  Past Surgical  "History:   Procedure Laterality Date    AMPUTATION ABOVE KNEE (AKA) Left 6/7/2024    Procedure: AMPUTATION ABOVE KNEE (AKA);  Surgeon: Juan Luis Rdz MD;  Location: [facility];  Service: Vascular    WI TEAEC W/WO PATCH GRAFT COMMON FEMORAL Left 6/7/2024    Procedure: left femoral ileofemoral thromectomy;  Surgeon: Juan Luis Rdz MD;  Location: [facility];  Service: Vascular    US GUIDED THYROID BIOPSY  3/15/2022    US GUIDED THYROID BIOPSY  11/26/2019       Social History:  Social History     Substance and Sexual Activity   Alcohol Use Yes     Social History     Substance and Sexual Activity   Drug Use Yes    Types: \"Crack\" cocaine, Marijuana     Social History     Tobacco Use   Smoking Status Some Days    Current packs/day: 1.00    Types: Cigarettes   Smokeless Tobacco Never       Family History:  Family History   Problem Relation Age of Onset    Diabetes Mother     Heart attack Mother     Heart attack Father        Allergies:  Allergies   Allergen Reactions    No Active Allergies        Medications:  Current Facility-Administered Medications   Medication Dose Route Frequency    acetaminophen (TYLENOL) tablet 975 mg  975 mg Oral TID PRN    aspirin (ECOTRIN LOW STRENGTH) EC tablet 81 mg  81 mg Oral Daily    atorvastatin (LIPITOR) tablet 80 mg  80 mg Oral Daily With Dinner    bictegravir-emtricitab-tenofovir alafenamide (BIKTARVY) -25 MG tablet 1 tablet  1 tablet Oral Daily With Breakfast    bisacodyl (DULCOLAX) rectal suppository 10 mg  10 mg Rectal Daily PRN    diphenhydrAMINE (BENADRYL) tablet 25 mg  25 mg Oral Q6H PRN    divalproex sodium (DEPAKOTE ER) 24 hr tablet 1,250 mg  1,250 mg Oral Daily    dolutegravir (TIVICAY) tablet 50 mg  50 mg Oral Daily    gabapentin (NEURONTIN) capsule 100 mg  100 mg Oral TID    lamoTRIgine (LaMICtal) tablet 50 mg  50 mg Oral BID    levOCARNitine (CARNITOR) oral solution 330 mg  1,000 mg/day Oral TID With Meals    losartan (COZAAR) tablet 50 mg  50 " "mg Oral Daily    melatonin tablet 6 mg  6 mg Oral HS    [START ON 7/10/2024] metoprolol succinate (TOPROL-XL) 24 hr tablet 25 mg  25 mg Oral Daily    mirtazapine (REMERON) tablet 15 mg  15 mg Oral HS    moisture barrier miconazole 2% cream (aka ELVA MOISTURE BARRIER ANTIFUNGAL CREAM)   Topical BID    ondansetron (ZOFRAN-ODT) dispersible tablet 4 mg  4 mg Oral Q6H PRN    oxyCODONE (ROXICODONE) split tablet 2.5 mg  2.5 mg Oral Q8H PRN    polyethylene glycol (MIRALAX) packet 17 g  17 g Oral Daily PRN    rivaroxaban (XARELTO) tablet 20 mg  20 mg Oral Daily With Dinner    senna (SENOKOT) tablet 8.6 mg  1 tablet Oral HS PRN    sertraline (ZOLOFT) tablet 75 mg  75 mg Oral Daily    tamsulosin (FLOMAX) capsule 0.4 mg  0.4 mg Oral Daily With Dinner    zonisamide (ZONEGRAN) capsule 400 mg  400 mg Oral Daily       Vitals:  /63 (BP Location: Left arm)   Pulse 84   Temp 98.4 °F (36.9 °C) (Oral)   Resp 18   Ht 5' 10\" (1.778 m)   Wt 72.6 kg (160 lb)   SpO2 98%   BMI 22.96 kg/m²     I/Os:  I/O last 24 hours:  In: 1040 [P.O.:1040]  Out: 1050 [Urine:1050]    Lab Results and Cultures:   Lab Results   Component Value Date    WBC 8.92 07/05/2024    HGB 12.1 07/05/2024    HCT 40.5 07/05/2024    MCV 94 07/05/2024     (H) 07/05/2024     Lab Results   Component Value Date    GLUCOSE 90 10/26/2019    CALCIUM 9.6 07/05/2024     12/10/2015    K 4.0 07/05/2024    CO2 25 07/05/2024     07/05/2024    BUN 25 07/05/2024    CREATININE 1.00 07/05/2024     Lab Results   Component Value Date    INR 1.35 (H) 06/07/2024    INR 1.08 06/07/2024    INR 0.98 05/16/2024    PROTIME 16.5 (H) 06/07/2024    PROTIME 14.7 (H) 06/07/2024    PROTIME 13.6 05/16/2024       Lipid Panel:   Lab Results   Component Value Date    CHOL 223 12/10/2015   ,     Blood Culture: No results found for: \"BLOODCX\",   Urinalysis:   Lab Results   Component Value Date    COLORU Yellow 06/25/2024    COLORU Dk Yellow 12/10/2015    CLARITYU Slightly Cloudy " "06/25/2024    CLARITYU Clear 12/10/2015    SPECGRAV 1.025 06/25/2024    SPECGRAV 1.016 12/10/2015    PHUR 7.5 06/25/2024    PHUR 7.0 07/02/2017    PHUR 6.0 12/10/2015    LEUKOCYTESUR Large (A) 06/25/2024    LEUKOCYTESUR Negative 12/10/2015    NITRITE Positive (A) 06/25/2024    NITRITE Negative 12/10/2015    PROTEINUA 100 (2+) (A) 12/10/2015    GLUCOSEU Negative 06/25/2024    GLUCOSEU Negative 12/10/2015    KETONESU Negative 06/25/2024    KETONESU Negative 12/10/2015    BILIRUBINUR Negative 06/25/2024    BILIRUBINUR Negative 12/10/2015    BLOODU Moderate (A) 06/25/2024    BLOODU Negative 12/10/2015   ,   Urine Culture:   Lab Results   Component Value Date    URINECX >100,000 cfu/ml Proteus mirabilis (A) 06/25/2024    URINECX Enterococcus faecalis (A) 06/25/2024   ,   Wound Culure: No results found for: \"WOUNDCULT\"    Imaging:  Reviewed     Physical Exam:  General appearance: Alert, in no acute distress  Skin:  L AKA incision site well-healed as below  Neurologic: Grossly neurologically intact to baseline  Head: Normocephalic, without obvious abnormality  Eyes:  EOMI bilaterally  Neck: supple, symmetrical, trachea midline  Lungs:  Normal pulmonary effort  Heart:  Regular rate, warm, well perfused  Abdomen:  Soft, non-distended  Extremities:  RLE warm and dry. L AKA as below    Wound/Incision:        Adrienne Pyle PA-C  7/9/2024    "

## 2024-07-10 NOTE — ASSESSMENT & PLAN NOTE
Remote history of TBI, previously residing in a group home prior to shelter sentence.  Evaluated by neuropsychiatry on 6/27/24 and deemed NOT to have medical decision-making capacity  Process for court appointed guardian started on 7/5/24 - CM following

## 2024-07-10 NOTE — PROGRESS NOTES
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  Monitor incision, vascular surgery following, staples removed on 7/10  Continue ASA and statin   Also on Xarelto with LV thrombus  Rehab and pain control per PMR    Cardiomyopathy (HCC)  Assessment & Plan  ECHO on 6/10/2024 showed LVEF 40-45% with mild global hypokinesis   Status post NM stress test on 6/11/2024 which showed: a large, mild, fixed defect in the inferior wall, possibly due to diaphragmatic attenuation artifact, there is a small area of partial reversibility in the inferior apical wall suggestive of ischemia    Elevated by cardiology, etiology felt to be possibly secondary to stress-induced cardiomyopathy, with apical thrombus  Cardiac catheterization deferred given the lack of any significant ischemia, and no current cardiac symptoms  Continue with medical management with aspirin, statin, beta-blocker, ARB  Monitor volume status, remains euvolemic off of diuretics   Outpatient follow-up with cardiology    Left ventricular thrombus  Assessment & Plan  Noted on echocardiogram 6/10/2024: spherical 1.5 x 1.3 cm thrombus at the LV apex   Initiated on AC with Xarelto, continue  Rx sent to [facility] for price check    Benign essential hypertension  Assessment & Plan  Home regimen: Losartan 50mg daily, Toprol XL 25 mg BID  Current regimen: Losartan 50 mg daily, Toprol-XL 25 mg daily  Pressure low normal on review, monitor routinely and adjust regimen as needed    History of seizures  Assessment &  Plan  Diagnosed in July 2019, follows with [facility] neurology outpatient  Continue home regimen with Depakote ER, Lamotrigine and Zonegran    History of stroke  Assessment & Plan  History of left MCA CVA in May 2018 with residual expressive aphasia  Continue ASA and atorvastatin    Traumatic brain injury (HCC)  Assessment & Plan  Remote history of TBI, previously residing in a group home prior to assisted sentence.  Evaluated by neuropsychiatry on 6/27/24 and deemed NOT to have medical decision-making capacity  Process for court appointed guardian started on 7/5/24 - CM following     Bipolar affective disorder (HCC)  Assessment & Plan  Continue home meds Zoloft and Remeron  Outpatient follow-up with Psychiatry    Human immunodeficiency virus (HIV) infection (HCC)  Assessment & Plan  Continue Biktarvy and Tivicay.    Carnitine deficiency (HCC)  Assessment & Plan  Continue levocarnitine 330 mg 3x daily with meals.           The above assessment and plan was reviewed and updated as determined by my evaluation of the patient on 7/10/2024.    Labs:   Results from last 7 days   Lab Units 07/05/24  1058   WBC Thousand/uL 8.92   HEMOGLOBIN g/dL 12.1   HEMATOCRIT % 40.5   PLATELETS Thousands/uL 543*     Results from last 7 days   Lab Units 07/05/24  1058   SODIUM mmol/L 138   POTASSIUM mmol/L 4.0   CHLORIDE mmol/L 103   CO2 mmol/L 25   BUN mg/dL 25   CREATININE mg/dL 1.00   CALCIUM mg/dL 9.6                   Imaging  No orders to display       Review of Scheduled Meds:  Current Facility-Administered Medications   Medication Dose Route Frequency Provider Last Rate    acetaminophen  975 mg Oral TID PRN José Salcido MD      aspirin  81 mg Oral Daily Lorrie Barillas PA-C      atorvastatin  80 mg Oral Daily With Dinner Lorrie Barillas PA-C      bictegravir-emtricitab-tenofovir alafenamide  1 tablet Oral Daily With Breakfast Lorrie Barillas PA-C      bisacodyl  10 mg Rectal Daily PRN Lorrie Barillas PA-C       diphenhydrAMINE  25 mg Oral Q6H PRN Lorrie Barillas PA-C      divalproex sodium  1,250 mg Oral Daily Lorrie Barillas PA-C      dolutegravir  50 mg Oral Daily Lorrie Barillas PA-C      gabapentin  100 mg Oral TID Lorrie Barillas PA-C      lamoTRIgine  50 mg Oral BID Lorrie Barillas PA-C      levOCARNitine  1,000 mg/day Oral TID With Meals Lorrie Barillas PA-C      losartan  50 mg Oral Daily Lorrie Barillas PA-C      melatonin  6 mg Oral HS Lorrie Barillas PA-C      metoprolol succinate  25 mg Oral Daily CHARLIE Mcclelland      mirtazapine  15 mg Oral HS Lorrie Barillas PA-C      ELVA ANTIFUNGAL   Topical BID Lorrie Barillas PA-C      ondansetron  4 mg Oral Q6H PRN Lorrie Barillas PA-C      oxyCODONE  2.5 mg Oral Q8H PRN Raimundo Riley MD      polyethylene glycol  17 g Oral Daily PRN Lorrie Barillas PA-C      rivaroxaban  20 mg Oral Daily With Dinner Lorrie Barillas PA-C      senna  1 tablet Oral HS PRN Lorrie Barillas PA-C      sertraline  75 mg Oral Daily Lorrie Barillas PA-C      tamsulosin  0.4 mg Oral Daily With Dinner Lorrie Barillas PA-C      zonisamide  400 mg Oral Daily Lorrie Barillas PA-C         Subjective/ HPI: Patient seen and examined. Patients overnight issues or events were reviewed with nursing staff. New or overnight issues include the following:     Patient sleeping comfortably in bed, did not attempt to arouse.  Discussed with primary RN who reported no new concerns or complaints.    ROS:   A 10 point ROS was performed; negative except as noted above.        *Labs /Radiology studies Reviewed, no new imaging  *Medications  reviewed and reconciled as needed  *Please refer to order section for additional ordered labs studies      Physical Examination:  Vitals:   Vitals:    07/09/24 2034 07/10/24 0511 07/10/24 0525 07/10/24 0829   BP: 132/63 118/57  116/62   BP Location: Left arm Left arm     Pulse: 84 78   72   Resp: 18 20     Temp: 98.4 °F (36.9 °C) 97.6 °F (36.4 °C)     TempSrc: Oral Oral     SpO2: 98% 92%     Weight:   76.6 kg (168 lb 14 oz)    Height:           General Appearance: Sleeping comfortably in bed, no acute distress  Neck:  Supple  Lungs: normal respiratory effort, no retractions, expiratory effort normal, on room air  CV: regular rate   ABD: deferred  EXT: status post L AKA  Skin: normal turgor, normal texture, no rash  Psych: affect normal, mood normal  Neuro: AAOx3       The above physical exam was reviewed and updated as determined by my evaluation of the patient on 7/10/2024.    Invasive Devices       None                      VTE Pharmacologic Prophylaxis: Xarelto  Code Status: Level 1 - Full Code  Current Length of Stay: 4 day(s)    Total floor / unit time spent today 15 minutes  Coordination of patient's care was performed in conjunction with primary service. Time invested included review of patient's labs, vitals, and management of their comorbidities with continued monitoring, examination of patient as well as answering patient questions, documenting her findings and creating progress note in electronic medical record,  ordering appropriate diagnostic testing.       ** Please Note:  voice to text software may have been used in the creation of this document. Although proof errors in transcription or interpretation are a potential of such software**

## 2024-07-10 NOTE — PROGRESS NOTES
"   07/10/24 1000   Patient Data   Rehab Impairment Impairment of mobility, safety and Activities of Daily Living (ADLs) due to Amputation: 05.3 Unilateral Lower Limb Above the Knee   Etiologic Diagnosis LLE Acute Limb Ischemia   Date of Onset 06/07/24   Prior Level of Function   Self-Care 3. Independent - Patient completed the activities by him/herself, with or without an assistive device, with no assistance from a helper.   Indoor-Mobility (Ambulation) 3. Independent - Patient completed the activities by him/herself, with or without an assistive device, with no assistance from a helper.   Stairs 3. Independent - Patient completed the activities by him/herself, with or without an assistive device, with no assistance from a helper.   Functional Cognition 2. Needed Some Help - Patient needed a partial assistance from another person to complete activities.   Prior Device Used Z. None of the above   Falls in the Last Year   Number of falls in the past 12 months 0   Patient Preference   Nickname (Patient Preference) \"Kieran\"   Psychosocial   Patient Behaviors/Mood Anxious;Angry;Appropriate for situation;Crying;Sad;Tearful   Restrictions/Precautions   Precautions Aphasia;Bed/chair alarms;Cognitive;Fall Risk;Pain;Supervision on toilet/commode;Seizure;Pressure Ulcer   Pain Assessment   Pain Assessment Tool 0-10   Pain Score 3   Comprehension   Auditory Basic   Visual Basic   Findings Refer to ST daily note.   QI: Comprehension 2. Sometimes Understands: Understands only basic conversations or simple, direct phrases. Frequently requires cues to understand   Comprehension (FIM) 3 - Understands basic info/conversation 50-74% of time   Expression   Verbal Basic   Non-Verbal Basic   Intelligibility Phrase   Findings Refer to ST daily note.   QI: Expression 2. Frequently exhibits difficulty with expressing needs and ideas   Expression (FIM) 3 - Expresses basic info/needs 50-74% of time   Social Interaction   Participation Individual " "  Medications needed to control mood/behavior? No   Behaviors observed Agitated;Foul language   Findings Refer to ST daily note.   Social Interaction (FIM) 2 - Needs frequent redirection   Problem Solving   Routine Manages call bell   Findings Refer to ST daily note.   Problem solving (FIM) 2 - Solves basic problems 25-49% of time   Memory   Recognize People Yes   Remember Routine Yes   Initiates Tasks Yes   Short-Term Other  (difficult to assess given expressive impairments)   Long Term Intact   Recalls Precaution Yes   Findings Refer to ST daily note.   Memory (FIM) 3 - Recognizes, recalls/performs 50-74%   Cognition   Overall Cognitive Status Impaired   Arousal/Participation Alert;Responsive;Cooperative   Attention Attends with cues to redirect   Orientation Level Oriented to person;Oriented to place;Disoriented to situation   Memory Unable to assess;Other (Comment)  (difficult to assess given expressive language impairments)   Following Commands Follows one step commands without difficulty   Comments Refer to ST daily note.   Discharge Information   Patient's Discharge Plan unknown at this time   Patient's Rehab Expectations \"get better\"   Barriers to Discharge Home No Family Support;Decreased Cognitive Function;Depression   Impressions Pt is a good rehab candidate to achieve modified independence upon anticipated discharge home w/ family support/supervision. Current barriers include No Family Support;Decreased Cognitive Function;Depression; which impact pt's safety awareness and functional mobility. Pt will benefit from skilled SLP services to optimize overall cognitive-lingusitic abilities at this time to decrease burden of care for family at time of discharge.   SLP Therapy Minutes   SLP Time In 1000   SLP Time Out 1035   SLP Total Time (minutes) 35   SLP Mode of treatment - Individual (minutes) 35   SLP Mode of treatment - Concurrent (minutes) 0   SLP Mode of treatment - Group (minutes) 0   SLP Mode of " treatment - Co-treat (minutes) 0   SLP Mode of Treatment - Total time(minutes) 35 minutes   SLP Cumulative Minutes 35   Cumulative Minutes   Cumulative therapy minutes 643

## 2024-07-10 NOTE — PROGRESS NOTES
07/10/24 0830   Pain Assessment   Pain Assessment Tool 0-10   Pain Score 10 - Worst Possible Pain   Pain Location/Orientation Location: Leg;Orientation: Left   Pain Onset/Description   (inconsistent with reports of pain, true needing pain meds)   Restrictions/Precautions   Precautions Aphasia;Bed/chair alarms;Cognitive;Fall Risk;Pain;Supervision on toilet/commode;Seizure;Pressure Ulcer   Weight Bearing Restrictions Yes   LLE Weight Bearing Per Order NWB   ROM Restrictions No   Cognition   Overall Cognitive Status Impaired   Arousal/Participation Alert;Responsive;Cooperative   Attention Attends with cues to redirect   Orientation Level Oriented to person;Oriented to place;Oriented to situation;Disoriented to time   Memory Decreased recall of precautions;Decreased recall of recent events   Following Commands Follows one step commands with increased time or repetition   Subjective   Subjective pt was agreeable to [facility] treatment session.   Roll Left and Right   Type of Assistance Needed Physical assistance   Physical Assistance Level 26%-50%   Comment HOB flat, no bedrails. however with PT interventions of using bedrail able to perform with extra time mod I   Roll Left and Right CARE Score 3   Lying to Sitting on Side of Bed   Type of Assistance Needed Physical assistance   Physical Assistance Level 26%-50%   Comment HOB flat, no bedrails. however with PT interventions of using bedrail able to perform with extra time mod I   Lying to Sitting on Side of Bed CARE Score 3   Sit to Stand   Type of Assistance Needed Physical assistance   Physical Assistance Level 25% or less   Comment min A from w/c, and bed   Sit to Stand CARE Score 3   Bed-Chair Transfer   Type of Assistance Needed Physical assistance   Physical Assistance Level 25% or less   Comment Min modified squat pivot with UE support using furniture, VC for safer hand placement   Chair/Bed-to-Chair Transfer CARE Score 3   Walk 10 Feet   Reason if not  "Attempted Safety concerns   Walk 10 Feet CARE Score 88   Walk 50 Feet with Two Turns   Reason if not Attempted Safety concerns   Walk 50 Feet with Two Turns CARE Score 88   Walk 150 Feet   Reason if not Attempted Safety concerns   Walk 150 Feet CARE Score 88   Walking 10 Feet on Uneven Surfaces   Reason if not Attempted Safety concerns   Walking 10 Feet on Uneven Surfaces CARE Score 88   Therapeutic Interventions   Strengthening 3x10 prone hip extension, 3x10 prone glute squeezes, 3x10 sidelying abduction   Flexibility prone lying 10 minutes for hip flexor contracture prevention   Other pt asked to brush his teeth, noted task fixation on wiping sink off water. residual limb wrapping on L LE anchored at hip with 4inch ace wrap. pt was incontinent during todays session, unable to express urgency to use bedside urinal.   Equipment Use   NuStep lvl 2 x 10 min, SPM>25 using bilat UE  and R LE only   Other Comments   Comments pt notified that meet and greet with [employer] is at 1pm during OT session. Staples were removed this morning, [facility] is working on getting pt a    Assessment   Treatment Assessment pt engaged in 90 minutes of skilled PT session with focus on LE strengthening, bed mobility, flexibility and endurance. pt was incontinent during the beginning of the session, unable to express urgency to use bedside urinal and demonstrated decreased safety awareness stating \"if i get out of bed i can\". mirror therapy was trialed and pt stated \"this is goofy im not doing this\", pt was educated on the the benefits of this intervention. pt showed improvement in activity tolerance with progression in level on NuStep and decreased amount of rest breaks taken. cont with POC focusing on LE strengthening and overall functional mobility for independence.   Problem List Decreased strength;Decreased range of motion;Decreased endurance;Impaired balance;Decreased mobility;Decreased coordination;Impaired " judgement;Decreased safety awareness;Orthopedic restrictions;Decreased cognition   Barriers to Discharge Inaccessible home environment;Decreased caregiver support   PT Barriers   Physical Impairment Decreased strength;Decreased range of motion;Decreased endurance;Impaired balance;Decreased cognition;Impaired judgement;Decreased safety awareness;Decreased skin integrity;Orthopedic restrictions;Pain   Functional Limitation Wheelchair management;Walking;Transfers;Standing;Stair negotiation;Ramp negotiation;Car transfers   Plan   Treatment/Interventions Functional transfer training;LE strengthening/ROM;Therapeutic exercise;Endurance training;Bed mobility;Gait training   Progress Progressing toward goals   PT Therapy Minutes   PT Time In 0830   PT Time Out 1000   PT Total Time (minutes) 90   PT Mode of treatment - Individual (minutes) 90   PT Mode of treatment - Concurrent (minutes) 0   PT Mode of treatment - Group (minutes) 0   PT Mode of treatment - Co-treat (minutes) 0   PT Mode of Treatment - Total time(minutes) 90 minutes   PT Cumulative Minutes 375   Therapy Time missed   Time missed? No

## 2024-07-10 NOTE — ASSESSMENT & PLAN NOTE
Presented with left lower extremity acute limb ischemia with extensive left iliofemoral and left infrainguinal embolism requiring left femoral thrombectomy and subsequent AKA on 6/7/24 with vascular surgery.  Monitor incision, vascular surgery following, staples removed on 7/10  Continue ASA and statin   Also on Xarelto with LV thrombus  Rehab and pain control per PMR

## 2024-07-11 LAB
ANION GAP SERPL CALCULATED.3IONS-SCNC: 10 MMOL/L (ref 4–13)
BASOPHILS # BLD AUTO: 0.06 THOUSANDS/ÂΜL (ref 0–0.1)
BASOPHILS NFR BLD AUTO: 1 % (ref 0–1)
BUN SERPL-MCNC: 19 MG/DL (ref 5–25)
CALCIUM SERPL-MCNC: 9 MG/DL (ref 8.4–10.2)
CHLORIDE SERPL-SCNC: 105 MMOL/L (ref 96–108)
CO2 SERPL-SCNC: 24 MMOL/L (ref 21–32)
CREAT SERPL-MCNC: 0.89 MG/DL (ref 0.6–1.3)
EOSINOPHIL # BLD AUTO: 0.19 THOUSAND/ÂΜL (ref 0–0.61)
EOSINOPHIL NFR BLD AUTO: 2 % (ref 0–6)
ERYTHROCYTE [DISTWIDTH] IN BLOOD BY AUTOMATED COUNT: 15.3 % (ref 11.6–15.1)
GFR SERPL CREATININE-BSD FRML MDRD: 91 ML/MIN/1.73SQ M
GLUCOSE P FAST SERPL-MCNC: 83 MG/DL (ref 65–99)
GLUCOSE SERPL-MCNC: 83 MG/DL (ref 65–140)
HCT VFR BLD AUTO: 36 % (ref 36.5–49.3)
HGB BLD-MCNC: 11.1 G/DL (ref 12–17)
IMM GRANULOCYTES # BLD AUTO: 0.03 THOUSAND/UL (ref 0–0.2)
IMM GRANULOCYTES NFR BLD AUTO: 0 % (ref 0–2)
LYMPHOCYTES # BLD AUTO: 1.82 THOUSANDS/ÂΜL (ref 0.6–4.47)
LYMPHOCYTES NFR BLD AUTO: 19 % (ref 14–44)
MCH RBC QN AUTO: 28.6 PG (ref 26.8–34.3)
MCHC RBC AUTO-ENTMCNC: 30.8 G/DL (ref 31.4–37.4)
MCV RBC AUTO: 93 FL (ref 82–98)
MONOCYTES # BLD AUTO: 0.8 THOUSAND/ÂΜL (ref 0.17–1.22)
MONOCYTES NFR BLD AUTO: 8 % (ref 4–12)
NEUTROPHILS # BLD AUTO: 6.65 THOUSANDS/ÂΜL (ref 1.85–7.62)
NEUTS SEG NFR BLD AUTO: 70 % (ref 43–75)
NRBC BLD AUTO-RTO: 0 /100 WBCS
PLATELET # BLD AUTO: 393 THOUSANDS/UL (ref 149–390)
PMV BLD AUTO: 8 FL (ref 8.9–12.7)
POTASSIUM SERPL-SCNC: 4 MMOL/L (ref 3.5–5.3)
RBC # BLD AUTO: 3.88 MILLION/UL (ref 3.88–5.62)
SODIUM SERPL-SCNC: 139 MMOL/L (ref 135–147)
WBC # BLD AUTO: 9.55 THOUSAND/UL (ref 4.31–10.16)

## 2024-07-11 PROCEDURE — 85025 COMPLETE CBC W/AUTO DIFF WBC: CPT | Performed by: PHYSICIAN ASSISTANT

## 2024-07-11 PROCEDURE — 97530 THERAPEUTIC ACTIVITIES: CPT

## 2024-07-11 PROCEDURE — 99232 SBSQ HOSP IP/OBS MODERATE 35: CPT

## 2024-07-11 PROCEDURE — 97535 SELF CARE MNGMENT TRAINING: CPT

## 2024-07-11 PROCEDURE — 97110 THERAPEUTIC EXERCISES: CPT

## 2024-07-11 PROCEDURE — 80048 BASIC METABOLIC PNL TOTAL CA: CPT | Performed by: PHYSICIAN ASSISTANT

## 2024-07-11 PROCEDURE — 92507 TX SP LANG VOICE COMM INDIV: CPT

## 2024-07-11 PROCEDURE — 99232 SBSQ HOSP IP/OBS MODERATE 35: CPT | Performed by: PHYSICIAN ASSISTANT

## 2024-07-11 RX ADMIN — GABAPENTIN 100 MG: 100 CAPSULE ORAL at 17:06

## 2024-07-11 RX ADMIN — GABAPENTIN 100 MG: 100 CAPSULE ORAL at 08:10

## 2024-07-11 RX ADMIN — BICTEGRAVIR SODIUM, EMTRICITABINE, AND TENOFOVIR ALAFENAMIDE FUMARATE 1 TABLET: 50; 200; 25 TABLET ORAL at 08:15

## 2024-07-11 RX ADMIN — LAMOTRIGINE 50 MG: 25 TABLET ORAL at 08:10

## 2024-07-11 RX ADMIN — MIRTAZAPINE 15 MG: 15 TABLET, FILM COATED ORAL at 21:16

## 2024-07-11 RX ADMIN — DIVALPROEX SODIUM 1250 MG: 500 TABLET, EXTENDED RELEASE ORAL at 08:10

## 2024-07-11 RX ADMIN — RIVAROXABAN 20 MG: 20 TABLET, FILM COATED ORAL at 17:05

## 2024-07-11 RX ADMIN — LAMOTRIGINE 50 MG: 25 TABLET ORAL at 17:05

## 2024-07-11 RX ADMIN — DOLUTEGRAVIR SODIUM 50 MG: 50 TABLET, FILM COATED ORAL at 08:14

## 2024-07-11 RX ADMIN — METOPROLOL SUCCINATE 25 MG: 25 TABLET, EXTENDED RELEASE ORAL at 08:10

## 2024-07-11 RX ADMIN — MICONAZOLE NITRATE: 20 CREAM TOPICAL at 08:15

## 2024-07-11 RX ADMIN — MICONAZOLE NITRATE 1 APPLICATION: 20 CREAM TOPICAL at 17:07

## 2024-07-11 RX ADMIN — ATORVASTATIN CALCIUM 80 MG: 80 TABLET, FILM COATED ORAL at 17:05

## 2024-07-11 RX ADMIN — TAMSULOSIN HYDROCHLORIDE 0.4 MG: 0.4 CAPSULE ORAL at 17:05

## 2024-07-11 RX ADMIN — Medication 6 MG: at 21:16

## 2024-07-11 RX ADMIN — LOSARTAN POTASSIUM 50 MG: 50 TABLET, FILM COATED ORAL at 08:10

## 2024-07-11 RX ADMIN — SERTRALINE HYDROCHLORIDE 75 MG: 50 TABLET ORAL at 08:10

## 2024-07-11 RX ADMIN — ZONISAMIDE 400 MG: 100 CAPSULE ORAL at 08:15

## 2024-07-11 RX ADMIN — LEVOCARNITINE 330 MG: 1 SOLUTION ORAL at 17:07

## 2024-07-11 RX ADMIN — LEVOCARNITINE 330 MG: 1 SOLUTION ORAL at 13:25

## 2024-07-11 RX ADMIN — GABAPENTIN 100 MG: 100 CAPSULE ORAL at 21:16

## 2024-07-11 RX ADMIN — ASPIRIN 81 MG: 81 TABLET, COATED ORAL at 08:10

## 2024-07-11 RX ADMIN — LEVOCARNITINE 330 MG: 1 SOLUTION ORAL at 08:15

## 2024-07-11 NOTE — ASSESSMENT & PLAN NOTE
Noted on echocardiogram 6/10/2024: spherical 1.5 x 1.3 cm thrombus at the LV apex   Initiated on AC with Xarelto, continue  Rx sent to [facility] for price check

## 2024-07-11 NOTE — PROGRESS NOTES
"   07/11/24 1030   Pain Assessment   Pain Assessment Tool 0-10   Pain Score No Pain   Restrictions/Precautions   Precautions Aphasia;Bed/chair alarms;Cognitive;Fall Risk;Seizure;Supervision on toilet/commode;Pressure Ulcer   Weight Bearing Restrictions Yes   LLE Weight Bearing Per Order NWB   ROM Restrictions No   Braces or Orthoses Other (Comment)  (LLE residual limb wrapping)   Lifestyle   Autonomy \"This is good, I like this\" referring to exercises   Oral Hygiene   Type of Assistance Needed Independent   Physical Assistance Level No physical assistance   Comment seated in w/c at sink   Oral Hygiene CARE Score 6   Shower/Bathe Self   Type of Assistance Needed Physical assistance   Physical Assistance Level 26%-50%   Comment pt completes sponge bathing routine supine/seated with pt able to bathe UB seated with supervision, R and L LE, and anthony area with assist for rear via rolling.   Shower/Bathe Self CARE Score 3   Upper Body Dressing   Type of Assistance Needed Supervision   Physical Assistance Level No physical assistance   Comment seated EOB to don/doff shirt   Upper Body Dressing CARE Score 4   Lower Body Dressing   Type of Assistance Needed Physical assistance   Physical Assistance Level 26%-50%   Comment TA for residual limb wrapping with anchoring around the waist with pt able to assist with stabilizing/holding ace wrap and ensures no wrinkles are present. TA for donning tab brief. pt able to thread pants over LEs, bridges for completing CM   Lower Body Dressing CARE Score 3   Putting On/Taking Off Footwear   Type of Assistance Needed Supervision   Physical Assistance Level No physical assistance   Comment seated EOB to don R sock/shoe   Putting On/Taking Off Footwear CARE Score 4   Bed-Chair Transfer   Type of Assistance Needed Physical assistance   Physical Assistance Level 25% or less   Comment Min A for modified squat pivot with chair at 45 degree angle and VCs for hand placement.   Chair/Bed-to-Chair " Transfer CARE Score 3   Exercise Tools   Other Exercise Tool 1 pt engages in UE Strengthening using 2# dowel bar, completing 3x10 of each of the following: shoulder press, shoulder flexion and chest press. good tolerance to exercises with rest breaks as needed to manage fatige. strengthening completed with focus on increasing strength and endurance for ADLs/transfers.   Cognition   Overall Cognitive Status Impaired   Arousal/Participation Alert;Cooperative   Attention Attends with cues to redirect   Orientation Level Oriented to person;Disoriented to time;Disoriented to situation;Oriented to place   Following Commands Follows one step commands without difficulty   Activity Tolerance   Activity Tolerance Patient tolerated treatment well   Medical Staff Made Aware RN Crystal present to observe and take picture of new small blister bubble on posterior L thigh   Assessment   Treatment Assessment pt engages in 60 minute skilled OT Session focusing on ADL retraining, sit pivots and UE Strengthening. see above for full func details. ADL session initiated by OTR Katia, finished remaining parts of ADL including residual limb wrapping, UE/LE Dressing and UE bathing/grooming tasks. pt continues to require TA for ace wrapping however, staples are now removed and are awaiting approval to obtain . Min A for modified sit/stand pivots with VCs for hand placement, able to manuever around room with VCs and increased time for w/c management/propulsion. pt remains limited by RUE weakness, impaired standing bill/bal, impaired safety/func cog, warranting continued skilled care to focus on ADL retraining, func transfers, basic amp edu, w/c management/propulsion, in order to decrease burden of care at d/c.   Prognosis Good   Problem List Decreased strength;Decreased range of motion;Decreased endurance;Impaired balance;Decreased mobility;Decreased coordination;Impaired judgement;Decreased safety awareness;Orthopedic  restrictions;Decreased cognition   Plan   Treatment/Interventions ADL retraining;Functional transfer training;Therapeutic exercise;Endurance training;Cognitive reorientation;Patient/family training;Equipment eval/education;Compensatory technique education   OT Therapy Minutes   OT Time In 1030   OT Time Out 1130   OT Total Time (minutes) 60   OT Mode of treatment - Individual (minutes) 60   OT Mode of treatment - Concurrent (minutes) 0   OT Mode of treatment - Group (minutes) 0   OT Mode of treatment - Co-treat (minutes) 0   OT Mode of Treatment - Total time(minutes) 60 minutes   OT Cumulative Minutes 496   Therapy Time missed   Time missed? No

## 2024-07-11 NOTE — PROGRESS NOTES
Physical Medicine and Rehabilitation Progress Note  Sunil Patel 63 y.o. male MRN: [id number]  Unit/Bed#: -01 Encounter: [id number]      Assessment & Plan:     Decline in ADLs and mobility: Functional assessment - improving         FIM  Care Score  Admit Score Recent Score    Total assist  1-100% or 2p    Tot     Max assist 2-51-75%    Sub To hygiene, bathing, LBD    Mod assist 3- 26-50%  Par  Bathing, LBD   Min assist 3- 25% or < Par     CG assist 4  TA     Sup/Setup 4-5 Sup UBD UBD   Mod-I/Indep 6 MI      Transfers  Significant assist  Min-mod assist    Ambulation   Total      WC mob  150 ft supervision     Stairs   Total assist/NT      Goal: Supervision for most ADLs, transfers and for WC mobility  Major barriers:  L AKA, neurocog d/o, dispo  Dispo: Possibly acute care with [facility] 14 days from admission unless alternative viable dispo becomes available pending guardianship      * 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 On imaging he was found to have an acute occlusion of the left external iliac artery without any visualizations of distal vessels, consistent with [place] class III acute limb ischemia and it was determined by vascular surgery that the LLE was not salvageable. He underwent left femoral thrombectomy and AKA on 6/7/24 with vascular surgery.   - Monitor closely for phantom limb pain/sensation. Conservative modalities such as gentle massage along the residual limb (avoiding deep pressure or direct contact with the incision site) can be utilized to reduce severity and frequency of phantom pain. Topical capsaicin and lidocaine can be considered for phantom pain.   -  "On Rivaroxaban with hx of LV thrombus and PAOD   - Analgesia as below  - Avoid pillows/wedging posterior to the residual limb to avoid development of hip flexion contracture.   - Amputation education on ARC  - Local incision care, monitor for breakdown, frequent turns; incision care per vascular surgery  - Limb-shaping with ACE wrapping for now  - He will require outpatient PM&R follow-up for assessment for and consideration of prosthesis fabrication when medically cleared by surgery to do so.  - Tolerating therapy adequately thus far in ARC - continue PT, OT in ARC setting     Pressure injury of buttock, unstageable and at high risk for breakdown  Assessment & Plan  Stable   - Wound care consulted and following  Per wound care specialist 7/8  \"- Wound care consulted and following  \"Right Heel dry intact and shelley with no skin loss or wounds present. Recommend preventative Hydraguard Cream and proper offloading/ repositioning.    POA B/L Sacro-buttocks Unstageable Pressure Injury: wound now presents as a POA Right Buttocks Stage 3 Pressure Injury. Wound was previously 2 areas of full thickness skin loss on b/l sacro-buttock. Wound is now one area of full thickness on right buttock. Left buttock is now intact, hyperpigmented and blanches. Right buttock wound with mix of 80% pink tissue with scattered 20% yellow slough tissue. Anthony-wound is fragile and hyperpigmented. Scant serosanguineous drainage noted. Recommend continuing with triad paste and hydraguard to anthony-wounds   - No induration, fluctuance, odor, warmth/temperature differences, redness, or purulence noted to the above noted wounds and skin areas assessed. New dressings applied per orders listed below. Patient tolerated well- no s/s of non-verbal pain or discomfort observed during the encounter. Bedside nurse aware of plan of care. See flow sheets for more detailed assessment findings.     Skin Care Plan:  1-Cleanse sacro-buttocks with soap and water. " "Apply Triad Paste to Sacro-Buttocks Wound Beds Only. Apply Hydraguard to Elsi-wounds and all other intact aspects of sacro-buttocks. Apply both creams TID and PRN episodes of incontinence.   2-Turn/reposition q2h or when medically stable for pressure re-distribution on skin .  3-Elevate right heel to offload pressure  4-Moisturize skin daily with skin nourishing cream  5-Ehob cushion in chair when out of bed.  6-Preventative Hydraguard to right heel BID and PRN.  7-P-500 Low Air Loss Mattress   8-Apply ELVA to groin BID per order. \"    - Recommend ROHO cushion in chair when out of bed instead   - Preventative hydraguard to bilateral heels BID and PRN.   - Monitor clinically for breakdown, frequent turns      Patient incapable of making informed decisions  Assessment & Plan  - History of remote TBI and prior strokes with comorbid psychiatric history.  - Evaluated by neuropsychology, Dr. Dilshad Tatum, PhD on 6/27/24, found to have \"diffuse cognitive dysfunction and on a measure assessing awareness of personal health status and ability to evaluate health problems, handle medical emergencies and take safety precautions, patient performed in the IMPAIRED range of functioning. During this encounter, patient does not appear to have capacity to make fully informed medical decisions.\"  - Court-appointed guardianship process has been initiated by acute care CM Katia Kay. Patient does not have family available or willing to take on medical decision making at this time.   - Patient will transition back to acute care when functional rehabilitation goals are met to follow-up with court-appointed guardianship for placement.    Impaired mobility and activities of daily living  Assessment & Plan  - Rehabilitation medicine physician for daily monitoring of care, 24 hour availability for acute medical issues, medication management, and therapeutic and diagnostic assessments.  - 24 hour rehabilitation nursing 7 days per week for: " "management/teaching of medications, bowel/bladder routine, skin care.  - PT, OT for 2-3 hours per day, 5 to 6 days per week; 15 hours per week  - Rehabilitation Psychology as needed for adjustment and coping  - MSW for barriers to discharge, community resources, and family support  - Discharge planning following to help ensure a safe and efficient discharge        Left ventricular thrombus  Assessment & Plan  - Visualized on echocardiogram on 6/10/24: spherical 1.5 x 1.3 cm thrombus at the LV apex.   - Rivaroxaban  - Outpatient follow-up with cardiology    Neurocognitive disorder  Assessment & Plan  Hx of TBI and L MCA CVA, psychiatric d/o, seizure d/o  - Neuropsych assessment 6/27/24 - \"diffuse cognitive dysfunction and on a measure assessing awareness of personal health status and ability to evaluate health problems, handle medical emergencies and take safety precautions, patient performed in the IMPAIRED range of functioning. During this encounter, patient does not appear to have capacity to make fully informed medical decisions.\"  MMSE 4/28 - severely impaired  - Guardianship process initiated on acute - follow-up by CM/Admin  - Status: some expressive and possible some receptive aphasia.  He can be difficult to follow at times likely due to a mixture of aphasia, tangentiality, mild lability, and impaired memory; lability with hx of possible bipolar d/o  - Neuropsych Med review: Depakote, Lamictal, Remeron, Zoloft, Melatonin, gabapentin, PRN oxy 2.5mg TID   - Monitor neuro-exam, wakefulness, mood, cognition, insight into deficits and safety awareness   - Monitor and ensure optimal management electrolytes, nutrition, and hydration  - Monitor for signs or symptoms of infection, medication intolerances, other systemic etiologies  - Additional labs, imaging, specialist follow-up as needed per primary team currently   - Overstimulation precautions, frequent re-orientation, re-direction, re-assurance  - Optimal mood, " pain, and sleep management  - If impaired sleep or behavior recommend sleep log and agitation monitoring    - Limit sedating medications when possible  - Fall precautions - if needed increase rounding or consider virtual sitter or in-person sitter  - For routine restlessness, anxiety, irritability focus on non-pharmacologic management    - Hold benzo's with increased risk paradoxical reaction and possibility of limiting cognitive recovery  - Recommend acute comprehensive interdisciplinary inpatient rehabilitation to include intensive skilled therapies (PT, OT, ST) with oversight and management by rehabilitation physician.  Inpatient rehab level nursing, case management and weekly interdisciplinary team meetings.          - Obtain Aphasia cog assessment such as WAB  - Assess iADLs - ability to manage medications, meal prep, finances, obtaining appropriate transport from community, managing emergencies, and overall safety in home environment.  - Provide therapy, compensatory strategies, and if available and necessary provide caregiver training.   - OP neuro and PCP     Traumatic brain injury (HCC)  Assessment & Plan  See neurocog impairment     History of stroke  Assessment & Plan  - History of old left temporal and parietal lobe cortical infarcts, also involving the insula and left occipital lobe as well as small right posterior parietal lobe. Stable on most recent CT head on 5/16/24.   - ASA, Xarelto and statin for secondary prevention  - Optimal BP control  - Monitor neuro exam - hx of LV thrombus   - OP neuro follow-up     Bipolar affective disorder (HCC)  Assessment & Plan  Mood acceptable; continue meds as outlined   Supportive counseling  NeuroPsychology consult while in ARC if available for support  Counseled on and continue to encourage deep breathing/relaxation/behavioral management techniques  - Mirtazapine 15 mg qHs  - Sertraline 75 mg daily  - Lamictal 50mg BID  - Depakote ER 1250mg qday   - Outpatient  psychiatry follow-up    At risk for altered urinary elimination  Assessment & Plan  - Did have some incontinence on acute   - monitor for retention, incontinence (including overflow incontinence), signs/symptoms of UTI  - recommend toileting program Q2-4 H during day and Q4-6H overnight   - nursing prompt to void  - if needed - bladder scan Q4H standing, record all voided amounts (if able to collect), record additional bladder scans as needed after all voids (ie. PVRs)  - Straight cath PRN >450 cc; or if has urge to void and cannot 250-449 cc  - If post void residual bladder scans are <150 cc x3 consecutive voids can stop scans          At risk for constipation  Assessment & Plan  - Stooling adequately recently; did have some incontinence on acute now improved  Toileting program: Toilet approx Q2-4H during day (provide bedpan only if too immobile for bedside commode or toilet but OOB preferred) and Q4-6H overnight  - Ensure adequate hydration, adjust bowel meds as indicated, monitor for infectious diarrhea  - Monitor and optimize skin care, nursing to assist with monitor skin closely for erythema, breakdown, or rashes (or worsening of prior)  - Monitor closely for opioid and immobility-induced constipation. Scheduled bowel regimen. Will follow BMs and adjust bowel regimen to target soft, formed stools q1-2 days, per pt's regular schedule.      Acute pain  Assessment & Plan  Controlled   - Tylenol 975 mg q8h PRN  - Gabapentin 100 mg TID scheduled for phantom pain/sensation  - Oxycodone 2.5 mg q8h PRN, wean as possible    Adjustment disorder with mixed anxiety and depressed mood  Assessment & Plan  - Related to recent release from incarceration, new AKA and uncertainty of future disposition, all in the setting of impaired cognition related to prior strokes and TBI  - Behavioral techniques for symptom management  - Neuropsychology consult as available    At risk for venous thromboembolism (VTE)  Assessment & Plan  - On  rivaroxaban    Carnitine deficiency (HCC)  Assessment & Plan  - Carnitine replacement  - Appreciate medicine management      History of seizures  Assessment & Plan  - Depakote ER, lamotrigine, zonisamide  - Outpatient follow-up with [facility] neurology    Benign essential hypertension  Assessment & Plan  - Metoprolol, losartan  - Appreciate medicine management    Human immunodeficiency virus (HIV) infection (HCC)  Assessment & Plan  - Continue anti-retroviral treatment  - Appreciate medicine management during ARC course  - OP ID follow-up           Other Medical Issues:  Monitor for     Follow-up providers and other issues to be followed up after discharge  PCP  Cards  ID  PMR  Vasc sx       Restrictions include:  Fall precautions    Objective:    Allergies per EMR  Diagnostic Studies: Reviewed, no new imaging  No orders to display     See above as well    Laboratory: Labs reviewed  Results from last 7 days   Lab Units 07/11/24  0526 07/05/24  1058   HEMOGLOBIN g/dL 11.1* 12.1   HEMATOCRIT % 36.0* 40.5   WBC Thousand/uL 9.55 8.92     Results from last 7 days   Lab Units 07/11/24  0526 07/05/24  1058   BUN mg/dL 19 25   SODIUM mmol/L 139 138   POTASSIUM mmol/L 4.0 4.0   CHLORIDE mmol/L 105 103   CREATININE mg/dL 0.89 1.00   AST U/L  --  14   ALT U/L  --  19            Drug regimen reviewed, all potential adverse effects identified and addressed:    Scheduled Meds:  Current Facility-Administered Medications   Medication Dose Route Frequency Provider Last Rate    acetaminophen  975 mg Oral TID PRN José Salcido MD      aspirin  81 mg Oral Daily Lorrie Barillas PA-C      atorvastatin  80 mg Oral Daily With Dinner Lorrie Barillas PA-C      bictegravir-emtricitab-tenofovir alafenamide  1 tablet Oral Daily With Breakfast Lorrie Barillas PA-C      bisacodyl  10 mg Rectal Daily PRN Lorrie Barillas PA-C      diphenhydrAMINE  25 mg Oral Q6H PRN Lorrie Barillas PA-C      divalproex sodium  1,250 mg  Oral Daily Lorrie Barillas PA-C      dolutegravir  50 mg Oral Daily Lorrie Barillas PA-C      gabapentin  100 mg Oral TID Lorrie Barillas PA-C      lamoTRIgine  50 mg Oral BID Lorrie Barillas PA-C      levOCARNitine  1,000 mg/day Oral TID With Meals Lorrie Barillas PA-C      losartan  50 mg Oral Daily Lorrie Barillas PA-C      melatonin  6 mg Oral HS Lorrie Barillas PA-C      metoprolol succinate  25 mg Oral Daily CHARLIE Mcclelland      mirtazapine  15 mg Oral HS Lorrie Barillas PA-C      ELVA ANTIFUNGAL   Topical BID Lorrie Barillas PA-C      ondansetron  4 mg Oral Q6H PRN Lorrie Barillas PA-C      oxyCODONE  2.5 mg Oral Q8H PRN Raimundo Riley MD      polyethylene glycol  17 g Oral Daily PRN Lorrie Barillas PA-C      rivaroxaban  20 mg Oral Daily With Dinner Lorrie Barillas PA-C      senna  1 tablet Oral HS PRN Lorrie Barillas PA-C      sertraline  75 mg Oral Daily Lorrie Barillas PA-C      tamsulosin  0.4 mg Oral Daily With Dinner Lorrie Barillas PA-C      zonisamide  400 mg Oral Daily Lorrie Barillas PA-C         Chief Complaints:  S/P L AKA     Subjective:     On eval, patient with expressive>receptive aphasia limiting some communication but appears to communicate adequately if given additional time.  Patient denies significant pain, lightheadedness, worsening strength, sensation, bowel or bladder issues, or other new complaints.    ROS: A 10 point ROS was performed; negative except as noted above.       Physical Exam:  Temp:  [97.7 °F (36.5 °C)-98.8 °F (37.1 °C)] 98.3 °F (36.8 °C)  HR:  [60-88] 88  Resp:  [17-20] 18  BP: (103-137)/(53-69) 103/57  SpO2:  [94 %-98 %] 97 %    GEN:  Sitting in NAD   HEENT/NECK: MMM  CARDIAC: Regular rate rhythm, no murmers, no rubs, no gallops  LUNGS:  clear to auscultation, no wheezes, rales, or rhonchi  ABDOMEN: Soft, non-tender, non-distended, normal active bowel sounds  EXTREMITIES/SKIN:  " RLE no calf edema or TTP; LLE incision with scant drainage on dressing but no significant drainage on incision site or signs of infection or significant dehiscence  NEURO:   MENTAL STATUS: Stable expressive greater than receptive aphasia, adequate wakefulness, able to follow most simple commands  PSYCH:  Affect:  Euthymic     HPI:  \"Sunil Patel is a 63 y.o. male who  has a past medical history of Acute lower limb ischemia (06/08/2024), Anxiety, Depression, HIV disease ([facility]), Substance abuse ([facility]), and Suicide attempt ([facility]). who presented to the [facility]7/24 from [facility] for increased LLE swelling and pain and was found to have a left external iliac artery occlusion and acute limb ischemia. 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 Patient had TTE on 6/10/24 showing LV apex thrombus. On 6/11/24 patient had pharmacologic nuclear stress test/SPECT scan which did not show any significant areas of ischemia with EF 40-45% and recommended continuing A/C for LV apical thrombus. His course was complicated by b/l buttock unstageable pressure ulcers, urinary and fecal incontinence, pain, and significant decline in ADLs and mobility. Patient has been continued on Xarelto for anticoagulation, as well as ASA and statin. Patient has a history of TBI, with baseline nonfluent aphasia and forgetfulness. He was evaluated by neuropsychology on 6/27/24, and deemed to not have capacity to make fully informed medical decisions. Therefore, the guardianship process was initiated as of 7/5/24.  \"    ** Please Note: Fluency Direct voice to text software may have been used in the creation of this document. **        "

## 2024-07-11 NOTE — PROGRESS NOTES
"   07/11/24 1240   Pain Assessment   Pain Assessment Tool 0-10   Pain Score No Pain   Restrictions/Precautions   Precautions Aphasia;Bed/chair alarms;Cognitive;Fall Risk;Pressure Ulcer;Seizure;Supervision on toilet/commode   LLE Weight Bearing Per Order NWB   Comprehension   Comprehension (FIM) 4 - Understands basic info/conversation 75-90% of time   Expression   Expression (FIM) 3 - Expresses basic info/needs 50-74% of time   Social Interaction   Social Interaction (FIM) 5 - Interacts appropriately with others 90% of time   Problem Solving   Problem solving (FIM) 3 - Solves basic problmes 50-74% of time   Memory   Memory (FIM) 3 - Recognizes, recalls/performs 50-74%   Speech/Language/Cognition Assessmetn   Treatment Assessment Pt was in recliner, awake, alert and engaged for session. As current SLP was new to pt, engaged in rapport building overall for session today. It was noted that pt did have fairly fluent speech output given the initial engagement in conversation. However what was observed was a decline in effective verbal output when attempting to explain events which lead to his amputation, but did use gestures accordingly. Still pt did not exhibit overt jargon, neologisms in spontaneous speech output, but more perseveration given statements, which pt was noted to be aware of the difficulty in trying to elicit the correct word. Of note, pt's comprehension about his ability to answer orientation questions was present as well as when SLP asking about his ability to communicate needs such as toileting. This lead to again more difficulty in fluent output for specifically stating \"bathroom\" or \"[facility].\" It was noted that pt pointed to urinal accordingly for bathroom. Also noted was his ability to look to the white board to locate name of hospital, current month and year. Still pt did have difficulty in verbalizing this. SLP did trial written cue of \"[facility]\" as well as \"July 2024.\" This did aid a " bit in his ability to state these items, but benefiting from phonemic cues to elicit. SLP providing education about written reference for this information which pt was highly receptive to at this time. Will plan to accommodate written cues to maximize expressive language skills or at best using written cues to point to information when asked. At this time, will continue to benefit from ongoing SLP services although likely brief to f/u and provide functional communication strategies effectively.    SLP Therapy Minutes   SLP Time In 1240   SLP Time Out 1310   SLP Total Time (minutes) 30   SLP Mode of treatment - Individual (minutes) 30   SLP Mode of treatment - Concurrent (minutes) 0   SLP Mode of treatment - Group (minutes) 0   SLP Mode of treatment - Co-treat (minutes) 0   SLP Mode of Treatment - Total time(minutes) 30 minutes   SLP Cumulative Minutes 65   Therapy Time missed   Time missed? No

## 2024-07-11 NOTE — ASSESSMENT & PLAN NOTE
Remote history of TBI, previously residing in a group home prior to care home sentence.  Evaluated by neuropsychiatry on 6/27/24 and deemed NOT to have medical decision-making capacity  Process for court appointed guardian started on 7/5/24 - CM following

## 2024-07-11 NOTE — PROGRESS NOTES
[facility]  Progress Note  Name: Sunil Patel I  MRN: [id number]  Unit/Bed#: -01 I Date of Admission: 7/6/2024   Date of Service: 7/11/2024 I Hospital Day: 5    Assessment & Plan   * Above-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)  Assessment & Plan  Presented with left lower extremity acute limb ischemia with extensive left iliofemoral and left infrainguinal embolism requiring left femoral thrombectomy and subsequent AKA on 6/7/24 with vascular surgery.  Monitor incision, vascular surgery following, staples removed on 7/10  Continue ASA and statin   Also on Xarelto due to LV thrombus  Rehab and pain control per PMR    Traumatic brain injury (HCC)  Assessment & Plan  Remote history of TBI, previously residing in a group home prior to prison sentence.  Evaluated by neuropsychiatry on 6/27/24 and deemed NOT to have medical decision-making capacity  Process for court appointed guardian started on 7/5/24 - CM following     Carnitine deficiency (HCC)  Assessment & Plan  Continue levocarnitine 330 mg 3x daily with meals.    History of seizures  Assessment & Plan  Diagnosed in July 2019, follows with [facility] neurology outpatient  Continue home regimen with Depakote ER, Lamotrigine and Zonegran    Left ventricular thrombus  Assessment & Plan  Noted on echocardiogram 6/10/2024: spherical 1.5 x 1.3 cm thrombus at the LV apex   Initiated on AC with Xarelto, continue  Rx sent to [facility] for price check    Benign essential hypertension  Assessment & Plan  Home regimen: Losartan 50mg daily, Toprol XL 25 mg BID  Current regimen: Losartan 50 mg daily, Toprol-XL 25 mg daily  Pressure low normal on review, monitor routinely and adjust regimen as needed    History of stroke  Assessment & Plan  History of left MCA CVA in May 2018 with residual expressive aphasia  Continue ASA and atorvastatin    Human immunodeficiency virus (HIV) infection (HCC)  Assessment & Plan  Continue Biktarvy and  Tivicay.    Bipolar affective disorder (HCC)  Assessment & Plan  Continue home meds Zoloft and Remeron  Outpatient follow-up with Psychiatry             The above assessment and plan was reviewed and updated as determined by my evaluation of the patient on 7/11/2024.    Labs:   Results from last 7 days   Lab Units 07/11/24  0526 07/05/24  1058   WBC Thousand/uL 9.55 8.92   HEMOGLOBIN g/dL 11.1* 12.1   HEMATOCRIT % 36.0* 40.5   PLATELETS Thousands/uL 393* 543*     Results from last 7 days   Lab Units 07/11/24  0526 07/05/24  1058   SODIUM mmol/L 139 138   POTASSIUM mmol/L 4.0 4.0   CHLORIDE mmol/L 105 103   CO2 mmol/L 24 25   BUN mg/dL 19 25   CREATININE mg/dL 0.89 1.00   CALCIUM mg/dL 9.0 9.6                   Imaging  No orders to display       Review of Scheduled Meds:  Current Facility-Administered Medications   Medication Dose Route Frequency Provider Last Rate    acetaminophen  975 mg Oral TID PRN José Salcido MD      aspirin  81 mg Oral Daily Lorrie Barillas PA-C      atorvastatin  80 mg Oral Daily With Dinner Lorrie Barillas PA-C      bictegravir-emtricitab-tenofovir alafenamide  1 tablet Oral Daily With Breakfast Lorrie Barillas PA-C      bisacodyl  10 mg Rectal Daily PRN Lorrie Barillas PA-C      diphenhydrAMINE  25 mg Oral Q6H PRN Lorrie Barillas PA-C      divalproex sodium  1,250 mg Oral Daily Lorrie Barillas PA-C      dolutegravir  50 mg Oral Daily Lorrie Barillas PA-C      gabapentin  100 mg Oral TID Lorrie Barillas PA-C      lamoTRIgine  50 mg Oral BID Lorrie Barillas PA-C      levOCARNitine  1,000 mg/day Oral TID With Meals Lorrie Barillas PA-C      losartan  50 mg Oral Daily Lorrie Barillas PA-C      melatonin  6 mg Oral HS Lorrie Barillas PA-C      metoprolol succinate  25 mg Oral Daily CHARLIE Mcclelland      mirtazapine  15 mg Oral HS EJ Black ANTIFUNGAL   Topical BID Lorrie Barillas PA-C       ondansetron  4 mg Oral Q6H PRN Lorrie Barillas PA-C      oxyCODONE  2.5 mg Oral Q8H PRN Raimundo Riley MD      polyethylene glycol  17 g Oral Daily PRN Lorrie Barillas PA-C      rivaroxaban  20 mg Oral Daily With Dinner Lorrie Barillas PA-C      senna  1 tablet Oral HS PRN Lorrie Barillas PA-C      sertraline  75 mg Oral Daily Lorriejacky Barillas PA-C      tamsulosin  0.4 mg Oral Daily With Dinner Lorrie Barillas PA-C      zonisamide  400 mg Oral Daily Lorriejacky Barillas PA-C         Subjective/ HPI: Patient seen and examined. Patients overnight issues or events were reviewed with nursing staff. New or overnight issues include the following:     Pt seen in his room. He states that he is doing well and denies any current complaints.    ROS:   A 10 point ROS was performed; negative except as noted above.        *Labs /Radiology studies Reviewed  *Medications  reviewed and reconciled as needed  *Please refer to order section for additional ordered labs studies      Physical Examination:  Vitals:   Vitals:    07/10/24 0829 07/10/24 1443 07/10/24 2055 07/11/24 0521   BP: 116/62 109/56 137/69 118/53   BP Location:  Left arm Left arm Left arm   Pulse: 72 85 84 60   Resp:  17 17 20   Temp:  98.3 °F (36.8 °C) 97.7 °F (36.5 °C) 98.8 °F (37.1 °C)   TempSrc:  Oral Oral Oral   SpO2:  97% 94% 98%   Weight:       Height:           General Appearance: NAD; pleasant  HEENT: PERRLA, conjuctiva normal; mucous membranes moist; face symmetrical  Neck:  Supple  Lungs: clear bilaterally, normal respiratory effort, no retractions, expiratory effort normal, on room air  CV: regular rate and rhythm, no murmurs rubs or gallops noted   ABD: soft non tender, +BS x4  EXT: Lt AKA  Skin: normal turgor, normal texture, no rash  Psych: affect normal, mood normal  Neuro: Expressive aphasia     The above physical exam was reviewed and updated as determined by my evaluation of the patient on 7/11/2024.    Invasive  Devices       None                      VTE Pharmacologic Prophylaxis: Xarelto  Code Status: Level 1 - Full Code  Current Length of Stay: 5 day(s)    Total floor / unit time spent today  35 minutes   Coordination of patient's care was performed in conjunction with primary service. Time invested included review of patient's labs, vitals, and management of their comorbidities with continued monitoring, examination of patient as well as answering patient questions, documenting her findings and creating progress note in electronic medical record,  ordering appropriate diagnostic testing.       ** Please Note:  voice to text software may have been used in the creation of this document. Although proof errors in transcription or interpretation are a potential of such software**

## 2024-07-11 NOTE — PLAN OF CARE
Problem: PAIN - ADULT  Goal: Verbalizes/displays adequate comfort level or baseline comfort level  Description: Interventions:  - Encourage patient to monitor pain and request assistance  - Assess pain using appropriate pain scale  - Administer analgesics based on type and severity of pain and evaluate response  - Implement non-pharmacological measures as appropriate and evaluate response  - Consider cultural and social influences on pain and pain management  - Notify physician/advanced practitioner if interventions unsuccessful or patient reports new pain  7/11/2024 0928 by Sofie Gurrola RN  Outcome: Progressing  7/11/2024 0928 by Sofie Gurrola RN  Outcome: Progressing     Problem: INFECTION - ADULT  Goal: Absence or prevention of progression during hospitalization  Description: INTERVENTIONS:  - Assess and monitor for signs and symptoms of infection  - Monitor lab/diagnostic results  - Monitor all insertion sites, i.e. indwelling lines, tubes, and drains  - Monitor endotracheal if appropriate and nasal secretions for changes in amount and color  - [place]on appropriate cooling/warming therapies per order  - Administer medications as ordered  - Instruct and encourage patient and family to use good hand hygiene technique  - Identify and instruct in appropriate isolation precautions for identified infection/condition  7/11/2024 0928 by Sofie Gurrola RN  Outcome: Progressing  7/11/2024 0928 by Sofie Gurrola RN  Outcome: Progressing

## 2024-07-11 NOTE — PROGRESS NOTES
"   07/11/24 1000   Pain Assessment   Pain Assessment Tool 0-10   Pain Score No Pain   Restrictions/Precautions   Precautions Aphasia;Bed/chair alarms;Cognitive;Fall Risk;Seizure;Supervision on toilet/commode   Weight Bearing Restrictions Yes   LLE Weight Bearing Per Order NWB   ROM Restrictions No   Lifestyle   Autonomy \"Yesterday I was sleepy, today I'm good.\"   Shower/Bathe Self   Comment Pt bathed groin and RUE, pt applied moisturizer to RLE. EPSTEIN assessed all other bathing.   Lower Body Dressing   Comment Removed LLE residual limb wrapping, noted to be soiled and provided w/ new 4\" ace wrap x3, RN reporting no need for ABD pad on incision and EPSTEIN made aware of same.   Toileting Hygiene   Comment noted w/ heavily soiled brief, edu pt to utilize urinal to promote sacral wound healing and dec risk of further skin breakdown.   Cognition   Overall Cognitive Status Impaired   Additional Activities   Additional Activities Comments At start of session, pt seemed startled upon OTR arrival. Pt questionining OTR's name and repetitively expressing, \"do I know you?\" OTR provided edu on primarily OT. Following several minutes, pt then stating, \"I got you, you looked scared.\" Pt laughing, as to have been playing a joke on OTR. Noted to be an attention-seeking behavior and requested pt to refrain from interactions like this w/ staff, as staff may be concerned thinking there is an acute medical change rather than a joke. Pt demo understanding, may benefit from repetition. RN present to assess LLE incision.   Assessment   Treatment Assessment Pt seen for 30 minute skilled OT session focusing on ADL retraining. Pt noted w/ heavily soiled brief, edu and encouragement provided to utilize urinal/BSC rather than urinating in brief. Cont OT POC: endurance work, fxnl modified squat pivot xfers, BUE strengthening, sacral offloading strategies, repetitive safety training, alternating lateral weightshifting, core strengthening, ADL " retraining, and ongoing phase 1 amputee education.   Prognosis Good   Problem List Decreased strength;Decreased range of motion;Decreased endurance;Impaired balance;Decreased mobility;Decreased coordination;Impaired judgement;Decreased safety awareness;Orthopedic restrictions;Decreased cognition   Plan   Treatment/Interventions ADL retraining;Endurance training;Patient/family training   Progress Progressing toward goals   Discharge Recommendation   Rehab Resource Intensity Level, OT   (pending progress)   OT Therapy Minutes   OT Time In 1000   OT Time Out 1030   OT Total Time (minutes) 30   OT Mode of treatment - Individual (minutes) 30   OT Mode of treatment - Concurrent (minutes) 0   OT Mode of treatment - Group (minutes) 0   OT Mode of treatment - Co-treat (minutes) 0   OT Mode of Treatment - Total time(minutes) 30 minutes   OT Cumulative Minutes 436   Therapy Time missed   Time missed? No

## 2024-07-11 NOTE — ASSESSMENT & PLAN NOTE
History of left MCA CVA in May 2018 with residual expressive aphasia  Continue ASA and atorvastatin

## 2024-07-11 NOTE — PROGRESS NOTES
07/11/24 1400   Pain Assessment   Pain Assessment Tool 0-10   Pain Score No Pain   Restrictions/Precautions   Precautions Aphasia;Bed/chair alarms;Cognitive;Fall Risk;Pressure Ulcer;Supervision on toilet/commode;Seizure   Weight Bearing Restrictions Yes   LLE Weight Bearing Per Order NWB   ROM Restrictions No   Braces or Orthoses Other (Comment)  (LLE residual limb wrapping)   Cognition   Overall Cognitive Status Impaired   Arousal/Participation Alert;Cooperative   Attention Attends with cues to redirect   Orientation Level Oriented to person;Disoriented to time;Disoriented to situation;Oriented to place   Memory Unable to assess;Other (Comment)   Following Commands Follows one step commands without difficulty   Subjective   Subjective pt was agreeable to [facility] treatment session   Roll Left and Right   Type of Assistance Needed Physical assistance   Physical Assistance Level 26%-50%   Comment HOB flat, supervision on the mat due to increased space, in hospital bed was not able to complete without assistance from bedrail   Roll Left and Right CARE Score 3   Sit to Lying   Type of Assistance Needed Supervision   Physical Assistance Level No physical assistance   Comment HOB flat, no rail   Sit to Lying CARE Score 4   Lying to Sitting on Side of Bed   Type of Assistance Needed Physical assistance   Physical Assistance Level 51%-75%   Comment Mod Ax1, support R leg to side of bed and provide truncal support to sitting, VC for sequencing   Lying to Sitting on Side of Bed CARE Score 2   Sit to Stand   Type of Assistance Needed Physical assistance   Physical Assistance Level 51%-75%   Comment modA from w/c, commode, and bed. VC for R foot placement, hand placement, and sequencing   Sit to Stand CARE Score 2   Bed-Chair Transfer   Type of Assistance Needed Physical assistance   Physical Assistance Level 51%-75%   Comment ModA for modified SPT, VC for R foot placement and hand placement, sequencing. Increased truncal  lean forward during transfer.   Chair/Bed-to-Chair Transfer CARE Score 2   Walk 10 Feet   Reason if not Attempted Safety concerns   Walk 10 Feet CARE Score 88   Walk 50 Feet with Two Turns   Reason if not Attempted Safety concerns   Walk 50 Feet with Two Turns CARE Score 88   Walking 10 Feet on Uneven Surfaces   Reason if not Attempted Safety concerns   Walking 10 Feet on Uneven Surfaces CARE Score 88   Wheel 50 Feet with Two Turns   Type of Assistance Needed Supervision   Physical Assistance Level No physical assistance   Wheel 50 Feet with Two Turns CARE Score 4   Toilet Transfer   Type of Assistance Needed Physical assistance   Physical Assistance Level 51%-75%   Comment ModA modified SPT to commode over toilet, VC for R foot placement and hand placement. Increased truncal lean forward during transfer.   Toilet Transfer CARE Score 2   Therapeutic Interventions   Strengthening reviewed above knee home exercise program, handout provided to the pt: 3x10 prone glute sets, 3x10 prone hip extension, 3x10 sidelying hip abduction, 3x10 glute bridge+ bolster, 1x10 hooklying adduction squeeze + pillow, 3x10 prone push up, 3x10 abdominal crunches+ bolster   Flexibility prone lying 10 min for hip flexor contracture prevention   Other Comments   Comments pt requested to brush teeth during todays session, notable fixation on cleaning sink and asked for a new toothbrush.   Assessment   Treatment Assessment pt engaged in 90 minutes of skilled PT session focusing on overall strengthening, bed mobility, and contracture prevention. pt demonstrated greater difficulty with stand pivot transfers to and from various surfaces. greater reliance on PT support during transfers and required frequent verbal cues for hand placement and R foot placement, notable truncal lean forward. Verbal /tactile cues provided when performing exercises to engage proper musculature and to move in the appropriate positon. pt was asked to abisai R shoe, stated  "he did not want to wear it. when asked for his shoe size he stated \"i can't talk\" unable to respond with size. L LE residual limb wrapping was completed at end of session. next tx session to focus on transfers, functional mobility, bed mobility, and overall strengthening.   Problem List Decreased strength;Decreased range of motion;Decreased endurance;Impaired balance;Decreased mobility;Decreased coordination;Impaired judgement;Decreased safety awareness;Orthopedic restrictions;Decreased cognition   Barriers to Discharge Inaccessible home environment;Decreased caregiver support   PT Barriers   Physical Impairment Decreased strength;Decreased range of motion;Decreased endurance;Impaired balance;Decreased cognition;Impaired judgement;Decreased safety awareness;Decreased skin integrity;Orthopedic restrictions;Pain   Functional Limitation Wheelchair management;Walking;Transfers;Standing;Stair negotiation;Ramp negotiation;Car transfers   Plan   Treatment/Interventions LE strengthening/ROM;Functional transfer training;Therapeutic exercise;Endurance training;Bed mobility   Progress Slow progress, cognitive deficits   PT Therapy Minutes   PT Time In 1400   PT Time Out 1530   PT Total Time (minutes) 90   PT Mode of treatment - Individual (minutes) 90   PT Mode of treatment - Concurrent (minutes) 0   PT Mode of treatment - Group (minutes) 0   PT Mode of treatment - Co-treat (minutes) 0   PT Mode of Treatment - Total time(minutes) 90 minutes   PT Cumulative Minutes 465     "

## 2024-07-11 NOTE — ASSESSMENT & PLAN NOTE
Presented with left lower extremity acute limb ischemia with extensive left iliofemoral and left infrainguinal embolism requiring left femoral thrombectomy and subsequent AKA on 6/7/24 with vascular surgery.  Monitor incision, vascular surgery following, staples removed on 7/10  Continue ASA and statin   Also on Xarelto due to LV thrombus  Rehab and pain control per PMR

## 2024-07-12 PROCEDURE — 97530 THERAPEUTIC ACTIVITIES: CPT

## 2024-07-12 PROCEDURE — 97110 THERAPEUTIC EXERCISES: CPT

## 2024-07-12 PROCEDURE — 99232 SBSQ HOSP IP/OBS MODERATE 35: CPT

## 2024-07-12 PROCEDURE — 92507 TX SP LANG VOICE COMM INDIV: CPT

## 2024-07-12 PROCEDURE — 99232 SBSQ HOSP IP/OBS MODERATE 35: CPT | Performed by: PHYSICIAN ASSISTANT

## 2024-07-12 PROCEDURE — 97535 SELF CARE MNGMENT TRAINING: CPT

## 2024-07-12 RX ADMIN — MICONAZOLE NITRATE 1 APPLICATION: 20 CREAM TOPICAL at 07:55

## 2024-07-12 RX ADMIN — MICONAZOLE NITRATE: 20 CREAM TOPICAL at 17:05

## 2024-07-12 RX ADMIN — DOLUTEGRAVIR SODIUM 50 MG: 50 TABLET, FILM COATED ORAL at 08:00

## 2024-07-12 RX ADMIN — GABAPENTIN 100 MG: 100 CAPSULE ORAL at 08:00

## 2024-07-12 RX ADMIN — Medication 6 MG: at 21:25

## 2024-07-12 RX ADMIN — LEVOCARNITINE 330 MG: 1 SOLUTION ORAL at 17:06

## 2024-07-12 RX ADMIN — LEVOCARNITINE 330 MG: 1 SOLUTION ORAL at 07:58

## 2024-07-12 RX ADMIN — DIVALPROEX SODIUM 1250 MG: 500 TABLET, EXTENDED RELEASE ORAL at 08:00

## 2024-07-12 RX ADMIN — LOSARTAN POTASSIUM 50 MG: 50 TABLET, FILM COATED ORAL at 08:00

## 2024-07-12 RX ADMIN — ATORVASTATIN CALCIUM 80 MG: 80 TABLET, FILM COATED ORAL at 17:04

## 2024-07-12 RX ADMIN — TAMSULOSIN HYDROCHLORIDE 0.4 MG: 0.4 CAPSULE ORAL at 17:04

## 2024-07-12 RX ADMIN — LAMOTRIGINE 50 MG: 25 TABLET ORAL at 17:04

## 2024-07-12 RX ADMIN — ZONISAMIDE 400 MG: 100 CAPSULE ORAL at 07:58

## 2024-07-12 RX ADMIN — LEVOCARNITINE 330 MG: 1 SOLUTION ORAL at 13:07

## 2024-07-12 RX ADMIN — BICTEGRAVIR SODIUM, EMTRICITABINE, AND TENOFOVIR ALAFENAMIDE FUMARATE 1 TABLET: 50; 200; 25 TABLET ORAL at 07:57

## 2024-07-12 RX ADMIN — LAMOTRIGINE 50 MG: 25 TABLET ORAL at 08:00

## 2024-07-12 RX ADMIN — METOPROLOL SUCCINATE 25 MG: 25 TABLET, EXTENDED RELEASE ORAL at 08:00

## 2024-07-12 RX ADMIN — RIVAROXABAN 20 MG: 20 TABLET, FILM COATED ORAL at 17:04

## 2024-07-12 RX ADMIN — GABAPENTIN 100 MG: 100 CAPSULE ORAL at 21:25

## 2024-07-12 RX ADMIN — MIRTAZAPINE 15 MG: 15 TABLET, FILM COATED ORAL at 21:25

## 2024-07-12 RX ADMIN — GABAPENTIN 100 MG: 100 CAPSULE ORAL at 17:04

## 2024-07-12 RX ADMIN — SERTRALINE HYDROCHLORIDE 75 MG: 50 TABLET ORAL at 08:00

## 2024-07-12 RX ADMIN — ASPIRIN 81 MG: 81 TABLET, COATED ORAL at 08:00

## 2024-07-12 NOTE — ASSESSMENT & PLAN NOTE
Noted on echocardiogram 6/10/2024: spherical 1.5 x 1.3 cm thrombus at the LV apex   Initiated on AC with Xarelto, continue  Rx sent to [facility] for price check - Pharmacy could not verify pt's rx benefit. CM to investigate.

## 2024-07-12 NOTE — PROGRESS NOTES
07/12/24 1230   Pain Assessment   Pain Assessment Tool 0-10   Pain Score No Pain   Cognition   Overall Cognitive Status Impaired   Arousal/Participation Alert;Cooperative   Attention Attends with cues to redirect   Orientation Level Oriented to person;Oriented to place;Disoriented to time;Disoriented to situation   Memory Unable to assess;Other (Comment)   Following Commands Follows one step commands without difficulty   Roll Left and Right   Comment S with use of bed rail   Sit to Lying   Type of Assistance Needed Supervision   Comment no rail   Sit to Lying CARE Score 4   Lying to Sitting on Side of Bed   Type of Assistance Needed Supervision   Comment no rail to R side   Lying to Sitting on Side of Bed CARE Score 4   Bed-Chair Transfer   Type of Assistance Needed Physical assistance;Verbal cues   Physical Assistance Level 25% or less   Comment modified sit VC francesco's for hand placement   Chair/Bed-to-Chair Transfer CARE Score 3   Transfer Bed/Chair/Wheelchair   Adaptive Equipment None   Walk 10 Feet   Reason if not Attempted Safety concerns   Walk 10 Feet CARE Score 88   Walk 50 Feet with Two Turns   Reason if not Attempted Safety concerns   Walk 50 Feet with Two Turns CARE Score 88   Walk 150 Feet   Reason if not Attempted Safety concerns   Walk 150 Feet CARE Score 88   Walking 10 Feet on Uneven Surfaces   Reason if not Attempted Safety concerns   Walking 10 Feet on Uneven Surfaces CARE Score 88   Ambulation   Primary Mode of Locomotion Prior to Admission Walk   Does the patient walk? 0. No, and walking goal is not clinically indicated.   Wheel 50 Feet with Two Turns   Type of Assistance Needed Supervision   Wheel 50 Feet with Two Turns CARE Score 4   Wheel 150 Feet   Type of Assistance Needed Supervision   Wheel 150 Feet CARE Score 4   Wheelchair mobility   Does the patient use a wheelchair? 1. Yes   Type of Wheelchair Used 1. Manual   Method Right upper extremity;Left upper extremity   Assistance Provided  For Remove armrests;Replace armrests   Distance Level Surface (feet) 250 ft   4 Steps   Reason if not Attempted Safety concerns   4 Steps CARE Score 88   12 Steps   Reason if not Attempted Safety concerns   12 Steps CARE Score 88   Toilet Transfer   Type of Assistance Needed Physical assistance;Verbal cues   Physical Assistance Level 26%-50%   Comment ADKOTAH onto BSC from bed, MIN/MODA to bed from BSC   Toilet Transfer CARE Score 3   Toilet Transfer   Surface Assessed Bedside Commode   Therapeutic Interventions   Strengthening in prone L hip ext, R HS curl and hip ext with glute sets 3 x10 reps   Flexibility prone lying x10 min   Other L residual limb ace wrapped end of session in supine. Several sit pivot transfers to surfaces with/without hand rails.   Equipment Use   NuStep L2 x10 min BUE, RLE   Assessment   Treatment Assessment Pt participated in skilled PT session with increased focus on WC mobility, functional transfers, ace wrapping and prone lying. Pt was very agreeable this session. Pt stated increased tightness to L hip flexors when in prone. He required increased A off of BSC this session but maintained DAKOTAH otherwise. Throughout session when asked a question pt would not always answer appropriately but understood what was being asked of him. Pt will cont POC as tolerated with cont focus on L hip ext, LE strengthening, bed mobility, increased functional transfers and WC mobility to increase I.   Problem List Decreased strength;Decreased range of motion;Decreased endurance;Impaired balance;Decreased mobility;Decreased coordination;Impaired judgement;Decreased safety awareness;Orthopedic restrictions;Decreased cognition   Barriers to Discharge Inaccessible home environment;Decreased caregiver support   PT Barriers   Functional Limitation Wheelchair management;Walking;Transfers;Standing;Stair negotiation;Ramp negotiation;Car transfers   Plan   Treatment/Interventions Functional transfer training;LE  strengthening/ROM;Therapeutic exercise;Endurance training;Cognitive reorientation;Patient/family training;Bed mobility;Gait training   Progress Progressing toward goals   Discharge Recommendation   Equipment Recommended Wheelchair   PT Therapy Minutes   PT Time In 1230   PT Time Out 1330   PT Total Time (minutes) 60   PT Mode of treatment - Individual (minutes) 60   PT Mode of treatment - Concurrent (minutes) 0   PT Mode of treatment - Group (minutes) 0   PT Mode of treatment - Co-treat (minutes) 0   PT Mode of Treatment - Total time(minutes) 60 minutes   PT Cumulative Minutes 525   Therapy Time missed   Time missed? No

## 2024-07-12 NOTE — ASSESSMENT & PLAN NOTE
Remote history of TBI, previously residing in a group home prior to longterm sentence.  Evaluated by neuropsychiatry on 6/27/24 and deemed NOT to have medical decision-making capacity  Process for court appointed guardian started on 7/5/24 - CM following

## 2024-07-12 NOTE — PROGRESS NOTES
Physical Medicine and Rehabilitation Progress Note  Sunil Patel 63 y.o. male MRN: [id number]  Unit/Bed#: -01 Encounter: [id number]      Assessment & Plan:     Decline in ADLs and mobility: Functional assessment - improving         FIM  Care Score  Admit Score Recent Score    Total assist  1-100% or 2p    Tot     Max assist 2-51-75%    Sub To hygiene, bathing, LBD    Mod assist 3- 26-50%  Par  Bathing, LBD, to hygiene   Min assist 3- 25% or < Par     CG assist 4  TA     Sup/Setup 4-5 Sup UBD UBD   Mod-I/Indep 6 MI      Transfers  Significant assist  Min-mod assist    Ambulation   Total      WC mob  150 ft supervision     Stairs   Total assist/NT      Goal: Supervision for most ADLs, transfers and for WC mobility  Major barriers:  L AKA, neurocog d/o, dispo  Dispo: Possibly acute care with [facility] 14 days from admission unless alternative viable dispo becomes available pending guardianship      * Above-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 On imaging he was found to have an acute occlusion of the left external iliac artery without any visualizations of distal vessels, consistent with [place] class III acute limb ischemia and it was determined by vascular surgery that the LLE was not salvageable. He underwent left femoral thrombectomy and AKA on 6/7/24 with vascular surgery.   - Monitor closely for phantom limb pain/sensation. Conservative modalities such as gentle massage along the residual limb (avoiding deep pressure or direct contact with the incision site) can be utilized to reduce severity and frequency of phantom pain.  "Topical capsaicin and lidocaine can be considered for phantom pain.   - On Rivaroxaban with hx of LV thrombus and PAOD   - Analgesia as below  - Avoid pillows/wedging posterior to the residual limb to avoid development of hip flexion contracture.   - Amputation education on ARC  - Local incision care, monitor for breakdown, frequent turns; incision care per vascular surgery  - Limb-shaping with ACE wrapping for now  - He will require outpatient PM&R follow-up for assessment for and consideration of prosthesis fabrication when medically cleared by surgery to do so.  - Tolerating therapy adequately thus far in ARC - continue PT, OT in ARC setting     Pressure injury of buttock, unstageable and at high risk for breakdown  Assessment & Plan  Stable   - Wound care consulted and following  Per wound care specialist 7/8  \"- Wound care consulted and following  \"Right Heel dry intact and shelley with no skin loss or wounds present. Recommend preventative Hydraguard Cream and proper offloading/ repositioning.    POA B/L Sacro-buttocks Unstageable Pressure Injury: wound now presents as a POA Right Buttocks Stage 3 Pressure Injury. Wound was previously 2 areas of full thickness skin loss on b/l sacro-buttock. Wound is now one area of full thickness on right buttock. Left buttock is now intact, hyperpigmented and blanches. Right buttock wound with mix of 80% pink tissue with scattered 20% yellow slough tissue. Anthony-wound is fragile and hyperpigmented. Scant serosanguineous drainage noted. Recommend continuing with triad paste and hydraguard to anthony-wounds   - No induration, fluctuance, odor, warmth/temperature differences, redness, or purulence noted to the above noted wounds and skin areas assessed. New dressings applied per orders listed below. Patient tolerated well- no s/s of non-verbal pain or discomfort observed during the encounter. Bedside nurse aware of plan of care. See flow sheets for more detailed assessment findings. " "    Skin Care Plan:  1-Cleanse sacro-buttocks with soap and water. Apply Triad Paste to Sacro-Buttocks Wound Beds Only. Apply Hydraguard to Elsi-wounds and all other intact aspects of sacro-buttocks. Apply both creams TID and PRN episodes of incontinence.   2-Turn/reposition q2h or when medically stable for pressure re-distribution on skin .  3-Elevate right heel to offload pressure  4-Moisturize skin daily with skin nourishing cream  5-Ehob cushion in chair when out of bed.  6-Preventative Hydraguard to right heel BID and PRN.  7-P-500 Low Air Loss Mattress   8-Apply ELVA to groin BID per order. \"    - Recommend ROHO cushion in chair when out of bed instead   - Preventative hydraguard to bilateral heels BID and PRN.   - Monitor clinically for breakdown, frequent turns      Patient incapable of making informed decisions  Assessment & Plan  - History of remote TBI and prior strokes with comorbid psychiatric history.  - Evaluated by neuropsychology, Dr. Dilshad Tatum, PhD on 6/27/24, found to have \"diffuse cognitive dysfunction and on a measure assessing awareness of personal health status and ability to evaluate health problems, handle medical emergencies and take safety precautions, patient performed in the IMPAIRED range of functioning. During this encounter, patient does not appear to have capacity to make fully informed medical decisions.\"  - Court-appointed guardianship process has been initiated by acute care CM Katia Kay. Patient does not have family available or willing to take on medical decision making at this time.   - Patient will transition back to acute care when functional rehabilitation goals are met to follow-up with court-appointed guardianship for placement.    Impaired mobility and activities of daily living  Assessment & Plan  - Rehabilitation medicine physician for daily monitoring of care, 24 hour availability for acute medical issues, medication management, and therapeutic and diagnostic " "assessments.  - 24 hour rehabilitation nursing 7 days per week for: management/teaching of medications, bowel/bladder routine, skin care.  - PT, OT for 2-3 hours per day, 5 to 6 days per week; 15 hours per week  - Rehabilitation Psychology as needed for adjustment and coping  - MSW for barriers to discharge, community resources, and family support  - Discharge planning following to help ensure a safe and efficient discharge        Left ventricular thrombus  Assessment & Plan  - Visualized on echocardiogram on 6/10/24: spherical 1.5 x 1.3 cm thrombus at the LV apex.   - Rivaroxaban  - Outpatient follow-up with cardiology    Neurocognitive disorder  Assessment & Plan  Cog slightly better compared to acute   Hx of TBI and L MCA CVA, psychiatric d/o, seizure d/o  - Neuropsych assessment 6/27/24 - \"diffuse cognitive dysfunction and on a measure assessing awareness of personal health status and ability to evaluate health problems, handle medical emergencies and take safety precautions, patient performed in the IMPAIRED range of functioning. During this encounter, patient does not appear to have capacity to make fully informed medical decisions.\"  MMSE 4/28 - severely impaired  - Guardianship process initiated on acute - follow-up by CM/Admin  - Status: some expressive and possible some receptive aphasia.  He can be difficult to follow at times likely due to a mixture of aphasia, tangentiality, mild lability, and impaired memory; lability with hx of possible bipolar d/o  - Neuropsych Med review: Depakote, Lamictal, Remeron, Zoloft, Melatonin, gabapentin, PRN oxy 2.5mg TID   - Monitor neuro-exam, wakefulness, mood, cognition, insight into deficits and safety awareness   - Monitor and ensure optimal management electrolytes, nutrition, and hydration  - Monitor for signs or symptoms of infection, medication intolerances, other systemic etiologies  - Additional labs, imaging, specialist follow-up as needed per primary team " currently   - Overstimulation precautions, frequent re-orientation, re-direction, re-assurance  - Optimal mood, pain, and sleep management  - If impaired sleep or behavior recommend sleep log and agitation monitoring    - Limit sedating medications when possible  - Fall precautions - if needed increase rounding or consider virtual sitter or in-person sitter  - For routine restlessness, anxiety, irritability focus on non-pharmacologic management    - Hold benzo's with increased risk paradoxical reaction and possibility of limiting cognitive recovery  - Recommend acute comprehensive interdisciplinary inpatient rehabilitation to include intensive skilled therapies (PT, OT, ST) with oversight and management by rehabilitation physician.  Inpatient rehab level nursing, case management and weekly interdisciplinary team meetings.          - Obtain Aphasia cog assessment such as WAB  - Assess iADLs - ability to manage medications, meal prep, finances, obtaining appropriate transport from community, managing emergencies, and overall safety in home environment.  - Provide therapy, compensatory strategies, and if available and necessary provide caregiver training.   - OP neuro and PCP     Traumatic brain injury (HCC)  Assessment & Plan  See neurocog impairment     History of stroke  Assessment & Plan  - History of old left temporal and parietal lobe cortical infarcts, also involving the insula and left occipital lobe as well as small right posterior parietal lobe. Stable on most recent CT head on 5/16/24.   - ASA, Xarelto and statin for secondary prevention  - Optimal BP control  - Monitor neuro exam - hx of LV thrombus   - OP neuro follow-up     Bipolar affective disorder (HCC)  Assessment & Plan  Mood acceptable; continue meds as outlined   Supportive counseling  NeuroPsychology consult while in ARC if available for support  Counseled on and continue to encourage deep breathing/relaxation/behavioral management techniques  -  Mirtazapine 15 mg qHs  - Sertraline 75 mg daily  - Lamictal 50mg BID  - Depakote ER 1250mg qday   - Outpatient psychiatry follow-up    At risk for altered urinary elimination  Assessment & Plan  - Did have some incontinence on acute   - monitor for retention, incontinence (including overflow incontinence), signs/symptoms of UTI  - recommend toileting program Q2-4 H during day and Q4-6H overnight   - nursing prompt to void  - if needed - bladder scan Q4H standing, record all voided amounts (if able to collect), record additional bladder scans as needed after all voids (ie. PVRs)  - Straight cath PRN >450 cc; or if has urge to void and cannot 250-449 cc  - If post void residual bladder scans are <150 cc x3 consecutive voids can stop scans          At risk for constipation  Assessment & Plan  - Stooling adequately recently; did have some incontinence on acute now improved  Toileting program: Toilet approx Q2-4H during day (provide bedpan only if too immobile for bedside commode or toilet but OOB preferred) and Q4-6H overnight  - Ensure adequate hydration, adjust bowel meds as indicated, monitor for infectious diarrhea  - Monitor and optimize skin care, nursing to assist with monitor skin closely for erythema, breakdown, or rashes (or worsening of prior)  - Monitor closely for opioid and immobility-induced constipation. Scheduled bowel regimen. Will follow BMs and adjust bowel regimen to target soft, formed stools q1-2 days, per pt's regular schedule.      Acute pain  Assessment & Plan  Controlled   - Tylenol 975 mg q8h PRN  - Gabapentin 100 mg TID scheduled for phantom pain/sensation  - Oxycodone 2.5 mg q8h PRN, wean as possible    Adjustment disorder with mixed anxiety and depressed mood  Assessment & Plan  - Related to recent release from incarceration, new AKA and uncertainty of future disposition, all in the setting of impaired cognition related to prior strokes and TBI  - Behavioral techniques for symptom  management  - Neuropsychology consult as available    At risk for venous thromboembolism (VTE)  Assessment & Plan  - On rivaroxaban    Carnitine deficiency (HCC)  Assessment & Plan  - Carnitine replacement  - Appreciate medicine management      History of seizures  Assessment & Plan  - Depakote ER, lamotrigine, zonisamide  - [facility]    Benign essential hypertension  Assessment & Plan  - Metoprolol, losartan  - Appreciate medicine management    Human immunodeficiency virus (HIV) infection (HCC)  Assessment & Plan  - Continue anti-retroviral treatment  - Appreciate medicine management during ARC course  - OP ID follow-up           Other Medical Issues:  Monitor for     Follow-up providers and other issues to be followed up after discharge  PCP  Cards  ID  PMR  Vasc sx       Restrictions include:  Fall precautions    Objective:    Allergies per EMR  Diagnostic Studies: Reviewed, no new imaging  No orders to display     See above as well    Laboratory: Labs reviewed  Results from last 7 days   Lab Units 07/11/24  0526   HEMOGLOBIN g/dL 11.1*   HEMATOCRIT % 36.0*   WBC Thousand/uL 9.55     Results from last 7 days   Lab Units 07/11/24  0526   BUN mg/dL 19   SODIUM mmol/L 139   POTASSIUM mmol/L 4.0   CHLORIDE mmol/L 105   CREATININE mg/dL 0.89            Drug regimen reviewed, all potential adverse effects identified and addressed:    Scheduled Meds:  Current Facility-Administered Medications   Medication Dose Route Frequency Provider Last Rate    acetaminophen  975 mg Oral TID PRN José Salcido MD      aspirin  81 mg Oral Daily Lorrie Barillas PA-C      atorvastatin  80 mg Oral Daily With Dinner Lorrie Barillas PA-C      bictegravir-emtricitab-tenofovir alafenamide  1 tablet Oral Daily With Breakfast Lorrie Barillas PA-C      bisacodyl  10 mg Rectal Daily PRN Lorrie Barillas PA-C      diphenhydrAMINE  25 mg Oral Q6H PRN Lorrie Barillas PA-C      divalproex  sodium  1,250 mg Oral Daily Lorrie Barillas PA-C      dolutegravir  50 mg Oral Daily Lorrie Barillas PA-C      gabapentin  100 mg Oral TID Lorrie Barillas PA-C      lamoTRIgine  50 mg Oral BID Lorrie Barillas PA-C      levOCARNitine  1,000 mg/day Oral TID With Meals Lorrie Barillas PA-C      losartan  50 mg Oral Daily Lorrie Barillas PA-C      melatonin  6 mg Oral HS Lorrie Barillas PA-C      metoprolol succinate  25 mg Oral Daily CHARLIE Mcclelland      mirtazapine  15 mg Oral HS Lorrie Barillas PA-C      ELVA ANTIFUNGAL   Topical BID Lorrie Barillas PA-C      ondansetron  4 mg Oral Q6H PRN Lorrie Barillas PA-C      oxyCODONE  2.5 mg Oral Q8H PRN Raimundo Riley MD      polyethylene glycol  17 g Oral Daily PRN Lorrie Barillas PA-C      rivaroxaban  20 mg Oral Daily With Dinner Lorrie Barillas PA-C      senna  1 tablet Oral HS PRN Lorrie Barillas PA-C      sertraline  75 mg Oral Daily Lorrie Barillas PA-C      tamsulosin  0.4 mg Oral Daily With Dinner Lorrie Barillas PA-C      zonisamide  400 mg Oral Daily Lorrie Barillas PA-C         Chief Complaints:  S/P L AKA     Subjective:     On eval, patient with expressive>receptive aphasia limiting some communication but appears to communicate adequately if given additional time again today.  Patient denies significant residual limb pain or other pain, lightheadedness, shortness of breath or other new complaints.    ROS: A 10 point ROS was performed; negative except as noted above.       Physical Exam:  Temp:  [98.2 °F (36.8 °C)-98.4 °F (36.9 °C)] 98.4 °F (36.9 °C)  HR:  [82-89] 89  Resp:  [17-19] 19  BP: (112-130)/(59-73) 117/62  SpO2:  [95 %-98 %] 95 %    GEN:  Sitting in NAD  and in gym   HEENT/NECK: MMM  CARDIAC: Regular rate rhythm, no murmers, no rubs, no gallops  LUNGS:  clear to auscultation, no wheezes, rales, or rhonchi  ABDOMEN: Soft, non-tender, non-distended, normal  "active bowel sounds  EXTREMITIES/SKIN:  LLL with wrap in place  NEURO:   MENTAL STATUS: Stable expressive greater than receptive aphasia, adequate wakefulness, able to follow most simple commands  PSYCH:  Affect:  Euthymic     HPI:  \"Sunil Patel is a 63 y.o. male who  has a past medical history of Acute lower limb ischemia (06/08/2024), Anxiety, Depression, HIV disease (HCC), Substance abuse (HCC), and Suicide attempt ([facility]). who presented to the [facility] on 6/7/24 from [facility] for increased LLE swelling and pain and was found to have a left external iliac artery occlusion and acute limb ischemia. 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 Patient had TTE on 6/10/24 showing LV apex thrombus. On 6/11/24 patient had pharmacologic nuclear stress test/SPECT scan which did not show any significant areas of ischemia with EF 40-45% and recommended continuing A/C for LV apical thrombus. His course was complicated by b/l buttock unstageable pressure ulcers, urinary and fecal incontinence, pain, and significant decline in ADLs and mobility. Patient has been continued on Xarelto for anticoagulation, as well as ASA and statin. Patient has a history of TBI, with baseline nonfluent aphasia and forgetfulness. He was evaluated by neuropsychology on 6/27/24, and deemed to not have capacity to make fully informed medical decisions. Therefore, the guardianship process was initiated as of 7/5/24.  \"    ** Please Note: Fluency Direct voice to text software may have been used in the creation of this document. **        "

## 2024-07-12 NOTE — ASSESSMENT & PLAN NOTE
Home regimen: Losartan 50mg daily, Toprol XL 25 mg BID  Current regimen: Losartan 50 mg daily, Toprol-XL 25 mg daily  BP acceptable.

## 2024-07-12 NOTE — PROGRESS NOTES
[facility]  Progress Note  Name: Sunil Patel I  MRN: [id number]  Unit/Bed#: -01 I Date of Admission: 7/6/2024   Date of Service: 7/12/2024 I Hospital Day: 6    Assessment & Plan   * Above-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)  Assessment & Plan  Presented with left lower extremity acute limb ischemia with extensive left iliofemoral and left infrainguinal embolism requiring left femoral thrombectomy and subsequent AKA on 6/7/24 with vascular surgery.  Monitor incision, vascular surgery following, staples removed on 7/10  Continue ASA and statin   Also on Xarelto due to LV thrombus  Rehab and pain control per PMR    Traumatic brain injury (HCC)  Assessment & Plan  Remote history of TBI, previously residing in a group home prior to skilled nursing sentence.  Evaluated by neuropsychiatry on 6/27/24 and deemed NOT to have medical decision-making capacity  Process for court appointed guardian started on 7/5/24 - CM following     Carnitine deficiency (HCC)  Assessment & Plan  Continue levocarnitine 330 mg 3x daily with meals.    History of seizures  Assessment & Plan  Diagnosed in July 2019, follows with [facility] neurology outpatient  Continue home regimen with Depakote ER, Lamotrigine and Zonegran    Left ventricular thrombus  Assessment & Plan  Noted on echocardiogram 6/10/2024: spherical 1.5 x 1.3 cm thrombus at the LV apex   Initiated on AC with Xarelto, continue  Rx sent to [facility] for price check - Pharmacy could not verify pt's rx benefit. CM to investigate.    Benign essential hypertension  Assessment & Plan  Home regimen: Losartan 50mg daily, Toprol XL 25 mg BID  Current regimen: Losartan 50 mg daily, Toprol-XL 25 mg daily  BP acceptable.    History of stroke  Assessment & Plan  History of left MCA CVA in May 2018 with residual expressive aphasia  Continue ASA and atorvastatin    Human immunodeficiency virus (HIV) infection (HCC)  Assessment & Plan  Continue Biktarvy and  Tivicay.    Bipolar affective disorder (HCC)  Assessment & Plan  Continue home meds Zoloft and Remeron  Outpatient follow-up with Psychiatry             The above assessment and plan was reviewed and updated as determined by my evaluation of the patient on 7/12/2024.    Labs:   Results from last 7 days   Lab Units 07/11/24  0526 07/05/24  1058   WBC Thousand/uL 9.55 8.92   HEMOGLOBIN g/dL 11.1* 12.1   HEMATOCRIT % 36.0* 40.5   PLATELETS Thousands/uL 393* 543*     Results from last 7 days   Lab Units 07/11/24  0526 07/05/24  1058   SODIUM mmol/L 139 138   POTASSIUM mmol/L 4.0 4.0   CHLORIDE mmol/L 105 103   CO2 mmol/L 24 25   BUN mg/dL 19 25   CREATININE mg/dL 0.89 1.00   CALCIUM mg/dL 9.0 9.6                   Imaging  No orders to display       Review of Scheduled Meds:  Current Facility-Administered Medications   Medication Dose Route Frequency Provider Last Rate    acetaminophen  975 mg Oral TID PRN José Salcido MD      aspirin  81 mg Oral Daily Lorrie Barillas PA-C      atorvastatin  80 mg Oral Daily With Dinner Lorrie Barillas PA-C      bictegravir-emtricitab-tenofovir alafenamide  1 tablet Oral Daily With Breakfast Lorrie Barillas PA-C      bisacodyl  10 mg Rectal Daily PRN Lorrie Barillas PA-C      diphenhydrAMINE  25 mg Oral Q6H PRN Lorrie Barillas PA-C      divalproex sodium  1,250 mg Oral Daily Lorrie Barillas PA-C      dolutegravir  50 mg Oral Daily Lorrie Barillas PA-C      gabapentin  100 mg Oral TID Lorrie Barillas PA-C      lamoTRIgine  50 mg Oral BID Lorrie Barillas PA-C      levOCARNitine  1,000 mg/day Oral TID With Meals Lorrie Barillas PA-C      losartan  50 mg Oral Daily Lorrie Barillas PA-C      melatonin  6 mg Oral HS Lorrie Barillas PA-C      metoprolol succinate  25 mg Oral Daily CHARLIE Mcclelland      mirtazapine  15 mg Oral HS EJ Black ANTIFUNGAL   Topical BID Lorrie Barillas PA-C       ondansetron  4 mg Oral Q6H PRN Lorrie Barillas PA-C      oxyCODONE  2.5 mg Oral Q8H PRN Raimundo Riley MD      polyethylene glycol  17 g Oral Daily PRN Lorriejacky Barillas PA-C      rivaroxaban  20 mg Oral Daily With Dinner Lorrie Barillas PA-C      senna  1 tablet Oral HS PRN Lorrie Barillas PA-C      sertraline  75 mg Oral Daily Lorriejacky Barillas PA-C      tamsulosin  0.4 mg Oral Daily With Dinner Lorrie Barillas PA-C      zonisamide  400 mg Oral Daily Lorriejacky Barillas PA-C         Subjective/ HPI: Patient seen and examined. Patients overnight issues or events were reviewed with nursing staff. New or overnight issues include the following:     Pt seen in his room. He denies any current complaints.    ROS:   A 10 point ROS was performed; negative except as noted above.        *Labs /Radiology studies Reviewed  *Medications  reviewed and reconciled as needed  *Please refer to order section for additional ordered labs studies      Physical Examination:  Vitals:   Vitals:    07/11/24 0521 07/11/24 1316 07/11/24 2042 07/12/24 0619   BP: 118/53 103/57 120/65 130/73   BP Location: Left arm Left arm Left arm Left arm   Pulse: 60 88 86 85   Resp: 20 18 20 18   Temp: 98.8 °F (37.1 °C) 98.3 °F (36.8 °C) 98.3 °F (36.8 °C)    TempSrc: Oral Oral Oral Oral   SpO2: 98% 97% 95% 96%   Weight:       Height:           General Appearance: NAD; pleasant  HEENT: PERRLA, conjuctiva normal; mucous membranes moist; face symmetrical  Neck:  Supple  Lungs: clear bilaterally, normal respiratory effort, no retractions, expiratory effort normal, on room air  CV: regular rate and rhythm, no murmurs rubs or gallops noted   ABD: soft non tender, +BS x4  EXT: Lt AKA  Skin: normal turgor, normal texture, no rash  Psych: affect normal, mood normal  Neuro: Awake and alert.     The above physical exam was reviewed and updated as determined by my evaluation of the patient on 7/12/2024.    Invasive Devices       None                       VTE Pharmacologic Prophylaxis: Xarelto  Code Status: Level 1 - Full Code  Current Length of Stay: 6 day(s)    Total floor / unit time spent today 30 minutes  Coordination of patient's care was performed in conjunction with primary service. Time invested included review of patient's labs, vitals, and management of their comorbidities with continued monitoring, examination of patient as well as answering patient questions, documenting her findings and creating progress note in electronic medical record,  ordering appropriate diagnostic testing.       ** Please Note:  voice to text software may have been used in the creation of this document. Although proof errors in transcription or interpretation are a potential of such software**

## 2024-07-12 NOTE — PROGRESS NOTES
24 1330   Pain Assessment   Pain Assessment Tool 0-10   Pain Score No Pain   Restrictions/Precautions   Precautions Aphasia;Bed/chair alarms;Cognitive;Fall Risk;Supervision on toilet/commode   Comprehension   Comprehension (FIM) 4 - Understands basic info/conversation 75-90% of time   Expression   Expression (FIM) 3 - Expresses basic info/needs 50-74% of time   Social Interaction   Social Interaction (FIM) 5 - Interacts appropriately with others 90% of time   Problem Solving   Problem solving (FIM) 4 - Solves basic problems 75-89% of time   Memory   Memory (FIM) 4 - Recognizes/recalls/performs 75-89%   Speech/Language/Cognition Assessmetn   Treatment Assessment Pt was awake, alert and cooperative for session. Today focus was on towards external basic communication boards to use when pt is experiencing increased difficulty in verbal communication in his attempts to express basic wants/need. SLP did initiate this by using FO4 pictures w/ words for the targeted need (ie: pain, brush teeth, shower, bathroom). SLP targeting pt's receptive ID give FO4 communication board was 4/4 accurate. When providing pt an addition FO4 page of basic wants/needs, pt remained to be 4/4 accurate in his ability to ID correct word/picture. Increased communication board to FO12 items, which still continued to target basic wants/needs, pt was able to maintain 12/12 accuracy in the increased picture/word field provided for this communication board. Also observed given this was pt's ability to spontaneously state the written word associated w/ the pictures provided, where he was 9/12 in stating.     Targeting written expression, pt was able to write own name, but unable to further write out , age, etc. This will not be an effective means for additional communication at this time, as pt did have increased difficulty given this. However, SLP did provide pt w/ written simple sentences targeting , current location, reasoning for  hospitalization and month/year. When SLP verbally stated sentence, pt was able to ID each sentence accordingly. Attempts in verbally stating them was more effortful, but still benefited from verbal cues for most words. At this time, still brief f/u warranted at this time, as pt is demonstrating ability to comprehend basic skills at this time.    SLP Therapy Minutes   SLP Time In 1330   SLP Time Out 1400   SLP Total Time (minutes) 30   SLP Mode of treatment - Individual (minutes) 30   SLP Mode of treatment - Concurrent (minutes) 0   SLP Mode of treatment - Group (minutes) 0   SLP Mode of treatment - Co-treat (minutes) 0   SLP Mode of Treatment - Total time(minutes) 30 minutes   SLP Cumulative Minutes 95   Therapy Time missed   Time missed? No

## 2024-07-12 NOTE — PLAN OF CARE
Problem: PAIN - ADULT  Goal: Verbalizes/displays adequate comfort level or baseline comfort level  Description: Interventions:  - Encourage patient to monitor pain and request assistance  - Assess pain using appropriate pain scale  - Administer analgesics based on type and severity of pain and evaluate response  - Implement non-pharmacological measures as appropriate and evaluate response  - Consider cultural and social influences on pain and pain management  - Notify physician/advanced practitioner if interventions unsuccessful or patient reports new pain  Outcome: Progressing     Problem: INFECTION - ADULT  Goal: Absence or prevention of progression during hospitalization  Description: INTERVENTIONS:  - Assess and monitor for signs and symptoms of infection  - Monitor lab/diagnostic results  - Monitor all insertion sites, i.e. indwelling lines, tubes, and drains  - Monitor endotracheal if appropriate and nasal secretions for changes in amount and color  - [place] appropriate cooling/warming therapies per order  - Administer medications as ordered  - Instruct and encourage patient and family to use good hand hygiene technique  - Identify and instruct in appropriate isolation precautions for identified infection/condition  Outcome: Progressing

## 2024-07-12 NOTE — CASE MANAGEMENT
Cm checked in with pt at bedside, no concerns. Acute cm and cm working on sending referrals again to SNF, attempting to communicate directly with syd so they can review again.

## 2024-07-12 NOTE — CASE MANAGEMENT
Case Management Discharge Planning Note    Patient name Sunil Patel  Location /-01 MRN [id number]  : 1960 Date 2024       Current Admission Date: 2024  Current Admission Diagnosis:Above-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)   Patient Active Problem List    Diagnosis Date Noted Date Diagnosed    Neurocognitive disorder 2024     Cardiomyopathy (HCC) 2024     Adjustment disorder with mixed anxiety and depressed mood 2024     Impaired mobility and activities of daily living 2024     At risk for venous thromboembolism (VTE) 2024     Acute pain 2024     At risk for constipation 2024     At risk for altered urinary elimination 2024     Patient incapable of making informed decisions 2024     Pressure injury of buttock, unstageable and at high risk for breakdown 2024     Above-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) 2024     Traumatic brain injury ([facility]) 2024     Carnitine deficiency ([facility]) 2024     Left ventricular thrombus 2024     History of seizures 2024     Tobacco abuse 10/26/2019     Cerebrovascular accident (CVA) ([facility]) 10/26/2019     Positive laboratory testing for human immunodeficiency virus ([facility]) 2017     Bipolar affective disorder ([facility]) 2017     Hypertension 2017     Human immunodeficiency virus (HIV) infection ([facility]) 2017     History of stroke 2017     Substance abuse ([facility]) 2013     Unspecified vitamin D deficiency 2010     Benign essential hypertension 2010       LOS (days): 6  Geometric Mean LOS (GMLOS) (days):   Days to GMLOS:     OBJECTIVE:  Risk of Unplanned Readmission Score: 26.99         Current admission status: Inpatient Rehab   Preferred Pharmacy:   [facility] [place] [facility] & [address]  [place] [address]  Phone: [phone] Fax:  [phone]    [facility] [place] [facility] [place] - [address]  [address]  [place] [address]  Phone: [phone] Fax: [phone]    Primary Care Provider: CHARLIE Trevino    Primary Insurance: MEDICARE  Secondary Insurance: [facility]    DISCHARGE DETAILS:    CM received notification that Pts Guardianship Hearing has been scheduled for Tuesday, August 6th, 2024 at 10:00am. This hearing will be held at the [facility]. All parties involved have been notified.     CM met w/ Pt at bedside to update him and provide a copy of the petition.     CM dept will continue to follow.

## 2024-07-12 NOTE — PROGRESS NOTES
"   07/12/24 0900   Pain Assessment   Pain Assessment Tool 0-10   Pain Score No Pain   Restrictions/Precautions   Precautions Aphasia;Bed/chair alarms;Cognitive;Fall Risk;Seizure;Supervision on toilet/commode   Weight Bearing Restrictions Yes   LLE Weight Bearing Per Order NWB   ROM Restrictions No   Braces or Orthoses Other (Comment)  (L residual limb wrapping)   Lifestyle   Autonomy \"I am doing good, yeah!\"   Bed-Chair Transfer   Type of Assistance Needed Physical assistance;Verbal cues;Adaptive equipment;Set-up / clean-up   Physical Assistance Level 25% or less   Comment Min A for modified squat pivot, cues for set-up and proper hand placement. improvement noted w/ pacing.   Chair/Bed-to-Chair Transfer CARE Score 3   Toilet Transfer   Type of Assistance Needed Physical assistance;Verbal cues;Adaptive equipment;Set-up / clean-up   Physical Assistance Level 25% or less   Comment Min A modified squat pivot to drop-arm platform BSC. Plan to trial modified squat pivot to drop arm BSC vs standard BSC.   Toilet Transfer CARE Score 3   Exercise Tools   Other Exercise Tool 1 BUE ther ex to maximize strength and endurance for ADLs and fxnl mobility. LUE completed w/ 3# DB 3x10 bicep curls, 2# for chest press, front arm raises, and OH shoulder press. Pt w/ h/o CVA w/ residual RUE weakness, therefore 1#DB used for bicep curls, chest press, and front arm raises. Pt tolerated well w/ occ vc's and rest breaks between sets to manage generalized fatigue.   Other Exercise Tool 2 While seated in w/c, engaged in core ther ex w/ 5# medicine ball 3x10 trunk rotations and seated crunches. Pt tolerated well w/ Min vc's to improve technique to further engage core. Add'lly completed 4x5 w/c push-ups w/ 2 second hold. Rest breaks provided between sets to manage generalized fatigue.   Cognition   Overall Cognitive Status Impaired   Additional Activities   Additional Activities Other (Comment)  (w/c mobility training)   Additional Activities " Comments Propelled w/c community distances w/ BUE at S level. Practiced tight turn-taking, rear parking, and navigating environmental barriers. Time spent discussing proper speed management, demo G carryover w/ traffic in hallway.   Assessment   Treatment Assessment Pt seen for skilled OT session focusing on fxnl squat pivot xfers, BUE strengthening, core strengthening, and w/c mobility training. Of note, upon arrival pt's BLE threaded through R pant leg, OTR A w/ BLE threading w/ pt able to complete partial push-up from w/c. Cont OT POC: endurance work, fxnl modified squat pivot xfers, BUE strengthening, sacral offloading strategies, repetitive safety training, alternating lateral weightshifting, core strengthening, ADL retraining, and ongoing phase 1 amputee education. Pt agreeable to rest in recliner, all needs within reach and alarm activated.   Prognosis Fair   Problem List Decreased strength;Decreased range of motion;Decreased endurance;Impaired balance;Decreased mobility;Decreased coordination;Impaired judgement;Decreased safety awareness;Orthopedic restrictions;Decreased cognition   Plan   Treatment/Interventions ADL retraining;Functional transfer training;Therapeutic exercise;Endurance training;Patient/family training   Progress Slow progress, cognitive deficits   Discharge Recommendation   Rehab Resource Intensity Level, OT   (pending progress)   OT Therapy Minutes   OT Time In 0900   OT Time Out 1030   OT Total Time (minutes) 90   OT Mode of treatment - Individual (minutes) 90   OT Mode of treatment - Concurrent (minutes) 0   OT Mode of treatment - Group (minutes) 0   OT Mode of treatment - Co-treat (minutes) 0   OT Mode of Treatment - Total time(minutes) 90 minutes   OT Cumulative Minutes 586   Therapy Time missed   Time missed? No

## 2024-07-13 PROCEDURE — 99232 SBSQ HOSP IP/OBS MODERATE 35: CPT | Performed by: STUDENT IN AN ORGANIZED HEALTH CARE EDUCATION/TRAINING PROGRAM

## 2024-07-13 PROCEDURE — 97110 THERAPEUTIC EXERCISES: CPT

## 2024-07-13 PROCEDURE — 97535 SELF CARE MNGMENT TRAINING: CPT

## 2024-07-13 PROCEDURE — 97530 THERAPEUTIC ACTIVITIES: CPT

## 2024-07-13 PROCEDURE — 97542 WHEELCHAIR MNGMENT TRAINING: CPT

## 2024-07-13 PROCEDURE — 99232 SBSQ HOSP IP/OBS MODERATE 35: CPT | Performed by: PHYSICIAN ASSISTANT

## 2024-07-13 RX ADMIN — ZONISAMIDE 400 MG: 100 CAPSULE ORAL at 09:02

## 2024-07-13 RX ADMIN — LOSARTAN POTASSIUM 50 MG: 50 TABLET, FILM COATED ORAL at 09:02

## 2024-07-13 RX ADMIN — LEVOCARNITINE 330 MG: 1 SOLUTION ORAL at 09:02

## 2024-07-13 RX ADMIN — ATORVASTATIN CALCIUM 80 MG: 80 TABLET, FILM COATED ORAL at 15:42

## 2024-07-13 RX ADMIN — MICONAZOLE NITRATE: 20 CREAM TOPICAL at 09:03

## 2024-07-13 RX ADMIN — LAMOTRIGINE 50 MG: 25 TABLET ORAL at 19:51

## 2024-07-13 RX ADMIN — METOPROLOL SUCCINATE 25 MG: 25 TABLET, EXTENDED RELEASE ORAL at 09:02

## 2024-07-13 RX ADMIN — MIRTAZAPINE 15 MG: 15 TABLET, FILM COATED ORAL at 21:27

## 2024-07-13 RX ADMIN — GABAPENTIN 100 MG: 100 CAPSULE ORAL at 21:28

## 2024-07-13 RX ADMIN — LAMOTRIGINE 50 MG: 25 TABLET ORAL at 09:02

## 2024-07-13 RX ADMIN — Medication 6 MG: at 21:27

## 2024-07-13 RX ADMIN — GABAPENTIN 100 MG: 100 CAPSULE ORAL at 09:02

## 2024-07-13 RX ADMIN — GABAPENTIN 100 MG: 100 CAPSULE ORAL at 15:42

## 2024-07-13 RX ADMIN — RIVAROXABAN 20 MG: 20 TABLET, FILM COATED ORAL at 15:42

## 2024-07-13 RX ADMIN — LEVOCARNITINE 330 MG: 1 SOLUTION ORAL at 15:42

## 2024-07-13 RX ADMIN — TAMSULOSIN HYDROCHLORIDE 0.4 MG: 0.4 CAPSULE ORAL at 15:42

## 2024-07-13 RX ADMIN — DOLUTEGRAVIR SODIUM 50 MG: 50 TABLET, FILM COATED ORAL at 09:03

## 2024-07-13 RX ADMIN — DIVALPROEX SODIUM 1250 MG: 500 TABLET, EXTENDED RELEASE ORAL at 09:02

## 2024-07-13 RX ADMIN — SERTRALINE HYDROCHLORIDE 75 MG: 50 TABLET ORAL at 09:02

## 2024-07-13 RX ADMIN — LEVOCARNITINE 330 MG: 1 SOLUTION ORAL at 19:51

## 2024-07-13 RX ADMIN — BICTEGRAVIR SODIUM, EMTRICITABINE, AND TENOFOVIR ALAFENAMIDE FUMARATE 1 TABLET: 50; 200; 25 TABLET ORAL at 09:03

## 2024-07-13 RX ADMIN — ASPIRIN 81 MG: 81 TABLET, COATED ORAL at 09:02

## 2024-07-13 NOTE — PROGRESS NOTES
"PM&R Coverage Progress Note:    Rehabilitation Diagnosis: Amputation:  05.3  Unilateral Lower Limb Above the Knee      ASSESSMENT: Stable, incisions to work with staples now removed, participating in therapy      PLAN:    Rehabilitation  Continue current rehabilitation plan of care to maximize function.    Continues to participate, no issues per nursing's overnight    Medical issues  Staples removed by vascular surgery, continue monitoring site  Reviewed vital signs and blood pressure in acceptable range with systolic BP of 112-130, pulse is in the 80s, afebrile  No new labs to review  Continue current medical plan of care.         SUBJECTIVE: Patient seen face to face.  No acute issues.  Progressing as expected in rehabilitation program.  Not having any significant pain and stating he is eating well as long as he uses his seasoning and he is participating with sports once in therapy.  No fever, chills, nausea, vomiting, cough or shortness of breath, diarrhea constipation.  Bowel movement earlier today        ROS:  A ten point review of systems was completed on 07/13/24 and pertinent positives are listed in subjective section. All other systems reviewed were negative.     OBJECTIVE:   /88 (BP Location: Left arm)   Pulse 85   Temp 97.5 °F (36.4 °C) (Oral)   Resp 17   Ht 5' 10\" (1.778 m)   Wt 76.6 kg (168 lb 14 oz)   SpO2 96%   BMI 24.23 kg/m²     Physical Exam  Vitals reviewed.   Constitutional:       General: He is not in acute distress.  HENT:      Head: Normocephalic and atraumatic.      Right Ear: External ear normal.      Left Ear: External ear normal.      Nose: Nose normal. No rhinorrhea.      Mouth/Throat:      Mouth: Mucous membranes are moist.      Pharynx: Oropharynx is clear.   Eyes:      General: No scleral icterus.  Cardiovascular:      Rate and Rhythm: Normal rate.   Pulmonary:      Effort: Pulmonary effort is normal. No respiratory distress.   Musculoskeletal:      Right lower leg: No " edema.      Comments: Left AKA   Skin:     General: Skin is warm and dry.      Comments: Incision clean dry and intact, staples no longer in place   Neurological:      Mental Status: He is alert and oriented to person, place, and time.   Psychiatric:         Mood and Affect: Mood normal.         Behavior: Behavior normal.            Personally reviewed on 07/13/24:   Lab Results   Component Value Date    WBC 9.55 07/11/2024    HGB 11.1 (L) 07/11/2024    HCT 36.0 (L) 07/11/2024    MCV 93 07/11/2024     (H) 07/11/2024     Lab Results   Component Value Date    SODIUM 139 07/11/2024    K 4.0 07/11/2024     07/11/2024    CO2 24 07/11/2024    BUN 19 07/11/2024    CREATININE 0.89 07/11/2024    GLUC 83 07/11/2024    CALCIUM 9.0 07/11/2024     Lab Results   Component Value Date    INR 1.35 (H) 06/07/2024    INR 1.08 06/07/2024    INR 0.98 05/16/2024    PROTIME 16.5 (H) 06/07/2024    PROTIME 14.7 (H) 06/07/2024    PROTIME 13.6 05/16/2024           Current Facility-Administered Medications:     acetaminophen (TYLENOL) tablet 975 mg, 975 mg, Oral, TID PRN, José Salcido MD    aspirin (ECOTRIN LOW STRENGTH) EC tablet 81 mg, 81 mg, Oral, Daily, Lorrie Barillas PA-C, 81 mg at 07/13/24 0902    atorvastatin (LIPITOR) tablet 80 mg, 80 mg, Oral, Daily With Dinner, Lorrie Barillas PA-C, 80 mg at 07/12/24 1704    bictegravir-emtricitab-tenofovir alafenamide (BIKTARVY) -25 MG tablet 1 tablet, 1 tablet, Oral, Daily With Breakfast, Lorrie Barillas PA-C, 1 tablet at 07/13/24 0903    bisacodyl (DULCOLAX) rectal suppository 10 mg, 10 mg, Rectal, Daily PRN, Lorrie Barillas PA-C    diphenhydrAMINE (BENADRYL) tablet 25 mg, 25 mg, Oral, Q6H PRN, Lorrie Barillas PA-C    divalproex sodium (DEPAKOTE ER) 24 hr tablet 1,250 mg, 1,250 mg, Oral, Daily, Lorrie Barillas PA-C, 1,250 mg at 07/13/24 0902    dolutegravir (TIVICAY) tablet 50 mg, 50 mg, Oral, Daily, Lorrie Barillas PA-C, 50 mg at  07/13/24 0903    gabapentin (NEURONTIN) capsule 100 mg, 100 mg, Oral, TID, Lorrie Ervin-Mariano PA-C, 100 mg at 07/13/24 0902    lamoTRIgine (LaMICtal) tablet 50 mg, 50 mg, Oral, BID, Lorrie Ervin-Mariano PA-C, 50 mg at 07/13/24 0902    levOCARNitine (CARNITOR) oral solution 330 mg, 1,000 mg/day, Oral, TID With Meals, Lorrie Barillas PA-C, 330 mg at 07/13/24 0902    losartan (COZAAR) tablet 50 mg, 50 mg, Oral, Daily, Lorrie Ervin-Mariano PA-C, 50 mg at 07/13/24 0902    melatonin tablet 6 mg, 6 mg, Oral, HS, Lorrie Barillas PA-C, 6 mg at 07/12/24 2125    metoprolol succinate (TOPROL-XL) 24 hr tablet 25 mg, 25 mg, Oral, Daily, CHARLIE Mcclelland, 25 mg at 07/13/24 0902    mirtazapine (REMERON) tablet 15 mg, 15 mg, Oral, HS, Lorrie Barillas PA-C, 15 mg at 07/12/24 2125    moisture barrier miconazole 2% cream (aka ELVA MOISTURE BARRIER ANTIFUNGAL CREAM), , Topical, BID, Lorrie Barillas PA-C, Given at 07/13/24 0903    ondansetron (ZOFRAN-ODT) dispersible tablet 4 mg, 4 mg, Oral, Q6H PRN, FADUMO Black-C, 4 mg at 07/08/24 0802    oxyCODONE (ROXICODONE) split tablet 2.5 mg, 2.5 mg, Oral, Q8H PRN, Raimundo Riley MD, 2.5 mg at 07/09/24 2341    polyethylene glycol (MIRALAX) packet 17 g, 17 g, Oral, Daily PRN, Lorrie Barillas PA-C    rivaroxaban (XARELTO) tablet 20 mg, 20 mg, Oral, Daily With Dinner, FADUMO Black-C, 20 mg at 07/12/24 1704    senna (SENOKOT) tablet 8.6 mg, 1 tablet, Oral, HS PRN, FADUMO Black-GERALDO    sertraline (ZOLOFT) tablet 75 mg, 75 mg, Oral, Daily, FADUMO Black-C, 75 mg at 07/13/24 0902    tamsulosin (FLOMAX) capsule 0.4 mg, 0.4 mg, Oral, Daily With Dinner, FADUMO Black-C, 0.4 mg at 07/12/24 1704    zonisamide (ZONEGRAN) capsule 400 mg, 400 mg, Oral, Daily, Lorrie Ervin-FADUMO Quintana-C, 400 mg at 07/13/24 0902    Past Medical History:   Diagnosis Date    Acute lower limb ischemia 06/08/2024    Anxiety     Depression     HIV  disease (HCC)     Substance abuse (HCC)     Suicide attempt (Formerly [facility])        Patient Active Problem List    Diagnosis Date Noted    Neurocognitive disorder 07/09/2024    Cardiomyopathy (HCC) 07/09/2024    Adjustment disorder with mixed anxiety and depressed mood 07/08/2024    Impaired mobility and activities of daily living 07/07/2024    At risk for venous thromboembolism (VTE) 07/07/2024    Acute pain 07/07/2024    At risk for constipation 07/07/2024    At risk for altered urinary elimination 07/07/2024    Patient incapable of making informed decisions 07/07/2024    Pressure injury of buttock, unstageable and at high risk for breakdown 07/07/2024    Above-knee amputation of left lower extremity ([facility]) 07/06/2024    Traumatic brain injury ([facility]) 07/06/2024    Carnitine deficiency ([facility]ly [facility]) 06/09/2024    Left ventricular thrombus 06/08/2024    History of seizures 06/08/2024    Tobacco abuse 10/26/2019    Cerebrovascular accident (CVA) ([facility]) 10/26/2019    Positive laboratory testing for human immunodeficiency virus ([facility]) 07/03/2017    Bipolar affective disorder ([facility]) 07/02/2017    Hypertension 02/27/2017    Human immunodeficiency virus (HIV) infection ([facility]) 02/16/2017    History of stroke 02/16/2017    Substance abuse ([facility]) 12/21/2013    Unspecified vitamin D deficiency 05/28/2010    Benign essential hypertension 02/25/2010        Grant Parks,   Physical Medicine and Rehabilitation  [facility]    I have spent a total time of 35 minutes on 07/13/24 in caring for this patient including Counseling / Coordination of care, Documenting in the medical record, Reviewing / ordering tests, medicine, procedures  , Obtaining or reviewing history  , and Communicating with other healthcare professionals .      ** Please Note:  voice to text software may have been used in the creation of this document. Although proof errors in transcription or interpretation are a potential of such software**

## 2024-07-13 NOTE — ASSESSMENT & PLAN NOTE
Presented with left lower extremity acute limb ischemia with extensive left iliofemoral and left infrainguinal embolism requiring left femoral thrombectomy and subsequent AKA on 6/7/24 with vascular surgery.  Incision C/D/I, pain controlled.  vascular surgery following, staples removed on 7/10  Continue ASA and statin   Also on Xarelto due to LV thrombus  Rehab and pain control per PMR

## 2024-07-13 NOTE — ASSESSMENT & PLAN NOTE
Remote history of TBI, previously residing in a group home prior to [facility] sentence.  Evaluated by neuropsychiatry on 6/27/24 and deemed NOT to have medical decision-making capacity  Process for court appointed guardian started on 7/5/24 - CM following

## 2024-07-13 NOTE — PROGRESS NOTES
07/13/24 1000   Pain Assessment   Pain Assessment Tool 0-10   Pain Score No Pain   Restrictions/Precautions   Precautions Aphasia;Bed/chair alarms;Cognitive;Fall Risk;Supervision on toilet/commode   Weight Bearing Restrictions Yes   LLE Weight Bearing Per Order NWB   ROM Restrictions No   Braces or Orthoses Other (Comment)  (L residual limb wrapping)   Cognition   Overall Cognitive Status Impaired   Arousal/Participation Alert;Cooperative   Attention Attends with cues to redirect   Orientation Level Oriented to person;Oriented to place;Oriented to situation;Disoriented to time   Following Commands Follows one step commands with increased time or repetition   Subjective   Subjective No complaint, agreeable to PT, sitting in chair   Roll Left and Right   Type of Assistance Needed Supervision;Verbal cues   Physical Assistance Level No physical assistance   Comment HOB flat, no bedrail   Roll Left and Right CARE Score 4   Sit to Lying   Type of Assistance Needed Supervision;Verbal cues   Physical Assistance Level No physical assistance   Comment HOB flat, no bedrail   Sit to Lying CARE Score 4   Lying to Sitting on Side of Bed   Type of Assistance Needed Supervision;Verbal cues;Adaptive equipment   Physical Assistance Level No physical assistance   Comment bed rail   Lying to Sitting on Side of Bed CARE Score 4   Sit to Stand   Type of Assistance Needed Physical assistance;Verbal cues;Adaptive equipment   Physical Assistance Level 25% or less   Comment min A, using hallway rail   Sit to Stand CARE Score 3   Bed-Chair Transfer   Type of Assistance Needed Physical assistance;Verbal cues   Physical Assistance Level 25% or less   Comment sit pivot, cueing for hand placement   Chair/Bed-to-Chair Transfer CARE Score 3   Walk 10 Feet   Reason if not Attempted Safety concerns   Walk 10 Feet CARE Score 88   Walk 50 Feet with Two Turns   Reason if not Attempted Safety concerns   Walk 50 Feet with Two Turns CARE Score 88   Walk  150 Feet   Reason if not Attempted Safety concerns   Walk 150 Feet CARE Score 88   Walking 10 Feet on Uneven Surfaces   Reason if not Attempted Safety concerns   Walking 10 Feet on Uneven Surfaces CARE Score 88   Ambulation   Primary Mode of Locomotion Prior to Admission Walk   Does the patient walk? 0. No, and walking goal is not clinically indicated.   Wheel 50 Feet with Two Turns   Type of Assistance Needed Supervision   Physical Assistance Level No physical assistance   Wheel 50 Feet with Two Turns CARE Score 4   Wheel 150 Feet   Type of Assistance Needed Supervision   Physical Assistance Level No physical assistance   Wheel 150 Feet CARE Score 4   Wheelchair mobility   Does the patient use a wheelchair? 1. Yes   Type of Wheelchair Used 1. Manual   Method Right upper extremity;Left upper extremity   Assistance Provided For Remove Leg Rest;Replace Leg Rest   Distance Level Surface (feet) 400 ft   Distance Wheeled 3% Grade 50 ft  (outdoor ramp, sidewalk)   Findings CGA ascending ramp   Curb or Single Stair   Reason if not Attempted Safety concerns   1 Step (Curb) CARE Score 88   4 Steps   Reason if not Attempted Safety concerns   4 Steps CARE Score 88   12 Steps   Reason if not Attempted Safety concerns   12 Steps CARE Score 88   Therapeutic Interventions   Strengthening Supine: Gluteal set, bridging, R SLR and SAQ with 2#, L hip flex with 3#. Sidelying L hip abd with 3#.   Flexibility Prone on elbow x 3mins   Balance Standing with hallway siderail support x 1min x 2, CGA   Other L residual limb wrapped at start of session   Equipment Use   NuStep L2 x10 min BUE, RLE   Assessment   Treatment Assessment Patient participated in skilled PT focus on transfer at w/c level, w/c mobility and strengthening. Patient performed bed mobility with supervision. He completed chair<>bed, bed<>w/c with min assist using modified sit pivot technique, min assist and cueing for hand placement. He participated in w/c mobility, outdoor  ramp/sidewalk x 50ft. He needed supervision descending and CGA ascending. Provided cueing for technique to prevent post tipping while asending, with good return demo. Cont PT as per POC to maximized functional mobility recovery at w/c level and dec burden of care.   Problem List Decreased strength;Decreased range of motion;Decreased endurance;Impaired balance;Decreased mobility;Decreased coordination;Impaired judgement;Decreased safety awareness;Orthopedic restrictions;Decreased cognition   Barriers to Discharge Inaccessible home environment;Decreased caregiver support   PT Barriers   Physical Impairment Decreased strength;Decreased range of motion;Decreased endurance;Impaired balance;Decreased cognition;Impaired judgement;Decreased safety awareness;Decreased skin integrity;Orthopedic restrictions;Pain   Functional Limitation Wheelchair management;Walking;Transfers;Standing;Stair negotiation;Ramp negotiation;Car transfers   Plan   Treatment/Interventions Functional transfer training;LE strengthening/ROM;Therapeutic exercise;Endurance training;Patient/family training;Bed mobility   Progress Progressing toward goals   PT Therapy Minutes   PT Time In 1000   PT Time Out 1130   PT Total Time (minutes) 90   PT Mode of treatment - Individual (minutes) 90   PT Mode of treatment - Concurrent (minutes) 0   PT Mode of treatment - Group (minutes) 0   PT Mode of treatment - Co-treat (minutes) 0   PT Mode of Treatment - Total time(minutes) 90 minutes   PT Cumulative Minutes 615   Therapy Time missed   Time missed? No

## 2024-07-13 NOTE — PLAN OF CARE
Problem: PAIN - ADULT  Goal: Verbalizes/displays adequate comfort level or baseline comfort level  Description: Interventions:  - Encourage patient to monitor pain and request assistance  - Assess pain using appropriate pain scale  - Administer analgesics based on type and severity of pain and evaluate response  - Implement non-pharmacological measures as appropriate and evaluate response  - Consider cultural and social influences on pain and pain management  - Notify physician/advanced practitioner if interventions unsuccessful or patient reports new pain  Outcome: Progressing     Problem: INFECTION - ADULT  Goal: Absence or prevention of progression during hospitalization  Description: INTERVENTIONS:  - Assess and monitor for signs and symptoms of infection  - Monitor lab/diagnostic results  - Monitor all insertion sites, i.e. indwelling lines, tubes, and drains  - Monitor endotracheal if appropriate and nasal secretions for changes in amount and color  - [place] appropriate cooling/warming therapies per order  - Administer medications as ordered  - Instruct and encourage patient and family to use good hand hygiene technique  - Identify and instruct in appropriate isolation precautions for identified infection/condition  Outcome: Progressing  Goal: Absence of fever/infection during neutropenic period  Description: INTERVENTIONS:  - Monitor WBC    Outcome: Progressing     Problem: SAFETY ADULT  Goal: Patient will remain free of falls  Description: INTERVENTIONS:  - Educate patient/family on patient safety including physical limitations  - Instruct patient to call for assistance with activity   - Consult OT/PT to assist with strengthening/mobility   - Keep Call bell within reach  - Keep bed low and locked with side rails adjusted as appropriate  - Keep care items and personal belongings within reach  - Initiate and maintain comfort rounds  - Make Fall Risk Sign visible to staff  - Offer Toileting every 2 Hours,  in advance of need  - Initiate/Maintain bed, chair alarm  - Obtain necessary fall risk management equipment:    - Apply yellow socks and bracelet for high fall risk patients  - Consider moving patient to room near nurses station  Outcome: Progressing  Goal: Maintain or return to baseline ADL function  Description: INTERVENTIONS:  -  Assess patient's ability to carry out ADLs; assess patient's baseline for ADL function and identify physical deficits which impact ability to perform ADLs (bathing, care of mouth/teeth, toileting, grooming, dressing, etc.)  - Assess/evaluate cause of self-care deficits   - Assess range of motion  - Assess patient's mobility; develop plan if impaired  - Assess patient's need for assistive devices and provide as appropriate  - Encourage maximum independence but intervene and supervise when necessary  - Involve family in performance of ADLs  - Assess for home care needs following discharge   - Consider OT consult to assist with ADL evaluation and planning for discharge  - Provide patient education as appropriate  Outcome: Progressing  Goal: Maintains/Returns to pre admission functional level  Description: INTERVENTIONS:  - Perform AM-PAC 6 Click Basic Mobility/ Daily Activity assessment daily.  - Set and communicate daily mobility goal to care team and patient/family/caregiver.   - Collaborate with rehabilitation services on mobility goals if consulted  - Perform Range of Motion   times a day.  - Reposition patient every   hours.  - Dangle patient   times a day  - Stand patient multiple times a day  - Ambulate patient   times a day  - Out of bed to chair   times a day   - Out of bed for meals     times a day  - Out of bed for toileting  - Record patient progress and toleration of activity level   Outcome: Progressing     Problem: DISCHARGE PLANNING  Goal: Discharge to home or other facility with appropriate resources  Description: INTERVENTIONS:  - Identify barriers to discharge w/patient  and caregiver  - Arrange for needed discharge resources and transportation as appropriate  - Identify discharge learning needs (meds, wound care, etc.)  - Arrange for interpretive services to assist at discharge as needed  - Refer to Case Management Department for coordinating discharge planning if the patient needs post-hospital services based on physician/advanced practitioner order or complex needs related to functional status, cognitive ability, or social support system  Outcome: Progressing     Problem: Prexisting or High Potential for Compromised Skin Integrity  Goal: Skin integrity is maintained or improved  Description: INTERVENTIONS:  - Identify patients at risk for skin breakdown  - Assess and monitor skin integrity  - Assess and monitor nutrition and hydration status  - Monitor labs   - Assess for incontinence   - Turn and reposition patient  - Assist with mobility/ambulation  - Relieve pressure over bony prominences  - Avoid friction and shearing  - Provide appropriate hygiene as needed including keeping skin clean and dry  - Evaluate need for skin moisturizer/barrier cream  - Collaborate with interdisciplinary team   - Patient/family teaching  - Consider wound care consult   Outcome: Progressing

## 2024-07-13 NOTE — PROGRESS NOTES
[facility]  Progress Note  Name: Sunil Patel I  MRN: [id number]  Unit/Bed#: -01 I Date of Admission: 7/6/2024   Date of Service: 7/13/2024 I Hospital Day: 7    Assessment & Plan   Cardiomyopathy (HCC)  Assessment & Plan  ECHO on 6/10/2024 showed LVEF 40-45% with mild global hypokinesis   Status post NM stress test on 6/11/2024 which showed: a large, mild, fixed defect in the inferior wall, possibly due to diaphragmatic attenuation artifact, there is a small area of partial reversibility in the inferior apical wall suggestive of ischemia    Elevated by cardiology, etiology felt to be possibly secondary to stress-induced cardiomyopathy, with apical thrombus  Cardiac catheterization deferred given the lack of any significant ischemia, and no current cardiac symptoms  Continue with medical management with aspirin, statin, beta-blocker, ARB  Monitor volume status, remains euvolemic off of diuretics   Outpatient follow-up with cardiology    Pressure injury of buttock, unstageable and at high risk for breakdown  Assessment & Plan  Being managed by PMR  Turn every 2 hours for offloading  Continue local care    Patient incapable of making informed decisions  Assessment & Plan  Seen by neuropsych on 6/27 and was deemed NOT to have medical decision making capacity    Traumatic brain injury (HCC)  Assessment & Plan  Remote history of TBI, previously residing in a group home prior to alf sentence.  Evaluated by neuropsychiatry on 6/27/24 and deemed NOT to have medical decision-making capacity  Process for court appointed guardian started on 7/5/24 - CM following     Carnitine deficiency (HCC)  Assessment & Plan  Continue levocarnitine 330 mg 3x daily with meals.    History of seizures  Assessment & Plan  Diagnosed in July 2019, follows with [facility] neurology outpatient  Continue home regimen with Depakote ER, Lamotrigine and Zonegran    Left ventricular thrombus  Assessment &  Plan  Noted on echocardiogram 6/10/2024: spherical 1.5 x 1.3 cm thrombus at the LV apex   Initiated on AC with Xarelto, continue  Rx sent to [facility] for price check - Pharmacy could not verify pt's rx benefit. CM to investigate.    Benign essential hypertension  Assessment & Plan  Home regimen: Losartan 50mg daily, Toprol XL 25 mg BID  Current regimen: Losartan 50 mg daily, Toprol-XL 25 mg daily  BP acceptable.    History of stroke  Assessment & Plan  History of left MCA CVA in May 2018 with residual expressive aphasia  Continue ASA and atorvastatin    Human immunodeficiency virus (HIV) infection (HCC)  Assessment & Plan  Continue Biktarvy and Tivicay.    Bipolar affective disorder (HCC)  Assessment & Plan  Continue home meds Zoloft and Remeron  Outpatient follow-up with Psychiatry    * Above-knee amputation of left lower extremity ([facility])  Assessment & Plan  Presented with left lower extremity acute limb ischemia with extensive left iliofemoral and left infrainguinal embolism requiring left femoral thrombectomy and subsequent AKA on 6/7/24 with vascular surgery.  Incision C/D/I, pain controlled.  vascular surgery following, staples removed on 7/10  Continue ASA and statin   Also on Xarelto due to LV thrombus  Rehab and pain control per PMR                 The above assessment and plan was reviewed and updated as determined by my evaluation of the patient on 7/13/2024.    Labs:   Results from last 7 days   Lab Units 07/11/24  0526   WBC Thousand/uL 9.55   HEMOGLOBIN g/dL 11.1*   HEMATOCRIT % 36.0*   PLATELETS Thousands/uL 393*     Results from last 7 days   Lab Units 07/11/24  0526   SODIUM mmol/L 139   POTASSIUM mmol/L 4.0   CHLORIDE mmol/L 105   CO2 mmol/L 24   BUN mg/dL 19   CREATININE mg/dL 0.89   CALCIUM mg/dL 9.0                   Imaging  No orders to display       Review of Scheduled Meds:  Current Facility-Administered Medications   Medication Dose Route Frequency Provider Last Rate    acetaminophen  975 mg  Oral TID PRN José Salcido MD      aspirin  81 mg Oral Daily Lorrie Barillas PA-C      atorvastatin  80 mg Oral Daily With Dinner Lorrie Barillas PA-C      bictegravir-emtricitab-tenofovir alafenamide  1 tablet Oral Daily With Breakfast Lorrie Barillas PA-C      bisacodyl  10 mg Rectal Daily PRN Lorrie Barillas PA-C      diphenhydrAMINE  25 mg Oral Q6H PRN Lorrie Barillas PA-C      divalproex sodium  1,250 mg Oral Daily Lorrie Barillas PA-C      dolutegravir  50 mg Oral Daily Lorrie Barillas PA-C      gabapentin  100 mg Oral TID Lorrie Barillas PA-C      lamoTRIgine  50 mg Oral BID Lorrie Barillas PA-C      levOCARNitine  1,000 mg/day Oral TID With Meals Lorrie Barillas PA-C      losartan  50 mg Oral Daily Lorrie Barillas PA-C      melatonin  6 mg Oral HS Lorrie Barillas PA-C      metoprolol succinate  25 mg Oral Daily CHARLIE Mcclelland      mirtazapine  15 mg Oral HS Lorrie Barillas PA-C      ELVA ANTIFUNGAL   Topical BID Lorrie Barillas PA-C      ondansetron  4 mg Oral Q6H PRN Lorrie Barillas PA-C      oxyCODONE  2.5 mg Oral Q8H PRN Raimundo Riley MD      polyethylene glycol  17 g Oral Daily PRN Lorrie Barillas PA-C      rivaroxaban  20 mg Oral Daily With Dinner Lorrie Barillas PA-C      senna  1 tablet Oral HS PRN Lorrie Barillas PA-C      sertraline  75 mg Oral Daily Lorrie Barillas PA-C      tamsulosin  0.4 mg Oral Daily With Dinner Lorrie Barillas PA-C      zonisamide  400 mg Oral Daily Lorrie Barillas PA-C         Subjective/ HPI: Patient seen and examined. Patients overnight issues or events were reviewed with nursing staff. New or overnight issues include the following:     Patient resting comfortably in bed. No new concerns per nursing    ROS:   A 10 point ROS was performed; negative except as noted above.        *Labs /Radiology studies Reviewed  *Medications  reviewed and reconciled  as needed  *Please refer to order section for additional ordered labs studies      Physical Examination:  Vitals:   Vitals:    07/12/24 0619 07/12/24 1502 07/12/24 2023 07/13/24 0500   BP: 130/73 112/59 117/62 127/88   BP Location: Left arm Left arm Left arm Left arm   Pulse: 85 82 89 85   Resp: 18 17 19 17   Temp:  98.2 °F (36.8 °C) 98.4 °F (36.9 °C) 97.5 °F (36.4 °C)   TempSrc: Oral Oral Oral Oral   SpO2: 96% 98% 95% 96%   Weight:       Height:           General Appearance: NAD; pleasant  HEENT: conjuctiva normal; mucous membranes moist; face symmetrical  Neck:  Supple  Lungs: clear bilaterally, normal respiratory effort, no retractions, expiratory effort normal, on room air  CV: regular rate and rhythm, no murmurs rubs or gallops noted   ABD: soft non tender, +BS x4  EXT: s/p left AKA, incision C/D/I  Skin: normal turgor, normal texture, no rash  Psych: affect normal, mood normal  Neuro: AAOx3       The above physical exam was reviewed and updated as determined by my evaluation of the patient on 7/13/2024.    Invasive Devices       None                      VTE Pharmacologic Prophylaxis: Xarelto  Code Status: Level 1 - Full Code  Current Length of Stay: 7 day(s)    Total floor / unit time spent today 15 minutes  Coordination of patient's care was performed in conjunction with primary service. Time invested included review of patient's labs, vitals, and management of their comorbidities with continued monitoring, examination of patient as well as answering patient questions, documenting her findings and creating progress note in electronic medical record,  ordering appropriate diagnostic testing.       ** Please Note:  voice to text software may have been used in the creation of this document. Although proof errors in transcription or interpretation are a potential of such software**

## 2024-07-13 NOTE — PROGRESS NOTES
"   07/13/24 1330   Pain Assessment   Pain Assessment Tool 0-10   Pain Score No Pain   Restrictions/Precautions   Precautions Aphasia;Bed/chair alarms;Cognitive;Fall Risk;Supervision on toilet/commode   LLE Weight Bearing Per Order NWB   Braces or Orthoses   (residual limb figure 8 ACE wrap)   Lifestyle   Autonomy \"I'm not stupid. When I talk to people I don't want them to think that....I know what I want to say, I just can't....will I get better?\"   Bed-Chair Transfer   Type of Assistance Needed Physical assistance   Physical Assistance Level 25% or less   Comment Jignesh sit pivot recliner>WC   Chair/Bed-to-Chair Transfer CARE Score 3   Toileting Hygiene   Type of Assistance Needed Physical assistance   Physical Assistance Level 26%-50%   Comment modA following voiding on [facility]   Toileting Hygiene CARE Score 3   Toilet Transfer   Type of Assistance Needed Physical assistance   Physical Assistance Level 26%-50%   Comment min-modA sit pivot WC<>[facility]   Toilet Transfer CARE Score 3   Exercise Tools   Other Exercise Tool 1 BUE ther ex to maximize strength and endurance for ADLs and fxnl mobility. LUE completed w/ 3# DB 3x10 bicep curls, 2# for chest press, front arm raises, and OH shoulder press. Pt w/ h/o CVA w/ residual RUE weakness, therefore 1#DB used for bicep curls, chest press, and front arm raises. Pt tolerated well w/ occ vc's and rest breaks between sets to manage generalized fatigue.   Other Exercise Tool 2 While seated in w/c, engaged in core ther ex w/ 5# medicine ball 3x10 trunk rotations and seated crunches. Pt tolerated well w/ Min vc's to improve technique. Short rest breaks provided to manage fatigue. pt very talkative during session but noted mild-moderate/extensive expressive aphasia and at least mild aphasia receptively.   Cognition   Overall Cognitive Status Impaired   Arousal/Participation Alert;Cooperative   Attention Attends with cues to redirect   Following Commands Follows one step commands without " "difficulty   Comments expressive aphasia>receptive, does make it difficult at times for pt to make needs known, express his wishes. He reports this makes him emotional, he states, \"i cry about it a lot\"   Activity Tolerance   Activity Tolerance Patient tolerated treatment well   Assessment   Treatment Assessment OT session focusing on toilet, fxnl transfers, core TE and UB TE. Pt continues to requrie skilled hands on assist in order to maintain his safety despite recent progress made in OT. OT to continue POC at this time.   Problem List Decreased strength;Decreased range of motion;Decreased endurance;Impaired balance;Decreased mobility;Decreased coordination;Decreased cognition;Impaired judgement;Decreased safety awareness;Impaired sensation;Decreased skin integrity;Orthopedic restrictions   Plan   Treatment/Interventions ADL retraining;Functional transfer training;Therapeutic exercise;Endurance training;Cognitive reorientation;Patient/family training;Equipment eval/education;Continued evaluation;Compensatory technique education;Spoke to nursing   Progress Progressing toward goals   OT Therapy Minutes   OT Time In 1330   OT Time Out 1430   OT Total Time (minutes) 60   OT Mode of treatment - Individual (minutes) 60   OT Mode of treatment - Concurrent (minutes) 0   OT Mode of treatment - Group (minutes) 0   OT Mode of treatment - Co-treat (minutes) 0   OT Mode of Treatment - Total time(minutes) 60 minutes   OT Cumulative Minutes 646   Therapy Time missed   Time missed? No       "

## 2024-07-13 NOTE — ASSESSMENT & PLAN NOTE
Noted on echocardiogram 6/10/2024: spherical 1.5 x 1.3 cm thrombus at the LV apex   Initiated on AC with Xarelto, continue  Rx sent to [facility] for price check - Pharmacy could not verify pt's rx benefit. CM to investigate.

## 2024-07-13 NOTE — PROGRESS NOTES
07/13/24 1430   Pain Assessment   Pain Assessment Tool 0-10   Pain Score No Pain   Restrictions/Precautions   Precautions Aphasia;Bed/chair alarms;Cognitive;Fall Risk;Supervision on toilet/commode   Weight Bearing Restrictions Yes   LLE Weight Bearing Per Order NWB   Braces or Orthoses Other (Comment)  (residual limb  ACE wrap)   Cognition   Overall Cognitive Status Impaired   Arousal/Participation Alert;Cooperative   Attention Attends with cues to redirect   Following Commands Follows one step commands without difficulty   Subjective   Subjective Patent seated in w/c, agreeable to PT   Sit to Stand   Type of Assistance Needed Physical assistance;Verbal cues   Physical Assistance Level 25% or less   Comment min A, using hallway rail   Sit to Stand CARE Score 3   Bed-Chair Transfer   Type of Assistance Needed Physical assistance;Verbal cues   Physical Assistance Level 25% or less   Comment min assist, sit pivot   Chair/Bed-to-Chair Transfer CARE Score 3   Transfer Bed/Chair/Wheelchair   Findings w/c<>bed 3x and w/c<>reclined 2x, min assist   Walk 10 Feet   Reason if not Attempted Safety concerns   Walk 10 Feet CARE Score 88   Walk 50 Feet with Two Turns   Reason if not Attempted Safety concerns   Walk 50 Feet with Two Turns CARE Score 88   Walk 150 Feet   Reason if not Attempted Safety concerns   Walk 150 Feet CARE Score 88   Walking 10 Feet on Uneven Surfaces   Reason if not Attempted Safety concerns   Walking 10 Feet on Uneven Surfaces CARE Score 88   Wheel 50 Feet with Two Turns   Type of Assistance Needed Supervision   Physical Assistance Level No physical assistance   Wheel 50 Feet with Two Turns CARE Score 4   Therapeutic Interventions   Balance Standing with hallway siderail support x 3min x 2, CGA   Assessment   Treatment Assessment PT session focus on transfer at w/c level, using sit pivot technique and standing tolerance. Patient performed w/c<>bed and w/c<>recliner transfer with min assist and  occasional vc for hand placement. He tolerated standing with hallway siderail support x 3minx x 2set with CGA. Cont PT as per POC to maximized functional mobility recovery at w/c level and dec burden of care.   Problem List Decreased strength;Decreased range of motion;Decreased endurance;Impaired balance;Decreased mobility;Decreased coordination;Decreased cognition;Impaired judgement;Decreased safety awareness;Impaired sensation;Decreased skin integrity;Orthopedic restrictions   Barriers to Discharge Inaccessible home environment;Decreased caregiver support   PT Barriers   Physical Impairment Decreased strength;Decreased range of motion;Decreased endurance;Impaired balance;Decreased cognition;Impaired judgement;Decreased safety awareness;Decreased skin integrity;Orthopedic restrictions;Pain   Functional Limitation Ramp negotiation;Standing;Transfers;Wheelchair management   Plan   Treatment/Interventions Functional transfer training;LE strengthening/ROM;Therapeutic exercise;Endurance training;Bed mobility;Patient/family training   PT Therapy Minutes   PT Time In 1430   PT Time Out 1500   PT Total Time (minutes) 30   PT Mode of treatment - Individual (minutes) 30   PT Mode of treatment - Concurrent (minutes) 0   PT Mode of treatment - Group (minutes) 0   PT Mode of treatment - Co-treat (minutes) 0   PT Mode of Treatment - Total time(minutes) 30 minutes   PT Cumulative Minutes 645   Therapy Time missed   Time missed? No

## 2024-07-14 PROCEDURE — 97542 WHEELCHAIR MNGMENT TRAINING: CPT

## 2024-07-14 PROCEDURE — 97110 THERAPEUTIC EXERCISES: CPT

## 2024-07-14 PROCEDURE — 97530 THERAPEUTIC ACTIVITIES: CPT

## 2024-07-14 RX ADMIN — ATORVASTATIN CALCIUM 80 MG: 80 TABLET, FILM COATED ORAL at 17:26

## 2024-07-14 RX ADMIN — LEVOCARNITINE 330 MG: 1 SOLUTION ORAL at 15:00

## 2024-07-14 RX ADMIN — LAMOTRIGINE 50 MG: 25 TABLET ORAL at 17:26

## 2024-07-14 RX ADMIN — SERTRALINE HYDROCHLORIDE 75 MG: 50 TABLET ORAL at 09:09

## 2024-07-14 RX ADMIN — GABAPENTIN 100 MG: 100 CAPSULE ORAL at 09:07

## 2024-07-14 RX ADMIN — LEVOCARNITINE 330 MG: 1 SOLUTION ORAL at 17:25

## 2024-07-14 RX ADMIN — ASPIRIN 81 MG: 81 TABLET, COATED ORAL at 09:12

## 2024-07-14 RX ADMIN — DIVALPROEX SODIUM 1250 MG: 500 TABLET, EXTENDED RELEASE ORAL at 09:08

## 2024-07-14 RX ADMIN — MIRTAZAPINE 15 MG: 15 TABLET, FILM COATED ORAL at 21:44

## 2024-07-14 RX ADMIN — MICONAZOLE NITRATE 1 APPLICATION: 20 CREAM TOPICAL at 17:26

## 2024-07-14 RX ADMIN — RIVAROXABAN 20 MG: 20 TABLET, FILM COATED ORAL at 17:26

## 2024-07-14 RX ADMIN — DOLUTEGRAVIR SODIUM 50 MG: 50 TABLET, FILM COATED ORAL at 09:06

## 2024-07-14 RX ADMIN — LEVOCARNITINE 330 MG: 1 SOLUTION ORAL at 09:06

## 2024-07-14 RX ADMIN — ACETAMINOPHEN 975 MG: 325 TABLET, FILM COATED ORAL at 22:21

## 2024-07-14 RX ADMIN — GABAPENTIN 100 MG: 100 CAPSULE ORAL at 15:03

## 2024-07-14 RX ADMIN — ZONISAMIDE 400 MG: 100 CAPSULE ORAL at 09:06

## 2024-07-14 RX ADMIN — Medication 6 MG: at 21:44

## 2024-07-14 RX ADMIN — LAMOTRIGINE 50 MG: 25 TABLET ORAL at 09:08

## 2024-07-14 RX ADMIN — GABAPENTIN 100 MG: 100 CAPSULE ORAL at 21:44

## 2024-07-14 RX ADMIN — TAMSULOSIN HYDROCHLORIDE 0.4 MG: 0.4 CAPSULE ORAL at 17:26

## 2024-07-14 RX ADMIN — MICONAZOLE NITRATE: 20 CREAM TOPICAL at 09:11

## 2024-07-14 RX ADMIN — BICTEGRAVIR SODIUM, EMTRICITABINE, AND TENOFOVIR ALAFENAMIDE FUMARATE 1 TABLET: 50; 200; 25 TABLET ORAL at 09:06

## 2024-07-14 NOTE — PLAN OF CARE
Problem: PAIN - ADULT  Goal: Verbalizes/displays adequate comfort level or baseline comfort level  Description: Interventions:  - Encourage patient to monitor pain and request assistance  - Assess pain using appropriate pain scale  - Administer analgesics based on type and severity of pain and evaluate response  - Implement non-pharmacological measures as appropriate and evaluate response  - Consider cultural and social influences on pain and pain management  - Notify physician/advanced practitioner if interventions unsuccessful or patient reports new pain  Outcome: Progressing     Problem: INFECTION - ADULT  Goal: Absence or prevention of progression during hospitalization  Description: INTERVENTIONS:  - Assess and monitor for signs and symptoms of infection  - Monitor lab/diagnostic results  - Monitor all insertion sites, i.e. indwelling lines, tubes, and drains  - Monitor endotracheal if appropriate and nasal secretions for changes in amount and color  - [place] appropriate cooling/warming therapies per order  - Administer medications as ordered  - Instruct and encourage patient and family to use good hand hygiene technique  - Identify and instruct in appropriate isolation precautions for identified infection/condition  Outcome: Progressing  Goal: Absence of fever/infection during neutropenic period  Description: INTERVENTIONS:  - Monitor WBC    Outcome: Progressing     Problem: SAFETY ADULT  Goal: Patient will remain free of falls  Description: INTERVENTIONS:  - Educate patient/family on patient safety including physical limitations  - Instruct patient to call for assistance with activity   - Consult OT/PT to assist with strengthening/mobility   - Keep Call bell within reach  - Keep bed low and locked with side rails adjusted as appropriate  - Keep care items and personal belongings within reach  - Initiate and maintain comfort rounds  - Make Fall Risk Sign visible to staff  - Offer Toileting every   Hours,  in advance of need  - Initiate/Maintain  ala rm  - Obtain necessary fall risk management equipment:    - Apply yellow socks and bracelet for high fall risk patients  - Consider moving patient to room near nurses station  Outcome: Progressing  Goal: Maintain or return to baseline ADL function  Description: INTERVENTIONS:  -  Assess patient's ability to carry out ADLs; assess patient's baseline for ADL function and identify physical deficits which impact ability to perform ADLs (bathing, care of mouth/teeth, toileting, grooming, dressing, etc.)  - Assess/evaluate cause of self-care deficits   - Assess range of motion  - Assess patient's mobility; develop plan if impaired  - Assess patient's need for assistive devices and provide as appropriate  - Encourage maximum independence but intervene and supervise when necessary  - Involve family in performance of ADLs  - Assess for home care needs following discharge   - Consider OT consult to assist with ADL evaluation and planning for discharge  - Provide patient education as appropriate  Outcome: Progressing  Goal: Maintains/Returns to pre admission functional level  Description: INTERVENTIONS:  - Perform AM-PAC 6 Click Basic Mobility/ Daily Activity assessment daily.  - Set and communicate daily mobility goal to care team and patient/family/caregiver.   - Collaborate with rehabilitation services on mobility goals if consulted  - Perform Range of Motion   times a day.  - Reposition patient every   hours.  - Dangle patient   times a day  - Stand patient   times a day  - Ambulate patient   times a day  - Out of bed to chair   times a day   - Out of bed for meals   times a day  - Out of bed for toileting  - Record patient progress and toleration of activity level   Outcome: Progressing     Problem: DISCHARGE PLANNING  Goal: Discharge to home or other facility with appropriate resources  Description: INTERVENTIONS:  - Identify barriers to discharge w/patient and caregiver  -  Arrange for needed discharge resources and transportation as appropriate  - Identify discharge learning needs (meds, wound care, etc.)  - Arrange for interpretive services to assist at discharge as needed  - Refer to Case Management Department for coordinating discharge planning if the patient needs post-hospital services based on physician/advanced practitioner order or complex needs related to functional status, cognitive ability, or social support system  Outcome: Progressing     Problem: Prexisting or High Potential for Compromised Skin Integrity  Goal: Skin integrity is maintained or improved  Description: INTERVENTIONS:  - Identify patients at risk for skin breakdown  - Assess and monitor skin integrity  - Assess and monitor nutrition and hydration status  - Monitor labs   - Assess for incontinence   - Turn and reposition patient  - Assist with mobility/ambulation  - Relieve pressure over bony prominences  - Avoid friction and shearing  - Provide appropriate hygiene as needed including keeping skin clean and dry  - Evaluate need for skin moisturizer/barrier cream  - Collaborate with interdisciplinary team   - Patient/family teaching  - Consider wound care consult   Outcome: Progressing

## 2024-07-14 NOTE — PROGRESS NOTES
07/14/24 1000   Pain Assessment   Pain Assessment Tool 0-10   Pain Score No Pain   Restrictions/Precautions   Precautions Aphasia;Bed/chair alarms;Cognitive;Fall Risk;Supervision on toilet/commode   Weight Bearing Restrictions Yes   LLE Weight Bearing Per Order NWB   ROM Restrictions No   Braces or Orthoses   (Ace wrap applied to residual limb at end of session)   Cognition   Overall Cognitive Status Impaired   Arousal/Participation Alert;Responsive;Cooperative   Attention Attends with cues to redirect   Orientation Level Oriented to person;Oriented to place;Oriented to situation;Disoriented to time   Memory Decreased recall of precautions   Following Commands Follows one step commands with increased time or repetition   Subjective   Subjective Pt reports he is like a soldier, he will do whatever someone tells him to do   Roll Left and Right   Type of Assistance Needed Supervision   Physical Assistance Level No physical assistance   Comment bed rail   Roll Left and Right CARE Score 4   Sit to Lying   Type of Assistance Needed Supervision   Physical Assistance Level No physical assistance   Comment HOB flat, no bed rail   Sit to Lying CARE Score 4   Lying to Sitting on Side of Bed   Type of Assistance Needed Supervision   Physical Assistance Level No physical assistance   Comment HOB flat, no bed rail   Lying to Sitting on Side of Bed CARE Score 4   Sit to Stand   Type of Assistance Needed Physical assistance   Physical Assistance Level 25% or less   Comment Min from EOB, CGA from w/c   Sit to Stand CARE Score 3   Bed-Chair Transfer   Type of Assistance Needed Incidental touching   Physical Assistance Level No physical assistance   Comment CGA modified sit-pivot to/from bed and w/c; no VC for sequencing required   Chair/Bed-to-Chair Transfer CARE Score 4   Walk 10 Feet   Reason if not Attempted Safety concerns   Walk 10 Feet CARE Score 88   Walk 50 Feet with Two Turns   Reason if not Attempted Safety concerns    Walk 50 Feet with Two Turns CARE Score 88   Walk 150 Feet   Reason if not Attempted Safety concerns   Walk 150 Feet CARE Score 88   Walking 10 Feet on Uneven Surfaces   Reason if not Attempted Safety concerns   Walking 10 Feet on Uneven Surfaces CARE Score 88   Ambulation   Does the patient walk? 0. No, and walking goal is not clinically indicated.   Wheel 50 Feet with Two Turns   Type of Assistance Needed Independent   Physical Assistance Level No physical assistance   Wheel 50 Feet with Two Turns CARE Score 6   Wheel 150 Feet   Type of Assistance Needed Independent   Physical Assistance Level No physical assistance   Wheel 150 Feet CARE Score 6   Wheelchair mobility   Does the patient use a wheelchair? 1. Yes   Type of Wheelchair Used 1. Manual   Method Right upper extremity;Left upper extremity;Right lower extremity   Assistance Provided For Remove Leg Rest;Replace Leg Rest   Distance Level Surface (feet) 500 ft   Findings mod I level surfaces inside, CGA up/down incline outside with incline/outside   Curb or Single Stair   Reason if not Attempted Safety concerns   1 Step (Curb) CARE Score 88   4 Steps   Reason if not Attempted Safety concerns   4 Steps CARE Score 88   12 Steps   Reason if not Attempted Safety concerns   12 Steps CARE Score 88   Toilet Transfer   Comment Pt did not require during session   Therapeutic Interventions   Strengthening Supine/prone/sidelying LE TE   Balance Standing tolerance at sink to brush teeth with unilateral UE support on sink, occasional no UE support; standing tolerance/balance standing at EOB to don/doff pants   Other bed mobility, transfer training, w/c mobility (indoor and outdoor)   Other Comments   Comments L LE residual wrap wrapped at end of session   Assessment   Treatment Assessment Pt agreeable to PT this AM, received supine in bed. Pt is continuing to tolerate LE TE well and improving with ability to reposition in bed with/without use of bed rails. Pt required no  VC for sequencing and hand placement during modified sit pivots. He is demonstrating improved standing tolerance on R LE for functional tasks such as standing at sink to brush teeth or standing to assist abisai/doff pants. Will continue with current PT POC to improve deficits and promote return to PLOF.   Problem List Decreased strength;Decreased range of motion;Decreased endurance;Impaired balance;Decreased mobility;Decreased coordination;Decreased cognition;Impaired judgement;Decreased safety awareness;Impaired sensation;Decreased skin integrity;Orthopedic restrictions   PT Barriers   Physical Impairment Decreased strength;Decreased range of motion;Decreased endurance;Impaired balance;Decreased cognition;Impaired judgement;Decreased safety awareness;Decreased skin integrity;Orthopedic restrictions;Pain   Functional Limitation Ramp negotiation;Standing;Transfers;Wheelchair management   Plan   Treatment/Interventions Functional transfer training;LE strengthening/ROM;Therapeutic exercise;Endurance training;Patient/family training;Equipment eval/education;Bed mobility;Gait training   Progress Progressing toward goals   PT Therapy Minutes   PT Time In 1000   PT Time Out 1130   PT Total Time (minutes) 90   PT Mode of treatment - Individual (minutes) 90   PT Mode of treatment - Concurrent (minutes) 0   PT Mode of treatment - Group (minutes) 0   PT Mode of treatment - Co-treat (minutes) 0   PT Mode of Treatment - Total time(minutes) 90 minutes   PT Cumulative Minutes 735     Supine/Prone/Sidelying LE TE:  3x10, B/L LEs  SLR - flexion  GS (prone)  Hip ADd - tanya  Supine single-leg bridges (R LE only)  Supine abdominal crunches  Sidelying hip abduction  Prone Hip Ext    Patient remains OOB in chair, all needs in reach.  Alarm in place and activated.  Encouraged use of call bell, patient verbalizes understanding.

## 2024-07-14 NOTE — PLAN OF CARE
Problem: PAIN - ADULT  Goal: Verbalizes/displays adequate comfort level or baseline comfort level  Description: Interventions:  - Encourage patient to monitor pain and request assistance  - Assess pain using appropriate pain scale  - Administer analgesics based on type and severity of pain and evaluate response  - Implement non-pharmacological measures as appropriate and evaluate response  - Consider cultural and social influences on pain and pain management  - Notify physician/advanced practitioner if interventions unsuccessful or patient reports new pain  Outcome: Progressing     Problem: INFECTION - ADULT  Goal: Absence or prevention of progression during hospitalization  Description: INTERVENTIONS:  - Assess and monitor for signs and symptoms of infection  - Monitor lab/diagnostic results  - Monitor all insertion sites, i.e. indwelling lines, tubes, and drains  - Monitor endotracheal if appropriate and nasal secretions for changes in amount and color  - [place] appropriate cooling/warming therapies per order  - Administer medications as ordered  - Instruct and encourage patient and family to use good hand hygiene technique  - Identify and instruct in appropriate isolation precautions for identified infection/condition  Outcome: Progressing  Goal: Absence of fever/infection during neutropenic period  Description: INTERVENTIONS:  - Monitor WBC    Outcome: Progressing     Problem: SAFETY ADULT  Goal: Patient will remain free of falls  Description: INTERVENTIONS:  - Educate patient/family on patient safety including physical limitations  - Instruct patient to call for assistance with activity   - Consult OT/PT to assist with strengthening/mobility   - Keep Call bell within reach  - Keep bed low and locked with side rails adjusted as appropriate  - Keep care items and personal belongings within reach  - Initiate and maintain comfort rounds  - Make Fall Risk Sign visible to staff  - Offer Toileting every 2 Hours,  in advance of need  - Initiate/Maintain bed/chair alarm  - Obtain necessary fall risk management equipment: alarms  - Apply yellow socks and bracelet for high fall risk patients  - Consider moving patient to room near nurses station  Outcome: Progressing  Goal: Maintain or return to baseline ADL function  Description: INTERVENTIONS:  -  Assess patient's ability to carry out ADLs; assess patient's baseline for ADL function and identify physical deficits which impact ability to perform ADLs (bathing, care of mouth/teeth, toileting, grooming, dressing, etc.)  - Assess/evaluate cause of self-care deficits   - Assess range of motion  - Assess patient's mobility; develop plan if impaired  - Assess patient's need for assistive devices and provide as appropriate  - Encourage maximum independence but intervene and supervise when necessary  - Involve family in performance of ADLs  - Assess for home care needs following discharge   - Consider OT consult to assist with ADL evaluation and planning for discharge  - Provide patient education as appropriate  Outcome: Progressing  Goal: Maintains/Returns to pre admission functional level  Description: INTERVENTIONS:  - Perform AM-PAC 6 Click Basic Mobility/ Daily Activity assessment daily.  - Set and communicate daily mobility goal to care team and patient/family/caregiver.   - Collaborate with rehabilitation services on mobility goals if consulted  - Perform Range of Motion 3 times a day.  - Reposition patient every 2 hours.  - Dangle patient 3 times a day  - Stand patient 3 times a day  - Ambulate patient 3 times a day  - Out of bed to chair 3 times a day   - Out of bed for meals 3 times a day  - Out of bed for toileting  - Record patient progress and toleration of activity level   Outcome: Progressing     Problem: DISCHARGE PLANNING  Goal: Discharge to home or other facility with appropriate resources  Description: INTERVENTIONS:  - Identify barriers to discharge w/patient and  caregiver  - Arrange for needed discharge resources and transportation as appropriate  - Identify discharge learning needs (meds, wound care, etc.)  - Arrange for interpretive services to assist at discharge as needed  - Refer to Case Management Department for coordinating discharge planning if the patient needs post-hospital services based on physician/advanced practitioner order or complex needs related to functional status, cognitive ability, or social support system  Outcome: Progressing     Problem: Prexisting or High Potential for Compromised Skin Integrity  Goal: Skin integrity is maintained or improved  Description: INTERVENTIONS:  - Identify patients at risk for skin breakdown  - Assess and monitor skin integrity  - Assess and monitor nutrition and hydration status  - Monitor labs   - Assess for incontinence   - Turn and reposition patient  - Assist with mobility/ambulation  - Relieve pressure over bony prominences  - Avoid friction and shearing  - Provide appropriate hygiene as needed including keeping skin clean and dry  - Evaluate need for skin moisturizer/barrier cream  - Collaborate with interdisciplinary team   - Patient/family teaching  - Consider wound care consult   Outcome: Progressing

## 2024-07-15 PROBLEM — R32 URINARY INCONTINENCE: Status: ACTIVE | Noted: 2024-07-07

## 2024-07-15 PROBLEM — L89.303 PRESSURE INJURY OF BUTTOCK, STAGE 3 (HCC): Status: ACTIVE | Noted: 2024-07-07

## 2024-07-15 PROCEDURE — 99232 SBSQ HOSP IP/OBS MODERATE 35: CPT | Performed by: INTERNAL MEDICINE

## 2024-07-15 PROCEDURE — 97110 THERAPEUTIC EXERCISES: CPT

## 2024-07-15 PROCEDURE — 97530 THERAPEUTIC ACTIVITIES: CPT

## 2024-07-15 PROCEDURE — 97535 SELF CARE MNGMENT TRAINING: CPT

## 2024-07-15 PROCEDURE — 99232 SBSQ HOSP IP/OBS MODERATE 35: CPT | Performed by: PHYSICAL MEDICINE & REHABILITATION

## 2024-07-15 RX ADMIN — MICONAZOLE NITRATE 1 APPLICATION: 20 CREAM TOPICAL at 08:05

## 2024-07-15 RX ADMIN — ATORVASTATIN CALCIUM 80 MG: 80 TABLET, FILM COATED ORAL at 17:25

## 2024-07-15 RX ADMIN — LEVOCARNITINE 330 MG: 1 SOLUTION ORAL at 08:04

## 2024-07-15 RX ADMIN — LOSARTAN POTASSIUM 50 MG: 50 TABLET, FILM COATED ORAL at 08:05

## 2024-07-15 RX ADMIN — Medication 6 MG: at 21:58

## 2024-07-15 RX ADMIN — GABAPENTIN 100 MG: 100 CAPSULE ORAL at 15:12

## 2024-07-15 RX ADMIN — ACETAMINOPHEN 975 MG: 325 TABLET, FILM COATED ORAL at 12:45

## 2024-07-15 RX ADMIN — ZONISAMIDE 400 MG: 100 CAPSULE ORAL at 08:04

## 2024-07-15 RX ADMIN — MICONAZOLE NITRATE: 20 CREAM TOPICAL at 17:24

## 2024-07-15 RX ADMIN — RIVAROXABAN 20 MG: 20 TABLET, FILM COATED ORAL at 17:25

## 2024-07-15 RX ADMIN — DIVALPROEX SODIUM 1250 MG: 500 TABLET, EXTENDED RELEASE ORAL at 08:05

## 2024-07-15 RX ADMIN — LEVOCARNITINE 330 MG: 1 SOLUTION ORAL at 15:13

## 2024-07-15 RX ADMIN — LAMOTRIGINE 50 MG: 25 TABLET ORAL at 17:25

## 2024-07-15 RX ADMIN — DOLUTEGRAVIR SODIUM 50 MG: 50 TABLET, FILM COATED ORAL at 08:06

## 2024-07-15 RX ADMIN — TAMSULOSIN HYDROCHLORIDE 0.4 MG: 0.4 CAPSULE ORAL at 17:25

## 2024-07-15 RX ADMIN — GABAPENTIN 100 MG: 100 CAPSULE ORAL at 21:58

## 2024-07-15 RX ADMIN — METOPROLOL SUCCINATE 25 MG: 25 TABLET, EXTENDED RELEASE ORAL at 08:05

## 2024-07-15 RX ADMIN — BICTEGRAVIR SODIUM, EMTRICITABINE, AND TENOFOVIR ALAFENAMIDE FUMARATE 1 TABLET: 50; 200; 25 TABLET ORAL at 08:06

## 2024-07-15 RX ADMIN — LAMOTRIGINE 50 MG: 25 TABLET ORAL at 08:05

## 2024-07-15 RX ADMIN — SERTRALINE HYDROCHLORIDE 75 MG: 50 TABLET ORAL at 08:05

## 2024-07-15 RX ADMIN — ASPIRIN 81 MG: 81 TABLET, COATED ORAL at 08:05

## 2024-07-15 RX ADMIN — GABAPENTIN 100 MG: 100 CAPSULE ORAL at 08:05

## 2024-07-15 RX ADMIN — MIRTAZAPINE 15 MG: 15 TABLET, FILM COATED ORAL at 21:58

## 2024-07-15 RX ADMIN — LEVOCARNITINE 330 MG: 1 SOLUTION ORAL at 17:25

## 2024-07-15 NOTE — PROGRESS NOTES
"   07/15/24 1400   Assessment   Treatment Assessment Pt refused PT repeatedly citing feeling down \" you know sometimes Im just\" demonstrating a thumbs down. PT offered to do bed level exercise, go outside for fresh air and do AMP phase 1 education. PT also offered pastoral care but pt refused. Pt agreeable for PT to come in when [facility] comes in at 14:30 for  prescription.   Therapy Time missed   Time missed? Yes   Amount of time missed 90   Reason for time missed Extreme fatigue   Time(s) multiple attempts made 2       "

## 2024-07-15 NOTE — ASSESSMENT & PLAN NOTE
Remote history of TBI, previously residing in a group home prior to [facility] sentence.  Evaluated by neuropsychiatry on 6/27/24 and deemed NOT to have medical decision-making capacity  Process for court appointed guardian started on 7/5/24 - CM following

## 2024-07-15 NOTE — PLAN OF CARE
Problem: PAIN - ADULT  Goal: Verbalizes/displays adequate comfort level or baseline comfort level  Description: Interventions:  - Encourage patient to monitor pain and request assistance  - Assess pain using appropriate pain scale  - Administer analgesics based on type and severity of pain and evaluate response  - Implement non-pharmacological measures as appropriate and evaluate response  - Consider cultural and social influences on pain and pain management  - Notify physician/advanced practitioner if interventions unsuccessful or patient reports new pain  Outcome: Progressing     Problem: INFECTION - ADULT  Goal: Absence or prevention of progression during hospitalization  Description: INTERVENTIONS:  - Assess and monitor for signs and symptoms of infection  - Monitor lab/diagnostic results  - Monitor all insertion sites, i.e. indwelling lines, tubes, and drains  - Monitor endotracheal if appropriate and nasal secretions for changes in amount and color  - [place] appropriate cooling/warming therapies per order  - Administer medications as ordered  - Instruct and encourage patient and family to use good hand hygiene technique  - Identify and instruct in appropriate isolation precautions for identified infection/condition  Outcome: Progressing  Goal: Absence of fever/infection during neutropenic period  Description: INTERVENTIONS:  - Monitor WBC    Outcome: Progressing     Problem: SAFETY ADULT  Goal: Patient will remain free of falls  Description: INTERVENTIONS:  - Educate patient/family on patient safety including physical limitations  - Instruct patient to call for assistance with activity   - Consult OT/PT to assist with strengthening/mobility   - Keep Call bell within reach  - Keep bed low and locked with side rails adjusted as appropriate  - Keep care items and personal belongings within reach  - Initiate and maintain comfort rounds  - Make Fall Risk Sign visible to staff  - Offer Toileting every   Hours,  in advance of need  - Initiate/Maintain  alarm  - Obtain necessary fall risk management equipment:    - Apply yellow socks and bracelet for high fall risk patients  - Consider moving patient to room near nurses station  Outcome: Progressing  Goal: Maintain or return to baseline ADL function  Description: INTERVENTIONS:  -  Assess patient's ability to carry out ADLs; assess patient's baseline for ADL function and identify physical deficits which impact ability to perform ADLs (bathing, care of mouth/teeth, toileting, grooming, dressing, etc.)  - Assess/evaluate cause of self-care deficits   - Assess range of motion  - Assess patient's mobility; develop plan if impaired  - Assess patient's need for assistive devices and provide as appropriate  - Encourage maximum independence but intervene and supervise when necessary  - Involve family in performance of ADLs  - Assess for home care needs following discharge   - Consider OT consult to assist with ADL evaluation and planning for discharge  - Provide patient education as appropriate  Outcome: Progressing  Goal: Maintains/Returns to pre admission functional level  Description: INTERVENTIONS:  - Perform AM-PAC 6 Click Basic Mobility/ Daily Activity assessment daily.  - Set and communicate daily mobility goal to care team and patient/family/caregiver.   - Collaborate with rehabilitation services on mobility goals if consulted  - Perform Range of Motion   times a day.  - Reposition patient every   hours.  - Dangle patient   times a day  - Stand patient   times a day  - Ambulate patient   times a day  - Out of bed to chair   times a day   - Out of bed for meals   times a day  - Out of bed for toileting  - Record patient progress and toleration of activity level   Outcome: Progressing     Problem: DISCHARGE PLANNING  Goal: Discharge to home or other facility with appropriate resources  Description: INTERVENTIONS:  - Identify barriers to discharge w/patient and caregiver  -  Arrange for needed discharge resources and transportation as appropriate  - Identify discharge learning needs (meds, wound care, etc.)  - Arrange for interpretive services to assist at discharge as needed  - Refer to Case Management Department for coordinating discharge planning if the patient needs post-hospital services based on physician/advanced practitioner order or complex needs related to functional status, cognitive ability, or social support system  Outcome: Progressing     Problem: Prexisting or High Potential for Compromised Skin Integrity  Goal: Skin integrity is maintained or improved  Description: INTERVENTIONS:  - Identify patients at risk for skin breakdown  - Assess and monitor skin integrity  - Assess and monitor nutrition and hydration status  - Monitor labs   - Assess for incontinence   - Turn and reposition patient  - Assist with mobility/ambulation  - Relieve pressure over bony prominences  - Avoid friction and shearing  - Provide appropriate hygiene as needed including keeping skin clean and dry  - Evaluate need for skin moisturizer/barrier cream  - Collaborate with interdisciplinary team   - Patient/family teaching  - Consider wound care consult   Outcome: Progressing

## 2024-07-15 NOTE — ASSESSMENT & PLAN NOTE
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 CM to investigate.

## 2024-07-15 NOTE — PROGRESS NOTES
Physical Medicine and Rehabilitation Progress Note  Sunil Patel 63 y.o. male MRN: [id number]  Unit/Bed#: [facility] 451-01 Encounter: [id number]    To Review: Sunil Patel is a 63 y.o. male who  has a past medical history of Acute lower limb ischemia (06/08/2024), Anxiety, Depression, HIV disease (HCC), Substance abuse (HCC), and Suicide attempt (HCC). who presented to the [facility] on 6/7/24 from [facility] for increased LLE swelling and pain and was found to have a left external iliac artery occlusion and acute limb ischemia. 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 Patient had TTE on 6/10/24 showing LV apex thrombus. On 6/11/24 patient had pharmacologic nuclear stress test/SPECT scan which did not show any significant areas of ischemia with EF 40-45% and recommended continuing A/C for LV apical thrombus. His course was complicated by b/l buttock unstageable pressure ulcers, urinary and fecal incontinence, pain, and significant decline in ADLs and mobility. Patient has been continued on Xarelto for anticoagulation, as well as ASA and statin. Patient has a history of TBI, with baseline nonfluent aphasia and forgetfulness. He was evaluated by neuropsychology on 6/27/24, and deemed to not have capacity to make fully informed medical decisions. Therefore, the guardianship process was initiated as of 7/5/24. He was admitted to the [facility] on 7/6.     Chief Complaint: No new concerns    Interval History/Subjective:  No acute events over the weekend. Has been appropriate with staff and participating appropriately. Denies any acute residual limb pain, but gets occasional phantom limb sensation - not pain. It does not bother him. He denies any new CP, SOB, fevers, chills, N/V, abdominal pain. Last BM 7/14  and continent. Has been intermittently continent of urine, and nursing reports patient at times has urgency. Patient denies any pain or dysuria. Nursing notes patient sometimes needs assistance utilizing the urinal. He is transferring well using a squat pivot. His wounds are improving.     ROS:  A 10 point review of systems was negative except for what is noted in the HPI.    Today's Changes:  Patient needs timed voids to assist with urinal. And would also like to check PVRS to make sure no retention. Ordered and discussed with nursing.   Can continue to use condom catheter at night to help maintain continence while working on wound healing.  Continue P500 mattress and frequent turns, as well as specialty cushion.   4.   Monitor BP.     Total visit time: 35 minutes, with more than 50% spent counseling/coordinating care. Counseling includes discussion with patient re: progress in therapies, functional issues observed by therapy staff, and discussion with patient regarding their current medical state and wellbeing. Coordination of patient's care was performed in conjunction with Internal Medicine service to monitor patient's labs, vitals, and management of their comorbidities.    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 Underwent L femoral thrombectomy and AKA with vascular surgery   - Vas sx follow-up 7/10 - L AKA incision site well-healed, staples taken out at the bedside this morning  - [facility] will be vendor - Rx for  sock provided   - Has some phantom limb sensation and is on gabapentin and working on desensitization. Monitor for need for topicals  - Monitor for hip flexion/abduction tightness. Stretches in therapy  - Volume management/shaping to transition to .   - On Rivaroxaban with hx of LV thrombus and PAOD   - PT/OT/SLP (to work on carry over strategies) 3-5 hours/day, 5-7  "days/week.    - Goals are Ind-Sup at a wheelchair level.   - Outpatient f/u with [facility] and Vascular    Neurocognitive disorder  Assessment & Plan  Cog slightly better compared to acute   Hx of TBI and L MCA CVA, psychiatric d/o, seizure d/o  - Neuropsych assessment 6/27/24 - \"diffuse cognitive dysfunction and on a measure assessing awareness of personal health status and ability to evaluate health problems, handle medical emergencies and take safety precautions, patient performed in the IMPAIRED range of functioning. During this encounter, patient does not appear to have capacity to make fully informed medical decisions.\"  MMSE 4/28 - severely impaired  - Guardianship process initiated on acute - follow-up by CM/Admin  - Status: some expressive and possible some receptive aphasia.  He can be difficult to follow at times likely due to a mixture of aphasia, tangentiality, mild lability, and impaired memory; lability with hx of possible bipolar d/o  - Neuropsych Med review: Depakote, Lamictal, Remeron, Zoloft, Melatonin, gabapentin, PRN oxy 2.5mg TID   - Monitor neuro-exam, wakefulness, mood, cognition, insight into deficits and safety awareness   - Monitor and ensure optimal management electrolytes, nutrition, and hydration  - Monitor for signs or symptoms of infection, medication intolerances, other systemic etiologies  - Additional labs, imaging, specialist follow-up as needed per primary team currently   - Overstimulation precautions, frequent re-orientation, re-direction, re-assurance  - Optimal mood, pain, and sleep management  - If impaired sleep or behavior recommend sleep log and agitation monitoring    - Limit sedating medications when possible  - Fall precautions - if needed increase rounding or consider virtual sitter or in-person sitter  - For routine restlessness, anxiety, irritability focus on non-pharmacologic management    - Hold benzo's with increased risk paradoxical reaction and " "possibility of limiting cognitive recovery  - Continue SLP and interdisciplinary care  - OP neuro and PCP     Adjustment disorder with mixed anxiety and depressed mood  Assessment & Plan  - Related to recent release from incarceration, new AKA and uncertainty of future disposition, all in the setting of impaired cognition related to prior strokes and TBI  - Behavioral techniques for symptom management  - Neuropsychology consult as available    Pressure injury of buttock, stage 3 (HCC)  Assessment & Plan  Stable   - Wound care consulted and following weekly  Per wound care specialist 7/8   \"- Wound care consulted and following  \"Right Heel dry intact and shelely with no skin loss or wounds present. Recommend preventative Hydraguard Cream and proper offloading/ repositioning.    POA B/L Sacro-buttocks Unstageable Pressure Injury: wound now presents as a POA Right Buttocks Stage 3 Pressure Injury. Wound was previously 2 areas of full thickness skin loss on b/l sacro-buttock. Wound is now one area of full thickness on right buttock. Left buttock is now intact, hyperpigmented and blanches. Right buttock wound with mix of 80% pink tissue with scattered 20% yellow slough tissue. Anthony-wound is fragile and hyperpigmented. Scant serosanguineous drainage noted. Recommend continuing with triad paste and hydraguard to anthony-wounds  - Recommend ROHO cushion in chair when out of bed instead   - Preventative hydraguard to bilateral heels BID and PRN.   - P500  - Monitor clinically for breakdown, frequent turns      Patient incapable of making informed decisions  Assessment & Plan  - History of remote TBI and prior strokes with comorbid psychiatric history.  - Evaluated by neuropsychology, Dr. Dilshad Tatum, PhD on 6/27/24, found to have \"diffuse cognitive dysfunction and on a measure assessing awareness of personal health status and ability to evaluate health problems, handle medical emergencies and take safety precautions, patient " "performed in the IMPAIRED range of functioning. During this encounter, patient does not appear to have capacity to make fully informed medical decisions.\"  - Court-appointed guardianship process has been initiated by acute care CM Katia Kay. Patient does not have family available or willing to take on medical decision making at this time.   - Patient will transition back to acute care when functional rehabilitation goals are met to follow-up with court-appointed guardianship for placement.    Urinary incontinence  Assessment & Plan  - Did have some incontinence on acute   - Describes urgency  - monitor for retention, incontinence (including overflow incontinence), signs/symptoms of UTI  - Timed Voids and PVRs Q4hrs to start   - If PVR <150 x3, can discontinue scans, but would continue timed voids   - He has some difficulty managing the urinal    - needs assistance but is able to transfer to [facility]   - Still uses purewick at night, in part for continence care/to protect his skin given his buttock wounds.          At risk for constipation  Assessment & Plan  - Stooling adequately recently; did have some incontinence on acute now improved  - Close continent care given buttock wounds  - Miralax PRN      Acute pain  Assessment & Plan  Controlled   - Tylenol 975 mg q8h PRN  - Gabapentin 100 mg TID scheduled for phantom pain/sensation  - Oxycodone 2.5 mg q8h PRN, wean as possible   - Has not used the past week   - May be able to discontinue at discharge.    At risk for venous thromboembolism (VTE)  Assessment & Plan  - On rivaroxaban    Impaired mobility and activities of daily living  Assessment & Plan  - Rehabilitation medicine physician for daily monitoring of care, 24 hour availability for acute medical issues, medication management, and therapeutic and diagnostic assessments.  - 24 hour rehabilitation nursing 7 days per week for: management/teaching of medications, bowel/bladder routine, skin care.  - PT, OT for " 2-3 hours per day, 5 to 6 days per week; 15 hours per week  - Rehabilitation Psychology as needed for adjustment and coping  - MSW for barriers to discharge, community resources, and family support  - Discharge planning following to help ensure a safe and efficient discharge        History of stroke  Assessment & Plan  - History of old left temporal and parietal lobe cortical infarcts, also involving the insula and left occipital lobe as well as small right posterior parietal lobe. Stable on most recent CT head on 5/16/24.   - ASA, Xarelto and statin for secondary prevention  - Optimal BP control  - Monitor neuro exam - hx of LV thrombus   - OP neuro follow-up     Bipolar affective disorder (HCC)  Assessment & Plan  Mood acceptable; continue meds as outlined   Supportive counseling  NeuroPsychology consult while in [facility] if available for support  Counseled on and continue to encourage deep breathing/relaxation/behavioral management techniques  - Mirtazapine 15 mg qHs  - Sertraline 75 mg daily  - Lamictal 50mg BID  - Depakote ER 1250mg qday   - Outpatient psychiatry follow-up    Cardiomyopathy (HCC)  Assessment & Plan  ECHO on 6/10/2024 showed LVEF 40-45% with mild global hypokinesis   Status post NM stress test on 6/11/2024 which showed: a large, mild, fixed defect in the inferior wall, possibly due to diaphragmatic attenuation artifact, there is a small area of partial reversibility in the inferior apical wall suggestive of ischemia    Elevated by cardiology, etiology felt to be possibly secondary to stress-induced cardiomyopathy, with apical thrombus  Cardiac catheterization deferred given the lack of any significant ischemia, and no current cardiac symptoms  Continue with medical management with aspirin, statin, beta-blocker, ARB  Monitor volume status, remains euvolemic off of diuretics   Outpatient follow-up with cardiology    Traumatic brain injury (HCC)  Assessment & Plan  See neurocog impairment     Carnitine  deficiency (HCC)  Assessment & Plan  - Carnitine replacement  - Appreciate medicine management      History of seizures  Assessment & Plan  - Depakote ER, lamotrigine, zonisamide  - Outpatient follow-up with [facility] neurology    Left ventricular thrombus  Assessment & Plan  - Visualized on echocardiogram on 6/10/24: spherical 1.5 x 1.3 cm thrombus at the LV apex.   - Rivaroxaban  - Outpatient follow-up with cardiology    Benign essential hypertension  Assessment & Plan  - Toprol, losartan  - Appreciate medicine management    Human immunodeficiency virus (HIV) infection (HCC)  Assessment & Plan  - Continue anti-retroviral treatment  - Appreciate medicine management during [facility] course  - OP ID follow-up         Health Maintenance  #Skin/Pressure Injury Prevention: Turn Q2hr in bed, with weight shifts [id number] in wheelchair.  #GI Prophylaxis: Not indicated   #Code Status: Full Code  #FEN: Cardiac diet, Ensure Max Vanilla with breakfast, and chocolate at dinner   #Dispo: [facility] 10-14 days with goals to discharge back to acute care if unable to place at most likely other [facility] to await guardianship procedure.       Objective:    Functional Update:  PT: Sup bed mobility, Min transfers, Ind wheelchair mobility   OT: modA toileting hygiene and min-modA toilet transfers   SLP: Min comprehension, problem solving, memory, modA expression, supervision social interaction     Allergies per EMR    Physical Exam:  Temp:  [97.9 °F (36.6 °C)-98.4 °F (36.9 °C)] 98.4 °F (36.9 °C)  HR:  [90-91] 90  Resp:  [18-19] 18  BP: (117)/(74-75) 117/74  Oxygen Therapy  SpO2: 96 %    Gen: No acute distress, Well-nourished, well-appearing.  HEENT: Moist mucus membranes  Cardiovascular: Regular rate, rhythm, S1/S2. Distal pulses palpable  Heme/Extr: No edema  Pulmonary: Non-labored breathing  : No fernandes  GI: Soft, non-tender, non-distended.   MSK: L AKA appearing wedge shaped.   Integumentary: Skin is warm, dry. Did not visualize buttock  or incision today.   Neuro: AAOx3, Speech is intact. Appropriate to questioning. Cooperative.   Psych: Normal mood and affect.     Diagnostic Studies: reviewed, no new imaging  No results found.    Laboratory:  Reviewed  Results from last 7 days   Lab Units 07/11/24  0526   HEMOGLOBIN g/dL 11.1*   HEMATOCRIT % 36.0*   WBC Thousand/uL 9.55     Results from last 7 days   Lab Units 07/11/24  0526   BUN mg/dL 19   POTASSIUM mmol/L 4.0   CHLORIDE mmol/L 105   CREATININE mg/dL 0.89            Patient Active Problem List   Diagnosis    Bipolar affective disorder (HCC)    Substance abuse (HCC)    Human immunodeficiency virus (HIV) infection (HCC)    History of stroke    Benign essential hypertension    Positive laboratory testing for human immunodeficiency virus (HCC)    Hypertension    Unspecified vitamin D deficiency    Tobacco abuse    Cerebrovascular accident (CVA) (HCC)    Left ventricular thrombus    History of seizures    Carnitine deficiency (HCC)    Above-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)    Traumatic brain injury (HCC)    Impaired mobility and activities of daily living    At risk for venous thromboembolism (VTE)    Acute pain    At risk for constipation    At risk for altered urinary elimination    Patient incapable of making informed decisions    Pressure injury of buttock, unstageable and at high risk for breakdown    Adjustment disorder with mixed anxiety and depressed mood    Neurocognitive disorder    Cardiomyopathy (HCC)         Medications  Current Facility-Administered Medications   Medication Dose Route Frequency Provider Last Rate    acetaminophen  975 mg Oral TID PRN José Salcido MD      aspirin  81 mg Oral Daily Lorrie Barillas PA-C      atorvastatin  80 mg Oral Daily With Dinner Lorrie Barillas PA-C      bictegravir-emtricitab-tenofovir alafenamide  1 tablet Oral Daily With Breakfast Lorrie Barillas PA-C      bisacodyl  10 mg Rectal Daily PRN Lorrie Barillas  EJ      diphenhydrAMINE  25 mg Oral Q6H PRN Lorrie Barillas, PA-GERALDO      divalproex sodium  1,250 mg Oral Daily Lorrie Barillas, PA-GERALDO      dolutegravir  50 mg Oral Daily Lorriejacky Barillas, PA-GERALDO      gabapentin  100 mg Oral TID Lorriejacky Barillas, PA-GERALDO      lamoTRIgine  50 mg Oral BID Lorriejacky Barilals, PA-GERALDO      levOCARNitine  1,000 mg/day Oral TID With Meals Lorrie Barillas, EJ      losartan  50 mg Oral Daily Lorrie Barillas, EJ      melatonin  6 mg Oral HS Lorrie Barillas, EJ      metoprolol succinate  25 mg Oral Daily CHARLIE Mcclelland      mirtazapine  15 mg Oral HS Lorrie Barillas PA-C      ELVA ANTIFUNGAL   Topical BID Lorrie Barillas, EJ      ondansetron  4 mg Oral Q6H PRN Lorrie Barillas, EJ      oxyCODONE  2.5 mg Oral Q8H PRN Raimundo Riley MD      polyethylene glycol  17 g Oral Daily PRN Lorrie Barillas, EJ      rivaroxaban  20 mg Oral Daily With Dinner Lorrie Barillas PA-C      senna  1 tablet Oral HS PRN Lorrie Barillas, EJ      sertraline  75 mg Oral Daily Lorrie Barillas, EJ      tamsulosin  0.4 mg Oral Daily With Dinner Lorrie Barillas, EJ      zonisamide  400 mg Oral Daily Lorrie Barillas PA-C            ** Please Note: Fluency Direct voice to text software may have been used in the creation of this document. **

## 2024-07-15 NOTE — PROGRESS NOTES
"   07/15/24 0830   Pain Assessment   Pain Assessment Tool 0-10   Pain Score No Pain   Restrictions/Precautions   Precautions Aphasia;Bed/chair alarms;Cognitive;Fall Risk;Supervision on toilet/commode;Seizure   Weight Bearing Restrictions Yes   LLE Weight Bearing Per Order NWB   ROM Restrictions No   Braces or Orthoses   (LLE residual limb wrap)   Lifestyle   Autonomy \"They were going to get me ready.\"   Oral Hygiene   Type of Assistance Needed Set-up / clean-up   Physical Assistance Level No physical assistance   Comment Patient able to complete oral hygiene tasks I after s/u seated EOB and pt able to don deodorant I after s/u.   Oral Hygiene CARE Score 5   Shower/Bathe Self   Type of Assistance Needed Physical assistance   Physical Assistance Level 25% or less   Comment Patient completes sponge bath supine/seated with pt able to bathe UB , BLEs, and anthony area with S. Patient requires A with buttocks when pt rolls to side.   Shower/Bathe Self CARE Score 3   Tub/Shower Transfer   Reason Not Assessed Sponge Bath;Safety;Medical;Endurance;Balance   Upper Body Dressing   Type of Assistance Needed Supervision   Physical Assistance Level No physical assistance   Comment doff/[facility] gown like [place] shirt, pt does not have other clothing at this time   Upper Body Dressing CARE Score 4   Lower Body Dressing   Type of Assistance Needed Physical assistance   Physical Assistance Level 51%-75%   Comment Total A for LLE residual limb wrapping. TA to don tab brief. Lying supine, pt able to thread pants over legs and bridge over hips - min A to fully don over hips.   Lower Body Dressing CARE Score 2   Putting On/Taking Off Footwear   Type of Assistance Needed Physical assistance   Physical Assistance Level 25% or less   Comment seated EOB, pt able to don R sock with S, min A to don R shoe with increased time   Putting On/Taking Off Footwear CARE Score 3   Roll Left and Right   Type of Assistance Needed Supervision   Physical " Assistance Level No physical assistance   Comment bed rail   Roll Left and Right CARE Score 4   Lying to Sitting on Side of Bed   Type of Assistance Needed Supervision   Physical Assistance Level No physical assistance   Comment HOB raised with bed rail   Lying to Sitting on Side of Bed CARE Score 4   Bed-Chair Transfer   Type of Assistance Needed Incidental touching   Physical Assistance Level No physical assistance   Comment CGA modified sit pivot bed -> recliner   Chair/Bed-to-Chair Transfer CARE Score 4   Toileting Hygiene   Comment Patient utilizes urinal lying supine with set-up. No pants donned at the time.   Therapeutic Excerise-Strength   UE Strength Yes   Right Upper Extremity- Strength   RUE Strength Comment Seated, BUE strengthening ex completed utilizng 2lb DB for LUE and 1lb DB for RUE for bicep curls and chest presses. Ther ex completed to promote strength and activity tolerance for carryover into ADLs/functional transfers.   Left Upper Extremity-Strength   LUE Strength Comment see above   Cognition   Overall Cognitive Status Impaired   Arousal/Participation Alert;Responsive;Cooperative   Attention Attends with cues to redirect   Memory Decreased recall of precautions   Following Commands Follows one step commands with increased time or repetition   Activity Tolerance   Activity Tolerance Patient tolerated treatment well   Medical Staff Made Aware ASHWIN Sandoval assesses LLE incision prior to ACE wrap and buttocks wound.   Assessment   Treatment Assessment Patient engaged in 90 min treatment session with focus on ADL retraining, functional transfers, LLE residual limb wrapping, and BUE strengthening as tolerated. Upon arrival, AM care not completed so OT completes ADL. SB ADL completed bed level/EOB  with min/mod A overall. Modified sit pivot transfer CGA bed -> recliner. Pt attempts to assist explain/assist with LLE wrapping however due to aphasia has difficulty expressing self, requiring total A from  therapist. See above for treatment details.   Continue OT plan of care with focus on endurance work, functional modified squat pivot transfers, BUE strengthening, sacral offloading strategies, repetitive safety training, alternating lateral weight shifting, core strengthening, ADL retraining, and ongoing phase 1 amputee education.   Prognosis Fair   Problem List Decreased strength;Decreased range of motion;Decreased endurance;Impaired balance;Decreased mobility;Decreased cognition;Impaired judgement;Decreased safety awareness;Decreased skin integrity   Barriers to Discharge Decreased caregiver support;Inaccessible home environment   Plan   Treatment/Interventions ADL retraining;Functional transfer training;Therapeutic exercise;Endurance training;Cognitive reorientation;Patient/family training;Equipment eval/education;Bed mobility;Compensatory technique education;Spoke to nursing   Progress Progressing toward goals   Discharge Recommendation   Rehab Resource Intensity Level, OT   (pending progress)   OT Therapy Minutes   OT Time In 0830   OT Time Out 1000   OT Total Time (minutes) 90   OT Mode of treatment - Individual (minutes) 90   OT Mode of treatment - Concurrent (minutes) 0   OT Mode of treatment - Group (minutes) 0   OT Mode of treatment - Co-treat (minutes) 0   OT Mode of Treatment - Total time(minutes) 90 minutes   OT Cumulative Minutes 736   Therapy Time missed   Time missed? No

## 2024-07-15 NOTE — PROGRESS NOTES
[facility]  Progress Note  Name: Sunil Patel I  MRN: [id number]  Unit/Bed#: -01 I Date of Admission: 7/6/2024   Date of Service: 7/15/2024 I Hospital Day: 9    Assessment & Plan   * Above-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)  Assessment & Plan  Presented with left lower extremity acute limb ischemia with extensive left iliofemoral and left infrainguinal embolism requiring left femoral thrombectomy and subsequent AKA on 6/7/24 with vascular surgery.  Incision C/D/I, pain controlled.  vascular surgery following, staples removed on 7/10  Continue ASA and statin   Also on Xarelto due to LV thrombus  Rehab and pain control per PMR    Cardiomyopathy ([facility])  Assessment & Plan  ECHO on 6/10/2024 showed LVEF 40-45% with mild global hypokinesis   Status post NM stress test on 6/11/2024 which showed: a large, mild, fixed defect in the inferior wall, possibly due to diaphragmatic attenuation artifact, there is a small area of partial reversibility in the inferior apical wall suggestive of ischemia    Elevated by cardiology, etiology felt to be possibly secondary to stress-induced cardiomyopathy, with apical thrombus  Cardiac catheterization deferred given the lack of any significant ischemia, and no current cardiac symptoms  Continue with medical management with aspirin, statin, beta-blocker, ARB  Monitor volume status, remains euvolemic off of diuretics   Outpatient follow-up with cardiology    Traumatic brain injury (HCC)  Assessment & Plan  Remote history of TBI, previously residing in a group home prior to alf sentence.  Evaluated by neuropsychiatry on 6/27/24 and deemed NOT to have medical decision-making capacity  Process for court appointed guardian started on 7/5/24 - CM following     Carnitine deficiency (HCC)  Assessment & Plan  Continue levocarnitine 330 mg 3x daily with meals.    History of seizures  Assessment & Plan  Diagnosed in July 2019, follows with  [facility] neurology outpatient  Continue home regimen with Depakote ER, Lamotrigine and Zonegran    Left ventricular thrombus  Assessment & Plan  Noted on echocardiogram 6/10/2024: spherical 1.5 x 1.3 cm thrombus at the LV apex   Initiated on AC with Xarelto, continue  Rx sent to [facility] for price check - Pharmacy could not verify pt's rx benefit. CM to investigate.    Benign essential hypertension  Assessment & Plan  Home regimen: Losartan 50mg daily, Toprol XL 25 mg BID  Current regimen: Losartan 50 mg daily, Toprol-XL 25 mg daily  BP acceptable.    History of stroke  Assessment & Plan  History of left MCA CVA in May 2018 with residual expressive aphasia  Continue ASA and atorvastatin    Human immunodeficiency virus (HIV) infection (HCC)  Assessment & Plan  Continue Biktarvy and Tivicay.    Bipolar affective disorder (HCC)  Assessment & Plan  Continue home meds Zoloft and Remeron  Outpatient follow-up with Psychiatry             The above assessment and plan was reviewed and updated as determined by my evaluation of the patient on 7/15/2024.    Labs:   Results from last 7 days   Lab Units 07/11/24  0526   WBC Thousand/uL 9.55   HEMOGLOBIN g/dL 11.1*   HEMATOCRIT % 36.0*   PLATELETS Thousands/uL 393*     Results from last 7 days   Lab Units 07/11/24  0526   SODIUM mmol/L 139   POTASSIUM mmol/L 4.0   CHLORIDE mmol/L 105   CO2 mmol/L 24   BUN mg/dL 19   CREATININE mg/dL 0.89   CALCIUM mg/dL 9.0                   Imaging  No orders to display       Review of Scheduled Meds:  Current Facility-Administered Medications   Medication Dose Route Frequency Provider Last Rate    acetaminophen  975 mg Oral TID PRN José Salcido MD      aspirin  81 mg Oral Daily Lorrie Barillas PA-C      atorvastatin  80 mg Oral Daily With Dinner Lorrie Barillas PA-C      bictegravir-emtricitab-tenofovir alafenamide  1 tablet Oral Daily With Breakfast Lorrie Barillas PA-C      bisacodyl  10 mg Rectal Daily PRN Lorrie  EJ Barillas      diphenhydrAMINE  25 mg Oral Q6H PRN Lorrie Barillas PA-C      divalproex sodium  1,250 mg Oral Daily Lorrie Barillas PA-C      dolutegravir  50 mg Oral Daily Lorrie Barillas PA-C      gabapentin  100 mg Oral TID Lorrie Barillas PA-C      lamoTRIgine  50 mg Oral BID Lorrie Barillas PA-C      levOCARNitine  1,000 mg/day Oral TID With Meals Lorrie Barillas PA-C      losartan  50 mg Oral Daily Lorrie Barillas PA-C      melatonin  6 mg Oral HS Lorrie Barillas PA-C      metoprolol succinate  25 mg Oral Daily CHARLIE Mcclelland      mirtazapine  15 mg Oral HS Lorrie Barillas PA-C      ELVA ANTIFUNGAL   Topical BID Lorrie Barillas PA-C      ondansetron  4 mg Oral Q6H PRN Lorrie Barillas PA-C      oxyCODONE  2.5 mg Oral Q8H PRN Raimundo Riley MD      polyethylene glycol  17 g Oral Daily PRN Lorrie Barillas PA-C      rivaroxaban  20 mg Oral Daily With Dinner Lorrie Barillas PA-C      senna  1 tablet Oral HS PRN Lorrie Barillas PA-C      sertraline  75 mg Oral Daily Lorrie Barillas PA-C      tamsulosin  0.4 mg Oral Daily With Dinner Lorrie Barillas PA-C      zonisamide  400 mg Oral Daily Lorrie Barillas PA-C         Subjective/ HPI: Patient seen and examined. Patients overnight issues or events were reviewed with nursing staff. New or overnight issues include the following:     Pt seen in his room. He was tearful re his current situation. He states that he needs a break from therapy. He denies any other complaints.    ROS:   A 10 point ROS was performed; negative except as noted above.        *Labs /Radiology studies Reviewed  *Medications  reviewed and reconciled as needed  *Please refer to order section for additional ordered labs studies      Physical Examination:  Vitals:   Vitals:    07/14/24 0614 07/14/24 1547 07/14/24 2110 07/15/24 0751   BP: 108/65 117/75 117/74 136/76   BP Location: Left arm Left  arm Left arm    Pulse: 82 91 90 94   Resp: 20 19 18 18   Temp: 97.9 °F (36.6 °C) 97.9 °F (36.6 °C) 98.4 °F (36.9 °C) 98.4 °F (36.9 °C)   TempSrc: Oral Oral Oral    SpO2: 97% 97% 96% 96%   Weight:       Height:           General Appearance: NAD; pleasant  HEENT: PERRLA, conjuctiva normal; mucous membranes moist; face symmetrical  Neck:  Supple  Lungs: clear bilaterally, normal respiratory effort, no retractions, expiratory effort normal, on room air  CV: regular rate and rhythm, no murmurs rubs or gallops noted   ABD: soft non tender, +BS x4  EXT: Lt AKA  Skin: normal turgor, normal texture, no rash  Psych: affect normal, mood normal  Neuro: Awake and alert.     The above physical exam was reviewed and updated as determined by my evaluation of the patient on 7/15/2024.    Invasive Devices       None                      VTE Pharmacologic Prophylaxis: Xarelto  Code Status: Level 1 - Full Code  Current Length of Stay: 9 day(s)    Total floor / unit time spent today 30 minutes  Coordination of patient's care was performed in conjunction with primary service. Time invested included review of patient's labs, vitals, and management of their comorbidities with continued monitoring, examination of patient as well as answering patient questions, documenting her findings and creating progress note in electronic medical record,  ordering appropriate diagnostic testing.       ** Please Note:  voice to text software may have been used in the creation of this document. Although proof errors in transcription or interpretation are a potential of such software**

## 2024-07-15 NOTE — PROGRESS NOTES
07/15/24 1440   Pain Assessment   Pain Assessment Tool 0-10   Pain Score No Pain   Pain Rating: FLACC (Rest) - Face 0   Pain Rating: FLACC (Rest) - Legs 0   Pain Rating: FLACC (Rest) - Activity 0   Pain Rating: FLACC (Rest) - Cry 0   Pain Rating: FLACC (Rest) - Consolability 0   Score: FLACC (Rest) 0   Pain Rating: FLACC (Activity) - Face 0   Pain Rating: FLACC (Activity) - Legs 0   Pain Rating: FLACC (Activity) - Activity 0   Pain Rating: FLACC (Activity) - Cry 0   Pain Rating: FLACC (Activity) - Consolability 0   Score: FLACC (Activity) 0   Restrictions/Precautions   Precautions Fall Risk;Cognitive;Bed/chair alarms;Aphasia;Supervision on toilet/commode;Seizure   LLE Weight Bearing Per Order NWB   Subjective   Subjective Pt agreeable for PT to see while [place] prosthetist Ole is here to deliver /fit pt with .   Roll Left and Right   Type of Assistance Needed Supervision;Verbal cues;Adaptive equipment   Comment bedrail - repeated rolling for anthony hygiene, changed diaper and during Medium size  fitting   Roll Left and Right CARE Score 4   Sit to Lying   Type of Assistance Needed Supervision;Verbal cues   Comment no rail, HOB flat   Sit to Lying CARE Score 4   Lying to Sitting on Side of Bed   Type of Assistance Needed Supervision;Verbal cues;Adaptive equipment   Comment HOB flat, despite PT put down the bedrail pt still reached over and used recliner arm rest to push off the bed to facilitate supine to sit   Lying to Sitting on Side of Bed CARE Score 4   Sit to Stand   Type of Assistance Needed Verbal cues;Incidental touching   Sit to Stand CARE Score 4   Bed-Chair Transfer   Type of Assistance Needed Incidental touching;Verbal cues   Comment modified squat pivot with UE support on surfaces   Chair/Bed-to-Chair Transfer CARE Score 4   Assessment   Treatment Assessment PT session focused on assisting pt with bed/chair transfers with emphasis on safe set up and to facilitate appt for   fitting with [facility] prosthetist Ole. Pt receptive to verbal education with handout provided by prosthetist. although due to ongoing cognitive impairment pt will require additional education/training to be able to don/doff  by himself. Pt requested to return back to recliner with alarm on and all needs within reach.   Barriers to Discharge Decreased caregiver support;Inaccessible home environment   Plan   Treatment/Interventions Functional transfer training;Bed mobility;Spoke to nursing   Progress Progressing toward goals   PT Therapy Minutes   PT Time In 1440   PT Time Out 1507   PT Total Time (minutes) 27   PT Mode of treatment - Individual (minutes) 0   PT Mode of treatment - Concurrent (minutes) 27   PT Mode of treatment - Group (minutes) 0   PT Mode of treatment - Co-treat (minutes) 0   PT Mode of Treatment - Total time(minutes) 27 minutes   PT Cumulative Minutes 762   Therapy Time missed   Time missed? Yes   Amount of time missed 63   Reason for time missed Extreme fatigue   Time(s) multiple attempts made 3

## 2024-07-16 PROCEDURE — 99231 SBSQ HOSP IP/OBS SF/LOW 25: CPT | Performed by: NURSE PRACTITIONER

## 2024-07-16 PROCEDURE — 99233 SBSQ HOSP IP/OBS HIGH 50: CPT | Performed by: PHYSICAL MEDICINE & REHABILITATION

## 2024-07-16 PROCEDURE — 97110 THERAPEUTIC EXERCISES: CPT

## 2024-07-16 PROCEDURE — 92507 TX SP LANG VOICE COMM INDIV: CPT

## 2024-07-16 PROCEDURE — 97530 THERAPEUTIC ACTIVITIES: CPT

## 2024-07-16 PROCEDURE — 97535 SELF CARE MNGMENT TRAINING: CPT

## 2024-07-16 RX ADMIN — GABAPENTIN 100 MG: 100 CAPSULE ORAL at 21:26

## 2024-07-16 RX ADMIN — LEVOCARNITINE 330 MG: 1 SOLUTION ORAL at 08:44

## 2024-07-16 RX ADMIN — GABAPENTIN 100 MG: 100 CAPSULE ORAL at 08:44

## 2024-07-16 RX ADMIN — ZONISAMIDE 400 MG: 100 CAPSULE ORAL at 08:44

## 2024-07-16 RX ADMIN — BICTEGRAVIR SODIUM, EMTRICITABINE, AND TENOFOVIR ALAFENAMIDE FUMARATE 1 TABLET: 50; 200; 25 TABLET ORAL at 08:45

## 2024-07-16 RX ADMIN — LOSARTAN POTASSIUM 50 MG: 50 TABLET, FILM COATED ORAL at 08:44

## 2024-07-16 RX ADMIN — LAMOTRIGINE 50 MG: 25 TABLET ORAL at 17:17

## 2024-07-16 RX ADMIN — GABAPENTIN 100 MG: 100 CAPSULE ORAL at 17:17

## 2024-07-16 RX ADMIN — TAMSULOSIN HYDROCHLORIDE 0.4 MG: 0.4 CAPSULE ORAL at 17:17

## 2024-07-16 RX ADMIN — METOPROLOL SUCCINATE 25 MG: 25 TABLET, EXTENDED RELEASE ORAL at 08:45

## 2024-07-16 RX ADMIN — SERTRALINE HYDROCHLORIDE 75 MG: 50 TABLET ORAL at 08:45

## 2024-07-16 RX ADMIN — Medication 6 MG: at 21:26

## 2024-07-16 RX ADMIN — LEVOCARNITINE 330 MG: 1 SOLUTION ORAL at 17:17

## 2024-07-16 RX ADMIN — LAMOTRIGINE 50 MG: 25 TABLET ORAL at 08:45

## 2024-07-16 RX ADMIN — DIVALPROEX SODIUM 1250 MG: 500 TABLET, EXTENDED RELEASE ORAL at 08:45

## 2024-07-16 RX ADMIN — DOLUTEGRAVIR SODIUM 50 MG: 50 TABLET, FILM COATED ORAL at 08:48

## 2024-07-16 RX ADMIN — MICONAZOLE NITRATE 1 APPLICATION: 20 CREAM TOPICAL at 08:44

## 2024-07-16 RX ADMIN — MIRTAZAPINE 15 MG: 15 TABLET, FILM COATED ORAL at 21:26

## 2024-07-16 RX ADMIN — ATORVASTATIN CALCIUM 80 MG: 80 TABLET, FILM COATED ORAL at 17:17

## 2024-07-16 RX ADMIN — ASPIRIN 81 MG: 81 TABLET, COATED ORAL at 08:44

## 2024-07-16 RX ADMIN — RIVAROXABAN 20 MG: 20 TABLET, FILM COATED ORAL at 17:17

## 2024-07-16 RX ADMIN — LEVOCARNITINE 330 MG: 1 SOLUTION ORAL at 14:47

## 2024-07-16 NOTE — PLAN OF CARE
Problem: PAIN - ADULT  Goal: Verbalizes/displays adequate comfort level or baseline comfort level  Description: Interventions:  - Encourage patient to monitor pain and request assistance  - Assess pain using appropriate pain scale  - Administer analgesics based on type and severity of pain and evaluate response  - Implement non-pharmacological measures as appropriate and evaluate response  - Consider cultural and social influences on pain and pain management  - Notify physician/advanced practitioner if interventions unsuccessful or patient reports new pain  Outcome: Progressing     Problem: INFECTION - ADULT  Goal: Absence or prevention of progression during hospitalization  Description: INTERVENTIONS:  - Assess and monitor for signs and symptoms of infection  - Monitor lab/diagnostic results  - Monitor all insertion sites, i.e. indwelling lines, tubes, and drains  - Monitor endotracheal if appropriate and nasal secretions for changes in amount and color  - Dobson appropriate cooling/warming therapies per order  - Administer medications as ordered  - Instruct and encourage patient and family to use good hand hygiene technique  - Identify and instruct in appropriate isolation precautions for identified infection/condition  Outcome: Progressing  Goal: Absence of fever/infection during neutropenic period  Description: INTERVENTIONS:  - Monitor WBC    Outcome: Progressing     Problem: SAFETY ADULT  Goal: Patient will remain free of falls  Description: INTERVENTIONS:  - Educate patient/family on patient safety including physical limitations  - Instruct patient to call for assistance with activity   - Consult OT/PT to assist with strengthening/mobility   - Keep Call bell within reach  - Keep bed low and locked with side rails adjusted as appropriate  - Keep care items and personal belongings within reach  - Initiate and maintain comfort rounds  - Make Fall Risk Sign visible to staff  - Offer Toileting every 2 Hours,  in advance of need  - Initiate/Maintain bed/chair alarm  - Obtain necessary fall risk management equipment: alarms   - Apply yellow socks and bracelet for high fall risk patients  - Consider moving patient to room near nurses station  Outcome: Progressing  Goal: Maintain or return to baseline ADL function  Description: INTERVENTIONS:  -  Assess patient's ability to carry out ADLs; assess patient's baseline for ADL function and identify physical deficits which impact ability to perform ADLs (bathing, care of mouth/teeth, toileting, grooming, dressing, etc.)  - Assess/evaluate cause of self-care deficits   - Assess range of motion  - Assess patient's mobility; develop plan if impaired  - Assess patient's need for assistive devices and provide as appropriate  - Encourage maximum independence but intervene and supervise when necessary  - Involve family in performance of ADLs  - Assess for home care needs following discharge   - Consider OT consult to assist with ADL evaluation and planning for discharge  - Provide patient education as appropriate  Outcome: Progressing  Goal: Maintains/Returns to pre admission functional level  Description: INTERVENTIONS:  - Perform AM-PAC 6 Click Basic Mobility/ Daily Activity assessment daily.  - Set and communicate daily mobility goal to care team and patient/family/caregiver.   - Collaborate with rehabilitation services on mobility goals if consulted  - Perform Range of Motion 3 times a day.  - Reposition patient every 2 hours.  - Dangle patient 3 times a day  - Stand patient 3 times a day  - Ambulate patient 3 times a day  - Out of bed to chair 3 times a day   - Out of bed for meals 3 times a day  - Out of bed for toileting  - Record patient progress and toleration of activity level   Outcome: Progressing     Problem: DISCHARGE PLANNING  Goal: Discharge to home or other facility with appropriate resources  Description: INTERVENTIONS:  - Identify barriers to discharge w/patient and  caregiver  - Arrange for needed discharge resources and transportation as appropriate  - Identify discharge learning needs (meds, wound care, etc.)  - Arrange for interpretive services to assist at discharge as needed  - Refer to Case Management Department for coordinating discharge planning if the patient needs post-hospital services based on physician/advanced practitioner order or complex needs related to functional status, cognitive ability, or social support system  Outcome: Progressing     Problem: Prexisting or High Potential for Compromised Skin Integrity  Goal: Skin integrity is maintained or improved  Description: INTERVENTIONS:  - Identify patients at risk for skin breakdown  - Assess and monitor skin integrity  - Assess and monitor nutrition and hydration status  - Monitor labs   - Assess for incontinence   - Turn and reposition patient  - Assist with mobility/ambulation  - Relieve pressure over bony prominences  - Avoid friction and shearing  - Provide appropriate hygiene as needed including keeping skin clean and dry  - Evaluate need for skin moisturizer/barrier cream  - Collaborate with interdisciplinary team   - Patient/family teaching  - Consider wound care consult   Outcome: Progressing

## 2024-07-16 NOTE — PROGRESS NOTES
[facility]  Progress Note  Name: Sunil Patel I  MRN: [id number]  Unit/Bed#: -01 I Date of Admission: 7/6/2024   Date of Service: 7/16/2024 I Hospital Day: 10    Assessment & Plan   * Above-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)  Assessment & Plan  Presented with left lower extremity acute limb ischemia with extensive left iliofemoral and left infrainguinal embolism requiring left femoral thrombectomy and subsequent AKA on 6/7/24 with vascular surgery.  Incision C/D/I, pain controlled. Vascular surgery following, staples removed on 7/10  Continue ASA and statin   Also on Xarelto due to LV thrombus  Rehab and pain control per PMR    Acute pain  Assessment & Plan  Due to the AKA  Pain controlled today per patient and he will participate with therapy     Benign essential hypertension  Assessment & Plan  Home regimen: Losartan 50mg daily, Toprol XL 25 mg BID  Current regimen: Losartan 50 mg daily, Toprol-XL 25 mg daily  BP acceptable upon review today (7/16/24)    Bipolar affective disorder (HCC)  Assessment & Plan  Continue home meds Zoloft and Remeron  Outpatient follow-up with Psychiatry    Cardiomyopathy ([facility])  Assessment & Plan  ECHO on 6/10/2024 showed LVEF 40-45% with mild global hypokinesis   Status post NM stress test on 6/11/2024 which showed: a large, mild, fixed defect in the inferior wall, possibly due to diaphragmatic attenuation artifact, there is a small area of partial reversibility in the inferior apical wall suggestive of ischemia    Evaluated ed by cardiology, etiology felt to be possibly secondary to stress-induced cardiomyopathy, with apical thrombus  Cardiac catheterization deferred given the lack of any significant ischemia, and no current cardiac symptoms  Continue with medical management with aspirin, statin, beta-blocker, ARB  Monitor volume status, remains euvolemic off of diuretics   Euvolemic on exam today (7/16/24)  Outpatient follow-up  with cardiology    Carnitine deficiency (HCC)  Assessment & Plan  Continue levocarnitine 330 mg 3x daily with meals.    Neurocognitive disorder  Assessment & Plan  Evaluated by neuropsychology and found to not have capacity  Guardianship in progress     Traumatic brain injury (HCC)  Assessment & Plan  Remote history of TBI, previously residing in a group home prior to long-term sentence.  Evaluated by neuropsychiatry on 6/27/24 and deemed NOT to have medical decision-making capacity  Process for court appointed guardian started on 7/5/24 - CM following     Pressure injury of buttock, stage 3 (HCC)  Assessment & Plan  Being managed by PMR  Turn every 2 hours for offloading  Continue local care    Left ventricular thrombus  Assessment & Plan  Noted on echocardiogram 6/10/2024: spherical 1.5 x 1.3 cm thrombus at the LV apex   Initiated on AC with Xarelto, continue  Rx sent to [facility] for price check - Pharmacy could not verify pt's rx benefit. CM to investigate.               The above assessment and plan was reviewed and updated as determined by my evaluation of the patient on 7/16/2024.    Labs:   Results from last 7 days   Lab Units 07/11/24  0526   WBC Thousand/uL 9.55   HEMOGLOBIN g/dL 11.1*   HEMATOCRIT % 36.0*   PLATELETS Thousands/uL 393*     Results from last 7 days   Lab Units 07/11/24  0526   SODIUM mmol/L 139   POTASSIUM mmol/L 4.0   CHLORIDE mmol/L 105   CO2 mmol/L 24   BUN mg/dL 19   CREATININE mg/dL 0.89   CALCIUM mg/dL 9.0                   Imaging  No orders to display       Review of Scheduled Meds:  Current Facility-Administered Medications   Medication Dose Route Frequency Provider Last Rate    acetaminophen  975 mg Oral TID PRN José Salcido MD      aspirin  81 mg Oral Daily Lorrie Barillas PA-C      atorvastatin  80 mg Oral Daily With Dinner Lorrie Barillas PA-C      bictegravir-emtricitab-tenofovir alafenamide  1 tablet Oral Daily With Breakfast Lorrie Barillas PA-C       bisacodyl  10 mg Rectal Daily PRN Lorrie Barillas PA-C      diphenhydrAMINE  25 mg Oral Q6H PRN Lorrie Barillas PA-C      divalproex sodium  1,250 mg Oral Daily Lorrie Barillas PA-C      dolutegravir  50 mg Oral Daily Lorriejacky Barillas PA-C      gabapentin  100 mg Oral TID Lorrie Barillas PA-C      lamoTRIgine  50 mg Oral BID Lorriejacky Barillas PA-C      levOCARNitine  1,000 mg/day Oral TID With Meals Lorrie Barillas PA-C      losartan  50 mg Oral Daily Lorrie Barillas PA-C      melatonin  6 mg Oral HS Lorrie Barillas PA-C      metoprolol succinate  25 mg Oral Daily CHARLIE Mcclelland      mirtazapine  15 mg Oral HS Lorriejacky Barillas PA-C      ELVA ANTIFUNGAL   Topical BID Lorrie Barillas PA-C      ondansetron  4 mg Oral Q6H PRN Lorrie Barillas PA-C      oxyCODONE  2.5 mg Oral Q8H PRN Raimundo Riley MD      polyethylene glycol  17 g Oral Daily PRN Lorrie Barillas PA-C      rivaroxaban  20 mg Oral Daily With Dinner Lorrie Barillas PA-C      senna  1 tablet Oral HS PRN Lorrie Barillas PA-C      sertraline  75 mg Oral Daily Lorrie Barillas PA-C      tamsulosin  0.4 mg Oral Daily With Dinner Lorrie Barillas PA-C      zonisamide  400 mg Oral Daily Lorrie Barillas PA-C         Subjective/ HPI: Patient seen and examined. Patients overnight issues or events were reviewed with nursing staff. New or overnight issues include the following:     States he is fine and has no pain. He is ready to participate with rehab today.     ROS:   A 10 point ROS was performed; negative except as noted above.        *Labs /Radiology studies Reviewed  *Medications  reviewed and reconciled as needed  *Please refer to order section for additional ordered labs studies      Physical Examination:  Vitals:   Vitals:    07/15/24 0751 07/15/24 1346 07/15/24 2134 07/16/24 0845   BP: 136/76 112/62 107/61 128/72   BP Location:  Left arm Left arm    Pulse: 94 83  87 86   Resp: 18 12 16    Temp: 98.4 °F (36.9 °C) (!) 97.1 °F (36.2 °C) 98.2 °F (36.8 °C)    TempSrc:  Oral Oral    SpO2: 96% 98% 95%    Weight:       Height:           General Appearance: NAD; pleasant  HEENT: PERRLA, conjuctiva normal; mucous membranes moist; face symmetrical  Neck:  Supple  Lungs: clear bilaterally, normal respiratory effort, no retractions, expiratory effort normal, on room air  CV: regular rate and rhythm, no murmurs rubs or gallops noted   ABD: soft non tender, +BS x4  EXT: DP pulses intact on the RLE,  no lymphadenopathy, no edema, L AKA healing   Skin: normal turgor, normal texture, no rash  Psych: affect normal, mood normal today, no tears   Neuro: AAOx3 , speech slow but clear      The above physical exam was reviewed and updated as determined by my evaluation of the patient on 7/16/2024.    Invasive Devices       None                      VTE Pharmacologic Prophylaxis: Xarelto  Code Status: Level 1 - Full Code  Current Length of Stay: 10 day(s)   Coordination of patient's care was performed in conjunction with primary service. Time invested included review of patient's labs, vitals, and management of their comorbidities with continued monitoring, examination of patient as well as answering patient questions, documenting her findings and creating progress note in electronic medical record,  ordering appropriate diagnostic testing.       ** Please Note:  voice to text software may have been used in the creation of this document. Although proof errors in transcription or interpretation are a potential of such software**

## 2024-07-16 NOTE — PROGRESS NOTES
"   07/16/24 1030   Pain Assessment   Pain Assessment Tool 0-10   Pain Score No Pain   Restrictions/Precautions   Precautions Fall Risk;Cognitive;Aphasia;Seizure;Supervision on toilet/commode   LLE Weight Bearing Per Order NWB   Braces or Orthoses   (L AKA )   Lifestyle   Autonomy \"They didn't do that\"   Lower Body Dressing   Comment maxA-near total A for donning reisdual limb .   Sit to Stand   Type of Assistance Needed Incidental touching   Comment CGA to stand w/ BUE support   Sit to Stand CARE Score 4   Bed-Chair Transfer   Type of Assistance Needed Incidental touching   Comment modified squat pivot with UE support on surface   Chair/Bed-to-Chair Transfer CARE Score 4   Transfer Bed/Chair/Wheelchair   Positioning Concerns Cognition;Skin Integrity   Toileting Hygiene   Type of Assistance Needed Physical assistance   Physical Assistance Level 51%-75%   Comment Assist for sequencing and management of brief and scrub pants while seated and in partial stance. pt was able to wipe seated following voiding on platform commode over toilet   Toileting Hygiene CARE Score 2   Toilet Transfer   Type of Assistance Needed Physical assistance   Physical Assistance Level 25% or less   Comment CGA-Jignesh modified sit pivot from WC<>platform BSC over toilet, attempted to have pt complete w/o GB use but he did also utilize GB 2' dec command following. Pt attempted to set up WC for transfer but positioned WC in front of commode facing away from it, not able to follow directions to correct, assist provided.   Toilet Transfer CARE Score 3   Transfers   Additional Comments WC mobility w/ sup from room <> 9th floor OT gym, tolerated well. 2' new AKA anticipate pt WC leve at DC   Exercise Tools   Other Exercise Tool 2 Seated EOM pt engages in various core strengthening activities in effort to inc his activity tolerance, dynamic sitting balance, core strength. pt w/ 3# med ball completing trunk rotation, partial sit up and " "forward bends to shin w/ good form w/ cues provided. tolerated 3x10 well. Pt then completing resistance clip activity where he completed lateral reach far outside of DOUGLAS while seated EOM to place clips on frame to his L. CGA-CS provided. Tolerated well. Completed x30 trials w/ short rest breaks to manage fatigue   Cognition   Overall Cognitive Status Impaired   Arousal/Participation Alert;Cooperative   Attention Attends with cues to redirect   Memory Decreased recall of recent events;Decreased short term memory;Decreased recall of precautions   Following Commands Follows one step commands with increased time or repetition   Comments baseline aphasia   Activity Tolerance   Activity Tolerance Patient tolerated treatment well   Assessment   Treatment Assessment OT session focusing on toielting tasks, donning of residual limb , core TE, WC mobility. pt tolerated well. His friend, Denise, who reports she is a friend and old ex SO of pts's was present at start and end of session. W/ pt's consent she was provided w/ brief fxnl update. She reports, \"I'm trying to get a little more involved w/ his care so that someone is helping him out.\" Despite recent progress towards goals pt continues to require skilled hands on assist in order to maintain his safety. OT to continue POC at this time.   Prognosis Fair   Problem List Decreased strength;Decreased endurance;Decreased range of motion;Impaired balance;Decreased mobility;Decreased coordination;Decreased cognition;Impaired judgement;Decreased safety awareness;Decreased skin integrity;Pain;Orthopedic restrictions   Plan   Treatment/Interventions ADL retraining;Functional transfer training;Therapeutic exercise;Endurance training;Cognitive reorientation;Patient/family training;Equipment eval/education;Compensatory technique education;Continued evaluation   Progress Progressing toward goals   OT Therapy Minutes   OT Time In 1030   OT Time Out 1130   OT Total Time (minutes) 60 "   OT Mode of treatment - Individual (minutes) 60   OT Mode of treatment - Concurrent (minutes) 0   OT Mode of treatment - Group (minutes) 0   OT Mode of treatment - Co-treat (minutes) 0   OT Mode of Treatment - Total time(minutes) 60 minutes   OT Cumulative Minutes 796   Therapy Time missed   Time missed? No

## 2024-07-16 NOTE — PROGRESS NOTES
07/16/24 1230   Pain Assessment   Pain Assessment Tool 0-10   Pain Score No Pain   Restrictions/Precautions   Precautions Aphasia;Bed/chair alarms;Fall Risk;Cognitive;Seizure;Supervision on toilet/commode   Weight Bearing Restrictions Yes   LLE Weight Bearing Per Order NWB   Braces or Orthoses   (L AKA )   Cognition   Overall Cognitive Status Impaired   Arousal/Participation Alert;Cooperative   Attention Attends with cues to redirect   Orientation Level Oriented to person;Oriented to place;Oriented to situation;Disoriented to time   Memory Decreased recall of recent events;Decreased short term memory;Decreased recall of precautions   Following Commands Follows one step commands with increased time or repetition   Subjective   Subjective Pt was agreeable to todays PT session   Roll Left and Right   Type of Assistance Needed Supervision;Verbal cues   Physical Assistance Level No physical assistance   Comment on mat, no bedrails   Roll Left and Right CARE Score 4   Sit to Lying   Type of Assistance Needed Supervision;Verbal cues   Physical Assistance Level No physical assistance   Comment on mat, no bedrails   Sit to Lying CARE Score 4   Lying to Sitting on Side of Bed   Type of Assistance Needed Physical assistance;Verbal cues   Physical Assistance Level 25% or less   Comment Min on mat, no use of bedrails   Lying to Sitting on Side of Bed CARE Score 3   Sit to Stand   Type of Assistance Needed Incidental touching;Verbal cues   Physical Assistance Level No physical assistance   Comment CGA +gait belt, VC for sequencing, postioning, hand and R foot placement.   Sit to Stand CARE Score 4   Bed-Chair Transfer   Type of Assistance Needed Incidental touching   Physical Assistance Level No physical assistance   Comment CGA modified squat pivot transfer + gait belt, mac VC for hand and R foot placement, positioning, and sequencing. demonstrates decreased carry over of learning. Repeated transfers on different  surfaces with emphasis on technique.    Chair/Bed-to-Chair Transfer CARE Score 4   Car Transfer   Type of Assistance Needed Physical assistance   Physical Assistance Level 51%-75%   Comment modA + gait belt, modified squat pivot transfer. VC for R foot and hand placement, positioning, and sequencing. 2x   Car Transfer CARE Score 2   Wheel 50 Feet with Two Turns   Type of Assistance Needed Supervision   Physical Assistance Level No physical assistance   Comment VC for safety   Wheel 50 Feet with Two Turns CARE Score 4   Wheel 150 Feet   Type of Assistance Needed Supervision   Physical Assistance Level No physical assistance   Comment VC for safety   Wheel 150 Feet CARE Score 4   Toilet Transfer   Type of Assistance Needed Physical assistance   Physical Assistance Level 25% or less   Comment Jignesh modified sit pivot to commode over toilet   Toilet Transfer CARE Score 3   Therapeutic Interventions   Strengthening reviewed bed level exercises per HEP handout: 3x10+ 5 second hold prone glute squeeze, 3x10 hip extension, 3x10 sidelying hip abduction, 3x10 hooklying adduction pillow squeeze+ bolster, 3x10 bridges + bolster, 3x10 prone push up, 3x10 abdominal crunches + bolster   Flexibility Prone lying 10 minutes for hip contracture prevention   Equipment Use   NuStep lvl 3, 12minutes, BUE and RLE + thigh strap for LE alignment   Assessment   Treatment Assessment Pt engaged in 90minutes of skilled PT session focusing on wheelchair mobility, transfers, endurance, and strengthening. pt states that he feels stronger during therapeutic exercises, however, still requires assistance with VC/TC for proper form and set up/sequencing. During all transfers, requires VC for hand placement, R foot placement, and set up/sequencing. pt will benefit from repeated training. pt demonstartes improvement in NuStep with increase in level. next tx to focus on improving independence in functional mobility at wheelchair level.   Problem List  Decreased strength;Decreased endurance;Decreased range of motion;Impaired balance;Decreased mobility;Decreased coordination;Decreased cognition;Impaired judgement;Decreased safety awareness;Decreased skin integrity;Pain;Orthopedic restrictions   Barriers to Discharge Decreased caregiver support;Inaccessible home environment   PT Barriers   Physical Impairment Decreased strength;Decreased range of motion;Decreased endurance;Impaired balance;Decreased cognition;Impaired judgement;Decreased safety awareness;Decreased skin integrity;Orthopedic restrictions;Pain   Functional Limitation Ramp negotiation;Standing;Transfers;Wheelchair management   Plan   Treatment/Interventions Functional transfer training;LE strengthening/ROM;Therapeutic exercise;Endurance training;Bed mobility   Progress Progressing toward goals   PT Therapy Minutes   PT Time In 1230   PT Time Out 1415   PT Total Time (minutes) 105   PT Mode of treatment - Individual (minutes) 90   PT Mode of treatment - Concurrent (minutes) 0   PT Mode of treatment - Group (minutes) 0   PT Mode of treatment - Co-treat (minutes) 0   PT Mode of Treatment - Total time(minutes) 90 minutes   PT Cumulative Minutes 852   Therapy Time missed   Time missed? No

## 2024-07-16 NOTE — PLAN OF CARE
Problem: PAIN - ADULT  Goal: Verbalizes/displays adequate comfort level or baseline comfort level  Description: Interventions:  - Encourage patient to monitor pain and request assistance  - Assess pain using appropriate pain scale  - Administer analgesics based on type and severity of pain and evaluate response  - Implement non-pharmacological measures as appropriate and evaluate response  - Consider cultural and social influences on pain and pain management  - Notify physician/advanced practitioner if interventions unsuccessful or patient reports new pain  Outcome: Progressing     Problem: INFECTION - ADULT  Goal: Absence or prevention of progression during hospitalization  Description: INTERVENTIONS:  - Assess and monitor for signs and symptoms of infection  - Monitor lab/diagnostic results  - Monitor all insertion sites, i.e. indwelling lines, tubes, and drains  - Monitor endotracheal if appropriate and nasal secretions for changes in amount and color  - [place] appropriate cooling/warming therapies per order  - Administer medications as ordered  - Instruct and encourage patient and family to use good hand hygiene technique  - Identify and instruct in appropriate isolation precautions for identified infection/condition  Outcome: Progressing

## 2024-07-16 NOTE — TEAM CONFERENCE
Acute RehabilitationTeam Conference Note  Date: 7/16/2024   Time: 10:39 AM       Patient Name:  Sunil Patel       Medical Record Number: [id number]   YOB: 1960  Sex: Male          Room/Bed:  [place] [address]/[facility] 451-01  Payor Info:  Payor: MEDICARE / Plan: MEDICARE A AND B / Product Type: Medicare A & B Fee for Service /      Admitting Diagnosis: Hx of AKA (above knee amputation) ([facility]) [Z89.619]   Admit Date/Time:  7/6/2024  1:30 PM  Admission Comments: No comment available     Primary Diagnosis:  Above-knee amputation of left lower extremity ([facility])  Principal Problem: Above-knee amputation of left lower extremity ([facility])    Patient Active Problem List    Diagnosis Date Noted    Neurocognitive disorder 07/09/2024    Cardiomyopathy ([facility]) 07/09/2024    Adjustment disorder with mixed anxiety and depressed mood 07/08/2024    Impaired mobility and activities of daily living 07/07/2024    At risk for venous thromboembolism (VTE) 07/07/2024    Acute pain 07/07/2024    At risk for constipation 07/07/2024    Urinary incontinence 07/07/2024    Patient incapable of making informed decisions 07/07/2024    Pressure injury of buttock, stage 3 ([facility]) 07/07/2024    Above-knee amputation of left lower extremity ([facility]) 07/06/2024    Traumatic brain injury ([facility]) 07/06/2024    Carnitine deficiency ([facility]) 06/09/2024    Left ventricular thrombus 06/08/2024    History of seizures 06/08/2024    Tobacco abuse 10/26/2019    Cerebrovascular accident (CVA) ([facility]) 10/26/2019    Positive laboratory testing for human immunodeficiency virus ([facility]) 07/03/2017    Bipolar affective disorder ([facility]) 07/02/2017    Hypertension 02/27/2017    Human immunodeficiency virus (HIV) infection ([facility]) 02/16/2017    History of stroke 02/16/2017    Substance abuse ([facility]) 12/21/2013    Unspecified vitamin D deficiency 05/28/2010    Benign essential hypertension 02/25/2010       Physical Therapy:    Weight Bearing Status: Non-weight bearing (L AKA residual  limb)  Transfers: Incidental Touching, Minimal Assistance  Bed Mobility: Supervision, Minimal Assistance, Moderate Assistance (min-mod A without bedrail, S with rail)  Amulation Distance (ft):  (TBA)  Ambulation:  (unsafe at this time)  Assistive Device for Ambulation:  (TBA)  Wheelchair Mobility Distance: 150 ft  Wheelchair Mobility: Supervision  Number of Stairs:  (TBA)  Assistive Device for Stairs:  (unsafe at this time)  Stair Assistance:  (TBA)  Ramp: Incidental Touching  Assistive Device for Ramp: Wheelchair  Discharge Recommendations:  (TBD pending guardianship)    7/15  Pt is demonstrating slow functional gains with skilled PT interventions mainly due to inconsistent carry over of new learning secondary to baseline impaired cognition, impaired safety, impaired STM and aphasia. In addition pt also demonstrate dec ROM/strength , dec endurance, dec standing balance and dec skin integrity with ongoing buttock wounds. Pt will benefit from additional skilled PT interventions to improve overall functional indep and safety pending guardianship and d/c location determination. Goals for this week to work on strengthening, amp phase 1 education and improve w/c level transfers to CS, set up with w/c propulsion and CS level with supine to/from sit without bedrail.    Occupational Therapy:  Eating: Independent  Grooming: Independent  Bathing: Minimal Assistance  Bathing: Minimal Assistance  Upper Body Dressing: Supervision  Lower Body Dressing: Moderate Assistance  Toileting: Minimal Assistance  Tub/Shower Transfer: Minimal Assistance  Toilet Transfer: Minimal Assistance  Cognition: Exceptions to WNL  Cognition: Decreased Memory, Decreased Safety, Decreased Executive Functions, Decreased Attention  Orientation: Person, Place, Time, Situation  Discharge Recommendations:  (TBD)  DC Home with:: 24 Hour Supervision       Pt continues to present with impairments in activity tolerance, endurance, standing balance/tolerance,  memory, insight, safety , and judgement . Additional functional barriers include fatigue, WBS , decreased caregiver support, risk for falls, and home environment. Pt is functioning at overall Min A for ADLs and fxnl squat pivot xfers. Pt will continue to benefit from skilled OT services to address above mentioned barriers and maximize functional independence in baseline areas of occupation, utilizing the following interventions: ADL retraining, DME/adaptive equipment assessment , functional transfer training, cognitive retraining, activity tolerance/edurance training, UB strengthening, and energy conservation education. OT D/C recommendation is for 24 hour S.            Speech Therapy:  Mode of Communication: Verbal  Speech/Language: Expressive Aphasia (greater expressive deficits than receptive)  Cognition: Exceptions to WNL  Cognition: Decreased Memory, Decreased Executive Functions, Decreased Attention, Decreased Comprehension, Decreased Safety  Orientation: Person, Place, Time, Situation  DC Home with:: 24 Hour Assisteance  Pt is currently being followed for skilled SLP services targeting language therapy. Pt with hx stroke, resulting in expressive and receptive aphasia but noting expressive communication skills to be greater impacted. SLP was consulted to support pt's communication skills for improved ability to express his needs with the team. Pt presenting with overall moderately impaired expressive and receptive language deficits, impacting functional communication skills. Pt has trialed written communication, however, this was found to be an ineffective communication modality. Pt has also been introduced to a low tech manual communication board, which he has demonstrated ability to utilize in order to convey basic wants/needs more easily and effectively. Currently, pt is functioning at min A for comprehension, problem solving and memory and mod A for expression. Plan this week to continue to attempt to  establish a more functional mode of communication which is both effective and easy for pt to utilize to support his communication attempts with staff. Recommending brief follow up skilled SLP services to continue to support pt in building a more functional communication modality to improve effectiveness of communication and to ease frustrations with communication attempts.     Nursing Notes:  Appetite: Good  Diet Type: Cardiac                           Type of Wound (LDA): Wound                                         Pain Location/Orientation: Location: Head  Pain Score: 0                       Hospital Pain Intervention(s): Medication (See MAR), Repositioned, Rest          63 y.o. male who  has a past medical history of Acute lower limb ischemia (06/08/2024), Anxiety, Depression, HIV disease (HCC), Substance abuse (HCC), and Suicide attempt (HCC). who presented [facility] the [facility] from snf for increased LLE swelling and pain and was found to have a left external iliac artery occlusion and acute limb ischemia. 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 Patient had TTE on 6/10/24 showing LV apex thrombus. On 6/11/24 patient had pharmacologic nuclear stress test/SPECT scan which did not show any significant areas of ischemia with EF 40-45% and recommended continuing A/C for LV apical thrombus. His course was complicated by b/l buttock unstageable pressure ulcers, urinary and fecal incontinence, pain, and significant decline in ADLs and mobility. Patient has been continued on Xarelto for anticoagulation, as well as ASA and statin. Patient has a history of TBI, with baseline nonfluent aphasia and forgetfulness. He was evaluated by neuropsychology on 6/27/24, and deemed to not have  capacity to make fully informed medical decisions. Therefore, the guardianship process was initiated as of 7/5/24. He was admitted to the [facility] on 7/6.     7/15: Will encourage independence with ADLs and monitor labs and vital signs.  We will educate pt/family about repositioning to prevent skin breakdown.  We will assist w repositioning and perform routine skin checks.  We will monitor for adequate pain control.  We will monitor for constipation and medicate pt as ordered. We will provide pt and family DM education. We will increase safety awareness and keep pt free from falls.    Case Management:     Discharge Planning  Living Arrangements: Other (Comment)  Support Systems: Son, Family members  Assistance Needed: Family members are currently arranging housing for pt after d/c  Type of Current Residence: Other (Comment)  Current Home Care Services: No  CM continues to follow with d/c planning needs. Process of pursuing guardianship as well as sending SNF referrals. CM continues to work with acute care CM to assist with d/c planning process.     Is the patient actively participating in therapies? yes  List any modifications to the treatment plan:     Barriers Interventions   incontinence Condom cath at hs, scheduled voiding times during the day   Sacral wounds  Triad paste, wound care following, p500, roho cushion, offloading   Expressive aphasia Functional communication, use of ipad   Residual limb care Wrapping education, nursing monitoring for signs of infection         Is the patient making expected progress toward goals? yes  List any update or changes to goals:     Medical Goals: Patient will be medically stable for discharge to [facility] upon completion of rehab program and Patient will be able to manage medical conditions and comorbid conditions with medications and follow up upon completion of rehab program    Weekly Team Goals:   Rehab Team Goals  ADL Team Goal: Patient will require  supervision with ADLs with least restrictive device upon completion of rehab program  Transfer Team Goal: Patient will require supervision with transfers with least restrictive device upon completion of rehab program  Locomotion Team Goal: Patient will require supervision with locomotion with least restrictive device upon completion of rehab program  Cognitive Team Goal: Patient will return to premorbid level of cognitive activity upon completion of rehab program    Discussion: pt presents with the above barriers and the team is working with pt with the above interventions to help pt achieve goals. A family friend has been identified and is helpful in understanding pts history and capabilities. Pts goals are for supervision with w/c mobility and transfers. Pt attempts to participate in limb care but due to poor carryover will always need assistance. Acute care team has initiated guardianship for pt and family friends are offering to play that role. Team feels a structured setting is the most beneficial for pt given his cognitive deficits. Unsure of pts dc disposition at this time.     Anticipated Discharge Date:  reteam  [facility] Team Members Present:The following team members are supervising care for this patient and were present during this Weekly Team Conference.    Physician: Dr. DePadua, MD  : TANYA Mathias  Registered Nurse: Sofie Gurrola, ASHWIN  Physical Therapist: Delfina Renner DPT  Occupational Therapist: Katia Morfin MS, OTR/L  Speech Therapist: Erin Roe MS, CCC-SLP

## 2024-07-16 NOTE — WOUND OSTOMY CARE
Progress Note - Wound   Sunil Patel 63 y.o. male MRN: [id number]  Unit/Bed#: [facility] 451-01 Encounter: [id number]        Assessment:   Patient is seen for wound care follow-up. Patient seen sitting OOB in WC to transfer from  to recliner. Mod assist for turning and repositioning- mod assist to stand/pivot to recliner. Incontinent of bowel and bladder at times.     Findings:  Right Heel dry intact and shelley with no skin loss or wounds present. Recommend preventative Hydraguard Cream and proper offloading/ repositioning.      POA Right Buttocks Stage 3 Pressure Injury: irregular in shape area of partial thickness skin loss. Measures smaller in size. Wound bed with mix of 90% pink tissue with scattered 10% yellow tissue. Elsi-wound is fragile and hyperpigmented. Scant serosanguineous drainage noted. Recommend continuing with triad paste to area.     Groin rash is now resolved. No rash noted to area at this time.     No induration, fluctuance, odor, warmth/temperature differences, redness, or purulence noted to the above noted wounds and skin areas assessed. New dressings applied per orders listed below. Patient tolerated well- no s/s of non-verbal pain or discomfort observed during the encounter. Bedside nurse aware of plan of care. See flow sheets for more detailed assessment findings.      Orders listed below and wound care will continue to follow, call or Secure Chat Message with questions.       Skin Care Plan:  1-Cleanse sacro-buttocks with soap and water. Apply Triad Paste to Sacro-Buttocks Wound Beds Only. Apply Hydraguard to Elsi-wounds and all other intact aspects of sacro-buttocks. Apply both creams TID and PRN episodes of incontinence.   2-Turn/reposition q2h or when medically stable for pressure re-distribution on skin .  3-Elevate right heel to offload pressure  4-Moisturize skin daily with skin nourishing cream  5-Ehob cushion in chair when out of bed.  6-Preventative Hydraguard to right heel BID and  PRN.  7-P-500 Low Air Loss Mattress       WOUNDS:  Wound 06/08/24 Pressure Injury Buttocks Bilateral (Active)   Wound Image   07/16/24 1122   Wound Description Pink;Yellow 07/16/24 1122   Pressure Injury Stage 3 07/16/24 1122   Elsi-wound Assessment Fragile;Hyperpigmented 07/16/24 1122   Wound Length (cm) 2.5 cm 07/16/24 1122   Wound Width (cm) 1.5 cm 07/16/24 1122   Wound Depth (cm) 0.1 cm 07/16/24 1122   Wound Surface Area (cm^2) 3.75 cm^2 07/16/24 1122   Wound Volume (cm^3) 0.375 cm^3 07/16/24 1122   Calculated Wound Volume (cm^3) 0.38 cm^3 07/16/24 1122   Change in Wound Size % 97.12 07/16/24 1122   Wound Site Closure WARD 07/16/24 1122   Drainage Amount Scant 07/16/24 1122   Drainage Description Serosanguineous 07/16/24 1122   Non-staged Wound Description Partial thickness 07/16/24 1122   Treatments Cleansed;Site care;Irrigation with NSS 07/16/24 1122   Dressing Other (Comment) 07/16/24 1122   Wound packed? No 07/16/24 1122   Packing- # removed 0 07/16/24 1122   Packing- # inserted 0 07/16/24 1122   Dressing Changed Changed 07/16/24 1122   Patient Tolerance Tolerated well 07/16/24 1122   Dressing Status Clean;Dry;Intact 07/16/24 1122                Willa Oakes RN, BSN, CWOCN

## 2024-07-16 NOTE — ASSESSMENT & PLAN NOTE
ECHO on 6/10/2024 showed LVEF 40-45% with mild global hypokinesis   Status post NM stress test on 6/11/2024 which showed: a large, mild, fixed defect in the inferior wall, possibly due to diaphragmatic attenuation artifact, there is a small area of partial reversibility in the inferior apical wall suggestive of ischemia    Evaluated ed by cardiology, etiology felt to be possibly secondary to stress-induced cardiomyopathy, with apical thrombus  Cardiac catheterization deferred given the lack of any significant ischemia, and no current cardiac symptoms  Continue with medical management with aspirin, statin, beta-blocker, ARB  Monitor volume status, remains euvolemic off of diuretics   Euvolemic on exam today (7/16/24)  Outpatient follow-up with cardiology

## 2024-07-16 NOTE — PCC NURSING
63 y.o. male who  has a past medical history of Acute lower limb ischemia (06/08/2024), Anxiety, Depression, HIV disease (HCC), Substance abuse (HCC), and Suicide attempt (HCC). who presented to the [facility] on 6/7/24 from retirement for increased LLE swelling and pain and was found to have a left external iliac artery occlusion and acute limb ischemia. 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 Patient had TTE on 6/10/24 showing LV apex thrombus. On 6/11/24 patient had pharmacologic nuclear stress test/SPECT scan which did not show any significant areas of ischemia with EF 40-45% and recommended continuing A/C for LV apical thrombus. His course was complicated by b/l buttock unstageable pressure ulcers, urinary and fecal incontinence, pain, and significant decline in ADLs and mobility. Patient has been continued on Xarelto for anticoagulation, as well as ASA and statin. Patient has a history of TBI, with baseline nonfluent aphasia and forgetfulness. He was evaluated by neuropsychology on 6/27/24, and deemed to not have capacity to make fully informed medical decisions. Therefore, the guardianship process was initiated as of 7/5/24. He was admitted to the [facility] on 7/6.     7/15: Will encourage independence with ADLs and monitor labs and vital signs.  We will educate pt/family about repositioning to prevent skin breakdown.  We will assist w repositioning and perform routine skin checks.  We will monitor for adequate pain control.  We will monitor for constipation and medicate pt as ordered. We will provide pt and family DM education. We will increase safety awareness and keep pt free from falls.    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 Noted on echocardiogram 6/10/2024: spherical 1.5 x 1.3 cm thrombus  at the  apex Initiated on AC with Xarelto, continue. Rx sent to [facility] for price check on 7/10/24 - CM spoke with [employer] and pt has rx coverage. Information sent to [facility].  Today on 7/21/24, d/w [facility] and Xarelto is not covered and he would have to pay OOP @$700.00.  CM to further address tomorrow 7/22/24.  He may have to be switched to Coumadin.     Will continue to encourage independence with ADLs and monitor labs and vital signs. We will educate pt/family about repositioning to prevent skin breakdown.  We will assist w repositioning and perform routine skin checks.  We will monitor for adequate pain control.  We will monitor for constipation and medicate pt as ordered. We will provide pt and family DM education. We will increase safety awareness and keep pt free from falls.     Pt is incontinent of bladder, uses male pure wick @ HS. Will encourage independence with ADLs and monitor labs and vital signs.  We will educate pt/family about repositioning to prevent skin breakdown.  We will assist w repositioning and perform routine skin checks.  We will monitor for adequate pain control.  We will monitor for constipation and medicate pt as ordered. We will provide pt and family wound and skin care management. We will increase safety awareness and keep pt free from falls.    8/5: Will continue to encourage independence with ADLs and monitor labs and vital signs. We will educate pt/family about repositioning to prevent skin breakdown.  We will assist w repositioning and perform routine skin checks.  We will monitor for adequate pain control.  We will monitor for constipation and medicate pt as ordered. We will provide pt and family DM education. We will increase safety awareness and keep pt free from falls.     8/13   Remote history of TBI, previously residing in a group home prior to assisted sentence.. Evaluated by neuropsychiatry on 6/27/24 and deemed NOT to have medical decision-making capacity. Process  for court appointed guardian started on 7/5/24 - CM following.Guardianship hearing 8/27/24 8/20- Pt awaiting guardianship hearing.  Remains inc urine w purewick use at night.  Pt remains alarmed for safety.    8/26- guardianship hearing tomorrow. KUB done, mild/mod stool burden reported; continue PRN bowel meds due to constipation.  Pt continues to use purewick at HS due to incontinence.  Pt remains on alarms for continued safety.      Goal: Will continue to encourage independence with ADLs and monitor labs and vital signs. We will educate pt/family about repositioning to prevent skin breakdown.  We will assist w repositioning and perform routine skin checks.  We will monitor for adequate pain control.  We will monitor for constipation and medicate pt as ordered. We will increase safety awareness and keep pt free from falls.

## 2024-07-16 NOTE — PROGRESS NOTES
07/16/24 1000   Pain Assessment   Pain Assessment Tool 0-10   Pain Score No Pain   Restrictions/Precautions   Precautions Aphasia;Bed/chair alarms;Cognitive;Fall Risk;Seizure;Supervision on toilet/commode   Comprehension   Comprehension (FIM) 4 - Understands basic info/conversation 75-90% of time   Expression   Expression (FIM) 3 - Expresses basic info/needs 50-74% of time   Social Interaction   Social Interaction (FIM) 5 - Interacts appropriately with others 90% of time   Problem Solving   Problem solving (FIM) 3 - Solves basic problmes 50-74% of time   Memory   Memory (FIM) 4 - Recognizes/recalls/performs 75-89%   Speech/Language/Cognition Assessment   Treatment Assessment Pt participated in skilled SLP session focusing on language and communication. At beginning of session, SLP reviewed introduction of a low tech manual communication board from previous ST session. Pt reports that this is beneficial to his communication. SLP offered team to create additional icons to which pt desires. During this time, pt became tearful as he appeared to express his desire to improve his expressive communication skills. He verbally explained a desire for a more high tech device which can speak. Before SLP able to elaborate on this, pt's friend Joanna then arrived to session. Pt cont to explain frustration with his past regarding minimal ST services. Pt's friend also explained her relationship to the pt and how she has contributed to assisting pt in the past. Pt's friend also provided info into a timeline of events and when he received ST services. Joanna verbalized that she is attempting to purchase an electronic tablet for pt to use. SLP explained that if she is able to do this, there are different language and communication apps that can be used, including those that are free and those that cost money. As pt verbalized preference for the free apps, SLP reviewed that this can be worked on and programmed if Joanna is able to  bring in a tablet. Overall, this session mainly focused on understanding how to support pt and his communication, along with understanding his PLOF for communication. Pt's friend, Joanna, reports that she is willing to continue to assist as able with their other friend, Autumn. Pt also conveyed other family dynamics at this time, relaying that Joanna is a primary support, along with his friend, Autumn. SLP stated that this information will be relayed to the team-pt and pt's friend appreciative. Throughout session, pt with inconsistent ability to express himself as at times he was able to convey messages with adequate semantic content, but other times, his messages lacked meaningful semantic content, where characterized by perseverative thoughts/verbalizations and contained occasional paraphasias. SLP spoke with Dr. DePadua, attending PMR physician, who stated ST frequency as 3x/week to support functional communication. Recommending short term follow up skilled SLP services to continue to support pt in building a more functional communication modality to improve effectiveness of communication and to ease frustrations with communication attempts.   SLP Therapy Minutes   SLP Time In 1000   SLP Time Out 1030   SLP Total Time (minutes) 30   SLP Mode of treatment - Individual (minutes) 30   SLP Mode of treatment - Concurrent (minutes) 0   SLP Mode of treatment - Group (minutes) 0   SLP Mode of treatment - Co-treat (minutes) 0   SLP Mode of Treatment - Total time(minutes) 30 minutes   SLP Cumulative Minutes 125   Therapy Time missed   Time missed? No

## 2024-07-16 NOTE — PROGRESS NOTES
Physical Medicine and Rehabilitation Progress Note  Sunil Patel 63 y.o. male MRN: [id number]  Unit/Bed#: [facility] 451-01 Encounter: [id number]    To Review: Sunil Patel is a 63 y.o. male who  has a past medical history of Acute lower limb ischemia (06/08/2024), Anxiety, Depression, HIV disease (HCC), Substance abuse (HCC), and Suicide attempt (HCC). who presented to the [facility] on 6/7/24 from senior living for increased LLE swelling and pain and was found to have a left external iliac artery occlusion and acute limb ischemia. 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 Patient had TTE on 6/10/24 showing LV apex thrombus. On 6/11/24 patient had pharmacologic nuclear stress test/SPECT scan which did not show any significant areas of ischemia with EF 40-45% and recommended continuing A/C for LV apical thrombus. His course was complicated by b/l buttock unstageable pressure ulcers, urinary and fecal incontinence, pain, and significant decline in ADLs and mobility. Patient has been continued on Xarelto for anticoagulation, as well as ASA and statin. Patient has a history of TBI, with baseline nonfluent aphasia and forgetfulness. He was evaluated by neuropsychology on 6/27/24, and deemed to not have capacity to make fully informed medical decisions. Therefore, the guardianship process was initiated as of 7/5/24. He was admitted to the [facility] on 7/6.     Chief Complaint: Feeling a bit better today.     Interval History/Subjective:  Yesterday afternoon was really feeling off - and stated that he had been pushing himself hard, and was in a very poor head space. He didn't feel he could participate in therapy, but today he felt better and ready to get back to work. He denies significant pain. Reports phantom  limb sensation, but not pain, and denies any new lightheadedness, dizziness, CP, SOB, fevers, chills, N/V, abdominal pain. His buttock wounds are improving, and he is doing better with assistance with voiding. Last BM 7/14. Continence better.     ROS:  A 10 point review of systems was negative except for what is noted in the HPI.    Today's Changes:  Continue timed voids. Ok for condom catheter overnight given buttock wounds  Discussed with wound care nursing, buttock wounds are improving. Continue local care.   Supportive friend and Donna and other friend Autumn interested in being guardians for the patient. Discussed with CM, they will need to be interviewed by the  involved with determining guardianship, and if deemed acceptable, this may accelerate the process of guardianship. They have been very involved while patient has been here.   Anticipate he will be meeting his goals in another week or so. Will continue to work on dispo. If able to determine guardianship sooner, possibility of transition to [facility]/SNF with ultimate goal to discharge to intermediate/nursing home or memory care unit where he can have a bit more structure. He is doing extremely well in regards to behavior in a more structured environment.   I led and participated in Team Conference today. See dispo below and separate conference note for more details. I concur with the plan of care as determined in Team Conference.    Total time spent:  50 minutes, with more than 50% spent counseling/coordinating care. Counseling includes discussion with patient re: progress in therapies, functional issues observed by therapy staff, and discussion with patient his/her current medical state/wellbeing. Coordination of patient's care was performed in conjunction with Internal Medicine service to monitor patient's labs, vitals, and management of their comorbidities. In addition, I lead the interdisciplinary team in weekly case conference today and concur with plan as  "per team meeting. The care of the patient was extensively discussed with all care providers and an appropriate rehabilitation plan was formulated unique for this patient. Barriers were identified preventing progression of therapy and appropriate interventions were discussed with each discipline. Please see the team note for input from all disciplines regarding barriers, intervention, and discharge planning.    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 Underwent L femoral thrombectomy and AKA with vascular surgery   - Vas sx follow-up 7/10 - L AKA incision site well-healed, staples taken out at the bedside this morning  - [facility] will be vendor - Patient now has .  - Has some phantom limb sensation and is on gabapentin and working on desensitization. Monitor for need for topicals  - Monitor for hip flexion/abduction tightness. Stretches in therapy  - Volume management/shaping to transition to .   - On Rivaroxaban with hx of LV thrombus and PAOD   - PT/OT/SLP (to work on carry over strategies) 3-5 hours/day, 5-7 days/week.    - Goals are Ind-Sup at a wheelchair level.   - Outpatient f/u with [facility] and Vascular    Neurocognitive disorder  Assessment & Plan  Cog slightly better compared to acute   Hx of TBI and L MCA CVA, psychiatric d/o, seizure d/o  - Neuropsych assessment 6/27/24 - \"diffuse cognitive dysfunction and on a measure assessing awareness of personal health status and ability to evaluate health problems, handle medical emergencies and take safety precautions, patient performed in the IMPAIRED range of functioning. During this encounter, patient does not appear to have capacity to make fully informed medical decisions.\"  MMSE 4/28 - severely impaired  - Guardianship process initiated on acute - follow-up by CM/Admin  - Status: some expressive and possible some " receptive aphasia.  He can be difficult to follow at times likely due to a mixture of aphasia, tangentiality, mild lability, and impaired memory; lability with hx of possible bipolar d/o  - Neuropsych Med review: Depakote, Lamictal, Remeron, Zoloft, Melatonin, gabapentin, PRN oxy 2.5mg TID   - Monitor neuro-exam, wakefulness, mood, cognition, insight into deficits and safety awareness   - Monitor and ensure optimal management electrolytes, nutrition, and hydration  - Monitor for signs or symptoms of infection, medication intolerances, other systemic etiologies  - Additional labs, imaging, specialist follow-up as needed per primary team currently   - Overstimulation precautions, frequent re-orientation, re-direction, re-assurance  - Optimal mood, pain, and sleep management  - If impaired sleep or behavior recommend sleep log and agitation monitoring    - Limit sedating medications when possible  - Fall precautions - if needed increase rounding or consider virtual sitter or in-person sitter  - For routine restlessness, anxiety, irritability focus on non-pharmacologic management    - Hold benzo's with increased risk paradoxical reaction and possibility of limiting cognitive recovery  - Continue SLP and interdisciplinary care  - OP neuro and PCP     Adjustment disorder with mixed anxiety and depressed mood  Assessment & Plan  - Related to recent release from incarceration, new AKA and uncertainty of future disposition, all in the setting of impaired cognition related to prior strokes and TBI  - Behavioral techniques for symptom management  - Neuropsychology consult as available    Pressure injury of buttock, stage 3 (HCC)  Assessment & Plan  Stable   - Wound care consulted and following weekly  Per wound care specialist 7/15  - POA L buttock pressure injury unstageable has healed.  - POA R buttock pressure injury is now Stage 3, and improving.   - Cleanse sacro-buttocks with soap and water. Apply Triad Paste to  "Sacro-Buttocks Wound Beds Only. Apply Hydraguard to Elsi-wounds and all other intact aspects of sacro-buttocks. Apply both creams TID and PRN episodes of incontinence.    - Recommend ROHO cushion in chair when out of bed instead   - Preventative hydraguard to bilateral heels BID and PRN.   - P500  - Monitor clinically for breakdown, frequent turns      Patient incapable of making informed decisions  Assessment & Plan  - History of remote TBI and prior strokes with comorbid psychiatric history.  - Evaluated by neuropsychology, Dr. Dilshad Tatum, PhD on 6/27/24, found to have \"diffuse cognitive dysfunction and on a measure assessing awareness of personal health status and ability to evaluate health problems, handle medical emergencies and take safety precautions, patient performed in the IMPAIRED range of functioning. During this encounter, patient does not appear to have capacity to make fully informed medical decisions.\"  - Court-appointed guardianship process has been initiated by acute care CM Katia Kay.    - We have identified two friends who are interested in being patient's guardians and have been involved and invested in patient's care on the AR[facility].   - They will need to be interviewed by the  involved in this process.    - If this is able to be expedited, may be able to transition patient to [facility]/SNF   - He would ultimately benefit from [facility]/nursing home/memory care unit - he does very well with structure and supervision.    Urinary incontinence  Assessment & Plan  - Did have some incontinence on acute   - Describes urgency  - monitor for retention, incontinence (including overflow incontinence), signs/symptoms of UTI  - Timed Voids and PVRs Q4hrs to start   - If PVR <150 x3, can discontinue scans, but would continue timed voids   - He has some difficulty managing the urinal    - needs assistance but is able to transfer to [facility]   - Still uses purewick at night, in part for continence care/to " protect his skin given his buttock wounds.          At risk for constipation  Assessment & Plan  - Stooling adequately recently; did have some incontinence on acute now improved  - Close continent care given buttock wounds  - Miralax PRN      Acute pain  Assessment & Plan  Controlled   - Tylenol 975 mg q8h PRN  - Gabapentin 100 mg TID scheduled for phantom pain/sensation  - Oxycodone 2.5 mg q8h PRN, wean as possible   - Has not used the past week   - May be able to discontinue at discharge.    At risk for venous thromboembolism (VTE)  Assessment & Plan  - On rivaroxaban    Impaired mobility and activities of daily living  Assessment & Plan  - Rehabilitation medicine physician for daily monitoring of care, 24 hour availability for acute medical issues, medication management, and therapeutic and diagnostic assessments.  - 24 hour rehabilitation nursing 7 days per week for: management/teaching of medications, bowel/bladder routine, skin care.  - PT, OT for 2-3 hours per day, 5 to 6 days per week; 15 hours per week  - Rehabilitation Psychology as needed for adjustment and coping  - MSW for barriers to discharge, community resources, and family support  - Discharge planning following to help ensure a safe and efficient discharge        History of stroke  Assessment & Plan  - History of old left temporal and parietal lobe cortical infarcts, also involving the insula and left occipital lobe as well as small right posterior parietal lobe. Stable on most recent CT head on 5/16/24.   - ASA, Xarelto and statin for secondary prevention  - Optimal BP control  - Monitor neuro exam - hx of LV thrombus   - OP neuro follow-up     Bipolar affective disorder (HCC)  Assessment & Plan  Mood acceptable; continue meds as outlined   Supportive counseling  NeuroPsychology consult while in [facility] if available for support  Counseled on and continue to encourage deep breathing/relaxation/behavioral management techniques  - Mirtazapine 15 mg  qHs  - Sertraline 75 mg daily  - Lamictal 50mg BID  - Depakote ER 1250mg qday   - Outpatient psychiatry follow-up    Cardiomyopathy (HCC)  Assessment & Plan  ECHO on 6/10/2024 showed LVEF 40-45% with mild global hypokinesis   Status post NM stress test on 6/11/2024 which showed: a large, mild, fixed defect in the inferior wall, possibly due to diaphragmatic attenuation artifact, there is a small area of partial reversibility in the inferior apical wall suggestive of ischemia    Elevated by cardiology, etiology felt to be possibly secondary to stress-induced cardiomyopathy, with apical thrombus  Cardiac catheterization deferred given the lack of any significant ischemia, and no current cardiac symptoms  Continue with medical management with aspirin, statin, beta-blocker, ARB  Monitor volume status, remains euvolemic off of diuretics   Outpatient follow-up with cardiology    Traumatic brain injury (HCC)  Assessment & Plan  See neurocog impairment     Carnitine deficiency (HCC)  Assessment & Plan  - Carnitine replacement  - Appreciate medicine management      History of seizures  Assessment & Plan  - Depakote ER, lamotrigine, zonisamide  - Outpatient follow-up with [facility] neurology    Left ventricular thrombus  Assessment & Plan  - Visualized on echocardiogram on 6/10/24: spherical 1.5 x 1.3 cm thrombus at the LV apex.   - Rivaroxaban  - Outpatient follow-up with cardiology    Benign essential hypertension  Assessment & Plan  - Toprol, losartan  - Appreciate medicine management    Human immunodeficiency virus (HIV) infection ([facility])  Assessment & Plan  - Continue anti-retroviral treatment  - Appreciate medicine management during ARC course  - OP ID follow-up         Health Maintenance  #Skin/Pressure Injury Prevention: Turn Q2hr in bed, with weight shifts [id number] in wheelchair.  #GI Prophylaxis: Not indicated   #Code Status: Full Code  #FEN: Cardiac diet, Ensure Max Vanilla with breakfast, and chocolate at dinner    #Dispo: Team 7/16: Anticipate he will be meeting goals in a week or so. In the process of getting guardianship, but we may have identified some friends who may be candidates to be guardians. Would need to be vetted by court system. He would ultimately benefit from a structured environment (memory care/[facility]), but would likely plan to transition to [facility]/SNF to start to see him through his AKA recovery and prosthetic process.   Follow-up: PMR, Vascular Surgery, Psychiatry, ID, PCP    Objective:    Functional Update:  PT:  CGA-Min tranfers, min-modA bed mobility, Sup wheelchair mobility 150', CGA ramp  OT: Ind eating, Ind grooming, Min bathing, Sup UB dressing, modA LB dressing, Min toileting, Min tub/shower transfers, Min toilet transfers   SLP: Min comprehension, problem solving, and memory, and modA for expression.     Allergies per EMR    Physical Exam:  Temp:  [97.1 °F (36.2 °C)-98.4 °F (36.9 °C)] 98.2 °F (36.8 °C)  HR:  [83-94] 87  Resp:  [12-18] 16  BP: (107-136)/(61-76) 107/61  Oxygen Therapy  SpO2: 95 %    Gen: No acute distress, Well-nourished, well-appearing.  HEENT: Moist mucus membranes, Normocephalic/Atraumatic  Cardiovascular: Regular rate, rhythm, S1/S2. Distal pulses palpable  Heme/Extr: No edema  Pulmonary: Non-labored breathing. Lungs CTAB  : No fernandes  GI: Soft, non-tender, non-distended. BS+  MSK: Still somewhat with dependent edema in R AKA. Dilshad testing negative.   Integumentary: Skin is warm, dry. Incision site well healed  Neuro: AAOx3,  Speech is intact. Appropriate to questioning. Pleasant and cooperative.   Psych: Normal mood and affect.       Diagnostic Studies: Reviewed, no new imaging  Laboratory:  Reviewed   Results from last 7 days   Lab Units 07/11/24  0526   HEMOGLOBIN g/dL 11.1*   HEMATOCRIT % 36.0*   WBC Thousand/uL 9.55     Results from last 7 days   Lab Units 07/11/24  0526   BUN mg/dL 19   POTASSIUM mmol/L 4.0   CHLORIDE mmol/L 105   CREATININE mg/dL 0.89             Patient Active Problem List   Diagnosis    Bipolar affective disorder (HCC)    Substance abuse (HCC)    Human immunodeficiency virus (HIV) infection (HCC)    History of stroke    Benign essential hypertension    Positive laboratory testing for human immunodeficiency virus (HCC)    Hypertension    Unspecified vitamin D deficiency    Tobacco abuse    Cerebrovascular accident (CVA) (HCC)    Left ventricular thrombus    History of seizures    Carnitine deficiency (HCC)    Above-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)    Traumatic brain injury (HCC)    Impaired mobility and activities of daily living    At risk for venous thromboembolism (VTE)    Acute pain    At risk for constipation    Urinary incontinence    Patient incapable of making informed decisions    Pressure injury of buttock, stage 3 (HCC)    Adjustment disorder with mixed anxiety and depressed mood    Neurocognitive disorder    Cardiomyopathy ([facility])         Medications  Current Facility-Administered Medications   Medication Dose Route Frequency Provider Last Rate    acetaminophen  975 mg Oral TID PRN José Salcido MD      aspirin  81 mg Oral Daily Lorriejacky Barillas PA-C      atorvastatin  80 mg Oral Daily With Dinner Lorrie Barillas PA-C      bictegravir-emtricitab-tenofovir alafenamide  1 tablet Oral Daily With Breakfast Lorrie Barillas PA-C      bisacodyl  10 mg Rectal Daily PRN Lorrie Barillas PA-C      diphenhydrAMINE  25 mg Oral Q6H PRN Lorrie Barillas PA-C      divalproex sodium  1,250 mg Oral Daily Lorrie Barillas PA-C      dolutegravir  50 mg Oral Daily Lorrie Barillas PA-C      gabapentin  100 mg Oral TID Lorrie Barillas PA-C      lamoTRIgine  50 mg Oral BID Lorrie Barillas PA-C      levOCARNitine  1,000 mg/day Oral TID With Meals Lorrie Barillas PA-C      losartan  50 mg Oral Daily Lorrie Barillas PA-C      melatonin  6 mg Oral HS Lorrie Barillas PA-C      metoprolol  succinate  25 mg Oral Daily CHARLIE Mcclelland      mirtazapine  15 mg Oral HS Lorrie Barillas PA-C      ELVA ANTIFUNGAL   Topical BID Lorrie Barillas PA-C      ondansetron  4 mg Oral Q6H PRN Lorrie Barillas PA-C      oxyCODONE  2.5 mg Oral Q8H PRN Raimundo Riley MD      polyethylene glycol  17 g Oral Daily PRN Lorrie Barillas PA-C      rivaroxaban  20 mg Oral Daily With Dinner Lorrie Barillas PA-C      senna  1 tablet Oral HS PRN Lorrie Barillas PA-C      sertraline  75 mg Oral Daily Lorrie Barillas PA-C      tamsulosin  0.4 mg Oral Daily With Dinner Lorrie Barillas PA-C      zonisamide  400 mg Oral Daily Lorrie Barillas PA-C            ** Please Note: Fluency Direct voice to text software may have been used in the creation of this document. **

## 2024-07-16 NOTE — ASSESSMENT & PLAN NOTE
Remote history of TBI, previously residing in a group home prior to alf sentence.  Evaluated by neuropsychiatry on 6/27/24 and deemed NOT to have medical decision-making capacity  Process for court appointed guardian started on 7/5/24 - CM following

## 2024-07-16 NOTE — ASSESSMENT & PLAN NOTE
Noted on echocardiogram 6/10/2024: spherical 1.5 x 1.3 cm thrombus at the LV apex   Initiated on AC with Xarelto, continue  Rx sent to [facility] for price check - Pharmacy could not verify pt's rx benefit. CM to investigate.

## 2024-07-16 NOTE — ASSESSMENT & PLAN NOTE
Home regimen: Losartan 50mg daily, Toprol XL 25 mg BID  Current regimen: Losartan 50 mg daily, Toprol-XL 25 mg daily  BP acceptable upon review today (7/16/24)

## 2024-07-17 PROCEDURE — 97530 THERAPEUTIC ACTIVITIES: CPT

## 2024-07-17 PROCEDURE — 97110 THERAPEUTIC EXERCISES: CPT

## 2024-07-17 PROCEDURE — 99232 SBSQ HOSP IP/OBS MODERATE 35: CPT | Performed by: PHYSICAL MEDICINE & REHABILITATION

## 2024-07-17 PROCEDURE — 97535 SELF CARE MNGMENT TRAINING: CPT

## 2024-07-17 PROCEDURE — 97542 WHEELCHAIR MNGMENT TRAINING: CPT

## 2024-07-17 PROCEDURE — 99231 SBSQ HOSP IP/OBS SF/LOW 25: CPT | Performed by: NURSE PRACTITIONER

## 2024-07-17 RX ADMIN — LEVOCARNITINE 330 MG: 1 SOLUTION ORAL at 18:11

## 2024-07-17 RX ADMIN — ZONISAMIDE 400 MG: 100 CAPSULE ORAL at 08:42

## 2024-07-17 RX ADMIN — METOPROLOL SUCCINATE 25 MG: 25 TABLET, EXTENDED RELEASE ORAL at 08:40

## 2024-07-17 RX ADMIN — SERTRALINE HYDROCHLORIDE 75 MG: 50 TABLET ORAL at 08:40

## 2024-07-17 RX ADMIN — LAMOTRIGINE 50 MG: 25 TABLET ORAL at 18:11

## 2024-07-17 RX ADMIN — BICTEGRAVIR SODIUM, EMTRICITABINE, AND TENOFOVIR ALAFENAMIDE FUMARATE 1 TABLET: 50; 200; 25 TABLET ORAL at 08:41

## 2024-07-17 RX ADMIN — DIVALPROEX SODIUM 1250 MG: 500 TABLET, EXTENDED RELEASE ORAL at 08:40

## 2024-07-17 RX ADMIN — GABAPENTIN 100 MG: 100 CAPSULE ORAL at 08:40

## 2024-07-17 RX ADMIN — GABAPENTIN 100 MG: 100 CAPSULE ORAL at 21:32

## 2024-07-17 RX ADMIN — LOSARTAN POTASSIUM 50 MG: 50 TABLET, FILM COATED ORAL at 08:40

## 2024-07-17 RX ADMIN — LAMOTRIGINE 50 MG: 25 TABLET ORAL at 08:40

## 2024-07-17 RX ADMIN — TAMSULOSIN HYDROCHLORIDE 0.4 MG: 0.4 CAPSULE ORAL at 16:05

## 2024-07-17 RX ADMIN — ATORVASTATIN CALCIUM 80 MG: 80 TABLET, FILM COATED ORAL at 16:05

## 2024-07-17 RX ADMIN — LEVOCARNITINE 330 MG: 1 SOLUTION ORAL at 14:51

## 2024-07-17 RX ADMIN — GABAPENTIN 100 MG: 100 CAPSULE ORAL at 16:05

## 2024-07-17 RX ADMIN — RIVAROXABAN 20 MG: 20 TABLET, FILM COATED ORAL at 16:05

## 2024-07-17 RX ADMIN — ASPIRIN 81 MG: 81 TABLET, COATED ORAL at 08:40

## 2024-07-17 RX ADMIN — DOLUTEGRAVIR SODIUM 50 MG: 50 TABLET, FILM COATED ORAL at 08:41

## 2024-07-17 RX ADMIN — MIRTAZAPINE 15 MG: 15 TABLET, FILM COATED ORAL at 21:32

## 2024-07-17 RX ADMIN — Medication 6 MG: at 21:32

## 2024-07-17 RX ADMIN — LEVOCARNITINE 330 MG: 1 SOLUTION ORAL at 08:41

## 2024-07-17 NOTE — ASSESSMENT & PLAN NOTE
Home regimen: Losartan 50mg daily, Toprol XL 25 mg BID  Current regimen: Losartan 50 mg daily, Toprol-XL 25 mg daily  BP acceptable upon review today (7/17/24)

## 2024-07-17 NOTE — PROGRESS NOTES
07/17/24 1030   Pain Assessment   Pain Assessment Tool 0-10   Pain Score No Pain   Restrictions/Precautions   Precautions Aphasia;Bed/chair alarms;Cognitive;Fall Risk;Seizure;Supervision on toilet/commode   LLE Weight Bearing Per Order NWB   Cognition   Overall Cognitive Status Impaired   Arousal/Participation Alert;Cooperative   Attention Attends with cues to redirect   Orientation Level Oriented to person;Oriented to place;Oriented to time   Memory Decreased recall of recent events;Decreased short term memory;Decreased recall of precautions   Following Commands Follows one step commands with increased time or repetition   Subjective   Subjective makes efforts to express himself, pleasant and cooperative. Difficulty word finding at times.   Sit to Stand   Comment parallel bar trials cg/min A.   Bed-Chair Transfer   Type of Assistance Needed Incidental touching   Physical Assistance Level No physical assistance   Comment sit pivot, cues for hand placement. Second trial pt able to set WC up and lock brakes without cues.   Chair/Bed-to-Chair Transfer CARE Score 4   Ambulation   Does the patient walk? 0. No, and walking goal is not clinically indicated.   Wheel 50 Feet with Two Turns   Type of Assistance Needed Supervision   Comment S, VC for safety   Wheel 50 Feet with Two Turns CARE Score 4   Wheel 150 Feet   Type of Assistance Needed Supervision   Comment S, VC for safety   Wheel 150 Feet CARE Score 4   Wheelchair mobility   Does the patient use a wheelchair? 1. Yes   Type of Wheelchair Used 1. Manual   Method Right upper extremity;Left upper extremity   Assistance Provided For Remove armrests;Replace armrests   Distance Level Surface (feet) 500 ft  (x2, 1x50ft)   Findings grossly mod I with B UE self propulsion, S provided due to dec cognition/directional cues.   Therapeutic Interventions   Strengthening x3 stand trials in parallel bars up to 3 min, L hip 2x10 abd and ext each standing trial.   Other x30 min  Phase I education; with efforts to explain: what coming home looks like, prosthesis components and time line, K levels, as well as fucntional and insurance criteria, initial vs definitative prosthesis.   Assessment   Treatment Assessment Pt seen for 60 min skilled PT intervention with efforts on post op Phase I education and First step book readings/education as above. Use of basic terms and laimen explainations in efforts to improve pt understanding due to cognitive deficits. Pt self propelled to 9th floor gym (and back), static stand trials in parallel bars, for L LE strength and sacral offloadin (per OT inc sacral wound bleeding this AM). Overall pt demonstrates good balance with CGA for safety in // bars, limited to approx 3min tolerance. L AKA  in place, to assess fit and postioning in PM session, with plan for HEP completion per handout.   Plan   Progress Progressing toward goals   Discharge Recommendation   Equipment Recommended Wheelchair   PT Therapy Minutes   PT Time In 1030   PT Time Out 1130   PT Total Time (minutes) 60   PT Mode of treatment - Individual (minutes) 60   PT Mode of treatment - Concurrent (minutes) 0   PT Mode of treatment - Group (minutes) 0   PT Mode of treatment - Co-treat (minutes) 0   PT Mode of Treatment - Total time(minutes) 60 minutes   PT Cumulative Minutes 912   Therapy Time missed   Time missed? No

## 2024-07-17 NOTE — ASSESSMENT & PLAN NOTE
Remote history of TBI, previously residing in a group home prior to detention sentence.  Evaluated by neuropsychiatry on 6/27/24 and deemed NOT to have medical decision-making capacity  Process for court appointed guardian started on 7/5/24 - CM following

## 2024-07-17 NOTE — ASSESSMENT & PLAN NOTE
Noted on echocardiogram 6/10/2024: spherical 1.5 x 1.3 cm thrombus at the LV apex   Initiated on AC with Xarelto, continue  Rx sent to [facility] for price check - Pharmacy could not verify pt's rx benefit. CM to investigate.

## 2024-07-17 NOTE — PROGRESS NOTES
[facility]  Progress Note  Name: Sunil Patel I  MRN: [id number]  Unit/Bed#: -01 I Date of Admission: 7/6/2024   Date of Service: 7/17/2024 I Hospital Day: 11    Assessment & Plan   * Above-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)  Assessment & Plan  Presented with left lower extremity acute limb ischemia with extensive left iliofemoral and left infrainguinal embolism requiring left femoral thrombectomy and subsequent AKA on 6/7/24 with vascular surgery.  Incision C/D/I, pain controlled. Vascular surgery following, staples removed on 7/10  Continue ASA and statin   Also on Xarelto due to LV thrombus  Rehab and pain control per PMR    Acute pain  Assessment & Plan  Due to the AKA  Pain controlled today per patient and he will participate with therapy     Benign essential hypertension  Assessment & Plan  Home regimen: Losartan 50mg daily, Toprol XL 25 mg BID  Current regimen: Losartan 50 mg daily, Toprol-XL 25 mg daily  BP acceptable upon review today (7/17/24)    Bipolar affective disorder (HCC)  Assessment & Plan  Continue home meds Zoloft and Remeron  Outpatient follow-up with Psychiatry    Cardiomyopathy ([facility])  Assessment & Plan  ECHO on 6/10/2024 showed LVEF 40-45% with mild global hypokinesis   Status post NM stress test on 6/11/2024 which showed: a large, mild, fixed defect in the inferior wall, possibly due to diaphragmatic attenuation artifact, there is a small area of partial reversibility in the inferior apical wall suggestive of ischemia    Evaluated ed by cardiology, etiology felt to be possibly secondary to stress-induced cardiomyopathy, with apical thrombus  Cardiac catheterization deferred given the lack of any significant ischemia, and no current cardiac symptoms  Continue with medical management with aspirin, statin, beta-blocker, ARB  Monitor volume status, remains euvolemic off of diuretics   Euvolemic on exam today (7/17/24)  Outpatient follow-up  with cardiology    Carnitine deficiency (HCC)  Assessment & Plan  Continue levocarnitine 330 mg 3x daily with meals.    Neurocognitive disorder  Assessment & Plan  Evaluated by neuropsychology and found to not have capacity  Guardianship in progress     Traumatic brain injury (HCC)  Assessment & Plan  Remote history of TBI, previously residing in a group home prior to [facility] sentence.  Evaluated by neuropsychiatry on 6/27/24 and deemed NOT to have medical decision-making capacity  Process for court appointed guardian started on 7/5/24 - CM following     Pressure injury of buttock, stage 3 (HCC)  Assessment & Plan  Being managed by PMR  Turn every 2 hours for offloading  Continue local care    Left ventricular thrombus  Assessment & Plan  Noted on echocardiogram 6/10/2024: spherical 1.5 x 1.3 cm thrombus at the LV apex   Initiated on AC with Xarelto, continue  Rx sent to [facility] for price check - Pharmacy could not verify pt's rx benefit. CM to investigate.               The above assessment and plan was reviewed and updated as determined by my evaluation of the patient on 7/17/2024.    Labs:   Results from last 7 days   Lab Units 07/11/24  0526   WBC Thousand/uL 9.55   HEMOGLOBIN g/dL 11.1*   HEMATOCRIT % 36.0*   PLATELETS Thousands/uL 393*     Results from last 7 days   Lab Units 07/11/24  0526   SODIUM mmol/L 139   POTASSIUM mmol/L 4.0   CHLORIDE mmol/L 105   CO2 mmol/L 24   BUN mg/dL 19   CREATININE mg/dL 0.89   CALCIUM mg/dL 9.0                   Imaging  No orders to display       Review of Scheduled Meds:  Current Facility-Administered Medications   Medication Dose Route Frequency Provider Last Rate    acetaminophen  975 mg Oral TID PRN José Salcido MD      aspirin  81 mg Oral Daily Lorrie Barillas PA-C      atorvastatin  80 mg Oral Daily With Dinner Lorrie Barillas PA-C      bictegravir-emtricitab-tenofovir alafenamide  1 tablet Oral Daily With Breakfast Lorrie Barillas PA-C       bisacodyl  10 mg Rectal Daily PRN Lorrie Barillas PA-C      diphenhydrAMINE  25 mg Oral Q6H PRN Lorrie Barillas PA-C      divalproex sodium  1,250 mg Oral Daily Lorrie Barillas PA-C      dolutegravir  50 mg Oral Daily Lorrie Barillas PA-C      gabapentin  100 mg Oral TID Lorrie Barillas PA-C      lamoTRIgine  50 mg Oral BID Lorrie Barillas PA-C      levOCARNitine  1,000 mg/day Oral TID With Meals Lorrie Barillas PA-C      losartan  50 mg Oral Daily Lorrie Barillas PA-C      melatonin  6 mg Oral HS Lorrie Barillas PA-C      metoprolol succinate  25 mg Oral Daily CHARLIE Mcclelland      mirtazapine  15 mg Oral HS Lorriejacky Barillas PA-C      ondansetron  4 mg Oral Q6H PRN Lorrie Barillas PA-C      oxyCODONE  2.5 mg Oral Q8H PRN Raimundo Riley MD      polyethylene glycol  17 g Oral Daily PRN Lorrie Barillas PA-C      rivaroxaban  20 mg Oral Daily With Dinner Lorrie Barillas PA-C      senna  1 tablet Oral HS PRN Lorrie Barillas PA-C      sertraline  75 mg Oral Daily Lorrie Barillas PA-C      tamsulosin  0.4 mg Oral Daily With Dinner Lorrie Barillas PA-C      zonisamide  400 mg Oral Daily Lorrie Barillas PA-C         Subjective/ HPI: Patient seen and examined. Patients overnight issues or events were reviewed with nursing staff. New or overnight issues include the following:     Offers no events and is tolerating his diet today. No fever or chills.     ROS:   A 10 point ROS was performed; negative except as noted above.        *Labs /Radiology studies Reviewed  *Medications  reviewed and reconciled as needed  *Please refer to order section for additional ordered labs studies      Physical Examination:  Vitals:   Vitals:    07/16/24 0845 07/16/24 1335 07/16/24 2020 07/17/24 0840   BP: 128/72 107/65 115/57 118/75   BP Location:  Left arm Left arm    Pulse: 86 86 92 84   Resp:  17 20    Temp:  98.2 °F (36.8 °C) 99 °F (37.2 °C)     TempSrc:  Oral Oral    SpO2:  97% 94%    Weight:       Height:           General Appearance: NAD; pleasant  HEENT: PERRLA, conjuctiva normal; mucous membranes moist; face symmetrical  Neck:  Supple  Lungs: clear bilaterally, normal respiratory effort, no retractions, expiratory effort normal, on room air  CV: regular rate and rhythm, no murmurs rubs or gallops noted   ABD: soft non tender, +BS x4  EXT: L AKA , no lymphadenopathy, no edema  Skin: normal turgor, normal texture, no rash  Psych: affect normal, mood normal  Neuro: AAOx3       The above physical exam was reviewed and updated as determined by my evaluation of the patient on 7/17/2024.    Invasive Devices       None                      VTE Pharmacologic Prophylaxis: Eliquis  Code Status: Level 1 - Full Code  Current Length of Stay: 11 day(s)     Coordination of patient's care was performed in conjunction with primary service. Time invested included review of patient's labs, vitals, and management of their comorbidities with continued monitoring, examination of patient as well as answering patient questions, documenting her findings and creating progress note in electronic medical record,  ordering appropriate diagnostic testing.       ** Please Note:  voice to text software may have been used in the creation of this document. Although proof errors in transcription or interpretation are a potential of such software**

## 2024-07-17 NOTE — PROGRESS NOTES
"   07/17/24 1230   Pain Assessment   Pain Assessment Tool 0-10   Pain Score No Pain   Restrictions/Precautions   Precautions Aphasia;Bed/chair alarms;Cognitive;Fall Risk;Seizure;Supervision on toilet/commode;Pressure Ulcer   LLE Weight Bearing Per Order NWB   Braces or Orthoses Other (Comment)  (L stump )   Cognition   Overall Cognitive Status Impaired   Arousal/Participation Alert;Cooperative   Attention Attends with cues to redirect   Orientation Level Oriented to person;Oriented to place;Oriented to time   Memory Decreased recall of recent events;Decreased short term memory;Decreased recall of precautions   Following Commands Follows one step commands with increased time or repetition   Subjective   Subjective pt joking with PT and SPT upon entry, playing he did not know about having therapy and why he was here. Then laughed and commented \" I got u!\"   Roll Left and Right   Type of Assistance Needed Supervision   Roll Left and Right CARE Score 4   Sit to Lying   Type of Assistance Needed Supervision   Sit to Lying CARE Score 4   Lying to Sitting on Side of Bed   Type of Assistance Needed Supervision   Lying to Sitting on Side of Bed CARE Score 4   Bed-Chair Transfer   Type of Assistance Needed Incidental touching   Physical Assistance Level No physical assistance   Comment sit pivot no AD, CG with cues as needed for safety and technique.   Chair/Bed-to-Chair Transfer CARE Score 4   Ambulation   Does the patient walk? 0. No, and walking goal is not clinically indicated.   Wheel 50 Feet with Two Turns   Type of Assistance Needed Supervision   Comment S, VC for safety   Wheel 50 Feet with Two Turns CARE Score 4   Wheel 150 Feet   Type of Assistance Needed Supervision   Comment S, VC for safety   Wheel 150 Feet CARE Score 4   Wheelchair mobility   Does the patient use a wheelchair? 1. Yes   Type of Wheelchair Used 1. Manual   Method Right upper extremity;Left upper extremity   Distance Level Surface (feet) " 350 ft  (x2)   Therapeutic Interventions   Strengthening Prone L hip ext 3x10, R sidelying for L hip abd 3x10; supine bridges 3x10.   Flexibility prone lying x10 min total. L sidelying manual hip flexor stretch 5x10 sec.   Other Time spent for  adjustment for fit and comfort. Sacral wound observed, no overt drainage however ongoing open area.   Assessment   Treatment Assessment Pt engaged in 45 min skilled PT intervention with focus on mat level stretchinng and HEP per handout provided. Pt c/o mild L sided LBP with prolonged prone, however able to adjust self higher into ext onto pillows with some comfort. Ongoing education for sidelying in bed and HEP per handout as able. Questionable recall given cognitive deficits. However overall cooperative and motivated for therapy. Continue as able to promote functional mobility and optimize phase I outcomes.   Barriers to Discharge Decreased caregiver support;Inaccessible home environment   Plan   Progress Progressing toward goals   Discharge Recommendation   Equipment Recommended Wheelchair   PT Therapy Minutes   PT Time In 1230   PT Time Out 1315   PT Total Time (minutes) 45   PT Mode of treatment - Individual (minutes) 45   PT Mode of treatment - Concurrent (minutes) 0   PT Mode of treatment - Group (minutes) 0   PT Mode of treatment - Co-treat (minutes) 0   PT Mode of Treatment - Total time(minutes) 45 minutes   PT Cumulative Minutes 957   Therapy Time missed   Time missed? No

## 2024-07-17 NOTE — ASSESSMENT & PLAN NOTE
ECHO on 6/10/2024 showed LVEF 40-45% with mild global hypokinesis   Status post NM stress test on 6/11/2024 which showed: a large, mild, fixed defect in the inferior wall, possibly due to diaphragmatic attenuation artifact, there is a small area of partial reversibility in the inferior apical wall suggestive of ischemia    Evaluated ed by cardiology, etiology felt to be possibly secondary to stress-induced cardiomyopathy, with apical thrombus  Cardiac catheterization deferred given the lack of any significant ischemia, and no current cardiac symptoms  Continue with medical management with aspirin, statin, beta-blocker, ARB  Monitor volume status, remains euvolemic off of diuretics   Euvolemic on exam today (7/17/24)  Outpatient follow-up with cardiology

## 2024-07-17 NOTE — PROGRESS NOTES
"   07/17/24 0830   Pain Assessment   Pain Assessment Tool 0-10   Pain Score No Pain   Restrictions/Precautions   Precautions Aphasia;Bed/chair alarms;Cognitive;Fall Risk;Seizure;Supervision on toilet/commode   LLE Weight Bearing Per Order NWB   ROM Restrictions No   Braces or Orthoses   (LLE )   Lifestyle   Autonomy \"Does it look bad?\" (sacral wound)   Oral Hygiene   Type of Assistance Needed Set-up / clean-up   Physical Assistance Level No physical assistance   Comment seated EOB   Oral Hygiene CARE Score 5   Shower/Bathe Self   Type of Assistance Needed Physical assistance;Set-up / clean-up   Physical Assistance Level 25% or less   Comment sponge bath completed while supine and seated EOB. Pt bathed groin and BLE while supine. req A to thoroughly bathe buttocks while sidelying. RN present for sacral wound care. UB bathed while seated EOB.   Shower/Bathe Self CARE Score 3   Tub/Shower Transfer   Reason Not Assessed Sponge Bath   Upper Body Dressing   Type of Assistance Needed Set-up / clean-up   Physical Assistance Level No physical assistance   Comment seated in recliner.   Upper Body Dressing CARE Score 5   Lower Body Dressing   Type of Assistance Needed Physical assistance;Verbal cues;Set-up / clean-up   Physical Assistance Level 26%-50%   Comment A to don LLE . A to don tab brief. Pt able to don pants while supine.   Lower Body Dressing CARE Score 3   Putting On/Taking Off Footwear   Type of Assistance Needed Supervision;Set-up / clean-up   Physical Assistance Level No physical assistance   Comment seated EOB, pt able to don/doff sock.   Putting On/Taking Off Footwear CARE Score 4   Lying to Sitting on Side of Bed   Type of Assistance Needed Incidental touching   Physical Assistance Level No physical assistance   Comment CS-CGA, inc time   Lying to Sitting on Side of Bed CARE Score 4   Bed-Chair Transfer   Type of Assistance Needed Incidental touching;Verbal cues   Physical Assistance Level No " physical assistance   Comment modified squat pivot, cues for hand placement.   Chair/Bed-to-Chair Transfer CARE Score 4   Toilet Transfer   Type of Assistance Needed Incidental touching;Adaptive equipment;Verbal cues   Physical Assistance Level No physical assistance   Comment CGA squat pivot EOB>drop-arm platform BSC. cues for proper hand placement.   Toilet Transfer CARE Score 4   Cognition   Overall Cognitive Status Impaired   Comments baseline expressive>receptitive aphasia   Activity Tolerance   Medical Staff Made Aware Pt's sacral wound noted w/ active bleeding, RN made aware and present for wound care management. Add'lly pt reports removing a scab from R ankle, RN assessed as well.   Assessment   Treatment Assessment Pt seen for skilled OT session focusing on self-care management. Details on sponge bath ADL noted above, completes at overall Min A. Improvements noted w/ fxnl squat pivot xfers, cont to req cues for proper hand placement at times to inc safety. Cont OT POC: endurance work, fxnl modified squat pivot xfers, BUE strengthening, sacral offloading strategies, repetitive safety training, alternating lateral weightshifting, core strengthening, ADL retraining, and ongoing phase 1 amputee education. Pt agreeable to rest in recliner, all needs within reach and alarm activated.   Prognosis Fair   Problem List Decreased strength;Decreased endurance;Decreased range of motion;Impaired balance;Decreased mobility;Decreased coordination;Decreased cognition;Impaired judgement;Decreased safety awareness;Decreased skin integrity;Pain;Orthopedic restrictions   Plan   Treatment/Interventions ADL retraining;Functional transfer training;Therapeutic exercise;Endurance training;Patient/family training   Progress Progressing toward goals   Discharge Recommendation   Rehab Resource Intensity Level, OT   (pending progress)   OT Therapy Minutes   OT Time In 0830   OT Time Out 1000   OT Total Time (minutes) 90   OT Mode of  treatment - Individual (minutes) 90   OT Mode of treatment - Concurrent (minutes) 0   OT Mode of treatment - Group (minutes) 0   OT Mode of treatment - Co-treat (minutes) 0   OT Mode of Treatment - Total time(minutes) 90 minutes   OT Cumulative Minutes 886   Therapy Time missed   Time missed? No

## 2024-07-17 NOTE — CASE MANAGEMENT
Sent referral to [facility] and new blanket referral to continue to search for LT placement for pt. Cm encouraged all SNFs and rakers to contact cm directly to better explain case.

## 2024-07-17 NOTE — PROGRESS NOTES
Physical Medicine and Rehabilitation Progress Note  Sunil Patel 63 y.o. male MRN: [id number]  Unit/Bed#: [facility] 451-01 Encounter: [id number]    To Review: Sunil Patel is a 63 y.o. male who  has a past medical history of Acute lower limb ischemia (06/08/2024), Anxiety, Depression, HIV disease (HCC), Substance abuse (HCC), and Suicide attempt (HCC). who presented to the [facility] on 6/7/24 from snf for increased LLE swelling and pain and was found to have a left external iliac artery occlusion and acute limb ischemia. 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 Patient had TTE on 6/10/24 showing LV apex thrombus. On 6/11/24 patient had pharmacologic nuclear stress test/SPECT scan which did not show any significant areas of ischemia with EF 40-45% and recommended continuing A/C for LV apical thrombus. His course was complicated by b/l buttock unstageable pressure ulcers, urinary and fecal incontinence, pain, and significant decline in ADLs and mobility. Patient has been continued on Xarelto for anticoagulation, as well as ASA and statin. Patient has a history of TBI, with baseline nonfluent aphasia and forgetfulness. He was evaluated by neuropsychology on 6/27/24, and deemed to not have capacity to make fully informed medical decisions. Therefore, the guardianship process was initiated as of 7/5/24. He was admitted to the [facility] on 7/6.     Chief Complaint: No new concerns.     Interval History/Subjective:  Feeling better overall. Has his , which isn't on correctly, but he will work on it with therapies. Sleep is fine. No new CP, SOB, fevers, chills, N/V, abdominal pain. Last BM 7/14.     ROS:  A 10 point review of systems was negative except for what is noted in the HPI.    Today's  "Changes:  Continue timed voids and local wound care.  Discussed case with CM - 8/6 is the meeting for guardianship. She believes they have to appoint someone first, and then they can explore his friends who offered to act as guardians. She will clarify.       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 Underwent L femoral thrombectomy and AKA with vascular surgery   - Vas sx follow-up 7/10 - L AKA incision site well-healed, staples taken out at the bedside this morning  - [facility] will be vendor - Patient now has .  - Has some phantom limb sensation and is on gabapentin and working on desensitization. Monitor for need for topicals  - Monitor for hip flexion/abduction tightness. Stretches in therapy  - Volume management/shaping to transition to .   - On Rivaroxaban with hx of LV thrombus and PAOD   - PT/OT/SLP (to work on carry over strategies) 3-5 hours/day, 5-7 days/week.    - Goals are Ind-Sup at a wheelchair level.   - Outpatient f/u with [facility] and Vascular    Neurocognitive disorder  Assessment & Plan  Cog slightly better compared to acute   Hx of TBI and L MCA CVA, psychiatric d/o, seizure d/o  - Neuropsych assessment 6/27/24 - \"diffuse cognitive dysfunction and on a measure assessing awareness of personal health status and ability to evaluate health problems, handle medical emergencies and take safety precautions, patient performed in the IMPAIRED range of functioning. During this encounter, patient does not appear to have capacity to make fully informed medical decisions.\"  MMSE 4/28 - severely impaired  - Guardianship process initiated on acute - follow-up by CM/Admin  - Status: some expressive and possible some receptive aphasia.  He can be difficult to follow at times likely due to a mixture of aphasia, tangentiality, mild lability, and impaired memory; lability " with hx of possible bipolar d/o  - Neuropsych Med review: Depakote, Lamictal, Remeron, Zoloft, Melatonin, gabapentin, PRN oxy 2.5mg TID   - Monitor neuro-exam, wakefulness, mood, cognition, insight into deficits and safety awareness   - Monitor and ensure optimal management electrolytes, nutrition, and hydration  - Monitor for signs or symptoms of infection, medication intolerances, other systemic etiologies  - Additional labs, imaging, specialist follow-up as needed per primary team currently   - Overstimulation precautions, frequent re-orientation, re-direction, re-assurance  - Optimal mood, pain, and sleep management  - If impaired sleep or behavior recommend sleep log and agitation monitoring    - Limit sedating medications when possible  - Fall precautions - if needed increase rounding or consider virtual sitter or in-person sitter  - For routine restlessness, anxiety, irritability focus on non-pharmacologic management    - Hold benzo's with increased risk paradoxical reaction and possibility of limiting cognitive recovery  - Continue SLP and interdisciplinary care  - OP neuro and PCP     Adjustment disorder with mixed anxiety and depressed mood  Assessment & Plan  - Related to recent release from incarceration, new AKA and uncertainty of future disposition, all in the setting of impaired cognition related to prior strokes and TBI  - Behavioral techniques for symptom management  - Neuropsychology consult as available    Pressure injury of buttock, stage 3 ([facility])  Assessment & Plan  Stable   - Wound care consulted and following weekly  Per wound care specialist 7/15  - POA L buttock pressure injury unstageable has healed.  - POA R buttock pressure injury is now Stage 3, and improving.   - Cleanse sacro-buttocks with soap and water. Apply Triad Paste to Sacro-Buttocks Wound Beds Only. Apply Hydraguard to Elsi-wounds and all other intact aspects of sacro-buttocks. Apply both creams TID and PRN episodes of  "incontinence.    - Recommend ROHO cushion in chair when out of bed instead   - Preventative hydraguard to bilateral heels BID and PRN.   - P500  - Monitor clinically for breakdown, frequent turns      Patient incapable of making informed decisions  Assessment & Plan  - History of remote TBI and prior strokes with comorbid psychiatric history.  - Evaluated by neuropsychology, Dr. Dilshad Tatum, PhD on 6/27/24, found to have \"diffuse cognitive dysfunction and on a measure assessing awareness of personal health status and ability to evaluate health problems, handle medical emergencies and take safety precautions, patient performed in the IMPAIRED range of functioning. During this encounter, patient does not appear to have capacity to make fully informed medical decisions.\"  - Court-appointed guardianship process has been initiated by acute care CM Katia Kay.    - We have identified two friends who are interested in being patient's guardians and have been involved and invested in patient's care on the ARC.   - They will need to be interviewed by the  involved in this process.    - If this is able to be expedited, may be able to transition patient to [facility]/SNF   - He would ultimately benefit from [facility]/nursing home/memory care unit - he does very well with structure and supervision.    Urinary incontinence  Assessment & Plan  - Did have some incontinence on acute   - Describes urgency  - monitor for retention, incontinence (including overflow incontinence), signs/symptoms of UTI  - Timed Voids and PVRs Q4hrs to start   - If PVR <150 x3, can discontinue scans, but would continue timed voids   - He has some difficulty managing the urinal    - needs assistance but is able to transfer to [facility]   - Still uses purewick at night, in part for continence care/to protect his skin given his buttock wounds.          At risk for constipation  Assessment & Plan  - Stooling adequately recently; did have some incontinence on " acute now improved  - Close continent care given buttock wounds  - Miralax PRN      Acute pain  Assessment & Plan  Controlled   - Tylenol 975 mg q8h PRN  - Gabapentin 100 mg TID scheduled for phantom pain/sensation  - Oxycodone 2.5 mg q8h PRN, wean as possible   - Has not used the past week   - May be able to discontinue at discharge.    At risk for venous thromboembolism (VTE)  Assessment & Plan  - On rivaroxaban    Impaired mobility and activities of daily living  Assessment & Plan  - Rehabilitation medicine physician for daily monitoring of care, 24 hour availability for acute medical issues, medication management, and therapeutic and diagnostic assessments.  - 24 hour rehabilitation nursing 7 days per week for: management/teaching of medications, bowel/bladder routine, skin care.  - PT, OT for 2-3 hours per day, 5 to 6 days per week; 15 hours per week  - Rehabilitation Psychology as needed for adjustment and coping  - MSW for barriers to discharge, community resources, and family support  - Discharge planning following to help ensure a safe and efficient discharge        History of stroke  Assessment & Plan  - History of old left temporal and parietal lobe cortical infarcts, also involving the insula and left occipital lobe as well as small right posterior parietal lobe. Stable on most recent CT head on 5/16/24.   - ASA, Xarelto and statin for secondary prevention  - Optimal BP control  - Monitor neuro exam - hx of LV thrombus   - OP neuro follow-up     Bipolar affective disorder (HCC)  Assessment & Plan  Mood acceptable; continue meds as outlined   Supportive counseling  NeuroPsychology consult while in ARC if available for support  Counseled on and continue to encourage deep breathing/relaxation/behavioral management techniques  - Mirtazapine 15 mg qHs  - Sertraline 75 mg daily  - Lamictal 50mg BID  - Depakote ER 1250mg qday   - Outpatient psychiatry follow-up    Cardiomyopathy (HCC)  Assessment & Plan  ECHO  on 6/10/2024 showed LVEF 40-45% with mild global hypokinesis   Status post NM stress test on 6/11/2024 which showed: a large, mild, fixed defect in the inferior wall, possibly due to diaphragmatic attenuation artifact, there is a small area of partial reversibility in the inferior apical wall suggestive of ischemia    Elevated by cardiology, etiology felt to be possibly secondary to stress-induced cardiomyopathy, with apical thrombus  Cardiac catheterization deferred given the lack of any significant ischemia, and no current cardiac symptoms  Continue with medical management with aspirin, statin, beta-blocker, ARB  Monitor volume status, remains euvolemic off of diuretics   Outpatient follow-up with cardiology    Traumatic brain injury (HCC)  Assessment & Plan  See neurocog impairment     Carnitine deficiency (HCC)  Assessment & Plan  - Carnitine replacement  - Appreciate medicine management      History of seizures  Assessment & Plan  - Depakote ER, lamotrigine, zonisamide  - Outpatient follow-up with [facility] neurology    Left ventricular thrombus  Assessment & Plan  - Visualized on echocardiogram on 6/10/24: spherical 1.5 x 1.3 cm thrombus at the LV apex.   - Rivaroxaban  - Outpatient follow-up with cardiology    Benign essential hypertension  Assessment & Plan  - Toprol, losartan  - Appreciate medicine management    Human immunodeficiency virus (HIV) infection ([facility])  Assessment & Plan  - Continue anti-retroviral treatment  - Appreciate medicine management during ARC course  - OP ID follow-up         Health Maintenance  #Skin/Pressure Injury Prevention: Turn Q2hr in bed, with weight shifts [id number] in wheelchair.  #GI Prophylaxis: Not indicated   #Code Status: Full Code  #FEN: Cardiac diet, Ensure Max Vanilla with breakfast, and chocolate at dinner   #Dispo: Team 7/16: Anticipate he will be meeting goals in a week or so. In the process of getting guardianship, but we may have identified some friends who may be  candidates to be guardians. Would need to be vetted by court system. He would ultimately benefit from a structured environment (memory care/nursing home), but would likely plan to transition to [facility]/SNF to start to see him through his AKA recovery and prosthetic process.   Follow-up: PMR, Vascular Surgery, Psychiatry, ID, PCP    Objective:    Functional Update:  PT:  CGA-Min tranfers, min-modA bed mobility, Sup wheelchair mobility 150', CGA ramp  OT: Ind eating, Ind grooming, Min bathing, Sup UB dressing, modA LB dressing, Min toileting, Min tub/shower transfers, Min toilet transfers   SLP: Min comprehension, problem solving, and memory, and modA for expression.     Allergies per EMR    Physical Exam:  Temp:  [98.2 °F (36.8 °C)-99 °F (37.2 °C)] 99 °F (37.2 °C)  HR:  [86-92] 92  Resp:  [17-20] 20  BP: (107-128)/(57-72) 115/57  Oxygen Therapy  SpO2: 94 %    Gen: No acute distress, Well-nourished, well-appearing.  HEENT: Moist mucus membranes, Normocephalic/Atraumatic  Cardiovascular: Regular rate, rhythm, S1/S2. Distal pulses palpable  Heme/Extr: No edema  Pulmonary: Non-labored breathing. Lungs CTAB  : No fernandes  GI: Soft, non-tender, non-distended. BS+  MSK: Stable L AKA.  not fully in place, essentially creating a circumferential wrap around his residual limb. Has a belt to help it remain in place.   Integumentary: Skin is warm, dry.   Neuro: AAOx3,  Speech is intact. Appropriate to questioning. Pleasant and cooperative.   Psych: Normal mood and affect.       Diagnostic Studies: Reviewed, no new imaging    Laboratory:  Reviewed   Results from last 7 days   Lab Units 07/11/24  0526   HEMOGLOBIN g/dL 11.1*   HEMATOCRIT % 36.0*   WBC Thousand/uL 9.55     Results from last 7 days   Lab Units 07/11/24  0526   BUN mg/dL 19   POTASSIUM mmol/L 4.0   CHLORIDE mmol/L 105   CREATININE mg/dL 0.89            Patient Active Problem List   Diagnosis    Bipolar affective disorder (HCC)    Substance abuse (HCC)    Human  immunodeficiency virus (HIV) infection (HCC)    History of stroke    Benign essential hypertension    Positive laboratory testing for human immunodeficiency virus (HCC)    Hypertension    Unspecified vitamin D deficiency    Tobacco abuse    Cerebrovascular accident (CVA) (HCC)    Left ventricular thrombus    History of seizures    Carnitine deficiency (HCC)    Above-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)    Traumatic brain injury (HCC)    Impaired mobility and activities of daily living    At risk for venous thromboembolism (VTE)    Acute pain    At risk for constipation    Urinary incontinence    Patient incapable of making informed decisions    Pressure injury of buttock, stage 3 (HCC)    Adjustment disorder with mixed anxiety and depressed mood    Neurocognitive disorder    Cardiomyopathy (HCC)         Medications  Current Facility-Administered Medications   Medication Dose Route Frequency Provider Last Rate    acetaminophen  975 mg Oral TID PRN José Salcido MD      aspirin  81 mg Oral Daily Lorrie Barillas PA-C      atorvastatin  80 mg Oral Daily With Dinner Lorrie Barillas PA-C      bictegravir-emtricitab-tenofovir alafenamide  1 tablet Oral Daily With Breakfast Lorrie Barillas PA-C      bisacodyl  10 mg Rectal Daily PRN Lorrie Barillas PA-C      diphenhydrAMINE  25 mg Oral Q6H PRN Lorrie Barillas PA-C      divalproex sodium  1,250 mg Oral Daily Lorrie Barillas PA-C      dolutegravir  50 mg Oral Daily Lorrie Barillas PA-C      gabapentin  100 mg Oral TID Lorrie Barillas PA-C      lamoTRIgine  50 mg Oral BID Lorrie Barillas PA-C      levOCARNitine  1,000 mg/day Oral TID With Meals Lorrie Barillas PA-C      losartan  50 mg Oral Daily Lorrie Barillas PA-C      melatonin  6 mg Oral HS Lorrie Barillas PA-C      metoprolol succinate  25 mg Oral Daily CHARLIE Mcclelland      mirtazapine  15 mg Oral HS Lorrie Barillas PA-C       ondansetron  4 mg Oral Q6H PRN Lorrie Barillas PA-C      oxyCODONE  2.5 mg Oral Q8H PRN Raimundo Riley MD      polyethylene glycol  17 g Oral Daily PRN Lorriejacky Barillas PA-C      rivaroxaban  20 mg Oral Daily With Dinner Lorriejacky Barillas PA-C      senna  1 tablet Oral HS PRN Lorriejacky Barillas PA-C      sertraline  75 mg Oral Daily Lorrie EJ Barillas      tamsulosin  0.4 mg Oral Daily With Dinner Lorriejacky Barillas PA-C      zonisamide  400 mg Oral Daily Lorriejacky Barillas PA-C            ** Please Note: Fluency Direct voice to text software may have been used in the creation of this document. **

## 2024-07-18 LAB
ANION GAP SERPL CALCULATED.3IONS-SCNC: 8 MMOL/L (ref 4–13)
BASOPHILS # BLD AUTO: 0.06 THOUSANDS/ÂΜL (ref 0–0.1)
BASOPHILS NFR BLD AUTO: 1 % (ref 0–1)
BUN SERPL-MCNC: 18 MG/DL (ref 5–25)
CALCIUM SERPL-MCNC: 9.2 MG/DL (ref 8.4–10.2)
CHLORIDE SERPL-SCNC: 105 MMOL/L (ref 96–108)
CO2 SERPL-SCNC: 26 MMOL/L (ref 21–32)
CREAT SERPL-MCNC: 0.88 MG/DL (ref 0.6–1.3)
EOSINOPHIL # BLD AUTO: 0.32 THOUSAND/ÂΜL (ref 0–0.61)
EOSINOPHIL NFR BLD AUTO: 4 % (ref 0–6)
ERYTHROCYTE [DISTWIDTH] IN BLOOD BY AUTOMATED COUNT: 15.3 % (ref 11.6–15.1)
GFR SERPL CREATININE-BSD FRML MDRD: 92 ML/MIN/1.73SQ M
GLUCOSE P FAST SERPL-MCNC: 92 MG/DL (ref 65–99)
GLUCOSE SERPL-MCNC: 92 MG/DL (ref 65–140)
HCT VFR BLD AUTO: 36.3 % (ref 36.5–49.3)
HGB BLD-MCNC: 11 G/DL (ref 12–17)
IMM GRANULOCYTES # BLD AUTO: 0.01 THOUSAND/UL (ref 0–0.2)
IMM GRANULOCYTES NFR BLD AUTO: 0 % (ref 0–2)
LYMPHOCYTES # BLD AUTO: 1.74 THOUSANDS/ÂΜL (ref 0.6–4.47)
LYMPHOCYTES NFR BLD AUTO: 23 % (ref 14–44)
MCH RBC QN AUTO: 28.6 PG (ref 26.8–34.3)
MCHC RBC AUTO-ENTMCNC: 30.3 G/DL (ref 31.4–37.4)
MCV RBC AUTO: 94 FL (ref 82–98)
MONOCYTES # BLD AUTO: 0.76 THOUSAND/ÂΜL (ref 0.17–1.22)
MONOCYTES NFR BLD AUTO: 10 % (ref 4–12)
NEUTROPHILS # BLD AUTO: 4.63 THOUSANDS/ÂΜL (ref 1.85–7.62)
NEUTS SEG NFR BLD AUTO: 62 % (ref 43–75)
NRBC BLD AUTO-RTO: 0 /100 WBCS
PLATELET # BLD AUTO: 398 THOUSANDS/UL (ref 149–390)
PMV BLD AUTO: 8 FL (ref 8.9–12.7)
POTASSIUM SERPL-SCNC: 3.9 MMOL/L (ref 3.5–5.3)
RBC # BLD AUTO: 3.85 MILLION/UL (ref 3.88–5.62)
SODIUM SERPL-SCNC: 139 MMOL/L (ref 135–147)
WBC # BLD AUTO: 7.52 THOUSAND/UL (ref 4.31–10.16)

## 2024-07-18 PROCEDURE — 85025 COMPLETE CBC W/AUTO DIFF WBC: CPT | Performed by: PHYSICIAN ASSISTANT

## 2024-07-18 PROCEDURE — 97110 THERAPEUTIC EXERCISES: CPT

## 2024-07-18 PROCEDURE — 97535 SELF CARE MNGMENT TRAINING: CPT

## 2024-07-18 PROCEDURE — 99232 SBSQ HOSP IP/OBS MODERATE 35: CPT | Performed by: PHYSICAL MEDICINE & REHABILITATION

## 2024-07-18 PROCEDURE — 99232 SBSQ HOSP IP/OBS MODERATE 35: CPT | Performed by: PHYSICIAN ASSISTANT

## 2024-07-18 PROCEDURE — 80048 BASIC METABOLIC PNL TOTAL CA: CPT | Performed by: PHYSICIAN ASSISTANT

## 2024-07-18 PROCEDURE — 97530 THERAPEUTIC ACTIVITIES: CPT

## 2024-07-18 PROCEDURE — 97542 WHEELCHAIR MNGMENT TRAINING: CPT

## 2024-07-18 RX ADMIN — LAMOTRIGINE 50 MG: 25 TABLET ORAL at 08:00

## 2024-07-18 RX ADMIN — LEVOCARNITINE 330 MG: 1 SOLUTION ORAL at 08:01

## 2024-07-18 RX ADMIN — DOLUTEGRAVIR SODIUM 50 MG: 50 TABLET, FILM COATED ORAL at 08:03

## 2024-07-18 RX ADMIN — TAMSULOSIN HYDROCHLORIDE 0.4 MG: 0.4 CAPSULE ORAL at 17:40

## 2024-07-18 RX ADMIN — GABAPENTIN 100 MG: 100 CAPSULE ORAL at 21:01

## 2024-07-18 RX ADMIN — ASPIRIN 81 MG: 81 TABLET, COATED ORAL at 08:01

## 2024-07-18 RX ADMIN — ZONISAMIDE 400 MG: 100 CAPSULE ORAL at 08:00

## 2024-07-18 RX ADMIN — LAMOTRIGINE 50 MG: 25 TABLET ORAL at 17:40

## 2024-07-18 RX ADMIN — Medication 6 MG: at 21:01

## 2024-07-18 RX ADMIN — GABAPENTIN 100 MG: 100 CAPSULE ORAL at 15:10

## 2024-07-18 RX ADMIN — GABAPENTIN 100 MG: 100 CAPSULE ORAL at 08:00

## 2024-07-18 RX ADMIN — RIVAROXABAN 20 MG: 20 TABLET, FILM COATED ORAL at 17:40

## 2024-07-18 RX ADMIN — METOPROLOL SUCCINATE 25 MG: 25 TABLET, EXTENDED RELEASE ORAL at 08:00

## 2024-07-18 RX ADMIN — DIVALPROEX SODIUM 1250 MG: 500 TABLET, EXTENDED RELEASE ORAL at 08:00

## 2024-07-18 RX ADMIN — ATORVASTATIN CALCIUM 80 MG: 80 TABLET, FILM COATED ORAL at 17:41

## 2024-07-18 RX ADMIN — LEVOCARNITINE 330 MG: 1 SOLUTION ORAL at 15:09

## 2024-07-18 RX ADMIN — BICTEGRAVIR SODIUM, EMTRICITABINE, AND TENOFOVIR ALAFENAMIDE FUMARATE 1 TABLET: 50; 200; 25 TABLET ORAL at 07:57

## 2024-07-18 RX ADMIN — LEVOCARNITINE 330 MG: 1 SOLUTION ORAL at 17:41

## 2024-07-18 RX ADMIN — LOSARTAN POTASSIUM 50 MG: 50 TABLET, FILM COATED ORAL at 08:08

## 2024-07-18 RX ADMIN — SERTRALINE HYDROCHLORIDE 75 MG: 50 TABLET ORAL at 08:00

## 2024-07-18 RX ADMIN — MIRTAZAPINE 15 MG: 15 TABLET, FILM COATED ORAL at 21:01

## 2024-07-18 NOTE — ASSESSMENT & PLAN NOTE
Home regimen: Losartan 50mg daily, Toprol XL 25 mg BID  Current regimen: Losartan 50 mg daily, Toprol-XL 25 mg daily  BP acceptable

## 2024-07-18 NOTE — PLAN OF CARE
Problem: PAIN - ADULT  Goal: Verbalizes/displays adequate comfort level or baseline comfort level  Description: Interventions:  - Encourage patient to monitor pain and request assistance  - Assess pain using appropriate pain scale  - Administer analgesics based on type and severity of pain and evaluate response  - Implement non-pharmacological measures as appropriate and evaluate response  - Consider cultural and social influences on pain and pain management  - Notify physician/advanced practitioner if interventions unsuccessful or patient reports new pain  Outcome: Progressing     Problem: INFECTION - ADULT  Goal: Absence or prevention of progression during hospitalization  Description: INTERVENTIONS:  - Assess and monitor for signs and symptoms of infection  - Monitor lab/diagnostic results  - Monitor all insertion sites, i.e. indwelling lines, tubes, and drains  - Monitor endotracheal if appropriate and nasal secretions for changes in amount and color  - [place] appropriate cooling/warming therapies per order  - Administer medications as ordered  - Instruct and encourage patient and family to use good hand hygiene technique  - Identify and instruct in appropriate isolation precautions for identified infection/condition  Outcome: Progressing     Problem: INFECTION - ADULT  Goal: Absence of fever/infection during neutropenic period  Description: INTERVENTIONS:  - Monitor WBC    Outcome: Progressing

## 2024-07-18 NOTE — PROGRESS NOTES
[facility]  Progress Note  Name: Sunil Patel I  MRN: [id number]  Unit/Bed#: -01 I Date of Admission: 7/6/2024   Date of Service: 7/18/2024 I Hospital Day: 12    Assessment & Plan   * Above-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)  Assessment & Plan  Presented with left lower extremity acute limb ischemia with extensive left iliofemoral and left infrainguinal embolism requiring left femoral thrombectomy and subsequent AKA on 6/7/24 with vascular surgery.  Incision C/D/I, pain controlled. Vascular surgery following, staples removed on 7/10  Continue ASA and statin   Also on Xarelto due to LV thrombus  Rehab and pain control per PMR    Cardiomyopathy ([facility])  Assessment & Plan  ECHO on 6/10/2024 showed LVEF 40-45% with mild global hypokinesis   Status post NM stress test on 6/11/2024 which showed: a large, mild, fixed defect in the inferior wall, possibly due to diaphragmatic attenuation artifact, there is a small area of partial reversibility in the inferior apical wall suggestive of ischemia    Evaluated ed by cardiology, etiology felt to be possibly secondary to stress-induced cardiomyopathy, with apical thrombus  Cardiac catheterization deferred given the lack of any significant ischemia, and no current cardiac symptoms  Continue with medical management with aspirin, statin, beta-blocker, ARB  Monitor volume status, remains euvolemic off of diuretics   Euvolemic on exam today (7/17/24)  Outpatient follow-up with cardiology    Traumatic brain injury (HCC)  Assessment & Plan  Remote history of TBI, previously residing in a group home prior to half-way sentence.  Evaluated by neuropsychiatry on 6/27/24 and deemed NOT to have medical decision-making capacity  Process for court appointed guardian started on 7/5/24 - CM following     Carnitine deficiency (HCC)  Assessment & Plan  Continue levocarnitine 330 mg 3x daily with meals.    History of seizures  Assessment &  "Plan  Diagnosed in July 2019, follows with [facility] neurology outpatient  Continue home regimen with Depakote ER, Lamotrigine and Zonegran    Left ventricular thrombus  Assessment & Plan  Noted on echocardiogram 6/10/2024: spherical 1.5 x 1.3 cm thrombus at the LV apex   Initiated on AC with Xarelto, continue  Rx sent to [facility] for price check - Pharmacy could not verify pt's rx benefit. CM to investigate.    Benign essential hypertension  Assessment & Plan  Home regimen: Losartan 50mg daily, Toprol XL 25 mg BID  Current regimen: Losartan 50 mg daily, Toprol-XL 25 mg daily  BP acceptable     History of stroke  Assessment & Plan  History of left MCA CVA in May 2018 with residual expressive aphasia  Continue ASA and atorvastatin    Human immunodeficiency virus (HIV) infection (HCC)  Assessment & Plan  Continue Biktarvy and Tivicay.    Bipolar affective disorder (HCC)  Assessment & Plan  Continue home meds Zoloft and Remeron  Outpatient follow-up with Psychiatry             The above assessment and plan was reviewed and updated as determined by my evaluation of the patient on 7/18/2024.    Labs:             Invalid input(s): \"LABGLOM\", \"CMP\"                Imaging  No orders to display       Review of Scheduled Meds:  Current Facility-Administered Medications   Medication Dose Route Frequency Provider Last Rate    acetaminophen  975 mg Oral TID PRN José Salcido MD      aspirin  81 mg Oral Daily Lorrie Barillas PA-C      atorvastatin  80 mg Oral Daily With Dinner Lorrie Barillas PA-C      bictegravir-emtricitab-tenofovir alafenamide  1 tablet Oral Daily With Breakfast Lorrie Barillas PA-C      bisacodyl  10 mg Rectal Daily PRN Lorrie Barillas PA-C      diphenhydrAMINE  25 mg Oral Q6H PRN Lorrie Barillas PA-C      divalproex sodium  1,250 mg Oral Daily Lorrie Barillas PA-C      dolutegravir  50 mg Oral Daily Lorrie Barillas PA-C      gabapentin  100 mg Oral TID Lorrie Barillas, " PA-C      lamoTRIgine  50 mg Oral BID Lorriejacky Barillas, PA-C      levOCARNitine  1,000 mg/day Oral TID With Meals Lorrie Barillas, PA-GERALDO      losartan  50 mg Oral Daily Lorriejacky Barillas, PA-C      melatonin  6 mg Oral HS Lorriejacky Barillas, PA-C      metoprolol succinate  25 mg Oral Daily CHARLIE Mcclelland      mirtazapine  15 mg Oral HS Lorriejacky Barillas, EJ      ondansetron  4 mg Oral Q6H PRN Lorrie Barillas, PA-C      oxyCODONE  2.5 mg Oral Q8H PRN Raimundo Riley MD      polyethylene glycol  17 g Oral Daily PRN Lorriejacky Barillas, PA-GERALDO      rivaroxaban  20 mg Oral Daily With Dinner Lorrie Barillas, PA-GERALDO      senna  1 tablet Oral HS PRN Lorrie Barillas, PA-GERALDO      sertraline  75 mg Oral Daily Lorriejacky Barillas, PA-GERALDO      tamsulosin  0.4 mg Oral Daily With Dinner Lorrie Barillas, PA-C      zonisamide  400 mg Oral Daily Lorrie Barillas PA-C         Subjective/ HPI: Patient seen and examined. Patients overnight issues or events were reviewed with nursing staff. New or overnight issues include the following:     Pt seen in his room. He denies any current complaints.    ROS:   A 10 point ROS was performed; negative except as noted above.        *Labs /Radiology studies Reviewed  *Medications  reviewed and reconciled as needed  *Please refer to order section for additional ordered labs studies      Physical Examination:  Vitals:   Vitals:    07/17/24 1333 07/17/24 2047 07/18/24 0621 07/18/24 0800   BP: 110/71 134/65 122/73 128/72   BP Location: Left arm Left arm Left arm    Pulse: 92 90 94 97   Resp: 17 16 17    Temp: 97.5 °F (36.4 °C) 97.6 °F (36.4 °C) 97.6 °F (36.4 °C)    TempSrc: Oral Oral Oral    SpO2: 96% 96% 96%    Weight:       Height:           General Appearance: NAD; pleasant  HEENT: PERRLA, conjuctiva normal; mucous membranes moist; face symmetrical  Neck:  Supple  Lungs: clear bilaterally, normal respiratory effort, no retractions, expiratory effort normal, on  room air  CV: regular rate and rhythm, no murmurs rubs or gallops noted   ABD: soft non tender, +BS x4  EXT: Lt AKA  Skin: normal turgor, normal texture, no rash  Psych: affect normal, mood normal  Neuro: AAOx3. Aphasia.     The above physical exam was reviewed and updated as determined by my evaluation of the patient on 7/18/2024.    Invasive Devices       None                      VTE Pharmacologic Prophylaxis: Xarelto  Code Status: Level 1 - Full Code  Current Length of Stay: 12 day(s)    Total floor / unit time spent today 30 minutes  Coordination of patient's care was performed in conjunction with consulting services. Time invested included review of patient's labs, vitals, and management of their comorbidities with continued monitoring, examination of patient as well as answering patient questions, documenting her findings and creating progress note in electronic medical record,  ordering appropriate diagnostic testing.       ** Please Note:  voice to text software may have been used in the creation of this document. Although proof errors in transcription or interpretation are a potential of such software**

## 2024-07-18 NOTE — PROGRESS NOTES
07/18/24 0930   Pain Assessment   Pain Assessment Tool 0-10   Pain Score No Pain   Restrictions/Precautions   Precautions Aspiration;Cardiac/sternal;Cognitive;Fall Risk;Pressure Ulcer;Seizure;Supervision on toilet/commode   Weight Bearing Restrictions Yes   LLE Weight Bearing Per Order NWB   ROM Restrictions No   Braces or Orthoses   (L LE )   Cognition   Overall Cognitive Status Impaired   Arousal/Participation Alert;Responsive;Cooperative   Attention Attends with cues to redirect   Orientation Level Oriented to person;Oriented to place;Oriented to situation;Disoriented to time   Memory Decreased recall of recent events;Decreased recall of precautions;Decreased short term memory   Following Commands Follows one step commands with increased time or repetition   Subjective   Subjective Pt reports he is ready to get to work   Roll Left and Right   Comment Pt OOB   Sit to Lying   Comment Pt OOB   Lying to Sitting on Side of Bed   Comment Pt OOB   Sit to Stand   Type of Assistance Needed Supervision   Physical Assistance Level No physical assistance   Comment Cl S CGA pull to stand at kitchen counter top   Sit to Stand CARE Score 4   Bed-Chair Transfer   Type of Assistance Needed Supervision   Physical Assistance Level No physical assistance   Comment Cl S modified sit-pivot vs stand-pivot to/from w/c, recliner, and NuStep; VC for sequencing   Chair/Bed-to-Chair Transfer CARE Score 4   Walk 10 Feet   Reason if not Attempted Safety concerns   Walk 10 Feet CARE Score 88   Walk 50 Feet with Two Turns   Reason if not Attempted Safety concerns   Walk 50 Feet with Two Turns CARE Score 88   Walk 150 Feet   Reason if not Attempted Safety concerns   Walk 150 Feet CARE Score 88   Walking 10 Feet on Uneven Surfaces   Reason if not Attempted Safety concerns   Walking 10 Feet on Uneven Surfaces CARE Score 88   Ambulation   Does the patient walk? 0. No, and walking goal is not clinically indicated.   Wheel 50 Feet with Two  Turns   Type of Assistance Needed Independent   Physical Assistance Level No physical assistance   Wheel 50 Feet with Two Turns CARE Score 6   Wheelchair mobility   Does the patient use a wheelchair? 1. Yes   Type of Wheelchair Used 1. Manual   Method Right upper extremity;Left upper extremity   Distance Level Surface (feet) 100 ft   Findings mod I with no VC for safety this session   Curb or Single Stair   Reason if not Attempted Safety concerns   1 Step (Curb) CARE Score 88   4 Steps   Reason if not Attempted Safety concerns   4 Steps CARE Score 88   12 Steps   Reason if not Attempted Safety concerns   12 Steps CARE Score 88   Toilet Transfer   Comment Pt did not require during session   Therapeutic Interventions   Other standing tolerance with counter support to play WAR ~5 min, w/c mobility, transfer training   Equipment Use   NuStep L2, 10 min, R LE + B/L UE   Assessment   Treatment Assessment Pt agreeable to PT this AM, received sitting upright in w.c. Focused short session on strengthening R LE with NuStep and prolonged standing tolerance at counter with card game with good tolerance. He continues to require VC for sequencing for modified sit-pivot vs stand-pivot transfer, but improving with R LE strength and eccentric control. Will continue with current PT POC to improve deficits and promote return to PLOF.   Problem List Decreased strength;Decreased range of motion;Decreased endurance;Impaired balance;Decreased mobility;Decreased coordination;Decreased cognition;Impaired judgement;Decreased safety awareness;Decreased skin integrity;Orthopedic restrictions   PT Barriers   Physical Impairment Decreased strength;Decreased range of motion;Decreased endurance;Impaired balance;Decreased cognition;Impaired judgement;Decreased safety awareness;Decreased skin integrity;Orthopedic restrictions;Pain   Functional Limitation Ramp negotiation;Standing;Transfers;Wheelchair management   Plan   Treatment/Interventions  Functional transfer training;LE strengthening/ROM;Therapeutic exercise;Endurance training;Cognitive reorientation;Patient/family training;Equipment eval/education;Bed mobility;Gait training   Progress Progressing toward goals   PT Therapy Minutes   PT Time In 0930   PT Time Out 1000   PT Total Time (minutes) 30   PT Mode of treatment - Individual (minutes) 30   PT Mode of treatment - Concurrent (minutes) 0   PT Mode of treatment - Group (minutes) 0   PT Mode of treatment - Co-treat (minutes) 0   PT Mode of Treatment - Total time(minutes) 30 minutes   PT Cumulative Minutes 987     Patient remains OOB in chair, all needs in reach.  Alarm in place and activated.  Encouraged use of call bell, patient verbalizes understanding.

## 2024-07-18 NOTE — CASE MANAGEMENT
All SNFs (20+) denied due to variable of reasons, main reason being unknown dispo plan. Left message for [facility] as they haven't responded in Aidin.     Cm, acute care cm's, [facility] rep and rep from [facility] met to discuss dispo plan. [facility] rep sent referrals to several residential behavioral programs, most of them did not have availability, beginning to search outside of state. Dispo plan is to continue to work towards achieving sup goals and regroup next week to discuss next steps, possible acute care transfer.

## 2024-07-18 NOTE — ASSESSMENT & PLAN NOTE
Remote history of TBI, previously residing in a group home prior to [facility] sentence.  Evaluated by neuropsychiatry on 6/27/24 and deemed NOT to have medical decision-making capacity  Process for court appointed guardian started on 7/5/24 - CM following

## 2024-07-18 NOTE — PROGRESS NOTES
OT Treatment Note       07/18/24 0800   Pain Assessment   Pain Assessment Tool 0-10   Pain Score No Pain   Restrictions/Precautions   Precautions Aspiration;Bed/chair alarms;Cognitive;Fall Risk;Seizure;Supervision on toilet/commode;Pressure Ulcer   Weight Bearing Restrictions Yes   LLE Weight Bearing Per Order NWB   ROM Restrictions No   Braces or Orthoses Other (Comment)  (LLE )   Oral Hygiene   Type of Assistance Needed Independent   Physical Assistance Level No physical assistance   Comment seated in WC at sink   Oral Hygiene CARE Score 6   Grooming   Able To Wash/Dry Face;Brush/Clean Teeth;Wash/Dry Hands   Findings seated EOB with setup and SUP, WC at sink IND   Shower/Bathe Self   Type of Assistance Needed Physical assistance   Physical Assistance Level 25% or less   Comment Upon OT arrival to room, noting purewick did not work effectively and all linens and clothes saturated with urine including LLE ; nursing staff made aware. Pt completing SB UB seated EOB, SB BLE and groin/buttocks supine and sidelying in bed. Pt requires assist to thoroughly wash bottom sidelying. Triad paste applied per RN to sacral wound.   Shower/Bathe Self CARE Score 3   Tub/Shower Transfer   Reason Not Assessed Sponge Bath   Upper Body Dressing   Type of Assistance Needed Set-up / clean-up   Physical Assistance Level No physical assistance   Comment seated EOB   Upper Body Dressing CARE Score 5   Lower Body Dressing   Type of Assistance Needed Physical assistance   Physical Assistance Level 26%-50%   Comment pt requires TA for  and tab brief. Able to don pants supine in bed with setup   Lower Body Dressing CARE Score 3   Putting On/Taking Off Footwear   Type of Assistance Needed Supervision;Set-up / clean-up   Physical Assistance Level No physical assistance   Comment to don R sock   Putting On/Taking Off Footwear CARE Score 4   Roll Left and Right   Type of Assistance Needed Supervision   Physical Assistance  Level No physical assistance   Roll Left and Right CARE Score 4   Sit to Lying   Type of Assistance Needed Supervision   Physical Assistance Level No physical assistance   Sit to Lying CARE Score 4   Lying to Sitting on Side of Bed   Type of Assistance Needed Incidental touching   Physical Assistance Level No physical assistance   Comment increased time   Lying to Sitting on Side of Bed CARE Score 4   Bed-Chair Transfer   Type of Assistance Needed Incidental touching   Physical Assistance Level No physical assistance   Comment sit pivot EOB->WC with min vc's for hand placement and armrest management   Chair/Bed-to-Chair Transfer CARE Score 4   Toileting Hygiene   Comment due to urinary urgency, requires assist in placement of urinal during session, no CM completed as task occurred during spongebathing routine bed level   Exercise Tools   Other Exercise Tool 1 Pt completing BUE therex for increased functional strength for transfers and WC mobility including; WC pushups 10x3 reps, chest press 2# db RUE 6x2 reps, 5#db LUE 10x3 reps; lat rows LUE using 5# db 10x3 reps. OT demo and min vc's for technique   Cognition   Overall Cognitive Status Impaired   Arousal/Participation Alert;Cooperative   Attention Attends with cues to redirect   Orientation Level Oriented to person;Oriented to place;Oriented to time   Memory Decreased recall of recent events;Decreased recall of precautions;Decreased short term memory   Following Commands Follows one step commands with increased time or repetition   Activity Tolerance   Activity Tolerance Patient tolerated treatment well   Assessment   Treatment Assessment Pt seen for 90 min OT session focusing on ADL routine, functional transfers and BUE therex. Pt requires Min with SB tasks and significant/overall TA with donning LLE . Upon OT arrival to room this morning, noting all linens and clothing including LLE  saturated with urine despite wearing purewick overnight;  [facility] staff made aware. Pt left with PT at end of session for continued therapy. OT to continue POC to focus on modified squat pivot xfers, BUE strengthening, activity tolerance, sacral offloading strategies, repetitive safety training, alternating lateral weightshifting, core strengthening, ADL retraining, and ongoing phase 1 amputee education.   Prognosis Fair   Problem List Decreased strength;Decreased range of motion;Decreased endurance;Impaired balance;Decreased mobility;Decreased coordination;Decreased cognition;Impaired judgement;Decreased safety awareness;Decreased skin integrity;Orthopedic restrictions   Barriers to Discharge Inaccessible home environment;Decreased caregiver support   Plan   Treatment/Interventions ADL retraining;Functional transfer training;Therapeutic exercise;Endurance training;Patient/family training;Bed mobility;Compensatory technique education;Spoke to nursing   Progress Progressing toward goals   Discharge Recommendation   Rehab Resource Intensity Level, OT   (pending)   OT Therapy Minutes   OT Time In 0800   OT Time Out 0930   OT Total Time (minutes) 90   OT Mode of treatment - Individual (minutes) 90   OT Mode of treatment - Concurrent (minutes) 0   OT Mode of treatment - Group (minutes) 0   OT Mode of treatment - Co-treat (minutes) 0   OT Mode of Treatment - Total time(minutes) 90 minutes   OT Cumulative Minutes 976   Therapy Time missed   Time missed? No

## 2024-07-18 NOTE — PROGRESS NOTES
Physical Medicine and Rehabilitation Progress Note  Sunil Patel 63 y.o. male MRN: [id number]  Unit/Bed#: [facility] 451-01 Encounter: [id number]    To Review: Sunil Patel is a 63 y.o. male who  has a past medical history of Acute lower limb ischemia (06/08/2024), Anxiety, Depression, HIV disease (HCC), Substance abuse (HCC), and Suicide attempt (HCC). who presented to the [facility] on 6/7/24 from halfway for increased LLE swelling and pain and was found to have a left external iliac artery occlusion and acute limb ischemia. 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 Patient had TTE on 6/10/24 showing LV apex thrombus. On 6/11/24 patient had pharmacologic nuclear stress test/SPECT scan which did not show any significant areas of ischemia with EF 40-45% and recommended continuing A/C for LV apical thrombus. His course was complicated by b/l buttock unstageable pressure ulcers, urinary and fecal incontinence, pain, and significant decline in ADLs and mobility. Patient has been continued on Xarelto for anticoagulation, as well as ASA and statin. Patient has a history of TBI, with baseline nonfluent aphasia and forgetfulness. He was evaluated by neuropsychology on 6/27/24, and deemed to not have capacity to make fully informed medical decisions. Therefore, the guardianship process was initiated as of 7/5/24. He was admitted to the [facility] on 7/6.     Chief Complaint: No new concerns today.     Interval History/Subjective:  No acute events overnight. Last BM 7/17 continent. Bladder incontinence markedly improved with timed voids and assistance with his urinal. Still with some occasional episodes of incontinence. No new CP, SOB, fevers, chills, N/V, abdominal pain, residual limb pain, or phantom limb  pain. Cognitive difficulties are stable. He remains cooperative and participates well with therapies.     ROS:  A 10 point review of systems was negative except for what is noted in the HPI.    Today's Changes:  Continued timed voids, local care. Discussed with therapies education on lateral leans for offloading.  Labs stable.   Discussed case with CM who had meeting today re: patient's social situation. The goal is to get him placed somewhere he will be able to stay long term to avoid frequent and disorienting changes in setting, and to maintain consistency in his care plan. We will likely meet rehab goals by mid next week, and he will be safe for discharge to next level of care/long term care facility, but that will not occur until he has guardianship in place. Meeting for that is on 8/6. There is a good possibility he will discharge back to the acute care hospital to finish out this guardianship process.     Total visit time: 35 minutes, with more than 50% spent counseling/coordinating care. Counseling includes discussion with patient re: progress in therapies, functional issues observed by therapy staff, and discussion with patient regarding their current medical state and wellbeing. Coordination of patient's care was performed in conjunction with Internal Medicine service to monitor patient's labs, vitals, and management of their comorbidities.    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 Underwent L femoral thrombectomy and AKA with vascular surgery   - [facility] sx follow-up 7/10 - L AKA incision site well-healed, staples taken out at the bedside this morning  - [facility] will be vendor - Patient now has .  - Has some phantom limb sensation and is on gabapentin and working on desensitization. Monitor for need for topicals  - Monitor for hip flexion/abduction tightness. Stretches in  "therapy  - Volume management/shaping to transition to .   - On Rivaroxaban with hx of LV thrombus and PAOD   - PT/OT/SLP (to work on carry over strategies) 3-5 hours/day, 5-7 days/week.    - Goals are Ind-Sup at a wheelchair level.   - Outpatient f/u with [facility] and Vascular    Neurocognitive disorder  Assessment & Plan  Has been appropriate and cooperative throughout this stay without any behavioral issues on the rehab unit to date.   Hx of TBI and L MCA CVA, psychiatric d/o, seizure d/o  - Neuropsych assessment 6/27/24 - \"diffuse cognitive dysfunction and on a measure assessing awareness of personal health status and ability to evaluate health problems, handle medical emergencies and take safety precautions, patient performed in the IMPAIRED range of functioning. During this encounter, patient does not appear to have capacity to make fully informed medical decisions.\"  MMSE 4/28 - severely impaired  - Guardianship process initiated on acute - follow-up by CM/Admin  - Status: some expressive and possible some receptive aphasia.  He can be difficult to follow at times likely due to a mixture of aphasia, tangentiality, mild lability, and impaired memory; lability with hx of possible bipolar d/o  - Neuropsych Med review: Depakote, Lamictal, Remeron, Zoloft, Melatonin, gabapentin, PRN oxy 2.5mg TID   - Monitor neuro-exam, wakefulness, mood, cognition, insight into deficits and safety awareness   - Monitor and ensure optimal management electrolytes, nutrition, and hydration  - Monitor for signs or symptoms of infection, medication intolerances, other systemic etiologies  - Additional labs, imaging, specialist follow-up as needed per primary team currently   - Overstimulation precautions, frequent re-orientation, re-direction, re-assurance  - Optimal mood, pain, and sleep management  - If impaired sleep or behavior recommend sleep log and agitation monitoring    - Limit sedating medications when " "possible  - Fall precautions - if needed increase rounding or consider virtual sitter or in-person sitter  - For routine restlessness, anxiety, irritability focus on non-pharmacologic management    - Hold benzo's with increased risk paradoxical reaction and possibility of limiting cognitive recovery  - Continue SLP and interdisciplinary care  - OP neuro and PCP     Adjustment disorder with mixed anxiety and depressed mood  Assessment & Plan  - Related to recent release from incarceration, new AKA and uncertainty of future disposition, all in the setting of impaired cognition related to prior strokes and TBI  - Behavioral techniques for symptom management  - Neuropsychology consult as available    Pressure injury of buttock, stage 3 (HCC)  Assessment & Plan  Stable   - Wound care consulted and following weekly  Per wound care specialist 7/15  - POA L buttock pressure injury unstageable has healed.  - POA R buttock pressure injury is now Stage 3, and improving.   - Cleanse sacro-buttocks with soap and water. Apply Triad Paste to Sacro-Buttocks Wound Beds Only. Apply Hydraguard to Elsi-wounds and all other intact aspects of sacro-buttocks. Apply both creams TID and PRN episodes of incontinence.    - Recommend ROHO cushion in chair when out of bed instead   - Preventative hydraguard to bilateral heels BID and PRN.   - P500  - Monitor clinically for breakdown, frequent turns      Patient incapable of making informed decisions  Assessment & Plan  - History of remote TBI and prior strokes with comorbid psychiatric history.  - Evaluated by neuropsychology, Dr. Dilshad Tatum, PhD on 6/27/24, found to have \"diffuse cognitive dysfunction and on a measure assessing awareness of personal health status and ability to evaluate health problems, handle medical emergencies and take safety precautions, patient performed in the IMPAIRED range of functioning. During this encounter, patient does not appear to have capacity to make " "fully informed medical decisions.\"  - Court-appointed guardianship process has been initiated by acute care CM Katia Kay.    - We have identified two friends who are interested in being patient's guardians and have been involved and invested in patient's care on the AR[facility].   - They will need to be interviewed by the  involved in this process.    - He has to undergo his hearing on 8/6/2024 first.    - He would ultimately benefit from [facility]/nursing home/memory care unit - he does very well with structure and supervision.    Urinary incontinence  Assessment & Plan  - Did have some incontinence on acute - improved with timed voids and consistent assistance with his urinal  - Describes urgency  - monitor for retention, incontinence (including overflow incontinence), signs/symptoms of UTI  - Timed Voids and PVRs Q4hrs to start   - PVR <150 x3, can discontinue scans, but would continue timed voids   - He has some difficulty managing the urinal    - needs assistance but is able to transfer to [facility]   - Still uses purewick at night, in part for continence care/to protect his skin given his buttock wounds.          At risk for constipation  Assessment & Plan  - Stooling adequately recently; did have some incontinence on acute now improved  - Close continent care given buttock wounds  - Miralax PRN      Acute pain  Assessment & Plan  Controlled   - Tylenol 975 mg q8h PRN  - Gabapentin 100 mg TID scheduled for phantom pain/sensation  - Oxycodone 2.5 mg q8h PRN, wean as possible   - Has not used the past week   - May be able to discontinue at discharge.    At risk for venous thromboembolism (VTE)  Assessment & Plan  - On rivaroxaban    Impaired mobility and activities of daily living  Assessment & Plan  - Rehabilitation medicine physician for daily monitoring of care, 24 hour availability for acute medical issues, medication management, and therapeutic and diagnostic assessments.  - 24 hour rehabilitation nursing 7 days " per week for: management/teaching of medications, bowel/bladder routine, skin care.  - PT, OT for 2-3 hours per day, 5 to 6 days per week; 15 hours per week  - Rehabilitation Psychology as needed for adjustment and coping  - MSW for barriers to discharge, community resources, and family support  - Discharge planning following to help ensure a safe and efficient discharge        History of stroke  Assessment & Plan  - History of old left temporal and parietal lobe cortical infarcts, also involving the insula and left occipital lobe as well as small right posterior parietal lobe. Stable on most recent CT head on 5/16/24.   - ASA, Xarelto and statin for secondary prevention  - Optimal BP control  - Monitor neuro exam - hx of LV thrombus   - OP neuro follow-up     Bipolar affective disorder (HCC)  Assessment & Plan  Mood acceptable; continue meds as outlined   Supportive counseling  NeuroPsychology consult while in [facility] if available for support  Counseled on and continue to encourage deep breathing/relaxation/behavioral management techniques  - Mirtazapine 15 mg qHs  - Sertraline 75 mg daily  - Lamictal 50mg BID  - Depakote ER 1250mg qday   - Outpatient psychiatry follow-up    Cardiomyopathy (HCC)  Assessment & Plan  ECHO on 6/10/2024 showed LVEF 40-45% with mild global hypokinesis   Status post NM stress test on 6/11/2024 which showed: a large, mild, fixed defect in the inferior wall, possibly due to diaphragmatic attenuation artifact, there is a small area of partial reversibility in the inferior apical wall suggestive of ischemia    Elevated by cardiology, etiology felt to be possibly secondary to stress-induced cardiomyopathy, with apical thrombus  Cardiac catheterization deferred given the lack of any significant ischemia, and no current cardiac symptoms  Continue with medical management with aspirin, statin, beta-blocker, ARB  Monitor volume status, remains euvolemic off of diuretics   Outpatient follow-up with  cardiology    Traumatic brain injury (HCC)  Assessment & Plan  See neurocog impairment     Carnitine deficiency (HCC)  Assessment & Plan  - Carnitine replacement  - Appreciate medicine management      History of seizures  Assessment & Plan  - Depakote ER, lamotrigine, zonisamide  - Outpatient follow-up with [facility] neurology    Left ventricular thrombus  Assessment & Plan  - Visualized on echocardiogram on 6/10/24: spherical 1.5 x 1.3 cm thrombus at the LV apex.   - Rivaroxaban  - Outpatient follow-up with cardiology    Benign essential hypertension  Assessment & Plan  - Toprol, losartan  - Appreciate medicine management    Human immunodeficiency virus (HIV) infection (HCC)  Assessment & Plan  - Continue anti-retroviral treatment  - Appreciate medicine management during AR[facility] course  - OP ID follow-up         Health Maintenance  #Skin/Pressure Injury Prevention: Turn Q2hr in bed, with weight shifts [id number] in wheelchair.  #GI Prophylaxis: Not indicated   #Code Status: Full Code  #FEN: Cardiac diet, Ensure Max Vanilla with breakfast, and chocolate at dinner   #Dispo: Team 7/16: Anticipate he will be meeting goals in a week or so. In the process of getting guardianship, but we may have identified some friends who may be candidates to be guardians. Would need to be vetted by court system. He would ultimately benefit from a structured environment (memory care/[facility]), but would likely plan to transition to [facility]/SNF to start to see him through his AKA recovery and prosthetic process.   Follow-up: PMR, Vascular Surgery, Psychiatry, ID, PCP    Objective:    Functional Update:  PT:  CGA-Min tranfers, min-modA bed mobility, Sup wheelchair mobility 150', CGA ramp  OT: Ind eating, Ind grooming, Min bathing, Sup UB dressing, modA LB dressing, Min toileting, Min tub/shower transfers, Min toilet transfers   SLP: Min comprehension, problem solving, and memory, and modA for expression.     Allergies per EMR    Physical Exam:  Temp:   [97.5 °F (36.4 °C)-97.6 °F (36.4 °C)] 97.6 °F (36.4 °C)  HR:  [84-94] 94  Resp:  [16-17] 17  BP: (110-134)/(65-75) 122/73  Oxygen Therapy  SpO2: 96 %    Gen: No acute distress, Well-nourished, well-appearing.  HEENT: Moist mucus membranes, Normocephalic/Atraumatic  Cardiovascular: Regular rate, rhythm, S1/S2. Distal pulses palpable  Heme/Extr: No edema  Pulmonary: Non-labored breathing. Lungs CTAB  : No fernandes  GI: Soft, non-tender, non-distended. BS+  MSK:  on appropriately on L residual limb/AKA  Integumentary: Skin is warm, dry.  Neuro: AAOx3,Speech is intact. Appropriate to questioning.  Psych: Normal mood and affect.     Diagnostic Studies: Reviewed, no new imaging    Laboratory:  Reviewed   Results from last 7 days   Lab Units 07/18/24  1003   HEMOGLOBIN g/dL 11.0*   HEMATOCRIT % 36.3*   WBC Thousand/uL 7.52       Results from last 7 days   Lab Units 07/18/24  1003   BUN mg/dL 18   POTASSIUM mmol/L 3.9   CHLORIDE mmol/L 105   CREATININE mg/dL 0.88              Patient Active Problem List   Diagnosis    Bipolar affective disorder (HCC)    Substance abuse (HCC)    Human immunodeficiency virus (HIV) infection (HCC)    History of stroke    Benign essential hypertension    Positive laboratory testing for human immunodeficiency virus (HCC)    Hypertension    Unspecified vitamin D deficiency    Tobacco abuse    Cerebrovascular accident (CVA) (HCC)    Left ventricular thrombus    History of seizures    Carnitine deficiency (HCC)    Above-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)    Traumatic brain injury (HCC)    Impaired mobility and activities of daily living    At risk for venous thromboembolism (VTE)    Acute pain    At risk for constipation    Urinary incontinence    Patient incapable of making informed decisions    Pressure injury of buttock, stage 3 (HCC)    Adjustment disorder with mixed anxiety and depressed mood    Neurocognitive disorder    Cardiomyopathy (HCC)         Medications  Current  Facility-Administered Medications   Medication Dose Route Frequency Provider Last Rate    acetaminophen  975 mg Oral TID PRN José Salcido MD      aspirin  81 mg Oral Daily Lorrie Barillas PA-C      atorvastatin  80 mg Oral Daily With Dinner Lorrie Barillas PA-C      bictegravir-emtricitab-tenofovir alafenamide  1 tablet Oral Daily With Breakfast Lorrie Barillas PA-C      bisacodyl  10 mg Rectal Daily PRN Lorrie Barillas PA-C      diphenhydrAMINE  25 mg Oral Q6H PRN Lorrie Barillas PA-C      divalproex sodium  1,250 mg Oral Daily Lorrie Barillas PA-C      dolutegravir  50 mg Oral Daily Lorrie Barillas PA-C      gabapentin  100 mg Oral TID Lorrie Barillas PA-C      lamoTRIgine  50 mg Oral BID Lorrie Barillas PA-C      levOCARNitine  1,000 mg/day Oral TID With Meals Lorrie Barillas PA-C      losartan  50 mg Oral Daily Lorrie Barillas PA-C      melatonin  6 mg Oral HS Lorrie Barillas PA-C      metoprolol succinate  25 mg Oral Daily CHARLIE Mcclelland      mirtazapine  15 mg Oral HS Lorriejacky Barillas PA-C      ondansetron  4 mg Oral Q6H PRN Lorrie Barillas PA-C      oxyCODONE  2.5 mg Oral Q8H PRN Raimundo Riley MD      polyethylene glycol  17 g Oral Daily PRN Lorrie Barillas PA-C      rivaroxaban  20 mg Oral Daily With Dinner Lorrie Barillas PA-C      senna  1 tablet Oral HS PRN Lorrie Barillas PA-C      sertraline  75 mg Oral Daily Lorrie Barillas PA-C      tamsulosin  0.4 mg Oral Daily With Dinner Lorrie Barillas PA-C      zonisamide  400 mg Oral Daily Lorrie Barillas PA-C            ** Please Note: Fluency Direct voice to text software may have been used in the creation of this document. **

## 2024-07-18 NOTE — ASSESSMENT & PLAN NOTE
Noted on echocardiogram 6/10/2024: spherical 1.5 x 1.3 cm thrombus at the LV apex   Initiated on AC with Xarelto, continue  Rx sent to [facility] for price check - Pharmacy could not verify pt's rx benefit. CM to investigate.

## 2024-07-18 NOTE — PROGRESS NOTES
07/18/24 1230   Pain Assessment   Pain Assessment Tool 0-10   Pain Score No Pain   Restrictions/Precautions   Precautions Aspiration;Bed/chair alarms;Cognitive;Fall Risk;Supervision on toilet/commode   Braces or Orthoses Other (Comment)  (JONN vanessa)   Cognition   Overall Cognitive Status Impaired   Arousal/Participation Alert;Cooperative   Attention Attends with cues to redirect   Orientation Level Oriented to person;Oriented to place   Memory Decreased recall of recent events   Following Commands Follows one step commands with increased time or repetition   Subjective   Subjective Pt agreeable and ready for PT.   Roll Left and Right   Type of Assistance Needed Supervision   Physical Assistance Level No physical assistance   Comment for safety on mat table   Roll Left and Right CARE Score 4   Sit to Lying   Type of Assistance Needed Incidental touching   Physical Assistance Level No physical assistance   Comment CG for safety   Sit to Lying CARE Score 4   Lying to Sitting on Side of Bed   Type of Assistance Needed Physical assistance   Physical Assistance Level 25% or less   Comment MinAx1 from mat table > sitting for trunk weakness/control. Pt struggled w/ instructions to sit EOB.   Lying to Sitting on Side of Bed CARE Score 3   Sit to Stand   Type of Assistance Needed Incidental touching   Physical Assistance Level No physical assistance   Comment CG for sit-stand for SPT for safety and to don/doff pants for use of urinal   Sit to Stand CARE Score 4   Bed-Chair Transfer   Type of Assistance Needed Incidental touching   Physical Assistance Level No physical assistance   Comment CG for safety SPT Chair<> WC   Chair/Bed-to-Chair Transfer CARE Score 4   Transfer Bed/Chair/Wheelchair   Limitations Noted In Balance;LE Strength   Walk 10 Feet   Reason if not Attempted Safety concerns   Walk 10 Feet CARE Score 88   Walk 50 Feet with Two Turns   Reason if not Attempted Safety concerns   Walk 50 Feet with Two Turns  CARE Score 88   Walk 150 Feet   Reason if not Attempted Safety concerns   Walk 150 Feet CARE Score 88   Walking 10 Feet on Uneven Surfaces   Reason if not Attempted Safety concerns   Walking 10 Feet on Uneven Surfaces CARE Score 88   Ambulation   Does the patient walk? 0. No, and walking goal is not clinically indicated.   Wheel 50 Feet with Two Turns   Type of Assistance Needed Independent   Physical Assistance Level No physical assistance   Comment 50ft up/down ramp   Wheel 50 Feet with Two Turns CARE Score 6   Wheel 150 Feet   Type of Assistance Needed Independent   Physical Assistance Level No physical assistance   Comment Traveling to/from gym   Wheel 150 Feet CARE Score 6   Wheelchair mobility   Does the patient use a wheelchair? 1. Yes   Type of Wheelchair Used 1. Manual   Method Right upper extremity;Left upper extremity   Distance Level Surface (feet) 150 ft   Distance Wheeled 3% Grade 50 ft   Findings Pt ind w/ manual WC, VCs for directions in hallway. Pt educated on ability to use LLE, but pt feels better using UEs. Use of 50ft ramp, ascend fwd due to low incline.   Curb or Single Stair   Reason if not Attempted Safety concerns   1 Step (Curb) CARE Score 88   4 Steps   Reason if not Attempted Safety concerns   4 Steps CARE Score 88   12 Steps   Reason if not Attempted Safety concerns   12 Steps CARE Score 88   Therapeutic Interventions   Strengthening Supine on table: bridges 2 x 10, prone lying shoulder pushups 3 x 5, prone R LE extension 2 x 10, glute squeezes 2 x 10. Seated EOM: mini sit up against wedge 2 x 10, EOM seated russian twists 6# medball 2 x 15. 4# ankle weights 3 x 10 LAQ and BL marches.   Balance Standing in parallel bars: 2 minutes x3, 3 x 10 hip abduction and hip extension RLE.   Other Pt requested to void into urinal at end of session. Requiring mod assistance for don/doffing pants and brief. Pt voided about 4oz of yellow urine.   Assessment   Treatment Assessment Pt pleasant and  encouraged during todays session. Pt continued to work through HEP exercises for practice. Pt demonstrates repetition of instructions to complete activity appropriately. Pt is limited by his trunk/core weakness and control w/ bed mobility. Pt's  readjusted and fitted appropriately for pt comfort. Continue to work on amputee education, transfer training, to maximize pt carryover, BL LE strengthening, and trunk strengthening to increase functional mobility and independence.   Problem List Decreased strength;Impaired balance;Decreased mobility;Decreased cognition;Impaired judgement;Decreased safety awareness   Plan   Treatment/Interventions ADL retraining;Functional transfer training;LE strengthening/ROM;Therapeutic exercise;Endurance training;Patient/family training;Equipment eval/education;Bed mobility;Gait training   Progress Progressing toward goals   PT Therapy Minutes   PT Time In 1230   PT Time Out 1400   PT Total Time (minutes) 90   PT Mode of treatment - Individual (minutes) 90   PT Mode of treatment - Concurrent (minutes) 0   PT Mode of treatment - Group (minutes) 0   PT Mode of treatment - Co-treat (minutes) 0   PT Mode of Treatment - Total time(minutes) 90 minutes   PT Cumulative Minutes 1077   Therapy Time missed   Time missed? No

## 2024-07-19 PROCEDURE — 97110 THERAPEUTIC EXERCISES: CPT

## 2024-07-19 PROCEDURE — 99232 SBSQ HOSP IP/OBS MODERATE 35: CPT | Performed by: PHYSICIAN ASSISTANT

## 2024-07-19 PROCEDURE — 97112 NEUROMUSCULAR REEDUCATION: CPT

## 2024-07-19 PROCEDURE — 97530 THERAPEUTIC ACTIVITIES: CPT

## 2024-07-19 PROCEDURE — 97535 SELF CARE MNGMENT TRAINING: CPT

## 2024-07-19 PROCEDURE — 99232 SBSQ HOSP IP/OBS MODERATE 35: CPT | Performed by: PHYSICAL MEDICINE & REHABILITATION

## 2024-07-19 RX ORDER — GABAPENTIN 100 MG/1
100 CAPSULE ORAL
Status: DISCONTINUED | OUTPATIENT
Start: 2024-07-19 | End: 2024-07-23

## 2024-07-19 RX ADMIN — DOLUTEGRAVIR SODIUM 50 MG: 50 TABLET, FILM COATED ORAL at 08:17

## 2024-07-19 RX ADMIN — ATORVASTATIN CALCIUM 80 MG: 80 TABLET, FILM COATED ORAL at 16:20

## 2024-07-19 RX ADMIN — LEVOCARNITINE 330 MG: 1 SOLUTION ORAL at 14:48

## 2024-07-19 RX ADMIN — GABAPENTIN 100 MG: 100 CAPSULE ORAL at 21:11

## 2024-07-19 RX ADMIN — TAMSULOSIN HYDROCHLORIDE 0.4 MG: 0.4 CAPSULE ORAL at 16:20

## 2024-07-19 RX ADMIN — LEVOCARNITINE 330 MG: 1 SOLUTION ORAL at 21:10

## 2024-07-19 RX ADMIN — Medication 2.5 MG: at 10:18

## 2024-07-19 RX ADMIN — SERTRALINE HYDROCHLORIDE 75 MG: 50 TABLET ORAL at 08:14

## 2024-07-19 RX ADMIN — MIRTAZAPINE 15 MG: 15 TABLET, FILM COATED ORAL at 21:11

## 2024-07-19 RX ADMIN — RIVAROXABAN 20 MG: 20 TABLET, FILM COATED ORAL at 16:20

## 2024-07-19 RX ADMIN — LEVOCARNITINE 330 MG: 1 SOLUTION ORAL at 08:16

## 2024-07-19 RX ADMIN — BICTEGRAVIR SODIUM, EMTRICITABINE, AND TENOFOVIR ALAFENAMIDE FUMARATE 1 TABLET: 50; 200; 25 TABLET ORAL at 06:37

## 2024-07-19 RX ADMIN — ZONISAMIDE 400 MG: 100 CAPSULE ORAL at 08:17

## 2024-07-19 RX ADMIN — LAMOTRIGINE 50 MG: 25 TABLET ORAL at 08:14

## 2024-07-19 RX ADMIN — ASPIRIN 81 MG: 81 TABLET, COATED ORAL at 08:14

## 2024-07-19 RX ADMIN — DIVALPROEX SODIUM 1250 MG: 500 TABLET, EXTENDED RELEASE ORAL at 08:15

## 2024-07-19 RX ADMIN — LAMOTRIGINE 50 MG: 25 TABLET ORAL at 18:18

## 2024-07-19 RX ADMIN — Medication 6 MG: at 21:11

## 2024-07-19 RX ADMIN — GABAPENTIN 100 MG: 100 CAPSULE ORAL at 08:14

## 2024-07-19 NOTE — PROGRESS NOTES
Physical Medicine and Rehabilitation Progress Note  Sunil Patel 63 y.o. male MRN: [id number]  Unit/Bed#: [facility] 451-01 Encounter: [id number]    To Review: Sunil Patel is a 63 y.o. male who  has a past medical history of Acute lower limb ischemia (06/08/2024), Anxiety, Depression, HIV disease (HCC), Substance abuse (HCC), and Suicide attempt (HCC). who presented to the  on 6/7/24 from [facility] for increased LLE swelling and pain and was found to have a left external iliac artery occlusion and acute limb ischemia. 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 Patient had TTE on 6/10/24 showing LV apex thrombus. On 6/11/24 patient had pharmacologic nuclear stress test/SPECT scan which did not show any significant areas of ischemia with EF 40-45% and recommended continuing A/C for LV apical thrombus. His course was complicated by b/l buttock unstageable pressure ulcers, urinary and fecal incontinence, pain, and significant decline in ADLs and mobility. Patient has been continued on Xarelto for anticoagulation, as well as ASA and statin. Patient has a history of TBI, with baseline nonfluent aphasia and forgetfulness. He was evaluated by neuropsychology on 6/27/24, and deemed to not have capacity to make fully informed medical decisions. Therefore, the guardianship process was initiated as of 7/5/24. He was admitted to the [facility] on 7/6.     Chief Complaint: No acute events overnight.     Interval History/Subjective:  No major issues today. Overall doing well. Making progress in therapies. Pleasant and cooperative. Occasional incontinence consisting of sometimes spilling on himself as at times he needs help with his urinal, but doing better overall with timed assistance from staffing. No new  "CP, SOB, fevers, chills, N/V, abdominal pain. Last BM 7/18.     ROS:  A 10 point review of systems was negative except for what is noted in the HPI.    Today's Changes:  Continue local wound care,  and therapies.  No significant phantom limb pain or discomfort. Will try de-escalating gabapentin to 100mg QHS, likely discontinue after the weekend if he does all right.    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 Underwent L femoral thrombectomy and AKA with vascular surgery   - [facility] sx follow-up 7/10 - L AKA incision site well-healed, staples taken out at the bedside this morning  - [facility] will be vendor - Patient now has .  - Has some phantom limb sensation and is on gabapentin and working on desensitization. Monitor for need for topicals  - Monitor for hip flexion/abduction tightness. Stretches in therapy  - Volume management/shaping to transition to .   - On Rivaroxaban with hx of LV thrombus and PAOD   - PT/OT/SLP (to work on carry over strategies) 3-5 hours/day, 5-7 days/week.    - Goals are Ind-Sup at a wheelchair level.   - Outpatient f/u with [facility] and Vascular    Neurocognitive disorder  Assessment & Plan  Has been appropriate and cooperative throughout this stay without any behavioral issues on the rehab unit to date.   Hx of TBI and L MCA CVA, psychiatric d/o, seizure d/o  - Neuropsych assessment 6/27/24 - \"diffuse cognitive dysfunction and on a measure assessing awareness of personal health status and ability to evaluate health problems, handle medical emergencies and take safety precautions, patient performed in the IMPAIRED range of functioning. During this encounter, patient does not appear to have capacity to make fully informed medical decisions.\"  MMSE 4/28 - severely impaired  - Guardianship process initiated on acute - follow-up by CM/Admin  - " Status: some expressive and possible some receptive aphasia.  He can be difficult to follow at times likely due to a mixture of aphasia, tangentiality, mild lability, and impaired memory; lability with hx of possible bipolar d/o  - Neuropsych Med review: Depakote, Lamictal, Remeron, Zoloft, Melatonin, gabapentin, PRN oxy 2.5mg TID   - Monitor neuro-exam, wakefulness, mood, cognition, insight into deficits and safety awareness   - Monitor and ensure optimal management electrolytes, nutrition, and hydration  - Monitor for signs or symptoms of infection, medication intolerances, other systemic etiologies  - Additional labs, imaging, specialist follow-up as needed per primary team currently   - Overstimulation precautions, frequent re-orientation, re-direction, re-assurance  - Optimal mood, pain, and sleep management  - If impaired sleep or behavior recommend sleep log and agitation monitoring    - Limit sedating medications when possible  - Fall precautions - if needed increase rounding or consider virtual sitter or in-person sitter  - For routine restlessness, anxiety, irritability focus on non-pharmacologic management    - Hold benzo's with increased risk paradoxical reaction and possibility of limiting cognitive recovery  - Continue SLP and interdisciplinary care  - OP neuro and PCP     Adjustment disorder with mixed anxiety and depressed mood  Assessment & Plan  - Related to recent release from incarceration, new AKA and uncertainty of future disposition, all in the setting of impaired cognition related to prior strokes and TBI  - Behavioral techniques for symptom management  - Neuropsychology consult as available    Pressure injury of buttock, stage 3 (HCC)  Assessment & Plan  Stable   - Wound care consulted and following weekly  Per wound care specialist 7/15  - POA L buttock pressure injury unstageable has healed.  - POA R buttock pressure injury is now Stage 3, and improving.   - Cleanse sacro-buttocks with  "soap and water. Apply Triad Paste to Sacro-Buttocks Wound Beds Only. Apply Hydraguard to Elsi-wounds and all other intact aspects of sacro-buttocks. Apply both creams TID and PRN episodes of incontinence.    - Recommend ROHO cushion in chair when out of bed instead   - Preventative hydraguard to bilateral heels BID and PRN.   - P500  - Monitor clinically for breakdown, frequent turns      Patient incapable of making informed decisions  Assessment & Plan  - History of remote TBI and prior strokes with comorbid psychiatric history.  - Evaluated by neuropsychology, Dr. Dilshad Tatum, PhD on 6/27/24, found to have \"diffuse cognitive dysfunction and on a measure assessing awareness of personal health status and ability to evaluate health problems, handle medical emergencies and take safety precautions, patient performed in the IMPAIRED range of functioning. During this encounter, patient does not appear to have capacity to make fully informed medical decisions.\"  - Court-appointed guardianship process has been initiated by acute care CM Katia Kay.    - We have identified two friends who are interested in being patient's guardians and have been involved and invested in patient's care on the ARC.   - They will need to be interviewed by the  involved in this process.    - He has to undergo his hearing on 8/6/2024 first.    - He would ultimately benefit from [facility]/nursing home/memory care unit - he does very well with structure and supervision.    Urinary incontinence  Assessment & Plan  - Did have some incontinence on acute - improved with timed voids and consistent assistance with his urinal  - Describes urgency  - monitor for retention, incontinence (including overflow incontinence), signs/symptoms of UTI  - Timed Voids and PVRs Q4hrs to start   - PVR <150 x3, can discontinue scans, but would continue timed voids   - He has some difficulty managing the urinal    - needs assistance but is able to transfer to " commode   - Still uses condom catheter at night, in part for continence care/to protect his skin given his buttock wounds.          At risk for constipation  Assessment & Plan  - Stooling adequately recently; did have some incontinence on acute now improved  - Close continent care given buttock wounds  - Miralax PRN      Acute pain  Assessment & Plan  Controlled   - Tylenol 975 mg q8h PRN  - Gabapentin 100 mg de-escalated to QHS. Will try to wean off   - Mild phantom limb sensation, without pain.   - Oxycodone 2.5 mg q8h PRN, wean as possible   - Has not used the past week   - May be able to discontinue at discharge.    At risk for venous thromboembolism (VTE)  Assessment & Plan  - On rivaroxaban    Impaired mobility and activities of daily living  Assessment & Plan  - Rehabilitation medicine physician for daily monitoring of care, 24 hour availability for acute medical issues, medication management, and therapeutic and diagnostic assessments.  - 24 hour rehabilitation nursing 7 days per week for: management/teaching of medications, bowel/bladder routine, skin care.  - PT, OT for 2-3 hours per day, 5 to 6 days per week; 15 hours per week  - Rehabilitation Psychology as needed for adjustment and coping  - MSW for barriers to discharge, community resources, and family support  - Discharge planning following to help ensure a safe and efficient discharge        History of stroke  Assessment & Plan  - History of old left temporal and parietal lobe cortical infarcts, also involving the insula and left occipital lobe as well as small right posterior parietal lobe. Stable on most recent CT head on 5/16/24.   - ASA, Xarelto and statin for secondary prevention  - Optimal BP control  - Monitor neuro exam - hx of LV thrombus   - OP neuro follow-up     Bipolar affective disorder (HCC)  Assessment & Plan  Mood acceptable; continue meds as outlined   Supportive counseling  NeuroPsychology consult while in ARC if available for  support  Counseled on and continue to encourage deep breathing/relaxation/behavioral management techniques  - Mirtazapine 15 mg qHs  - Sertraline 75 mg daily  - Lamictal 50mg BID  - Depakote ER 1250mg qday   - Outpatient psychiatry follow-up    Cardiomyopathy (HCC)  Assessment & Plan  ECHO on 6/10/2024 showed LVEF 40-45% with mild global hypokinesis   Status post NM stress test on 6/11/2024 which showed: a large, mild, fixed defect in the inferior wall, possibly due to diaphragmatic attenuation artifact, there is a small area of partial reversibility in the inferior apical wall suggestive of ischemia    Elevated by cardiology, etiology felt to be possibly secondary to stress-induced cardiomyopathy, with apical thrombus  Cardiac catheterization deferred given the lack of any significant ischemia, and no current cardiac symptoms  Continue with medical management with aspirin, statin, beta-blocker, ARB  Monitor volume status, remains euvolemic off of diuretics   Outpatient follow-up with cardiology    Traumatic brain injury (HCC)  Assessment & Plan  See neurocog impairment     Carnitine deficiency (HCC)  Assessment & Plan  - Carnitine replacement  - Appreciate medicine management      History of seizures  Assessment & Plan  - Depakote ER, lamotrigine, zonisamide  - Outpatient follow-up with [facility] neurology    Left ventricular thrombus  Assessment & Plan  - Visualized on echocardiogram on 6/10/24: spherical 1.5 x 1.3 cm thrombus at the LV apex.   - Rivaroxaban  - Outpatient follow-up with cardiology    Benign essential hypertension  Assessment & Plan  - Toprol, losartan  - Appreciate medicine management    Human immunodeficiency virus (HIV) infection ([facility])  Assessment & Plan  - Continue anti-retroviral treatment  - Appreciate medicine management during ARC course  - OP ID follow-up         Health Maintenance  #Skin/Pressure Injury Prevention: Turn Q2hr in bed, with weight shifts [id number] in wheelchair.  #GI Prophylaxis:  Not indicated   #Code Status: Full Code  #FEN: Cardiac diet, Ensure Max Vanilla with breakfast, and chocolate at dinner   #Dispo: Team 7/16: Anticipate he will be meeting goals in a week or so. In the process of getting guardianship, but we may have identified some friends who may be candidates to be guardians. Would need to be vetted by court system. He would ultimately benefit from a structured environment (memory care/[facility]), but would likely plan to transition to [facility]/SNF to start to see him through his AKA recovery and prosthetic process.   Follow-up: PMR, Vascular Surgery, Psychiatry, ID, PCP    Objective:    Functional Update:  PT:  CGA-Min tranfers, min-modA bed mobility, Sup wheelchair mobility 150', CGA ramp  OT: Ind eating, Ind grooming, Min bathing, Sup UB dressing, modA LB dressing, Min toileting, Min tub/shower transfers, Min toilet transfers   SLP: Min comprehension, problem solving, and memory, and modA for expression.     Allergies per EMR    Physical Exam:  Temp:  [97.7 °F (36.5 °C)-98.1 °F (36.7 °C)] 97.7 °F (36.5 °C)  HR:  [86-97] 86  Resp:  [17-18] 17  BP: (126-132)/(68-74) 132/68  Oxygen Therapy  SpO2: 96 %    Gen: No acute distress, Well-nourished, well-appearing.  HEENT: Moist mucus membranes, Normocephalic/Atraumatic  Cardiovascular: Regular rate, rhythm, S1/S2. Distal pulses palpable  Heme/Extr: No edema  Pulmonary: Non-labored breathing. Lungs CTAB  : No fernandes  GI: Soft, non-tender, non-distended. BS+  MSK: Stable L AKA with  in place   Integumentary: Skin is warm, dry.   Neuro: AAOx3, Speech is intact. Appropriate to questioning.  Psych: Normal mood and affect.     Diagnostic Studies: Reviewed, no new imaging    Laboratory:  Reviewed   Results from last 7 days   Lab Units 07/18/24  1003   HEMOGLOBIN g/dL 11.0*   HEMATOCRIT % 36.3*   WBC Thousand/uL 7.52       Results from last 7 days   Lab Units 07/18/24  1003   BUN mg/dL 18   POTASSIUM mmol/L 3.9   CHLORIDE mmol/L 105    CREATININE mg/dL 0.88              Patient Active Problem List   Diagnosis    Bipolar affective disorder (HCC)    Substance abuse (HCC)    Human immunodeficiency virus (HIV) infection (HCC)    History of stroke    Benign essential hypertension    Positive laboratory testing for human immunodeficiency virus (HCC)    Hypertension    Unspecified vitamin D deficiency    Tobacco abuse    Cerebrovascular accident (CVA) (HCC)    Left ventricular thrombus    History of seizures    Carnitine deficiency (HCC)    Above-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)    Traumatic brain injury (HCC)    Impaired mobility and activities of daily living    At risk for venous thromboembolism (VTE)    Acute pain    At risk for constipation    Urinary incontinence    Patient incapable of making informed decisions    Pressure injury of buttock, stage 3 (HCC)    Adjustment disorder with mixed anxiety and depressed mood    Neurocognitive disorder    Cardiomyopathy (HCC)         Medications  Current Facility-Administered Medications   Medication Dose Route Frequency Provider Last Rate    acetaminophen  975 mg Oral TID PRN José Slacido MD      aspirin  81 mg Oral Daily Lorriejacky Barillas PA-C      atorvastatin  80 mg Oral Daily With Dinner Lorrie Barillas PA-C      bictegravir-emtricitab-tenofovir alafenamide  1 tablet Oral Daily With Breakfast Lorrie Barillas PA-C      bisacodyl  10 mg Rectal Daily PRN Lorrie Barillas PA-C      diphenhydrAMINE  25 mg Oral Q6H PRN Lorrie Barillas PA-C      divalproex sodium  1,250 mg Oral Daily Lorrie Barillas PA-C      dolutegravir  50 mg Oral Daily Lorrie Barillas PA-C      gabapentin  100 mg Oral TID Lorrie Barillas PA-C      lamoTRIgine  50 mg Oral BID Lorrie Barillas PA-C      levOCARNitine  1,000 mg/day Oral TID With Meals Lorrie Barillas PA-C      losartan  50 mg Oral Daily Lorrie Barillas PA-C      melatonin  6 mg Oral HS Lorrie  JE Barillas      metoprolol succinate  25 mg Oral Daily CHARLIE Mcclelland      mirtazapine  15 mg Oral HS Lorriejacky Barillas PA-C      ondansetron  4 mg Oral Q6H PRN Lorrie Barillas PA-C      oxyCODONE  2.5 mg Oral Q8H PRN Raimundo Riley MD      polyethylene glycol  17 g Oral Daily PRN Lorriejacky Barillas PA-C      rivaroxaban  20 mg Oral Daily With Dinner Lorrie Barillas PA-C      senna  1 tablet Oral HS PRN Lorrie Barillas PA-C      sertraline  75 mg Oral Daily Lorriejacky Barillas PA-C      tamsulosin  0.4 mg Oral Daily With Dinner Lorrie Barillas PA-C      zonisamide  400 mg Oral Daily Lorriejacky Barillas PA-C            ** Please Note: Fluency Direct voice to text software may have been used in the creation of this document. **

## 2024-07-19 NOTE — ASSESSMENT & PLAN NOTE
Remote history of TBI, previously residing in a group home prior to skilled nursing sentence.  Evaluated by neuropsychiatry on 6/27/24 and deemed NOT to have medical decision-making capacity  Process for court appointed guardian started on 7/5/24 - CM following

## 2024-07-19 NOTE — PROGRESS NOTES
07/19/24 0830   Pain Assessment   Pain Assessment Tool 0-10   Pain Score 7   Pain Location/Orientation Orientation: Right;Location: Head   Pain Onset/Description Frequency: Constant/Continuous   Effect of Pain on Daily Activities limits pt activity tolerance   Hospital Pain Intervention(s) Medication (See MAR)  (RN was notified)   Restrictions/Precautions   Precautions Bed/chair alarms;Cognitive;Fall Risk;Supervision on toilet/commode;Seizure;Aphasia   Weight Bearing Restrictions Yes   LLE Weight Bearing Per Order NWB   ROM Restrictions No   Braces or Orthoses   (LLE )   Cognition   Overall Cognitive Status Impaired   Arousal/Participation Alert;Cooperative   Attention Attends with cues to redirect   Orientation Level Oriented to person;Oriented to place   Memory Decreased recall of recent events   Following Commands Follows one step commands with increased time or repetition   Subjective   Subjective pt was agreeable to [facility] treatment session   Roll Left and Right   Type of Assistance Needed Physical assistance   Physical Assistance Level 25% or less   Comment in hospital bed requires use of bedrail and VC for sequencing, min Ax1 without bedrail   Roll Left and Right CARE Score 3   Sit to Lying   Type of Assistance Needed Supervision   Physical Assistance Level No physical assistance   Sit to Lying CARE Score 4   Lying to Sitting on Side of Bed   Type of Assistance Needed Verbal cues;Physical assistance   Physical Assistance Level 25% or less   Comment minAx1 in hospital bed without handrails   Lying to Sitting on Side of Bed CARE Score 3   Sit to Stand   Type of Assistance Needed Incidental touching   Physical Assistance Level No physical assistance   Comment CG to CS at best and gait belt, VC for positioning. UE support from furniture   Sit to Stand CARE Score 4   Bed-Chair Transfer   Type of Assistance Needed Incidental touching;Verbal cues   Physical Assistance Level No physical assistance    Comment CG to CS at best + gait belt, sit pivot transfer. VC for sequencing and managing wheelchair parts   Chair/Bed-to-Chair Transfer CARE Score 4   Wheel 50 Feet with Two Turns   Type of Assistance Needed Independent   Physical Assistance Level No physical assistance   Wheel 50 Feet with Two Turns CARE Score 6   Wheel 150 Feet   Type of Assistance Needed Independent   Physical Assistance Level No physical assistance   Comment to and from 9th floor gym   Wheel 150 Feet CARE Score 6   Toilet Transfer   Type of Assistance Needed Incidental touching;Verbal cues   Physical Assistance Level No physical assistance   Comment CG+ gait belt, VC for sequencing during sit pivot transfer. Max assist ofr clothing management   Toilet Transfer CARE Score 4   Therapeutic Interventions   Strengthening wheelchair press ups 3x10   Flexibility 5x 30 second holds R hamstring/gastroc stretch with leg lift   Neuromuscular Re-Education balance/abdominal exercises sitting at edge of mat on green balance disk: 3x10 ball punches with 2lb dowel, CGA at trunk.3x 10 diagonal chops with 4lb ball, CGA at trunk. 3x10 abdominal crunches+ press out to target with 4lb ball, pt back against physioball with PT CGA.   Equipment Use   NuStep L3, 15 minutes. Bilat UE and R LE only.   Other Comments   Comments lower body dressing, Min with donning/doffing shirt   Assessment   Treatment Assessment pt engaged in 120 minutes of skilled PT session with focus on abdominal and LE strengthening, balance, flexibility, endurance, and sequencing of sit pivot transfers. pt demonstrated improvements in NuStep with increase in level and time. Pt independent with propelling wheelchair, however, requires frequent VC for sequencing during sit pivot transfers to/from wheelchair and managing wheelchair parts. pt demonstrated decreased carry over of sequencing during transfer, expressed agitation with verbal instruction at times but able to be redirected. pt will benefit  from increased repetitions of sit pivot transfers from various surfaces to/from wheelchair, with arm rest back due to inabilty to clear. next tx to focus on abdominal strengthening, LE strengthening, and  transfer training to increase independence with functional mobility.   Problem List Decreased strength;Impaired balance;Decreased mobility;Decreased cognition;Impaired judgement;Decreased safety awareness   Barriers to Discharge Inaccessible home environment;Decreased caregiver support   PT Barriers   Physical Impairment Decreased strength;Decreased range of motion;Decreased endurance;Impaired balance;Decreased cognition;Impaired judgement;Decreased safety awareness;Decreased skin integrity;Orthopedic restrictions;Pain   Functional Limitation Ramp negotiation;Standing;Transfers;Wheelchair management   Plan   Treatment/Interventions Functional transfer training;LE strengthening/ROM;Therapeutic exercise;Endurance training;Patient/family training;Equipment eval/education;Bed mobility;Gait training   Progress Progressing toward goals   Discharge Recommendation   Equipment Recommended Wheelchair   PT Therapy Minutes   PT Time In 0830   PT Time Out 1030   PT Total Time (minutes) 120   PT Mode of treatment - Individual (minutes) 120   PT Mode of treatment - Concurrent (minutes) 0   PT Mode of treatment - Group (minutes) 0   PT Mode of treatment - Co-treat (minutes) 0   PT Mode of Treatment - Total time(minutes) 120 minutes   PT Cumulative Minutes 1197   Therapy Time missed   Time missed? No

## 2024-07-19 NOTE — CASE MANAGEMENT
Cm checked in with pt at bedside, cm updated pt with process so far and search for dispo setting. Pt showed some concern with his money, cm advised him once guardianship is completed, he will have assistance with that. Pt had no specific concerns at time of conversation. Cm will continue to update pt.     Raymundo NUNEZ from acute cm dept advised to send referrals in [place] area again, cm sent blanket referral to [facility] zipcode radius of 30 miles, majority of facilities owned by [place], all [facility] facilities are denying pt. Cm sent SNF referral to [facility] in [place] via [facility], cm called as well, rang with no . Cm to attempt again.

## 2024-07-19 NOTE — ASSESSMENT & PLAN NOTE
ECHO on 6/10/2024 showed LVEF 40-45% with mild global hypokinesis   Status post NM stress test on 6/11/2024 which showed: a large, mild, fixed defect in the inferior wall, possibly due to diaphragmatic attenuation artifact, there is a small area of partial reversibility in the inferior apical wall suggestive of ischemia    Evaluated ed by cardiology, etiology felt to be possibly secondary to stress-induced cardiomyopathy, with apical thrombus  Cardiac catheterization deferred given the lack of any significant ischemia, and no current cardiac symptoms  Continue with medical management with aspirin, statin, beta-blocker, ARB  Monitor volume status, remains euvolemic off of diuretics   Euvolemic on exam  Outpatient follow-up with cardiology

## 2024-07-19 NOTE — PROGRESS NOTES
07/19/24 1245   Pain Assessment   Pain Assessment Tool 0-10   Pain Score No Pain   Restrictions/Precautions   Precautions Bed/chair alarms;Cognitive;Fall Risk;Aphasia;Seizure;Supervision on toilet/commode   Weight Bearing Restrictions Yes   LLE Weight Bearing Per Order NWB   ROM Restrictions No   Braces or Orthoses Other (Comment)  (LLE )   Grooming   Able To Initiate Tasks;Acquire Items;Shave;Wash/Dry Face   Findings seated in w/c at sink, pt engages in grooming tasks per pt request, shaving face. pt quite meticulous regarding grooming tasks requiring increased time to complete however able to complete IND.   Sit to Stand   Type of Assistance Needed Incidental touching   Physical Assistance Level No physical assistance   Comment CG from EOB supporting self on furniture   Sit to Stand CARE Score 4   Bed Mobility   Additional Comments pt able to achieve sidelying position with pillow placed between knees with focus on offloading sacrum; pt able to maintain adequate position for 10 minutes and during that time period, briefly discussed general phase 1 amp edu. extensive edu provided regarding proper positioning in bed during nighttime hours and encouraged to start out in sidelying position upon entering bed for the evening hours.   Bed-Chair Transfer   Type of Assistance Needed Incidental touching   Physical Assistance Level No physical assistance   Comment CG sit pivot transfer; pt did better this session with hand placement, less VCs required.   Chair/Bed-to-Chair Transfer CARE Score 4   Toileting Hygiene   Type of Assistance Needed Physical assistance   Physical Assistance Level 51%-75%   Comment pt insistant on completing urinary toileting seated EOB with use of urinal. pt requires assistance for CM up and down as again pt insistant on standing for CM vs lateral weight shifting. pt then able to place and hold urinal during voiding.   Toileting Hygiene CARE Score 2   Cognition   Overall Cognitive Status  Impaired   Arousal/Participation Alert;Cooperative   Attention Attends with cues to redirect   Orientation Level Oriented to person;Oriented to place   Memory Decreased recall of recent events   Following Commands Follows one step commands with increased time or repetition   Additional Activities   Additional Activities Comments while seated in w/c, pt engages in exaggerated anterior weight shifting onto large blue physio ball again with focus on sacral offloading. pt able to tolerate for 2 min x4 reps with seated upright rest break in between each set. pt tolerates well.   Activity Tolerance   Activity Tolerance Patient tolerated treatment well   Assessment   Treatment Assessment pt engages in 90 minute skilled OT session focusing on sacral offloading, edu regarding sacral offloading, grooming tasks and func transfers. see above for full func details. pt continues to demo slow and steady progress toward OT goals; did better with hand placement during transfers this PM session, remains overall CG for safety. pt insistant on performing CM durnig toileting tasks in partial stance with UE support on furniture, requiring increased assistance despite edu on laterally weight shifting. extensive time spent reviewing edu on sacral offloading; spent time providing edu on proper positioning in the bed--sidelying with proper placement of pillows again to off load sacrum. pt voices understanding, however may benefit from VCs for initiation upon bedtime hour. pt remains limited by impaired func cog/safety, impaired balance, strength and endurance, warranting continued skilled care to focus on ADL retraining, func transfers, weight shifting for toileting tasks vs standing, UE Strengthening, sacral offloading, and general phase 1 amp edu, in order to decrease burden of care at d/c.   Prognosis Fair   Problem List Decreased strength;Impaired balance;Decreased mobility;Decreased cognition;Impaired judgement;Decreased safety  awareness   Plan   Treatment/Interventions ADL retraining;Functional transfer training;Therapeutic exercise;Endurance training;Cognitive reorientation;Patient/family training;Equipment eval/education;Compensatory technique education   OT Therapy Minutes   OT Time In 1245   OT Time Out 1415   OT Total Time (minutes) 90   OT Mode of treatment - Individual (minutes) 90   OT Mode of treatment - Concurrent (minutes) 0   OT Mode of treatment - Group (minutes) 0   OT Mode of treatment - Co-treat (minutes) 0   OT Mode of Treatment - Total time(minutes) 90 minutes   OT Cumulative Minutes 1066   Therapy Time missed   Time missed? No

## 2024-07-19 NOTE — PLAN OF CARE
Problem: INFECTION - ADULT  Goal: Absence or prevention of progression during hospitalization  Description: INTERVENTIONS:  - Assess and monitor for signs and symptoms of infection  - Monitor lab/diagnostic results  - Monitor all insertion sites, i.e. indwelling lines, tubes, and drains  - Monitor endotracheal if appropriate and nasal secretions for changes in amount and color  - [place] appropriate cooling/warming therapies per order  - Administer medications as ordered  - Instruct and encourage patient and family to use good hand hygiene technique  - Identify and instruct in appropriate isolation precautions for identified infection/condition  Outcome: Progressing

## 2024-07-19 NOTE — PROGRESS NOTES
[facility]  Progress Note  Name: Sunil Patel I  MRN: [id number]  Unit/Bed#: -01 I Date of Admission: 7/6/2024   Date of Service: 7/19/2024 I Hospital Day: 13    Assessment & Plan   * Above-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)  Assessment & Plan  Presented with left lower extremity acute limb ischemia with extensive left iliofemoral and left infrainguinal embolism requiring left femoral thrombectomy and subsequent AKA on 6/7/24 with vascular surgery.  Incision C/D/I, pain controlled. Vascular surgery following, staples removed on 7/10  Continue ASA and statin   Also on Xarelto due to LV thrombus  Rehab and pain control per PMR    Cardiomyopathy ([facility])  Assessment & Plan  ECHO on 6/10/2024 showed LVEF 40-45% with mild global hypokinesis   Status post NM stress test on 6/11/2024 which showed: a large, mild, fixed defect in the inferior wall, possibly due to diaphragmatic attenuation artifact, there is a small area of partial reversibility in the inferior apical wall suggestive of ischemia    Evaluated ed by cardiology, etiology felt to be possibly secondary to stress-induced cardiomyopathy, with apical thrombus  Cardiac catheterization deferred given the lack of any significant ischemia, and no current cardiac symptoms  Continue with medical management with aspirin, statin, beta-blocker, ARB  Monitor volume status, remains euvolemic off of diuretics   Euvolemic on exam  Outpatient follow-up with cardiology    Traumatic brain injury (HCC)  Assessment & Plan  Remote history of TBI, previously residing in a group home prior to retirement sentence.  Evaluated by neuropsychiatry on 6/27/24 and deemed NOT to have medical decision-making capacity  Process for court appointed guardian started on 7/5/24 - CM following     Carnitine deficiency (HCC)  Assessment & Plan  Continue levocarnitine 330 mg 3x daily with meals.    History of seizures  Assessment & Plan  Diagnosed in  July 2019, follows with [facility] neurology outpatient  Continue home regimen with Depakote ER, Lamotrigine and Zonegran    Left ventricular thrombus  Assessment & Plan  Noted on echocardiogram 6/10/2024: spherical 1.5 x 1.3 cm thrombus at the LV apex   Initiated on AC with Xarelto, continue  Rx sent to [facility] for price check - [email or web address] and pt has rx coverage. Information sent to [facility].    Benign essential hypertension  Assessment & Plan  Home regimen: Losartan 50mg daily, Toprol XL 25 mg BID  Current regimen: Losartan 50 mg daily, Toprol-XL 25 mg daily  BP acceptable     History of stroke  Assessment & Plan  History of left MCA CVA in May 2018 with residual expressive aphasia  Continue ASA and atorvastatin    Human immunodeficiency virus (HIV) infection (HCC)  Assessment & Plan  Continue Biktarvy and Tivicay.    Bipolar affective disorder (HCC)  Assessment & Plan  Continue home meds Zoloft and Remeron  Outpatient follow-up with Psychiatry             The above assessment and plan was reviewed and updated as determined by my evaluation of the patient on 7/19/2024.    Labs:   Results from last 7 days   Lab Units 07/18/24  1003   WBC Thousand/uL 7.52   HEMOGLOBIN g/dL 11.0*   HEMATOCRIT % 36.3*   PLATELETS Thousands/uL 398*     Results from last 7 days   Lab Units 07/18/24  1003   SODIUM mmol/L 139   POTASSIUM mmol/L 3.9   CHLORIDE mmol/L 105   CO2 mmol/L 26   BUN mg/dL 18   CREATININE mg/dL 0.88   CALCIUM mg/dL 9.2                   Imaging  No orders to display       Review of Scheduled Meds:  Current Facility-Administered Medications   Medication Dose Route Frequency Provider Last Rate    acetaminophen  975 mg Oral TID PRN José Salcido MD      aspirin  81 mg Oral Daily Lorrie Barillas PA-C      atorvastatin  80 mg Oral Daily With Dinner Lorrie Barillas PA-C      bictegravir-emtricitab-tenofovir alafenamide  1 tablet Oral Daily With Breakfast Lorrie Barillas PA-C      bisacodyl   10 mg Rectal Daily PRN Lorrie Barillas PA-C      diphenhydrAMINE  25 mg Oral Q6H PRN Lorrie Barillas PA-C      divalproex sodium  1,250 mg Oral Daily Lorrie Barillas PA-C      dolutegravir  50 mg Oral Daily Lorrie Barillas PA-C      gabapentin  100 mg Oral TID Lorrie Barillas PA-C      lamoTRIgine  50 mg Oral BID Lorrie Barillas PA-C      levOCARNitine  1,000 mg/day Oral TID With Meals Lorrie Barillas PA-C      losartan  50 mg Oral Daily Lorrie Barillas PA-C      melatonin  6 mg Oral HS Lorrie Barillas PA-C      metoprolol succinate  25 mg Oral Daily CHARLIE Mcclelland      mirtazapine  15 mg Oral HS Lorrie Barillas PA-C      ondansetron  4 mg Oral Q6H PRN Lorrie Barillas PA-C      oxyCODONE  2.5 mg Oral Q8H PRN Raimundo Riley MD      polyethylene glycol  17 g Oral Daily PRN Lorrie Barillas PA-C      rivaroxaban  20 mg Oral Daily With Dinner Lorrie Barillas PA-C      senna  1 tablet Oral HS PRN Lorrie Barillas PA-C      sertraline  75 mg Oral Daily Lorrie Barillas PA-C      tamsulosin  0.4 mg Oral Daily With Dinner Lorrie Barillas PA-C      zonisamide  400 mg Oral Daily Lorrie Barillas PA-C         Subjective/ HPI: Patient seen and examined. Patients overnight issues or events were reviewed with nursing staff. New or overnight issues include the following:     Pt seen in his room. He reports a 7/10 Rt sided headache today. He states that he has it every day but doesn't usually complain about it. He denies any other complaints.    ROS:   A 10 point ROS was performed; negative except as noted above.        *Labs /Radiology studies Reviewed  *Medications  reviewed and reconciled as needed  *Please refer to order section for additional ordered labs studies      Physical Examination:  Vitals:   Vitals:    07/18/24 1331 07/18/24 2035 07/19/24 0530 07/19/24 0815   BP: 126/74 132/68 110/72 110/77   BP Location: Left arm Left arm  Left arm    Pulse: 86 86 80 94   Resp: 18 17 18    Temp: 98.1 °F (36.7 °C) 97.7 °F (36.5 °C) 98.4 °F (36.9 °C)    TempSrc: Oral Oral Oral    SpO2: 97% 96% 98%    Weight:       Height:           General Appearance: NAD; pleasant  HEENT: PERRLA, conjuctiva normal; mucous membranes moist; face symmetrical  Neck:  Supple  Lungs: clear bilaterally, normal respiratory effort, no retractions, expiratory effort normal, on room air  CV: regular rate and rhythm, no murmurs rubs or gallops noted   ABD: soft non tender, +BS x4  EXT: Lt AKA  Skin: normal turgor, normal texture, no rash  Psych: affect normal, mood normal  Neuro: Awake and alert. Expressive aphasia.     The above physical exam was reviewed and updated as determined by my evaluation of the patient on 7/19/2024.    Invasive Devices       None                      VTE Pharmacologic Prophylaxis: Xarelto  Code Status: Level 1 - Full Code  Current Length of Stay: 13 day(s)    Total floor / unit time spent today 30 minutes  Coordination of patient's care was performed in conjunction with consulting services. Time invested included review of patient's labs, vitals, and management of their comorbidities with continued monitoring, examination of patient as well as answering patient questions, documenting her findings and creating progress note in electronic medical record,  ordering appropriate diagnostic testing.       ** Please Note:  voice to text software may have been used in the creation of this document. Although proof errors in transcription or interpretation are a potential of such software**

## 2024-07-19 NOTE — ASSESSMENT & PLAN NOTE
Noted on echocardiogram 6/10/2024: spherical 1.5 x 1.3 cm thrombus at the LV apex   Initiated on AC with Xarelto, continue  Rx sent to hospitals for price check - CM spoke with [employer] and pt has rx coverage. Information sent to [facility].

## 2024-07-19 NOTE — ASSESSMENT & PLAN NOTE
Continue Biktarvy and Tivicay.   89 y/o female with PMHx of ACarlota fib on Eliquis, myelodysplastic syndrome, HTN, HLD presents to the ED complaining of cough and chest congestion x 7 days. Patient states that she feels like she is unable to cough up all of the mucus in her chest. She has not seen her doctor the past week. She has been taking tylenol and robitussin for mild relief at home. Past two days aide has reported patient is urinating more frequently. No reported fevers at home. Aide reports today patient was very weak and unable to walk at home. Normally ambulates without assistance. She has been eating and drinking well at home. No recent sick contacts. No headache, fever, chills, abdominal pain, n/v/d.    poss gout  - solumedrol 20 x 1 today    urinary retention  - place titus  - start floamax    CHF  - hold  lasix  - strict Is and Os  - echo done    iron def anemia  - iv iron x 2 days    MARIELA improvred  - hold lasix  - monitor cre  - nephrology following    afib  - c/w Eliquis  - rate controlled    hypothyroid  -  TSH 6.9  - c/w synthroid    cough  - Robitussin    constipation  - senna and miralax    -poss discharge tomorrow    dvt px  - pt is on eliquis    d/c planning to [facility]

## 2024-07-20 PROCEDURE — 97110 THERAPEUTIC EXERCISES: CPT

## 2024-07-20 PROCEDURE — 97530 THERAPEUTIC ACTIVITIES: CPT

## 2024-07-20 PROCEDURE — 99232 SBSQ HOSP IP/OBS MODERATE 35: CPT | Performed by: NURSE PRACTITIONER

## 2024-07-20 RX ADMIN — GABAPENTIN 100 MG: 100 CAPSULE ORAL at 21:09

## 2024-07-20 RX ADMIN — LEVOCARNITINE 330 MG: 1 SOLUTION ORAL at 08:00

## 2024-07-20 RX ADMIN — Medication 6 MG: at 21:09

## 2024-07-20 RX ADMIN — DIVALPROEX SODIUM 1250 MG: 500 TABLET, EXTENDED RELEASE ORAL at 08:01

## 2024-07-20 RX ADMIN — LEVOCARNITINE 330 MG: 1 SOLUTION ORAL at 15:04

## 2024-07-20 RX ADMIN — LAMOTRIGINE 50 MG: 25 TABLET ORAL at 08:02

## 2024-07-20 RX ADMIN — DOLUTEGRAVIR SODIUM 50 MG: 50 TABLET, FILM COATED ORAL at 08:00

## 2024-07-20 RX ADMIN — ATORVASTATIN CALCIUM 80 MG: 80 TABLET, FILM COATED ORAL at 17:26

## 2024-07-20 RX ADMIN — LAMOTRIGINE 50 MG: 25 TABLET ORAL at 17:27

## 2024-07-20 RX ADMIN — ASPIRIN 81 MG: 81 TABLET, COATED ORAL at 08:01

## 2024-07-20 RX ADMIN — TAMSULOSIN HYDROCHLORIDE 0.4 MG: 0.4 CAPSULE ORAL at 17:27

## 2024-07-20 RX ADMIN — SERTRALINE HYDROCHLORIDE 75 MG: 50 TABLET ORAL at 08:01

## 2024-07-20 RX ADMIN — ZONISAMIDE 400 MG: 100 CAPSULE ORAL at 11:13

## 2024-07-20 RX ADMIN — LEVOCARNITINE 330 MG: 1 SOLUTION ORAL at 17:26

## 2024-07-20 RX ADMIN — METOPROLOL SUCCINATE 25 MG: 25 TABLET, EXTENDED RELEASE ORAL at 08:02

## 2024-07-20 RX ADMIN — RIVAROXABAN 20 MG: 20 TABLET, FILM COATED ORAL at 17:27

## 2024-07-20 RX ADMIN — LOSARTAN POTASSIUM 50 MG: 50 TABLET, FILM COATED ORAL at 08:03

## 2024-07-20 RX ADMIN — MIRTAZAPINE 15 MG: 15 TABLET, FILM COATED ORAL at 21:09

## 2024-07-20 RX ADMIN — BICTEGRAVIR SODIUM, EMTRICITABINE, AND TENOFOVIR ALAFENAMIDE FUMARATE 1 TABLET: 50; 200; 25 TABLET ORAL at 08:00

## 2024-07-20 NOTE — PROGRESS NOTES
[facility]  Progress Note  Name: Sunil Patel I  MRN: [id number]  Unit/Bed#: -01 I Date of Admission: 7/6/2024   Date of Service: 7/20/2024 I Hospital Day: 14    Assessment & Plan   * Above-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)  Assessment & Plan  Presented with left lower extremity acute limb ischemia/extensive left iliofemoral and left infrainguinal embolism requiring left femoral thrombectomy and subsequent AKA on 6/7/24 with vascular surgery.  Incision C/D/I, pain controlled.   Vascular surgery saw here last on 7/10 and removed staples  Continue ASA and statin   Also on Xarelto due to LV thrombus  Rehab and pain control per PMR    Traumatic brain injury (HCC)  Assessment & Plan  Remote history of TBI, previously residing in a group home prior to [employer] sentence.  Evaluated by neuropsychiatry on 6/27/24 and deemed NOT to have medical decision-making capacity  Process for court appointed guardian started on 7/5/24 - CM following     Cardiomyopathy (HCC)  Assessment & Plan  ECHO on 6/10/2024 showed LVEF 40-45% with mild global hypokinesis   Status post NM stress test on 6/11/2024 which showed: a large, mild, fixed defect in the inferior wall, possibly due to diaphragmatic attenuation artifact, there is a small area of partial reversibility in the inferior apical wall suggestive of ischemia    Evaluated ed by cardiology, etiology felt to be possibly secondary to stress-induced cardiomyopathy, with apical thrombus  Cardiac catheterization deferred given the lack of any significant ischemia, and no current cardiac symptoms  Continue with medical management with aspirin, statin, beta-blocker, ARB  Monitor volume status, remains euvolemic off of diuretics   Euvolemic on exam  Outpatient follow-up with cardiology    Carnitine deficiency (HCC)  Assessment & Plan  Continue levocarnitine 330 mg 3x daily with meals.    History of seizures  Assessment & Plan  Diagnosed in  July 2019, follows with [facility] neurology outpatient  Continue home regimen with Depakote ER, Lamotrigine and Zonegran    Left ventricular thrombus  Assessment & Plan  Noted on echocardiogram 6/10/2024: spherical 1.5 x 1.3 cm thrombus at the LV apex   Initiated on AC with Xarelto, continue  Rx sent to [facility] for price check - CM spoke with [employer] and pt has rx coverage. [facility] sent to [facility].    Benign essential hypertension  Assessment & Plan  Home regimen: Losartan 50mg daily, Toprol XL 25 mg BID  Current regimen: Losartan 50 mg daily, Toprol-XL 25 mg daily  Did miss both 7/19 for  = watch today since he got both and make no changes yet    History of stroke  Assessment & Plan  History of left MCA CVA in May 2018 with residual expressive aphasia  Continue ASA and atorvastatin    Human immunodeficiency virus (HIV) infection (HCC)  Assessment & Plan  Continue Biktarvy and Tivicay.    Bipolar affective disorder (HCC)  Assessment & Plan  Continue home meds Zoloft and Remeron  Outpatient follow-up with Psychiatry           The above assessment and plan was reviewed and updated as determined by my evaluation of the patient on 7/20/2024.    Labs:   Results from last 7 days   Lab Units 07/18/24  1003   WBC Thousand/uL 7.52   HEMOGLOBIN g/dL 11.0*   HEMATOCRIT % 36.3*   PLATELETS Thousands/uL 398*     Results from last 7 days   Lab Units 07/18/24  1003   SODIUM mmol/L 139   POTASSIUM mmol/L 3.9   CHLORIDE mmol/L 105   CO2 mmol/L 26   BUN mg/dL 18   CREATININE mg/dL 0.88   CALCIUM mg/dL 9.2                   Imaging  No orders to display       Review of Scheduled Meds:  Current Facility-Administered Medications   Medication Dose Route Frequency Provider Last Rate    acetaminophen  975 mg Oral TID PRN José Salcido MD      aspirin  81 mg Oral Daily Lorrie Barillas PA-C      atorvastatin  80 mg Oral Daily With Dinner Lorrie Barillas PA-C      bictegravir-emtricitab-tenofovir alafenamide  1  tablet Oral Daily With Breakfast Lorrie Barillas PA-C      bisacodyl  10 mg Rectal Daily PRN Lorrie Barillas PA-C      diphenhydrAMINE  25 mg Oral Q6H PRN Lorrie Barillas PA-C      divalproex sodium  1,250 mg Oral Daily Lorrie Barillas PA-C      dolutegravir  50 mg Oral Daily Lorrie Barillas PA-C      gabapentin  100 mg Oral HS Ashley Depadua, MD      lamoTRIgine  50 mg Oral BID Lorrie Barillas PA-C      levOCARNitine  1,000 mg/day Oral TID With Meals Lorrie Barillas PA-C      losartan  50 mg Oral Daily Lorrie Barillas PA-C      melatonin  6 mg Oral HS Lorrie Barillas PA-C      metoprolol succinate  25 mg Oral Daily CHARLIE Mcclelland      mirtazapine  15 mg Oral HS Lorrie Barillas PA-C      ondansetron  4 mg Oral Q6H PRN Lorrie Barillas PA-C      oxyCODONE  2.5 mg Oral Q8H PRN Raimundo Riley MD      polyethylene glycol  17 g Oral Daily PRN Lorrie Barillas PA-C      rivaroxaban  20 mg Oral Daily With Dinner Lorrie Barillas PA-C      senna  1 tablet Oral HS PRN Lorrie Barillas PA-C      sertraline  75 mg Oral Daily Lorrie Barillas PA-C      tamsulosin  0.4 mg Oral Daily With Dinner Lorrie Barillas PA-C      zonisamide  400 mg Oral Daily Lorrie Barillas PA-C         Subjective/ HPI: Patient seen and examined. Patients overnight issues or events were reviewed with nursing staff. New or overnight issues include the following:     No new or overnight issues.  Offers no complaints    ROS:   A 10 point ROS was performed; negative except as noted above.        *Labs /Radiology studies Reviewed  *Medications  reviewed and reconciled as needed  *Please refer to order section for additional ordered labs studies      Physical Examination:  Vitals:   Vitals:    07/19/24 0815 07/19/24 1456 07/19/24 2010 07/20/24 0614   BP: 110/77 118/67 120/61 135/69   BP Location:  Left arm Left arm Left arm   Pulse: 94 85 97 88   Resp:  18 17 17    Temp:  97.9 °F (36.6 °C) 98.2 °F (36.8 °C) 98.3 °F (36.8 °C)   TempSrc:  Oral Oral Oral   SpO2:  95% 94% 97%   Weight:       Height:           General Appearance: no distress, non toxic appearing  HEENT: PERRLA, conjuctiva normal; oropharynx clear; mucous membranes moist   Neck:  Supple, normal ROM  Lungs: CTA, normal respiratory effort, no retractions, expiratory effort normal  CV: regular rate and rhythm; no rubs/murmurs/gallops, PMI normal   ABD: soft; ND/NT; +BS  EXT: no edema  Skin: normal turgor, normal texture, no rashes  Psych: affect normal, mood normal  Neuro: AA           The above physical exam was reviewed and updated as determined by my evaluation of the patient on 7/20/2024.    Invasive Devices       None                      VTE Pharmacologic Prophylaxis: Xarelto  Code Status: Level 1 - Full Code  Current Length of Stay: 14 day(s)    Total floor / unit time spent today 30 minutes  Coordination of patient's care was performed in conjunction with consulting services. Time invested included review of patient's labs, vitals, and management of their comorbidities with continued monitoring, examination of patient as well as answering patient questions, documenting her findings and creating progress note in electronic medical record,  ordering appropriate diagnostic testing.       ** Please Note:  voice to text software may have been used in the creation of this document. Although proof errors in transcription or interpretation are a potential of such software**

## 2024-07-20 NOTE — ASSESSMENT & PLAN NOTE
Home regimen: Losartan 50mg daily, Toprol XL 25 mg BID  Current regimen: Losartan 50 mg daily, Toprol-XL 25 mg daily  Did miss both 7/19 for  = watch today since he got both and make no changes yet

## 2024-07-20 NOTE — ASSESSMENT & PLAN NOTE
Presented with left lower extremity acute limb ischemia/extensive left iliofemoral and left infrainguinal embolism requiring left femoral thrombectomy and subsequent AKA on 6/7/24 with vascular surgery.  Incision C/D/I, pain controlled.   Vascular surgery saw here last on 7/10 and removed staples  Continue ASA and statin   Also on Xarelto due to LV thrombus  Rehab and pain control per PMR

## 2024-07-20 NOTE — PLAN OF CARE
Problem: PAIN - ADULT  Goal: Verbalizes/displays adequate comfort level or baseline comfort level  Description: Interventions:  - Encourage patient to monitor pain and request assistance  - Assess pain using appropriate pain scale  - Administer analgesics based on type and severity of pain and evaluate response  - Implement non-pharmacological measures as appropriate and evaluate response  - Consider cultural and social influences on pain and pain management  - Notify physician/advanced practitioner if interventions unsuccessful or patient reports new pain  Outcome: Progressing     Problem: INFECTION - ADULT  Goal: Absence or prevention of progression during hospitalization  Description: INTERVENTIONS:  - Assess and monitor for signs and symptoms of infection  - Monitor lab/diagnostic results  - Monitor all insertion sites, i.e. indwelling lines, tubes, and drains  - Monitor endotracheal if appropriate and nasal secretions for changes in amount and color  - [place] appropriate cooling/warming therapies per order  - Administer medications as ordered  - Instruct and encourage patient and family to use good hand hygiene technique  - Identify and instruct in appropriate isolation precautions for identified infection/condition  Outcome: Progressing  Goal: Absence of fever/infection during neutropenic period  Description: INTERVENTIONS:  - Monitor WBC    Outcome: Progressing     Problem: SAFETY ADULT  Goal: Patient will remain free of falls  Description: INTERVENTIONS:  - Educate patient/family on patient safety including physical limitations  - Instruct patient to call for assistance with activity   - Consult OT/PT to assist with strengthening/mobility   - Keep Call bell within reach  - Keep bed low and locked with side rails adjusted as appropriate  - Keep care items and personal belongings within reach  - Initiate and maintain comfort rounds  - Make Fall Risk Sign visible to staff  - Offer Toileting every   Hours,  in advance of need  - Initiate/Maintain  bed, chair alarm  - Obtain necessary fall risk management equipment:    - Apply yellow socks and bracelet for high fall risk patients  - Consider moving patient to room near nurses station  Outcome: Progressing  Goal: Maintain or return to baseline ADL function  Description: INTERVENTIONS:  -  Assess patient's ability to carry out ADLs; assess patient's baseline for ADL function and identify physical deficits which impact ability to perform ADLs (bathing, care of mouth/teeth, toileting, grooming, dressing, etc.)  - Assess/evaluate cause of self-care deficits   - Assess range of motion  - Assess patient's mobility; develop plan if impaired  - Assess patient's need for assistive devices and provide as appropriate  - Encourage maximum independence but intervene and supervise when necessary  - Involve family in performance of ADLs  - Assess for home care needs following discharge   - Consider OT consult to assist with ADL evaluation and planning for discharge  - Provide patient education as appropriate  Outcome: Progressing  Goal: Maintains/Returns to pre admission functional level  Description: INTERVENTIONS:  - Perform AM-PAC 6 Click Basic Mobility/ Daily Activity assessment daily.  - Set and communicate daily mobility goal to care team and patient/family/caregiver.   - Collaborate with rehabilitation services on mobility goals if consulted  - Perform Range of Motion   times a day.  - Reposition patient every   hours.  - Dangle patient   times a day  - Stand patient   times a day  - Ambulate patient   times a day  - Out of bed to chair   times a day   - Out of bed for meals   times a day  - Out of bed for toileting  - Record patient progress and toleration of activity level   Outcome: Progressing     Problem: DISCHARGE PLANNING  Goal: Discharge to home or other facility with appropriate resources  Description: INTERVENTIONS:  - Identify barriers to discharge w/patient and  caregiver  - Arrange for needed discharge resources and transportation as appropriate  - Identify discharge learning needs (meds, wound care, etc.)  - Arrange for interpretive services to assist at discharge as needed  - Refer to Case Management Department for coordinating discharge planning if the patient needs post-hospital services based on physician/advanced practitioner order or complex needs related to functional status, cognitive ability, or social support system  Outcome: Progressing     Problem: Prexisting or High Potential for Compromised Skin Integrity  Goal: Skin integrity is maintained or improved  Description: INTERVENTIONS:  - Identify patients at risk for skin breakdown  - Assess and monitor skin integrity  - Assess and monitor nutrition and hydration status  - Monitor labs   - Assess for incontinence   - Turn and reposition patient  - Assist with mobility/ambulation  - Relieve pressure over bony prominences  - Avoid friction and shearing  - Provide appropriate hygiene as needed including keeping skin clean and dry  - Evaluate need for skin moisturizer/barrier cream  - Collaborate with interdisciplinary team   - Patient/family teaching  - Consider wound care consult   Outcome: Progressing

## 2024-07-20 NOTE — PROGRESS NOTES
"   07/20/24 0820   Pain Assessment   Pain Assessment Tool 0-10   Pain Score No Pain   Restrictions/Precautions   Precautions Bed/chair alarms;Cognitive;Fall Risk;Seizure;Supervision on toilet/commode   LLE Weight Bearing Per Order NWB  (residual limb)   ROM Restrictions No   Braces or Orthoses   (L AKA )   Cognition   Overall Cognitive Status Impaired   Arousal/Participation Alert;Cooperative   Attention Attends with cues to redirect   Subjective   Subjective pt had no c/o and ready for PT   Roll Left and Right   Type of Assistance Needed Supervision   Comment extra time to complete. Due to space on a mat pt able to complete at S level.   Roll Left and Right CARE Score 4   Sit to Lying   Type of Assistance Needed Supervision   Sit to Lying CARE Score 4   Lying to Sitting on Side of Bed   Type of Assistance Needed Physical assistance;Verbal cues   Physical Assistance Level 26%-50%   Comment even on mat requires mod A to sit up on his right side. will trial sup to sit with pt pushing on his L side. At one point pt insisted on going back to sit on EOB to use the urinal \" see its easier this way\" vs offered to use BSC over toilet. However, in this position pt not safe to use urinal alone by himself for has tendency to extend R LE out in order to use the urinal increasing his risk to fall off the bed. He was provided education why it is an unsafe set up but demo'd dec insights insisting \" I have been doing this forever\".    Lying to Sitting on Side of Bed CARE Score 3   Sit to Stand   Type of Assistance Needed Verbal cues;Incidental touching;Adaptive equipment   Physical Assistance Level No physical assistance   Comment pulling up on a bedrail or inside the // bar pushing from w/c arm rest then transition to // bars   Sit to Stand CARE Score 4   Bed-Chair Transfer   Type of Assistance Needed Supervision;Verbal cues   Comment CS repeated sit pivots or modified stand pivots without arm rest. demos difficulty " managing arm rest lever on R due to impaired strength, coordination, sensation as previous CVA sequelae   Chair/Bed-to-Chair Transfer CARE Score 4   Wheel 50 Feet with Two Turns   Type of Assistance Needed Independent   Wheel 50 Feet with Two Turns CARE Score 6   Wheel 150 Feet   Type of Assistance Needed Independent   Wheel 150 Feet CARE Score 6   Wheelchair mobility   Findings focused of tx also on repeated w/c arm rest management keesha on the left side due to noted difficulty managing lever. PT also sprayed arm rest with WD40 to facilitate ease of management   Therapeutic Interventions   Strengthening b/l gluteal sets x 30 reps, bilat hip extension x 10 reps x 3 sets. modified bridging x 10 reps x 3 sets, sidelying for L hip abduction x 15 reps x 2 sets, L hip extension x 15 reps x 2 sets.  w/c push ups x 3 SH x 10 reps x 3 sets with VC to lift L residual limb vs letting it dangle   Flexibility prone hip flexor mm stretch x 10 mins   Neuromuscular Re-Education seated EOM core exercise: diagonal chops x 15 reps x 2 sets each side and truncal twisties  with bilat hand holding 4# weighted ball x 15 reps x 2 sets  fwd reaching with bilat hand holding 4# weighted ball x15 reps x 2 sets   Equipment Use   NuStep lvl 4 x 15 mins, SPM> 35   Parallel Bars hopping fwd/bwd inside the // bars x 2 bouts before seated rest break x 3 trials total. no knee buckling but pt educated task rationale for strengthening and WB exercise however primary goal remain to be R sound limb preservation in preparation for future prosthetic training - he verbalized understanding   Assessment   Treatment Assessment Pt tolerated skilled PT with focused on strengthening /flexibility exercise to prevent contracture and improve overall strength to facilitate mobilities. PT also focused on w/c parts management and sequencing during repeated sit pivot transfer training which pt able to complete consistently at CS level. although still requires VC for set  "up, safety and assist in managing R arm rest due to baseline weakness with noted impaired coordination/sensation on R hand post previous CVA so will benefit from additional training. Pt reported \" it feels strange\" during hopping task but receptive to education provided on task rationale \" I understand\". Pt assisted back to recliner at end of tx with alarm on and all needs within reach.   Family/Caregiver Present no   Problem List Decreased strength;Impaired balance;Decreased mobility;Decreased cognition;Impaired judgement;Decreased safety awareness   Barriers to Discharge Inaccessible home environment;Decreased caregiver support   PT Barriers   Physical Impairment Decreased strength;Decreased range of motion;Decreased endurance;Impaired balance;Decreased cognition;Impaired judgement;Decreased safety awareness;Decreased skin integrity;Orthopedic restrictions;Pain   Functional Limitation Ramp negotiation;Standing;Transfers;Wheelchair management   Plan   Treatment/Interventions Functional transfer training;LE strengthening/ROM;Therapeutic exercise;Endurance training;Bed mobility;Spoke to nursing;OT   Progress Progressing toward goals   Discharge Recommendation   Equipment Recommended Wheelchair   PT Therapy Minutes   PT Time In 0830   PT Time Out 1000   PT Total Time (minutes) 90   PT Mode of treatment - Individual (minutes) 90   PT Mode of treatment - Concurrent (minutes) 0   PT Mode of treatment - Group (minutes) 0   PT Mode of treatment - Co-treat (minutes) 0   PT Mode of Treatment - Total time(minutes) 90 minutes   PT Cumulative Minutes 1287       "

## 2024-07-20 NOTE — ASSESSMENT & PLAN NOTE
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 Information sent to [facility].

## 2024-07-21 PROCEDURE — 97110 THERAPEUTIC EXERCISES: CPT | Performed by: PHYSICAL THERAPIST

## 2024-07-21 PROCEDURE — 99232 SBSQ HOSP IP/OBS MODERATE 35: CPT | Performed by: NURSE PRACTITIONER

## 2024-07-21 PROCEDURE — 97110 THERAPEUTIC EXERCISES: CPT

## 2024-07-21 PROCEDURE — 97112 NEUROMUSCULAR REEDUCATION: CPT | Performed by: PHYSICAL THERAPIST

## 2024-07-21 PROCEDURE — 97535 SELF CARE MNGMENT TRAINING: CPT

## 2024-07-21 PROCEDURE — 97530 THERAPEUTIC ACTIVITIES: CPT | Performed by: PHYSICAL THERAPIST

## 2024-07-21 RX ADMIN — Medication 6 MG: at 21:25

## 2024-07-21 RX ADMIN — ZONISAMIDE 400 MG: 100 CAPSULE ORAL at 08:11

## 2024-07-21 RX ADMIN — ATORVASTATIN CALCIUM 80 MG: 80 TABLET, FILM COATED ORAL at 17:16

## 2024-07-21 RX ADMIN — LEVOCARNITINE 330 MG: 1 SOLUTION ORAL at 17:16

## 2024-07-21 RX ADMIN — ONDANSETRON 4 MG: 4 TABLET, ORALLY DISINTEGRATING ORAL at 08:14

## 2024-07-21 RX ADMIN — BICTEGRAVIR SODIUM, EMTRICITABINE, AND TENOFOVIR ALAFENAMIDE FUMARATE 1 TABLET: 50; 200; 25 TABLET ORAL at 08:11

## 2024-07-21 RX ADMIN — LEVOCARNITINE 330 MG: 1 SOLUTION ORAL at 15:20

## 2024-07-21 RX ADMIN — SERTRALINE HYDROCHLORIDE 75 MG: 50 TABLET ORAL at 08:13

## 2024-07-21 RX ADMIN — LOSARTAN POTASSIUM 50 MG: 50 TABLET, FILM COATED ORAL at 08:13

## 2024-07-21 RX ADMIN — ASPIRIN 81 MG: 81 TABLET, COATED ORAL at 08:12

## 2024-07-21 RX ADMIN — TAMSULOSIN HYDROCHLORIDE 0.4 MG: 0.4 CAPSULE ORAL at 17:16

## 2024-07-21 RX ADMIN — LAMOTRIGINE 50 MG: 25 TABLET ORAL at 17:16

## 2024-07-21 RX ADMIN — LAMOTRIGINE 50 MG: 25 TABLET ORAL at 08:13

## 2024-07-21 RX ADMIN — DIVALPROEX SODIUM 1250 MG: 500 TABLET, EXTENDED RELEASE ORAL at 08:12

## 2024-07-21 RX ADMIN — ACETAMINOPHEN 975 MG: 325 TABLET, FILM COATED ORAL at 06:12

## 2024-07-21 RX ADMIN — ACETAMINOPHEN 975 MG: 325 TABLET, FILM COATED ORAL at 15:52

## 2024-07-21 RX ADMIN — DOLUTEGRAVIR SODIUM 50 MG: 50 TABLET, FILM COATED ORAL at 08:12

## 2024-07-21 RX ADMIN — MIRTAZAPINE 15 MG: 15 TABLET, FILM COATED ORAL at 21:25

## 2024-07-21 RX ADMIN — METOPROLOL SUCCINATE 25 MG: 25 TABLET, EXTENDED RELEASE ORAL at 08:13

## 2024-07-21 RX ADMIN — LEVOCARNITINE 330 MG: 1 SOLUTION ORAL at 08:15

## 2024-07-21 RX ADMIN — RIVAROXABAN 20 MG: 20 TABLET, FILM COATED ORAL at 17:16

## 2024-07-21 RX ADMIN — GABAPENTIN 100 MG: 100 CAPSULE ORAL at 21:25

## 2024-07-21 NOTE — ASSESSMENT & PLAN NOTE
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 Information sent to [facility].  Today on 7/21/24, d/w [facility] and Xarelto is NOT covered and he would have to pay OOP @$700.00.  CM to further address tomorrow 7/22/24.  He may have to be switched to Coumadin

## 2024-07-21 NOTE — PROGRESS NOTES
07/21/24 0800   Pain Assessment   Pain Assessment Tool 0-10   Restrictions/Precautions   Precautions Bed/chair alarms;Cognitive;Fall Risk;Seizure;Supervision on toilet/commode   Weight Bearing Restrictions Yes   LLE Weight Bearing Per Order NWB  (residual limb)   ROM Restrictions No   Cognition   Overall Cognitive Status Impaired   Arousal/Participation Alert;Cooperative   Attention Attends with cues to redirect   Orientation Level Oriented X4   Memory Decreased recall of recent events   Following Commands Follows one step commands with increased time or repetition   Subjective   Subjective pt report feeling tired and illish but willing to try therapy   Roll Left and Right   Type of Assistance Needed Supervision   Physical Assistance Level 25% or less   Comment extra time to complete. Due to space on a mat pt able to complete at S level.   Roll Left and Right CARE Score 3   Sit to Lying   Type of Assistance Needed Supervision   Physical Assistance Level No physical assistance   Comment CG fo9r safety from bed and mat table   Sit to Lying CARE Score 4   Lying to Sitting on Side of Bed   Type of Assistance Needed Physical assistance   Physical Assistance Level 26%-50%   Comment Mod A from bed and mat table   Lying to Sitting on Side of Bed CARE Score 3   Sit to Stand   Type of Assistance Needed Verbal cues;Adaptive equipment;Incidental touching   Physical Assistance Level No physical assistance   Comment pushng up from bed rail and w/c rails   Sit to Stand CARE Score 4   Bed-Chair Transfer   Type of Assistance Needed Supervision;Verbal cues   Physical Assistance Level No physical assistance   Comment C/S sit pviots with cuing for seqeunce and directions   Chair/Bed-to-Chair Transfer CARE Score 4   Transfer Bed/Chair/Wheelchair   Limitations Noted In Balance;LE Strength   Adaptive Equipment None   Findings w/c <> bed x 2, Mat <> w/c 2   Walk 10 Feet   Reason if not Attempted Safety concerns   Walk 10 Feet CARE Score  88   Walk 50 Feet with Two Turns   Reason if not Attempted Safety concerns   Walk 50 Feet with Two Turns CARE Score 88   Walk 150 Feet   Reason if not Attempted Safety concerns   Walk 150 Feet CARE Score 88   Walking 10 Feet on Uneven Surfaces   Reason if not Attempted Safety concerns   Walking 10 Feet on Uneven Surfaces CARE Score 88   Ambulation   Does the patient walk? 0. No, and walking goal is not clinically indicated.   Wheel 50 Feet with Two Turns   Type of Assistance Needed Independent   Physical Assistance Level No physical assistance   Wheel 50 Feet with Two Turns CARE Score 6   Wheel 150 Feet   Type of Assistance Needed Independent   Physical Assistance Level No physical assistance   Wheel 150 Feet CARE Score 6   Wheelchair mobility   Does the patient use a wheelchair? 1. Yes   Type of Wheelchair Used 1. Manual   Method Right upper extremity;Left upper extremity   Assistance Provided For Remove armrests;Replace armrests   Distance Level Surface (feet) 150 ft   Curb or Single Stair   Reason if not Attempted Safety concerns   1 Step (Curb) CARE Score 88   4 Steps   Reason if not Attempted Safety concerns   4 Steps CARE Score 88   12 Steps   Reason if not Attempted Safety concerns   12 Steps CARE Score 88   Picking Up Object   Type of Assistance Needed Physical assistance   Physical Assistance Level Total assistance   Picking Up Object CARE Score 1   Therapeutic Interventions   Strengthening b/l gluteal sets x 30 reps, bilat hip extension x 10 reps x 3 sets. modified bridging x 10 reps x 3 sets, sidelying for L hip abduction x 15 reps x 2 sets, L hip extension x 15 reps x 2 sets.  w/c push ups x 3   Flexibility prone hip flexor mm stretch x 10 mins   Neuromuscular Re-Education seated edge of mat with trunk rotation, pnf chops and, chess press and over head lifts 2 x 10 reps with 5 lb wt   Assessment   Treatment Assessment Pt tolerated skilled PT with focused on strengthening /flexibility exercise to prevent  contracture and improve overall strength to facilitate mobilities. PT also focused on w/c transfers with focus on sequencing with focus on reaching and sit pivot. Noted increase time for verbalizing thoughts. Assisted with donning of  in supine with patient agreeable max A. Assisted with hygiene due to patient soiling self with new gown and pants.   Pt will continue to benefit from skilled PT to address noted impairments and functional limitations.  Pt assisted back to recliner at end of tx with alarm on and all needs within reach.   Family/Caregiver Present no   Problem List Decreased strength;Impaired balance;Decreased mobility;Decreased cognition;Impaired judgement;Decreased safety awareness   Barriers to Discharge Inaccessible home environment;Decreased caregiver support   PT Barriers   Physical Impairment Decreased strength;Decreased range of motion;Decreased endurance;Impaired balance;Decreased cognition;Impaired judgement;Decreased safety awareness;Decreased skin integrity;Orthopedic restrictions;Pain   Functional Limitation Ramp negotiation;Standing;Transfers;Wheelchair management   Plan   Treatment/Interventions Functional transfer training;LE strengthening/ROM;Therapeutic exercise;Endurance training;Bed mobility;Spoke to nursing;OT   Progress Progressing toward goals   PT Therapy Minutes   PT Time In 0800   PT Time Out 0930   PT Total Time (minutes) 90   PT Mode of treatment - Individual (minutes) 90   PT Mode of treatment - Concurrent (minutes) 0   PT Mode of treatment - Group (minutes) 0   PT Mode of treatment - Co-treat (minutes) 0   PT Mode of Treatment - Total time(minutes) 90 minutes   PT Cumulative Minutes 1377   Therapy Time missed   Time missed? No

## 2024-07-21 NOTE — PROGRESS NOTES
"   07/21/24 1351   Pain Assessment   Pain Assessment Tool 0-10   Pain Score No Pain   Restrictions/Precautions   Precautions Bed/chair alarms;Cognitive;Fall Risk;Supervision on toilet/commode;Seizure   LLE Weight Bearing Per Order NWB   Lifestyle   Autonomy \"thank you claire.\"   Reciprocal Relationships Friend Mireya is coming tomorrow to bring his I pad and clothing   Intrinsic Gratification Pt reports that he likes cooking   Bed-Chair Transfer   Type of Assistance Needed Supervision   Comment sit pivots   Chair/Bed-to-Chair Transfer CARE Score 4   Toileting Hygiene   Type of Assistance Needed Physical assistance   Physical Assistance Level 51%-75%   Comment pt w/ need for toielting during session. pt req extensive time to complete as he was very constipated. RN kimberly present for assistance with bowel movement. guarding in sitting as pt was experienceing pain and was repositioning often. pt requires assistance for CM up and down as pt insistant on standing for CM vs lateral weight shifting. anticipate great difficulty with mangement of absoirbant tab breif for lateral weight shifting for don/doffing over hips. would benefit from underwear style breifs. from toilet req DAKOTAH and R knee block for safety when attempting stance with grab bars.   Toileting Hygiene CARE Score 2   Toilet Transfer   Type of Assistance Needed Incidental touching   Comment sit pivot to [facility]   Toilet Transfer CARE Score 4   Exercise Tools   Other Exercise Tool 2 Pt participates in seated UE/core therapeutic exercise intended to enable the patient to Maintain ROM, increase flexibility, increase strength, promote Endurance in order to increase independence and safety w/ ADL's, daily occupations along with functional transfers. Pt is able to safely complete all exercises w/ no C/O pain and self perceived exertion within personal tolerance. Seated unsupported on mat table pt engages in circuit style movements focusing on core work. Seated trunk " rotations with dowel, kayak with dowel, overhead press with 4lb dowel, and reverse Rowing. Completed a 1 min periods.   Cognition   Overall Cognitive Status Impaired   Additional Activities   Additional Activities Comments WC propulsion to from NW9 gym area.   Activity Tolerance   Activity Tolerance Patient tolerated treatment well   Assessment   Treatment Assessment Pt participated in skilled OT treatment session with treatment focus on ADL re-training, fxnl xfers, UE strengthening, and UE endurance. Pt tolerated session well, feeling much better after a large bowel movement. At this time pt needs to work on practice with lateral weight shifting for the purpose of toileting.pt remains limited by impaired func cog/safety, impaired balance, strength and endurance, warranting continued skilled care to focus on ADL retraining, func transfers, weight shifting for toileting tasks vs standing, UE Strengthening, sacral offloading, and general phase 1 amp edu, in order to decrease burden of care at d/c.   Prognosis Fair   OT Therapy Minutes   OT Time In 1245   OT Time Out 1430   OT Total Time (minutes) 105   OT Mode of treatment - Individual (minutes) 105   OT Mode of treatment - Concurrent (minutes) 0   OT Mode of treatment - Group (minutes) 0   OT Mode of treatment - Co-treat (minutes) 0   OT Mode of Treatment - Total time(minutes) 105 minutes   OT Cumulative Minutes 1171

## 2024-07-21 NOTE — PROGRESS NOTES
[facility]  Progress Note  Name: Sunil Patel I  MRN: [id number]  Unit/Bed#: -01 I Date of Admission: 7/6/2024   Date of Service: 7/21/2024 I Hospital Day: 15    Assessment & Plan   * Above-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)  Assessment & Plan  Presented with left lower extremity acute limb ischemia/extensive left iliofemoral and left infrainguinal embolism requiring left femoral thrombectomy and subsequent AKA on 6/7/24 with vascular surgery.  Incision C/D/I, pain controlled.   Vascular surgery saw here last on 7/10 and removed staples  Continue ASA and statin   Also on Xarelto due to LV thrombus  Rehab and pain control per PMR    Traumatic brain injury (HCC)  Assessment & Plan  Remote history of TBI, previously residing in a group home prior to assisted sentence.  Evaluated by neuropsychiatry on 6/27/24 and deemed NOT to have medical decision-making capacity  Process for court appointed guardian started on 7/5/24 - CM following     Cardiomyopathy (HCC)  Assessment & Plan  ECHO 6/10/2024 = LVEF 40-45% with mild global hypokinesis   Status post NM stress test on 6/11/2024 which showed: a large, mild, fixed defect in the inferior wall, possibly due to diaphragmatic attenuation artifact, there is a small area of partial reversibility in the inferior apical wall suggestive of ischemia    Evaluated ed by cardiology, etiology felt to be possibly secondary to stress-induced cardiomyopathy, with apical thrombus  Cardiac catheterization deferred given the lack of any significant ischemia, and no current cardiac symptoms  Continue with medical management with aspirin, statin, beta-blocker, ARB  Monitor volume status, remains euvolemic off of diuretics   Euvolemic on exam  Outpatient follow-up with cardiology    Carnitine deficiency (HCC)  Assessment & Plan  Continue levocarnitine 330 mg 3x daily with meals.    History of seizures  Assessment & Plan  Diagnosed in July  2019, follows with [facility] neurology outpatient  Continue home regimen with Depakote ER, Lamotrigine and Zonegran    Left ventricular thrombus  Assessment & Plan  Noted on echocardiogram 6/10/2024: spherical 1.5 x 1.3 cm thrombus at the LV apex   Initiated on AC with Xarelto, continue  Rx sent to [employer] for price check on 7/10/24 - CM spoke with [employer] and pt has rx coverage. [email or web address]  Today on 7/21/24, d/w [employer] and Xarelto is NOT covered and he would have to pay OOP @$700.00.  CM to further address tomorrow 7/22/24.  He may have to be switched to Coumadin    Benign essential hypertension  Assessment & Plan  Home regimen: Losartan 50mg daily, Toprol XL 25 mg BID  Current regimen: Losartan 50 mg daily, Toprol-XL 25 mg daily  Did miss both 7/19 for  = made no changes and he has gotten both the last 2 days    History of stroke  Assessment & Plan  History of left MCA CVA in May 2018 with residual expressive aphasia  Continue ASA and atorvastatin    Human immunodeficiency virus (HIV) infection (HCC)  Assessment & Plan  Continue Biktarvy and Tivicay.    Bipolar affective disorder (HCC)  Assessment & Plan  Continue home meds Zoloft and Remeron  Outpatient follow-up with Psychiatry           The above assessment and plan was reviewed and updated as determined by my evaluation of the patient on 7/21/2024.    Labs:   Results from last 7 days   Lab Units 07/18/24  1003   WBC Thousand/uL 7.52   HEMOGLOBIN g/dL 11.0*   HEMATOCRIT % 36.3*   PLATELETS Thousands/uL 398*     Results from last 7 days   Lab Units 07/18/24  1003   SODIUM mmol/L 139   POTASSIUM mmol/L 3.9   CHLORIDE mmol/L 105   CO2 mmol/L 26   BUN mg/dL 18   CREATININE mg/dL 0.88   CALCIUM mg/dL 9.2                   Imaging  No orders to display       Review of Scheduled Meds:  Current Facility-Administered Medications   Medication Dose Route Frequency Provider Last Rate    acetaminophen  975 mg Oral TID PRN José Salcido MD       aspirin  81 mg Oral Daily Lorrie Barillas PA-C      atorvastatin  80 mg Oral Daily With Dinner Lorrie Barillas PA-C      bictegravir-emtricitab-tenofovir alafenamide  1 tablet Oral Daily With Breakfast Lorrie Barillas PA-C      bisacodyl  10 mg Rectal Daily PRN Lorrie Barillas PA-C      diphenhydrAMINE  25 mg Oral Q6H PRN Lorrie Barillas PA-C      divalproex sodium  1,250 mg Oral Daily Lorrie Barillas PA-C      dolutegravir  50 mg Oral Daily Lorrie Barillas PA-C      gabapentin  100 mg Oral HS Ashley Depadua, MD      lamoTRIgine  50 mg Oral BID Lorrie Barillas PA-C      levOCARNitine  1,000 mg/day Oral TID With Meals Lorrie Barillas PA-C      losartan  50 mg Oral Daily Lorrie Barillas PA-C      melatonin  6 mg Oral HS Lorrie Barillas PA-C      metoprolol succinate  25 mg Oral Daily CHARLIE Mcclelland      mirtazapine  15 mg Oral HS Lorriejacky Barillas PA-C      ondansetron  4 mg Oral Q6H PRN Lorrie Barillas PA-C      oxyCODONE  2.5 mg Oral Q8H PRN Raimundo Riley MD      polyethylene glycol  17 g Oral Daily PRN Lorrie Barillas PA-C      rivaroxaban  20 mg Oral Daily With Dinner Lorrie Barillas PA-C      senna  1 tablet Oral HS PRN Lorrie Barillas PA-C      sertraline  75 mg Oral Daily Lorrie Barillas PA-C      tamsulosin  0.4 mg Oral Daily With Dinner Lorrie Barillas PA-C      zonisamide  400 mg Oral Daily Lorrie Barillas PA-C         Subjective/ HPI: Patient seen and examined. Patients overnight issues or events were reviewed with nursing staff. New or overnight issues include the following:     No new or overnight issues.  Offers no complaints    ROS:   A 10 point ROS was performed; negative except as noted above.        *Labs /Radiology studies Reviewed  *Medications  reviewed and reconciled as needed  *Please refer to order section for additional ordered labs studies      Physical Examination:  Vitals:    Vitals:    07/20/24 0614 07/20/24 1339 07/20/24 2020 07/21/24 0550   BP: 135/69 113/65 122/66 123/74   BP Location: Left arm Left arm Left arm Left arm   Pulse: 88 88 89 87   Resp: 17 17 17 20   Temp: 98.3 °F (36.8 °C) 98.4 °F (36.9 °C) 99 °F (37.2 °C) 98.3 °F (36.8 °C)   TempSrc: Oral Oral Oral Oral   SpO2: 97% 96% 95% 96%   Weight:       Height:           General Appearance: no distress, nontoxic appearing  HEENT:  External ear normal.  Nose normal w/o drainage. Mucous membranes are moist. Oropharynx is clear. Conjunctiva clear w/o icterus or redness.  Neck:  Supple, normal ROM  Lungs: BBS without crackles/wheeze/rhonchi; respirations unlabored with normal inspiratory/expiratory effort.  No retractions noted.  On RA  CV: regular rate and rhythm; no rubs/murmurs/gallops, PMI normal   ABD: Abdomen is soft.  Bowel sounds all quadrants.  Nontender with no distention.    EXT: no edema  Skin: normal turgor, normal texture, no rashes  Psych: affect normal, mood normal  Neuro: AA          The above physical exam was reviewed and updated as determined by my evaluation of the patient on 7/21/2024.    Invasive Devices       None                      VTE Pharmacologic Prophylaxis: Xarelto  Code Status: Level 1 - Full Code  Current Length of Stay: 15 day(s)    Total floor / unit time spent today 30 minutes  Coordination of patient's care was performed in conjunction with consulting services. Time invested included review of patient's labs, vitals, and management of their comorbidities with continued monitoring, examination of patient as well as answering patient questions, documenting her findings and creating progress note in electronic medical record,  ordering appropriate diagnostic testing.       ** Please Note:  voice to text software may have been used in the creation of this document. Although proof errors in transcription or interpretation are a potential of such software**

## 2024-07-21 NOTE — ASSESSMENT & PLAN NOTE
Remote history of TBI, previously residing in a group home prior to long term sentence.  Evaluated by neuropsychiatry on 6/27/24 and deemed NOT to have medical decision-making capacity  Process for court appointed guardian started on 7/5/24 - CM following

## 2024-07-21 NOTE — PLAN OF CARE
Problem: PAIN - ADULT  Goal: Verbalizes/displays adequate comfort level or baseline comfort level  Description: Interventions:  - Encourage patient to monitor pain and request assistance  - Assess pain using appropriate pain scale  - Administer analgesics based on type and severity of pain and evaluate response  - Implement non-pharmacological measures as appropriate and evaluate response  - Consider cultural and social influences on pain and pain management  - Notify physician/advanced practitioner if interventions unsuccessful or patient reports new pain  Outcome: Progressing     Problem: INFECTION - ADULT  Goal: Absence or prevention of progression during hospitalization  Description: INTERVENTIONS:  - Assess and monitor for signs and symptoms of infection  - Monitor lab/diagnostic results  - Monitor all insertion sites, i.e. indwelling lines, tubes, and drains  - Monitor endotracheal if appropriate and nasal secretions for changes in amount and color  - [place] appropriate cooling/warming therapies per order  - Administer medications as ordered  - Instruct and encourage patient and family to use good hand hygiene technique  - Identify and instruct in appropriate isolation precautions for identified infection/condition  Outcome: Progressing  Goal: Absence of fever/infection during neutropenic period  Description: INTERVENTIONS:  - Monitor WBC    Outcome: Progressing     Problem: SAFETY ADULT  Goal: Patient will remain free of falls  Description: INTERVENTIONS:  - Educate patient/family on patient safety including physical limitations  - Instruct patient to call for assistance with activity   - Consult OT/PT to assist with strengthening/mobility   - Keep Call bell within reach  - Keep bed low and locked with side rails adjusted as appropriate  - Keep care items and personal belongings within reach  - Initiate and maintain comfort rounds  - Make Fall Risk Sign visible to staff  - Offer Toileting every   Hours,  in advance of need  - Initiate/Maintain  alarm  - Obtain necessary fall risk management equipment:    - Apply yellow socks and bracelet for high fall risk patients  - Consider moving patient to room near nurses station  Outcome: Progressing  Goal: Maintain or return to baseline ADL function  Description: INTERVENTIONS:  -  Assess patient's ability to carry out ADLs; assess patient's baseline for ADL function and identify physical deficits which impact ability to perform ADLs (bathing, care of mouth/teeth, toileting, grooming, dressing, etc.)  - Assess/evaluate cause of self-care deficits   - Assess range of motion  - Assess patient's mobility; develop plan if impaired  - Assess patient's need for assistive devices and provide as appropriate  - Encourage maximum independence but intervene and supervise when necessary  - Involve family in performance of ADLs  - Assess for home care needs following discharge   - Consider OT consult to assist with ADL evaluation and planning for discharge  - Provide patient education as appropriate  Outcome: Progressing  Goal: Maintains/Returns to pre admission functional level  Description: INTERVENTIONS:  - Perform AM-PAC 6 Click Basic Mobility/ Daily Activity assessment daily.  - Set and communicate daily mobility goal to care team and patient/family/caregiver.   - Collaborate with rehabilitation services on mobility goals if consulted  - Perform Range of Motion   times a day.  - Reposition patient every   hours.  - Dangle patient   times a day  - Stand patient   times a day  - Ambulate patient   times a day  - Out of bed to chair   times a day   - Out of bed for meals   times a day  - Out of bed for toileting  - Record patient progress and toleration of activity level   Outcome: Progressing     Problem: DISCHARGE PLANNING  Goal: Discharge to home or other facility with appropriate resources  Description: INTERVENTIONS:  - Identify barriers to discharge w/patient and caregiver  -  Arrange for needed discharge resources and transportation as appropriate  - Identify discharge learning needs (meds, wound care, etc.)  - Arrange for interpretive services to assist at discharge as needed  - Refer to Case Management Department for coordinating discharge planning if the patient needs post-hospital services based on physician/advanced practitioner order or complex needs related to functional status, cognitive ability, or social support system  Outcome: Progressing     Problem: Prexisting or High Potential for Compromised Skin Integrity  Goal: Skin integrity is maintained or improved  Description: INTERVENTIONS:  - Identify patients at risk for skin breakdown  - Assess and monitor skin integrity  - Assess and monitor nutrition and hydration status  - Monitor labs   - Assess for incontinence   - Turn and reposition patient  - Assist with mobility/ambulation  - Relieve pressure over bony prominences  - Avoid friction and shearing  - Provide appropriate hygiene as needed including keeping skin clean and dry  - Evaluate need for skin moisturizer/barrier cream  - Collaborate with interdisciplinary team   - Patient/family teaching  - Consider wound care consult   Outcome: Progressing

## 2024-07-21 NOTE — ASSESSMENT & PLAN NOTE
Home regimen: Losartan 50mg daily, Toprol XL 25 mg BID  Current regimen: Losartan 50 mg daily, Toprol-XL 25 mg daily  Did miss both 7/19 for  = made no changes and he has gotten both the last 2 days

## 2024-07-21 NOTE — ASSESSMENT & PLAN NOTE
ECHO 6/10/2024 = LVEF 40-45% with mild global hypokinesis   Status post NM stress test on 6/11/2024 which showed: a large, mild, fixed defect in the inferior wall, possibly due to diaphragmatic attenuation artifact, there is a small area of partial reversibility in the inferior apical wall suggestive of ischemia    Evaluated ed by cardiology, etiology felt to be possibly secondary to stress-induced cardiomyopathy, with apical thrombus  Cardiac catheterization deferred given the lack of any significant ischemia, and no current cardiac symptoms  Continue with medical management with aspirin, statin, beta-blocker, ARB  Monitor volume status, remains euvolemic off of diuretics   Euvolemic on exam  Outpatient follow-up with cardiology   [facility] Booked trip through [employer] Ref# [id number], vendor [employer]. [facility] [phone], TRIP # [id number] Ref# [id number], 5PM .

## 2024-07-22 PROBLEM — R51.9 EPISODIC HEADACHE: Status: ACTIVE | Noted: 2024-07-22

## 2024-07-22 PROCEDURE — 97110 THERAPEUTIC EXERCISES: CPT

## 2024-07-22 PROCEDURE — 97535 SELF CARE MNGMENT TRAINING: CPT

## 2024-07-22 PROCEDURE — 92507 TX SP LANG VOICE COMM INDIV: CPT

## 2024-07-22 PROCEDURE — 97530 THERAPEUTIC ACTIVITIES: CPT

## 2024-07-22 PROCEDURE — 99232 SBSQ HOSP IP/OBS MODERATE 35: CPT | Performed by: PHYSICIAN ASSISTANT

## 2024-07-22 PROCEDURE — 99232 SBSQ HOSP IP/OBS MODERATE 35: CPT | Performed by: PHYSICAL MEDICINE & REHABILITATION

## 2024-07-22 RX ADMIN — RIMEGEPANT SULFATE 75 MG: 75 TABLET, ORALLY DISINTEGRATING ORAL at 13:53

## 2024-07-22 RX ADMIN — ASPIRIN 81 MG: 81 TABLET, COATED ORAL at 08:04

## 2024-07-22 RX ADMIN — LAMOTRIGINE 50 MG: 25 TABLET ORAL at 08:04

## 2024-07-22 RX ADMIN — BICTEGRAVIR SODIUM, EMTRICITABINE, AND TENOFOVIR ALAFENAMIDE FUMARATE 1 TABLET: 50; 200; 25 TABLET ORAL at 07:59

## 2024-07-22 RX ADMIN — LOSARTAN POTASSIUM 50 MG: 50 TABLET, FILM COATED ORAL at 08:05

## 2024-07-22 RX ADMIN — TAMSULOSIN HYDROCHLORIDE 0.4 MG: 0.4 CAPSULE ORAL at 17:14

## 2024-07-22 RX ADMIN — ATORVASTATIN CALCIUM 80 MG: 80 TABLET, FILM COATED ORAL at 17:13

## 2024-07-22 RX ADMIN — RIVAROXABAN 20 MG: 20 TABLET, FILM COATED ORAL at 17:14

## 2024-07-22 RX ADMIN — SERTRALINE HYDROCHLORIDE 75 MG: 50 TABLET ORAL at 08:04

## 2024-07-22 RX ADMIN — DIVALPROEX SODIUM 1250 MG: 500 TABLET, EXTENDED RELEASE ORAL at 08:03

## 2024-07-22 RX ADMIN — ACETAMINOPHEN 975 MG: 325 TABLET, FILM COATED ORAL at 12:05

## 2024-07-22 RX ADMIN — LEVOCARNITINE 330 MG: 1 SOLUTION ORAL at 08:00

## 2024-07-22 RX ADMIN — LEVOCARNITINE 330 MG: 1 SOLUTION ORAL at 20:12

## 2024-07-22 RX ADMIN — Medication 6 MG: at 21:15

## 2024-07-22 RX ADMIN — ZONISAMIDE 400 MG: 100 CAPSULE ORAL at 08:00

## 2024-07-22 RX ADMIN — DOLUTEGRAVIR SODIUM 50 MG: 50 TABLET, FILM COATED ORAL at 08:01

## 2024-07-22 RX ADMIN — METOPROLOL SUCCINATE 25 MG: 25 TABLET, EXTENDED RELEASE ORAL at 08:05

## 2024-07-22 RX ADMIN — MIRTAZAPINE 15 MG: 15 TABLET, FILM COATED ORAL at 21:15

## 2024-07-22 RX ADMIN — LAMOTRIGINE 50 MG: 25 TABLET ORAL at 17:13

## 2024-07-22 RX ADMIN — LEVOCARNITINE 330 MG: 1 SOLUTION ORAL at 15:06

## 2024-07-22 RX ADMIN — GABAPENTIN 100 MG: 100 CAPSULE ORAL at 21:15

## 2024-07-22 NOTE — ASSESSMENT & PLAN NOTE
Presented with left lower extremity acute limb ischemia/extensive left iliofemoral and left infrainguinal embolism requiring left femoral thrombectomy and subsequent AKA on 6/7/24 with vascular surgery.  Incision C/D/I, pain controlled.   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 CM to investigate what is covered.  Rehab and pain control per PMR

## 2024-07-22 NOTE — PROGRESS NOTES
07/22/24 1015   Pain Assessment   Pain Assessment Tool 0-10   Pain Score No Pain   Restrictions/Precautions   Precautions Aphasia;Bed/chair alarms;Cognitive;Fall Risk;Seizure;Supervision on toilet/commode   Comprehension   Comprehension (FIM) 3 - Understands basic info/conversation 50-74% of time   Expression   Expression (FIM) 3 - Expresses basic info/needs 50-74% of time   Social Interaction   Social Interaction (FIM) 5 - Interacts appropriately with others 90% of time   Problem Solving   Problem solving (FIM) 3 - Solves basic problmes 50-74% of time   Memory   Memory (FIM) 3 - Recognizes, recalls/performs 50-74%   Speech/Language/Cognition Assessment   Treatment Assessment Pt participated in skilled SLP session focusing on language and communication skills. When SLP originally arrived to see pt, his friend had not yet arrived with his tablet, therefore, SLP returned later when Joanna was present. Pt's friend, Joanna, set up pt's new tablet by logging pt in to his account. When Joanna was explaining info of how to maintain the tablet, pt benefited from moderate verbal cuing for problem solving, as well as verbal cuing to confirm his communicative intent when he was attempting to ask a question about having to charge the tablet. While tablet was being initially set up, SLP engaged pt in an interview related to the different topics he desired to express or those which are planned to be programmed. Pt with decreased understanding initially and also then appeared to change his mind on how the tablet would support him in comparison to our previous discussion in last session. After further explanation from SLP, determined that pt would like to use the device for both language activities/drills and for more functional communication to support expression. Pt required assist from both SLP and friend to determine appropriate categories of icons or programmed information to include. Pt conveyed interest in written expression  as well, reviewing use off writing and texting (with tablet) and explaining use of predictive text feature with tablet and keyboard. Will further discuss in next session.When discussing options of apps, pt and pt's friend opted only for free versions at this time, which does limit the use and function of some of the apps. Prior to engaging in language tasks or apps, SLP demonstrated to pt how to unlock tablet. Pt initially with decreased comprehension in entering his numeric pass code, despite being provided with verbal instruction from SLP and his friend. SLP again modeled this task and pt then able to unlock tablet across three different trials by pressing correct buttons and entering his numeric pass code. SLP downloaded two different apps, both for language drills. One alejandra finished downloading this session and SLP reviewed this alejandra with pt, showing the different areas of it such as drills to target verbal expression, written expression, auditory comprehension and reading comprehension. Other alejandra did not finish downloading before session was over. As SLP attempted to ID a a free communication alejandra which would supplement his verbal expression and can be customizable, but is free, SLP unable to locate on this type of tablet. SLP to follow up in researching additional possibilities for this. During session, pt's friend requesting to speak to . SLP reached out to - to call later today. Recommending short term follow up skilled SLP services to continue to support pt in building a more functional communication modality to improve effectiveness of communication and to ease frustrations with communication attempts.      SLP Therapy Minutes   SLP Time In 1015   SLP Time Out 1055   SLP Total Time (minutes) 40   SLP Mode of treatment - Individual (minutes) 40   SLP Mode of treatment - Concurrent (minutes) 0   SLP Mode of treatment - Group (minutes) 0   SLP Mode of treatment - Co-treat (minutes) 0   SLP Mode of  Treatment - Total time(minutes) 40 minutes   SLP Cumulative Minutes 165   Therapy Time missed   Time missed? No

## 2024-07-22 NOTE — PLAN OF CARE
Problem: PAIN - ADULT  Goal: Verbalizes/displays adequate comfort level or baseline comfort level  Description: Interventions:  - Encourage patient to monitor pain and request assistance  - Assess pain using appropriate pain scale  - Administer analgesics based on type and severity of pain and evaluate response  - Implement non-pharmacological measures as appropriate and evaluate response  - Consider cultural and social influences on pain and pain management  - Notify physician/advanced practitioner if interventions unsuccessful or patient reports new pain  Outcome: Progressing     Problem: INFECTION - ADULT  Goal: Absence or prevention of progression during hospitalization  Description: INTERVENTIONS:  - Assess and monitor for signs and symptoms of infection  - Monitor lab/diagnostic results  - Monitor all insertion sites, i.e. indwelling lines, tubes, and drains  - Monitor endotracheal if appropriate and nasal secretions for changes in amount and color  - [place] appropriate cooling/warming therapies per order  - Administer medications as ordered  - Instruct and encourage patient and family to use good hand hygiene technique  - Identify and instruct in appropriate isolation precautions for identified infection/condition  Outcome: Progressing  Goal: Absence of fever/infection during neutropenic period  Description: INTERVENTIONS:  - Monitor WBC    Outcome: Progressing     Problem: SAFETY ADULT  Goal: Patient will remain free of falls  Description: INTERVENTIONS:  - Educate patient/family on patient safety including physical limitations  - Instruct patient to call for assistance with activity   - Consult OT/PT to assist with strengthening/mobility   - Keep Call bell within reach  - Keep bed low and locked with side rails adjusted as appropriate  - Keep care items and personal belongings within reach  - Initiate and maintain comfort rounds  - Make Fall Risk Sign visible to staff  - Apply yellow socks and bracelet  for high fall risk patients  - Consider moving patient to room near nurses station  Outcome: Progressing  Goal: Maintain or return to baseline ADL function  Description: INTERVENTIONS:  -  Assess patient's ability to carry out ADLs; assess patient's baseline for ADL function and identify physical deficits which impact ability to perform ADLs (bathing, care of mouth/teeth, toileting, grooming, dressing, etc.)  - Assess/evaluate cause of self-care deficits   - Assess range of motion  - Assess patient's mobility; develop plan if impaired  - Assess patient's need for assistive devices and provide as appropriate  - Encourage maximum independence but intervene and supervise when necessary  - Involve family in performance of ADLs  - Assess for home care needs following discharge   - Consider OT consult to assist with ADL evaluation and planning for discharge  - Provide patient education as appropriate  Outcome: Progressing  Goal: Maintains/Returns to pre admission functional level  Description: INTERVENTIONS:  - Perform AM-PAC 6 Click Basic Mobility/ Daily Activity assessment daily.  - Set and communicate daily mobility goal to care team and patient/family/caregiver.   - Collaborate with rehabilitation services on mobility goals if consulted  - Perform Range of Motion several times a day.  - Patient will reposition himself every 2 hours.  - Dangle patient 3+ times a day  - Stand patient 3+ times a day  - Ambulate patient   - Out of bed to chair 1+ times a day   - Out of bed for meals   - Out of bed for toileting  - Record patient progress and toleration of activity level   Outcome: Progressing     Problem: DISCHARGE PLANNING  Goal: Discharge to home or other facility with appropriate resources  Description: INTERVENTIONS:  - Identify barriers to discharge w/patient and caregiver  - Arrange for needed discharge resources and transportation as appropriate  - Identify discharge learning needs (meds, wound care, etc.)  -  Arrange for interpretive services to assist at discharge as needed  - Refer to Case Management Department for coordinating discharge planning if the patient needs post-hospital services based on physician/advanced practitioner order or complex needs related to functional status, cognitive ability, or social support system  Outcome: Progressing     Problem: Prexisting or High Potential for Compromised Skin Integrity  Goal: Skin integrity is maintained or improved  Description: INTERVENTIONS:  - Identify patients at risk for skin breakdown  - Assess and monitor skin integrity  - Assess and monitor nutrition and hydration status  - Monitor labs   - Assess for incontinence   - Turn and reposition patient  - Assist with mobility/ambulation  - Relieve pressure over bony prominences  - Avoid friction and shearing  - Provide appropriate hygiene as needed including keeping skin clean and dry  - Evaluate need for skin moisturizer/barrier cream  - Collaborate with interdisciplinary team   - Patient/family teaching  - Consider wound care consult   Outcome: Progressing

## 2024-07-22 NOTE — ASSESSMENT & PLAN NOTE
Remote history of TBI, previously residing in a group home prior to group home sentence.  Evaluated by neuropsychiatry on 6/27/24 and deemed NOT to have medical decision-making capacity  Process for court appointed guardian started on 7/5/24 - CM following

## 2024-07-22 NOTE — CASE MANAGEMENT
Cm checked in with pt at bedside, pt reportedly doing well. [facility] asking for further clinicals to review, cm sent. [facility] in [facility] also looking into HIV med cost.

## 2024-07-22 NOTE — PROGRESS NOTES
Physical Medicine and Rehabilitation Progress Note  Suinl Patel 63 y.o. male MRN: [id number]  Unit/Bed#: [facility] 451-01 Encounter: [id number]    To Review: Sunil Patel is a 63 y.o. male who  has a past medical history of Acute lower limb ischemia (06/08/2024), Anxiety, Depression, HIV disease (HCC), Substance abuse (HCC), and Suicide attempt (HCC). who presented to the [facility] on 6/7/24 from halfway for increased LLE swelling and pain and was found to have a left external iliac artery occlusion and acute limb ischemia. 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 Patient had TTE on 6/10/24 showing LV apex thrombus. On 6/11/24 patient had pharmacologic nuclear stress test/SPECT scan which did not show any significant areas of ischemia with EF 40-45% and recommended continuing A/C for LV apical thrombus. His course was complicated by b/l buttock unstageable pressure ulcers, urinary and fecal incontinence, pain, and significant decline in ADLs and mobility. Patient has been continued on Xarelto for anticoagulation, as well as ASA and statin. Patient has a history of TBI, with baseline nonfluent aphasia and forgetfulness. He was evaluated by neuropsychology on 6/27/24, and deemed to not have capacity to make fully informed medical decisions. Therefore, the guardianship process was initiated as of 7/5/24. He was admitted to the [facility] on 7/6.     Chief Complaint: No acute events overnight.     Interval History/Subjective:  Patient reports having a headache today. The pain is all over his head, but he is unable to describe the sensation. He also reports feeling overall very sad today. He states he just wants to go to bed and doesn't feel up to doing much. He is agreeable to adjustment of his  "antidepressant. He still endorses some phantom limb sensation in his LLE. He states he slept well through the night. Otherwise, he denies any new fevers, chills, chest pain, sob, abdominal pain, nausea or vomiting. Last BM 7/21.      ROS:  A 10 point review of systems was negative except for what is noted in the HPI.    Today's Changes:  Continue local wound care,  and therapies.  Nurtec for headache per IM. Will monitor for acute neuro changes  No worsening phantom limb pain or discomfort. Continue gabapentin nightly for now. 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 Underwent L femoral thrombectomy and AKA with vascular surgery   - [facility] sx follow-up 7/10 - L AKA incision site well-healed, staples taken out at the bedside this morning  - [facility] will be vendor - Patient now has .  - Has some phantom limb sensation and is on gabapentin and working on desensitization. Monitor for need for topicals  - Monitor for hip flexion/abduction tightness. Stretches in therapy  - Volume management/shaping to transition to .   - On Rivaroxaban with hx of LV thrombus and PAOD   - PT/OT/SLP (to work on carry over strategies) 3-5 hours/day, 5-7 days/week.    - Goals are Ind-Sup at a wheelchair level.   - Outpatient f/u with [facility] and Vascular    Neurocognitive disorder  Assessment & Plan  Has been appropriate and cooperative throughout this stay without any behavioral issues on the rehab unit to date.   Hx of TBI and L MCA CVA, psychiatric d/o, seizure d/o  - Neuropsych assessment 6/27/24 - \"diffuse cognitive dysfunction and on a measure assessing awareness of personal health status and ability to evaluate health problems, handle medical emergencies and take safety precautions, patient performed in the IMPAIRED range of " "functioning. During this encounter, patient does not appear to have capacity to make fully informed medical decisions.\"  MMSE 4/28 - severely impaired  - Guardianship process initiated on acute - follow-up by CM/Admin  - Status: some expressive and possible some receptive aphasia.  He can be difficult to follow at times likely due to a mixture of aphasia, tangentiality, mild lability, and impaired memory; lability with hx of possible bipolar d/o  - Neuropsych Med review: Depakote, Lamictal, Remeron, Zoloft, Melatonin, gabapentin, PRN oxy 2.5mg TID   - Monitor neuro-exam, wakefulness, mood, cognition, insight into deficits and safety awareness   - Monitor and ensure optimal management electrolytes, nutrition, and hydration  - Monitor for signs or symptoms of infection, medication intolerances, other systemic etiologies  - Additional labs, imaging, specialist follow-up as needed per primary team currently   - Overstimulation precautions, frequent re-orientation, re-direction, re-assurance  - Optimal mood, pain, and sleep management  - If impaired sleep or behavior recommend sleep log and agitation monitoring    - Limit sedating medications when possible  - Fall precautions - if needed increase rounding or consider virtual sitter or in-person sitter  - For routine restlessness, anxiety, irritability focus on non-pharmacologic management    - Hold benzo's with increased risk paradoxical reaction and possibility of limiting cognitive recovery  - Continue SLP and interdisciplinary care  - OP neuro and PCP     Adjustment disorder with mixed anxiety and depressed mood  Assessment & Plan  - Related to recent release from incarceration, new AKA and uncertainty of future disposition, all in the setting of impaired cognition related to prior strokes and TBI  - Behavioral techniques for symptom management  - Neuropsychology consult as available  - Can consider increasing Sertraline if symptoms continue to worsen    Pressure " "injury of buttock, stage 3 ([facility])  Assessment & Plan  Stable   - Wound care consulted and following weekly  Per wound care specialist 7/15  - POA L buttock pressure injury unstageable has healed.  - POA R buttock pressure injury is now Stage 3, and improving.   - Cleanse sacro-buttocks with soap and water. Apply Triad Paste to Sacro-Buttocks Wound Beds Only. Apply Hydraguard to Elsi-wounds and all other intact aspects of sacro-buttocks. Apply both creams TID and PRN episodes of incontinence.    - Recommend ROHO cushion in chair when out of bed instead   - Preventative hydraguard to bilateral heels BID and PRN.   - P500  - Monitor clinically for breakdown, frequent turns      Patient incapable of making informed decisions  Assessment & Plan  - History of remote TBI and prior strokes with comorbid psychiatric history.  - Evaluated by neuropsychology, Dr. Dilshad Tatum, PhD on 6/27/24, found to have \"diffuse cognitive dysfunction and on a measure assessing awareness of personal health status and ability to evaluate health problems, handle medical emergencies and take safety precautions, patient performed in the IMPAIRED range of functioning. During this encounter, patient does not appear to have capacity to make fully informed medical decisions.\"  - Court-appointed guardianship process has been initiated by acute care CM Katia Kay.    - We have identified two friends who are interested in being patient's guardians and have been involved and invested in patient's care on the ARC.   - They will need to be interviewed by the  involved in this process.    - He has to undergo his hearing on 8/6/2024 first.    - He would ultimately benefit from [facility]/nursing home/memory care unit - he does very well with structure and supervision.    Urinary incontinence  Assessment & Plan  - Did have some incontinence on acute - improved with timed voids and consistent assistance with his urinal  - Describes urgency  - monitor for " retention, incontinence (including overflow incontinence), signs/symptoms of UTI  - Timed Voids and PVRs Q4hrs to start   - PVR <150 x3, scans discontinued. Will continue timed voids   - He has some difficulty managing the urinal    - needs assistance but is able to transfer to [facility]   - Still uses condom catheter at night, in part for continence care/to protect his skin given his buttock wounds.          At risk for constipation  Assessment & Plan  - Stooling adequately recently; did have some incontinence on acute now improved  - Close continent care given buttock wounds  - Miralax PRN      Acute pain  Assessment & Plan  Controlled   - Tylenol 975 mg q8h PRN  - Gabapentin 100 mg QHS. Will try to wean off as able this week   - Mild phantom limb sensation, without pain.   - Oxycodone 2.5 mg q8h PRN, wean as possible   - Last used 7/19.    - May be able to discontinue at discharge.    Impaired mobility and activities of daily living  Assessment & Plan  - Rehabilitation medicine physician for daily monitoring of care, 24 hour availability for acute medical issues, medication management, and therapeutic and diagnostic assessments.  - 24 hour rehabilitation nursing 7 days per week for: management/teaching of medications, bowel/bladder routine, skin care.  - PT, OT for 2-3 hours per day, 5 to 6 days per week; 15 hours per week  - Rehabilitation Psychology as needed for adjustment and coping  - MSW for barriers to discharge, community resources, and family support  - Discharge planning following to help ensure a safe and efficient discharge        Traumatic brain injury (HCC)  Assessment & Plan  See neurocog impairment     Cardiomyopathy (HCC)  Assessment & Plan  ECHO on 6/10/2024 showed LVEF 40-45% with mild global hypokinesis   Status post NM stress test on 6/11/2024 which showed: a large, mild, fixed defect in the inferior wall, possibly due to diaphragmatic attenuation artifact, there is a small area of partial  reversibility in the inferior apical wall suggestive of ischemia    Elevated by cardiology, etiology felt to be possibly secondary to stress-induced cardiomyopathy, with apical thrombus  Cardiac catheterization deferred given the lack of any significant ischemia, and no current cardiac symptoms  Continue with medical management with aspirin, statin, beta-blocker, ARB  Monitor volume status, remains euvolemic off of diuretics   Outpatient follow-up with cardiology    At risk for venous thromboembolism (VTE)  Assessment & Plan  - On rivaroxaban    Carnitine deficiency (HCC)  Assessment & Plan  - Carnitine replacement  - Appreciate medicine management      History of seizures  Assessment & Plan  - Depakote ER, lamotrigine, zonisamide  - Outpatient follow-up with [facility] neurology    Left ventricular thrombus  Assessment & Plan  - Visualized on echocardiogram on 6/10/24: spherical 1.5 x 1.3 cm thrombus at the LV apex.   - Rivaroxaban  - Outpatient follow-up with cardiology    Benign essential hypertension  Assessment & Plan  - Toprol, losartan  - Appreciate medicine management    History of stroke  Assessment & Plan  - History of old left temporal and parietal lobe cortical infarcts, also involving the insula and left occipital lobe as well as small right posterior parietal lobe. Stable on most recent CT head on 5/16/24.   - ASA, Xarelto and statin for secondary prevention  - Optimal BP control  - Monitor neuro exam - hx of LV thrombus   - OP neuro follow-up     Human immunodeficiency virus (HIV) infection (HCC)  Assessment & Plan  - Continue anti-retroviral treatment  - Appreciate medicine management during ARC course  - OP ID follow-up       Bipolar affective disorder (HCC)  Assessment & Plan  Mood acceptable; continue meds as outlined   Supportive counseling  NeuroPsychology consult while in ARC if available for support  Counseled on and continue to encourage deep breathing/relaxation/behavioral management techniques  -  Mirtazapine 15 mg qHs  - Can consider increasing Sertraline for better mood regulation  - Lamictal 50mg BID  - Depakote ER 1250mg qday   - Outpatient psychiatry follow-up      Health Maintenance  #Skin/Pressure Injury Prevention: Turn Q2hr in bed, with weight shifts T84-13drs in wheelchair.  #GI Prophylaxis: Not indicated   #Code Status: Full Code  #FEN: Cardiac diet, Ensure Max Vanilla with breakfast, and chocolate at dinner   #Dispo: Team 7/16: Anticipate he will be meeting goals in a week or so. In the process of getting guardianship, but we may have identified some friends who may be candidates to be guardians. Would need to be vetted by court system. He would ultimately benefit from a structured environment (memory care/[facility]), but would likely plan to transition to [facility]/SNF to start to see him through his AKA recovery and prosthetic process.   Follow-up: PMR, Vascular Surgery, Psychiatry, ID, PCP    Objective:    Functional Update:  PT:  CGA-Min tranfers, min-modA bed mobility, Ind wheelchair mobility 150'  OT: Ind eating, Ind grooming, Min bathing, Sup UB dressing, modA LB dressing, modA toileting, Min tub/shower transfers, incidental touching toilet transfers   SLP: modA comprehension, problem solving, and memory, and expression. Supervision for social interaction.    Allergies per EMR    Physical Exam:  Temp:  [97.9 °F (36.6 °C)-98.3 °F (36.8 °C)] 98.3 °F (36.8 °C)  HR:  [84-89] 84  Resp:  [17-20] 18  BP: (104-131)/(55-72) 115/57  Oxygen Therapy  SpO2: 97 %    Gen: No acute distress, Well-nourished, well-appearing.   HEENT: Moist mucus membranes, Normocephalic/Atraumatic  Cardiovascular: Regular rate, rhythm, S1/S2. Distal pulses palpable  Heme/Extr: No edema  Pulmonary: Non-labored breathing. Lungs CTAB  : No fernandes  GI: Soft, non-tender, non-distended.  MSK: Stable L AKA with  in place. L Hip flexion 4+/5, L hip adduction 4+/5, L hip abduction 4+/5. RLE 5/5 throughout.  Integumentary: Skin is  warm, dry. R buttock wound appears well healing.  Neuro: AAOx3, Speech is intact. Appropriate to questioning.  Psych: Normal affect. Mood is down today.    Diagnostic Studies: Reviewed, no new imaging    Laboratory:  Reviewed   Results from last 7 days   Lab Units 07/18/24  1003   HEMOGLOBIN g/dL 11.0*   HEMATOCRIT % 36.3*   WBC Thousand/uL 7.52       Results from last 7 days   Lab Units 07/18/24  1003   BUN mg/dL 18   POTASSIUM mmol/L 3.9   CHLORIDE mmol/L 105   CREATININE mg/dL 0.88              Patient Active Problem List   Diagnosis    Bipolar affective disorder (HCC)    Substance abuse (HCC)    Human immunodeficiency virus (HIV) infection (HCC)    History of stroke    Benign essential hypertension    Positive laboratory testing for human immunodeficiency virus (HCC)    Hypertension    Unspecified vitamin D deficiency    Tobacco abuse    Cerebrovascular accident (CVA) ([facility])    Left ventricular thrombus    History of seizures    Carnitine deficiency (HCC)    Above-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)    Traumatic brain injury (HCC)    Impaired mobility and activities of daily living    At risk for venous thromboembolism (VTE)    Acute pain    At risk for constipation    Urinary incontinence    Patient incapable of making informed decisions    Pressure injury of buttock, stage 3 (HCC)    Adjustment disorder with mixed anxiety and depressed mood    Neurocognitive disorder    Cardiomyopathy (HCC)    Episodic headache         Medications  Current Facility-Administered Medications   Medication Dose Route Frequency Provider Last Rate    acetaminophen  975 mg Oral TID PRN José Salcido MD      aspirin  81 mg Oral Daily Lorrie Barillas PA-C      atorvastatin  80 mg Oral Daily With Dinner Lorrie Barillas PA-C      bictegravir-emtricitab-tenofovir alafenamide  1 tablet Oral Daily With Breakfast Lorrie Barillas PA-C      bisacodyl  10 mg Rectal Daily PRN Lorrie Barillas PA-C       diphenhydrAMINE  25 mg Oral Q6H PRN Lorrie Barillas PA-C      divalproex sodium  1,250 mg Oral Daily Lorrie Barillas PA-C      dolutegravir  50 mg Oral Daily Lorriejacky Barillas PA-C      gabapentin  100 mg Oral HS Ashley Depadua, MD      lamoTRIgine  50 mg Oral BID Lorrie Barillas PA-C      levOCARNitine  1,000 mg/day Oral TID With Meals Lorrie Barillas PA-C      losartan  50 mg Oral Daily Lorrie Barillas PA-C      melatonin  6 mg Oral HS Lorrie Barillas PA-C      metoprolol succinate  25 mg Oral Daily CHARLIE Mcclelland      mirtazapine  15 mg Oral HS Lorrie Barillas PA-C      ondansetron  4 mg Oral Q6H PRN Lorrie Barillas PA-C      oxyCODONE  2.5 mg Oral Q8H PRN Raimundo Riley MD      polyethylene glycol  17 g Oral Daily PRN Lorrie Barillas PA-C      rivaroxaban  20 mg Oral Daily With Dinner Lorrie Barillas PA-C      senna  1 tablet Oral HS PRN Lorrie Barillas PA-C      sertraline  75 mg Oral Daily Lorrie Barillas PA-C      tamsulosin  0.4 mg Oral Daily With Dinner Lorrie Barillas PA-C      zonisamide  400 mg Oral Daily Lorrie Barillas PA-C            ** Please Note: Fluency Direct voice to text software may have been used in the creation of this document. **

## 2024-07-22 NOTE — PROGRESS NOTES
07/22/24 0900   Pain Assessment   Pain Assessment Tool 0-10   Pain Score No Pain   Restrictions/Precautions   Precautions Bed/chair alarms;Cognitive;Fall Risk;Supervision on toilet/commode;Seizure;Aphasia   Weight Bearing Restrictions Yes   LLE Weight Bearing Per Order NWB   ROM Restrictions No   Braces or Orthoses   (L AKA )   Roll Left and Right   Type of Assistance Needed Supervision;Adaptive equipment   Physical Assistance Level No physical assistance   Comment Sup to roll to alternating sides using bedrails for assist, while therapist donned L AKA    Roll Left and Right CARE Score 4   Sit to Lying   Type of Assistance Needed Supervision   Physical Assistance Level No physical assistance   Sit to Lying CARE Score 4   Lying to Sitting on Side of Bed   Type of Assistance Needed Incidental touching;Adaptive equipment   Physical Assistance Level No physical assistance   Comment CGA w/bedrail and increased time   Lying to Sitting on Side of Bed CARE Score 4   Bed-Chair Transfer   Type of Assistance Needed Supervision;Verbal cues   Physical Assistance Level No physical assistance   Comment CS sit-pivots to various surfaces including EOB, recliner, w/c, and EOM, with vc's to ensure w/c brakes locked, chair setup correctly, and armrest raised prior to transferring   Chair/Bed-to-Chair Transfer CARE Score 4   Commmunity Re-entry   Community Re-entry Level Wheelchair   Community Re-entry Level of Assistance Close supervision   Community Re-entry Pt self-propelled w/c using BUEs from 4th floor to 9th floor OT gyms as well as through hospital hallways at SUP level, with focus on improving endurance, w/c mgmt, and navigating obstacles safely in anticipation of pt D/Cing w/c level. Pt tolerated well with brief rest breaks when propelling later in OT session 2* BUE fatigue. Pt able to navigate all obstacles w/o assist or cues, but easily distracted by environment, requiring vc's to sustain attention on  directions.   Exercise Tools   Exercise Tools Yes   Other Exercise Tool 1 Seated EOM w/CS, 6x21tttl each of B/L elbow flexion, chest press, prograde/retrograde rowing, and OH press using 4# tbar for increased BUE strength/endurance during fxl transfers and w/c mobility. Pt tolerated well with min vc's to improve technique and brief rest breaks between sets to manage proximal BUE fatigue.   Other Exercise Tool 2 Seated EOM w/CS, 30x each of forward trunk flexion, lateral trunk flexion, and trunk twists while holding 3# ball for improved core strength, unsupported sitting balance/tolerance, and weightshifting during toileting. Pt tolerated well with min vc's for technique and brief rest breaks between sets to manage fatigue. No LOB.   Cognition   Overall Cognitive Status Impaired   Arousal/Participation Alert;Cooperative   Attention Attends with cues to redirect   Orientation Level Oriented X4   Memory Decreased recall of precautions;Decreased recall of recent events   Following Commands Follows one step commands with increased time or repetition   Activity Tolerance   Activity Tolerance Patient tolerated treatment well   Assessment   Treatment Assessment Pt seen for 65min skilled OT session focused on bed mobility, fxl sit-pivot transfer training, UE/core strengthening, w/c mobility/mgmt for community re-entry, and [facility] bed-level (MaxA), for increased independence w/ADLs and decreased caregiver burden. See detailed descriptions of fxl performance above. Pt tolerated session well, continuing to complete sit-pivot transfers to various surfaces at CS level with vc's for setup of transfer and brake mgmt. Pt cont to be limited by decreased fxl cognition/safety, balance, strength, and endurance. Pt would benefit from continued skilled OT focused on ADL retraining, lateral weightshifting for toileting, fxl transfers, UE strengthening, sacral offloading, core strengthening, phase 1 amp edu, and D/C  planning.   Prognosis Fair   Problem List Decreased strength;Decreased endurance;Impaired balance;Decreased mobility;Decreased cognition;Impaired judgement;Decreased safety awareness   Plan   Treatment/Interventions ADL retraining;Functional transfer training;Therapeutic exercise;Endurance training;Patient/family training;Equipment eval/education;Bed mobility;Compensatory technique education   Progress Progressing toward goals   Discharge Recommendation   Rehab Resource Intensity Level, OT   (pending)   OT Therapy Minutes   OT Time In 0900   OT Time Out 1005   OT Total Time (minutes) 65   OT Mode of treatment - Individual (minutes) 65   OT Mode of treatment - Concurrent (minutes) 0   OT Mode of treatment - Group (minutes) 0   OT Mode of treatment - Co-treat (minutes) 0   OT Mode of Treatment - Total time(minutes) 65 minutes   OT Cumulative Minutes 1236   Therapy Time missed   Time missed? No

## 2024-07-22 NOTE — ASSESSMENT & PLAN NOTE
Pt reports a history of migraines.  HA is currently one sided.  He rates it 710.  It is associated with photophobia.  He is unable to take triptans due to a history of stroke.  He is agreeable to trying Nurtec.

## 2024-07-22 NOTE — ASSESSMENT & PLAN NOTE
Noted on echocardiogram 6/10/2024: spherical 1.5 x 1.3 cm thrombus at the LV apex   Initiated on AC with Xarelto, continue  Rx sent to [facility] for price check on 7/10/24 - LENY spoke with [employer] and pt has rx coverage. Information sent to [facility].  Today on 7/21/24, d/w [facility] and Xarelto is NOT covered and he would have to pay OOP @$700.00.  CM to further address.

## 2024-07-22 NOTE — PROGRESS NOTES
07/22/24 1230   Pain Assessment   Pain Assessment Tool 0-10   Pain Score No Pain   Restrictions/Precautions   Precautions Aphasia;Bed/chair alarms;Cognitive;Fall Risk;Seizure;Supervision on toilet/commode   LLE Weight Bearing Per Order NWB   Braces or Orthoses   (L AKA )   Subjective   Subjective pt agreeable to perform skilled PT and pt reports a little tired this PM session   Roll Left and Right   Type of Assistance Needed Supervision   Roll Left and Right CARE Score 4   Sit to Lying   Type of Assistance Needed Supervision   Sit to Lying CARE Score 4   Lying to Sitting on Side of Bed   Type of Assistance Needed Incidental touching   Comment CGA w/bedrail and increased time   Lying to Sitting on Side of Bed CARE Score 4   Sit to Stand   Type of Assistance Needed Incidental touching;Verbal cues   Comment in parpl bars   Sit to Stand CARE Score 4   Bed-Chair Transfer   Type of Assistance Needed Supervision;Verbal cues   Comment sit pivot with VC for sequence   Chair/Bed-to-Chair Transfer CARE Score 4   Transfer Bed/Chair/Wheelchair   Adaptive Equipment None   Car Transfer   Type of Assistance Needed Physical assistance   Physical Assistance Level 26%-50%   Comment VC for hand and WC placement   Car Transfer CARE Score 3   Walk 10 Feet   Reason if not Attempted Safety concerns   Walk 10 Feet CARE Score 88   Walk 50 Feet with Two Turns   Reason if not Attempted Safety concerns   Walk 50 Feet with Two Turns CARE Score 88   Walk 150 Feet   Reason if not Attempted Safety concerns   Walk 150 Feet CARE Score 88   Walking 10 Feet on Uneven Surfaces   Reason if not Attempted Safety concerns   Walking 10 Feet on Uneven Surfaces CARE Score 88   Ambulation   Does the patient walk? 0. No, and walking goal is not clinically indicated.   Wheel 50 Feet with Two Turns   Type of Assistance Needed Independent   Wheel 50 Feet with Two Turns CARE Score 6   Wheel 150 Feet   Type of Assistance Needed Independent   Wheel 150 Feet  CARE Score 6   Wheelchair mobility   Does the patient use a wheelchair? 1. Yes   Type of Wheelchair Used 1. Manual   Method Right upper extremity;Left upper extremity   Curb or Single Stair   Reason if not Attempted Safety concerns   1 Step (Curb) CARE Score 88   4 Steps   Reason if not Attempted Safety concerns   4 Steps CARE Score 88   12 Steps   Reason if not Attempted Safety concerns   12 Steps CARE Score 88   Therapeutic Interventions   Flexibility hip extension sidelying and prone stretch 8-10 min to his tolerance , pt c/o back pain   Equipment Use   NuStep lvl 4 or 15 min SPM< 40   Parallel Bars standing in bars LLE flexion/extension/add/abduction 20 reps x3 sets   Assessment   Treatment Assessment pt seen 90 min of skilled PT focus on overall WC set for all transfers bed, car, and into and form WC , keesha VC for locking breaks and WC arm rest positioning before sit pivot transfers , pt needs to place WC for retrivel for safety . Pt perform Sitting mat program for core and upper arm work out with 2-3 dowel bar and ball toss . Pt will cont to benefit with skilled PT and major barriers is his safety awareness wit limited cognition and Short term memory and realign and applied left shinker. Cont POC working on dc plan and all goals   Barriers to Discharge Inaccessible home environment;Decreased caregiver support   Plan   Progress Progressing toward goals   PT Therapy Minutes   PT Time In 1230   PT Time Out 1400   PT Total Time (minutes) 90   PT Mode of treatment - Individual (minutes) 90   PT Mode of treatment - Concurrent (minutes) 0   PT Mode of treatment - Group (minutes) 0   PT Mode of treatment - Co-treat (minutes) 0   PT Mode of Treatment - Total time(minutes) 90 minutes   PT Cumulative Minutes 1467   Therapy Time missed   Time missed? No

## 2024-07-22 NOTE — ASSESSMENT & PLAN NOTE
ECHO 6/10/2024 = LVEF 40-45% with mild global hypokinesis   Status post NM stress test on 6/11/2024 which showed: a large, mild, fixed defect in the inferior wall, possibly due to diaphragmatic attenuation artifact, there is a small area of partial reversibility in the inferior apical wall suggestive of ischemia    Evaluated ed by cardiology, etiology felt to be possibly secondary to stress-induced cardiomyopathy, with apical thrombus  Cardiac catheterization deferred given the lack of any significant ischemia, and no current cardiac symptoms  Continue with medical management with aspirin, statin, beta-blocker, ARB  Monitor volume status, remains euvolemic off of diuretics   Euvolemic on exam  Outpatient follow-up with cardiology

## 2024-07-22 NOTE — PROGRESS NOTES
[facility]  Progress Note  Name: Sunil Patel I  MRN: [id number]  Unit/Bed#: -01 I Date of Admission: 7/6/2024   Date of Service: 7/22/2024 I Hospital Day: 16    Assessment & Plan   * Above-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)  Assessment & Plan  Presented with left lower extremity acute limb ischemia/extensive left iliofemoral and left infrainguinal embolism requiring left femoral thrombectomy and subsequent AKA on 6/7/24 with vascular surgery.  Incision C/D/I, pain controlled.   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 CM to investigate what is covered.  Rehab and pain control per PMR    Episodic headache  Assessment & Plan  Pt reports a history of migraines.  HA is currently one sided.  He rates it 710.  It is associated with photophobia.  He is unable to take triptans due to a history of stroke.  He is agreeable to trying Nurtec.    Cardiomyopathy (HCC)  Assessment & Plan  ECHO 6/10/2024 = LVEF 40-45% with mild global hypokinesis   Status post NM stress test on 6/11/2024 which showed: a large, mild, fixed defect in the inferior wall, possibly due to diaphragmatic attenuation artifact, there is a small area of partial reversibility in the inferior apical wall suggestive of ischemia    Evaluated ed by cardiology, etiology felt to be possibly secondary to stress-induced cardiomyopathy, with apical thrombus  Cardiac catheterization deferred given the lack of any significant ischemia, and no current cardiac symptoms  Continue with medical management with aspirin, statin, beta-blocker, ARB  Monitor volume status, remains euvolemic off of diuretics   Euvolemic on exam  Outpatient follow-up with cardiology    Traumatic brain injury (HCC)  Assessment & Plan  Remote history of TBI, previously residing in a group home prior to intermediate  sentence.  Evaluated by neuropsychiatry on 6/27/24 and deemed NOT to have medical decision-making capacity  Process for court appointed guardian started on 7/5/24 - CM following     Carnitine deficiency (HCC)  Assessment & Plan  Continue levocarnitine 330 mg 3x daily with meals.    History of seizures  Assessment & Plan  Diagnosed in July 2019, follows with [facility] neurology outpatient  Continue home regimen with Depakote ER, Lamotrigine and Zonegran    Left ventricular thrombus  Assessment & Plan  Noted on echocardiogram 6/10/2024: spherical 1.5 x 1.3 cm thrombus at the LV apex   Initiated on AC with Xarelto, continue  Rx sent to [facility] for price check on 7/10/24 - CM spoke with [employer] and pt has rx coverage. Information sent to [facility].  Today on 7/21/24, d/w [facility] and Xarelto is NOT covered and he would have to pay OOP @$700.00.   to further address.    Benign essential hypertension  Assessment & Plan  Home regimen: Losartan 50mg daily, Toprol XL 25 mg BID  Current regimen: Losartan 50 mg daily, Toprol-XL 25 mg daily  Did miss both 7/19 for  = made no changes and he has gotten both the last 2 days    History of stroke  Assessment & Plan  History of left MCA CVA in May 2018 with residual expressive aphasia  Continue ASA and atorvastatin    Human immunodeficiency virus (HIV) infection (HCC)  Assessment & Plan  Continue Biktarvy and Tivicay.    Bipolar affective disorder (HCC)  Assessment & Plan  Continue home meds Zoloft and Remeron  Outpatient follow-up with Psychiatry             The above assessment and plan was reviewed and updated as determined by my evaluation of the patient on 7/22/2024.    Labs:   Results from last 7 days   Lab Units 07/18/24  1003   WBC Thousand/uL 7.52   HEMOGLOBIN g/dL 11.0*   HEMATOCRIT % 36.3*   PLATELETS Thousands/uL 398*     Results from last 7 days   Lab Units 07/18/24  1003   SODIUM mmol/L 139   POTASSIUM mmol/L 3.9   CHLORIDE mmol/L 105   CO2 mmol/L 26   BUN mg/dL  18   CREATININE mg/dL 0.88   CALCIUM mg/dL 9.2                   Imaging  No orders to display       Review of Scheduled Meds:  Current Facility-Administered Medications   Medication Dose Route Frequency Provider Last Rate    acetaminophen  975 mg Oral TID PRN José Salcido MD      aspirin  81 mg Oral Daily Lorrie Barillas PA-C      atorvastatin  80 mg Oral Daily With Dinner Lorrie Barillas PA-C      bictegravir-emtricitab-tenofovir alafenamide  1 tablet Oral Daily With Breakfast Lorrie Barillas PA-C      bisacodyl  10 mg Rectal Daily PRN Lorrie Barillas PA-C      diphenhydrAMINE  25 mg Oral Q6H PRN Lorrie Barillas PA-C      divalproex sodium  1,250 mg Oral Daily Lorrie Barillas PA-C      dolutegravir  50 mg Oral Daily Lorrie Barillas PA-C      gabapentin  100 mg Oral HS Ashley Depadua, MD      lamoTRIgine  50 mg Oral BID Lorrie Barillas PA-C      levOCARNitine  1,000 mg/day Oral TID With Meals Lorrie Barillas PA-C      losartan  50 mg Oral Daily Lorrie Barillas PA-C      melatonin  6 mg Oral HS Lorrie Barillas PA-C      metoprolol succinate  25 mg Oral Daily CHARLIE Mcclelland      mirtazapine  15 mg Oral HS Lorriejacky Barillas PA-C      ondansetron  4 mg Oral Q6H PRN Lorrie Barillas PA-C      oxyCODONE  2.5 mg Oral Q8H PRN Raimundo Riley MD      polyethylene glycol  17 g Oral Daily PRN Lorrie Barillas PA-C      rimegepant sulfate  75 mg Oral Once Lorriejacky Barillas PA-C      rivaroxaban  20 mg Oral Daily With Dinner Lorrie Barillas PA-C      senna  1 tablet Oral HS PRN Lorrie Barillas PA-C      sertraline  75 mg Oral Daily Lorriejacky Barillas PA-C      tamsulosin  0.4 mg Oral Daily With Dinner Lorrie Barillas PA-C      zonisamide  400 mg Oral Daily Lorrie Barillas PA-C         Subjective/ HPI: Patient seen and examined. Patients overnight issues or events were reviewed with nursing staff. New or overnight  issues include the following:     Pt seen in his room. He is tearful re the amputation. He reports a history of headaches and he currently has one. It is located in the Rt frontal area. He usually treats them with cole. He rates that HA at 7/10. It is associated with photophobia. His friend, Mireya, was at the bedside and confirmed his history of headaches. He denies any other complaints.    ROS:   A 10 point ROS was performed; negative except as noted above.        *Labs /Radiology studies Reviewed  *Medications  reviewed and reconciled as needed  *Please refer to order section for additional ordered labs studies      Physical Examination:  Vitals:   Vitals:    07/21/24 1438 07/21/24 2030 07/22/24 0602 07/22/24 0805   BP: 109/66 104/55 131/70 110/72   BP Location: Right arm Left arm Left arm    Pulse: 68 89 86    Resp: 14 20 17    Temp: 98.6 °F (37 °C) 97.9 °F (36.6 °C) 97.9 °F (36.6 °C)    TempSrc: Oral Oral Oral    SpO2: 97% 95% 95%    Weight:       Height:           General Appearance: NAD; pleasant, tearful  HEENT: PERRLA, conjuctiva normal; mucous membranes moist; face symmetrical  Neck:  Supple  Lungs: clear bilaterally, normal respiratory effort, no retractions, expiratory effort normal, on room air  CV: regular rate and rhythm, no murmurs rubs or gallops noted   ABD: soft non tender, +BS x4  EXT: Rt AKA  Skin: normal turgor, normal texture, no rash  Psych: Tearful  Neuro: AAOx3. Expressive aphasia     The above physical exam was reviewed and updated as determined by my evaluation of the patient on 7/22/2024.    Invasive Devices       None                      VTE Pharmacologic Prophylaxis: Xarelto  Code Status: Level 1 - Full Code  Current Length of Stay: 16 day(s)    Total floor / unit time spent today 45 minutes  Coordination of patient's care was performed in conjunction with consulting services. Time invested included review of patient's labs, vitals, and management of their comorbidities with  continued monitoring, examination of patient as well as answering patient questions, documenting her findings and creating progress note in electronic medical record,  ordering appropriate diagnostic testing.       ** Please Note:  voice to text software may have been used in the creation of this document. Although proof errors in transcription or interpretation are a potential of such software**

## 2024-07-23 PROCEDURE — 99233 SBSQ HOSP IP/OBS HIGH 50: CPT | Performed by: PHYSICAL MEDICINE & REHABILITATION

## 2024-07-23 PROCEDURE — 99232 SBSQ HOSP IP/OBS MODERATE 35: CPT | Performed by: NURSE PRACTITIONER

## 2024-07-23 PROCEDURE — 99222 1ST HOSP IP/OBS MODERATE 55: CPT

## 2024-07-23 PROCEDURE — 97535 SELF CARE MNGMENT TRAINING: CPT

## 2024-07-23 PROCEDURE — 97530 THERAPEUTIC ACTIVITIES: CPT

## 2024-07-23 PROCEDURE — 92507 TX SP LANG VOICE COMM INDIV: CPT

## 2024-07-23 PROCEDURE — 97110 THERAPEUTIC EXERCISES: CPT

## 2024-07-23 RX ADMIN — BICTEGRAVIR SODIUM, EMTRICITABINE, AND TENOFOVIR ALAFENAMIDE FUMARATE 1 TABLET: 50; 200; 25 TABLET ORAL at 07:15

## 2024-07-23 RX ADMIN — LEVOCARNITINE 330 MG: 1 SOLUTION ORAL at 08:08

## 2024-07-23 RX ADMIN — Medication 6 MG: at 20:54

## 2024-07-23 RX ADMIN — LOSARTAN POTASSIUM 50 MG: 50 TABLET, FILM COATED ORAL at 08:06

## 2024-07-23 RX ADMIN — LEVOCARNITINE 330 MG: 1 SOLUTION ORAL at 14:59

## 2024-07-23 RX ADMIN — METOPROLOL SUCCINATE 25 MG: 25 TABLET, EXTENDED RELEASE ORAL at 08:06

## 2024-07-23 RX ADMIN — RIVAROXABAN 20 MG: 20 TABLET, FILM COATED ORAL at 17:05

## 2024-07-23 RX ADMIN — LAMOTRIGINE 50 MG: 25 TABLET ORAL at 17:05

## 2024-07-23 RX ADMIN — DIVALPROEX SODIUM 1250 MG: 500 TABLET, EXTENDED RELEASE ORAL at 08:06

## 2024-07-23 RX ADMIN — LAMOTRIGINE 50 MG: 25 TABLET ORAL at 08:06

## 2024-07-23 RX ADMIN — ZONISAMIDE 400 MG: 100 CAPSULE ORAL at 08:07

## 2024-07-23 RX ADMIN — TAMSULOSIN HYDROCHLORIDE 0.4 MG: 0.4 CAPSULE ORAL at 17:05

## 2024-07-23 RX ADMIN — DOLUTEGRAVIR SODIUM 50 MG: 50 TABLET, FILM COATED ORAL at 08:07

## 2024-07-23 RX ADMIN — ATORVASTATIN CALCIUM 80 MG: 80 TABLET, FILM COATED ORAL at 17:05

## 2024-07-23 RX ADMIN — LEVOCARNITINE 330 MG: 1 SOLUTION ORAL at 17:06

## 2024-07-23 RX ADMIN — SERTRALINE HYDROCHLORIDE 75 MG: 50 TABLET ORAL at 08:05

## 2024-07-23 RX ADMIN — ASPIRIN 81 MG: 81 TABLET, COATED ORAL at 08:06

## 2024-07-23 RX ADMIN — MIRTAZAPINE 15 MG: 15 TABLET, FILM COATED ORAL at 20:54

## 2024-07-23 NOTE — PROGRESS NOTES
"   07/23/24 0901   Pain Assessment   Pain Assessment Tool 0-10   Pain Score No Pain   Restrictions/Precautions   Precautions Aphasia;Bed/chair alarms;Cognitive;Fall Risk;Impulsive;Seizure;Supervision on toilet/commode   Weight Bearing Restrictions Yes   LLE Weight Bearing Per Order NWB   ROM Restrictions No   Braces or Orthoses Other (Comment)  (L AKA )   Lifestyle   Autonomy \"This is a lot.\" referring to shower   Oral Hygiene   Type of Assistance Needed Supervision   Physical Assistance Level No physical assistance   Comment Seated in w/c at sink   Oral Hygiene CARE Score 4   Grooming   Able To Shave   Findings Seated in w/c at sink. Able to throuhgly shave but required increased time.   Shower/Bathe Self   Type of Assistance Needed Physical assistance   Physical Assistance Level 25% or less   Comment Pt completed a full shower and was able to bathe all body parts. Completed seated on tub bench. Pt weightshifting to wash bottom. Assistance needed for throughness. Pt was heistate to complete shower, but after done states \"it felt so good.\"   Shower/Bathe Self CARE Score 3   Bathing   Assessed Bath Style Shower   Able to Adjust Water Temperature Yes   Tub/Shower Transfer   Adaptive Equipment Grab Bars;Seat with Back   Assessed Shower   Findings Sit pivot transfer from w/c to tub bench with use of grab bars.   Upper Body Dressing   Type of Assistance Needed Supervision   Physical Assistance Level No physical assistance   Comment Done seated EOB   Upper Body Dressing CARE Score 4   Lower Body Dressing   Type of Assistance Needed Physical assistance   Physical Assistance Level 51%-75%   Comment Able to thread pullover brief and shorts. Min A to adjust brief due to twisting. pt was able to ininate threading of , but required max A to don. All tasks completed supine in bed.   Lower Body Dressing CARE Score 2   Putting On/Taking Off Footwear   Type of Assistance Needed Supervision   Physical Assistance " Level No physical assistance   Comment able to don/doff R sock and shoe   Putting On/Taking Off Footwear CARE Score 4   Roll Left and Right   Type of Assistance Needed Supervision   Physical Assistance Level No physical assistance   Comment use of bed rails   Roll Left and Right CARE Score 4   Sit to Lying   Type of Assistance Needed Supervision   Physical Assistance Level No physical assistance   Sit to Lying CARE Score 4   Lying to Sitting on Side of Bed   Type of Assistance Needed Supervision   Physical Assistance Level No physical assistance   Comment use of bed rails and increased time. HOB flat.   Lying to Sitting on Side of Bed CARE Score 4   Sit to Stand   Type of Assistance Needed Incidental touching   Physical Assistance Level 25% or less   Comment CGA for safety and balance. Use of no AD.   Sit to Stand CARE Score 3   Bed-Chair Transfer   Type of Assistance Needed Supervision   Physical Assistance Level No physical assistance   Comment Sit pivot transfer. Able to manage w/c brakes and drop arm.   Chair/Bed-to-Chair Transfer CARE Score 4   Toileting Hygiene   Type of Assistance Needed Physical assistance   Physical Assistance Level 25% or less   Comment CGA in stance for bladder hygiene. Min a for CM of brief. Pt required increased time. Trialed weight shifting for CM, but pt reports being to difficult and wanting to stand.   Toileting Hygiene CARE Score 3   Toilet Transfer   Type of Assistance Needed Supervision   Physical Assistance Level No physical assistance   Comment sit pivot   Toilet Transfer CARE Score 4   Cognition   Overall Cognitive Status Impaired   Arousal/Participation Alert   Attention Attends with cues to redirect   Orientation Level Oriented X4   Memory Decreased recall of recent events   Following Commands Follows one step commands with increased time or repetition   Assessment   Treatment Assessment Pt participated in 90 minute skilled OT session focusing on ADL. See more details above.  Pt was able to fully participate in shower even though hesitate at first. Throughout session pt often got frustrated and overwhelmed. Able to regroup when given a break. Pt is a good advocate for himself for his needs and when he needs a break/more time to complete a task. Pt is still limited by LB dressing and decreased cognition. Pt would benefit from continued OT to focus on ADLs, cognition, UE strengthening, and activity tolerance. Continue POC at this time. Pt left in recliner with alarm on and all needs within reach.   Prognosis Fair   Problem List Decreased strength;Decreased range of motion;Decreased endurance;Impaired balance;Decreased mobility;Decreased coordination;Decreased cognition;Impaired judgement;Decreased safety awareness   Plan   Treatment/Interventions ADL retraining;Functional transfer training;Therapeutic exercise;Endurance training;Cognitive reorientation;Patient/family training;Equipment eval/education;Compensatory technique education   Progress Progressing toward goals   OT Therapy Minutes   OT Time In 0900   OT Time Out 1030   OT Total Time (minutes) 90   OT Mode of treatment - Individual (minutes) 90   OT Mode of treatment - Concurrent (minutes) 0   OT Mode of treatment - Group (minutes) 0   OT Mode of treatment - Co-treat (minutes) 0   OT Mode of Treatment - Total time(minutes) 90 minutes   OT Cumulative Minutes 1326   Therapy Time missed   Time missed? No

## 2024-07-23 NOTE — PROGRESS NOTES
[facility]  Progress Note  Name: Sunil Patel I  MRN: [id number]  Unit/Bed#: -01 I Date of Admission: 7/6/2024   Date of Service: 7/23/2024 I Hospital Day: 17    Assessment & Plan   * Above-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)  Assessment & Plan  Presented with left lower extremity acute limb ischemia/extensive left iliofemoral and left infrainguinal embolism requiring left femoral thrombectomy and subsequent AKA on 6/7/24 with vascular surgery.  Incision C/D/I, pain controlled.   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 CM to investigate what is covered.  Rehab and pain control per PMR    Episodic headache  Assessment & Plan  Pt reports a history of migraines.  HA is currently one sided.  He rates it 710.  It is associated with photophobia.  He is unable to take triptans due to a history of stroke.  He is agreeable to trying Nurtec.    Cardiomyopathy (HCC)  Assessment & Plan  ECHO 6/10/2024 = LVEF 40-45% with mild global hypokinesis   Status post NM stress test on 6/11/2024 which showed: a large, mild, fixed defect in the inferior wall, possibly due to diaphragmatic attenuation artifact, there is a small area of partial reversibility in the inferior apical wall suggestive of ischemia    Evaluated ed by cardiology, etiology felt to be possibly secondary to stress-induced cardiomyopathy, with apical thrombus  Cardiac catheterization deferred given the lack of any significant ischemia, and no current cardiac symptoms  Continue with medical management with aspirin, statin, beta-blocker, ARB  Monitor volume status, remains euvolemic off of diuretics   Euvolemic on exam  Outpatient follow-up with cardiology    Neurocognitive disorder  Assessment & Plan  Evaluated by neuropsychology and found to not have capacity  Guardianship in progress      Acute pain  Assessment & Plan  Due to the AKA  Pain controlled today per patient he is about to go to therapy now     Traumatic brain injury (HCC)  Assessment & Plan  Remote history of TBI, previously residing in a group home prior to [employer] sentence.  Evaluated by neuropsychiatry on 6/27/24 and deemed NOT to have medical decision-making capacity  Process for court appointed guardian started on 7/5/24 - CM following   Respectful    Carnitine deficiency (HCC)  Assessment & Plan  Continue levocarnitine 330 mg 3x daily with meals.    History of seizures  Assessment & Plan  Diagnosed in July 2019, follows with [facility] neurology outpatient  Continue home regimen with Depakote ER, Lamotrigine and Zonegran    Left ventricular thrombus  Assessment & Plan  Noted on echocardiogram 6/10/2024: spherical 1.5 x 1.3 cm thrombus at the LV apex   Initiated on AC with Xarelto, continue  Rx sent to [facility] for price check on 7/10/24 - CM spoke with [employer] and pt has rx coverage. Information sent to [facility] [employer].  Today on 7/21/24, d/w [employer] and Xarelto is NOT covered and he would have to pay OOP @$700.00.  CM to further address.    Benign essential hypertension  Assessment & Plan  Home regimen: Losartan 50mg daily, Toprol XL 25 mg BID  Current regimen: Losartan 50 mg daily, Toprol-XL 25 mg daily  Did miss both 7/19 for  = made no changes and he has gotten both the last 2 days  Borderline did receive medications today . Pt denies dizziness or lightheadedness    History of stroke  Assessment & Plan  History of left MCA CVA in May 2018 with residual expressive aphasia  Continue ASA and atorvastatin    Human immunodeficiency virus (HIV) infection (HCC)  Assessment & Plan  Continue Biktarvy and Tivicay.    Bipolar affective disorder (HCC)  Assessment & Plan  Continue home meds Zoloft and Remeron  Outpatient follow-up with Psychiatry             The above assessment and plan was reviewed and updated as determined by my  evaluation of the patient on 7/23/2024.    Labs:   Results from last 7 days   Lab Units 07/18/24  1003   WBC Thousand/uL 7.52   HEMOGLOBIN g/dL 11.0*   HEMATOCRIT % 36.3*   PLATELETS Thousands/uL 398*     Results from last 7 days   Lab Units 07/18/24  1003   SODIUM mmol/L 139   POTASSIUM mmol/L 3.9   CHLORIDE mmol/L 105   CO2 mmol/L 26   BUN mg/dL 18   CREATININE mg/dL 0.88   CALCIUM mg/dL 9.2                   Imaging  No orders to display       Review of Scheduled Meds:  Current Facility-Administered Medications   Medication Dose Route Frequency Provider Last Rate    acetaminophen  975 mg Oral TID PRN José Salcido MD      aspirin  81 mg Oral Daily Lorrie Barillas PA-C      atorvastatin  80 mg Oral Daily With Dinner Lorrie Barillas PA-C      bictegravir-emtricitab-tenofovir alafenamide  1 tablet Oral Daily With Breakfast Lorrie Barillas PA-C      bisacodyl  10 mg Rectal Daily PRN Lorrie Barillas PA-C      diphenhydrAMINE  25 mg Oral Q6H PRN Lorrie Barillas PA-C      divalproex sodium  1,250 mg Oral Daily Lorrie Barillas PA-C      dolutegravir  50 mg Oral Daily Lorrie Barillas PA-C      gabapentin  100 mg Oral HS Ashley Depadua, MD      lamoTRIgine  50 mg Oral BID Lorrie Barillas PA-C      levOCARNitine  1,000 mg/day Oral TID With Meals Lorrie Barillas PA-C      losartan  50 mg Oral Daily Lorrie Barillas PA-C      melatonin  6 mg Oral HS Lorrie Barillas PA-C      metoprolol succinate  25 mg Oral Daily CHARLIE Mcclelland      mirtazapine  15 mg Oral HS Lorrie Barillas PA-C      ondansetron  4 mg Oral Q6H PRN Lorrie Barillas PA-C      oxyCODONE  2.5 mg Oral Q8H PRN Raimundo Riley MD      polyethylene glycol  17 g Oral Daily PRN Lorrie Barillas PA-C      rivaroxaban  20 mg Oral Daily With Dinner Lorrie Barillas PA-C      senna  1 tablet Oral HS PRN Lorrie Barillas PA-C      sertraline  75 mg Oral Daily Lorrie  EJ Barillas      tamsulosin  0.4 mg Oral Daily With Dinner Lorrie Barillas PA-C      zonisamide  400 mg Oral Daily Lorrie Barillas PA-C         Subjective/ HPI: Patient seen and examined. Patients overnight issues or events were reviewed with nursing staff. New or overnight issues include the following:     Pt about to go to therapy reports he gets pain but this am right now has no pain. No n/v no concerns. Voiding  , eating and drinking .     ROS:   A 10 point ROS was performed; negative except as noted above.        *Labs /Radiology studies Reviewed  *Medications  reviewed and reconciled as needed  *Please refer to order section for additional ordered labs studies      Physical Examination:  Vitals:   Vitals:    07/22/24 1402 07/22/24 2041 07/23/24 0600 07/23/24 0806   BP: 115/57 105/61 113/68 119/75   BP Location: Left arm Left arm Left arm Left arm   Pulse: 84 89 90 91   Resp: 18 19 18    Temp: 98.3 °F (36.8 °C) 98.4 °F (36.9 °C) 98.2 °F (36.8 °C)    TempSrc: Oral Oral Oral    SpO2: 97% 98% 93%    Weight:       Height:           General Appearance: NAD; pleasant  HEENT: PERRLA, conjuctiva normal; mucous membranes moist; face symmetrical  Neck:  Supple  Lungs: clear bilaterally, normal respiratory effort, no retractions, expiratory effort normal, on room air  CV: regular rate and rhythm, no murmurs rubs or gallops noted   ABD: soft non tender, +BS x4  EXT: DP pulses intact, no lymphadenopathy, no edema, left leg amputee aka   Skin: normal turgor, normal texture, no rash  Psych: affect normal, mood normal  Neuro: AAOx3       The above physical exam was reviewed and updated as determined by my evaluation of the patient on 7/23/2024.    Invasive Devices       None                      VTE Pharmacologic Prophylaxis: Xarelto  Code Status: Level 1 - Full Code  Current Length of Stay: 17 day(s)    Total floor / unit time spent today 20 minutes  Coordination of patient's care was performed in  conjunction with consulting services. Time invested included review of patient's labs, vitals, and management of their comorbidities with continued monitoring, examination of patient as well as answering patient questions, documenting her findings and creating progress note in electronic medical record,  ordering appropriate diagnostic testing.       ** Please Note:  voice to text software may have been used in the creation of this document. Although proof errors in transcription or interpretation are a potential of such software**

## 2024-07-23 NOTE — TEAM CONFERENCE
Acute RehabilitationTeam Conference Note  Date: 7/23/2024   Time: 9:14 AM       Patient Name:  Sunil Patel       Medical Record Number: [id number]   YOB: 1960  Sex: Male          Room/Bed:  [place] [address]/[facility] 451-01  Payor Info:  Payor: MEDICARE / Plan: MEDICARE A AND B / Product Type: Medicare A & B Fee for Service /      Admitting Diagnosis: Hx of AKA (above knee amputation) ([facility]) [Z89.619]   Admit Date/Time:  7/6/2024  1:30 PM  Admission Comments: No comment available     Primary Diagnosis:  Above-knee amputation of left lower extremity ([facility])  Principal Problem: Above-knee amputation of left lower extremity ([facility])    Patient Active Problem List    Diagnosis Date Noted    Episodic headache 07/22/2024    Neurocognitive disorder 07/09/2024    Cardiomyopathy ([facility]) 07/09/2024    Adjustment disorder with mixed anxiety and depressed mood 07/08/2024    Impaired mobility and activities of daily living 07/07/2024    At risk for venous thromboembolism (VTE) 07/07/2024    Acute pain 07/07/2024    At risk for constipation 07/07/2024    Urinary incontinence 07/07/2024    Patient incapable of making informed decisions 07/07/2024    Pressure injury of buttock, stage 3 ([facility]) 07/07/2024    Above-knee amputation of left lower extremity ([facility]) 07/06/2024    Traumatic brain injury ([facility]) 07/06/2024    Carnitine deficiency ([facility]) 06/09/2024    Left ventricular thrombus 06/08/2024    History of seizures 06/08/2024    Tobacco abuse 10/26/2019    Cerebrovascular accident (CVA) ([facility]) 10/26/2019    Positive laboratory testing for human immunodeficiency virus ([facility]) 07/03/2017    Bipolar affective disorder ([facility]) 07/02/2017    Hypertension 02/27/2017    Human immunodeficiency virus (HIV) infection ([facility]) 02/16/2017    History of stroke 02/16/2017    Substance abuse ([facility]) 12/21/2013    Unspecified vitamin D deficiency 05/28/2010    Benign essential hypertension 02/25/2010       Physical Therapy:    Weight Bearing Status: Non-weight  bearing  Transfers: Supervision  Bed Mobility: Supervision  Amulation Distance (ft): 0 feet  Ambulation:  (unsafe at this time)  Assistive Device for Ambulation:  (TBA)  Wheelchair Mobility Distance: 150 ft  Wheelchair Mobility: Independent  Number of Stairs: 0  Assistive Device for Stairs:  (unsafe at this time)  Stair Assistance:  (TBA)  Ramp: Incidental Touching  Assistive Device for Ramp: Wheelchair  Discharge Recommendations: Other (pending outcome and radianship)    7/15  Pt is demonstrating slow functional gains with skilled PT interventions mainly due to inconsistent carry over of new learning secondary to baseline impaired cognition, impaired safety, impaired STM and aphasia. In addition pt also demonstrate dec ROM/strength , dec endurance, dec standing balance and dec skin integrity with ongoing buttock wounds. Pt will benefit from additional skilled PT interventions to improve overall functional indep and safety pending guardianship and d/c location determination. Goals for this week to work on strengthening, amp phase 1 education and improve w/c level transfers to CS, set up with w/c propulsion and CS level with supine to/from sit without bedrail.    7/22/2024  pt seen 90 min of skilled PT focus on overall WC set for all transfers bed, car, and into and form WC , keesha VC for locking breaks and WC arm rest positioning before sit pivot transfers , pt needs to place WC for retrivel for safety . Pt perform Sitting mat program for core and upper arm work out with 2-3# dowel bar and ball toss . Pt will cont to benefit with skilled PT and major barriers is his safety awareness with limited cognition and Short term memory, Instructed and educated pt on  realign and applied left shinker. Cont POC working on dc plan and all goals and DC pending on guardianship. Pt has WC form home and this week cont working on core and upper arm strengthening, WC set up before transfers , safety awareness during WC propulsion and  transfers.     Occupational Therapy:  Eating: Independent  Grooming: Supervision (set-up)  Bathing: Minimal Assistance  Bathing: Minimal Assistance  Upper Body Dressing: Supervision  Lower Body Dressing: Minimal Assistance  Toileting: Moderate Assistance  Tub/Shower Transfer: Minimal Assistance  Toilet Transfer: Incidental Touching  Cognition: Exceptions to WNL  Cognition: Decreased Memory, Decreased Safety, Decreased Executive Functions, Decreased Attention, Decreased Comprehension  Orientation: Person, Place, Time, Situation  Discharge Recommendations: Home with:  DC Home with:: 24 Hour Supervision, Family Support, First Floor Setup, Home Occupational Therapy       Pt continues to present with impairments in endurance, standing balance/tolerance, memory, insight, safety , and judgement . Additional functional barriers include fatigue, LLE NWB status, poor skin integrity, decreased caregiver support, risk for falls, and home environment. Pt is functioning at overall Min A for ADLs and CS-CGA fxnl squat pivot xfers. Pt will continue to benefit from skilled OT services to address above mentioned barriers and maximize functional independence in baseline areas of occupation, utilizing the following interventions: ADL retraining, DME/adaptive equipment assessment , functional transfer training, cognitive retraining, activity tolerance/edurance training, UB strengthening, and energy conservation education. OT D/C recommendation is for 24 hour S due to baseline cognitive deficits.          Speech Therapy:  Mode of Communication: Verbal  Speech/Language: Expressive Aphasia (expressive > receptive aphasia)  Cognition: Exceptions to WNL  Cognition: Decreased Memory, Decreased Executive Functions, Decreased Attention, Decreased Comprehension, Decreased Safety  Orientation: Person, Place, Time, Situation  DC Home with:: 24 Hour Supervision, Family Support, Outpatient Speech Therapy, Home Speech Therapy (continued ST pending dc  plan)  Pt is currently being followed for skilled SLP services targeting language therapy. Pt with hx stroke, resulting in expressive and receptive aphasia but noting expressive communication skills to be greater impacted. SLP was consulted to support pt's communication skills for improved ability to express his needs with the team. Pt presenting with overall moderately impaired expressive and receptive language deficits, impacting functional communication skills. Pt has trialed written communication, however, this was found to be an ineffective communication modality. Pt has also been introduced to a low tech manual communication board, which he has demonstrated ability to utilize in order to convey basic wants/needs more easily and effectively. Currently, pt is functioning at min A for comprehension, problem solving and memory and mod A for expression. Plan this week to continue to attempt to establish a more functional mode of communication which is both effective and easy for pt to utilize to support his communication attempts with staff. Recommending brief follow up skilled SLP services to continue to support pt in building a more functional communication modality to improve effectiveness of communication and to ease frustrations with communication attempts.     Update week of 7/22/2024: Pt continues to be seen for skilled SLP services focusing on language and communication skills with primary reason for consultation to support communication needs at this time. Pt remains with deficits in expressive and receptive language skills,which is pt's baseline prior to admission to this unit, but which impact all domains of language (auditory & reading comprehension; verbal & written expression). This week, sessions have focused on introducing a high tech communication device as per pt's request. Pt's friend provided a tablet and recent session has focused on exploring different communication apps which fit pt's needs  (function, cost, etc), as well as apps which pt can then complete language tasks in his free time. Pt is demonstrating desire to utilize device, but will benefit from additional training and practice with device, especially due to continued deficits in comprehension, ST memory/recall, problem solving and attention. This week, plan to continue to focus on determining appropriate apps for language drills/activities, as well as identifying an appropriate alejandra to support expressive language/communication, while also programming alejandra for pt's specific needs. Currently, pt is functioning at mod A for expression, comprehension, problem solving and memory and supervision for social interaction. Recommending short term follow up of skilled SLP services to continue to support pt in building a more functional communication modality to improve effectiveness of communication and to ease frustrations with communication attempts during acute rehab stay. Will also benefit from engaging pt's friend in education/training with device.     Nursing Notes:  Appetite: Good  Diet Type: Cardiac                      Diet Patient/Family Education Complete: No    Type of Wound (LDA): Wound                    Type of Wound Patient/Family Education: No  Bladder: Incontinent (purewick at night)     Bladder Patient/Family Education: No  Bowel: Continent     Bowel Patient/Family Education: No  Pain Location/Orientation: Location: Head  Pain Score: 0                       Hospital Pain Intervention(s): Repositioned, Medication (See MAR)  Pain Patient/Family Education: No       63 y.o. male who  has a past medical history of Acute lower limb ischemia (06/08/2024), Anxiety, Depression, HIV disease ([facility]), Substance abuse ([facility]), and Suicide attempt ([facility]). who [facility] for increased LLE swelling and pain and was found to have a left external iliac artery occlusion and acute limb ischemia.  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 Patient had TTE on 6/10/24 showing LV apex thrombus. On 6/11/24 patient had pharmacologic nuclear stress test/SPECT scan which did not show any significant areas of ischemia with EF 40-45% and recommended continuing A/C for LV apical thrombus. His course was complicated by b/l buttock unstageable pressure ulcers, urinary and fecal incontinence, pain, and significant decline in ADLs and mobility. Patient has been continued on Xarelto for anticoagulation, as well as ASA and statin. Patient has a history of TBI, with baseline nonfluent aphasia and forgetfulness. He was evaluated by neuropsychology on 6/27/24, and deemed to not have capacity to make fully informed medical decisions. Therefore, the guardianship process was initiated as of 7/5/24. He [facility] 7[facility]6.     7/15: Will encourage independence with ADLs and monitor labs and vital signs.  We will educate pt/family about repositioning to prevent skin breakdown.  We will assist w repositioning and perform routine skin checks.  We will monitor for adequate pain control.  We will monitor for constipation and medicate pt as ordered. We will provide pt and family DM education. We will increase safety awareness and keep pt free from falls.    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 Noted on echocardiogram 6/10/2024: spherical 1.5 x 1.3 cm thrombus at the LV apex Initiated on AC with Xarelto, continue. Rx sent to [facility] [employer] for price check on 7/10/24 - LENY spoke with [employer] and pt has rx coverage. Information sent to [employer].  Today on 7/21/24, d/w [employer] and Xarelto is not covered and he would have to pay OOP @$700.00.  CM to further address tomorrow 7/22/24.  He may have to be switched to Coumadin.     Will  continue to encourage independence with ADLs and monitor labs and vital signs. We will educate pt/family about repositioning to prevent skin breakdown.  We will assist w repositioning and perform routine skin checks.  We will monitor for adequate pain control.  We will monitor for constipation and medicate pt as ordered. We will provide pt and family DM education. We will increase safety awareness and keep pt free from falls.      Case Management:     Discharge Planning  Living Arrangements: Other (Comment)  Support Systems: Son, Family members  Assistance Needed: Family members are currently arranging housing for pt after d/c  Type of Current Residence: Other (Comment)  Current Home Care Services: No  7/23- CM continues to follow with d/c planning needs. Process of pursuing guardianship as well as sending SNF referrals. CM continues to work with acute care CM to assist with d/c planning process.         Is the patient actively participating in therapies? yes  List any modifications to the treatment plan: None  Barriers Interventions   incontinence Condom cath at night   Sacral wounds  Triad paste, wound care following, p500, roho cushion, offloading   Expressive aphasia Functional communication, use of ipad   Residual limb care Wrapping education, nursing monitoring for signs of infection    Dispo planning  Several meetings and coordination      Is the patient making expected progress toward goals? yes  List any update or changes to goals: None    Medical Goals: Patient will be medically stable for discharge to [facility] upon completion of rehab program and Patient will be able to manage medical conditions and comorbid conditions with medications and follow up upon completion of rehab program    Weekly Team Goals:   Rehab Team Goals  ADL Team Goal: Patient will require supervision with ADLs with least restrictive device upon completion of rehab program  Transfer Team Goal: Patient will require  supervision with transfers with least restrictive device upon completion of rehab program  Locomotion Team Goal: Patient will require supervision with locomotion with least restrictive device upon completion of rehab program  Cognitive Team Goal: Patient will return to premorbid level of cognitive activity upon completion of rehab program    Discussion: Pt functioning overall transfers close sup with cuues, adls needs minimal assistance for  and toileting. Pt has met goals set for acute rehab stay. Distance sup for wc to mod I.     Team recommending dc back to acute Friday.     Anticipated Discharge Date:  dc back to acute Friday 7/26   [facility] Team Members Present:  The following team members are supervising care for this patient and were present during this Weekly Team Conference.    Physician: Dr. Omari MD  : Berta George MSW  Registered Nurse: Regi Hackett RN  Physical Therapist: Delfina Renner DPT  Occupational Therapist: Katia Morfin MS, OTR/L  Speech Therapist: Erin Roe MS, CCC-SLP

## 2024-07-23 NOTE — PROGRESS NOTES
"Progress Note - Wound   Sunil Patel 63 y.o. male MRN: [id number]  Unit/Bed#: [facility] 451-01 Encounter: [id number]      Assessment:  Pressure injury of right buttock, stage 3   Ambulatory dysfunction  L AKA      Plan:  Right buttock stage 3 pressure injury is improved on exam, appears partial thickness and is decreased in size. Patient with less incontinence, will recommend to change wound management to foam dressing. Change every other day and PRN.  No clinical s/s of infection present  Recommend to continue with preventative nursing skin care measures in place  Pressure relief- offloading of pressure with turning/repositioning as patient medically tolerates, heel elevation, foam wedges for offloading/repositioning, and waffle cushion to chair.  Nutrition is following  Patient verbalized understanding of plan of care.  Epic secure chat wound care team with questions or concerns.   Routine wound care follow-up while admitted. AVS updated.       Subjective:  Wound care to see patient for follow-up visit of right buttock pressure injury. Patient cooperative and agreeable for the assessment, seen OOB in recliner chair on offloading seating cushion. Patient offers no wound related complaints, states it is feeling better. Nursing reports improved continence. Denies fever, chills, or increased pain related to the wound.     Objective:      Vitals: Blood pressure 119/75, pulse 91, temperature 98.2 °F (36.8 °C), temperature source Oral, resp. rate 18, height 5' 10\" (1.778 m), weight 76.6 kg (168 lb 14 oz), SpO2 93%.,Body mass index is 24.23 kg/m².    Focused Physical Exam:  Right buttock stage III pressure injury presents as partial-thickness skin loss with 100% moist pink/red scattered tissue.  Open aspects measured together.  Edges fragile attached without maceration.  Wound is producing scant amount of bloody drainage.  Periwound is intact with fragile freshly healed epithelialized pink skin and blanchable hyperpigmented " "skin.    No induration, fluctuance, odor, warmth/temperature differences, redness, or purulence noted to the above mentioned wounds and skin areas assessed. New dressings applied as noted above. Patient tolerated assessment well- denies pain and no s/s of non-verbal pain or discomfort observed during the encounter.        Lab, Imaging and other studies: I have personally reviewed pertinent reports.        Wound 06/08/24 Pressure Injury Buttocks Bilateral (Active)   Wound Image   07/23/24 1122   Wound Length (cm) 1 cm 07/23/24 1122   Wound Width (cm) 0.5 cm 07/23/24 1122   Wound Depth (cm) 0.1 cm 07/23/24 1122   Wound Surface Area (cm^2) 0.5 cm^2 07/23/24 1122   Wound Volume (cm^3) 0.05 cm^3 07/23/24 1122   Calculated Wound Volume (cm^3) 0.05 cm^3 07/23/24 1122   Change in Wound Size % 99.62 07/23/24 1122             Total time spent today:    Total time (face-to-face and non-face-to-face) spent on today's visit was 22 minutes. This includes preparation for the visits (previous wound care notes/images from 7/16/24, and SLIM note from 7/23/24) performance of a medically appropriate history and examination, and orders for medications/treatments or testing.  Discussed assessment findings, and plan of care/recommendations with patients RN.      CHARLIE Green, DANDREP-C, CWON      Portions of the record may have been created with voice recognition software.  Occasional wrong word or \"sound a like\" substitutions may have occurred due to the inherent limitations of voice recognition software.  Read the chart carefully and recognize, using context, where substitutions have occurred      "

## 2024-07-23 NOTE — PROGRESS NOTES
Physical Medicine and Rehabilitation Progress Note  Sunil Patel 63 y.o. male MRN: [id number]  Unit/Bed#: [facility] 451-01 Encounter: [id number]    To Review: Sunil Patel is a 63 y.o. male who  has a past medical history of Acute lower limb ischemia (06/08/2024), Anxiety, Depression, HIV disease (HCC), Substance abuse (HCC), and Suicide attempt (HCC). who presented to the [facility] on 6/7/24 from halfway for increased LLE swelling and pain and was found to have a left external iliac artery occlusion and acute limb ischemia. 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 Patient had TTE on 6/10/24 showing LV apex thrombus. On 6/11/24 patient had pharmacologic nuclear stress test/SPECT scan which did not show any significant areas of ischemia with EF 40-45% and recommended continuing A/C for LV apical thrombus. His course was complicated by b/l buttock unstageable pressure ulcers, urinary and fecal incontinence, pain, and significant decline in ADLs and mobility. Patient has been continued on Xarelto for anticoagulation, as well as ASA and statin. Patient has a history of TBI, with baseline nonfluent aphasia and forgetfulness. He was evaluated by neuropsychology on 6/27/24, and deemed to not have capacity to make fully informed medical decisions. Therefore, the guardianship process was initiated as of 7/5/24. He was admitted to the [facility] on 7/6.     Chief Complaint: Headache and frustration with his circumstances    Interval History/Subjective:  No acute overnight events. Patient reports that his headache is improved this morning. He notes that he doesn't know if the gabapentin is helping with his phantom limb sensation. He denies any LLE pain at this time. This morning, he is ultimately frustrated  with his living situation and the circumstances around his dispo planning. He does endorse frustration with repetitive activities and states that he would like to go outside for a change of scenery. He states he wants to have a cigarette outside, but was informed that he cannot smoke on hospital grounds. He was offered a nicotine gum or patch, but patient refused. Otherwise, patient denies any fevers, chills, chest pain, sob, abdominal pain, nausea or vomiting. Further discussion at Teams meeting today addressed barriers to discharge as assistance with ADLs and safety concerns with lateral leans, as well as housing situation.    ROS:  10 point review of systems is otherwise negative except for what is noted above.    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 Underwent L femoral thrombectomy and AKA with vascular surgery   - [facility] sx follow-up 7/10 - L AKA incision site well-healed, staples taken out at the bedside this morning  - [facility] will be vendor - Patient now has .  - Monitor for hip flexion/abduction tightness. Stretches in therapy  - On Rivaroxaban with hx of LV thrombus and PAOD   - PT/OT/SLP (to work on carry over strategies) 3-5 hours/day, 5-7 days/week.    - Goals are Ind-Sup at a wheelchair level.   - Outpatient f/u with [facility] and Vascular  - Continue patient training with  placement  - Can wean off gabapentin as patient does not feel that it is helping with phantom limb sensation. Monitor need for topicals    Neurocognitive disorder  Assessment & Plan  Has been appropriate and cooperative throughout this stay without any behavioral issues on the rehab unit to date.   Hx of TBI and L MCA CVA, psychiatric  "d/o, seizure d/o  - Neuropsych assessment 6/27/24 - \"diffuse cognitive dysfunction and on a measure assessing awareness of personal health status and ability to evaluate health problems, handle medical emergencies and take safety precautions, patient performed in the IMPAIRED range of functioning. During this encounter, patient does not appear to have capacity to make fully informed medical decisions.\"  MMSE 4/28 - severely impaired  - Guardianship process initiated on acute - follow-up by CM/Admin  - Status: some expressive and possible some receptive aphasia.  He can be difficult to follow at times likely due to a mixture of aphasia, tangentiality, mild lability, and impaired memory; lability with hx of possible bipolar d/o  - Neuropsych Med review: Depakote, Lamictal, Remeron, Zoloft, Melatonin, gabapentin, PRN oxy 2.5mg TID   - Monitor neuro-exam, wakefulness, mood, cognition, insight into deficits and safety awareness   - Monitor and ensure optimal management electrolytes, nutrition, and hydration  - Monitor for signs or symptoms of infection, medication intolerances, other systemic etiologies  - Additional labs, imaging, specialist follow-up as needed per primary team currently   - Overstimulation precautions, frequent re-orientation, re-direction, re-assurance  - Optimal mood, pain, and sleep management  - If impaired sleep or behavior recommend sleep log and agitation monitoring    - Limit sedating medications when possible  - Fall precautions - if needed increase rounding or consider virtual sitter or in-person sitter  - For routine restlessness, anxiety, irritability focus on non-pharmacologic management    - Hold benzo's with increased risk paradoxical reaction and possibility of limiting cognitive recovery  - Continue SLP and interdisciplinary care  - OP neuro and PCP   7/23- impaired attention after his first shower in the unit. Consistent with cognitive fatigue. Also increased frustration given his " "housing circumstances. Could benefit from more outdoor activities    Adjustment disorder with mixed anxiety and depressed mood  Assessment & Plan  - Related to recent release from incarceration, new AKA and uncertainty of future disposition, all in the setting of impaired cognition related to prior strokes and TBI  - Behavioral techniques for symptom management  - Neuropsychology consult as available  - Given his circumstances, increased frustration is expected. Can consider Psych consult if symptoms continue to worsen to adjust mood stabilizing medications. Will try nonpharmacologic approaches first with more frequent conversations/redirections     Pressure injury of buttock, stage 3 (formerly [facility])  Assessment & Plan  Stable   - Wound care consulted and following weekly  Per wound care specialist 7/15  - POA L buttock pressure injury unstageable has healed.  - POA R buttock pressure injury is now Stage 3, and improving.   - Cleanse sacro-buttocks with soap and water. Apply Triad Paste to Sacro-Buttocks Wound Beds Only. Apply Hydraguard to Elsi-wounds and all other intact aspects of sacro-buttocks. Apply both creams TID and PRN episodes of incontinence.    - Recommend ROHO cushion in chair when out of bed instead   - Preventative hydraguard to bilateral heels BID and PRN.   - P500  - Monitor clinically for breakdown, frequent turns  - 7/23: wound appears well healing with no signs of active infection. Continue local wound care. Continue lateral leans in therapy as tolerated      Patient incapable of making informed decisions  Assessment & Plan  - History of remote TBI and prior strokes with comorbid psychiatric history.  - Evaluated by neuropsychology, Dr. Dilshad Tatum, PhD on 6/27/24, found to have \"diffuse cognitive dysfunction and on a measure assessing awareness of personal health status and ability to evaluate health problems, handle medical emergencies and take safety precautions, patient performed in the IMPAIRED " "range of functioning. During this encounter, patient does not appear to have capacity to make fully informed medical decisions.\"  - Court-appointed guardianship process has been initiated by acute care CM Katia Kay.    - We have identified two friends who are interested in being patient's guardians and have been involved and invested in patient's care on the ARC.   - They will need to be interviewed by the  involved in this process.    - He has to undergo his hearing on 8/6/2024 first.    - He would ultimately benefit from [facility]/nursing home/memory care unit - he does very well with structure and supervision.  Teams 7/23- Ongoing discussions with case management and social work to dispo patient to stable long-term housing. Consideration for discharge back to acute on Friday    Urinary incontinence  Assessment & Plan  - Did have some incontinence on acute - improved with timed voids and consistent assistance with his urinal  - Described as urgency  - monitor for retention, incontinence (including overflow incontinence), signs/symptoms of UTI  - Timed Voids and PVRs Q4hrs initiated earlier. And PVR <150 x3, so scans discontinued.    - He has some difficulty managing the urinal    - needs assistance but is able to transfer to [facility]  - Still uses condom catheter at night, in part for continence care/to protect his skin given his buttock wounds.  - Continue timed voids          At risk for constipation  Assessment & Plan  - Stooling adequately recently; did have some incontinence on acute now improved  - Close continent care given buttock wounds  - Last BM 7/22 and continent  - Continue Miralax, senna, Ducolax PRN    Acute pain  Assessment & Plan  Controlled   - Tylenol 975 mg q8h PRN  - Oxycodone 2.5 mg q8h PRN, wean as possible   - Last used 7/19.    - Can discontinue at discharge.  - Gabapentin 100 mg QHS currently - patient does not know if this is helping with his phantom limb sensation. Denies any phantom " limb pain.    -can take off gabapentin and re-assess phantom limb sensation. If symptoms worsen, we can restart    Impaired mobility and activities of daily living  Assessment & Plan  - Rehabilitation medicine physician for daily monitoring of care, 24 hour availability for acute medical issues, medication management, and therapeutic and diagnostic assessments.  - 24 hour rehabilitation nursing 7 days per week for: management/teaching of medications, bowel/bladder routine, skin care.  - PT, OT for 2-3 hours per day, 5 to 6 days per week; 15 hours per week  - Rehabilitation Psychology as needed for adjustment and coping  - MSW for barriers to discharge, community resources, and family support  - Discharge planning following to help ensure a safe and efficient discharge  -7/23 teams: Biggest ADL barriers per OT are toileting and applying  to LLE        Traumatic brain injury (HCC)  Assessment & Plan  See neurocog impairment     Cardiomyopathy (HCC)  Assessment & Plan  ECHO on 6/10/2024 showed LVEF 40-45% with mild global hypokinesis   Status post NM stress test on 6/11/2024 which showed: a large, mild, fixed defect in the inferior wall, possibly due to diaphragmatic attenuation artifact, there is a small area of partial reversibility in the inferior apical wall suggestive of ischemia    Elevated by cardiology, etiology felt to be possibly secondary to stress-induced cardiomyopathy, with apical thrombus  Cardiac catheterization deferred given the lack of any significant ischemia, and no current cardiac symptoms  Continue with medical management with aspirin, statin, beta-blocker, ARB  Monitor volume status, remains euvolemic off of diuretics   Outpatient follow-up with cardiology    At risk for venous thromboembolism (VTE)  Assessment & Plan  - On rivaroxaban    Carnitine deficiency (HCC)  Assessment & Plan  - Carnitine replacement  - Appreciate medicine management      History of seizures  Assessment & Plan  -  Depakote ER, lamotrigine, zonisamide  - Outpatient follow-up with [facility] neurology    Left ventricular thrombus  Assessment & Plan  - Visualized on echocardiogram on 6/10/24: spherical 1.5 x 1.3 cm thrombus at the LV apex.   - Rivaroxaban  - Outpatient follow-up with cardiology    Benign essential hypertension  Assessment & Plan  - Toprol, losartan  - Appreciate medicine management    History of stroke  Assessment & Plan  - History of old left temporal and parietal lobe cortical infarcts, also involving the insula and left occipital lobe as well as small right posterior parietal lobe. Stable on most recent CT head on 5/16/24.   - ASA, Xarelto and statin for secondary prevention  - Optimal BP control  - Monitor neuro exam - hx of LV thrombus   - OP neuro follow-up     Human immunodeficiency virus (HIV) infection (HCC)  Assessment & Plan  - Continue anti-retroviral treatment  - Appreciate medicine management during ARC course  - OP ID follow-up       Bipolar affective disorder (HCC)  Assessment & Plan  Mood acceptable; continue meds as outlined   Supportive counseling  NeuroPsychology consult while in ARC if available for support  Counseled on and continue to encourage deep breathing/relaxation/behavioral management techniques  - Mirtazapine 15 mg qHs  - Will consult Psych before increasing Sertraline for better mood regulation  - Lamictal 50mg BID  - Depakote ER 1250mg qday   - Outpatient psychiatry follow-up      Health Maintenance  #Skin/Pressure Injury Prevention: Turn Q2hr in bed, with weight shifts [id number] in wheelchair.  #GI Prophylaxis: Not indicated   #Code Status: Full Code  #FEN: Cardiac diet, Ensure Max Vanilla with breakfast, and chocolate at dinner   #Dispo: Team 7/23: Anticipate he will be meeting goals in a week or so. In the process of getting guardianship, but we may have identified some friends who may be candidates to be guardians. Would need to be vetted by court system. He would ultimately  benefit from a structured environment (memory care/penitentiary), but would likely plan to transition to [facility]/SNF to start to see him through his AKA recovery and prosthetic process.   Follow-up: PMR, Vascular Surgery, Psychiatry, ID, PCP    Objective:    Functional Update:  PT:  Sup-CGA bed mobility/transfers, Deshawn and distance supervision with WC  OT: Min  application, Min toileting  SLP: mod comprehension, expression, problem solving, and Ind with expression    Allergies per EMR    Physical Exam:  Temp:  [97.8 °F (36.6 °C)-98.4 °F (36.9 °C)] 97.8 °F (36.6 °C)  HR:  [88-91] 88  Resp:  [17-19] 17  BP: (105-119)/(61-75) 114/64  Oxygen Therapy  SpO2: 97 %      Physical Exam  Vitals reviewed.   Constitutional:       Appearance: Normal appearance.   HENT:      Head: Normocephalic and atraumatic.      Right Ear: External ear normal.      Left Ear: External ear normal.      Nose: Nose normal.      Mouth/Throat:      Mouth: Mucous membranes are moist.      Pharynx: Oropharynx is clear.   Eyes:      Extraocular Movements: Extraocular movements intact.      Conjunctiva/sclera: Conjunctivae normal.      Pupils: Pupils are equal, round, and reactive to light.   Cardiovascular:      Rate and Rhythm: Normal rate and regular rhythm.      Pulses: Normal pulses.   Pulmonary:      Effort: Pulmonary effort is normal.      Breath sounds: Normal breath sounds.   Abdominal:      General: Bowel sounds are normal.   Musculoskeletal:      Comments: + L AKA with  in place  L hip adduction, abduction 4+/5   Skin:     General: Skin is warm and dry.      Capillary Refill: Capillary refill takes less than 2 seconds.   Neurological:      Mental Status: He is alert.      Cranial Nerves: No cranial nerve deficit.   Psychiatric:      Comments: Impaired attention. And mildly increased frustration today        MMT:   Strength:   Right  Left  Site  Right  Left  Site    4 5  S Ab: Shoulder Abductors  4  4+  HF: Hip Flexors    5 5  EF: Elbow  Flexors  5  - KF: Knee Flexors    5  5  EE: Elbow Extensors  5  -  KE: Knee Extensors    5  5  WE: Wrist Extensors  5  - DR: Dorsi Flexors    5  5  FF: Finger Flexors  5  -  PF: Plantar Flexors    5  5  HI: Hand Intrinsics  5  -  EHL: Extensor Hallucis Longus   Psych: Normal mood and affect.     Diagnostic Studies:   Reviewed, no new imaging    Laboratory:  Reviewed, no new labs  Results from last 7 days   Lab Units 07/18/24  1003   HEMOGLOBIN g/dL 11.0*   HEMATOCRIT % 36.3*   WBC Thousand/uL 7.52       Results from last 7 days   Lab Units 07/18/24  1003   BUN mg/dL 18   POTASSIUM mmol/L 3.9   CHLORIDE mmol/L 105   CREATININE mg/dL 0.88              Patient Active Problem List   Diagnosis    Bipolar affective disorder (HCC)    Substance abuse (HCC)    Human immunodeficiency virus (HIV) infection (HCC)    History of stroke    Benign essential hypertension    Positive laboratory testing for human immunodeficiency virus (HCC)    Hypertension    Unspecified vitamin D deficiency    Tobacco abuse    Cerebrovascular accident (CVA) (HCC)    Left ventricular thrombus    History of seizures    Carnitine deficiency (HCC)    Above-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)    Traumatic brain injury (HCC)    Impaired mobility and activities of daily living    At risk for venous thromboembolism (VTE)    Acute pain    At risk for constipation    Urinary incontinence    Patient incapable of making informed decisions    Pressure injury of buttock, stage 3 (HCC)    Adjustment disorder with mixed anxiety and depressed mood    Neurocognitive disorder    Cardiomyopathy (HCC)    Episodic headache         Medications  Current Facility-Administered Medications   Medication Dose Route Frequency Provider Last Rate    acetaminophen  975 mg Oral TID PRN José Salcido MD      aspirin  81 mg Oral Daily Lorrie Barillas PA-C      atorvastatin  80 mg Oral Daily With Dinner Lorrie Barillas PA-C       bictegravir-emtricitab-tenofovir alafenamide  1 tablet Oral Daily With Breakfast Lorrie Barillas PA-C      bisacodyl  10 mg Rectal Daily PRN Lorrie Barillas PA-C      diphenhydrAMINE  25 mg Oral Q6H PRN Lorrie Barillas PA-C      divalproex sodium  1,250 mg Oral Daily Lorrie Barillas PA-C      dolutegravir  50 mg Oral Daily Lorrie Barillas PA-C      gabapentin  100 mg Oral HS Ashley Depadua, MD      lamoTRIgine  50 mg Oral BID Lorrie Barillas PA-C      levOCARNitine  1,000 mg/day Oral TID With Meals Lorrie Barillas PA-C      losartan  50 mg Oral Daily Lorrie Barillas PA-C      melatonin  6 mg Oral HS Lorrie Barillas PA-C      metoprolol succinate  25 mg Oral Daily CHARLIE Mcclelland      mirtazapine  15 mg Oral HS Lorriejacky Barillas PA-C      ondansetron  4 mg Oral Q6H PRN Lorrie Barillas PA-C      oxyCODONE  2.5 mg Oral Q8H PRN Raimundo Riley MD      polyethylene glycol  17 g Oral Daily PRN Lorrie Barillas PA-C      rivaroxaban  20 mg Oral Daily With Dinner Lorrie Barillas PA-C      senna  1 tablet Oral HS PRN Lorrie Barillas PA-C      sertraline  75 mg Oral Daily Lorrie Barillas PA-C      tamsulosin  0.4 mg Oral Daily With Dinner Lorrie Barillas PA-C      zonisamide  400 mg Oral Daily Lorrie Barillas PA-C            ** Please Note: Fluency Direct voice to text software may have been used in the creation of this document. **

## 2024-07-23 NOTE — ASSESSMENT & PLAN NOTE
Remote history of TBI, previously residing in a group home prior to skilled nursing sentence.  Evaluated by neuropsychiatry on 6/27/24 and deemed NOT to have medical decision-making capacity  Process for court appointed guardian started on 7/5/24 - CM following   Respectful

## 2024-07-23 NOTE — ASSESSMENT & PLAN NOTE
Noted on echocardiogram 6/10/2024: spherical 1.5 x 1.3 cm thrombus at the LV apex   Initiated on AC with Xarelto, continue  Rx sent to [employer] for price check on 7/10/24 - LENY spoke with [employer] and pt has rx coverage. Information sent to [facility].  Today on 7/21/24, d/w [facility] and Xarelto is NOT covered and he would have to pay OOP @$700.00.  CM to further address.

## 2024-07-23 NOTE — ASSESSMENT & PLAN NOTE
Home regimen: Losartan 50mg daily, Toprol XL 25 mg BID  Current regimen: Losartan 50 mg daily, Toprol-XL 25 mg daily  Did miss both 7/19 for  = made no changes and he has gotten both the last 2 days  Borderline did receive medications today . Pt denies dizziness or lightheadedness

## 2024-07-23 NOTE — PLAN OF CARE
Problem: PAIN - ADULT  Goal: Verbalizes/displays adequate comfort level or baseline comfort level  Description: Interventions:  - Encourage patient to monitor pain and request assistance  - Assess pain using appropriate pain scale  - Administer analgesics based on type and severity of pain and evaluate response  - Implement non-pharmacological measures as appropriate and evaluate response  - Consider cultural and social influences on pain and pain management  - Notify physician/advanced practitioner if interventions unsuccessful or patient reports new pain  Outcome: Progressing     Problem: INFECTION - ADULT  Goal: Absence or prevention of progression during hospitalization  Description: INTERVENTIONS:  - Assess and monitor for signs and symptoms of infection  - Monitor lab/diagnostic results  - Monitor all insertion sites, i.e. indwelling lines, tubes, and drains  - Monitor endotracheal if appropriate and nasal secretions for changes in amount and color  - [place] appropriate cooling/warming therapies per order  - Administer medications as ordered  - Instruct and encourage patient and family to use good hand hygiene technique  - Identify and instruct in appropriate isolation precautions for identified infection/condition  Outcome: Progressing  Goal: Absence of fever/infection during neutropenic period  Description: INTERVENTIONS:  - Monitor WBC    Outcome: Progressing     Problem: SAFETY ADULT  Goal: Patient will remain free of falls  Description: INTERVENTIONS:  - Educate patient/family on patient safety including physical limitations  - Instruct patient to call for assistance with activity   - Consult OT/PT to assist with strengthening/mobility   - Keep Call bell within reach  - Keep bed low and locked with side rails adjusted as appropriate  - Keep care items and personal belongings within reach  - Initiate and maintain comfort rounds  - Make Fall Risk Sign visible to staff  - Offer Toileting every 2 Hours,  in advance of need  - Initiate/Maintain bed/chair alarm  - Obtain necessary fall risk management equipment: alarms  - Apply yellow socks and bracelet for high fall risk patients  - Consider moving patient to room near nurses station  Outcome: Progressing  Goal: Maintain or return to baseline ADL function  Description: INTERVENTIONS:  -  Assess patient's ability to carry out ADLs; assess patient's baseline for ADL function and identify physical deficits which impact ability to perform ADLs (bathing, care of mouth/teeth, toileting, grooming, dressing, etc.)  - Assess/evaluate cause of self-care deficits   - Assess range of motion  - Assess patient's mobility; develop plan if impaired  - Assess patient's need for assistive devices and provide as appropriate  - Encourage maximum independence but intervene and supervise when necessary  - Involve family in performance of ADLs  - Assess for home care needs following discharge   - Consider OT consult to assist with ADL evaluation and planning for discharge  - Provide patient education as appropriate  Outcome: Progressing  Goal: Maintains/Returns to pre admission functional level  Description: INTERVENTIONS:  - Perform AM-PAC 6 Click Basic Mobility/ Daily Activity assessment daily.  - Set and communicate daily mobility goal to care team and patient/family/caregiver.   - Collaborate with rehabilitation services on mobility goals if consulted  - Perform Range of Motion 3 times a day.  - Reposition patient every 2 hours.  - Dangle patient 3 times a day  - Stand patient 3 times a day  - Ambulate patient 3 times a day  - Out of bed to chair 3 times a day   - Out of bed for meals 3 times a day  - Out of bed for toileting  - Record patient progress and toleration of activity level   Outcome: Progressing     Problem: DISCHARGE PLANNING  Goal: Discharge to home or other facility with appropriate resources  Description: INTERVENTIONS:  - Identify barriers to discharge w/patient and  caregiver  - Arrange for needed discharge resources and transportation as appropriate  - Identify discharge learning needs (meds, wound care, etc.)  - Arrange for interpretive services to assist at discharge as needed  - Refer to Case Management Department for coordinating discharge planning if the patient needs post-hospital services based on physician/advanced practitioner order or complex needs related to functional status, cognitive ability, or social support system  Outcome: Progressing     Problem: Prexisting or High Potential for Compromised Skin Integrity  Goal: Skin integrity is maintained or improved  Description: INTERVENTIONS:  - Identify patients at risk for skin breakdown  - Assess and monitor skin integrity  - Assess and monitor nutrition and hydration status  - Monitor labs   - Assess for incontinence   - Turn and reposition patient  - Assist with mobility/ambulation  - Relieve pressure over bony prominences  - Avoid friction and shearing  - Provide appropriate hygiene as needed including keeping skin clean and dry  - Evaluate need for skin moisturizer/barrier cream  - Collaborate with interdisciplinary team   - Patient/family teaching  - Consider wound care consult   Outcome: Progressing

## 2024-07-23 NOTE — PROGRESS NOTES
"   07/23/24 6295   Pain Assessment   Pain Assessment Tool 0-10   Pain Score No Pain   Restrictions/Precautions   Precautions Aphasia;Bed/chair alarms;Cognitive;Fall Risk;Impulsive;Seizure;Supervision on toilet/commode   Comprehension   Comprehension (FIM) 3 - Understands basic info/conversation 50-74% of time   Expression   Expression (FIM) 3 - Expresses basic info/needs 50-74% of time   Social Interaction   Social Interaction (FIM) 5 - Interacts appropriately with others 90% of time   Problem Solving   Problem solving (FIM) 3 - Solves basic problmes 50-74% of time   Memory   Memory (FIM) 3 - Recognizes, recalls/performs 50-74%   Speech/Language/Cognition Assessment   Treatment Assessment Pt participated in skilled SLP session focusing on communication and overall language skills. As SLP entered pt's room, pt was using his electronic tablet device to listen to music. SLP engaged pt in review of communication and language apps which were downloaded yesterday. Pt reported that he had been using the alejandra that we reviewed yesterday, throughout yesterday afternoon and last night. Of note, pt was effectively unlocking pass code on tablet but when navigating tablet pages and apps, demo difficulty in locating what he desired to, requiring verbal cues and eventually physical assist from SLP. SLP then introduced \"Recover Brain\" alejandra, which is free as per pt's request, which is engineered to support pt's with aphasia and cognitive linguistic deficits. SLP explained goal of alejandra and how to navigate back to the home page, as well as where to locate alejandra. When engaging in some of the apps activities and content, pt completed an auditory comprehension task by following directions which contained the target words \"in front\" and \"behind.\" Pt was 2/6 accurate with task, where pt was observed to have good comprehension of identifying the nouns given in sentences by pointing to pictures, but demo significant difficulty with comprehension " "of prepositions. Pt then completed a \"Noticing the Difference in Patterns\" task where pt was given a Fo4 patterns with missing centers and instructed to match the centers to the patterns. Pt accurately matched all Fo4 patterns across 3/6 trials, noting pt to require increased time and min to moderate verbal and visual cuing to match remaining items. Lastly, pt completed a visual integration task when given two different items and requested to identify a design which combined both items from a Fo4. Pt was 2/4 accurate with this task, noting pt to require consistent repetition of instructions, and notably increased time for processing/to elicit responses. Also noted possible R inattention in completing task, benefiting from visual cues to improve scanning. During session, SLP also explored additional communication apps which are compatible with this device, but that are also free of charge, which more so targeted verbal expression supplementation. SLP was able to locate one alejandra at this time, which was explained to pt, however, this alejandra relies primarily on pt's ability to read to identify icons, rather than also having a picture associated with the message. Pt's reading ability post stroke is not yet functional as pt able to verbalize that he was able to easily able to read prior to stroke but since then, has had more difficulty. Plan to continue to teach/educate on use of apps to engage in language tasks independently as pt's dispo is currently unknown, as well as to target use of a communication alejandra to supplement expression. Recommending short term follow up skilled SLP services to continue to support pt in building a more functional communication modality to improve effectiveness of communication and to ease frustrations with communication attempts.    SLP Therapy Minutes   SLP Time In 0830   SLP Time Out 0900   SLP Total Time (minutes) 30   SLP Mode of treatment - Individual (minutes) 30   SLP Mode of treatment - " Concurrent (minutes) 0   SLP Mode of treatment - Group (minutes) 0   SLP Mode of treatment - Co-treat (minutes) 0   SLP Mode of Treatment - Total time(minutes) 30 minutes   SLP Cumulative Minutes 195   Therapy Time missed   Time missed? No

## 2024-07-23 NOTE — PROGRESS NOTES
07/23/24 1205   Pain Assessment   Pain Assessment Tool 0-10   Pain Score No Pain   Restrictions/Precautions   Precautions Aphasia;Bed/chair alarms;Cognitive;Fall Risk;Impulsive;Supervision on toilet/commode   Weight Bearing Restrictions Yes   LLE Weight Bearing Per Order NWB   Roll Left and Right   Type of Assistance Needed Supervision   Roll Left and Right CARE Score 4   Sit to Lying   Type of Assistance Needed Supervision   Sit to Lying CARE Score 4   Lying to Sitting on Side of Bed   Type of Assistance Needed Supervision   Comment needed extra time and multiple trys without bedrails   Lying to Sitting on Side of Bed CARE Score 4   Sit to Stand   Type of Assistance Needed Incidental touching   Comment CG for safety -pulling at grab bar   Sit to Stand CARE Score 4   Bed-Chair Transfer   Type of Assistance Needed Supervision   Comment sit pivot - close S for safety   Chair/Bed-to-Chair Transfer CARE Score 4   Walk 10 Feet   Reason if not Attempted Safety concerns   Walk 10 Feet CARE Score 88   Walk 50 Feet with Two Turns   Reason if not Attempted Safety concerns   Walk 50 Feet with Two Turns CARE Score 88   Walk 150 Feet   Reason if not Attempted Safety concerns   Walk 150 Feet CARE Score 88   Walking 10 Feet on Uneven Surfaces   Reason if not Attempted Safety concerns   Walking 10 Feet on Uneven Surfaces CARE Score 88   Ambulation   Does the patient walk? 0. No, and walking goal is not clinically indicated.   Wheel 50 Feet with Two Turns   Type of Assistance Needed Independent   Wheel 50 Feet with Two Turns CARE Score 6   Wheel 150 Feet   Type of Assistance Needed Independent   Wheel 150 Feet CARE Score 6   Wheelchair mobility   Does the patient use a wheelchair? 1. Yes   Type of Wheelchair Used 1. Manual   Distance Level Surface (feet) 300 ft   Findings pt doing well with self propel,  due to vision sometimes has some difficulty with arm rest of chair-   Curb or Single Stair   Reason if not Attempted Safety  concerns   1 Step (Curb) CARE Score 88   4 Steps   Reason if not Attempted Safety concerns   4 Steps CARE Score 88   12 Steps   Reason if not Attempted Safety concerns   12 Steps CARE Score 88   Toilet Transfer   Type of Assistance Needed Supervision   Toilet Transfer CARE Score 4   Therapeutic Interventions   Strengthening Standing in llbars about 2 min incriments x4  with AROM 12x4 LLE hip flex, abd, and ext.  Prone L hip ext arom,  R sidelying L hip abd with some ext,  supine mini bridgees over bolster   Equipment Use   NuStep L4 15mins  no LLE SPM 40s   Assessment   Treatment Assessment 90 min skilled PT focused on amputee education  with focus on standing bouts, LLE hip strengthening, sit pivot transfers.  Session started with pt needing to use commode (was unsafe while standing at grab bar trying to manage pants himself).  Performed Nustep for conditioning, pt showing improvement with sit pivot xfers (needing less cues) but times some forgetfulness with arm rest.  Performed standing bouts and AROM on L hip (would add resistance next session).  Finished session with mat program for strengthening and stretching in prone.  Overall pt continues to show improvement but still lacks some insight and not safe at times.  Cont skilled PT toward LTGs   Problem List Decreased strength;Decreased range of motion;Decreased endurance;Impaired balance;Decreased mobility;Decreased coordination;Decreased cognition;Impaired judgement;Decreased safety awareness   PT Barriers   Functional Limitation Car transfers;Standing;Transfers;Wheelchair management   Plan   Progress Progressing toward goals   PT Therapy Minutes   PT Time In 1205   PT Time Out 1335   PT Total Time (minutes) 90   PT Mode of treatment - Individual (minutes) 90   PT Mode of treatment - Concurrent (minutes) 0   PT Mode of treatment - Group (minutes) 0   PT Mode of treatment - Co-treat (minutes) 0   PT Mode of Treatment - Total time(minutes) 90 minutes   PT  Cumulative Minutes 1557   Therapy Time missed   Time missed? No

## 2024-07-24 PROCEDURE — 97535 SELF CARE MNGMENT TRAINING: CPT

## 2024-07-24 PROCEDURE — 99232 SBSQ HOSP IP/OBS MODERATE 35: CPT | Performed by: PHYSICAL MEDICINE & REHABILITATION

## 2024-07-24 PROCEDURE — 99231 SBSQ HOSP IP/OBS SF/LOW 25: CPT | Performed by: NURSE PRACTITIONER

## 2024-07-24 PROCEDURE — 97110 THERAPEUTIC EXERCISES: CPT

## 2024-07-24 PROCEDURE — 97530 THERAPEUTIC ACTIVITIES: CPT

## 2024-07-24 RX ORDER — DOCUSATE SODIUM 100 MG/1
100 CAPSULE, LIQUID FILLED ORAL 2 TIMES DAILY
Status: DISCONTINUED | OUTPATIENT
Start: 2024-07-24 | End: 2024-08-07

## 2024-07-24 RX ORDER — GABAPENTIN 100 MG/1
100 CAPSULE ORAL EVERY 8 HOURS
Status: DISCONTINUED | OUTPATIENT
Start: 2024-07-24 | End: 2024-08-30 | Stop reason: HOSPADM

## 2024-07-24 RX ADMIN — ASPIRIN 81 MG: 81 TABLET, COATED ORAL at 08:34

## 2024-07-24 RX ADMIN — DOLUTEGRAVIR SODIUM 50 MG: 50 TABLET, FILM COATED ORAL at 08:33

## 2024-07-24 RX ADMIN — TAMSULOSIN HYDROCHLORIDE 0.4 MG: 0.4 CAPSULE ORAL at 16:07

## 2024-07-24 RX ADMIN — BICTEGRAVIR SODIUM, EMTRICITABINE, AND TENOFOVIR ALAFENAMIDE FUMARATE 1 TABLET: 50; 200; 25 TABLET ORAL at 08:03

## 2024-07-24 RX ADMIN — LAMOTRIGINE 50 MG: 25 TABLET ORAL at 08:34

## 2024-07-24 RX ADMIN — GABAPENTIN 100 MG: 100 CAPSULE ORAL at 14:23

## 2024-07-24 RX ADMIN — LEVOCARNITINE 330 MG: 1 SOLUTION ORAL at 14:23

## 2024-07-24 RX ADMIN — ACETAMINOPHEN 975 MG: 325 TABLET, FILM COATED ORAL at 08:02

## 2024-07-24 RX ADMIN — METOPROLOL SUCCINATE 25 MG: 25 TABLET, EXTENDED RELEASE ORAL at 08:34

## 2024-07-24 RX ADMIN — GABAPENTIN 100 MG: 100 CAPSULE ORAL at 21:25

## 2024-07-24 RX ADMIN — Medication 6 MG: at 21:25

## 2024-07-24 RX ADMIN — RIVAROXABAN 20 MG: 20 TABLET, FILM COATED ORAL at 16:07

## 2024-07-24 RX ADMIN — ACETAMINOPHEN 975 MG: 325 TABLET, FILM COATED ORAL at 17:30

## 2024-07-24 RX ADMIN — ATORVASTATIN CALCIUM 80 MG: 80 TABLET, FILM COATED ORAL at 16:07

## 2024-07-24 RX ADMIN — SERTRALINE HYDROCHLORIDE 75 MG: 50 TABLET ORAL at 08:34

## 2024-07-24 RX ADMIN — LEVOCARNITINE 330 MG: 1 SOLUTION ORAL at 17:31

## 2024-07-24 RX ADMIN — DOCUSATE SODIUM 100 MG: 100 CAPSULE, LIQUID FILLED ORAL at 17:30

## 2024-07-24 RX ADMIN — LAMOTRIGINE 50 MG: 25 TABLET ORAL at 17:30

## 2024-07-24 RX ADMIN — ZONISAMIDE 400 MG: 100 CAPSULE ORAL at 08:39

## 2024-07-24 RX ADMIN — MIRTAZAPINE 15 MG: 15 TABLET, FILM COATED ORAL at 21:25

## 2024-07-24 RX ADMIN — DIVALPROEX SODIUM 1250 MG: 500 TABLET, EXTENDED RELEASE ORAL at 08:33

## 2024-07-24 RX ADMIN — LOSARTAN POTASSIUM 50 MG: 50 TABLET, FILM COATED ORAL at 08:34

## 2024-07-24 RX ADMIN — LEVOCARNITINE 330 MG: 1 SOLUTION ORAL at 08:34

## 2024-07-24 NOTE — PROGRESS NOTES
07/24/24 1105   Pain Assessment   Pain Rating: FLACC (Rest) - Face 1   Pain Rating: FLACC (Rest) - Legs 0   Pain Rating: FLACC (Rest) - Activity 0   Pain Rating: FLACC (Rest) - Cry 0   Pain Rating: FLACC (Rest) - Consolability 0   Score: FLACC (Rest) 1   Restrictions/Precautions   Precautions Bed/chair alarms;Cognitive;Fall Risk;Aphasia;Supervision on toilet/commode;Pain;Seizure   Cognition   Overall Cognitive Status Impaired   Arousal/Participation Cooperative;Arousable   Subjective   Subjective pt woken up from nap and agreeable to PT education therapy but continues to endorse HA without any improvement after pain meds given by RN. RN Teresa notified.   Therapeutic Interventions   Other pt boosted up in bed and set up lunch tray post PT ed.   Assessment   Treatment Assessment Despite ongoing HA pt still participated with Phase 1 AMP education using First Step booklet  with topics included pain management and atlernative techniques, self management, skin inspection & hygiene, Accepting your new body and  Community & networking. PT to cont with functional mobility training next tx session to improve overall functions with dec risk for falls.   Family/Caregiver Present no   PT Therapy Minutes   PT Time In 1105   PT Time Out 1135   PT Total Time (minutes) 30   PT Mode of treatment - Individual (minutes) 30   PT Mode of treatment - Concurrent (minutes) 0   PT Mode of treatment - Group (minutes) 0   PT Mode of treatment - Co-treat (minutes) 0   PT Mode of Treatment - Total time(minutes) 30 minutes   PT Cumulative Minutes 1587   Therapy Time missed   Time missed? No

## 2024-07-24 NOTE — PROGRESS NOTES
"   07/24/24 1235   Pain Assessment   Pain Location/Orientation Location: Head   Pain Rating: FLACC (Rest) - Face 1   Pain Rating: FLACC (Rest) - Legs 0   Pain Rating: FLACC (Rest) - Activity 0   Pain Rating: FLACC (Rest) - Cry 0   Pain Rating: FLACC (Rest) - Consolability 0   Score: FLACC (Rest) 1   Pain Rating: FLACC (Activity) - Face 1   Pain Rating: FLACC (Activity) - Legs 0   Pain Rating: FLACC (Activity) - Activity 1   Pain Rating: FLACC (Activity) - Cry 1   Pain Rating: FLACC (Activity) - Consolability 1   Score: FLACC (Activity) 4   Restrictions/Precautions   Precautions Aphasia;Bed/chair alarms;Cognitive;Fall Risk;Pain;Seizure;Supervision on toilet/commode   Weight Bearing Restrictions Yes   LLE Weight Bearing Per Order NWB   ROM Restrictions No   Braces or Orthoses Other (Comment)  (L AKA )   Lifestyle   Autonomy \"I am scared.\"   Oral Hygiene   Type of Assistance Needed Set-up / clean-up   Physical Assistance Level No physical assistance   Comment seated in recliner.   Oral Hygiene CARE Score 5   Shower/Bathe Self   Type of Assistance Needed Supervision;Set-up / clean-up;Verbal cues   Physical Assistance Level No physical assistance   Comment Pt bathed lower body while supine in bed, alternating lateral weightshifting to bathe buttocks. S while seated EOB to bathe UB.   Shower/Bathe Self CARE Score 4   Tub/Shower Transfer   Reason Not Assessed Sponge Bath   Upper Body Dressing   Type of Assistance Needed Set-up / clean-up   Physical Assistance Level No physical assistance   Comment seated in recliner   Upper Body Dressing CARE Score 5   Lower Body Dressing   Type of Assistance Needed Physical assistance;Verbal cues;Set-up / clean-up   Physical Assistance Level 26%-50%   Comment supine to don tab brief, req partial A to adjust tabs. Pt able to don  w/ inc time and cues, req partial A for waist strap. Pt able to thread BLE while supine and bridge for clothing management.   Lower Body Dressing " CARE Score 3   Putting On/Taking Off Footwear   Type of Assistance Needed Set-up / clean-up   Physical Assistance Level No physical assistance   Comment able to don/doff sock while supine.   Putting On/Taking Off Footwear CARE Score 5   Lying to Sitting on Side of Bed   Type of Assistance Needed Supervision   Physical Assistance Level No physical assistance   Lying to Sitting on Side of Bed CARE Score 4   Bed-Chair Transfer   Type of Assistance Needed Supervision   Physical Assistance Level No physical assistance   Comment CS sit pivot EOB>recliner.   Chair/Bed-to-Chair Transfer CARE Score 4   Exercise Tools   Other Exercise Tool 1 While seated in recliner, pt engaged in BUE ther ex to maximize strength and endurance for ADLs and fxnl mobility. Completed w/ 4# dowel, 3x10 bicep curls, chest press, and prograde rows. Pt tolerated well w/ rest breaks between sets to manage fatigue.   Cognition   Overall Cognitive Status Impaired   Assessment   Treatment Assessment Pt seen for skilled OT session focusing on self-care management and BUE strengthening. Pt reporting mild headache, pt unagreeable to leave room for OT however w/ encouragement provided pt agreeable to sponge bath. Details on sponge bath ADL noted above, overall improvements w/ L shinker management. Time spent actively listening and providing emotional support, pt reporting inc frustration and worry w/ unknown d/c plan. Cont OT POC: alternating lateral weightshifting, BUE strengthening, repetitive safety training, L  management, and ongoing basic phase 1 amputee education. Pt agreeable to rest in recliner, all needs within reach and alarm activated.   Prognosis Fair   Problem List Decreased strength;Decreased range of motion;Decreased endurance;Impaired balance;Decreased mobility;Decreased coordination;Decreased cognition;Impaired judgement;Decreased safety awareness   Plan   Treatment/Interventions ADL retraining;Functional transfer  training;Therapeutic exercise;Endurance training;Patient/family training   Progress Progressing toward goals   Discharge Recommendation   Rehab Resource Intensity Level, OT   (TBD)   OT Therapy Minutes   OT Time In 1235   OT Time Out 1358   OT Total Time (minutes) 83   OT Mode of treatment - Individual (minutes) 83   OT Mode of treatment - Concurrent (minutes) 0   OT Mode of treatment - Group (minutes) 0   OT Mode of treatment - Co-treat (minutes) 0   OT Mode of Treatment - Total time(minutes) 83 minutes   OT Cumulative Minutes 1409   Therapy Time missed   Time missed? No

## 2024-07-24 NOTE — PROGRESS NOTES
[facility]  Progress Note  Name: Sunil Patel I  MRN: [id number]  Unit/Bed#: -01 I Date of Admission: 7/6/2024   Date of Service: 7/24/2024 I Hospital Day: 18    Assessment & Plan   * Above-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)  Assessment & Plan  Presented with left lower extremity acute limb ischemia/extensive left iliofemoral and left infrainguinal embolism requiring left femoral thrombectomy and subsequent AKA on 6/7/24 with vascular surgery.  Incision C/D/I, pain controlled.   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 CM to investigate what is covered.  Will know more information tomorrow   Rehab and pain control per PMR    Episodic headache  Assessment & Plan  Pt reports a history of migraines.  Did report headache to nursing today now sp tylenol and some sleep   It is associated with photophobia.  He is unable to take triptans due to a history of stroke.  He is agreeable to trying Nurtec.  Pt did have headache , originally asleep when I went in after Tylenol per RN     Cardiomyopathy ([facility])  Assessment & Plan  ECHO 6/10/2024 = LVEF 40-45% with mild global hypokinesis   Status post NM stress test on 6/11/2024 which showed: a large, mild, fixed defect in the inferior wall, possibly due to diaphragmatic attenuation artifact, there is a small area of partial reversibility in the inferior apical wall suggestive of ischemia    Evaluated ed by cardiology, etiology felt to be possibly secondary to stress-induced cardiomyopathy, with apical thrombus  Cardiac catheterization deferred given the lack of any significant ischemia, and no current cardiac symptoms  Continue with medical management with aspirin, statin, beta-blocker, ARB  Monitor volume status, remains euvolemic off of diuretics   Euvolemic on exam  Outpatient follow-up with  cardiology    Neurocognitive disorder  Assessment & Plan  Evaluated by neuropsychology and found to not have capacity  Guardianship in progress     Pressure injury of buttock, stage 3 (HCC)  Assessment & Plan  Being managed by PMR  Turn every 2 hours for offloading  Continue local care    Acute pain  Assessment & Plan  Due to the AKA  Pain controlled     At risk for venous thromboembolism (VTE)  Assessment & Plan  On Xarelto at this time pending insurance auth and approval     Traumatic brain injury (HCC)  Assessment & Plan  Remote history of TBI, previously residing in a group home prior to [facility] sentence.  Evaluated by neuropsychiatry on 6/27/24 and deemed NOT to have medical decision-making capacity  Process for court appointed guardian started on 7/5/24 - CM following   Respectful in my discussion with pt . OT have just gone over educating him    Carnitine deficiency (HCC)  Assessment & Plan  Continue levocarnitine 330 mg 3x daily with meals.    History of seizures  Assessment & Plan  Diagnosed in July 2019, follows with [facility] neurology outpatient  Continue home regimen with Depakote ER, Lamotrigine and Zonegran    Left ventricular thrombus  Assessment & Plan  Noted on echocardiogram 6/10/2024: spherical 1.5 x 1.3 cm thrombus at the LV apex   Initiated on AC with Xarelto, continue  Rx sent to [employer] for price check on 7/10/24 - CM spoke with [employer] and pt has rx coverage. Information sent to [employer]  7/21/24, d/w [employer] and Xarelto is NOT covered and he would have to pay OOP @$700.00.    Discussed with casemgmt 7/24: Tomorrow plan for escalated meeting , should have decision   If unable to obtain coverage will need to bridge pt to Warfarin     Benign essential hypertension  Assessment & Plan  Home regimen: Losartan 50mg daily, Toprol XL 25 mg BID  Current regimen: Losartan 50 mg daily, Toprol-XL 25 mg daily  Did miss both 7/19 for  = made no changes and he has gotten both over last few  days      History of stroke  Assessment & Plan  History of left MCA CVA in May 2018 with residual expressive aphasia  Continue ASA and atorvastatin    Human immunodeficiency virus (HIV) infection (HCC)  Assessment & Plan  Continue Biktarvy and Tivicay.    Bipolar affective disorder (HCC)  Assessment & Plan  Continue home meds Zoloft and Remeron  Outpatient follow-up with Psychiatry             The above assessment and plan was reviewed and updated as determined by my evaluation of the patient on 7/24/2024.    Labs:   Results from last 7 days   Lab Units 07/18/24  1003   WBC Thousand/uL 7.52   HEMOGLOBIN g/dL 11.0*   HEMATOCRIT % 36.3*   PLATELETS Thousands/uL 398*     Results from last 7 days   Lab Units 07/18/24  1003   SODIUM mmol/L 139   POTASSIUM mmol/L 3.9   CHLORIDE mmol/L 105   CO2 mmol/L 26   BUN mg/dL 18   CREATININE mg/dL 0.88   CALCIUM mg/dL 9.2                   Imaging  No orders to display       Review of Scheduled Meds:  Current Facility-Administered Medications   Medication Dose Route Frequency Provider Last Rate    acetaminophen  975 mg Oral TID PRN José Salcido MD      aspirin  81 mg Oral Daily Lorrie Barillas PA-C      atorvastatin  80 mg Oral Daily With Dinner Lorrie Barillas PA-C      bictegravir-emtricitab-tenofovir alafenamide  1 tablet Oral Daily With Breakfast Lorrie Barillas PA-C      bisacodyl  10 mg Rectal Daily PRN Lorrie Barillas PA-C      diphenhydrAMINE  25 mg Oral Q6H PRN Lorrie Barillas PA-C      divalproex sodium  1,250 mg Oral Daily FADUMO Black-GERALDO      dolutegravir  50 mg Oral Daily FADUMO Black-GERALDO      lamoTRIgine  50 mg Oral BID Lorrie Barillas PA-C      levOCARNitine  1,000 mg/day Oral TID With Meals Lorrie Barillas PA-C      losartan  50 mg Oral Daily Lorrie Barillas PA-C      melatonin  6 mg Oral HS Lorrie Barillas PA-C      metoprolol succinate  25 mg Oral Daily CHARLIE Mcclelland       mirtazapine  15 mg Oral HS Lorriejacky Barillas PA-C      ondansetron  4 mg Oral Q6H PRN Lorrie Barillas PA-C      oxyCODONE  2.5 mg Oral Q8H PRN Raimundo Riley MD      polyethylene glycol  17 g Oral Daily PRN Lorrie Barillas PA-C      rivaroxaban  20 mg Oral Daily With Dinner Lorrie Barillas PA-C      senna  1 tablet Oral HS PRN Lorrie Barillas PA-C      sertraline  75 mg Oral Daily Lorrie Barillas PA-C      tamsulosin  0.4 mg Oral Daily With Dinner Lorrie Barillas PA-C      zonisamide  400 mg Oral Daily Lorrie Barillas PA-C         Subjective/ HPI: Patient seen and examined. Patients overnight issues or events were reviewed with nursing staff. New or overnight issues include the following:     Pt originally sleeping and snoring. Upon return sp OT working on educating the patient . Does report mild headache otherwise no other concerns. States he had a bm yesterday. No n/v/d   ROS:   A 10 point ROS was performed; negative except as noted above.        *Labs /Radiology studies Reviewed  *Medications  reviewed and reconciled as needed  *Please refer to order section for additional ordered labs studies      Physical Examination:  Vitals:   Vitals:    07/23/24 1344 07/23/24 1954 07/24/24 0553 07/24/24 0734   BP: 114/64 117/63 107/73 121/80   BP Location: Left arm Left arm Left arm Left arm   Pulse: 88 82 92 89   Resp: 17 18 17 18   Temp: 97.8 °F (36.6 °C) 98.4 °F (36.9 °C) 97.6 °F (36.4 °C) 97.6 °F (36.4 °C)   TempSrc: Oral Oral Oral Oral   SpO2: 97% 94% 94% 96%   Weight:       Height:           General Appearance: NAD; pleasant  HEENT: PERRLA, conjuctiva normal; mucous membranes moist; face symmetrical  Neck:  Supple  Lungs: clear bilaterally, normal respiratory effort, no retractions, expiratory effort normal, on room air  CV: regular rate and rhythm, no murmurs rubs or gallops noted   ABD: soft non tender, +BS x4  EXT: Left AKA ,  no lymphadenopathy, no edema  Skin: normal turgor,  normal texture, no rash  Psych: affect normal, mood normal  Neuro: AAOx3       The above physical exam was reviewed and updated as determined by my evaluation of the patient on 7/24/2024.    Invasive Devices       None                      VTE Pharmacologic Prophylaxis: Xarelto  Code Status: Level 1 - Full Code  Current Length of Stay: 18 day(s)    Total floor / unit time spent today 30 minutes  Coordination of patient's care was performed in conjunction with consulting services. Time invested included review of patient's labs, vitals, and management of their comorbidities with continued monitoring, examination of patient as well as answering patient questions, documenting her findings and creating progress note in electronic medical record,  ordering appropriate diagnostic testing.       ** Please Note:  voice to text software may have been used in the creation of this document. Although proof errors in transcription or interpretation are a potential of such software**

## 2024-07-24 NOTE — PROGRESS NOTES
Physical Medicine and Rehabilitation Progress Note  Sunil Patel 63 y.o. male MRN: [id number]  Unit/Bed#: [facility] 451-01 Encounter: [id number]    To Review: Sunil Patel is a 63 y.o. male who  has a past medical history of Acute lower limb ischemia (06/08/2024), Anxiety, Depression, HIV disease (HCC), Substance abuse (HCC), and Suicide attempt (HCC). who presented to the [facility] on 6/7/24 from senior living for increased LLE swelling and pain and was found to have a left external iliac artery occlusion and acute limb ischemia. 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 Patient had TTE on 6/10/24 showing LV apex thrombus. On 6/11/24 patient had pharmacologic nuclear stress test/SPECT scan which did not show any significant areas of ischemia with EF 40-45% and recommended continuing A/C for LV apical thrombus. His course was complicated by b/l buttock unstageable pressure ulcers, urinary and fecal incontinence, pain, and significant decline in ADLs and mobility. Patient has been continued on Xarelto for anticoagulation, as well as ASA and statin. Patient has a history of TBI, with baseline nonfluent aphasia and forgetfulness. He was evaluated by neuropsychology on 6/27/24, and deemed to not have capacity to make fully informed medical decisions. Therefore, the guardianship process was initiated as of 7/5/24. He was admitted to the [facility] on 7/6.     Chief Complaint: No new issues.     Interval History/Subjective:  No acute events overnight. Has a headache today, not wanting to participate in therapy. No new CP, SOB, fevers, chills, N/V, abdominal pain. Last BM 7/23.     ROS:  A 10 point review of systems was negative except for what is noted in the HPI.    Today's Changes:  He is starting to plateau,  and his frustration tolerance is decreased. He is starting to decline therapies at times. He has been more irritable, with headaches more frequently since decreasing his  gabapentin - the only major medical change made. Will restart 100mg Q8hrs and monitor response.   Initiating discussions regarding next steps, if patient no longer wants to participate in therapies. Patient cannot smoke on hospital grounds, but we can bring him outside for a change in scenery. Therapy staff will also see if we can arrange other activities for him during this time.   Follow-up price check on Eliquis, pradaxa, otherwise we may be looking at transitioning to Coumadin. Will f/u with IM.   Will see if therapies can see patient later this afternoon if he is feeling better.   Meeting tomorrow regarding patient's dispo.   Adding scheduled colace to help with bowel consistency.     Total visit time: 35 minutes, with more than 50% spent counseling/coordinating care. Counseling includes discussion with patient re: progress in therapies, functional issues observed by therapy staff, and discussion with patient regarding their current medical state and wellbeing. Coordination of patient's care was performed in conjunction with Internal Medicine service to monitor patient's labs, vitals, and management of their comorbidities.      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 Underwent L femoral thrombectomy and AKA with vascular surgery   - [facility] sx follow-up 7/10 - L AKA incision site well-healed, staples taken out at the bedside this morning  - [facility] will be vendor - Patient now has .  - Monitor for hip flexion/abduction tightness. Stretches in therapy  - On Rivaroxaban with hx of LV thrombus and PAOD   - PT/OT/SLP (to work on carry over strategies) 3-5 hours/day, 5-7 days/week.    - Goals are Ind-Sup at a wheelchair  "level.   - Outpatient f/u with [facility] and Vascular  - Continue patient training with  placement  - Continue gabapentin - doesn't do too much for phantom limb sensation, but that doesn't bother him or cause him pain currently.     Neurocognitive disorder  Assessment & Plan  Has been appropriate and cooperative throughout this stay without any behavioral issues on the rehab unit to date.   Hx of TBI and L MCA CVA, psychiatric d/o, seizure d/o  - Neuropsych assessment 6/27/24 - \"diffuse cognitive dysfunction and on a measure assessing awareness of personal health status and ability to evaluate health problems, handle medical emergencies and take safety precautions, patient performed in the IMPAIRED range of functioning. During this encounter, patient does not appear to have capacity to make fully informed medical decisions.\"  MMSE 4/28 - severely impaired  - Guardianship process initiated on acute - follow-up by CM/Admin  - Status: some expressive and possible some receptive aphasia.  He can be difficult to follow at times likely due to a mixture of aphasia, tangentiality, mild lability, and impaired memory; lability with hx of possible bipolar d/o  - Neuropsych Med review: Depakote, Lamictal, Remeron, Zoloft, Melatonin, gabapentin, PRN oxy 2.5mg TID   - Monitor neuro-exam, wakefulness, mood, cognition, insight into deficits and safety awareness   - Monitor and ensure optimal management electrolytes, nutrition, and hydration  - Monitor for signs or symptoms of infection, medication intolerances, other systemic etiologies  - Additional labs, imaging, specialist follow-up as needed per primary team currently   - Overstimulation precautions, frequent re-orientation, re-direction, re-assurance  - Optimal mood, pain, and sleep management  - If impaired sleep or behavior recommend sleep log and agitation monitoring    - Limit sedating medications when possible  - Fall precautions - if needed increase " rounding or consider virtual sitter or in-person sitter  - For routine restlessness, anxiety, irritability focus on non-pharmacologic management    - Hold benzo's with increased risk paradoxical reaction and possibility of limiting cognitive recovery  - Continue SLP and interdisciplinary care  - OP neuro and PCP   7/23- impaired attention after his first shower in the unit. Consistent with cognitive fatigue. Also increased frustration given his housing circumstances. Could benefit from more outdoor activities    Adjustment disorder with mixed anxiety and depressed mood  Assessment & Plan  - Related to recent release from incarceration, new AKA and uncertainty of future disposition, all in the setting of impaired cognition related to prior strokes and TBI  - Behavioral techniques for symptom management  - Neuropsychology consult as available  - Given his circumstances, increased frustration is expected. Can consider Psych consult if symptoms continue to worsen to adjust mood stabilizing medications. Will try nonpharmacologic approaches first with more frequent conversations/redirections     Pressure injury of buttock, stage 3 ([facility])  Assessment & Plan  Stable   - Wound care consulted and following weekly  Per wound care specialist 7/15  - POA L buttock pressure injury unstageable has healed.  - POA R buttock pressure injury is now Stage 3, and improving.   - Cleanse sacro-buttocks with soap and water. Apply Triad Paste to Sacro-Buttocks Wound Beds Only. Apply Hydraguard to Elsi-wounds and all other intact aspects of sacro-buttocks. Apply both creams TID and PRN episodes of incontinence.    - Recommend ROHO cushion in chair when out of bed instead   - Preventative hydraguard to bilateral heels BID and PRN.   - P500  - Monitor clinically for breakdown, frequent turns  - 7/23: wound appears well healing with no signs of active infection. Continue local wound care. Continue lateral leans in therapy as  "tolerated      Patient incapable of making informed decisions  Assessment & Plan  - History of remote TBI and prior strokes with comorbid psychiatric history.  - Evaluated by neuropsychology, Dr. Dilshad Tautm, PhD on 6/27/24, found to have \"diffuse cognitive dysfunction and on a measure assessing awareness of personal health status and ability to evaluate health problems, handle medical emergencies and take safety precautions, patient performed in the IMPAIRED range of functioning. During this encounter, patient does not appear to have capacity to make fully informed medical decisions.\"  - Court-appointed guardianship process has been initiated by acute care CM Katia Kay.    - We have identified two friends who are interested in being patient's guardians and have been involved and invested in patient's care on the ARC.   - They will need to be interviewed by the  involved in this process.    - He has to undergo his hearing on 8/6/2024 first.    - He would ultimately benefit from [facility]/nursing home/memory care unit - he does very well with structure and supervision.  Teams 7/23- Ongoing discussions with case management and social work to dispo patient to stable long-term housing. Consideration for discharge back to acute on Friday    Urinary incontinence  Assessment & Plan  - Did have some incontinence on acute - improved with timed voids and consistent assistance with his urinal  - Described as urgency  - Marked improvement on timed voids.   - monitor for retention, incontinence (including overflow incontinence), signs/symptoms of UTI  - Timed Voids and PVRs Q4hrs initiated earlier. And PVR <150 x3, so scans discontinued.    - He has some difficulty managing the urinal    - needs assistance but is able to [facility] to [facility]  - Still uses condom catheter at night, in part for continence care/to protect his skin given his buttock wounds.  - Continue timed voids          At risk for constipation  Assessment " & Plan  - Stooling adequately recently; did have some incontinence on acute now improved  - Close continent care given buttock wounds  - Last BM 7/23 and continent  - Colace 100mg BID, Senna 1 tab at night  - PRN miralax and suppository    Acute pain  Assessment & Plan  Controlled   - Tylenol 975 mg q8h PRN  - Oxycodone 2.5 mg q8h PRN, wean as possible   - Last used 7/19.    - Can discontinue at discharge.  - Resumed Gabapentin 100mg Q8hr due to increased headaches and irritability since discontinuing.    At risk for venous thromboembolism (VTE)  Assessment & Plan  - On rivaroxaban    Impaired mobility and activities of daily living  Assessment & Plan  - Rehabilitation medicine physician for daily monitoring of care, 24 hour availability for acute medical issues, medication management, and therapeutic and diagnostic assessments.  - 24 hour rehabilitation nursing 7 days per week for: management/teaching of medications, bowel/bladder routine, skin care.  - PT, OT for 2-3 hours per day, 5 to 6 days per week; 15 hours per week  - Rehabilitation Psychology as needed for adjustment and coping  - MSW for barriers to discharge, community resources, and family support  - Discharge planning following to help ensure a safe and efficient discharge  -7/23 teams: Biggest ADL barriers per OT are toileting and applying  to LLE        History of stroke  Assessment & Plan  - History of old left temporal and parietal lobe cortical infarcts, also involving the insula and left occipital lobe as well as small right posterior parietal lobe. Stable on most recent CT head on 5/16/24.   - ASA, Xarelto and statin for secondary prevention  - Optimal BP control  - Monitor neuro exam - hx of LV thrombus   - OP neuro follow-up     Bipolar affective disorder (HCC)  Assessment & Plan  Mood acceptable; continue meds as outlined   Supportive counseling  NeuroPsychology consult while in ARC if available for support  Counseled on and continue  to encourage deep breathing/relaxation/behavioral management techniques  - Mirtazapine 15 mg qHs  - Will consult Psych before increasing Sertraline for better mood regulation  - Lamictal 50mg BID  - Depakote ER 1250mg qday   - Outpatient psychiatry follow-up    Episodic headache  Assessment & Plan  Seemed to worsen with increased irritability after discontinuing gabapentin  Resumed gabapentin  Monitor for now  Received nurtec earlier this week.     Cardiomyopathy (HCC)  Assessment & Plan  ECHO on 6/10/2024 showed LVEF 40-45% with mild global hypokinesis   Status post NM stress test on 6/11/2024 which showed: a large, mild, fixed defect in the inferior wall, possibly due to diaphragmatic attenuation artifact, there is a small area of partial reversibility in the inferior apical wall suggestive of ischemia    Elevated by cardiology, etiology felt to be possibly secondary to stress-induced cardiomyopathy, with apical thrombus  Cardiac catheterization deferred given the lack of any significant ischemia, and no current cardiac symptoms  Continue with medical management with aspirin, statin, beta-blocker, ARB  Monitor volume status, remains euvolemic off of diuretics   Outpatient follow-up with cardiology    Traumatic brain injury (HCC)  Assessment & Plan  See neurocog impairment     Carnitine deficiency (HCC)  Assessment & Plan  - Carnitine replacement  - Appreciate medicine management      History of seizures  Assessment & Plan  - Depakote ER, lamotrigine, zonisamide  - Outpatient follow-up with [facility] neurology    Left ventricular thrombus  Assessment & Plan  - Visualized on echocardiogram on 6/10/24: spherical 1.5 x 1.3 cm thrombus at the LV apex.   - Rivaroxaban   - $700.   - Price checking eliquis and pradaxa   - May need to transition to coumadin pending cost   - Management as per IM  - Outpatient follow-up with cardiology    Benign essential hypertension  Assessment & Plan  - Toprol, losartan  - Appreciate medicine  management    Human immunodeficiency virus (HIV) infection (HCC)  Assessment & Plan  - Continue anti-retroviral treatment  - Appreciate medicine management during ARC course  - OP ID follow-up         Health Maintenance  #Skin/Pressure Injury Prevention: Turn Q2hr in bed, with weight shifts P40-96ccs in wheelchair.  #GI Prophylaxis: Not indicated   #Code Status: Full Code  #FEN: Cardiac diet, Ensure Max Vanilla with breakfast, and chocolate at dinner   #Dispo: Team 7/23: Anticipate he will be meeting goals in a week or so. In the process of getting guardianship, but we may have identified some friends who may be candidates to be guardians. Would need to be vetted by court system. He would ultimately benefit from a structured environment (memory care/[facility]), but would likely plan to transition to [facility]/SNF to start to see him through his AKA recovery and prosthetic process.   Follow-up: PMR, Vascular Surgery, Psychiatry, ID, PCP    Objective:    Functional Update:  PT:  Sup-CGA bed mobility/transfers, Deshawn and distance supervision with WC  OT: Min  application, Min toileting  SLP: mod comprehension, expression, problem solving, and Ind with expression    Allergies per EMR    Physical Exam:  Temp:  [97.6 °F (36.4 °C)-98.4 °F (36.9 °C)] 97.6 °F (36.4 °C)  HR:  [82-92] 89  Resp:  [17-18] 18  BP: (107-121)/(63-80) 121/80  Oxygen Therapy  SpO2: 96 %    Gen: No acute distress, Well-nourished, well-appearing.  HEENT: Moist mucus membranes, Normocephalic/Atraumatic  Cardiovascular: Regular rate, rhythm, S1/S2. Distal pulses palpable  Heme/Extr: No edema  Pulmonary: Non-labored breathing.   : No fernandes  GI: Soft, non-tender, non-distended.  MSK: Stable L AKA  Integumentary: Skin is warm, dry.   Neuro: No new focality   Psych: Congruent mood and affect.     Diagnostic Studies:   Reviewed, no new imaging    Laboratory:  Reviewed, no new labs  Results from last 7 days   Lab Units 07/18/24  1003   HEMOGLOBIN g/dL 11.0*    HEMATOCRIT % 36.3*   WBC Thousand/uL 7.52       Results from last 7 days   Lab Units 07/18/24  1003   BUN mg/dL 18   POTASSIUM mmol/L 3.9   CHLORIDE mmol/L 105   CREATININE mg/dL 0.88              Patient Active Problem List   Diagnosis    Bipolar affective disorder (HCC)    Substance abuse (HCC)    Human immunodeficiency virus (HIV) infection (HCC)    History of stroke    Benign essential hypertension    Positive laboratory testing for human immunodeficiency virus (HCC)    Hypertension    Unspecified vitamin D deficiency    Tobacco abuse    Cerebrovascular accident (CVA) ([facility])    Left ventricular thrombus    History of seizures    Carnitine deficiency (HCC)    Above-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)    Traumatic brain injury (HCC)    Impaired mobility and activities of daily living    At risk for venous thromboembolism (VTE)    Acute pain    At risk for constipation    Urinary incontinence    Patient incapable of making informed decisions    Pressure injury of buttock, stage 3 ([facility])    Adjustment disorder with mixed anxiety and depressed mood    Neurocognitive disorder    Cardiomyopathy ([facility])    Episodic headache         Medications  Current Facility-Administered Medications   Medication Dose Route Frequency Provider Last Rate    acetaminophen  975 mg Oral TID PRN José Salcido MD      aspirin  81 mg Oral Daily Lorrie Barillas PA-C      atorvastatin  80 mg Oral Daily With Dinner Lorrie Barillas PA-C      bictegravir-emtricitab-tenofovir alafenamide  1 tablet Oral Daily With Breakfast Lorrie Barillas PA-C      bisacodyl  10 mg Rectal Daily PRN Lorrie Barillas PA-C      diphenhydrAMINE  25 mg Oral Q6H PRN Lorrie Barillas PA-C      divalproex sodium  1,250 mg Oral Daily Lorrie Barillas PA-C      dolutegravir  50 mg Oral Daily Lorrie Bairllas PA-C      lamoTRIgine  50 mg Oral BID Lorrie Barillas PA-C      levOCARNitine  1,000 mg/day Oral TID With Meals Lorrie  JE Barillas      losartan  50 mg Oral Daily Lorrie EJ Barillas      melatonin  6 mg Oral HS Lorriejacky Barillas PA-C      metoprolol succinate  25 mg Oral Daily CHARLIE Mcclelland      mirtazapine  15 mg Oral HS Lorrie EJ Barillas      ondansetron  4 mg Oral Q6H PRN Lorriejacky Barillas PA-C      oxyCODONE  2.5 mg Oral Q8H PRN Raimundo Riley MD      polyethylene glycol  17 g Oral Daily PRN Lorriejacky Barillas PA-C      rivaroxaban  20 mg Oral Daily With Dinner Lorriejacky Barillas PA-C      senna  1 tablet Oral HS PRN Lorriejacky Barillas PA-C      sertraline  75 mg Oral Daily Lorriejacky Barillas PA-C      tamsulosin  0.4 mg Oral Daily With Dinner Lorriejacky Barillas PA-C      zonisamide  400 mg Oral Daily Lorriejacky Barillas PA-C            ** Please Note: Fluency Direct voice to text software may have been used in the creation of this document. **

## 2024-07-24 NOTE — ASSESSMENT & PLAN NOTE
Chronic issue.  Seemed to worsen with increased irritability after discontinuing gabapentin  Resumed gabapentin  [facility] with middling results  Sleep logs have been good - discontinued.  Headache is on and off

## 2024-07-24 NOTE — ASSESSMENT & PLAN NOTE
Noted on echocardiogram 6/10/2024: spherical 1.5 x 1.3 cm thrombus at the LV apex   Initiated on AC with Xarelto, continue  Rx sent to [employer] for price check on 7/10/24 - [facility] and [facility]. Information sent to [facility].  7/21/24, d/w [employer] and Xarelto is NOT covered and he would have to pay OOP @$700.00.    Discussed with casemgmt 7/24: Tomorrow plan for escalated meeting , should have decision   If unable to obtain coverage will need to bridge pt to Warfarin

## 2024-07-24 NOTE — PLAN OF CARE
Problem: PAIN - ADULT  Goal: Verbalizes/displays adequate comfort level or baseline comfort level  Description: Interventions:  - Encourage patient to monitor pain and request assistance  - Assess pain using appropriate pain scale  - Administer analgesics based on type and severity of pain and evaluate response  - Implement non-pharmacological measures as appropriate and evaluate response  - Consider cultural and social influences on pain and pain management  - Notify physician/advanced practitioner if interventions unsuccessful or patient reports new pain  Outcome: Progressing     Problem: SAFETY ADULT  Goal: Patient will remain free of falls  Description: INTERVENTIONS:  - Educate patient/family on patient safety including physical limitations  - Instruct patient to call for assistance with activity   - Consult OT/PT to assist with strengthening/mobility   - Keep Call bell within reach  - Keep bed low and locked with side rails adjusted as appropriate  - Keep care items and personal belongings within reach  - Initiate and maintain comfort rounds  - Make Fall Risk Sign visible to staff  - Offer Toileting every 2 Hours, in advance of need  - Initiate/Maintain bed/chair alarm  - Apply yellow socks and bracelet for high fall risk patients  - Consider moving patient to room near nurses station  Outcome: Progressing     Problem: Prexisting or High Potential for Compromised Skin Integrity  Goal: Skin integrity is maintained or improved  Description: INTERVENTIONS:  - Identify patients at risk for skin breakdown  - Assess and monitor skin integrity  - Assess and monitor nutrition and hydration status  - Monitor labs   - Assess for incontinence   - Turn and reposition patient  - Assist with mobility/ambulation  - Relieve pressure over bony prominences  - Avoid friction and shearing  - Provide appropriate hygiene as needed including keeping skin clean and dry  - Evaluate need for skin moisturizer/barrier cream  -  Collaborate with interdisciplinary team   - Patient/family teaching  - Consider wound care consult   Outcome: Progressing

## 2024-07-24 NOTE — ASSESSMENT & PLAN NOTE
History of left MCA CVA in May 2018 with residual expressive aphasia  Continue ASA and atorvastatin   16-Jul-2021 22:07

## 2024-07-24 NOTE — ASSESSMENT & PLAN NOTE
Presented with left lower extremity acute limb ischemia/extensive left iliofemoral and left infrainguinal embolism requiring left femoral thrombectomy and subsequent AKA on 6/7/24 with vascular surgery.  Incision C/D/I, pain controlled.   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 CM to investigate what is covered.  Will know more information tomorrow   Rehab and pain control per PMR

## 2024-07-24 NOTE — PROGRESS NOTES
07/24/24 0830   Pain Assessment   Pain Rating: FLACC (Rest) - Face 1   Pain Rating: FLACC (Rest) - Legs 0   Pain Rating: FLACC (Rest) - Activity 0   Pain Rating: FLACC (Rest) - Cry 0   Pain Rating: FLACC (Rest) - Consolability 0   Score: FLACC (Rest) 1   Assessment   Treatment Assessment Attempted to see pt for therapy session, refused treatment session and amputee education. However, was agreeable for PT to come back at a later time.   Therapy Time missed   Time missed? Yes   Amount of time missed 90   Reason for time missed   (headache)   Time(s) multiple attempts made 1  (will reattempt at a later time)

## 2024-07-24 NOTE — ASSESSMENT & PLAN NOTE
Remote history of TBI, previously residing in a group home prior to senior living sentence.  Evaluated by neuropsychiatry on 6/27/24 and deemed NOT to have medical decision-making capacity  Process for court appointed guardian started on 7/5/24 - CM following   Respectful in my discussion with pt . OT have just gone over educating him

## 2024-07-24 NOTE — ASSESSMENT & PLAN NOTE
Pt reports a history of migraines.  Did report headache to nursing today now sp tylenol and some sleep   It is associated with photophobia.  He is unable to take triptans due to a history of stroke.  He is agreeable to trying Nurtec.  Pt did have headache , originally asleep when I went in after Tylenol per RN

## 2024-07-24 NOTE — ASSESSMENT & PLAN NOTE
Home regimen: Losartan 50mg daily, Toprol XL 25 mg BID  Current regimen: Losartan 50 mg daily, Toprol-XL 25 mg daily  Did miss both 7/19 for  = made no changes and he has gotten both over last few days

## 2024-07-25 LAB
ANION GAP SERPL CALCULATED.3IONS-SCNC: 9 MMOL/L (ref 4–13)
BASOPHILS # BLD AUTO: 0.04 THOUSANDS/ÂΜL (ref 0–0.1)
BASOPHILS NFR BLD AUTO: 1 % (ref 0–1)
BUN SERPL-MCNC: 20 MG/DL (ref 5–25)
CALCIUM SERPL-MCNC: 8.9 MG/DL (ref 8.4–10.2)
CHLORIDE SERPL-SCNC: 105 MMOL/L (ref 96–108)
CO2 SERPL-SCNC: 25 MMOL/L (ref 21–32)
CREAT SERPL-MCNC: 0.85 MG/DL (ref 0.6–1.3)
EOSINOPHIL # BLD AUTO: 0.39 THOUSAND/ÂΜL (ref 0–0.61)
EOSINOPHIL NFR BLD AUTO: 6 % (ref 0–6)
ERYTHROCYTE [DISTWIDTH] IN BLOOD BY AUTOMATED COUNT: 15.9 % (ref 11.6–15.1)
GFR SERPL CREATININE-BSD FRML MDRD: 93 ML/MIN/1.73SQ M
GLUCOSE P FAST SERPL-MCNC: 97 MG/DL (ref 65–99)
GLUCOSE SERPL-MCNC: 97 MG/DL (ref 65–140)
HCT VFR BLD AUTO: 36 % (ref 36.5–49.3)
HGB BLD-MCNC: 10.5 G/DL (ref 12–17)
IMM GRANULOCYTES # BLD AUTO: 0.04 THOUSAND/UL (ref 0–0.2)
IMM GRANULOCYTES NFR BLD AUTO: 1 % (ref 0–2)
LYMPHOCYTES # BLD AUTO: 1.74 THOUSANDS/ÂΜL (ref 0.6–4.47)
LYMPHOCYTES NFR BLD AUTO: 27 % (ref 14–44)
MCH RBC QN AUTO: 28 PG (ref 26.8–34.3)
MCHC RBC AUTO-ENTMCNC: 29.2 G/DL (ref 31.4–37.4)
MCV RBC AUTO: 96 FL (ref 82–98)
MONOCYTES # BLD AUTO: 0.7 THOUSAND/ÂΜL (ref 0.17–1.22)
MONOCYTES NFR BLD AUTO: 11 % (ref 4–12)
NEUTROPHILS # BLD AUTO: 3.43 THOUSANDS/ÂΜL (ref 1.85–7.62)
NEUTS SEG NFR BLD AUTO: 54 % (ref 43–75)
NRBC BLD AUTO-RTO: 0 /100 WBCS
PLATELET # BLD AUTO: 384 THOUSANDS/UL (ref 149–390)
PMV BLD AUTO: 8 FL (ref 8.9–12.7)
POTASSIUM SERPL-SCNC: 4 MMOL/L (ref 3.5–5.3)
RBC # BLD AUTO: 3.75 MILLION/UL (ref 3.88–5.62)
SODIUM SERPL-SCNC: 139 MMOL/L (ref 135–147)
WBC # BLD AUTO: 6.34 THOUSAND/UL (ref 4.31–10.16)

## 2024-07-25 PROCEDURE — 85025 COMPLETE CBC W/AUTO DIFF WBC: CPT | Performed by: PHYSICIAN ASSISTANT

## 2024-07-25 PROCEDURE — 97129 THER IVNTJ 1ST 15 MIN: CPT

## 2024-07-25 PROCEDURE — 92507 TX SP LANG VOICE COMM INDIV: CPT

## 2024-07-25 PROCEDURE — 97535 SELF CARE MNGMENT TRAINING: CPT

## 2024-07-25 PROCEDURE — 97110 THERAPEUTIC EXERCISES: CPT

## 2024-07-25 PROCEDURE — 99232 SBSQ HOSP IP/OBS MODERATE 35: CPT | Performed by: PHYSICAL MEDICINE & REHABILITATION

## 2024-07-25 PROCEDURE — 80048 BASIC METABOLIC PNL TOTAL CA: CPT | Performed by: PHYSICIAN ASSISTANT

## 2024-07-25 PROCEDURE — 97530 THERAPEUTIC ACTIVITIES: CPT

## 2024-07-25 PROCEDURE — 99232 SBSQ HOSP IP/OBS MODERATE 35: CPT | Performed by: PHYSICIAN ASSISTANT

## 2024-07-25 RX ADMIN — DOLUTEGRAVIR SODIUM 50 MG: 50 TABLET, FILM COATED ORAL at 08:35

## 2024-07-25 RX ADMIN — LAMOTRIGINE 50 MG: 25 TABLET ORAL at 08:34

## 2024-07-25 RX ADMIN — GABAPENTIN 100 MG: 100 CAPSULE ORAL at 14:49

## 2024-07-25 RX ADMIN — LEVOCARNITINE 330 MG: 1 SOLUTION ORAL at 14:50

## 2024-07-25 RX ADMIN — ACETAMINOPHEN 975 MG: 325 TABLET, FILM COATED ORAL at 08:39

## 2024-07-25 RX ADMIN — DOCUSATE SODIUM 100 MG: 100 CAPSULE, LIQUID FILLED ORAL at 17:00

## 2024-07-25 RX ADMIN — GABAPENTIN 100 MG: 100 CAPSULE ORAL at 22:25

## 2024-07-25 RX ADMIN — ASPIRIN 81 MG: 81 TABLET, COATED ORAL at 08:34

## 2024-07-25 RX ADMIN — LEVOCARNITINE 330 MG: 1 SOLUTION ORAL at 17:02

## 2024-07-25 RX ADMIN — ZONISAMIDE 400 MG: 100 CAPSULE ORAL at 08:36

## 2024-07-25 RX ADMIN — BICTEGRAVIR SODIUM, EMTRICITABINE, AND TENOFOVIR ALAFENAMIDE FUMARATE 1 TABLET: 50; 200; 25 TABLET ORAL at 08:36

## 2024-07-25 RX ADMIN — GABAPENTIN 100 MG: 100 CAPSULE ORAL at 05:23

## 2024-07-25 RX ADMIN — TAMSULOSIN HYDROCHLORIDE 0.4 MG: 0.4 CAPSULE ORAL at 16:56

## 2024-07-25 RX ADMIN — ATORVASTATIN CALCIUM 80 MG: 80 TABLET, FILM COATED ORAL at 16:56

## 2024-07-25 RX ADMIN — METOPROLOL SUCCINATE 25 MG: 25 TABLET, EXTENDED RELEASE ORAL at 08:34

## 2024-07-25 RX ADMIN — RIVAROXABAN 20 MG: 20 TABLET, FILM COATED ORAL at 16:56

## 2024-07-25 RX ADMIN — LAMOTRIGINE 50 MG: 25 TABLET ORAL at 17:00

## 2024-07-25 RX ADMIN — LEVOCARNITINE 330 MG: 1 SOLUTION ORAL at 08:35

## 2024-07-25 RX ADMIN — DOCUSATE SODIUM 100 MG: 100 CAPSULE, LIQUID FILLED ORAL at 08:34

## 2024-07-25 RX ADMIN — Medication 6 MG: at 21:27

## 2024-07-25 RX ADMIN — LOSARTAN POTASSIUM 50 MG: 50 TABLET, FILM COATED ORAL at 08:34

## 2024-07-25 RX ADMIN — SERTRALINE HYDROCHLORIDE 75 MG: 50 TABLET ORAL at 08:34

## 2024-07-25 RX ADMIN — DIVALPROEX SODIUM 1250 MG: 500 TABLET, EXTENDED RELEASE ORAL at 08:34

## 2024-07-25 RX ADMIN — MIRTAZAPINE 15 MG: 15 TABLET, FILM COATED ORAL at 21:27

## 2024-07-25 NOTE — PROGRESS NOTES
24 1040   Pain Assessment   Pain Assessment Tool 0-10   Pain Score 3   Pain Location/Orientation Location: Head   Restrictions/Precautions   Precautions Aphasia;Bed/chair alarms;Cognitive;Fall Risk;Seizure;Supervision on toilet/commode   Comprehension   Comprehension (FIM) 3 - Understands basic info/conversation 50-74% of time   Expression   Expression (FIM) 3 - Expresses basic info/needs 50-74% of time   Social Interaction   Social Interaction (FIM) 5 - Interacts appropriately with others 90% of time   Problem Solving   Problem solving (FIM) 2 - Needs direction more than ½ time to initiate, plan or complete simple tasks   Memory   Memory (FIM) 3 - Recognizes, recalls/performs 50-74%   Speech/Language/Cognition Assessmetn   Treatment Assessment Focus of session was toward completing of formalized assessment per request from MD. Per chart review, pt had completed testing w/ [facility] on 24 which pt completed the MMSE with a score of 4/28, which indicates severe impairment.     Pt completed the SLUMS cognitive assessment. Pt total score was 1/30 which as compared to those with high school education correlates with severe neurocognitive disorder. Breakdown of scores as follows:    Orientation: 1/3  Functional problem solving with math: 0/3  Divergent naming (timed 1 minute- animals): 0/3 (4 animals total)  Word recall: 0/5  Mental flexibility with number order: 0/2  Clock Drawin/4   Following directions given shapes: 0/2  Story recall comprehension questions: 0/8    Total score: 1/30    Based on score, patient will benefit from further skilled SLP services to maximize overall cognitive lingusitic communication abilities for increased independence and decreased burden of care.     OF NOTE, THIS ASSESSMENT SCORE IS HIGHLY IMPACTED GIVEN HIS EXPRESSIVE LANGUAGE DEFICITS. PT WAS OBSERVED AT TIMES TO DEMONSTRATE COMPENSATORY STRATEGIES IN HIS ATTEMPTS TO NAME ORIENTATION INFORMATION, COMPLETE DIVERGENT  NAMING AS WELL AS DURING CLOCK DRAWING TASKS. DUE TO PT'S LANGUAGE DEFICITS LIKELY MILD-MODERATE RECEPTIVE LANGUAGE AND THEN MODERATE AT TIMES SEVERE EXPRESSIVE LANGUAGE DEFICITS, THERE IS LIKELY  A COMPONENT OF DECREASED COGNITIVE FUNCTIONING BUT DIFFICULT TO FULL DETERMINE THE EXTENT OF DEFICITS DUE TO HIS APHASIA.    Otherwise, pt does continue to recognize current SLP, immediately going to the tablet w/ apps which were downloaded for pt to utilize in down time. Pt was able to accurately pull up the alejandra which he was currently working on, which was noted to be a cognitive tasks, BUT SLP able to use language skills to name the animals presented in 3/3 occurrence. Also SLP targeted naming colors, which pt was 2/3 accurate, increasing to 3/3 when using phonemic cues. It was noted that allowing increased time and visual scanning (question the possibility of some decreased R sided inattention), pt was able to use the target picture to determine the solution. Also pt's friend, Mireya, also present throughout session and was educated as well on the reasoning for attempting to complete formalized cognitive testing. At this time, pt to continue to benefit from ongoing skilled SLP services targeting the ability to maximize receptive and expressive language skills in addition to cognitive skills in hopes of decreasing burden of care over time.    SLP Therapy Minutes   SLP Time In 1040   SLP Time Out 1110   SLP Total Time (minutes) 30   SLP Mode of treatment - Individual (minutes) 30   SLP Mode of treatment - Concurrent (minutes) 0   SLP Mode of treatment - Group (minutes) 0   SLP Mode of treatment - Co-treat (minutes) 0   SLP Mode of Treatment - Total time(minutes) 30 minutes   SLP Cumulative Minutes 225   Therapy Time missed   Time missed? No

## 2024-07-25 NOTE — PROGRESS NOTES
[facility]  Progress Note  Name: Sunil Patel I  MRN: [id number]  Unit/Bed#: -01 I Date of Admission: 7/6/2024   Date of Service: 7/25/2024 I Hospital Day: 19    Assessment & Plan   * Above-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)  Assessment & Plan  Presented with left lower extremity acute limb ischemia/extensive left iliofemoral and left infrainguinal embolism requiring left femoral thrombectomy and subsequent AKA on 6/7/24 with vascular surgery.  Incision C/D/I, pain controlled.   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 CM to investigate what is covered.  Rehab and pain control per PMR  Pt has met his rehab goals and could be discharged tomorrow.    Episodic headache  Assessment & Plan  Pt reports a history of migraines.  It is associated with photophobia.  He is unable to take triptans due to a history of stroke.  [facility]    Cardiomyopathy (HCC)  Assessment & Plan  ECHO 6/10/2024 = LVEF 40-45% with mild global hypokinesis   Status post NM stress test on 6/11/2024 which showed: a large, mild, fixed defect in the inferior wall, possibly due to diaphragmatic attenuation artifact, there is a small area of partial reversibility in the inferior apical wall suggestive of ischemia    Evaluated ed by cardiology, etiology felt to be possibly secondary to stress-induced cardiomyopathy, with apical thrombus  Cardiac catheterization deferred given the lack of any significant ischemia, and no current cardiac symptoms  Continue with medical management with aspirin, statin, beta-blocker, ARB  Monitor volume status, remains euvolemic off of diuretics   Euvolemic on exam  Outpatient follow-up with cardiology    Traumatic brain injury (HCC)  Assessment & Plan  Remote history of TBI, previously residing in a group home prior to [facility]  sentence.  Evaluated by neuropsychiatry on 6/27/24 and deemed NOT to have medical decision-making capacity  Process for court appointed guardian started on 7/5/24 - CM following     Carnitine deficiency (HCC)  Assessment & Plan  Continue levocarnitine 330 mg 3x daily with meals.    History of seizures  Assessment & Plan  Diagnosed in July 2019, follows with [facility] neurology outpatient  Continue home regimen with Depakote ER, Lamotrigine and Zonegran    Left ventricular thrombus  Assessment & Plan  Noted on echocardiogram 6/10/2024: spherical 1.5 x 1.3 cm thrombus at the LV apex   Initiated on AC with Xarelto, continue  Rx sent to [facility] for price check on 7/10/24 -  spoke with [employer] and pt has rx coverage. Information sent to [facility].  7/21/24, d/w [facility] and Xarelto is NOT covered and he would have to pay OOP @$700.00.    Escalated meeting today. Message sent to .  If unable to obtain coverage will need to bridge pt to Warfarin     Benign essential hypertension  Assessment & Plan  Home regimen: Losartan 50mg daily, Toprol XL 25 mg BID  Current regimen: Losartan 50 mg daily, Toprol-XL 25 mg daily  Did miss both 7/19 for  = made no changes and he has gotten both over last few days      History of stroke  Assessment & Plan  History of left MCA CVA in May 2018 with residual expressive aphasia  Continue ASA and atorvastatin    Human immunodeficiency virus (HIV) infection (HCC)  Assessment & Plan  Continue Biktarvy and Tivicay.    Bipolar affective disorder (HCC)  Assessment & Plan  Continue home meds Zoloft and Remeron  Outpatient follow-up with Psychiatry             The above assessment and plan was reviewed and updated as determined by my evaluation of the patient on 7/25/2024.    Labs:   Results from last 7 days   Lab Units 07/25/24  0518   WBC Thousand/uL 6.34   HEMOGLOBIN g/dL 10.5*   HEMATOCRIT % 36.0*   PLATELETS Thousands/uL 384     Results from last 7 days   Lab Units 07/25/24  0518   SODIUM  mmol/L 139   POTASSIUM mmol/L 4.0   CHLORIDE mmol/L 105   CO2 mmol/L 25   BUN mg/dL 20   CREATININE mg/dL 0.85   CALCIUM mg/dL 8.9                   Imaging  No orders to display       Review of Scheduled Meds:  Current Facility-Administered Medications   Medication Dose Route Frequency Provider Last Rate    acetaminophen  975 mg Oral TID PRN José Salcido MD      aspirin  81 mg Oral Daily Lorrie Barillas PA-C      atorvastatin  80 mg Oral Daily With Dinner Lorrie Barillas PA-C      bictegravir-emtricitab-tenofovir alafenamide  1 tablet Oral Daily With Breakfast Lorrie Barillas PA-C      bisacodyl  10 mg Rectal Daily PRN Lorrie Barillas PA-C      diphenhydrAMINE  25 mg Oral Q6H PRN Lorrie Barillas PA-C      divalproex sodium  1,250 mg Oral Daily Lorrie Barillas PA-C      docusate sodium  100 mg Oral BID Ashley Depadua, MD      dolutegravir  50 mg Oral Daily Lorrie Barillas PA-C      gabapentin  100 mg Oral Q8H Ashley Depadua, MD      lamoTRIgine  50 mg Oral BID Lorrie Barillas PA-C      levOCARNitine  1,000 mg/day Oral TID With Meals Lorrie Barillas PA-C      losartan  50 mg Oral Daily Lorrie Barillas PA-C      melatonin  6 mg Oral HS Lorrie Barillas PA-C      metoprolol succinate  25 mg Oral Daily CHARLIE Mcclelland      mirtazapine  15 mg Oral HS Lorrie Barillas PA-C      ondansetron  4 mg Oral Q6H PRN Lorrie Barillas PA-C      oxyCODONE  2.5 mg Oral Q8H PRN Raimundo Riley MD      polyethylene glycol  17 g Oral Daily PRN Lorrie Barillas PA-C      rivaroxaban  20 mg Oral Daily With Dinner Lorrie Barillas PA-C      senna  1 tablet Oral HS PRN Lorrie Barillas PA-C      sertraline  75 mg Oral Daily Lorrie Barillas PA-C      tamsulosin  0.4 mg Oral Daily With Dinner Lorriejacky Barillas PA-C      zonisamide  400 mg Oral Daily Lorrie Barillas PA-C         Subjective/ HPI: Patient seen and examined. Patients  overnight issues or events were reviewed with nursing staff. New or overnight issues include the following:     Pt seen in his room with his friend, Mireya, present. He reports a headache after therapy today. He states he is otherwise doing well. He denies any other complaints.    ROS:   A 10 point ROS was performed; negative except as noted above.        *Labs /Radiology studies Reviewed  *Medications  reviewed and reconciled as needed  *Please refer to order section for additional ordered labs studies      Physical Examination:  Vitals:   Vitals:    07/24/24 1539 07/24/24 2020 07/25/24 0601 07/25/24 0826   BP: 111/63 115/60 116/62 131/73   BP Location: Left arm Left arm Left arm Left arm   Pulse: 87 90 86 89   Resp: 17 17 20    Temp: 97.5 °F (36.4 °C) 98.2 °F (36.8 °C) 97.9 °F (36.6 °C)    TempSrc: Oral Oral Oral    SpO2: 97% 94% 99%    Weight:       Height:           General Appearance: NAD; pleasant  HEENT: PERRLA, conjuctiva normal; mucous membranes moist; face symmetrical  Neck:  Supple  Lungs: clear bilaterally, normal respiratory effort, no retractions, expiratory effort normal, on room air  CV: regular rate and rhythm, no murmurs rubs or gallops noted   ABD: soft non tender, +BS x4  EXT: Lt AKA  Skin: normal turgor, normal texture, no rash  Psych: affect normal, mood normal  Neuro: Awake and alert. Expressive aphasia.      The above physical exam was reviewed and updated as determined by my evaluation of the patient on 7/25/2024.    Invasive Devices       None                      VTE Pharmacologic Prophylaxis: Xarelto  Code Status: Level 1 - Full Code  Current Length of Stay: 19 day(s)    Total floor / unit time spent today  35 minutes   Coordination of patient's care was performed in conjunction with consulting services. Time invested included review of patient's labs, vitals, and management of their comorbidities with continued monitoring, examination of patient as well as answering patient questions,  documenting her findings and creating progress note in electronic medical record,  ordering appropriate diagnostic testing.       ** Please Note:  voice to text software may have been used in the creation of this document. Although proof errors in transcription or interpretation are a potential of such software**

## 2024-07-25 NOTE — PROGRESS NOTES
07/25/24 1330   Pain Assessment   Pain Assessment Tool 0-10   Pain Score No Pain   Restrictions/Precautions   Precautions Bed/chair alarms;Aphasia;Cognitive;Fall Risk;Supervision on toilet/commode;Seizure   LLE Weight Bearing Per Order NWB   Braces or Orthoses Other (Comment)  (L AKA rasheeda)   Subjective   Subjective pt reports feeling ok and ready for skilled PT   Roll Left and Right   Type of Assistance Needed Supervision   Comment 9th floor on  mat   Roll Left and Right CARE Score 4   Sit to Lying   Type of Assistance Needed Supervision   Comment 9th floor on mat   Sit to Lying CARE Score 4   Lying to Sitting on Side of Bed   Type of Assistance Needed Supervision   Comment 9th floor on mat   Lying to Sitting on Side of Bed CARE Score 4   Sit to Stand   Type of Assistance Needed Supervision   Comment paprl bars   Sit to Stand CARE Score 4   Bed-Chair Transfer   Type of Assistance Needed Supervision   Comment WC lvl sit pivot   Chair/Bed-to-Chair Transfer CARE Score 4   Transfer Bed/Chair/Wheelchair   Adaptive Equipment None   Car Transfer   Type of Assistance Needed Incidental touching   Comment VC CgA for sit pivot transfers WC position and hand/foot position   Car Transfer CARE Score 4   Walk 10 Feet   Reason if not Attempted Safety concerns   Walk 10 Feet CARE Score 88   Walk 50 Feet with Two Turns   Reason if not Attempted Safety concerns   Walk 50 Feet with Two Turns CARE Score 88   Walk 150 Feet   Reason if not Attempted Safety concerns   Walk 150 Feet CARE Score 88   Walking 10 Feet on Uneven Surfaces   Reason if not Attempted Safety concerns   Walking 10 Feet on Uneven Surfaces CARE Score 88   Ambulation   Does the patient walk? 0. No, and walking goal is not clinically indicated.   Wheel 50 Feet with Two Turns   Type of Assistance Needed Independent   Comment outside and inside WC propl   Wheel 50 Feet with Two Turns CARE Score 6   Wheel 150 Feet   Type of Assistance Needed Independent   Comment  outside and inside WC propl up and down ramp 40 feet   Wheel 150 Feet CARE Score 6   Wheelchair mobility   Does the patient use a wheelchair? 1. Yes   Type of Wheelchair Used 1. Manual   Method Right upper extremity;Left upper extremity   Curb or Single Stair   Reason if not Attempted Safety concerns   1 Step (Curb) CARE Score 88   4 Steps   Reason if not Attempted Safety concerns   4 Steps CARE Score 88   12 Steps   Reason if not Attempted Safety concerns   12 Steps CARE Score 88   Therapeutic Interventions   Strengthening wc push 10 reps x2 sets   Flexibility prone stretch hip extension and sidelying into extension   Balance sitting core strengthening with long sitting and ball toss and dowel 3 # ball toss ,   Equipment Use   NuStep level 4 for 12 min   Assessment   Treatment Assessment pt cont to work on set up WC with limited VC for set up and safety , progressing with safety but cont at times needs assistance to remember lock and WC placement . Pt progressing with core strengthening on gym mat and all ball and core activities . Pt will cont instructed on AKA amputee shinker and keep limb form skin break down and RLE healthy . Cont POC and work on WC safety and all DC goals. Pt cont to awaiting guardianship and placement   Plan   Progress Progressing toward goals   PT Therapy Minutes   PT Time In 1330   PT Time Out 1500   PT Total Time (minutes) 90   PT Mode of treatment - Individual (minutes) 90   PT Mode of treatment - Concurrent (minutes) 0   PT Mode of treatment - Group (minutes) 0   PT Mode of treatment - Co-treat (minutes) 0   PT Mode of Treatment - Total time(minutes) 90 minutes   PT Cumulative Minutes 1700   Therapy Time missed   Time missed? No

## 2024-07-25 NOTE — ASSESSMENT & PLAN NOTE
Presented with left lower extremity acute limb ischemia/extensive left iliofemoral and left infrainguinal embolism requiring left femoral thrombectomy and subsequent AKA on 6/7/24 with vascular surgery.  Incision C/D/I, pain controlled.   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 CM to investigate what is covered.  Rehab and pain control per PMR  Pt has met his rehab goals and could be discharged tomorrow.

## 2024-07-25 NOTE — CASE MANAGEMENT
CM met with virtually via teams with [facility], cm director acute, Samantha MCDONNELL to discuss dispo plan thus far. Kika and taz have continued to explore SNFs, subacute facilities and residential programs throughout [facility] and part of [place]. Dae case has been pushed back to 8/27. Cm made team aware.    Transitional planning: Spoke with patient at bedside regarding home health care. Patient does not have current established PCP. Patient is willing to go to PT/OT as outpatient here at [facility]    1112 PS message to neurology resident informing of the above. They will place out patient therapy orders.     1135 Phone call to OP therapy. Spoke with Rohini to schedule PT appointment for 1/31/24 at 0730 arrive by 0715. Unable to coordinate OT at this time.    1145 Spoke to patient, sister and girl friend to notify of the above. The above time may not work out due to transportation. Provided family with OP therapy phone number if they need to change the appointment and [facility] transportation number as another option for transportation, noting that they will need 48 hours notice.    1211 Phone call to Sandra with [facility]. They have not heard anything on precert. Notified her that patient plans to return home with OP therapy at 2 pm.

## 2024-07-25 NOTE — PROGRESS NOTES
07/25/24 1110   Pain Assessment   Pain Assessment Tool 0-10   Pain Score No Pain   Pain Rating: FLACC (Rest) - Face 0   Pain Rating: FLACC (Rest) - Legs 0   Pain Rating: FLACC (Rest) - Activity 0   Pain Rating: FLACC (Rest) - Cry 0   Pain Rating: FLACC (Rest) - Consolability 0   Score: FLACC (Rest) 0   Pain Rating: FLACC (Activity) - Face 0   Pain Rating: FLACC (Activity) - Legs 0   Pain Rating: FLACC (Activity) - Activity 0   Pain Rating: FLACC (Activity) - Cry 0   Pain Rating: FLACC (Activity) - Consolability 0   Score: FLACC (Activity) 0   Restrictions/Precautions   Precautions Bed/chair alarms;Aphasia;Cognitive;Fall Risk;Supervision on toilet/commode;Seizure   Weight Bearing Restrictions Yes   LLE Weight Bearing Per Order NWB   ROM Restrictions No   Cognition   Overall Cognitive Status Impaired   Arousal/Participation Cooperative;Arousable   Attention Attends with cues to redirect   Subjective   Subjective pt with some encouragement from his friend Donna agreed to have therapy. pt initially wants to do something on his ipad.   Bed-Chair Transfer   Type of Assistance Needed Supervision;Verbal cues   Comment CS w/c level sit pivots. requires VC/TC about 50% of the time for set up, hand placement and w/c parts management to complete task safely   Chair/Bed-to-Chair Transfer CARE Score 4   Wheel 50 Feet with Two Turns   Type of Assistance Needed Independent   Wheel 50 Feet with Two Turns CARE Score 6   Wheel 150 Feet   Type of Assistance Needed Independent   Comment using bilat UE, has R personal shoe on but still does not utilize to propel chair but able to keep it up while performing task without using a leg rest   Wheel 150 Feet CARE Score 6   Therapeutic Interventions   Strengthening w/c push ups x 5 SH x 5 reps x 4 sets with VC to lift L residual limb to activate hip flexor for strengthening vs letting it hang down. seated resisted R hip flexion and LAQ with 3# AW x 5 SH x 10 reps x 2 sets   Assessment    Treatment Assessment Pt tolerated tx and actively participated with w/c level mobility training and strengthening exercises. pt consistently completed transfers at CS level with VC/TC provided to have a safe set up,for sequencing and for hand placement. PT to perform care score mobility reassessment with PM session and cont with strengthening exercises as well. pt assisted back to recliner post tx and lunch tray set up and alarm on.   Barriers to Discharge Inaccessible home environment;Decreased caregiver support   Barriers to Discharge Comments awaiting guardianship and placement   PT Barriers   Functional Limitation Car transfers;Standing;Transfers;Wheelchair management   Plan   Treatment/Interventions Functional transfer training;LE strengthening/ROM;Therapeutic exercise;Endurance training   Progress Progressing toward goals   PT Therapy Minutes   PT Time In 1110   PT Time Out 1133   PT Total Time (minutes) 23   PT Mode of treatment - Individual (minutes) 3   PT Mode of treatment - Concurrent (minutes) 20   PT Mode of treatment - Group (minutes) 0   PT Mode of treatment - Co-treat (minutes) 0   PT Mode of Treatment - Total time(minutes) 23 minutes   PT Cumulative Minutes 1610   Therapy Time missed   Time missed? No

## 2024-07-25 NOTE — ASSESSMENT & PLAN NOTE
Noted on echocardiogram 6/10/2024: spherical 1.5 x 1.3 cm thrombus at the LV apex   Initiated on AC with Xarelto, continue  Rx sent to [facility] for price check on 7/10/24 - CM spoke with [employer] and pt has rx coverage. Information sent to [facility].  7/21/24, d/w [facility] and Xarelto is NOT covered and he would have to pay OOP @$700.00.    Escalated meeting today. Message sent to .  If unable to obtain coverage will need to bridge pt to Warfarin

## 2024-07-25 NOTE — PROGRESS NOTES
"   07/25/24 0846   Pain Assessment   Pain Assessment Tool 0-10   Pain Score 2   Pain Location/Orientation Location: Head   Pain Onset/Description Onset: Gradual   Effect of Pain on Daily Activities did not limit Pt participation   Patient's Stated Pain Goal No pain   Hospital Pain Intervention(s) Medication (See MAR)  (Pt had tylenol right before treatment session)   Restrictions/Precautions   Precautions Aphasia;Bed/chair alarms;Cognitive;Fall Risk;Seizure;Supervision on toilet/commode   Weight Bearing Restrictions Yes   LLE Weight Bearing Per Order NWB   ROM Restrictions No   Braces or Orthoses Other (Comment)  (L AKA )   Lifestyle   Autonomy \"My head starts throbbing. Too much to think about and do all the time.\"   Reciprocal Relationships friend Mireya is coming today to visit and talk about finances per Pt   Service to Others unknown   Intrinsic Gratification Pt reports that he likes cooking   Oral Hygiene   Type of Assistance Needed Set-up / clean-up   Physical Assistance Level No physical assistance   Comment seated in wc at the sink   Oral Hygiene CARE Score 5   Grooming   Able To Comb/Brush Hair  (Pt used a dry wash cap to wash his hair at the sink with set-up assist)   Shower/Bathe Self   Type of Assistance Needed Set-up / clean-up   Physical Assistance Level No physical assistance   Comment Pt bathed all areas except his back, using various positions in bed to weight shift and roll for posterior LE and buttocks.   Shower/Bathe Self CARE Score 5   Bathing   Assessed Bath Style Sponge Bath   Anticipated D/C Bath Style Other  (unknown dc plan)   Able to Gather/Transport No   Able to Wash/Rinse/Dry (body part) Buttocks;Perineal Area;Abdomen;Chest;R Lower Leg/Foot;R Upper Leg;L Upper Leg;Right Arm;Left Arm   Limitations Noted in Balance;Endurance   Positioning Seated;Supine   Upper Body Dressing   Type of Assistance Needed Set-up / clean-up   Physical Assistance Level No physical assistance   Comment " seated EOB   Upper Body Dressing CARE Score 5   Lower Body Dressing   Type of Assistance Needed Physical assistance   Physical Assistance Level 26%-50%   Comment Pt removed his condom cath and right slipper sock independently. No other LB garments on at start of session. Following bathing OT provided instruction in self application in disposable brief by adhereing the tabs around the midthigh and then attempting to hike to waist through lateral weight shifting and bridging in the bed, encouraging bimanual techniques to engage the right hand more effectively. Pt required mod A with this task. Pt worked on removal of AKA  with min A to free the strap from behind his back. Donning the  Pt needed mod A, at first attempting to put it on inside out and twisted. Training provided in opening the  to don one layer at a time with verbal and tactile cues needed to fully don medial aspect. Pt worked to tuck the strap behind his left side but required assist to pull it through once rolled onto his left side due to deficits in sensation and grasp with right hand. Pt able to abisai right sock with set-up and thread hospital pants in long sit with min A to hike with bimanual pull in bridged position. OT stabilized right foot during bridge due to air mattress making his foot slide.   Lower Body Dressing CARE Score 3   Putting On/Taking Off Footwear   Type of Assistance Needed Set-up / clean-up   Physical Assistance Level No physical assistance   Comment able to don/doff slipper sock and right slide in shoe   Putting On/Taking Off Footwear CARE Score 5   Picking Up Object   Comment from locked wc, Pt able to retrieve items from floor level without LOB   Roll Left and Right   Type of Assistance Needed Supervision   Physical Assistance Level No physical assistance   Comment using bed rails   Roll Left and Right CARE Score 4   Lying to Sitting on Side of Bed   Type of Assistance Needed Supervision   Physical  Assistance Level No physical assistance   Comment right side rail   Lying to Sitting on Side of Bed CARE Score 4   Sit to Stand   Type of Assistance Needed Supervision   Physical Assistance Level No physical assistance   Comment close supervision for safety   Sit to Stand CARE Score 4   Bed-Chair Transfer   Type of Assistance Needed Supervision   Physical Assistance Level No physical assistance   Comment CS bed to wc, wc to recliner. Pt asked to verbally identify each step for set-up and transfer for OT.   Chair/Bed-to-Chair Transfer CARE Score 4   Toileting Hygiene   Comment Pt urinated into condom catheter prior to removing, declined use of BSC for urination or bowel.   Reason if not Attempted Refused to perform   Toileting Hygiene CARE Score 7   Light Housekeeping   Light Housekeeping Level of Assistance Minimum assistance   Light Housekeeping Pt requested to washout his used AKA  in the sink due to wearing it for multiple days. Pt sat sinkside in wc to perform with set-up and assistance needed to wring the garment more fully after rinsing. Pt also performed bed making task to straighten his sheet and blankets on the bed from  level, with review of propulsion techniques specific to small spaces, safety with reaching and locking the brakes.   Therapeutic Excerise-Strength   UE Strength Yes   Right Upper Extremity- Strength   RUE Strength Comment seated exercise using 4# dowel 15reps x 2 prograde rowing, 20reps x 2 chest press and bicep curls. Emphasis placed on breathing techniques to improve endurance and reduce Pt tendency to hold his breath. Education provided on potential changes with blood pressure and his c/o headache related to holding his breath during challenging tasks.   Left Upper Extremity-Strength   LUE Strength Comment see above   Cognition   Overall Cognitive Status Impaired   Arousal/Participation Cooperative   Attention Attends with cues to redirect   Orientation Level Oriented to  person;Oriented to place;Oriented to situation;Disoriented to time   Memory Decreased recall of recent events   Following Commands Follows one step commands with increased time or repetition   Activity Tolerance   Activity Tolerance Patient tolerated treatment well   Medical Staff Made Aware Nursing alerted when sacral/buttock hygiene was completed and dressing removed. Nursing able to redress wound immediately noting reduced bleeding and improved appearance.   Assessment   Treatment Assessment Pt particiaated in 90minute session of OT with emphasis on selfcare performance with continued training in don/doffing AKA , LB compensatory strategies. Details of selfcare performance are above. Pt reports the pull-up style briefs in his room are too small and uncomfortable, consequently he is wearing full disposable briefs which are very difficult for him to don himself. Pt may benefit from trial of hospital underwear with a disposable pad instead to facilitate independence with management depending on his level of incontinence. Pt also participated in mild housekeeping tasks such as making his bed and rinsing out his  to dry in the bathroom until tomorrow, and washed his hair using the dry shampoo cap. Pt continues to benefit from skilled OT intervention to improve use of the right hand as a functional assist during tasks, training and repetition in compensatory strategies to improve independence with selfcare tasks, functional transfer training including post transfer wc manipulation in anticipation for next time the wc is needed, and UE strength/coordination tasks with review of breathing techniques. Pt would also benefit from medication mgmt assessment in preparation for discharge to the least restrictive environment possible.   Prognosis Fair   Problem List Decreased strength;Decreased range of motion;Decreased endurance;Impaired balance;Decreased cognition;Impaired judgement;Decreased safety  awareness;Pain;Decreased mobility   Barriers to Discharge Inaccessible home environment;Decreased caregiver support   Barriers to Discharge Comments awaiting guardianship and plcement   Plan   Treatment/Interventions ADL retraining;Functional transfer training;Therapeutic exercise;Endurance training;Cognitive reorientation;Compensatory technique education   Progress Progressing toward goals   OT Therapy Minutes   OT Time In 0830   OT Time Out 1000   OT Total Time (minutes) 90   OT Mode of treatment - Individual (minutes) 90   OT Mode of treatment - Concurrent (minutes) 0   OT Mode of treatment - Group (minutes) 0   OT Mode of treatment - Co-treat (minutes) 0   OT Mode of Treatment - Total time(minutes) 90 minutes   OT Cumulative Minutes 1499   Therapy Time missed   Time missed? No

## 2024-07-25 NOTE — ASSESSMENT & PLAN NOTE
Pt reports a history of migraines.  It is associated with photophobia.  He is unable to take triptans due to a history of stroke.  Given [facility] 7/21/24.

## 2024-07-25 NOTE — PROGRESS NOTES
Physical Medicine and Rehabilitation Progress Note  Sunil Patel 63 y.o. male MRN: [id number]  Unit/Bed#: [facility] 451-01 Encounter: [id number]    To Review: Sunil Paetl is a 63 y.o. male who  has a past medical history of Acute lower limb ischemia (06/08/2024), Anxiety, Depression, HIV disease (HCC), Substance abuse (HCC), and Suicide attempt (HCC). who presented to the [facility] on 6/7/24 from custodial for increased LLE swelling and pain and was found to have a left external iliac artery occlusion and acute limb ischemia. 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 Patient had TTE on 6/10/24 showing LV apex thrombus. On 6/11/24 patient had pharmacologic nuclear stress test/SPECT scan which did not show any significant areas of ischemia with EF 40-45% and recommended continuing A/C for LV apical thrombus. His course was complicated by b/l buttock unstageable pressure ulcers, urinary and fecal incontinence, pain, and significant decline in ADLs and mobility. Patient has been continued on Xarelto for anticoagulation, as well as ASA and statin. Patient has a history of TBI, with baseline nonfluent aphasia and forgetfulness. He was evaluated by neuropsychology on 6/27/24, and deemed to not have capacity to make fully informed medical decisions. Therefore, the guardianship process was initiated as of 7/5/24. He was admitted to the [facility] on 7/6.     Chief Complaint: headache    Interval History/Subjective:  No acute overnight events. Patient's headache is improved to 5/10 in severity. Patient's friend Mireya is at bedside today and brought him a lot of treats. This morning, patient continues to feel frustrated about his living situation and inability to retrieve his stuff back. He re-iterates that he  "would like to go outside. Otherwise, denies any fevers, chills, chest pain, sob, abdominal pain, nausea or vomiting.     ROS:  10 point review of systems is otherwise negative except for what is noted above.    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 Underwent L femoral thrombectomy and AKA with vascular surgery   - [facility] sx follow-up 7/10 - L AKA incision site well-healed, staples taken out at the bedside this morning  - [facility] will be vendor - Patient now has .  - Monitor for hip flexion/abduction tightness. Stretches in therapy  - On Rivaroxaban with hx of LV thrombus and PAOD   - PT/OT/SLP (to work on carry over strategies) 3-5 hours/day, 5-7 days/week.    - Goals are Ind-Sup at a wheelchair level.   - Outpatient f/u with [facility] and Vascular  - Continue patient training with  placement  - Continue gabapentin - doesn't do too much for phantom limb sensation, but that doesn't bother him or cause him pain currently.   - Patient still frustrated given circumstances; will continue gabapentin for anxiety    Neurocognitive disorder  Assessment & Plan  Has been appropriate and cooperative throughout this stay without any behavioral issues on the rehab unit to date.   Hx of TBI and L MCA CVA, psychiatric d/o, seizure d/o  - Neuropsych assessment 6/27/24 - \"diffuse cognitive dysfunction and on a measure assessing awareness of personal health status and ability to evaluate health problems, handle medical emergencies and take safety precautions, patient performed in the IMPAIRED range of functioning. During this encounter, patient does not appear to have capacity to make fully informed medical decisions.\"  MMSE 4/28 - severely " impaired  - Guardianship process initiated on acute - follow-up by CM/Admin  - Status: some expressive and possible some receptive aphasia.  He can be difficult to follow at times likely due to a mixture of aphasia, tangentiality, mild lability, and impaired memory; lability with hx of possible bipolar d/o  - Neuropsych Med review: Depakote, Lamictal, Remeron, Zoloft, Melatonin, gabapentin, PRN oxy 2.5mg TID   - Monitor neuro-exam, wakefulness, mood, cognition, insight into deficits and safety awareness   - Monitor and ensure optimal management electrolytes, nutrition, and hydration  - Monitor for signs or symptoms of infection, medication intolerances, other systemic etiologies  - Additional labs, imaging, specialist follow-up as needed per primary team currently   - Overstimulation precautions, frequent re-orientation, re-direction, re-assurance  - Optimal mood, pain, and sleep management  - If impaired sleep or behavior recommend sleep log and agitation monitoring    - Limit sedating medications when possible  - Fall precautions - if needed increase rounding or consider virtual sitter or in-person sitter  - For routine restlessness, anxiety, irritability focus on non-pharmacologic management    - Hold benzo's with increased risk paradoxical reaction and possibility of limiting cognitive recovery  - Continue SLP and interdisciplinary care  - OP neuro and PCP   7/23- impaired attention after his first shower in the unit. Consistent with cognitive fatigue. Also increased frustration given his housing circumstances. Could benefit from more outdoor activities  7/25- Increased frustration today as well. Ongoing discussions with CM/team about potential outings outside to change scenery and help with emotional fatigue    Adjustment disorder with mixed anxiety and depressed mood  Assessment & Plan  - Related to recent release from incarceration, new AKA and uncertainty of future disposition, all in the setting of impaired  "cognition related to prior strokes and TBI  - Behavioral techniques for symptom management  - Neuropsychology consult as available  - Given his circumstances, increased frustration is expected. Can consider Psych consult if symptoms continue to worsen to adjust mood stabilizing medications. Will try nonpharmacologic approaches first with more frequent conversations/redirections   - Continue gabapentin 100 q8 for now    Pressure injury of buttock, stage 3 ([facility])  Assessment & Plan  Stable   - Wound care consulted and following weekly  Per wound care specialist 7/15  - POA L buttock pressure injury unstageable has healed.  - POA R buttock pressure injury is now Stage 3, and improving.   - Cleanse sacro-buttocks with soap and water. Apply Triad Paste to Sacro-Buttocks Wound Beds Only. Apply Hydraguard to Elsi-wounds and all other intact aspects of sacro-buttocks. Apply both creams TID and PRN episodes of incontinence.    - Recommend ROHO cushion in chair when out of bed instead   - Preventative hydraguard to bilateral heels BID and PRN.   - P500  - Monitor clinically for breakdown, frequent turns  - 7/23: wound appears well healing with no signs of active infection. Continue local wound care. Continue lateral leans in therapy as tolerated      Patient incapable of making informed decisions  Assessment & Plan  - History of remote TBI and prior strokes with comorbid psychiatric history.  - Evaluated by neuropsychology, Dr. Dilshad Tatum, PhD on 6/27/24, found to have \"diffuse cognitive dysfunction and on a measure assessing awareness of personal health status and ability to evaluate health problems, handle medical emergencies and take safety precautions, patient performed in the IMPAIRED range of functioning. During this encounter, patient does not appear to have capacity to make fully informed medical decisions.\"  - Court-appointed guardianship process has been initiated by acute care CM Katia Kay.    - We have " identified two friends who are interested in being patient's guardians and have been involved and invested in patient's care on the ARC.   - They will need to be interviewed by the  involved in this process.    - He has to undergo his hearing on 8/6/2024 first.    - He would ultimately benefit from [facility]/nursing home/memory care unit - he does very well with structure and supervision.  Teams 7/23- Ongoing discussions with case management and social work to dispo patient to stable long-term housing.   7/25- CM still awaiting confirmation from acute for discharge planning. Likely will not be discharging in the next 24-48 hours    Urinary incontinence  Assessment & Plan  - Did have some incontinence on acute - improved with timed voids and consistent assistance with his urinal  - Described as urgency  - Marked improvement on timed voids.   - monitor for retention, incontinence (including overflow incontinence), signs/symptoms of UTI  - Timed Voids and PVRs Q4hrs initiated earlier. And PVR <150 x3, so scans discontinued.    - He has some difficulty managing the urinal    - needs assistance but is able to transfer to [facility]  - Still uses condom catheter at night, in part for continence care/to protect his skin given his buttock wounds.  - Continue timed voids          At risk for constipation  Assessment & Plan  - Stooling adequately recently; did have some incontinence on acute now improved  - Close continent care given buttock wounds  - Last BM 7/24 and continent  - Colace 100mg BID  - PRN miralax, Senna and suppository    Acute pain  Assessment & Plan  Controlled   - Tylenol 975 mg q8h PRN  - Oxycodone 2.5 mg q8h PRN, wean as possible   - Last used 7/19.    - Can discontinue at discharge.  - Resumed Gabapentin 100mg Q8hr due to increased headaches and irritability since discontinuing.    Impaired mobility and activities of daily living  Assessment & Plan  - Rehabilitation medicine physician for daily monitoring  of care, 24 hour availability for acute medical issues, medication management, and therapeutic and diagnostic assessments.  - 24 hour rehabilitation nursing 7 days per week for: management/teaching of medications, bowel/bladder routine, skin care.  - PT, OT for 2-3 hours per day, 5 to 6 days per week; 15 hours per week  - Rehabilitation Psychology as needed for adjustment and coping  - MSW for barriers to discharge, community resources, and family support  - Discharge planning following to help ensure a safe and efficient discharge  -7/23 teams: Biggest ADL barriers per OT are toileting and applying  to LLE        Traumatic brain injury (HCC)  Assessment & Plan  See neurocog impairment     Episodic headache  Assessment & Plan  Seemed to worsen with increased irritability after discontinuing gabapentin  Resumed gabapentin  Received nurtec earlier this week.   - 7/25: improvement in headache, now 5/10 in severity. Continue to monitor    Cardiomyopathy (HCC)  Assessment & Plan  ECHO on 6/10/2024 showed LVEF 40-45% with mild global hypokinesis   Status post NM stress test on 6/11/2024 which showed: a large, mild, fixed defect in the inferior wall, possibly due to diaphragmatic attenuation artifact, there is a small area of partial reversibility in the inferior apical wall suggestive of ischemia    Elevated by cardiology, etiology felt to be possibly secondary to stress-induced cardiomyopathy, with apical thrombus  Cardiac catheterization deferred given the lack of any significant ischemia, and no current cardiac symptoms  Continue with medical management with aspirin, statin, beta-blocker, ARB  Monitor volume status, remains euvolemic off of diuretics   Outpatient follow-up with cardiology    At risk for venous thromboembolism (VTE)  Assessment & Plan  - On rivaroxaban    Carnitine deficiency (HCC)  Assessment & Plan  - Carnitine replacement  - Appreciate medicine management      History of seizures  Assessment  & Plan  - Depakote ER, lamotrigine, zonisamide  - Outpatient follow-up with [facility] neurology    Left ventricular thrombus  Assessment & Plan  - Visualized on echocardiogram on 6/10/24: spherical 1.5 x 1.3 cm thrombus at the LV apex.   - Rivaroxaban   - $700.   - Price checking eliquis and pradaxa   - May need to transition to coumadin pending cost   - Management as per IM  - Outpatient follow-up with cardiology    Benign essential hypertension  Assessment & Plan  - Toprol, losartan  - Appreciate medicine management    History of stroke  Assessment & Plan  - History of old left temporal and parietal lobe cortical infarcts, also involving the insula and left occipital lobe as well as small right posterior parietal lobe. Stable on most recent CT head on 5/16/24.   - ASA, Xarelto and statin for secondary prevention  - Optimal BP control  - Monitor neuro exam - hx of LV thrombus   - OP neuro follow-up     Human immunodeficiency virus (HIV) infection (HCC)  Assessment & Plan  - Continue anti-retroviral treatment  - Appreciate medicine management during ARC course  - OP ID follow-up       Bipolar affective disorder (HCC)  Assessment & Plan  Mood acceptable; continue meds as outlined   Supportive counseling  NeuroPsychology consult while in ARC if available for support  Counseled on and continue to encourage deep breathing/relaxation/behavioral management techniques  - Mirtazapine 15 mg qHs  - Will consult Psych before increasing Sertraline for better mood regulation  - Lamictal 50mg BID  - Depakote ER 1250mg qday   - Outpatient psychiatry follow-up        Health Maintenance  #Delirium/Sleep: Optimize sleep/wake cycle and maintain delirium precautions. + Remeron 15 mg   #Pain: Tylenol PRN   #Bowel: Continent. Last BM 7/24. On Colace BID w/ Senna,Miralax,Dulcolax PRN  #Bladder: Voiding and continent  #Skin/Pressure Injury Prevention: Turn Q2hr in bed, with weight shifts [id number] in wheelchair.  #DVT Prophylaxis: Xarelto  #GI  Prophylaxis: not indicated  #Code Status: Full code  #FEN: Cardiac diet with Ensure at bfast/dinner  #Dispo: [facility] pending confirmation from acute hospital/. Guardianship meeting still set for 8/6      Objective:    Functional Update:  PT: sup bed mobility, CS with bed-chair transfer, Ind WC use with 150'  OT: set up oral hygiene, setup bathing, setup UBD, min-modA LBD, setup footwear, Min light housekeeping,   SLP: sup social interaction, ModA with comprehension, expression, problem solving, memory    Allergies per EMR    Physical Exam:  Temp:  [97.5 °F (36.4 °C)-98.2 °F (36.8 °C)] 97.9 °F (36.6 °C)  HR:  [86-90] 89  Resp:  [17-20] 20  BP: (111-131)/(60-73) 131/73  Oxygen Therapy  SpO2: 99 %    Physical Exam  Vitals reviewed.   Constitutional:       Appearance: Normal appearance.   HENT:      Head: Normocephalic and atraumatic.      Right Ear: External ear normal.      Left Ear: External ear normal.      Nose: Nose normal.      Mouth/Throat:      Mouth: Mucous membranes are moist.      Pharynx: Oropharynx is clear.   Eyes:      Conjunctiva/sclera: Conjunctivae normal.   Cardiovascular:      Rate and Rhythm: Normal rate and regular rhythm.      Pulses: Normal pulses.      Heart sounds: Normal heart sounds.   Pulmonary:      Effort: Pulmonary effort is normal.      Breath sounds: Normal breath sounds.   Abdominal:      General: Bowel sounds are normal.      Palpations: Abdomen is soft.   Musculoskeletal:      Comments: + L AKA with  in place   Skin:     General: Skin is warm and dry.   Neurological:      Mental Status: He is alert.   Psychiatric:         Mood and Affect: Mood normal.         Behavior: Behavior normal.      Comments: Mild frustration in the setting of expressive aphasia and housing situation.           Diagnostic Studies: reviewed, no new imaging    Laboratory:    Reviewed 7/25 labs: within normal limits  Results from last 7 days   Lab Units 07/25/24  0518   HEMOGLOBIN g/dL 10.5*    HEMATOCRIT % 36.0*   WBC Thousand/uL 6.34     Results from last 7 days   Lab Units 07/25/24  0518   BUN mg/dL 20   POTASSIUM mmol/L 4.0   CHLORIDE mmol/L 105   CREATININE mg/dL 0.85            Patient Active Problem List   Diagnosis    Bipolar affective disorder (HCC)    Substance abuse (HCC)    Human immunodeficiency virus (HIV) infection ([facility])    History of stroke    Benign essential hypertension    Positive laboratory testing for human immunodeficiency virus (HCC)    Hypertension    Unspecified vitamin D deficiency    Tobacco abuse    Cerebrovascular accident (CVA) ([facility])    Left ventricular thrombus    History of seizures    Carnitine deficiency ([facility])    Above-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)    Traumatic brain injury (HCC)    Impaired mobility and activities of daily living    At risk for venous thromboembolism (VTE)    Acute pain    At risk for constipation    Urinary incontinence    Patient incapable of making informed decisions    Pressure injury of buttock, stage 3 ([facility])    Adjustment disorder with mixed anxiety and depressed mood    Neurocognitive disorder    Cardiomyopathy ([facility])    Episodic headache         Medications  Current Facility-Administered Medications   Medication Dose Route Frequency Provider Last Rate    acetaminophen  975 mg Oral TID PRN José Salcido MD      aspirin  81 mg Oral Daily Lorrie Barillas PA-C      atorvastatin  80 mg Oral Daily With Dinner Lorrie Barillas PA-C      bictegravir-emtricitab-tenofovir alafenamide  1 tablet Oral Daily With Breakfast Lorrie Barillas PA-C      bisacodyl  10 mg Rectal Daily PRN Lorrie Barillas PA-C      diphenhydrAMINE  25 mg Oral Q6H PRN Lorrie Barillas PA-C      divalproex sodium  1,250 mg Oral Daily Lorrie Barillas PA-C      docusate sodium  100 mg Oral BID Ashley Depadua, MD      dolutegravir  50 mg Oral Daily Lorrie Barillas PA-C      gabapentin  100 mg Oral Q8H Ashley Depadua, MD       lamoTRIgine  50 mg Oral BID Lorrie Barillas PA-C      levOCARNitine  1,000 mg/day Oral TID With Meals Lorrie Barillas PA-C      losartan  50 mg Oral Daily Lorrie Barillas PA-C      melatonin  6 mg Oral HS Lorrie Barillas PA-C      metoprolol succinate  25 mg Oral Daily CHARLIE Mcclelland      mirtazapine  15 mg Oral HS Lorriejacky Barillas PA-C      ondansetron  4 mg Oral Q6H PRN Lorrie Barillas PA-C      oxyCODONE  2.5 mg Oral Q8H PRN Raimundo Riley MD      polyethylene glycol  17 g Oral Daily PRN Lorrie Barillas PA-C      rivaroxaban  20 mg Oral Daily With Dinner Lorrie Barillas PA-C      senna  1 tablet Oral HS PRN Lorrie Barillas PA-C      sertraline  75 mg Oral Daily Lorrie Barillas PA-C      tamsulosin  0.4 mg Oral Daily With Dinner Lorrie Barillas PA-C      zonisamide  400 mg Oral Daily Lorrie Barillas PA-C            ** Please Note: Fluency Direct voice to text software may have been used in the creation of this document. **

## 2024-07-26 PROCEDURE — 97535 SELF CARE MNGMENT TRAINING: CPT

## 2024-07-26 PROCEDURE — 99232 SBSQ HOSP IP/OBS MODERATE 35: CPT | Performed by: PHYSICAL MEDICINE & REHABILITATION

## 2024-07-26 PROCEDURE — 97110 THERAPEUTIC EXERCISES: CPT

## 2024-07-26 PROCEDURE — 99232 SBSQ HOSP IP/OBS MODERATE 35: CPT | Performed by: PHYSICIAN ASSISTANT

## 2024-07-26 PROCEDURE — 97530 THERAPEUTIC ACTIVITIES: CPT

## 2024-07-26 RX ADMIN — LAMOTRIGINE 50 MG: 25 TABLET ORAL at 17:48

## 2024-07-26 RX ADMIN — DOLUTEGRAVIR SODIUM 50 MG: 50 TABLET, FILM COATED ORAL at 09:19

## 2024-07-26 RX ADMIN — LEVOCARNITINE 330 MG: 1 SOLUTION ORAL at 17:48

## 2024-07-26 RX ADMIN — ASPIRIN 81 MG: 81 TABLET, COATED ORAL at 09:20

## 2024-07-26 RX ADMIN — LEVOCARNITINE 330 MG: 1 SOLUTION ORAL at 14:13

## 2024-07-26 RX ADMIN — GABAPENTIN 100 MG: 100 CAPSULE ORAL at 14:13

## 2024-07-26 RX ADMIN — RIVAROXABAN 20 MG: 20 TABLET, FILM COATED ORAL at 17:48

## 2024-07-26 RX ADMIN — ATORVASTATIN CALCIUM 80 MG: 80 TABLET, FILM COATED ORAL at 17:48

## 2024-07-26 RX ADMIN — LOSARTAN POTASSIUM 50 MG: 50 TABLET, FILM COATED ORAL at 09:19

## 2024-07-26 RX ADMIN — Medication 2.5 MG: at 21:43

## 2024-07-26 RX ADMIN — LEVOCARNITINE 330 MG: 1 SOLUTION ORAL at 09:21

## 2024-07-26 RX ADMIN — Medication 6 MG: at 21:40

## 2024-07-26 RX ADMIN — DIPHENHYDRAMINE HCL 25 MG: 25 TABLET ORAL at 23:53

## 2024-07-26 RX ADMIN — TAMSULOSIN HYDROCHLORIDE 0.4 MG: 0.4 CAPSULE ORAL at 17:48

## 2024-07-26 RX ADMIN — METOPROLOL SUCCINATE 25 MG: 25 TABLET, EXTENDED RELEASE ORAL at 09:19

## 2024-07-26 RX ADMIN — BICTEGRAVIR SODIUM, EMTRICITABINE, AND TENOFOVIR ALAFENAMIDE FUMARATE 1 TABLET: 50; 200; 25 TABLET ORAL at 09:19

## 2024-07-26 RX ADMIN — DIVALPROEX SODIUM 1250 MG: 500 TABLET, EXTENDED RELEASE ORAL at 09:19

## 2024-07-26 RX ADMIN — MIRTAZAPINE 15 MG: 15 TABLET, FILM COATED ORAL at 21:40

## 2024-07-26 RX ADMIN — DOCUSATE SODIUM 100 MG: 100 CAPSULE, LIQUID FILLED ORAL at 17:48

## 2024-07-26 RX ADMIN — LAMOTRIGINE 50 MG: 25 TABLET ORAL at 09:19

## 2024-07-26 RX ADMIN — GABAPENTIN 100 MG: 100 CAPSULE ORAL at 21:40

## 2024-07-26 RX ADMIN — ZONISAMIDE 400 MG: 100 CAPSULE ORAL at 09:20

## 2024-07-26 RX ADMIN — SERTRALINE HYDROCHLORIDE 75 MG: 50 TABLET ORAL at 09:20

## 2024-07-26 RX ADMIN — DOCUSATE SODIUM 100 MG: 100 CAPSULE, LIQUID FILLED ORAL at 09:20

## 2024-07-26 RX ADMIN — ACETAMINOPHEN 975 MG: 325 TABLET, FILM COATED ORAL at 23:53

## 2024-07-26 RX ADMIN — GABAPENTIN 100 MG: 100 CAPSULE ORAL at 06:05

## 2024-07-26 NOTE — PROGRESS NOTES
[facility]  Progress Note  Name: Sunil Patel I  MRN: [id number]  Unit/Bed#: -01 I Date of Admission: 7/6/2024   Date of Service: 7/26/2024 I Hospital Day: 20    Assessment & Plan   * Above-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)  Assessment & Plan  Presented with left lower extremity acute limb ischemia/extensive left iliofemoral and left infrainguinal embolism requiring left femoral thrombectomy and subsequent AKA on 6/7/24 with vascular surgery.  Incision C/D/I, pain controlled.   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 CM to investigate what is covered.  Rehab and pain control per PMR  Pt has met his rehab goals and will be discharged when able.    Episodic headache  Assessment & Plan  Pt reports a history of migraines.  It is associated with photophobia.  He is unable to take triptans due to a history of stroke.  [facility]    Cardiomyopathy (HCC)  Assessment & Plan  ECHO 6/10/2024 = LVEF 40-45% with mild global hypokinesis   Status post NM stress test on 6/11/2024 which showed: a large, mild, fixed defect in the inferior wall, possibly due to diaphragmatic attenuation artifact, there is a small area of partial reversibility in the inferior apical wall suggestive of ischemia    Evaluated ed by cardiology, etiology felt to be possibly secondary to stress-induced cardiomyopathy, with apical thrombus  Cardiac catheterization deferred given the lack of any significant ischemia, and no current cardiac symptoms  Continue with medical management with aspirin, statin, beta-blocker, ARB  Monitor volume status, remains euvolemic off of diuretics   Euvolemic on exam  Outpatient follow-up with cardiology    Traumatic brain injury (HCC)  Assessment & Plan  Remote history of TBI, previously residing in a group home prior to detention  sentence.  Evaluated by neuropsychiatry on 6/27/24 and deemed NOT to have medical decision-making capacity  Process for court appointed guardian started on 7/5/24 - CM following   Guardianship hearing 8/27/24.    Carnitine deficiency (HCC)  Assessment & Plan  Continue levocarnitine 330 mg 3x daily with meals.    History of seizures  Assessment & Plan  Diagnosed in July 2019, follows with [facility] neurology outpatient  Continue home regimen with Depakote ER, Lamotrigine and Zonegran    Left ventricular thrombus  Assessment & Plan  Noted on echocardiogram 6/10/2024: spherical 1.5 x 1.3 cm thrombus at the LV apex   Initiated on AC with Xarelto, continue  Rx sent to [employer] for price check on 7/10/24 - CM spoke with [employer] and pt has rx coverage. Information sent to [employer]. [employer] ran the insurance and it is not active. CM to call the pharmacy pt used most recently to see if they have different information.    If unable to obtain coverage will need to bridge pt to Warfarin     Benign essential hypertension  Assessment & Plan  Home regimen: Losartan 50mg daily, Toprol XL 25 mg BID  Current regimen: Losartan 50 mg daily, Toprol-XL 25 mg daily  Did miss both 7/19 for  = made no changes and he has gotten both over last few days      History of stroke  Assessment & Plan  History of left MCA CVA in May 2018 with residual expressive aphasia  Continue ASA and atorvastatin    Human immunodeficiency virus (HIV) infection (HCC)  Assessment & Plan  Continue Biktarvy and Tivicay.    Bipolar affective disorder (HCC)  Assessment & Plan  Continue home meds Zoloft and Remeron  Outpatient follow-up with Psychiatry             The above assessment and plan was reviewed and updated as determined by my evaluation of the patient on 7/26/2024.    Labs:   Results from last 7 days   Lab Units 07/25/24  0518   WBC Thousand/uL 6.34   HEMOGLOBIN g/dL 10.5*   HEMATOCRIT % 36.0*   PLATELETS Thousands/uL 384     Results from last 7 days    Lab Units 07/25/24  0518   SODIUM mmol/L 139   POTASSIUM mmol/L 4.0   CHLORIDE mmol/L 105   CO2 mmol/L 25   BUN mg/dL 20   CREATININE mg/dL 0.85   CALCIUM mg/dL 8.9                   Imaging  No orders to display       Review of Scheduled Meds:  Current Facility-Administered Medications   Medication Dose Route Frequency Provider Last Rate    acetaminophen  975 mg Oral TID PRN José Salcido MD      aspirin  81 mg Oral Daily Lorrie Barillas PA-C      atorvastatin  80 mg Oral Daily With Dinner Lorrie Barillas PA-C      bictegravir-emtricitab-tenofovir alafenamide  1 tablet Oral Daily With Breakfast Lorrie Barillas PA-C      bisacodyl  10 mg Rectal Daily PRN Lorrie Barillas PA-C      diphenhydrAMINE  25 mg Oral Q6H PRN Lorrie Barillas PA-C      divalproex sodium  1,250 mg Oral Daily Lorrie Barillas PA-C      docusate sodium  100 mg Oral BID Ashley Depadua, MD      dolutegravir  50 mg Oral Daily Lorrie Barillas PA-C      gabapentin  100 mg Oral Q8H Ashley Depadua, MD      lamoTRIgine  50 mg Oral BID Lorrie Barillas PA-C      levOCARNitine  1,000 mg/day Oral TID With Meals Lorrie Barillas PA-C      losartan  50 mg Oral Daily Lorrie Barillas PA-C      melatonin  6 mg Oral HS Lorrie Barillas PA-C      metoprolol succinate  25 mg Oral Daily CHARLIE Mcclelland      mirtazapine  15 mg Oral HS Lorrie Barillas PA-C      ondansetron  4 mg Oral Q6H PRN Lorrie Barillas PA-C      oxyCODONE  2.5 mg Oral Q8H PRN Raimundo Riley MD      polyethylene glycol  17 g Oral Daily PRN Lorrie Barillas PA-C      rivaroxaban  20 mg Oral Daily With Dinner Lorrie Barillas PA-C      senna  1 tablet Oral HS PRN Lorrie Arcadio-Liguori, PA-C      sertraline  75 mg Oral Daily Lorrie Barillas PA-C      tamsulosin  0.4 mg Oral Daily With Dinner Lorrie Barillas PA-C      zonisamide  400 mg Oral Daily Lorrie Barillas PA-C         Subjective/ HPI:  Patient seen and examined. Patients overnight issues or events were reviewed with nursing staff. New or overnight issues include the following:     Pt seen in PT. He states that he is doing well. He would like to go outside this afternoon. He denies any current complaints.    ROS:   A 10 point ROS was performed; negative except as noted above.        *Labs /Radiology studies Reviewed  *Medications  reviewed and reconciled as needed  *Please refer to order section for additional ordered labs studies      Physical Examination:  Vitals:   Vitals:    07/25/24 1601 07/25/24 1958 07/26/24 0505 07/26/24 0919   BP: 117/64 125/63 135/69 134/88   BP Location: Left arm Left arm Left arm    Pulse: 88 93 89 101   Resp: 18 18 20    Temp: 98.4 °F (36.9 °C) 98.4 °F (36.9 °C) 97.7 °F (36.5 °C)    TempSrc: Oral Oral Oral    SpO2: 95% 95% 94%    Weight:       Height:           General Appearance: NAD; pleasant  HEENT: PERRLA, conjuctiva normal; mucous membranes moist; face symmetrical  Neck:  Supple  Lungs: clear bilaterally, normal respiratory effort, no retractions, expiratory effort normal, on room air  CV: regular rate and rhythm, no murmurs rubs or gallops noted   ABD: soft non tender, +BS x4  EXT: Lt AKA  Skin: normal turgor, normal texture, no rash  Psych: affect normal, mood normal  Neuro: Awake and alert. Expressive aphasia     The above physical exam was reviewed and updated as determined by my evaluation of the patient on 7/26/2024.    Invasive Devices       None                      VTE Pharmacologic Prophylaxis: Xarelto  Code Status: Level 1 - Full Code  Current Length of Stay: 20 day(s)    Total floor / unit time spent today 30 minutes  Coordination of patient's care was performed in conjunction with consulting services. Time invested included review of patient's labs, vitals, and management of their comorbidities with continued monitoring, examination of patient as well as answering patient questions, documenting her  findings and creating progress note in electronic medical record,  ordering appropriate diagnostic testing.       ** Please Note:  voice to text software may have been used in the creation of this document. Although proof errors in transcription or interpretation are a potential of such software**

## 2024-07-26 NOTE — PROGRESS NOTES
"   07/26/24 0900   Pain Assessment   Pain Assessment Tool 0-10   Pain Score No Pain   Restrictions/Precautions   Precautions Bed/chair alarms;Cognitive;Fall Risk;Seizure;Supervision on toilet/commode   Weight Bearing Restrictions Yes   LLE Weight Bearing Per Order NWB   ROM Restrictions No   Braces or Orthoses Other (Comment)  (L AKA rasheeda)   Lifestyle   Autonomy \"I'm willing to do whatever you need me to do.\"   Eating   Type of Assistance Needed Independent   Physical Assistance Level No physical assistance   Comment Seated in recliner   Eating CARE Score 6   Oral Hygiene   Type of Assistance Needed Set-up / clean-up   Physical Assistance Level No physical assistance   Comment Seated in recliner   Oral Hygiene CARE Score 5   Sit to Stand   Type of Assistance Needed Supervision   Physical Assistance Level No physical assistance   Comment hand rails on EOB.   Sit to Stand CARE Score 4   Bed-Chair Transfer   Type of Assistance Needed Supervision   Physical Assistance Level No physical assistance   Comment CS sit pivot transfer.   Chair/Bed-to-Chair Transfer CARE Score 4   Toileting Hygiene   Type of Assistance Needed Supervision   Physical Assistance Level No physical assistance   Comment Pt urinated into urnal, declined use of BSC for urination. Done in stance at bedside with handraisla nd CS.   Toileting Hygiene CARE Score 4   Toilet Transfer   Type of Assistance Needed Supervision   Physical Assistance Level No physical assistance   Comment CS in stance using urnial   Toilet Transfer CARE Score 4   Right Upper Extremity- Strength   RUE Strength Comment Seated exercise on mat using 4 lb weight; bicep curls, lateral raises. 4 lb dowel used to completed 10 x 2 prograde rowing, chest press, and shoulder press. Pt reports no pain during activty.   Left Upper Extremity-Strength   LUE Strength Comment See above.   Cognition   Overall Cognitive Status Impaired   Arousal/Participation Alert   Attention Attends with cues " to redirect   Orientation Level Oriented to person;Oriented to place;Oriented to situation   Memory Decreased recall of recent events   Following Commands Follows one step commands with increased time or repetition   Additional Activities   Additional Activities Comments Pt completed in activites on mat with the focus on bilateral weight shifting and trunk contorl. Pt was asked to reach for cones on either side using opposite hand to cross midline 7x 3. Pt was able to weight shift on either side. Pt has imporved core strength and was able to correct when having a slight loss of trunk control. Pt then participate in corn hole activty focusing on foward reach and trunk control while seated on mat. Pt enaged in w/c mobility using UE. Able to nagivate throew 5 cones x 3.   Activity Tolerance   Activity Tolerance Patient tolerated treatment well   Assessment   Treatment Assessment Pt participated in 90 minute skilled OT session focusing on UE strengthening, weight shifting, and trunk control. See more details above. Pt was cooperative all session and tolerate treatment well. Pt is making progressing towards goals, but is still limited by assistance needed LB dressing. Pt demonstrated improved ability to weight shift on mat. Pt would benefit from continue OT to focus on ADLs, UE strengthening, weight shifting, and core strength. Continue POC at this time. Pt left in recliner with alarms on and all needs within reach.   Prognosis Fair   Problem List Decreased strength;Decreased range of motion;Decreased endurance;Impaired balance;Decreased mobility;Decreased cognition;Impaired judgement;Decreased safety awareness;Pain   Plan   Treatment/Interventions ADL retraining;Functional transfer training;Therapeutic exercise;Cognitive reorientation;Patient/family training;Equipment eval/education   Progress Progressing toward goals   OT Therapy Minutes   OT Time In 0900   OT Time Out 1030   OT Total Time (minutes) 90   OT Mode of  treatment - Individual (minutes) 90   OT Mode of treatment - Concurrent (minutes) 0   OT Mode of treatment - Group (minutes) 0   OT Mode of treatment - Co-treat (minutes) 0   OT Mode of Treatment - Total time(minutes) 90 minutes   OT Cumulative Minutes 1589   Therapy Time missed   Time missed? No

## 2024-07-26 NOTE — CASE MANAGEMENT
Cm spoke with friend Donna via phone to check in and update her on progress. Donna provided cm with [employer] information which is a service that assists Kieran with his money, she also reported the care home is not releasing any of Kieran's belongings to someone who is not his family, Donna working on retrieving his belongings.

## 2024-07-26 NOTE — PLAN OF CARE
Problem: PAIN - ADULT  Goal: Verbalizes/displays adequate comfort level or baseline comfort level  Description: Interventions:  - Encourage patient to monitor pain and request assistance  - Assess pain using appropriate pain scale  - Administer analgesics based on type and severity of pain and evaluate response  - Implement non-pharmacological measures as appropriate and evaluate response  - Consider cultural and social influences on pain and pain management  - Notify physician/advanced practitioner if interventions unsuccessful or patient reports new pain  Outcome: Progressing     Problem: INFECTION - ADULT  Goal: Absence or prevention of progression during hospitalization  Description: INTERVENTIONS:  - Assess and monitor for signs and symptoms of infection  - Monitor lab/diagnostic results  - Monitor all insertion sites, i.e. indwelling lines, tubes, and drains  - Monitor endotracheal if appropriate and nasal secretions for changes in amount and color  - [place] appropriate cooling/warming therapies per order  - Administer medications as ordered  - Instruct and encourage patient and family to use good hand hygiene technique  - Identify and instruct in appropriate isolation precautions for identified infection/condition  Outcome: Progressing  Goal: Absence of fever/infection during neutropenic period  Description: INTERVENTIONS:  - Monitor WBC    Outcome: Progressing     Problem: SAFETY ADULT  Goal: Patient will remain free of falls  Description: INTERVENTIONS:  - Educate patient/family on patient safety including physical limitations  - Instruct patient to call for assistance with activity   - Consult OT/PT to assist with strengthening/mobility   - Keep Call bell within reach  - Keep bed low and locked with side rails adjusted as appropriate  - Keep care items and personal belongings within reach  - Initiate and maintain comfort rounds  - Make Fall Risk Sign visible to staff  - Offer Toileting every 2 Hours,  in advance of need  - Initiate/Maintain alarm  - Obtain necessary fall risk management equipment:   - Apply yellow socks and bracelet for high fall risk patients  - Consider moving patient to room near nurses station  Outcome: Progressing  Goal: Maintain or return to baseline ADL function  Description: INTERVENTIONS:  -  Assess patient's ability to carry out ADLs; assess patient's baseline for ADL function and identify physical deficits which impact ability to perform ADLs (bathing, care of mouth/teeth, toileting, grooming, dressing, etc.)  - Assess/evaluate cause of self-care deficits   - Assess range of motion  - Assess patient's mobility; develop plan if impaired  - Assess patient's need for assistive devices and provide as appropriate  - Encourage maximum independence but intervene and supervise when necessary  - Involve family in performance of ADLs  - Assess for home care needs following discharge   - Consider OT consult to assist with ADL evaluation and planning for discharge  - Provide patient education as appropriate  Outcome: Progressing  Goal: Maintains/Returns to pre admission functional level  Description: INTERVENTIONS:  - Perform AM-PAC 6 Click Basic Mobility/ Daily Activity assessment daily.  - Set and communicate daily mobility goal to care team and patient/family/caregiver.   - Collaborate with rehabilitation services on mobility goals if consulted  - Perform Range of Motion 3 times a day.  - Reposition patient every 2 hours.  - Dangle patient 3 times a day  - Stand patient 3 times a day  - Ambulate patient 3 times a day  - Out of bed to chair 3 times a day   - Out of bed for meals 3 times a day  - Out of bed for toileting  - Record patient progress and toleration of activity level   Outcome: Progressing     Problem: DISCHARGE PLANNING  Goal: Discharge to home or other facility with appropriate resources  Description: INTERVENTIONS:  - Identify barriers to discharge w/patient and caregiver  -  Arrange for needed discharge resources and transportation as appropriate  - Identify discharge learning needs (meds, wound care, etc.)  - Arrange for interpretive services to assist at discharge as needed  - Refer to Case Management Department for coordinating discharge planning if the patient needs post-hospital services based on physician/advanced practitioner order or complex needs related to functional status, cognitive ability, or social support system  Outcome: Progressing     Problem: Prexisting or High Potential for Compromised Skin Integrity  Goal: Skin integrity is maintained or improved  Description: INTERVENTIONS:  - Identify patients at risk for skin breakdown  - Assess and monitor skin integrity  - Assess and monitor nutrition and hydration status  - Monitor labs   - Assess for incontinence   - Turn and reposition patient  - Assist with mobility/ambulation  - Relieve pressure over bony prominences  - Avoid friction and shearing  - Provide appropriate hygiene as needed including keeping skin clean and dry  - Evaluate need for skin moisturizer/barrier cream  - Collaborate with interdisciplinary team   - Patient/family teaching  - Consider wound care consult   Outcome: Progressing

## 2024-07-26 NOTE — PROGRESS NOTES
07/26/24 1300   Pain Assessment   Pain Assessment Tool 0-10   Pain Score No Pain   Restrictions/Precautions   Precautions Bed/chair alarms;Cognitive;Fall Risk;Pain;Supervision on toilet/commode;Aphasia   Weight Bearing Restrictions Yes   LLE Weight Bearing Per Order NWB   ROM Restrictions No   Braces or Orthoses   (L AKA )   Cognition   Overall Cognitive Status Impaired   Arousal/Participation Alert   Attention Attends with cues to redirect   Orientation Level Oriented to person;Oriented to place;Oriented to situation   Memory Decreased recall of recent events   Following Commands Follows one step commands with increased time or repetition   Subjective   Subjective pt was agreeable to todays treatment session   Roll Left and Right   Type of Assistance Needed Supervision   Physical Assistance Level No physical assistance   Comment no rails, increased time to complete.   Roll Left and Right CARE Score 4   Sit to Lying   Type of Assistance Needed Supervision   Physical Assistance Level No physical assistance   Comment no rails, increased time to complete.   Sit to Lying CARE Score 4   Lying to Sitting on Side of Bed   Type of Assistance Needed Supervision   Physical Assistance Level No physical assistance   Comment no rails, increased time to complete.   Lying to Sitting on Side of Bed CARE Score 4   Sit to Stand   Type of Assistance Needed Supervision   Physical Assistance Level No physical assistance   Comment CS + gait belt   Sit to Stand CARE Score 4   Bed-Chair Transfer   Type of Assistance Needed Incidental touching   Physical Assistance Level No physical assistance   Comment CG- CS at best due to foot placement.+ gait belt   Chair/Bed-to-Chair Transfer CARE Score 4   Walk 10 Feet   Comment not appropriate at this time due to sound limb preservation goals.   Reason if not Attempted Safety concerns   Walk 10 Feet CARE Score 88   Walk 50 Feet with Two Turns   Comment not appropriate at this time due to  "sound limb preservation goals.   Reason if not Attempted Safety concerns   Walk 50 Feet with Two Turns CARE Score 88   Walk 150 Feet   Comment not appropriate at this time due to sound limb preservation goals.   Reason if not Attempted Safety concerns   Walk 150 Feet CARE Score 88   Wheel 50 Feet with Two Turns   Type of Assistance Needed Independent   Physical Assistance Level No physical assistance   Wheel 50 Feet with Two Turns CARE Score 6   Wheel 150 Feet   Type of Assistance Needed Independent   Physical Assistance Level No physical assistance   Wheel 150 Feet CARE Score 6   Therapeutic Interventions   Strengthening LE strengthening: 2x10 prone press up, 3x10 prone hip extension, 3x10+ 5sec hold prone glute squeeze, 3x10 modified glute bridges, 3x10 R LE hip marches, 3x10 LAQ R LE, 3x10 leg lifts, 3x10 R toe raises, 3x10 R hamstring curls, 3x10 chair push ups   Flexibility prone lying passive L hip flexor stretching   Other 20ft hop with RW + w/c follow, Min-modAx2. for future treatment, keep hopping less than 50 ft for sound limb preservation and due to pts impaired cognitive flexibility which may cause confusion and increase risk for falls. Only used to increase strength and bone loading.   Equipment Use   NuStep lvl 5, 10 minutes, R LE and B UE only   Assessment   Treatment Assessment pt engaged in 90 minutes of skilled PT with focus on LE strengthening, endurance, and functional mobililty. pt reported he would like to go outside in the beginning of session. When asked later to perform w/c mobillity outside he refused and stated \"no its goofy\" while pointing at residual limb. pt demonstrated improvements in NuStep level, and increased carry over with sequencing during transfers and functional mobility. However, requires increased time during bed mobility due to decreased core strength. pt would benefit from additional abdominal strengthening. this weekend to focus on LE/abdominal strengthening, improving " balance and endurance, and w/c mobility.   Family/Caregiver Present no   Problem List Decreased strength;Decreased endurance;Impaired balance;Decreased mobility;Decreased cognition;Impaired judgement;Decreased safety awareness;Pain;Decreased coordination;Decreased skin integrity   Barriers to Discharge Inaccessible home environment;Decreased caregiver support   PT Barriers   Physical Impairment Decreased strength;Decreased endurance;Impaired balance;Decreased mobility;Decreased coordination;Decreased cognition;Impaired judgement;Decreased safety awareness;Decreased skin integrity   Functional Limitation Car transfers;Standing;Transfers;Wheelchair management   Plan   Treatment/Interventions Functional transfer training;LE strengthening/ROM;Therapeutic exercise;Endurance training;Bed mobility;Gait training   Progress Progressing toward goals   PT Therapy Minutes   PT Time In 1300   PT Time Out 1430   PT Total Time (minutes) 90   PT Mode of treatment - Individual (minutes) 60   PT Mode of treatment - Concurrent (minutes) 30   PT Mode of treatment - Group (minutes) 0   PT Mode of treatment - Co-treat (minutes) 0   PT Mode of Treatment - Total time(minutes) 90 minutes   PT Cumulative Minutes 1790   Therapy Time missed   Time missed? No

## 2024-07-26 NOTE — PLAN OF CARE
Problem: SAFETY ADULT  Goal: Patient will remain free of falls  Description: INTERVENTIONS:  - Educate patient/family on patient safety including physical limitations  - Instruct patient to call for assistance with activity   - Consult OT/PT to assist with strengthening/mobility   - Keep Call bell within reach  - Keep bed low and locked with side rails adjusted as appropriate  - Keep care items and personal belongings within reach  - Initiate and maintain comfort rounds  - Make Fall Risk Sign visible to staff  - Offer Toileting every 2 Hours, in advance of need  - Initiate/Maintain alarm  - Obtain necessary fall risk management equipment:   - Apply yellow socks and bracelet for high fall risk patients  - Consider moving patient to room near nurses station  Outcome: Progressing     Problem: PAIN - ADULT  Goal: Verbalizes/displays adequate comfort level or baseline comfort level  Description: Interventions:  - Encourage patient to monitor pain and request assistance  - Assess pain using appropriate pain scale  - Administer analgesics based on type and severity of pain and evaluate response  - Implement non-pharmacological measures as appropriate and evaluate response  - Consider cultural and social influences on pain and pain management  - Notify physician/advanced practitioner if interventions unsuccessful or patient reports new pain  Outcome: Progressing

## 2024-07-26 NOTE — PROGRESS NOTES
"Physical Medicine and Rehabilitation Progress Note  Sunil Patel 63 y.o. male MRN: [id number]  Unit/Bed#: [facility] 451-01 Encounter: [id number]    To Review:   Sunil Patel is a 63 y.o. male who  has a past medical history of Acute lower limb ischemia (06/08/2024), Anxiety, Depression, HIV disease (HCC), Substance abuse (HCC), and Suicide attempt (HCC). who presented to the [facility] on 6/7/24 from [facility] for increased LLE swelling and pain and was found to have a left external iliac artery occlusion and acute limb ischemia. 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 Patient had TTE on 6/10/24 showing LV apex thrombus. On 6/11/24 patient had pharmacologic nuclear stress test/SPECT scan which did not show any significant areas of ischemia with EF 40-45% and recommended continuing A/C for LV apical thrombus. His course was complicated by b/l buttock unstageable pressure ulcers, urinary and fecal incontinence, pain, and significant decline in ADLs and mobility. Patient has been continued on Xarelto for anticoagulation, as well as ASA and statin. Patient has a history of TBI, with baseline nonfluent aphasia and forgetfulness. He was evaluated by neuropsychology on 6/27/24, and deemed to not have capacity to make fully informed medical decisions. Therefore, the guardianship process was initiated as of 7/5/24. He was admitted to the [facility] on 7/6.     Chief Complaint: Would like to go outside    Interval History/Subjective:  No acute overnight events. Patient states that his headache is very improved today. He describes the headache as feeling \"beautiful\". His mood is much calmer today and he has no complaints. He states that his friend is visiting later today and he wants to go outside with him to " "smoke. We strongly informed patient that he is not allowed to smoke on hospital premises and also not allowed to leave hospital premises with his friend; however, we will work with staff to allow him outside privileges with his friend and a chaperone. Patient seems discouraged by this response, but is still looking to go outdoors. Otherwise, denies any fevers, chills, chest pain, sob, abdominal pain, nausea or vomiting.       ROS:  10 point review of systems is otherwise negative except for what is noted above.      Today's Changes:  Encourage IS  Discuss with visiting friend about limitations of their visit  Guardianship meeting re-set for 8/27.    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 Underwent L femoral thrombectomy and AKA with vascular surgery   - [facility] sx follow-up 7/10 - L AKA incision site well-healed, staples taken out at the bedside this morning  - [facility] will be vendor - Patient now has .  - Monitor for hip flexion/abduction tightness. Stretches in therapy  - On Rivaroxaban with hx of LV thrombus and PAOD   - PT/OT/SLP (to work on carry over strategies) 3-5 hours/day, 5-7 days/week.    - Goals are Ind-Sup at a wheelchair level.   - Outpatient f/u with [facility] and Vascular  - Continue patient training with  placement  - Continue gabapentin - doesn't do too much for phantom limb sensation, but that doesn't bother him or cause him pain currently.   - Patient still frustrated given circumstances; will continue gabapentin for anxiety    Neurocognitive disorder  Assessment & Plan  Has been appropriate and cooperative throughout this stay without any behavioral issues on the rehab unit to date.   Hx of TBI and L MCA CVA, psychiatric d/o, seizure d/o  - Neuropsych assessment 6/27/24 - \"diffuse cognitive dysfunction and on a measure assessing awareness of personal " "health status and ability to evaluate health problems, handle medical emergencies and take safety precautions, patient performed in the IMPAIRED range of functioning. During this encounter, patient does not appear to have capacity to make fully informed medical decisions.\"  MMSE 4/28 - severely impaired  - Guardianship process initiated on acute - follow-up by CM/Admin  - Status: some expressive and possible some receptive aphasia.  He can be difficult to follow at times likely due to a mixture of aphasia, tangentiality, mild lability, and impaired memory; lability with hx of possible bipolar d/o  - Neuropsych Med review: Depakote, Lamictal, Remeron, Zoloft, Melatonin, gabapentin, PRN oxy 2.5mg TID   - Monitor neuro-exam, wakefulness, mood, cognition, insight into deficits and safety awareness   - Monitor and ensure optimal management electrolytes, nutrition, and hydration  - Monitor for signs or symptoms of infection, medication intolerances, other systemic etiologies  - Additional labs, imaging, specialist follow-up as needed per primary team currently   - Overstimulation precautions, frequent re-orientation, re-direction, re-assurance  - Optimal mood, pain, and sleep management  - If impaired sleep or behavior recommend sleep log and agitation monitoring    - Limit sedating medications when possible  - Fall precautions - if needed increase rounding or consider virtual sitter or in-person sitter  - For routine restlessness, anxiety, irritability focus on non-pharmacologic management    - Hold benzo's with increased risk paradoxical reaction and possibility of limiting cognitive recovery  - Continue SLP and interdisciplinary care  - OP neuro and PCP   7/23- impaired attention after his first shower in the unit. Consistent with cognitive fatigue. Also increased frustration given his housing circumstances. Could benefit from more outdoor activities  7/25- Increased frustration today as well. Ongoing discussions with " "CM/team about potential outings outside to change scenery and help with emotional fatigue  7/26- Mood is more improved today. Will discuss with staff to get patient outdoor privileges with his friend that is visiting later today    Adjustment disorder with mixed anxiety and depressed mood  Assessment & Plan  - Related to recent release from incarceration, new AKA and uncertainty of future disposition, all in the setting of impaired cognition related to prior strokes and TBI  - Behavioral techniques for symptom management  - Neuropsychology consult as available  - Given his circumstances, increased frustration is expected. Can consider Psych consult if symptoms continue to worsen to adjust mood stabilizing medications. Will try nonpharmacologic approaches first with more frequent conversations/redirections   - Continue gabapentin 100 q8 for now    Pressure injury of buttock, stage 3 ([facility])  Assessment & Plan  Stable   - Wound care consulted and following weekly  Per wound care specialist 7/15  - POA L buttock pressure injury unstageable has healed.  - POA R buttock pressure injury is now Stage 3, and improving.   - Cleanse sacro-buttocks with soap and water. Apply Triad Paste to Sacro-Buttocks Wound Beds Only. Apply Hydraguard to Elsi-wounds and all other intact aspects of sacro-buttocks. Apply both creams TID and PRN episodes of incontinence.    - Recommend ROHO cushion in chair when out of bed instead   - Preventative hydraguard to bilateral heels BID and PRN.   - P500  - Monitor clinically for breakdown, frequent turns  - 7/23: wound appears well healing with no signs of active infection. Continue local wound care. Continue lateral leans in therapy as tolerated      Patient incapable of making informed decisions  Assessment & Plan  - History of remote TBI and prior strokes with comorbid psychiatric history.  - Evaluated by neuropsychology, Dr. Dilshad Ttaum, PhD on 6/27/24, found to have \"diffuse cognitive " "dysfunction and on a measure assessing awareness of personal health status and ability to evaluate health problems, handle medical emergencies and take safety precautions, patient performed in the IMPAIRED range of functioning. During this encounter, patient does not appear to have capacity to make fully informed medical decisions.\"  - Court-appointed guardianship process has been initiated by acute care CM Katia Kay.    - We have identified two friends who are interested in being patient's guardians and have been involved and invested in patient's care on the ARC.   - They will need to be interviewed by the  involved in this process.    - He has to undergo his hearing on 8/6/2024 first.    - He would ultimately benefit from [facility]/nursing home/memory care unit - he does very well with structure and supervision.  Teams 7/23- Ongoing discussions with case management and social work to dispo patient to stable long-term housing.   7/25- CM still awaiting confirmation from [facility] for discharge planning. Likely will not be discharging in the next 24-48 hours  7/26- Guardianship meeting rescheduled to 8/27    Urinary incontinence  Assessment & Plan  - Did have some incontinence on acute - improved with timed voids and consistent assistance with his urinal  - Described as urgency  - Marked improvement on timed voids.   - monitor for retention, incontinence (including overflow incontinence), signs/symptoms of UTI  - Timed Voids and PVRs Q4hrs initiated earlier. And PVR <150 x3, so scans discontinued.    - He has some difficulty managing the urinal    - needs assistance but is able to transfer to [facility]  - Still uses condom catheter at night, in part for continence care/to protect his skin given his buttock wounds.  - Continue timed voids          At risk for constipation  Assessment & Plan  - Stooling adequately recently; did have some incontinence on acute now improved  - Close continent care given buttock wounds  - " Last BM 7/25 and continent  - Colace 100mg BID  - PRN miralax, Senna and suppository    Acute pain  Assessment & Plan  Controlled   - Tylenol 975 mg q8h PRN  - Oxycodone 2.5 mg q8h PRN, wean as possible   - Last used 7/19.    - Can discontinue at discharge.  - Resumed Gabapentin 100mg Q8hr due to increased headaches and irritability since discontinuing.    Impaired mobility and activities of daily living  Assessment & Plan  - Rehabilitation medicine physician for daily monitoring of care, 24 hour availability for acute medical issues, medication management, and therapeutic and diagnostic assessments.  - 24 hour rehabilitation nursing 7 days per week for: management/teaching of medications, bowel/bladder routine, skin care.  - PT, OT for 2-3 hours per day, 5 to 6 days per week; 15 hours per week  - Rehabilitation Psychology as needed for adjustment and coping  - MSW for barriers to discharge, community resources, and family support  - Discharge planning following to help ensure a safe and efficient discharge  -7/23 teams: Biggest ADL barriers per OT are toileting and applying  to LLE        Traumatic brain injury (HCC)  Assessment & Plan  See neurocog impairment     Episodic headache  Assessment & Plan  Seemed to worsen with increased irritability after discontinuing gabapentin  Resumed gabapentin  Received nurtec earlier this week.   - 7/25: improvement in headache, now 5/10 in severity. Continue to monitor  7/26- Patient's headache is much more improved today. Mood is also improved. Will continue on gabapentin dosing for now    Cardiomyopathy (HCC)  Assessment & Plan  ECHO on 6/10/2024 showed LVEF 40-45% with mild global hypokinesis   Status post NM stress test on 6/11/2024 which showed: a large, mild, fixed defect in the inferior wall, possibly due to diaphragmatic attenuation artifact, there is a small area of partial reversibility in the inferior apical wall suggestive of ischemia    Elevated by  cardiology, etiology felt to be possibly secondary to stress-induced cardiomyopathy, with apical thrombus  Cardiac catheterization deferred given the lack of any significant ischemia, and no current cardiac symptoms  Continue with medical management with aspirin, statin, beta-blocker, ARB  Monitor volume status, remains euvolemic off of diuretics   Outpatient follow-up with cardiology    At risk for venous thromboembolism (VTE)  Assessment & Plan  - On rivaroxaban    Carnitine deficiency (HCC)  Assessment & Plan  - Carnitine replacement  - Appreciate medicine management      History of seizures  Assessment & Plan  - Depakote ER, lamotrigine, zonisamide  - Outpatient follow-up with [facility] neurology    Left ventricular thrombus  Assessment & Plan  - Visualized on echocardiogram on 6/10/24: spherical 1.5 x 1.3 cm thrombus at the LV apex.   - Rivaroxaban   - $700.   - Price checking eliquis and pradaxa   - May need to transition to coumadin pending cost   - Management as per IM  - 7/26: IM/CM is following up with Eliquis pricing. Will find out later this afternoon. If too expensive, we will have to transition to coumadin  - Outpatient follow-up with cardiology    Benign essential hypertension  Assessment & Plan  - Toprol, losartan  - Appreciate medicine management    History of stroke  Assessment & Plan  - History of old left temporal and parietal lobe cortical infarcts, also involving the insula and left occipital lobe as well as small right posterior parietal lobe. Stable on most recent CT head on 5/16/24.   - ASA, Xarelto and statin for secondary prevention  - Optimal BP control  - Monitor neuro exam - hx of LV thrombus   - OP neuro follow-up     Human immunodeficiency virus (HIV) infection (HCC)  Assessment & Plan  - Continue anti-retroviral treatment  - Appreciate medicine management during ARC course  - OP ID follow-up       Bipolar affective disorder (HCC)  Assessment & Plan  Mood acceptable; continue meds as  outlined   Supportive counseling  NeuroPsychology consult while in ARC if available for support  Counseled on and continue to encourage deep breathing/relaxation/behavioral management techniques  - Mirtazapine 15 mg qHs  - Will consult Psych before increasing Sertraline for better mood regulation  - Lamictal 50mg BID  - Depakote ER 1250mg qday   - Outpatient psychiatry follow-up        Health Maintenance  #Delirium/Sleep: Optimize sleep/wake cycle and maintain delirium precautions. + Remeron 15 mg   #Pain: Tylenol PRN   #Bowel: Continent. Last BM 7/25. On Colace BID w/ Senna,Miralax,Dulcolax PRN  #Bladder: Voiding and continent  #Skin/Pressure Injury Prevention: Turn Q2hr in bed, with weight shifts [id number] in wheelchair.  #DVT Prophylaxis: Xarelto  #GI Prophylaxis: not indicated  #Code Status: Full code  #FEN: Cardiac diet + Ensure at bfast/dinner  #Dispo: [facility] pending confirmation from acute hospital/. Guardianship meeting still set for 8/6      Objective:    Functional Update:  PT: sup bed mobility, CS with bed-chair transfer, Ind WC use with 150'  OT: set up oral hygiene, setup bathing, setup UBD, min-modA LBD, setup footwear, Min light housekeeping,   SLP: sup social interaction, ModA with comprehension, expression, problem solving, memory    Allergies per EMR    Physical Exam:  Temp:  [97.7 °F (36.5 °C)-98.4 °F (36.9 °C)] 97.7 °F (36.5 °C)  HR:  [] 101  Resp:  [18-20] 20  BP: (117-135)/(63-88) 134/88  Oxygen Therapy  SpO2: 94 %    Physical Exam  Vitals reviewed.   Constitutional:       Appearance: Normal appearance.   HENT:      Head: Normocephalic and atraumatic.      Right Ear: External ear normal.      Left Ear: External ear normal.      Nose: Nose normal.      Mouth/Throat:      Mouth: Mucous membranes are moist.      Pharynx: Oropharynx is clear.   Eyes:      Conjunctiva/sclera: Conjunctivae normal.   Cardiovascular:      Rate and Rhythm: Normal rate and regular rhythm.      Pulses: Normal  pulses.      Heart sounds: Normal heart sounds.   Pulmonary:      Effort: Pulmonary effort is normal.      Breath sounds: No wheezing, rhonchi or rales.      Comments: minimally diminished lung sounds at bilateral bases  Abdominal:      General: Bowel sounds are normal. There is no distension.      Palpations: Abdomen is soft.   Musculoskeletal:      Comments: + L AKA with  in place   Skin:     General: Skin is warm and dry.      Capillary Refill: Capillary refill takes less than 2 seconds.   Neurological:      Mental Status: He is alert.   Psychiatric:         Mood and Affect: Mood normal.         Behavior: Behavior normal.          Diagnostic Studies:   reviewed, no new imaging    Laboratory:    Reviewed, no new labs  Results from last 7 days   Lab Units 07/25/24  0518   HEMOGLOBIN g/dL 10.5*   HEMATOCRIT % 36.0*   WBC Thousand/uL 6.34     Results from last 7 days   Lab Units 07/25/24  0518   BUN mg/dL 20   POTASSIUM mmol/L 4.0   CHLORIDE mmol/L 105   CREATININE mg/dL 0.85            Patient Active Problem List   Diagnosis    Bipolar affective disorder (HCC)    Substance abuse (HCC)    Human immunodeficiency virus (HIV) infection (HCC)    History of stroke    Benign essential hypertension    Positive laboratory testing for human immunodeficiency virus (HCC)    Hypertension    Unspecified vitamin D deficiency    Tobacco abuse    Cerebrovascular accident (CVA) (HCC)    Left ventricular thrombus    History of seizures    Carnitine deficiency (HCC)    Above-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)    Traumatic brain injury (HCC)    Impaired mobility and activities of daily living    At risk for venous thromboembolism (VTE)    Acute pain    At risk for constipation    Urinary incontinence    Patient incapable of making informed decisions    Pressure injury of buttock, stage 3 (HCC)    Adjustment disorder with mixed anxiety and depressed mood    Neurocognitive disorder    Cardiomyopathy (HCC)    Episodic  headache         Medications  Current Facility-Administered Medications   Medication Dose Route Frequency Provider Last Rate    acetaminophen  975 mg Oral TID PRN José Salcido MD      aspirin  81 mg Oral Daily Lorrie Barillas PA-C      atorvastatin  80 mg Oral Daily With Dinner Lorrie Barillas PA-C      bictegravir-emtricitab-tenofovir alafenamide  1 tablet Oral Daily With Breakfast Lorrie Barillas PA-C      bisacodyl  10 mg Rectal Daily PRN Lorrie Barillas PA-C      diphenhydrAMINE  25 mg Oral Q6H PRN Lorrie Barillas PA-C      divalproex sodium  1,250 mg Oral Daily Lorrie Barillas PA-C      docusate sodium  100 mg Oral BID Ashley Depadua, MD      dolutegravir  50 mg Oral Daily Lorrie Barillas PA-C      gabapentin  100 mg Oral Q8H Ashley Depadua, MD      lamoTRIgine  50 mg Oral BID Lorrie Barillas PA-C      levOCARNitine  1,000 mg/day Oral TID With Meals Lorrie Barillas PA-C      losartan  50 mg Oral Daily Lorrie Barillas PA-C      melatonin  6 mg Oral HS Lorrie Barillas PA-C      metoprolol succinate  25 mg Oral Daily CHARLIE Mcclelland      mirtazapine  15 mg Oral HS Lorrie Barillas PA-C      ondansetron  4 mg Oral Q6H PRN Lorrie Barillas PA-C      oxyCODONE  2.5 mg Oral Q8H PRN Raimundo Riley MD      polyethylene glycol  17 g Oral Daily PRN Lorrie Barillas PA-C      rivaroxaban  20 mg Oral Daily With Dinner Lorrie Barillas PA-C      senna  1 tablet Oral HS PRN Lorrie Barillas PA-C      sertraline  75 mg Oral Daily Lorrie Barillas PA-C      tamsulosin  0.4 mg Oral Daily With Dinner Lorrie Barillas PA-C      zonisamide  400 mg Oral Daily Lorrie Barillas PA-C            ** Please Note: Fluency Direct voice to text software may have been used in the creation of this document. **

## 2024-07-26 NOTE — ASSESSMENT & PLAN NOTE
Presented with left lower extremity acute limb ischemia/extensive left iliofemoral and left infrainguinal embolism requiring left femoral thrombectomy and subsequent AKA on 6/7/24 with vascular surgery.  Incision C/D/I, pain controlled.   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 CM to investigate what is covered.  Rehab and pain control per PMR  Pt has met his rehab goals and will be discharged when able.

## 2024-07-26 NOTE — ASSESSMENT & PLAN NOTE
Remote history of TBI, previously residing in a group home prior to [facility] sentence.  Evaluated by neuropsychiatry on 6/27/24 and deemed NOT to have medical decision-making capacity  Process for court appointed guardian started on 7/5/24 - CM following   Guardianship hearing 8/27/24.

## 2024-07-26 NOTE — ASSESSMENT & PLAN NOTE
Noted on echocardiogram 6/10/2024: spherical 1.5 x 1.3 cm thrombus at the LV apex   Initiated on AC with Xarelto, continue  Rx sent to [employer] for price check on 7/10/24 - CM spoke with [employer] and pt has rx coverage. Information sent to [employer]. [employer] ran the insurance and it is not active. CM to call the pharmacy pt used most recently to see if they have different information.    If unable to obtain coverage will need to bridge pt to Warfarin

## 2024-07-27 PROCEDURE — 99232 SBSQ HOSP IP/OBS MODERATE 35: CPT | Performed by: PHYSICIAN ASSISTANT

## 2024-07-27 PROCEDURE — 97530 THERAPEUTIC ACTIVITIES: CPT | Performed by: PHYSICAL THERAPIST

## 2024-07-27 RX ADMIN — GABAPENTIN 100 MG: 100 CAPSULE ORAL at 05:21

## 2024-07-27 RX ADMIN — GABAPENTIN 100 MG: 100 CAPSULE ORAL at 14:48

## 2024-07-27 RX ADMIN — DIVALPROEX SODIUM 1250 MG: 500 TABLET, EXTENDED RELEASE ORAL at 08:19

## 2024-07-27 RX ADMIN — LAMOTRIGINE 50 MG: 25 TABLET ORAL at 17:35

## 2024-07-27 RX ADMIN — ZONISAMIDE 400 MG: 100 CAPSULE ORAL at 08:20

## 2024-07-27 RX ADMIN — LEVOCARNITINE 330 MG: 1 SOLUTION ORAL at 08:20

## 2024-07-27 RX ADMIN — BICTEGRAVIR SODIUM, EMTRICITABINE, AND TENOFOVIR ALAFENAMIDE FUMARATE 1 TABLET: 50; 200; 25 TABLET ORAL at 08:20

## 2024-07-27 RX ADMIN — RIVAROXABAN 20 MG: 20 TABLET, FILM COATED ORAL at 17:35

## 2024-07-27 RX ADMIN — ATORVASTATIN CALCIUM 80 MG: 80 TABLET, FILM COATED ORAL at 17:35

## 2024-07-27 RX ADMIN — LAMOTRIGINE 50 MG: 25 TABLET ORAL at 08:19

## 2024-07-27 RX ADMIN — LEVOCARNITINE 330 MG: 1 SOLUTION ORAL at 17:35

## 2024-07-27 RX ADMIN — MIRTAZAPINE 15 MG: 15 TABLET, FILM COATED ORAL at 21:47

## 2024-07-27 RX ADMIN — Medication 6 MG: at 21:47

## 2024-07-27 RX ADMIN — ASPIRIN 81 MG: 81 TABLET, COATED ORAL at 08:18

## 2024-07-27 RX ADMIN — ACETAMINOPHEN 975 MG: 325 TABLET, FILM COATED ORAL at 08:19

## 2024-07-27 RX ADMIN — DOCUSATE SODIUM 100 MG: 100 CAPSULE, LIQUID FILLED ORAL at 08:19

## 2024-07-27 RX ADMIN — SERTRALINE HYDROCHLORIDE 75 MG: 50 TABLET ORAL at 08:19

## 2024-07-27 RX ADMIN — LEVOCARNITINE 330 MG: 1 SOLUTION ORAL at 14:48

## 2024-07-27 RX ADMIN — DOLUTEGRAVIR SODIUM 50 MG: 50 TABLET, FILM COATED ORAL at 08:20

## 2024-07-27 RX ADMIN — LOSARTAN POTASSIUM 50 MG: 50 TABLET, FILM COATED ORAL at 08:19

## 2024-07-27 RX ADMIN — METOPROLOL SUCCINATE 25 MG: 25 TABLET, EXTENDED RELEASE ORAL at 08:19

## 2024-07-27 RX ADMIN — GABAPENTIN 100 MG: 100 CAPSULE ORAL at 21:47

## 2024-07-27 RX ADMIN — TAMSULOSIN HYDROCHLORIDE 0.4 MG: 0.4 CAPSULE ORAL at 17:35

## 2024-07-27 RX ADMIN — DOCUSATE SODIUM 100 MG: 100 CAPSULE, LIQUID FILLED ORAL at 17:35

## 2024-07-27 NOTE — ASSESSMENT & PLAN NOTE
Pt reports a history of migraines.  It is associated with photophobia.  He is unable to take triptans due to a history of stroke.  Given [facility] 7/21/24.

## 2024-07-27 NOTE — ASSESSMENT & PLAN NOTE
Home regimen: Losartan 50mg daily, Toprol XL 25 mg BID  Current regimen: Losartan 50 mg daily, Toprol-XL 25 mg daily  Stable

## 2024-07-27 NOTE — PROGRESS NOTES
07/27/24 1230   Pain Assessment   Pain Assessment Tool 0-10   Pain Score 5   Pain Location/Orientation Orientation: Right;Location: Groin   Pain Onset/Description Onset: Ongoing   Hospital Pain Intervention(s) Repositioned;Rest   Therapy Time missed   Time missed? Yes   Amount of time missed 90   Reason for time missed Extreme fatigue  (pt refusal)   Time(s) multiple attempts made 1205, 1230, 1300

## 2024-07-27 NOTE — ASSESSMENT & PLAN NOTE
Noted on echocardiogram 6/10/2024: spherical 1.5 x 1.3 cm thrombus at the LV apex   Initiated on AC with Xarelto, continue  Rx sent to [employer] for price check on 7/10/24 - CM spoke with [employer] and pt has rx coverage. Information sent to [employer] ran the insurance and it is not active. CM to call the pharmacy pt used most recently to see if they have different information.    If unable to obtain coverage will need to bridge pt to Warfarin

## 2024-07-27 NOTE — ASSESSMENT & PLAN NOTE
Remote history of TBI, previously residing in a group home prior to jail sentence.  Evaluated by neuropsychiatry on 6/27/24 and deemed NOT to have medical decision-making capacity  Process for court appointed guardian started on 7/5/24 - CM following   Guardianship hearing 8/27/24.

## 2024-07-27 NOTE — PROGRESS NOTES
"   07/27/24 0700   Pain Assessment   Pain Assessment Tool 0-10   Pain Score 3   Pain Location/Orientation Location: Groin;Orientation: Right   Restrictions/Precautions   Precautions Bed/chair alarms;Cognitive;Fall Risk;Pain;Supervision on toilet/commode;Aphasia   Weight Bearing Restrictions Yes   LLE Weight Bearing Per Order NWB   ROM Restrictions No   Braces or Orthoses   (L AKA )   Cognition   Overall Cognitive Status Impaired   Arousal/Participation Alert   Attention Attends with cues to redirect   Orientation Level Oriented to person;Disoriented to time;Oriented to place;Oriented to situation   Memory Decreased recall of recent events   Following Commands Follows one step commands with increased time or repetition   Assessment   Treatment Assessment Attempted to see patient for PT treatment this date. PT stated its time for therapy. The patient replied, \" i don't feel good don't want to do anything\" PT attempted to education with encouraging for therapy and how rehab will help maintain function and prevent further weakness.  Pt became verbal aggressive with stating \" i'm not doing fucking nothing today, leave me alone\".  2nd attempt, patient replied without raising voice, \" i'm sorry i don't feel good my head is hurting i just needed to sleep.\" at that point the patient began to snore with not responding to therapist's further attempts to perform activity even in the room.   PT Therapy Minutes   PT Time In 0800   PT Time Out 0815   PT Total Time (minutes) 15   PT Mode of treatment - Individual (minutes) 15   Therapy Time missed   Time missed? Yes   Amount of time missed 75       "

## 2024-07-27 NOTE — PLAN OF CARE
Problem: PAIN - ADULT  Goal: Verbalizes/displays adequate comfort level or baseline comfort level  Description: Interventions:  - Encourage patient to monitor pain and request assistance  - Assess pain using appropriate pain scale  - Administer analgesics based on type and severity of pain and evaluate response  - Implement non-pharmacological measures as appropriate and evaluate response  - Consider cultural and social influences on pain and pain management  - Notify physician/advanced practitioner if interventions unsuccessful or patient reports new pain  Outcome: Progressing     Problem: INFECTION - ADULT  Goal: Absence or prevention of progression during hospitalization  Description: INTERVENTIONS:  - Assess and monitor for signs and symptoms of infection  - Monitor lab/diagnostic results  - Monitor all insertion sites, i.e. indwelling lines, tubes, and drains  - Monitor endotracheal if appropriate and nasal secretions for changes in amount and color  - [place] appropriate cooling/warming therapies per order  - Administer medications as ordered  - Instruct and encourage patient and family to use good hand hygiene technique  - Identify and instruct in appropriate isolation precautions for identified infection/condition  Outcome: Progressing  Goal: Absence of fever/infection during neutropenic period  Description: INTERVENTIONS:  - Monitor WBC    Outcome: Progressing     Problem: SAFETY ADULT  Goal: Patient will remain free of falls  Description: INTERVENTIONS:  - Educate patient/family on patient safety including physical limitations  - Instruct patient to call for assistance with activity   - Consult OT/PT to assist with strengthening/mobility   - Keep Call bell within reach  - Keep bed low and locked with side rails adjusted as appropriate  - Keep care items and personal belongings within reach  - Initiate and maintain comfort rounds  - Make Fall Risk Sign visible to staff  - Offer Toileting every 2 Hours,  in advance of need  - Initiate/Maintain alarm  - Obtain necessary fall risk management equipment:   - Apply yellow socks and bracelet for high fall risk patients  - Consider moving patient to room near nurses station  Outcome: Progressing  Goal: Maintain or return to baseline ADL function  Description: INTERVENTIONS:  -  Assess patient's ability to carry out ADLs; assess patient's baseline for ADL function and identify physical deficits which impact ability to perform ADLs (bathing, care of mouth/teeth, toileting, grooming, dressing, etc.)  - Assess/evaluate cause of self-care deficits   - Assess range of motion  - Assess patient's mobility; develop plan if impaired  - Assess patient's need for assistive devices and provide as appropriate  - Encourage maximum independence but intervene and supervise when necessary  - Involve family in performance of ADLs  - Assess for home care needs following discharge   - Consider OT consult to assist with ADL evaluation and planning for discharge  - Provide patient education as appropriate  Outcome: Progressing  Goal: Maintains/Returns to pre admission functional level  Description: INTERVENTIONS:  - Perform AM-PAC 6 Click Basic Mobility/ Daily Activity assessment daily.  - Set and communicate daily mobility goal to care team and patient/family/caregiver.   - Collaborate with rehabilitation services on mobility goals if consulted  - Perform Range of Motion 3 times a day.  - Reposition patient every 2 hours.  - Dangle patient 3 times a day  - Stand patient 3 times a day  - Ambulate patient 3 times a day  - Out of bed to chair 3 times a day   - Out of bed for meals 3 times a day  - Out of bed for toileting  - Record patient progress and toleration of activity level   Outcome: Progressing     Problem: DISCHARGE PLANNING  Goal: Discharge to home or other facility with appropriate resources  Description: INTERVENTIONS:  - Identify barriers to discharge w/patient and caregiver  -  Arrange for needed discharge resources and transportation as appropriate  - Identify discharge learning needs (meds, wound care, etc.)  - Arrange for interpretive services to assist at discharge as needed  - Refer to Case Management Department for coordinating discharge planning if the patient needs post-hospital services based on physician/advanced practitioner order or complex needs related to functional status, cognitive ability, or social support system  Outcome: Progressing     Problem: Prexisting or High Potential for Compromised Skin Integrity  Goal: Skin integrity is maintained or improved  Description: INTERVENTIONS:  - Identify patients at risk for skin breakdown  - Assess and monitor skin integrity  - Assess and monitor nutrition and hydration status  - Monitor labs   - Assess for incontinence   - Turn and reposition patient  - Assist with mobility/ambulation  - Relieve pressure over bony prominences  - Avoid friction and shearing  - Provide appropriate hygiene as needed including keeping skin clean and dry  - Evaluate need for skin moisturizer/barrier cream  - Collaborate with interdisciplinary team   - Patient/family teaching  - Consider wound care consult   Outcome: Progressing

## 2024-07-27 NOTE — PROGRESS NOTES
[facility]  Progress Note  Name: Sunil Patel I  MRN: [id number]  Unit/Bed#: -01 I Date of Admission: 7/6/2024   Date of Service: 7/27/2024 I Hospital Day: 21    Assessment & Plan   * Above-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)  Assessment & Plan  Presented with left lower extremity acute limb ischemia/extensive left iliofemoral and left infrainguinal embolism requiring left femoral thrombectomy and subsequent AKA on 6/7/24 with vascular surgery.  Incision C/D/I, pain controlled.   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 CM to investigate what is covered.  Rehab and pain control per PMR  Pt has met his rehab goals and will be discharged when able.    Episodic headache  Assessment & Plan  Pt reports a history of migraines.  It is associated with photophobia.  He is unable to take triptans due to a history of stroke.  [facility]    Cardiomyopathy (HCC)  Assessment & Plan  ECHO 6/10/2024 = LVEF 40-45% with mild global hypokinesis   Status post NM stress test on 6/11/2024 which showed: a large, mild, fixed defect in the inferior wall, possibly due to diaphragmatic attenuation artifact, there is a small area of partial reversibility in the inferior apical wall suggestive of ischemia    Evaluated ed by cardiology, etiology felt to be possibly secondary to stress-induced cardiomyopathy, with apical thrombus  Cardiac catheterization deferred given the lack of any significant ischemia, and no current cardiac symptoms  Continue with medical management with aspirin, statin, beta-blocker, ARB  Monitor volume status, remains euvolemic off of diuretics   Euvolemic on exam  Outpatient follow-up with cardiology    Traumatic brain injury (HCC)  Assessment & Plan  Remote history of TBI, previously residing in a group home prior to halfway  sentence.  Evaluated by neuropsychiatry on 6/27/24 and deemed NOT to have medical decision-making capacity  Process for court appointed guardian started on 7/5/24 - CM following   Guardianship hearing 8/27/24.    Carnitine deficiency (HCC)  Assessment & Plan  Continue levocarnitine 330 mg 3x daily with meals.    History of seizures  Assessment & Plan  Diagnosed in July 2019, follows with [facility] neurology outpatient  Continue home regimen with Depakote ER, Lamotrigine and Zonegran    Left ventricular thrombus  Assessment & Plan  Noted on echocardiogram 6/10/2024: spherical 1.5 x 1.3 cm thrombus at the LV apex   Initiated on AC with Xarelto, continue  Rx sent to [employer] for price check on 7/10/24 - CM spoke with [employer] and pt has rx coverage. Information sent to [employer]. [employer] ran the insurance and it is not active. CM to call the pharmacy pt used most recently to see if they have different information.    If unable to obtain coverage will need to bridge pt to Warfarin     Benign essential hypertension  Assessment & Plan  Home regimen: Losartan 50mg daily, Toprol XL 25 mg BID  Current regimen: Losartan 50 mg daily, Toprol-XL 25 mg daily  Stable      History of stroke  Assessment & Plan  History of left MCA CVA in May 2018 with residual expressive aphasia  Continue ASA and atorvastatin    Human immunodeficiency virus (HIV) infection (HCC)  Assessment & Plan  Continue Biktarvy and Tivicay.    Bipolar affective disorder (HCC)  Assessment & Plan  Continue home meds Zoloft and Remeron  Outpatient follow-up with Psychiatry             The above assessment and plan was reviewed and updated as determined by my evaluation of the patient on 7/27/2024.    Labs:   Results from last 7 days   Lab Units 07/25/24  0518   WBC Thousand/uL 6.34   HEMOGLOBIN g/dL 10.5*   HEMATOCRIT % 36.0*   PLATELETS Thousands/uL 384     Results from last 7 days   Lab Units 07/25/24  0518   SODIUM mmol/L 139   POTASSIUM mmol/L 4.0   CHLORIDE  mmol/L 105   CO2 mmol/L 25   BUN mg/dL 20   CREATININE mg/dL 0.85   CALCIUM mg/dL 8.9                   Imaging  No orders to display       Review of Scheduled Meds:  Current Facility-Administered Medications   Medication Dose Route Frequency Provider Last Rate    acetaminophen  975 mg Oral TID PRN José Salcido MD      aspirin  81 mg Oral Daily Lorrie Barillas PA-C      atorvastatin  80 mg Oral Daily With Dinner Lorrie Barillas PA-C      bictegravir-emtricitab-tenofovir alafenamide  1 tablet Oral Daily With Breakfast Lorrie Barillas PA-C      bisacodyl  10 mg Rectal Daily PRN Lorrie Barillas PA-C      diphenhydrAMINE  25 mg Oral Q6H PRN Lorrie Barillas PA-C      divalproex sodium  1,250 mg Oral Daily Lorrie Barillas PA-C      docusate sodium  100 mg Oral BID Ashley Depadua, MD      dolutegravir  50 mg Oral Daily Lorrie Barillas PA-C      gabapentin  100 mg Oral Q8H Ashley Depadua, MD      lamoTRIgine  50 mg Oral BID Lorrie Barillas PA-C      levOCARNitine  1,000 mg/day Oral TID With Meals Lorrie Barillas PA-C      losartan  50 mg Oral Daily Lorrie Barillas PA-C      melatonin  6 mg Oral HS Lorrie Barillas PA-C      metoprolol succinate  25 mg Oral Daily CHARLIE Mcclelland      mirtazapine  15 mg Oral HS Lorriejacky Barillas PA-C      ondansetron  4 mg Oral Q6H PRN Lorrie Barillas PA-C      oxyCODONE  2.5 mg Oral Q8H PRN Raimundo Riley MD      polyethylene glycol  17 g Oral Daily PRN Lorrie Barillas PA-C      rivaroxaban  20 mg Oral Daily With Dinner Lorrie Barillas PA-C      senna  1 tablet Oral HS PRN Lorrie Barillas PA-C      sertraline  75 mg Oral Daily Lorrie Barillas PA-C      tamsulosin  0.4 mg Oral Daily With Dinner Lorrie Barillas PA-C      zonisamide  400 mg Oral Daily Lorrie Barillas PA-C         Subjective/ HPI: Patient seen and examined. Patients overnight issues or events were reviewed with  nursing staff. New or overnight issues include the following:     Pt refused therapy this AM. He denies any current complaints.    ROS:   A 10 point ROS was performed; negative except as noted above.        *Labs /Radiology studies Reviewed  *Medications  reviewed and reconciled as needed  *Please refer to order section for additional ordered labs studies      Physical Examination:  Vitals:   Vitals:    07/26/24 0919 07/26/24 1300 07/26/24 2014 07/27/24 0818   BP: 134/88 118/62 108/63 114/65   BP Location:  Left arm Left arm    Pulse: 101 92 88 64   Resp:  17 16 18   Temp:  98.7 °F (37.1 °C) 98.6 °F (37 °C) 97.5 °F (36.4 °C)   TempSrc:  Oral Oral    SpO2:  96% 94% 98%   Weight:       Height:           General Appearance: NAD; pleasant  HEENT: PERRLA, conjuctiva normal; mucous membranes moist; face symmetrical  Neck:  Supple  Lungs: clear bilaterally, normal respiratory effort, no retractions, expiratory effort normal, on room air  CV: regular rate and rhythm, no murmurs rubs or gallops noted   ABD: soft non tender, +BS x4  EXT: Lt AKA  Skin: normal turgor, normal texture, no rash  Psych: affect normal, mood normal  Neuro: AAOx3. Expressive aphasia     The above physical exam was reviewed and updated as determined by my evaluation of the patient on 7/27/2024.    Invasive Devices       None                      VTE Pharmacologic Prophylaxis: Xarelto  Code Status: Level 1 - Full Code  Current Length of Stay: 21 day(s)    Total floor / unit time spent today 30 minutes  Coordination of patient's care was performed in conjunction with consulting services. Time invested included review of patient's labs, vitals, and management of their comorbidities with continued monitoring, examination of patient as well as answering patient questions, documenting her findings and creating progress note in electronic medical record,  ordering appropriate diagnostic testing.       ** Please Note:  voice to text software may have been used  in the creation of this document. Although proof errors in transcription or interpretation are a potential of such software**

## 2024-07-28 PROCEDURE — 97530 THERAPEUTIC ACTIVITIES: CPT

## 2024-07-28 PROCEDURE — 97110 THERAPEUTIC EXERCISES: CPT

## 2024-07-28 RX ADMIN — GABAPENTIN 100 MG: 100 CAPSULE ORAL at 05:51

## 2024-07-28 RX ADMIN — GABAPENTIN 100 MG: 100 CAPSULE ORAL at 21:46

## 2024-07-28 RX ADMIN — DOCUSATE SODIUM 100 MG: 100 CAPSULE, LIQUID FILLED ORAL at 17:23

## 2024-07-28 RX ADMIN — GABAPENTIN 100 MG: 100 CAPSULE ORAL at 14:40

## 2024-07-28 RX ADMIN — METOPROLOL SUCCINATE 25 MG: 25 TABLET, EXTENDED RELEASE ORAL at 09:33

## 2024-07-28 RX ADMIN — ATORVASTATIN CALCIUM 80 MG: 80 TABLET, FILM COATED ORAL at 17:23

## 2024-07-28 RX ADMIN — TAMSULOSIN HYDROCHLORIDE 0.4 MG: 0.4 CAPSULE ORAL at 17:23

## 2024-07-28 RX ADMIN — Medication 6 MG: at 21:46

## 2024-07-28 RX ADMIN — LAMOTRIGINE 50 MG: 25 TABLET ORAL at 17:23

## 2024-07-28 RX ADMIN — DIVALPROEX SODIUM 1250 MG: 500 TABLET, EXTENDED RELEASE ORAL at 09:32

## 2024-07-28 RX ADMIN — ASPIRIN 81 MG: 81 TABLET, COATED ORAL at 09:33

## 2024-07-28 RX ADMIN — ZONISAMIDE 400 MG: 100 CAPSULE ORAL at 09:34

## 2024-07-28 RX ADMIN — DOLUTEGRAVIR SODIUM 50 MG: 50 TABLET, FILM COATED ORAL at 09:33

## 2024-07-28 RX ADMIN — DOCUSATE SODIUM 100 MG: 100 CAPSULE, LIQUID FILLED ORAL at 09:32

## 2024-07-28 RX ADMIN — SERTRALINE HYDROCHLORIDE 75 MG: 50 TABLET ORAL at 09:33

## 2024-07-28 RX ADMIN — LEVOCARNITINE 330 MG: 1 SOLUTION ORAL at 09:33

## 2024-07-28 RX ADMIN — MIRTAZAPINE 15 MG: 15 TABLET, FILM COATED ORAL at 21:46

## 2024-07-28 RX ADMIN — LEVOCARNITINE 330 MG: 1 SOLUTION ORAL at 16:20

## 2024-07-28 RX ADMIN — RIVAROXABAN 20 MG: 20 TABLET, FILM COATED ORAL at 17:23

## 2024-07-28 RX ADMIN — LOSARTAN POTASSIUM 50 MG: 50 TABLET, FILM COATED ORAL at 09:33

## 2024-07-28 RX ADMIN — BICTEGRAVIR SODIUM, EMTRICITABINE, AND TENOFOVIR ALAFENAMIDE FUMARATE 1 TABLET: 50; 200; 25 TABLET ORAL at 09:33

## 2024-07-28 RX ADMIN — ACETAMINOPHEN 975 MG: 325 TABLET, FILM COATED ORAL at 09:33

## 2024-07-28 RX ADMIN — LEVOCARNITINE 330 MG: 1 SOLUTION ORAL at 17:23

## 2024-07-28 RX ADMIN — LAMOTRIGINE 50 MG: 25 TABLET ORAL at 09:33

## 2024-07-28 NOTE — PLAN OF CARE
Problem: PAIN - ADULT  Goal: Verbalizes/displays adequate comfort level or baseline comfort level  Description: Interventions:  - Encourage patient to monitor pain and request assistance  - Assess pain using appropriate pain scale  - Administer analgesics based on type and severity of pain and evaluate response  - Implement non-pharmacological measures as appropriate and evaluate response  - Consider cultural and social influences on pain and pain management  - Notify physician/advanced practitioner if interventions unsuccessful or patient reports new pain  Outcome: Progressing     Problem: INFECTION - ADULT  Goal: Absence or prevention of progression during hospitalization  Description: INTERVENTIONS:  - Assess and monitor for signs and symptoms of infection  - Monitor lab/diagnostic results  - Monitor all insertion sites, i.e. indwelling lines, tubes, and drains  - Monitor endotracheal if appropriate and nasal secretions for changes in amount and color  - [place] appropriate cooling/warming therapies per order  - Administer medications as ordered  - Instruct and encourage patient and family to use good hand hygiene technique  - Identify and instruct in appropriate isolation precautions for identified infection/condition  Outcome: Progressing  Goal: Absence of fever/infection during neutropenic period  Description: INTERVENTIONS:  - Monitor WBC    Outcome: Progressing     Problem: SAFETY ADULT  Goal: Patient will remain free of falls  Description: INTERVENTIONS:  - Educate patient/family on patient safety including physical limitations  - Instruct patient to call for assistance with activity   - Consult OT/PT to assist with strengthening/mobility   - Keep Call bell within reach  - Keep bed low and locked with side rails adjusted as appropriate  - Keep care items and personal belongings within reach  - Initiate and maintain comfort rounds  - Make Fall Risk Sign visible to staff  - Offer Toileting every 2 Hours,  in advance of need  - Initiate/Maintain alarm  - Obtain necessary fall risk management equipment:   - Apply yellow socks and bracelet for high fall risk patients  - Consider moving patient to room near nurses station  Outcome: Progressing  Goal: Maintain or return to baseline ADL function  Description: INTERVENTIONS:  -  Assess patient's ability to carry out ADLs; assess patient's baseline for ADL function and identify physical deficits which impact ability to perform ADLs (bathing, care of mouth/teeth, toileting, grooming, dressing, etc.)  - Assess/evaluate cause of self-care deficits   - Assess range of motion  - Assess patient's mobility; develop plan if impaired  - Assess patient's need for assistive devices and provide as appropriate  - Encourage maximum independence but intervene and supervise when necessary  - Involve family in performance of ADLs  - Assess for home care needs following discharge   - Consider OT consult to assist with ADL evaluation and planning for discharge  - Provide patient education as appropriate  Outcome: Progressing  Goal: Maintains/Returns to pre admission functional level  Description: INTERVENTIONS:  - Perform AM-PAC 6 Click Basic Mobility/ Daily Activity assessment daily.  - Set and communicate daily mobility goal to care team and patient/family/caregiver.   - Collaborate with rehabilitation services on mobility goals if consulted  - Perform Range of Motion 3 times a day.  - Reposition patient every 2 hours.  - Dangle patient 3 times a day  - Stand patient 3 times a day  - Ambulate patient 3 times a day  - Out of bed to chair 3 times a day   - Out of bed for meals 3 times a day  - Out of bed for toileting  - Record patient progress and toleration of activity level   Outcome: Progressing     Problem: DISCHARGE PLANNING  Goal: Discharge to home or other facility with appropriate resources  Description: INTERVENTIONS:  - Identify barriers to discharge w/patient and caregiver  -  Arrange for needed discharge resources and transportation as appropriate  - Identify discharge learning needs (meds, wound care, etc.)  - Arrange for interpretive services to assist at discharge as needed  - Refer to Case Management Department for coordinating discharge planning if the patient needs post-hospital services based on physician/advanced practitioner order or complex needs related to functional status, cognitive ability, or social support system  Outcome: Progressing     Problem: Prexisting or High Potential for Compromised Skin Integrity  Goal: Skin integrity is maintained or improved  Description: INTERVENTIONS:  - Identify patients at risk for skin breakdown  - Assess and monitor skin integrity  - Assess and monitor nutrition and hydration status  - Monitor labs   - Assess for incontinence   - Turn and reposition patient  - Assist with mobility/ambulation  - Relieve pressure over bony prominences  - Avoid friction and shearing  - Provide appropriate hygiene as needed including keeping skin clean and dry  - Evaluate need for skin moisturizer/barrier cream  - Collaborate with interdisciplinary team   - Patient/family teaching  - Consider wound care consult   Outcome: Progressing

## 2024-07-28 NOTE — PLAN OF CARE
Problem: PAIN - ADULT  Goal: Verbalizes/displays adequate comfort level or baseline comfort level  Description: Interventions:  - Encourage patient to monitor pain and request assistance  - Assess pain using appropriate pain scale  - Administer analgesics based on type and severity of pain and evaluate response  - Implement non-pharmacological measures as appropriate and evaluate response  - Consider cultural and social influences on pain and pain management  - Notify physician/advanced practitioner if interventions unsuccessful or patient reports new pain  7/28/2024 0737 by Manjeet Latif RN  Outcome: Progressing  7/28/2024 0736 by Manjeet Latif RN  Outcome: Progressing     Problem: INFECTION - ADULT  Goal: Absence or prevention of progression during hospitalization  Description: INTERVENTIONS:  - Assess and monitor for signs and symptoms of infection  - Monitor lab/diagnostic results  - Monitor all insertion sites, i.e. indwelling lines, tubes, and drains  - Monitor endotracheal if appropriate and nasal secretions for changes in amount and color  - [place] appropriate cooling/warming therapies per order  - Administer medications as ordered  - Instruct and encourage patient and family to use good hand hygiene technique  - Identify and instruct in appropriate isolation precautions for identified infection/condition  7/28/2024 0737 by Manjeet Latif RN  Outcome: Progressing  7/28/2024 0736 by Manjeet Latif RN  Outcome: Progressing  Goal: Absence of fever/infection during neutropenic period  Description: INTERVENTIONS:  - Monitor WBC    7/28/2024 0737 by Manjeet Latif RN  Outcome: Progressing  7/28/2024 0736 by Manjeet Latif RN  Outcome: Progressing     Problem: SAFETY ADULT  Goal: Patient will remain free of falls  Description: INTERVENTIONS:  - Educate patient/family on patient safety including physical limitations  - Instruct patient to call for assistance with activity   - Consult OT/PT to assist with  strengthening/mobility   - Keep Call bell within reach  - Keep bed low and locked with side rails adjusted as appropriate  - Keep care items and personal belongings within reach  - Initiate and maintain comfort rounds  - Make Fall Risk Sign visible to staff  - Offer Toileting every 2 Hours, in advance of need  - Initiate/Maintain alarm  - Obtain necessary fall risk management equipment:   - Apply yellow socks and bracelet for high fall risk patients  - Consider moving patient to room near nurses station  7/28/2024 0737 by Manjeet Latif RN  Outcome: Progressing  7/28/2024 0736 by Manjeet Latif RN  Outcome: Progressing  Goal: Maintain or return to baseline ADL function  Description: INTERVENTIONS:  -  Assess patient's ability to carry out ADLs; assess patient's baseline for ADL function and identify physical deficits which impact ability to perform ADLs (bathing, care of mouth/teeth, toileting, grooming, dressing, etc.)  - Assess/evaluate cause of self-care deficits   - Assess range of motion  - Assess patient's mobility; develop plan if impaired  - Assess patient's need for assistive devices and provide as appropriate  - Encourage maximum independence but intervene and supervise when necessary  - Involve family in performance of ADLs  - Assess for home care needs following discharge   - Consider OT consult to assist with ADL evaluation and planning for discharge  - Provide patient education as appropriate  7/28/2024 0737 by Manjeet Latif RN  Outcome: Progressing  7/28/2024 0736 by Manjeet Latif RN  Outcome: Progressing  Goal: Maintains/Returns to pre admission functional level  Description: INTERVENTIONS:  - Perform AM-PAC 6 Click Basic Mobility/ Daily Activity assessment daily.  - Set and communicate daily mobility goal to care team and patient/family/caregiver.   - Collaborate with rehabilitation services on mobility goals if consulted  - Perform Range of Motion 3 times a day.  - Reposition patient every 2  hours.  - Dangle patient 3 times a day  - Stand patient 3 times a day  - Ambulate patient 3 times a day  - Out of bed to chair 3 times a day   - Out of bed for meals 3 times a day  - Out of bed for toileting  - Record patient progress and toleration of activity level   7/28/2024 0737 by Manjeet Latif RN  Outcome: Progressing  7/28/2024 0736 by Manjeet Latif RN  Outcome: Progressing     Problem: DISCHARGE PLANNING  Goal: Discharge to home or other facility with appropriate resources  Description: INTERVENTIONS:  - Identify barriers to discharge w/patient and caregiver  - Arrange for needed discharge resources and transportation as appropriate  - Identify discharge learning needs (meds, wound care, etc.)  - Arrange for interpretive services to assist at discharge as needed  - Refer to Case Management Department for coordinating discharge planning if the patient needs post-hospital services based on physician/advanced practitioner order or complex needs related to functional status, cognitive ability, or social support system  7/28/2024 0737 by Manjeet Latif RN  Outcome: Progressing  7/28/2024 0736 by Manjeet Latif RN  Outcome: Progressing     Problem: Prexisting or High Potential for Compromised Skin Integrity  Goal: Skin integrity is maintained or improved  Description: INTERVENTIONS:  - Identify patients at risk for skin breakdown  - Assess and monitor skin integrity  - Assess and monitor nutrition and hydration status  - Monitor labs   - Assess for incontinence   - Turn and reposition patient  - Assist with mobility/ambulation  - Relieve pressure over bony prominences  - Avoid friction and shearing  - Provide appropriate hygiene as needed including keeping skin clean and dry  - Evaluate need for skin moisturizer/barrier cream  - Collaborate with interdisciplinary team   - Patient/family teaching  - Consider wound care consult   7/28/2024 0737 by Manjeet Latif RN  Outcome: Progressing  7/28/2024 0736 by Manjeet  SHAILA Latif RN  Outcome: Progressing

## 2024-07-28 NOTE — PROGRESS NOTES
07/28/24 1030   Pain Assessment   Pain Assessment Tool 0-10   Pain Score No Pain   Restrictions/Precautions   Precautions Bed/chair alarms;Cognitive;Fall Risk;Pain;Supervision on toilet/commode;Aphasia   LLE Weight Bearing Per Order NWB   Braces or Orthoses Other (Comment)  (L AKA )   Subjective   Subjective pt will to perform skilled PT at this time   Roll Left and Right   Type of Assistance Needed Supervision   Roll Left and Right CARE Score 4   Sit to Lying   Type of Assistance Needed Supervision   Sit to Lying CARE Score 4   Lying to Sitting on Side of Bed   Type of Assistance Needed Supervision   Lying to Sitting on Side of Bed CARE Score 4   Sit to Stand   Type of Assistance Needed Supervision   Sit to Stand CARE Score 4   Bed-Chair Transfer   Type of Assistance Needed Supervision;Incidental touching   Comment CgA to CS but needs VC for one braking lockig   Chair/Bed-to-Chair Transfer CARE Score 4   Transfer Bed/Chair/Wheelchair   Adaptive Equipment None   Car Transfer   Type of Assistance Needed Incidental touching   Comment VC for sequence and WC placement   Car Transfer CARE Score 4   Walk 10 Feet   Reason if not Attempted Safety concerns   Walk 10 Feet CARE Score 88   Walk 50 Feet with Two Turns   Reason if not Attempted Safety concerns   Walk 50 Feet with Two Turns CARE Score 88   Walk 150 Feet   Reason if not Attempted Safety concerns   Walk 150 Feet CARE Score 88   Walking 10 Feet on Uneven Surfaces   Reason if not Attempted Safety concerns   Walking 10 Feet on Uneven Surfaces CARE Score 88   Wheel 50 Feet with Two Turns   Type of Assistance Needed Independent   Wheel 50 Feet with Two Turns CARE Score 6   Wheel 150 Feet   Type of Assistance Needed Independent   Wheel 150 Feet CARE Score 6   Wheelchair mobility   Does the patient use a wheelchair? 1. Yes   Type of Wheelchair Used 1. Manual   Method Left upper extremity;Right upper extremity   Curb or Single Stair   Reason if not Attempted  Safety concerns   1 Step (Curb) CARE Score 88   4 Steps   Reason if not Attempted Safety concerns   4 Steps CARE Score 88   12 Steps   Reason if not Attempted Safety concerns   12 Steps CARE Score 88   Therapeutic Interventions   Neuromuscular Re-Education 9th floor mat and core ball toss and catch , prone stretch 8 min andsidelying stretch . Pt also perform WC sequence for sit pivot transfers but needs a little VC for set up keesha locking WC down before trnasfers   Other Comments   Comments Pt cont to need extra time to perform on shinker and clothes   Assessment   Treatment Assessment pt cont to need WC training in sequence for sit pivot transfers , WC placement , arms rest and WC locks before sit pivot transfers , however pt is overall improvingbut cont to need S lvl if home home or SNF Cont POC   Barriers to Discharge Inaccessible home environment;Decreased caregiver support   Plan   Progress Progressing toward goals   PT Therapy Minutes   PT Time In 1030   PT Time Out 1130   PT Total Time (minutes) 60   PT Mode of treatment - Individual (minutes) 60   PT Mode of treatment - Concurrent (minutes) 0   PT Mode of treatment - Group (minutes) 0   PT Mode of treatment - Co-treat (minutes) 0   PT Mode of Treatment - Total time(minutes) 60 minutes   PT Cumulative Minutes 1850   Therapy Time missed   Time missed? No

## 2024-07-28 NOTE — QUICK NOTE
Chart reviewed. Vitals stable over the past 24 hours. Nursing reports that pt does not sleep well at night. PMR aware.

## 2024-07-28 NOTE — QUICK NOTE
PMR Quick Note    Was very tired yesterday and slept throughout the day yesterday. Then had trouble sleeping at night. Would try to keep patient awake during the day. Continue current dosing of mirtazapine/melatonin. Patient has been refusing therapies on occasion. Hemodynamically stable, no signs of infection. Last BM 7/25. Would give PRN today if no BM. Otherwise continue plan of care.    Ashley de Padua, MD  Physical Medicine and Rehabilitation

## 2024-07-29 LAB
INR PPP: 1.12 (ref 0.84–1.19)
PROTHROMBIN TIME: 14.3 SECONDS (ref 11.6–14.5)

## 2024-07-29 PROCEDURE — 97110 THERAPEUTIC EXERCISES: CPT | Performed by: PHYSICAL THERAPIST

## 2024-07-29 PROCEDURE — 99233 SBSQ HOSP IP/OBS HIGH 50: CPT | Performed by: PHYSICAL MEDICINE & REHABILITATION

## 2024-07-29 PROCEDURE — 92507 TX SP LANG VOICE COMM INDIV: CPT

## 2024-07-29 PROCEDURE — 99232 SBSQ HOSP IP/OBS MODERATE 35: CPT | Performed by: PHYSICIAN ASSISTANT

## 2024-07-29 PROCEDURE — 97535 SELF CARE MNGMENT TRAINING: CPT

## 2024-07-29 PROCEDURE — 97110 THERAPEUTIC EXERCISES: CPT

## 2024-07-29 PROCEDURE — 85610 PROTHROMBIN TIME: CPT | Performed by: PHYSICIAN ASSISTANT

## 2024-07-29 PROCEDURE — 97530 THERAPEUTIC ACTIVITIES: CPT | Performed by: PHYSICAL THERAPIST

## 2024-07-29 PROCEDURE — 97112 NEUROMUSCULAR REEDUCATION: CPT | Performed by: PHYSICAL THERAPIST

## 2024-07-29 RX ORDER — WARFARIN SODIUM 5 MG/1
5 TABLET ORAL
Status: DISCONTINUED | OUTPATIENT
Start: 2024-07-29 | End: 2024-08-01

## 2024-07-29 RX ADMIN — GABAPENTIN 100 MG: 100 CAPSULE ORAL at 22:59

## 2024-07-29 RX ADMIN — ACETAMINOPHEN 975 MG: 325 TABLET, FILM COATED ORAL at 08:27

## 2024-07-29 RX ADMIN — ATORVASTATIN CALCIUM 80 MG: 80 TABLET, FILM COATED ORAL at 16:09

## 2024-07-29 RX ADMIN — METOPROLOL SUCCINATE 25 MG: 25 TABLET, EXTENDED RELEASE ORAL at 08:27

## 2024-07-29 RX ADMIN — WARFARIN SODIUM 5 MG: 5 TABLET ORAL at 17:06

## 2024-07-29 RX ADMIN — LOSARTAN POTASSIUM 50 MG: 50 TABLET, FILM COATED ORAL at 08:25

## 2024-07-29 RX ADMIN — Medication 6 MG: at 21:02

## 2024-07-29 RX ADMIN — LAMOTRIGINE 50 MG: 25 TABLET ORAL at 08:26

## 2024-07-29 RX ADMIN — LEVOCARNITINE 330 MG: 1 SOLUTION ORAL at 08:22

## 2024-07-29 RX ADMIN — DOCUSATE SODIUM 100 MG: 100 CAPSULE, LIQUID FILLED ORAL at 08:26

## 2024-07-29 RX ADMIN — GABAPENTIN 100 MG: 100 CAPSULE ORAL at 06:21

## 2024-07-29 RX ADMIN — LEVOCARNITINE 330 MG: 1 SOLUTION ORAL at 16:10

## 2024-07-29 RX ADMIN — SERTRALINE HYDROCHLORIDE 75 MG: 50 TABLET ORAL at 08:25

## 2024-07-29 RX ADMIN — ZONISAMIDE 400 MG: 100 CAPSULE ORAL at 08:23

## 2024-07-29 RX ADMIN — LEVOCARNITINE 330 MG: 1 SOLUTION ORAL at 17:06

## 2024-07-29 RX ADMIN — BICTEGRAVIR SODIUM, EMTRICITABINE, AND TENOFOVIR ALAFENAMIDE FUMARATE 1 TABLET: 50; 200; 25 TABLET ORAL at 08:22

## 2024-07-29 RX ADMIN — LAMOTRIGINE 50 MG: 25 TABLET ORAL at 17:06

## 2024-07-29 RX ADMIN — GABAPENTIN 100 MG: 100 CAPSULE ORAL at 16:09

## 2024-07-29 RX ADMIN — DIVALPROEX SODIUM 1250 MG: 500 TABLET, EXTENDED RELEASE ORAL at 08:24

## 2024-07-29 RX ADMIN — TAMSULOSIN HYDROCHLORIDE 0.4 MG: 0.4 CAPSULE ORAL at 16:09

## 2024-07-29 RX ADMIN — DOLUTEGRAVIR SODIUM 50 MG: 50 TABLET, FILM COATED ORAL at 11:06

## 2024-07-29 RX ADMIN — DOCUSATE SODIUM 100 MG: 100 CAPSULE, LIQUID FILLED ORAL at 17:06

## 2024-07-29 RX ADMIN — ASPIRIN 81 MG: 81 TABLET, COATED ORAL at 08:24

## 2024-07-29 RX ADMIN — MIRTAZAPINE 15 MG: 15 TABLET, FILM COATED ORAL at 21:02

## 2024-07-29 NOTE — CASE MANAGEMENT
Cm met with pt at bedside, pt frustrated over belongings still at [facility] and reporting friends unable to retrieve. Cm attempting to call [facility] to see if there is another way around this. Cm also touched on dispo plan at this point, pt verbalizing frustration and then eventually calmed down, cm validated his feelings and listened to his concerns.

## 2024-07-29 NOTE — ASSESSMENT & PLAN NOTE
Remote history of TBI, previously residing in a group home prior to half-way sentence.  Evaluated by neuropsychiatry on 6/27/24 and deemed NOT to have medical decision-making capacity  Process for court appointed guardian started on 7/5/24 - CM following   Guardianship hearing 8/27/24.

## 2024-07-29 NOTE — PROGRESS NOTES
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  Incision C/D/I, pain controlled.   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 CM to investigate what is covered.  Rehab and pain control per PMR  Pt has been refusing therapy through the weekend.  Pt has met his rehab goals and will be discharged when able.    Episodic headache  Assessment & Plan  Pt reports a history of migraines.  It is associated with photophobia.  He is unable to take triptans due to a history of stroke.  [facility]    Cardiomyopathy (HCC)  Assessment & Plan  ECHO 6/10/2024 = LVEF 40-45% with mild global hypokinesis   Status post NM stress test on 6/11/2024 which showed: a large, mild, fixed defect in the inferior wall, possibly due to diaphragmatic attenuation artifact, there is a small area of partial reversibility in the inferior apical wall suggestive of ischemia    Evaluated ed by cardiology, etiology felt to be possibly secondary to stress-induced cardiomyopathy, with apical thrombus  Cardiac catheterization deferred given the lack of any significant ischemia, and no current cardiac symptoms  Continue with medical management with aspirin, statin, beta-blocker, ARB  Monitor volume status, remains euvolemic off of diuretics   Euvolemic on exam  Outpatient follow-up with cardiology    Traumatic brain injury (HCC)  Assessment & Plan  Remote history of TBI,  previously residing in a group home prior to snf sentence.  Evaluated by neuropsychiatry on 6/27/24 and deemed NOT to have medical decision-making capacity  Process for court appointed guardian started on 7/5/24 - CM following   Guardianship hearing 8/27/24.    Carnitine deficiency (HCC)  Assessment & Plan  Continue levocarnitine 330 mg 3x daily with meals.    History of seizures  Assessment & Plan  Diagnosed in July 2019, follows with [facility] neurology outpatient  Continue home regimen with Depakote ER, Lamotrigine and Zonegran    Left ventricular thrombus  Assessment & Plan  Noted on echocardiogram 6/10/2024: spherical 1.5 x 1.3 cm thrombus at the LV apex   Initiated on AC with Xarelto, continue  Rx sent to [facility] for price check on 7/10/24 - LENY spoke with [employer] and pt has rx coverage. Information sent to [facility]. [facility] ran the insurance and it is not active. CM to call the pharmacy pt used most recently to see if they have different information.    If unable to obtain coverage will need to bridge pt to Warfarin     Benign essential hypertension  Assessment & Plan  Home regimen: Losartan 50mg daily, Toprol XL 25 mg BID  Current regimen: Losartan 50 mg daily, Toprol-XL 25 mg daily  Stable      History of stroke  Assessment & Plan  History of left MCA CVA in May 2018 with residual expressive aphasia  Continue ASA and atorvastatin    Human immunodeficiency virus (HIV) infection (HCC)  Assessment & Plan  Continue Biktarvy and Tivicay.    Bipolar affective disorder (HCC)  Assessment & Plan  Continue home meds Zoloft and Remeron  Outpatient follow-up with Psychiatry             The above assessment and plan was reviewed and updated as determined by my evaluation of the patient on 7/29/2024.    Labs:   Results from last 7 days   Lab Units 07/25/24  0518   WBC Thousand/uL 6.34   HEMOGLOBIN g/dL 10.5*   HEMATOCRIT % 36.0*   PLATELETS Thousands/uL 384     Results from last 7 days   Lab Units 07/25/24  0518    SODIUM mmol/L 139   POTASSIUM mmol/L 4.0   CHLORIDE mmol/L 105   CO2 mmol/L 25   BUN mg/dL 20   CREATININE mg/dL 0.85   CALCIUM mg/dL 8.9                   Imaging  No orders to display       Review of Scheduled Meds:  Current Facility-Administered Medications   Medication Dose Route Frequency Provider Last Rate    acetaminophen  975 mg Oral TID PRN José Salcido MD      aspirin  81 mg Oral Daily Lorrie Barillas PA-C      atorvastatin  80 mg Oral Daily With Dinner Lorrie Barillas PA-C      bictegravir-emtricitab-tenofovir alafenamide  1 tablet Oral Daily With Breakfast Lorrie Barillas PA-C      bisacodyl  10 mg Rectal Daily PRN Lorrie Barillas PA-C      diphenhydrAMINE  25 mg Oral Q6H PRN Lorrie Barillas PA-C      divalproex sodium  1,250 mg Oral Daily Lorrie Barillas PA-C      docusate sodium  100 mg Oral BID Ashley Depadua, MD      dolutegravir  50 mg Oral Daily Lorrie Barillas PA-C      gabapentin  100 mg Oral Q8H Ashley Depadua, MD      lamoTRIgine  50 mg Oral BID Lorrie Bairllas PA-C      levOCARNitine  1,000 mg/day Oral TID With Meals Lorrie Barillas PA-C      losartan  50 mg Oral Daily Lorrie Barillas PA-C      melatonin  6 mg Oral HS Lorrie Barillas PA-C      metoprolol succinate  25 mg Oral Daily CHARLIE Mcclelland      mirtazapine  15 mg Oral HS Lorriejacky Barillas PA-C      ondansetron  4 mg Oral Q6H PRN Lorrie Barillas PA-C      oxyCODONE  2.5 mg Oral Q8H PRN Raimundo Riley MD      polyethylene glycol  17 g Oral Daily PRN Lorrie Barillas PA-C      rivaroxaban  20 mg Oral Daily With Dinner Lorrie Barillas PA-C      senna  1 tablet Oral HS PRN Lorrie Barillas PA-C      sertraline  75 mg Oral Daily LUPE BlackC      tamsulosin  0.4 mg Oral Daily With Dinner Lorrie Barillas PA-C      zonisamide  400 mg Oral Daily Lorrie Barillas PA-C         Subjective/ HPI: Patient seen and examined.  Patients overnight issues or events were reviewed with nursing staff. New or overnight issues include the following:     Pt seen in his room. He is frustrated by being in the hospital. He denies any other complaints.    ROS:   A 10 point ROS was performed; negative except as noted above.        *Labs /Radiology studies Reviewed  *Medications  reviewed and reconciled as needed  *Please refer to order section for additional ordered labs studies      Physical Examination:  Vitals:   Vitals:    07/28/24 0610 07/28/24 1528 07/28/24 2027 07/29/24 0601   BP: 128/69 112/65 115/82 112/65   BP Location: Left arm Left arm Left arm Left arm   Pulse: 93 82 84 84   Resp: 20 18 18 19   Temp: 97.8 °F (36.6 °C) 97.6 °F (36.4 °C) 98 °F (36.7 °C) (!) 97.3 °F (36.3 °C)   TempSrc: Oral Oral Oral Oral   SpO2: 91% 96% 95% 93%   Weight:       Height:           General Appearance: NAD; pleasant  HEENT: PERRLA, conjuctiva normal; mucous membranes moist; face symmetrical  Neck:  Supple  Lungs: clear bilaterally, normal respiratory effort, no retractions, expiratory effort normal, on room air  CV: regular rate and rhythm, no murmurs rubs or gallops noted   ABD: soft non tender, +BS x4  EXT: Lt AKA  Skin: normal turgor, normal texture, no rash  Psych: affect normal, mood normal  Neuro: AAOx3. Expressive aphasia.     The above physical exam was reviewed and updated as determined by my evaluation of the patient on 7/29/2024.    Invasive Devices       None                      VTE Pharmacologic Prophylaxis: Xarelto  Code Status: Level 1 - Full Code  Current Length of Stay: 23 day(s)    Total floor / unit time spent today  35 minutes   Coordination of patient's care was performed in conjunction with consulting services. Time invested included review of patient's labs, vitals, and management of their comorbidities with continued monitoring, examination of patient as well as answering patient questions, documenting her findings and creating progress  note in electronic medical record,  ordering appropriate diagnostic testing.       ** Please Note:  voice to text software may have been used in the creation of this document. Although proof errors in transcription or interpretation are a potential of such software**

## 2024-07-29 NOTE — PROGRESS NOTES
07/29/24 1230   Pain Assessment   Pain Assessment Tool 0-10   Pain Score No Pain   Restrictions/Precautions   Precautions Bed/chair alarms;Cognitive;Fall Risk;Aphasia;Supervision on toilet/commode   LLE Weight Bearing Per Order NWB   ROM Restrictions No   Cognition   Overall Cognitive Status Impaired   Arousal/Participation Alert;Cooperative   Subjective   Subjective Pt with no new complaints, agreeable to PT   Roll Left and Right   Type of Assistance Needed Supervision   Physical Assistance Level No physical assistance   Roll Left and Right CARE Score 4   Sit to Lying   Type of Assistance Needed Supervision   Physical Assistance Level No physical assistance   Sit to Lying CARE Score 4   Lying to Sitting on Side of Bed   Type of Assistance Needed Supervision   Physical Assistance Level No physical assistance   Lying to Sitting on Side of Bed CARE Score 4   Sit to Stand   Type of Assistance Needed Incidental touching;Supervision   Physical Assistance Level No physical assistance   Sit to Stand CARE Score 4   Bed-Chair Transfer   Type of Assistance Needed Supervision;Incidental touching   Physical Assistance Level No physical assistance   Chair/Bed-to-Chair Transfer CARE Score 4   Ambulation   Does the patient walk? 0. No, and walking goal is not clinically indicated.   Wheel 50 Feet with Two Turns   Type of Assistance Needed Independent   Physical Assistance Level No physical assistance   Wheel 50 Feet with Two Turns CARE Score 6   Wheel 150 Feet   Type of Assistance Needed Independent   Physical Assistance Level No physical assistance   Wheel 150 Feet CARE Score 6   Wheelchair mobility   Does the patient use a wheelchair? 1. Yes   Type of Wheelchair Used 1. Manual   Method Right upper extremity;Left upper extremity   Assistance Provided For Remove armrests;Replace armrests   Distance Level Surface (feet) 150 ft   Therapeutic Interventions   Strengthening Mat program: pronelying x 5 min, prone on elbows x 5 min,  prone press ups 10x, prone L hip extension 3 x 20 reps, sidelying L hip extension, 3 x 20 reps, attempted sidelying L hip abduction but pt having difficulty with form, R single leg bridges 3 x 10 reps   Neuromuscular Re-Education Core exercises performed seated edge of bed with bed elevated so RLE did not touch floor: core rotation with 10# dumbell, chops with 10# dumbell, chest press with 5# dowel, rotation with 5# dowel, ball toss outside DOUGLAS   Other sit>stands from w/c standing in front of windowsill to look outside 10x total   Equipment Use   NuStep L2 x 10 min, RLE and BUE   Other Comments   Comments Played blues music on tablet during session, as he reports this is his favorite type of music, to increase pt participation and salience   Assessment   Treatment Assessment Pt participated in 90 min skilled PT session focusing on transfers, LE strengthening and core strengthening. He is doing well with sit pivot transfers and can perform mostly at CS level; he does participate in setting up w/c such as lining it up correctly but he forgot to flip the armrest back however it was still a safe set-up and he cleared his bottom successfully. He had most difficulty with sidelying hip abduction and kept performing flexion instead despite tactile and verbal cueing. He demonstrated good participation t/o session. [facility] he likes to be outside and was enjoying the view. Overall doing well with his transfers and w/c mobility; awaiting D/C plan. He requires continued skilled PT to maximize independence and safety with all functional mobility.   Barriers to Discharge Inaccessible home environment;Decreased caregiver support   PT Barriers   Physical Impairment Decreased strength;Decreased range of motion;Decreased endurance;Impaired balance;Decreased mobility;Decreased coordination;Decreased cognition;Impaired judgement;Decreased safety awareness;Decreased skin integrity;Orthopedic  restrictions   Functional Limitation Car transfers;Ramp negotiation;Standing;Transfers;Wheelchair management   Plan   Treatment/Interventions Functional transfer training;LE strengthening/ROM;Therapeutic exercise;Endurance training;Patient/family training;Equipment eval/education;Bed mobility   Progress Progressing toward goals   Discharge Recommendation   Equipment Recommended Wheelchair   PT Therapy Minutes   PT Time In 1230   PT Time Out 1400   PT Total Time (minutes) 90   PT Mode of treatment - Individual (minutes) 90   PT Mode of treatment - Concurrent (minutes) 0   PT Mode of treatment - Group (minutes) 0   PT Mode of treatment - Co-treat (minutes) 0   PT Mode of Treatment - Total time(minutes) 90 minutes   PT Cumulative Minutes 1940   Therapy Time missed   Time missed? No

## 2024-07-29 NOTE — ASSESSMENT & PLAN NOTE
Pt reports a history of migraines.  It is associated with photophobia.  He is unable to take triptans due to a history of stroke.  Given [facility] 7/21/24.

## 2024-07-29 NOTE — PROGRESS NOTES
Physical Medicine and Rehabilitation Progress Note  Sunil Patel 63 y.o. male MRN: [id number]  Unit/Bed#: [facility] 451-01 Encounter: [id number]    To Review: Sunil Patel is a 63 y.o. male who  has a past medical history of Acute lower limb ischemia (06/08/2024), Anxiety, Depression, HIV disease (HCC), Substance abuse (HCC), and Suicide attempt (HCC). who presented to the [facility] on 6/7/24 from skilled nursing for increased LLE swelling and pain and was found to have a left external iliac artery occlusion and acute limb ischemia. 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 Patient had TTE on 6/10/24 showing LV apex thrombus. On 6/11/24 patient had pharmacologic nuclear stress test/SPECT scan which did not show any significant areas of ischemia with EF 40-45% and recommended continuing A/C for LV apical thrombus. His course was complicated by b/l buttock unstageable pressure ulcers, urinary and fecal incontinence, pain, and significant decline in ADLs and mobility. Patient has been continued on Xarelto for anticoagulation, as well as ASA and statin. Patient has a history of TBI, with baseline nonfluent aphasia and forgetfulness. He was evaluated by neuropsychology on 6/27/24, and deemed to not have capacity to make fully informed medical decisions. Therefore, the guardianship process was initiated as of 7/5/24. He was admitted to the [facility] on 7/6.     Chief Complaint: did not sleep well and headache    Interval History/Subjective:  No acute overnight events. Nursing reports that patient is not incontinent of urine, but rather did not ring the call bell for assistance overnight so he wet self. Nurse reports that sacral wounds are clean and dry as patient is amenable to keeping condom cath on. He is  "currently wearing this. Also, patient states he did not sleep well last night, but was unable to explain further details about why. He reports an ongoing headache, for which Tylenol does not really help with. He continues to express frustration around his living/financial situation and inability to go outside/smoke. Otherwise, he denies any fevers, chills, chest pain, sob, abdominal pain, nausea or vomiting.     ROS:  10 point review of systems is otherwise negative except for what is noted above.    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 Underwent L femoral thrombectomy and AKA with vascular surgery   - [facility] sx follow-up 7/10 - L AKA incision site well-healed, staples taken out at the bedside this morning  - [facility] will be vendor - Patient now has .  - Monitor for hip flexion/abduction tightness. Stretches in therapy  - On Rivaroxaban with hx of LV thrombus and PAOD   - PT/OT/SLP (to work on carry over strategies) 3-5 hours/day, 5-7 days/week.    - Goals are Ind-Sup at a wheelchair level.   - Outpatient f/u with [facility] and [facility]  - Continue patient training with  placement  - Continue gabapentin - doesn't do too much for phantom limb sensation, but that doesn't bother him or cause him pain currently.   - Patient still frustrated given circumstances; will continue gabapentin for anxiety    Neurocognitive disorder  Assessment & Plan  Has been appropriate and cooperative throughout this stay without any behavioral issues on the rehab unit to date.   Hx of TBI and L MCA CVA, psychiatric d/o, seizure d/o  - Neuropsych assessment 6/27/24 - \"diffuse cognitive dysfunction and on a measure assessing awareness of personal health status and ability " "to evaluate health problems, handle medical emergencies and take safety precautions, patient performed in the IMPAIRED range of functioning. During this encounter, patient does not appear to have capacity to make fully informed medical decisions.\"  MMSE 4/28 - severely impaired  - Guardianship process initiated on acute - follow-up by CM/Admin  - Status: some expressive and possible some receptive aphasia.  He can be difficult to follow at times likely due to a mixture of aphasia, tangentiality, mild lability, and impaired memory; lability with hx of possible bipolar d/o  - Neuropsych Med review: Depakote, Lamictal, Remeron, Zoloft, Melatonin, gabapentin, PRN oxy 2.5mg TID   - Monitor neuro-exam, wakefulness, mood, cognition, insight into deficits and safety awareness   - Monitor and ensure optimal management electrolytes, nutrition, and hydration  - Monitor for signs or symptoms of infection, medication intolerances, other systemic etiologies  - Additional labs, imaging, specialist follow-up as needed per primary team currently   - Overstimulation precautions, frequent re-orientation, re-direction, re-assurance  - Optimal mood, pain, and sleep management  - If impaired sleep or behavior recommend sleep log and agitation monitoring    - Limit sedating medications when possible  - Fall precautions - if needed increase rounding or consider virtual sitter or in-person sitter  - For routine restlessness, anxiety, irritability focus on non-pharmacologic management    - Hold benzo's with increased risk paradoxical reaction and possibility of limiting cognitive recovery  - Continue SLP and interdisciplinary care  - OP neuro and PCP   7/23- impaired attention after his first shower in the unit. Consistent with cognitive fatigue. Also increased frustration given his housing circumstances. Could benefit from more outdoor activities  7/25- Increased frustration today as well. Ongoing discussions with CM/team about potential " outings outside to change scenery and help with emotional fatigue  7/26- Mood is more improved today. Will discuss with staff to get patient outdoor privileges with his friend that is visiting later today  7/29- Patient has increased frustration today. Discussed that we are having ongoing discussions with CM and other facilities for dispo planning    Adjustment disorder with mixed anxiety and depressed mood  Assessment & Plan  - Related to recent release from incarceration, new AKA and uncertainty of future disposition, all in the setting of impaired cognition related to prior strokes and TBI  - Behavioral techniques for symptom management  - Neuropsychology consult as available  - Given his circumstances, increased frustration is expected. Can consider Psych consult if symptoms continue to worsen to adjust mood stabilizing medications. Will try nonpharmacologic approaches first with more frequent conversations/redirections   - Continue gabapentin 100 q8 for now    Pressure injury of buttock, stage 3 ([facility])  Assessment & Plan  Stable   - Wound care consulted and following weekly  Per wound care specialist 7/15  - POA L buttock pressure injury unstageable has healed.  - POA R buttock pressure injury is now Stage 3, and improving.   - Cleanse sacro-buttocks with soap and water. Apply Triad Paste to Sacro-Buttocks Wound Beds Only. Apply Hydraguard to Elsi-wounds and all other intact aspects of sacro-buttocks. Apply both creams TID and PRN episodes of incontinence.    - Recommend ROHO cushion in chair when out of bed instead   - Preventative hydraguard to bilateral heels BID and PRN.   - P500  - Monitor clinically for breakdown, frequent turns  - 7/23: wound appears well healing with no signs of active infection. Continue local wound care. Continue lateral leans in therapy as tolerated  -7/29: R buttock pressure injury is improving. Continue local wound care    Patient incapable of making informed decisions  Assessment &  "Plan  - History of remote TBI and prior strokes with comorbid psychiatric history.  - Evaluated by neuropsychology, Dr. Dilshad Tatum, PhD on 6/27/24, found to have \"diffuse cognitive dysfunction and on a measure assessing awareness of personal health status and ability to evaluate health problems, handle medical emergencies and take safety precautions, patient performed in the IMPAIRED range of functioning. During this encounter, patient does not appear to have capacity to make fully informed medical decisions.\"  - Court-appointed guardianship process has been initiated by acute care CM Katia Kay.    - We have identified two friends who are interested in being patient's guardians and have been involved and invested in patient's care on the ARC.   - They will need to be interviewed by the  involved in this process.    - He has to undergo his hearing on 8/6/2024 first.    - He would ultimately benefit from [facility]/nursing home/memory care unit - he does very well with structure and supervision.  Teams 7/23- Ongoing discussions with case management and social work to dispo patient to stable long-term housing.   7/25- CM still awaiting confirmation from acute for discharge planning. Likely will not be discharging in the next 24-48 hours  7/26- Guardianship meeting rescheduled to 8/27    Urinary incontinence  Assessment & Plan  - Did have some incontinence on acute - improved with timed voids and consistent assistance with his urinal  - Described as urgency  - Marked improvement on timed voids.   - monitor for retention, incontinence (including overflow incontinence), signs/symptoms of UTI  - Timed Voids and PVRs Q4hrs initiated earlier. And PVR <150 x3, so scans discontinued.    - He has some difficulty managing the urinal    - needs assistance but is able to transfer to [facility]    - Still uses condom catheter at night, in part for continence care/to protect his skin given his buttock wounds.  - Continue timed " voids           At risk for constipation  Assessment & Plan  - Stooling adequately recently; did have some incontinence on acute now improved  - Close continent care given buttock wounds  - Last BM 7/28 and continent  - Colace 100mg BID  - PRN miralax, Senna and suppository    Acute pain  Assessment & Plan  Controlled   - Tylenol 975 mg q8h PRN  - Oxycodone 2.5 mg q8h PRN, wean as possible   - Last used 7/19.    - Can discontinue at discharge.  - Resumed Gabapentin 100mg Q8hr due to increased headaches and irritability since discontinuing.    Impaired mobility and activities of daily living  Assessment & Plan  - Rehabilitation medicine physician for daily monitoring of care, 24 hour availability for acute medical issues, medication management, and therapeutic and diagnostic assessments.  - 24 hour rehabilitation nursing 7 days per week for: management/teaching of medications, bowel/bladder routine, skin care.  - PT, OT for 2-3 hours per day, 5 to 6 days per week; 15 hours per week  - Rehabilitation Psychology as needed for adjustment and coping  - MSW for barriers to discharge, community resources, and family support  - Discharge planning following to help ensure a safe and efficient discharge  -7/23 teams: Biggest ADL barriers per OT are toileting and applying  to LLE        Traumatic brain injury (HCC)  Assessment & Plan  See neurocog impairment     Episodic headache  Assessment & Plan  Seemed to worsen with increased irritability after discontinuing gabapentin  Resumed gabapentin  Received nurtec earlier this week.   - 7/25: improvement in headache, now 5/10 in severity. Continue to monitor  7/26- Patient's headache is much more improved today. Mood is also improved. Will continue on gabapentin dosing for now  7/29- Headache is constant, but fluctuates in severity on different days. May be related to poor sleep quality as well. Will continue to monitor and adjust medications if needed    Cardiomyopathy  (HCC)  Assessment & Plan  ECHO on 6/10/2024 showed LVEF 40-45% with mild global hypokinesis   Status post NM stress test on 6/11/2024 which showed: a large, mild, fixed defect in the inferior wall, possibly due to diaphragmatic attenuation artifact, there is a small area of partial reversibility in the inferior apical wall suggestive of ischemia    Elevated by cardiology, etiology felt to be possibly secondary to stress-induced cardiomyopathy, with apical thrombus  Cardiac catheterization deferred given the lack of any significant ischemia, and no current cardiac symptoms  Continue with medical management with aspirin, statin, beta-blocker, ARB  Monitor volume status, remains euvolemic off of diuretics   Outpatient follow-up with cardiology    At risk for venous thromboembolism (VTE)  Assessment & Plan  - On rivaroxaban    Carnitine deficiency (HCC)  Assessment & Plan  - Carnitine replacement  - Appreciate medicine management      History of seizures  Assessment & Plan  - Depakote ER, lamotrigine, zonisamide  - Outpatient follow-up with [facility] neurology    Left ventricular thrombus  Assessment & Plan  - Visualized on echocardiogram on 6/10/24: spherical 1.5 x 1.3 cm thrombus at the LV apex.   - Rivaroxaban   - $700.   - Price checking eliquis and pradaxa   - May need to transition to coumadin pending cost   - Management as per IM  - 7/26: IM/ is following up with Eliquis pricing. Will find out later this afternoon. If too expensive, we will have to transition to coumadin  - 7/29: no update on eliquis pricing, but anticipate high costs due to no prescription insurance. Will transition pt to coumadin per IM  - Outpatient follow-up with cardiology    Benign essential hypertension  Assessment & Plan  - Toprol, losartan  - Appreciate medicine management    History of stroke  Assessment & Plan  - History of old left temporal and parietal lobe cortical infarcts, also involving the insula and left occipital lobe as well as  small right posterior parietal lobe. Stable on most recent CT head on 5/16/24.   - ASA, and statin for secondary prevention  - Optimal BP control  - Monitor neuro exam - hx of LV thrombus   - transition to coumadin 7/29  - OP neuro follow-up     Human immunodeficiency virus (HIV) infection (HCC)  Assessment & Plan  - Continue anti-retroviral treatment  - Appreciate medicine management during ARC course  - OP ID follow-up       Bipolar affective disorder (HCC)  Assessment & Plan  Mood acceptable; continue meds as outlined   Supportive counseling  NeuroPsychology consult while in ARC if available for support  Counseled on and continue to encourage deep breathing/relaxation/behavioral management techniques  - Mirtazapine 15 mg qHs  - Will consult Psych before increasing Sertraline for better mood regulation  - Lamictal 50mg BID  - Depakote ER 1250mg qday   - Outpatient psychiatry follow-up        Health Maintenance  #Delirium/Sleep: Optimize sleep/wake cycle and maintain delirium precautions. + Remeron 15 mg  and melatonin 6 mg  #Pain: Tylenol PRN   #Bowel: Continent. Last BM 7/28. On Colace BID w/ Senna,Miralax,Dulcolax PRN  #Bladder: Voiding and continent. Condom cath during night.  #Skin/Pressure Injury Prevention: Turn Q2hr in bed, with weight shifts Q16-78aoo in wheelchair.  #DVT Prophylaxis: Xarelto  #GI Prophylaxis: not indicated  #Code Status: Full code  #FEN: Cardiac diet + Ensure at bfast/dinner  #Dispo: [facility] pending confirmation from [facility] hospital/. Guardianship meeting now set for 8/27      Objective:    Functional Update:   PT: sup bed mobility, CGA-CS for transfers, ind wheelchair mobility   OT: ind eating, setup oral hygiene, sup bathing, sup UBD, mod-maxA LBD, min-modA footwear, sup toileting, sup toilet transfer  SLP: modA comprehension/expression/memory, maxA prolbem solving, Sup social interaction    Allergies per EMR    Physical Exam:  Temp:  [97.3 °F (36.3 °C)-98 °F (36.7 °C)] 97.3 °F (36.3  °C)  HR:  [82-84] 84  Resp:  [18-19] 19  BP: (112-115)/(65-82) 112/65  Oxygen Therapy  SpO2: 93 %    Physical Exam  Vitals reviewed.   Constitutional:       Appearance: Normal appearance.   HENT:      Head: Normocephalic and atraumatic.      Right Ear: External ear normal.      Left Ear: External ear normal.      Nose: Nose normal.      Mouth/Throat:      Mouth: Mucous membranes are moist.      Pharynx: Oropharynx is clear.   Eyes:      Conjunctiva/sclera: Conjunctivae normal.   Cardiovascular:      Rate and Rhythm: Normal rate and regular rhythm.      Pulses: Normal pulses.      Heart sounds: Normal heart sounds.   Pulmonary:      Effort: Pulmonary effort is normal.      Breath sounds: Normal breath sounds.   Abdominal:      General: Bowel sounds are normal. There is no distension.      Palpations: Abdomen is soft.   Musculoskeletal:      Comments: + L AKA   Skin:     General: Skin is warm and dry.      Capillary Refill: Capillary refill takes less than 2 seconds.      Comments: + well healing right buttock wound with epithelization and granular tissue present   Neurological:      Mental Status: He is alert.   Psychiatric:      Comments: Appears more tired and frustrated           Diagnostic Studies: reviewed, no new imaging    Laboratory:    Reviewed, INR 1.12  Results from last 7 days   Lab Units 07/25/24  0518   HEMOGLOBIN g/dL 10.5*   HEMATOCRIT % 36.0*   WBC Thousand/uL 6.34     Results from last 7 days   Lab Units 07/25/24  0518   BUN mg/dL 20   POTASSIUM mmol/L 4.0   CHLORIDE mmol/L 105   CREATININE mg/dL 0.85     Results from last 7 days   Lab Units 07/29/24  1102   PROTIME seconds 14.3   INR  1.12        Patient Active Problem List   Diagnosis    Bipolar affective disorder (HCC)    Substance abuse (HCC)    Human immunodeficiency virus (HIV) infection (HCC)    History of stroke    Benign essential hypertension    Positive laboratory testing for human immunodeficiency virus (HCC)    Hypertension     Unspecified vitamin D deficiency    Tobacco abuse    Cerebrovascular accident (CVA) (HCC)    Left ventricular thrombus    History of seizures    Carnitine deficiency (HCC)    Above-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)    Traumatic brain injury (HCC)    Impaired mobility and activities of daily living    At risk for venous thromboembolism (VTE)    Acute pain    At risk for constipation    Urinary incontinence    Patient incapable of making informed decisions    Pressure injury of buttock, stage 3 (HCC)    Adjustment disorder with mixed anxiety and depressed mood    Neurocognitive disorder    Cardiomyopathy (HCC)    Episodic headache         Medications  Current Facility-Administered Medications   Medication Dose Route Frequency Provider Last Rate    acetaminophen  975 mg Oral TID PRN José Salcido MD      aspirin  81 mg Oral Daily Lorrie Barillas PA-C      atorvastatin  80 mg Oral Daily With Dinner Lorrie Barillas PA-C      bictegravir-emtricitab-tenofovir alafenamide  1 tablet Oral Daily With Breakfast Lorrie Barillas PA-C      bisacodyl  10 mg Rectal Daily PRN Lorrie Barillas PA-C      diphenhydrAMINE  25 mg Oral Q6H PRN Lorrie Barillas PA-C      divalproex sodium  1,250 mg Oral Daily Lorrie Barillas PA-C      docusate sodium  100 mg Oral BID Ashley Depadua, MD      dolutegravir  50 mg Oral Daily Lorrie Barillas PA-C      gabapentin  100 mg Oral Q8H Ashley Depadua, MD      lamoTRIgine  50 mg Oral BID Lorrie Barillas PA-C      levOCARNitine  1,000 mg/day Oral TID With Meals Lorrie Barillas PA-C      losartan  50 mg Oral Daily Lorrie Barillas PA-C      melatonin  6 mg Oral HS Lorrie Barillas PA-C      metoprolol succinate  25 mg Oral Daily CHARLIE Mcclelland      mirtazapine  15 mg Oral HS Lorrie Barillas PA-C      ondansetron  4 mg Oral Q6H PRN Lorrie Barillas PA-C      oxyCODONE  2.5 mg Oral Q8H PRN Raimundo Riley MD       polyethylene glycol  17 g Oral Daily PRN Lorrie Barillas PA-C      senna  1 tablet Oral HS PRN Lorrie Barillas PA-C      sertraline  75 mg Oral Daily Lorrie Barillas PA-C      tamsulosin  0.4 mg Oral Daily With Dinner Lorrie Barillas PA-C      warfarin  5 mg Oral Daily (warfarin) Lorrie Barillas PA-C      zonisamide  400 mg Oral Daily Lorrie Barillas PA-C            ** Please Note: Fluency Direct voice to text software may have been used in the creation of this document. **

## 2024-07-29 NOTE — ASSESSMENT & PLAN NOTE
Noted on echocardiogram 6/10/2024: spherical 1.5 x 1.3 cm thrombus at the LV apex   Initiated on AC with Xarelto, continue  Rx sent to [employer] for price check on 7/10/24 - CM spoke with [employer] and pt has rx coverage. Information sent to [employer]. [employer] ran the insurance and it is not active. CM to call the pharmacy pt used most recently to see if they have different information.    If unable to obtain coverage will need to bridge pt to Warfarin

## 2024-07-29 NOTE — PROGRESS NOTES
"   07/29/24 1110   Pain Assessment   Pain Assessment Tool 0-10   Pain Score No Pain   Restrictions/Precautions   Precautions Aphasia;Bed/chair alarms;Cognitive;Fall Risk;Supervision on toilet/commode   Comprehension   Comprehension (FIM) 3 - Understands basic info/conversation 50-74% of time   Expression   Expression (FIM) 3 - Expresses basic info/needs 50-74% of time   Social Interaction   Social Interaction (FIM) 5 - Interacts appropriately with others 90% of time   Problem Solving   Problem solving (FIM) 3 - Solves basic problmes 50-74% of time   Memory   Memory (FIM) 3 - Recognizes, recalls/performs 50-74%   Speech/Language/Cognition Assessment   Treatment Assessment Pt participated in skilled SLP session focusing on language and communication skills. Pt eager to participate to begin session. Pt promptly took out his device tablet to begin and showed SLP that he had been working on activities from previous sessions. However, pt with concerns about similar tasks being repeated. SLP explained that his is a \"lite\" or \"trial\" version of the alejandra as the full versions cost money. Pt is not yet interested in paying for content at this time, therefore, will continue to search for apps to target language and communication which are free of charge. SLP then set pt up with another alejandra, Constant [employer], by also selecting different types of activities/tasks which will focus on word retrieval, reading comprehension, auditory comprehension, writing, memory, and visuospatial skills. Pt began to engage in alejandra tasks, requiring assist from SLP to improve understanding of some of the directions provided. Pt provided feedback/reflection on some of the tasks, stating that some were easier than others, which was accurate based on his performance with them. Utilizing his tablet device, pt continues to be able to unlock and navigate tablet for general use, however, required verbal cues for specific location of apps. Additionally in " session, pt was educated on use of predictive text function in a communication alejandra which focuses on expression. SLP provided examples of how to complete but will likely benefit from additional education/training in upcoming sessions and hands on practice.  Recommending short term follow up skilled SLP services to continue to support pt in building a more functional communication modality to improve effectiveness of communication and to ease frustrations with communication attempts.   SLP Therapy Minutes   SLP Time In 1110   SLP Time Out 1135   SLP Total Time (minutes) 25   SLP Mode of treatment - Individual (minutes) 25   SLP Mode of treatment - Concurrent (minutes) 0   SLP Mode of treatment - Group (minutes) 0   SLP Mode of treatment - Co-treat (minutes) 0   SLP Mode of Treatment - Total time(minutes) 25 minutes   SLP Cumulative Minutes 250   Therapy Time missed   Time missed? No

## 2024-07-29 NOTE — ASSESSMENT & PLAN NOTE
Presented with left lower extremity acute limb ischemia/extensive left iliofemoral and left infrainguinal embolism requiring left femoral thrombectomy and subsequent AKA on 6/7/24 with vascular surgery.  Incision C/D/I, pain controlled.   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 CM to investigate what is covered.  Rehab and pain control per PMR  Pt has been refusing therapy through the weekend.  Pt has met his rehab goals and will be discharged when able.

## 2024-07-29 NOTE — PROGRESS NOTES
"   07/29/24 0920   Pain Assessment   Pain Assessment Tool 0-10   Pain Score No Pain   Restrictions/Precautions   Precautions Bed/chair alarms;Cognitive;Fall Risk;Aphasia;Supervision on toilet/commode  (skin integrity)   LLE Weight Bearing Per Order NWB   ROM Restrictions No   Braces or Orthoses   (L AKA )   Lifestyle   Autonomy \"If you say so then we will try\"   Oral Hygiene   Type of Assistance Needed Set-up / clean-up   Comment seated at sink   Oral Hygiene CARE Score 5   Grooming   Findings Sup for shaving while seated at sink   Shower/Bathe Self   Type of Assistance Needed Supervision   Comment LB SB bed level, cues for thoroughness of buttocks and to remind pt to wash all LB parts. Seated at sink w/ sup and occasional cues pt washed UB.   Shower/Bathe Self CARE Score 4   Bathing   Assessed Bath Style Sponge Bath   Findings  Pt preferred SB this date vs shower   Upper Body Dressing   Type of Assistance Needed Supervision   Comment pt ulitmately required cue for orientation of T shirt   Upper Body Dressing CARE Score 4   Lower Body Dressing   Type of Assistance Needed Physical assistance   Physical Assistance Level 51%-75%   Comment threaded undwear and scrub pants to thigh height seated on BSC w/ partial assist. part of CM over hips via WS, remainder or CM pt completes partial stance using BSC rails while OT finishes CM   Lower Body Dressing CARE Score 2   Putting On/Taking Off Footwear   Type of Assistance Needed Physical assistance   Physical Assistance Level 26%-50%   Comment modA w/ donning sock and sneaker EOB   Putting On/Taking Off Footwear CARE Score 3   Lying to Sitting on Side of Bed   Type of Assistance Needed Supervision   Comment HOB flat pt required multiple attempts   Lying to Sitting on Side of Bed CARE Score 4   Sit to Stand   Type of Assistance Needed Supervision   Comment to GB   Sit to Stand CARE Score 4   Bed-Chair Transfer   Type of Assistance Needed Supervision;Incidental touching "   Comment sup-CGA sit pivot   Chair/Bed-to-Chair Transfer CARE Score 4   Toileting Hygiene   Type of Assistance Needed Physical assistance   Physical Assistance Level 51%-75%   Comment seated for hygiene  w/ Min following BM for thoroughness at buttocks. part of CM over hips via WS, remainder or CM pt completes partial stance using BSC rails while OT finishes CM   Toileting Hygiene CARE Score 2   Toilet Transfer   Type of Assistance Needed Supervision   Comment CS sit pivot, requires cues for safety as pt would complete stand pivot w/o the necessary cues to instead sit pivot   Toilet Transfer CARE Score 4   Transfers   Additional Comments sup WC mobility from room<>9th floor therapy gym   Exercise Tools   Exercise Tools Yes   UE Ergometer table top ergometer 3min prograde, 3min retrograde. Pt tolerated well. COmpleted to inc BUE strength and endurance for inc IND w/ ADLs and transfers. Monitored via spot check HR 82-86bpm and SpO2 95-97%, pt w/ very mild SOB following retrograde set. Overall toelrated well.   Other Exercise Tool 1 Seated in WC holding 3# weighted ball  at chest pt comletes forward flx and trials fxnl reach to shin, then returns to starting position. Completed 3x10 w/ cues to stop at end of each set. Pt fatigued following sets requiring brief rest break. Completed to inc core strength and trunk control for inc IND w/ ADLs and transfers.   Cognition   Overall Cognitive Status Impaired   Arousal/Participation Alert;Cooperative   Activity Tolerance   Activity Tolerance Patient tolerated treatment well   Assessment   Treatment Assessment OT session focusing on ADL retraining, fxnl transfers, core strength, UE TE. Pt agreeable to session and cooperative t/o. He continues to require hands on assist in order to maintain his safety, but w/ ongoing training using modifed techniques he has the potential to progress to largely CS level.  Continue to rec sup at DC 2' baseline cog deficits including aphasia.    Prognosis Fair   Problem List Decreased strength;Decreased range of motion;Decreased endurance;Impaired balance;Decreased mobility;Decreased coordination;Decreased cognition;Impaired judgement;Decreased safety awareness;Decreased skin integrity;Orthopedic restrictions;Pain   Plan   Treatment/Interventions ADL retraining;Functional transfer training;Therapeutic exercise;Endurance training;Cognitive reorientation;Patient/family training;Equipment eval/education;Compensatory technique education;Continued evaluation   Progress Progressing toward goals   OT Therapy Minutes   OT Time In 0920   OT Time Out 1050   OT Total Time (minutes) 90   OT Mode of treatment - Individual (minutes) 90   OT Mode of treatment - Concurrent (minutes) 0   OT Mode of treatment - Group (minutes) 0   OT Mode of treatment - Co-treat (minutes) 0   OT Mode of Treatment - Total time(minutes) 90 minutes   OT Cumulative Minutes 1679   Therapy Time missed   Time missed? No

## 2024-07-30 LAB
INR PPP: 1.07 (ref 0.84–1.19)
PROTHROMBIN TIME: 13.8 SECONDS (ref 11.6–14.5)

## 2024-07-30 PROCEDURE — 99232 SBSQ HOSP IP/OBS MODERATE 35: CPT

## 2024-07-30 PROCEDURE — 99232 SBSQ HOSP IP/OBS MODERATE 35: CPT | Performed by: NURSE PRACTITIONER

## 2024-07-30 PROCEDURE — 99233 SBSQ HOSP IP/OBS HIGH 50: CPT | Performed by: PHYSICAL MEDICINE & REHABILITATION

## 2024-07-30 PROCEDURE — 97110 THERAPEUTIC EXERCISES: CPT

## 2024-07-30 PROCEDURE — 97535 SELF CARE MNGMENT TRAINING: CPT

## 2024-07-30 PROCEDURE — 97530 THERAPEUTIC ACTIVITIES: CPT

## 2024-07-30 PROCEDURE — 92507 TX SP LANG VOICE COMM INDIV: CPT

## 2024-07-30 PROCEDURE — 85610 PROTHROMBIN TIME: CPT | Performed by: PHYSICIAN ASSISTANT

## 2024-07-30 RX ORDER — ENOXAPARIN SODIUM 100 MG/ML
1 INJECTION SUBCUTANEOUS
Status: DISCONTINUED | OUTPATIENT
Start: 2024-07-30 | End: 2024-08-05

## 2024-07-30 RX ADMIN — DOLUTEGRAVIR SODIUM 50 MG: 50 TABLET, FILM COATED ORAL at 08:18

## 2024-07-30 RX ADMIN — ATORVASTATIN CALCIUM 80 MG: 80 TABLET, FILM COATED ORAL at 15:39

## 2024-07-30 RX ADMIN — GABAPENTIN 100 MG: 100 CAPSULE ORAL at 22:19

## 2024-07-30 RX ADMIN — BICTEGRAVIR SODIUM, EMTRICITABINE, AND TENOFOVIR ALAFENAMIDE FUMARATE 1 TABLET: 50; 200; 25 TABLET ORAL at 08:18

## 2024-07-30 RX ADMIN — WARFARIN SODIUM 5 MG: 5 TABLET ORAL at 17:29

## 2024-07-30 RX ADMIN — MIRTAZAPINE 15 MG: 15 TABLET, FILM COATED ORAL at 22:19

## 2024-07-30 RX ADMIN — ENOXAPARIN SODIUM 80 MG: 80 INJECTION SUBCUTANEOUS at 22:19

## 2024-07-30 RX ADMIN — LOSARTAN POTASSIUM 50 MG: 50 TABLET, FILM COATED ORAL at 08:19

## 2024-07-30 RX ADMIN — LAMOTRIGINE 50 MG: 25 TABLET ORAL at 08:18

## 2024-07-30 RX ADMIN — ZONISAMIDE 400 MG: 100 CAPSULE ORAL at 08:18

## 2024-07-30 RX ADMIN — ASPIRIN 81 MG: 81 TABLET, COATED ORAL at 08:18

## 2024-07-30 RX ADMIN — DOCUSATE SODIUM 100 MG: 100 CAPSULE, LIQUID FILLED ORAL at 17:29

## 2024-07-30 RX ADMIN — DOCUSATE SODIUM 100 MG: 100 CAPSULE, LIQUID FILLED ORAL at 08:19

## 2024-07-30 RX ADMIN — Medication 6 MG: at 22:19

## 2024-07-30 RX ADMIN — LEVOCARNITINE 330 MG: 1 SOLUTION ORAL at 17:32

## 2024-07-30 RX ADMIN — METOPROLOL SUCCINATE 25 MG: 25 TABLET, EXTENDED RELEASE ORAL at 08:19

## 2024-07-30 RX ADMIN — GABAPENTIN 100 MG: 100 CAPSULE ORAL at 05:56

## 2024-07-30 RX ADMIN — DIVALPROEX SODIUM 1250 MG: 500 TABLET, EXTENDED RELEASE ORAL at 08:18

## 2024-07-30 RX ADMIN — LAMOTRIGINE 50 MG: 25 TABLET ORAL at 17:29

## 2024-07-30 RX ADMIN — GABAPENTIN 100 MG: 100 CAPSULE ORAL at 13:29

## 2024-07-30 RX ADMIN — SERTRALINE HYDROCHLORIDE 75 MG: 50 TABLET ORAL at 08:19

## 2024-07-30 RX ADMIN — LEVOCARNITINE 330 MG: 1 SOLUTION ORAL at 15:39

## 2024-07-30 RX ADMIN — TAMSULOSIN HYDROCHLORIDE 0.4 MG: 0.4 CAPSULE ORAL at 15:39

## 2024-07-30 RX ADMIN — LEVOCARNITINE 330 MG: 1 SOLUTION ORAL at 08:18

## 2024-07-30 NOTE — PROGRESS NOTES
07/30/24 1400   Pain Assessment   Pain Assessment Tool 0-10   Pain Score No Pain   Restrictions/Precautions   Precautions Bed/chair alarms;Cognitive;Fall Risk;Aphasia;Supervision on toilet/commode   LLE Weight Bearing Per Order NWB   Braces or Orthoses   (L AKA )   Subjective   Subjective pt agreeable to perform skilled PT   Roll Left and Right   Type of Assistance Needed Supervision   Roll Left and Right CARE Score 4   Sit to Lying   Type of Assistance Needed Supervision   Sit to Lying CARE Score 4   Lying to Sitting on Side of Bed   Type of Assistance Needed Supervision   Lying to Sitting on Side of Bed CARE Score 4   Sit to Stand   Type of Assistance Needed Supervision   Comment to urine in standing leaning back of bed   Sit to Stand CARE Score 4   Bed-Chair Transfer   Type of Assistance Needed Supervision   Comment pt perform correct sequence for sit pivot   Chair/Bed-to-Chair Transfer CARE Score 4   Transfer Bed/Chair/Wheelchair   Adaptive Equipment None   Car Transfer   Type of Assistance Needed Supervision   Comment better sequence and needs VC at times to keep WC closer   Car Transfer CARE Score 4   Walk 10 Feet   Reason if not Attempted Safety concerns   Walk 10 Feet CARE Score 88   Walk 50 Feet with Two Turns   Reason if not Attempted Safety concerns   Walk 50 Feet with Two Turns CARE Score 88   Walk 150 Feet   Reason if not Attempted Safety concerns   Walk 150 Feet CARE Score 88   Walking 10 Feet on Uneven Surfaces   Reason if not Attempted Safety concerns   Walking 10 Feet on Uneven Surfaces CARE Score 88   Ambulation   Does the patient walk? 0. No, and walking goal is not clinically indicated.   Wheel 50 Feet with Two Turns   Type of Assistance Needed Independent   Wheel 50 Feet with Two Turns CARE Score 6   Wheel 150 Feet   Type of Assistance Needed Independent   Wheel 150 Feet CARE Score 6   Wheelchair mobility   Does the patient use a wheelchair? 1. Yes   Type of Wheelchair Used 1. Manual    Method Right upper extremity;Left upper extremity   Curb or Single Stair   Reason if not Attempted Safety concerns   1 Step (Curb) CARE Score 88   4 Steps   Reason if not Attempted Safety concerns   4 Steps CARE Score 88   12 Steps   Reason if not Attempted Safety concerns   12 Steps CARE Score 88   Toilet Transfer   Type of Assistance Needed Physical assistance   Physical Assistance Level 51%-75%   Comment in standing leaning back into bed   Toilet Transfer CARE Score 2   Toilet Transfer   Surface Assessed Other   Findings urine bottom   Therapeutic Interventions   Strengthening WC push ups 10 x2 sets with rest   Flexibility prone lying stretch left limb and   Balance manual stretch LE   Neuromuscular Re-Education core strengthening on 9 th floor with 3 # dowel lalitha and ball toss with red and yellow working side to side throwing and overall cores balance in sitting , prone lying 8 min and sidelying for hip extension.   Equipment Use   [place] 3 for 12 min   Assessment   Treatment Assessment pt overall cont working on sequence with WC set up for sit pivot transfers and upper body strengthening and core work. Pt edcauted/instructed to keep Right limb and foot protected , Barriers cont with limited carryover at times with WC sequence keesha locking right side on WC  and limited with cognition for safety awareness keesha in standing to urine in bottom. Pt will cont toward DC goals and cont working on amputee program and core strengthening and with WC sequence.   Barriers to Discharge Inaccessible home environment;Decreased caregiver support   Plan   Progress Progressing toward goals   PT Therapy Minutes   PT Time In 1400   PT Time Out 1523   PT Total Time (minutes) 83   PT Mode of treatment - Individual (minutes) 83   PT Mode of treatment - Concurrent (minutes) 0   PT Mode of treatment - Group (minutes) 0   PT Mode of treatment - Co-treat (minutes) 0   PT Mode of Treatment - Total time(minutes) 83 minutes   PT Cumulative  Minutes 2023   Therapy Time missed   Time missed? No

## 2024-07-30 NOTE — TEAM CONFERENCE
Acute RehabilitationTeam Conference Note  Date: 7/30/2024   Time: 10:37 AM       Patient Name:  Sunil Patel       Medical Record Number: [id number]   YOB: 1960  Sex: Male          Room/Bed:  [place] [address]/[facility] 451-01  Payor Info:  Payor: MEDICARE / Plan: MEDICARE A AND B / Product Type: Medicare A & B Fee for Service /      Admitting Diagnosis: Hx of AKA (above knee amputation) ([facility]) [Z89.619]   Admit Date/Time:  7/6/2024  1:30 PM  Admission Comments: No comment available     Primary Diagnosis:  Above-knee amputation of left lower extremity ([facility])  Principal Problem: Above-knee amputation of left lower extremity ([facility])    Patient Active Problem List    Diagnosis Date Noted    Episodic headache 07/22/2024    Neurocognitive disorder 07/09/2024    Cardiomyopathy ([facility]) 07/09/2024    Adjustment disorder with mixed anxiety and depressed mood 07/08/2024    Impaired mobility and activities of daily living 07/07/2024    At risk for venous thromboembolism (VTE) 07/07/2024    Acute pain 07/07/2024    At risk for constipation 07/07/2024    Urinary incontinence 07/07/2024    Patient incapable of making informed decisions 07/07/2024    Pressure injury of buttock, stage 3 ([facility]) 07/07/2024    Above-knee amputation of left lower extremity ([facility]) 07/06/2024    Traumatic brain injury ([facility]) 07/06/2024    Carnitine deficiency ([facility]) 06/09/2024    Left ventricular thrombus 06/08/2024    History of seizures 06/08/2024    Tobacco abuse 10/26/2019    Cerebrovascular accident (CVA) ([facility]) 10/26/2019    Positive laboratory testing for human immunodeficiency virus ([facility]) 07/03/2017    Bipolar affective disorder ([facility]) 07/02/2017    Hypertension 02/27/2017    Human immunodeficiency virus (HIV) infection ([facility]) 02/16/2017    History of stroke 02/16/2017    Substance abuse ([facility]) 12/21/2013    Unspecified vitamin D deficiency 05/28/2010    Benign essential hypertension 02/25/2010       Physical Therapy:    Weight Bearing Status: Non-weight  bearing (L residual limb s/p AKA)  Transfers: Incidental Touching, Supervision  Bed Mobility: Supervision  Amulation Distance (ft):  (he is able to hop with RW x 20' for strengthening and WB purpose but not a goal secondary to Sound limb preservation goal in preparation for prosthetic training)  Ambulation:  (unsafe at this time)  Assistive Device for Ambulation:  (TBA)  Wheelchair Mobility Distance: 200 ft  Wheelchair Mobility: Independent  Number of Stairs:  (unsafe at this time)  Assistive Device for Stairs:  (unsafe at this time)  Stair Assistance:  (TBA)  Ramp: Supervision  Assistive Device for Ramp: Wheelchair  Discharge Recommendations:  (pending placement while awaiting guardianship decision)    7/15/24  Pt has plateaued functionally for he already met S/CGA goals at w/c level mobilities. Due to baseline cognition, aphasia, impaired safety with STM impairment impacting consistent carry over of new learning so does not anticipate pt to progress beyond S level at this time. Also for the past week pt also demos inconsistent participation with skilled PT interventions. Goals for this week to complete/review Phase 1 AMP education, cont with w/c level transfer training keesha with car transfer, cont with strengthening/flexibility exercises including reviewing HEP packet provided while awaiting placement pending guardianship decision.         Occupational Therapy:  Eating: Independent  Grooming: Supervision  Bathing: Supervision  Bathing: Supervision  Upper Body Dressing: Supervision  Lower Body Dressing: Minimal Assistance  Toileting: Minimal Assistance  Tub/Shower Transfer: Incidental Touching  Toilet Transfer: Supervision  Cognition: Exceptions to WNL  Cognition: Decreased Memory, Decreased Executive Functions, Decreased Attention, Decreased Comprehension, Decreased Safety  Orientation: Person, Place, Time, Situation  Discharge Recommendations: Home with:  DC Home with:: 24 Hour Supervision       7/29/24  Pt  continues to present with impairments in endurance, standing balance/tolerance, memory, insight, safety , and judgement . Additional functional barriers include fatigue, LLE NWB status, poor skin integrity, decreased caregiver support, risk for falls, and home environment. Pt is functioning at overall S for ADLs w/ exception for partial A to don LLE , CS fxnl squat pivot xfers, and DS for w/c mobility. Pt will continue to benefit from skilled OT services to address above mentioned barriers and maximize functional independence in baseline areas of occupation, utilizing the following interventions: ADL retraining, DME/adaptive equipment assessment , functional transfer training, cognitive retraining, activity tolerance/edurance training, UB strengthening, and energy conservation education. No fxnl status changed for ADLs observed since last week. OT D/C recommendation is for 24 hour S due to baseline cognitive deficits.          Speech Therapy:  Mode of Communication: Verbal  Speech/Language: Receptive Aphasia (expressive and receptive aphasia)  Cognition: Exceptions to WNL  Cognition: Decreased Memory, Decreased Executive Functions, Decreased Attention, Decreased Comprehension, Decreased Safety, Impulsive  Orientation: Person, Place, Time, Situation  Discharge Recommendations:  (pending progress)  DC Home with:: 24 Hour Supervision, Family Support, Outpatient Speech Therapy, Home Speech Therapy (continued ST pending dc plan)  Pt is currently being followed for skilled SLP services targeting language therapy. Pt with hx stroke, resulting in expressive and receptive aphasia but noting expressive communication skills to be greater impacted. SLP was consulted to support pt's communication skills for improved ability to express his needs with the team. Pt presenting with overall moderately impaired expressive and receptive language deficits, impacting functional communication skills. Pt has trialed written  communication, however, this was found to be an ineffective communication modality. Pt has also been introduced to a low tech manual communication board, which he has demonstrated ability to utilize in order to convey basic wants/needs more easily and effectively. Currently, pt is functioning at min A for comprehension, problem solving and memory and mod A for expression. Plan this week to continue to attempt to establish a more functional mode of communication which is both effective and easy for pt to utilize to support his communication attempts with staff. Recommending brief follow up skilled SLP services to continue to support pt in building a more functional communication modality to improve effectiveness of communication and to ease frustrations with communication attempts.     Update week of 7/22/2024: Pt continues to be seen for skilled SLP services focusing on language and communication skills with primary reason for consultation to support communication needs at this time. Pt remains with deficits in expressive and receptive language skills,which is pt's baseline prior to admission to this unit, but which impact all domains of language (auditory & reading comprehension; verbal & written expression). This week, sessions have focused on introducing a high tech communication device as per pt's request. Pt's friend provided a tablet and recent session has focused on exploring different communication apps which fit pt's needs (function, cost, etc), as well as apps which pt can then complete language tasks in his free time. Pt is demonstrating desire to utilize device, but will benefit from additional training and practice with device, especially due to continued deficits in comprehension, ST memory/recall, problem solving and attention. This week, plan to continue to focus on determining appropriate apps for language drills/activities, as well as identifying an appropriate alejandra to support expressive  language/communication, while also programming alejandra for pt's specific needs. Currently, pt is functioning at mod A for expression, comprehension, problem solving and memory and supervision for social interaction. Recommending short term follow up of skilled SLP services to continue to support pt in building a more functional communication modality to improve effectiveness of communication and to ease frustrations with communication attempts during acute rehab stay. Will also benefit from engaging pt's friend in education/training with device.     Update week of 7/29/2024: Pt continues to be followed for language and communication therapy where he is making slow progress, however, ST primarily being implemented to support current functional language as pt with baseline aphasia. Pt is limited by deficits in expressive and receptive language skills, to include verbal expression, written expression, auditory comprehension and reading comprehension, which impact overall functional communication skills. Additionally, a cognitive linguistic evaluation was completed this week, as per medical team request. Pt completed the SLUMS assessment with a score of 1/30 which as compared to those with high school education correlates with severe neurocognitive disorder, however, this score is likely highly impacted by pt's language deficits and should be interpreted with caution. Cognitive linguistic deficits include decreased: attention, STM recall, problem solving, executive functions, safety awareness, reasoning, and visuospatial skills. The following interventions are used to target these barriers, including visual orientation checklist, verbal problem solving task, verbal working memory tasks, categorization tasks , picture problem solving activities, verbal reasoning tasks, visual attention tasks, and family education/training. basic communication boards, verbal command following activities, written command following activities,  biographical yes/no questions, simple yes/no questions, moderate yes/no questions, visual/receptive object ID tasks, picture object ID tasks, automatic speech: counting, automatic speech: COURTNEY, automatic speech: CORRY, object naming tasks, simple phrase completion tasks, reading comprehension at word level, reading comprehension at phrase level , written expression of biographical information, written expression at word level, and word-picture matching.     Plan to target cognitive linguistic skills (basic problem solving, memory, safety) and language skills across all language domains this coming week. Currently, pt is functioning at mod assist for comprehension, expression, problem solving and memory, as well as supervision for social interaction. Recommending continued skilled SLP services to continue to support pt in building a more functional communication modality to improve effectiveness of communication and to ease frustrations with communication attempts during acute rehab stay, as well as to target cognitive linguistic skills to maximize safety and independence on discharge.     Nursing Notes:  Appetite: Good  Diet Type: Cardiac                      Diet Patient/Family Education Complete: No    Type of Wound (LDA): Wound                    Type of Wound Patient/Family Education: No  Bladder: Incontinent (purewick at night)     Bladder Patient/Family Education: No  Bowel: Continent     Bowel Patient/Family Education: No  Pain Location/Orientation: Orientation: Right, Location: Abdomen, Location: Groin  Pain Score: 0                       Hospital Pain Intervention(s): Repositioned, Rest  Pain Patient/Family Education: No       63 y.o. male who  has a past medical history of Acute lower limb ischemia (06/08/2024), Anxiety, Depression, HIV disease (HCC), Substance abuse (HCC), and Suicide attempt ([facility]). who presented to the [facility] on 6/7/24 from alf for increased LLE swelling and  pain and was found to have a left external iliac artery occlusion and acute limb ischemia. 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 Patient had TTE on 6/10/24 showing LV apex thrombus. On 6/11/24 patient had pharmacologic nuclear stress test/SPECT scan which did not show any significant areas of ischemia with EF 40-45% and recommended continuing A/C for LV apical thrombus. His course was complicated by b/l buttock unstageable pressure ulcers, urinary and fecal incontinence, pain, and significant decline in ADLs and mobility. Patient has been continued on Xarelto for anticoagulation, as well as ASA and statin. Patient has a history of TBI, with baseline nonfluent aphasia and forgetfulness. He was evaluated by neuropsychology on 6/27/24, and deemed to not have capacity to make fully informed medical decisions. Therefore, the guardianship process was initiated as of 7/5/24. He was admitted to the [facility] on 7/6.     7/15: Will encourage independence with ADLs and monitor labs and vital signs.  We will educate pt/family about repositioning to prevent skin breakdown.  We will assist w repositioning and perform routine skin checks.  We will monitor for adequate pain control.  We will monitor for constipation and medicate pt as ordered. We will provide pt and family DM education. We will increase safety awareness and keep pt free from falls.    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 Noted on echocardiogram 6/10/2024: spherical 1.5 x 1.3 cm thrombus at the LV apex Initiated on AC with Xarelto, continue. Rx sent to [facility] for price check on 7/10/24 - LENY spoke with [employer] and pt has rx coverage. Information sent to [facility].  Today on 7/21/24, d/w [facility] and Xarelto is not covered and he would have to pay OOP @$700.00.  LENY to  further address tomorrow 7/22/24.  He may have to be switched to Coumadin.     Will continue to encourage independence with ADLs and monitor labs and vital signs. We will educate pt/family about repositioning to prevent skin breakdown.  We will assist w repositioning and perform routine skin checks.  We will monitor for adequate pain control.  We will monitor for constipation and medicate pt as ordered. We will provide pt and family DM education. We will increase safety awareness and keep pt free from falls.       Pt is incontinent of bladder, uses male pure wick @ HS. Will encourage independence with ADLs and monitor labs and vital signs.  We will educate pt/family about repositioning to prevent skin breakdown.  We will assist w repositioning and perform routine skin checks.  We will monitor for adequate pain control.  We will monitor for constipation and medicate pt as ordered. We will provide pt and family wound and skin care management. We will increase safety awareness and keep pt free from falls.          Case Management:     Discharge Planning  Living Arrangements: Other (Comment)  Support Systems: Son, Family members  Assistance Needed: Family members are currently arranging housing for pt after d/c  Type of Current Residence: Other (Comment)  Current Home Care Services: No  7/23- CM continues to follow with d/c planning needs. Process of pursuing guardianship as well as sending SNF referrals. CM continues to work with acute care CM to assist with d/c planning process.     7/30- Cm continues to follow and search for dispo plan    Is the patient actively participating in therapies? yes  List any modifications to the treatment plan: None  Barriers Interventions   incontinence Condom cath at night   Sacral wounds  Triad paste, wound care following, p500, roho cushion, offloading   Expressive aphasia Functional communication, use of ipad, participating more    Residual limb care Wrapping education, nursing  monitoring for signs of infection    Dispo planning  Several meetings and coordination   Mood  Worsened    Afib Coumadin      Is the patient making expected progress toward goals? yes  List any update or changes to goals: None    Medical Goals: Patient will be medically stable for discharge to [facility] restrictive envrionment upon completion of rehab program and Patient will be able to manage medical conditions and comorbid conditions with medications and follow up upon completion of rehab program    Weekly Team Goals:   Rehab Team Goals  ADL Team Goal: Patient will require supervision with ADLs with least restrictive device upon completion of rehab program  Transfer Team Goal: Patient will require supervision with transfers with least restrictive device upon completion of rehab program  Locomotion Team Goal: Patient will require supervision with locomotion with least restrictive device upon completion of rehab program  Cognitive Team Goal: Patient will return to premorbid level of cognitive activity upon completion of rehab program    Discussion: Pt stable and functioning at sup for wc, adls min assist mostly for clothing mgmt. Cues needed to get into safe position. Standing balance effected by poor righting reaction.    Admin and cm working on dispo planning, many meeting discussing dispo plan within network.     Anticipated Discharge Date:  reteam - pending meeting with [facility] Team Members Present:  The following team members are supervising care for this patient and were present during this Weekly Team Conference.    Physician: Dr. DePadua, MD  : Berta George MSW  Registered Nurse: Vanessa Gurrola, ASHWIN  Physical Therapist: Delfina Renner DPT  Occupational Therapist: Katia Morfin MS, OTR/L  Speech Therapist: Erin Roe MS, CCC-SLP

## 2024-07-30 NOTE — PLAN OF CARE
Problem: PAIN - ADULT  Goal: Verbalizes/displays adequate comfort level or baseline comfort level  Description: Interventions:  - Encourage patient to monitor pain and request assistance  - Assess pain using appropriate pain scale  - Administer analgesics based on type and severity of pain and evaluate response  - Implement non-pharmacological measures as appropriate and evaluate response  - Consider cultural and social influences on pain and pain management  - Notify physician/advanced practitioner if interventions unsuccessful or patient reports new pain  Outcome: Progressing     Problem: INFECTION - ADULT  Goal: Absence or prevention of progression during hospitalization  Description: INTERVENTIONS:  - Assess and monitor for signs and symptoms of infection  - Monitor lab/diagnostic results  - Monitor all insertion sites, i.e. indwelling lines, tubes, and drains  - Monitor endotracheal if appropriate and nasal secretions for changes in amount and color  - [place] appropriate cooling/warming therapies per order  - Administer medications as ordered  - Instruct and encourage patient and family to use good hand hygiene technique  - Identify and instruct in appropriate isolation precautions for identified infection/condition  Outcome: Progressing     Problem: INFECTION - ADULT  Goal: Absence of fever/infection during neutropenic period  Description: INTERVENTIONS:  - Monitor WBC    Outcome: Progressing     Problem: SAFETY ADULT  Goal: Patient will remain free of falls  Description: INTERVENTIONS:  - Educate patient/family on patient safety including physical limitations  - Instruct patient to call for assistance with activity   - Consult OT/PT to assist with strengthening/mobility   - Keep Call bell within reach  - Keep bed low and locked with side rails adjusted as appropriate  - Keep care items and personal belongings within reach  - Initiate and maintain comfort rounds  - Make Fall Risk Sign visible to staff  -  Offer Toileting every 2 Hours, in advance of need  - Initiate/Maintain alarm  - Obtain necessary fall risk management equipment:   - Apply yellow socks and bracelet for high fall risk patients  - Consider moving patient to room near nurses station  Outcome: Progressing

## 2024-07-30 NOTE — ASSESSMENT & PLAN NOTE
Xarelto not affordable pt started on Warfarin   Will order Wt based Lovenox until INR therepeutic 2.0- 3.0

## 2024-07-30 NOTE — ASSESSMENT & PLAN NOTE
Pt reports a history of migraines.  It is associated with photophobia.  He is unable to take triptans due to a history of stroke.  Given [facility] 7/21/24.

## 2024-07-30 NOTE — PROGRESS NOTES
"   07/30/24 0845   Pain Assessment   Pain Assessment Tool 0-10   Pain Score No Pain   Restrictions/Precautions   Precautions Bed/chair alarms;Cognitive;Fall Risk;Aphasia;Supervision on toilet/commode   LLE Weight Bearing Per Order NWB   Lifestyle   Autonomy \"beautiful, its a beautiful thing.\"   Eating   Type of Assistance Needed Independent   Eating CARE Score 6   Oral Hygiene   Type of Assistance Needed Independent   Oral Hygiene CARE Score 6   Shower/Bathe Self   Type of Assistance Needed Supervision   Comment full shower today on tub  bench. heavily reliant on grab bars for lateral weight shift to complete buttocks   Shower/Bathe Self CARE Score 4   Upper Body Dressing   Type of Assistance Needed Supervision   Upper Body Dressing CARE Score 4   Lower Body Dressing   Type of Assistance Needed Physical assistance   Physical Assistance Level 25% or less   Comment supine bed level. today provided more distance for observation of performance. today with increased time and incidental assistance able to don  over limb. req slight assist for management of strap around waist. able to don pull up breif and pants   Lower Body Dressing CARE Score 3   Putting On/Taking Off Footwear   Type of Assistance Needed Supervision   Comment completed pre tying of   Putting On/Taking Off Footwear CARE Score 4   Roll Left and Right   Type of Assistance Needed Supervision   Roll Left and Right CARE Score 4   Sit to Lying   Type of Assistance Needed Supervision   Sit to Lying CARE Score 4   Lying to Sitting on Side of Bed   Type of Assistance Needed Supervision   Lying to Sitting on Side of Bed CARE Score 4   Sit to Stand   Type of Assistance Needed Supervision   Sit to Stand CARE Score 4   Bed-Chair Transfer   Type of Assistance Needed Supervision   Comment sit pivot   Chair/Bed-to-Chair Transfer CARE Score 4   Toileting Hygiene   Comment seated for L UE hygiene with lateral weight shift with use fo WC arm for bracing on the R. " did not complete clothing at this time   Toilet Transfer   Type of Assistance Needed Supervision   Comment sit piv   Toilet Transfer CARE Score 4   Right Upper Extremity- Strength   RUE Strength Comment Home exercise program initiated with handout. Pt engages in verbal instruction, visual demonstration for HEP. HEP to include UE work with Theraband, focusing on generalized shoulder modified from STOMPS protocol. HEP modified for simple carryover.  Pt provided . Pt completed full review of all there-ex with teach back method. Pt instructed to complete 3x 10 reps and to complete 2-3 times week. Safety reviewed and pt verbalizes understanding. Further instruction will be required.    - Seated Shoulder Horizontal Abduction with Resistance - thumbs Up  - 1 x daily - 7 x weekly - 3 sets - 10 reps- Seated Shoulder Diagonal with Resistance  - 1 x daily - 7 x weekly - 3 sets - 10 reps- Seated Single Arm Elbow Flexion with resistance  - 1 x daily - 7 x weekly - 3 sets - 10 reps   Assessment   Treatment Assessment Pt participated in skilled OT session focusing on functional ADL retraining, activity tolerance, activity modification, use of DME, and functional transfers. See above for details on ADL function. Pt today demonstrates improved showering ability and initiation on tub bench today with good carryover for tech of transfer. Today with clothing still remains limited with  R UE deficits in coordination, awareness, and strength. pt remains limited by impaired func cog/safety, impaired balance, strength and endurance, warranting continued skilled care to focus on ADL retraining, func transfers, weight shifting for toileting tasks vs standing, UE Strengthening, sacral offloading, and general phase 1 amp edu, in order to decrease burden of care at d/c.   Prognosis Good   Plan   Treatment/Interventions ADL retraining;Functional transfer training;LE strengthening/ROM;Therapeutic exercise;Endurance training;Cognitive  reorientation;Patient/family training;Equipment eval/education;Bed mobility;Compensatory technique education   Progress Progressing toward goals   OT Therapy Minutes   OT Time In 0845   OT Time Out 1030   OT Total Time (minutes) 105   OT Mode of treatment - Individual (minutes) 105   OT Mode of treatment - Concurrent (minutes) 0   OT Mode of treatment - Group (minutes) 0   OT Mode of treatment - Co-treat (minutes) 0   OT Mode of Treatment - Total time(minutes) 105 minutes   OT Cumulative Minutes 1786

## 2024-07-30 NOTE — ASSESSMENT & PLAN NOTE
Presented with left lower extremity acute limb ischemia/extensive left iliofemoral and left infrainguinal embolism requiring left femoral thrombectomy and subsequent AKA on 6/7/24 with vascular surgery.  Incision C/D/I, pain controlled.   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  Initiated on Warfarin 7/29 subtherepeutic will start Wt based Lovenox bridge until INR > 2.0  Rehab and pain control per PMR  Pt has been refusing therapy through the weekend.   Pt has met his rehab goals and will be discharged when able.

## 2024-07-30 NOTE — ASSESSMENT & PLAN NOTE
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 Information sent to [facility]. [facility] ran the insurance and it is not active. CM to call the pharmacy pt used most recently to see if they have different information.    If unable to obtain coverage will need to bridge pt to Warfarin   Pt started on warfarin 7/29 tonight will be dose #2  on 5 mg daily

## 2024-07-30 NOTE — PROGRESS NOTES
07/30/24 1230   Pain Assessment   Pain Assessment Tool 0-10   Pain Score No Pain   Comprehension   Comprehension (FIM) 3 - Understands basic info/conversation 50-74% of time   Expression   Expression (FIM) 3 - Expresses basic info/needs 50-74% of time   Social Interaction   Social Interaction (FIM) 5 - Interacts appropriately with others 90% of time   Problem Solving   Problem solving (FIM) 3 - Solves basic problmes 50-74% of time   Memory   Memory (FIM) 3 - Recognizes, recalls/performs 50-74%   Speech/Language/Cognition Assessmetn   Treatment Assessment Pt sitting in recliner chair using tablet upon SLP entering. Pt agreeable to going into Constant Therapy application on tablet to complete setup that was initiated in prior session with other SLP. SLP assisted pt in completing such by reading aloud (repeating) the auditory prompts slower than the tablet audio read the written material on the screen (max cues). This was to further increase pt's comprehension of the written and auditory material. Additionally, gestures were used when appropriate when pt's facial expressions indicated a reduction in comprehension even given the repetition. Once set up completed, pt then engaging in auditory comprehension task where he was provided with a story read aloud via the tablet, then asked to answer a question regarding the short story. The story was not provided in written material, however, the questions asked were. The SLP provided an additional reading of the question, but slower. Pt was the required to select the answer from F3. He completed 3/5 where SLP read aloud the possible three answers to increase comprehension and accuracy. Pt then asked to demonstrate reading comprehension by choosing the object/picture that belongs to the written word provided. He completed with 5/5 accuracy. After each choosing, SLP requested pt name the object orally. He completed 3/5 increasing to 5/5 when given phonemic cue and phonemic  placement cue. Next, pt was engaged in sentence completion task where he was given part of a sentence and to then choose from F3 the answer to go in the blank space to complete the sentence. He completed 4/5 where max cues provided (reading the sentence and possible answers aloud and providing gestures as appropriate). Pt would continue to benefit from skilled ST services while at the [facility] to further optimize his auditory and reading comprehension as well as use of compensatory speech strategies in order to improve his communication skills and for reducing communication breakdowns.   SLP Therapy Minutes   SLP Time In 1230   SLP Time Out 1300   SLP Total Time (minutes) 30   SLP Mode of treatment - Individual (minutes) 30   SLP Mode of treatment - Concurrent (minutes) 0   SLP Mode of treatment - Group (minutes) 0   SLP Mode of treatment - Co-treat (minutes) 0   SLP Mode of Treatment - Total time(minutes) 30 minutes   SLP Cumulative Minutes 280   Therapy Time missed   Time missed? No

## 2024-07-30 NOTE — CASE MANAGEMENT
Moreno, Raymundo CALI, Autumn GOODE, and Maribel Gibson from [facility] met virtually via teams to discuss dispo planning. Maribel working on [facility], this CM advised to send [place] referral to reach other states, being that pt has pending court might forced pt to stay in state until then. Cm to send and reach out by phone to SNFs as well.

## 2024-07-30 NOTE — PROGRESS NOTES
"Progress Note - Wound   Sunil Patel 63 y.o. male MRN: [id number]  Unit/Bed#: -01 Encounter: [id number]      Assessment:  Pressure injury of right buttock, stage 3   Ambulatory dysfunction  L AKA      Plan:  Right buttock stage 3 pressure injury is measuring slightly larger today, but wound bed appearance is improved on exam, appears partial thickness and is decreased in size. Recommend to continue with wound management of silicone bordered foam dressing. Change every other day and PRN.  No clinical s/s of infection present  Recommend to continue with preventative nursing skin care measures in place  Pressure relief- offloading of pressure with turning/repositioning as patient medically tolerates, heel elevation, foam wedges for offloading/repositioning, and waffle cushion to chair.  Nutrition is following  Patient verbalized understanding of plan of care.  Epic secure chat wound care team with questions or concerns.   Routine wound care follow-up while admitted. AVS updated.       Subjective:  Wound care to see patient for follow-up visit of right buttock pressure injury. Patient cooperative and agreeable for the assessment, seen OOB in recliner chair on offloading seating cushion. Patient offers no wound related complaints. No incontinence at time of the assessment. Foam dressing in place to wound at time of the assessment. Denies fever, chills, or increased pain related to the wound.        Objective:      Vitals: Blood pressure 110/70, pulse 82, temperature 98.3 °F (36.8 °C), temperature source Oral, resp. rate 18, height 5' 10\" (1.778 m), weight 76.6 kg (168 lb 14 oz), SpO2 97%.,Body mass index is 24.23 kg/m².    Focused Physical Exam:  Right buttock stage III pressure injury presents as 2 areas partial-thickness skin loss with intact epithelium in between. Open aspects measured together: 1.5x0.7x0.1cm.  Wound beds are 100% moist pink/red tissue that is producing small amount of bloody drainage.  " "Periwound is dry and intact with freshly healed epithelialized blanchable pink/hyperpigmented-colored skin and scar tissue.    No induration, fluctuance, odor, warmth/temperature differences, redness, or purulence noted to the above mentioned wounds and skin areas assessed. New dressings applied as noted above. Patient tolerated assessment well- denies pain and no s/s of non-verbal pain or discomfort observed during the encounter.            Lab, Imaging and other studies: I have personally reviewed pertinent reports.            Total time spent today:    Total time (face-to-face and non-face-to-face) spent on today's visit was 15 minutes. This includes preparation for the visits (previous wound care notes/images and SLIM note from 7/30/24) performance of a medically appropriate history and examination, and orders for medications/treatments or testing.  Discussed assessment findings, and plan of care/recommendations with patients RN.      Noelle Tran, CHARLIE, SIERRA-C, ANA      Portions of the record may have been created with voice recognition software.  Occasional wrong word or \"sound a like\" substitutions may have occurred due to the inherent limitations of voice recognition software.  Read the chart carefully and recognize, using context, where substitutions have occurred      "

## 2024-07-30 NOTE — PROGRESS NOTES
Physical Medicine and Rehabilitation Progress Note  Sunil Patel 63 y.o. male MRN: [id number]  Unit/Bed#: [facility] 451-01 Encounter: [id number]    To Review: Sunil Patel is a 63 y.o. male who  has a past medical history of Acute lower limb ischemia (06/08/2024), Anxiety, Depression, HIV disease (HCC), Substance abuse (HCC), and Suicide attempt (HCC). who presented to the [facility] on 6/7/24 from halfway for increased LLE swelling and pain and was found to have a left external iliac artery occlusion and acute limb ischemia. 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 Patient had TTE on 6/10/24 showing LV apex thrombus. On 6/11/24 patient had pharmacologic nuclear stress test/SPECT scan which did not show any significant areas of ischemia with EF 40-45% and recommended continuing A/C for LV apical thrombus. His course was complicated by b/l buttock unstageable pressure ulcers, urinary and fecal incontinence, pain, and significant decline in ADLs and mobility. Patient has been continued on Xarelto for anticoagulation, as well as ASA and statin. Patient has a history of TBI, with baseline nonfluent aphasia and forgetfulness. He was evaluated by neuropsychology on 6/27/24, and deemed to not have capacity to make fully informed medical decisions. Therefore, the guardianship process was initiated as of 7/5/24. He was admitted to the [facility] on 7/6.     Chief Complaint: no new issues    Interval History/Subjective:  No acute overnight events. Patient is doing well and states his headache is not bad today. Sleep log shows good sleep throughout the night- he only awoke once overnight which was to empty urinal. Otherwise, he denies any fevers, chills, chest pain, sob, abdominal pain, nausea or vomiting.  "    ROS:  10 point review of systems is otherwise negative except for what is noted above.    Today's Changes:  Bridge to warfarin with Lovenox per IM  Maintain routine labs to ensure therapeutic INR on warfarin  Ongoing dispo meetings planned    Assessment/Plan:     * Above-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)  Assessment & Plan  6/7 with acute occlusoin of L EIA, and not salvageable. Underwent L femoral thrombectomy and AKA with vascular surgery   - [facility] sx follow-up 7/10 - L AKA incision site well-healed, staples taken out at the bedside this morning  - [facility] will be vendor - Patient now has .  - Monitor for hip flexion/abduction tightness. Stretches in therapy  - On Rivaroxaban with hx of LV thrombus and PAOD   - PT/OT/SLP (to work on carry over strategies) 3-5 hours/day, 5-7 days/week.    - Goals are Ind-Sup at a wheelchair level.   - Outpatient f/u with [facility] and [facility]ascular  - Continue patient training with  placement  - Continue gabapentin - doesn't do too much for phantom limb sensation, but that doesn't bother him or cause him pain currently.   - Patient still frustrated given circumstances; will continue gabapentin for anxiety    Neurocognitive disorder  Assessment & Plan  Has been appropriate and cooperative throughout this stay without any behavioral issues on the rehab unit to date.   Hx of TBI and L MCA CVA, psychiatric d/o, seizure d/o  - Neuropsych assessment 6/27/24 - \"diffuse cognitive dysfunction and on a measure assessing awareness of personal health status and ability to evaluate health problems, handle medical emergencies and take safety precautions, patient performed in the IMPAIRED range of functioning. During this encounter, patient does not appear to have capacity to make fully informed medical decisions.\"  MMSE 4/28 - severely impaired  - Guardianship process initiated on acute - follow-up by CM/Admin  - Status: some expressive and possible " some receptive aphasia.  He can be difficult to follow at times likely due to a mixture of aphasia, tangentiality, mild lability, and impaired memory; lability with hx of possible bipolar d/o  - Neuropsych Med review: Depakote, Lamictal, Remeron, Zoloft, Melatonin, gabapentin, PRN oxy 2.5mg TID   - Monitor neuro-exam, wakefulness, mood, cognition, insight into deficits and safety awareness   - Monitor and ensure optimal management electrolytes, nutrition, and hydration  - Monitor for signs or symptoms of infection, medication intolerances, other systemic etiologies  - Additional labs, imaging, specialist follow-up as needed per primary team currently   - Overstimulation precautions, frequent re-orientation, re-direction, re-assurance  - Optimal mood, pain, and sleep management  - If impaired sleep or behavior recommend sleep log and agitation monitoring    - Limit sedating medications when possible  - Fall precautions - if needed increase rounding or consider virtual sitter or in-person sitter  - For routine restlessness, anxiety, irritability focus on non-pharmacologic management    - Hold benzo's with increased risk paradoxical reaction and possibility of limiting cognitive recovery  - Continue SLP and interdisciplinary care  - OP neuro and PCP   7/23- impaired attention after his first shower in the unit. Consistent with cognitive fatigue. Also increased frustration given his housing circumstances. Could benefit from more outdoor activities  7/25- Increased frustration today as well. Ongoing discussions with CM/team about potential outings outside to change scenery and help with emotional fatigue  7/26- Mood is more improved today. Will discuss with staff to get patient outdoor privileges with his friend that is visiting later today  7/29- Patient has increased frustration today. Discussed that we are having ongoing discussions with CM and other facilities for dispo planning    Adjustment disorder with mixed  "anxiety and depressed mood  Assessment & Plan  - Related to recent release from incarceration, new AKA and uncertainty of future disposition, all in the setting of impaired cognition related to prior strokes and TBI  - Behavioral techniques for symptom management  - Neuropsychology consult as available  - Given his circumstances, increased frustration is expected. Can consider Psych consult if symptoms continue to worsen to adjust mood stabilizing medications. Will try nonpharmacologic approaches first with more frequent conversations/redirections   - Continue gabapentin 100 q8 for now    Pressure injury of buttock, stage 3 (HCC)  Assessment & Plan  Stable   - Wound care consulted and following weekly  Per wound care specialist 7/15  - POA L buttock pressure injury unstageable has healed.  - POA R buttock pressure injury is now Stage 3, and improving.   - Cleanse sacro-buttocks with soap and water. Apply Triad Paste to Sacro-Buttocks Wound Beds Only. Apply Hydraguard to Elsi-wounds and all other intact aspects of sacro-buttocks. Apply both creams TID and PRN episodes of incontinence.    - Recommend ROHO cushion in chair when out of bed instead   - Preventative hydraguard to bilateral heels BID and PRN.   - P500  - Monitor clinically for breakdown, frequent turns  - 7/23: wound appears well healing with no signs of active infection. Continue local wound care. Continue lateral leans in therapy as tolerated  -7/29: R buttock pressure injury is improving. Continue local wound care    Patient incapable of making informed decisions  Assessment & Plan  - History of remote TBI and prior strokes with comorbid psychiatric history.  - Evaluated by neuropsychology, Dr. Dilshad Tatum, PhD on 6/27/24, found to have \"diffuse cognitive dysfunction and on a measure assessing awareness of personal health status and ability to evaluate health problems, handle medical emergencies and take safety precautions, patient performed in the " "IMPAIRED range of functioning. During this encounter, patient does not appear to have capacity to make fully informed medical decisions.\"  - Court-appointed guardianship process has been initiated by acute care CM Katia Kay.    - We have identified two friends who are interested in being patient's guardians and have been involved and invested in patient's care on the [facility].   - They will need to be interviewed by the  involved in this process.    - He has to undergo his hearing on 8/6/2024 first.    - He would ultimately benefit from [facility]/nursing home/memory care unit - he does very well with structure and supervision.  Teams 7/23- Ongoing discussions with case management and social work to dispo patient to stable long-term housing.   7/25- CM still awaiting confirmation from acute for discharge planning. Likely will not be discharging in the next 24-48 hours  7/26- Guardianship meeting rescheduled to 8/27    Urinary incontinence  Assessment & Plan  - Did have some incontinence on acute - improved with timed voids and consistent assistance with his urinal  - Described as urgency  - Marked improvement on timed voids.   - monitor for retention, incontinence (including overflow incontinence), signs/symptoms of UTI  - Timed Voids and PVRs Q4hrs initiated earlier. And PVR <150 x3, so scans discontinued.    - He has some difficulty managing the urinal    - needs assistance but is able to transfer to [facility]    - Still uses condom catheter at night, in part for continence care/to protect his skin given his buttock wounds.  - Continue timed voids           At risk for constipation  Assessment & Plan  - Stooling adequately recently; did have some incontinence on acute now improved  - Close continent care given buttock wounds  - Last BM 7/29 and continent  - Colace 100mg BID  - PRN miralax, Senna and suppository    Acute pain  Assessment & Plan  Controlled   - Tylenol 975 mg q8h PRN  - Oxycodone 2.5 mg q8h PRN, wean as " possible   - Last used 7/19.    - Can discontinue at discharge.  - Resumed Gabapentin 100mg Q8hr due to increased headaches and irritability since discontinuing.    Impaired mobility and activities of daily living  Assessment & Plan  - Rehabilitation medicine physician for daily monitoring of care, 24 hour availability for acute medical issues, medication management, and therapeutic and diagnostic assessments.  - 24 hour rehabilitation nursing 7 days per week for: management/teaching of medications, bowel/bladder routine, skin care.  - PT, OT for 2-3 hours per day, 5 to 6 days per week; 15 hours per week  - Rehabilitation Psychology as needed for adjustment and coping  - MSW for barriers to discharge, community resources, and family support  - Discharge planning following to help ensure a safe and efficient discharge  -7/23 teams: Biggest ADL barriers per OT are toileting and applying  to LLE    -7/30 teams: Patient is getting agitated with PT therapies, as he is reaching a plateau. Pt enjoys SLP and cognitive exercises with tablet. Discussions are ongoing for his dispo planning- [facility] admin have meetings with a few SNFs to discuss his case. CM is actively working on retrieving his belongings from prison. Guardianship court date still set for 8/27.        Traumatic brain injury (HCC)  Assessment & Plan  See neurocog impairment     Episodic headache  Assessment & Plan  Seemed to worsen with increased irritability after discontinuing gabapentin  Resumed gabapentin  Received nurtec earlier this week.   - 7/25: improvement in headache, now 5/10 in severity. Continue to monitor  7/26- Patient's headache is much more improved today. Mood is also improved. Will continue on gabapentin dosing for now  7/29- Headache is constant, but fluctuates in severity on different days. May be related to poor sleep quality as well. Will continue to monitor and adjust medications if needed  7/30- sleep log shows consistent sleep  overnight. Headache is improved. Will continue sleep logs and monitor headaches    Cardiomyopathy (HCC)  Assessment & Plan  ECHO on 6/10/2024 showed LVEF 40-45% with mild global hypokinesis   Status post NM stress test on 6/11/2024 which showed: a large, mild, fixed defect in the inferior wall, possibly due to diaphragmatic attenuation artifact, there is a small area of partial reversibility in the inferior apical wall suggestive of ischemia    Elevated by cardiology, etiology felt to be possibly secondary to stress-induced cardiomyopathy, with apical thrombus  Cardiac catheterization deferred given the lack of any significant ischemia, and no current cardiac symptoms  Continue with medical management with aspirin, statin, beta-blocker, ARB  Monitor volume status, remains euvolemic off of diuretics   Outpatient follow-up with cardiology    At risk for venous thromboembolism (VTE)  Assessment & Plan  - On Warfarin/Lovenox bridge now    Carnitine deficiency (HCC)  Assessment & Plan  - Carnitine replacement  - Appreciate medicine management      History of seizures  Assessment & Plan  - Depakote ER, lamotrigine, zonisamide  - Outpatient follow-up with [facility] neurology    Left ventricular thrombus  Assessment & Plan  - Visualized on echocardiogram on 6/10/24: spherical 1.5 x 1.3 cm thrombus at the LV apex.   - Rivaroxaban   - $700.   - Price checking eliquis and pradaxa   - May need to transition to coumadin pending cost   - Management as per IM  - 7/26: IM/CM is following up with Eliquis pricing. Will find out later this afternoon. If too expensive, we will have to transition to coumadin  - 7/29: no update on eliquis pricing, but anticipate high costs due to no prescription insurance. Will transition pt to coumadin per IM  -7/30: discussed with IM. They will add lovenox to bridge patient over.     - Obtain routine labs to ensure patient is therapeutic  - Outpatient follow-up with cardiology    Benign essential  hypertension  Assessment & Plan  - Toprol, losartan  - Appreciate medicine management    History of stroke  Assessment & Plan  - History of old left temporal and parietal lobe cortical infarcts, also involving the insula and left occipital lobe as well as small right posterior parietal lobe. Stable on most recent CT head on 5/16/24.   - ASA, and statin for secondary prevention  - Optimal BP control  - Monitor neuro exam - hx of LV thrombus   - transition to coumadin 7/29  - 7/30 - IM adding Lovenox to bridge patient over successfully  - OP neuro follow-up     Human immunodeficiency virus (HIV) infection (HCC)  Assessment & Plan  - Continue anti-retroviral treatment  - Appreciate medicine management during ARC course  - OP ID follow-up       Bipolar affective disorder (HCC)  Assessment & Plan  Mood acceptable; continue meds as outlined   Supportive counseling  NeuroPsychology consult while in [facility] if available for support  Counseled on and continue to encourage deep breathing/relaxation/behavioral management techniques  - Mirtazapine 15 mg qHs  - Will consult Psych before increasing Sertraline for better mood regulation  - Lamictal 50mg BID  - Depakote ER 1250mg qday   - Outpatient psychiatry follow-up        Health Maintenance  #Delirium/Sleep: Optimize sleep/wake cycle and maintain delirium precautions. + Remeron 15 mg  and melatonin 6 mg  #Pain: Tylenol PRN   #Bowel: Continent. Last BM 7/29. On Colace BID w/ Senna,Miralax,Dulcolax PRN  #Bladder: Voiding and continent. Condom cath during night.  #Skin/Pressure Injury Prevention: Turn Q2hr in bed, with weight shifts [id number] in wheelchair.  #DVT Prophylaxis: Xarelto  #GI Prophylaxis: not indicated  #Code Status: Full code  #FEN: Cardiac diet + Ensure at bfast/dinner  #Dispo: [facility] pending confirmation from acute hospital/SNFs/CM. Guardianship meeting now set for 8/27. Teams 7/30: meeting with multiple SNFs planned with ARC admin. CM working actively to retrieve  patient's belongings. Pt is getting agitated with PT, but actively engaged with SLP.      Objective:    Functional Update:  PT: sup bed mobility, CGA-CS for transfers, ind wheelchair mobility   OT: ind eating, setup oral hygiene, sup bathing, sup UBD, mod-maxA LBD, min-modA footwear, sup toileting, sup toilet transfer  SLP: modA comprehension/expression/memory, maxA prolbem solving, Sup social interaction    Allergies per EMR    Physical Exam:  Temp:  [97.7 °F (36.5 °C)-98.4 °F (36.9 °C)] 98.3 °F (36.8 °C)  HR:  [82-89] 82  Resp:  [17-18] 18  BP: (101-130)/(56-72) 110/70  Oxygen Therapy  SpO2: 97 %    Physical Exam  Vitals reviewed.   Constitutional:       Appearance: Normal appearance.   HENT:      Head: Normocephalic and atraumatic.      Right Ear: External ear normal.      Left Ear: External ear normal.      Nose: Nose normal.      Mouth/Throat:      Mouth: Mucous membranes are moist.      Pharynx: Oropharynx is clear.   Eyes:      Conjunctiva/sclera: Conjunctivae normal.   Cardiovascular:      Rate and Rhythm: Normal rate and regular rhythm.      Pulses: Normal pulses.      Heart sounds: Normal heart sounds.   Pulmonary:      Effort: Pulmonary effort is normal.      Breath sounds: Normal breath sounds.   Abdominal:      General: Bowel sounds are normal. There is no distension.      Palpations: Abdomen is soft.   Musculoskeletal:      Comments: + L AKA   Skin:     General: Skin is warm and dry.   Neurological:      Mental Status: He is alert.   Psychiatric:         Mood and Affect: Mood normal.         Behavior: Behavior normal.          Diagnostic Studies: reviewed, no new imaging    Laboratory:    Reviewed, INR 1.07  Results from last 7 days   Lab Units 07/25/24  0518   HEMOGLOBIN g/dL 10.5*   HEMATOCRIT % 36.0*   WBC Thousand/uL 6.34     Results from last 7 days   Lab Units 07/25/24  0518   BUN mg/dL 20   POTASSIUM mmol/L 4.0   CHLORIDE mmol/L 105   CREATININE mg/dL 0.85     Results from last 7 days   Lab  Units 07/30/24  0642 07/29/24  1102   PROTIME seconds 13.8 14.3   INR  1.07 1.12        Patient Active Problem List   Diagnosis    Bipolar affective disorder (HCC)    Substance abuse (HCC)    Human immunodeficiency virus (HIV) infection (HCC)    History of stroke    Benign essential hypertension    Positive laboratory testing for human immunodeficiency virus (HCC)    Hypertension    Unspecified vitamin D deficiency    Tobacco abuse    Cerebrovascular accident (CVA) (HCC)    Left ventricular thrombus    History of seizures    Carnitine deficiency (HCC)    Above-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)    Traumatic brain injury (HCC)    Impaired mobility and activities of daily living    At risk for venous thromboembolism (VTE)    Acute pain    At risk for constipation    Urinary incontinence    Patient incapable of making informed decisions    Pressure injury of buttock, stage 3 ([facility])    Adjustment disorder with mixed anxiety and depressed mood    Neurocognitive disorder    Cardiomyopathy (HCC)    Episodic headache         Medications  Current Facility-Administered Medications   Medication Dose Route Frequency Provider Last Rate    acetaminophen  975 mg Oral TID PRN José Salcido MD      aspirin  81 mg Oral Daily Lorrie Barillas PA-C      atorvastatin  80 mg Oral Daily With Dinner Lorrie Barillas PA-C      bictegravir-emtricitab-tenofovir alafenamide  1 tablet Oral Daily With Breakfast Lorrie Barillas PA-C      bisacodyl  10 mg Rectal Daily PRN Lorrie Barillas PA-C      diphenhydrAMINE  25 mg Oral Q6H PRN Lorrie Barillas PA-C      divalproex sodium  1,250 mg Oral Daily Lorrie Barillas PA-C      docusate sodium  100 mg Oral BID Ashley Depadua, MD      dolutegravir  50 mg Oral Daily Lorrie Barillas PA-C      enoxaparin  1 mg/kg Subcutaneous Q24H CHARLIE Gordon      gabapentin  100 mg Oral Q8H Ashley Depadua, MD      lamoTRIgine  50 mg Oral BID Lorrie  EJ Barillas      levOCARNitine  1,000 mg/day Oral TID With Meals Lorrie Barillas PA-C      losartan  50 mg Oral Daily Lorrie Barillas PA-C      melatonin  6 mg Oral HS Lorrie Barillas PA-C      metoprolol succinate  25 mg Oral Daily CHARLIE Mcclelland      mirtazapine  15 mg Oral HS Lorrie Barillas PA-C      ondansetron  4 mg Oral Q6H PRN Lorrie Barillas PA-C      oxyCODONE  2.5 mg Oral Q8H PRN Raimundo Riley MD      polyethylene glycol  17 g Oral Daily PRN Lorrie Barillas PA-C      senna  1 tablet Oral HS PRN Lorrie Barillas PA-C      sertraline  75 mg Oral Daily Lorrie Barillas PA-C      tamsulosin  0.4 mg Oral Daily With Dinner Lorrie Barillas PA-C      warfarin  5 mg Oral Daily (warfarin) Lorrie Barillas PA-C      zonisamide  400 mg Oral Daily Lorrie Barillas PA-C            ** Please Note: Fluency Direct voice to text software may have been used in the creation of this document. **

## 2024-07-30 NOTE — ASSESSMENT & PLAN NOTE
Remote history of TBI, previously residing in a group home prior to correction sentence.  Evaluated by neuropsychiatry on 6/27/24 and deemed NOT to have medical decision-making capacity  Process for court appointed guardian started on 7/5/24 - CM following   Guardianship hearing 8/27/24.

## 2024-07-31 LAB
ANION GAP SERPL CALCULATED.3IONS-SCNC: 9 MMOL/L (ref 4–13)
BASOPHILS # BLD AUTO: 0.06 THOUSANDS/ÂΜL (ref 0–0.1)
BASOPHILS NFR BLD AUTO: 1 % (ref 0–1)
BUN SERPL-MCNC: 21 MG/DL (ref 5–25)
CALCIUM SERPL-MCNC: 9.1 MG/DL (ref 8.4–10.2)
CHLORIDE SERPL-SCNC: 104 MMOL/L (ref 96–108)
CO2 SERPL-SCNC: 26 MMOL/L (ref 21–32)
CREAT SERPL-MCNC: 0.88 MG/DL (ref 0.6–1.3)
EOSINOPHIL # BLD AUTO: 0.34 THOUSAND/ÂΜL (ref 0–0.61)
EOSINOPHIL NFR BLD AUTO: 5 % (ref 0–6)
ERYTHROCYTE [DISTWIDTH] IN BLOOD BY AUTOMATED COUNT: 15.5 % (ref 11.6–15.1)
GFR SERPL CREATININE-BSD FRML MDRD: 92 ML/MIN/1.73SQ M
GLUCOSE SERPL-MCNC: 104 MG/DL (ref 65–140)
HCT VFR BLD AUTO: 34.6 % (ref 36.5–49.3)
HGB BLD-MCNC: 10.2 G/DL (ref 12–17)
IMM GRANULOCYTES # BLD AUTO: 0.03 THOUSAND/UL (ref 0–0.2)
IMM GRANULOCYTES NFR BLD AUTO: 1 % (ref 0–2)
INR PPP: 1.1 (ref 0.84–1.19)
LYMPHOCYTES # BLD AUTO: 2.43 THOUSANDS/ÂΜL (ref 0.6–4.47)
LYMPHOCYTES NFR BLD AUTO: 37 % (ref 14–44)
MCH RBC QN AUTO: 28.1 PG (ref 26.8–34.3)
MCHC RBC AUTO-ENTMCNC: 29.5 G/DL (ref 31.4–37.4)
MCV RBC AUTO: 95 FL (ref 82–98)
MONOCYTES # BLD AUTO: 0.64 THOUSAND/ÂΜL (ref 0.17–1.22)
MONOCYTES NFR BLD AUTO: 10 % (ref 4–12)
NEUTROPHILS # BLD AUTO: 3.09 THOUSANDS/ÂΜL (ref 1.85–7.62)
NEUTS SEG NFR BLD AUTO: 46 % (ref 43–75)
NRBC BLD AUTO-RTO: 0 /100 WBCS
PLATELET # BLD AUTO: 398 THOUSANDS/UL (ref 149–390)
PMV BLD AUTO: 8.1 FL (ref 8.9–12.7)
POTASSIUM SERPL-SCNC: 4 MMOL/L (ref 3.5–5.3)
PROTHROMBIN TIME: 14.1 SECONDS (ref 11.6–14.5)
RBC # BLD AUTO: 3.63 MILLION/UL (ref 3.88–5.62)
SODIUM SERPL-SCNC: 139 MMOL/L (ref 135–147)
WBC # BLD AUTO: 6.59 THOUSAND/UL (ref 4.31–10.16)

## 2024-07-31 PROCEDURE — 99231 SBSQ HOSP IP/OBS SF/LOW 25: CPT | Performed by: NURSE PRACTITIONER

## 2024-07-31 PROCEDURE — 80048 BASIC METABOLIC PNL TOTAL CA: CPT | Performed by: NURSE PRACTITIONER

## 2024-07-31 PROCEDURE — 85025 COMPLETE CBC W/AUTO DIFF WBC: CPT | Performed by: NURSE PRACTITIONER

## 2024-07-31 PROCEDURE — 99232 SBSQ HOSP IP/OBS MODERATE 35: CPT | Performed by: PHYSICAL MEDICINE & REHABILITATION

## 2024-07-31 PROCEDURE — 85610 PROTHROMBIN TIME: CPT | Performed by: PHYSICIAN ASSISTANT

## 2024-07-31 RX ADMIN — LAMOTRIGINE 50 MG: 25 TABLET ORAL at 17:55

## 2024-07-31 RX ADMIN — WARFARIN SODIUM 5 MG: 5 TABLET ORAL at 17:55

## 2024-07-31 RX ADMIN — ATORVASTATIN CALCIUM 80 MG: 80 TABLET, FILM COATED ORAL at 17:55

## 2024-07-31 RX ADMIN — GABAPENTIN 100 MG: 100 CAPSULE ORAL at 21:01

## 2024-07-31 RX ADMIN — DIVALPROEX SODIUM 1250 MG: 500 TABLET, EXTENDED RELEASE ORAL at 09:07

## 2024-07-31 RX ADMIN — ENOXAPARIN SODIUM 80 MG: 80 INJECTION SUBCUTANEOUS at 09:07

## 2024-07-31 RX ADMIN — LAMOTRIGINE 50 MG: 25 TABLET ORAL at 09:07

## 2024-07-31 RX ADMIN — DOLUTEGRAVIR SODIUM 50 MG: 50 TABLET, FILM COATED ORAL at 09:08

## 2024-07-31 RX ADMIN — SERTRALINE HYDROCHLORIDE 75 MG: 50 TABLET ORAL at 09:07

## 2024-07-31 RX ADMIN — ZONISAMIDE 400 MG: 100 CAPSULE ORAL at 09:07

## 2024-07-31 RX ADMIN — DOCUSATE SODIUM 100 MG: 100 CAPSULE, LIQUID FILLED ORAL at 17:55

## 2024-07-31 RX ADMIN — MIRTAZAPINE 15 MG: 15 TABLET, FILM COATED ORAL at 21:01

## 2024-07-31 RX ADMIN — LEVOCARNITINE 330 MG: 1 SOLUTION ORAL at 09:07

## 2024-07-31 RX ADMIN — ASPIRIN 81 MG: 81 TABLET, COATED ORAL at 09:07

## 2024-07-31 RX ADMIN — Medication 6 MG: at 21:01

## 2024-07-31 RX ADMIN — GABAPENTIN 100 MG: 100 CAPSULE ORAL at 13:00

## 2024-07-31 RX ADMIN — BICTEGRAVIR SODIUM, EMTRICITABINE, AND TENOFOVIR ALAFENAMIDE FUMARATE 1 TABLET: 50; 200; 25 TABLET ORAL at 09:08

## 2024-07-31 RX ADMIN — DOCUSATE SODIUM 100 MG: 100 CAPSULE, LIQUID FILLED ORAL at 09:07

## 2024-07-31 RX ADMIN — ACETAMINOPHEN 975 MG: 325 TABLET, FILM COATED ORAL at 09:15

## 2024-07-31 RX ADMIN — GABAPENTIN 100 MG: 100 CAPSULE ORAL at 06:00

## 2024-07-31 RX ADMIN — LEVOCARNITINE 330 MG: 1 SOLUTION ORAL at 17:56

## 2024-07-31 RX ADMIN — TAMSULOSIN HYDROCHLORIDE 0.4 MG: 0.4 CAPSULE ORAL at 17:55

## 2024-07-31 RX ADMIN — LEVOCARNITINE 330 MG: 1 SOLUTION ORAL at 14:26

## 2024-07-31 NOTE — CASE MANAGEMENT
CM continues to send referrals, currently working with [facility] sent 70 SNF referrals. Pending court date might factor into pt not being able to leave the state, awaiting confirmation on that.     [facility] accepting early July, cm followed up and they report cannot accept due to social barriers.    Several facilities are also reporting his  is incorrect for Medicare, cm emailed insurance verification to resolve this.

## 2024-07-31 NOTE — ASSESSMENT & PLAN NOTE
Remote history of TBI, previously residing in a group home prior to assisted sentence.  Evaluated by neuropsychiatry on 6/27/24 and deemed NOT to have medical decision-making capacity  Process for court appointed guardian started on 7/5/24 - CM following   Guardianship hearing 8/27/24.

## 2024-07-31 NOTE — ASSESSMENT & PLAN NOTE
Noted on echocardiogram 6/10/2024: spherical 1.5 x 1.3 cm thrombus at the LV apex   Initiated on AC with Xarelto, continue  Rx sent to [employer] for price check on 7/10/24 - CM spoke with [email or web address] and pt has rx coverage. Information sent to [employer]. [employer] ran the insurance and it is not active. CM to call the pharmacy pt used most recently to see if they have different information.    If unable to obtain coverage will need to bridge pt to Warfarin   Pt started on warfarin 7/29 tonight will be dose #3 on 5 mg daily  - expect to see increase tomorrow if not will increase dose 7.5 mg daily

## 2024-07-31 NOTE — PROGRESS NOTES
[facility]  Progress Note  Name: Sunli Patel I  MRN: [id number]  Unit/Bed#: -01 I Date of Admission: 7/6/2024   Date of Service: 7/31/2024 I Hospital Day: 25    Assessment & Plan   * Above-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)  Assessment & Plan  Presented with left lower extremity acute limb ischemia/extensive left iliofemoral and left infrainguinal embolism requiring left femoral thrombectomy and subsequent AKA on 6/7/24 with vascular surgery.  Incision C/D/I, pain controlled.   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  Initiated on Warfarin 7/29 subtherepeutic will start Wt based Lovenox bridge until INR > 2.0  Rehab and pain control per PMR  Pt has been refusing therapy through the weekend.   Pt has met his rehab goals and will be discharged when able.    Episodic headache  Assessment & Plan  Pt reports a history of migraines.  It is associated with photophobia.  He is unable to take triptans due to a history of stroke.  Given Nurtec 7/21/24.  Headache when he woke up this am , RN to provide Tylenol for now monitor for resolution     Cardiomyopathy (HCC)  Assessment & Plan  ECHO 6/10/2024 = LVEF 40-45% with mild global hypokinesis   Status post NM stress test on 6/11/2024 which showed: a large, mild, fixed defect in the inferior wall, possibly due to diaphragmatic attenuation artifact, there is a small area of partial reversibility in the inferior apical wall suggestive of ischemia    Evaluated ed by cardiology, etiology felt to be possibly secondary to stress-induced cardiomyopathy, with apical thrombus  Cardiac catheterization deferred given the lack of any significant ischemia, and no current cardiac symptoms  Continue with medical management with aspirin, statin, beta-blocker, ARB  Monitor volume status, remains euvolemic off of  diuretics   Euvolemic on exam  Outpatient follow-up with cardiology    Neurocognitive disorder  Assessment & Plan  Evaluated by neuropsychology and found to not have capacity  Guardianship in progress meeting rescheduled for 8/27    Pressure injury of buttock, stage 3 (HCC)  Assessment & Plan  Being managed by PMR  Turn every 2 hours for offloading  P500   Continue local care  Right buttock per pressure ulcer care now stage 3   Left buttock per pressure ulcer  care now     Patient incapable of making informed decisions  Assessment & Plan  Seen by neuropsych on 6/27 and was deemed NOT to have medical decision making capacity    At risk for constipation  Assessment & Plan  Denies constipation     Acute pain  Assessment & Plan  Due to the AKA  Pain controlled     At risk for venous thromboembolism (VTE)  Assessment & Plan  Xarelto not affordable pt started on Warfarin   Will order Wt based Lovenox until INR therepeutic 2.0- 3.0    Traumatic brain injury (HCC)  Assessment & Plan  Remote history of TBI, previously residing in a group home prior to senior care sentence.  Evaluated by neuropsychiatry on 6/27/24 and deemed NOT to have medical decision-making capacity  Process for court appointed guardian started on 7/5/24 -  following   Guardianship hearing 8/27/24.    Carnitine deficiency (HCC)  Assessment & Plan  Continue levocarnitine 330 mg 3x daily with meals.    History of seizures  Assessment & Plan  Diagnosed in July 2019, follows with [facility] neurology outpatient  Continue home regimen with Depakote ER, Lamotrigine and Zonegran    Left ventricular thrombus  Assessment & Plan  Noted on echocardiogram 6/10/2024: spherical 1.5 x 1.3 cm thrombus at the LV apex   Initiated on AC with Xarelto, continue  Rx sent to [employer] for price check on 7/10/24 - LENY spoke with [employer] and pt has rx coverage. Information sent to [employer]. [employer] ran the insurance and it is not active. CM to call the pharmacy pt used most recently to  see if they have different information.    If unable to obtain coverage will need to bridge pt to Warfarin   Pt started on warfarin 7/29 tonight will be dose #3 on 5 mg daily  - expect to see increase tomorrow if not will increase dose 7.5 mg daily     Benign essential hypertension  Assessment & Plan  Home regimen: Losartan 50mg daily, Toprol XL 25 mg BID  Current regimen: Losartan 50 mg daily, Toprol-XL 25 mg daily  Stable      History of stroke  Assessment & Plan  History of left MCA CVA in May 2018 with residual expressive aphasia  Continue ASA and atorvastatin    Human immunodeficiency virus (HIV) infection (HCC)  Assessment & Plan  Continue Biktarvy and Tivicay.    Bipolar affective disorder (HCC)  Assessment & Plan  Continue home meds Zoloft and Remeron  Outpatient follow-up with Psychiatry             The above assessment and plan was reviewed and updated as determined by my evaluation of the patient on 7/31/2024.    Labs:   Results from last 7 days   Lab Units 07/31/24  0501 07/25/24  0518   WBC Thousand/uL 6.59 6.34   HEMOGLOBIN g/dL 10.2* 10.5*   HEMATOCRIT % 34.6* 36.0*   PLATELETS Thousands/uL 398* 384     Results from last 7 days   Lab Units 07/31/24  0501 07/25/24  0518   SODIUM mmol/L 139 139   POTASSIUM mmol/L 4.0 4.0   CHLORIDE mmol/L 104 105   CO2 mmol/L 26 25   BUN mg/dL 21 20   CREATININE mg/dL 0.88 0.85   CALCIUM mg/dL 9.1 8.9         Results from last 7 days   Lab Units 07/31/24  0719 07/30/24  0642   INR  1.10 1.07           Imaging  No orders to display       Review of Scheduled Meds:  Current Facility-Administered Medications   Medication Dose Route Frequency Provider Last Rate    acetaminophen  975 mg Oral TID PRN José Salcido MD      aspirin  81 mg Oral Daily Lorrie Barillas PA-C      atorvastatin  80 mg Oral Daily With Dinner Lorrie Barillas PA-C      bictegravir-emtricitab-tenofovir alafenamide  1 tablet Oral Daily With Breakfast Lorrie Barillas PA-C      bisacodyl   10 mg Rectal Daily PRN Lorrie Barillas PA-C      diphenhydrAMINE  25 mg Oral Q6H PRN Lorrie Barillas PA-C      divalproex sodium  1,250 mg Oral Daily Lorrie Barillas PA-C      docusate sodium  100 mg Oral BID Ashley Depadua, MD      dolutegravir  50 mg Oral Daily Lorrie Barillas PA-C      enoxaparin  1 mg/kg Subcutaneous Q24H CHARLIE Gordon      gabapentin  100 mg Oral Q8H Ashley Depadua, MD      lamoTRIgine  50 mg Oral BID Lorrie Barillas PA-C      levOCARNitine  1,000 mg/day Oral TID With Meals Lorrie Barillas PA-C      losartan  50 mg Oral Daily Lorrie Barilals PA-C      melatonin  6 mg Oral HS Lorrie Barillas PA-C      metoprolol succinate  25 mg Oral Daily CHARLIE Mcclelland      mirtazapine  15 mg Oral HS Lorrie Barillas PA-C      ondansetron  4 mg Oral Q6H PRN Lorrie Barillas PA-C      oxyCODONE  2.5 mg Oral Q8H PRN Raimundo Riley MD      polyethylene glycol  17 g Oral Daily PRN Lorrie Barillas PA-C      senna  1 tablet Oral HS PRN Lorrie Barillas PA-C      sertraline  75 mg Oral Daily Lorrie Barillas PA-C      tamsulosin  0.4 mg Oral Daily With Dinner Lorrie Barillas PA-C      warfarin  5 mg Oral Daily (warfarin) Lorrie Barillas PA-C      zonisamide  400 mg Oral Daily Lorrie Barillas PA-C         Subjective/ HPI: Patient seen and examined. Patients overnight issues or events were reviewed with nursing staff. New or overnight issues include the following:     Pt reports bad headache today . States he can't eat because of the headache rn going to give him some tylenol . Explained reasoning behind lovenox shot . He is accepting that / some bl flank pain he points to and says it hurts there.     ROS:   A 10 point ROS was performed; negative except as noted above.        *Labs /Radiology studies Reviewed  *Medications  reviewed and reconciled as needed  *Please refer to order section for additional ordered labs  studies      Physical Examination:  Vitals:   Vitals:    07/30/24 2150 07/31/24 0617 07/31/24 0700 07/31/24 1500   BP: 142/63 116/64 108/74 119/72   BP Location: Left arm  Left arm Left arm   Pulse: 88 78 68 88   Resp: 17 17 18 18   Temp: 98.5 °F (36.9 °C) 97.9 °F (36.6 °C) 97.7 °F (36.5 °C) 98.3 °F (36.8 °C)   TempSrc: Oral  Oral Oral   SpO2: 93% 95% 96% 94%   Weight:       Height:           General Appearance: NAD; slightly irritated today dt headache  HEENT: PERRLA, conjuctiva normal; mucous membranes moist; face symmetrical  Neck:  Supple  Lungs: clear bilaterally, poor respiratory effort, no retractions, expiratory effort normal, on room air  CV: regular rate and rhythm, no murmurs rubs or gallops noted   ABD: soft non tender, +BS x4  EXT: DP pulses intact in right leg, no lymphadenopathy, no edema  Skin: normal turgor, normal texture, no rash, buttock wound, left aka intact healed well now scar   Psych: affect normal, mood normal  Neuro: AAOx3       The above physical exam was reviewed and updated as determined by my evaluation of the patient on 7/31/2024.    Invasive Devices       None                      VTE Pharmacologic Prophylaxis: Enoxaparin bridge to coumadin   Code Status: Level 1 - Full Code  Current Length of Stay: 25 day(s)    Total floor / unit time spent today 20 minutes  Coordination of patient's care was performed in conjunction with consulting services. Time invested included review of patient's labs, vitals, and management of their comorbidities with continued monitoring, examination of patient as well as answering patient questions, documenting her findings and creating progress note in electronic medical record,  ordering appropriate diagnostic testing.       ** Please Note:  voice to text software may have been used in the creation of this document. Although proof errors in transcription or interpretation are a potential of such software**

## 2024-07-31 NOTE — ASSESSMENT & PLAN NOTE
Being managed by PMR  Turn every 2 hours for offloading  P500   Continue local care  Right buttock per pressure ulcer care now stage 3   Left buttock per pressure ulcer  care now

## 2024-07-31 NOTE — PROGRESS NOTES
07/31/24 0830   PT Therapy Minutes   PT Time In 0830   PT Time Out 1000   PT Total Time (minutes) 90   PT Mode of treatment - Individual (minutes) 0   PT Mode of treatment - Concurrent (minutes) 0   PT Mode of treatment - Group (minutes) 0   PT Mode of treatment - Co-treat (minutes) 0   PT Mode of Treatment - Total time(minutes) 0 minutes   PT Cumulative Minutes 2023   Therapy Time missed   Time missed? Yes   Amount of time missed 90   Reason for time missed Extreme fatigue;Illness  (pt reporting headache not feeling will and fatiue /not sleeping)   Time(s) multiple attempts made 0830,0930,1115

## 2024-07-31 NOTE — PROGRESS NOTES
Physical Medicine and Rehabilitation Progress Note  Sunil Patel 63 y.o. male MRN: [id number]  Unit/Bed#: [facility] 451-01 Encounter: [id number]    To Review: Sunil Patel is a 63 y.o. male who  has a past medical history of Acute lower limb ischemia (06/08/2024), Anxiety, Depression, HIV disease (HCC), Substance abuse (HCC), and Suicide attempt (HCC). who presented to the [facility] on 6/7/24 from senior living for increased LLE swelling and pain and was found to have a left external iliac artery occlusion and acute limb ischemia. 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 Patient had TTE on 6/10/24 showing LV apex thrombus. On 6/11/24 patient had pharmacologic nuclear stress test/SPECT scan which did not show any significant areas of ischemia with EF 40-45% and recommended continuing A/C for LV apical thrombus. His course was complicated by b/l buttock unstageable pressure ulcers, urinary and fecal incontinence, pain, and significant decline in ADLs and mobility. Patient has been continued on Xarelto for anticoagulation, as well as ASA and statin. Patient has a history of TBI, with baseline nonfluent aphasia and forgetfulness. He was evaluated by neuropsychology on 6/27/24, and deemed to not have capacity to make fully informed medical decisions. Therefore, the guardianship process was initiated as of 7/5/24. He was admitted to the [facility] on 7/6.     Chief Complaint: No new concerns.     Interval History/Subjective:  No acute events overnight. Tired this morning. No new focal complaints or concerns. Continent of bladder. Last BM 7/30 and continent. Some headache this AM.     ROS:  A 10 point review of systems was negative except for what is noted in the HPI.    Today's Changes:  INR 1.10. Warfarin 5mg  "tonight. Recheck in AM. If no increase, dose to increase to 7.5mg per IM  Ongoing dispo concerns.     Assessment/Plan:     * 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 Underwent L femoral thrombectomy and AKA with vascular surgery   - [facility] sx follow-up 7/10 - L AKA incision site well-healed, staples taken out at the bedside this morning  - [facility] will be vendor - Patient now has .  - Monitor for hip flexion/abduction tightness. Stretches in therapy  - On Rivaroxaban with hx of LV thrombus and PAOD   - PT/OT/SLP (to work on carry over strategies) 3-5 hours/day, 5-7 days/week.    - Goals are Ind-Sup at a wheelchair level.   - Outpatient f/u with [facility] and [facility]ascular  - Continue patient training with  placement  - Continue gabapentin - doesn't do too much for phantom limb sensation, but that doesn't bother him or cause him pain currently.   - Patient still frustrated given circumstances; will continue gabapentin for anxiety    Neurocognitive disorder  Assessment & Plan  Has been appropriate and cooperative throughout this stay without any behavioral issues on the rehab unit to date.    - Does have decreased frustration tolerance   - So far redirectable.  Hx of TBI and L MCA CVA, psychiatric d/o, seizure d/o  - Neuropsych assessment 6/27/24 - \"diffuse cognitive dysfunction and on a measure assessing awareness of personal health status and ability to evaluate health problems, handle medical emergencies and take safety precautions, patient performed in the IMPAIRED range of functioning. During this encounter, patient does not appear to have capacity to make fully informed medical decisions.\"  MMSE 4/28 - severely impaired  - Guardianship process initiated on acute - follow-up by CM/Admin  - Status: some expressive and possible some receptive aphasia.  He can be difficult to follow at times likely due " to a mixture of aphasia, tangentiality, mild lability, and impaired memory; lability with hx of possible bipolar d/o  - Neuropsych Med review: Depakote, Lamictal, Remeron, Zoloft, Melatonin, gabapentin, PRN oxy 2.5mg TID   - Monitor neuro-exam, wakefulness, mood, cognition, insight into deficits and safety awareness   - Monitor and ensure optimal management electrolytes, nutrition, and hydration  - Monitor for signs or symptoms of infection, medication intolerances, other systemic etiologies  - Additional labs, imaging, specialist follow-up as needed per primary team currently   - Overstimulation precautions, frequent re-orientation, re-direction, re-assurance  - Optimal mood, pain, and sleep management  - If impaired sleep or behavior recommend sleep log and agitation monitoring    - Limit sedating medications when possible  - Fall precautions - if needed increase rounding or consider virtual sitter or in-person sitter  - For routine restlessness, anxiety, irritability focus on non-pharmacologic management    - Hold benzo's with increased risk paradoxical reaction and possibility of limiting cognitive recovery  - Continue SLP and interdisciplinary care  - OP neuro and PCP         Adjustment disorder with mixed anxiety and depressed mood  Assessment & Plan  - Related to recent release from incarceration, new AKA and uncertainty of future disposition, all in the setting of impaired cognition related to prior strokes and TBI  - Behavioral techniques for symptom management  - Neuropsychology consult as available  - Given his circumstances, increased frustration is expected. Can consider Psych consult if symptoms continue to worsen to adjust mood stabilizing medications. Will try nonpharmacologic approaches first with more frequent conversations/redirections   - Continue gabapentin 100 q8 for now    Pressure injury of buttock, stage 3 (HCC)  Assessment & Plan  Stable   - Wound care consulted and following weekly  - POA  "L buttock pressure injury unstageable has healed.  - POA R buttock pressure injury is now Stage 3, and improving.   - Continue local care with border foam.  - Recommend ROHO cushion in chair when out of bed instead   - Preventative hydraguard to bilateral heels BID and PRN.   - P500  - Monitor clinically for breakdown, frequent turns    Patient incapable of making informed decisions  Assessment & Plan  - History of remote TBI and prior strokes with comorbid psychiatric history.  - Evaluated by neuropsychology, Dr. Dilshad Tatum, PhD on 6/27/24, found to have \"diffuse cognitive dysfunction and on a measure assessing awareness of personal health status and ability to evaluate health problems, handle medical emergencies and take safety precautions, patient performed in the IMPAIRED range of functioning. During this encounter, patient does not appear to have capacity to make fully informed medical decisions.\"  - Court-appointed guardianship process has been initiated by acute care CM Katia Kay.    - We have identified two friends who are interested in being patient's guardians and have been involved and invested in patient's care on the ARC.   - They will need to be interviewed by the  involved in this process.    - He has to undergo his hearing on 8/6/2024 first.    - He would ultimately benefit from [facility]/nursing home/memory care unit - he does very well with structure and supervision.  Teams 7/23- Ongoing discussions with case management and social work to dispo patient to stable long-term housing.   7/25- CM still awaiting confirmation from acute for discharge planning. Likely will not be discharging in the next 24-48 hours  7/26- Guardianship meeting rescheduled to 8/27    Urinary incontinence  Assessment & Plan  - Did have some incontinence on acute - improved with timed voids and consistent assistance with his urinal  - Described as urgency  - Marked improvement on timed voids.   - monitor for " retention, incontinence (including overflow incontinence), signs/symptoms of UTI  - Timed Voids and PVRs Q4hrs initiated earlier. And PVR <150 x3, so scans discontinued.    - He has some difficulty managing the urinal    - needs assistance but is able to transfer to [facility]    - Still uses condom catheter at night, in part for continence care/to protect his skin given his buttock wounds.  - Continue timed voids           At risk for constipation  Assessment & Plan  - Stooling adequately recently; did have some incontinence on acute now improved  - Close continent care given buttock wounds  - Last BM 7/30 and continent  - Colace 100mg BID  - PRN miralax, Senna and suppository    Acute pain  Assessment & Plan  Controlled   - Tylenol 975 mg q8h PRN  - Oxycodone 2.5 mg q8h PRN, wean as possible   - Last used 7/19.    - Can discontinue at discharge.  - Resumed Gabapentin 100mg Q8hr due to increased headaches and irritability since discontinuing.    At risk for venous thromboembolism (VTE)  Assessment & Plan  - On Warfarin/Lovenox bridge now    Impaired mobility and activities of daily living  Assessment & Plan  - Rehabilitation medicine physician for daily monitoring of care, 24 hour availability for acute medical issues, medication management, and therapeutic and diagnostic assessments.  - 24 hour rehabilitation nursing 7 days per week for: management/teaching of medications, bowel/bladder routine, skin care.  - PT, OT for 2-3 hours per day, 5 to 6 days per week; 15 hours per week  - Rehabilitation Psychology as needed for adjustment and coping  - MSW for barriers to discharge, community resources, and family support  - Discharge planning following to help ensure a safe and efficient discharge  -7/23 teams: Biggest ADL barriers per OT are toileting and applying  to LLE    -7/30 teams: Patient is getting agitated with PT therapies, as he is reaching a plateau. Pt enjoys SLP and cognitive exercises with tablet.  Discussions are ongoing for his dispo planning- ARC admin have meetings with a few SNFs to discuss his case. CM is actively working on retrieving his belongings from [facility]. Guardianship court date still set for 8/27.        History of stroke  Assessment & Plan  - History of old left temporal and parietal lobe cortical infarcts, also involving the insula and left occipital lobe as well as small right posterior parietal lobe. Stable on most recent CT head on 5/16/24.   - ASA, and statin for secondary prevention  - Optimal BP control  - Monitor neuro exam - hx of LV thrombus    - See that entry  - OP neuro follow-up     Bipolar affective disorder (HCC)  Assessment & Plan  Mood acceptable; continue meds as outlined   Supportive counseling  NeuroPsychology consult while in ARC if available for support  Counseled on and continue to encourage deep breathing/relaxation/behavioral management techniques  - Mirtazapine 15 mg qHs  - Will consult Psych before increasing Sertraline for better mood regulation  - Lamictal 50mg BID  - Depakote ER 1250mg qday   - Outpatient psychiatry follow-up    Episodic headache  Assessment & Plan  Chronic issue.  Seemed to worsen with increased irritability after discontinuing gabapentin  Resumed gabapentin  Received [facility] once during stay with middling results  Sleep is fairly consistent - monitoring logs  Headache this AM, happens on and off.    Cardiomyopathy (HCC)  Assessment & Plan  ECHO on 6/10/2024 showed LVEF 40-45% with mild global hypokinesis   Status post NM stress test on 6/11/2024 which showed: a large, mild, fixed defect in the inferior wall, possibly due to diaphragmatic attenuation artifact, there is a small area of partial reversibility in the inferior apical wall suggestive of ischemia    Elevated by cardiology, etiology felt to be possibly secondary to stress-induced cardiomyopathy, with apical thrombus  Cardiac catheterization deferred given the lack of any significant  ischemia, and no current cardiac symptoms  Continue with medical management with aspirin, statin, beta-blocker, ARB  Monitor volume status, remains euvolemic off of diuretics   Outpatient follow-up with cardiology    Traumatic brain injury ([facility])  Assessment & Plan  See neurocog impairment     Carnitine deficiency ([facility])  Assessment & Plan  - Carnitine replacement  - Appreciate medicine management      History of seizures  Assessment & Plan  - Depakote ER, lamotrigine, zonisamide  - Outpatient follow-up with [facility] neurology    Left ventricular thrombus  Assessment & Plan  - Visualized on echocardiogram on 6/10/24: spherical 1.5 x 1.3 cm thrombus at the LV apex.   - Was started on rivaroxaban, but patient does not appear to have insurance coverage for prescriptions   - Xarelto, eliquis, and pradaxa likely cost prohibitive per IM   - 7/29 transitioned to warfarin with lovenox bridge.   - Daily INRs until therapeutic   - Management as per IM.  - Outpatient follow-up with cardiology    Benign essential hypertension  Assessment & Plan  - Toprol, losartan  - Appreciate medicine management    Human immunodeficiency virus (HIV) infection ([facility])  Assessment & Plan  - Continue anti-retroviral treatment  - Appreciate medicine management during ARC course  - OP ID follow-up           Health Maintenance  #Delirium/Sleep: Optimize sleep/wake cycle and maintain delirium precautions. + Remeron 15 mg  and melatonin 6 mg  #Pain: Tylenol PRN   #Bowel: Continent. Last BM 7/30. On Colace BID w/ Senna,Miralax,Dulcolax PRN  #Bladder: Voiding and continent. Condom cath during night.  #Skin/Pressure Injury Prevention: Turn Q2hr in bed, with weight shifts [id number] in wheelchair.  #DVT Prophylaxis: Xarelto  #GI Prophylaxis: not indicated  #Code Status: Full code  #FEN: Cardiac diet + Ensure at bfast/dinner  #Dispo: [facility] pending confirmation from acute hospital/SNFs/CM. Guardianship meeting now set for 8/27. Teams 7/30: meeting with multiple SNFs  planned with ARC admin. CM working actively to retrieve patient's belongings. Pt is getting agitated with PT, but actively engaged with SLP.      Objective:    Functional Update:  PT: sup bed mobility, CGA-CS for transfers, ind wheelchair mobility   OT: ind eating, setup oral hygiene, sup bathing, sup UBD, mod-maxA LBD, min-modA footwear, sup toileting, sup toilet transfer  SLP: modA comprehension/expression/memory, maxA prolbem solving, Sup social interaction    Allergies per EMR    Physical Exam:  Temp:  [97.7 °F (36.5 °C)-98.5 °F (36.9 °C)] 97.7 °F (36.5 °C)  HR:  [68-89] 68  Resp:  [17-18] 18  BP: (108-142)/(63-74) 108/74  Oxygen Therapy  SpO2: 96 %    Gen: No acute distress, Well-nourished, well-appearing.  HEENT: Moist mucus membranes, Normocephalic/Atraumatic  Cardiovascular: Regular rate, rhythm, S1/S2. Distal pulses palpable  Heme/Extr: No edema  Pulmonary: Non-labored breathing. Lungs CTAB  : No fernandes  GI: Soft, non-tender, non-distended. BS+  Integumentary: Skin is warm, dry.   Neuro: Sleeping, but easily arousable.  Psych: Normal mood and affect.     Diagnostic Studies: Reviewed, no new imaging    Laboratory:    Reviewed. INR 1.1  Results from last 7 days   Lab Units 07/31/24  0501 07/25/24  0518   HEMOGLOBIN g/dL 10.2* 10.5*   HEMATOCRIT % 34.6* 36.0*   WBC Thousand/uL 6.59 6.34     Results from last 7 days   Lab Units 07/31/24  0501 07/25/24  0518   BUN mg/dL 21 20   POTASSIUM mmol/L 4.0 4.0   CHLORIDE mmol/L 104 105   CREATININE mg/dL 0.88 0.85     Results from last 7 days   Lab Units 07/30/24  0642 07/29/24  1102   PROTIME seconds 13.8 14.3   INR  1.07 1.12        Patient Active Problem List   Diagnosis    Bipolar affective disorder (HCC)    Substance abuse (HCC)    Human immunodeficiency virus (HIV) infection (HCC)    History of stroke    Benign essential hypertension    Positive laboratory testing for human immunodeficiency virus (HCC)    Hypertension    Unspecified vitamin D deficiency     Tobacco abuse    Cerebrovascular accident (CVA) (HCC)    Left ventricular thrombus    History of seizures    Carnitine deficiency (HCC)    Above-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)    Traumatic brain injury (HCC)    Impaired mobility and activities of daily living    At risk for venous thromboembolism (VTE)    Acute pain    At risk for constipation    Urinary incontinence    Patient incapable of making informed decisions    Pressure injury of buttock, stage 3 (HCC)    Adjustment disorder with mixed anxiety and depressed mood    Neurocognitive disorder    Cardiomyopathy (HCC)    Episodic headache         Medications  Current Facility-Administered Medications   Medication Dose Route Frequency Provider Last Rate    acetaminophen  975 mg Oral TID PRN José Salcido MD      aspirin  81 mg Oral Daily Lorrie Barillas PA-C      atorvastatin  80 mg Oral Daily With Dinner Lorrie Barillas PA-C      bictegravir-emtricitab-tenofovir alafenamide  1 tablet Oral Daily With Breakfast Lorrie Barillas PA-C      bisacodyl  10 mg Rectal Daily PRN Lorrie Barillas PA-C      diphenhydrAMINE  25 mg Oral Q6H PRN Lorrie Barillas PA-C      divalproex sodium  1,250 mg Oral Daily Lorrie Barillas PA-C      docusate sodium  100 mg Oral BID Ashley Depadua, MD      dolutegravir  50 mg Oral Daily Lorrie Barillas PA-C      enoxaparin  1 mg/kg Subcutaneous Q24H CHARLIE Gordon      gabapentin  100 mg Oral Q8H Ashley Depadua, MD      lamoTRIgine  50 mg Oral BID Lorrie Barillas PA-C      levOCARNitine  1,000 mg/day Oral TID With Meals Lorrie Barillas PA-C      losartan  50 mg Oral Daily Lorrie Barillas PA-C      melatonin  6 mg Oral HS Lorrie Barillas PA-C      metoprolol succinate  25 mg Oral Daily CHARLIE Mcclelland      mirtazapine  15 mg Oral HS Lorrie Barillas PA-C      ondansetron  4 mg Oral Q6H PRN Lorrie Barillas PA-C      oxyCODONE  2.5 mg Oral Q8H  PRN Raimundo Riley MD      polyethylene glycol  17 g Oral Daily PRN Lorrie Barillas PA-C      senna  1 tablet Oral HS PRN Lorrie Barillas PA-C      sertraline  75 mg Oral Daily Lorrie Barillas PA-C      tamsulosin  0.4 mg Oral Daily With Dinner Lorrie Barillas PA-C      warfarin  5 mg Oral Daily (warfarin) Lorrie Barillas PA-C      zonisamide  400 mg Oral Daily Lorrie Barillas PA-C            ** Please Note: Fluency Direct voice to text software may have been used in the creation of this document. **

## 2024-07-31 NOTE — PROGRESS NOTES
"   07/31/24 1330   Pain Assessment   Pain Assessment Tool 0-10   Pain Score No Pain   Restrictions/Precautions   Precautions Bed/chair alarms;Fall Risk;Aphasia;Supervision on toilet/commode   LLE Weight Bearing Per Order NWB   Lifestyle   Autonomy \"I dont feel good.\"   Oral Hygiene   Type of Assistance Needed Set-up / clean-up   Comment bed level   Oral Hygiene CARE Score 5   Shower/Bathe Self   Type of Assistance Needed Supervision   Comment bed level ADL at this time. sponge bath in HOB raised supine. does not want to remove pure wick at this time.   Shower/Bathe Self CARE Score 4   Upper Body Dressing   Comment pt requests [facility]   Assessment   Treatment Assessment today after multiple attempts pt agreeable to participate in bed level ADL. supportive friend emanuel present at 12:30, pt reports that he didnt feel well and he had been asleep most of the morning. he was jsut finishing his lunch. checked back multiple times. pt engages in self directed ADL at bed level with set up. session limited to \"not feeling well.\" pt states \"ill be ready for tomorrow.\"   OT Therapy Minutes   OT Time In 1330   OT Time Out 1400   OT Total Time (minutes) 30   OT Mode of treatment - Individual (minutes) 30   OT Mode of treatment - Concurrent (minutes) 0   OT Mode of treatment - Group (minutes) 0   OT Mode of treatment - Co-treat (minutes) 0   OT Mode of Treatment - Total time(minutes) 30 minutes   OT Cumulative Minutes 1814   Therapy Time missed   Time missed? Yes   Amount of time missed 60   Reason for time missed Extreme fatigue  (refused)   Time(s) multiple attempts made 1230, 1300,1330       "

## 2024-07-31 NOTE — PROGRESS NOTES
07/31/24 1400   Pain Assessment   Pain Assessment Tool 0-10   Pain Score 5   Pain Location/Orientation Location: Head   Pain Onset/Description Onset: Ongoing   Effect of Pain on Daily Activities could not engage in ST this date, declined several task suggestions   Patient's Stated Pain Goal 5   Therapy Time missed   Time missed? Yes   Amount of time missed 30   Reason for time missed Illness  (pt reporting headache, declining several task suggestions)

## 2024-07-31 NOTE — ASSESSMENT & PLAN NOTE
Evaluated by neuropsychology and found to not have capacity  Guardianship in progress meeting rescheduled for 8/27

## 2024-07-31 NOTE — ASSESSMENT & PLAN NOTE
Pt reports a history of migraines.  It is associated with photophobia.  He is unable to take triptans due to a history of stroke.  Given Nurtec 7/21/24.  Headache when he woke up this am , RN to provide Tylenol for now monitor for resolution

## 2024-07-31 NOTE — PLAN OF CARE
Problem: PAIN - ADULT  Goal: Verbalizes/displays adequate comfort level or baseline comfort level  Description: Interventions:  - Encourage patient to monitor pain and request assistance  - Assess pain using appropriate pain scale  - Administer analgesics based on type and severity of pain and evaluate response  - Implement non-pharmacological measures as appropriate and evaluate response  - Consider cultural and social influences on pain and pain management  - Notify physician/advanced practitioner if interventions unsuccessful or patient reports new pain  Outcome: Progressing     Problem: INFECTION - ADULT  Goal: Absence or prevention of progression during hospitalization  Description: INTERVENTIONS:  - Assess and monitor for signs and symptoms of infection  - Monitor lab/diagnostic results  - Monitor all insertion sites, i.e. indwelling lines, tubes, and drains  - Monitor endotracheal if appropriate and nasal secretions for changes in amount and color  - [place] appropriate cooling/warming therapies per order  - Administer medications as ordered  - Instruct and encourage patient and family to use good hand hygiene technique  - Identify and instruct in appropriate isolation precautions for identified infection/condition  Outcome: Progressing

## 2024-08-01 LAB
ANION GAP SERPL CALCULATED.3IONS-SCNC: 6 MMOL/L (ref 4–13)
BASOPHILS # BLD AUTO: 0.05 THOUSANDS/ÂΜL (ref 0–0.1)
BASOPHILS NFR BLD AUTO: 1 % (ref 0–1)
BUN SERPL-MCNC: 16 MG/DL (ref 5–25)
CALCIUM SERPL-MCNC: 9.4 MG/DL (ref 8.4–10.2)
CHLORIDE SERPL-SCNC: 104 MMOL/L (ref 96–108)
CO2 SERPL-SCNC: 29 MMOL/L (ref 21–32)
CREAT SERPL-MCNC: 0.95 MG/DL (ref 0.6–1.3)
EOSINOPHIL # BLD AUTO: 0.27 THOUSAND/ÂΜL (ref 0–0.61)
EOSINOPHIL NFR BLD AUTO: 4 % (ref 0–6)
ERYTHROCYTE [DISTWIDTH] IN BLOOD BY AUTOMATED COUNT: 15.5 % (ref 11.6–15.1)
GFR SERPL CREATININE-BSD FRML MDRD: 85 ML/MIN/1.73SQ M
GLUCOSE P FAST SERPL-MCNC: 91 MG/DL (ref 65–99)
GLUCOSE SERPL-MCNC: 91 MG/DL (ref 65–140)
HCT VFR BLD AUTO: 39.3 % (ref 36.5–49.3)
HGB BLD-MCNC: 11.5 G/DL (ref 12–17)
IMM GRANULOCYTES # BLD AUTO: 0.02 THOUSAND/UL (ref 0–0.2)
IMM GRANULOCYTES NFR BLD AUTO: 0 % (ref 0–2)
INR PPP: 1.24 (ref 0.84–1.19)
LYMPHOCYTES # BLD AUTO: 2 THOUSANDS/ÂΜL (ref 0.6–4.47)
LYMPHOCYTES NFR BLD AUTO: 31 % (ref 14–44)
MCH RBC QN AUTO: 28.5 PG (ref 26.8–34.3)
MCHC RBC AUTO-ENTMCNC: 29.3 G/DL (ref 31.4–37.4)
MCV RBC AUTO: 97 FL (ref 82–98)
MONOCYTES # BLD AUTO: 0.69 THOUSAND/ÂΜL (ref 0.17–1.22)
MONOCYTES NFR BLD AUTO: 11 % (ref 4–12)
NEUTROPHILS # BLD AUTO: 3.39 THOUSANDS/ÂΜL (ref 1.85–7.62)
NEUTS SEG NFR BLD AUTO: 53 % (ref 43–75)
NRBC BLD AUTO-RTO: 0 /100 WBCS
PLATELET # BLD AUTO: 421 THOUSANDS/UL (ref 149–390)
PMV BLD AUTO: 8 FL (ref 8.9–12.7)
POTASSIUM SERPL-SCNC: 4.5 MMOL/L (ref 3.5–5.3)
PROTHROMBIN TIME: 15.4 SECONDS (ref 11.6–14.5)
RBC # BLD AUTO: 4.04 MILLION/UL (ref 3.88–5.62)
SODIUM SERPL-SCNC: 139 MMOL/L (ref 135–147)
WBC # BLD AUTO: 6.42 THOUSAND/UL (ref 4.31–10.16)

## 2024-08-01 PROCEDURE — 99232 SBSQ HOSP IP/OBS MODERATE 35: CPT | Performed by: PHYSICIAN ASSISTANT

## 2024-08-01 PROCEDURE — 97530 THERAPEUTIC ACTIVITIES: CPT

## 2024-08-01 PROCEDURE — 85025 COMPLETE CBC W/AUTO DIFF WBC: CPT | Performed by: PHYSICIAN ASSISTANT

## 2024-08-01 PROCEDURE — 97535 SELF CARE MNGMENT TRAINING: CPT

## 2024-08-01 PROCEDURE — 80048 BASIC METABOLIC PNL TOTAL CA: CPT | Performed by: PHYSICIAN ASSISTANT

## 2024-08-01 PROCEDURE — 99233 SBSQ HOSP IP/OBS HIGH 50: CPT | Performed by: PHYSICAL MEDICINE & REHABILITATION

## 2024-08-01 PROCEDURE — 85610 PROTHROMBIN TIME: CPT | Performed by: PHYSICIAN ASSISTANT

## 2024-08-01 PROCEDURE — 97110 THERAPEUTIC EXERCISES: CPT

## 2024-08-01 RX ORDER — WARFARIN SODIUM 7.5 MG/1
7.5 TABLET ORAL
Status: DISCONTINUED | OUTPATIENT
Start: 2024-08-01 | End: 2024-08-07

## 2024-08-01 RX ADMIN — LEVOCARNITINE 330 MG: 1 SOLUTION ORAL at 08:25

## 2024-08-01 RX ADMIN — BICTEGRAVIR SODIUM, EMTRICITABINE, AND TENOFOVIR ALAFENAMIDE FUMARATE 1 TABLET: 50; 200; 25 TABLET ORAL at 08:24

## 2024-08-01 RX ADMIN — LAMOTRIGINE 50 MG: 25 TABLET ORAL at 08:21

## 2024-08-01 RX ADMIN — DIVALPROEX SODIUM 1250 MG: 500 TABLET, EXTENDED RELEASE ORAL at 08:23

## 2024-08-01 RX ADMIN — DOLUTEGRAVIR SODIUM 50 MG: 50 TABLET, FILM COATED ORAL at 08:24

## 2024-08-01 RX ADMIN — SERTRALINE HYDROCHLORIDE 75 MG: 50 TABLET ORAL at 08:22

## 2024-08-01 RX ADMIN — LOSARTAN POTASSIUM 50 MG: 50 TABLET, FILM COATED ORAL at 08:19

## 2024-08-01 RX ADMIN — METOPROLOL SUCCINATE 25 MG: 25 TABLET, EXTENDED RELEASE ORAL at 08:21

## 2024-08-01 RX ADMIN — ENOXAPARIN SODIUM 80 MG: 80 INJECTION SUBCUTANEOUS at 08:26

## 2024-08-01 RX ADMIN — LAMOTRIGINE 50 MG: 25 TABLET ORAL at 17:10

## 2024-08-01 RX ADMIN — WARFARIN SODIUM 7.5 MG: 7.5 TABLET ORAL at 17:10

## 2024-08-01 RX ADMIN — TAMSULOSIN HYDROCHLORIDE 0.4 MG: 0.4 CAPSULE ORAL at 17:10

## 2024-08-01 RX ADMIN — Medication 6 MG: at 21:34

## 2024-08-01 RX ADMIN — DOCUSATE SODIUM 100 MG: 100 CAPSULE, LIQUID FILLED ORAL at 08:20

## 2024-08-01 RX ADMIN — GABAPENTIN 100 MG: 100 CAPSULE ORAL at 14:40

## 2024-08-01 RX ADMIN — ASPIRIN 81 MG: 81 TABLET, COATED ORAL at 08:20

## 2024-08-01 RX ADMIN — LEVOCARNITINE 330 MG: 1 SOLUTION ORAL at 17:11

## 2024-08-01 RX ADMIN — ZONISAMIDE 400 MG: 100 CAPSULE ORAL at 08:25

## 2024-08-01 RX ADMIN — GABAPENTIN 100 MG: 100 CAPSULE ORAL at 21:34

## 2024-08-01 RX ADMIN — GABAPENTIN 100 MG: 100 CAPSULE ORAL at 05:24

## 2024-08-01 RX ADMIN — LEVOCARNITINE 330 MG: 1 SOLUTION ORAL at 14:41

## 2024-08-01 RX ADMIN — ACETAMINOPHEN 975 MG: 325 TABLET, FILM COATED ORAL at 05:25

## 2024-08-01 RX ADMIN — ATORVASTATIN CALCIUM 80 MG: 80 TABLET, FILM COATED ORAL at 17:10

## 2024-08-01 RX ADMIN — DOCUSATE SODIUM 100 MG: 100 CAPSULE, LIQUID FILLED ORAL at 17:10

## 2024-08-01 RX ADMIN — MIRTAZAPINE 15 MG: 15 TABLET, FILM COATED ORAL at 21:34

## 2024-08-01 NOTE — PROGRESS NOTES
08/01/24 1300   Pain Assessment   Pain Assessment Tool 0-10   Pain Score No Pain   Restrictions/Precautions   Precautions Bed/chair alarms;Aphasia;Fall Risk;Supervision on toilet/commode   Braces or Orthoses Other (Comment)  (left AKA )   Subjective   Subjective cooperative and ready for therapy   Roll Left and Right   Type of Assistance Needed Independent   Roll Left and Right CARE Score 6   Sit to Lying   Type of Assistance Needed Independent   Sit to Lying CARE Score 6   Lying to Sitting on Side of Bed   Type of Assistance Needed Independent   Lying to Sitting on Side of Bed CARE Score 6   Sit to Stand   Type of Assistance Needed Supervision   Comment CS in RW   Sit to Stand CARE Score 4   Bed-Chair Transfer   Type of Assistance Needed Supervision   Comment sit pivot   Chair/Bed-to-Chair Transfer CARE Score 4   Transfer Bed/Chair/Wheelchair   Adaptive Equipment Roller Walker   Stand Pivot Minimal Assist   Findings Prcaticed SPT x 6 with RW and Min A both directions   Car Transfer   Type of Assistance Needed Supervision   Car Transfer CARE Score 4   Wheel 50 Feet with Two Turns   Type of Assistance Needed Independent   Wheel 50 Feet with Two Turns CARE Score 6   Wheel 150 Feet   Type of Assistance Needed Independent   Wheel 150 Feet CARE Score 6   Wheelchair mobility   Does the patient use a wheelchair? 1. Yes   Type of Wheelchair Used 1. Manual   Therapeutic Interventions   Strengthening Sidelying LLE Hip Abd & ext 4 x 15 ; core strengthening at edge of mat table using 6lb bal to perform trunk rotations, press over head and reaching in multiple plains with B UE togther holding ball; repeated sidelying to sitting 2 bouts of 5 to each side; 3/4 stands from w/c 3 x 10 reps   Flexibility prone lying x 10 min; Passive prone hip flex stretch B LE 4 bouts 45 sec each   Equipment Use   NuStep lvl 2 x 10 min   Assessment   Treatment Assessment Pt participating well today. Continued with core strengthening and  stretching ex's. Worked with RW on stand pivots for first time to assess safety, balance and ability. Pt performing very well with good control of RW and only one minor LOB on first attempt. Improved with practice. Sit pivot transfers are very safe, with pt demo good setup of w/c.   PT Therapy Minutes   PT Time In 1300   PT Time Out 1430   PT Total Time (minutes) 90   PT Mode of treatment - Individual (minutes) 90   PT Mode of treatment - Concurrent (minutes) 0   PT Mode of treatment - Group (minutes) 0   PT Mode of treatment - Co-treat (minutes) 0   PT Mode of Treatment - Total time(minutes) 90 minutes   PT Cumulative Minutes 2113   Therapy Time missed   Time missed? No

## 2024-08-01 NOTE — PLAN OF CARE
Problem: PAIN - ADULT  Goal: Verbalizes/displays adequate comfort level or baseline comfort level  Description: Interventions:  - Encourage patient to monitor pain and request assistance  - Assess pain using appropriate pain scale  - Administer analgesics based on type and severity of pain and evaluate response  - Implement non-pharmacological measures as appropriate and evaluate response  - Consider cultural and social influences on pain and pain management  - Notify physician/advanced practitioner if interventions unsuccessful or patient reports new pain  Outcome: Progressing     Problem: INFECTION - ADULT  Goal: Absence or prevention of progression during hospitalization  Description: INTERVENTIONS:  - Assess and monitor for signs and symptoms of infection  - Monitor lab/diagnostic results  - Monitor all insertion sites, i.e. indwelling lines, tubes, and drains  - Monitor endotracheal if appropriate and nasal secretions for changes in amount and color  - [place] appropriate cooling/warming therapies per order  - Administer medications as ordered  - Instruct and encourage patient and family to use good hand hygiene technique  - Identify and instruct in appropriate isolation precautions for identified infection/condition  Outcome: Progressing  Goal: Absence of fever/infection during neutropenic period  Description: INTERVENTIONS:  - Monitor WBC    Outcome: Progressing     Problem: SAFETY ADULT  Goal: Patient will remain free of falls  Description: INTERVENTIONS:  - Educate patient/family on patient safety including physical limitations  - Instruct patient to call for assistance with activity   - Consult OT/PT to assist with strengthening/mobility   - Keep Call bell within reach  - Keep bed low and locked with side rails adjusted as appropriate  - Keep care items and personal belongings within reach  - Initiate and maintain comfort rounds  - Make Fall Risk Sign visible to staff  - Offer Toileting every 2 Hours,  in advance of need  - Initiate/Maintain alarm  - Obtain necessary fall risk management equipment:   - Apply yellow socks and bracelet for high fall risk patients  - Consider moving patient to room near nurses station  Outcome: Progressing  Goal: Maintain or return to baseline ADL function  Description: INTERVENTIONS:  -  Assess patient's ability to carry out ADLs; assess patient's baseline for ADL function and identify physical deficits which impact ability to perform ADLs (bathing, care of mouth/teeth, toileting, grooming, dressing, etc.)  - Assess/evaluate cause of self-care deficits   - Assess range of motion  - Assess patient's mobility; develop plan if impaired  - Assess patient's need for assistive devices and provide as appropriate  - Encourage maximum independence but intervene and supervise when necessary  - Involve family in performance of ADLs  - Assess for home care needs following discharge   - Consider OT consult to assist with ADL evaluation and planning for discharge  - Provide patient education as appropriate  Outcome: Progressing  Goal: Maintains/Returns to pre admission functional level  Description: INTERVENTIONS:  - Perform AM-PAC 6 Click Basic Mobility/ Daily Activity assessment daily.  - Set and communicate daily mobility goal to care team and patient/family/caregiver.   - Collaborate with rehabilitation services on mobility goals if consulted  - Perform Range of Motion 3 times a day.  - Reposition patient every 2 hours.  - Dangle patient 3 times a day  - Stand patient 3 times a day  - Ambulate patient 3 times a day  - Out of bed to chair 3 times a day   - Out of bed for meals 3 times a day  - Out of bed for toileting  - Record patient progress and toleration of activity level   Outcome: Progressing     Problem: DISCHARGE PLANNING  Goal: Discharge to home or other facility with appropriate resources  Description: INTERVENTIONS:  - Identify barriers to discharge w/patient and caregiver  -  Arrange for needed discharge resources and transportation as appropriate  - Identify discharge learning needs (meds, wound care, etc.)  - Arrange for interpretive services to assist at discharge as needed  - Refer to Case Management Department for coordinating discharge planning if the patient needs post-hospital services based on physician/advanced practitioner order or complex needs related to functional status, cognitive ability, or social support system  Outcome: Progressing     Problem: Prexisting or High Potential for Compromised Skin Integrity  Goal: Skin integrity is maintained or improved  Description: INTERVENTIONS:  - Identify patients at risk for skin breakdown  - Assess and monitor skin integrity  - Assess and monitor nutrition and hydration status  - Monitor labs   - Assess for incontinence   - Turn and reposition patient  - Assist with mobility/ambulation  - Relieve pressure over bony prominences  - Avoid friction and shearing  - Provide appropriate hygiene as needed including keeping skin clean and dry  - Evaluate need for skin moisturizer/barrier cream  - Collaborate with interdisciplinary team   - Patient/family teaching  - Consider wound care consult   Outcome: Progressing

## 2024-08-01 NOTE — PROGRESS NOTES
Physical Medicine and Rehabilitation Progress Note  Sunil Patel 63 y.o. male MRN: [id number]  Unit/Bed#: [facility] 451-01 Encounter: [id number]    To Review: Sunil Patel is a 63 y.o. male who  has a past medical history of Acute lower limb ischemia (06/08/2024), Anxiety, Depression, HIV disease (HCC), Substance abuse (HCC), and Suicide attempt (HCC). who presented to the [facility] on 6/7/24 from long term for increased LLE swelling and pain and was found to have a left external iliac artery occlusion and acute limb ischemia. 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 Patient had TTE on 6/10/24 showing LV apex thrombus. On 6/11/24 patient had pharmacologic nuclear stress test/SPECT scan which did not show any significant areas of ischemia with EF 40-45% and recommended continuing A/C for LV apical thrombus. His course was complicated by b/l buttock unstageable pressure ulcers, urinary and fecal incontinence, pain, and significant decline in ADLs and mobility. Patient has been continued on Xarelto for anticoagulation, as well as ASA and statin. Patient has a history of TBI, with baseline nonfluent aphasia and forgetfulness. He was evaluated by neuropsychology on 6/27/24, and deemed to not have capacity to make fully informed medical decisions. Therefore, the guardianship process was initiated as of 7/5/24. He was admitted to the [facility] on 7/6.     Chief Complaint: feeling well    Interval History/Subjective:  No acute overnight events. Patient reports he had a headache overnight, which Tylenol helped relieve. Currently he feels well and is eating breakfast. Otherwise, he denies any fevers, chills, chest pain, sob, abdominal pain, nausea or vomiting. His last BM was 7/30.     ROS:  10 point review  "of systems is otherwise negative except for what is noted above.      Today's Changes:  Warfarin 7.5 mg tonight per IM. Recheck labs in AMINR 1.24.   Continue current plan  CM meeting later today to discuss with other facilities for housing - will follow up    Assessment/Plan:    * 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 Underwent L femoral thrombectomy and AKA with vascular surgery   - [facility] sx follow-up 7/10 - L AKA incision site well-healed, staples taken out at the bedside this morning  - [facility] will be vendor - Patient now has .  - Monitor for hip flexion/abduction tightness. Stretches in therapy  - Now on Coumadin with hx of LV thrombus and PAOD   - PT/OT/SLP (to work on carry over strategies) 3-5 hours/day, 5-7 days/week.    - Goals are Ind-Sup at a wheelchair level.   - Outpatient f/u with [facility] and Vascular  - Continue patient training with  placement  - Continue gabapentin - doesn't do too much for phantom limb sensation, but that doesn't bother him or cause him pain currently.   - Patient still frustrated given circumstances; will continue gabapentin for anxiety    Neurocognitive disorder  Assessment & Plan  Has been appropriate and cooperative throughout this stay without any behavioral issues on the rehab unit to date.    - Does have decreased frustration tolerance   - So far redirectable.  Hx of TBI and L MCA CVA, psychiatric d/o, seizure d/o  - Neuropsych assessment 6/27/24 - \"diffuse cognitive dysfunction and on a measure assessing awareness of personal health status and ability to evaluate health problems, handle medical emergencies and take safety precautions, patient performed in the IMPAIRED range of functioning. During this encounter, patient does not appear to have capacity to make fully informed medical decisions.\"  MMSE 4/28 - severely impaired  - Guardianship " process initiated on acute - follow-up by CM/Admin  - Status: some expressive and possible some receptive aphasia.  He can be difficult to follow at times likely due to a mixture of aphasia, tangentiality, mild lability, and impaired memory; lability with hx of possible bipolar d/o  - Neuropsych Med review: Depakote, Lamictal, Remeron, Zoloft, Melatonin, gabapentin, PRN oxy 2.5mg TID   - Monitor neuro-exam, wakefulness, mood, cognition, insight into deficits and safety awareness   - Monitor and ensure optimal management electrolytes, nutrition, and hydration  - Monitor for signs or symptoms of infection, medication intolerances, other systemic etiologies  - Additional labs, imaging, specialist follow-up as needed per primary team currently   - Overstimulation precautions, frequent re-orientation, re-direction, re-assurance  - Optimal mood, pain, and sleep management  - If impaired sleep or behavior recommend sleep log and agitation monitoring    - Limit sedating medications when possible  - Fall precautions - if needed increase rounding or consider virtual sitter or in-person sitter  - For routine restlessness, anxiety, irritability focus on non-pharmacologic management    - Hold benzo's with increased risk paradoxical reaction and possibility of limiting cognitive recovery  - Continue SLP and interdisciplinary care  - OP neuro and PCP         Adjustment disorder with mixed anxiety and depressed mood  Assessment & Plan  - Related to recent release from incarceration, new AKA and uncertainty of future disposition, all in the setting of impaired cognition related to prior strokes and TBI  - Behavioral techniques for symptom management  - Neuropsychology consult as available  - Given his circumstances, increased frustration is expected. Can consider Psych consult if symptoms continue to worsen to adjust mood stabilizing medications. Will try nonpharmacologic approaches first with more frequent  "conversations/redirections   - Continue gabapentin 100 q8 for now - tolerating well. Mood fluctuates day to day but general frustration is expected on his behalf.    Pressure injury of buttock, stage 3 (HCC)  Assessment & Plan  Stable to improving  - Wound care consulted and following weekly  - POA L buttock pressure injury unstageable has healed.  - POA R buttock pressure injury is now Stage 3, and improving.   - Continue local care with border foam.  - Recommend ROHO cushion in chair when out of bed instead   - Preventative hydraguard to bilateral heels BID and PRN.   - P500  - Monitor clinically for breakdown, frequent turns  - reviewed picture of wound today. It is healing well. Continue to monitor    Patient incapable of making informed decisions  Assessment & Plan  - History of remote TBI and prior strokes with comorbid psychiatric history.  - Evaluated by neuropsychology, Dr. Dilshad Tatum, PhD on 6/27/24, found to have \"diffuse cognitive dysfunction and on a measure assessing awareness of personal health status and ability to evaluate health problems, handle medical emergencies and take safety precautions, patient performed in the IMPAIRED range of functioning. During this encounter, patient does not appear to have capacity to make fully informed medical decisions.\"  - Court-appointed guardianship process has been initiated by acute care CM Katia Kay.    - We have identified two friends who are interested in being patient's guardians and have been involved and invested in patient's care on the ARC.   - They will need to be interviewed by the  involved in this process.    - He has to undergo his hearing on 8/6/2024 first.    - He would ultimately benefit from [facility]/nursing home/memory care unit - he does very well with structure and supervision.  Teams 7/23- Ongoing discussions with case management and social work to dispo patient to stable long-term housing.   7/25- CM still awaiting confirmation " from acute for discharge planning. Likely will not be discharging in the next 24-48 hours  7/26- Guardianship meeting rescheduled to 8/27    Urinary incontinence  Assessment & Plan  - Did have some incontinence on acute - improved with timed voids and consistent assistance with his urinal  - Described as urgency  - Marked improvement on timed voids.   - monitor for retention, incontinence (including overflow incontinence), signs/symptoms of UTI  - Timed Voids and PVRs Q4hrs initiated earlier. And PVR <150 x3, so scans discontinued.    - He has some difficulty managing the urinal    - needs assistance but is able to transfer to [facility]    - Still uses condom catheter at night, in part for continence care/to protect his skin given his buttock wounds.  - Continue timed voids           At risk for constipation  Assessment & Plan  - Stooling adequately recently; did have some incontinence on acute now improved  - Close continent care given buttock wounds  - Last BM 7/30 and continent  - Colace 100mg BID  - PRN miralax, Senna and suppository    Acute pain  Assessment & Plan  Controlled   - Tylenol 975 mg q8h PRN  - Oxycodone 2.5 mg q8h PRN, wean as possible   - Last used 7/19.    - Can discontinue at discharge.  - Resumed Gabapentin 100mg Q8hr due to increased headaches and irritability since discontinuing.    Impaired mobility and activities of daily living  Assessment & Plan  - Rehabilitation medicine physician for daily monitoring of care, 24 hour availability for acute medical issues, medication management, and therapeutic and diagnostic assessments.  - 24 hour rehabilitation nursing 7 days per week for: management/teaching of medications, bowel/bladder routine, skin care.  - PT, OT for 2-3 hours per day, 5 to 6 days per week; 15 hours per week  - Rehabilitation Psychology as needed for adjustment and coping  - MSW for barriers to discharge, community resources, and family support  - Discharge planning following to  help ensure a safe and efficient discharge  -7/23 teams: Biggest ADL barriers per OT are toileting and applying  to LLE    -7/30 teams: Patient is getting agitated with PT therapies, as he is reaching a plateau. Pt enjoys SLP and cognitive exercises with tablet. Discussions are ongoing for his dispo planning- ARC admin have meetings with a few SNFs to discuss his case. CM is actively working on retrieving his belongings from [facility]. Guardianship court date still set for 8/27.        Traumatic brain injury ([facility])  Assessment & Plan  See neurocog impairment     Episodic headache  Assessment & Plan  Chronic issue.  Seemed to worsen with increased irritability after discontinuing gabapentin  Resumed gabapentin  Received nurtec once during stay with middling results  Sleep is fairly consistent - monitoring logs. Can discontinue after another 1-2 nights  Headache early this AM, but relieved with tylenol.     Cardiomyopathy ([facility])  Assessment & Plan  ECHO on 6/10/2024 showed LVEF 40-45% with mild global hypokinesis   Status post NM stress test on 6/11/2024 which showed: a large, mild, fixed defect in the inferior wall, possibly due to diaphragmatic attenuation artifact, there is a small area of partial reversibility in the inferior apical wall suggestive of ischemia    Elevated by cardiology, etiology felt to be possibly secondary to stress-induced cardiomyopathy, with apical thrombus  Cardiac catheterization deferred given the lack of any significant ischemia, and no current cardiac symptoms  Continue with medical management with aspirin, statin, beta-blocker, ARB  Monitor volume status, remains euvolemic off of diuretics   Outpatient follow-up with cardiology    At risk for venous thromboembolism (VTE)  Assessment & Plan  - On Warfarin/Lovenox bridge now  - 8/1: still subtherapeutic at INR 1.24 - Warfarin 7.5mg tonight with repeat labs in AM  - IM managing    Carnitine deficiency (HCC)  Assessment & Plan  -  Carnitine replacement  - Appreciate medicine management      History of seizures  Assessment & Plan  - Depakote ER, lamotrigine, zonisamide  - Outpatient follow-up with [facility] neurology    Left ventricular thrombus  Assessment & Plan  - Visualized on echocardiogram on 6/10/24: spherical 1.5 x 1.3 cm thrombus at the LV apex.   - Was started on rivaroxaban, but patient does not appear to have insurance coverage for prescriptions   - Xarelto, eliquis, and pradaxa likely cost prohibitive per IM   - 7/29 transitioned to warfarin with lovenox bridge.   - 8/1: still subtherapeutic at INR 1.24. Dose 7.5 mg tonight and recheck labs in AM   - Daily INRs until therapeutic   - Management as per IM.  - Outpatient follow-up with cardiology    Benign essential hypertension  Assessment & Plan  - Toprol, losartan  - Appreciate medicine management    History of stroke  Assessment & Plan  - History of old left temporal and parietal lobe cortical infarcts, also involving the insula and left occipital lobe as well as small right posterior parietal lobe. Stable on most recent CT head on 5/16/24.   - ASA, and statin for secondary prevention  - Optimal BP control  - Monitor neuro exam - hx of LV thrombus    - See that entry  - OP neuro follow-up     Human immunodeficiency virus (HIV) infection (HCC)  Assessment & Plan  - Continue anti-retroviral treatment  - Appreciate medicine management during ARC course  - OP ID follow-up       Bipolar affective disorder (HCC)  Assessment & Plan  Mood acceptable; continue meds as outlined   Supportive counseling  NeuroPsychology consult while in ARC if available for support  Counseled on and continue to encourage deep breathing/relaxation/behavioral management techniques  - Mirtazapine 15 mg qHs  - Will consult Psych before increasing Sertraline for better mood regulation  - Lamictal 50mg BID  - Depakote ER 1250mg qday   - Outpatient psychiatry follow-up        Health Maintenance  #Delirium/Sleep:  Optimize sleep/wake cycle and maintain delirium precautions. + Remeron 15 mg  and melatonin 6 mg  #Pain: Tylenol PRN   #Bowel: Continent. Last BM 7/30. On Colace BID w/ Senna,Miralax,Dulcolax PRN  #Bladder: Voiding and continent. Condom cath during night.  #Skin/Pressure Injury Prevention: Turn Q2hr in bed, with weight shifts [id number] in wheelchair.  #DVT Prophylaxis: Coumadin  #GI Prophylaxis: not indicated  #Code Status: Full code  #FEN: Cardiac diet + Ensure at bfast/dinner  #Dispo: [facility] pending confirmation from acute hospital/SNFs/CM. Guardianship meeting now set for 8/27. Teams 7/30: meeting with multiple SNFs planned with ARC admin. CM working actively to retrieve patient's belongings. Pt is getting agitated with PT, but actively engaged with SLP.      Objective:    Functional Update:  PT: sup bed mobility, sup transfers, toilet transfer mod-max A  OT: Ind oral hygiene, Ind eating, sup bathing, sup UBD, Min LBD, sup footwear  SLP: mod-maxA comprehension, expression, problem solving, memory, sup social interaction    Allergies per EMR    Physical Exam:  Temp:  [97.6 °F (36.4 °C)-98.3 °F (36.8 °C)] 97.6 °F (36.4 °C)  HR:  [88-93] 93  Resp:  [17-20] 20  BP: (119-126)/(72-81) 122/76  Oxygen Therapy  SpO2: 96 %    Physical Exam  Vitals reviewed.   Constitutional:       Appearance: Normal appearance.   HENT:      Head: Normocephalic and atraumatic.      Right Ear: External ear normal.      Left Ear: External ear normal.      Nose: Nose normal.      Mouth/Throat:      Mouth: Mucous membranes are moist.      Pharynx: Oropharynx is clear.   Eyes:      Conjunctiva/sclera: Conjunctivae normal.   Cardiovascular:      Rate and Rhythm: Normal rate and regular rhythm.      Pulses: Normal pulses.      Heart sounds: Normal heart sounds.   Pulmonary:      Effort: Pulmonary effort is normal.      Breath sounds: Normal breath sounds.   Abdominal:      General: Bowel sounds are normal.   Musculoskeletal:      Comments: + L AKA    Skin:     General: Skin is warm and dry.      Capillary Refill: Capillary refill takes less than 2 seconds.   Neurological:      Mental Status: He is alert.   Psychiatric:         Mood and Affect: Mood normal.         Behavior: Behavior normal.          Diagnostic Studies: reviewed, no new imaging  No results found.    Laboratory:    Reviewed 8/1 labs: Hgb stable 11.5, INR 1.24, BMP stable  Results from last 7 days   Lab Units 08/01/24  0517 07/31/24  0501   HEMOGLOBIN g/dL 11.5* 10.2*   HEMATOCRIT % 39.3 34.6*   WBC Thousand/uL 6.42 6.59     Results from last 7 days   Lab Units 08/01/24  0517 07/31/24  0501   BUN mg/dL 16 21   POTASSIUM mmol/L 4.5 4.0   CHLORIDE mmol/L 104 104   CREATININE mg/dL 0.95 0.88     Results from last 7 days   Lab Units 08/01/24  0517 07/31/24  0719 07/30/24  0642   PROTIME seconds 15.4* 14.1 13.8   INR  1.24* 1.10 1.07        Patient Active Problem List   Diagnosis    Bipolar affective disorder (HCC)    Substance abuse (HCC)    Human immunodeficiency virus (HIV) infection ([facility])    History of stroke    Benign essential hypertension    Positive laboratory testing for human immunodeficiency virus (HCC)    Hypertension    Unspecified vitamin D deficiency    Tobacco abuse    Cerebrovascular accident (CVA) (HCC)    Left ventricular thrombus    History of seizures    Carnitine deficiency (HCC)    Above-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)    Traumatic brain injury (HCC)    Impaired mobility and activities of daily living    At risk for venous thromboembolism (VTE)    Acute pain    At risk for constipation    Urinary incontinence    Patient incapable of making informed decisions    Pressure injury of buttock, stage 3 (HCC)    Adjustment disorder with mixed anxiety and depressed mood    Neurocognitive disorder    Cardiomyopathy (HCC)    Episodic headache         Medications  Current Facility-Administered Medications   Medication Dose Route Frequency Provider Last Rate    acetaminophen   975 mg Oral TID PRN José Salcido MD      aspirin  81 mg Oral Daily Lorrie Barillas PA-C      atorvastatin  80 mg Oral Daily With Dinner Lorrie Barillas PA-C      bictegravir-emtricitab-tenofovir alafenamide  1 tablet Oral Daily With Breakfast Lorrie Barillas PA-C      bisacodyl  10 mg Rectal Daily PRN Lorrie Barillas PA-C      diphenhydrAMINE  25 mg Oral Q6H PRN Lorrie Barillas PA-C      divalproex sodium  1,250 mg Oral Daily Lorrie Barillas PA-C      docusate sodium  100 mg Oral BID Ashley Depadua, MD      dolutegravir  50 mg Oral Daily Lorrie Barillas PA-C      enoxaparin  1 mg/kg Subcutaneous Q24H CHARLIE Gordon      gabapentin  100 mg Oral Q8H Ashley Depadua, MD      lamoTRIgine  50 mg Oral BID Lorrie Barillas PA-C      levOCARNitine  1,000 mg/day Oral TID With Meals Lorrie Barillas PA-C      losartan  50 mg Oral Daily Lorrie Barillas PA-C      melatonin  6 mg Oral HS Lorrie Barillas PA-C      metoprolol succinate  25 mg Oral Daily CHARLIE Mcclelland      mirtazapine  15 mg Oral HS Lorrie Barillas PA-C      ondansetron  4 mg Oral Q6H PRN Lorrie Barillas PA-C      oxyCODONE  2.5 mg Oral Q8H PRN Raimundo Riley MD      polyethylene glycol  17 g Oral Daily PRN Lorrie Barillas PA-C      senna  1 tablet Oral HS PRN Lorrie Barillas PA-C      sertraline  75 mg Oral Daily Lorrie Barillas PA-C      tamsulosin  0.4 mg Oral Daily With Dinner Lorrie Barillas PA-C      warfarin  7.5 mg Oral Daily (warfarin) Lorrie Barillas PA-C      zonisamide  400 mg Oral Daily Lorrie Barillas PA-C            ** Please Note: Fluency Direct voice to text software may have been used in the creation of this document. **

## 2024-08-01 NOTE — ASSESSMENT & PLAN NOTE
Noted on echocardiogram 6/10/2024: spherical 1.5 x 1.3 cm thrombus at the LV apex   Initiated on AC with Xarelto however unable to verify insurance coverage.  Pt switched to Coumadin. Lovenox bridge until INR > 2.  Will increase Coumadin to 7.5mg tonight.  Continue to monitor INR

## 2024-08-01 NOTE — ASSESSMENT & PLAN NOTE
Remote history of TBI, previously residing in a group home prior to [facility] sentence.  Evaluated by neuropsychiatry on 6/27/24 and deemed NOT to have medical decision-making capacity  Process for court appointed guardian started on 7/5/24 - CM following   Guardianship hearing 8/27/24.

## 2024-08-01 NOTE — PROGRESS NOTES
"   08/01/24 0830   Pain Assessment   Pain Assessment Tool 0-10   Pain Score No Pain   Restrictions/Precautions   Precautions Bed/chair alarms;Cognitive;Fall Risk;Supervision on toilet/commode   LLE Weight Bearing Per Order NWB   Lifestyle   Autonomy \"sorry about yesterday.\"   Eating   Type of Assistance Needed Independent   Eating CARE Score 6   Oral Hygiene   Type of Assistance Needed Set-up / clean-up   Oral Hygiene CARE Score 5   Shower/Bathe Self   Type of Assistance Needed Supervision   Comment seated tub bench full shower with grab bars. inc time to manage, able to complete all bathing including perineal with weight shifting.   Shower/Bathe Self CARE Score 4   Upper Body Dressing   Type of Assistance Needed Supervision   Upper Body Dressing CARE Score 4   Lower Body Dressing   Type of Assistance Needed Physical assistance   Physical Assistance Level 25% or less   Comment supine bed level. today provided more distance for observation of performance. today with increased time and incidental assistance able to don  over limb. req slight assist for management of strap around waist, had pt get up inot long sit to place strap around back.still dififculty with R UE. able to don pull up breif and pants   Lower Body Dressing CARE Score 3   Putting On/Taking Off Footwear   Type of Assistance Needed Supervision   Comment completed pre tying of shoes, able to slip foot into shoe. in sitting able to doff sock with cross leg tech with UE support in sitting for balance. in supine able to don sock.   Putting On/Taking Off Footwear CARE Score 4   Roll Left and Right   Type of Assistance Needed Independent   Roll Left and Right CARE Score 6   Sit to Lying   Type of Assistance Needed Supervision   Sit to Lying CARE Score 4   Lying to Sitting on Side of Bed   Type of Assistance Needed Supervision   Lying to Sitting on Side of Bed CARE Score 4   Bed-Chair Transfer   Type of Assistance Needed Supervision   Comment Sit piv " to WC.   Chair/Bed-to-Chair Transfer CARE Score 4   Toileting Hygiene   Type of Assistance Needed Physical assistance   Physical Assistance Level 51%-75%   Comment seated for L UE hygiene with lateral weight shift with use fo WC arm for bracing on the R. fair thoroughness today with bowel movement.  did not complete clothing at this time   Toileting Hygiene CARE Score 2   Toilet Transfer   Type of Assistance Needed Supervision   Comment on wide drop arm BSC from WC.   Toilet Transfer CARE Score 4   Assessment   Treatment Assessment Pt participated in skilled OT session focusing on functional ADL retraining, activity tolerance, activity modification, use of DME, and functional transfers. See above for details on ADL function. Pt today demonstrates improved showering ability and initiation on tub bench today with good carryover for tech of transfer. Today with clothing still remains limited with  R UE deficits in coordination, awareness, and strength. pt remains limited by impaired func cog/safety, impaired balance, strength and endurance, warranting continued skilled care to focus on ADL retraining, func transfers, weight shifting for toileting tasks vs standing, UE Strengthening, sacral offloading, and general phase 1 amp edu, in order to decrease burden of care at d/c.   Prognosis Good   OT Therapy Minutes   OT Time In 0830   OT Time Out 1000   OT Total Time (minutes) 90   OT Mode of treatment - Individual (minutes) 90   OT Mode of treatment - Concurrent (minutes) 0   OT Mode of treatment - Group (minutes) 0   OT Mode of treatment - Co-treat (minutes) 0   OT Mode of Treatment - Total time(minutes) 90 minutes   OT Cumulative Minutes 190

## 2024-08-01 NOTE — CASE MANAGEMENT
Leny, Raymundo Silveira, Kika from [facility] and Samantha from [facility] [place] met virtually to discuss dispo planning thus far. Team feels res hab placement would be better for pt with how he is functioning however snf might assist him with reaching inpt to be able to function well in residential programs. LENY continues to search out of state now for snfs willing to accept, Kika searching for residential program, a program in [place] is reviewing clinicals.     Leny continues to follow for dispo planning

## 2024-08-01 NOTE — ASSESSMENT & PLAN NOTE
Pt reports a history of migraines.  It is associated with photophobia.  He is unable to take triptans due to a history of stroke.  Given [facility] 7[facility] - unclear if it was helpful

## 2024-08-01 NOTE — PROGRESS NOTES
[facility]  Progress Note  Name: Sunil Patel I  MRN: [id number]  Unit/Bed#: -01 I Date of Admission: 7/6/2024   Date of Service: 8/1/2024 I Hospital Day: 26    Assessment & Plan   * Above-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)  Assessment & Plan  Presented with left lower extremity acute limb ischemia/extensive left iliofemoral and left infrainguinal embolism requiring left femoral thrombectomy and subsequent AKA on 6/7/24 with vascular surgery.  Incision C/D/I, pain controlled.   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  Initiated on Warfarin 7/29 subtherepeutic will start Wt based Lovenox bridge until INR > 2.0  Rehab and pain control per PMR  Pt has been refusing therapy through the weekend.   Pt has met his rehab goals and will be discharged when able.    Episodic headache  Assessment & Plan  Pt reports a history of migraines.  It is associated with photophobia.  He is unable to take triptans due to a history of stroke.  Given Nurtec 7/21/24 - unclear if it was helpful    Cardiomyopathy (HCC)  Assessment & Plan  ECHO 6/10/2024 = LVEF 40-45% with mild global hypokinesis   Status post NM stress test on 6/11/2024 which showed: a large, mild, fixed defect in the inferior wall, possibly due to diaphragmatic attenuation artifact, there is a small area of partial reversibility in the inferior apical wall suggestive of ischemia    Evaluated ed by cardiology, etiology felt to be possibly secondary to stress-induced cardiomyopathy, with apical thrombus  Cardiac catheterization deferred given the lack of any significant ischemia, and no current cardiac symptoms  Continue with medical management with aspirin, statin, beta-blocker, ARB  Monitor volume status, remains euvolemic off of diuretics   Euvolemic on exam  Outpatient follow-up with  cardiology    Traumatic brain injury (HCC)  Assessment & Plan  Remote history of TBI, previously residing in a group home prior to half-way sentence.  Evaluated by neuropsychiatry on 6/27/24 and deemed NOT to have medical decision-making capacity  Process for court appointed guardian started on 7/5/24 - CM following   Guardianship hearing 8/27/24.    Carnitine deficiency (HCC)  Assessment & Plan  Continue levocarnitine 330 mg 3x daily with meals.    History of seizures  Assessment & Plan  Diagnosed in July 2019, follows with [facility] neurology outpatient  Continue home regimen with Depakote ER, Lamotrigine and Zonegran    Left ventricular thrombus  Assessment & Plan  Noted on echocardiogram 6/10/2024: spherical 1.5 x 1.3 cm thrombus at the LV apex   Initiated on AC with Xarelto however unable to verify insurance coverage.  Pt switched to Coumadin. Lovenox bridge until INR > 2.  Will increase Coumadin to 7.5mg tonight.  Continue to monitor INR    Benign essential hypertension  Assessment & Plan  Home regimen: Losartan 50mg daily, Toprol XL 25 mg BID  Current regimen: Losartan 50 mg daily, Toprol-XL 25 mg daily  Stable      History of stroke  Assessment & Plan  History of left MCA CVA in May 2018 with residual expressive aphasia  Continue ASA and atorvastatin    Human immunodeficiency virus (HIV) infection (HCC)  Assessment & Plan  Continue Biktarvy and Tivicay.    Bipolar affective disorder (HCC)  Assessment & Plan  Continue home meds Zoloft and Remeron  Outpatient follow-up with Psychiatry             The above assessment and plan was reviewed and updated as determined by my evaluation of the patient on 8/1/2024.    Labs:   Results from last 7 days   Lab Units 08/01/24  0517 07/31/24  0501   WBC Thousand/uL 6.42 6.59   HEMOGLOBIN g/dL 11.5* 10.2*   HEMATOCRIT % 39.3 34.6*   PLATELETS Thousands/uL 421* 398*     Results from last 7 days   Lab Units 08/01/24  0517 07/31/24  0501   SODIUM mmol/L 139 139   POTASSIUM mmol/L 4.5  4.0   CHLORIDE mmol/L 104 104   CO2 mmol/L 29 26   BUN mg/dL 16 21   CREATININE mg/dL 0.95 0.88   CALCIUM mg/dL 9.4 9.1         Results from last 7 days   Lab Units 08/01/24  0517 07/31/24  0719   INR  1.24* 1.10           Imaging  No orders to display       Review of Scheduled Meds:  Current Facility-Administered Medications   Medication Dose Route Frequency Provider Last Rate    acetaminophen  975 mg Oral TID PRN José Salcido MD      aspirin  81 mg Oral Daily Lorrie Barillas PA-C      atorvastatin  80 mg Oral Daily With Dinner Lorrie Barillas PA-C      bictegravir-emtricitab-tenofovir alafenamide  1 tablet Oral Daily With Breakfast Lorrie Barillas PA-C      bisacodyl  10 mg Rectal Daily PRN Lorrie Barillas PA-C      diphenhydrAMINE  25 mg Oral Q6H PRN Lorrie Barillas PA-C      divalproex sodium  1,250 mg Oral Daily Lorrie Barillas PA-C      docusate sodium  100 mg Oral BID Ashley Depadua, MD      dolutegravir  50 mg Oral Daily Lorrie Barillas PA-C      enoxaparin  1 mg/kg Subcutaneous Q24H CHARLIE Gordon      gabapentin  100 mg Oral Q8H Ashley Depadua, MD      lamoTRIgine  50 mg Oral BID Lorrie Barillas PA-C      levOCARNitine  1,000 mg/day Oral TID With Meals Lorrie Barillas PA-C      losartan  50 mg Oral Daily Lorrie Barillas PA-C      melatonin  6 mg Oral HS Lorrie Barillas PA-C      metoprolol succinate  25 mg Oral Daily CHARLIE Mcclelland      mirtazapine  15 mg Oral HS Lorrie Barillas PA-C      ondansetron  4 mg Oral Q6H PRN Lorrie Barillas PA-C      oxyCODONE  2.5 mg Oral Q8H PRN Raimundo Riley MD      polyethylene glycol  17 g Oral Daily PRN Lorrie Barillas PA-C      senna  1 tablet Oral HS PRN Lorrie Barillas PA-C      sertraline  75 mg Oral Daily Lorrie Barillas PA-C      tamsulosin  0.4 mg Oral Daily With Dinner Lorrie Barillas PA-C      warfarin  5 mg Oral Daily (warfarin) Lorrie  EJ Barillas      zonisamide  400 mg Oral Daily Lorrie Barillas PA-C         Subjective/ HPI: Patient seen and examined. Patients overnight issues or events were reviewed with nursing staff. New or overnight issues include the following:     Pt seen in his room. He states that he is doing well. He denies any current complaints.    ROS:   A 10 point ROS was performed; negative except as noted above.        *Labs /Radiology studies Reviewed  *Medications  reviewed and reconciled as needed  *Please refer to order section for additional ordered labs studies      Physical Examination:  Vitals:   Vitals:    07/31/24 1500 07/31/24 2011 08/01/24 0512 08/01/24 0804   BP: 119/72 126/81 123/72 122/76   BP Location: Left arm Left arm Left arm Left arm   Pulse: 88 91 93 93   Resp: 18 17 20    Temp: 98.3 °F (36.8 °C) 98.2 °F (36.8 °C) 97.6 °F (36.4 °C)    TempSrc: Oral Oral Oral    SpO2: 94% 94% 96%    Weight:       Height:           General Appearance: NAD; pleasant  HEENT: PERRLA, conjuctiva normal; mucous membranes moist; face symmetrical  Neck:  Supple  Lungs: clear bilaterally, normal respiratory effort, no retractions, expiratory effort normal, on room air  CV: regular rate and rhythm, no murmurs rubs or gallops noted   ABD: soft non tender, +BS x4  EXT: Lt AKA  Skin: normal turgor, normal texture, no rash  Psych: affect normal, mood normal  Neuro: Awake and alert. Expressive aphasia.     The above physical exam was reviewed and updated as determined by my evaluation of the patient on 8/1/2024.    Invasive Devices       Drain  Duration             External Urinary Catheter <1 day                       VTE Pharmacologic Prophylaxis: Enoxaparin and Warfarin (Coumadin)  Code Status: Level 1 - Full Code  Current Length of Stay: 26 day(s)    Total floor / unit time spent today  35 minutes   Coordination of patient's care was performed in conjunction with consulting services. Time invested included review of patient's  labs, vitals, and management of their comorbidities with continued monitoring, examination of patient as well as answering patient questions, documenting her findings and creating progress note in electronic medical record,  ordering appropriate diagnostic testing.       ** Please Note:  voice to text software may have been used in the creation of this document. Although proof errors in transcription or interpretation are a potential of such software**

## 2024-08-02 LAB
INR PPP: 1.64 (ref 0.85–1.19)
PROTHROMBIN TIME: 19.6 SECONDS (ref 12.3–15)

## 2024-08-02 PROCEDURE — 99232 SBSQ HOSP IP/OBS MODERATE 35: CPT | Performed by: PHYSICIAN ASSISTANT

## 2024-08-02 PROCEDURE — 97530 THERAPEUTIC ACTIVITIES: CPT

## 2024-08-02 PROCEDURE — 99232 SBSQ HOSP IP/OBS MODERATE 35: CPT | Performed by: PHYSICAL MEDICINE & REHABILITATION

## 2024-08-02 PROCEDURE — 97110 THERAPEUTIC EXERCISES: CPT

## 2024-08-02 PROCEDURE — 85610 PROTHROMBIN TIME: CPT | Performed by: PHYSICIAN ASSISTANT

## 2024-08-02 RX ADMIN — ENOXAPARIN SODIUM 80 MG: 80 INJECTION SUBCUTANEOUS at 09:30

## 2024-08-02 RX ADMIN — METOPROLOL SUCCINATE 25 MG: 25 TABLET, EXTENDED RELEASE ORAL at 09:31

## 2024-08-02 RX ADMIN — Medication 6 MG: at 21:48

## 2024-08-02 RX ADMIN — LEVOCARNITINE 330 MG: 1 SOLUTION ORAL at 13:31

## 2024-08-02 RX ADMIN — GABAPENTIN 100 MG: 100 CAPSULE ORAL at 21:48

## 2024-08-02 RX ADMIN — DIVALPROEX SODIUM 1250 MG: 500 TABLET, EXTENDED RELEASE ORAL at 09:31

## 2024-08-02 RX ADMIN — MIRTAZAPINE 15 MG: 15 TABLET, FILM COATED ORAL at 21:48

## 2024-08-02 RX ADMIN — ATORVASTATIN CALCIUM 80 MG: 80 TABLET, FILM COATED ORAL at 16:59

## 2024-08-02 RX ADMIN — DOLUTEGRAVIR SODIUM 50 MG: 50 TABLET, FILM COATED ORAL at 09:32

## 2024-08-02 RX ADMIN — SENNOSIDES 8.6 MG: 8.6 TABLET, FILM COATED ORAL at 21:48

## 2024-08-02 RX ADMIN — LAMOTRIGINE 50 MG: 25 TABLET ORAL at 17:00

## 2024-08-02 RX ADMIN — GABAPENTIN 100 MG: 100 CAPSULE ORAL at 06:27

## 2024-08-02 RX ADMIN — ZONISAMIDE 400 MG: 100 CAPSULE ORAL at 09:31

## 2024-08-02 RX ADMIN — LEVOCARNITINE 330 MG: 1 SOLUTION ORAL at 17:02

## 2024-08-02 RX ADMIN — BICTEGRAVIR SODIUM, EMTRICITABINE, AND TENOFOVIR ALAFENAMIDE FUMARATE 1 TABLET: 50; 200; 25 TABLET ORAL at 09:32

## 2024-08-02 RX ADMIN — LAMOTRIGINE 50 MG: 25 TABLET ORAL at 09:31

## 2024-08-02 RX ADMIN — GABAPENTIN 100 MG: 100 CAPSULE ORAL at 13:31

## 2024-08-02 RX ADMIN — TAMSULOSIN HYDROCHLORIDE 0.4 MG: 0.4 CAPSULE ORAL at 17:00

## 2024-08-02 RX ADMIN — DOCUSATE SODIUM 100 MG: 100 CAPSULE, LIQUID FILLED ORAL at 09:31

## 2024-08-02 RX ADMIN — WARFARIN SODIUM 7.5 MG: 7.5 TABLET ORAL at 17:00

## 2024-08-02 RX ADMIN — ASPIRIN 81 MG: 81 TABLET, COATED ORAL at 09:31

## 2024-08-02 RX ADMIN — SERTRALINE HYDROCHLORIDE 75 MG: 50 TABLET ORAL at 09:31

## 2024-08-02 RX ADMIN — DOCUSATE SODIUM 100 MG: 100 CAPSULE, LIQUID FILLED ORAL at 17:00

## 2024-08-02 RX ADMIN — LOSARTAN POTASSIUM 50 MG: 50 TABLET, FILM COATED ORAL at 09:31

## 2024-08-02 RX ADMIN — LEVOCARNITINE 330 MG: 1 SOLUTION ORAL at 09:30

## 2024-08-02 NOTE — PROGRESS NOTES
[facility]  Progress Note  Name: Sunil Patel I  MRN: [id number]  Unit/Bed#: -01 I Date of Admission: 7/6/2024   Date of Service: 8/2/2024 I Hospital Day: 27    Assessment & Plan   * Above-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)  Assessment & Plan  Presented with left lower extremity acute limb ischemia/extensive left iliofemoral and left infrainguinal embolism requiring left femoral thrombectomy and subsequent AKA on 6/7/24 with vascular surgery.  Incision C/D/I, pain controlled.   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  Initiated on Warfarin 7/29 subtherepeutic will start Wt based Lovenox bridge until INR > 2.0  Rehab and pain control per PMR  Pt has been refusing therapy through the weekend.   Pt has met his rehab goals and will be discharged when able.    Episodic headache  Assessment & Plan  Pt reports a history of migraines.  It is associated with photophobia.  He is unable to take triptans due to a history of stroke.  Given Nurtec 7/21/24 - unclear if it was helpful    Cardiomyopathy (HCC)  Assessment & Plan  ECHO 6/10/2024 = LVEF 40-45% with mild global hypokinesis   Status post NM stress test on 6/11/2024 which showed: a large, mild, fixed defect in the inferior wall, possibly due to diaphragmatic attenuation artifact, there is a small area of partial reversibility in the inferior apical wall suggestive of ischemia    Evaluated ed by cardiology, etiology felt to be possibly secondary to stress-induced cardiomyopathy, with apical thrombus  Cardiac catheterization deferred given the lack of any significant ischemia, and no current cardiac symptoms  Continue with medical management with aspirin, statin, beta-blocker, ARB  Monitor volume status, remains euvolemic off of diuretics   Euvolemic on exam  Outpatient follow-up with  cardiology    Traumatic brain injury (HCC)  Assessment & Plan  Remote history of TBI, previously residing in a group home prior to [facility] sentence.  Evaluated by neuropsychiatry on 6/27/24 and deemed NOT to have medical decision-making capacity  Process for court appointed guardian started on 7/5/24 - CM following   Guardianship hearing 8/27/24.    Carnitine deficiency (HCC)  Assessment & Plan  Continue levocarnitine 330 mg 3x daily with meals.    History of seizures  Assessment & Plan  Diagnosed in July 2019, follows with [facility] neurology outpatient  Continue home regimen with Depakote ER, Lamotrigine and Zonegran    Left ventricular thrombus  Assessment & Plan  Noted on echocardiogram 6/10/2024: spherical 1.5 x 1.3 cm thrombus at the LV apex   Initiated on AC with Xarelto however unable to verify insurance coverage.  Pt switched to Coumadin. Lovenox bridge until INR > 2. INR 1.64  Continue Coumadin at 7.5mg tonight.  Continue to monitor INR    Benign essential hypertension  Assessment & Plan  Home regimen: Losartan 50mg daily, Toprol XL 25 mg BID  Current regimen: Losartan 50 mg daily, Toprol-XL 25 mg daily  Stable      History of stroke  Assessment & Plan  History of left MCA CVA in May 2018 with residual expressive aphasia  Continue ASA and atorvastatin    Human immunodeficiency virus (HIV) infection (HCC)  Assessment & Plan  Continue Biktarvy and Tivicay.    Bipolar affective disorder (HCC)  Assessment & Plan  Continue home meds Zoloft and Remeron  Outpatient follow-up with Psychiatry             The above assessment and plan was reviewed and updated as determined by my evaluation of the patient on 8/2/2024.    Labs:   Results from last 7 days   Lab Units 08/01/24  0517 07/31/24  0501   WBC Thousand/uL 6.42 6.59   HEMOGLOBIN g/dL 11.5* 10.2*   HEMATOCRIT % 39.3 34.6*   PLATELETS Thousands/uL 421* 398*     Results from last 7 days   Lab Units 08/01/24  0517 07/31/24  0501   SODIUM mmol/L 139 139   POTASSIUM mmol/L  4.5 4.0   CHLORIDE mmol/L 104 104   CO2 mmol/L 29 26   BUN mg/dL 16 21   CREATININE mg/dL 0.95 0.88   CALCIUM mg/dL 9.4 9.1         Results from last 7 days   Lab Units 08/02/24  0531 08/01/24  0517   INR  1.64* 1.24*           Imaging  No orders to display       Review of Scheduled Meds:  Current Facility-Administered Medications   Medication Dose Route Frequency Provider Last Rate    acetaminophen  975 mg Oral TID PRN José Salcido MD      aspirin  81 mg Oral Daily Lorrie Barillas PA-C      atorvastatin  80 mg Oral Daily With Dinner Lorrie Barillas PA-C      bictegravir-emtricitab-tenofovir alafenamide  1 tablet Oral Daily With Breakfast Lorrie Barillas PA-C      bisacodyl  10 mg Rectal Daily PRN Lorrie Barillas PA-C      diphenhydrAMINE  25 mg Oral Q6H PRN Lorrie Barillas PA-C      divalproex sodium  1,250 mg Oral Daily Lorrie Barillas PA-C      docusate sodium  100 mg Oral BID Ashley Depadua, MD      dolutegravir  50 mg Oral Daily Lorrie Barillas PA-C      enoxaparin  1 mg/kg Subcutaneous Q24H CHARLIE Gordon      gabapentin  100 mg Oral Q8H Ashley Depadua, MD      lamoTRIgine  50 mg Oral BID Lorrie Barillas PA-C      levOCARNitine  1,000 mg/day Oral TID With Meals Lorrie Barillas PA-C      losartan  50 mg Oral Daily Lorrie Barillas PA-C      melatonin  6 mg Oral HS Lorrie Barillas PA-C      metoprolol succinate  25 mg Oral Daily CHARLIE Mcclelland      mirtazapine  15 mg Oral HS Lorrie Barillas PA-C      ondansetron  4 mg Oral Q6H PRN Lorrie Barillas PA-C      oxyCODONE  2.5 mg Oral Q8H PRN Raimundo Riley MD      polyethylene glycol  17 g Oral Daily PRN Lorrie Barillas PA-C      senna  1 tablet Oral HS PRN Lorrie Barillas PA-C      sertraline  75 mg Oral Daily Lorrie Barillas PA-C      tamsulosin  0.4 mg Oral Daily With Dinner Lorrie Barillas PA-C      warfarin  7.5 mg Oral Daily (warfarin) Lorrie  EJ Barillas      zonisamide  400 mg Oral Daily Lorrie Barillas PA-C         Subjective/ HPI: Patient seen and examined. Patients overnight issues or events were reviewed with nursing staff. New or overnight issues include the following:     Pt seen in his room. He states that he is doing well. He denies any current complaints.    ROS:   A 10 point ROS was performed; negative except as noted above.        *Labs /Radiology studies Reviewed  *Medications  reviewed and reconciled as needed  *Please refer to order section for additional ordered labs studies      Physical Examination:  Vitals:   Vitals:    08/01/24 0804 08/01/24 1526 08/01/24 2000 08/02/24 0526   BP: 122/76 126/64 134/63 110/64   BP Location: Left arm Right arm Left arm Left arm   Pulse: 93 90 93 80   Resp:  18 17 20   Temp:  98.1 °F (36.7 °C) 98.7 °F (37.1 °C) (!) 97.3 °F (36.3 °C)   TempSrc:  Oral Oral Oral   SpO2:  96% 95% 95%   Weight:       Height:           General Appearance: NAD; pleasant  HEENT: PERRLA, conjuctiva normal; mucous membranes moist; face symmetrical  Neck:  Supple  Lungs: clear bilaterally, normal respiratory effort, no retractions, expiratory effort normal, on room air  CV: regular rate and rhythm, no murmurs rubs or gallops noted   ABD: soft non tender, +BS x4  EXT: Lt AKA  Skin: normal turgor, normal texture, no rash  Psych: affect normal, mood normal  Neuro: Awake and alert. Expressive aphasia.     The above physical exam was reviewed and updated as determined by my evaluation of the patient on 8/2/2024.    Invasive Devices       Drain  Duration             External Urinary Catheter 1 day                       VTE Pharmacologic Prophylaxis: Enoxaparin and Warfarin (Coumadin)  Code Status: Level 1 - Full Code  Current Length of Stay: 27 day(s)    Total floor / unit time spent today 30 minutes  Coordination of patient's care was performed in conjunction with consulting services. Time invested included review of  patient's labs, vitals, and management of their comorbidities with continued monitoring, examination of patient as well as answering patient questions, documenting her findings and creating progress note in electronic medical record,  ordering appropriate diagnostic testing.       ** Please Note:  voice to text software may have been used in the creation of this document. Although proof errors in transcription or interpretation are a potential of such software**

## 2024-08-02 NOTE — ASSESSMENT & PLAN NOTE
Remote history of TBI, previously residing in a group home prior to detention sentence.  Evaluated by neuropsychiatry on 6/27/24 and deemed NOT to have medical decision-making capacity  Process for court appointed guardian started on 7/5/24 - CM following   Guardianship hearing 8/27/24.

## 2024-08-02 NOTE — ASSESSMENT & PLAN NOTE
Noted on echocardiogram 6/10/2024: spherical 1.5 x 1.3 cm thrombus at the LV apex   Initiated on AC with Xarelto however unable to verify insurance coverage.  Pt switched to Coumadin. Lovenox bridge until INR > 2. INR 1.64  Continue Coumadin at 7.5mg tonight.  Continue to monitor INR

## 2024-08-02 NOTE — CASE MANAGEMENT
Cm placed letter for Donna to take to [facility] to retreive pt's belongings in paper chart. CM made RN aware and left cm for Donna that is in chart.

## 2024-08-02 NOTE — PROGRESS NOTES
Physical Medicine and Rehabilitation Progress Note  Sunil Patel 63 y.o. male MRN: [id number]  Unit/Bed#: [facility] 451-01 Encounter: [id number]    To Review: Sunil Patel is a 63 y.o. male who  has a past medical history of Acute lower limb ischemia (06/08/2024), Anxiety, Depression, HIV disease (HCC), Substance abuse (HCC), and Suicide attempt (HCC). who presented to the [facility] on 6/7/24 from halfway for increased LLE swelling and pain and was found to have a left external iliac artery occlusion and acute limb ischemia. 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 Patient had TTE on 6/10/24 showing LV apex thrombus. On 6/11/24 patient had pharmacologic nuclear stress test/SPECT scan which did not show any significant areas of ischemia with EF 40-45% and recommended continuing A/C for LV apical thrombus. His course was complicated by b/l buttock unstageable pressure ulcers, urinary and fecal incontinence, pain, and significant decline in ADLs and mobility. Patient has been continued on Xarelto for anticoagulation, as well as ASA and statin. Patient has a history of TBI, with baseline nonfluent aphasia and forgetfulness. He was evaluated by neuropsychology on 6/27/24, and deemed to not have capacity to make fully informed medical decisions. Therefore, the guardianship process was initiated as of 7/5/24. He was admitted to the [facility] on 7/6.     Chief Complaint: feeling well, listening to music    Interval History/Subjective:  No acute overnight events. Patient is feeling well and denies any current headache this morning. We advised him that we will have updates about his housing situation later this afternoon. Otherwise, he denies any fevers, chills, chest pain, sob, abdominal pain, nausea or  "vomiting. His last BM was 7/30.     ROS:  10 point review of systems is otherwise negative except for what is noted above.      Today's Changes:  Warfarin 7.5 mg again tonight per IM. Recheck labs in AM  Continue current plan  CM arranged for rep to get patient's belongings back from intermediate  Will have a meeting this afternoon from different facilities for potential placement    Assessment/Plan:    * 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 Underwent L femoral thrombectomy and AKA with vascular surgery   - [facility] sx follow-up 7/10 - L AKA incision site well-healed, staples taken out at the bedside this morning  - [facility] will be vendor - Patient now has .  - Monitor for hip flexion/abduction tightness. Stretches in therapy  - Now on Coumadin with hx of LV thrombus and PAOD   - PT/OT/SLP (to work on carry over strategies) 3-5 hours/day, 5-7 days/week.    - Goals are Ind-Sup at a wheelchair level.   - Outpatient f/[facility] with [facility] and [facility]  - Continue patient training with  placement  - Continue gabapentin - doesn't do too much for phantom limb sensation, but that doesn't bother him or cause him pain currently.   - Patient still frustrated given circumstances; will continue gabapentin for anxiety    Neurocognitive disorder  Assessment & Plan  Has been appropriate and cooperative throughout this stay without any behavioral issues on the rehab unit to date.    - Does have decreased frustration tolerance   - So far redirectable.  Hx of TBI and L MCA CVA, psychiatric d/o, seizure d/o  - Neuropsych assessment 6/27/24 - \"diffuse cognitive dysfunction and on a measure assessing awareness of personal health status and ability to evaluate health problems, handle medical emergencies and take safety precautions, patient performed in the IMPAIRED range of functioning. During this encounter, patient does not " "appear to have capacity to make fully informed medical decisions.\"  MMSE 4/28 - severely impaired  - Guardianship process initiated on acute - follow-up by CM/Admin  - Status: some expressive and possible some receptive aphasia.  He can be difficult to follow at times likely due to a mixture of aphasia, tangentiality, mild lability, and impaired memory; lability with hx of possible bipolar d/o  - Neuropsych Med review: Depakote, Lamictal, Remeron, Zoloft, Melatonin, gabapentin, PRN oxy 2.5mg TID   - Monitor neuro-exam, wakefulness, mood, cognition, insight into deficits and safety awareness   - Monitor and ensure optimal management electrolytes, nutrition, and hydration  - Monitor for signs or symptoms of infection, medication intolerances, other systemic etiologies  - Additional labs, imaging, specialist follow-up as needed per primary team currently   - Overstimulation precautions, frequent re-orientation, re-direction, re-assurance  - Optimal mood, pain, and sleep management  - If impaired sleep or behavior recommend sleep log and agitation monitoring    - Limit sedating medications when possible  - Fall precautions - if needed increase rounding or consider virtual sitter or in-person sitter  - For routine restlessness, anxiety, irritability focus on non-pharmacologic management    - Hold benzo's with increased risk paradoxical reaction and possibility of limiting cognitive recovery  - Continue SLP and interdisciplinary care  - OP neuro and PCP         Adjustment disorder with mixed anxiety and depressed mood  Assessment & Plan  - Related to recent release from incarceration, new AKA and uncertainty of future disposition, all in the setting of impaired cognition related to prior strokes and TBI  - Behavioral techniques for symptom management  - Neuropsychology consult as available  - Given his circumstances, increased frustration is expected. Can consider Psych consult if symptoms continue to worsen to adjust " "mood stabilizing medications. Will try nonpharmacologic approaches first with more frequent conversations/redirections   - Continue gabapentin 100 q8 for now - tolerating well. Mood fluctuates day to day but general frustration is expected on his behalf.    Pressure injury of buttock, stage 3 (HCC)  Assessment & Plan  Stable to improving  - Wound care consulted and following weekly  - POA L buttock pressure injury unstageable has healed.  - POA R buttock pressure injury is now Stage 3, and improving.   - Continue local care with border foam.  - Recommend ROHO cushion in chair when out of bed instead   - Preventative hydraguard to bilateral heels BID and PRN.   - P500  - Monitor clinically for breakdown, frequent turns  - reviewed picture of wound today. It is healing well. Continue to monitor    Patient incapable of making informed decisions  Assessment & Plan  - History of remote TBI and prior strokes with comorbid psychiatric history.  - Evaluated by neuropsychology, Dr. Dilshad Tatum, PhD on 6/27/24, found to have \"diffuse cognitive dysfunction and on a measure assessing awareness of personal health status and ability to evaluate health problems, handle medical emergencies and take safety precautions, patient performed in the IMPAIRED range of functioning. During this encounter, patient does not appear to have capacity to make fully informed medical decisions.\"  - Court-appointed guardianship process has been initiated by acute care CM Katia Kay.    - We have identified two friends who are interested in being patient's guardians and have been involved and invested in patient's care on the ARC.   - They will need to be interviewed by the  involved in this process.    - He has to undergo his hearing on 8/6/2024 first.  -- now rescheduled to 8/27   - He would ultimately benefit from [facility]/nursing home/memory care unit - he does very well with structure and supervision.    8/2- Ongoing discussions with CM, " ARC admin and social work to dispo patient to stable long-term housing. Meetings are currently arranged later today with several SNFs to discuss placement      Urinary incontinence  Assessment & Plan  - Did have some incontinence on acute - improved with timed voids and consistent assistance with his urinal  - Described as urgency  - Marked improvement on timed voids.   - monitor for retention, incontinence (including overflow incontinence), signs/symptoms of UTI  - Timed Voids and PVRs Q4hrs initiated earlier. And PVR <150 x3, so scans discontinued.    - He has some difficulty managing the urinal    - needs assistance but is able to transfer to [facility]    - Still uses condom catheter at night, in part for continence care/to protect his skin given his buttock wounds.  - Continue timed voids           At risk for constipation  Assessment & Plan  - Stooling adequately recently; did have some incontinence on acute now improved  - Close continent care given buttock wounds  - Last BM 8/1 and continent  - Colace 100mg BID  - PRN miralax, Senna and suppository    Acute pain  Assessment & Plan  Controlled   - Tylenol 975 mg q8h PRN  - Oxycodone 2.5 mg q8h PRN, wean as possible   - Last used 7/19.    - Can discontinue at discharge.  - Resumed Gabapentin 100mg Q8hr due to increased headaches and irritability since discontinuing.    Impaired mobility and activities of daily living  Assessment & Plan  - Rehabilitation medicine physician for daily monitoring of care, 24 hour availability for acute medical issues, medication management, and therapeutic and diagnostic assessments.  - 24 hour rehabilitation nursing 7 days per week for: management/teaching of medications, bowel/bladder routine, skin care.  - PT, OT for 2-3 hours per day, 5 to 6 days per week; 15 hours per week  - Rehabilitation Psychology as needed for adjustment and coping  - MSW for barriers to discharge, community resources, and family support  - Discharge  planning following to help ensure a safe and efficient discharge  -7/23 teams: Biggest ADL barriers per OT are toileting and applying  to LLE    -7/30 teams: Patient is getting agitated with PT therapies, as he is reaching a plateau. Pt enjoys SLP and cognitive exercises with tablet. Discussions are ongoing for his dispo planning- [facility] admin have meetings with a few SNFs to discuss his case. CM is actively working on retrieving his belongings from group home. Guardianship court date still set for 8/27.        Traumatic brain injury (HCC)  Assessment & Plan  See neurocog impairment     Episodic headache  Assessment & Plan  Chronic issue.  Seemed to worsen with increased irritability after discontinuing gabapentin  Resumed gabapentin  Received nurtec once during stay with middling results  Sleep has been fairly consistent - Can discontinue after another 1-2 nights  Headache is on and off    Cardiomyopathy (HCC)  Assessment & Plan  ECHO on 6/10/2024 showed LVEF 40-45% with mild global hypokinesis   Status post NM stress test on 6/11/2024 which showed: a large, mild, fixed defect in the inferior wall, possibly due to diaphragmatic attenuation artifact, there is a small area of partial reversibility in the inferior apical wall suggestive of ischemia    Elevated by cardiology, etiology felt to be possibly secondary to stress-induced cardiomyopathy, with apical thrombus  Cardiac catheterization deferred given the lack of any significant ischemia, and no current cardiac symptoms  Continue with medical management with aspirin, statin, beta-blocker, ARB  Monitor volume status, remains euvolemic off of diuretics   Outpatient follow-up with cardiology    At risk for venous thromboembolism (VTE)  Assessment & Plan  - On Warfarin/Lovenox bridge now  - 8/2: INR 1.64 - Warfarin 7.5mg tonight with repeat labs in AM  - IM managing    Carnitine deficiency (HCC)  Assessment & Plan  - Carnitine replacement  - Appreciate medicine  management      History of seizures  Assessment & Plan  - Depakote ER, lamotrigine, zonisamide  - Outpatient follow-up with [facility] neurology    Left ventricular thrombus  Assessment & Plan  - Visualized on echocardiogram on 6/10/24: spherical 1.5 x 1.3 cm thrombus at the LV apex.   - Was started on rivaroxaban, but patient does not appear to have insurance coverage for prescriptions   - Xarelto, eliquis, and pradaxa likely cost prohibitive per IM   - 7/29 transitioned to warfarin with lovenox bridge.   - 8/2: still subtherapeutic at INR 1.64. Dose 7.5 mg tonight and recheck labs in AM   - Daily INRs until therapeutic   - Management as per IM.  - Outpatient follow-up with cardiology    Benign essential hypertension  Assessment & Plan  - Toprol, losartan  - Appreciate medicine management    History of stroke  Assessment & Plan  - History of old left temporal and parietal lobe cortical infarcts, also involving the insula and left occipital lobe as well as small right posterior parietal lobe. Stable on most recent CT head on 5/16/24.   - ASA, and statin for secondary prevention  - Optimal BP control  - Monitor neuro exam - hx of LV thrombus    - See that entry  - OP neuro follow-up     Human immunodeficiency virus (HIV) infection (HCC)  Assessment & Plan  - Continue anti-retroviral treatment  - Appreciate medicine management during ARC course  - OP ID follow-up       Bipolar affective disorder (HCC)  Assessment & Plan  Mood acceptable; continue meds as outlined   Supportive counseling  NeuroPsychology consult while in [facility] if available for support  Counseled on and continue to encourage deep breathing/relaxation/behavioral management techniques  - Mirtazapine 15 mg qHs  - Sertraline 75 mg daily   - Lamictal 50mg BID  - Depakote ER 1250mg qday   - Outpatient psychiatry follow-up  - Would consult Psych before changing medications        Health Maintenance  #Delirium/Sleep: Optimize sleep/wake cycle and maintain delirium  precautions. + Remeron 15 mg  and melatonin 6 mg  #Pain: Tylenol PRN   #Bowel: Continent. Last BM 7/30. On Colace BID w/ Senna,Miralax,Dulcolax PRN  #Bladder: Voiding and continent. Condom cath during night.  #Skin/Pressure Injury Prevention: Turn Q2hr in bed, with weight shifts [id number] in wheelchair.  #DVT Prophylaxis: Coumadin  #GI Prophylaxis: not indicated  #Code Status: Full code  #FEN: Cardiac diet + Ensure at bfast/dinner  #Dispo: [facility] pending confirmation from acute hospital/SNFs/CM. Guardianship meeting now set for 8/27. Teams 7/30: meeting with multiple SNFs planned with ARC admin. CM working actively to retrieve patient's belongings. Pt is getting agitated with PT, but actively engaged with SLP.      Objective:    Functional Update:  PT: sup bed mobility, sup transfers, toilet transfer mod-max A  OT: Ind oral hygiene, Ind eating, sup bathing, sup UBD, Min LBD, sup footwear  SLP: mod-maxA comprehension, expression, problem solving, memory, sup social interaction    Allergies per EMR    Physical Exam:  Temp:  [97.3 °F (36.3 °C)-98.7 °F (37.1 °C)] 97.3 °F (36.3 °C)  HR:  [80-93] 80  Resp:  [17-20] 20  BP: (110-148)/(63-86) 148/86  Oxygen Therapy  SpO2: 95 %    Physical Exam  Vitals reviewed.   Constitutional:       Appearance: Normal appearance.   HENT:      Head: Normocephalic and atraumatic.      Right Ear: External ear normal.      Left Ear: External ear normal.      Nose: Nose normal.      Mouth/Throat:      Mouth: Mucous membranes are moist.      Pharynx: Oropharynx is clear.   Eyes:      Conjunctiva/sclera: Conjunctivae normal.   Musculoskeletal:      Comments: + L AKA   Neurological:      Mental Status: He is alert.   Psychiatric:         Mood and Affect: Mood normal.         Behavior: Behavior normal.          Diagnostic Studies: reviewed, no new imaging    Laboratory:    Reviewed 8/2 labs: INR 1.64 now from 1.24  Results from last 7 days   Lab Units 08/01/24  0517 07/31/24  0501   HEMOGLOBIN g/dL  11.5* 10.2*   HEMATOCRIT % 39.3 34.6*   WBC Thousand/uL 6.42 6.59     Results from last 7 days   Lab Units 08/01/24  0517 07/31/24  0501   BUN mg/dL 16 21   POTASSIUM mmol/L 4.5 4.0   CHLORIDE mmol/L 104 104   CREATININE mg/dL 0.95 0.88     Results from last 7 days   Lab Units 08/02/24  0531 08/01/24  0517 07/31/24  0719   PROTIME seconds 19.6* 15.4* 14.1   INR  1.64* 1.24* 1.10        Patient Active Problem List   Diagnosis    Bipolar affective disorder (HCC)    Substance abuse (HCC)    Human immunodeficiency virus (HIV) infection (HCC)    History of stroke    Benign essential hypertension    Positive laboratory testing for human immunodeficiency virus (HCC)    Hypertension    Unspecified vitamin D deficiency    Tobacco abuse    Cerebrovascular accident (CVA) (HCC)    Left ventricular thrombus    History of seizures    Carnitine deficiency (HCC)    Above-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)    Traumatic brain injury (HCC)    Impaired mobility and activities of daily living    At risk for venous thromboembolism (VTE)    Acute pain    At risk for constipation    Urinary incontinence    Patient incapable of making informed decisions    Pressure injury of buttock, stage 3 (HCC)    Adjustment disorder with mixed anxiety and depressed mood    Neurocognitive disorder    Cardiomyopathy (HCC)    Episodic headache         Medications  Current Facility-Administered Medications   Medication Dose Route Frequency Provider Last Rate    acetaminophen  975 mg Oral TID PRN José Salcido MD      aspirin  81 mg Oral Daily Lorrie Barillas PA-C      atorvastatin  80 mg Oral Daily With Dinner Lorrie Barillas PA-C      bictegravir-emtricitab-tenofovir alafenamide  1 tablet Oral Daily With Breakfast Lorrie Barillas PA-C      bisacodyl  10 mg Rectal Daily PRN Lorrie Barillas PA-C      diphenhydrAMINE  25 mg Oral Q6H PRN Lorrie Barillas PA-C      divalproex sodium  1,250 mg Oral Daily Lorrie  EJ Barillas      docusate sodium  100 mg Oral BID Ashley Depadua, MD      dolutegravir  50 mg Oral Daily Lorrie Barillas PA-C      enoxaparin  1 mg/kg Subcutaneous Q24H CHARLIE Gordon      gabapentin  100 mg Oral Q8H Ashley Depadua, MD      lamoTRIgine  50 mg Oral BID Lorrie Barillas PA-C      levOCARNitine  1,000 mg/day Oral TID With Meals Lorrie Barillas PA-C      losartan  50 mg Oral Daily Lorrie Barillas PA-C      melatonin  6 mg Oral HS Lorrie Barillas PA-C      metoprolol succinate  25 mg Oral Daily CHARLIE Mcclelland      mirtazapine  15 mg Oral HS Lorrie Barillas PA-C      ondansetron  4 mg Oral Q6H PRN Lorrie Barillas PA-C      oxyCODONE  2.5 mg Oral Q8H PRN Raimundo Riley MD      polyethylene glycol  17 g Oral Daily PRN Lorrie Barillas PA-C      senna  1 tablet Oral HS PRN Lorrie Barillas PA-C      sertraline  75 mg Oral Daily Lorrie Barillas PA-C      tamsulosin  0.4 mg Oral Daily With Dinner Lorrie Barillas PA-C      warfarin  7.5 mg Oral Daily (warfarin) Lorrie Barillas PA-C      zonisamide  400 mg Oral Daily Lorrie Barillas PA-C            ** Please Note: Fluency Direct voice to text software may have been used in the creation of this document. **

## 2024-08-02 NOTE — PROGRESS NOTES
08/02/24 1000   Pain Assessment   Pain Assessment Tool 0-10   Pain Score No Pain   Restrictions/Precautions   Precautions Bed/chair alarms;Aphasia;Fall Risk;Supervision on toilet/commode   LLE Weight Bearing Per Order NWB   Braces or Orthoses Other (Comment)  (left AKA )   Subjective   Subjective pt agreeable to perform skilled PT and in his WC post bathroom   Bed-Chair Transfer   Type of Assistance Needed Supervision   Comment sit pivot   Chair/Bed-to-Chair Transfer CARE Score 4   Walk 10 Feet   Reason if not Attempted Safety concerns   Walk 10 Feet CARE Score 88   Walk 50 Feet with Two Turns   Reason if not Attempted Safety concerns   Walk 50 Feet with Two Turns CARE Score 88   Walk 150 Feet   Reason if not Attempted Safety concerns   Walk 150 Feet CARE Score 88   Walking 10 Feet on Uneven Surfaces   Reason if not Attempted Safety concerns   Walking 10 Feet on Uneven Surfaces CARE Score 88   Ambulation   Primary Mode of Locomotion Prior to Admission Walk   Does the patient walk? 0. No, and walking goal is not clinically indicated.   Wheel 50 Feet with Two Turns   Type of Assistance Needed Independent   Wheel 50 Feet with Two Turns CARE Score 6   Wheel 150 Feet   Type of Assistance Needed Independent   Wheel 150 Feet CARE Score 6   Wheelchair mobility   Does the patient use a wheelchair? 1. Yes   Type of Wheelchair Used 1. Manual   Method Right upper extremity;Left upper extremity   Curb or Single Stair   Reason if not Attempted Safety concerns   1 Step (Curb) CARE Score 88   4 Steps   Reason if not Attempted Safety concerns   4 Steps CARE Score 88   12 Steps   Reason if not Attempted Safety concerns   12 Steps CARE Score 88   Assessment   Treatment Assessment pt engaged in skilled PT and propl  feet no problems . Pt on mat 9th floor to inc core strengthening and overall condition. Pt also perfor  4 # dowel with ball toss and red small ball to inc abdominal cores stability . Pt working ball toss  side to side an roel head toss . Pt working on gym mat to inc left hip extension , also pt at one point started to cry a little and talking about his left limb amuputee . Pt requested to go back to his room . Pt able to propl WC back to his room and sit pivot to recliner with all needs in reach and hsi friend Donna had some papers to sign  to get his clothes form [facility] . Spoke with LPN about pt concern at this time . Cont POC and progressing has able   Barriers to Discharge Inaccessible home environment;Decreased caregiver support   Plan   Progress Progressing toward goals   PT Therapy Minutes   PT Time In 1000   PT Time Out 1100   PT Total Time (minutes) 60   PT Mode of treatment - Individual (minutes) 60   PT Mode of treatment - Concurrent (minutes) 0   PT Mode of treatment - Group (minutes) 0   PT Mode of treatment - Co-treat (minutes) 0   PT Mode of Treatment - Total time(minutes) 60 minutes   PT Cumulative Minutes 2173   Therapy Time missed   Time missed? Yes   Amount of time missed 30   Reason for time missed Extreme fatigue;Illness   Time(s) multiple attempts made ,1100,1115,1130

## 2024-08-02 NOTE — ASSESSMENT & PLAN NOTE
Pt reports a history of migraines.  It is associated with photophobia.  He is unable to take triptans due to a history of stroke.  [facility] unclear if [facility] was helpful

## 2024-08-02 NOTE — CASE MANAGEMENT
Case Management Discharge Planning Note    Patient name Sunil Patel  Location /-01 MRN [id number]  : 1960 Date 2024       Current Admission Date: 2024  Current Admission Diagnosis:Above-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)   Patient Active Problem List    Diagnosis Date Noted Date Diagnosed    Episodic headache 2024     Neurocognitive disorder 2024     Cardiomyopathy (HCC) 2024     Adjustment disorder with mixed anxiety and depressed mood 2024     Impaired mobility and activities of daily living 2024     At risk for venous thromboembolism (VTE) 2024     Acute pain 2024     At risk for constipation 2024     Urinary incontinence 2024     Patient incapable of making informed decisions 2024     Pressure injury of buttock, stage 3 ([facility]) 2024     Above-knee amputation of left lower extremity ([facility]) 2024     Traumatic brain injury ([facility]) 2024     Carnitine deficiency ([facility]) 2024     Left ventricular thrombus 2024     History of seizures 2024     Tobacco abuse 10/26/2019     Cerebrovascular accident (CVA) ([facility]) 10/26/2019     Positive laboratory testing for human immunodeficiency virus ([facility]) 2017     Bipolar affective disorder ([facility]) 2017     Hypertension 2017     Human immunodeficiency virus (HIV) infection ([facility]) 2017     History of stroke 2017     Substance abuse ([facility]) 2013     Unspecified vitamin D deficiency 2010     Benign essential hypertension 2010       LOS (days): 27  Geometric Mean LOS (GMLOS) (days):   Days to GMLOS:     OBJECTIVE:  Risk of Unplanned Readmission Score: 38.15         Current admission status: Inpatient Rehab   Preferred Pharmacy:   [facility] [place] [facility] & [address]  [place] [address]  Phone: [phone] Fax: [phone]    [facility] - [place] - [address]  [address]  [place] [address]  Phone: [phone] Fax: [phone]    Primary Care Provider: CHARLIE Trevino    Primary Insurance: MEDICARE  Secondary Insurance: [facility]    DISCHARGE DETAILS:        Additional Comments: Phone message to Dinah Millan- [phone] [employer] [place] left message for return call re: NATHAN

## 2024-08-02 NOTE — PLAN OF CARE
Problem: PAIN - ADULT  Goal: Verbalizes/displays adequate comfort level or baseline comfort level  Description: Interventions:  - Encourage patient to monitor pain and request assistance  - Assess pain using appropriate pain scale  - Administer analgesics based on type and severity of pain and evaluate response  - Implement non-pharmacological measures as appropriate and evaluate response  - Consider cultural and social influences on pain and pain management  - Notify physician/advanced practitioner if interventions unsuccessful or patient reports new pain  Outcome: Progressing     Problem: INFECTION - ADULT  Goal: Absence or prevention of progression during hospitalization  Description: INTERVENTIONS:  - Assess and monitor for signs and symptoms of infection  - Monitor lab/diagnostic results  - Monitor all insertion sites, i.e. indwelling lines, tubes, and drains  - Monitor endotracheal if appropriate and nasal secretions for changes in amount and color  - [place] appropriate cooling/warming therapies per order  - Administer medications as ordered  - Instruct and encourage patient and family to use good hand hygiene technique  - Identify and instruct in appropriate isolation precautions for identified infection/condition  Outcome: Progressing     Problem: INFECTION - ADULT  Goal: Absence of fever/infection during neutropenic period  Description: INTERVENTIONS:  - Monitor WBC    Outcome: Progressing

## 2024-08-02 NOTE — PROGRESS NOTES
"   08/02/24 1300   Pain Assessment   Pain Assessment Tool 0-10   Pain Score No Pain   Restrictions/Precautions   Precautions Aphasia;Bed/chair alarms;Fall Risk;Cognitive;Impulsive;Supervision on toilet/commode;Pressure Ulcer   Lifestyle   Autonomy \"I want to do a big one.\"   Bed-Chair Transfer   Type of Assistance Needed Supervision   Comment pit pivot   Chair/Bed-to-Chair Transfer CARE Score 4   Additional Activities   Additional Activities Comments Pt engages in leisure exploration activity for increased QOL. Pt w/ history of painting as a leisure interest. Pt assists with set up with supplies to complete self directed splatter paint activity. All items strategically placed in right field for inc R attention to desired items. Pt provided with minimal cognitive support to complete appropriate sequencing and management of all items and tools. Pt appearing to enjoy the activity as he states \"this is amazing.\" Pt completes management of bimanual tasks with use of scissors with applicaiton of painters tape on canvas. pt enjoyed placing tape pattern. Pt then engages in functional daily tasks for washing and cleaning up area via WC level to inc func cog for management of activities at WC. Level. INc to for management of WC in narrow spaces with cont dec awareness of R with minor collisions in environment with awareness to correct.   Assessment   Treatment Assessment Pt participated in skilled OT treatment session with treatment focus on leisure engagement and functional cognitive participation in activities. Pt tolerated session well, with great enthusiasm for painting activity. Today PT reporting that pt was having a rough day, feeling down and tearful about his situation. Pt remained engaged in activity with intensity as he was \"in his element.\" continues to state throughout,  \"I love that stuff.\" pt remains limited by impaired func cog/safety, impaired balance, strength and endurance, warranting continued skilled care " to focus on ADL retraining, func transfers, weight shifting for toileting tasks vs standing, UE Strengthening, sacral offloading, and general phase 1 amp edu, basic life skills and IADL prep at  level.   Prognosis Good   OT Therapy Minutes   OT Time In 1300   OT Time Out 1500   OT Total Time (minutes) 120   OT Mode of treatment - Individual (minutes) 120   OT Mode of treatment - Concurrent (minutes) 0   OT Mode of treatment - Group (minutes) 0   OT Mode of treatment - Co-treat (minutes) 0   OT Mode of Treatment - Total time(minutes) 120 minutes   OT Cumulative Minutes 2024

## 2024-08-03 LAB
INR PPP: 2.27 (ref 0.85–1.19)
PROTHROMBIN TIME: 25 SECONDS (ref 12.3–15)

## 2024-08-03 PROCEDURE — 99232 SBSQ HOSP IP/OBS MODERATE 35: CPT | Performed by: STUDENT IN AN ORGANIZED HEALTH CARE EDUCATION/TRAINING PROGRAM

## 2024-08-03 PROCEDURE — 97530 THERAPEUTIC ACTIVITIES: CPT

## 2024-08-03 PROCEDURE — 97110 THERAPEUTIC EXERCISES: CPT

## 2024-08-03 PROCEDURE — 85610 PROTHROMBIN TIME: CPT | Performed by: PHYSICIAN ASSISTANT

## 2024-08-03 RX ADMIN — LAMOTRIGINE 50 MG: 25 TABLET ORAL at 08:58

## 2024-08-03 RX ADMIN — MIRTAZAPINE 15 MG: 15 TABLET, FILM COATED ORAL at 21:12

## 2024-08-03 RX ADMIN — GABAPENTIN 100 MG: 100 CAPSULE ORAL at 06:13

## 2024-08-03 RX ADMIN — LEVOCARNITINE 330 MG: 1 SOLUTION ORAL at 17:24

## 2024-08-03 RX ADMIN — LEVOCARNITINE 330 MG: 1 SOLUTION ORAL at 08:59

## 2024-08-03 RX ADMIN — LOSARTAN POTASSIUM 50 MG: 50 TABLET, FILM COATED ORAL at 08:59

## 2024-08-03 RX ADMIN — SERTRALINE HYDROCHLORIDE 75 MG: 50 TABLET ORAL at 08:58

## 2024-08-03 RX ADMIN — DOLUTEGRAVIR SODIUM 50 MG: 50 TABLET, FILM COATED ORAL at 08:59

## 2024-08-03 RX ADMIN — LAMOTRIGINE 50 MG: 25 TABLET ORAL at 17:21

## 2024-08-03 RX ADMIN — BICTEGRAVIR SODIUM, EMTRICITABINE, AND TENOFOVIR ALAFENAMIDE FUMARATE 1 TABLET: 50; 200; 25 TABLET ORAL at 09:00

## 2024-08-03 RX ADMIN — ENOXAPARIN SODIUM 80 MG: 80 INJECTION SUBCUTANEOUS at 08:58

## 2024-08-03 RX ADMIN — METOPROLOL SUCCINATE 25 MG: 25 TABLET, EXTENDED RELEASE ORAL at 08:58

## 2024-08-03 RX ADMIN — SENNOSIDES 8.6 MG: 8.6 TABLET, FILM COATED ORAL at 17:20

## 2024-08-03 RX ADMIN — WARFARIN SODIUM 7.5 MG: 7.5 TABLET ORAL at 17:21

## 2024-08-03 RX ADMIN — Medication 6 MG: at 21:12

## 2024-08-03 RX ADMIN — TAMSULOSIN HYDROCHLORIDE 0.4 MG: 0.4 CAPSULE ORAL at 16:32

## 2024-08-03 RX ADMIN — ASPIRIN 81 MG: 81 TABLET, COATED ORAL at 08:59

## 2024-08-03 RX ADMIN — ATORVASTATIN CALCIUM 80 MG: 80 TABLET, FILM COATED ORAL at 16:32

## 2024-08-03 RX ADMIN — ZONISAMIDE 400 MG: 100 CAPSULE ORAL at 09:00

## 2024-08-03 RX ADMIN — GABAPENTIN 100 MG: 100 CAPSULE ORAL at 21:12

## 2024-08-03 RX ADMIN — DOCUSATE SODIUM 100 MG: 100 CAPSULE, LIQUID FILLED ORAL at 17:21

## 2024-08-03 RX ADMIN — DOCUSATE SODIUM 100 MG: 100 CAPSULE, LIQUID FILLED ORAL at 08:58

## 2024-08-03 RX ADMIN — DIVALPROEX SODIUM 1250 MG: 500 TABLET, EXTENDED RELEASE ORAL at 08:58

## 2024-08-03 RX ADMIN — LEVOCARNITINE 330 MG: 1 SOLUTION ORAL at 13:58

## 2024-08-03 RX ADMIN — GABAPENTIN 100 MG: 100 CAPSULE ORAL at 13:58

## 2024-08-03 NOTE — PROGRESS NOTES
08/03/24 1230   Pain Assessment   Pain Assessment Tool 0-10   Pain Score No Pain   Restrictions/Precautions   Precautions Aphasia;Bed/chair alarms;Cognitive;Fall Risk;Impulsive;Supervision on toilet/commode;Seizure;Pressure Ulcer   Weight Bearing Restrictions Yes   LLE Weight Bearing Per Order NWB   Braces or Orthoses   (L AKA )   Bed-Chair Transfer   Type of Assistance Needed Supervision   Physical Assistance Level No physical assistance   Comment Sup sit-pivots to various surfaces, G setup of w/c and brake/armrest mgmt w/o cueing   Chair/Bed-to-Chair Transfer CARE Score 4   Commmunity Re-entry   Community Re-entry Level Wheelchair   Community Re-entry Level of Assistance Close supervision   Community Re-entry Pt self-propelled w/c using BUEs from 4th floor pt room to 9th floor OT gym as well as through hospital hallways at SUP level, w/focus on improving endurance, w/c mgmt, obstacle negotiation, and UE strength, in anticipation of pt D/Cing w/c level. Pt tolerated well with several brief rest breaks to manage min fatigue. Pt wore biking gloves for improved  and skin integrity protection. Pt cleared all obstacles w/o cueing. Pt rested at 9th floor window by elevators, discussing view of [place] and recounting fond memories, for improved pt mental health and wellbeing.   Exercise Tools   Exercise Tools Yes   Other Exercise Tool 1 Seated EOM w/Sup, 0b66igit BUE chest press, OH press, elbow flexion, horiz ABD, prograde rowing, using 4.5# tbar for increased BUE strength, for improved ind during fxl transfers and STS. Pt tolerated well with rest breaks between sets to manage fatigue and soreness in R shoulder.   Other Exercise Tool 2 Seated EOM w/Sup, 30x each of fwd trunk flexion, lateral trunk flexion, and trunk twists while holding 3# ball for increased core strength, unsupported sitting balance/tolerance, and weightshifting during toileting. Pt tolerated well with min vc's for technique and rest  breaks between sets to manage fatigue. Pt denied pain.   Cognition   Overall Cognitive Status Impaired   Arousal/Participation Alert;Cooperative   Attention Attends with cues to redirect   Orientation Level Oriented X4   Memory Decreased recall of recent events   Following Commands Follows one step commands with increased time or repetition   Activity Tolerance   Activity Tolerance Patient tolerated treatment well   Assessment   Treatment Assessment Pt seen for 90min skilled OT session focused on fxl sit-pivot transfer training, UE/core strengthening, w/c mobility/mgmt for community re-entry, and mental wellbeing/ coping strategy, for increased independence w/ADLs and decreased caregiver burden. See detailed descriptions of fxl performance above. Pt tolerated session well, continuing to complete sit-pivot transfers to various surfaces at SUP level with G setup of transfer and brake mgmt w/o cueing. Pt cont to be limited by decreased fxl cognition/safety, balance, strength, and endurance. Pt would benefit from continued skilled OT focused on ADL retraining, lateral weightshifting for toileting, fxl transfers, UE strengthening, sacral offloading, core strengthening, phase 1 amp edu, and D/C planning.   Prognosis Good   Problem List Decreased strength;Decreased range of motion;Decreased endurance;Impaired balance;Decreased mobility;Decreased cognition;Decreased coordination;Impaired judgement;Decreased safety awareness;Decreased skin integrity;Orthopedic restrictions;Pain   Barriers to Discharge Inaccessible home environment;Decreased caregiver support   Plan   Treatment/Interventions ADL retraining;Functional transfer training;Therapeutic exercise;Endurance training;Cognitive reorientation;Patient/family training;Equipment eval/education;Bed mobility;Compensatory technique education;Spoke to nursing   Progress Progressing toward goals   Discharge Recommendation   Rehab Resource Intensity Level, OT   (pending)   OT  Therapy Minutes   OT Time In 1230   OT Time Out 1400   OT Total Time (minutes) 90   OT Mode of treatment - Individual (minutes) 90   OT Mode of treatment - Concurrent (minutes) 0   OT Mode of treatment - Group (minutes) 0   OT Mode of treatment - Co-treat (minutes) 0   OT Mode of Treatment - Total time(minutes) 90 minutes   OT Cumulative Minutes 2114   Therapy Time missed   Time missed? No

## 2024-08-03 NOTE — PROGRESS NOTES
08/03/24 0930   Pain Assessment   Pain Assessment Tool 0-10   Restrictions/Precautions   Precautions Aphasia;Bed/chair alarms;Fall Risk;Cognitive;Impulsive;Supervision on toilet/commode;Pressure Ulcer   LLE Weight Bearing Per Order NWB   Braces or Orthoses Other (Comment)  (left AKA )   Subjective   Subjective pt agreeable to perform skilled PT   Roll Left and Right   Type of Assistance Needed Independent   Roll Left and Right CARE Score 6   Sit to Lying   Type of Assistance Needed Independent   Sit to Lying CARE Score 6   Lying to Sitting on Side of Bed   Type of Assistance Needed Independent   Lying to Sitting on Side of Bed CARE Score 6   Sit to Stand   Type of Assistance Needed Supervision   Comment in parpl bars   Sit to Stand CARE Score 4   Bed-Chair Transfer   Type of Assistance Needed Supervision   Chair/Bed-to-Chair Transfer CARE Score 4   Transfer Bed/Chair/Wheelchair   Adaptive Equipment Roller Walker   Walk 10 Feet   Reason if not Attempted Safety concerns   Walk 10 Feet CARE Score 88   Walk 50 Feet with Two Turns   Reason if not Attempted Safety concerns   Walk 50 Feet with Two Turns CARE Score 88   Walk 150 Feet   Reason if not Attempted Safety concerns   Walk 150 Feet CARE Score 88   Walking 10 Feet on Uneven Surfaces   Reason if not Attempted Safety concerns   Walking 10 Feet on Uneven Surfaces CARE Score 88   Ambulation   Primary Mode of Locomotion Prior to Admission Walk   Findings hop in parpl bars   Wheel 50 Feet with Two Turns   Type of Assistance Needed Independent   Wheel 50 Feet with Two Turns CARE Score 6   Wheel 150 Feet   Type of Assistance Needed Independent   Wheel 150 Feet CARE Score 6   Curb or Single Stair   Reason if not Attempted Safety concerns   1 Step (Curb) CARE Score 88   4 Steps   Reason if not Attempted Safety concerns   4 Steps CARE Score 88   12 Steps   Reason if not Attempted Safety concerns   12 Steps CARE Score 88   Equipment Use   NuStep lvl 3 for 12 min    Assessment   Treatment Assessment pt overall cont to progress with WC propl STS SPT indep lvl to DS if fatigue . Pt WC propl down stairs up down and perform core strengthening on mat and prone 5 min with sidelying to inc hip extension . Pt also perform prone extension and side lying ex 's . Pt progressing toward DC goals . Pt cont to be limited with his safety awareness and fall risk. Cont POC , pt propl WC up and down ramp ground level Indep , pt will cont toward DC goals   Barriers to Discharge Inaccessible home environment;Decreased caregiver support   Plan   Progress Progressing toward goals   PT Therapy Minutes   PT Time In 0930   PT Time Out 1100   PT Total Time (minutes) 90   PT Mode of treatment - Individual (minutes) 90   PT Mode of treatment - Concurrent (minutes) 0   PT Mode of treatment - Group (minutes) 0   PT Mode of treatment - Co-treat (minutes) 0   PT Mode of Treatment - Total time(minutes) 90 minutes   PT Cumulative Minutes 2263   Therapy Time missed   Time missed? No

## 2024-08-03 NOTE — PROGRESS NOTES
"PM&R Coverage Progress Note:    Rehabilitation Diagnosis: Amputation:  05.3  Unilateral Lower Limb Above the Knee     ASSESSMENT: Stable, constipation      PLAN:    Rehabilitation  Continue current rehabilitation plan of care to maximize function.    Continue to utilize  and provide education to help prevent contracture  Continue current wound care plan from the stage III pressure injury of the left buttocks    Medical issues  Last bowel movement on 7/30.  Received Colace this morning and received Senokot last night.  He did not receive any of the as needed medications and the last nursing stated he refused however no documentation of the refusal.  The patient states that he had a bowel movement yesterday on 8/2 once again which was not documented.  He refuses any as needed medications at this time.  Continue current medical plan of care.        SUBJECTIVE: Patient seen face to face.  No acute issues.  Progressing as expected in rehabilitation program.  Fairly terse mildly agitated when talking about his bowel movements however states he had 1 yesterday and does not want any additional medications.  Denies any fever, chills, nausea, vomiting, cough, shortness of breath, diarrhea or constipation.  Pain is under control.  Discussed case with nursing        ROS:  A ten point review of systems was completed on 08/03/24 and pertinent positives are listed in subjective section. All other systems reviewed were negative.     OBJECTIVE:   /60 (BP Location: Left arm)   Pulse 84   Temp 98.2 °F (36.8 °C) (Oral)   Resp 20   Ht 5' 10\" (1.778 m)   Wt 76.6 kg (168 lb 14 oz)   SpO2 94%   BMI 24.23 kg/m²     Physical Exam  Vitals reviewed.   Constitutional:       General: He is not in acute distress.  HENT:      Head: Normocephalic and atraumatic.      Right Ear: External ear normal.      Left Ear: External ear normal.      Nose: Nose normal. No rhinorrhea.      Mouth/Throat:      Mouth: Mucous membranes are " moist.      Pharynx: Oropharynx is clear.   Eyes:      General: No scleral icterus.  Cardiovascular:      Rate and Rhythm: Normal rate.   Pulmonary:      Effort: Pulmonary effort is normal. No respiratory distress.   Musculoskeletal:      Right lower leg: No edema.      Comments: Left AKA   Skin:     General: Skin is warm and dry.      Comments: Incision clean dry and intact, staples no longer in place   Neurological:      Mental Status: He is alert and oriented to person, place, and time.   Psychiatric:         Mood and Affect: Mood normal.         Behavior: Behavior normal.            Personally reviewed on 08/03/24:   Lab Results   Component Value Date    WBC 6.42 08/01/2024    HGB 11.5 (L) 08/01/2024    HCT 39.3 08/01/2024    MCV 97 08/01/2024     (H) 08/01/2024     Lab Results   Component Value Date    SODIUM 139 08/01/2024    K 4.5 08/01/2024     08/01/2024    CO2 29 08/01/2024    BUN 16 08/01/2024    CREATININE 0.95 08/01/2024    GLUC 91 08/01/2024    CALCIUM 9.4 08/01/2024     Lab Results   Component Value Date    INR 2.27 (H) 08/03/2024    INR 1.64 (H) 08/02/2024    INR 1.24 (H) 08/01/2024    PROTIME 25.0 (H) 08/03/2024    PROTIME 19.6 (H) 08/02/2024    PROTIME 15.4 (H) 08/01/2024           Current Facility-Administered Medications:     acetaminophen (TYLENOL) tablet 975 mg, 975 mg, Oral, TID PRN, José Salcido MD, 975 mg at 08/01/24 0525    aspirin (ECOTRIN LOW STRENGTH) EC tablet 81 mg, 81 mg, Oral, Daily, Lorrie Barillas PA-C, 81 mg at 08/03/24 0859    atorvastatin (LIPITOR) tablet 80 mg, 80 mg, Oral, Daily With Dinner, Lorrie Barillas PA-C, 80 mg at 08/02/24 1659    bictegravir-emtricitab-tenofovir alafenamide (BIKTARVY) -25 MG tablet 1 tablet, 1 tablet, Oral, Daily With Breakfast, Lorrie Barillas PA-C, 1 tablet at 08/03/24 0900    bisacodyl (DULCOLAX) rectal suppository 10 mg, 10 mg, Rectal, Daily PRN, Lorrie Barillas PA-C    diphenhydrAMINE (BENADRYL)  tablet 25 mg, 25 mg, Oral, Q6H PRN, Lorrie Ervin-Mariano PA-C, 25 mg at 07/26/24 2353    divalproex sodium (DEPAKOTE ER) 24 hr tablet 1,250 mg, 1,250 mg, Oral, Daily, Lorrie Ervin-Mariano, PA-C, 1,250 mg at 08/03/24 0858    docusate sodium (COLACE) capsule 100 mg, 100 mg, Oral, BID, Ashley Depadua, MD, 100 mg at 08/03/24 0858    dolutegravir (TIVICAY) tablet 50 mg, 50 mg, Oral, Daily, FADUMO Black-C, 50 mg at 08/03/24 0859    enoxaparin (LOVENOX) subcutaneous injection 80 mg, 1 mg/kg, Subcutaneous, Q24H DAVINA, CHARLIE Plata, 80 mg at 08/03/24 0858    gabapentin (NEURONTIN) capsule 100 mg, 100 mg, Oral, Q8H, Ashley Depadua, MD, 100 mg at 08/03/24 0613    lamoTRIgine (LaMICtal) tablet 50 mg, 50 mg, Oral, BID, Lorrie Ervin-Mariano PA-C, 50 mg at 08/03/24 0858    levOCARNitine (CARNITOR) oral solution 330 mg, 1,000 mg/day, Oral, TID With Meals, Lorrie Ervin-Mariano PA-C, 330 mg at 08/03/24 0859    losartan (COZAAR) tablet 50 mg, 50 mg, Oral, Daily, Lorrie Ervin-Mariano PA-C, 50 mg at 08/03/24 0859    melatonin tablet 6 mg, 6 mg, Oral, HS, Lorrie Ervin-Mariano PA-C, 6 mg at 08/02/24 2148    metoprolol succinate (TOPROL-XL) 24 hr tablet 25 mg, 25 mg, Oral, Daily, CHARLIE Mcclelland, 25 mg at 08/03/24 0858    mirtazapine (REMERON) tablet 15 mg, 15 mg, Oral, HS, Lorrie Ervin-Mariano PA-C, 15 mg at 08/02/24 2148    ondansetron (ZOFRAN-ODT) dispersible tablet 4 mg, 4 mg, Oral, Q6H PRN, Lorrie Barillas PA-C, 4 mg at 07/21/24 0814    oxyCODONE (ROXICODONE) split tablet 2.5 mg, 2.5 mg, Oral, Q8H PRN, Raimundo Riley MD, 2.5 mg at 07/26/24 2143    polyethylene glycol (MIRALAX) packet 17 g, 17 g, Oral, Daily PRN, Lorrie Barillas PA-C    senna (SENOKOT) tablet 8.6 mg, 1 tablet, Oral, HS PRN, Lorrie Barillas PA-C, 8.6 mg at 08/02/24 2148    sertraline (ZOLOFT) tablet 75 mg, 75 mg, Oral, Daily, Lorrie Barillas PA-C, 75 mg at 08/03/24 0858    tamsulosin (FLOMAX) capsule 0.4 mg, 0.4 mg,  Oral, Daily With Dinner, Lorrie Barillas PA-C, 0.4 mg at 08/02/24 1700    warfarin (COUMADIN) tablet 7.5 mg, 7.5 mg, Oral, Daily (warfarin), Lorrie Barillas PA-C, 7.5 mg at 08/02/24 1700    zonisamide (ZONEGRAN) capsule 400 mg, 400 mg, Oral, Daily, Lorrie Barillas PA-C, 400 mg at 08/03/24 0900    Past Medical History:   Diagnosis Date    Acute lower limb ischemia 06/08/2024    Anxiety     Depression     HIV disease ([facility])     Substance abuse ([facility])     Suicide attempt ([facility])        Patient Active Problem List    Diagnosis Date Noted    Episodic headache 07/22/2024    Neurocognitive disorder 07/09/2024    Cardiomyopathy ([facility]) 07/09/2024    Adjustment disorder with mixed anxiety and depressed mood 07/08/2024    Impaired mobility and activities of daily living 07/07/2024    At risk for venous thromboembolism (VTE) 07/07/2024    Acute pain 07/07/2024    At risk for constipation 07/07/2024    Urinary incontinence 07/07/2024    Patient incapable of making informed decisions 07/07/2024    Pressure injury of buttock, stage 3 ([facility]) 07/07/2024    Above-knee amputation of left lower extremity ([facility]) 07/06/2024    Traumatic brain injury ([facility]) 07/06/2024    Carnitine deficiency ([facility]) 06/09/2024    Left ventricular thrombus 06/08/2024    History of seizures 06/08/2024    Tobacco abuse 10/26/2019    Cerebrovascular accident (CVA) ([facility]) 10/26/2019    Positive laboratory testing for human immunodeficiency virus ([facility]) 07/03/2017    Bipolar affective disorder ([facility]) 07/02/2017    Hypertension 02/27/2017    Human immunodeficiency virus (HIV) infection ([facility]) 02/16/2017    History of stroke 02/16/2017    Substance abuse ([facility]) 12/21/2013    Unspecified vitamin D deficiency 05/28/2010    Benign essential hypertension 02/25/2010      Grant Parks,   Physical Medicine and [facility]    I have spent a total time of 35 minutes on 08/03/24 in caring for this patient including Counseling /  Coordination of care, Documenting in the medical record, Obtaining or reviewing history  , and Communicating with other healthcare professionals .      ** Please Note:  voice to text software may have been used in the creation of this document. Although proof errors in transcription or interpretation are a potential of such software**

## 2024-08-03 NOTE — PLAN OF CARE
Problem: PAIN - ADULT  Goal: Verbalizes/displays adequate comfort level or baseline comfort level  Description: Interventions:  - Encourage patient to monitor pain and request assistance  - Assess pain using appropriate pain scale  - Administer analgesics based on type and severity of pain and evaluate response  - Implement non-pharmacological measures as appropriate and evaluate response  - Consider cultural and social influences on pain and pain management  - Notify physician/advanced practitioner if interventions unsuccessful or patient reports new pain  Outcome: Progressing     Problem: INFECTION - ADULT  Goal: Absence or prevention of progression during hospitalization  Description: INTERVENTIONS:  - Assess and monitor for signs and symptoms of infection  - Monitor lab/diagnostic results  - Monitor all insertion sites, i.e. indwelling lines, tubes, and drains  - Monitor endotracheal if appropriate and nasal secretions for changes in amount and color  - Muse appropriate cooling/warming therapies per order  - Administer medications as ordered  - Instruct and encourage patient and family to use good hand hygiene technique  - Identify and instruct in appropriate isolation precautions for identified infection/condition  Outcome: Progressing  Goal: Absence of fever/infection during neutropenic period  Description: INTERVENTIONS:  - Monitor WBC    Outcome: Progressing     Problem: SAFETY ADULT  Goal: Patient will remain free of falls  Description: INTERVENTIONS:  - Educate patient/family on patient safety including physical limitations  - Instruct patient to call for assistance with activity   - Consult OT/PT to assist with strengthening/mobility   - Keep Call bell within reach  - Keep bed low and locked with side rails adjusted as appropriate  - Keep care items and personal belongings within reach  - Initiate and maintain comfort rounds  - Make Fall Risk Sign visible to staff  - Offer Toileting every 2 Hours,  in advance of need  - Initiate/Maintain alarm  - Obtain necessary fall risk management equipment:   - Apply yellow socks and bracelet for high fall risk patients  - Consider moving patient to room near nurses station  Outcome: Progressing  Goal: Maintain or return to baseline ADL function  Description: INTERVENTIONS:  -  Assess patient's ability to carry out ADLs; assess patient's baseline for ADL function and identify physical deficits which impact ability to perform ADLs (bathing, care of mouth/teeth, toileting, grooming, dressing, etc.)  - Assess/evaluate cause of self-care deficits   - Assess range of motion  - Assess patient's mobility; develop plan if impaired  - Assess patient's need for assistive devices and provide as appropriate  - Encourage maximum independence but intervene and supervise when necessary  - Involve family in performance of ADLs  - Assess for home care needs following discharge   - Consider OT consult to assist with ADL evaluation and planning for discharge  - Provide patient education as appropriate  Outcome: Progressing  Goal: Maintains/Returns to pre admission functional level  Description: INTERVENTIONS:  - Perform AM-PAC 6 Click Basic Mobility/ Daily Activity assessment daily.  - Set and communicate daily mobility goal to care team and patient/family/caregiver.   - Collaborate with rehabilitation services on mobility goals if consulted  - Perform Range of Motion 3 times a day.  - Reposition patient every 2 hours.  - Dangle patient 3 times a day  - Stand patient 3 times a day  - Ambulate patient 3 times a day  - Out of bed to chair 3 times a day   - Out of bed for meals 3 times a day  - Out of bed for toileting  - Record patient progress and toleration of activity level   Outcome: Progressing     Problem: DISCHARGE PLANNING  Goal: Discharge to home or other facility with appropriate resources  Description: INTERVENTIONS:  - Identify barriers to discharge w/patient and caregiver  -  Arrange for needed discharge resources and transportation as appropriate  - Identify discharge learning needs (meds, wound care, etc.)  - Arrange for interpretive services to assist at discharge as needed  - Refer to Case Management Department for coordinating discharge planning if the patient needs post-hospital services based on physician/advanced practitioner order or complex needs related to functional status, cognitive ability, or social support system  Outcome: Progressing     Problem: Prexisting or High Potential for Compromised Skin Integrity  Goal: Skin integrity is maintained or improved  Description: INTERVENTIONS:  - Identify patients at risk for skin breakdown  - Assess and monitor skin integrity  - Assess and monitor nutrition and hydration status  - Monitor labs   - Assess for incontinence   - Turn and reposition patient  - Assist with mobility/ambulation  - Relieve pressure over bony prominences  - Avoid friction and shearing  - Provide appropriate hygiene as needed including keeping skin clean and dry  - Evaluate need for skin moisturizer/barrier cream  - Collaborate with interdisciplinary team   - Patient/family teaching  - Consider wound care consult   Outcome: Progressing

## 2024-08-03 NOTE — QUICK NOTE
Reviewed pt's chart.No problems overnight. Nursing reports no issues today. Pt continues to refuse therapy intermittently.

## 2024-08-04 LAB
INR PPP: 2.86 (ref 0.85–1.19)
PROTHROMBIN TIME: 29.7 SECONDS (ref 12.3–15)

## 2024-08-04 PROCEDURE — 85610 PROTHROMBIN TIME: CPT | Performed by: PHYSICIAN ASSISTANT

## 2024-08-04 RX ADMIN — BICTEGRAVIR SODIUM, EMTRICITABINE, AND TENOFOVIR ALAFENAMIDE FUMARATE 1 TABLET: 50; 200; 25 TABLET ORAL at 09:11

## 2024-08-04 RX ADMIN — TAMSULOSIN HYDROCHLORIDE 0.4 MG: 0.4 CAPSULE ORAL at 17:15

## 2024-08-04 RX ADMIN — GABAPENTIN 100 MG: 100 CAPSULE ORAL at 05:26

## 2024-08-04 RX ADMIN — LEVOCARNITINE 330 MG: 1 SOLUTION ORAL at 09:11

## 2024-08-04 RX ADMIN — LEVOCARNITINE 330 MG: 1 SOLUTION ORAL at 17:15

## 2024-08-04 RX ADMIN — GABAPENTIN 100 MG: 100 CAPSULE ORAL at 22:11

## 2024-08-04 RX ADMIN — METOPROLOL SUCCINATE 25 MG: 25 TABLET, EXTENDED RELEASE ORAL at 09:10

## 2024-08-04 RX ADMIN — Medication 6 MG: at 22:11

## 2024-08-04 RX ADMIN — ENOXAPARIN SODIUM 80 MG: 80 INJECTION SUBCUTANEOUS at 09:10

## 2024-08-04 RX ADMIN — DIVALPROEX SODIUM 1250 MG: 500 TABLET, EXTENDED RELEASE ORAL at 09:10

## 2024-08-04 RX ADMIN — LEVOCARNITINE 330 MG: 1 SOLUTION ORAL at 13:46

## 2024-08-04 RX ADMIN — GABAPENTIN 100 MG: 100 CAPSULE ORAL at 13:46

## 2024-08-04 RX ADMIN — LAMOTRIGINE 50 MG: 25 TABLET ORAL at 17:15

## 2024-08-04 RX ADMIN — DOLUTEGRAVIR SODIUM 50 MG: 50 TABLET, FILM COATED ORAL at 09:11

## 2024-08-04 RX ADMIN — ZONISAMIDE 400 MG: 100 CAPSULE ORAL at 09:12

## 2024-08-04 RX ADMIN — WARFARIN SODIUM 7.5 MG: 7.5 TABLET ORAL at 17:15

## 2024-08-04 RX ADMIN — LAMOTRIGINE 50 MG: 25 TABLET ORAL at 09:10

## 2024-08-04 RX ADMIN — MIRTAZAPINE 15 MG: 15 TABLET, FILM COATED ORAL at 22:11

## 2024-08-04 RX ADMIN — ATORVASTATIN CALCIUM 80 MG: 80 TABLET, FILM COATED ORAL at 17:15

## 2024-08-04 RX ADMIN — SERTRALINE HYDROCHLORIDE 75 MG: 50 TABLET ORAL at 09:10

## 2024-08-04 RX ADMIN — LOSARTAN POTASSIUM 50 MG: 50 TABLET, FILM COATED ORAL at 09:10

## 2024-08-04 RX ADMIN — ASPIRIN 81 MG: 81 TABLET, COATED ORAL at 09:10

## 2024-08-04 NOTE — QUICK NOTE
Reviewed chart and discussed with patient's nurse. No acute issues or concerns. Has been noted to refuse therapy or medications at times but nurse states this is mood dependent.

## 2024-08-04 NOTE — PLAN OF CARE
Problem: PAIN - ADULT  Goal: Verbalizes/displays adequate comfort level or baseline comfort level  Description: Interventions:  - Encourage patient to monitor pain and request assistance  - Assess pain using appropriate pain scale  - Administer analgesics based on type and severity of pain and evaluate response  - Implement non-pharmacological measures as appropriate and evaluate response  - Consider cultural and social influences on pain and pain management  - Notify physician/advanced practitioner if interventions unsuccessful or patient reports new pain  Outcome: Progressing     Problem: INFECTION - ADULT  Goal: Absence or prevention of progression during hospitalization  Description: INTERVENTIONS:  - Assess and monitor for signs and symptoms of infection  - Monitor lab/diagnostic results  - Monitor all insertion sites, i.e. indwelling lines, tubes, and drains  - Monitor endotracheal if appropriate and nasal secretions for changes in amount and color  - [place] appropriate cooling/warming therapies per order  - Administer medications as ordered  - Instruct and encourage patient and family to use good hand hygiene technique  - Identify and instruct in appropriate isolation precautions for identified infection/condition  Outcome: Progressing  Goal: Absence of fever/infection during neutropenic period  Description: INTERVENTIONS:  - Monitor WBC    Outcome: Progressing     Problem: SAFETY ADULT  Goal: Patient will remain free of falls  Description: INTERVENTIONS:  - Educate patient/family on patient safety including physical limitations  - Instruct patient to call for assistance with activity   - Consult OT/PT to assist with strengthening/mobility   - Keep Call bell within reach  - Keep bed low and locked with side rails adjusted as appropriate  - Keep care items and personal belongings within reach  - Initiate and maintain comfort rounds  - Make Fall Risk Sign visible to staff  - Offer Toileting every 2 Hours,  in advance of need  - Initiate/Maintain alarm  - Obtain necessary fall risk management equipment:   - Apply yellow socks and bracelet for high fall risk patients  - Consider moving patient to room near nurses station  Outcome: Progressing  Goal: Maintain or return to baseline ADL function  Description: INTERVENTIONS:  -  Assess patient's ability to carry out ADLs; assess patient's baseline for ADL function and identify physical deficits which impact ability to perform ADLs (bathing, care of mouth/teeth, toileting, grooming, dressing, etc.)  - Assess/evaluate cause of self-care deficits   - Assess range of motion  - Assess patient's mobility; develop plan if impaired  - Assess patient's need for assistive devices and provide as appropriate  - Encourage maximum independence but intervene and supervise when necessary  - Involve family in performance of ADLs  - Assess for home care needs following discharge   - Consider OT consult to assist with ADL evaluation and planning for discharge  - Provide patient education as appropriate  Outcome: Progressing  Goal: Maintains/Returns to pre admission functional level  Description: INTERVENTIONS:  - Perform AM-PAC 6 Click Basic Mobility/ Daily Activity assessment daily.  - Set and communicate daily mobility goal to care team and patient/family/caregiver.   - Collaborate with rehabilitation services on mobility goals if consulted  - Perform Range of Motion 3 times a day.  - Reposition patient every 2 hours.  - Dangle patient 3 times a day  - Stand patient 3 times a day  - Ambulate patient 3 times a day  - Out of bed to chair 3 times a day   - Out of bed for meals 3 times a day  - Out of bed for toileting  - Record patient progress and toleration of activity level   Outcome: Progressing     Problem: DISCHARGE PLANNING  Goal: Discharge to home or other facility with appropriate resources  Description: INTERVENTIONS:  - Identify barriers to discharge w/patient and caregiver  -  Arrange for needed discharge resources and transportation as appropriate  - Identify discharge learning needs (meds, wound care, etc.)  - Arrange for interpretive services to assist at discharge as needed  - Refer to Case Management Department for coordinating discharge planning if the patient needs post-hospital services based on physician/advanced practitioner order or complex needs related to functional status, cognitive ability, or social support system  Outcome: Progressing     Problem: Prexisting or High Potential for Compromised Skin Integrity  Goal: Skin integrity is maintained or improved  Description: INTERVENTIONS:  - Identify patients at risk for skin breakdown  - Assess and monitor skin integrity  - Assess and monitor nutrition and hydration status  - Monitor labs   - Assess for incontinence   - Turn and reposition patient  - Assist with mobility/ambulation  - Relieve pressure over bony prominences  - Avoid friction and shearing  - Provide appropriate hygiene as needed including keeping skin clean and dry  - Evaluate need for skin moisturizer/barrier cream  - Collaborate with interdisciplinary team   - Patient/family teaching  - Consider wound care consult   Outcome: Progressing

## 2024-08-04 NOTE — PLAN OF CARE
Problem: SAFETY ADULT  Goal: Patient will remain free of falls  Description: INTERVENTIONS:  - Educate patient/family on patient safety including physical limitations  - Instruct patient to call for assistance with activity   - Consult OT/PT to assist with strengthening/mobility   - Keep Call bell within reach  - Keep bed low and locked with side rails adjusted as appropriate  - Keep care items and personal belongings within reach  - Initiate and maintain comfort rounds  - Make Fall Risk Sign visible to staff  - Offer Toileting every 2 Hours, in advance of need  - Initiate/Maintain bed/chair alarm  - Obtain necessary fall risk management equipment:non skid footwear   - Apply yellow socks and bracelet for high fall risk patients  - Consider moving patient to room near nurses station  Outcome: Progressing     Problem: Prexisting or High Potential for Compromised Skin Integrity  Goal: Skin integrity is maintained or improved  Description: INTERVENTIONS:  - Identify patients at risk for skin breakdown  - Assess and monitor skin integrity  - Assess and monitor nutrition and hydration status  - Monitor labs   - Assess for incontinence   - Turn and reposition patient  - Assist with mobility/ambulation  - Relieve pressure over bony prominences  - Avoid friction and shearing  - Provide appropriate hygiene as needed including keeping skin clean and dry  - Evaluate need for skin moisturizer/barrier cream  - Collaborate with interdisciplinary team   - Patient/family teaching  - Consider wound care consult   Outcome: Progressing

## 2024-08-05 LAB
INR PPP: 2.67 (ref 0.85–1.19)
PROTHROMBIN TIME: 28.3 SECONDS (ref 12.3–15)

## 2024-08-05 PROCEDURE — 97110 THERAPEUTIC EXERCISES: CPT

## 2024-08-05 PROCEDURE — 92507 TX SP LANG VOICE COMM INDIV: CPT

## 2024-08-05 PROCEDURE — 99232 SBSQ HOSP IP/OBS MODERATE 35: CPT | Performed by: PHYSICIAN ASSISTANT

## 2024-08-05 PROCEDURE — 97530 THERAPEUTIC ACTIVITIES: CPT

## 2024-08-05 PROCEDURE — 99232 SBSQ HOSP IP/OBS MODERATE 35: CPT | Performed by: PHYSICAL MEDICINE & REHABILITATION

## 2024-08-05 PROCEDURE — 85610 PROTHROMBIN TIME: CPT | Performed by: PHYSICIAN ASSISTANT

## 2024-08-05 RX ADMIN — ZONISAMIDE 400 MG: 100 CAPSULE ORAL at 08:22

## 2024-08-05 RX ADMIN — TAMSULOSIN HYDROCHLORIDE 0.4 MG: 0.4 CAPSULE ORAL at 17:13

## 2024-08-05 RX ADMIN — METOPROLOL SUCCINATE 25 MG: 25 TABLET, EXTENDED RELEASE ORAL at 08:22

## 2024-08-05 RX ADMIN — MIRTAZAPINE 15 MG: 15 TABLET, FILM COATED ORAL at 21:04

## 2024-08-05 RX ADMIN — Medication 6 MG: at 21:04

## 2024-08-05 RX ADMIN — LAMOTRIGINE 50 MG: 25 TABLET ORAL at 17:13

## 2024-08-05 RX ADMIN — LEVOCARNITINE 330 MG: 1 SOLUTION ORAL at 15:02

## 2024-08-05 RX ADMIN — LOSARTAN POTASSIUM 50 MG: 50 TABLET, FILM COATED ORAL at 08:22

## 2024-08-05 RX ADMIN — LEVOCARNITINE 330 MG: 1 SOLUTION ORAL at 08:23

## 2024-08-05 RX ADMIN — BICTEGRAVIR SODIUM, EMTRICITABINE, AND TENOFOVIR ALAFENAMIDE FUMARATE 1 TABLET: 50; 200; 25 TABLET ORAL at 08:21

## 2024-08-05 RX ADMIN — LEVOCARNITINE 330 MG: 1 SOLUTION ORAL at 17:12

## 2024-08-05 RX ADMIN — GABAPENTIN 100 MG: 100 CAPSULE ORAL at 15:02

## 2024-08-05 RX ADMIN — SERTRALINE HYDROCHLORIDE 75 MG: 50 TABLET ORAL at 08:22

## 2024-08-05 RX ADMIN — DIVALPROEX SODIUM 1250 MG: 500 TABLET, EXTENDED RELEASE ORAL at 08:22

## 2024-08-05 RX ADMIN — DOLUTEGRAVIR SODIUM 50 MG: 50 TABLET, FILM COATED ORAL at 08:22

## 2024-08-05 RX ADMIN — GABAPENTIN 100 MG: 100 CAPSULE ORAL at 21:04

## 2024-08-05 RX ADMIN — WARFARIN SODIUM 7.5 MG: 7.5 TABLET ORAL at 17:13

## 2024-08-05 RX ADMIN — ATORVASTATIN CALCIUM 80 MG: 80 TABLET, FILM COATED ORAL at 17:12

## 2024-08-05 RX ADMIN — GABAPENTIN 100 MG: 100 CAPSULE ORAL at 05:31

## 2024-08-05 RX ADMIN — ASPIRIN 81 MG: 81 TABLET, COATED ORAL at 08:22

## 2024-08-05 RX ADMIN — LAMOTRIGINE 50 MG: 25 TABLET ORAL at 08:22

## 2024-08-05 NOTE — ASSESSMENT & PLAN NOTE
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

## 2024-08-05 NOTE — PROGRESS NOTES
08/05/24 1350   Therapy Time missed   Time missed? Yes   Amount of time missed 30   Reason for time missed Extreme fatigue  (refusal)   Time(s) multiple attempts made 1350, 1400, 1430       Katia Gallagher MS, OTR/L

## 2024-08-05 NOTE — PROGRESS NOTES
08/05/24 0830   Pain Assessment   Pain Assessment Tool 0-10   Pain Score No Pain   [place] FACES Pain Rating 0   Restrictions/Precautions   Precautions Aphasia;Bed/chair alarms;Cognitive;Fall Risk;Impulsive;Supervision on toilet/commode;Seizure;Pressure Ulcer   LLE Weight Bearing Per Order NWB   Braces or Orthoses   (L AKA )   Subjective   Subjective pt agreeable to perform skilled PT   Roll Left and Right   Type of Assistance Needed Independent   Roll Left and Right CARE Score 6   Sit to Lying   Type of Assistance Needed Independent   Sit to Lying CARE Score 6   Lying to Sitting on Side of Bed   Type of Assistance Needed Independent   Lying to Sitting on Side of Bed CARE Score 6   Sit to Stand   Type of Assistance Needed Supervision   Comment STS in parpl bars   Sit to Stand CARE Score 4   Bed-Chair Transfer   Type of Assistance Needed Supervision   Comment sit pivot   Chair/Bed-to-Chair Transfer CARE Score 4   Transfer Bed/Chair/Wheelchair   Adaptive Equipment Parrallel Bars   Stand Pivot Supervision   Car Transfer   Type of Assistance Needed Supervision   Car Transfer CARE Score 4   Walk 10 Feet   Reason if not Attempted Safety concerns   Walk 10 Feet CARE Score 88   Walk 50 Feet with Two Turns   Reason if not Attempted Safety concerns   Walk 50 Feet with Two Turns CARE Score 88   Walk 150 Feet   Reason if not Attempted Safety concerns   Walk 150 Feet CARE Score 88   Walking 10 Feet on Uneven Surfaces   Reason if not Attempted Safety concerns   Walking 10 Feet on Uneven Surfaces CARE Score 88   Ambulation   Does the patient walk? 0. No, and walking goal is not clinically indicated.   Wheel 50 Feet with Two Turns   Type of Assistance Needed Independent   Wheel 50 Feet with Two Turns CARE Score 6   Wheel 150 Feet   Type of Assistance Needed Independent   Wheel 150 Feet CARE Score 6   Wheelchair mobility   Does the patient use a wheelchair? 1. Yes   Type of Wheelchair Used 1. Manual   Method Right  upper extremity;Left upper extremity   Curb or Single Stair   Reason if not Attempted Safety concerns   1 Step (Curb) CARE Score 88   4 Steps   Reason if not Attempted Safety concerns   4 Steps CARE Score 88   12 Steps   Reason if not Attempted Safety concerns   12 Steps CARE Score 88   Toilet Transfer   Type of Assistance Needed Supervision   Toilet Transfer CARE Score 4   Therapeutic Interventions   Strengthening core strengthening with 5 # dowel and ball toss   Flexibility prone and sidelying for limb manual stretch   Balance standing and seated dynamic balance   Equipment Use   NuStep lvl 3 for 12 min   Parallel Bars in bars hopping and TE ex's   Assessment   Treatment Assessment Pt cont to progress with WC indep lvl with set up for correct sit picot transfers and pt able to reminder to lock brakes and overall hand and bod positioning to safety sit pivot transfers . Pt also perfomr in Parpl bars for standing ex 's and short hpping with RLE and also instructed pt on right foot and limb protection. Dr Depadua will speak with pt friend Donna about whats next for Kieran for DC . Cont POC   Barriers to Discharge Inaccessible home environment;Decreased caregiver support   Plan   Progress Progressing toward goals   PT Therapy Minutes   PT Time In 0830   PT Time Out 1000   PT Total Time (minutes) 90   PT Mode of treatment - Individual (minutes) 90   PT Mode of treatment - Concurrent (minutes) 0   PT Mode of treatment - Group (minutes) 0   PT Mode of treatment - Co-treat (minutes) 0   PT Mode of Treatment - Total time(minutes) 90 minutes   PT Cumulative Minutes 2353   Therapy Time missed   Time missed? No

## 2024-08-05 NOTE — PROGRESS NOTES
[facility]  Progress Note  Name: Sunil Patel I  MRN: [id number]  Unit/Bed#: -01 I Date of Admission: 7/6/2024   Date of Service: 8/5/2024 I Hospital Day: 30    Assessment & Plan   * Above-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)  Assessment & Plan  Presented with left lower extremity acute limb ischemia/extensive left iliofemoral and left infrainguinal embolism requiring left femoral thrombectomy and subsequent AKA on 6/7/24 with vascular surgery.  Incision C/D/I, pain controlled.   Vascular surgery saw here last on 7/10 and removed staples  Continue ASA, Coumadin and statin   Rehab and pain control per PMR  Pt has been refusing therapy through the weekend.   Pt has met his rehab goals and will be discharged when able.    Episodic headache  Assessment & Plan  Pt reports a history of migraines.  It is associated with photophobia.  He is unable to take triptans due to a history of stroke.  Given Nurtec 7/21/24 - unclear if it was helpful    Cardiomyopathy (HCC)  Assessment & Plan  ECHO 6/10/2024 = LVEF 40-45% with mild global hypokinesis   Status post NM stress test on 6/11/2024 which showed: a large, mild, fixed defect in the inferior wall, possibly due to diaphragmatic attenuation artifact, there is a small area of partial reversibility in the inferior apical wall suggestive of ischemia    Evaluated ed by cardiology, etiology felt to be possibly secondary to stress-induced cardiomyopathy, with apical thrombus  Cardiac catheterization deferred given the lack of any significant ischemia, and no current cardiac symptoms  Continue with medical management with aspirin, statin, beta-blocker, ARB  Monitor volume status, remains euvolemic off of diuretics   Euvolemic on exam  Outpatient follow-up with cardiology    Traumatic brain injury (HCC)  Assessment & Plan  Remote history of TBI, previously residing in a group home prior to jail sentence.  Evaluated by  neuropsychiatry on 6/27/24 and deemed NOT to have medical decision-making capacity  Process for court appointed guardian started on 7/5/24 - CM following   Guardianship hearing 8/27/24.    Carnitine deficiency (HCC)  Assessment & Plan  Continue levocarnitine 330 mg 3x daily with meals.    History of seizures  Assessment & Plan  Diagnosed in July 2019, follows with [facility] neurology outpatient  Continue home regimen with Depakote ER, Lamotrigine and Zonegran    Left ventricular thrombus  Assessment & Plan  Noted on echocardiogram 6/10/2024: spherical 1.5 x 1.3 cm thrombus at the LV apex   Initiated on AC with Xarelto however unable to verify insurance coverage.  Pt switched to Coumadin. Lovenox bridge until INR > 2. INR 2.67  Lovenox stopped. Continue Coumadin at 7.5mg.  INR Thursday.    Benign essential hypertension  Assessment & Plan  Home regimen: Losartan 50mg daily, Toprol XL 25 mg BID  Current regimen: Losartan 50 mg daily, Toprol-XL 25 mg daily  Stable      History of stroke  Assessment & Plan  History of left MCA CVA in May 2018 with residual expressive aphasia  Continue ASA and atorvastatin    Human immunodeficiency virus (HIV) infection (HCC)  Assessment & Plan  Continue Biktarvy and Tivicay.    Bipolar affective disorder (HCC)  Assessment & Plan  Continue home meds Zoloft and Remeron  Outpatient follow-up with Psychiatry             The above assessment and plan was reviewed and updated as determined by my evaluation of the patient on 8/5/2024.    Labs:   Results from last 7 days   Lab Units 08/01/24  0517 07/31/24  0501   WBC Thousand/uL 6.42 6.59   HEMOGLOBIN g/dL 11.5* 10.2*   HEMATOCRIT % 39.3 34.6*   PLATELETS Thousands/uL 421* 398*     Results from last 7 days   Lab Units 08/01/24  0517 07/31/24  0501   SODIUM mmol/L 139 139   POTASSIUM mmol/L 4.5 4.0   CHLORIDE mmol/L 104 104   CO2 mmol/L 29 26   BUN mg/dL 16 21   CREATININE mg/dL 0.95 0.88   CALCIUM mg/dL 9.4 9.1         Results from last 7 days   Lab  Units 08/05/24  0643 08/04/24  0631   INR  2.67* 2.86*           Imaging  No orders to display       Review of Scheduled Meds:  Current Facility-Administered Medications   Medication Dose Route Frequency Provider Last Rate    acetaminophen  975 mg Oral TID PRN José Salcido MD      aspirin  81 mg Oral Daily Lorrie Barillas PA-C      atorvastatin  80 mg Oral Daily With Dinner Lorrie Barillas PA-C      bictegravir-emtricitab-tenofovir alafenamide  1 tablet Oral Daily With Breakfast Lorrie Barillas PA-C      bisacodyl  10 mg Rectal Daily PRN Lorrie Barillas PA-C      diphenhydrAMINE  25 mg Oral Q6H PRN Lorrie Barillas PA-C      divalproex sodium  1,250 mg Oral Daily Lorrie Barillas PA-C      docusate sodium  100 mg Oral BID Ashley Depadua, MD      dolutegravir  50 mg Oral Daily Lorrie Barillas PA-C      gabapentin  100 mg Oral Q8H Ashley Depadua, MD      lamoTRIgine  50 mg Oral BID Lorrie Barillas PA-C      levOCARNitine  1,000 mg/day Oral TID With Meals Lorrie Barillas PA-C      losartan  50 mg Oral Daily Lorrie Barillas PA-C      melatonin  6 mg Oral HS Lorrie Barillas PA-C      metoprolol succinate  25 mg Oral Daily CHARLIE Mcclelland      mirtazapine  15 mg Oral HS Lorrie Barillas PA-C      ondansetron  4 mg Oral Q6H PRN Lorrie Barillas PA-C      polyethylene glycol  17 g Oral Daily PRN Lorrie Barillas PA-C      senna  1 tablet Oral HS PRN Lorrie Barillas PA-C      sertraline  75 mg Oral Daily Lorrie Barillas PA-C      tamsulosin  0.4 mg Oral Daily With Dinner Lorrie Barillas PA-C      warfarin  7.5 mg Oral Daily (warfarin) Lorrie Barillas PA-C      zonisamide  400 mg Oral Daily Lorrie Barillas PA-C         Subjective/ HPI: Patient seen and examined. Patients overnight issues or events were reviewed with nursing staff. New or overnight issues include the following:     Pt seen in his room with his friend,  Mireya, present. He was very upset about the loss of his leg. He denies any other complaints.    ROS:   A 10 point ROS was performed; negative except as noted above.        *Labs /Radiology studies Reviewed  *Medications  reviewed and reconciled as needed  *Please refer to order section for additional ordered labs studies      Physical Examination:  Vitals:   Vitals:    08/04/24 0539 08/04/24 1412 08/04/24 2030 08/05/24 0540   BP: 118/71 131/73 109/65 120/68   BP Location: Left arm Right arm Right arm Left arm   Pulse: 90 81 85 86   Resp: 19 14 18 18   Temp: (!) 97.2 °F (36.2 °C) 98 °F (36.7 °C) 98.4 °F (36.9 °C) 97.6 °F (36.4 °C)   TempSrc: Oral Oral Oral Oral   SpO2: 94% 96% 95% 95%   Weight:       Height:           General Appearance: NAD; upset and tearful  HEENT: PERRLA, conjuctiva normal; mucous membranes moist; face symmetrical  Neck:  Supple  Lungs: clear bilaterally, normal respiratory effort, no retractions, expiratory effort normal, on room air  CV: regular rate and rhythm, no murmurs rubs or gallops noted   ABD: soft non tender, +BS x4  EXT: Lt AKA  Skin: normal turgor, normal texture, no rash  Psych: affect normal, mood normal  Neuro: Awake and alert.     The above physical exam was reviewed and updated as determined by my evaluation of the patient on 8/5/2024.    Invasive Devices       Drain  Duration             External Urinary Catheter 4 days                       VTE Pharmacologic Prophylaxis: Warfarin (Coumadin)  Code Status: Level 1 - Full Code  Current Length of Stay: 30 day(s)    Total floor / unit time spent today  35 minutes   Coordination of patient's care was performed in conjunction with consulting services. Time invested included review of patient's labs, vitals, and management of their comorbidities with continued monitoring, examination of patient as well as answering patient questions, documenting her findings and creating progress note in electronic medical record,  ordering  appropriate diagnostic testing.       ** Please Note:  voice to text software may have been used in the creation of this document. Although proof errors in transcription or interpretation are a potential of such software**

## 2024-08-05 NOTE — PROGRESS NOTES
"   08/05/24 1230   Therapy Time missed   Time missed? Yes   Amount of time missed 30   Reason for time missed   (Refusal reporting, \"I'm tired of this sh*t\" and reports he is not in the mood for therapy today. NSG is aware.)   Time(s) multiple attempts made 2       "

## 2024-08-05 NOTE — ASSESSMENT & PLAN NOTE
Presented with left lower extremity acute limb ischemia/extensive left iliofemoral and left infrainguinal embolism requiring left femoral thrombectomy and subsequent AKA on 6/7/24 with vascular surgery.  Incision C/D/I, pain controlled.   Vascular surgery saw here last on 7/10 and removed staples  Continue ASA, Coumadin and statin   Rehab and pain control per PMR  Pt has been refusing therapy through the weekend.   Pt has met his rehab goals and will be discharged when able.

## 2024-08-05 NOTE — PROGRESS NOTES
Physical Medicine and Rehabilitation Progress Note  Sunil Patel 63 y.o. male MRN: [id number]  Unit/Bed#: [facility] 451-01 Encounter: [id number]    To Review: Sunil Patel is a 63 y.o. male who  has a past medical history of Acute lower limb ischemia (06/08/2024), Anxiety, Depression, HIV disease (HCC), Substance abuse (HCC), and Suicide attempt (HCC). who presented to the [facility] on 6/7/24 from group home for increased LLE swelling and pain and was found to have a left external iliac artery occlusion and acute limb ischemia. 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 Patient had TTE on 6/10/24 showing LV apex thrombus. On 6/11/24 patient had pharmacologic nuclear stress test/SPECT scan which did not show any significant areas of ischemia with EF 40-45% and recommended continuing A/C for LV apical thrombus. His course was complicated by b/l buttock unstageable pressure ulcers, urinary and fecal incontinence, pain, and significant decline in ADLs and mobility. Patient has been continued on Xarelto for anticoagulation, as well as ASA and statin. Patient has a history of TBI, with baseline nonfluent aphasia and forgetfulness. He was evaluated by neuropsychology on 6/27/24, and deemed to not have capacity to make fully informed medical decisions. Therefore, the guardianship process was initiated as of 7/5/24. He was admitted to the [facility] on 7/6.     Chief Complaint: Wanted to chat today about his money situation    Interval History/Subjective:  No acute events over the weekend. He has been doing well with therapy. They have incorporated some painting exercises. No new CP, SOB, fevers, chills, N/V, abdominal pain. Last BM 8/4. Has been fairly regular, just not always recorded. Remains largely  continent of bowel/bladder. No new headaches or pain today. He had an argument with his friend Mireya today, and mentioned to nursing again using Marijuana, and frustration with being stuck here. His friend, Mireya reviewed with him where all his money is, but he didn't seem to be receptive to her explanation. He expressed frustration as he wants to rebekah the detention who he blames for his leg. However, he remains with limited capacity to make complex decisions. He also is tired of being at this rehab. He was fairly angry in his conversations with friends, and staff notes that with the aphasia - that just ramps up his frustration - and similar the frustration worsens his aphasia. When I went to see him again, he was self soothing in his room with some music.     ROS:  A 10 point review of systems was negative except for what is noted in the HPI.    Today's Changes:  Stopped lovenox as has been therapeutic. Discussed with IM dosing for tonight given continued climb in INR. He got 7.5mg throughout the weekend.   Can stop the sleep log. Discussed with nursing.  Stop oxycodone PRN. Has not used in a few weeks.    Total visit time: 35 minutes, with more than 50% spent counseling/coordinating care. Counseling includes discussion with patient re: progress in therapies, functional issues observed by therapy staff, and discussion with patient regarding their current medical state and wellbeing. Coordination of patient's care was performed in conjunction with Internal Medicine service to monitor patient's labs, vitals, and management of their comorbidities.      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 Underwent L femoral thrombectomy and AKA with vascular surgery   - [facility] sx follow-up 7/10 - L AKA incision site well-healed, staples taken out at the bedside this morning  - [facility] will be vendor - Patient now  "has .  - Monitor for hip flexion/abduction tightness. Stretches in therapy  - Now on Coumadin with hx of LV thrombus and PAOD   - PT/OT/SLP (to work on carry over strategies) 3-5 hours/day, 5-7 days/week.    - Goals are Ind-Sup at a wheelchair level.   - Outpatient f/u with [facility] and Vascular  - Continue patient training with  placement  - Continue gabapentin - doesn't do too much for phantom limb sensation, but that doesn't bother him or cause him pain currently.   - Patient still frustrated given circumstances; will continue gabapentin for anxiety    Neurocognitive disorder  Assessment & Plan  Has been appropriate and cooperative throughout this stay without any behavioral issues on the rehab unit to date.    - Does have decreased frustration tolerance   - So far redirectable.  Hx of TBI and L MCA CVA, psychiatric d/o, seizure d/o  - Neuropsych assessment 6/27/24 - \"diffuse cognitive dysfunction and on a measure assessing awareness of personal health status and ability to evaluate health problems, handle medical emergencies and take safety precautions, patient performed in the IMPAIRED range of functioning. During this encounter, patient does not appear to have capacity to make fully informed medical decisions.\"  MMSE 4/28 - severely impaired  - Guardianship process initiated on acute - follow-up by CM/Admin  - Status: some expressive and possible some receptive aphasia.  He can be difficult to follow at times likely due to a mixture of aphasia, tangentiality, mild lability, and impaired memory; lability with hx of possible bipolar d/o  - Neuropsych Med review: Depakote, Lamictal, Remeron, Zoloft, Melatonin, gabapentin, PRN oxy 2.5mg TID   - Monitor neuro-exam, wakefulness, mood, cognition, insight into deficits and safety awareness   - Monitor and ensure optimal management electrolytes, nutrition, and hydration  - Monitor for signs or symptoms of infection, medication intolerances, " other systemic etiologies  - Additional labs, imaging, specialist follow-up as needed per primary team currently   - Overstimulation precautions, frequent re-orientation, re-direction, re-assurance  - Optimal mood, pain, and sleep management  - If impaired sleep or behavior recommend sleep log and agitation monitoring    - Limit sedating medications when possible  - Fall precautions - if needed increase rounding or consider virtual sitter or in-person sitter  - For routine restlessness, anxiety, irritability focus on non-pharmacologic management    - Hold benzo's with increased risk paradoxical reaction and possibility of limiting cognitive recovery  - Continue SLP and interdisciplinary care  - OP neuro and PCP         Adjustment disorder with mixed anxiety and depressed mood  Assessment & Plan  - Related to recent release from incarceration, new AKA and uncertainty of future disposition, all in the setting of impaired cognition related to prior strokes and TBI  - Behavioral techniques for symptom management  - Neuropsychology consult as available  - Given his circumstances, increased frustration is expected. Can consider Psych consult if symptoms continue to worsen to adjust mood stabilizing medications. Will try nonpharmacologic approaches first with more frequent conversations/redirections   - Continue gabapentin 100 q8 for now - tolerating well. Mood fluctuates day to day but general frustration is expected on his behalf.    Pressure injury of buttock, stage 3 ([facility])  Assessment & Plan  Stable to improving  - Wound care consulted and following weekly  - POA L buttock pressure injury unstageable has healed.  - POA R buttock pressure injury is now Stage 3, and improving.   - Continue local care with border foam.  - Recommend ROHO cushion in chair when out of bed instead   - Preventative hydraguard to bilateral heels BID and PRN.   - P500  - Monitor clinically for breakdown, frequent turns  - reviewed picture of  "wound today. It is healing well. Continue to monitor    Patient incapable of making informed decisions  Assessment & Plan  - History of remote TBI and prior strokes with comorbid psychiatric history.  - Evaluated by neuropsychology, Dr. Dilshad Tatum, PhD on 6/27/24, found to have \"diffuse cognitive dysfunction and on a measure assessing awareness of personal health status and ability to evaluate health problems, handle medical emergencies and take safety precautions, patient performed in the IMPAIRED range of functioning. During this encounter, patient does not appear to have capacity to make fully informed medical decisions.\"  - Court-appointed guardianship process has been initiated by acute care CM Katia Kay.    - We have identified two friends who are interested in being patient's guardians and have been involved and invested in patient's care on the [facility].   - They will need to be interviewed by the  involved in this process.    - He has to undergo his hearing on 8/6/2024 first.  -- now rescheduled to 8/27   - He would ultimately benefit from prison/nursing home/memory care unit - he does very well with structure and supervision.    8/2- Ongoing discussions with CM, [facility] admin and social work to dispo patient to stable long-term housing. Process continues, with evaluation by therapy managers from [facility].       Urinary incontinence  Assessment & Plan  - Did have some incontinence on acute - improved with timed voids and consistent assistance with his urinal  - Described as urgency  - Marked improvement on timed voids.   - monitor for retention, incontinence (including overflow incontinence), signs/symptoms of UTI  - Timed Voids and PVRs Q4hrs initiated earlier. And PVR <150 x3, so scans discontinued.    - He has some difficulty managing the urinal    - needs assistance but is able to transfer to [facility]    - Still uses condom catheter at night, in part for continence care/to protect his skin given " his buttock wounds.  - Continue timed voids           At risk for constipation  Assessment & Plan  - Stooling adequately recently; did have some incontinence on acute now improved  - Close continent care given buttock wounds  - Last BM 8/4 and continent  - Colace 100mg BID  - PRN miralax, Senna and suppository    Acute pain  Assessment & Plan  Controlled   - Tylenol 975 mg q8h PRN  - Resumed Gabapentin 100mg Q8hr due to increased headaches and irritability since discontinuing.  - Able to discontinue oxycodone. Has not used since 7/19.     At risk for venous thromboembolism (VTE)  Assessment & Plan  - Fully anticoagulated on warfarin for apical thrombus  - 8/5: INR 2.67 from 2.86 - dosing as per IM   - IM managing    Impaired mobility and activities of daily living  Assessment & Plan  - Rehabilitation medicine physician for daily monitoring of care, 24 hour availability for acute medical issues, medication management, and therapeutic and diagnostic assessments.  - 24 hour rehabilitation nursing 7 days per week for: management/teaching of medications, bowel/bladder routine, skin care.  - PT, OT for 2-3 hours per day, 5 to 6 days per week; 15 hours per week  - Rehabilitation Psychology as needed for adjustment and coping  - MSW for barriers to discharge, community resources, and family support  - Discharge planning following to help ensure a safe and efficient discharge  -7/30 teams: Patient is getting agitated with PT therapies, as he is reaching a plateau. Pt enjoys SLP and cognitive exercises with tablet. Discussions are ongoing for his dispo planning- [facility] admin have meetings with a few SNFs to discuss his case. CM is actively working on retrieving his belongings from prison. Guardianship court date still set for 8/27.  - Had a meeting with [place] leadership and [place] leadership, as well as Case Management. They will have their therapists come and evaluate the patient for appropriateness.         History  of stroke  Assessment & Plan  - History of old left temporal and parietal lobe cortical infarcts, also involving the insula and left occipital lobe as well as small right posterior parietal lobe. Stable on most recent CT head on 5/16/24.   - ASA, and statin for secondary prevention  - Optimal BP control  - Monitor neuro exam - hx of LV thrombus    - See that entry  - OP neuro follow-up     Bipolar affective disorder (HCC)  Assessment & Plan  Mood acceptable; continue meds as outlined   Supportive counseling  NeuroPsychology consult while in [facility] if available for support  Counseled on and continue to encourage deep breathing/relaxation/behavioral management techniques  - Mirtazapine 15 mg qHs  - Sertraline 75 mg daily   - Lamictal 50mg BID  - Depakote ER 1250mg qday   - Outpatient psychiatry follow-up  - Would consult Psych before changing medications    Episodic headache  Assessment & Plan  Chronic issue.  Seemed to worsen with increased irritability after discontinuing gabapentin  Resumed gabapentin  Received [facility] once during stay with middling results  Sleep has been fairly consistent - Can discontinue after another 1-2 nights  Headache is on and off    Cardiomyopathy (HCC)  Assessment & Plan  ECHO on 6/10/2024 showed LVEF 40-45% with mild global hypokinesis   Status post NM stress test on 6/11/2024 which showed: a large, mild, fixed defect in the inferior wall, possibly due to diaphragmatic attenuation artifact, there is a small area of partial reversibility in the inferior apical wall suggestive of ischemia    Elevated by cardiology, etiology felt to be possibly secondary to stress-induced cardiomyopathy, with apical thrombus  Cardiac catheterization deferred given the lack of any significant ischemia, and no current cardiac symptoms  Continue with medical management with aspirin, statin, beta-blocker, ARB  Monitor volume status, remains euvolemic off of diuretics   Outpatient follow-up with  cardiology    Traumatic brain injury (HCC)  Assessment & Plan  See neurocog impairment     Carnitine deficiency (HCC)  Assessment & Plan  - Carnitine replacement  - Appreciate medicine management      History of seizures  Assessment & Plan  - Depakote ER, lamotrigine, zonisamide  - Outpatient follow-up with [facility] neurology    Left ventricular thrombus  Assessment & Plan  - Visualized on echocardiogram on 6/10/24: spherical 1.5 x 1.3 cm thrombus at the LV apex.   - Was started on rivaroxaban, but patient does not appear to have insurance coverage for prescriptions   - Xarelto, eliquis, and pradaxa likely cost prohibitive per IM   - 7/29 transitioned to warfarin with lovenox bridge.   - Now off lovenox, and fully anticoagulated on warfarin.   - Management as per IM.  - Outpatient follow-up with cardiology    Benign essential hypertension  Assessment & Plan  - Toprol, losartan  - Appreciate medicine management    Human immunodeficiency virus (HIV) infection (HCC)  Assessment & Plan  - Continue anti-retroviral treatment  - Appreciate medicine management during [facility] course  - OP ID follow-up           Health Maintenance  #GI Prophylaxis: not indicated  #Code Status: Full code  #FEN: Cardiac diet + Ensure at bfast/dinner  #Dispo: [facility] pending confirmation from acute hospital/SNFs/CM. Guardianship meeting now set for 8/27. Teams 7/30: meeting with multiple SNFs planned with [facility] admin. CM working actively to retrieve patient's belongings. Pt is getting agitated with PT, but actively engaged with SLP.  Will need f/u with PMR, PCP, Vascular, Psych     Objective:    Functional Update: stabilizing.   PT: sup bed mobility, sup transfers, toilet transfer mod-max A  OT: Ind oral hygiene, Ind eating, sup bathing, sup UBD, Min LBD, sup footwear  SLP: mod-maxA comprehension, expression, problem solving, memory, sup social interaction    Allergies per EMR    Physical Exam:  Temp:  [97.6 °F (36.4 °C)-98.4 °F (36.9 °C)] 97.6 °F (36.4  °C)  HR:  [81-86] 86  Resp:  [14-18] 18  BP: (109-131)/(65-73) 120/68  Oxygen Therapy  SpO2: 95 %    Gen: No acute distress, Well-nourished, well-appearing.  HEENT: Moist mucus membranes, Normocephalic/Atraumatic  Cardiovascular: Regular rate, rhythm, S1/S2. Distal pulses palpable  Heme/Extr: No edema  Pulmonary: Non-labored breathing  : No fernandes  GI: Soft, non-tender, non-distended.   MSK: stable L AKA.  Integumentary: Skin is warm, dry.   Neuro: Awake, alert. Aphasia is stable expressive > receptive. Still some limitations in insight and recall.    Psych: Congruent mood and affect. Appropriately redirectable for me in therapies.     Diagnostic Studies: Reviewed, no new imaging.    Laboratory:  Reviewed   8/5 INR 1.67   Results from last 7 days   Lab Units 08/01/24  0517 07/31/24  0501   HEMOGLOBIN g/dL 11.5* 10.2*   HEMATOCRIT % 39.3 34.6*   WBC Thousand/uL 6.42 6.59     Results from last 7 days   Lab Units 08/01/24  0517 07/31/24  0501   BUN mg/dL 16 21   POTASSIUM mmol/L 4.5 4.0   CHLORIDE mmol/L 104 104   CREATININE mg/dL 0.95 0.88     Results from last 7 days   Lab Units 08/04/24  0631 08/03/24  0758 08/02/24  0531   PROTIME seconds 29.7* 25.0* 19.6*   INR  2.86* 2.27* 1.64*        Patient Active Problem List   Diagnosis    Bipolar affective disorder (HCC)    Substance abuse (HCC)    Human immunodeficiency virus (HIV) infection (HCC)    History of stroke    Benign essential hypertension    Positive laboratory testing for human immunodeficiency virus (HCC)    Hypertension    Unspecified vitamin D deficiency    Tobacco abuse    Cerebrovascular accident (CVA) (HCC)    Left ventricular thrombus    History of seizures    Carnitine deficiency (HCC)    Above-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)    Traumatic brain injury (HCC)    Impaired mobility and activities of daily living    At risk for venous thromboembolism (VTE)    Acute pain    At risk for constipation    Urinary incontinence    Patient incapable  of making informed decisions    Pressure injury of buttock, stage 3 (HCC)    Adjustment disorder with mixed anxiety and depressed mood    Neurocognitive disorder    Cardiomyopathy (HCC)    Episodic headache         Medications  Current Facility-Administered Medications   Medication Dose Route Frequency Provider Last Rate    acetaminophen  975 mg Oral TID PRN José Salcido MD      aspirin  81 mg Oral Daily Lorrie Barillas PA-C      atorvastatin  80 mg Oral Daily With Dinner Lorrie Barillas PA-C      bictegravir-emtricitab-tenofovir alafenamide  1 tablet Oral Daily With Breakfast Lorrie Barillas PA-C      bisacodyl  10 mg Rectal Daily PRN Lorrie Barillas PA-C      diphenhydrAMINE  25 mg Oral Q6H PRN Lorrie Barillas PA-C      divalproex sodium  1,250 mg Oral Daily Lorrie Barillas PA-C      docusate sodium  100 mg Oral BID Ashley Depadua, MD      dolutegravir  50 mg Oral Daily Lorrie Barillas PA-C      enoxaparin  1 mg/kg Subcutaneous Q24H CHARLIE Gordon      gabapentin  100 mg Oral Q8H Ashley Depadua, MD      lamoTRIgine  50 mg Oral BID Lorrie Barillas PA-C      levOCARNitine  1,000 mg/day Oral TID With Meals Lorrie Barillas PA-C      losartan  50 mg Oral Daily Lorrie Barillas PA-C      melatonin  6 mg Oral HS Lorrie Barillas PA-C      metoprolol succinate  25 mg Oral Daily CHARLIE Mcclelland      mirtazapine  15 mg Oral HS Lorrie Barillas PA-C      ondansetron  4 mg Oral Q6H PRN Lorrie Barillas PA-C      oxyCODONE  2.5 mg Oral Q8H PRN Raimundo Riley MD      polyethylene glycol  17 g Oral Daily PRN Lorrie Barillas PA-C      senna  1 tablet Oral HS PRN Lorrie Barillas PA-C      sertraline  75 mg Oral Daily Lorrie Barillas PA-C      tamsulosin  0.4 mg Oral Daily With Dinner Lorrie Barillas PA-C      warfarin  7.5 mg Oral Daily (warfarin) Lorrie Barillas PA-C      zonisamide  400 mg Oral Daily Lorrie  EJ Barillas            ** Please Note: Fluency Direct voice to text software may have been used in the creation of this document. **

## 2024-08-05 NOTE — PLAN OF CARE
Problem: PAIN - ADULT  Goal: Verbalizes/displays adequate comfort level or baseline comfort level  Description: Interventions:  - Encourage patient to monitor pain and request assistance  - Assess pain using appropriate pain scale  - Administer analgesics based on type and severity of pain and evaluate response  - Implement non-pharmacological measures as appropriate and evaluate response  - Consider cultural and social influences on pain and pain management  - Notify physician/advanced practitioner if interventions unsuccessful or patient reports new pain  Outcome: Progressing     Problem: INFECTION - ADULT  Goal: Absence or prevention of progression during hospitalization  Description: INTERVENTIONS:  - Assess and monitor for signs and symptoms of infection  - Monitor lab/diagnostic results  - Monitor all insertion sites, i.e. indwelling lines, tubes, and drains  - Monitor endotracheal if appropriate and nasal secretions for changes in amount and color  - [place] appropriate cooling/warming therapies per order  - Administer medications as ordered  - Instruct and encourage patient and family to use good hand hygiene technique  - Identify and instruct in appropriate isolation precautions for identified infection/condition  Outcome: Progressing  Goal: Absence of fever/infection during neutropenic period  Description: INTERVENTIONS:  - Monitor WBC    Outcome: Progressing     Problem: SAFETY ADULT  Goal: Patient will remain free of falls  Description: INTERVENTIONS:  - Educate patient/family on patient safety including physical limitations  - Instruct patient to call for assistance with activity   - Consult OT/PT to assist with strengthening/mobility   - Keep Call bell within reach  - Keep bed low and locked with side rails adjusted as appropriate  - Keep care items and personal belongings within reach  - Initiate and maintain comfort rounds  - Make Fall Risk Sign visible to staff  - Offer Toileting every 2 Hours,  in advance of need  - Initiate/Maintain alarm  - Obtain necessary fall risk management equipment:   - Apply yellow socks and bracelet for high fall risk patients  - Consider moving patient to room near nurses station  Outcome: Progressing  Goal: Maintain or return to baseline ADL function  Description: INTERVENTIONS:  -  Assess patient's ability to carry out ADLs; assess patient's baseline for ADL function and identify physical deficits which impact ability to perform ADLs (bathing, care of mouth/teeth, toileting, grooming, dressing, etc.)  - Assess/evaluate cause of self-care deficits   - Assess range of motion  - Assess patient's mobility; develop plan if impaired  - Assess patient's need for assistive devices and provide as appropriate  - Encourage maximum independence but intervene and supervise when necessary  - Involve family in performance of ADLs  - Assess for home care needs following discharge   - Consider OT consult to assist with ADL evaluation and planning for discharge  - Provide patient education as appropriate  Outcome: Progressing  Goal: Maintains/Returns to pre admission functional level  Description: INTERVENTIONS:  - Perform AM-PAC 6 Click Basic Mobility/ Daily Activity assessment daily.  - Set and communicate daily mobility goal to care team and patient/family/caregiver.   - Collaborate with rehabilitation services on mobility goals if consulted  - Perform Range of Motion 3 times a day.  - Reposition patient every 2 hours.  - Dangle patient 3 times a day  - Stand patient 3 times a day  - Ambulate patient 3 times a day  - Out of bed to chair 3 times a day   - Out of bed for meals 3 times a day  - Out of bed for toileting  - Record patient progress and toleration of activity level   Outcome: Progressing     Problem: DISCHARGE PLANNING  Goal: Discharge to home or other facility with appropriate resources  Description: INTERVENTIONS:  - Identify barriers to discharge w/patient and caregiver  -  Arrange for needed discharge resources and transportation as appropriate  - Identify discharge learning needs (meds, wound care, etc.)  - Arrange for interpretive services to assist at discharge as needed  - Refer to Case Management Department for coordinating discharge planning if the patient needs post-hospital services based on physician/advanced practitioner order or complex needs related to functional status, cognitive ability, or social support system  Outcome: Progressing     Problem: Prexisting or High Potential for Compromised Skin Integrity  Goal: Skin integrity is maintained or improved  Description: INTERVENTIONS:  - Identify patients at risk for skin breakdown  - Assess and monitor skin integrity  - Assess and monitor nutrition and hydration status  - Monitor labs   - Assess for incontinence   - Turn and reposition patient  - Assist with mobility/ambulation  - Relieve pressure over bony prominences  - Avoid friction and shearing  - Provide appropriate hygiene as needed including keeping skin clean and dry  - Evaluate need for skin moisturizer/barrier cream  - Collaborate with interdisciplinary team   - Patient/family teaching  - Consider wound care consult   Outcome: Progressing

## 2024-08-05 NOTE — ASSESSMENT & PLAN NOTE
Pt reports a history of migraines.  It is associated with photophobia.  He is unable to take triptans due to a history of stroke.  [facility] - unclear if it was helpful

## 2024-08-05 NOTE — ASSESSMENT & PLAN NOTE
Remote history of TBI, previously residing in a group home prior to nursing home sentence.  Evaluated by neuropsychiatry on 6/27/24 and deemed NOT to have medical decision-making capacity  Process for court appointed guardian started on 7/5/24 - CM following   Guardianship hearing 8/27/24.

## 2024-08-05 NOTE — PROGRESS NOTES
08/05/24 1000   Pain Assessment   Pain Assessment Tool 0-10   Pain Score No Pain   Restrictions/Precautions   Precautions Aphasia;Bed/chair alarms;Cognitive;Fall Risk;Impulsive;Pressure Ulcer;Seizure;Supervision on toilet/commode   Comprehension   Comprehension (FIM) 3 - Understands basic info/conversation 50-74% of time   Expression   Expression (FIM) 3 - Expresses basic info/needs 50-74% of time   Social Interaction   Social Interaction (FIM) 5 - Interacts appropriately with others 90% of time   Problem Solving   Problem solving (FIM) 3 - Solves basic problmes 50-74% of time   Memory   Memory (FIM) 3 - Recognizes, recalls/performs 50-74%   Speech/Language/Cognition Assessment   Treatment Assessment Pt was seen in room for skilled ST session targeting expressive language-utilizing pt's tablet with focus on aphasia. Upon entering room, pt was ordering food with  for dinner and breakfast. Pt noted to have decrease expressive language, needing yes/no questions provided and pt was able to easily answer. Pt was able to answer some open ended questions but noted to have decrease comprehension at times, w/ SLP providing verbal and semantic cues to increase w/ mod-max A. Once order was complete, SLP introduced self to pt. SLP and pt participated in brief rapport building as this SLP is new to pt's care team. Pt reported that he slept well but did not have a good breakfast. He pointed to his window sill and stated 'gotta have or else' and noted to have decrease semantic relations. Semantic formations were agrammatic w/ most phrases incomplete. SLP asked pt if he could recall what he previously has been completing with ST and pt was able to open his tablet to an alejandra however, it was not constant therapy but a different alejandra and pt voiced 'this thing...keep going with stay... nothing here.' SLP voiced to pt that she is unfamiliar with this program and is unsure what exactly might be wrong with this program so for  session, to focus on constant therapy instead. Pt agreeable to this. Currently, pt has the free version for patients (white icon) of constant therapy and when he opens, it is not programed with a therapist and has only 1 activity. SLP educated pt that with this program, it works best when it is programed with the therapist so they can provide homework and see progress as well as have more options for pt to attempt various activities. Pt voiced interest and SLP downloaded on pt's tablet. Due to time, was unable to log in but SLP had on her phone and pulled up on phone. Pt was able to complete 2 tasks- Auditory Comprehension: repetition (imitate words) and word retrieval (identify picture features). In first task, pt was asked to list and watch a visual of someone verbalizing a word and then asked to press a button and record himself saying the same word. Pt was able to complete the first task w/ repetition in 4/10 accuracy. Pt noted to have significant following direction difficulty which impacting completion of task. Pt was able to vocalize and show improvement of vocalizations w/ visual. Pt noted to need MAX A w/ direction following and improved only when SLP assisted w/ pressing buttons and telling pt when to speak. Pt noted to get increasingly frustrated but overall showed improvement with expression at the word level. As this was the first time targeting with this alejandra, SLP suggested to pt investigating various tasks in alejandra to give pt more items to attempt. Next, pt was given an image and a verbal questions (Ex: Can this item be held?). Pt was able to complete task in 7/10 accuracy. Pt noted to have decrease comprehension and was observed to have difficulty w/ comprehension even w/ visuals. During task, SLP explained items that were incorrect to pt so to understand why items were incorrect so pt could increase comprehension w/ visuals and verbal questions. During tasks, pt's friend Mireya arrived and was able to  watch most of the tasks above and see pt complete various new expressive-receptive language tasks. Pt's friend voiced that it's nice to see various new activities. SLP educated friend that it would beneficial to have her help pt with navigating new alejandra and SLP educated her how to move items and target various expressive-receptive items as well as see baseline and current levels. Pt voiced and pointed to baseline scores stating 'oh that's bad.. but its okay.. I work on it' noting awareness but wanting to improve.  At this time, pt continues to benefit from ongoing skilled SLP services to maximize overall cognitive linguistic skills in attempts to decrease caregiver burden over time.   SLP Therapy Minutes   SLP Time In 1000   SLP Time Out 1030   SLP Total Time (minutes) 30   SLP Mode of treatment - Individual (minutes) 30   SLP Mode of treatment - Concurrent (minutes) 0   SLP Mode of treatment - Group (minutes) 0   SLP Mode of treatment - Co-treat (minutes) 0   SLP Mode of Treatment - Total time(minutes) 30 minutes   SLP Cumulative Minutes 310   Therapy Time missed   Time missed? No

## 2024-08-05 NOTE — ASSESSMENT & PLAN NOTE
Noted on echocardiogram 6/10/2024: spherical 1.5 x 1.3 cm thrombus at the LV apex   Initiated on AC with Xarelto however unable to verify insurance coverage.  Pt switched to Coumadin. Lovenox bridge until INR > 2. INR 2.67  Lovenox stopped. Continue Coumadin at 7.5mg.  INR Thursday.

## 2024-08-05 NOTE — CASE MANAGEMENT
Cm met with pt at bedside to check in, no solid dispo plans right now. Per team, [facility] therapy managers going to come to unit and observe pt this week to determine if they are willing to accept pt.

## 2024-08-05 NOTE — CASE MANAGEMENT
Case Management Discharge Planning Note    Patient name Sunil Patel  Location /-01 MRN [id number]  : 1960 Date 2024       Current Admission Date: 2024  Current Admission Diagnosis:Above-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)   Patient Active Problem List    Diagnosis Date Noted Date Diagnosed    Episodic headache 2024     Neurocognitive disorder 2024     Cardiomyopathy (HCC) 2024     Adjustment disorder with mixed anxiety and depressed mood 2024     Impaired mobility and activities of daily living 2024     At risk for venous thromboembolism (VTE) 2024     Acute pain 2024     At risk for constipation 2024     Urinary incontinence 2024     Patient incapable of making informed decisions 2024     Pressure injury of buttock, stage 3 ([facility]) 2024     Above-knee amputation of left lower extremity ([facility]) 2024     Traumatic brain injury ([facility]) 2024     Carnitine deficiency ([facility]) 2024     Left ventricular thrombus 2024     History of seizures 2024     Tobacco abuse 10/26/2019     Cerebrovascular accident (CVA) ([facility]) 10/26/2019     Positive laboratory testing for human immunodeficiency virus ([facility]) 2017     Bipolar affective disorder ([facility]) 2017     Hypertension 2017     Human immunodeficiency virus (HIV) infection ([facility]) 2017     History of stroke 2017     Substance abuse ([facility]) 2013     Unspecified vitamin D deficiency 2010     Benign essential hypertension 2010       LOS (days): 30  Geometric Mean LOS (GMLOS) (days):   Days to GMLOS:     OBJECTIVE:  Risk of Unplanned Readmission Score: 36.25         Current admission status: Inpatient Rehab   Preferred Pharmacy:   [facility] [place] [facility] & [address]  [place] [address]  Phone: [phone] Fax: [phone]    [facility] [place] [facility] [place] - [address]  [address]  [place] [address]  Phone: [phone] Fax: [phone]    Primary Care Provider: CHARLIE Trevino    Primary Insurance: MEDICARE  Secondary Insurance: [facility]    DISCHARGE DETAILS:       Other Referral/Resources/Interventions Provided:  Referral Comments: CM submitted additional referrals in [place] to facilities suggested by [facility]

## 2024-08-06 PROCEDURE — 99233 SBSQ HOSP IP/OBS HIGH 50: CPT | Performed by: PHYSICAL MEDICINE & REHABILITATION

## 2024-08-06 PROCEDURE — 92507 TX SP LANG VOICE COMM INDIV: CPT

## 2024-08-06 PROCEDURE — 99231 SBSQ HOSP IP/OBS SF/LOW 25: CPT | Performed by: NURSE PRACTITIONER

## 2024-08-06 PROCEDURE — 97110 THERAPEUTIC EXERCISES: CPT

## 2024-08-06 PROCEDURE — 97535 SELF CARE MNGMENT TRAINING: CPT

## 2024-08-06 PROCEDURE — 97530 THERAPEUTIC ACTIVITIES: CPT

## 2024-08-06 RX ADMIN — METOPROLOL SUCCINATE 25 MG: 25 TABLET, EXTENDED RELEASE ORAL at 08:34

## 2024-08-06 RX ADMIN — GABAPENTIN 100 MG: 100 CAPSULE ORAL at 22:16

## 2024-08-06 RX ADMIN — TAMSULOSIN HYDROCHLORIDE 0.4 MG: 0.4 CAPSULE ORAL at 16:02

## 2024-08-06 RX ADMIN — BICTEGRAVIR SODIUM, EMTRICITABINE, AND TENOFOVIR ALAFENAMIDE FUMARATE 1 TABLET: 50; 200; 25 TABLET ORAL at 08:36

## 2024-08-06 RX ADMIN — LEVOCARNITINE 330 MG: 1 SOLUTION ORAL at 17:21

## 2024-08-06 RX ADMIN — ACETAMINOPHEN 975 MG: 325 TABLET, FILM COATED ORAL at 08:35

## 2024-08-06 RX ADMIN — ZONISAMIDE 400 MG: 100 CAPSULE ORAL at 08:36

## 2024-08-06 RX ADMIN — LOSARTAN POTASSIUM 50 MG: 50 TABLET, FILM COATED ORAL at 08:35

## 2024-08-06 RX ADMIN — DOCUSATE SODIUM 100 MG: 100 CAPSULE, LIQUID FILLED ORAL at 08:35

## 2024-08-06 RX ADMIN — GABAPENTIN 100 MG: 100 CAPSULE ORAL at 16:02

## 2024-08-06 RX ADMIN — MIRTAZAPINE 15 MG: 15 TABLET, FILM COATED ORAL at 22:16

## 2024-08-06 RX ADMIN — ASPIRIN 81 MG: 81 TABLET, COATED ORAL at 08:35

## 2024-08-06 RX ADMIN — SERTRALINE HYDROCHLORIDE 75 MG: 50 TABLET ORAL at 08:34

## 2024-08-06 RX ADMIN — DOCUSATE SODIUM 100 MG: 100 CAPSULE, LIQUID FILLED ORAL at 17:22

## 2024-08-06 RX ADMIN — DOLUTEGRAVIR SODIUM 50 MG: 50 TABLET, FILM COATED ORAL at 08:36

## 2024-08-06 RX ADMIN — WARFARIN SODIUM 7.5 MG: 7.5 TABLET ORAL at 17:22

## 2024-08-06 RX ADMIN — LEVOCARNITINE 330 MG: 1 SOLUTION ORAL at 08:34

## 2024-08-06 RX ADMIN — ATORVASTATIN CALCIUM 80 MG: 80 TABLET, FILM COATED ORAL at 16:02

## 2024-08-06 RX ADMIN — LAMOTRIGINE 50 MG: 25 TABLET ORAL at 08:34

## 2024-08-06 RX ADMIN — GABAPENTIN 100 MG: 100 CAPSULE ORAL at 05:28

## 2024-08-06 RX ADMIN — LAMOTRIGINE 50 MG: 25 TABLET ORAL at 17:22

## 2024-08-06 RX ADMIN — LEVOCARNITINE 330 MG: 1 SOLUTION ORAL at 16:02

## 2024-08-06 RX ADMIN — Medication 6 MG: at 22:15

## 2024-08-06 RX ADMIN — DIVALPROEX SODIUM 1250 MG: 500 TABLET, EXTENDED RELEASE ORAL at 08:34

## 2024-08-06 NOTE — PROGRESS NOTES
"   08/06/24 2229   Pain Assessment   Pain Assessment Tool 0-10   Pain Score 10 - Worst Possible Pain  (\"36 out of 10\")   Pain Location/Orientation Location: Head   Pain Onset/Description Onset: Sudden;Descriptor: Aching;Descriptor: Sharp   Hospital Pain Intervention(s) Emotional support;Rest  (nsg present during OT session to provide pain med per pt request)   Restrictions/Precautions   Precautions Aphasia;Bed/chair alarms;Cognitive;Fall Risk;Impulsive;Seizure;Pressure Ulcer;Supervision on toilet/commode;Pain   Weight Bearing Restrictions Yes   LLE Weight Bearing Per Order NWB   ROM Restrictions No   Braces or Orthoses   (L AKA )   Eating   Type of Assistance Needed Independent   Physical Assistance Level No physical assistance   Comment seated in recliner for breakfast meal at end of OT session   Eating CARE Score 6   Oral Hygiene   Type of Assistance Needed Set-up / clean-up   Physical Assistance Level No physical assistance   Comment S/U A seated to brush teeth   Oral Hygiene CARE Score 5   Shower/Bathe Self   Type of Assistance Needed Supervision;Verbal cues   Physical Assistance Level No physical assistance   Comment Pt declined shower, requesting sponge bath bed-level / EOB instead. Pt able to wash 9/9 parts with SUP, washing UB, anthony area, and LEs while supine in bed with HOB elevated slightly. Pt able to roll into sidelying to wash rear. Pt required min vc's to ensure all parts were washed and for thoroughness, 2* decreased STM/ attention.   Shower/Bathe Self CARE Score 4   Bathing   Assessed Bath Style Sponge Bath   Anticipated D/C Bath Style   (unknown D/C plan)   Able to Gather/Transport No   Able to Adjust Water Temperature No   Able to Wash/Rinse/Dry (body part) Left Arm;Right Arm;L Upper Leg;R Upper Leg;R Lower Leg/Foot;Chest;Perineal Area;Abdomen;Buttocks   Limitations Noted in Balance;Endurance;Problem Solving;Safety;Sequencing;Strength;Timeliness   Positioning Seated;Supine   Tub/Shower " Transfer   Reason Not Assessed Sponge Bath;Patient refusal   Upper Body Dressing   Type of Assistance Needed Supervision   Physical Assistance Level No physical assistance   Comment seated EOB   Upper Body Dressing CARE Score 4   Lower Body Dressing   Type of Assistance Needed Physical assistance;Verbal cues   Physical Assistance Level 25% or less   Comment Jignesh to don L AKA  while pt supine in bed. Sup to don brief and hospital pants while supine in bed with HOB elevated, then rolling to complete CM up over hips. Pt required increased time and vc's for sequencing.   Lower Body Dressing CARE Score 3   Putting On/Taking Off Footwear   Type of Assistance Needed Supervision   Physical Assistance Level No physical assistance   Comment Sup while supine and bringing knee to chest to don R sock, with pt then able to slip on pre-tied sneaker while seated EOB wtih SUP   Putting On/Taking Off Footwear CARE Score 4   Dressing/Undressing Clothing   Remove UB Clothes Pullover Shirt   Don UB Clothes Pullover Shirt   Don LB Clothes Pants;Undergarment;Socks;Shoes   Limitations Noted In Balance;Endurance;Strength;Problem Solving;Safety;Sequencing;Timeliness   Positioning In Bed;Sit Edge Of Bed   Roll Left and Right   Type of Assistance Needed Independent   Physical Assistance Level No physical assistance   Comment no rails, increased time required   Roll Left and Right CARE Score 6   Sit to Lying   Type of Assistance Needed Independent   Physical Assistance Level No physical assistance   Comment no rails, increased time required   Sit to Lying CARE Score 6   Lying to Sitting on Side of Bed   Type of Assistance Needed Independent   Physical Assistance Level No physical assistance   Comment no rails, HOB flat, pt required increased time   Lying to Sitting on Side of Bed CARE Score 6   Bed-Chair Transfer   Type of Assistance Needed Supervision   Physical Assistance Level No physical assistance   Comment Sup sit-pivot xfer,  "EOB>recliner   Chair/Bed-to-Chair Transfer CARE Score 4   Toileting Hygiene   Comment offered toileting, pt declined   Toilet Transfer   Comment offered toileting, pt declined   Cognition   Overall Cognitive Status Impaired   Arousal/Participation Alert;Cooperative   Attention Attends with cues to redirect   Memory Decreased short term memory;Decreased recall of recent events;Decreased recall of precautions   Following Commands Follows one step commands with increased time or repetition   Activity Tolerance   Activity Tolerance Patient limited by pain   Medical Staff Made Aware notified zhou Mendez who was present to provide pain med during OT session. Nsg also present during bathing bed-level to change mepilex on pt's pressure wound on rear   Assessment   Treatment Assessment Pt seen for 60min of planned 90min skilled OT session focused on ADL retraining (bed-level dressing and sponge bathing), fxl transfer training, and activity tolerance/engagement for increased independence w/ADLs and decreased caregiver burden. See detailed descriptions of fxl performance above. Pt agreeable to \"get washed up\" bed-level, requiring increased time to complete all components of ADL routine and min vc's for sequencing and thoroughness. Due to \"36 out of 10\" headache, pt refused the last 30min of planned session, requesting to rest in recliner while eating breakfast. Reattempted twice later in the day but pt continued to refuse any additional OT. Pt cont to be limited by decreased fxl cognition/safety, balance, strength, and endurance. Pt would benefit from continued skilled OT focused on ADL retraining, lateral weightshifting for toileting, fxl transfers, UE strengthening, sacral offloading, core strengthening, phase 1 amp edu, and D/C planning.   Prognosis Good   Problem List Decreased strength;Decreased range of motion;Decreased endurance;Impaired balance;Decreased mobility;Decreased coordination;Decreased cognition;Impaired " judgement;Decreased safety awareness;Decreased skin integrity;Orthopedic restrictions;Pain   Plan   Treatment/Interventions ADL retraining;Functional transfer training;Therapeutic exercise;Endurance training;Patient/family training;Equipment eval/education;Bed mobility;Compensatory technique education;Spoke to nursing   Progress Progressing toward goals   Discharge Recommendation   Rehab Resource Intensity Level, OT   (pending)   OT Therapy Minutes   OT Time In 0830   OT Time Out 0930   OT Total Time (minutes) 60   OT Mode of treatment - Individual (minutes) 60   OT Mode of treatment - Concurrent (minutes) 0   OT Mode of treatment - Group (minutes) 0   OT Mode of treatment - Co-treat (minutes) 0   OT Mode of Treatment - Total time(minutes) 60 minutes   OT Cumulative Minutes 2174   Therapy Time missed   Time missed? Yes   Amount of time missed 30   Reason for time missed Illness  (headache)   Time(s) multiple attempts made 830, 11:00, 12:00

## 2024-08-06 NOTE — CASE MANAGEMENT
Case Management Discharge Planning Note    Patient name Sunil Patel  Location /-01 MRN [id number]  : 1960 Date 2024       Current Admission Date: 2024  Current Admission Diagnosis:Above-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)   Patient Active Problem List    Diagnosis Date Noted Date Diagnosed    Episodic headache 2024     Neurocognitive disorder 2024     Cardiomyopathy (HCC) 2024     Adjustment disorder with mixed anxiety and depressed mood 2024     Impaired mobility and activities of daily living 2024     At risk for venous thromboembolism (VTE) 2024     Acute pain 2024     At risk for constipation 2024     Urinary incontinence 2024     Patient incapable of making informed decisions 2024     Pressure injury of buttock, stage 3 ([facility]) 2024     Above-knee amputation of left lower extremity ([facility]) 2024     Traumatic brain injury ([facility]) 2024     Carnitine deficiency ([facility]) 2024     Left ventricular thrombus 2024     History of seizures 2024     Tobacco abuse 10/26/2019     Cerebrovascular accident (CVA) ([facility]) 10/26/2019     Positive laboratory testing for human immunodeficiency virus ([facility]) 2017     Bipolar affective disorder ([facility]) 2017     Hypertension 2017     Human immunodeficiency virus (HIV) infection ([facility]) 2017     History of stroke 2017     Substance abuse ([facility]) 2013     Unspecified vitamin D deficiency 2010     Benign essential hypertension 2010       LOS (days): 31  Geometric Mean LOS (GMLOS) (days):   Days to GMLOS:     OBJECTIVE:  Risk of Unplanned Readmission Score: 36.28         Current admission status: Inpatient Rehab   Preferred Pharmacy:   [facility] [place] [facility] & [address]  [place] [address]  Phone: [phone] Fax: [phone]    [facility]  [facility] [place] - [place] - [address]  [address]  [place] [address]  Phone: [phone] Fax: [phone]    Primary Care Provider: CHARLIE Trevino    Primary Insurance: MEDICARE  Secondary Insurance: [facility]    DISCHARGE DETAILS:       Other Referral/Resources/Interventions Provided:  Referral Comments: Phone message to Bertha Hanson @ [facility] [phone]. Left message to confirm additional clinicals have been received and if they are able to accept patient.

## 2024-08-06 NOTE — PROGRESS NOTES
[facility]  Progress Note  Name: Sunil Patel I  MRN: [id number]  Unit/Bed#: -01 I Date of Admission: 7/6/2024   Date of Service: 8/6/2024 I Hospital Day: 31    Assessment & Plan   * Above-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)  Assessment & Plan  Presented with left lower extremity acute limb ischemia/extensive left iliofemoral and left infrainguinal embolism requiring left femoral thrombectomy and subsequent AKA on 6/7/24 with vascular surgery.  Incision C/D/I, pain controlled.   Vascular surgery saw here last on 7/10 and removed staples  Continue ASA, Coumadin and statin   Rehab and pain control per PMR  Pt has been refusing therapy through the weekend.   Pt has met his rehab goals and will be discharged when able.    Episodic headache  Assessment & Plan  Pt reports a history of migraines.  It is associated with photophobia.  He is unable to take triptans due to a history of stroke.  Given Nurtec 7/21/24 - unclear if it was helpful    Cardiomyopathy (HCC)  Assessment & Plan  ECHO 6/10/2024 = LVEF 40-45% with mild global hypokinesis   Status post NM stress test on 6/11/2024 which showed: a large, mild, fixed defect in the inferior wall, possibly due to diaphragmatic attenuation artifact, there is a small area of partial reversibility in the inferior apical wall suggestive of ischemia    Evaluated ed by cardiology, etiology felt to be possibly secondary to stress-induced cardiomyopathy, with apical thrombus  Cardiac catheterization deferred given the lack of any significant ischemia, and no current cardiac symptoms  Continue with medical management with aspirin, statin, beta-blocker, ARB  Monitor volume status, remains euvolemic off of diuretics   Euvolemic on exam  Outpatient follow-up with cardiology    Neurocognitive disorder  Assessment & Plan  Evaluated by neuropsychology and found to not have capacity  Guardianship in progress meeting rescheduled for  8/27    Pressure injury of buttock, stage 3 (HCC)  Assessment & Plan  Being managed by PMR  Turn every 2 hours for offloading  P500   Continue local care  Right buttock per pressure ulcer care now stage 3   Left buttock per pressure ulcer  care now     Patient incapable of making informed decisions  Assessment & Plan  Seen by neuropsych on 6/27 and was deemed NOT to have medical decision making capacity    At risk for constipation  Assessment & Plan  Denies constipation     Acute pain  Assessment & Plan  Due to the AKA  Pain controlled     At risk for venous thromboembolism (VTE)  Assessment & Plan  Xarelto not affordable pt started on Warfarin   No need for bridge now pt on Warfarin and at goal     Traumatic brain injury (HCC)  Assessment & Plan  Remote history of TBI, previously residing in a group home prior to senior living sentence.  Evaluated by neuropsychiatry on 6/27/24 and deemed NOT to have medical decision-making capacity  Process for court appointed guardian started on 7/5/24 - CM following   Guardianship hearing 8/27/24.    Carnitine deficiency (HCC)  Assessment & Plan  Continue levocarnitine 330 mg 3x daily with meals.    History of seizures  Assessment & Plan  Diagnosed in July 2019, follows with [facility] neurology outpatient  Continue home regimen with Depakote ER, Lamotrigine and Zonegran    Left ventricular thrombus  Assessment & Plan  Noted on echocardiogram 6/10/2024: spherical 1.5 x 1.3 cm thrombus at the LV apex   Initiated on AC with Xarelto however unable to verify insurance coverage.  Pt switched to Coumadin. Lovenox bridge until INR > 2. INR 2.67  Lovenox stopped. Continue Coumadin at 7.5mg.  INR Thursday.    Benign essential hypertension  Assessment & Plan  Home regimen: Losartan 50mg daily, Toprol XL 25 mg BID  Current regimen: Losartan 50 mg daily, Toprol-XL 25 mg daily  Stable      History of stroke  Assessment & Plan  History of left MCA CVA in May 2018 with residual expressive aphasia  Continue  ASA and atorvastatin    Human immunodeficiency virus (HIV) infection (HCC)  Assessment & Plan  Continue Biktarvy and Tivicay.    Bipolar affective disorder (HCC)  Assessment & Plan  Continue home meds Zoloft and Remeron  Outpatient follow-up with Psychiatry               The above assessment and plan was reviewed and updated as determined by my evaluation of the patient on 8/6/2024.    Labs:   Results from last 7 days   Lab Units 08/01/24  0517 07/31/24  0501   WBC Thousand/uL 6.42 6.59   HEMOGLOBIN g/dL 11.5* 10.2*   HEMATOCRIT % 39.3 34.6*   PLATELETS Thousands/uL 421* 398*     Results from last 7 days   Lab Units 08/01/24  0517 07/31/24  0501   SODIUM mmol/L 139 139   POTASSIUM mmol/L 4.5 4.0   CHLORIDE mmol/L 104 104   CO2 mmol/L 29 26   BUN mg/dL 16 21   CREATININE mg/dL 0.95 0.88   CALCIUM mg/dL 9.4 9.1         Results from last 7 days   Lab Units 08/05/24  0643 08/04/24  0631   INR  2.67* 2.86*           Imaging  No orders to display       Review of Scheduled Meds:  Current Facility-Administered Medications   Medication Dose Route Frequency Provider Last Rate    acetaminophen  975 mg Oral TID PRN José Salcido MD      aspirin  81 mg Oral Daily Lorrie Barillas PA-C      atorvastatin  80 mg Oral Daily With Dinner Lorrie Barillas PA-C      bictegravir-emtricitab-tenofovir alafenamide  1 tablet Oral Daily With Breakfast Lorrie Barillas PA-C      bisacodyl  10 mg Rectal Daily PRN Lorrie Barillas PA-C      diphenhydrAMINE  25 mg Oral Q6H PRN Lorrie Barillas PA-C      divalproex sodium  1,250 mg Oral Daily Lorrie Barillas PA-C      docusate sodium  100 mg Oral BID Ashley Depadua, MD      dolutegravir  50 mg Oral Daily Lorrie Barillas PA-C      gabapentin  100 mg Oral Q8H Ashley Depadua, MD      lamoTRIgine  50 mg Oral BID Lorrie Barillas PA-C      levOCARNitine  1,000 mg/day Oral TID With Meals Lorrie Barillas PA-C      losartan  50 mg Oral Daily Lorrie  EJ Barillas      melatonin  6 mg Oral HS Lorriejacky Barillas PA-C      metoprolol succinate  25 mg Oral Daily CHARLIE Mcclelland      mirtazapine  15 mg Oral HS Lorriejacky Barillas PA-C      ondansetron  4 mg Oral Q6H PRN Lorrie Barillas PA-C      polyethylene glycol  17 g Oral Daily PRN Lorrie Barillas PA-C      senna  1 tablet Oral HS PRN Lorrie Barillas PA-C      sertraline  75 mg Oral Daily Lorrie Barillas PA-C      tamsulosin  0.4 mg Oral Daily With Dinner Lorrie Barillas PA-C      warfarin  7.5 mg Oral Daily (warfarin) Lorrie Barillas PA-C      zonisamide  400 mg Oral Daily Lorrie Barillas PA-C         Subjective/ HPI: Patient seen and examined. Patients overnight issues or events were reviewed with nursing staff. New or overnight issues include the following:     Pt is doing well laying in bed appears comfortable denies pain. No n/v/d . Has depends on. He has no concerns or requests at this time     ROS:   A 10 point ROS was performed; negative except as noted above.        *Labs /Radiology studies Reviewed, no new imaging  *Medications  reviewed and reconciled as needed  *Please refer to order section for additional ordered labs studies      Physical Examination:  Vitals:   Vitals:    08/05/24 1407 08/05/24 2016 08/06/24 0525 08/06/24 0835   BP: 114/63 129/76 123/67 141/96   BP Location:  Left arm Left arm Left arm   Pulse: 84 86 82 91   Resp: 14 18 18 19   Temp: 98.1 °F (36.7 °C) 98.4 °F (36.9 °C) 98 °F (36.7 °C)    TempSrc: Oral Oral Oral    SpO2: 92% 94% 97%    Weight:       Height:           General Appearance: NAD; pleasant  HEENT: PERRLA, conjuctiva normal; mucous membranes moist; face symmetrical  Neck:  Supple  Lungs: clear bilaterally, normal respiratory effort, no retractions, expiratory effort normal, on room air  CV: regular rate and rhythm, no murmurs rubs or gallops noted   ABD: soft non tender, +BS x4  EXT: DP pulses intact, no  lymphadenopathy, no edema left aka healed   Skin: normal turgor, normal texture, no rash  Psych: affect normal, mood normal  Neuro: AAOx3       The above physical exam was reviewed and updated as determined by my evaluation of the patient on 8/6/2024.    Invasive Devices       Drain  Duration             External Urinary Catheter 5 days                       VTE Pharmacologic Prophylaxis: Warfarin (Coumadin)  Code Status: Level 1 - Full Code  Current Length of Stay: 31 day(s)    Total floor / unit time spent today 20 minutes  Coordination of patient's care was performed in conjunction with consulting services. Time invested included review of patient's labs, vitals, and management of their comorbidities with continued monitoring, examination of patient as well as answering patient questions, documenting her findings and creating progress note in electronic medical record,  ordering appropriate diagnostic testing.       ** Please Note:  voice to text software may have been used in the creation of this document. Although proof errors in transcription or interpretation are a potential of such software**

## 2024-08-06 NOTE — ASSESSMENT & PLAN NOTE
Xarelto not affordable pt started on Warfarin   No need for bridge now pt on Warfarin and at goal

## 2024-08-06 NOTE — ASSESSMENT & PLAN NOTE
Remote history of TBI, previously residing in a group home prior to long term sentence.  Evaluated by neuropsychiatry on 6/27/24 and deemed NOT to have medical decision-making capacity  Process for court appointed guardian started on 7/5/24 - CM following   Guardianship hearing 8/27/24.

## 2024-08-06 NOTE — ASSESSMENT & PLAN NOTE
Pt reports a history of migraines.  It is associated with photophobia.  He is unable to take triptans due to a history of stroke.  [facility] - unclear if it was helpful

## 2024-08-06 NOTE — PLAN OF CARE
Problem: PAIN - ADULT  Goal: Verbalizes/displays adequate comfort level or baseline comfort level  Description: Interventions:  - Encourage patient to monitor pain and request assistance  - Assess pain using appropriate pain scale  - Administer analgesics based on type and severity of pain and evaluate response  - Implement non-pharmacological measures as appropriate and evaluate response  - Consider cultural and social influences on pain and pain management  - Notify physician/advanced practitioner if interventions unsuccessful or patient reports new pain  Outcome: Progressing     Problem: INFECTION - ADULT  Goal: Absence or prevention of progression during hospitalization  Description: INTERVENTIONS:  - Assess and monitor for signs and symptoms of infection  - Monitor lab/diagnostic results  - Monitor all insertion sites, i.e. indwelling lines, tubes, and drains  - Monitor endotracheal if appropriate and nasal secretions for changes in amount and color  - [place] appropriate cooling/warming therapies per order  - Administer medications as ordered  - Instruct and encourage patient and family to use good hand hygiene technique  - Identify and instruct in appropriate isolation precautions for identified infection/condition  Outcome: Progressing  Goal: Absence of fever/infection during neutropenic period  Description: INTERVENTIONS:  - Monitor WBC    Outcome: Progressing     Problem: SAFETY ADULT  Goal: Patient will remain free of falls  Description: INTERVENTIONS:  - Educate patient/family on patient safety including physical limitations  - Instruct patient to call for assistance with activity   - Consult OT/PT to assist with strengthening/mobility   - Keep Call bell within reach  - Keep bed low and locked with side rails adjusted as appropriate  - Keep care items and personal belongings within reach  - Initiate and maintain comfort rounds  - Make Fall Risk Sign visible to staff  - Offer Toileting every 2 Hours,  in advance of need  - Initiate/Maintain alarm  - Obtain necessary fall risk management equipment:   - Apply yellow socks and bracelet for high fall risk patients  - Consider moving patient to room near nurses station  Outcome: Progressing  Goal: Maintain or return to baseline ADL function  Description: INTERVENTIONS:  -  Assess patient's ability to carry out ADLs; assess patient's baseline for ADL function and identify physical deficits which impact ability to perform ADLs (bathing, care of mouth/teeth, toileting, grooming, dressing, etc.)  - Assess/evaluate cause of self-care deficits   - Assess range of motion  - Assess patient's mobility; develop plan if impaired  - Assess patient's need for assistive devices and provide as appropriate  - Encourage maximum independence but intervene and supervise when necessary  - Involve family in performance of ADLs  - Assess for home care needs following discharge   - Consider OT consult to assist with ADL evaluation and planning for discharge  - Provide patient education as appropriate  Outcome: Progressing  Goal: Maintains/Returns to pre admission functional level  Description: INTERVENTIONS:  - Perform AM-PAC 6 Click Basic Mobility/ Daily Activity assessment daily.  - Set and communicate daily mobility goal to care team and patient/family/caregiver.   - Collaborate with rehabilitation services on mobility goals if consulted  - Perform Range of Motion 3 times a day.  - Reposition patient every 2 hours.  - Dangle patient 3 times a day  - Stand patient 3 times a day  - Ambulate patient 3 times a day  - Out of bed to chair 3 times a day   - Out of bed for meals 3 times a day  - Out of bed for toileting  - Record patient progress and toleration of activity level   Outcome: Progressing     Problem: DISCHARGE PLANNING  Goal: Discharge to home or other facility with appropriate resources  Description: INTERVENTIONS:  - Identify barriers to discharge w/patient and caregiver  -  Arrange for needed discharge resources and transportation as appropriate  - Identify discharge learning needs (meds, wound care, etc.)  - Arrange for interpretive services to assist at discharge as needed  - Refer to Case Management Department for coordinating discharge planning if the patient needs post-hospital services based on physician/advanced practitioner order or complex needs related to functional status, cognitive ability, or social support system  Outcome: Progressing     Problem: Prexisting or High Potential for Compromised Skin Integrity  Goal: Skin integrity is maintained or improved  Description: INTERVENTIONS:  - Identify patients at risk for skin breakdown  - Assess and monitor skin integrity  - Assess and monitor nutrition and hydration status  - Monitor labs   - Assess for incontinence   - Turn and reposition patient  - Assist with mobility/ambulation  - Relieve pressure over bony prominences  - Avoid friction and shearing  - Provide appropriate hygiene as needed including keeping skin clean and dry  - Evaluate need for skin moisturizer/barrier cream  - Collaborate with interdisciplinary team   - Patient/family teaching  - Consider wound care consult   Outcome: Progressing

## 2024-08-06 NOTE — CASE MANAGEMENT
"Moreno, Kika Silveira CM from [facility], Janusz from  [facility] residential program, and pt met virtually so RN Janusz could get to know Kieran and see if he is a good fit for their program. Janusz describe the 2 house she operates, one being a house with 3 males but could have up to 6, and the other house being a co oed home with 2 women currently and 3 men downstairs, pt would be on first level and inpt for the most part with some supervision. Pt had questions but was perseverating on needing marijuana in any place he goes. Pt did enjoy what the program had to offer. Cm and rest of the call explained that no facility would accept marijuana at this time, team spoke about possibly pain mgmt but pt does not use marijuana for pain. Team explained that he has not used for a little over a month now and encouraged him that he is capable of going without it with proper supports. Pt listened well but at this time does not want to give that up. Janusz is going to meet again with him virtually on Thursday, cm and rest of team going to explore an outlet for him while on AR[facility] as he would benefit from speaking with someone to assist with his emotions and talk through all of the things he is going through.     Cm checked in with pt to review how meeting went, pt in good spirits and cm reassured him we are trying to figure out the safest solution for him, pt voiced frustration that he feels like people think he is \"dumb\" and do not talk to him directly. Cm to continue to follow pt's dispo plan and assist and also check in on pt as he appreciates being able to talk to someone while going through this challenging time.   "

## 2024-08-06 NOTE — PROGRESS NOTES
Physical Medicine and Rehabilitation Progress Note  Sunil Patel 63 y.o. male MRN: [id number]  Unit/Bed#: [facility] 451-01 Encounter: [id number]    To Review: Sunil Patel is a 63 y.o. male who  has a past medical history of Acute lower limb ischemia (06/08/2024), Anxiety, Depression, HIV disease (HCC), Substance abuse (HCC), and Suicide attempt (HCC). who presented to the [facility] on 6/7/24 from care home for increased LLE swelling and pain and was found to have a left external iliac artery occlusion and acute limb ischemia. 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 Patient had TTE on 6/10/24 showing LV apex thrombus. On 6/11/24 patient had pharmacologic nuclear stress test/SPECT scan which did not show any significant areas of ischemia with EF 40-45% and recommended continuing A/C for LV apical thrombus. His course was complicated by b/l buttock unstageable pressure ulcers, urinary and fecal incontinence, pain, and significant decline in ADLs and mobility. Patient has been continued on Xarelto for anticoagulation, as well as ASA and statin. Patient has a history of TBI, with baseline nonfluent aphasia and forgetfulness. He was evaluated by neuropsychology on 6/27/24, and deemed to not have capacity to make fully informed medical decisions. Therefore, the guardianship process was initiated as of 7/5/24. He was admitted to the [facility] on 7/6.     Chief Complaint: Headache.    Interval History/Subjective:  No acute events overnight. Sleep has been fine. Has a headache this morning. Chronic issue. Again stated his desire to leave the unit, but when therapy offers, he declines unless he is allowed to smoke. No new CP, SOB, fevers, chills, N/V, abdominal pain. Last BM 8/4. He has been able to redirect  himself or de-escalate himself when he gets frustrated. He states he just wants to be able to smoke weed and paint.     ROS:  A 10 point review of systems was negative except for what is noted in the HPI.    Today's Changes:  Therapy will continue to offer outdoor time and incorporate painting activities.  Consideration for increasing gabapentin for continued headaches  Has a meeting with a residential program today.   Check INR tomorrow.   I led and participated in Team Conference today. See dispo below and separate conference note for more details. I concur with the plan of care as determined in Team Conference.      Total time spent:  50 minutes, with more than 50% spent counseling/coordinating care. Counseling includes discussion with patient re: progress in therapies, functional issues observed by therapy staff, and discussion with patient his/her current medical state/wellbeing. Coordination of patient's care was performed in conjunction with Internal Medicine service to monitor patient's labs, vitals, and management of their comorbidities. In addition, I lead the interdisciplinary team in weekly case conference today and concur with plan as per team meeting. The care of the patient was extensively discussed with all care providers and an appropriate rehabilitation plan was formulated unique for this patient. Barriers were identified preventing progression of therapy and appropriate interventions were discussed with each discipline. Please see the team note for input from all disciplines regarding barriers, intervention, and discharge planning.      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 Underwent L femoral thrombectomy and AKA with vascular surgery   - [facility] sx follow-up 7/10 - L AKA incision site well-healed, staples taken out at the bedside this morning  - [facility] will be vendor - Patient  "now has .  - Monitor for hip flexion/abduction tightness. Stretches in therapy  - Now on Coumadin with hx of LV thrombus and PAOD   - PT/OT/SLP (to work on carry over strategies) 3-5 hours/day, 5-7 days/week.    - Goals are Ind-Sup at a wheelchair level.   - Outpatient f/u with [facility] and Vascular  - Continue patient training with  placement  - Continue gabapentin - doesn't do too much for phantom limb sensation, but that doesn't bother him or cause him pain currently.   - Patient still frustrated given circumstances; will continue gabapentin for anxiety    Neurocognitive disorder  Assessment & Plan  Has been appropriate and cooperative throughout this stay without any behavioral issues on the rehab unit to date.    - Does have decreased frustration tolerance   - So far redirectable.  Hx of TBI and L MCA CVA, psychiatric d/o, seizure d/o  - Neuropsych assessment 6/27/24 - \"diffuse cognitive dysfunction and on a measure assessing awareness of personal health status and ability to evaluate health problems, handle medical emergencies and take safety precautions, patient performed in the IMPAIRED range of functioning. During this encounter, patient does not appear to have capacity to make fully informed medical decisions.\"  MMSE 4/28 - severely impaired  - Guardianship process initiated on acute - follow-up by CM/Admin  - Status: some expressive and possible some receptive aphasia.  He can be difficult to follow at times likely due to a mixture of aphasia, tangentiality, mild lability, and impaired memory; lability with hx of possible bipolar d/o  - Neuropsych Med review: Depakote, Lamictal, Remeron, Zoloft, Melatonin, gabapentin, PRN oxy 2.5mg TID   - Monitor neuro-exam, wakefulness, mood, cognition, insight into deficits and safety awareness   - Monitor and ensure optimal management electrolytes, nutrition, and hydration  - Monitor for signs or symptoms of infection, medication " intolerances, other systemic etiologies  - Additional labs, imaging, specialist follow-up as needed per primary team currently   - Overstimulation precautions, frequent re-orientation, re-direction, re-assurance  - Optimal mood, pain, and sleep management  - If impaired sleep or behavior recommend sleep log and agitation monitoring    - Limit sedating medications when possible  - Fall precautions - if needed increase rounding or consider virtual sitter or in-person sitter  - For routine restlessness, anxiety, irritability focus on non-pharmacologic management    - Hold benzo's with increased risk paradoxical reaction and possibility of limiting cognitive recovery  - Continue SLP and interdisciplinary care  - OP neuro and PCP         Adjustment disorder with mixed anxiety and depressed mood  Assessment & Plan  - Related to recent release from incarceration, new AKA and uncertainty of future disposition, all in the setting of impaired cognition related to prior strokes and TBI  - Behavioral techniques for symptom management  - Neuropsychology consult as available  - Given his circumstances, increased frustration is expected. Can consider Psych consult if symptoms continue to worsen to adjust mood stabilizing medications. Will try nonpharmacologic approaches first with more frequent conversations/redirections     Pressure injury of buttock, stage 3 (HCC)  Assessment & Plan  Stable to improving  - Wound care consulted and following weekly  - POA L buttock pressure injury unstageable has healed.  - POA R buttock pressure injury is now Stage 3, and improving.   - Continue local care with border foam.  - Recommend ROHO cushion in chair when out of bed instead   - Preventative hydraguard to bilateral heels BID and PRN.   - P500  - Monitor clinically for breakdown, frequent turns  - reviewed picture of wound today. It is healing well. Continue to monitor    Patient incapable of making informed decisions  Assessment & Plan  -  "History of remote TBI and prior strokes with comorbid psychiatric history.  - Evaluated by neuropsychology, Dr. Dilshad Tatum, PhD on 6/27/24, found to have \"diffuse cognitive dysfunction and on a measure assessing awareness of personal health status and ability to evaluate health problems, handle medical emergencies and take safety precautions, patient performed in the IMPAIRED range of functioning. During this encounter, patient does not appear to have capacity to make fully informed medical decisions.\"  - Court-appointed guardianship process has been initiated by acute care CM Katia Kay.    - We have identified two friends who are interested in being patient's guardians and have been involved and invested in patient's care on the [facility].   - They will need to be interviewed by the  involved in this process.    - He has to undergo his hearing on 8/6/2024 first.  -- now rescheduled to 8/27   - He would ultimately benefit from FDC/nursing home/memory care unit - he does very well with structure and supervision.    8/2- Ongoing discussions with CM, [facility] admin and social work to dispo patient to stable long-term housing. Process continues, with evaluation by therapy managers from [facility].       Urinary incontinence  Assessment & Plan  - Did have some incontinence on acute - improved with timed voids and consistent assistance with his urinal  - Described as urgency  - Marked improvement on timed voids.   - monitor for retention, incontinence (including overflow incontinence), signs/symptoms of UTI  - Timed Voids and PVRs Q4hrs initiated earlier. And PVR <150 x3, so scans discontinued.    - He has some difficulty managing the urinal    - needs assistance but is able to transfer to [facility]    - Still uses condom catheter at night, in part for continence care/to protect his skin given his buttock wounds.  - Continue timed voids           At risk for constipation  Assessment & Plan  - Stooling adequately " recently; did have some incontinence on acute now improved  - Close continent care given buttock wounds  - Last BM 8/4 and continent  - Colace 100mg BID  - PRN miralax, Senna and suppository    Acute pain  Assessment & Plan  Controlled   - Tylenol 975 mg q8h PRN  - Resumed Gabapentin 100mg Q8hr due to increased headaches and irritability since discontinuing.  - Able to discontinue oxycodone. Has not used since 7/19.     At risk for venous thromboembolism (VTE)  Assessment & Plan  - Fully anticoagulated on warfarin for apical thrombus  - IM managing    Impaired mobility and activities of daily living  Assessment & Plan  - Rehabilitation medicine physician for daily monitoring of care, 24 hour availability for acute medical issues, medication management, and therapeutic and diagnostic assessments.  - 24 hour rehabilitation nursing 7 days per week for: management/teaching of medications, bowel/bladder routine, skin care.  - PT, OT for 2-3 hours per day, 5 to 6 days per week; 15 hours per week  - Rehabilitation Psychology as needed for adjustment and coping  - MSW for barriers to discharge, community resources, and family support  - Discharge planning following to help ensure a safe and efficient discharge  -7/30 teams: Patient is getting agitated with PT therapies, as he is reaching a plateau. Pt enjoys SLP and cognitive exercises with tablet. Discussions are ongoing for his dispo planning- [facility] admin have meetings with a few SNFs to discuss his case. CM is actively working on retrieving his belongings from prison. Guardianship court date still set for 8/27.  - Had a meeting with [facility] leadership and [place] leadership, as well as Case Management. They will have their therapists come and evaluate the patient for appropriateness.         History of stroke  Assessment & Plan  - History of old left temporal and parietal lobe cortical infarcts, also involving the insula and left occipital lobe as well as small  right posterior parietal lobe. Stable on most recent CT head on 5/16/24.   - ASA, and statin for secondary prevention  - Optimal BP control  - Monitor neuro exam - hx of LV thrombus    - See that entry  - OP neuro follow-up     Bipolar affective disorder (HCC)  Assessment & Plan  Mood acceptable; continue meds as outlined   Supportive counseling  NeuroPsychology consult while in [facility] if available for support  Counseled on and continue to encourage deep breathing/relaxation/behavioral management techniques  - Mirtazapine 15 mg qHs  - Sertraline 75 mg daily   - Lamictal 50mg BID  - Depakote ER 1250mg qday   - Outpatient psychiatry follow-up  - Would consult Psych before changing medications    Episodic headache  Assessment & Plan  Chronic issue.  Seemed to worsen with increased irritability after discontinuing gabapentin  Resumed gabapentin  Received [facility] once during stay with middling results  Sleep logs have been good - discontinued.  Headache is on and off    Cardiomyopathy (HCC)  Assessment & Plan  ECHO on 6/10/2024 showed LVEF 40-45% with mild global hypokinesis   Status post NM stress test on 6/11/2024 which showed: a large, mild, fixed defect in the inferior wall, possibly due to diaphragmatic attenuation artifact, there is a small area of partial reversibility in the inferior apical wall suggestive of ischemia    Elevated by cardiology, etiology felt to be possibly secondary to stress-induced cardiomyopathy, with apical thrombus  Cardiac catheterization deferred given the lack of any significant ischemia, and no current cardiac symptoms  Continue with medical management with aspirin, statin, beta-blocker, ARB  Monitor volume status, remains euvolemic off of diuretics   Outpatient follow-up with cardiology    Traumatic brain injury (HCC)  Assessment & Plan  See neurocog impairment     Carnitine deficiency (HCC)  Assessment & Plan  - Carnitine replacement  - Appreciate medicine management      History of  seizures  Assessment & Plan  - Depakote ER, lamotrigine, zonisamide  - Outpatient follow-up with [facility] neurology    Left ventricular thrombus  Assessment & Plan  - Visualized on echocardiogram on 6/10/24: spherical 1.5 x 1.3 cm thrombus at the LV apex.   - Was started on rivaroxaban, but patient does not appear to have insurance coverage for prescriptions   - Xarelto, eliquis, and pradaxa likely cost prohibitive per IM   - 7/29 transitioned to warfarin with lovenox bridge.   - Now off lovenox, and fully anticoagulated on warfarin.   - Management as per IM.   - Recheck in the AM to confirm trend. On 7.5mg daily currently  - Outpatient follow-up with cardiology    Benign essential hypertension  Assessment & Plan  - Toprol, losartan  - Appreciate medicine management    Human immunodeficiency virus (HIV) infection (HCC)  Assessment & Plan  - Continue anti-retroviral treatment  - Appreciate medicine management during [facility] course  - OP ID follow-up           Health Maintenance  #GI Prophylaxis: not indicated  #Code Status: Full code  #FEN: Cardiac diet + Ensure at bfast/dinner  #Dispo: Teams 8/6: ADD TBD. Still working on placement. He is meeting with a residential facility today, and CM and Rehab Director are working on evaluation with [facility]. Guardianship court date is 8/27.    Will need f/u with PMR, PCP, Vascular, Psych     Objective:    Functional Update:   PT: Sup transfers, Ind bed mobility, Ind wheelchair mobility  OT: Ind eating/grooming, Sup bathing, Ind UB dressing, Sup LB dressing, CGA toileting, CGA tub/shower transfers, Sup toilet transfers  SLP: modA comprehension, modA expression, Sup social interaction, modA problem solving, modA memory.     Allergies per EMR    Physical Exam:  Temp:  [98 °F (36.7 °C)-98.4 °F (36.9 °C)] 98 °F (36.7 °C)  HR:  [82-86] 82  Resp:  [14-18] 18  BP: (114-129)/(63-76) 123/67  Oxygen Therapy  SpO2: 97 %    Gen: No acute distress, Well-nourished,  well-appearing.  HEENT: Moist mucus membranes, Normocephalic/Atraumatic  Cardiovascular: Regular rate, rhythm, S1/S2. Distal pulses palpable  Heme/Extr: No edema  Pulmonary: Non-labored breathing. Lungs CTAB  : No fernandes  GI: Soft, non-tender, non-distended.   MSK: Stable L AKA.  Integumentary: Skin is warm, dry. Buttock not visualized.  Neuro: Awake and alert, stable aphasia. Difficulty with reasoning. Decreased frustration tolerance, but demonstrating ability to de-escalate and redirect himself.   Psych: Normal mood and affect.       Diagnostic Studies: Reviewed, no new imaging    Laboratory:  Reviewed  Results from last 7 days   Lab Units 08/01/24  0517 07/31/24  0501   HEMOGLOBIN g/dL 11.5* 10.2*   HEMATOCRIT % 39.3 34.6*   WBC Thousand/uL 6.42 6.59     Results from last 7 days   Lab Units 08/01/24  0517 07/31/24  0501   BUN mg/dL 16 21   POTASSIUM mmol/L 4.5 4.0   CHLORIDE mmol/L 104 104   CREATININE mg/dL 0.95 0.88     Results from last 7 days   Lab Units 08/05/24  0643 08/04/24  0631 08/03/24  0758   PROTIME seconds 28.3* 29.7* 25.0*   INR  2.67* 2.86* 2.27*        Patient Active Problem List   Diagnosis    Bipolar affective disorder (HCC)    Substance abuse (HCC)    Human immunodeficiency virus (HIV) infection (HCC)    History of stroke    Benign essential hypertension    Positive laboratory testing for human immunodeficiency virus (HCC)    Hypertension    Unspecified vitamin D deficiency    Tobacco abuse    Cerebrovascular accident (CVA) (HCC)    Left ventricular thrombus    History of seizures    Carnitine deficiency (HCC)    Above-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)    Traumatic brain injury (HCC)    Impaired mobility and activities of daily living    At risk for venous thromboembolism (VTE)    Acute pain    At risk for constipation    Urinary incontinence    Patient incapable of making informed decisions    Pressure injury of buttock, stage 3 (HCC)    Adjustment disorder with mixed anxiety and  depressed mood    Neurocognitive disorder    Cardiomyopathy (HCC)    Episodic headache         Medications  Current Facility-Administered Medications   Medication Dose Route Frequency Provider Last Rate    acetaminophen  975 mg Oral TID PRN José Salcido MD      aspirin  81 mg Oral Daily Lorrie Barillas PA-C      atorvastatin  80 mg Oral Daily With Dinner Lorrie Barillas PA-C      bictegravir-emtricitab-tenofovir alafenamide  1 tablet Oral Daily With Breakfast Lorrie Barillas PA-C      bisacodyl  10 mg Rectal Daily PRN Lorrie Barillas PA-C      diphenhydrAMINE  25 mg Oral Q6H PRN Lorrie Barillas PA-C      divalproex sodium  1,250 mg Oral Daily Lorrie Barillas PA-C      docusate sodium  100 mg Oral BID Ashley Depadua, MD      dolutegravir  50 mg Oral Daily Lorrie Barillas PA-C      gabapentin  100 mg Oral Q8H Ashley Depadua, MD      lamoTRIgine  50 mg Oral BID Lorrie Barillas PA-C      levOCARNitine  1,000 mg/day Oral TID With Meals Lorrie Barillas PA-C      losartan  50 mg Oral Daily Lorrie Barillas PA-C      melatonin  6 mg Oral HS Lorrie Barillas PA-C      metoprolol succinate  25 mg Oral Daily CHARLIE Mcclelland      mirtazapine  15 mg Oral HS Lorrie Barillas PA-C      ondansetron  4 mg Oral Q6H PRN Lorrie Barillas PA-C      polyethylene glycol  17 g Oral Daily PRN Lorrie Barillas PA-C      senna  1 tablet Oral HS PRN Lorrie Barillas PA-C      sertraline  75 mg Oral Daily Lorrie Barillas PA-C      tamsulosin  0.4 mg Oral Daily With Dinner Lorrie Barillas PA-C      warfarin  7.5 mg Oral Daily (warfarin) Lorrie Barillas PA-C      zonisamide  400 mg Oral Daily Lorrie Barillas PA-C            ** Please Note: Fluency Direct voice to text software may have been used in the creation of this document. **

## 2024-08-06 NOTE — TEAM CONFERENCE
Acute RehabilitationTeam Conference Note  Date: 8/6/2024   Time: 10:31 AM       Patient Name:  Sunil Patel       Medical Record Number: [id number]   YOB: 1960  Sex: Male          Room/Bed:  [place] [address]/[facility] 451-01  Payor Info:  Payor: MEDICARE / Plan: MEDICARE A AND B / Product Type: Medicare A & B Fee for Service /      Admitting Diagnosis: Hx of AKA (above knee amputation) ([facility]) [Z89.619]   Admit Date/Time:  7/6/2024  1:30 PM  Admission Comments: No comment available     Primary Diagnosis:  Above-knee amputation of left lower extremity ([facility])  Principal Problem: Above-knee amputation of left lower extremity ([facility])    Patient Active Problem List    Diagnosis Date Noted    Episodic headache 07/22/2024    Neurocognitive disorder 07/09/2024    Cardiomyopathy ([facility]) 07/09/2024    Adjustment disorder with mixed anxiety and depressed mood 07/08/2024    Impaired mobility and activities of daily living 07/07/2024    At risk for venous thromboembolism (VTE) 07/07/2024    Acute pain 07/07/2024    At risk for constipation 07/07/2024    Urinary incontinence 07/07/2024    Patient incapable of making informed decisions 07/07/2024    Pressure injury of buttock, stage 3 ([facility]) 07/07/2024    Above-knee amputation of left lower extremity ([facility]) 07/06/2024    Traumatic brain injury ([facility]) 07/06/2024    Carnitine deficiency ([facility]) 06/09/2024    Left ventricular thrombus 06/08/2024    History of seizures 06/08/2024    Tobacco abuse 10/26/2019    Cerebrovascular accident (CVA) ([facility]) 10/26/2019    Positive laboratory testing for human immunodeficiency virus ([facility]) 07/03/2017    Bipolar affective disorder ([facility]) 07/02/2017    Hypertension 02/27/2017    Human immunodeficiency virus (HIV) infection ([facility]) 02/16/2017    History of stroke 02/16/2017    Substance abuse ([facility]) 12/21/2013    Unspecified vitamin D deficiency 05/28/2010    Benign essential hypertension 02/25/2010       Physical Therapy:    Weight Bearing Status: Non-weight  bearing  Transfers: Supervision  Bed Mobility: Independent  Amulation Distance (ft): 0 feet  Ambulation:  (unsafe at this time)  Assistive Device for Ambulation:  (TBA)  Wheelchair Mobility Distance: 300 ft  Wheelchair Mobility: Independent  Number of Stairs: 0  Assistive Device for Stairs:  (unsafe at this time)  Stair Assistance:  (TBA)  Ramp: Supervision  Assistive Device for Ramp: Wheelchair  Discharge Recommendations: Other (awaiting for placement)    7/15/24  Pt has plateaued functionally for he already met S/CGA goals at w/c level mobilities. Due to baseline cognition, aphasia, impaired safety with STM impairment impacting consistent carry over of new learning so does not anticipate pt to progress beyond S level at this time. Also for the past week pt also demos inconsistent participation with skilled PT interventions. Goals for this week to complete/review Phase 1 AMP education, cont with w/c level transfer training keesha with car transfer, cont with strengthening/flexibility exercises including reviewing HEP packet provided while awaiting placement pending guardianship decision.     8/5/2024    Pt cont at this time with skilled PT and pt overall at S lvl d/t baseline cognition, aphasia, impaired safety with STM impairment. Pt at this time cont awaiting for guarding ship/placement , Dr Depadua speaking with Kieran and his fiend Donna about DC places and overall outcome. Pt will cont to benefit with skilled PT to keep functional mobility, LE /core/UE strengthening and overall better outcome . Cont working on sit pivot transfers setup and overall limb care with safety awareness.      Occupational Therapy:  Eating: Independent  Grooming: Independent  Bathing: Supervision  Bathing: Supervision  Upper Body Dressing: Independent  Lower Body Dressing: Supervision  Toileting: Incidental Touching  Tub/Shower Transfer: Incidental Touching  Toilet Transfer: Supervision  Cognition: Exceptions to WNL  Cognition: Decreased  Memory, Decreased Safety, Decreased Executive Functions, Behavioral Considerations, Decreased Attention, Decreased Comprehension  Orientation: Person, Time, Place, Situation  Discharge Recommendations:  (JARRETT)  DC Home with:: 24 Hour Supervision       Pt continues to present with impairments in endurance, standing balance/tolerance, memory, insight, safety, and judgement. Additional functional barriers include fatigue, LLE NWB status, poor skin integrity, decreased caregiver support, risk for falls, and home environment. Pt is functioning at overall S for ADLs and CS fxnl squat pivot xfers, and DS for w/c mobility. Pt will continue to benefit from skilled OT services to address above mentioned barriers and maximize functional independence in baseline areas of occupation, utilizing the following interventions: ADL retraining, DME/adaptive equipment assessment, functional transfer training, repetitive safety training, activity tolerance/edurance training, UB strengthening, and energy conservation education. OT D/C recommendation is for 24 hour S due to baseline cognitive deficits, pt would benefit from JARRETT.          Speech Therapy:  Mode of Communication: Verbal  Speech/Language: Receptive Aphasia (and expressive aphasia)  Cognition: Exceptions to WNL  Cognition: Decreased Memory, Decreased Executive Functions, Decreased Attention, Decreased Comprehension, Decreased Safety, Impulsive  Orientation: Person, Place, Time, Situation  Discharge Recommendations:  (pending patient progress)  DC Home with:: 24 Hour Supervision, Family Support, Outpatient Speech Therapy, Home Speech Therapy (continued ST pending dc plan)  Pt is currently being followed for skilled SLP services targeting language therapy. Pt with hx stroke, resulting in expressive and receptive aphasia but noting expressive communication skills to be greater impacted. SLP was consulted to support pt's communication skills for improved ability to express his needs  with the team. Pt presenting with overall moderately impaired expressive and receptive language deficits, impacting functional communication skills. Pt has trialed written communication, however, this was found to be an ineffective communication modality. Pt has also been introduced to a low tech manual communication board, which he has demonstrated ability to utilize in order to convey basic wants/needs more easily and effectively. Currently, pt is functioning at min A for comprehension, problem solving and memory and mod A for expression. Plan this week to continue to attempt to establish a more functional mode of communication which is both effective and easy for pt to utilize to support his communication attempts with staff. Recommending brief follow up skilled SLP services to continue to support pt in building a more functional communication modality to improve effectiveness of communication and to ease frustrations with communication attempts.     Update week of 7/22/2024: Pt continues to be seen for skilled SLP services focusing on language and communication skills with primary reason for consultation to support communication needs at this time. Pt remains with deficits in expressive and receptive language skills,which is pt's baseline prior to admission to this unit, but which impact all domains of language (auditory & reading comprehension; verbal & written expression). This week, sessions have focused on introducing a high tech communication device as per pt's request. Pt's friend provided a tablet and recent session has focused on exploring different communication apps which fit pt's needs (function, cost, etc), as well as apps which pt can then complete language tasks in his free time. Pt is demonstrating desire to utilize device, but will benefit from additional training and practice with device, especially due to continued deficits in comprehension, ST memory/recall, problem solving and attention. This  week, plan to continue to focus on determining appropriate apps for language drills/activities, as well as identifying an appropriate alejandra to support expressive language/communication, while also programming alejandra for pt's specific needs. Currently, pt is functioning at mod A for expression, comprehension, problem solving and memory and supervision for social interaction. Recommending short term follow up of skilled SLP services to continue to support pt in building a more functional communication modality to improve effectiveness of communication and to ease frustrations with communication attempts during acute rehab stay. Will also benefit from engaging pt's friend in education/training with device.     Update week of 7/29/2024: Pt continues to be followed for language and communication therapy where he is making slow progress, however, ST primarily being implemented to support current functional language as pt with baseline aphasia. Pt is limited by deficits in expressive and receptive language skills, to include verbal expression, written expression, auditory comprehension and reading comprehension, which impact overall functional communication skills. Additionally, a cognitive linguistic evaluation was completed this week, as per medical team request. Pt completed the SLUMS assessment with a score of 1/30 which as compared to those with high school education correlates with severe neurocognitive disorder, however, this score is likely highly impacted by pt's language deficits and should be interpreted with caution. Cognitive linguistic deficits include decreased: attention, STM recall, problem solving, executive functions, safety awareness, reasoning, and visuospatial skills. The following interventions are used to target these barriers, including visual orientation checklist, verbal problem solving task, verbal working memory tasks, categorization tasks , picture problem solving activities, verbal reasoning  tasks, visual attention tasks, and family education/training. basic communication boards, verbal command following activities, written command following activities, biographical yes/no questions, simple yes/no questions, moderate yes/no questions, visual/receptive object ID tasks, picture object ID tasks, automatic speech: counting, automatic speech: COURTNEY, automatic speech: CORRY, object naming tasks, simple phrase completion tasks, reading comprehension at word level, reading comprehension at phrase level , written expression of biographical information, written expression at word level, and word-picture matching.     Plan to target cognitive linguistic skills (basic problem solving, memory, safety) and language skills across all language domains this coming week. Currently, pt is functioning at mod assist for comprehension, expression, problem solving and memory, as well as supervision for social interaction. Recommending continued skilled SLP services to continue to support pt in building a more functional communication modality to improve effectiveness of communication and to ease frustrations with communication attempts during acute rehab stay, as well as to target cognitive linguistic skills to maximize safety and independence on discharge.     Update week 8/5/2024: Pt continues to be followed for language and communication therapy where is making slow progress, however, ST continues to implement support with a focus on baseline aphasia.In regards to aphasia, pt is limited by deficits in expressive and receptive language skills, to include verbal expression, written expression, auditory comprehension and reading comprehension, which impacts overall functional communication skills. In recent sessions, implementation of constant therapy application on pt's tablet has been completed and discussed w/ pt's friend to help pt practice outside of therapy and during therapy with expressive-receptive language. Pt voiced  engagement with application and continued focus with application will be used in future sessions as well as additional interventions to target these barriers such as basic communication boards, verbal command following activities, written command following activities, biographical yes/no questions, simple yes/no questions, moderate yes/no questions, visual/receptive object ID tasks, picture object ID tasks, automatic speech: counting, automatic speech: COURTNEY, automatic speech: CORRY, object naming tasks, simple phrase completion tasks, reading comprehension at word level, reading comprehension at phrase level , written expression of biographical information, written expression at word level, and word-picture matching. In regards to cognition, recent sessions have not been targeting due to pt's signifiant expressive language deficits. The following interventions are to be used to target these barriers, visual orientation checklist, verbal problem solving task, verbal working memory tasks, categorization tasks , picture problem solving activities, verbal reasoning tasks, visual attention tasks, and family education/training. Plan to target cognitive linguistic skills (basic problem solving, memory, safety) and language skills across all language domains this coming week.     Current level of functioning:    Comprehension: Mod A  Expression: Mod A  Social Interaction: Supervision  Problem Solving: Mod A  Memory: Mod A      Nursing Notes:  Appetite: Good  Diet Type: Cardiac                      Diet Patient/Family Education Complete: No    Type of Wound (LDA): Wound                    Type of Wound Patient/Family Education: No  Bladder: Incontinent (purewick at night)     Bladder Patient/Family Education: No  Bowel: Continent     Bowel Patient/Family Education: No  Pain Location/Orientation: Location: Head  Pain Score: 10                       Hospital Pain Intervention(s): Medication (See MAR), Repositioned, Rest  Pain  Patient/Family Education: No       63 y.o. male who  has a past medical history of Acute lower limb ischemia (06/08/2024), Anxiety, Depression, HIV disease (HCC), Substance abuse (HCC), and Suicide attempt ([facility]). who [facility] increased LLE swelling and pain and was found to have a left external iliac artery occlusion and acute limb ischemia. 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 Patient had TTE on 6/10/24 showing LV apex thrombus. On 6/11/24 patient had pharmacologic nuclear stress test/SPECT scan which did not show any significant areas of ischemia with EF 40-45% and recommended continuing A/C for LV apical thrombus. His course was complicated by b/l buttock unstageable pressure ulcers, urinary and fecal incontinence, pain, and significant decline in ADLs and mobility. Patient has been continued on Xarelto for anticoagulation, as well as ASA and statin. Patient has a history of TBI, with baseline nonfluent aphasia and forgetfulness. He was evaluated by neuropsychology on 6/27/24, and deemed to not have capacity to make fully informed medical decisions. Therefore, the guardianship process was initiated as of 7/5/24. He was admitted to the [facility] on 7/6.     7/15: Will encourage independence with ADLs and monitor labs and vital signs.  We will educate pt/family about repositioning to prevent skin breakdown.  We will assist w repositioning and perform routine skin checks.  We will monitor for adequate pain control.  We will monitor for constipation and medicate pt as ordered. We will provide pt and family DM education. We will increase safety awareness and keep pt free from falls.    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 Noted on echocardiogram  6/10/2024: spherical 1.5 x 1.3 cm thrombus at the LV apex Initiated on AC with Xarelto, continue. Rx sent to [facility] for price check on 7/10/24 - LENY spoke with [employer] and pt [facility]. [facility]  Today on 7/21/24, d/w [facility] and Xarelto is not covered and he would have to pay OOP @$700.00.  CM to further address tomorrow 7/22/24.  He may have to be switched to Coumadin.     Will continue to encourage independence with ADLs and monitor labs and vital signs. We will educate pt/family about repositioning to prevent skin breakdown.  We will assist w repositioning and perform routine skin checks.  We will monitor for adequate pain control.  We will monitor for constipation and medicate pt as ordered. We will provide pt and family DM education. We will increase safety awareness and keep pt free from falls.     Pt is incontinent of bladder, uses male pure wick @ HS. Will encourage independence with ADLs and monitor labs and vital signs.  We will educate pt/family about repositioning to prevent skin breakdown.  We will assist w repositioning and perform routine skin checks.  We will monitor for adequate pain control.  We will monitor for constipation and medicate pt as ordered. We will provide pt and family wound and skin care management. We will increase safety awareness and keep pt free from falls.    8/5: Will continue to encourage independence with ADLs and monitor labs and vital signs. We will educate pt/family about repositioning to prevent skin breakdown.  We will assist w repositioning and perform routine skin checks.  We will monitor for adequate pain control.  We will monitor for constipation and medicate pt as ordered. We will provide pt and family DM education. We will increase safety awareness and keep pt free from falls.       Case Management:     Discharge Planning  Living Arrangements: Other (Comment)  Support Systems: Son, Family members  Assistance Needed: Family members  are currently arranging housing for pt after d/c  Type of Current Residence: Other (Comment)  Current Home Care Services: No  7/23- CM continues to follow with d/c planning needs. Process of pursuing guardianship as well as sending SNF referrals. CM continues to work with acute care CM to assist with d/c planning process.     7/30- Cm continues to follow and search for dispo plan    8/6- Team still working on dispo plan, [facility] come to unit and meet with pt this week to determine if they can accept.     Is the patient actively participating in therapies? yes  List any modifications to the treatment plan: None    Barriers Interventions   incontinence Condom cath at night   Sacral wounds  Triad paste, wound care following, p500, roho cushion, offloading, improving   Expressive aphasia Functional communication, use of ipad, participating more    Residual limb care Wrapping education, nursing monitoring for signs of infection    Dispo planning  Several meetings and coordination   Mood  Varies, dc frustration tolerance    Afib Coumadin      Is the patient making expected progress toward goals? yes  List any update or changes to goals: None    Medical Goals: Patient will be medically stable for discharge to [facility] upon completion of rehab program and Patient will be able to manage medical conditions and comorbid conditions with medications and follow up upon completion of rehab program    Weekly Team Goals:   Rehab Team Goals  ADL Team Goal: Patient will require supervision with ADLs with least restrictive device upon completion of rehab program  Transfer Team Goal: Patient will require supervision with transfers with least restrictive device upon completion of rehab program  Locomotion Team Goal: Patient will require supervision with locomotion with least restrictive device upon completion of rehab program  Cognitive Team Goal: Patient will return to premorbid level of cognitive  activity upon completion of rehab program    Discussion: dc pending    Anticipated Discharge Date:  Pt stable and functioning at sup for wc, adls cg mostly for clothing mgmt. Cues needed to get into safe position pending. Standing balance effected by poor righting reaction.   Admin and cm working on dispo planning, many meeting discussing dispo plan within network.   [facility] Team Members Present:   The following team members are supervising care for this patient and were present during this Weekly Team Conference.    Physician: Dr. DePadua, MD  : Berta George MSW  Registered Nurse: Sara Gurrola, ASHWIN  Physical Therapist: Delfina Renner DPT  Occupational Therapist: Katia Morfin MS, OTR/L  Speech Therapist: Erin Roe MS, CCC-SLP

## 2024-08-06 NOTE — PROGRESS NOTES
08/06/24 1000   Pain Assessment   Pain Assessment Tool 0-10   Pain Score 1   Pain Location/Orientation Location: Head   Hospital Pain Intervention(s) Repositioned   Restrictions/Precautions   Precautions Aphasia;Bed/chair alarms;Cognitive;Fall Risk;Pain;Supervision on toilet/commode   Comprehension   Comprehension (FIM) 3 - Understands basic info/conversation 50-74% of time   Expression   Expression (FIM) 3 - Expresses basic info/needs 50-74% of time   Social Interaction   Social Interaction (FIM) 5 - Needs monitoring/encouragement  to participate/interact   Problem Solving   Problem solving (FIM) 3 - Solves basic problmes 50-74% of time   Speech/Language/Cognition Assessmetn   Treatment Assessment Pt was awake, in recliner, listening to music upon arrival. Pt does recognize current SLP from prior sessions, to where he was able to show current SLP the most recent alejandra for his tablet which he uses for not only language tasks but also cognitive tasks. When asking pt about being shown an additional website to sign up for which also is highly beneficial for targeting not only language skills but also cognitive skills, pt was highly receptive to this. From prior experience given this site, SLP asking pt about recalling his email address. He was accurate to ask for folder at [employer], but the information was not in there. Pt still appropriately guiding SLP to look further under items on [employer], which pt was able to recognize where the item was located. Using pt's tablet, SLP typing in the website for pt to where SLP encouraging pt to type in his name and email address. It was noted that pt was able to independently type first name w/o cues. As for last name, pt was beginning to recognize errors in reversal of vowels in last name, but did need increased visual cues to locate those letters which were to the R side of the tablet. When typing in email address, pt was doing well until mid part, noticing that he was  "missing letters, punctuation, to where pt did verbalize mild frustration but wanting SLP to complete for more accuracy. Again, it was noted that pt was not scanning fully to the R side of the keyboard where the letters and punctuation marks were located. As for  when asked, pt did want SLP to place into information. Pt remained to use his \"universal\" password for this website as well. Pt was able to be registered accordingly, to where once the website loaded, there was a box which contained the information to where the website can only be used on a laptop or desktop computer. SLP offering pt still to trials this moving forward which pt was highly receptive to use and trying \"new things.\" At this time, pt will benefit from ongoing skilled SLP services warranted 3x/week to continue to target functional communication modality to improve effectiveness of communication and to ease frustrations with communication attempts during acute rehab stay, as well as to target cognitive linguistic skills to maximize safety and independence on discharge.   SLP Therapy Minutes   SLP Time In 1000   SLP Time Out 1030   SLP Total Time (minutes) 30   SLP Mode of treatment - Individual (minutes) 30   SLP Mode of treatment - Concurrent (minutes) 0   SLP Mode of treatment - Group (minutes) 0   SLP Mode of treatment - Co-treat (minutes) 0   SLP Mode of Treatment - Total time(minutes) 30 minutes   SLP Cumulative Minutes 340   Therapy Time missed   Time missed? No       "

## 2024-08-06 NOTE — PLAN OF CARE
Problem: PAIN - ADULT  Goal: Verbalizes/displays adequate comfort level or baseline comfort level  Description: Interventions:  - Encourage patient to monitor pain and request assistance  - Assess pain using appropriate pain scale  - Administer analgesics based on type and severity of pain and evaluate response  - Implement non-pharmacological measures as appropriate and evaluate response  - Consider cultural and social influences on pain and pain management  - Notify physician/advanced practitioner if interventions unsuccessful or patient reports new pain  Outcome: Progressing     Problem: INFECTION - ADULT  Goal: Absence or prevention of progression during hospitalization  Description: INTERVENTIONS:  - Assess and monitor for signs and symptoms of infection  - Monitor lab/diagnostic results  - Monitor all insertion sites, i.e. indwelling lines, tubes, and drains  - Monitor endotracheal if appropriate and nasal secretions for changes in amount and color  - [place] appropriate cooling/warming therapies per order  - Administer medications as ordered  - Instruct and encourage patient and family to use good hand hygiene technique  - Identify and instruct in appropriate isolation precautions for identified infection/condition  Outcome: Progressing     Problem: INFECTION - ADULT  Goal: Absence of fever/infection during neutropenic period  Description: INTERVENTIONS:  - Monitor WBC    Outcome: Progressing     Problem: SAFETY ADULT  Goal: Patient will remain free of falls  Description: INTERVENTIONS:  - Educate patient/family on patient safety including physical limitations  - Instruct patient to call for assistance with activity   - Consult OT/PT to assist with strengthening/mobility   - Keep Call bell within reach  - Keep bed low and locked with side rails adjusted as appropriate  - Keep care items and personal belongings within reach  - Initiate and maintain comfort rounds  - Make Fall Risk Sign visible to staff  -  Offer Toileting every 2 Hours, in advance of need  - Initiate/Maintain alarm  - Obtain necessary fall risk management equipment:   - Apply yellow socks and bracelet for high fall risk patients  - Consider moving patient to room near nurses station  Outcome: Progressing

## 2024-08-06 NOTE — PROGRESS NOTES
08/06/24 1300   Pain Assessment   Pain Assessment Tool 0-10   Restrictions/Precautions   Precautions Aphasia;Bed/chair alarms;Cognitive;Fall Risk;Pain;Supervision on toilet/commode   LLE Weight Bearing Per Order NWB   Braces or Orthoses   (L AKA  Flowsheet Note)   Subjective   Subjective pt agreeable to perform skilled PT   Roll Left and Right   Type of Assistance Needed Independent   Roll Left and Right CARE Score 6   Sit to Lying   Type of Assistance Needed Independent   Sit to Lying CARE Score 6   Lying to Sitting on Side of Bed   Type of Assistance Needed Independent   Lying to Sitting on Side of Bed CARE Score 6   Sit to Stand   Type of Assistance Needed Supervision   Sit to Stand CARE Score 4   Bed-Chair Transfer   Type of Assistance Needed Supervision   Comment recliner <> WC sit pivot   Chair/Bed-to-Chair Transfer CARE Score 4   Car Transfer   Type of Assistance Needed Set-up / clean-up   Car Transfer CARE Score 5   Walk 10 Feet   Reason if not Attempted Safety concerns   Walk 10 Feet CARE Score 88   Walk 50 Feet with Two Turns   Reason if not Attempted Safety concerns   Walk 50 Feet with Two Turns CARE Score 88   Walk 150 Feet   Reason if not Attempted Safety concerns   Walk 150 Feet CARE Score 88   Walking 10 Feet on Uneven Surfaces   Reason if not Attempted Safety concerns   Walking 10 Feet on Uneven Surfaces CARE Score 88   Ambulation   Primary Mode of Locomotion Prior to Admission Walk   Does the patient walk? 0. No, and walking goal is not clinically indicated.   Wheel 50 Feet with Two Turns   Type of Assistance Needed Independent   Wheel 50 Feet with Two Turns CARE Score 6   Wheel 150 Feet   Type of Assistance Needed Independent   Wheel 150 Feet CARE Score 6   Wheelchair mobility   Does the patient use a wheelchair? 1. Yes   Type of Wheelchair Used 1. Manual   Method Right upper extremity;Left upper extremity   Curb or Single Stair   Reason if not Attempted Safety concerns   1 Step (Curb)  CARE Score 88   4 Steps   Reason if not Attempted Safety concerns   4 Steps CARE Score 88   12 Steps   Reason if not Attempted Safety concerns   12 Steps CARE Score 88   Therapeutic Interventions   Strengthening core strengthening with 5 # dowel and ball toss   Flexibility prone and sidelying for limb manual stretch   Neuromuscular Re-Education core strengthening on 9 th floor with 3 # dowel lalitha and ball toss with red and yellow working side to side throwing and overall cores balance in sitting , prone lying 8 min and sidelying for hip extension.   Assessment   Treatment Assessment pt cont to work on core/UE strengthening , and overall strengtheninig /endurance /conditioning to meet DC goals .Pt cont to need placement at this time , butpt will cont to benefit with skilled PT to meet DC goals   Barriers to Discharge Inaccessible home environment;Decreased caregiver support   Plan   Progress Progressing toward goals   PT Therapy Minutes   PT Time In 1300   PT Time Out 1400   PT Total Time (minutes) 60   PT Mode of treatment - Individual (minutes) 60   PT Mode of treatment - Concurrent (minutes) 0   PT Mode of treatment - Group (minutes) 0   PT Mode of treatment - Co-treat (minutes) 0   PT Mode of Treatment - Total time(minutes) 60 minutes   PT Cumulative Minutes 2413   Therapy Time missed   Time missed? No

## 2024-08-07 LAB
INR PPP: 3.3 (ref 0.85–1.19)
PROTHROMBIN TIME: 33.2 SECONDS (ref 12.3–15)

## 2024-08-07 PROCEDURE — 97530 THERAPEUTIC ACTIVITIES: CPT

## 2024-08-07 PROCEDURE — 97110 THERAPEUTIC EXERCISES: CPT

## 2024-08-07 PROCEDURE — 85610 PROTHROMBIN TIME: CPT | Performed by: PHYSICAL MEDICINE & REHABILITATION

## 2024-08-07 PROCEDURE — 97535 SELF CARE MNGMENT TRAINING: CPT

## 2024-08-07 PROCEDURE — 99232 SBSQ HOSP IP/OBS MODERATE 35: CPT | Performed by: NURSE PRACTITIONER

## 2024-08-07 RX ORDER — WARFARIN SODIUM 1 MG/1
2 TABLET ORAL
Status: DISCONTINUED | OUTPATIENT
Start: 2024-08-07 | End: 2024-08-07

## 2024-08-07 RX ORDER — WARFARIN SODIUM 5 MG/1
5 TABLET ORAL
Status: DISCONTINUED | OUTPATIENT
Start: 2024-08-08 | End: 2024-08-22

## 2024-08-07 RX ADMIN — GABAPENTIN 100 MG: 100 CAPSULE ORAL at 05:45

## 2024-08-07 RX ADMIN — LAMOTRIGINE 50 MG: 25 TABLET ORAL at 08:40

## 2024-08-07 RX ADMIN — GABAPENTIN 100 MG: 100 CAPSULE ORAL at 21:01

## 2024-08-07 RX ADMIN — DIVALPROEX SODIUM 1250 MG: 500 TABLET, EXTENDED RELEASE ORAL at 08:37

## 2024-08-07 RX ADMIN — LEVOCARNITINE 330 MG: 1 SOLUTION ORAL at 13:41

## 2024-08-07 RX ADMIN — LEVOCARNITINE 330 MG: 1 SOLUTION ORAL at 08:41

## 2024-08-07 RX ADMIN — LAMOTRIGINE 50 MG: 25 TABLET ORAL at 18:09

## 2024-08-07 RX ADMIN — DOLUTEGRAVIR SODIUM 50 MG: 50 TABLET, FILM COATED ORAL at 08:42

## 2024-08-07 RX ADMIN — Medication 6 MG: at 21:01

## 2024-08-07 RX ADMIN — LEVOCARNITINE 330 MG: 1 SOLUTION ORAL at 18:09

## 2024-08-07 RX ADMIN — LOSARTAN POTASSIUM 50 MG: 50 TABLET, FILM COATED ORAL at 08:39

## 2024-08-07 RX ADMIN — ASPIRIN 81 MG: 81 TABLET, COATED ORAL at 08:40

## 2024-08-07 RX ADMIN — SERTRALINE HYDROCHLORIDE 75 MG: 50 TABLET ORAL at 08:37

## 2024-08-07 RX ADMIN — GABAPENTIN 100 MG: 100 CAPSULE ORAL at 13:40

## 2024-08-07 RX ADMIN — METOPROLOL SUCCINATE 25 MG: 25 TABLET, EXTENDED RELEASE ORAL at 08:37

## 2024-08-07 RX ADMIN — MIRTAZAPINE 15 MG: 15 TABLET, FILM COATED ORAL at 21:00

## 2024-08-07 RX ADMIN — TAMSULOSIN HYDROCHLORIDE 0.4 MG: 0.4 CAPSULE ORAL at 16:34

## 2024-08-07 RX ADMIN — BICTEGRAVIR SODIUM, EMTRICITABINE, AND TENOFOVIR ALAFENAMIDE FUMARATE 1 TABLET: 50; 200; 25 TABLET ORAL at 08:35

## 2024-08-07 RX ADMIN — ATORVASTATIN CALCIUM 80 MG: 80 TABLET, FILM COATED ORAL at 16:34

## 2024-08-07 RX ADMIN — ZONISAMIDE 400 MG: 100 CAPSULE ORAL at 08:41

## 2024-08-07 NOTE — WOUND OSTOMY CARE
Progress Note - Wound   Sunil Patel 63 y.o. male MRN: [id number]  Unit/Bed#: -01 Encounter: [id number]        Assessment:   Patient is seen for wound care follow-up. Seen sitting OOB in recliner. Mod assist for turning and repositioning. Agreeable for assessment. Family present at bedside. Left AKA.     Findings:  Right Heel is dry intact and shelley with no skin loss or wounds present. Recommend preventative Hydraguard Cream and proper offloading/ repositioning.      POA Right Buttocks Stage 3 Pressure Injury: RESOLVED. Intact pink epithelial tissue. Tissue blanches. No skin loss or drainage noted. Recommend preventative Hydraguard to area.       No induration, fluctuance, odor, warmth/temperature differences, redness, or purulence noted to the above noted wounds and skin areas assessed. New dressings applied per orders listed below. Patient tolerated well- no s/s of non-verbal pain or discomfort observed during the encounter. Bedside nurse aware of plan of care. See flow sheets for more detailed assessment findings.      Orders listed below and wound care will sign off, call or Secure Chat Message with questions.         Skin Care Plan:  1-Preventative Hydraguard to right heel and B/L Sacro-Buttocks BID and PRN.  2-Turn/reposition q2h or when medically stable for pressure re-distribution on skin .  3-Elevate right heel to offload pressure  4-Moisturize skin daily with skin nourishing cream  5-Ehob cushion in chair when out of bed.  6-P-500 Low Air Loss Mattress         WOUNDS:  Wound 06/08/24 Pressure Injury Buttocks Bilateral (Active)   Wound Image   08/07/24 1326   Wound Description Intact;Pink 08/07/24 1326   Pressure Injury Stage O 08/07/24 1326   Elsi-wound Assessment Intact 08/07/24 1326   Wound Length (cm) 0 cm 08/07/24 1326   Wound Width (cm) 0 cm 08/07/24 1326   Wound Depth (cm) 0 cm 08/07/24 1326   Wound Surface Area (cm^2) 0 cm^2 08/07/24 1326   Wound Volume (cm^3) 0 cm^3 08/07/24 1326    Calculated Wound Volume (cm^3) 0 cm^3 08/07/24 1326   Change in Wound Size % 100 08/07/24 1326   Wound Site Closure WARD 08/07/24 1326   Drainage Amount None 08/07/24 1326   Drainage Description WARD 08/07/24 1326   Non-staged Wound Description Not applicable 08/07/24 1326   Treatments Cleansed;Site care 08/07/24 1326   Dressing Moisture barrier 08/07/24 1326   Wound packed? No 08/07/24 1326   Packing- # removed 0 08/07/24 1326   Packing- # inserted 0 08/07/24 1326   Dressing Changed New 08/07/24 1326   Patient Tolerance Tolerated well 08/07/24 1326   Dressing Status Intact 08/07/24 1326                Willa Oakes RN, BSN, CWOCN

## 2024-08-07 NOTE — PROGRESS NOTES
[facility]  Progress Note  Name: Sunil Patel I  MRN: [id number]  Unit/Bed#: -01 I Date of Admission: 7/6/2024   Date of Service: 8/7/2024 I Hospital Day: 32    Assessment & Plan   * Above-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)  Assessment & Plan  Presented with left lower extremity acute limb ischemia/extensive left iliofemoral and left infrainguinal embolism requiring left femoral thrombectomy and subsequent AKA on 6/7/24 with vascular surgery.  Incision C/D/I, pain controlled.   Vascular surgery saw here last on 7/10 and removed staples  Continue ASA, Coumadin and statin   Rehab and pain control per PMR  Pt has been refusing therapy through the weekend.   Pt has met his rehab goals and will be discharged when able.    Episodic headache  Assessment & Plan  Pt reports a history of migraines.  It is associated with photophobia.  He is unable to take triptans due to a history of stroke.  Pt on gabapentin 100mg q 8 hrs per PMR could consider increasing   Given Nurtec 7/21/24 - unclear if it was helpful    Cardiomyopathy (HCC)  Assessment & Plan  ECHO 6/10/2024 = LVEF 40-45% with mild global hypokinesis   Status post NM stress test on 6/11/2024 which showed: a large, mild, fixed defect in the inferior wall, possibly due to diaphragmatic attenuation artifact, there is a small area of partial reversibility in the inferior apical wall suggestive of ischemia    Evaluated ed by cardiology, etiology felt to be possibly secondary to stress-induced cardiomyopathy, with apical thrombus  Cardiac catheterization deferred given the lack of any significant ischemia, and no current cardiac symptoms  Continue with medical management with aspirin, statin, beta-blocker, ARB  Monitor volume status, remains euvolemic off of diuretics   Euvolemic on exam  Outpatient follow-up with cardiology    Neurocognitive disorder  Assessment & Plan  Evaluated by neuropsychology and found to not  have capacity  Guardianship in progress meeting rescheduled for 8/27    Pressure injury of buttock, stage 3 (HCC)  Assessment & Plan  Being managed by PMR  Turn every 2 hours for offloading  P500   Continue local care  Right buttock per pressure ulcer care now stage 3   Left buttock per pressure ulcer  care now     Patient incapable of making informed decisions  Assessment & Plan  Seen by neuropsych on 6/27 and was deemed NOT to have medical decision making capacity    At risk for constipation  Assessment & Plan  Denies constipation     Acute pain  Assessment & Plan  Due to the AKA  Pain controlled     At risk for venous thromboembolism (VTE)  Assessment & Plan  Xarelto not affordable pt started on Warfarin   No need for bridge now pt on Warfarin and supratherepeutic 3.30 today on 7.5 mg   Will hold tonights dose and schedule 5 mg for tomorrow night 8/8   Chk INR in am     Traumatic brain injury (HCC)  Assessment & Plan  Remote history of TBI, previously residing in a group home prior to care home sentence.  Evaluated by neuropsychiatry on 6/27/24 and deemed NOT to have medical decision-making capacity  Process for court appointed guardian started on 7/5/24 - CM following   Guardianship hearing 8/27/24.    Carnitine deficiency (HCC)  Assessment & Plan  Continue levocarnitine 330 mg 3x daily with meals.    History of seizures  Assessment & Plan  Diagnosed in July 2019, follows with [facility] neurology outpatient  Continue home regimen with Depakote ER, Lamotrigine and Zonegran    Left ventricular thrombus  Assessment & Plan  Noted on echocardiogram 6/10/2024: spherical 1.5 x 1.3 cm thrombus at the LV apex   Initiated on AC with Xarelto however unable to verify insurance coverage.  Pt switched to Coumadin. Lovenox bridge until INR > 2. INR 3.30  Lovenox stopped. Hold Coumadin at 7.5mg.  Will change dose to 5 mg to be given on 8/8 - will need to chk INR in am    Benign essential hypertension  Assessment & Plan  Home regimen:  Losartan 50mg daily, Toprol XL 25 mg BID  Current regimen: Losartan 50 mg daily, Toprol-XL 25 mg daily  Stable      History of stroke  Assessment & Plan  History of left MCA CVA in May 2018 with residual expressive aphasia  Continue ASA and atorvastatin    Human immunodeficiency virus (HIV) infection (HCC)  Assessment & Plan  Continue Biktarvy and Tivicay.    Bipolar affective disorder (HCC)  Assessment & Plan  Continue home meds Zoloft and Remeron  Outpatient follow-up with Psychiatry             The above assessment and plan was reviewed and updated as determined by my evaluation of the patient on 8/7/2024.    Labs:   Results from last 7 days   Lab Units 08/01/24  0517   WBC Thousand/uL 6.42   HEMOGLOBIN g/dL 11.5*   HEMATOCRIT % 39.3   PLATELETS Thousands/uL 421*     Results from last 7 days   Lab Units 08/01/24  0517   SODIUM mmol/L 139   POTASSIUM mmol/L 4.5   CHLORIDE mmol/L 104   CO2 mmol/L 29   BUN mg/dL 16   CREATININE mg/dL 0.95   CALCIUM mg/dL 9.4         Results from last 7 days   Lab Units 08/07/24  0550 08/05/24  0643   INR  3.30* 2.67*           Imaging  No orders to display       Review of Scheduled Meds:  Current Facility-Administered Medications   Medication Dose Route Frequency Provider Last Rate    acetaminophen  975 mg Oral TID PRN José Salcido MD      aspirin  81 mg Oral Daily Lorrie Barillas PA-C      atorvastatin  80 mg Oral Daily With Dinner Lorrie Barillas PA-C      bictegravir-emtricitab-tenofovir alafenamide  1 tablet Oral Daily With Breakfast Lorrie Barillas PA-C      bisacodyl  10 mg Rectal Daily PRN Lorrie Barillas PA-C      diphenhydrAMINE  25 mg Oral Q6H PRN Lorrie Barillas PA-C      divalproex sodium  1,250 mg Oral Daily Lorrie Barillas PA-C      dolutegravir  50 mg Oral Daily Lorrie Barillas PA-C      gabapentin  100 mg Oral Q8H Ashley Depadua, MD      lamoTRIgine  50 mg Oral BID Lorrie Barillas PA-C      levOCARNitine  1,000 mg/day  Oral TID With Meals Lorrie Barillas PA-C      losartan  50 mg Oral Daily Lorrie Barillas PA-C      melatonin  6 mg Oral HS Lorrie Barillas PA-C      metoprolol succinate  25 mg Oral Daily CHARLIE Mcclelland      mirtazapine  15 mg Oral HS Lorrie Barillas PA-C      ondansetron  4 mg Oral Q6H PRN Lorrie Barillas PA-C      polyethylene glycol  17 g Oral Daily PRN Lorrie Barillas PA-C      senna  1 tablet Oral HS PRN Lorrie Barillas PA-C      sertraline  75 mg Oral Daily Lorrie Barillas PA-C      tamsulosin  0.4 mg Oral Daily With Dinner Lorrie Barillas PA-C      [START ON 8/8/2024] warfarin  5 mg Oral Daily (warfarin) CHARLIE Plata      zonisamide  400 mg Oral Daily Lorrie Barillas PA-C         Subjective/ HPI: Patient seen and examined. Patients overnight issues or events were reviewed with nursing staff. New or overnight issues include the following:     Pt is sitting up in the chair he has no pain. Reports that he needs to have a bm needs help . He feels well no coughing . No sob no dizziness or lightheadeness    ROS:   A 10 point ROS was performed; negative except as noted above.        *Labs /Radiology studies Reviewed, no new imaging  *Medications  reviewed and reconciled as needed  *Please refer to order section for additional ordered labs studies      Physical Examination:  Vitals:   Vitals:    08/06/24 1526 08/06/24 2144 08/07/24 0545 08/07/24 0837   BP: 108/64 117/59 112/70 123/77   BP Location:  Left arm Left arm    Pulse: 81 85 81 89   Resp:  16 16    Temp: 98.9 °F (37.2 °C) 98.1 °F (36.7 °C) 98 °F (36.7 °C)    TempSrc:  Oral Oral    SpO2: 97% 96% 95%    Weight:       Height:           General Appearance: NAD; pleasant  HEENT: PERRLA, conjuctiva normal; mucous membranes moist; face symmetrical  Neck:  Supple  Lungs: clear bilaterally, normal respiratory effort, no retractions, expiratory effort normal, on room air  CV: regular rate and  rhythm, no murmurs rubs or gallops noted   ABD: soft non tender, +BS x4  EXT: DP pulses intact, no lymphadenopathy, no edema, left leg aka   Skin: normal turgor, normal texture, no rash  Psych: affect normal, mood normal  Neuro: AAOx3       The above physical exam was reviewed and updated as determined by my evaluation of the patient on 8/7/2024.    Invasive Devices       Drain  Duration             External Urinary Catheter 6 days                       VTE Pharmacologic Prophylaxis: Warfarin (Coumadin)  Code Status: Level 1 - Full Code  Current Length of Stay: 32 day(s)    Total floor / unit time spent today 20 minutes  Coordination of patient's care was performed in conjunction with consulting services. Time invested included review of patient's labs, vitals, and management of their comorbidities with continued monitoring, examination of patient as well as answering patient questions, documenting her findings and creating progress note in electronic medical record,  ordering appropriate diagnostic testing.       ** Please Note:  voice to text software may have been used in the creation of this document. Although proof errors in transcription or interpretation are a potential of such software**

## 2024-08-07 NOTE — ASSESSMENT & PLAN NOTE
Remote history of TBI, previously residing in a group home prior to longterm sentence.  Evaluated by neuropsychiatry on 6/27/24 and deemed NOT to have medical decision-making capacity  Process for court appointed guardian started on 7/5/24 - CM following   Guardianship hearing 8/27/24.

## 2024-08-07 NOTE — ASSESSMENT & PLAN NOTE
Remote history of TBI, previously residing in a group home prior to intermediate sentence.  Evaluated by neuropsychiatry on 6/27/24 and deemed NOT to have medical decision-making capacity  Process for court appointed guardian started on 7/5/24 - CM following   Guardianship hearing 8/27/24.

## 2024-08-07 NOTE — PROGRESS NOTES
08/07/24 0900   Pain Assessment   Pain Assessment Tool 0-10   Pain Score No Pain   Restrictions/Precautions   Precautions Aphasia;Bed/chair alarms;Cognitive;Fall Risk;Pain;Supervision on toilet/commode   Weight Bearing Restrictions Yes   LLE Weight Bearing Per Order NWB   ROM Restrictions No   Braces or Orthoses   (L AKA )   Sit to Stand   Type of Assistance Needed Supervision   Physical Assistance Level No physical assistance   Comment at GB during toileting   Sit to Stand CARE Score 4   Bed-Chair Transfer   Type of Assistance Needed Supervision   Physical Assistance Level No physical assistance   Comment Sup sit pivots, G setup of w/c and brake mgmt w/o cueing   Chair/Bed-to-Chair Transfer CARE Score 4   Toileting Hygiene   Type of Assistance Needed Physical assistance;Verbal cues;Adaptive equipment   Physical Assistance Level 26%-50%   Comment Pt required increased time to produce BM this session. CS in stance w/unilateral UE support on GB while pt managed clothing down, but required A to manage back up. Recommending pt use weightshifting seated for CM for decreased fall risk, but pt insists on standing. Sup seated for rear hygiene   Toileting Hygiene CARE Score 3   Toilet Transfer   Type of Assistance Needed Supervision   Physical Assistance Level No physical assistance   Comment Sup sit pivots, w/c<> PFBSC over toilet, no LOB   Toilet Transfer CARE Score 4   Toilet Transfer   Surface Assessed Platform Commode   Transfer Technique Sit Pivot   Limitations Noted In Balance;LE Strength;UE Strength;Endurance   Positioning Concerns Safety;Cognition   Commmunity Re-entry   Community Re-entry Level Wheelchair   Community Re-entry Level of Assistance Distant supervision   Community Re-entry Pt self-propelled w/c using BUEs from 4th floor pt room to 9th floor OT gym as well as through hospital hallways at SUP level, w/focuse on increasing endurance, w/c mgmt, obstacle negotiation, and UE strength, in  anticipation of pt D/Cing w/c level. Pt tolerated well with several brief rest breaks to manage min fatigue. Pt wore biking gloves for improved  and skin integrity protection. Pt cleared all obstacles w/o cueing. Pt rested at 9th floor window by elevators, discussing view of [place] and recounting memories, for improved pt mental health and wellbeing, which has been limited 2* extended hospital stay.   Exercise Tools   Exercise Tools Yes   UE Ergometer Seated w/Sup, SciFit UEB bilaterally for 5min prograde, 5min retrograde, against L 1.0 resistance for increased BUE strength for improved IND w/fxl transfers. Pt tolerated well with rest break x1 to manage SOB/fatigue.   Cognition   Overall Cognitive Status Impaired   Arousal/Participation Alert;Cooperative   Attention Attends with cues to redirect   Orientation Level Oriented X4   Memory Decreased short term memory;Decreased recall of recent events;Decreased recall of precautions   Following Commands Follows one step commands with increased time or repetition   Activity Tolerance   Activity Tolerance Patient tolerated treatment well   Assessment   Treatment Assessment Pt seen for 60min skilled OT session focused on toileting (pt required increased time to produce BM), toilet transfers, UE strengthening, endurance training, w/c mobility/mgmt, and emotional support/ mental wellbeing and coping strategy, for increased independence w/ADLs and decreased caregiver burden. See detailed descriptions of fxl performance above. Pt tolerated session well, but cont to be limited by decreased fxl cognition/safety, balance, strength, and endurance. Pt would benefit from continued skilled OT focused on ADL retraining, lateral weightshifting for toileting, fxl transfers, UE strengthening, sacral offloading, core strengthening, phase 1 amp edu, and D/C planning.   Prognosis Good   Problem List Decreased strength;Decreased range of motion;Decreased endurance;Impaired  balance;Decreased mobility;Decreased coordination;Decreased cognition;Impaired judgement;Decreased safety awareness;Decreased skin integrity;Orthopedic restrictions;Pain   Plan   Treatment/Interventions ADL retraining;Functional transfer training;Therapeutic exercise;Endurance training;Patient/family training;Equipment eval/education;Bed mobility;Compensatory technique education;Spoke to nursing   Progress Progressing toward goals   Discharge Recommendation   Rehab Resource Intensity Level, OT   (pending)   OT Therapy Minutes   OT Time In 0900   OT Time Out 1000   OT Total Time (minutes) 60   OT Mode of treatment - Individual (minutes) 60   OT Mode of treatment - Concurrent (minutes) 0   OT Mode of treatment - Group (minutes) 0   OT Mode of treatment - Co-treat (minutes) 0   OT Mode of Treatment - Total time(minutes) 60 minutes   OT Cumulative Minutes 2114   Therapy Time missed   Time missed? No

## 2024-08-07 NOTE — PROGRESS NOTES
Physical Medicine and Rehabilitation Progress Note  Sunil Patel 63 y.o. male MRN: [id number]  Unit/Bed#: [facility] 451-01 Encounter: [id number]    To Review: Sunil Patel is a 63 y.o. male who  has a past medical history of Acute lower limb ischemia (06/08/2024), Anxiety, Depression, HIV disease (HCC), Substance abuse (HCC), and Suicide attempt (HCC). who presented to the [facility] on 6/7/24 from custodial for increased LLE swelling and pain and was found to have a left external iliac artery occlusion and acute limb ischemia. 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 Patient had TTE on 6/10/24 showing LV apex thrombus. On 6/11/24 patient had pharmacologic nuclear stress test/SPECT scan which did not show any significant areas of ischemia with EF 40-45% and recommended continuing A/C for LV apical thrombus. His course was complicated by b/l buttock unstageable pressure ulcers, urinary and fecal incontinence, pain, and significant decline in ADLs and mobility. Patient has been continued on Xarelto for anticoagulation, as well as ASA and statin. Patient has a history of TBI, with baseline nonfluent aphasia and forgetfulness. He was evaluated by neuropsychology on 6/27/24, and deemed to not have capacity to make fully informed medical decisions. Therefore, the guardianship process was initiated as of 7/5/24. He was admitted to the [facility] on 7/6.     Chief Complaint: Feeling well this morning.     Interval History/Subjective:  No acute events overnight. States he feels clearer and his mind feels a bit more even - he just struggles to express himself which can be frustrating. He denies any new CP, SOB, fevers, chills, N/V, abdominal pain. Last BM was 8/7. He asked about who would pay for the next level  of care he is transferred to, and I reviewed that the team is working to figure that out for him. Last BM 8/7.     ROS:  A 10 point review of systems was negative except for what is noted in the HPI.    Today's Changes:  Meeting today with a residential facility  CM and Admin looking into placement at [facility] to await guardianship  Inr 2.89. Getting 5mg today, and checking INR in the AM.   Buttock wound resolved.   Having regular bowel movements, not always charted. Stop colace and monitor.       Total visit time: 35 minutes, with more than 50% spent counseling/coordinating care. Counseling includes discussion with patient re: progress in therapies, functional issues observed by therapy staff, and discussion with patient regarding their current medical state and wellbeing. Coordination of patient's care was performed in conjunction with Internal Medicine service to monitor patient's labs, vitals, and management of their comorbidities.      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 Underwent L femoral thrombectomy and AKA with vascular surgery   - [facility] sx follow-up 7/10 - L AKA incision site well-healed, staples taken out at the bedside this morning  - [facility] will be vendor - Patient now has .  - Monitor for hip flexion/abduction tightness. Stretches in therapy  - Now on Coumadin with hx of LV thrombus and PAOD   - PT/OT/SLP (to work on carry over strategies) 3-5 hours/day, 5-7 days/week.    - Goals are Ind-Sup at a wheelchair level.   - Outpatient f/u with [facility] and Vascular  - Continue patient training with  placement  - Continue gabapentin - doesn't do too much for phantom limb sensation, but that doesn't bother him or cause him pain currently.   - Patient still frustrated given circumstances; will continue gabapentin for anxiety    Neurocognitive  "disorder  Assessment & Plan  Has been appropriate and cooperative throughout this stay without any behavioral issues on the rehab unit to date.    - Does have decreased frustration tolerance   - So far redirectable.  Hx of TBI and L MCA CVA, psychiatric d/o, seizure d/o  - Neuropsych assessment 6/27/24 - \"diffuse cognitive dysfunction and on a measure assessing awareness of personal health status and ability to evaluate health problems, handle medical emergencies and take safety precautions, patient performed in the IMPAIRED range of functioning. During this encounter, patient does not appear to have capacity to make fully informed medical decisions.\"  MMSE 4/28 - severely impaired  - Guardianship process initiated on acute - follow-up by CM/Admin  - Status: some expressive and possible some receptive aphasia.  He can be difficult to follow at times likely due to a mixture of aphasia, tangentiality, mild lability, and impaired memory; lability with hx of possible bipolar d/o  - Neuropsych Med review: Depakote, Lamictal, Remeron, Zoloft, Melatonin, gabapentin, PRN oxy 2.5mg TID   - Monitor neuro-exam, wakefulness, mood, cognition, insight into deficits and safety awareness   - Monitor and ensure optimal management electrolytes, nutrition, and hydration  - Monitor for signs or symptoms of infection, medication intolerances, other systemic etiologies  - Additional labs, imaging, specialist follow-up as needed per primary team currently   - Overstimulation precautions, frequent re-orientation, re-direction, re-assurance  - Optimal mood, pain, and sleep management  - If impaired sleep or behavior recommend sleep log and agitation monitoring    - Limit sedating medications when possible  - Fall precautions - if needed increase rounding or consider virtual sitter or in-person sitter  - For routine restlessness, anxiety, irritability focus on non-pharmacologic management    - Hold benzo's with increased risk paradoxical " "reaction and possibility of limiting cognitive recovery  - Continue SLP and interdisciplinary care  - OP neuro and PCP         Adjustment disorder with mixed anxiety and depressed mood  Assessment & Plan  - Related to recent release from incarceration, new AKA and uncertainty of future disposition, all in the setting of impaired cognition related to prior strokes and TBI  - Behavioral techniques for symptom management  - Neuropsychology consult as available  - Given his circumstances, increased frustration is expected. Can consider Psych consult if symptoms continue to worsen to adjust mood stabilizing medications. Will try nonpharmacologic approaches first with more frequent conversations/redirections     Pressure injury of buttock, stage 3 (HCC)  Assessment & Plan  Resolved as of 8/7.   - Wound care consulted and following weekly  - POA L buttock pressure injury unstageable has healed.  - POA R buttock pressure injury  evolved from unstageable to Stage 3, and is now resolved.   - Recommend ROHO cushion in chair when out of bed instead   - Preventative hydraguard to bilateral heels BID and PRN.   - P500  - Monitor clinically for breakdown, frequent turns  - reviewed picture of wound today. It is healing well. Continue to monitor    Patient incapable of making informed decisions  Assessment & Plan  - History of remote TBI and prior strokes with comorbid psychiatric history.  - Evaluated by neuropsychology, Dr. Dilshad Tatum, PhD on 6/27/24, found to have \"diffuse cognitive dysfunction and on a measure assessing awareness of personal health status and ability to evaluate health problems, handle medical emergencies and take safety precautions, patient performed in the IMPAIRED range of functioning. During this encounter, patient does not appear to have capacity to make fully informed medical decisions.\"  - Court-appointed guardianship process has been initiated by acute care CM Katia Kay.    - We have identified " two friends who are interested in being patient's guardians and have been involved and invested in patient's care on the AR[facility].   - They will need to be interviewed by the  involved in this process.    - He has to undergo his hearing on 8/6/2024 first.  -- now rescheduled to 8/27   - He would ultimately benefit from [facility]/nursing home/memory care unit - he does very well with structure and supervision.    8/2- Ongoing discussions with , [facility] admin and social work to dispo patient to stable long-term housing. Process continues, with evaluation by therapy managers from [facility].       Urinary incontinence  Assessment & Plan  - Did have some incontinence on acute - improved with timed voids and consistent assistance with his urinal  - Described as urgency  - Marked improvement on timed voids.   - monitor for retention, incontinence (including overflow incontinence), signs/symptoms of UTI  - Timed Voids and PVRs Q4hrs initiated earlier. And PVR <150 x3, so scans discontinued.    - He has some difficulty managing the urinal    - needs assistance but is able to transfer to [facility]    - Still uses condom catheter at night, in part for continence care/to protect his skin given his buttock wounds.  - Continue timed voids           At risk for constipation  Assessment & Plan  - Stooling adequately recently; did have some incontinence on acute now improved  - Close continent care given buttock wounds  - Last BM 8/7 and continent  - PRN miralax, Senna and suppository    Acute pain  Assessment & Plan  Controlled   - Tylenol 975 mg q8h PRN  - Resumed Gabapentin 100mg Q8hr due to increased headaches and irritability since discontinuing.  - Able to discontinue oxycodone. Has not used since 7/19.     At risk for venous thromboembolism (VTE)  Assessment & Plan  - Fully anticoagulated on warfarin for apical thrombus  - IM managing    Impaired mobility and activities of daily living  Assessment & Plan  - Rehabilitation  medicine physician for daily monitoring of care, 24 hour availability for acute medical issues, medication management, and therapeutic and diagnostic assessments.  - 24 hour rehabilitation nursing 7 days per week for: management/teaching of medications, bowel/bladder routine, skin care.  - PT, OT for 2-3 hours per day, 5 to 6 days per week; 15 hours per week  - Rehabilitation Psychology as needed for adjustment and coping  - MSW for barriers to discharge, community resources, and family support  - Discharge planning following to help ensure a safe and efficient discharge  -7/30 teams: Patient is getting agitated with PT therapies, as he is reaching a plateau. Pt enjoys SLP and cognitive exercises with tablet. Discussions are ongoing for his dispo planning- [facility] admin have meetings with a few SNFs to discuss his case. CM is actively working on retrieving his belongings from prison. Guardianship court date still set for 8/27.  - Had a meeting with [facility] leadership and [place] leadership, as well as Case Management. They will have their therapists come and evaluate the patient for appropriateness.         History of stroke  Assessment & Plan  - History of old left temporal and parietal lobe cortical infarcts, also involving the insula and left occipital lobe as well as small right posterior parietal lobe. Stable on most recent CT head on 5/16/24.   - ASA, and statin for secondary prevention  - Optimal BP control  - Monitor neuro exam - hx of LV thrombus    - See that entry  - OP neuro follow-up     Bipolar affective disorder (HCC)  Assessment & Plan  Mood acceptable; continue meds as outlined   Supportive counseling  NeuroPsychology consult while in [facility] if available for support  Counseled on and continue to encourage deep breathing/relaxation/behavioral management techniques  - Mirtazapine 15 mg qHs  - Sertraline 75 mg daily   - Lamictal 50mg BID  - Depakote ER 1250mg qday   - Outpatient psychiatry  follow-up  - Would consult Psych before changing medications    Episodic headache  Assessment & Plan  Chronic issue.  Seemed to worsen with increased irritability after discontinuing gabapentin  Resumed gabapentin  Received [facility] once during stay with middling results  Sleep logs have been good - discontinued.  Headache is on and off    Cardiomyopathy (HCC)  Assessment & Plan  ECHO on 6/10/2024 showed LVEF 40-45% with mild global hypokinesis   Status post NM stress test on 6/11/2024 which showed: a large, mild, fixed defect in the inferior wall, possibly due to diaphragmatic attenuation artifact, there is a small area of partial reversibility in the inferior apical wall suggestive of ischemia    Elevated by cardiology, etiology felt to be possibly secondary to stress-induced cardiomyopathy, with apical thrombus  Cardiac catheterization deferred given the lack of any significant ischemia, and no current cardiac symptoms  Continue with medical management with aspirin, statin, beta-blocker, ARB  Monitor volume status, remains euvolemic off of diuretics   Outpatient follow-up with cardiology    Traumatic brain injury ([facility])  Assessment & Plan  See neurocog impairment     Carnitine deficiency ([facility])  Assessment & Plan  - Carnitine replacement  - Appreciate medicine management      History of seizures  Assessment & Plan  - Depakote ER, lamotrigine, zonisamide  - Outpatient follow-up with [facility] neurology    Left ventricular thrombus  Assessment & Plan  - Visualized on echocardiogram on 6/10/24: spherical 1.5 x 1.3 cm thrombus at the LV apex.   - Was started on rivaroxaban, but patient does not appear to have insurance coverage for prescriptions   - Xarelto, eliquis, and pradaxa likely cost prohibitive per IM   - 7/29 transitioned to warfarin with lovenox bridge.   - Now off lovenox, and fully anticoagulated on warfarin.   - Management as per IM.   - 8/8 INR 1.89. Now on 5mg daily. Recheck 8/9  - Outpatient follow-up with  cardiology    Benign essential hypertension  Assessment & Plan  - Toprol, losartan  - Appreciate medicine management    Human immunodeficiency virus (HIV) infection (HCC)  Assessment & Plan  - Continue anti-retroviral treatment  - Appreciate medicine management during AR[facility] course  - OP ID follow-up         Health Maintenance  #GI Prophylaxis: not indicated  #Code Status: Full code  #FEN: Cardiac diet + Ensure at bfast/dinner  #Dispo: Teams 8/6: ADD TBD. Still working on placement. He is meeting with a residential facility today, and CM and Rehab Director are working on evaluation with [facility]. Guardianship court date is 8/27.    Will need f/u with PMR, PCP, Vascular, Psych     Objective:    Functional Update: Intermittently refusing therapy.   PT: Sup transfers, Ind bed mobility, Ind wheelchair mobility  OT: Ind eating/grooming, Sup bathing, Ind UB dressing, Sup LB dressing, CGA toileting, CGA tub/shower transfers, Sup toilet transfers  SLP: modA comprehension, modA expression, Sup social interaction, modA problem solving, modA memory.     Allergies per EMR    Physical Exam:  Temp:  [98 °F (36.7 °C)-98.9 °F (37.2 °C)] 98 °F (36.7 °C)  HR:  [81-91] 81  Resp:  [16-19] 16  BP: (108-141)/(59-96) 112/70  Oxygen Therapy  SpO2: 95 %    Gen: No acute distress, Well-nourished, well-appearing.  HEENT: Moist mucus membranes, Normocephalic/Atraumatic  Cardiovascular: Regular rate, rhythm, S1/S2. Distal pulses palpable  Heme/Extr: No edema  Pulmonary: Non-labored breathing. Lungs CTAB  : No fernandes  GI: Soft, non-tender, non-distended. BS+  MSK: L AKA.   Integumentary: Skin is warm, dry.   Neuro: AAOx3, Still with aphasia which is frustrating. Asked some reasonable and insightful questions today. Pleasant and cooperative.   Psych: Normal mood and affect.     Diagnostic Studies: Reviewed, no new imaging    Laboratory:  Reviewed   Results from last 7 days   Lab Units 08/01/24  0517   HEMOGLOBIN g/dL 11.5*    HEMATOCRIT % 39.3   WBC Thousand/uL 6.42     Results from last 7 days   Lab Units 08/01/24  0517   BUN mg/dL 16   POTASSIUM mmol/L 4.5   CHLORIDE mmol/L 104   CREATININE mg/dL 0.95     Results from last 7 days   Lab Units 08/07/24  0550 08/05/24  0643 08/04/24  0631   PROTIME seconds 33.2* 28.3* 29.7*   INR  3.30* 2.67* 2.86*        Patient Active Problem List   Diagnosis    Bipolar affective disorder (HCC)    Substance abuse (HCC)    Human immunodeficiency virus (HIV) infection ([facility])    History of stroke    Benign essential hypertension    Positive laboratory testing for human immunodeficiency virus (HCC)    Hypertension    Unspecified vitamin D deficiency    Tobacco abuse    Cerebrovascular accident (CVA) ([facility])    Left ventricular thrombus    History of seizures    Carnitine deficiency ([facility])    Above-knee amputation of left lower extremity ([facility])    Traumatic brain injury ([facility])    Impaired mobility and activities of daily living    At risk for venous thromboembolism (VTE)    Acute pain    At risk for constipation    Urinary incontinence    Patient incapable of making informed decisions    Pressure injury of buttock, stage 3 ([facility])    Adjustment disorder with mixed anxiety and depressed mood    Neurocognitive disorder    Cardiomyopathy (HCC)    Episodic headache         Medications  Current Facility-Administered Medications   Medication Dose Route Frequency Provider Last Rate    acetaminophen  975 mg Oral TID PRN José Salcido MD      aspirin  81 mg Oral Daily Lorrie Barillas PA-C      atorvastatin  80 mg Oral Daily With Dinner Lorrie Barillas PA-C      bictegravir-emtricitab-tenofovir alafenamide  1 tablet Oral Daily With Breakfast Lorrie Barillas PA-C      bisacodyl  10 mg Rectal Daily PRN Lorrie Barillas PA-C      diphenhydrAMINE  25 mg Oral Q6H PRN Lorrie Barillas PA-C      divalproex sodium  1,250 mg Oral Daily Lorrie Barillas PA-C      docusate sodium  100 mg Oral BID  Ashley Depadua, MD      dolutegravir  50 mg Oral Daily Lorrie Barillas PA-C      gabapentin  100 mg Oral Q8H Ashley Depadua, MD      lamoTRIgine  50 mg Oral BID Lorrie Barillas PA-C      levOCARNitine  1,000 mg/day Oral TID With Meals Lorrie Barillas PA-C      losartan  50 mg Oral Daily Lorrie Barillas PA-C      melatonin  6 mg Oral HS Lorrie Barillas PA-C      metoprolol succinate  25 mg Oral Daily CHARLIE Mcclelland      mirtazapine  15 mg Oral HS Lorrie Barillas PA-C      ondansetron  4 mg Oral Q6H PRN Lorrie Barillas PA-C      polyethylene glycol  17 g Oral Daily PRN Lorrie Barillas PA-C      senna  1 tablet Oral HS PRN Lorrie Barillas PA-C      sertraline  75 mg Oral Daily Lorrie Barillas PA-C      tamsulosin  0.4 mg Oral Daily With Dinner Lorrie Barillas PA-C      warfarin  7.5 mg Oral Daily (warfarin) Lorrie Barillas PA-C      zonisamide  400 mg Oral Daily Lorrie Barillas PA-C            ** Please Note: Fluency Direct voice to text software may have been used in the creation of this document. **

## 2024-08-07 NOTE — PROGRESS NOTES
08/07/24 1400   Subjective   Subjective pt reports this session not coming to PT session d/t not feeling will and illness   PT Therapy Minutes   PT Time In 1400   PT Time Out 1500   PT Total Time (minutes) 60   PT Mode of treatment - Individual (minutes) 0   PT Mode of treatment - Concurrent (minutes) 0   PT Mode of treatment - Group (minutes) 0   PT Mode of treatment - Co-treat (minutes) 0   PT Mode of Treatment - Total time(minutes) 0 minutes   PT Cumulative Minutes 2233   Therapy Time missed   Time missed? Yes   Amount of time missed 60   Reason for time missed Extreme fatigue;Illness   Time(s) multiple attempts made 1400,1410,1415

## 2024-08-07 NOTE — ASSESSMENT & PLAN NOTE
Noted on echocardiogram 6/10/2024: spherical 1.5 x 1.3 cm thrombus at the LV apex   Initiated on AC with Xarelto however unable to verify insurance coverage.  Pt switched to Coumadin. Lovenox bridge until INR > 2. INR 3.30  Lovenox stopped. Hold Coumadin at 7.5mg.  Will change dose to 5 mg to be given on 8/8 - will need to chk INR in am

## 2024-08-07 NOTE — ASSESSMENT & PLAN NOTE
Noted on echocardiogram 6/10/2024: spherical 1.5 x 1.3 cm thrombus at the LV apex   Initiated on AC with Xarelto however unable to verify insurance coverage.  Pt switched to Coumadin. Lovenox bridge until INR > 2. INR 2.89  Continue Coumadin at 5mg daily.  INR in AM.

## 2024-08-07 NOTE — PLAN OF CARE
Problem: INFECTION - ADULT  Goal: Absence or prevention of progression during hospitalization  Description: INTERVENTIONS:  - Assess and monitor for signs and symptoms of infection  - Monitor lab/diagnostic results  - Monitor all insertion sites, i.e. indwelling lines, tubes, and drains  - Monitor endotracheal if appropriate and nasal secretions for changes in amount and color  - [place] appropriate cooling/warming therapies per order  - Administer medications as ordered  - Instruct and encourage patient and family to use good hand hygiene technique  - Identify and instruct in appropriate isolation precautions for identified infection/condition  Outcome: Progressing

## 2024-08-07 NOTE — PLAN OF CARE
Problem: PAIN - ADULT  Goal: Verbalizes/displays adequate comfort level or baseline comfort level  Description: Interventions:  - Encourage patient to monitor pain and request assistance  - Assess pain using appropriate pain scale  - Administer analgesics based on type and severity of pain and evaluate response  - Implement non-pharmacological measures as appropriate and evaluate response  - Consider cultural and social influences on pain and pain management  - Notify physician/advanced practitioner if interventions unsuccessful or patient reports new pain  Outcome: Progressing     Problem: INFECTION - ADULT  Goal: Absence or prevention of progression during hospitalization  Description: INTERVENTIONS:  - Assess and monitor for signs and symptoms of infection  - Monitor lab/diagnostic results  - Monitor all insertion sites, i.e. indwelling lines, tubes, and drains  - Monitor endotracheal if appropriate and nasal secretions for changes in amount and color  - [place] appropriate cooling/warming therapies per order  - Administer medications as ordered  - Instruct and encourage patient and family to use good hand hygiene technique  - Identify and instruct in appropriate isolation precautions for identified infection/condition  Outcome: Progressing  Goal: Absence of fever/infection during neutropenic period  Description: INTERVENTIONS:  - Monitor WBC    Outcome: Progressing     Problem: SAFETY ADULT  Goal: Patient will remain free of falls  Description: INTERVENTIONS:  - Educate patient/family on patient safety including physical limitations  - Instruct patient to call for assistance with activity   - Consult OT/PT to assist with strengthening/mobility   - Keep Call bell within reach  - Keep bed low and locked with side rails adjusted as appropriate  - Keep care items and personal belongings within reach  - Initiate and maintain comfort rounds  - Make Fall Risk Sign visible to staff  - Offer Toileting every 2 Hours,  in advance of need  - Initiate/Maintain alarm  - Obtain necessary fall risk management equipment:   - Apply yellow socks and bracelet for high fall risk patients  - Consider moving patient to room near nurses station  Outcome: Progressing  Goal: Maintain or return to baseline ADL function  Description: INTERVENTIONS:  -  Assess patient's ability to carry out ADLs; assess patient's baseline for ADL function and identify physical deficits which impact ability to perform ADLs (bathing, care of mouth/teeth, toileting, grooming, dressing, etc.)  - Assess/evaluate cause of self-care deficits   - Assess range of motion  - Assess patient's mobility; develop plan if impaired  - Assess patient's need for assistive devices and provide as appropriate  - Encourage maximum independence but intervene and supervise when necessary  - Involve family in performance of ADLs  - Assess for home care needs following discharge   - Consider OT consult to assist with ADL evaluation and planning for discharge  - Provide patient education as appropriate  Outcome: Progressing  Goal: Maintains/Returns to pre admission functional level  Description: INTERVENTIONS:  - Perform AM-PAC 6 Click Basic Mobility/ Daily Activity assessment daily.  - Set and communicate daily mobility goal to care team and patient/family/caregiver.   - Collaborate with rehabilitation services on mobility goals if consulted  - Perform Range of Motion 3 times a day.  - Reposition patient every 2 hours.  - Dangle patient 3 times a day  - Stand patient 3 times a day  - Ambulate patient 3 times a day  - Out of bed to chair 3 times a day   - Out of bed for meals 3 times a day  - Out of bed for toileting  - Record patient progress and toleration of activity level   Outcome: Progressing     Problem: DISCHARGE PLANNING  Goal: Discharge to home or other facility with appropriate resources  Description: INTERVENTIONS:  - Identify barriers to discharge w/patient and caregiver  -  Arrange for needed discharge resources and transportation as appropriate  - Identify discharge learning needs (meds, wound care, etc.)  - Arrange for interpretive services to assist at discharge as needed  - Refer to Case Management Department for coordinating discharge planning if the patient needs post-hospital services based on physician/advanced practitioner order or complex needs related to functional status, cognitive ability, or social support system  Outcome: Progressing     Problem: Prexisting or High Potential for Compromised Skin Integrity  Goal: Skin integrity is maintained or improved  Description: INTERVENTIONS:  - Identify patients at risk for skin breakdown  - Assess and monitor skin integrity  - Assess and monitor nutrition and hydration status  - Monitor labs   - Assess for incontinence   - Turn and reposition patient  - Assist with mobility/ambulation  - Relieve pressure over bony prominences  - Avoid friction and shearing  - Provide appropriate hygiene as needed including keeping skin clean and dry  - Evaluate need for skin moisturizer/barrier cream  - Collaborate with interdisciplinary team   - Patient/family teaching  - Consider wound care consult   Outcome: Progressing

## 2024-08-07 NOTE — ASSESSMENT & PLAN NOTE
Xarelto not affordable pt started on Warfarin   No need for bridge now pt on Warfarin and supratherepeutic 3.30 today on 7.5 mg   Will hold tonights dose and schedule 5 mg for tomorrow night 8/8   Chk INR in am

## 2024-08-07 NOTE — PROGRESS NOTES
08/07/24 1200   Pain Assessment   Pain Assessment Tool 0-10   Pain Score No Pain   Restrictions/Precautions   Precautions Aphasia;Bed/chair alarms;Cognitive;Fall Risk;Pain;Supervision on toilet/commode;Pressure Ulcer   Weight Bearing Restrictions Yes   LLE Weight Bearing Per Order NWB   ROM Restrictions No   Braces or Orthoses   (L AKA )   Sit to Stand   Type of Assistance Needed Supervision   Physical Assistance Level No physical assistance   Comment briefly at bedrail to use urinal   Sit to Stand CARE Score 4   Bed-Chair Transfer   Type of Assistance Needed Supervision   Physical Assistance Level No physical assistance   Comment Sup sit pivots, G setup of w/c and brake mgmt w/o cueing   Chair/Bed-to-Chair Transfer CARE Score 4   Toileting Hygiene   Type of Assistance Needed Physical assistance;Verbal cues   Physical Assistance Level 26%-50%   Comment CS in stance w/unilateral UE support on bedrail while pt managed clothing down, Jignesh to manage clothes back up. Pt used urinal in stance.   Toileting Hygiene CARE Score 3   Exercise Tools   Exercise Tools Yes   Other Exercise Tool 1 Seated EOM w/Sup, [id number] each of BUE chest press, OH press, elbow flexion, horiz ABD, prograde rowing using 4.5# tbar for increased BUE strength for improved IND during fxl transfers and STS. Pt tolerated well w/rest breaks between sets to manage fatigue.   Other Exercise Tool 2 Seated EOM w/Sup, 30x each of fwd trunk flexion, lateral trunk flexion, and trunk twists while holding 3# ball for increased core strength, unsupported sitting balance/tolerance, and w/s during toileting. Pt tolerated well with min vc's for technique and rest breaks to manage fatigue. Pt denied pain.   Cognition   Overall Cognitive Status Impaired   Arousal/Participation Alert;Cooperative   Attention Attends with cues to redirect   Orientation Level Oriented X4   Memory Decreased short term memory;Decreased recall of recent events;Decreased recall of  precautions   Following Commands Follows one step commands with increased time or repetition   Activity Tolerance   Activity Tolerance Patient tolerated treatment well   Assessment   Treatment Assessment Pt seen for 60min skilled OT session focused on toileting (used urinal in stance), UE/core strengthening, w/c mobility/mgmt, and activity tolerance, for increased independence w/ADLs and decreased caregiver burden. See detailed descriptions of fxl performance above. Pt tolerated session well, but cont to be limited by decreased fxl cognition/safety, balance, strength, and endurance. Pt would benefit from continued skilled OT focused on ADL retraining, lateral weightshifting for toileting, fxl transfers, UE strengthening, sacral offloading, core strengthening, phase 1 amp edu, and D/C planning.   Prognosis Good   Problem List Decreased strength;Decreased range of motion;Decreased endurance;Impaired balance;Decreased mobility;Decreased coordination;Decreased cognition;Impaired judgement;Decreased safety awareness;Orthopedic restrictions;Decreased skin integrity;Pain   Plan   Treatment/Interventions ADL retraining;Functional transfer training;Therapeutic exercise;Endurance training;Patient/family training;Equipment eval/education;Bed mobility;Compensatory technique education   Progress Progressing toward goals   Discharge Recommendation   Rehab Resource Intensity Level, OT   (pending)   OT Therapy Minutes   OT Time In 1200   OT Time Out 1300   OT Total Time (minutes) 60   OT Mode of treatment - Individual (minutes) 60   OT Mode of treatment - Concurrent (minutes) 0   OT Mode of treatment - Group (minutes) 0   OT Mode of treatment - Co-treat (minutes) 0   OT Mode of Treatment - Total time(minutes) 60 minutes   OT Cumulative Minutes 2114   Therapy Time missed   Time missed? No

## 2024-08-07 NOTE — ASSESSMENT & PLAN NOTE
Presented with left lower extremity acute limb ischemia/extensive left iliofemoral and left infrainguinal embolism requiring left femoral thrombectomy and subsequent AKA on 6/7/24 with vascular surgery.  Incision C/D/I, pain controlled.   Vascular surgery saw here last on 7/10 and removed staples  Continue ASA, Coumadin and statin   Rehab and pain control per PMR  Pt has met his rehab goals and will be discharged when able.

## 2024-08-07 NOTE — ASSESSMENT & PLAN NOTE
Pt reports a history of migraines.  It is associated with photophobia.  He is unable to take triptans due to a history of stroke.  Pt on gabapentin 100mg q 8 hrs per PMR could consider increasing   Given Nurtec 7/21/24 - unclear if it was helpful

## 2024-08-08 LAB
ANION GAP SERPL CALCULATED.3IONS-SCNC: 10 MMOL/L (ref 4–13)
BASOPHILS # BLD AUTO: 0.06 THOUSANDS/ÂΜL (ref 0–0.1)
BASOPHILS NFR BLD AUTO: 1 % (ref 0–1)
BUN SERPL-MCNC: 23 MG/DL (ref 5–25)
CALCIUM SERPL-MCNC: 8.7 MG/DL (ref 8.4–10.2)
CHLORIDE SERPL-SCNC: 106 MMOL/L (ref 96–108)
CO2 SERPL-SCNC: 24 MMOL/L (ref 21–32)
CREAT SERPL-MCNC: 0.93 MG/DL (ref 0.6–1.3)
EOSINOPHIL # BLD AUTO: 0.26 THOUSAND/ÂΜL (ref 0–0.61)
EOSINOPHIL NFR BLD AUTO: 4 % (ref 0–6)
ERYTHROCYTE [DISTWIDTH] IN BLOOD BY AUTOMATED COUNT: 15.6 % (ref 11.6–15.1)
GFR SERPL CREATININE-BSD FRML MDRD: 87 ML/MIN/1.73SQ M
GLUCOSE P FAST SERPL-MCNC: 130 MG/DL (ref 65–99)
GLUCOSE SERPL-MCNC: 130 MG/DL (ref 65–140)
HCT VFR BLD AUTO: 34.2 % (ref 36.5–49.3)
HGB BLD-MCNC: 10.1 G/DL (ref 12–17)
IMM GRANULOCYTES # BLD AUTO: 0.03 THOUSAND/UL (ref 0–0.2)
IMM GRANULOCYTES NFR BLD AUTO: 1 % (ref 0–2)
INR PPP: 2.89 (ref 0.85–1.19)
LYMPHOCYTES # BLD AUTO: 2.21 THOUSANDS/ÂΜL (ref 0.6–4.47)
LYMPHOCYTES NFR BLD AUTO: 34 % (ref 14–44)
MCH RBC QN AUTO: 28.5 PG (ref 26.8–34.3)
MCHC RBC AUTO-ENTMCNC: 29.5 G/DL (ref 31.4–37.4)
MCV RBC AUTO: 96 FL (ref 82–98)
MONOCYTES # BLD AUTO: 0.78 THOUSAND/ÂΜL (ref 0.17–1.22)
MONOCYTES NFR BLD AUTO: 12 % (ref 4–12)
NEUTROPHILS # BLD AUTO: 3.24 THOUSANDS/ÂΜL (ref 1.85–7.62)
NEUTS SEG NFR BLD AUTO: 48 % (ref 43–75)
NRBC BLD AUTO-RTO: 0 /100 WBCS
PLATELET # BLD AUTO: 360 THOUSANDS/UL (ref 149–390)
PMV BLD AUTO: 8.1 FL (ref 8.9–12.7)
POTASSIUM SERPL-SCNC: 3.7 MMOL/L (ref 3.5–5.3)
PROTHROMBIN TIME: 30 SECONDS (ref 12.3–15)
RBC # BLD AUTO: 3.55 MILLION/UL (ref 3.88–5.62)
SODIUM SERPL-SCNC: 140 MMOL/L (ref 135–147)
WBC # BLD AUTO: 6.58 THOUSAND/UL (ref 4.31–10.16)

## 2024-08-08 PROCEDURE — 99232 SBSQ HOSP IP/OBS MODERATE 35: CPT | Performed by: PHYSICIAN ASSISTANT

## 2024-08-08 PROCEDURE — 80048 BASIC METABOLIC PNL TOTAL CA: CPT | Performed by: PHYSICIAN ASSISTANT

## 2024-08-08 PROCEDURE — 85610 PROTHROMBIN TIME: CPT | Performed by: PHYSICIAN ASSISTANT

## 2024-08-08 PROCEDURE — 97110 THERAPEUTIC EXERCISES: CPT

## 2024-08-08 PROCEDURE — 92507 TX SP LANG VOICE COMM INDIV: CPT

## 2024-08-08 PROCEDURE — 85025 COMPLETE CBC W/AUTO DIFF WBC: CPT | Performed by: PHYSICIAN ASSISTANT

## 2024-08-08 PROCEDURE — 99232 SBSQ HOSP IP/OBS MODERATE 35: CPT | Performed by: PHYSICAL MEDICINE & REHABILITATION

## 2024-08-08 PROCEDURE — 97530 THERAPEUTIC ACTIVITIES: CPT

## 2024-08-08 RX ADMIN — WARFARIN SODIUM 5 MG: 5 TABLET ORAL at 17:35

## 2024-08-08 RX ADMIN — LEVOCARNITINE 330 MG: 1 SOLUTION ORAL at 17:35

## 2024-08-08 RX ADMIN — DIVALPROEX SODIUM 1250 MG: 500 TABLET, EXTENDED RELEASE ORAL at 09:12

## 2024-08-08 RX ADMIN — TAMSULOSIN HYDROCHLORIDE 0.4 MG: 0.4 CAPSULE ORAL at 17:35

## 2024-08-08 RX ADMIN — GABAPENTIN 100 MG: 100 CAPSULE ORAL at 13:45

## 2024-08-08 RX ADMIN — ATORVASTATIN CALCIUM 80 MG: 80 TABLET, FILM COATED ORAL at 17:35

## 2024-08-08 RX ADMIN — LAMOTRIGINE 50 MG: 25 TABLET ORAL at 17:35

## 2024-08-08 RX ADMIN — GABAPENTIN 100 MG: 100 CAPSULE ORAL at 21:58

## 2024-08-08 RX ADMIN — LEVOCARNITINE 330 MG: 1 SOLUTION ORAL at 13:45

## 2024-08-08 RX ADMIN — ZONISAMIDE 400 MG: 100 CAPSULE ORAL at 09:12

## 2024-08-08 RX ADMIN — GABAPENTIN 100 MG: 100 CAPSULE ORAL at 05:33

## 2024-08-08 RX ADMIN — Medication 6 MG: at 21:58

## 2024-08-08 RX ADMIN — LAMOTRIGINE 50 MG: 25 TABLET ORAL at 09:11

## 2024-08-08 RX ADMIN — LOSARTAN POTASSIUM 50 MG: 50 TABLET, FILM COATED ORAL at 09:12

## 2024-08-08 RX ADMIN — METOPROLOL SUCCINATE 25 MG: 25 TABLET, EXTENDED RELEASE ORAL at 09:12

## 2024-08-08 RX ADMIN — ASPIRIN 81 MG: 81 TABLET, COATED ORAL at 09:12

## 2024-08-08 RX ADMIN — LEVOCARNITINE 330 MG: 1 SOLUTION ORAL at 09:13

## 2024-08-08 RX ADMIN — BICTEGRAVIR SODIUM, EMTRICITABINE, AND TENOFOVIR ALAFENAMIDE FUMARATE 1 TABLET: 50; 200; 25 TABLET ORAL at 09:13

## 2024-08-08 RX ADMIN — DOLUTEGRAVIR SODIUM 50 MG: 50 TABLET, FILM COATED ORAL at 09:13

## 2024-08-08 RX ADMIN — SERTRALINE HYDROCHLORIDE 75 MG: 50 TABLET ORAL at 09:11

## 2024-08-08 RX ADMIN — MIRTAZAPINE 15 MG: 15 TABLET, FILM COATED ORAL at 21:58

## 2024-08-08 NOTE — PROGRESS NOTES
[facility]  Progress Note  Name: Sunil Patel I  MRN: [id number]  Unit/Bed#: -01 I Date of Admission: 7/6/2024   Date of Service: 8/8/2024 I Hospital Day: 33    Assessment & Plan   * Above-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)  Assessment & Plan  Presented with left lower extremity acute limb ischemia/extensive left iliofemoral and left infrainguinal embolism requiring left femoral thrombectomy and subsequent AKA on 6/7/24 with vascular surgery.  Incision C/D/I, pain controlled.   Vascular surgery saw here last on 7/10 and removed staples  Continue ASA, Coumadin and statin   Rehab and pain control per PMR  Pt has met his rehab goals and will be discharged when able.    Episodic headache  Assessment & Plan  Pt reports a history of migraines.  It is associated with photophobia.  He is unable to take triptans due to a history of stroke.  Pt on gabapentin 100mg q 8 hrs per PMR could consider increasing   Given Nurtec 7/21/24 - unclear if it was helpful    Cardiomyopathy (HCC)  Assessment & Plan  ECHO 6/10/2024 = LVEF 40-45% with mild global hypokinesis   Status post NM stress test on 6/11/2024 which showed: a large, mild, fixed defect in the inferior wall, possibly due to diaphragmatic attenuation artifact, there is a small area of partial reversibility in the inferior apical wall suggestive of ischemia    Evaluated ed by cardiology, etiology felt to be possibly secondary to stress-induced cardiomyopathy, with apical thrombus  Cardiac catheterization deferred given the lack of any significant ischemia, and no current cardiac symptoms  Continue with medical management with aspirin, statin, beta-blocker, ARB  Monitor volume status, remains euvolemic off of diuretics   Euvolemic on exam  Outpatient follow-up with cardiology    Traumatic brain injury (HCC)  Assessment & Plan  Remote history of TBI, previously residing in a group home prior to detention sentence.  Evaluated  by neuropsychiatry on 6/27/24 and deemed NOT to have medical decision-making capacity  Process for court appointed guardian started on 7/5/24 - CM following   Guardianship hearing 8/27/24.    Carnitine deficiency (HCC)  Assessment & Plan  Continue levocarnitine 330 mg 3x daily with meals.    History of seizures  Assessment & Plan  Diagnosed in July 2019, follows with [facility] neurology outpatient  Continue home regimen with Depakote ER, Lamotrigine and Zonegran    Left ventricular thrombus  Assessment & Plan  Noted on echocardiogram 6/10/2024: spherical 1.5 x 1.3 cm thrombus at the LV apex   Initiated on AC with Xarelto however unable to verify insurance coverage.  Pt switched to Coumadin. Lovenox bridge until INR > 2. INR 2.89  Continue Coumadin at 5mg daily.  INR in AM.    Benign essential hypertension  Assessment & Plan  Home regimen: Losartan 50mg daily, Toprol XL 25 mg BID  Current regimen: Losartan 50 mg daily, Toprol-XL 25 mg daily  Stable      History of stroke  Assessment & Plan  History of left MCA CVA in May 2018 with residual expressive aphasia  Continue ASA and atorvastatin    Human immunodeficiency virus (HIV) infection (HCC)  Assessment & Plan  Continue Biktarvy and Tivicay.    Bipolar affective disorder (HCC)  Assessment & Plan  Continue home meds Zoloft and Remeron  Outpatient follow-up with Psychiatry    Neurocognitive disorder  Assessment & Plan  Evaluated by neuropsychology and found to not have capacity  Guardianship in progress meeting rescheduled for 8/27    Pressure injury of buttock, stage 3 (HCC)  Assessment & Plan  Being managed by PMR  Turn every 2 hours for offloading  P500   Continue local care  Right buttock per pressure ulcer care now stage 3   Left buttock per pressure ulcer  care now     Patient incapable of making informed decisions  Assessment & Plan  Seen by neuropsych on 6/27 and was deemed NOT to have medical decision making capacity    At risk for constipation  Assessment &  Plan  Denies constipation     Acute pain  Assessment & Plan  Due to the AKA  Pain controlled     At risk for venous thromboembolism (VTE)  Assessment & Plan  Xarelto not affordable pt started on Warfarin   No need for bridge now pt on Warfarin and supratherepeutic 3.30 today on 7.5 mg   Will hold tonights dose and schedule 5 mg for tomorrow night 8/8   Chk INR in am              The above assessment and plan was reviewed and updated as determined by my evaluation of the patient on 8/8/2024.    Labs:   Results from last 7 days   Lab Units 08/08/24  0535   WBC Thousand/uL 6.58   HEMOGLOBIN g/dL 10.1*   HEMATOCRIT % 34.2*   PLATELETS Thousands/uL 360     Results from last 7 days   Lab Units 08/08/24  0535   SODIUM mmol/L 140   POTASSIUM mmol/L 3.7   CHLORIDE mmol/L 106   CO2 mmol/L 24   BUN mg/dL 23   CREATININE mg/dL 0.93   CALCIUM mg/dL 8.7         Results from last 7 days   Lab Units 08/08/24  0535 08/07/24  0550   INR  2.89* 3.30*           Imaging  No orders to display       Review of Scheduled Meds:  Current Facility-Administered Medications   Medication Dose Route Frequency Provider Last Rate    acetaminophen  975 mg Oral TID PRN José Salcido MD      aspirin  81 mg Oral Daily Lorrie Barillas PA-C      atorvastatin  80 mg Oral Daily With Dinner Lorrie Barillas PA-C      bictegravir-emtricitab-tenofovir alafenamide  1 tablet Oral Daily With Breakfast Lorrie Barillas PA-C      bisacodyl  10 mg Rectal Daily PRN Lorrie Barillas PA-C      diphenhydrAMINE  25 mg Oral Q6H PRN Lorrie Barillas PA-C      divalproex sodium  1,250 mg Oral Daily Lorrie Barillas PA-C      dolutegravir  50 mg Oral Daily Lorrie Barillas PA-C      gabapentin  100 mg Oral Q8H Ashley Depadua, MD      lamoTRIgine  50 mg Oral BID Lorrie Barillas PA-C      levOCARNitine  1,000 mg/day Oral TID With Meals Lorrie Barillas PA-C      losartan  50 mg Oral Daily Lorrie Barillas PA-C      melatonin  6  mg Oral HS Lorrie Barillas PA-C      metoprolol succinate  25 mg Oral Daily CHARLIE Mcclelland      mirtazapine  15 mg Oral HS Lorrie Barillas PA-C      ondansetron  4 mg Oral Q6H PRN Lorrie Barillas PA-C      polyethylene glycol  17 g Oral Daily PRN Lorrie Barillas PA-C      senna  1 tablet Oral HS PRN Lorrie Barillas PA-C      sertraline  75 mg Oral Daily Lorrie Barillas PA-C      tamsulosin  0.4 mg Oral Daily With Dinner Lorrie Barillas PA-C      warfarin  5 mg Oral Daily (warfarin) CHARLIE Plata      zonisamide  400 mg Oral Daily Lorrie Barillas PA-C         Subjective/ HPI: Patient seen and examined. Patients overnight issues or events were reviewed with nursing staff. New or overnight issues include the following:     Pt seen in his room. He remains upset about still being in rehab. He was tearful. Reviewed INR with pt and plan for Coumadin. He denies any other complaints.    ROS:   A 10 point ROS was performed; negative except as noted above.        *Labs /Radiology studies Reviewed  *Medications  reviewed and reconciled as needed  *Please refer to order section for additional ordered labs studies      Physical Examination:  Vitals:   Vitals:    08/07/24 0837 08/07/24 1405 08/07/24 2039 08/08/24 0510   BP: 123/77 126/62 113/56 140/65   BP Location:  Left arm Left arm Left arm   Pulse: 89 92 97 83   Resp:  17 20 20   Temp:  98.1 °F (36.7 °C) 98.8 °F (37.1 °C) 97.9 °F (36.6 °C)   TempSrc:  Oral Oral Oral   SpO2:  95% 91% 92%   Weight:       Height:           General Appearance: NAD; tearful  HEENT: PERRLA, conjuctiva normal; mucous membranes moist; face symmetrical  Neck:  Supple  Lungs: clear bilaterally, normal respiratory effort, no retractions, expiratory effort normal, on room air  CV: regular rate and rhythm, no murmurs rubs or gallops noted   ABD: soft non tender, +BS x4  EXT: Lt AKA  Skin: normal turgor, normal texture, no rash  Psych: affect  normal, mood normal  Neuro: Awake and alert. Expressive aphasia.     The above physical exam was reviewed and updated as determined by my evaluation of the patient on 8/8/2024.    Invasive Devices       Drain  Duration             External Urinary Catheter 7 days                       VTE Pharmacologic Prophylaxis: Warfarin (Coumadin)  Code Status: Level 1 - Full Code  Current Length of Stay: 33 day(s)    Total floor / unit time spent today  30 minutes   Coordination of patient's care was performed in conjunction with consulting services. Time invested included review of patient's labs, vitals, and management of their comorbidities with continued monitoring, examination of patient as well as answering patient questions, documenting her findings and creating progress note in electronic medical record,  ordering appropriate diagnostic testing.       ** Please Note:  voice to text software may have been used in the creation of this document. Although proof errors in transcription or interpretation are a potential of such software**

## 2024-08-08 NOTE — CASE MANAGEMENT
Case Management Discharge Planning Note    Patient name Sunil Patel  Location /-01 MRN [id number]  : 1960 Date 2024       Current Admission Date: 2024  Current Admission Diagnosis:Above-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)   Patient Active Problem List    Diagnosis Date Noted Date Diagnosed    Episodic headache 2024     Neurocognitive disorder 2024     Cardiomyopathy (HCC) 2024     Adjustment disorder with mixed anxiety and depressed mood 2024     Impaired mobility and activities of daily living 2024     At risk for venous thromboembolism (VTE) 2024     Acute pain 2024     At risk for constipation 2024     Urinary incontinence 2024     Patient incapable of making informed decisions 2024     Pressure injury of buttock, stage 3 ([facility]) 2024     Above-knee amputation of left lower extremity ([facility]) 2024     Traumatic brain injury ([facility]) 2024     Carnitine deficiency ([facility]) 2024     Left ventricular thrombus 2024     History of seizures 2024     Tobacco abuse 10/26/2019     Cerebrovascular accident (CVA) ([facility]) 10/26/2019     Positive laboratory testing for human immunodeficiency virus ([facility]) 2017     Bipolar affective disorder ([facility]) 2017     Hypertension 2017     Human immunodeficiency virus (HIV) infection ([facility]) 2017     History of stroke 2017     Substance abuse ([facility]) 2013     Unspecified vitamin D deficiency 2010     Benign essential hypertension 2010       LOS (days): 33  Geometric Mean LOS (GMLOS) (days):   Days to GMLOS:     OBJECTIVE:  Risk of Unplanned Readmission Score: 38.53         Current admission status: Inpatient Rehab   Preferred Pharmacy:   [facility] [place] [place] [facility]  [address] [place] [address]  [place] [address]  Phone: [phone] Fax: [phone]    [facility] [place] - [address]  [address]  [place] [address]  Phone: [phone] Fax: [phone]    Primary Care Provider: CHARLIE Trevion    Primary Insurance: MEDICARE  Secondary Insurance: [facility]    D      Additional Comments: Conferance call with [facility] Kika, Sarah Romero, Rk Cuellar, and Edmond Wellington. Discussed possibility of talk therapy while pt is IP. Pt is unable to receive any OP therapy while IP. CM advised of friend Mireya who appears to be driving the discussion about marijuana. CM followed up with [facility] and seveal other faciiltes requesting additional clinicals. May need to expand search to boarding homes. F/u meeting next week with LENY, HEATHER MICHELE and Kika for 2nd interview

## 2024-08-08 NOTE — PROGRESS NOTES
08/08/24 0800   Pain Assessment   Pain Assessment Tool 0-10   Pain Score No Pain   Therapy Time missed   Time missed? Yes   Amount of time missed 90   Reason for time missed Extreme fatigue  (refusal)   Time(s) multiple attempts made 8:00, 8:15, 8:30

## 2024-08-08 NOTE — PLAN OF CARE
Problem: PAIN - ADULT  Goal: Verbalizes/displays adequate comfort level or baseline comfort level  Description: Interventions:  - Encourage patient to monitor pain and request assistance  - Assess pain using appropriate pain scale  - Administer analgesics based on type and severity of pain and evaluate response  - Implement non-pharmacological measures as appropriate and evaluate response  - Consider cultural and social influences on pain and pain management  - Notify physician/advanced practitioner if interventions unsuccessful or patient reports new pain  Outcome: Progressing     Problem: INFECTION - ADULT  Goal: Absence or prevention of progression during hospitalization  Description: INTERVENTIONS:  - Assess and monitor for signs and symptoms of infection  - Monitor lab/diagnostic results  - Monitor all insertion sites, i.e. indwelling lines, tubes, and drains  - Monitor endotracheal if appropriate and nasal secretions for changes in amount and color  - [place] appropriate cooling/warming therapies per order  - Administer medications as ordered  - Instruct and encourage patient and family to use good hand hygiene technique  - Identify and instruct in appropriate isolation precautions for identified infection/condition  Outcome: Progressing  Goal: Absence of fever/infection during neutropenic period  Description: INTERVENTIONS:  - Monitor WBC    Outcome: Progressing     Problem: SAFETY ADULT  Goal: Patient will remain free of falls  Description: INTERVENTIONS:  - Educate patient/family on patient safety including physical limitations  - Instruct patient to call for assistance with activity   - Consult OT/PT to assist with strengthening/mobility   - Keep Call bell within reach  - Keep bed low and locked with side rails adjusted as appropriate  - Keep care items and personal belongings within reach  - Initiate and maintain comfort rounds  - Make Fall Risk Sign visible to staff  - Offer Toileting every 2 Hours,  in advance of need  - Initiate/Maintain alarm  - Obtain necessary fall risk management equipment:   - Apply yellow socks and bracelet for high fall risk patients  - Consider moving patient to room near nurses station  Outcome: Progressing  Goal: Maintain or return to baseline ADL function  Description: INTERVENTIONS:  -  Assess patient's ability to carry out ADLs; assess patient's baseline for ADL function and identify physical deficits which impact ability to perform ADLs (bathing, care of mouth/teeth, toileting, grooming, dressing, etc.)  - Assess/evaluate cause of self-care deficits   - Assess range of motion  - Assess patient's mobility; develop plan if impaired  - Assess patient's need for assistive devices and provide as appropriate  - Encourage maximum independence but intervene and supervise when necessary  - Involve family in performance of ADLs  - Assess for home care needs following discharge   - Consider OT consult to assist with ADL evaluation and planning for discharge  - Provide patient education as appropriate  Outcome: Progressing  Goal: Maintains/Returns to pre admission functional level  Description: INTERVENTIONS:  - Perform AM-PAC 6 Click Basic Mobility/ Daily Activity assessment daily.  - Set and communicate daily mobility goal to care team and patient/family/caregiver.   - Collaborate with rehabilitation services on mobility goals if consulted  - Perform Range of Motion 3 times a day.  - Reposition patient every 2 hours.  - Dangle patient 3 times a day  - Stand patient 3 times a day  - Ambulate patient 3 times a day  - Out of bed to chair 3 times a day   - Out of bed for meals 3 times a day  - Out of bed for toileting  - Record patient progress and toleration of activity level   Outcome: Progressing     Problem: DISCHARGE PLANNING  Goal: Discharge to home or other facility with appropriate resources  Description: INTERVENTIONS:  - Identify barriers to discharge w/patient and caregiver  -  Arrange for needed discharge resources and transportation as appropriate  - Identify discharge learning needs (meds, wound care, etc.)  - Arrange for interpretive services to assist at discharge as needed  - Refer to Case Management Department for coordinating discharge planning if the patient needs post-hospital services based on physician/advanced practitioner order or complex needs related to functional status, cognitive ability, or social support system  Outcome: Progressing     Problem: Prexisting or High Potential for Compromised Skin Integrity  Goal: Skin integrity is maintained or improved  Description: INTERVENTIONS:  - Identify patients at risk for skin breakdown  - Assess and monitor skin integrity  - Assess and monitor nutrition and hydration status  - Monitor labs   - Assess for incontinence   - Turn and reposition patient  - Assist with mobility/ambulation  - Relieve pressure over bony prominences  - Avoid friction and shearing  - Provide appropriate hygiene as needed including keeping skin clean and dry  - Evaluate need for skin moisturizer/barrier cream  - Collaborate with interdisciplinary team   - Patient/family teaching  - Consider wound care consult   Outcome: Progressing

## 2024-08-08 NOTE — PROGRESS NOTES
08/08/24 1400   Pain Assessment   Pain Assessment Tool 0-10   Pain Score No Pain   Restrictions/Precautions   Precautions Aphasia;Bed/chair alarms;Cognitive;Fall Risk;Pain;Pressure Ulcer   Weight Bearing Restrictions Yes   LLE Weight Bearing Per Order NWB   ROM Restrictions No   General   Change In Medical/Functional Status per wound care, sacral wound has resolved   Cognition   Overall Cognitive Status Impaired   Arousal/Participation Alert;Cooperative   Attention Attends with cues to redirect   Orientation Level Oriented X4   Memory Decreased short term memory;Decreased recall of recent events;Decreased recall of precautions   Following Commands Follows one step commands with increased time or repetition   Subjective   Subjective pt was agreeable to todays treatment session   Roll Left and Right   Type of Assistance Needed Independent   Physical Assistance Level No physical assistance   Comment HOB flat, increased time required, no rails   Roll Left and Right CARE Score 6   Sit to Lying   Type of Assistance Needed Independent   Physical Assistance Level No physical assistance   Comment HOB flat, increased time required, no rails   Sit to Lying CARE Score 6   Lying to Sitting on Side of Bed   Type of Assistance Needed Independent   Physical Assistance Level No physical assistance   Comment HOB flat, increased time required, no rails   Lying to Sitting on Side of Bed CARE Score 6   Sit to Stand   Type of Assistance Needed Supervision   Physical Assistance Level No physical assistance   Sit to Stand CARE Score 4   Bed-Chair Transfer   Type of Assistance Needed Supervision   Physical Assistance Level No physical assistance   Comment sit pivot transfer   Chair/Bed-to-Chair Transfer CARE Score 4   Wheel 50 Feet with Two Turns   Reason if not Attempted Refused to perform   Wheel 50 Feet with Two Turns CARE Score 7   Wheel 150 Feet   Reason if not Attempted Refused to perform   Wheel 150 Feet CARE Score 7   Therapeutic  "Interventions   Strengthening LE strengthening with rest breaks throughout: 3x10 + 5sec hold prone glute sets, 3x10 prone hip ext, 3x10 sidelying hip abduction, 3x10 leg lifts, 3x10 modified bridge, 3x10 + 5sec hold hip adduction with pillow, 3x10 abdominal crunches, 3x10 R LE hip marches, 3x10+ 5 sec hold LAQ   Flexibility prone lying passive hip flexor stretch   Other Comments   Comments pt became agitated during the session and requested to end therapy, he agreed to continue when blues music was played on pt tablet. After a few exercises, pt refused the rest of the session. multiple attempts were made to continue session.   Assessment   Treatment Assessment pt engaged in 54 minutes of skilled PT with focus on LE strengthening. pt completed all exercises at bed level due to waiting for a meeting. pt initially was agreeable to PT, although, became agitiated during the session and requested to end. pt agreed to continue when blues music was played on his tablet. After a few exercises he stated \"im done\" and refused the rest of the session. next tx to focus on LE strengthening, balance, and increasing independence in functional mobility.   Family/Caregiver Present no   Problem List Decreased strength;Decreased range of motion;Decreased endurance;Impaired balance;Decreased mobility;Decreased coordination;Decreased cognition;Impaired judgement;Decreased safety awareness;Orthopedic restrictions;Decreased skin integrity;Pain   Barriers to Discharge Inaccessible home environment;Decreased caregiver support   PT Barriers   Physical Impairment Decreased strength;Decreased range of motion;Decreased endurance;Impaired balance;Decreased mobility;Decreased coordination;Decreased cognition;Impaired judgement;Decreased safety awareness;Decreased skin integrity;Orthopedic restrictions   Functional Limitation Car transfers;Ramp negotiation;Standing;Transfers;Wheelchair management   Plan   Treatment/Interventions Functional transfer " training;LE strengthening/ROM;Therapeutic exercise;Endurance training;Patient/family training;Equipment eval/education;Bed mobility;Gait training   Progress Progressing toward goals   PT Therapy Minutes   PT Time In 1400   PT Time Out 1454   PT Total Time (minutes) 54   PT Mode of treatment - Individual (minutes) 54   PT Mode of treatment - Concurrent (minutes) 0   PT Mode of treatment - Group (minutes) 0   PT Mode of treatment - Co-treat (minutes) 0   PT Mode of Treatment - Total time(minutes) 54 minutes   PT Cumulative Minutes 2182   Therapy Time missed   Time missed? Yes, missed 36 minutes due to pt refusal. Unable to reattempt due to pt therapy schedule.

## 2024-08-08 NOTE — PROGRESS NOTES
"   08/08/24 1100   Pain Assessment   Pain Assessment Tool 0-10   Pain Score No Pain   Restrictions/Precautions   Precautions Aphasia;Bed/chair alarms;Cognitive;Fall Risk;Pain;Pressure Ulcer;Supervision on toilet/commode   Comprehension   Comprehension (FIM) 4 - Understands basic info/conversation 75-90% of time   Expression   Expression (FIM) 3 - Expresses basic info/needs 50-74% of time   Social Interaction   Social Interaction (FIM) 5 - Interacts appropriately with others 90% of time   Problem Solving   Problem solving (FIM) 3 - Solves basic problmes 50-74% of time   Memory   Memory (FIM) 3 - Recognizes, recalls/performs 50-74%   Speech/Language/Cognition Assessmetn   Treatment Assessment Pt was awake, alert and engaged for session. It was noted that pt was mildly tearful upon entering room, in which SLP asking probing questions to see if current SLP could assist pt in any matter. Pt was more so verbalizing mild frustration at not getting ready yet this AM, stating that \"he was in and out and did nothing.\" Due to expressive aphasia, pt was not able to fully elicit whom he was referring to this AM. However, SLP able to provide pt supportive encouragement which pt remained motivated to complete ST session.     SLP f/u w/ pt in regard to signing up for [employer] therapy materials which unfortunately was not able to be initiated on pt's own tablet. SLP using laptop to use for session to demonstrate the activities provided. SLP and pt engaged in both receptive language and expressive language task as well as trialing basic categorization task.     1- Answering Questions(Level 1): This task targeted pt's auditory comprehension skills by asking a descriptive question and FO2 pictures to ID the answer to each question. Pt was 10/10 accurate in his ability to point to correct picture per question. SLP did also target pt's expressive language skill by having pt name each picture as well, where pt was 5/10 accurate, noting " that the errored responses were highly semantically related to the target words. Pt was able to elicit the true target word w/ phonemic cue provided by SLP.     2-Describe the Picture (Level 1): This task targeted pt's expressive language skills and reading comprehension skills, to match the picture to the FO2 word choice. Again, noting that reading comprehension skills were good to where pt was able to match the target word to the picture w/ 10/10 accuracy. Again, SLP targeting verbal expression skills by stating the target word provided to be matched to each picture. It was noted that pt as able to spontaneously state 4/10 words, but again w/ phonemic cue by SLP, increased to 10/10 items. It was still observed that pt does demonstrate semantic paraphasias in attempts to verbalize the written words provided.    3-Naming the Category (Level 1): This last task engaged pt's reading comprehension and categorization skills. Three words were provided and then FO3 possible category choices given below. Pt was to choose the correct category which matched the 3 words. It was noted that pt did allow increased processing time to ensure understanding of words to match to categories given. Ability for pt to ID the target categories was 8/10 accurate overall. It was noted that the items which pt did initially choose incorrectly, his 2nd choice was the correct category.     Pt highly receptive to these tasks which were completed today in session. SLP plans to reach out to [employer] rep to determine if there is a means to have the access to the [employer] Therapy tool for Android tablet as this is a FREE resource for pt to utilize on own over time. Pt will continue to benefit from ongoing skilled SLP services targeting use of tablet as not only extra therapy activity but use of increasing communication skills in hopes for decreasing burden of care over time.   SLP Therapy Minutes   SLP Time In 1100   SLP Time Out 1130   SLP Total  Time (minutes) 30   SLP Mode of treatment - Individual (minutes) 30   SLP Mode of treatment - Concurrent (minutes) 0   SLP Mode of treatment - Group (minutes) 0   SLP Mode of treatment - Co-treat (minutes) 0   SLP Mode of Treatment - Total time(minutes) 30 minutes   SLP Cumulative Minutes 370   Therapy Time missed   Time missed? No

## 2024-08-09 PROBLEM — L89.303 PRESSURE INJURY OF BUTTOCK, STAGE 3 (HCC): Status: RESOLVED | Noted: 2024-07-07 | Resolved: 2024-08-09

## 2024-08-09 LAB
INR PPP: 2.38 (ref 0.85–1.19)
PROTHROMBIN TIME: 25.9 SECONDS (ref 12.3–15)

## 2024-08-09 PROCEDURE — 85610 PROTHROMBIN TIME: CPT | Performed by: PHYSICIAN ASSISTANT

## 2024-08-09 PROCEDURE — 99232 SBSQ HOSP IP/OBS MODERATE 35: CPT | Performed by: PHYSICIAN ASSISTANT

## 2024-08-09 PROCEDURE — 97530 THERAPEUTIC ACTIVITIES: CPT

## 2024-08-09 PROCEDURE — 99232 SBSQ HOSP IP/OBS MODERATE 35: CPT | Performed by: PHYSICAL MEDICINE & REHABILITATION

## 2024-08-09 PROCEDURE — 97110 THERAPEUTIC EXERCISES: CPT

## 2024-08-09 PROCEDURE — 97535 SELF CARE MNGMENT TRAINING: CPT

## 2024-08-09 RX ADMIN — LOSARTAN POTASSIUM 50 MG: 50 TABLET, FILM COATED ORAL at 08:16

## 2024-08-09 RX ADMIN — LEVOCARNITINE 330 MG: 1 SOLUTION ORAL at 14:09

## 2024-08-09 RX ADMIN — METOPROLOL SUCCINATE 25 MG: 25 TABLET, EXTENDED RELEASE ORAL at 08:16

## 2024-08-09 RX ADMIN — WARFARIN SODIUM 5 MG: 5 TABLET ORAL at 17:19

## 2024-08-09 RX ADMIN — ZONISAMIDE 400 MG: 100 CAPSULE ORAL at 08:19

## 2024-08-09 RX ADMIN — ASPIRIN 81 MG: 81 TABLET, COATED ORAL at 08:16

## 2024-08-09 RX ADMIN — DIVALPROEX SODIUM 1250 MG: 500 TABLET, EXTENDED RELEASE ORAL at 08:16

## 2024-08-09 RX ADMIN — Medication 6 MG: at 21:14

## 2024-08-09 RX ADMIN — LEVOCARNITINE 330 MG: 1 SOLUTION ORAL at 08:20

## 2024-08-09 RX ADMIN — TAMSULOSIN HYDROCHLORIDE 0.4 MG: 0.4 CAPSULE ORAL at 17:19

## 2024-08-09 RX ADMIN — MIRTAZAPINE 15 MG: 15 TABLET, FILM COATED ORAL at 21:14

## 2024-08-09 RX ADMIN — LAMOTRIGINE 50 MG: 25 TABLET ORAL at 17:19

## 2024-08-09 RX ADMIN — GABAPENTIN 100 MG: 100 CAPSULE ORAL at 21:14

## 2024-08-09 RX ADMIN — DOLUTEGRAVIR SODIUM 50 MG: 50 TABLET, FILM COATED ORAL at 08:19

## 2024-08-09 RX ADMIN — LAMOTRIGINE 50 MG: 25 TABLET ORAL at 08:16

## 2024-08-09 RX ADMIN — ATORVASTATIN CALCIUM 80 MG: 80 TABLET, FILM COATED ORAL at 17:19

## 2024-08-09 RX ADMIN — LEVOCARNITINE 330 MG: 1 SOLUTION ORAL at 17:19

## 2024-08-09 RX ADMIN — BICTEGRAVIR SODIUM, EMTRICITABINE, AND TENOFOVIR ALAFENAMIDE FUMARATE 1 TABLET: 50; 200; 25 TABLET ORAL at 08:19

## 2024-08-09 RX ADMIN — GABAPENTIN 100 MG: 100 CAPSULE ORAL at 05:20

## 2024-08-09 RX ADMIN — GABAPENTIN 100 MG: 100 CAPSULE ORAL at 14:09

## 2024-08-09 RX ADMIN — SERTRALINE HYDROCHLORIDE 75 MG: 50 TABLET ORAL at 08:16

## 2024-08-09 NOTE — PROGRESS NOTES
Physical Medicine and Rehabilitation Progress Note  Sunil Patel 63 y.o. male MRN: [id number]  Unit/Bed#: [facility] 451-01 Encounter: [id number]    To Review: Sunil Patel is a 63 y.o. male who  has a past medical history of Acute lower limb ischemia (06/08/2024), Anxiety, Depression, HIV disease (HCC), Substance abuse (HCC), and Suicide attempt (HCC). who presented to the [facility] on 6/7/24 from senior living for increased LLE swelling and pain and was found to have a left external iliac artery occlusion and acute limb ischemia. 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 Patient had TTE on 6/10/24 showing LV apex thrombus. On 6/11/24 patient had pharmacologic nuclear stress test/SPECT scan which did not show any significant areas of ischemia with EF 40-45% and recommended continuing A/C for LV apical thrombus. His course was complicated by b/l buttock unstageable pressure ulcers, urinary and fecal incontinence, pain, and significant decline in ADLs and mobility. Patient has been continued on Xarelto for anticoagulation, as well as ASA and statin. Patient has a history of TBI, with baseline nonfluent aphasia and forgetfulness. He was evaluated by neuropsychology on 6/27/24, and deemed to not have capacity to make fully informed medical decisions. Therefore, the guardianship process was initiated as of 7/5/24. He was admitted to the [facility] on 7/6.     Chief Complaint: Feeling well this morning.     Interval History/Subjective:  No acute events overnight. Last BM 8/8. Continent of bowel/bladder. In a better mood today. I reviewed that I was not sure why the person from the residential facility did not come yesterday, and apologized. He expressed understanding and said it was fine. He has no new  lightheadedness, dizziness, CP, SOB, fevers, chills, N/V, abdominal pin.     ROS:  A 10 point review of systems was negative except for what is noted in the HPI.    Today's Changes:  CM to work on residential facilitiy  Consideration for repeat neuropsychology consult to see if any improvement in his cognition. His mood is more even, and I suspect he still does not have capacity, but there is a component of aphasia that is fairly significant that impacts his communication. His understanding of his situation, insight, and carry over is definitely still impaired. I remain with concerns about his ability to make truly informed decisions about his care given his cognitive-linguistic deficits.  INR 2.38. per IM continue 5mg daily. Repeat INR on Monday.   Continued discussions for placement.     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 Underwent L femoral thrombectomy and AKA with vascular surgery   - [facility] sx follow-up 7/10 - L AKA incision site well-healed, staples taken out at the bedside this morning  - [facility] will be vendor - Patient now has .  - Monitor for hip flexion/abduction tightness. Stretches in therapy  - Now on Coumadin with hx of LV thrombus and PAOD   - PT/OT/SLP (to work on carry over strategies) 3-5 hours/day, 5-7 days/week.    - Goals are Ind-Sup at a wheelchair level.   - Outpatient f/u with [facility] and Vascular  - Continue patient training with  placement  - Continue gabapentin - doesn't do too much for phantom limb sensation, but that doesn't bother him or cause him pain currently.   - Patient still frustrated given circumstances; will continue gabapentin for anxiety    Neurocognitive disorder  Assessment & Plan  Has been appropriate and cooperative throughout this stay without any behavioral issues on the rehab unit to date.    - Does have decreased  "frustration tolerance   - So far redirectable.  Hx of TBI and L MCA CVA, psychiatric d/o, seizure d/o  - Neuropsych assessment 6/27/24 - \"diffuse cognitive dysfunction and on a measure assessing awareness of personal health status and ability to evaluate health problems, handle medical emergencies and take safety precautions, patient performed in the IMPAIRED range of functioning. During this encounter, patient does not appear to have capacity to make fully informed medical decisions.\"  MMSE 4/28 - severely impaired  - Guardianship process initiated on acute - follow-up by CM/Admin  - Status: some expressive and possible some receptive aphasia.  He can be difficult to follow at times likely due to a mixture of aphasia, tangentiality, mild lability, and impaired memory; lability with hx of possible bipolar d/o  - Neuropsych Med review: Depakote, Lamictal, Remeron, Zoloft, Melatonin, gabapentin, PRN oxy 2.5mg TID   - Monitor neuro-exam, wakefulness, mood, cognition, insight into deficits and safety awareness   - Monitor and ensure optimal management electrolytes, nutrition, and hydration  - Monitor for signs or symptoms of infection, medication intolerances, other systemic etiologies  - Additional labs, imaging, specialist follow-up as needed per primary team currently   - Overstimulation precautions, frequent re-orientation, re-direction, re-assurance  - Optimal mood, pain, and sleep management  - If impaired sleep or behavior recommend sleep log and agitation monitoring    - Limit sedating medications when possible  - Fall precautions - if needed increase rounding or consider virtual sitter or in-person sitter  - For routine restlessness, anxiety, irritability focus on non-pharmacologic management    - Hold benzo's with increased risk paradoxical reaction and possibility of limiting cognitive recovery  - Continue SLP and interdisciplinary care  - OP neuro and PCP         Adjustment disorder with mixed anxiety and " "depressed mood  Assessment & Plan  - Related to recent release from incarceration, new AKA and uncertainty of future disposition, all in the setting of impaired cognition related to prior strokes and TBI  - Behavioral techniques for symptom management  - Neuropsychology consult as available  - Given his circumstances, increased frustration is expected. Can consider Psych consult if symptoms continue to worsen to adjust mood stabilizing medications. Will try nonpharmacologic approaches first with more frequent conversations/redirections     Patient incapable of making informed decisions  Assessment & Plan  - History of remote TBI and prior strokes with comorbid psychiatric history.  - Evaluated by neuropsychology, Dr. Dilshad Tatum, PhD on 6/27/24, found to have \"diffuse cognitive dysfunction and on a measure assessing awareness of personal health status and ability to evaluate health problems, handle medical emergencies and take safety precautions, patient performed in the IMPAIRED range of functioning. During this encounter, patient does not appear to have capacity to make fully informed medical decisions.\"  - Court-appointed guardianship process has been initiated by acute care CM Katia Kay.    - We have identified two friends who are interested in being patient's guardians and have been involved and invested in patient's care on the AR[facility].   - They will need to be interviewed by the  involved in this process.    - He has to undergo his hearing on 8/6/2024 first.  -- now rescheduled to 8/27   - He would ultimately benefit from [facility]/nursing home/memory care unit - he does very well with structure and supervision.    8/2- Ongoing discussions with CM, [facility] admin and social work to dispo patient to stable long-term housing. Process continues, with evaluation by therapy managers from [facility].       Urinary incontinence  Assessment & Plan  - Did have some incontinence on acute - improved with timed voids " and consistent assistance with his urinal  - Described as urgency  - Marked improvement on timed voids.   - monitor for retention, incontinence (including overflow incontinence), signs/symptoms of UTI  - Timed Voids and PVRs Q4hrs initiated earlier. And PVR <150 x3, so scans discontinued.    - He has some difficulty managing the urinal    - needs assistance but is able to transfer to [facility]    - Still uses condom catheter at night, in part for continence care/to protect his skin given his buttock wounds.  - Continue timed voids           At risk for constipation  Assessment & Plan  - Stooling adequately recently; did have some incontinence on acute now improved  - Close continent care given buttock wounds  - Last BM 8/8 and continent  - PRN miralax, Senna and suppository    Acute pain  Assessment & Plan  Controlled   - Tylenol 975 mg q8h PRN  - Resumed Gabapentin 100mg Q8hr due to increased headaches and irritability since discontinuing.  - Able to discontinue oxycodone. Has not used since 7/19.     At risk for venous thromboembolism (VTE)  Assessment & Plan  - Fully anticoagulated on warfarin for apical thrombus  - IM managing    Impaired mobility and activities of daily living  Assessment & Plan  - Rehabilitation medicine physician for daily monitoring of care, 24 hour availability for acute medical issues, medication management, and therapeutic and diagnostic assessments.  - 24 hour rehabilitation nursing 7 days per week for: management/teaching of medications, bowel/bladder routine, skin care.  - PT, OT for 2-3 hours per day, 5 to 6 days per week; 15 hours per week  - Rehabilitation Psychology as needed for adjustment and coping  - MSW for barriers to discharge, community resources, and family support  - Discharge planning following to help ensure a safe and efficient discharge  -7/30 teams: Patient is getting agitated with PT therapies, as he is reaching a plateau. Pt enjoys SLP and cognitive exercises with  tablet. Discussions are ongoing for his dispo planning- [facility] admin have meetings with a few SNFs to discuss his case. CM is actively working on retrieving his belongings from intermediate. Guardianship court date still set for 8/27.  - Had a meeting with [facility] leadership and [place] leadership, as well as Case Management. They will have their therapists come and evaluate the patient for appropriateness.         History of stroke  Assessment & Plan  - History of old left temporal and parietal lobe cortical infarcts, also involving the insula and left occipital lobe as well as small right posterior parietal lobe. Stable on most recent CT head on 5/16/24.   - ASA, and statin for secondary prevention  - Optimal BP control  - Monitor neuro exam - hx of LV thrombus    - See that entry  - OP neuro follow-up     Bipolar affective disorder (HCC)  Assessment & Plan  Mood acceptable; continue meds as outlined   Supportive counseling  NeuroPsychology consult while in [facility] if available for support  Counseled on and continue to encourage deep breathing/relaxation/behavioral management techniques  - Mirtazapine 15 mg qHs  - Sertraline 75 mg daily   - Lamictal 50mg BID  - Depakote ER 1250mg qday   - Outpatient psychiatry follow-up  - Would consult Psych before changing medications    Pressure injury of buttock, stage 3 (HCC)-resolved as of 8/9/2024  Assessment & Plan  Resolved as of 8/7.   - Wound care consulted and following weekly  - POA L buttock pressure injury unstageable has healed.  - POA R buttock pressure injury  evolved from unstageable to Stage 3, and is now resolved.   - Recommend ROHO cushion in chair when out of bed instead   - Preventative hydraguard to bilateral heels BID and PRN.   - P500  - Monitor clinically for breakdown, frequent turns  - reviewed picture of wound today. It is healing well. Continue to monitor    Episodic headache  Assessment & Plan  Chronic issue.  Seemed to worsen with increased  irritability after discontinuing gabapentin  Resumed gabapentin  Received [facility] once during stay with middling results  Sleep logs have been good - discontinued.  Headache is on and off    Cardiomyopathy (HCC)  Assessment & Plan  ECHO on 6/10/2024 showed LVEF 40-45% with mild global hypokinesis   Status post NM stress test on 6/11/2024 which showed: a large, mild, fixed defect in the inferior wall, possibly due to diaphragmatic attenuation artifact, there is a small area of partial reversibility in the inferior apical wall suggestive of ischemia    Elevated by cardiology, etiology felt to be possibly secondary to stress-induced cardiomyopathy, with apical thrombus  Cardiac catheterization deferred given the lack of any significant ischemia, and no current cardiac symptoms  Continue with medical management with aspirin, statin, beta-blocker, ARB  Monitor volume status, remains euvolemic off of diuretics   Outpatient follow-up with cardiology    Traumatic brain injury (HCC)  Assessment & Plan  See neurocog impairment     Carnitine deficiency (HCC)  Assessment & Plan  - Carnitine replacement  - Appreciate medicine management      History of seizures  Assessment & Plan  - Depakote ER, lamotrigine, zonisamide  - [facility]    Left ventricular thrombus  Assessment & Plan  - Visualized on echocardiogram on 6/10/24: spherical 1.5 x 1.3 cm thrombus at the LV apex.   - Was started on rivaroxaban, but patient does not appear to have insurance coverage for prescriptions   - Xarelto, eliquis, and pradaxa likely cost prohibitive per IM   - 7/29 transitioned to warfarin with lovenox bridge.   - Now off lovenox, and fully anticoagulated on warfarin.   - Management as per IM.   - 8/9 INR 2.35. Continue 5mg daily. Recheck 8/12  - Outpatient follow-up with cardiology    Benign essential hypertension  Assessment & Plan  - Toprol, losartan  - Appreciate medicine management    Human immunodeficiency virus  (HIV) infection (HCC)  Assessment & Plan  - Continue anti-retroviral treatment  - Appreciate medicine management during [facility] course  - OP ID follow-up         Health Maintenance  #GI Prophylaxis: not indicated  #Code Status: Full code  #FEN: Cardiac diet + Ensure at bfast/dinner  #Dispo: Teams 8/6: ADD TBD. Still working on placement. He is meeting with a residential facility today, and CM and Rehab Director are working on evaluation with [facility]. Guardianship court date is 8/27.    Will need f/u with PMR, PCP, Vascular, Psych     Objective:    Functional Update: Intermittently refusing therapy.   PT: Sup transfers, Ind bed mobility, Ind wheelchair mobility  OT: Ind eating/grooming, Sup bathing, Ind UB dressing, Sup LB dressing, CGA toileting, CGA tub/shower transfers, Sup toilet transfers  SLP: modA comprehension, modA expression, Sup social interaction, modA problem solving, modA memory.     Allergies per EMR    Physical Exam:  Temp:  [98 °F (36.7 °C)-98.5 °F (36.9 °C)] 98 °F (36.7 °C)  HR:  [83-88] 87  Resp:  [17-20] 20  BP: (117-132)/(60-76) 121/60  Oxygen Therapy  SpO2: 96 %    Gen: No acute distress, Well-nourished, well-appearing.  HEENT: Moist mucus membranes, Normocephalic/Atraumatic  Cardiovascular: Regular, Distal pulses palpable  Heme/Extr: No edema  Pulmonary: Non-labored breathing.  : No fernandes  GI: Soft, non-tender, non-distended. BS+  MSK: L AKA   Integumentary: Skin is warm, dry. No rashes or ulcers.  Neuro: Awake, alert. Aphasic speech, but able to answer questions and socially interact. Still lack of insight, recall, judgment. Pleasant and cooperative.  Psych: Normal mood and affect.       Diagnostic Studies: Reviewed, no new imaging    Laboratory:  Reviewed   Results from last 7 days   Lab Units 08/08/24  0535   HEMOGLOBIN g/dL 10.1*   HEMATOCRIT % 34.2*   WBC Thousand/uL 6.58     Results from last 7 days   Lab Units 08/08/24  0535   BUN mg/dL 23   POTASSIUM mmol/L 3.7   CHLORIDE  mmol/L 106   CREATININE mg/dL 0.93     Results from last 7 days   Lab Units 08/09/24  0515 08/08/24  0535 08/07/24  0550   PROTIME seconds 25.9* 30.0* 33.2*   INR  2.38* 2.89* 3.30*        Patient Active Problem List   Diagnosis    Bipolar affective disorder (HCC)    Substance abuse (HCC)    Human immunodeficiency virus (HIV) infection (HCC)    History of stroke    Benign essential hypertension    Positive laboratory testing for human immunodeficiency virus (HCC)    Hypertension    Unspecified vitamin D deficiency    Tobacco abuse    Cerebrovascular accident (CVA) (HCC)    Left ventricular thrombus    History of seizures    Carnitine deficiency (HCC)    Above-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)    Traumatic brain injury (HCC)    Impaired mobility and activities of daily living    At risk for venous thromboembolism (VTE)    Acute pain    At risk for constipation    Urinary incontinence    Patient incapable of making informed decisions    Pressure injury of buttock, stage 3 (HCC)    Adjustment disorder with mixed anxiety and depressed mood    Neurocognitive disorder    Cardiomyopathy (HCC)    Episodic headache         Medications  Current Facility-Administered Medications   Medication Dose Route Frequency Provider Last Rate    acetaminophen  975 mg Oral TID PRN José Salcido MD      aspirin  81 mg Oral Daily Lorrie Barillas PA-C      atorvastatin  80 mg Oral Daily With Dinner Lorrie Barillas PA-C      bictegravir-emtricitab-tenofovir alafenamide  1 tablet Oral Daily With Breakfast Lorrie Barillas PA-C      bisacodyl  10 mg Rectal Daily PRN Lorrie Barillas PA-C      diphenhydrAMINE  25 mg Oral Q6H PRN Lorrie Barillas PA-C      divalproex sodium  1,250 mg Oral Daily Lorrie Barillas PA-C      dolutegravir  50 mg Oral Daily Lorrie Barillas PA-C      gabapentin  100 mg Oral Q8H Ashley Depadua, MD      lamoTRIgine  50 mg Oral BID Lorrie Barillas PA-C       levOCARNitine  1,000 mg/day Oral TID With Meals Lorrie Barillas PA-C      losartan  50 mg Oral Daily Lorrie Barillas PA-C      melatonin  6 mg Oral HS Lorrie Barillas PA-C      metoprolol succinate  25 mg Oral Daily CHARLIE Mcclelland      mirtazapine  15 mg Oral HS Lorrie Barillas PA-C      ondansetron  4 mg Oral Q6H PRN Lorrie Barillas PA-C      polyethylene glycol  17 g Oral Daily PRN Lorrie Barillas PA-C      senna  1 tablet Oral HS PRN Lorrie Barillas PA-C      sertraline  75 mg Oral Daily Lorrie Barillas PA-C      tamsulosin  0.4 mg Oral Daily With Dinner Lorrie Barillas PA-C      warfarin  5 mg Oral Daily (warfarin) CHARLIE Plata      zonisamide  400 mg Oral Daily Lorrie Barillas PA-C            ** Please Note: Fluency Direct voice to text software may have been used in the creation of this document. **

## 2024-08-09 NOTE — PROGRESS NOTES
[facility]  Progress Note  Name: Sunil Patel I  MRN: [id number]  Unit/Bed#: -01 I Date of Admission: 7/6/2024   Date of Service: 8/9/2024 I Hospital Day: 34    Assessment & Plan   * Above-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)  Assessment & Plan  Presented with left lower extremity acute limb ischemia/extensive left iliofemoral and left infrainguinal embolism requiring left femoral thrombectomy and subsequent AKA on 6/7/24 with vascular surgery.  Incision C/D/I, pain controlled.   Vascular surgery saw here last on 7/10 and removed staples  Continue ASA, Coumadin and statin   Rehab and pain control per PMR  Pt has met his rehab goals and will be discharged when able.    Episodic headache  Assessment & Plan  Pt reports a history of migraines.  Currently taking Depakote, gabapentin, zonegran and lamictal all of which can help in prevention.  It is associated with photophobia.  He is unable to take triptans due to a history of stroke.  Given Nurtec 7/21/24 - unclear if it was helpful    Cardiomyopathy (HCC)  Assessment & Plan  ECHO 6/10/2024 = LVEF 40-45% with mild global hypokinesis   Status post NM stress test on 6/11/2024 which showed: a large, mild, fixed defect in the inferior wall, possibly due to diaphragmatic attenuation artifact, there is a small area of partial reversibility in the inferior apical wall suggestive of ischemia    Evaluated ed by cardiology, etiology felt to be possibly secondary to stress-induced cardiomyopathy, with apical thrombus  Cardiac catheterization deferred given the lack of any significant ischemia, and no current cardiac symptoms  Continue with medical management with aspirin, statin, beta-blocker, ARB  Monitor volume status, remains euvolemic off of diuretics   Euvolemic on exam  Outpatient follow-up with cardiology    Traumatic brain injury (HCC)  Assessment & Plan  Remote history of TBI, previously residing in a group home  prior to nursing home sentence.  Evaluated by neuropsychiatry on 6/27/24 and deemed NOT to have medical decision-making capacity  Process for court appointed guardian started on 7/5/24 - CM following   Guardianship hearing 8/27/24.    Carnitine deficiency (HCC)  Assessment & Plan  Continue levocarnitine 330 mg 3x daily with meals.    History of seizures  Assessment & Plan  Diagnosed in July 2019, follows with [facility] neurology outpatient  Continue home regimen with Depakote ER, Lamotrigine and Zonegran    Left ventricular thrombus  Assessment & Plan  Noted on echocardiogram 6/10/2024: spherical 1.5 x 1.3 cm thrombus at the LV apex   Initiated on AC with Xarelto however unable to verify insurance coverage.  INR 2.38  Continue Coumadin at 5mg daily.  Repeat INR Monday.    Benign essential hypertension  Assessment & Plan  Home regimen: Losartan 50mg daily, Toprol XL 25 mg BID  Current regimen: Losartan 50 mg daily, Toprol-XL 25 mg daily  Stable      History of stroke  Assessment & Plan  History of left MCA CVA in May 2018 with residual expressive aphasia  Continue ASA and atorvastatin    Human immunodeficiency virus (HIV) infection (HCC)  Assessment & Plan  Continue Biktarvy and Tivicay.    Bipolar affective disorder (HCC)  Assessment & Plan  Continue home meds Zoloft and Remeron  Outpatient follow-up with Psychiatry             The above assessment and plan was reviewed and updated as determined by my evaluation of the patient on 8/9/2024.    Labs:   Results from last 7 days   Lab Units 08/08/24  0535   WBC Thousand/uL 6.58   HEMOGLOBIN g/dL 10.1*   HEMATOCRIT % 34.2*   PLATELETS Thousands/uL 360     Results from last 7 days   Lab Units 08/08/24  0535   SODIUM mmol/L 140   POTASSIUM mmol/L 3.7   CHLORIDE mmol/L 106   CO2 mmol/L 24   BUN mg/dL 23   CREATININE mg/dL 0.93   CALCIUM mg/dL 8.7         Results from last 7 days   Lab Units 08/09/24  0515 08/08/24  0535   INR  2.38* 2.89*           Imaging  No orders to display        Review of Scheduled Meds:  Current Facility-Administered Medications   Medication Dose Route Frequency Provider Last Rate    acetaminophen  975 mg Oral TID PRN José Salcido MD      aspirin  81 mg Oral Daily Lorire Barillas PA-C      atorvastatin  80 mg Oral Daily With Dinner Lorrie Barillas PA-C      bictegravir-emtricitab-tenofovir alafenamide  1 tablet Oral Daily With Breakfast Lorrie Barillas PA-C      bisacodyl  10 mg Rectal Daily PRN Lorrie Barillas PA-C      diphenhydrAMINE  25 mg Oral Q6H PRN Lorrie Barillas PA-C      divalproex sodium  1,250 mg Oral Daily Lorrie Barillas PA-C      dolutegravir  50 mg Oral Daily Lorrie Barillas PA-C      gabapentin  100 mg Oral Q8H Ashley Depadua, MD      lamoTRIgine  50 mg Oral BID Lorrie Barillas PA-C      levOCARNitine  1,000 mg/day Oral TID With Meals Lorrie Barillas PA-C      losartan  50 mg Oral Daily Lorrie Barillas PA-C      melatonin  6 mg Oral HS Lorrie Barillas PA-C      metoprolol succinate  25 mg Oral Daily CHARLIE Mcclelland      mirtazapine  15 mg Oral HS Lorriejacky Barillas PA-C      ondansetron  4 mg Oral Q6H PRN Lorrie Barillas PA-C      polyethylene glycol  17 g Oral Daily PRN Lorrie Barillas PA-C      senna  1 tablet Oral HS PRN Lorrie Barillas PA-C      sertraline  75 mg Oral Daily Lorrie Barillas PA-C      tamsulosin  0.4 mg Oral Daily With Dinner Lorrie Barillas PA-C      warfarin  5 mg Oral Daily (warfarin) CHARLIE Plata      zonisamide  400 mg Oral Daily Lrorie Barillas PA-C         Subjective/ HPI: Patient seen and examined. Patients overnight issues or events were reviewed with nursing staff. New or overnight issues include the following:     Pt seen in his room. He denies any current complaints.    ROS:   A 10 point ROS was performed; negative except as noted above.        *Labs /Radiology studies Reviewed  *Medications  reviewed and  reconciled as needed  *Please refer to order section for additional ordered labs studies      Physical Examination:  Vitals:   Vitals:    08/08/24 1300 08/08/24 2000 08/09/24 0515 08/09/24 0816   BP: 132/63 117/74 121/60 131/77   BP Location: Left arm Left arm Left arm Left arm   Pulse: 84 88 87 91   Resp: 18 17 20    Temp: 98.2 °F (36.8 °C) 98.5 °F (36.9 °C) 98 °F (36.7 °C)    TempSrc: Oral Oral Oral    SpO2: 95% 95% 96%    Weight:       Height:           General Appearance: NAD; frustrated with his current situation.  HEENT: PERRLA, conjuctiva normal; mucous membranes moist; face symmetrical  Neck:  Supple  Lungs: clear bilaterally, normal respiratory effort, no retractions, expiratory effort normal, on room air  CV: regular rate and rhythm, no murmurs rubs or gallops noted   ABD: soft non tender, +BS x4  EXT: Lt AKA  Skin: normal turgor, normal texture, no rash  Psych: affect normal, mood normal  Neuro: AAOx3       The above physical exam was reviewed and updated as determined by my evaluation of the patient on 8/9/2024.    Invasive Devices       Drain  Duration             External Urinary Catheter 8 days                       VTE Pharmacologic Prophylaxis: Warfarin (Coumadin)  Code Status: Level 1 - Full Code  Current Length of Stay: 34 day(s)    Total floor / unit time spent today 30 minutes  Coordination of patient's care was performed in conjunction with consulting services. Time invested included review of patient's labs, vitals, and management of their comorbidities with continued monitoring, examination of patient as well as answering patient questions, documenting her findings and creating progress note in electronic medical record,  ordering appropriate diagnostic testing.       ** Please Note:  voice to text software may have been used in the creation of this document. Although proof errors in transcription or interpretation are a potential of such software**

## 2024-08-09 NOTE — PROGRESS NOTES
08/09/24 1030   Pain Assessment   Pain Assessment Tool 0-10   Pain Score No Pain   Restrictions/Precautions   Precautions Aphasia;Bed/chair alarms;Cognitive;Fall Risk;Pain;Pressure Ulcer   LLE Weight Bearing Per Order NWB   Braces or Orthoses   (L AKA )   Subjective   Subjective Pt agreeable to perform skilled PT   Roll Left and Right   Type of Assistance Needed Independent   Roll Left and Right CARE Score 6   Sit to Lying   Type of Assistance Needed Independent   Sit to Lying CARE Score 6   Lying to Sitting on Side of Bed   Type of Assistance Needed Independent   Lying to Sitting on Side of Bed CARE Score 6   Sit to Stand   Type of Assistance Needed Supervision   Comment pt stands to urinate and  leaning on bed   Sit to Stand CARE Score 4   Bed-Chair Transfer   Type of Assistance Needed Supervision   Comment sit pviot   Chair/Bed-to-Chair Transfer CARE Score 4   Walk 10 Feet   Reason if not Attempted Safety concerns   Walk 10 Feet CARE Score 88   Walk 50 Feet with Two Turns   Reason if not Attempted Safety concerns   Walk 50 Feet with Two Turns CARE Score 88   Walk 150 Feet   Reason if not Attempted Safety concerns   Walk 150 Feet CARE Score 88   Ambulation   Does the patient walk? 0. No, and walking goal is not clinically indicated.   Wheel 50 Feet with Two Turns   Type of Assistance Needed Independent   Wheel 50 Feet with Two Turns CARE Score 6   Wheel 150 Feet   Type of Assistance Needed Independent   Wheel 150 Feet CARE Score 6   Wheelchair mobility   Does the patient use a wheelchair? 1. Yes   Type of Wheelchair Used 1. Manual   Method Right upper extremity;Left upper extremity   Curb or Single Stair   Reason if not Attempted Safety concerns   1 Step (Curb) CARE Score 88   4 Steps   Reason if not Attempted Safety concerns   4 Steps CARE Score 88   12 Steps   Reason if not Attempted Safety concerns   12 Steps CARE Score 88   Toilet Transfer   Type of Assistance Needed Supervision   Toilet Transfer  CARE Score 4   Therapeutic Interventions   Balance mat table  core strengthening   Assessment   Treatment Assessment pt focus on transfers and WC propl up 150 and more , pt also perfomr mat seated core strengthening and overall pt in improving thru-out his skilled PT session. Pt will cont POC to meet DC goals   Barriers to Discharge Decreased caregiver support   Plan   Progress Progressing toward goals   PT Therapy Minutes   PT Time In 1030   PT Time Out 1100   PT Total Time (minutes) 30   PT Mode of treatment - Individual (minutes) 30   PT Mode of treatment - Concurrent (minutes) 0   PT Mode of treatment - Group (minutes) 0   PT Mode of treatment - Co-treat (minutes) 0   PT Mode of Treatment - Total time(minutes) 30 minutes   PT Cumulative Minutes 2122   Therapy Time missed   Time missed? No

## 2024-08-09 NOTE — PROGRESS NOTES
08/09/24 1230   Pain Assessment   Pain Assessment Tool 0-10   Pain Score No Pain   Restrictions/Precautions   Precautions Aphasia;Bed/chair alarms;Cognitive;Fall Risk;Supervision on toilet/commode   LLE Weight Bearing Per Order NWB   ROM Restrictions No   Braces or Orthoses   (L AKA rasheeda)   Cognition   Overall Cognitive Status Impaired   Arousal/Participation Alert;Cooperative   Attention Attends with cues to redirect   Orientation Level Oriented X4   Memory Decreased short term memory;Decreased recall of recent events;Decreased recall of precautions   Following Commands Follows one step commands with increased time or repetition   Subjective   Subjective pt agreeable to todays treatment session   Sit to Stand   Type of Assistance Needed Supervision   Physical Assistance Level No physical assistance   Sit to Stand CARE Score 4   Bed-Chair Transfer   Type of Assistance Needed Supervision   Physical Assistance Level No physical assistance   Comment modified sit/squat pivot transfer   Chair/Bed-to-Chair Transfer CARE Score 4   Car Transfer   Reason if not Attempted Refused to perform   Car Transfer CARE Score 7   Wheel 50 Feet with Two Turns   Type of Assistance Needed Independent   Physical Assistance Level No physical assistance   Wheel 50 Feet with Two Turns CARE Score 6   Wheel 150 Feet   Type of Assistance Needed Independent   Physical Assistance Level No physical assistance   Comment 301ft with no rest break, able to navigate with environmental distractions   Wheel 150 Feet CARE Score 6   Wheelchair mobility   Findings pt declined w/c level ramp training   Therapeutic Interventions   Strengthening LE strengthening with rest breaks throughout: 3x10 hip marches, 3x10+ 5sec hold LAQ, 3x10+ 5sec hold hip adduction with pillow squeeze, 3x10 hip abduction with green theraband, 3x20 chair push ups.   Equipment Use   NuStep lvl 5, 10 min, SPM>70   Other Comments   Comments attempted to use urinal during session,  "unable to go   Assessment   Treatment Assessment pt engaged in 70 min of skilled PT with focus on LE strengthening, w/c mobility, and endurance. pt demos ability to propel w/c for longer distances and was able to navigate busy environment. however, required redirection after instruction with directions. pt was initially agreeable to PT, although, stated \"im done\" when asked to perform more exercises. PT provided choices to pt on activities and continued to refuse. next tx focus on LE strengthening, balance, and increasing independence in functional mobility while awaiting guardianship/ placement.   Problem List Decreased strength;Decreased range of motion;Decreased endurance;Impaired balance;Decreased mobility;Decreased coordination;Decreased cognition;Impaired judgement;Decreased safety awareness;Pain   Barriers to Discharge Decreased caregiver support   PT Barriers   Physical Impairment Decreased strength;Decreased range of motion;Decreased endurance;Impaired balance;Decreased mobility;Decreased coordination;Decreased cognition;Impaired judgement;Decreased safety awareness;Decreased skin integrity;Orthopedic restrictions   Functional Limitation Car transfers;Ramp negotiation;Standing;Transfers;Wheelchair management   Plan   Treatment/Interventions Functional transfer training;LE strengthening/ROM;Therapeutic exercise;Endurance training;Equipment eval/education;Patient/family training;Bed mobility;Gait training   Progress Progressing toward goals   PT Therapy Minutes   PT Time In 1230   PT Time Out 1340   PT Total Time (minutes) 70   PT Mode of treatment - Individual (minutes) 70   PT Mode of treatment - Concurrent (minutes) 0   PT Mode of treatment - Group (minutes) 0   PT Mode of treatment - Co-treat (minutes) 0   PT Mode of Treatment - Total time(minutes) 70 minutes   PT Cumulative Minutes 2192   Therapy Time missed   Time missed? Yes   Amount of time missed 20   Reason for time missed   (refusal)   Time(s) " multiple attempts made 2

## 2024-08-09 NOTE — ASSESSMENT & PLAN NOTE
Remote history of TBI, previously residing in a group home prior to senior living sentence.  Evaluated by neuropsychiatry on 6/27/24 and deemed NOT to have medical decision-making capacity  Process for court appointed guardian started on 7/5/24 - CM following   Guardianship hearing 8/27/24.

## 2024-08-09 NOTE — PROGRESS NOTES
"   08/09/24 0900   Pain Assessment   Pain Assessment Tool 0-10   Restrictions/Precautions   Precautions Aspiration;Bed/chair alarms;Cognitive;Fall Risk;Supervision on toilet/commode   LLE Weight Bearing Per Order NWB   Braces or Orthoses Other (Comment)  (LLE )   Lifestyle   Autonomy \"Thank you ma'am.\"   Oral Hygiene   Type of Assistance Needed Independent   Physical Assistance Level No physical assistance   Comment seated   Oral Hygiene CARE Score 6   Shower/Bathe Self   Type of Assistance Needed Supervision;Verbal cues;Set-up / clean-up   Physical Assistance Level No physical assistance   Comment pt bathed LB at bed level, alternating logrolling to bathe buttocks. Seated EOB to bathe UB.   Shower/Bathe Self CARE Score 4   Tub/Shower Transfer   Reason Not Assessed Patient refusal;Sponge Bath  (Pt declined shower, agreeable to sponge bath)   Upper Body Dressing   Type of Assistance Needed Set-up / clean-up   Physical Assistance Level No physical assistance   Comment seated   Upper Body Dressing CARE Score 5   Lower Body Dressing   Type of Assistance Needed Physical assistance   Physical Assistance Level 25% or less   Comment Partial A to fully don LLE . Pt able to don pull-up brief and pants at bed level w/ inc time.   Lower Body Dressing CARE Score 3   Putting On/Taking Off Footwear   Type of Assistance Needed Supervision   Physical Assistance Level No physical assistance   Comment seated EOB to don/doff sock and shoe.   Putting On/Taking Off Footwear CARE Score 4   Lying to Sitting on Side of Bed   Type of Assistance Needed Independent   Lying to Sitting on Side of Bed CARE Score 6   Bed-Chair Transfer   Type of Assistance Needed Supervision   Physical Assistance Level No physical assistance   Comment sit pivot   Chair/Bed-to-Chair Transfer CARE Score 4   Cognition   Overall Cognitive Status Impaired   Assessment   Treatment Assessment Pt seen for skilled OT session focusing on self-care " management. Details on sponge bath noted above, completed at overall set-up and supervision w/ partial A to fully don LLE shriker. Pt questioning if a d/c meeting is occurring today, CM plans to call pt today and pt made aware of same. Cont OT POC: endurance work, core strengthening, BUE strengthening, donning , repetitive safety training, w/c management/mobility training. Pt agreeable to rest in recliner, all needs within reach and alarm activated.   Prognosis Good   Problem List Decreased strength;Decreased range of motion;Decreased endurance;Impaired balance;Decreased mobility;Decreased coordination;Decreased cognition;Impaired judgement;Decreased safety awareness;Orthopedic restrictions;Decreased skin integrity;Pain   Plan   Treatment/Interventions Functional transfer training;Endurance training;Patient/family training;ADL retraining   Discharge Recommendation   Rehab Resource Intensity Level, OT   (pending)   OT Therapy Minutes   OT Time In 0900   OT Time Out 1020   OT Total Time (minutes) 80   OT Mode of treatment - Individual (minutes) 80   OT Mode of treatment - Concurrent (minutes) 0   OT Mode of treatment - Group (minutes) 0   OT Mode of treatment - Co-treat (minutes) 0   OT Mode of Treatment - Total time(minutes) 80 minutes   OT Cumulative Minutes 2051

## 2024-08-09 NOTE — ASSESSMENT & PLAN NOTE
Pt reports a history of migraines.  Currently taking Depakote, gabapentin, zonegran and lamictal all of which can help in prevention.  It is associated with photophobia.  He is unable to take triptans due to a history of stroke.  [facility] - unclear if it was helpful

## 2024-08-09 NOTE — ASSESSMENT & PLAN NOTE
Home regimen: Losartan 50mg daily, Toprol XL 25 mg BID  Current regimen: Losartan 50 mg daily, Toprol-XL 25 mg daily  Stable     no

## 2024-08-09 NOTE — ASSESSMENT & PLAN NOTE
Noted on echocardiogram 6/10/2024: spherical 1.5 x 1.3 cm thrombus at the LV apex   Initiated on AC with Xarelto however unable to verify insurance coverage.  INR 2.38  Continue Coumadin at 5mg daily.  Repeat INR Monday.

## 2024-08-09 NOTE — CASE MANAGEMENT
CM checked in with pt via phone and reviewed the dispo process this cm, cm explained that unfortunately Rick from [facility] was not able to meet virtually yesterday and cm will let him know once we reschedule the meeting. Pt verbalized understanding.    CM and admin continue to work on dispo plan, [employer] and [place] did not come this week to observe pt.

## 2024-08-10 PROCEDURE — 97530 THERAPEUTIC ACTIVITIES: CPT | Performed by: PHYSICAL THERAPIST

## 2024-08-10 PROCEDURE — 97110 THERAPEUTIC EXERCISES: CPT | Performed by: PHYSICAL THERAPIST

## 2024-08-10 PROCEDURE — 97535 SELF CARE MNGMENT TRAINING: CPT

## 2024-08-10 PROCEDURE — 99232 SBSQ HOSP IP/OBS MODERATE 35: CPT | Performed by: NURSE PRACTITIONER

## 2024-08-10 PROCEDURE — 97112 NEUROMUSCULAR REEDUCATION: CPT | Performed by: PHYSICAL THERAPIST

## 2024-08-10 RX ADMIN — LEVOCARNITINE 330 MG: 1 SOLUTION ORAL at 17:10

## 2024-08-10 RX ADMIN — MIRTAZAPINE 15 MG: 15 TABLET, FILM COATED ORAL at 21:01

## 2024-08-10 RX ADMIN — LEVOCARNITINE 330 MG: 1 SOLUTION ORAL at 08:41

## 2024-08-10 RX ADMIN — ATORVASTATIN CALCIUM 80 MG: 80 TABLET, FILM COATED ORAL at 17:09

## 2024-08-10 RX ADMIN — TAMSULOSIN HYDROCHLORIDE 0.4 MG: 0.4 CAPSULE ORAL at 17:09

## 2024-08-10 RX ADMIN — DOLUTEGRAVIR SODIUM 50 MG: 50 TABLET, FILM COATED ORAL at 08:40

## 2024-08-10 RX ADMIN — ASPIRIN 81 MG: 81 TABLET, COATED ORAL at 08:41

## 2024-08-10 RX ADMIN — LAMOTRIGINE 50 MG: 25 TABLET ORAL at 08:41

## 2024-08-10 RX ADMIN — LOSARTAN POTASSIUM 50 MG: 50 TABLET, FILM COATED ORAL at 08:41

## 2024-08-10 RX ADMIN — Medication 6 MG: at 21:01

## 2024-08-10 RX ADMIN — GABAPENTIN 100 MG: 100 CAPSULE ORAL at 05:54

## 2024-08-10 RX ADMIN — ACETAMINOPHEN 975 MG: 325 TABLET, FILM COATED ORAL at 05:54

## 2024-08-10 RX ADMIN — DIVALPROEX SODIUM 1250 MG: 500 TABLET, EXTENDED RELEASE ORAL at 08:42

## 2024-08-10 RX ADMIN — GABAPENTIN 100 MG: 100 CAPSULE ORAL at 13:22

## 2024-08-10 RX ADMIN — LEVOCARNITINE 330 MG: 1 SOLUTION ORAL at 13:22

## 2024-08-10 RX ADMIN — LAMOTRIGINE 50 MG: 25 TABLET ORAL at 17:09

## 2024-08-10 RX ADMIN — GABAPENTIN 100 MG: 100 CAPSULE ORAL at 21:01

## 2024-08-10 RX ADMIN — WARFARIN SODIUM 5 MG: 5 TABLET ORAL at 17:10

## 2024-08-10 RX ADMIN — METOPROLOL SUCCINATE 25 MG: 25 TABLET, EXTENDED RELEASE ORAL at 08:41

## 2024-08-10 RX ADMIN — ZONISAMIDE 400 MG: 100 CAPSULE ORAL at 08:41

## 2024-08-10 RX ADMIN — BICTEGRAVIR SODIUM, EMTRICITABINE, AND TENOFOVIR ALAFENAMIDE FUMARATE 1 TABLET: 50; 200; 25 TABLET ORAL at 08:40

## 2024-08-10 RX ADMIN — SERTRALINE HYDROCHLORIDE 75 MG: 50 TABLET ORAL at 08:42

## 2024-08-10 NOTE — PLAN OF CARE
Problem: PAIN - ADULT  Goal: Verbalizes/displays adequate comfort level or baseline comfort level  Description: Interventions:  - Encourage patient to monitor pain and request assistance  - Assess pain using appropriate pain scale  - Administer analgesics based on type and severity of pain and evaluate response  - Implement non-pharmacological measures as appropriate and evaluate response  - Consider cultural and social influences on pain and pain management  - Notify physician/advanced practitioner if interventions unsuccessful or patient reports new pain  Outcome: Progressing     Problem: INFECTION - ADULT  Goal: Absence or prevention of progression during hospitalization  Description: INTERVENTIONS:  - Assess and monitor for signs and symptoms of infection  - Monitor lab/diagnostic results  - Monitor all insertion sites, i.e. indwelling lines, tubes, and drains  - Monitor endotracheal if appropriate and nasal secretions for changes in amount and color  - [place] appropriate cooling/warming therapies per order  - Administer medications as ordered  - Instruct and encourage patient and family to use good hand hygiene technique  - Identify and instruct in appropriate isolation precautions for identified infection/condition  Outcome: Progressing  Goal: Absence of fever/infection during neutropenic period  Description: INTERVENTIONS:  - Monitor WBC    Outcome: Progressing     Problem: SAFETY ADULT  Goal: Patient will remain free of falls  Description: INTERVENTIONS:  - Educate patient/family on patient safety including physical limitations  - Instruct patient to call for assistance with activity   - Consult OT/PT to assist with strengthening/mobility   - Keep Call bell within reach  - Keep bed low and locked with side rails adjusted as appropriate  - Keep care items and personal belongings within reach  - Initiate and maintain comfort rounds  - Make Fall Risk Sign visible to staff  - Offer Toileting every 2 Hours,  in advance of need  - Initiate/Maintain alarm  - Obtain necessary fall risk management equipment:   - Apply yellow socks and bracelet for high fall risk patients  - Consider moving patient to room near nurses station  Outcome: Progressing  Goal: Maintain or return to baseline ADL function  Description: INTERVENTIONS:  -  Assess patient's ability to carry out ADLs; assess patient's baseline for ADL function and identify physical deficits which impact ability to perform ADLs (bathing, care of mouth/teeth, toileting, grooming, dressing, etc.)  - Assess/evaluate cause of self-care deficits   - Assess range of motion  - Assess patient's mobility; develop plan if impaired  - Assess patient's need for assistive devices and provide as appropriate  - Encourage maximum independence but intervene and supervise when necessary  - Involve family in performance of ADLs  - Assess for home care needs following discharge   - Consider OT consult to assist with ADL evaluation and planning for discharge  - Provide patient education as appropriate  Outcome: Progressing  Goal: Maintains/Returns to pre admission functional level  Description: INTERVENTIONS:  - Perform AM-PAC 6 Click Basic Mobility/ Daily Activity assessment daily.  - Set and communicate daily mobility goal to care team and patient/family/caregiver.   - Collaborate with rehabilitation services on mobility goals if consulted  - Perform Range of Motion 3 times a day.  - Reposition patient every 2 hours.  - Dangle patient 3 times a day  - Stand patient 3 times a day  - Ambulate patient 3 times a day  - Out of bed to chair 3 times a day   - Out of bed for meals 3 times a day  - Out of bed for toileting  - Record patient progress and toleration of activity level   Outcome: Progressing     Problem: DISCHARGE PLANNING  Goal: Discharge to home or other facility with appropriate resources  Description: INTERVENTIONS:  - Identify barriers to discharge w/patient and caregiver  -  Arrange for needed discharge resources and transportation as appropriate  - Identify discharge learning needs (meds, wound care, etc.)  - Arrange for interpretive services to assist at discharge as needed  - Refer to Case Management Department for coordinating discharge planning if the patient needs post-hospital services based on physician/advanced practitioner order or complex needs related to functional status, cognitive ability, or social support system  Outcome: Progressing     Problem: Prexisting or High Potential for Compromised Skin Integrity  Goal: Skin integrity is maintained or improved  Description: INTERVENTIONS:  - Identify patients at risk for skin breakdown  - Assess and monitor skin integrity  - Assess and monitor nutrition and hydration status  - Monitor labs   - Assess for incontinence   - Turn and reposition patient  - Assist with mobility/ambulation  - Relieve pressure over bony prominences  - Avoid friction and shearing  - Provide appropriate hygiene as needed including keeping skin clean and dry  - Evaluate need for skin moisturizer/barrier cream  - Collaborate with interdisciplinary team   - Patient/family teaching  - Consider wound care consult   Outcome: Progressing

## 2024-08-10 NOTE — PROGRESS NOTES
[facility]  Progress Note  Name: Sunil Patel I  MRN: [id number]  Unit/Bed#: -01 I Date of Admission: 7/6/2024   Date of Service: 8/10/2024 I Hospital Day: 35    Assessment & Plan   * Above-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)  Assessment & Plan  Presented with left lower extremity acute limb ischemia/extensive left iliofemoral and left infrainguinal embolism requiring left femoral thrombectomy and subsequent AKA on 6/7/24 with vascular surgery.  Incision C/D/I, pain controlled.   Vascular surgery saw here last on 7/10 and removed staples  Continue ASA, Coumadin and statin   Rehab and pain control per PMR  Pt has met his rehab goals and will be discharged when able.    Traumatic brain injury (HCC)  Assessment & Plan  Remote history of TBI, previously residing in a group home prior to senior living sentence.  Evaluated by neuropsychiatry on 6/27/24 and deemed NOT to have medical decision-making capacity  Process for court appointed guardian started on 7/5/24 - CM following   Guardianship hearing 8/27/24.    Cardiomyopathy (HCC)  Assessment & Plan  ECHO 6/10/2024 = LVEF 40-45% with mild global hypokinesis   Status post NM stress test on 6/11/2024 which showed: a large, mild, fixed defect in the inferior wall, possibly due to diaphragmatic attenuation artifact, there is a small area of partial reversibility in the inferior apical wall suggestive of ischemia    Evaluated by cardiology.  Etiology felt to be possibly secondary to stress-induced cardiomyopathy, with apical thrombus  Cardiac catheterization deferred given the lack of any significant ischemia, and no current cardiac symptoms  Continue with medical management with aspirin, statin, beta-blocker, ARB  Monitor volume status, remains euvolemic off of diuretics   Remains euvolemic on exam  Outpatient follow-up with cardiology    Carnitine deficiency (HCC)  Assessment & Plan  Continue levocarnitine 330 mg 3x daily  with meals.    History of seizures  Assessment & Plan  Diagnosed in July 2019, follows with [facility] neurology outpatient  Continue home regimen with Depakote ER, Lamotrigine and Zonegran  Stable w/o any recent events    Left ventricular thrombus  Assessment & Plan  Noted on echocardiogram 6/10/2024: spherical 1.5 x 1.3 cm thrombus at the LV apex   Initiated on AC with Xarelto however unable to verify insurance coverage.  Labs reviewed by me; INR 2.38 on 8/9/24  Continue Coumadin at 5mg daily.  Repeat INR Monday 8/12/24.    Benign essential hypertension  Assessment & Plan  Home regimen: Losartan 50mg daily, Toprol XL 25 mg BID  Current regimen: Losartan 50 mg daily, Toprol-XL 25 mg daily  Stable and will not make any changes today 8/10/24      History of stroke  Assessment & Plan  History of left MCA CVA in May 2018 with residual expressive aphasia  Continue ASA and atorvastatin    Human immunodeficiency virus (HIV) infection (HCC)  Assessment & Plan  Continue Biktarvy and Tivicay.    Bipolar affective disorder (HCC)  Assessment & Plan  Continue home meds Zoloft and Remeron  Outpatient follow-up with Psychiatry  Currently stable             The above assessment and plan was reviewed and updated as determined by my evaluation of the patient on 8/10/2024.    Labs:   Results from last 7 days   Lab Units 08/08/24  0535   WBC Thousand/uL 6.58   HEMOGLOBIN g/dL 10.1*   HEMATOCRIT % 34.2*   PLATELETS Thousands/uL 360     Results from last 7 days   Lab Units 08/08/24  0535   SODIUM mmol/L 140   POTASSIUM mmol/L 3.7   CHLORIDE mmol/L 106   CO2 mmol/L 24   BUN mg/dL 23   CREATININE mg/dL 0.93   CALCIUM mg/dL 8.7         Results from last 7 days   Lab Units 08/09/24  0515 08/08/24  0535   INR  2.38* 2.89*           Imaging  No orders to display       Review of Scheduled Meds:  Current Facility-Administered Medications   Medication Dose Route Frequency Provider Last Rate    acetaminophen  975 mg Oral TID PRN José Salcido MD       aspirin  81 mg Oral Daily Lorrie Barillas PA-C      atorvastatin  80 mg Oral Daily With Dinner Lorrie Barillas PA-C      bictegravir-emtricitab-tenofovir alafenamide  1 tablet Oral Daily With Breakfast Lorrie Barillas PA-C      bisacodyl  10 mg Rectal Daily PRN Lorrie Barillas PA-C      diphenhydrAMINE  25 mg Oral Q6H PRN Lorrie Barillas PA-C      divalproex sodium  1,250 mg Oral Daily Lorrie Barillas PA-C      dolutegravir  50 mg Oral Daily Lorrie Barillas PA-C      gabapentin  100 mg Oral Q8H Ashley Depadua, MD      lamoTRIgine  50 mg Oral BID Lorrie Barillas PA-C      levOCARNitine  1,000 mg/day Oral TID With Meals Lorrie Barillas PA-C      losartan  50 mg Oral Daily Lorrie Barillas PA-C      melatonin  6 mg Oral HS Lorrie Barillas PA-C      metoprolol succinate  25 mg Oral Daily CHARLIE Mcclelland      mirtazapine  15 mg Oral HS Lorrie Barillas PA-C      ondansetron  4 mg Oral Q6H PRN Lorrie Barillas PA-C      polyethylene glycol  17 g Oral Daily PRN Lorrie Barillas PA-C      senna  1 tablet Oral HS PRN Lorrie Barillas PA-C      sertraline  75 mg Oral Daily Lorrie Barillas PA-C      tamsulosin  0.4 mg Oral Daily With Dinner Lorrie Barillas PA-C      warfarin  5 mg Oral Daily (warfarin) CHARLIE Plata      zonisamide  400 mg Oral Daily Lorrie Barillas PA-C         Subjective/ HPI: Patient seen and examined. Patients overnight issues or events were reviewed with nursing staff. New or overnight issues include the following:     No new or overnight issues.  Offers no complaints    ROS:   A 10 point ROS was performed; negative except as noted above.        *Labs /Radiology studies Reviewed  *Medications  reviewed and reconciled as needed  *Please refer to order section for additional ordered labs studies      Physical Examination:  Vitals:   Vitals:    08/09/24 0816 08/09/24 1414 08/09/24 2100 08/10/24 0546    BP: 131/77 143/69 126/61 124/66   BP Location: Left arm Left arm Left arm Left arm   Pulse: 91 93 83 87   Resp:  17 18 18   Temp:  97.9 °F (36.6 °C) 97.7 °F (36.5 °C) 97.8 °F (36.6 °C)   TempSrc:  Oral Oral Oral   SpO2:  97% 94% 94%   Weight:       Height:           General Appearance: no distress, non toxic appearing  HEENT: PERRLA, conjuctiva normal; oropharynx clear; mucous membranes moist   Neck:  Supple, normal ROM  Lungs: CTA, normal respiratory effort, no retractions, expiratory effort normal.  On RA  CV: regular rate and rhythm; no rubs/murmurs/gallops, PMI normal   ABD: soft; ND/NT; +BS  EXT: no edema  Skin: normal turgor, normal texture  Psych: affect normal, mood normal  Neuro: AA           The above physical exam was reviewed and updated as determined by my evaluation of the patient on 8/10/2024.    Invasive Devices       Drain  Duration             External Urinary Catheter 9 days                       VTE Pharmacologic Prophylaxis: Warfarin (Coumadin)  Code Status: Level 1 - Full Code  Current Length of Stay: 35 day(s)    Total floor / unit time spent today 30 minutes  Coordination of patient's care was performed in conjunction with consulting services. Time invested included review of patient's labs, vitals, and management of their comorbidities with continued monitoring, examination of patient as well as answering patient questions, documenting her findings and creating progress note in electronic medical record,  ordering appropriate diagnostic testing.       ** Please Note:  voice to text software may have been used in the creation of this document. Although proof errors in transcription or interpretation are a potential of such software**

## 2024-08-10 NOTE — ASSESSMENT & PLAN NOTE
Noted on echocardiogram 6/10/2024: spherical 1.5 x 1.3 cm thrombus at the LV apex   Initiated on AC with Xarelto however unable to verify insurance coverage.  Labs reviewed by me; INR 2.38 on 8/9/24  Continue Coumadin at 5mg daily.  Repeat INR Monday 8/12/24.

## 2024-08-10 NOTE — PROGRESS NOTES
08/10/24 1400   Pain Assessment   Pain Assessment Tool 0-10   Pain Score No Pain   Restrictions/Precautions   Precautions Aphasia;Bed/chair alarms;Cognitive;Fall Risk;Supervision on toilet/commode   Weight Bearing Restrictions Yes   LLE Weight Bearing Per Order NWB   ROM Restrictions No   Braces or Orthoses Other (Comment)  (L AKA rasheeda)   Cognition   Overall Cognitive Status Impaired   Arousal/Participation Alert;Cooperative   Attention Attends with cues to redirect   Orientation Level Oriented X4   Memory Decreased short term memory;Decreased recall of recent events;Decreased recall of precautions   Following Commands Follows one step commands with increased time or repetition   Subjective   Subjective pt agreeable to PT   Sit to Stand   Type of Assistance Needed Supervision   Physical Assistance Level No physical assistance   Comment completed multiple times during session.  good hand placment on hand rails, grab bars, // bars   Sit to Stand CARE Score 4   Bed-Chair Transfer   Type of Assistance Needed Supervision   Physical Assistance Level No physical assistance   Comment sit pivot   Chair/Bed-to-Chair Transfer CARE Score 4   Walk 10 Feet   Reason if not Attempted Safety concerns   Walk 10 Feet CARE Score 88   Walk 50 Feet with Two Turns   Reason if not Attempted Safety concerns   Walk 50 Feet with Two Turns CARE Score 88   Walk 150 Feet   Reason if not Attempted Safety concerns   Walk 150 Feet CARE Score 88   Walking 10 Feet on Uneven Surfaces   Reason if not Attempted Safety concerns   Walking 10 Feet on Uneven Surfaces CARE Score 88   Ambulation   Primary Mode of Locomotion Prior to Admission Walk   Findings hops in // bars 4 laps   Does the patient walk? 0. No, and walking goal is not clinically indicated.   Wheel 50 Feet with Two Turns   Type of Assistance Needed Independent   Physical Assistance Level No physical assistance   Wheel 50 Feet with Two Turns CARE Score 6   Wheel 150 Feet   Type of  "Assistance Needed Independent   Physical Assistance Level No physical assistance   Wheel 150 Feet CARE Score 6   Wheelchair mobility   Does the patient use a wheelchair? 1. Yes   Type of Wheelchair Used 1. Manual   Method Right upper extremity;Left upper extremity;Right lower extremity   Assistance Provided For Remove armrests;Replace armrests   Distance Level Surface (feet) 350 ft   Findings pt able to wheel self from room to elevators, elevators to 9th floor PT gym and back   Curb or Single Stair   Reason if not Attempted Safety concerns   1 Step (Curb) CARE Score 88   4 Steps   Reason if not Attempted Safety concerns   4 Steps CARE Score 88   12 Steps   Reason if not Attempted Safety concerns   12 Steps CARE Score 88   Toilet Transfer   Type of Assistance Needed Supervision   Physical Assistance Level No physical assistance   Comment transfer performed with S.  pt did require increased A for LB dressing. x1 occurance of having one arm on commode with tipping required increased A for safety.   Toilet Transfer CARE Score 4   Toilet Transfer   Positioning Concerns Cognition;Safety   Propulsion 1   Propulsion Type 1 Manual   Level 1 Level tile   Method 1 Right upper extremity;Left upper extremity   Level of Assistance 1 Distant supervision   Therapeutic Interventions   Strengthening LAQ #3 2x10, mat table SLR BL 2x10, SL hip abd LLE 2x10, prone hip extension LLE 2x10, SL bridge RLE 2x10, seated bicep curls #5 2x10 BL, seated Tricep extension RTB 2x10, RLE HS curls pulling WC 30ft x2   Flexibility prone lying for LLE extension stretch 2', RLE calf stretch with leg  30\"x3   Neuromuscular Re-Education seated ball toss 2x20 on mat table, 2x20 on large blue tej disc   Other scooting on mat table from supine to foot of mat table.   Equipment Use   NuStep Lv3 10 minutes RLE and BUE   Parallel Bars STS 2x10, hopping fwd and retro 2 sets of 4 laps   Assessment   Treatment Assessment pt participated in 90 minutes of " "skilled PT focus on WC mobility, LE and UE strengthening, transfer training and seated core balance.  pt completed all exercises with good form and tolerance.  pt continues to demonstrate S level ability to transfer, however does demonstrate periods of decreased safety awareness such as post toileting with one hand on commode.  pt tolerated ex in // bars well with good tolerance however did note increased fatigue following. post session, pt returned to room stating \"good day\". pt was returned to bed side chair with chair alarm activated and all necessities within reach.   Problem List Decreased strength;Decreased endurance;Impaired balance;Decreased mobility;Decreased cognition;Impaired judgement;Decreased safety awareness;Orthopedic restrictions;Pain;Decreased skin integrity   Barriers to Discharge Decreased caregiver support   PT Barriers   Physical Impairment Decreased strength;Decreased range of motion;Decreased endurance;Impaired balance;Decreased mobility;Decreased coordination;Decreased cognition;Impaired judgement;Decreased safety awareness;Decreased skin integrity;Orthopedic restrictions   Functional Limitation Car transfers;Ramp negotiation;Standing;Transfers;Wheelchair management   Plan   Treatment/Interventions ADL retraining;LE strengthening/ROM;Functional transfer training;Elevations;Therapeutic exercise;Endurance training;Bed mobility;Gait training;Spoke to MD   Progress Progressing toward goals   PT Therapy Minutes   PT Time In 1400   PT Time Out 1530   PT Total Time (minutes) 90   PT Mode of treatment - Individual (minutes) 90   PT Mode of treatment - Concurrent (minutes) 0   PT Mode of treatment - Group (minutes) 0   PT Mode of treatment - Co-treat (minutes) 0   PT Mode of Treatment - Total time(minutes) 90 minutes   PT Cumulative Minutes 2282   Therapy Time missed   Time missed? No     "

## 2024-08-10 NOTE — ASSESSMENT & PLAN NOTE
Home regimen: Losartan 50mg daily, Toprol XL 25 mg BID  Current regimen: Losartan 50 mg daily, Toprol-XL 25 mg daily  Stable and will not make any changes today 8/10/24

## 2024-08-10 NOTE — PROGRESS NOTES
"   08/10/24 1035   Pain Assessment   Pain Assessment Tool 0-10   Pain Score No Pain   Restrictions/Precautions   Precautions Aphasia;Bed/chair alarms;Cognitive;Fall Risk;Supervision on toilet/commode   Weight Bearing Restrictions Yes   LLE Weight Bearing Per Order NWB   Braces or Orthoses Other (Comment)  (L AKA )   Lifestyle   Autonomy \"I know what I'm doing honey\".   Eating   Type of Assistance Needed Independent   Physical Assistance Level No physical assistance   Comment seated in recliner for lunch at end of session   Eating CARE Score 6   Eating Assessment   QI: Swallowing/Nutritional Status None of the above   Oral Hygiene   Type of Assistance Needed Independent   Physical Assistance Level No physical assistance   Comment seated in WC   Oral Hygiene CARE Score 6   Grooming   Findings Supervision seated in WC for hand hygiene, seated on tub bench to wash face/hair   Shower/Bathe Self   Type of Assistance Needed Supervision   Physical Assistance Level No physical assistance   Comment seated on tub bench to bathe full body, VC to complete seated lateral weight shift to bathe buttocks w/ pt stating \"I know what I'm doing honey\". Xleg to bathe RLE with inc time   Shower/Bathe Self CARE Score 4   Bathing   Assessed Bath Style Shower   Able to Gather/Transport No   Able to Adjust Water Temperature Yes   Able to Wash/Rinse/Dry (body part) Left Arm;Right Arm;L Upper Leg;R Upper Leg;R Lower Leg/Foot;Chest;Abdomen;Perineal Area;Buttocks   Limitations Noted in Balance;Endurance;ROM;Strength;Timeliness   Positioning Seated   Adaptive Equipment Tub Bench;Shower Bars;Hand Held Shower   Tub/Shower Transfer   Limitations Noted In Endurance;Safety;LE Strength   Adaptive Equipment Grab Bars;Transfer Bench   Assessed Shower   Findings S sit pivot WC to tub bench   Upper Body Dressing   Type of Assistance Needed Set-up / clean-up   Physical Assistance Level No physical assistance   Comment seated EOB   Upper Body Dressing " CARE Score 5   Lower Body Dressing   Type of Assistance Needed Physical assistance   Physical Assistance Level 25% or less   Comment continued A to fully don L RL , pt able to manage underwear/pants up over hips bed level w/ rolling, inc time warranted   Lower Body Dressing CARE Score 3   Putting On/Taking Off Footwear   Type of Assistance Needed Supervision   Physical Assistance Level No physical assistance   Comment seated EOB to don R sock/sneaker with inc time and cross leg method   Putting On/Taking Off Footwear CARE Score 4   Roll Left and Right   Type of Assistance Needed Independent   Physical Assistance Level No physical assistance   Roll Left and Right CARE Score 6   Sit to Lying   Type of Assistance Needed Independent   Physical Assistance Level No physical assistance   Sit to Lying CARE Score 6   Lying to Sitting on Side of Bed   Type of Assistance Needed Independent   Physical Assistance Level No physical assistance   Lying to Sitting on Side of Bed CARE Score 6   Sit to Stand   Type of Assistance Needed Supervision   Physical Assistance Level No physical assistance   Comment briefly in stance to manage clothing fully up over hips for dressing and toileting, unilateral hold on arm rest of chair and on grab bar next to toilet   Sit to Stand CARE Score 4   Bed-Chair Transfer   Type of Assistance Needed Supervision   Physical Assistance Level No physical assistance   Comment sit pivot   Chair/Bed-to-Chair Transfer CARE Score 4   Transfer Bed/Chair/Wheelchair   Limitations Noted In Balance;Endurance;UE Strength;LE Strength;Other  (safety)   Toileting Hygiene   Type of Assistance Needed Supervision   Physical Assistance Level No physical assistance   Comment seated on platform BSC over toilet for rear hygiene, inc time, pt wearing hospital gown due to going to shower after and able to hold up w/ unilateral release and other hand on grab bar   Toileting Hygiene CARE Score 4   Toilet Transfer   Type of  Assistance Needed Supervision   Physical Assistance Level No physical assistance   Comment sit pivot WC<>BSC   Toilet Transfer CARE Score 4   Toilet Transfer   Surface Assessed Platform Commode   Transfer Technique Sit Pivot   Positioning Concerns Safety;Cognition   Cognition   Overall Cognitive Status Impaired   Arousal/Participation Alert;Cooperative   Attention Attends with cues to redirect   Orientation Level Oriented X4   Memory Decreased short term memory;Decreased recall of recent events;Decreased recall of precautions   Following Commands Follows one step commands with increased time or repetition   Additional Activities   Additional Activities Comments pt self propelling WC to/from shower room and around in room, occassional VC to recall need to lock WC brakes when not in motion   Activity Tolerance   Activity Tolerance Patient tolerated treatment well   Assessment   Treatment Assessment Pt participated in skilled OT Tx with focus on completion of ADL routine. Pt overall CS for full shower. Pt continues to require assistance to don LLE  bed level with max inc time to complete and benefits from further instructions. Pt continues to demo decreased safety awareness for WC mgmt and transfers requiring VC at times. Pt  continues to benefit from skilled OT services to maximize rehab potential with ongoing focus on BL UE strengthening, ADL retraining, and post amputation education.   Prognosis Good   Problem List Decreased strength;Decreased endurance;Impaired balance;Decreased mobility;Decreased cognition;Impaired judgement;Decreased safety awareness;Orthopedic restrictions;Pain;Decreased skin integrity   Barriers to Discharge Decreased caregiver support   Plan   Treatment/Interventions ADL retraining;Functional transfer training;Endurance training;Patient/family training   Progress Progressing toward goals   Discharge Recommendation   Rehab Resource Intensity Level, OT   (pending DC planning with  bryan and placement)   OT Therapy Minutes   OT Time In 1035   OT Time Out 1205   OT Total Time (minutes) 90   OT Mode of treatment - Individual (minutes) 90   OT Mode of treatment - Concurrent (minutes) 0   OT Mode of treatment - Group (minutes) 0   OT Mode of treatment - Co-treat (minutes) 0   OT Mode of Treatment - Total time(minutes) 90 minutes   OT Cumulative Minutes 1968   Therapy Time missed   Time missed? No

## 2024-08-10 NOTE — ASSESSMENT & PLAN NOTE
ECHO 6/10/2024 = LVEF 40-45% with mild global hypokinesis   Status post NM stress test on 6/11/2024 which showed: a large, mild, fixed defect in the inferior wall, possibly due to diaphragmatic attenuation artifact, there is a small area of partial reversibility in the inferior apical wall suggestive of ischemia    Evaluated by cardiology.  Etiology felt to be possibly secondary to stress-induced cardiomyopathy, with apical thrombus  Cardiac catheterization deferred given the lack of any significant ischemia, and no current cardiac symptoms  Continue with medical management with aspirin, statin, beta-blocker, ARB  Monitor volume status, remains euvolemic off of diuretics   Remains euvolemic on exam  Outpatient follow-up with cardiology

## 2024-08-10 NOTE — ASSESSMENT & PLAN NOTE
Diagnosed in July 2019, follows with [facility] neurology outpatient  Continue home regimen with Depakote ER, Lamotrigine and Zonegran  Stable w/o any recent events

## 2024-08-11 PROBLEM — D64.9 ANEMIA: Status: ACTIVE | Noted: 2024-08-11

## 2024-08-11 PROCEDURE — 99232 SBSQ HOSP IP/OBS MODERATE 35: CPT | Performed by: NURSE PRACTITIONER

## 2024-08-11 RX ADMIN — ZONISAMIDE 400 MG: 100 CAPSULE ORAL at 08:22

## 2024-08-11 RX ADMIN — METOPROLOL SUCCINATE 25 MG: 25 TABLET, EXTENDED RELEASE ORAL at 08:22

## 2024-08-11 RX ADMIN — TAMSULOSIN HYDROCHLORIDE 0.4 MG: 0.4 CAPSULE ORAL at 17:10

## 2024-08-11 RX ADMIN — GABAPENTIN 100 MG: 100 CAPSULE ORAL at 13:53

## 2024-08-11 RX ADMIN — DIVALPROEX SODIUM 1250 MG: 500 TABLET, EXTENDED RELEASE ORAL at 08:22

## 2024-08-11 RX ADMIN — LEVOCARNITINE 330 MG: 1 SOLUTION ORAL at 08:25

## 2024-08-11 RX ADMIN — MIRTAZAPINE 15 MG: 15 TABLET, FILM COATED ORAL at 21:40

## 2024-08-11 RX ADMIN — Medication 6 MG: at 21:40

## 2024-08-11 RX ADMIN — LOSARTAN POTASSIUM 50 MG: 50 TABLET, FILM COATED ORAL at 08:22

## 2024-08-11 RX ADMIN — GABAPENTIN 100 MG: 100 CAPSULE ORAL at 21:40

## 2024-08-11 RX ADMIN — LEVOCARNITINE 330 MG: 1 SOLUTION ORAL at 17:10

## 2024-08-11 RX ADMIN — LEVOCARNITINE 330 MG: 1 SOLUTION ORAL at 13:53

## 2024-08-11 RX ADMIN — DOLUTEGRAVIR SODIUM 50 MG: 50 TABLET, FILM COATED ORAL at 08:22

## 2024-08-11 RX ADMIN — ATORVASTATIN CALCIUM 80 MG: 80 TABLET, FILM COATED ORAL at 17:10

## 2024-08-11 RX ADMIN — LAMOTRIGINE 50 MG: 25 TABLET ORAL at 17:10

## 2024-08-11 RX ADMIN — WARFARIN SODIUM 5 MG: 5 TABLET ORAL at 17:10

## 2024-08-11 RX ADMIN — LAMOTRIGINE 50 MG: 25 TABLET ORAL at 08:22

## 2024-08-11 RX ADMIN — ASPIRIN 81 MG: 81 TABLET, COATED ORAL at 08:22

## 2024-08-11 RX ADMIN — BICTEGRAVIR SODIUM, EMTRICITABINE, AND TENOFOVIR ALAFENAMIDE FUMARATE 1 TABLET: 50; 200; 25 TABLET ORAL at 08:22

## 2024-08-11 RX ADMIN — SERTRALINE HYDROCHLORIDE 75 MG: 50 TABLET ORAL at 08:22

## 2024-08-11 RX ADMIN — GABAPENTIN 100 MG: 100 CAPSULE ORAL at 06:15

## 2024-08-11 NOTE — ASSESSMENT & PLAN NOTE
Noted on echocardiogram 6/10/2024: spherical 1.5 x 1.3 cm thrombus at the LV apex   Initiated on AC with Xarelto however unable to verify insurance coverage.  Labs reviewed by me = INR 2.38 on 8/9/24  Continue Coumadin at 5mg daily.  Repeat INR Monday 8/12/24.

## 2024-08-11 NOTE — PROGRESS NOTES
[facility]  Progress Note  Name: Sunil Patel I  MRN: [id number]  Unit/Bed#: -01 I Date of Admission: 7/6/2024   Date of Service: 8/11/2024 I Hospital Day: 36    Assessment & Plan   * Above-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)  Assessment & Plan  Presented with left lower extremity acute limb ischemia/extensive left iliofemoral and left infrainguinal embolism requiring left femoral thrombectomy and subsequent AKA on 6/7/24 with vascular surgery.  Vascular surgery saw here last on 7/10/24 and removed staples  Continue ASA, Coumadin and statin   Rehab and pain control per PMR  Pt has met his rehab goals and will be discharged when able.    Traumatic brain injury (HCC)  Assessment & Plan  Remote history of TBI, previously residing in a group home prior to penitentiary sentence.  Evaluated by neuropsychiatry on 6/27/24 and deemed NOT to have medical decision-making capacity  Process for court appointed guardian started on 7/5/24 - CM following   Guardianship hearing 8/27/24.    Cardiomyopathy (HCC)  Assessment & Plan  ECHO 6/10/2024 = LVEF 40-45% with mild global hypokinesis   Status post NM stress test 6/11/2024 = large, mild, fixed defect in the inferior wall, possibly due to diaphragmatic attenuation artifact, there is a small area of partial reversibility in the inferior apical wall suggestive of ischemia    Evaluated by cardiology.  Etiology felt to be possibly secondary to stress-induced cardiomyopathy, with apical thrombus  Cardiac catheterization deferred given the lack of any significant ischemia, and no current cardiac symptoms  Continue with medical management with aspirin, statin, beta-blocker, ARB  Remains euvolemic off of diuretics   Outpatient follow-up with cardiology = had seen Dr Gumaro Aguila [facility] in past    Carnitine deficiency (HCC)  Assessment & Plan  Continue levocarnitine 330 mg 3x daily with meals.    History of seizures  Assessment &  Plan  Diagnosed in July 2019, follows with [facility] neurology outpatient  Continue home regimen with Depakote ER, Lamotrigine and Zonegran  Stable w/o any recent events    Left ventricular thrombus  Assessment & Plan  Noted on echocardiogram 6/10/2024: spherical 1.5 x 1.3 cm thrombus at the LV apex   Initiated on AC with Xarelto however unable to verify insurance coverage.  Labs reviewed by me = INR 2.38 on 8/9/24  Continue Coumadin at 5mg daily.  Repeat INR Monday 8/12/24.    Benign essential hypertension  Assessment & Plan  Home regimen: Losartan 50mg daily, Toprol XL 25 mg BID  Current regimen: Losartan 50 mg daily, Toprol-XL 25 mg daily  Stable and will not make any changes again today 8/11/24      History of stroke  Assessment & Plan  History of left MCA CVA in May 2018 with residual expressive aphasia  Continue ASA and atorvastatin    Human immunodeficiency virus (HIV) infection (HCC)  Assessment & Plan  Continue Biktarvy and Tivicay.    Bipolar affective disorder (HCC)  Assessment & Plan  Continue home meds Zoloft and Remeron  Outpatient follow-up with Psychiatry  Currently stable    Anemia  Assessment & Plan  Normocytic  PTA hemoglobin was normal  Hemoglobin has been running 10-11 since admission  Likely is ABLA  Will send iron panel in AM  May benefit from iron supplement  Since he is on ASA and Coumadin will send stool for hemoccult to be thorough.  No reported melena and stools have been brown           The above assessment and plan was reviewed and updated as determined by my evaluation of the patient on 8/11/2024.    Labs:   Results from last 7 days   Lab Units 08/08/24  0535   WBC Thousand/uL 6.58   HEMOGLOBIN g/dL 10.1*   HEMATOCRIT % 34.2*   PLATELETS Thousands/uL 360     Results from last 7 days   Lab Units 08/08/24  0535   SODIUM mmol/L 140   POTASSIUM mmol/L 3.7   CHLORIDE mmol/L 106   CO2 mmol/L 24   BUN mg/dL 23   CREATININE mg/dL 0.93   CALCIUM mg/dL 8.7         Results from last 7 days   Lab Units  08/09/24  0515 08/08/24  0535   INR  2.38* 2.89*           Imaging  No orders to display       Review of Scheduled Meds:  Current Facility-Administered Medications   Medication Dose Route Frequency Provider Last Rate    acetaminophen  975 mg Oral TID PRN José Salcido MD      aspirin  81 mg Oral Daily Lorrie Barillas PA-C      atorvastatin  80 mg Oral Daily With Dinner Lorrie Barillas PA-C      bictegravir-emtricitab-tenofovir alafenamide  1 tablet Oral Daily With Breakfast Lorrie Barillas PA-C      bisacodyl  10 mg Rectal Daily PRN Lorrie Barillas PA-C      diphenhydrAMINE  25 mg Oral Q6H PRN Lorrie Barillas PA-C      divalproex sodium  1,250 mg Oral Daily Lorrie Barillas PA-C      dolutegravir  50 mg Oral Daily Lorrie Barillas PA-C      gabapentin  100 mg Oral Q8H Ashley Depadua, MD      lamoTRIgine  50 mg Oral BID Lorrie Barillas PA-C      levOCARNitine  1,000 mg/day Oral TID With Meals Lorrie Barillas PA-C      losartan  50 mg Oral Daily Lorrie Barillas PA-C      melatonin  6 mg Oral HS Lorrie Barillas PA-C      metoprolol succinate  25 mg Oral Daily CHARLIE Mcclelland      mirtazapine  15 mg Oral HS Lorrie Barillas PA-C      ondansetron  4 mg Oral Q6H PRN Lorrie Barillas PA-C      polyethylene glycol  17 g Oral Daily PRN Lorrie Barillas PA-C      senna  1 tablet Oral HS PRN Lorrie Barillas PA-C      sertraline  75 mg Oral Daily Lorrie Barillas PA-C      tamsulosin  0.4 mg Oral Daily With Dinner Lorrie Barillas PA-C      warfarin  5 mg Oral Daily (warfarin) CHARLIE Plata      zonisamide  400 mg Oral Daily Lorrie Barillas PA-C         Subjective/ HPI: Patient seen and examined. Patients overnight issues or events were reviewed with nursing staff. New or overnight issues include the following:     No new or overnight issues.  Offers no complaints    ROS:   A 10 point ROS was performed; negative except  as noted above.        *Labs /Radiology studies Reviewed  *Medications  reviewed and reconciled as needed  *Please refer to order section for additional ordered labs studies      Physical Examination:  Vitals:   Vitals:    08/10/24 1308 08/10/24 2039 08/11/24 0540 08/11/24 1305   BP: 109/62 130/64 131/63 119/72   BP Location: Left arm Left arm Left arm Left arm   Pulse: 81 90 79 89   Resp: 16 18 17 17   Temp: 98 °F (36.7 °C) 98.1 °F (36.7 °C) 98 °F (36.7 °C) 98.3 °F (36.8 °C)   TempSrc: Oral Oral Oral Oral   SpO2: 95% 93% 95% 96%   Weight:       Height:           General Appearance: no distress, nontoxic appearing  HEENT:  External ear normal.  Nose normal w/o drainage. Mucous membranes are moist. Oropharynx is clear. Conjunctiva clear w/o icterus or redness.  Neck:  Supple, normal ROM  Lungs: BBS without crackles/wheeze/rhonchi; respirations unlabored with normal inspiratory/expiratory effort.  No retractions noted.  On RA  CV: regular rate and rhythm; no rubs/murmurs/gallops, PMI normal   ABD: Abdomen is soft.  Bowel sounds all quadrants.  Nontender with no distention.    EXT: no edema  Skin: normal turgor, normal texture  Psych: affect normal, mood normal  Neuro: AAO          The above physical exam was reviewed and updated as determined by my evaluation of the patient on 8/11/2024.    Invasive Devices       Drain  Duration             External Urinary Catheter 10 days                       VTE Pharmacologic Prophylaxis: Warfarin (Coumadin)  Code Status: Level 1 - Full Code  Current Length of Stay: 36 day(s)    Total floor / unit time spent today 30 minutes  Coordination of patient's care was performed in conjunction with consulting services. Time invested included review of patient's labs, vitals, and management of their comorbidities with continued monitoring, examination of patient as well as answering patient questions, documenting her findings and creating progress note in electronic medical record,   ordering appropriate diagnostic testing.       ** Please Note:  voice to text software may have been used in the creation of this document. Although proof errors in transcription or interpretation are a potential of such software**

## 2024-08-11 NOTE — ASSESSMENT & PLAN NOTE
Remote history of TBI, previously residing in a group home prior to senior care sentence.  Evaluated by neuropsychiatry on 6/27/24 and deemed NOT to have medical decision-making capacity  Process for court appointed guardian started on 7/5/24 - CM following   Guardianship hearing 8/27/24.

## 2024-08-11 NOTE — ASSESSMENT & PLAN NOTE
Normocytic  PTA hemoglobin was normal  Hemoglobin has been running 10-11 since admission  Likely is ABLA  Will send iron panel in AM  May benefit from iron supplement  Since he is on ASA and Coumadin will send stool for hemoccult to be thorough.  No reported melena and stools have been brown

## 2024-08-11 NOTE — ASSESSMENT & PLAN NOTE
ECHO 6/10/2024 = LVEF 40-45% with mild global hypokinesis   Status post NM stress test 6/11/2024 = large, mild, fixed defect in the inferior wall, possibly due to diaphragmatic attenuation artifact, there is a small area of partial reversibility in the inferior apical wall suggestive of ischemia    Evaluated by cardiology.  Etiology felt to be possibly secondary to stress-induced cardiomyopathy, with apical thrombus  Cardiac catheterization deferred given the lack of any significant ischemia, and no current cardiac symptoms  Continue with medical management with aspirin, statin, beta-blocker, ARB  Remains euvolemic off of diuretics   Outpatient follow-up with cardiology = had seen Dr Gumaro Aguila [facility] in past

## 2024-08-11 NOTE — PLAN OF CARE
Problem: PAIN - ADULT  Goal: Verbalizes/displays adequate comfort level or baseline comfort level  Description: Interventions:  - Encourage patient to monitor pain and request assistance  - Assess pain using appropriate pain scale  - Administer analgesics based on type and severity of pain and evaluate response  - Implement non-pharmacological measures as appropriate and evaluate response  - Consider cultural and social influences on pain and pain management  - Notify physician/advanced practitioner if interventions unsuccessful or patient reports new pain  Outcome: Progressing     Problem: INFECTION - ADULT  Goal: Absence or prevention of progression during hospitalization  Description: INTERVENTIONS:  - Assess and monitor for signs and symptoms of infection  - Monitor lab/diagnostic results  - Monitor all insertion sites, i.e. indwelling lines, tubes, and drains  - Monitor endotracheal if appropriate and nasal secretions for changes in amount and color  - [place] appropriate cooling/warming therapies per order  - Administer medications as ordered  - Instruct and encourage patient and family to use good hand hygiene technique  - Identify and instruct in appropriate isolation precautions for identified infection/condition  Outcome: Progressing  Goal: Absence of fever/infection during neutropenic period  Description: INTERVENTIONS:  - Monitor WBC    Outcome: Progressing     Problem: SAFETY ADULT  Goal: Patient will remain free of falls  Description: INTERVENTIONS:  - Educate patient/family on patient safety including physical limitations  - Instruct patient to call for assistance with activity   - Consult OT/PT to assist with strengthening/mobility   - Keep Call bell within reach  - Keep bed low and locked with side rails adjusted as appropriate  - Keep care items and personal belongings within reach  - Initiate and maintain comfort rounds  - Make Fall Risk Sign visible to staff  - Offer Toileting every 2 Hours,  in advance of need  - Initiate/Maintain alarm  - Obtain necessary fall risk management equipment:   - Apply yellow socks and bracelet for high fall risk patients  - Consider moving patient to room near nurses station  Outcome: Progressing  Goal: Maintain or return to baseline ADL function  Description: INTERVENTIONS:  -  Assess patient's ability to carry out ADLs; assess patient's baseline for ADL function and identify physical deficits which impact ability to perform ADLs (bathing, care of mouth/teeth, toileting, grooming, dressing, etc.)  - Assess/evaluate cause of self-care deficits   - Assess range of motion  - Assess patient's mobility; develop plan if impaired  - Assess patient's need for assistive devices and provide as appropriate  - Encourage maximum independence but intervene and supervise when necessary  - Involve family in performance of ADLs  - Assess for home care needs following discharge   - Consider OT consult to assist with ADL evaluation and planning for discharge  - Provide patient education as appropriate  Outcome: Progressing  Goal: Maintains/Returns to pre admission functional level  Description: INTERVENTIONS:  - Perform AM-PAC 6 Click Basic Mobility/ Daily Activity assessment daily.  - Set and communicate daily mobility goal to care team and patient/family/caregiver.   - Collaborate with rehabilitation services on mobility goals if consulted  - Perform Range of Motion 3 times a day.  - Reposition patient every 2 hours.  - Dangle patient 3 times a day  - Stand patient 3 times a day  - Ambulate patient 3 times a day  - Out of bed to chair 3 times a day   - Out of bed for meals 3 times a day  - Out of bed for toileting  - Record patient progress and toleration of activity level   Outcome: Progressing     Problem: DISCHARGE PLANNING  Goal: Discharge to home or other facility with appropriate resources  Description: INTERVENTIONS:  - Identify barriers to discharge w/patient and caregiver  -  Arrange for needed discharge resources and transportation as appropriate  - Identify discharge learning needs (meds, wound care, etc.)  - Arrange for interpretive services to assist at discharge as needed  - Refer to Case Management Department for coordinating discharge planning if the patient needs post-hospital services based on physician/advanced practitioner order or complex needs related to functional status, cognitive ability, or social support system  Outcome: Progressing     Problem: Prexisting or High Potential for Compromised Skin Integrity  Goal: Skin integrity is maintained or improved  Description: INTERVENTIONS:  - Identify patients at risk for skin breakdown  - Assess and monitor skin integrity  - Assess and monitor nutrition and hydration status  - Monitor labs   - Assess for incontinence   - Turn and reposition patient  - Assist with mobility/ambulation  - Relieve pressure over bony prominences  - Avoid friction and shearing  - Provide appropriate hygiene as needed including keeping skin clean and dry  - Evaluate need for skin moisturizer/barrier cream  - Collaborate with interdisciplinary team   - Patient/family teaching  - Consider wound care consult   Outcome: Progressing

## 2024-08-11 NOTE — PROGRESS NOTES
08/11/24 1030   Pain Assessment   Pain Assessment Tool 0-10   Pain Score No Pain   Therapy Time missed   Time missed? Yes   Amount of time missed 90   Reason for time missed Extreme fatigue  (pt refusal)   Time(s) multiple attempts made 1030, 12:30, 13:00

## 2024-08-11 NOTE — ASSESSMENT & PLAN NOTE
Home regimen: Losartan 50mg daily, Toprol XL 25 mg BID  Current regimen: Losartan 50 mg daily, Toprol-XL 25 mg daily  Stable and will not make any changes again today 8/11/24

## 2024-08-11 NOTE — ASSESSMENT & PLAN NOTE
Presented with left lower extremity acute limb ischemia/extensive left iliofemoral and left infrainguinal embolism requiring left femoral thrombectomy and subsequent AKA on 6/7/24 with vascular surgery.  Vascular surgery saw here last on 7/10/24 and removed staples  Continue ASA, Coumadin and statin   Rehab and pain control per PMR  Pt has met his rehab goals and will be discharged when able.

## 2024-08-12 PROBLEM — Z86.69 HISTORY OF MIGRAINE HEADACHES: Status: ACTIVE | Noted: 2024-07-22

## 2024-08-12 LAB
FERRITIN SERPL-MCNC: 42 NG/ML (ref 24–336)
INR PPP: 2.74 (ref 0.85–1.19)
IRON SATN MFR SERPL: 20 % (ref 15–50)
IRON SERPL-MCNC: 62 UG/DL (ref 50–212)
PROTHROMBIN TIME: 28.8 SECONDS (ref 12.3–15)
TIBC SERPL-MCNC: 304 UG/DL (ref 250–450)
UIBC SERPL-MCNC: 242 UG/DL (ref 155–355)

## 2024-08-12 PROCEDURE — 85610 PROTHROMBIN TIME: CPT | Performed by: PHYSICIAN ASSISTANT

## 2024-08-12 PROCEDURE — 83540 ASSAY OF IRON: CPT | Performed by: NURSE PRACTITIONER

## 2024-08-12 PROCEDURE — 99233 SBSQ HOSP IP/OBS HIGH 50: CPT | Performed by: PHYSICAL MEDICINE & REHABILITATION

## 2024-08-12 PROCEDURE — 83550 IRON BINDING TEST: CPT | Performed by: NURSE PRACTITIONER

## 2024-08-12 PROCEDURE — 99223 1ST HOSP IP/OBS HIGH 75: CPT | Performed by: PSYCHIATRY & NEUROLOGY

## 2024-08-12 PROCEDURE — 99231 SBSQ HOSP IP/OBS SF/LOW 25: CPT | Performed by: PHYSICIAN ASSISTANT

## 2024-08-12 PROCEDURE — 97110 THERAPEUTIC EXERCISES: CPT

## 2024-08-12 PROCEDURE — 97535 SELF CARE MNGMENT TRAINING: CPT

## 2024-08-12 PROCEDURE — 82728 ASSAY OF FERRITIN: CPT | Performed by: NURSE PRACTITIONER

## 2024-08-12 PROCEDURE — 97112 NEUROMUSCULAR REEDUCATION: CPT

## 2024-08-12 PROCEDURE — 97530 THERAPEUTIC ACTIVITIES: CPT

## 2024-08-12 RX ADMIN — LAMOTRIGINE 50 MG: 25 TABLET ORAL at 08:20

## 2024-08-12 RX ADMIN — ATORVASTATIN CALCIUM 80 MG: 80 TABLET, FILM COATED ORAL at 17:06

## 2024-08-12 RX ADMIN — ASPIRIN 81 MG: 81 TABLET, COATED ORAL at 08:20

## 2024-08-12 RX ADMIN — WARFARIN SODIUM 5 MG: 5 TABLET ORAL at 17:06

## 2024-08-12 RX ADMIN — GABAPENTIN 100 MG: 100 CAPSULE ORAL at 14:15

## 2024-08-12 RX ADMIN — LEVOCARNITINE 330 MG: 1 SOLUTION ORAL at 08:20

## 2024-08-12 RX ADMIN — METOPROLOL SUCCINATE 25 MG: 25 TABLET, EXTENDED RELEASE ORAL at 08:20

## 2024-08-12 RX ADMIN — ZONISAMIDE 400 MG: 100 CAPSULE ORAL at 08:20

## 2024-08-12 RX ADMIN — TAMSULOSIN HYDROCHLORIDE 0.4 MG: 0.4 CAPSULE ORAL at 17:06

## 2024-08-12 RX ADMIN — SERTRALINE HYDROCHLORIDE 75 MG: 50 TABLET ORAL at 08:20

## 2024-08-12 RX ADMIN — GABAPENTIN 100 MG: 100 CAPSULE ORAL at 21:24

## 2024-08-12 RX ADMIN — DOLUTEGRAVIR SODIUM 50 MG: 50 TABLET, FILM COATED ORAL at 08:21

## 2024-08-12 RX ADMIN — MIRTAZAPINE 15 MG: 15 TABLET, FILM COATED ORAL at 21:24

## 2024-08-12 RX ADMIN — BICTEGRAVIR SODIUM, EMTRICITABINE, AND TENOFOVIR ALAFENAMIDE FUMARATE 1 TABLET: 50; 200; 25 TABLET ORAL at 08:21

## 2024-08-12 RX ADMIN — LAMOTRIGINE 50 MG: 25 TABLET ORAL at 17:06

## 2024-08-12 RX ADMIN — LEVOCARNITINE 330 MG: 1 SOLUTION ORAL at 14:15

## 2024-08-12 RX ADMIN — LOSARTAN POTASSIUM 50 MG: 50 TABLET, FILM COATED ORAL at 08:20

## 2024-08-12 RX ADMIN — DIVALPROEX SODIUM 1250 MG: 500 TABLET, EXTENDED RELEASE ORAL at 08:20

## 2024-08-12 RX ADMIN — GABAPENTIN 100 MG: 100 CAPSULE ORAL at 06:12

## 2024-08-12 RX ADMIN — Medication 6 MG: at 21:24

## 2024-08-12 RX ADMIN — LEVOCARNITINE 330 MG: 1 SOLUTION ORAL at 17:05

## 2024-08-12 NOTE — ASSESSMENT & PLAN NOTE
Home regimen: Losartan 50mg daily, Toprol XL 25 mg BID  Current regimen: Losartan 50 mg daily, Toprol-XL 25 mg daily  Blood pressure reviewed and stable

## 2024-08-12 NOTE — ASSESSMENT & PLAN NOTE
Noted on echocardiogram 6/10/2024: spherical 1.5 x 1.3 cm thrombus at the LV apex   Initiated on AC with Xarelto however unable to verify insurance coverage, now on Coumadin at 5 mg daily  INR therapeutic, trend intermittently

## 2024-08-12 NOTE — ASSESSMENT & PLAN NOTE
Continue PTA meds Depakote, gabapentin, zonegran and lamictal all of which can help in prevention  Unable to take triptans due to a history of stroke

## 2024-08-12 NOTE — ASSESSMENT & PLAN NOTE
ECHO 6/10/2024 = LVEF 40-45% with mild global hypokinesis   Status post NM stress test 6/11/2024 = large, mild, fixed defect in the inferior wall, possibly due to diaphragmatic attenuation artifact, there is a small area of partial reversibility in the inferior apical wall suggestive of ischemia    Evaluated by cardiology.  Etiology felt to be possibly secondary to stress-induced cardiomyopathy, with apical thrombus  Cardiac catheterization deferred given the lack of any significant ischemia, and no current cardiac symptoms  Continue with medical management with aspirin, statin, beta-blocker, ARB  Remains euvolemic off of diuretics   Outpatient follow-up with cardiology = had seen Dr Gumaro Aguila [facility] in past

## 2024-08-12 NOTE — PLAN OF CARE
Problem: PAIN - ADULT  Goal: Verbalizes/displays adequate comfort level or baseline comfort level  Description: Interventions:  - Encourage patient to monitor pain and request assistance  - Assess pain using appropriate pain scale  - Administer analgesics based on type and severity of pain and evaluate response  - Implement non-pharmacological measures as appropriate and evaluate response  - Consider cultural and social influences on pain and pain management  - Notify physician/advanced practitioner if interventions unsuccessful or patient reports new pain  8/12/2024 0756 by Manjeet Latif RN  Outcome: Progressing  8/12/2024 0755 by Manjeet Latif RN  Outcome: Progressing     Problem: INFECTION - ADULT  Goal: Absence or prevention of progression during hospitalization  Description: INTERVENTIONS:  - Assess and monitor for signs and symptoms of infection  - Monitor lab/diagnostic results  - Monitor all insertion sites, i.e. indwelling lines, tubes, and drains  - Monitor endotracheal if appropriate and nasal secretions for changes in amount and color  - Levan appropriate cooling/warming therapies per order  - Administer medications as ordered  - Instruct and encourage patient and family to use good hand hygiene technique  - Identify and instruct in appropriate isolation precautions for identified infection/condition  8/12/2024 0756 by Manjeet Latif RN  Outcome: Progressing  8/12/2024 0755 by Manjeet Latif RN  Outcome: Progressing  Goal: Absence of fever/infection during neutropenic period  Description: INTERVENTIONS:  - Monitor WBC    8/12/2024 0756 by Manjeet Latif RN  Outcome: Progressing  8/12/2024 0755 by Manjeet Latif RN  Outcome: Progressing     Problem: SAFETY ADULT  Goal: Patient will remain free of falls  Description: INTERVENTIONS:  - Educate patient/family on patient safety including physical limitations  - Instruct patient to call for assistance with activity   - Consult OT/PT to assist with  strengthening/mobility   - Keep Call bell within reach  - Keep bed low and locked with side rails adjusted as appropriate  - Keep care items and personal belongings within reach  - Initiate and maintain comfort rounds  - Make Fall Risk Sign visible to staff  - Offer Toileting every 2 Hours, in advance of need  - Initiate/Maintain alarm  - Obtain necessary fall risk management equipment:   - Apply yellow socks and bracelet for high fall risk patients  - Consider moving patient to room near nurses station  8/12/2024 0756 by Manjeet Latif RN  Outcome: Progressing  8/12/2024 0755 by Manjeet Latif RN  Outcome: Progressing  Goal: Maintain or return to baseline ADL function  Description: INTERVENTIONS:  -  Assess patient's ability to carry out ADLs; assess patient's baseline for ADL function and identify physical deficits which impact ability to perform ADLs (bathing, care of mouth/teeth, toileting, grooming, dressing, etc.)  - Assess/evaluate cause of self-care deficits   - Assess range of motion  - Assess patient's mobility; develop plan if impaired  - Assess patient's need for assistive devices and provide as appropriate  - Encourage maximum independence but intervene and supervise when necessary  - Involve family in performance of ADLs  - Assess for home care needs following discharge   - Consider OT consult to assist with ADL evaluation and planning for discharge  - Provide patient education as appropriate  8/12/2024 0756 by Manjeet Latif RN  Outcome: Progressing  8/12/2024 0755 by Manjeet Latif RN  Outcome: Progressing  Goal: Maintains/Returns to pre admission functional level  Description: INTERVENTIONS:  - Perform AM-PAC 6 Click Basic Mobility/ Daily Activity assessment daily.  - Set and communicate daily mobility goal to care team and patient/family/caregiver.   - Collaborate with rehabilitation services on mobility goals if consulted  - Perform Range of Motion 3 times a day.  - Reposition patient every 2  hours.  - Dangle patient 3 times a day  - Stand patient 3 times a day  - Ambulate patient 3 times a day  - Out of bed to chair 3 times a day   - Out of bed for meals 3 times a day  - Out of bed for toileting  - Record patient progress and toleration of activity level   8/12/2024 0756 by Manjeet Latif RN  Outcome: Progressing  8/12/2024 0755 by Manjeet Latif RN  Outcome: Progressing     Problem: DISCHARGE PLANNING  Goal: Discharge to home or other facility with appropriate resources  Description: INTERVENTIONS:  - Identify barriers to discharge w/patient and caregiver  - Arrange for needed discharge resources and transportation as appropriate  - Identify discharge learning needs (meds, wound care, etc.)  - Arrange for interpretive services to assist at discharge as needed  - Refer to Case Management Department for coordinating discharge planning if the patient needs post-hospital services based on physician/advanced practitioner order or complex needs related to functional status, cognitive ability, or social support system  8/12/2024 0756 by Manjeet Latif RN  Outcome: Progressing  8/12/2024 0755 by Manjeet Latif RN  Outcome: Progressing     Problem: Prexisting or High Potential for Compromised Skin Integrity  Goal: Skin integrity is maintained or improved  Description: INTERVENTIONS:  - Identify patients at risk for skin breakdown  - Assess and monitor skin integrity  - Assess and monitor nutrition and hydration status  - Monitor labs   - Assess for incontinence   - Turn and reposition patient  - Assist with mobility/ambulation  - Relieve pressure over bony prominences  - Avoid friction and shearing  - Provide appropriate hygiene as needed including keeping skin clean and dry  - Evaluate need for skin moisturizer/barrier cream  - Collaborate with interdisciplinary team   - Patient/family teaching  - Consider wound care consult   8/12/2024 0756 by Manjeet Latif RN  Outcome: Progressing  8/12/2024 0755 by Manjeet  SHAILA Latif RN  Outcome: Progressing

## 2024-08-12 NOTE — ASSESSMENT & PLAN NOTE
Remote history of TBI, previously residing in a group home prior to nursing home sentence.  Evaluated by neuropsychiatry on 6/27/24 and deemed NOT to have medical decision-making capacity  Process for court appointed guardian started on 7/5/24 - CM following   Guardianship hearing 8/27/24

## 2024-08-12 NOTE — PROGRESS NOTES
"OT Treatment Note       08/12/24 1030   Pain Assessment   Pain Assessment Tool 0-10   Pain Score No Pain   Restrictions/Precautions   Precautions Aphasia;Bed/chair alarms;Cognitive;Fall Risk;Supervision on toilet/commode   Weight Bearing Restrictions Yes   LLE Weight Bearing Per Order NWB   ROM Restrictions No   Braces or Orthoses   (L AKA )   Lifestyle   Autonomy \"A couple things need to happen,\" pt visiting with friends at initial attempt at 10a, requiring 3 attempts and then agreeable to OT session.   Eating   Type of Assistance Needed Independent   Physical Assistance Level No physical assistance   Comment seated in recliner at end of session   Eating CARE Score 6   Oral Hygiene   Type of Assistance Needed Independent   Physical Assistance Level No physical assistance   Comment WC level at sink   Oral Hygiene CARE Score 6   Grooming   Able To Wash/Dry Face;Brush/Clean Teeth;Wash/Dry Hands;Shave   Findings IND WC level at sink   Shower/Bathe Self   Type of Assistance Needed Supervision;Verbal cues   Physical Assistance Level No physical assistance   Comment SUP for min vc's and safety, encouraging lateral WS rather than standing but pt declines, stands with CS to wash groin   Shower/Bathe Self CARE Score 4   Tub/Shower Transfer   Findings SUP sit pivot WC<->TTB using grab bar   Upper Body Dressing   Type of Assistance Needed Set-up / clean-up   Physical Assistance Level No physical assistance   Comment seated EOB   Upper Body Dressing CARE Score 5   Lower Body Dressing   Comment assist for tab brief, but able to don shahzad pants with SUP bed level; unable to don LLE  as both were found soaked/wet in sink after being washed earlier- OT hanging to dry   Putting On/Taking Off Footwear   Type of Assistance Needed Supervision   Physical Assistance Level No physical assistance   Comment seated EOB to don R sock and sneaker using xleg technique   Putting On/Taking Off Footwear CARE Score 4   Roll Left and " Right   Type of Assistance Needed Independent   Physical Assistance Level No physical assistance   Roll Left and Right CARE Score 6   Sit to Lying   Type of Assistance Needed Independent   Physical Assistance Level No physical assistance   Sit to Lying CARE Score 6   Lying to Sitting on Side of Bed   Type of Assistance Needed Independent   Physical Assistance Level No physical assistance   Lying to Sitting on Side of Bed CARE Score 6   Bed-Chair Transfer   Type of Assistance Needed Supervision   Physical Assistance Level No physical assistance   Comment sit pivot   Chair/Bed-to-Chair Transfer CARE Score 4   Toileting Hygiene   Type of Assistance Needed Supervision   Physical Assistance Level No physical assistance   Comment BM hygiene completed in stance using GB with CS, though pt did not have BM; pt using urinal upon OT arrival with setup   Toileting Hygiene CARE Score 4   Toilet Transfer   Type of Assistance Needed Supervision   Physical Assistance Level No physical assistance   Comment sit pivot WC<->platform commode   Toilet Transfer CARE Score 4   Cognition   Overall Cognitive Status Impaired   Arousal/Participation Alert;Cooperative   Attention Attends with cues to redirect   Orientation Level Oriented X4   Memory Decreased short term memory;Decreased recall of recent events;Decreased recall of precautions   Following Commands Follows one step commands with increased time or repetition   Additional Activities   Additional Activities Comments WC mobility on unit with DS, occasional vc's to lock brakes when not in motion   Activity Tolerance   Activity Tolerance Patient tolerated treatment well   Assessment   Treatment Assessment Pt seen for 90 min OT session focusing on ADL routine/shower. Pt requiring multiple attempts this session to participate, as pt had friends visiting and reported needing their assistance re: finances. Pt agreeable to participate on 3rd attempt. Pt completes functional transfers and ADL  tasks with primarily SUP. OT to continue POC to focus on  endurance work, BUE and core strengthening, IND donning , repetitive safety training, and w/c management/mobility training.   Prognosis Good   Problem List Decreased strength;Decreased endurance;Impaired balance;Decreased mobility;Decreased cognition;Impaired judgement;Decreased safety awareness;Orthopedic restrictions;Pain;Decreased skin integrity   Barriers to Discharge Decreased caregiver support   Plan   Treatment/Interventions ADL retraining;Functional transfer training;Therapeutic exercise;Endurance training;Patient/family training;Cognitive reorientation;Equipment eval/education;Gait training;Compensatory technique education;Spoke to nursing   Progress Progressing toward goals   Discharge Recommendation   Rehab Resource Intensity Level, OT   (pending DC planning with guardianship and placement)   OT Therapy Minutes   OT Time In 1030   OT Time Out 1200   OT Total Time (minutes) 90   OT Mode of treatment - Individual (minutes) 90   OT Mode of treatment - Concurrent (minutes) 0   OT Mode of treatment - Group (minutes) 0   OT Mode of treatment - Co-treat (minutes) 0   OT Mode of Treatment - Total time(minutes) 90 minutes   OT Cumulative Minutes 1968   Therapy Time missed   Time missed? No

## 2024-08-12 NOTE — CONSULTS
Consultation - Behavioral Health   Sunil Patel 63 y.o. male MRN: [id number]  Unit/Bed#: -01 Encounter: [id number]      Chief Complaint: I am feeling fine    History of Present Illness   Physician Requesting Consult: Matilde Cifuentes MD  Reason for Consult / Principal Problem: Adjustment disorder with mixed anxiety and depressed mood    Sunil Patel is a 63 y.o. 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  He has been in rehabilitation and need to be transferred to a different place and need psychiatric clearance.  When I saw the patient and he is states that he is feeling fine, he  stated that he is a good jess.  He stated that he is trying to do the exercises that they recommend.  I explained to him the reason of my consult he was cooperative.  He stated his mood is stable, he denies any suicidal or homicidal ideation plan or intent, he does not have any active hallucinations or paranoid thinking.  He has been taking his medication as given.         Psychiatric Review Of Systems:  sleep: no  appetite changes: no  weight changes: no  energy/anergy: no  interest/pleasure/anhedonia: no  somatic symptoms: no  anxiety/panic: no  florentin: no  guilty/hopeless: no  self injurious behavior/risky behavior: no    Historical Information   Past Psychiatric History:   In Patient he had a prior inpatient psych admission at [facility], [facility], [facility], [facility], he has been also dual rehabilitation at [facility]  Currently in treatment with none.  Past Suicide attempts: He had thought but he never have any attempt  Past Violent behavior: None  Past Psychiatric medication trial: Cymbalta, Seroquel, lithium, Depakote,  Lamictal, that being used for seizures and mood disorder    Substance Abuse History:  History of crack cocaine, marijuana and alcohol disorder for some time      I have assessed this patient for substance use within the past 12 months     History of IP/OP rehabilitation program: He has been in multiple rehabilitation  Smoking history: Someday smoker  Family Psychiatric History:   Denies any history of mental illness, no history of substance abuse or suicide attempt    Social History  Education: high school diploma/GED  Learning Disabilities:  None  Marital history: Unknown if he is   Living arrangement, social support:  Prior to admission he was incarcerated.  Occupational History: unemployed  Functioning Relationships: poor support system.  Other Pertinent History:  He was in the [employer], no honorable discharge secondary to drug use, history of incarceration    Traumatic History:   Abuse:  Denies any  Other Traumatic Events:  None    Past Medical History:   Diagnosis Date    Acute lower limb ischemia 06/08/2024    Anxiety     Depression     HIV disease ([facility])     Substance abuse ([facility])     Suicide attempt ([facility])        Medical Review Of Systems:  Review of Systems -all system reviewed and there were negative    Meds/Allergies   current meds:   Current Facility-Administered Medications   Medication Dose Route Frequency    acetaminophen (TYLENOL) tablet 975 mg  975 mg Oral TID PRN    aspirin (ECOTRIN LOW STRENGTH) EC tablet 81 mg  81 mg Oral Daily    atorvastatin (LIPITOR) tablet 80 mg  80 mg Oral Daily With Dinner    bictegravir-emtricitab-tenofovir alafenamide (BIKTARVY) -25 MG tablet 1 tablet  1 tablet Oral Daily With Breakfast    bisacodyl (DULCOLAX) rectal suppository 10 mg  10 mg Rectal Daily PRN    diphenhydrAMINE (BENADRYL) tablet 25 mg  25 mg Oral Q6H PRN    divalproex sodium (DEPAKOTE ER) 24 hr tablet 1,250 mg  1,250 mg Oral Daily    dolutegravir (TIVICAY) tablet 50 mg  50 mg Oral Daily     gabapentin (NEURONTIN) capsule 100 mg  100 mg Oral Q8H    lamoTRIgine (LaMICtal) tablet 50 mg  50 mg Oral BID    levOCARNitine (CARNITOR) oral solution 330 mg  1,000 mg/day Oral TID With Meals    losartan (COZAAR) tablet 50 mg  50 mg Oral Daily    melatonin tablet 6 mg  6 mg Oral HS    metoprolol succinate (TOPROL-XL) 24 hr tablet 25 mg  25 mg Oral Daily    mirtazapine (REMERON) tablet 15 mg  15 mg Oral HS    ondansetron (ZOFRAN-ODT) dispersible tablet 4 mg  4 mg Oral Q6H PRN    polyethylene glycol (MIRALAX) packet 17 g  17 g Oral Daily PRN    senna (SENOKOT) tablet 8.6 mg  1 tablet Oral HS PRN    sertraline (ZOLOFT) tablet 75 mg  75 mg Oral Daily    tamsulosin (FLOMAX) capsule 0.4 mg  0.4 mg Oral Daily With Dinner    warfarin (COUMADIN) tablet 5 mg  5 mg Oral Daily (warfarin)    zonisamide (ZONEGRAN) capsule 400 mg  400 mg Oral Daily     Allergies   Allergen Reactions    No Active Allergies        Objective   Vital signs in last 24 hours:  Temp:  [97.9 °F (36.6 °C)-98.9 °F (37.2 °C)] 98.9 °F (37.2 °C)  HR:  [86-89] 86  Resp:  [17-20] 17  BP: (119-127)/(60-63) 121/63      Intake/Output Summary (Last 24 hours) at 8/12/2024 1407  Last data filed at 8/12/2024 1223  Gross per 24 hour   Intake 600 ml   Output 900 ml   Net -300 ml       Mental Status Evaluation:  Appearance:  age appropriate and casually dressed   Behavior:  cooperative   Speech:  normal pitch and normal volume   Mood:  euthymic   Affect:  mood-congruent   Language: naming objects and repeating phrases   Thought Process:  concrete   Associations: concrete associations   Thought Content:  normal   Perceptual Disturbances: None   Risk Potential: Suicidal Ideations none, Homicidal Ideations none, and Potential for Aggression No   Sensorium:  person and place   Memory:  recent and remote memory grossly intact   Cognition:  recent and remote memory grossly intact   Consciousness:  alert and awake    Attention: attention span appeared shorter than expected for  age   Intellect: normal   Fund of Knowledge: awareness of current events: Unable to assess he does not answer this question   Insight:  limited   Judgment: limited   Muscle Strength and Tone: Within normal limits in the upper extremities   Gait/Station: Unable to assess at this time   Motor Activity: no abnormal movements     Lab Results:  I have personally reviewed all pertinent laboratory/tests results.    Code Status: )Level 1 - Full Code    Assessment & Plan     Assessment:  Sunil Patel is a 63 y.o. 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  He has been evaluated for placement.  At the moment of the evaluation patient was cooperative, he denies any suicidal or homicidal thoughts plan or intent he does not have any active psychotic symptoms.   Diagnosis:  Mood disorder unspecified  Plan:   Continue medical management  Continue current psychotropic medication  At this time patient is psychiatrically cleared  Discussed with the primary team  No other intervention at this time I will sign off  Risks, benefits and possible side effects of Medications:   Risks, benefits, and possible side effects of medications explained to patient and patient verbalizes understanding.           Tereza Mireles MD

## 2024-08-12 NOTE — ASSESSMENT & PLAN NOTE
Continue home meds Zoloft and Remeron  Outpatient follow-up with Psychiatry  Mood is currently stable

## 2024-08-12 NOTE — PROGRESS NOTES
08/12/24 1225   Pain Assessment   Pain Assessment Tool 0-10   Pain Score No Pain   Restrictions/Precautions   Precautions Aphasia;Bed/chair alarms;Cognitive;Fall Risk;Supervision on toilet/commode   Weight Bearing Restrictions Yes   LLE Weight Bearing Per Order NWB   ROM Restrictions No   Braces or Orthoses   (L AKA  not in place this session, being washed)   Cognition   Overall Cognitive Status Impaired   Arousal/Participation Alert;Responsive;Cooperative   Attention Attends with cues to redirect   Orientation Level Oriented X4   Memory Decreased short term memory;Decreased recall of recent events;Decreased recall of precautions   Following Commands Follows one step commands with increased time or repetition   Subjective   Subjective Pt is agreeable to PT, requests to go outside   Roll Left and Right   Comment Pt OOB   Sit to Lying   Comment Pt OOB   Lying to Sitting on Side of Bed   Comment Pt OOB   Sit to Stand   Type of Assistance Needed Supervision   Physical Assistance Level No physical assistance   Comment use of grab bars, parallel bars, from w/c   Sit to Stand CARE Score 4   Bed-Chair Transfer   Type of Assistance Needed Supervision   Physical Assistance Level No physical assistance   Comment sit pivot, good hand placement and technique   Chair/Bed-to-Chair Transfer CARE Score 4   Walk 10 Feet   Reason if not Attempted Safety concerns   Walk 10 Feet CARE Score 88   Walk 50 Feet with Two Turns   Reason if not Attempted Safety concerns   Walk 50 Feet with Two Turns CARE Score 88   Walk 150 Feet   Reason if not Attempted Safety concerns   Walk 150 Feet CARE Score 88   Walking 10 Feet on Uneven Surfaces   Reason if not Attempted Safety concerns   Walking 10 Feet on Uneven Surfaces CARE Score 88   Ambulation   Findings Hopping within parallel bars forward/backward 3 + 4 laps, Min for posterior lean when going backwards   Does the patient walk? 0. No, and walking goal is not clinically indicated.    Wheel 50 Feet with Two Turns   Type of Assistance Needed Independent   Physical Assistance Level No physical assistance   Wheel 50 Feet with Two Turns CARE Score 6   Wheel 150 Feet   Type of Assistance Needed Independent   Physical Assistance Level No physical assistance   Wheel 150 Feet CARE Score 6   Wheelchair mobility   Does the patient use a wheelchair? 1. Yes   Type of Wheelchair Used 1. Manual   Method Right upper extremity;Left upper extremity;Right lower extremity   Distance Level Surface (feet) 500 ft   Findings mod I level and unlevel surfaces, outside on incline/decline   Curb or Single Stair   Reason if not Attempted Safety concerns   1 Step (Curb) CARE Score 88   4 Steps   Reason if not Attempted Safety concerns   4 Steps CARE Score 88   12 Steps   Reason if not Attempted Safety concerns   12 Steps CARE Score 88   Toilet Transfer   Type of Assistance Needed Supervision   Physical Assistance Level No physical assistance   Comment sit pivot from w/c to platform commode   Toilet Transfer CARE Score 4   Therapeutic Interventions   Strengthening Repeated sit to stands with single UE, x 10 R UE, x 10 L UE within parallel bars; seated marches B/L LE   Flexibility R LE calf stretch with leg    Other transfer training, gait training within parallel bars, w/c mobility   Equipment Use   NuStep L4, 10 min, B/L UE, R LE   Assessment   Treatment Assessment Pt agreeable to PT this PM, received sitting upright in recliner. Pt requested to go outside this PM, continued to challenge endurance with prolonged w/c mobility and ability to navigate on uneven surfaces with good tolerance. Pt able to abisai/doff pants and pull up this session without assist during tolieting, using grab bar and reliance of wall for support. Continued to challenge R LE strength/endurance with NuStep at increased resistance this session. Pt continues to progress with hopping activities and repeated sit to stands within parallel bars. Will  continue with current PT POC to improve deficits and promote return to PLOF.   Problem List Decreased strength;Decreased endurance;Impaired balance;Decreased mobility;Decreased cognition;Impaired judgement;Decreased safety awareness;Orthopedic restrictions;Pain;Decreased skin integrity   Barriers to Discharge Decreased caregiver support   PT Barriers   Physical Impairment Decreased strength;Decreased range of motion;Decreased endurance;Impaired balance;Decreased mobility;Decreased coordination;Decreased cognition;Impaired judgement;Decreased safety awareness;Decreased skin integrity;Orthopedic restrictions   Functional Limitation Car transfers;Ramp negotiation;Standing;Transfers;Wheelchair management   Plan   Treatment/Interventions Functional transfer training;LE strengthening/ROM;Therapeutic exercise;Endurance training;Cognitive reorientation;Patient/family training;Equipment eval/education;Bed mobility;Gait training   Progress Progressing toward goals   PT Therapy Minutes   PT Time In 1225   PT Time Out 1355   PT Total Time (minutes) 90   PT Mode of treatment - Individual (minutes) 90   PT Mode of treatment - Concurrent (minutes) 0   PT Mode of treatment - Group (minutes) 0   PT Mode of treatment - Co-treat (minutes) 0   PT Mode of Treatment - Total time(minutes) 90 minutes   PT Cumulative Minutes 2132     Patient remains OOB in chair, all needs in reach.  Alarm in place and activated.  Encouraged use of call bell, patient verbalizes understanding.

## 2024-08-12 NOTE — PROGRESS NOTES
"Physical Medicine and Rehabilitation Progress Note  Sunil Patel 63 y.o. male MRN: [id number]  Unit/Bed#: [facility] 451-01 Encounter: [id number]    To Review: Sunil Patel is a 63 y.o. male who  has a past medical history of Acute lower limb ischemia (06/08/2024), Anxiety, Depression, HIV disease (HCC), Substance abuse (HCC), and Suicide attempt (HCC). who presented to the [facility] on 6/7/24 from detention for increased LLE swelling and pain and was found to have a left external iliac artery occlusion and acute limb ischemia. 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 Patient had TTE on 6/10/24 showing LV apex thrombus. On 6/11/24 patient had pharmacologic nuclear stress test/SPECT scan which did not show any significant areas of ischemia with EF 40-45% and recommended continuing A/C for LV apical thrombus. His course was complicated by b/l buttock unstageable pressure ulcers, urinary and fecal incontinence, pain, and significant decline in ADLs and mobility. Patient has been continued on Xarelto for anticoagulation, as well as ASA and statin. Patient has a history of TBI, with baseline nonfluent aphasia and forgetfulness. He was evaluated by neuropsychology on 6/27/24, and deemed to not have capacity to make fully informed medical decisions. Therefore, the guardianship process was initiated as of 7/5/24. He was admitted to the [facility] on 7/6.     Chief Complaint: \"feeling good\"    Interval History/Subjective:  Patient seen and assessed at bedside this morning with Dr. de Padua. No acute events over the weekend. Stage 3 buttock pressure wound improved/healed. No concerns from patient this AM. Last BM 8/11. Continent of bladder. Eating well. Hoping that we can make progress this week in terms of " disposition with residential place or SNF.     ROS:  A 10 point review of systems was negative except for what is noted in the HPI.    Today's Changes:  CM to work on residential facilitiy  Consideration for repeat neuropsychology consult to see if any improvement in his cognition. His mood is more even, and I suspect he still does not have capacity, but there is a component of aphasia that is fairly significant that impacts his communication. His understanding of his situation, insight, and carry over is definitely still impaired. I remain with concerns about his ability to make truly informed decisions about his care given his cognitive-linguistic deficits.  INR 2.74. per IM continue 5mg daily. Repeat INR on Wednesday.   Continued discussions for placement      Assessment/Plan:    History of migraine headaches  Assessment & Plan  Chronic issue.  Seemed to worsen with increased irritability after discontinuing gabapentin  Resumed gabapentin  [facility] with middling results  Sleep logs have been good - discontinued.  Headache is on and off    Cardiomyopathy (HCC)  Assessment & Plan  ECHO on 6/10/2024 showed LVEF 40-45% with mild global hypokinesis   Status post NM stress test on 6/11/2024 which showed: a large, mild, fixed defect in the inferior wall, possibly due to diaphragmatic attenuation artifact, there is a small area of partial reversibility in the inferior apical wall suggestive of ischemia    Elevated by cardiology, etiology felt to be possibly secondary to stress-induced cardiomyopathy, with apical thrombus  Cardiac catheterization deferred given the lack of any significant ischemia, and no current cardiac symptoms  Continue with medical management with aspirin, statin, beta-blocker, ARB  Monitor volume status, remains euvolemic off of diuretics   Outpatient follow-up with cardiology    Neurocognitive disorder  Assessment & Plan  Has been appropriate and cooperative throughout this  "stay without any behavioral issues on the rehab unit to date.    - Does have decreased frustration tolerance   - So far redirectable.  Hx of TBI and L MCA CVA, psychiatric d/o, seizure d/o  - Neuropsych assessment 6/27/24 - \"diffuse cognitive dysfunction and on a measure assessing awareness of personal health status and ability to evaluate health problems, handle medical emergencies and take safety precautions, patient performed in the IMPAIRED range of functioning. During this encounter, patient does not appear to have capacity to make fully informed medical decisions.\"  MMSE 4/28 - severely impaired  - Guardianship process initiated on acute - follow-up by CM/Admin  - Status: some expressive and possible some receptive aphasia.  He can be difficult to follow at times likely due to a mixture of aphasia, tangentiality, mild lability, and impaired memory; lability with hx of possible bipolar d/o  - Neuropsych Med review: Depakote, Lamictal, Remeron, Zoloft, Melatonin, gabapentin, PRN oxy 2.5mg TID   - Monitor neuro-exam, wakefulness, mood, cognition, insight into deficits and safety awareness   - Monitor and ensure optimal management electrolytes, nutrition, and hydration  - Monitor for signs or symptoms of infection, medication intolerances, other systemic etiologies  - Additional labs, imaging, specialist follow-up as needed per primary team currently   - Overstimulation precautions, frequent re-orientation, re-direction, re-assurance  - Optimal mood, pain, and sleep management  - If impaired sleep or behavior recommend sleep log and agitation monitoring    - Limit sedating medications when possible  - Fall precautions - if needed increase rounding or consider virtual sitter or in-person sitter  - For routine restlessness, anxiety, irritability focus on non-pharmacologic management    - Hold benzo's with increased risk paradoxical reaction and possibility of limiting cognitive recovery  - Continue SLP and " "interdisciplinary care  - OP neuro and PCP         Adjustment disorder with mixed anxiety and depressed mood  Assessment & Plan  - Related to recent release from incarceration, new AKA and uncertainty of future disposition, all in the setting of impaired cognition related to prior strokes and TBI  - Behavioral techniques for symptom management  - Neuropsychology consult as available  - Given his circumstances, increased frustration is expected. Can consider Psych consult if symptoms continue to worsen to adjust mood stabilizing medications. Will try nonpharmacologic approaches first with more frequent conversations/redirections     Patient incapable of making informed decisions  Assessment & Plan  - History of remote TBI and prior strokes with comorbid psychiatric history.  - Evaluated by neuropsychology, Dr. Dilshad Tatum, PhD on 6/27/24, found to have \"diffuse cognitive dysfunction and on a measure assessing awareness of personal health status and ability to evaluate health problems, handle medical emergencies and take safety precautions, patient performed in the IMPAIRED range of functioning. During this encounter, patient does not appear to have capacity to make fully informed medical decisions.\"  - Court-appointed guardianship process has been initiated by acute care CM Katia Kay.    - We have identified two friends who are interested in being patient's guardians and have been involved and invested in patient's care on the ARC.   - They will need to be interviewed by the  involved in this process.    - He has to undergo his hearing on 8/6/2024 first.  -- now rescheduled to 8/27   - He would ultimately benefit from [facility]/nursing home/memory care unit - he does very well with structure and supervision.    8/2- Ongoing discussions with CM, [facility] admin and social work to dispo patient to stable long-term housing. Process continues, with evaluation by therapy managers from [facility]/[place].   8/12- " unfortunately have not made much progress with SNF placement per [facility]. Have not heard back from residential community that met with him on 8/6    Urinary incontinence  Assessment & Plan  - Did have some incontinence on acute - improved with timed voids and consistent assistance with his urinal  - Described as urgency  - Marked improvement on timed voids.   - monitor for retention, incontinence (including overflow incontinence), signs/symptoms of UTI  - Timed Voids and PVRs Q4hrs initiated earlier. And PVR <150 x3, so scans discontinued.    - He has some difficulty managing the urinal    - needs assistance but is able to transfer to [facility]    - Still uses condom catheter at night, in part for continence care/to protect his skin given his buttock wounds. However now note that buttocks wound has healed  - Continue timed voids           At risk for constipation  Assessment & Plan  - Stooling adequately recently; did have some incontinence on acute now improved  - Close continent care given buttock wounds  - Last BM 8/8 and continent  - PRN miralax, Senna and suppository    Acute pain  Assessment & Plan  Controlled   - Tylenol 975 mg q8h PRN  - Resumed Gabapentin 100mg Q8hr due to increased headaches and irritability since discontinuing.  - Able to discontinue oxycodone. Has not used since 7/19.     At risk for venous thromboembolism (VTE)  Assessment & Plan  - Fully anticoagulated on warfarin for apical thrombus  - IM managing    Impaired mobility and activities of daily living  Assessment & Plan  - Rehabilitation medicine physician for daily monitoring of care, 24 hour availability for acute medical issues, medication management, and therapeutic and diagnostic assessments.  - 24 hour rehabilitation nursing 7 days per week for: management/teaching of medications, bowel/bladder routine, skin care.  - PT, OT for 2-3 hours per day, 5 to 6 days per week; 15 hours per week  - Rehabilitation Psychology as  needed for adjustment and coping  - MSW for barriers to discharge, community resources, and family support  - Discharge planning following to help ensure a safe and efficient discharge  -7/30 teams: Patient is getting agitated with PT therapies, as he is reaching a plateau. Pt enjoys SLP and cognitive exercises with tablet. Discussions are ongoing for his dispo planning- ARC admin have meetings with a few SNFs to discuss his case. CM is actively working on retrieving his belongings from [facility]. Guardianship court date still set for 8/27.  - Had a meeting with [place] leadership and [place] leadership, as well as Case Management. They will have their therapists come and evaluate the patient for appropriateness. Awaiting final decision        Traumatic brain injury (HCC)  Assessment & Plan  See neurocog impairment     Carnitine deficiency (HCC)  Assessment & Plan  - Carnitine replacement  - Appreciate medicine management      History of seizures  Assessment & Plan  - Depakote ER, lamotrigine, zonisamide  - [facility]    Left ventricular thrombus  Assessment & Plan  - Visualized on echocardiogram on 6/10/24: spherical 1.5 x 1.3 cm thrombus at the LV apex.   - Was started on rivaroxaban, but patient does not appear to have insurance coverage for prescriptions   - Xarelto, eliquis, and pradaxa likely cost prohibitive per IM   - 7/29 transitioned to warfarin with lovenox bridge.   - Now off lovenox, and fully anticoagulated on warfarin.   - Management as per IM.   - 8/12 INR 2.74. Continue 5mg daily. Recheck 8/14 or per IM  - Outpatient follow-up with cardiology    Benign essential hypertension  Assessment & Plan  - Toprol, losartan  - Appreciate medicine management    History of stroke  Assessment & Plan  - History of old left temporal and parietal lobe cortical infarcts, also involving the insula and left occipital lobe as well as small right posterior parietal lobe. Stable on most  recent CT head on 5/16/24.   - ASA, and statin for secondary prevention  - Optimal BP control  - Monitor neuro exam - hx of LV thrombus    - See that entry  - OP neuro follow-up     Human immunodeficiency virus (HIV) infection (HCC)  Assessment & Plan  - Continue anti-retroviral treatment  - Appreciate medicine management during ARC course  - OP ID follow-up       Bipolar affective disorder (HCC)  Assessment & Plan  Mood acceptable; continue meds as outlined   Supportive counseling  NeuroPsychology consult while in ARC if available for support  Counseled on and continue to encourage deep breathing/relaxation/behavioral management techniques  - Mirtazapine 15 mg qHs  - Sertraline 75 mg daily   - Lamictal 50mg BID  - Depakote ER 1250mg qday   - Outpatient psychiatry follow-up  - Would consult Psych before changing medications    * 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 Underwent L femoral thrombectomy and AKA with vascular surgery   - [facility] sx follow-up 7/10 - L AKA incision site well-healed, staples taken out at bedside  - [facility] will be vendor - Patient now has .  - Monitor for hip flexion/abduction tightness. Stretches in therapy  - Now on Coumadin with hx of LV thrombus and PAOD. Checking INR's as above   - PT/OT/SLP (to work on carry over strategies) 3-5 hours/day, 5-7 days/week.    - Goals are Ind-Sup at a wheelchair level.   - Outpatient f/u with [facility] and Vascular  - Continue patient training with  placement  - Continue gabapentin - doesn't do too much for phantom limb sensation, but that doesn't bother him or cause him pain currently.   - Patient still frustrated given circumstances; will continue gabapentin for anxiety    Pressure injury of buttock, stage 3 (HCC)-resolved as of 8/9/2024  Assessment & Plan  Resolved as of 8/7.   - Wound care consulted and following weekly  - POA L  buttock pressure injury unstageable has healed.  - POA R buttock pressure injury  evolved from unstageable to Stage 3, and is now resolved.   - Recommend ROHO cushion in chair when out of bed instead   - Preventative hydraguard to bilateral heels BID and PRN.   - P500  - Monitor clinically for breakdown, frequent turns  - reviewed picture of wound today. It is healing well. Continue to monitor        Health Maintenance  #GI Prophylaxis: not indicated  #Code Status: Full code  #FEN: Cardiac diet + Ensure at bfast/dinner  #Dispo: Teams 8/6: ADD TBD. Still working on placement. He is meeting with a residential facility today, and CM and Rehab Director are working on evaluation with [facility]. Guardianship court date is 8/27.    Will need f/u with PMR, PCP, Vascular, Psych     Objective:    Functional Update:  PT: Sup transfers, Ind bed mobility, Ind wheelchair mobility  OT: Ind eating/grooming, Sup bathing, Ind UB dressing, Sup LB dressing, CGA toileting, CGA tub/shower transfers, Sup toilet transfers  SLP: modA comprehension, modA expression, Sup social interaction, modA problem solving, modA memory.     Allergies per EMR    Physical Exam:  Temp:  [97.9 °F (36.6 °C)-98.3 °F (36.8 °C)] 97.9 °F (36.6 °C)  HR:  [86-89] 89  Resp:  [17-20] 20  BP: (119-127)/(60-72) 119/60  Oxygen Therapy  SpO2: 95 %    Gen: No acute distress, Well-nourished, well-appearing.  HEENT: Moist mucus membranes, Normocephalic/Atraumatic  Cardiovascular: Regular, Distal pulses palpable  Heme/Extr: No edema  Pulmonary: Non-labored breathing.  : No fernandes  GI: Soft, non-tender, non-distended. BS+  MSK: L AKA   Integumentary: Skin is warm, dry. No rashes or ulcers.  Neuro: Awake, alert. Aphasic speech, but able to answer questions and socially interact. Still lack of insight, recall, judgment. Pleasant and cooperative.  Psych: Normal mood and affect.     Diagnostic Studies: reviewed, no new imaging  No results found.    Laboratory:     Results from last 7 days   Lab Units 08/08/24  0535   HEMOGLOBIN g/dL 10.1*   HEMATOCRIT % 34.2*   WBC Thousand/uL 6.58     Results from last 7 days   Lab Units 08/08/24  0535   BUN mg/dL 23   POTASSIUM mmol/L 3.7   CHLORIDE mmol/L 106   CREATININE mg/dL 0.93     Results from last 7 days   Lab Units 08/12/24  0520 08/09/24  0515 08/08/24  0535   PROTIME seconds 28.8* 25.9* 30.0*   INR  2.74* 2.38* 2.89*        Patient Active Problem List   Diagnosis    Bipolar affective disorder (HCC)    Substance abuse (HCC)    Human immunodeficiency virus (HIV) infection (HCC)    History of stroke    Benign essential hypertension    Positive laboratory testing for human immunodeficiency virus (HCC)    Hypertension    Unspecified vitamin D deficiency    Tobacco abuse    Cerebrovascular accident (CVA) (HCC)    Left ventricular thrombus    History of seizures    Carnitine deficiency (HCC)    Above-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)    Traumatic brain injury (HCC)    Impaired mobility and activities of daily living    At risk for venous thromboembolism (VTE)    Acute pain    At risk for constipation    Urinary incontinence    Patient incapable of making informed decisions    Adjustment disorder with mixed anxiety and depressed mood    Neurocognitive disorder    Cardiomyopathy (HCC)    History of migraine headaches    Postoperative anemia         Medications  Current Facility-Administered Medications   Medication Dose Route Frequency Provider Last Rate    acetaminophen  975 mg Oral TID PRN José Salcido MD      aspirin  81 mg Oral Daily Lorrie Barillas PA-C      atorvastatin  80 mg Oral Daily With Dinner Lorrie Barillas PA-C      bictegravir-emtricitab-tenofovir alafenamide  1 tablet Oral Daily With Breakfast Lorrie Barillas PA-C      bisacodyl  10 mg Rectal Daily PRN Lorrie Barillas PA-C      diphenhydrAMINE  25 mg Oral Q6H PRN Lorrie Barillas PA-C      divalproex sodium  1,250 mg Oral  Daily Lorrie Barillas PA-C      dolutegravir  50 mg Oral Daily Lorrie Barillas PA-C      gabapentin  100 mg Oral Q8H Ashley Depadua, MD      lamoTRIgine  50 mg Oral BID Lorrie Barillas PA-C      levOCARNitine  1,000 mg/day Oral TID With Meals Lorrie Barillas PA-C      losartan  50 mg Oral Daily Lorrie Barillas PA-C      melatonin  6 mg Oral HS Lorrie Barillas PA-C      metoprolol succinate  25 mg Oral Daily CHARLIE Mcclelland      mirtazapine  15 mg Oral HS Lorrie Barillas PA-C      ondansetron  4 mg Oral Q6H PRN Lorrie Barillas PA-C      polyethylene glycol  17 g Oral Daily PRN Lorrie Barillas PA-C      senna  1 tablet Oral HS PRN Lorrie Barillas PA-C      sertraline  75 mg Oral Daily Lorrie Barillas PA-C      tamsulosin  0.4 mg Oral Daily With Dinner Lorrie Barillas PA-C      warfarin  5 mg Oral Daily (warfarin) CHARLIE Plata      zonisamide  400 mg Oral Daily Lorrie Barillas PA-C            ** Please Note: Fluency Direct voice to text software may have been used in the creation of this document. **

## 2024-08-12 NOTE — PROGRESS NOTES
[facility]  Progress Note  Name: Sunil Patel I  MRN: [id number]  Unit/Bed#: -01 I Date of Admission: 7/6/2024   Date of Service: 8/12/2024 I Hospital Day: 37    Assessment & Plan   * Above-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)  Assessment & Plan  Presented with left lower extremity acute limb ischemia/extensive left iliofemoral and left infrainguinal embolism requiring left femoral thrombectomy and subsequent AKA on 6/7/24 with vascular surgery.  Vascular surgery saw here last on 7/10/24 and removed staples  Continue ASA, Coumadin and statin   Rehab and pain control per PMR  Pt has met his rehab goals and will be discharged when able.    Postoperative anemia  Assessment & Plan  Normocytic  PTA hemoglobin was normal, now has been running 10-11 since admission  No signs or symptoms of active bleeding  Iron panel reviewed, no evidence of TY  Likely postoperative ABLA    Cardiomyopathy (HCC)  Assessment & Plan  ECHO 6/10/2024 = LVEF 40-45% with mild global hypokinesis   Status post NM stress test 6/11/2024 = large, mild, fixed defect in the inferior wall, possibly due to diaphragmatic attenuation artifact, there is a small area of partial reversibility in the inferior apical wall suggestive of ischemia    Evaluated by cardiology.  Etiology felt to be possibly secondary to stress-induced cardiomyopathy, with apical thrombus  Cardiac catheterization deferred given the lack of any significant ischemia, and no current cardiac symptoms  Continue with medical management with aspirin, statin, beta-blocker, ARB  Remains euvolemic off of diuretics   Outpatient follow-up with cardiology = had seen Dr Gumaro Aguila [facility] in past    Left ventricular thrombus  Assessment & Plan  Noted on echocardiogram 6/10/2024: spherical 1.5 x 1.3 cm thrombus at the LV apex   Initiated on AC with Xarelto however unable to verify insurance coverage, now on Coumadin at 5 mg daily  INR  therapeutic, trend intermittently     Benign essential hypertension  Assessment & Plan  Home regimen: Losartan 50mg daily, Toprol XL 25 mg BID  Current regimen: Losartan 50 mg daily, Toprol-XL 25 mg daily  Blood pressure reviewed and stable       History of migraine headaches  Assessment & Plan  Continue PTA meds Depakote, gabapentin, zonegran and lamictal all of which can help in prevention  Unable to take triptans due to a history of stroke    Traumatic brain injury (HCC)  Assessment & Plan  Remote history of TBI, previously residing in a group home prior to residential sentence.  Evaluated by neuropsychiatry on 6/27/24 and deemed NOT to have medical decision-making capacity  Process for court appointed guardian started on 7/5/24 - CM following   Guardianship hearing 8/27/24    History of seizures  Assessment & Plan  Diagnosed in July 2019, follows with [facility] neurology outpatient  Continue home regimen with Depakote ER, Lamotrigine and Zonegran  Stable w/o any recent events    History of stroke  Assessment & Plan  History of left MCA CVA in May 2018 with residual expressive aphasia  Continue ASA and atorvastatin    Human immunodeficiency virus (HIV) infection (HCC)  Assessment & Plan  Continue Biktarvy and Tivicay.    Bipolar affective disorder (HCC)  Assessment & Plan  Continue home meds Zoloft and Remeron  Outpatient follow-up with Psychiatry  Mood is currently stable    Carnitine deficiency (HCC)  Assessment & Plan  Continue levocarnitine 330 mg 3x daily with meals.    Neurocognitive disorder  Assessment & Plan  Evaluated by neuropsychology and found to not have capacity  Guardianship in progress meeting rescheduled for 8/27    Patient incapable of making informed decisions  Assessment & Plan  Seen by neuropsych on 6/27 and was deemed NOT to have medical decision making capacity    At risk for constipation  Assessment & Plan  Management per PMR    At risk for venous thromboembolism (VTE)  Assessment & Plan  On  systemic AC in the setting of LV thrombus            The above assessment and plan was reviewed and updated as determined by my evaluation of the patient on 8/12/2024.    Labs:   Results from last 7 days   Lab Units 08/08/24  0535   WBC Thousand/uL 6.58   HEMOGLOBIN g/dL 10.1*   HEMATOCRIT % 34.2*   PLATELETS Thousands/uL 360     Results from last 7 days   Lab Units 08/08/24  0535   SODIUM mmol/L 140   POTASSIUM mmol/L 3.7   CHLORIDE mmol/L 106   CO2 mmol/L 24   BUN mg/dL 23   CREATININE mg/dL 0.93   CALCIUM mg/dL 8.7         Results from last 7 days   Lab Units 08/12/24  0520 08/09/24  0515   INR  2.74* 2.38*           Imaging  No orders to display       Review of Scheduled Meds:  Current Facility-Administered Medications   Medication Dose Route Frequency Provider Last Rate    acetaminophen  975 mg Oral TID PRN José Salcido MD      aspirin  81 mg Oral Daily Lorrie Barillas PA-C      atorvastatin  80 mg Oral Daily With Dinner Lorrie Barillas PA-C      bictegravir-emtricitab-tenofovir alafenamide  1 tablet Oral Daily With Breakfast Lorrie Barillas PA-C      bisacodyl  10 mg Rectal Daily PRN Lorrie Barillas PA-C      diphenhydrAMINE  25 mg Oral Q6H PRN Lorrie Barillas PA-C      divalproex sodium  1,250 mg Oral Daily Lorrie Barillas PA-C      dolutegravir  50 mg Oral Daily Lorrie Barillas PA-C      gabapentin  100 mg Oral Q8H Ashley Depadua, MD      lamoTRIgine  50 mg Oral BID Lorrie Barillas PA-C      levOCARNitine  1,000 mg/day Oral TID With Meals Lorrie Barillas PA-C      losartan  50 mg Oral Daily Lorrie Barillas PA-C      melatonin  6 mg Oral HS Lorrie Barillas PA-C      metoprolol succinate  25 mg Oral Daily CHARLIE Mcclelland      mirtazapine  15 mg Oral HS Lorrie Barillas PA-C      ondansetron  4 mg Oral Q6H PRN Lorrie Barillas PA-C      polyethylene glycol  17 g Oral Daily PRN Lorrie Barillas PA-C      senna  1 tablet Oral  HS PRN Lorrie Barillas PA-C      sertraline  75 mg Oral Daily Lorrie Barillas PA-C      tamsulosin  0.4 mg Oral Daily With Dinner Lorrie Barillas PA-C      warfarin  5 mg Oral Daily (warfarin) CHARLIE Plata      zonisamide  400 mg Oral Daily Lorrie Barillas PA-C         Subjective/ HPI: Patient seen and examined. Patients overnight issues or events were reviewed with nursing staff. New or overnight issues include the following:     Patient offers no new complaints today, specifically denies lightheadedness/dizziness.     ROS:   A 10 point ROS was performed; negative except as noted above.        *Labs /Radiology studies Reviewed  *Medications  reviewed and reconciled as needed  *Please refer to order section for additional ordered labs studies      Physical Examination:  Vitals:   Vitals:    08/11/24 0540 08/11/24 1305 08/11/24 2000 08/12/24 0512   BP: 131/63 119/72 127/62 119/60   BP Location: Left arm Left arm Left arm Left arm   Pulse: 79 89 86 89   Resp: 17 17 17 20   Temp: 98 °F (36.7 °C) 98.3 °F (36.8 °C) 98.2 °F (36.8 °C) 97.9 °F (36.6 °C)   TempSrc: Oral Oral Oral Oral   SpO2: 95% 96% 96% 95%   Weight:       Height:           General Appearance: NAD; pleasant  HEENT: PERRLA, conjuctiva normal; mucous membranes moist; face symmetrical  Neck:  Supple  Lungs: normal respiratory effort, no retractions, expiratory effort normal, on room air  CV: regular rate and rhythm, no murmurs rubs or gallops noted   ABD: soft non tender, +BS x4  EXT: status post L BKA  Skin: no rash  Psych: affect normal, mood normal  Neuro: AAOx3       The above physical exam was reviewed and updated as determined by my evaluation of the patient on 8/12/2024.    Invasive Devices       Drain  Duration             External Urinary Catheter 11 days                       VTE Pharmacologic Prophylaxis: Warfarin (Coumadin)  Code Status: Level 1 - Full Code  Current Length of Stay: 37 day(s)    Total floor / unit time  spent today 15 minutes  Coordination of patient's care was performed in conjunction with consulting services. Time invested included review of patient's labs, vitals, and management of their comorbidities with continued monitoring, examination of patient as well as answering patient questions, documenting her findings and creating progress note in electronic medical record,  ordering appropriate diagnostic testing.       ** Please Note:  voice to text software may have been used in the creation of this document. Although proof errors in transcription or interpretation are a potential of such software**

## 2024-08-13 PROCEDURE — 99232 SBSQ HOSP IP/OBS MODERATE 35: CPT | Performed by: PHYSICIAN ASSISTANT

## 2024-08-13 PROCEDURE — 99233 SBSQ HOSP IP/OBS HIGH 50: CPT | Performed by: PHYSICAL MEDICINE & REHABILITATION

## 2024-08-13 RX ADMIN — DIVALPROEX SODIUM 1250 MG: 500 TABLET, EXTENDED RELEASE ORAL at 08:05

## 2024-08-13 RX ADMIN — Medication 6 MG: at 21:44

## 2024-08-13 RX ADMIN — BICTEGRAVIR SODIUM, EMTRICITABINE, AND TENOFOVIR ALAFENAMIDE FUMARATE 1 TABLET: 50; 200; 25 TABLET ORAL at 08:09

## 2024-08-13 RX ADMIN — TAMSULOSIN HYDROCHLORIDE 0.4 MG: 0.4 CAPSULE ORAL at 15:52

## 2024-08-13 RX ADMIN — DOLUTEGRAVIR SODIUM 50 MG: 50 TABLET, FILM COATED ORAL at 08:09

## 2024-08-13 RX ADMIN — LAMOTRIGINE 50 MG: 25 TABLET ORAL at 08:03

## 2024-08-13 RX ADMIN — GABAPENTIN 100 MG: 100 CAPSULE ORAL at 13:08

## 2024-08-13 RX ADMIN — ACETAMINOPHEN 975 MG: 325 TABLET, FILM COATED ORAL at 09:12

## 2024-08-13 RX ADMIN — MIRTAZAPINE 15 MG: 15 TABLET, FILM COATED ORAL at 21:44

## 2024-08-13 RX ADMIN — SERTRALINE HYDROCHLORIDE 75 MG: 50 TABLET ORAL at 08:06

## 2024-08-13 RX ADMIN — LOSARTAN POTASSIUM 50 MG: 50 TABLET, FILM COATED ORAL at 08:04

## 2024-08-13 RX ADMIN — WARFARIN SODIUM 5 MG: 5 TABLET ORAL at 17:33

## 2024-08-13 RX ADMIN — LAMOTRIGINE 50 MG: 25 TABLET ORAL at 17:33

## 2024-08-13 RX ADMIN — ASPIRIN 81 MG: 81 TABLET, COATED ORAL at 08:02

## 2024-08-13 RX ADMIN — LEVOCARNITINE 330 MG: 1 SOLUTION ORAL at 17:34

## 2024-08-13 RX ADMIN — METOPROLOL SUCCINATE 25 MG: 25 TABLET, EXTENDED RELEASE ORAL at 08:02

## 2024-08-13 RX ADMIN — LEVOCARNITINE 330 MG: 1 SOLUTION ORAL at 13:08

## 2024-08-13 RX ADMIN — LEVOCARNITINE 330 MG: 1 SOLUTION ORAL at 08:10

## 2024-08-13 RX ADMIN — ZONISAMIDE 400 MG: 100 CAPSULE ORAL at 08:08

## 2024-08-13 RX ADMIN — GABAPENTIN 100 MG: 100 CAPSULE ORAL at 21:44

## 2024-08-13 RX ADMIN — ATORVASTATIN CALCIUM 80 MG: 80 TABLET, FILM COATED ORAL at 15:52

## 2024-08-13 NOTE — PROGRESS NOTES
08/13/24 1230   Subjective   Subjective pt reports not doing PT thia session d/t fatigue   PT Therapy Minutes   PT Time In 1200   PT Time Out 1330   PT Total Time (minutes) 90   PT Mode of treatment - Individual (minutes) 90   PT Mode of treatment - Concurrent (minutes) 0   PT Mode of treatment - Group (minutes) 0   PT Mode of treatment - Co-treat (minutes) 0   PT Mode of Treatment - Total time(minutes) 90 minutes   PT Cumulative Minutes 2102   Therapy Time missed   Time missed? Yes   Amount of time missed 90   Reason for time missed Illness;Extreme fatigue   Time(s) multiple attempts made ,1200,1208,1215,1230

## 2024-08-13 NOTE — PROGRESS NOTES
"   08/13/24 0905   Pain Assessment   Pain Assessment Tool 0-10   Pain Score 10 - Worst Possible Pain   Pain Location/Orientation Location: Head   Hospital Pain Intervention(s) Emotional support  (RN provided pain medication)   Restrictions/Precautions   Precautions Aphasia;Bed/chair alarms;Cognitive;Fall Risk;Supervision on toilet/commode   Weight Bearing Restrictions Yes   LLE Weight Bearing Per Order NWB   ROM Restrictions No   Braces or Orthoses Other (Comment)  (L AKA )   Lifestyle   Autonomy \"I can't, I just can't. the pain, it's so bad\" referring to headache   Cognition   Overall Cognitive Status Impaired   Arousal/Participation Alert;Responsive   Attention Attends with cues to redirect   Orientation Level Oriented X4   Memory Decreased short term memory;Decreased recall of recent events;Decreased recall of precautions   Following Commands Follows one step commands with increased time or repetition   Activity Tolerance   Activity Tolerance Patient limited by pain   Assessment   Treatment Assessment attempted to see pt for scheduled ADL session at 9:05. pt soundly sleeping upon arrival, easily awoken with c/o 10/10 headached. informed RN who provided pain medication. pt stating, \"If I can just get little sleep, maybe I'll be better\". did let pt rest and allow pain medication to take affect. returned to pt at 9:40 who again was soundly sleeping but easily awoken. pt again continues to report 10/10 headache with no relief from pain medication. offered bed level ADL, cold pack vs moist hot pack in attempt to calm down headache, pt politely refusing all offered interventions. pt again requesting to \"sleep, just sleep a little\". will attempt to return at 11:00 am as schedule allows. RN notified.   Prognosis Good   Problem List Decreased strength;Decreased endurance;Impaired balance;Decreased mobility;Decreased cognition;Impaired judgement;Decreased safety awareness;Orthopedic restrictions;Pain;Decreased skin " integrity   Plan   Treatment/Interventions ADL retraining;Functional transfer training;Therapeutic exercise;Endurance training;Cognitive reorientation;Patient/family training;Equipment eval/education;Compensatory technique education   Therapy Time missed   Amount of time missed 60   Reason for time missed   (10/10 headache; refusal)   Time(s) multiple attempts made 9:05, 9:40

## 2024-08-13 NOTE — ASSESSMENT & PLAN NOTE
- Did have some incontinence on acute - improved with timed voids and consistent assistance with his urinal  - Described as urgency  - Marked improvement on timed voids.   - monitor for retention, incontinence (including overflow incontinence), signs/symptoms of UTI  - Timed Voids and PVRs Q4hrs initiated earlier. And PVR <150 x3, so scans discontinued.    - He has some difficulty managing the urinal    - needs assistance but is able to transfer to [facility]    - Still uses condom catheter at night, in part for continence care/to protect his skin given his buttock wounds. However now note that buttocks wound has healed  - Continue timed voids

## 2024-08-13 NOTE — TEAM CONFERENCE
Acute RehabilitationTeam Conference Note  Date: 8/13/2024   Time: 9:01 AM       Patient Name:  Sunil Patel       Medical Record Number: [id number]   YOB: 1960  Sex: Male          Room/Bed:  [place] [address]/[facility] 451-01  Payor Info:  Payor: MEDICARE / Plan: MEDICARE A AND B / Product Type: Medicare A & B Fee for Service /      Admitting Diagnosis: Hx of AKA (above knee amputation) (Formerly [facility]) [Z89.619]   Admit Date/Time:  7/6/2024  1:30 PM  Admission Comments: No comment available     Primary Diagnosis:  Above-knee amputation of left lower extremity (Formerly [facility])  Principal Problem: Above-knee amputation of left lower extremity (Formerly [facility])    Patient Active Problem List    Diagnosis Date Noted    Postoperative anemia 08/11/2024    History of migraine headaches 07/22/2024    Cardiomyopathy (Formerly [facility]) 07/09/2024    Adjustment disorder with mixed anxiety and depressed mood 07/08/2024    Impaired mobility and activities of daily living 07/07/2024    At risk for venous thromboembolism (VTE) 07/07/2024    Acute pain 07/07/2024    At risk for constipation 07/07/2024    Urinary incontinence 07/07/2024    Patient incapable of making informed decisions 07/07/2024    Above-knee amputation of left lower extremity (Formerly [facility]) 07/06/2024    Traumatic brain injury (Formerly [facility]) 07/06/2024    Carnitine deficiency (Formerly [facility]) 06/09/2024    Left ventricular thrombus 06/08/2024    History of seizures 06/08/2024    Tobacco abuse 10/26/2019    Cerebrovascular accident (CVA) (Formerly [facility]) 10/26/2019    Positive laboratory testing for human immunodeficiency virus (Formerly [facility]) 07/03/2017    Bipolar affective disorder (Formerly [facility]) 07/02/2017    Hypertension 02/27/2017    Human immunodeficiency virus (HIV) infection (Formerly [facility]) 02/16/2017    History of stroke 02/16/2017    Substance abuse (Formerly [facility]) 12/21/2013    Unspecified vitamin D deficiency 05/28/2010    Benign essential hypertension 02/25/2010       Physical Therapy:    Weight Bearing Status: Non-weight bearing (L LE)  Transfers: Supervision  Bed  Mobility: Independent  Amulation Distance (ft): 0 feet (10' within parallel bars)  Ambulation:  (within parallel bars)  Assistive Device for Ambulation:  (parallel bars)  Wheelchair Mobility Distance: 500 ft  Wheelchair Mobility: Independent  Number of Stairs: 0  Assistive Device for Stairs:  (TBA)  Stair Assistance:  (TBA)  Ramp: Independent (TBA)  Assistive Device for Ramp: Wheelchair  Discharge Recommendations: Other (awaiting placement)    7/15/24  Pt has plateaued functionally for he already met S/CGA goals at w/c level mobilities. Due to baseline cognition, aphasia, impaired safety with STM impairment impacting consistent carry over of new learning so does not anticipate pt to progress beyond S level at this time. Also for the past week pt also demos inconsistent participation with skilled PT interventions. Goals for this week to complete/review Phase 1 AMP education, cont with w/c level transfer training keesha with car transfer, cont with strengthening/flexibility exercises including reviewing HEP packet provided while awaiting placement pending guardianship decision.     8/5/2024    Pt cont at this time with skilled PT and pt overall at S lvl d/t baseline cognition, aphasia, impaired safety with STM impairment. Pt at this time cont awaiting for guarding ship/placement , Dr Depadua speaking with Kieran and his fiend Donna about DC places and overall outcome. Pt will cont to benefit with skilled PT to keep functional mobility, LE /core/UE strengthening and overall better outcome . Cont working on sit pivot transfers setup and overall limb care with safety awareness.    08/13/2024: Pt cont at this time with skilled PT and pt overall at S lvl d/t baseline cognition, aphasia, impaired safety with STM impairment. Pt at this time cont awaiting for guarding ship/placement , Dr Depadua speaking with Kieran and his fiend Donna about DC places and overall outcome. Pt will cont to benefit with skilled PT to keep functional  mobility, LE /core/UE strengthening and overall better outcome . Cont working on sit pivot transfers setup and overall limb care with safety awareness. Pt has been ambulating within parallel bars and progressing well, requiring VC for proper weight shifting and sequencing.      Occupational Therapy:  Eating: Independent  Grooming: Independent  Bathing: Supervision  Bathing: Supervision  Upper Body Dressing:  (set-up)  Lower Body Dressing: Minimal Assistance  Toileting: Supervision  Tub/Shower Transfer: Supervision  Toilet Transfer: Supervision  Cognition: Exceptions to WNL  Cognition: Decreased Memory, Decreased Safety, Decreased Executive Functions, Decreased Attention, Decreased Comprehension  Orientation: Person, Place, Time, Situation  Discharge Recommendations: Home with:  DC Home with:: 24 Hour Supervision       Pt continues to present with impairments in endurance, standing balance/tolerance, memory, insight, safety, and judgement. Additional functional barriers include fatigue, LLE NWB status, decreased caregiver support, risk for falls, and home environment. Pt is functioning at overall S for ADLs and CS fxnl sit pivot xfers, and DS for w/c mobility. Pt will continue to benefit from skilled OT services to address above mentioned barriers and maximize functional independence in baseline areas of occupation, utilizing the following interventions: ADL retraining, DME/adaptive equipment assessment, functional transfer training, repetitive safety training, activity tolerance/edurance training, UB/core strengthening, and energy conservation education. OT D/C recommendation is for 24 hour S due to baseline cognitive deficits, pt would benefit from JARRETT.          Speech Therapy:  Mode of Communication: Verbal  Speech/Language:  (expressive>receptive aphasia)  Cognition: Exceptions to WNL  Cognition: Decreased Memory, Decreased Executive Functions, Decreased Attention, Decreased Comprehension, Decreased  Safety  Orientation: Person (able to determine orientation w/ use of white board/written cues)  Discharge Recommendations:  (pending patient progress)  DC Home with::  (pending pt progress)  Pt is currently being followed for skilled SLP services targeting language therapy. Pt with hx stroke, resulting in expressive and receptive aphasia but noting expressive communication skills to be greater impacted. SLP was consulted to support pt's communication skills for improved ability to express his needs with the team. Pt presenting with overall moderately impaired expressive and receptive language deficits, impacting functional communication skills. Pt has trialed written communication, however, this was found to be an ineffective communication modality. Pt has also been introduced to a low tech manual communication board, which he has demonstrated ability to utilize in order to convey basic wants/needs more easily and effectively. Currently, pt is functioning at min A for comprehension, problem solving and memory and mod A for expression. Plan this week to continue to attempt to establish a more functional mode of communication which is both effective and easy for pt to utilize to support his communication attempts with staff. Recommending brief follow up skilled SLP services to continue to support pt in building a more functional communication modality to improve effectiveness of communication and to ease frustrations with communication attempts.     Update week of 7/22/2024: Pt continues to be seen for skilled SLP services focusing on language and communication skills with primary reason for consultation to support communication needs at this time. Pt remains with deficits in expressive and receptive language skills,which is pt's baseline prior to admission to this unit, but which impact all domains of language (auditory & reading comprehension; verbal & written expression). This week, sessions have focused on  introducing a high tech communication device as per pt's request. Pt's friend provided a tablet and recent session has focused on exploring different communication apps which fit pt's needs (function, cost, etc), as well as apps which pt can then complete language tasks in his free time. Pt is demonstrating desire to utilize device, but will benefit from additional training and practice with device, especially due to continued deficits in comprehension, ST memory/recall, problem solving and attention. This week, plan to continue to focus on determining appropriate apps for language drills/activities, as well as identifying an appropriate alejandra to support expressive language/communication, while also programming alejandra for pt's specific needs. Currently, pt is functioning at mod A for expression, comprehension, problem solving and memory and supervision for social interaction. Recommending short term follow up of skilled SLP services to continue to support pt in building a more functional communication modality to improve effectiveness of communication and to ease frustrations with communication attempts during acute rehab stay. Will also benefit from engaging pt's friend in education/training with device.     Update week of 7/29/2024: Pt continues to be followed for language and communication therapy where he is making slow progress, however, ST primarily being implemented to support current functional language as pt with baseline aphasia. Pt is limited by deficits in expressive and receptive language skills, to include verbal expression, written expression, auditory comprehension and reading comprehension, which impact overall functional communication skills. Additionally, a cognitive linguistic evaluation was completed this week, as per medical team request. Pt completed the SLUMS assessment with a score of 1/30 which as compared to those with high school education correlates with severe neurocognitive disorder,  however, this score is likely highly impacted by pt's language deficits and should be interpreted with caution. Cognitive linguistic deficits include decreased: attention, STM recall, problem solving, executive functions, safety awareness, reasoning, and visuospatial skills. The following interventions are used to target these barriers, including visual orientation checklist, verbal problem solving task, verbal working memory tasks, categorization tasks , picture problem solving activities, verbal reasoning tasks, visual attention tasks, and family education/training. basic communication boards, verbal command following activities, written command following activities, biographical yes/no questions, simple yes/no questions, moderate yes/no questions, visual/receptive object ID tasks, picture object ID tasks, automatic speech: counting, automatic speech: COURTNEY, automatic speech: CORRY, object naming tasks, simple phrase completion tasks, reading comprehension at word level, reading comprehension at phrase level , written expression of biographical information, written expression at word level, and word-picture matching.     Plan to target cognitive linguistic skills (basic problem solving, memory, safety) and language skills across all language domains this coming week. Currently, pt is functioning at mod assist for comprehension, expression, problem solving and memory, as well as supervision for social interaction. Recommending continued skilled SLP services to continue to support pt in building a more functional communication modality to improve effectiveness of communication and to ease frustrations with communication attempts during acute rehab stay, as well as to target cognitive linguistic skills to maximize safety and independence on discharge.     Update week 8/5/2024: Pt continues to be followed for language and communication therapy where is making slow progress, however, ST continues to implement support with  a focus on baseline aphasia.In regards to aphasia, pt is limited by deficits in expressive and receptive language skills, to include verbal expression, written expression, auditory comprehension and reading comprehension, which impacts overall functional communication skills. In recent sessions, implementation of constant therapy application on pt's tablet has been completed and discussed w/ pt's friend to help pt practice outside of therapy and during therapy with expressive-receptive language. Pt voiced engagement with application and continued focus with application will be used in future sessions as well as additional interventions to target these barriers such as basic communication boards, verbal command following activities, written command following activities, biographical yes/no questions, simple yes/no questions, moderate yes/no questions, visual/receptive object ID tasks, picture object ID tasks, automatic speech: counting, automatic speech: COURTNEY, automatic speech: CORRY, object naming tasks, simple phrase completion tasks, reading comprehension at word level, reading comprehension at phrase level , written expression of biographical information, written expression at word level, and word-picture matching. In regards to cognition, recent sessions have not been targeting due to pt's signifiant expressive language deficits. The following interventions are to be used to target these barriers, visual orientation checklist, verbal problem solving task, verbal working memory tasks, categorization tasks , picture problem solving activities, verbal reasoning tasks, visual attention tasks, and family education/training. Plan to target cognitive linguistic skills (basic problem solving, memory, safety) and language skills across all language domains this coming week.     Current level of functioning:    Comprehension: Mod A  Expression: Mod A  Social Interaction: Supervision  Problem Solving: Mod A  Memory: Mod  A      Update from week: 8/13/2024 . Pt is being followed for language tx  sessions to where pt is making slower progress this week. Continued language barriers which present include: decreased higher level auditory comprehension skills, expressive language skills including decreased overall communicative intent, reading comprehension skills including lengthier written information, and written language skills including difficulty in basic written expression including biographical information which still impacts pt's overall safety, functional cognitive communication skills as well as functional mobility. However, still incorporating the use of tablet w/ free apps which have been provided by ST team. Of SLP had introduced [employer] website and practive therapy materials for pt. Unfortunately this is not allowed on tablet but is for computer use. SLP did have pt complete tasks w/ use of laptop which pt responded favorably. Will plan to reach out to company to determine if the website can be utilized to pt's personal tablet. Continued cognitive barriers which present include: decreased visual attention, ST memory recall , reasoning, sequencing, judgement, and insight, which still impacts pt's overall safety, functional cognitive communication skills as well as functional mobility. The following interventions are used to target these barriers, including visual orientation checklist, drawing conclusions activities, categorization tasks , picture problem solving activities, visual retraining activities., visual attention tasks, and functional reading tasks .     Current level of functioning:   Comprehension: min-mod A   Expression: mod A  Social Interaction: supervision  Executive functions: mod A   Memory: mod A    This week will continue to target independence of use of tablet and language/cognitive apps to use as a supplement during pt's downtime. Pt to benefit from skilled language tx  session 3x per week  maintenance given overall communication skills in attempts to decreased caregiver burden over time.           Nursing Notes:  Appetite: Good  Diet Type: Cardiac                      Diet Patient/Family Education Complete: No    Type of Wound (LDA): Wound                    Type of Wound Patient/Family Education: No  Bladder: Incontinent     Bladder Patient/Family Education: No  Bowel: Continent     Bowel Patient/Family Education: No  Pain Location/Orientation: Location: Head  Pain Score: 0                       Hospital Pain Intervention(s): Repositioned  Pain Patient/Family Education: No       63 y.o. male who  has a past medical history of Acute lower limb ischemia (06/08/2024), Anxiety, Depression, HIV disease (HCC), Substance abuse (HCC), and Suicide attempt (HCC). who presented to the [facility] on 6/7/24 from halfway for increased LLE swelling and pain and was found to have a left external iliac artery occlusion and acute limb ischemia. 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 Patient had TTE on 6/10/24 showing LV apex thrombus. On 6/11/24 patient had pharmacologic nuclear stress test/SPECT scan which did not show any significant areas of ischemia with EF 40-45% and recommended continuing A/C for LV apical thrombus. His course was complicated by b/l buttock unstageable pressure ulcers, urinary and fecal incontinence, pain, and significant decline in ADLs and mobility. Patient has been continued on Xarelto for anticoagulation, as well as ASA and statin. Patient has a history of TBI, with baseline nonfluent aphasia and forgetfulness. He was evaluated by neuropsychology on 6/27/24, and deemed to not have capacity to make fully informed medical decisions. Therefore, the guardianship  process was initiated as of 7/5/24. He was admitted to the [facility] on 7/6.     7/15: Will encourage independence with ADLs and monitor labs and vital signs.  We will educate pt/family about repositioning to prevent skin breakdown.  We will assist w repositioning and perform routine skin checks.  We will monitor for adequate pain control.  We will monitor for constipation and medicate pt as ordered. We will provide pt and family DM education. We will increase safety awareness and keep pt free from falls.    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 Noted on echocardiogram 6/10/2024: spherical 1.5 x 1.3 cm thrombus at the LV apex Initiated on AC with Xarelto, continue. Rx sent to [facility] for price check on 7/10/24 - CM spoke with "[employer]" and pt has rx coverage. Information sent to [facility] [employer].  Today on 7/21/24, d/w [facility] and Xarelto is not covered and he would have to pay OOP @$700.00.  CM to further address tomorrow 7/22/24.  He may have to be switched to Coumadin.     Will continue to encourage independence with ADLs and monitor labs and vital signs. We will educate pt/family about repositioning to prevent skin breakdown.  We will assist w repositioning and perform routine skin checks.  We will monitor for adequate pain control.  We will monitor for constipation and medicate pt as ordered. We will provide pt and family DM education. We will increase safety awareness and keep pt free from falls.     Pt is incontinent of bladder, uses male pure wick @ HS. Will encourage independence with ADLs and monitor labs and vital signs.  We will educate pt/family about repositioning to prevent skin breakdown.  We will assist w repositioning and perform routine skin checks.  We will monitor for adequate pain control.  We will monitor for constipation and medicate pt as ordered. We will provide pt and family wound and skin care management. We will increase safety awareness and keep pt free from falls.    8/5: Will continue to encourage  independence with ADLs and monitor labs and vital signs. We will educate pt/family about repositioning to prevent skin breakdown.  We will assist w repositioning and perform routine skin checks.  We will monitor for adequate pain control.  We will monitor for constipation and medicate pt as ordered. We will provide pt and family DM education. We will increase safety awareness and keep pt free from falls.     8/13   Remote history of TBI, previously residing in a group home prior to halfway sentence.. Evaluated by neuropsychiatry on 6/27/24 and deemed NOT to have medical decision-making capacity. Process for court appointed guardian started on 7/5/24 - CM following.Guardianship hearing 8/27/24         Case Management:     Discharge Planning  Living Arrangements: Other (Comment)  Support Systems: Son, Family members  Assistance Needed: Family members are currently arranging housing for pt after d/c  Type of Current Residence: Other (Comment)  Current Home Care Services: No  7/23- CM continues to follow with d/c planning needs. Process of pursuing guardianship as well as sending SNF referrals. CM continues to work with acute care CM to assist with d/c planning process.     7/30- Cm continues to follow and search for dispo plan    8/6- Team still working on dispo plan, [facility] to [facility] and meet with pt this week to determine if they can accept.     8/12- [facility] this [facility], awaiting dispo    Is the patient actively participating in therapies? yes  List any modifications to the treatment plan: None    Barriers Interventions   incontinence Condom cath at night   Sacral wounds  Triad paste, wound care following, p500, roho cushion, offloading, improving   Expressive aphasia Functional communication, use of ipad, participating more    Residual limb care Wrapping education, nursing monitoring for signs of infection    Dispo planning  Several meetings and coordination   Mood   Varies, dc frustration tolerance    Afib Coumadin      Is the patient making expected progress toward goals? yes  List any update or changes to goals: None    Medical Goals: Patient will be medically stable for discharge to [facility] upon completion of rehab program and Patient will be able to manage medical conditions and comorbid conditions with medications and follow up upon completion of rehab program    Weekly Team Goals:   Rehab Team Goals  ADL Team Goal: Patient will require supervision with ADLs with least restrictive device upon completion of rehab program  Transfer Team Goal: Patient will require supervision with transfers with least restrictive device upon completion of rehab program  Locomotion Team Goal: Patient will require supervision with locomotion with least restrictive device upon completion of rehab program  Cognitive Team Goal: Patient will return to premorbid level of cognitive activity upon completion of rehab program    Discussion: Pt functioning at sup for all adls and ambulation, min to mod for language skills.    Team still awaiting dispo plan, [facility] to visit Wednesday/Thursday and observe pt. Still awaiting confirmation from [place].     Anticipated Discharge Date:  reteam - complex dc, pending placement   [facility] Team Members Present:  The following team members are supervising care for this patient and were present during this Weekly Team Conference.    Physician: Dr. DePadua, MD  : Berta George MSW  Registered Nurse: Vy Ortiz RN  Physical Therapist: Delfina Renner DPT  Occupational Therapist: Katia Morfin MS, OTR/L  Speech Therapist: Bertha Trujillo MA, CCC-SLP

## 2024-08-13 NOTE — ASSESSMENT & PLAN NOTE
Psychiatry evaluated most recently on 8/12 and cleared for discharge to rehab   Continue home meds Zoloft and Remeron  Mood is currently stable

## 2024-08-13 NOTE — ASSESSMENT & PLAN NOTE
"- History of remote TBI and prior strokes with comorbid psychiatric history.  - Evaluated by neuropsychology, Dr. Dilshad Tatum, PhD on 6/27/24, found to have \"diffuse cognitive dysfunction and on a measure assessing awareness of personal health status and ability to evaluate health problems, handle medical emergencies and take safety precautions, patient performed in the IMPAIRED range of functioning. During this encounter, patient does not appear to have capacity to make fully informed medical decisions.\"  - 8/27 Patient assigned a Guardian   - Continue dispo planning.    "

## 2024-08-13 NOTE — ASSESSMENT & PLAN NOTE
Mood acceptable; continue meds as outlined   Supportive counseling  NeuroPsychology consult while in [facility] if available for support  Psych evaluated on 8/12 and deemed him stable for discharge with current regimen as below  Counseled on and continue to encourage deep breathing/relaxation/behavioral management techniques  - Mirtazapine 15 mg qHs  - Sertraline 75 mg daily   - Lamictal 50mg BID  - Depakote ER 1250mg qday   - Outpatient psychiatry follow-up  - Would consult Psych before changing medications

## 2024-08-13 NOTE — ASSESSMENT & PLAN NOTE
- Visualized on echocardiogram on 6/10/24: spherical 1.5 x 1.3 cm thrombus at the LV apex.   - Was started on rivaroxaban, but patient does not appear to have insurance coverage for prescriptions   - Xarelto, eliquis, and pradaxa likely cost prohibitive per IM   - 7/29 transitioned to warfarin with lovenox bridge.   - Now off lovenox, and fully anticoagulated on warfarin.   - Management as per IM.   - INR 2.63 on 8/29. continue warfarin with 2.5mg on Fridays and 5mg the rest of the days. Recheck INR per IM  - Outpatient follow-up with cardiology

## 2024-08-13 NOTE — ASSESSMENT & PLAN NOTE
Noted on echocardiogram 6/10/2024: spherical 1.5 x 1.3 cm thrombus at the LV apex   Initiated on AC with Xarelto however unable to verify insurance coverage, now on Coumadin at 5 mg daily  INR therapeutic on last check, trend intermittently

## 2024-08-13 NOTE — ASSESSMENT & PLAN NOTE
Seen by wesley on 6/27 and was deemed NOT to have medical decision making capacity  Guardianship hearing scheduled for 8/27

## 2024-08-13 NOTE — PROGRESS NOTES
"   08/13/24 1100   Pain Assessment   Pain Assessment Tool 0-10   Pain Score 10 - Worst Possible Pain   Pain Location/Orientation Location: Head   Restrictions/Precautions   Precautions Aphasia;Bed/chair alarms;Cognitive;Fall Risk;Supervision on toilet/commode   Weight Bearing Restrictions Yes   LLE Weight Bearing Per Order NWB   ROM Restrictions No   Braces or Orthoses Other (Comment)  (L AKA )   Activity Tolerance   Activity Tolerance Patient limited by pain  (ongoing HA)   Assessment   Treatment Assessment attempted to see pt for 3rd time however pt again refusing to participate in therapy session due to ongoing 10/10 pain in head. pt reports pain medication did not help, continues to decline all offered pain management interventions such as cold pack vs moist heat pack, repositioning, lunch meal, etc. pt requesting to \"sleep some more\". RN notified that pt reports pain medication did not help and RN aware of ongoing refusal to participate in therapy this AM.   Prognosis Good   Problem List Decreased strength;Decreased endurance;Impaired balance;Decreased mobility;Decreased cognition;Impaired judgement;Decreased safety awareness;Orthopedic restrictions;Pain;Decreased skin integrity   Plan   Treatment/Interventions ADL retraining;Functional transfer training;Therapeutic exercise;Endurance training;Cognitive reorientation;Patient/family training;Equipment eval/education;Compensatory technique education   Therapy Time missed   Time missed? Yes   Amount of time missed 90   Reason for time missed   (10/10 headache; refusal)   Time(s) multiple attempts made 09:05, 09:40, 11:00       "

## 2024-08-13 NOTE — ASSESSMENT & PLAN NOTE
Presented with left lower extremity acute limb ischemia/extensive left iliofemoral and left infrainguinal embolism requiring left femoral thrombectomy and subsequent AKA on 6/7/24 with vascular surgery.  Vascular surgery following, last evaluated on 7/10/24 and removed staples  Continue with local wound care   Continue ASA, Coumadin and statin   Stable for discharge from physiatry standpoint   Dispo planning as per case management

## 2024-08-13 NOTE — ASSESSMENT & PLAN NOTE
ECHO 6/10/2024 = LVEF 40-45% with mild global hypokinesis   Status post NM stress test 6/11/2024 = large, mild, fixed defect in the inferior wall, possibly due to diaphragmatic attenuation artifact, there is a small area of partial reversibility in the inferior apical wall suggestive of ischemia    Evaluated by cardiology.  Etiology felt to be possibly secondary to stress-induced cardiomyopathy, with apical thrombus  Cardiac catheterization deferred given the lack of any significant ischemia, and no current cardiac symptoms  Continue with medical management with aspirin, statin, beta-blocker, ARB  Remains euvolemic off of diuretics, continue to monitor volume status   Outpatient follow-up with cardiology, previously followed with Dr. Gumaro Aguila [facility]

## 2024-08-13 NOTE — CASE MANAGEMENT
Cm checked in with pt at bedside, pt reportedly doing okay, no solid dispo plan yet. Pt verbalized understanding, will continue to review as time goes on.

## 2024-08-13 NOTE — PLAN OF CARE
Problem: PAIN - ADULT  Goal: Verbalizes/displays adequate comfort level or baseline comfort level  Description: Interventions:  - Encourage patient to monitor pain and request assistance  - Assess pain using appropriate pain scale  - Administer analgesics based on type and severity of pain and evaluate response  - Implement non-pharmacological measures as appropriate and evaluate response  - Consider cultural and social influences on pain and pain management  - Notify physician/advanced practitioner if interventions unsuccessful or patient reports new pain  Outcome: Progressing     Problem: INFECTION - ADULT  Goal: Absence or prevention of progression during hospitalization  Description: INTERVENTIONS:  - Assess and monitor for signs and symptoms of infection  - Monitor lab/diagnostic results  - Monitor all insertion sites, i.e. indwelling lines, tubes, and drains  - Monitor endotracheal if appropriate and nasal secretions for changes in amount and color  - [place] appropriate cooling/warming therapies per order  - Administer medications as ordered  - Instruct and encourage patient and family to use good hand hygiene technique  - Identify and instruct in appropriate isolation precautions for identified infection/condition  Outcome: Progressing     Problem: SAFETY ADULT  Goal: Patient will remain free of falls  Description: INTERVENTIONS:  - Educate patient/family on patient safety including physical limitations  - Instruct patient to call for assistance with activity   - Consult OT/PT to assist with strengthening/mobility   - Keep Call bell within reach  - Keep bed low and locked with side rails adjusted as appropriate  - Keep care items and personal belongings within reach  - Initiate and maintain comfort rounds  - Make Fall Risk Sign visible to staff  - Offer Toileting every 2 Hours, in advance of need  - Initiate/Maintain alarm  - Obtain necessary fall risk management equipment:   - Apply yellow socks and  bracelet for high fall risk patients  - Consider moving patient to room near nurses station  Outcome: Progressing     Problem: DISCHARGE PLANNING  Goal: Discharge to home or other facility with appropriate resources  Description: INTERVENTIONS:  - Identify barriers to discharge w/patient and caregiver  - Arrange for needed discharge resources and transportation as appropriate  - Identify discharge learning needs (meds, wound care, etc.)  - Arrange for interpretive services to assist at discharge as needed  - Refer to Case Management Department for coordinating discharge planning if the patient needs post-hospital services based on physician/advanced practitioner order or complex needs related to functional status, cognitive ability, or social support system  Outcome: Progressing

## 2024-08-13 NOTE — ASSESSMENT & PLAN NOTE
Remote history of TBI, previously residing in a group home prior to [facility] sentence.  Evaluated by neuropsychiatry on 6/27/24 and deemed NOT to have medical decision-making capacity  Process for court appointed guardian started on 7/5/24 - CM following   Guardianship hearing 8/27/24

## 2024-08-13 NOTE — PLAN OF CARE
Problem: PAIN - ADULT  Goal: Verbalizes/displays adequate comfort level or baseline comfort level  Description: Interventions:  - Encourage patient to monitor pain and request assistance  - Assess pain using appropriate pain scale  - Administer analgesics based on type and severity of pain and evaluate response  - Implement non-pharmacological measures as appropriate and evaluate response  - Consider cultural and social influences on pain and pain management  - Notify physician/advanced practitioner if interventions unsuccessful or patient reports new pain  Outcome: Progressing     Problem: INFECTION - ADULT  Goal: Absence or prevention of progression during hospitalization  Description: INTERVENTIONS:  - Assess and monitor for signs and symptoms of infection  - Monitor lab/diagnostic results  - Monitor all insertion sites, i.e. indwelling lines, tubes, and drains  - Monitor endotracheal if appropriate and nasal secretions for changes in amount and color  - [place] appropriate cooling/warming therapies per order  - Administer medications as ordered  - Instruct and encourage patient and family to use good hand hygiene technique  - Identify and instruct in appropriate isolation precautions for identified infection/condition  Outcome: Progressing  Goal: Absence of fever/infection during neutropenic period  Description: INTERVENTIONS:  - Monitor WBC    Outcome: Progressing     Problem: SAFETY ADULT  Goal: Patient will remain free of falls  Description: INTERVENTIONS:  - Educate patient/family on patient safety including physical limitations  - Instruct patient to call for assistance with activity   - Consult OT/PT to assist with strengthening/mobility   - Keep Call bell within reach  - Keep bed low and locked with side rails adjusted as appropriate  - Keep care items and personal belongings within reach  - Initiate and maintain comfort rounds  - Make Fall Risk Sign visible to staff  - Offer Toileting every 2 Hours,  in advance of need  - Initiate/Maintain alarm  - Obtain necessary fall risk management equipment:   - Apply yellow socks and bracelet for high fall risk patients  - Consider moving patient to room near nurses station  Outcome: Progressing  Goal: Maintain or return to baseline ADL function  Description: INTERVENTIONS:  -  Assess patient's ability to carry out ADLs; assess patient's baseline for ADL function and identify physical deficits which impact ability to perform ADLs (bathing, care of mouth/teeth, toileting, grooming, dressing, etc.)  - Assess/evaluate cause of self-care deficits   - Assess range of motion  - Assess patient's mobility; develop plan if impaired  - Assess patient's need for assistive devices and provide as appropriate  - Encourage maximum independence but intervene and supervise when necessary  - Involve family in performance of ADLs  - Assess for home care needs following discharge   - Consider OT consult to assist with ADL evaluation and planning for discharge  - Provide patient education as appropriate  Outcome: Progressing  Goal: Maintains/Returns to pre admission functional level  Description: INTERVENTIONS:  - Perform AM-PAC 6 Click Basic Mobility/ Daily Activity assessment daily.  - Set and communicate daily mobility goal to care team and patient/family/caregiver.   - Collaborate with rehabilitation services on mobility goals if consulted  - Perform Range of Motion 3 times a day.  - Reposition patient every 2 hours.  - Dangle patient 3 times a day  - Stand patient 3 times a day  - Ambulate patient 3 times a day  - Out of bed to chair 3 times a day   - Out of bed for meals 3 times a day  - Out of bed for toileting  - Record patient progress and toleration of activity level   Outcome: Progressing     Problem: DISCHARGE PLANNING  Goal: Discharge to home or other facility with appropriate resources  Description: INTERVENTIONS:  - Identify barriers to discharge w/patient and caregiver  -  Arrange for needed discharge resources and transportation as appropriate  - Identify discharge learning needs (meds, wound care, etc.)  - Arrange for interpretive services to assist at discharge as needed  - Refer to Case Management Department for coordinating discharge planning if the patient needs post-hospital services based on physician/advanced practitioner order or complex needs related to functional status, cognitive ability, or social support system  Outcome: Progressing     Problem: Prexisting or High Potential for Compromised Skin Integrity  Goal: Skin integrity is maintained or improved  Description: INTERVENTIONS:  - Identify patients at risk for skin breakdown  - Assess and monitor skin integrity  - Assess and monitor nutrition and hydration status  - Monitor labs   - Assess for incontinence   - Turn and reposition patient  - Assist with mobility/ambulation  - Relieve pressure over bony prominences  - Avoid friction and shearing  - Provide appropriate hygiene as needed including keeping skin clean and dry  - Evaluate need for skin moisturizer/barrier cream  - Collaborate with interdisciplinary team   - Patient/family teaching  - Consider wound care consult   Outcome: Progressing     Problem: Nutrition/Hydration-ADULT  Goal: Nutrient/Hydration intake appropriate for improving, restoring or maintaining nutritional needs  Description: Monitor and assess patient's nutrition/hydration status for malnutrition. Collaborate with interdisciplinary team and initiate plan and interventions as ordered.  Monitor patient's weight and dietary intake as ordered or per policy. Utilize nutrition screening tool and intervene as necessary. Determine patient's food preferences and provide high-protein, high-caloric foods as appropriate.     INTERVENTIONS:  - Monitor oral intake, urinary output, labs, and treatment plans  - Assess nutrition and hydration status and recommend course of action  - Evaluate amount of meals  eaten  - Assist patient with eating if necessary   - Allow adequate time for meals  - Recommend/ encourage appropriate diets, oral nutritional supplements, and vitamin/mineral supplements  - Order, calculate, and assess calorie counts as needed  - Recommend, monitor, and adjust tube feedings and TPN/PPN based on assessed needs  - Assess need for intravenous fluids  - Provide specific nutrition/hydration education as appropriate  - Include patient/family/caregiver in decisions related to nutrition  Outcome: Progressing

## 2024-08-13 NOTE — ASSESSMENT & PLAN NOTE
6/7 with acute occlusion of L EIA, and not salvageable. Underwent L femoral thrombectomy and AKA with vascular surgery   - Vas sx follow-up 7/10 - L AKA incision site well-healed, staples taken out at bedside  - [facility] will be vendor - Patient now has .  - Monitor for hip flexion/abduction tightness. Stretches in therapy.  - Phantom limb sensation - not painful, well controlled.  - Now on Coumadin with hx of LV thrombus and PAOD. Checking INR's as above   - PT/OT/SLP (to work on carry over strategies) 3-5 hours/day, 5-7 days/week.    - Patient now refusing at times   - Has met rehab goals at this point.   - Outpatient f/u with [facility]

## 2024-08-13 NOTE — ASSESSMENT & PLAN NOTE
Chronic issue.  Seemed to worsen with increased irritability after discontinuing gabapentin  Resumed gabapentin  [facility] with middling results  Sleep logs have been good - discontinued.  Headache is on and off

## 2024-08-13 NOTE — ASSESSMENT & PLAN NOTE
- Incontinent during acute care hospitalization  - Has improved/resolved with improved mobility.  - Now off scheduled bowel regimen.   - Last BM 8/28 and continent. Not on a regimen  - PRN miralax, Senna and suppository

## 2024-08-13 NOTE — ASSESSMENT & PLAN NOTE
Normocytic  PTA hemoglobin was normal, now has been running 10-11 since admission  No signs or symptoms of active bleeding  Iron panel reviewed, no evidence of TY  Likely postoperative ABLA  Monitor CBC weekly

## 2024-08-13 NOTE — PROGRESS NOTES
Physical Medicine and Rehabilitation Progress Note  Sunil Patel 63 y.o. male MRN: [id number]  Unit/Bed#: [facility] 451-01 Encounter: [id number]    To Review: Sunil Patel is a 63 y.o. male who  has a past medical history of Acute lower limb ischemia (06/08/2024), Anxiety, Depression, HIV disease (HCC), Substance abuse (HCC), and Suicide attempt (HCC). who presented to the [facility] on 6/7/24 from [facility] for increased LLE swelling and pain and was found to have a left external iliac artery occlusion and acute limb ischemia. 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 Patient had TTE on 6/10/24 showing LV apex thrombus. On 6/11/24 patient had pharmacologic nuclear stress test/SPECT scan which did not show any significant areas of ischemia with EF 40-45% and recommended continuing A/C for LV apical thrombus. His course was complicated by b/l buttock unstageable pressure ulcers, urinary and fecal incontinence, pain, and significant decline in ADLs and mobility. Patient has been continued on Xarelto for anticoagulation, as well as ASA and statin. Patient has a history of TBI, with baseline nonfluent aphasia and forgetfulness. He was evaluated by neuropsychology on 6/27/24, and deemed to not have capacity to make fully informed medical decisions. Therefore, the guardianship process was initiated as of 7/5/24. He was admitted to the [facility] on 7/6.     Chief Complaint: none    Interval History/Subjective:  Patient seen and assessed at bedside this morning after finishing breakfast. Last BM 8/12. Vital signs stable. Refused gabapentin this morning due to not feeling like he needed it. Still hoping for placement soon. No acute concerns such as chest pain, shortness of breath, lightheadedness when working  "with therapy. Per therapy, had a headache that got better with Tylenol.    ROS:  10 point ROS performed and negative unless mentioned in HPI.     Today's Changes:  [facility] to observe patient  INR check tomorrow  Psych note reviewed: will continue medication regimen as below  Iron studies reviewed; unremarkable      Assessment/Plan:    History of migraine headaches  Assessment & Plan  Chronic issue.  Seemed to worsen with increased irritability after discontinuing gabapentin  Resumed gabapentin  Received nurtec once during stay with middling results  Sleep logs have been good - discontinued.  Headache is on and off    Cardiomyopathy (HCC)  Assessment & Plan  ECHO on 6/10/2024 showed LVEF 40-45% with mild global hypokinesis   Status post NM stress test on 6/11/2024 which showed: a large, mild, fixed defect in the inferior wall, possibly due to diaphragmatic attenuation artifact, there is a small area of partial reversibility in the inferior apical wall suggestive of ischemia    Elevated by cardiology, etiology felt to be possibly secondary to stress-induced cardiomyopathy, with apical thrombus  Cardiac catheterization deferred given the lack of any significant ischemia, and no current cardiac symptoms  Continue with medical management with aspirin, statin, beta-blocker, ARB  Monitor volume status, remains euvolemic off of diuretics   Outpatient follow-up with cardiology    Patient incapable of making informed decisions  Assessment & Plan  - History of remote TBI and prior strokes with comorbid psychiatric history.  - Evaluated by neuropsychology, Dr. Dilshad Tatum, PhD on 6/27/24, found to have \"diffuse cognitive dysfunction and on a measure assessing awareness of personal health status and ability to evaluate health problems, handle medical emergencies and take safety precautions, patient performed in the IMPAIRED range of functioning. During this encounter, patient does not appear to have capacity to make fully " "informed medical decisions.\"  - Court-appointed guardianship process has been initiated by acute care CM Katia Kay.    - We have identified two friends who are interested in being patient's guardians and have been involved and invested in patient's care on the ARC.   - They will need to be interviewed by the  involved in this process.    - He has to undergo his hearing on 8/6/2024 first.  -- now rescheduled to 8/27   - He would ultimately benefit from [facility]/nursing home/memory care unit - he does very well with structure and supervision.    8/2- Ongoing discussions with CM, [facility] admin and social work to dispo patient to stable long-term housing. Process continues, with evaluation by therapy managers from [facility].   8/12- unfortunately have not made much progress with SNF placement per [facility]. Have not heard back from residential community that met with him on 8/6    Urinary incontinence  Assessment & Plan  - Did have some incontinence on acute - improved with timed voids and consistent assistance with his urinal  - Described as urgency  - Marked improvement on timed voids.   - monitor for retention, incontinence (including overflow incontinence), signs/symptoms of UTI  - Timed Voids and PVRs Q4hrs initiated earlier. And PVR <150 x3, so scans discontinued.    - He has some difficulty managing the urinal    - needs assistance but is able to transfer to [facility]    - Still uses condom catheter at night, in part for continence care/to protect his skin given his buttock wounds. However now note that buttocks wound has healed  - Continue timed voids           At risk for constipation  Assessment & Plan  - Stooling adequately recently; did have some incontinence on acute now improved  - Close continent care given buttock wounds  - Last BM 8/8 and continent  - PRN miralax, Senna and suppository    At risk for venous thromboembolism (VTE)  Assessment & Plan  - Fully anticoagulated on warfarin for " apical thrombus  - IM managing    Traumatic brain injury (HCC)  Assessment & Plan  See neurocog impairment     Carnitine deficiency (HCC)  Assessment & Plan  - Carnitine replacement  - Appreciate medicine management      History of seizures  Assessment & Plan  - Depakote ER, lamotrigine, zonisamide  - [facility]    Left ventricular thrombus  Assessment & Plan  - Visualized on echocardiogram on 6/10/24: spherical 1.5 x 1.3 cm thrombus at the LV apex.   - Was started on rivaroxaban, but patient does not appear to have insurance coverage for prescriptions   - Xarelto, eliquis, and pradaxa likely cost prohibitive per IM   - 7/29 transitioned to warfarin with lovenox bridge.   - Now off lovenox, and fully anticoagulated on warfarin.   - Management as per IM.   - 8/12 INR 2.74. Continue 5mg daily. Recheck 8/14 or per IM  - Outpatient follow-up with cardiology    Benign essential hypertension  Assessment & Plan  - Toprol, losartan  - Appreciate medicine management    History of stroke  Assessment & Plan  - History of old left temporal and parietal lobe cortical infarcts, also involving the insula and left occipital lobe as well as small right posterior parietal lobe. Stable on most recent CT head on 5/16/24.   - ASA, and statin for secondary prevention  - Optimal BP control  - Monitor neuro exam - hx of LV thrombus    - See that entry  - OP neuro follow-up     Human immunodeficiency virus (HIV) infection (HCC)  Assessment & Plan  - Continue anti-retroviral treatment  - Appreciate medicine management during ARC course  - OP ID follow-up       Bipolar affective disorder (HCC)  Assessment & Plan  Mood acceptable; continue meds as outlined   Supportive counseling  NeuroPsychology consult while in ARC if available for support  Psych evaluated on 8/12 and deemed him stable for discharge with current regimen as below  Counseled on and continue to encourage deep breathing/relaxation/behavioral  management techniques  - Mirtazapine 15 mg qHs  - Sertraline 75 mg daily   - Lamictal 50mg BID  - Depakote ER 1250mg qday   - Outpatient psychiatry follow-up  - Would consult Psych before changing medications    * 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 Underwent L femoral thrombectomy and AKA with vascular surgery   - [facility] sx follow-up 7/10 - L AKA incision site well-healed, staples taken out at bedside  - [facility] will be vendor - Patient now has .  - Monitor for hip flexion/abduction tightness. Stretches in therapy  - Now on Coumadin with hx of LV thrombus and PAOD. Checking INR's as above   - PT/OT/SLP (to work on carry over strategies) 3-5 hours/day, 5-7 days/week.    - Goals are Ind-Sup at a wheelchair level.   - Outpatient f/u with [facility] and Vascular  - Continue patient training with  placement  - Continue gabapentin - doesn't do too much for phantom limb sensation, but that doesn't bother him or cause him pain currently.   - Patient still frustrated given circumstances; will continue gabapentin for anxiety    Pressure injury of buttock, stage 3 (HCC)-resolved as of 8/9/2024  Assessment & Plan  Resolved as of 8/7.   - Wound care consulted and following weekly  - POA L buttock pressure injury unstageable has healed.  - POA R buttock pressure injury  evolved from unstageable to Stage 3, and is now resolved.   - Recommend ROHO cushion in chair when out of bed instead   - Preventative hydraguard to bilateral heels BID and PRN.   - P500  - Monitor clinically for breakdown, frequent turns  - reviewed picture of wound today. It is healing well. Continue to monitor        Health Maintenance  #GI Prophylaxis: not indicated  #Code Status: Full code  #FEN: Cardiac diet + Ensure at bfast/dinner  #Dispo: Teams 8/13: ADD TBD. Still working on placement. He is meeting with a residential facility  today, and CM and Rehab Director are working on evaluation with [facility]. Guardianship court date is 8/27.    Will need f/u with PMR, PCP, Vascular, Psych     Objective:    Functional Update:  PT: sup transfers, sup bed mobility, 500 ft wheelchair ambulation independent  OT: Ind eating/grooming, Sup bathing, Ind UB dressing, Sup LB dressing, CGA toileting, CGA tub/shower transfers, Sup toilet transfers  SLP: modA comprehension, modA expression, Sup social interaction, modA problem solving, modA memory     Allergies per EMR    Physical Exam:  Temp:  [98 °F (36.7 °C)-98.9 °F (37.2 °C)] 98.3 °F (36.8 °C)  HR:  [86-92] 92  Resp:  [17-18] 18  BP: (121-132)/(63-69) 132/69  Oxygen Therapy  SpO2: 96 %      Gen: No acute distress, Well-nourished, well-appearing.  HEENT: Moist mucus membranes, Normocephalic/Atraumatic  Cardiovascular: Regular rate, rhythm, S1/S2. Distal pulses palpable  Heme/Extr: No edema/clubbing/cyanosis  Pulmonary: Non-labored breathing. Lungs CTAB  : No fernandes  GI: Soft, non-tender, non-distended. BS+  MSK: ROM is WFL in all extremities. No effusions or deformities. Bulk is symmetric.  L AKA with 5/5 strength in hip flexion and extension  Integumentary: Skin is warm, dry. No rashes or ulcers.  Neuro: AAOx3  Psych: Normal mood and affect.      Diagnostic Studies: none to review    Laboratory:    Results from last 7 days   Lab Units 08/08/24  0535   HEMOGLOBIN g/dL 10.1*   HEMATOCRIT % 34.2*   WBC Thousand/uL 6.58     Results from last 7 days   Lab Units 08/08/24  0535   BUN mg/dL 23   POTASSIUM mmol/L 3.7   CHLORIDE mmol/L 106   CREATININE mg/dL 0.93     Results from last 7 days   Lab Units 08/12/24  0520 08/09/24  0515 08/08/24  0535   PROTIME seconds 28.8* 25.9* 30.0*   INR  2.74* 2.38* 2.89*        Patient Active Problem List   Diagnosis    Bipolar affective disorder (HCC)    Substance abuse (HCC)    Human immunodeficiency virus (HIV) infection (HCC)    History of stroke    Benign  essential hypertension    Positive laboratory testing for human immunodeficiency virus (HCC)    Hypertension    Unspecified vitamin D deficiency    Tobacco abuse    Cerebrovascular accident (CVA) (HCC)    Left ventricular thrombus    History of seizures    Carnitine deficiency (HCC)    Above-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)    Traumatic brain injury (HCC)    Impaired mobility and activities of daily living    At risk for venous thromboembolism (VTE)    Acute pain    At risk for constipation    Urinary incontinence    Patient incapable of making informed decisions    Adjustment disorder with mixed anxiety and depressed mood    Cardiomyopathy (HCC)    History of migraine headaches    Postoperative anemia         Medications  Current Facility-Administered Medications   Medication Dose Route Frequency Provider Last Rate    acetaminophen  975 mg Oral TID PRN José Salcido MD      aspirin  81 mg Oral Daily Lorrie Barillas PA-C      atorvastatin  80 mg Oral Daily With Dinner Lorrie Barillas PA-C      bictegravir-emtricitab-tenofovir alafenamide  1 tablet Oral Daily With Breakfast Lorrie Barillas PA-C      bisacodyl  10 mg Rectal Daily PRN Lorrie Barillas PA-C      diphenhydrAMINE  25 mg Oral Q6H PRN Lorrie Barillas PA-C      divalproex sodium  1,250 mg Oral Daily Lorrie Barillas PA-C      dolutegravir  50 mg Oral Daily Lorrie Barillas PA-C      gabapentin  100 mg Oral Q8H Ashley Depadua, MD      lamoTRIgine  50 mg Oral BID Lorrie Barillas PA-C      levOCARNitine  1,000 mg/day Oral TID With Meals Lorrie Barillas PA-C      losartan  50 mg Oral Daily Lorrie Barillas PA-C      melatonin  6 mg Oral HS Lorrie Barillas PA-C      metoprolol succinate  25 mg Oral Daily CHARLIE Mcclelland      mirtazapine  15 mg Oral HS Lorrie Barillas PA-C      ondansetron  4 mg Oral Q6H PRN Lorrie Barillas PA-C      polyethylene glycol  17 g Oral Daily  PRN Lorrie Barillas PA-C      senna  1 tablet Oral HS PRN Lorrie Barillas PA-C      sertraline  75 mg Oral Daily Lorrie Barillas PA-C      tamsulosin  0.4 mg Oral Daily With Dinner Lorrie Barillas PA-C      warfarin  5 mg Oral Daily (warfarin) CHARLIE Plata      zonisamide  400 mg Oral Daily Lorrie Barillas PA-C            ** Please Note: Fluency Direct voice to text software may have been used in the creation of this document. **

## 2024-08-13 NOTE — ASSESSMENT & PLAN NOTE
Home regimen: Losartan 50 mg daily, Toprol XL 25 mg BID  Current regimen: Losartan 50 mg daily, Toprol-XL 25 mg daily  Blood pressure reviewed and stable

## 2024-08-13 NOTE — PROGRESS NOTES
[facility]  Progress Note  Name: Sunil Patel I  MRN: [id number]  Unit/Bed#: -01 I Date of Admission: 7/6/2024   Date of Service: 8/13/2024 I Hospital Day: 38    Assessment & Plan   * Above-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)  Assessment & Plan  Presented with left lower extremity acute limb ischemia/extensive left iliofemoral and left infrainguinal embolism requiring left femoral thrombectomy and subsequent AKA on 6/7/24 with vascular surgery.  Vascular surgery following, last evaluated on 7/10/24 and removed staples  Continue with local wound care   Continue ASA, Coumadin and statin   Stable for discharge from physiatry standpoint   Dispo planning as per case management     Postoperative anemia  Assessment & Plan  Normocytic  PTA hemoglobin was normal, now has been running 10-11 since admission  No signs or symptoms of active bleeding  Iron panel reviewed, no evidence of TY  Likely postoperative ABLA  Monitor CBC weekly     Cardiomyopathy (HCC)  Assessment & Plan  ECHO 6/10/2024 = LVEF 40-45% with mild global hypokinesis   Status post NM stress test 6/11/2024 = large, mild, fixed defect in the inferior wall, possibly due to diaphragmatic attenuation artifact, there is a small area of partial reversibility in the inferior apical wall suggestive of ischemia    Evaluated by cardiology.  Etiology felt to be possibly secondary to stress-induced cardiomyopathy, with apical thrombus  Cardiac catheterization deferred given the lack of any significant ischemia, and no current cardiac symptoms  Continue with medical management with aspirin, statin, beta-blocker, ARB  Remains euvolemic off of diuretics, continue to monitor volume status   Outpatient follow-up with cardiology, previously followed with Dr. Gumaro Aguila [facility]    Left ventricular thrombus  Assessment & Plan  Noted on echocardiogram 6/10/2024: spherical 1.5 x 1.3 cm thrombus at the LV apex   Initiated on AC  with Xarelto however unable to verify insurance coverage, now on Coumadin at 5 mg daily  INR therapeutic on last check, trend intermittently     Benign essential hypertension  Assessment & Plan  Home regimen: Losartan 50 mg daily, Toprol XL 25 mg BID  Current regimen: Losartan 50 mg daily, Toprol-XL 25 mg daily  Blood pressure reviewed and stable       History of migraine headaches  Assessment & Plan  Continue PTA meds Depakote, gabapentin, zonegran and lamictal all of which can help in prevention  Unable to take triptans due to a history of stroke    Traumatic brain injury (HCC)  Assessment & Plan  Remote history of TBI, previously residing in a group home prior to senior living sentence.  Evaluated by neuropsychiatry on 6/27/24 and deemed NOT to have medical decision-making capacity  Process for court appointed guardian started on 7/5/24 - CM following   Guardianship hearing 8/27/24    Bipolar affective disorder (HCC)  Assessment & Plan  Psychiatry evaluated most recently on 8/12 and cleared for discharge to rehab   Continue home meds Zoloft and Remeron  Mood is currently stable    History of seizures  Assessment & Plan  Diagnosed in July 2019, follows with [facility] neurology outpatient  Continue home regimen with Depakote ER, Lamotrigine and Zonegran    History of stroke  Assessment & Plan  History of left MCA CVA in May 2018 with residual expressive aphasia  Continue ASA and atorvastatin    Human immunodeficiency virus (HIV) infection (HCC)  Assessment & Plan  Continue Biktarvy and Tivicay.    Carnitine deficiency (HCC)  Assessment & Plan  Continue levocarnitine 330 mg 3x daily with meals.    Patient incapable of making informed decisions  Assessment & Plan  Seen by neuropsych on 6/27 and was deemed NOT to have medical decision making capacity  Guardianship hearing scheduled for 8/27           Subjective/ HPI: Patient seen and examined. Patients overnight issues or events were reviewed with nursing staff. New or overnight  issues include the following:     Patient seen sleeping comfortably in bed, did not attempt to arouse. Discussed with RN, patient had complained of headache this morning and refused therapy. Notes he has been getting headaches intermittently but typically resolves with Tylenol.     ROS:   A 10 point ROS was performed; negative except as noted above.        *Labs /Radiology studies Reviewed, no new imaging  *Medications  reviewed and reconciled as needed  *Please refer to order section for additional ordered labs studies      Physical Examination:  Vitals:   Vitals:    08/12/24 1405 08/12/24 2042 08/13/24 0526 08/13/24 0801   BP: 121/63 126/69 132/66 132/69   BP Location: Left arm Left arm Left arm Left arm   Pulse: 86 89 88 92   Resp: 17 18 18    Temp: 98.9 °F (37.2 °C) 98 °F (36.7 °C) 98.3 °F (36.8 °C)    TempSrc: Oral Oral Oral    SpO2: 93% 95% 96%    Weight:       Height:           General Appearance: NAD; sleeping comfortably in bed  Lungs: normal respiratory effort, on room air  CV: regular rate   EXT: no lymphadenopathy, no edema, status post L AKA  Neuro: no focal deficits     The above physical exam was reviewed and updated as determined by my evaluation of the patient on 8/13/2024.    Invasive Devices       Drain  Duration             External Urinary Catheter 12 days                       VTE Pharmacologic Prophylaxis: Warfarin (Coumadin)  Code Status: Level 1 - Full Code  Current Length of Stay: 38 day(s)    Total floor / unit time spent today 15 minutes  Coordination of patient's care was performed in conjunction with consulting services. Time invested included review of patient's labs, vitals, and management of their comorbidities with continued monitoring, examination of patient as well as answering patient questions, documenting her findings and creating progress note in electronic medical record,  ordering appropriate diagnostic testing.       ** Please Note:  voice to text software may have been  used in the creation of this document. Although proof errors in transcription or interpretation are a potential of such software**

## 2024-08-14 PROCEDURE — 97530 THERAPEUTIC ACTIVITIES: CPT

## 2024-08-14 PROCEDURE — 99232 SBSQ HOSP IP/OBS MODERATE 35: CPT | Performed by: PHYSICAL MEDICINE & REHABILITATION

## 2024-08-14 PROCEDURE — 97110 THERAPEUTIC EXERCISES: CPT

## 2024-08-14 PROCEDURE — 92507 TX SP LANG VOICE COMM INDIV: CPT

## 2024-08-14 PROCEDURE — 99231 SBSQ HOSP IP/OBS SF/LOW 25: CPT | Performed by: PHYSICIAN ASSISTANT

## 2024-08-14 RX ORDER — BISACODYL 10 MG
10 SUPPOSITORY, RECTAL RECTAL DAILY PRN
Status: DISCONTINUED | OUTPATIENT
Start: 2024-08-14 | End: 2024-08-30 | Stop reason: HOSPADM

## 2024-08-14 RX ORDER — SENNOSIDES 8.6 MG
1 TABLET ORAL
Status: DISCONTINUED | OUTPATIENT
Start: 2024-08-14 | End: 2024-08-30 | Stop reason: HOSPADM

## 2024-08-14 RX ADMIN — TAMSULOSIN HYDROCHLORIDE 0.4 MG: 0.4 CAPSULE ORAL at 17:03

## 2024-08-14 RX ADMIN — LEVOCARNITINE 330 MG: 1 SOLUTION ORAL at 17:03

## 2024-08-14 RX ADMIN — WARFARIN SODIUM 5 MG: 5 TABLET ORAL at 17:02

## 2024-08-14 RX ADMIN — DIVALPROEX SODIUM 1250 MG: 500 TABLET, EXTENDED RELEASE ORAL at 07:56

## 2024-08-14 RX ADMIN — ZONISAMIDE 400 MG: 100 CAPSULE ORAL at 07:55

## 2024-08-14 RX ADMIN — LAMOTRIGINE 50 MG: 25 TABLET ORAL at 08:01

## 2024-08-14 RX ADMIN — LEVOCARNITINE 330 MG: 1 SOLUTION ORAL at 13:27

## 2024-08-14 RX ADMIN — LEVOCARNITINE 330 MG: 1 SOLUTION ORAL at 07:51

## 2024-08-14 RX ADMIN — SERTRALINE HYDROCHLORIDE 75 MG: 50 TABLET ORAL at 07:53

## 2024-08-14 RX ADMIN — BICTEGRAVIR SODIUM, EMTRICITABINE, AND TENOFOVIR ALAFENAMIDE FUMARATE 1 TABLET: 50; 200; 25 TABLET ORAL at 07:50

## 2024-08-14 RX ADMIN — LOSARTAN POTASSIUM 50 MG: 50 TABLET, FILM COATED ORAL at 07:52

## 2024-08-14 RX ADMIN — MIRTAZAPINE 15 MG: 15 TABLET, FILM COATED ORAL at 22:15

## 2024-08-14 RX ADMIN — GABAPENTIN 100 MG: 100 CAPSULE ORAL at 06:12

## 2024-08-14 RX ADMIN — DOLUTEGRAVIR SODIUM 50 MG: 50 TABLET, FILM COATED ORAL at 08:01

## 2024-08-14 RX ADMIN — GABAPENTIN 100 MG: 100 CAPSULE ORAL at 13:26

## 2024-08-14 RX ADMIN — GABAPENTIN 100 MG: 100 CAPSULE ORAL at 22:15

## 2024-08-14 RX ADMIN — ATORVASTATIN CALCIUM 80 MG: 80 TABLET, FILM COATED ORAL at 17:02

## 2024-08-14 RX ADMIN — METOPROLOL SUCCINATE 25 MG: 25 TABLET, EXTENDED RELEASE ORAL at 07:54

## 2024-08-14 RX ADMIN — Medication 6 MG: at 22:15

## 2024-08-14 RX ADMIN — LAMOTRIGINE 50 MG: 25 TABLET ORAL at 17:03

## 2024-08-14 RX ADMIN — ASPIRIN 81 MG: 81 TABLET, COATED ORAL at 07:51

## 2024-08-14 NOTE — CASE MANAGEMENT
Case Management Discharge Planning Note    Patient name Sunil Patel  Location /-01 MRN [id number]  : 1960 Date 2024       Current Admission Date: 2024  Current Admission Diagnosis:Above-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)   Patient Active Problem List    Diagnosis Date Noted Date Diagnosed    Postoperative anemia 2024     History of migraine headaches 2024     Cardiomyopathy (HCC) 2024     Adjustment disorder with mixed anxiety and depressed mood 2024     Impaired mobility and activities of daily living 2024     At risk for venous thromboembolism (VTE) 2024     Acute pain 2024     At risk for constipation 2024     Urinary incontinence 2024     Patient incapable of making informed decisions 2024     Above-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) 2024     Traumatic brain injury (HCC) 2024     Carnitine deficiency ([facility]) 2024     Left ventricular thrombus 2024     History of seizures 2024     Tobacco abuse 10/26/2019     Cerebrovascular accident (CVA) ([facility]) 10/26/2019     Positive laboratory testing for human immunodeficiency virus ([facility]) 2017     Bipolar affective disorder ([facility]) 2017     Hypertension 2017     Human immunodeficiency virus (HIV) infection ([facility]) 2017     History of stroke 2017     Substance abuse ([facility]) 2013     Unspecified vitamin D deficiency 2010     Benign essential hypertension 2010       LOS (days): 39  Geometric Mean LOS (GMLOS) (days):   Days to GMLOS:     OBJECTIVE:  Risk of Unplanned Readmission Score: 37.89         Current admission status: Inpatient Rehab   Preferred Pharmacy:   [facility] [place] - [address] [facility]  [address]  [place] [address]  Phone: [phone] Fax: [phone]    [facility] [place] [place] - [address] [place] [address]  [address]  [address] [place] [address] [address]  [place] [address]  Phone: [phone] Fax: [phone]    Primary Care Provider: CHARLIE Trevino    Primary Insurance: MEDICARE  Secondary Insurance: [facility]    DISCHARGE DETAILS:      Additional Comments: Phone call form Nav Gamboa's office, spoke with Portia. Pt does not need to be present for the hearing based on the expert report of psychiatric illness. CM will notify ARC CM E.B and KAMILLETM

## 2024-08-14 NOTE — PROGRESS NOTES
Physical Medicine and Rehabilitation Progress Note  Sunil Ptael 63 y.o. male MRN: [id number]  Unit/Bed#: [facility] 451-01 Encounter: [id number]    To Review: Sunil Patel is a 63 y.o. male who  has a past medical history of Acute lower limb ischemia (06/08/2024), Anxiety, Depression, HIV disease (HCC), Substance abuse (HCC), and Suicide attempt (HCC). who presented to the [facility] on 6/7/24 from [facility] for increased LLE swelling and pain and was found to have a left external iliac artery occlusion and acute limb ischemia. 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 Patient had TTE on 6/10/24 showing LV apex thrombus. On 6/11/24 patient had pharmacologic nuclear stress test/SPECT scan which did not show any significant areas of ischemia with EF 40-45% and recommended continuing A/C for LV apical thrombus. His course was complicated by b/l buttock unstageable pressure ulcers, urinary and fecal incontinence, pain, and significant decline in ADLs and mobility. Patient has been continued on Xarelto for anticoagulation, as well as ASA and statin. Patient has a history of TBI, with baseline nonfluent aphasia and forgetfulness. He was evaluated by neuropsychology on 6/27/24, and deemed to not have capacity to make fully informed medical decisions. Therefore, the guardianship process was initiated as of 7/5/24. He was admitted to the [facility] on 7/6.     Chief Complaint: No new issues.    Interval History/Subjective:  Headache today much better. The folks from a SNF/[facility] came by, and he stated they seemed very nice, but he wanted to be able to pain and smoke. I reviewed with him that not many places would allow that, and he expressed some frustration, but ultimately understanding. He is eating a sandwich  "and otherwise feels well. No new CP, SOB, fevers, chills, N/V, abdominal pain. Lat BM 8/14.     ROS:  A 10 point review of systems was negative except for what is noted in the HPI.    Today's Changes:  [facility] came by and at this point are not accepting patient. Will be looking to transfer patient back to the acute care hospital in the near future unless alternative arrangements can be made.    Should probably check INR tomorrow.     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 Underwent L femoral thrombectomy and AKA with vascular surgery   - [facility] sx follow-up 7/10 - L AKA incision site well-healed, staples taken out at bedside  - [facility] will be vendor - Patient now has .  - Monitor for hip flexion/abduction tightness. Stretches in therapy  - Now on Coumadin with hx of LV thrombus and PAOD. Checking INR's as above   - PT/OT/SLP (to work on carry over strategies) 3-5 hours/day, 5-7 days/week.    - Goals are Ind-Sup at a wheelchair level.   - Outpatient f/u with [facility] and Vascular  - Continue patient training with  placement  - Continue gabapentin - doesn't do too much for phantom limb sensation, but that doesn't bother him or cause him pain currently.   - Patient still frustrated given circumstances; will continue gabapentin for anxiety    Patient incapable of making informed decisions  Assessment & Plan  - History of remote TBI and prior strokes with comorbid psychiatric history.  - Evaluated by neuropsychology, Dr. Dilshad Tatum, PhD on 6/27/24, found to have \"diffuse cognitive dysfunction and on a measure assessing awareness of personal health status and ability to evaluate health problems, handle medical emergencies and take safety precautions, patient performed in the IMPAIRED range of functioning. During this encounter, patient does not appear to have " "capacity to make fully informed medical decisions.\"  - Court-appointed guardianship process has been initiated by acute care CM Katia Kay.    - We have identified two friends who are interested in being patient's guardians and have been involved and invested in patient's care on the ARC.   - They will need to be interviewed by the  involved in this process.    - He has to undergo his hearing on 8/6/2024 first.  -- now rescheduled to 8/27   - He would ultimately benefit from [facility]/nursing home/memory care unit - he does very well with structure and supervision.    8/2- Ongoing discussions with CM, [facility] admin and social work to dispo patient to stable long-term housing. Process continues, with evaluation by therapy managers from [facility].   8/12- unfortunately have not made much progress with SNF placement per [facility] facilities. Have not heard back from residential community that met with him on 8/6    Urinary incontinence  Assessment & Plan  - Did have some incontinence on acute - improved with timed voids and consistent assistance with his urinal  - Described as urgency  - Marked improvement on timed voids.   - monitor for retention, incontinence (including overflow incontinence), signs/symptoms of UTI  - Timed Voids and PVRs Q4hrs initiated earlier. And PVR <150 x3, so scans discontinued.    - He has some difficulty managing the urinal    - needs assistance but is able to [facility]    - Still uses condom catheter at night, in part for continence care/to protect his skin given his buttock wounds. However now note that buttocks wound has healed  - Continue timed voids           At risk for constipation  Assessment & Plan  - Stooling adequately recently; did have some incontinence on acute now improved  - Close continent care given buttock wounds  - Last BM 8/14 and continent  - PRN miralax, Senna and suppository    At risk for venous thromboembolism (VTE)  Assessment & Plan  - Fully " anticoagulated on warfarin for apical thrombus  - IM managing    History of stroke  Assessment & Plan  - History of old left temporal and parietal lobe cortical infarcts, also involving the insula and left occipital lobe as well as small right posterior parietal lobe. Stable on most recent CT head on 5/16/24.   - ASA, and statin for secondary prevention  - Optimal BP control  - Monitor neuro exam - hx of LV thrombus    - See that entry  - OP neuro follow-up     Bipolar affective disorder (HCC)  Assessment & Plan  Mood acceptable; continue meds as outlined   Supportive counseling  NeuroPsychology consult while in ARC if available for support  Psych evaluated on 8/12 and deemed him stable for discharge with current regimen as below  Counseled on and continue to encourage deep breathing/relaxation/behavioral management techniques  - Mirtazapine 15 mg qHs  - Sertraline 75 mg daily   - Lamictal 50mg BID  - Depakote ER 1250mg qday   - Outpatient psychiatry follow-up  - Would consult Psych before changing medications    Pressure injury of buttock, stage 3 (HCC)-resolved as of 8/9/2024  Assessment & Plan  Resolved as of 8/7.   - Wound care consulted and following weekly  - POA L buttock pressure injury unstageable has healed.  - POA R buttock pressure injury  evolved from unstageable to Stage 3, and is now resolved.   - Recommend ROHO cushion in chair when out of bed instead   - Preventative hydraguard to bilateral heels BID and PRN.   - P500  - Monitor clinically for breakdown, frequent turns  - reviewed picture of wound today. It is healing well. Continue to monitor    History of migraine headaches  Assessment & Plan  Chronic issue.  Seemed to worsen with increased irritability after discontinuing gabapentin  Resumed gabapentin  [facility] with middling results  Sleep logs have been good - discontinued.  Headache is on and off    Cardiomyopathy (HCC)  Assessment & Plan  ECHO on 6/10/2024 showed LVEF  40-45% with mild global hypokinesis   Status post NM stress test on 6/11/2024 which showed: a large, mild, fixed defect in the inferior wall, possibly due to diaphragmatic attenuation artifact, there is a small area of partial reversibility in the inferior apical wall suggestive of ischemia    Elevated by cardiology, etiology felt to be possibly secondary to stress-induced cardiomyopathy, with apical thrombus  Cardiac catheterization deferred given the lack of any significant ischemia, and no current cardiac symptoms  Continue with medical management with aspirin, statin, beta-blocker, ARB  Monitor volume status, remains euvolemic off of diuretics   Outpatient follow-up with cardiology    Traumatic brain injury (HCC)  Assessment & Plan  See neurocog impairment     Carnitine deficiency (HCC)  Assessment & Plan  - Carnitine replacement  - Appreciate medicine management      History of seizures  Assessment & Plan  - Depakote ER, lamotrigine, zonisamide  - Outpatient follow-up with [facility] neurology    Left ventricular thrombus  Assessment & Plan  - Visualized on echocardiogram on 6/10/24: spherical 1.5 x 1.3 cm thrombus at the LV apex.   - Was started on rivaroxaban, but patient does not appear to have insurance coverage for prescriptions   - Xarelto, eliquis, and pradaxa likely cost prohibitive per IM   - 7/29 transitioned to warfarin with lovenox bridge.   - Now off lovenox, and fully anticoagulated on warfarin.   - Management as per IM.   - 8/12 INR 2.74. Continue 5mg daily. Recheck 8/14 or per IM  - Outpatient follow-up with cardiology    Benign essential hypertension  Assessment & Plan  - Toprol, losartan  - Appreciate medicine management    Human immunodeficiency virus (HIV) infection ([facility])  Assessment & Plan  - Continue anti-retroviral treatment  - Appreciate medicine management during ARC course  - OP ID follow-up           Health Maintenance  #GI Prophylaxis: not indicated  #Code Status: Full code  #FEN: Cardiac  diet + Ensure at bfast/dinner  #Dispo: Teams 8/13: ADD TBD. Still working on placement. May need to transfer back to acute care hospital. Guardianship court date is 8/27.    Will need f/u with PMR, PCP, Vascular, Psych     Objective:    Functional Update:  PT: sup transfers, sup bed mobility, 500 ft wheelchair ambulation independent  OT: Ind eating/grooming, Sup bathing, Ind UB dressing, Sup LB dressing, CGA toileting, CGA tub/shower transfers, Sup toilet transfers  SLP: modA comprehension, modA expression, Sup social interaction, modA problem solving, modA memory     Allergies per EMR    Physical Exam:  Temp:  [97.7 °F (36.5 °C)-98.3 °F (36.8 °C)] 97.7 °F (36.5 °C)  HR:  [88-92] 92  Resp:  [17-18] 17  BP: (117-131)/(60-79) 117/70  Oxygen Therapy  SpO2: 95 %    Gen: No acute distress, Well-nourished, well-appearing.  HEENT: Moist mucus membranes, Normocephalic/Atraumatic  Cardiovascular: Regular rate, rhythm, S1/S2. Distal pulses palpable  Heme/Extr: No edema  Pulmonary: Non-labored breathing. Lungs CTAB  : No fernandes  GI: Soft, non-tender, non-distended.   MSK: Stable L AKA  Integumentary: Skin is warm, dry.   Neuro: AAOx3,  Appropriate to questioning. Still with significant aphasia and impaired insight/deficits.   Psych: Normal mood and affect.       Diagnostic Studies: Reviewed, no new imaging    Laboratory:  Reviewed, no new labs.   Results from last 7 days   Lab Units 08/08/24  0535   HEMOGLOBIN g/dL 10.1*   HEMATOCRIT % 34.2*   WBC Thousand/uL 6.58     Results from last 7 days   Lab Units 08/08/24  0535   BUN mg/dL 23   POTASSIUM mmol/L 3.7   CHLORIDE mmol/L 106   CREATININE mg/dL 0.93     Results from last 7 days   Lab Units 08/12/24  0520 08/09/24  0515 08/08/24  0535   PROTIME seconds 28.8* 25.9* 30.0*   INR  2.74* 2.38* 2.89*        Patient Active Problem List   Diagnosis    Bipolar affective disorder (HCC)    Substance abuse (HCC)    Human immunodeficiency virus (HIV) infection (HCC)    History of stroke     Benign essential hypertension    Positive laboratory testing for human immunodeficiency virus (HCC)    Hypertension    Unspecified vitamin D deficiency    Tobacco abuse    Cerebrovascular accident (CVA) (HCC)    Left ventricular thrombus    History of seizures    Carnitine deficiency (HCC)    Above-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)    Traumatic brain injury (HCC)    Impaired mobility and activities of daily living    At risk for venous thromboembolism (VTE)    Acute pain    At risk for constipation    Urinary incontinence    Patient incapable of making informed decisions    Adjustment disorder with mixed anxiety and depressed mood    Cardiomyopathy (HCC)    History of migraine headaches    Postoperative anemia         Medications  Current Facility-Administered Medications   Medication Dose Route Frequency Provider Last Rate    acetaminophen  975 mg Oral TID PRN José Salcido MD      aspirin  81 mg Oral Daily Lorrie Barillas PA-C      atorvastatin  80 mg Oral Daily With Dinner Lorrie Barillas PA-C      bictegravir-emtricitab-tenofovir alafenamide  1 tablet Oral Daily With Breakfast Lorrie Barillas PA-C      bisacodyl  10 mg Rectal Daily PRN Kenny Castro MD      diphenhydrAMINE  25 mg Oral Q6H PRN Lorrie Barillas PA-C      divalproex sodium  1,250 mg Oral Daily Lorrie Barillas PA-C      dolutegravir  50 mg Oral Daily Lorrie Barillas PA-C      gabapentin  100 mg Oral Q8H Ashley Depadua, MD      lamoTRIgine  50 mg Oral BID Lorrie Barillas PA-C      levOCARNitine  1,000 mg/day Oral TID With Meals Lorrie Barillas PA-C      losartan  50 mg Oral Daily Lorrie Barillas PA-C      melatonin  6 mg Oral HS Lorrie Barillas PA-C      metoprolol succinate  25 mg Oral Daily CHARLIE Mcclelland      mirtazapine  15 mg Oral HS Lorrie Barillas PA-C      ondansetron  4 mg Oral Q6H PRN Lorrie Barillas PA-C      polyethylene glycol  17 g  Oral Daily PRN Lorrie Barillas PA-C      senna  1 tablet Oral HS PRN Kenny Castro MD      sertraline  75 mg Oral Daily Lorrie Barillas PA-C      tamsulosin  0.4 mg Oral Daily With Dinner Lorrie Barillas PA-C      warfarin  5 mg Oral Daily (warfarin) CHARLIE Plata      zonisamide  400 mg Oral Daily Lorrie Barillas PA-C            ** Please Note: Fluency Direct voice to text software may have been used in the creation of this document. **

## 2024-08-14 NOTE — PROGRESS NOTES
[facility]  Progress Note  Name: Sunil Patel I  MRN: [id number]  Unit/Bed#: -01 I Date of Admission: 7/6/2024   Date of Service: 8/14/2024 I Hospital Day: 39    Assessment & Plan   * Above-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)  Assessment & Plan  Presented with left lower extremity acute limb ischemia/extensive left iliofemoral and left infrainguinal embolism requiring left femoral thrombectomy and subsequent AKA on 6/7/24 with vascular surgery.  Vascular surgery following, last evaluated on 7/10/24 and removed staples  Continue with local wound care   Continue ASA, Coumadin and statin   Stable for discharge from PMR standpoint   Dispo planning as per case management     Postoperative anemia  Assessment & Plan  Normocytic  PTA hemoglobin was normal, now has been running 10-11 since admission  No signs or symptoms of active bleeding  Iron panel reviewed, no evidence of TY  Likely postoperative ABLA  Monitor CBC weekly     Cardiomyopathy (HCC)  Assessment & Plan  ECHO 6/10/2024 = LVEF 40-45% with mild global hypokinesis   Status post NM stress test 6/11/2024 = large, mild, fixed defect in the inferior wall, possibly due to diaphragmatic attenuation artifact, there is a small area of partial reversibility in the inferior apical wall suggestive of ischemia    Evaluated by cardiology.  Etiology felt to be possibly secondary to stress-induced cardiomyopathy, with apical thrombus  Cardiac catheterization deferred given the lack of any significant ischemia, and no current cardiac symptoms  Continue with medical management with aspirin, statin, beta-blocker, ARB  Remains euvolemic off of diuretics, continue to monitor volume status   Outpatient follow-up with cardiology, previously followed with Dr. Gumaro Aguila [facility]    Left ventricular thrombus  Assessment & Plan  Noted on echocardiogram 6/10/2024: spherical 1.5 x 1.3 cm thrombus at the LV apex   Initiated on AC with  Xarelto however unable to verify insurance coverage, now on Coumadin at 5 mg daily  INR therapeutic on last check, trend 2x weekly     Benign essential hypertension  Assessment & Plan  Blood pressure reviewed and stable   Continue with current regimen: Losartan 50 mg daily, Toprol-XL 25 mg daily      History of migraine headaches  Assessment & Plan  Continue PTA meds Depakote, gabapentin, zonegran and lamictal all of which can help in prevention  Unable to take triptans due to a history of stroke  PRN Tylenol     Traumatic brain injury (HCC)  Assessment & Plan  Remote history of TBI, previously residing in a group home prior to long term sentence.  Evaluated by neuropsychiatry on 6/27/24 and deemed NOT to have medical decision-making capacity  Process for court appointed guardian started on 7/5/24 -  following   Guardianship hearing 8/27/24    Bipolar affective disorder (HCC)  Assessment & Plan  Psychiatry evaluated most recently on 8/12 and cleared for discharge to rehab   Continue home meds Zoloft and Remeron  Mood is currently stable    History of seizures  Assessment & Plan  Diagnosed in July 2019, follows with [facility] neurology outpatient  Continue home regimen with Depakote ER, Lamotrigine and Zonegran    History of stroke  Assessment & Plan  History of left MCA CVA in May 2018 with residual expressive aphasia  Continue ASA and atorvastatin    Human immunodeficiency virus (HIV) infection (HCC)  Assessment & Plan  Continue Biktarvy and Tivicay.    Carnitine deficiency (HCC)  Assessment & Plan  Continue levocarnitine 330 mg 3x daily with meals.    Patient incapable of making informed decisions  Assessment & Plan  Seen by neuropsych on 6/27 and was deemed NOT to have medical decision making capacity  Guardianship hearing scheduled for 8/27               The above assessment and plan was reviewed and updated as determined by my evaluation of the patient on 8/14/2024.    Labs:   Results from last 7 days   Lab Units  08/08/24  0535   WBC Thousand/uL 6.58   HEMOGLOBIN g/dL 10.1*   HEMATOCRIT % 34.2*   PLATELETS Thousands/uL 360     Results from last 7 days   Lab Units 08/08/24  0535   SODIUM mmol/L 140   POTASSIUM mmol/L 3.7   CHLORIDE mmol/L 106   CO2 mmol/L 24   BUN mg/dL 23   CREATININE mg/dL 0.93   CALCIUM mg/dL 8.7         Results from last 7 days   Lab Units 08/12/24  0520 08/09/24  0515   INR  2.74* 2.38*           Imaging  No orders to display       Review of Scheduled Meds:  Current Facility-Administered Medications   Medication Dose Route Frequency Provider Last Rate    acetaminophen  975 mg Oral TID PRN José Salcido MD      aspirin  81 mg Oral Daily Lorrie Barillas PA-C      atorvastatin  80 mg Oral Daily With Dinner Lorrie Barillas PA-C      bictegravir-emtricitab-tenofovir alafenamide  1 tablet Oral Daily With Breakfast Lorrie Barillas PA-C      bisacodyl  10 mg Rectal Daily PRN Kenny Castro MD      diphenhydrAMINE  25 mg Oral Q6H PRN Lorrie Barillas PA-C      divalproex sodium  1,250 mg Oral Daily Lorrie Barillas PA-C      dolutegravir  50 mg Oral Daily Lorrie Barillas PA-C      gabapentin  100 mg Oral Q8H Ashley Depadua, MD      lamoTRIgine  50 mg Oral BID Lorrie Barillas PA-C      levOCARNitine  1,000 mg/day Oral TID With Meals Lorrie Barillas PA-C      losartan  50 mg Oral Daily Lorrie Barillas PA-C      melatonin  6 mg Oral HS Lorrie Barillas PA-C      metoprolol succinate  25 mg Oral Daily CHARLIE Mcclelland      mirtazapine  15 mg Oral HS Lorrie Barillas PA-C      ondansetron  4 mg Oral Q6H PRN Lorrie Barillas PA-C      polyethylene glycol  17 g Oral Daily PRN Lorrie Barillas PA-C      senna  1 tablet Oral HS PRN Kenny Castro MD      sertraline  75 mg Oral Daily Lorrie Barillas PA-C      tamsulosin  0.4 mg Oral Daily With Dinner Lorrie Barillas PA-C      warfarin  5 mg Oral Daily (warfarin) Catherine  CHARLIE Chow      zonisamide  400 mg Oral Daily Lorrie Barillas PA-C         Subjective/ HPI: Patient seen and examined. Patients overnight issues or events were reviewed with nursing staff. New or overnight issues include the following:     Patient sitting up in chair during my encounter, he reports feeling well today. Offers no complaints and specifically denies headache.     *Labs /Radiology studies Reviewed, no new imaging  *Medications  reviewed and reconciled as needed  *Please refer to order section for additional ordered labs studies      Physical Examination:  Vitals:   Vitals:    08/13/24 0801 08/13/24 1424 08/13/24 2031 08/14/24 0622   BP: 132/69 129/70 117/60 131/79   BP Location: Left arm Left arm Left arm Left arm   Pulse: 92 79 90 88   Resp:  14 17 18   Temp:  97.9 °F (36.6 °C) 98.3 °F (36.8 °C) 98.2 °F (36.8 °C)   TempSrc:  Oral Oral Oral   SpO2:  95% 91% 93%   Weight:    76.4 kg (168 lb 6.9 oz)   Height:           General Appearance: NAD; pleasant  Lungs:  normal respiratory effort, no retractions, expiratory effort normal, on room air  CV: regular rate and rhythm  ABD: soft non tender  EXT: status post L AKA  Skin: no rash  Psych: affect normal, mood normal  Neuro: AAOx3       The above physical exam was reviewed and updated as determined by my evaluation of the patient on 8/14/2024.    Invasive Devices       Drain  Duration             External Urinary Catheter 13 days                       VTE Pharmacologic Prophylaxis: Warfarin (Coumadin)  Code Status: Level 1 - Full Code  Current Length of Stay: 39 day(s)    Total floor / unit time spent today 15 minutes  Coordination of patient's care was performed in conjunction with consulting services. Time invested included review of patient's labs, vitals, and management of their comorbidities with continued monitoring, examination of patient as well as answering patient questions, documenting her findings and creating progress note in electronic  medical record,  ordering appropriate diagnostic testing.       ** Please Note:  voice to text software may have been used in the creation of this document. Although proof errors in transcription or interpretation are a potential of such software**

## 2024-08-14 NOTE — ASSESSMENT & PLAN NOTE
ECHO 6/10/2024 = LVEF 40-45% with mild global hypokinesis   Status post NM stress test 6/11/2024 = large, mild, fixed defect in the inferior wall, possibly due to diaphragmatic attenuation artifact, there is a small area of partial reversibility in the inferior apical wall suggestive of ischemia    Evaluated by cardiology.  Etiology felt to be possibly secondary to stress-induced cardiomyopathy, with apical thrombus  Cardiac catheterization deferred given the lack of any significant ischemia, and no current cardiac symptoms  Continue with medical management with aspirin, statin, beta-blocker, ARB  Remains euvolemic off of diuretics, continue to monitor volume status   Outpatient follow-up with cardiology, previously followed with Dr. Gumaro Aguila [facility]

## 2024-08-14 NOTE — ASSESSMENT & PLAN NOTE
Continue PTA meds Depakote, gabapentin, zonegran and lamictal all of which can help in prevention  Unable to take triptans due to a history of stroke  PRN Tylenol

## 2024-08-14 NOTE — PROGRESS NOTES
08/14/24 0830   Pain Assessment   Pain Assessment Tool 0-10   Pain Score No Pain   Restrictions/Precautions   Precautions Aphasia;Bed/chair alarms;Cognitive;Fall Risk;Supervision on toilet/commode   Weight Bearing Restrictions Yes   LLE Weight Bearing Per Order NWB   ROM Restrictions No   Braces or Orthoses   (L LE  not used this session, being washed)   Cognition   Overall Cognitive Status Impaired   Arousal/Participation Alert;Responsive;Cooperative   Attention Attends with cues to redirect   Orientation Level Oriented X4   Memory Decreased short term memory;Decreased recall of recent events;Decreased recall of precautions   Following Commands Follows one step commands with increased time or repetition   Subjective   Subjective Pt reports he is eager to go outside, headache has subsided from yesterday   Roll Left and Right   Comment Pt OOB   Sit to Lying   Comment Pt OOB   Lying to Sitting on Side of Bed   Comment Pt OOB   Sit to Stand   Type of Assistance Needed Supervision   Physical Assistance Level No physical assistance   Comment from EOB to stand to urinate   Sit to Stand CARE Score 4   Bed-Chair Transfer   Type of Assistance Needed Supervision   Physical Assistance Level No physical assistance   Comment sit pivot vs stand pivot to/from w/c, bench outside, recliner   Chair/Bed-to-Chair Transfer CARE Score 4   Walk 10 Feet   Reason if not Attempted Safety concerns   Walk 10 Feet CARE Score 88   Walk 50 Feet with Two Turns   Reason if not Attempted Safety concerns   Walk 50 Feet with Two Turns CARE Score 88   Walk 150 Feet   Reason if not Attempted Safety concerns   Walk 150 Feet CARE Score 88   Walking 10 Feet on Uneven Surfaces   Reason if not Attempted Safety concerns   Walking 10 Feet on Uneven Surfaces CARE Score 88   Ambulation   Does the patient walk? 0. No, and walking goal is not clinically indicated.   Wheel 50 Feet with Two Turns   Type of Assistance Needed Independent   Physical  Assistance Level No physical assistance   Wheel 50 Feet with Two Turns CARE Score 6   Wheel 150 Feet   Type of Assistance Needed Independent   Physical Assistance Level No physical assistance   Wheel 150 Feet CARE Score 6   Wheelchair mobility   Does the patient use a wheelchair? 1. Yes   Type of Wheelchair Used 1. Manual   Distance Level Surface (feet) 500 ft   Findings mod I level surfaces inside, unlevel surfaces outside   Curb or Single Stair   Reason if not Attempted Safety concerns   1 Step (Curb) CARE Score 88   4 Steps   Reason if not Attempted Safety concerns   4 Steps CARE Score 88   12 Steps   Reason if not Attempted Safety concerns   12 Steps CARE Score 88   Toilet Transfer   Comment Pt did not require during session   Therapeutic Interventions   Strengthening Seated LE TE, 2# R LE   Other transfer training (SPT from recliner to bed vs sit-pivot transfers NuStep, w/c, bench), w/c mobility   Equipment Use   NuStep L3, 10 min, B/L UE/LE   Assessment   Treatment Assessment Pt agreeable to PT this AM, received sitting upright in recliner. Pt requested to go outside, continues to demonstrate mod I ability to propel w/c on uneven surfaces and incline/decline. Pt completed multiple sit-pivot transfers to/from w/c and bench on uneven surfaces with good safety and setup. Pt tolerated seated LE TE and NuStep well, with reported fatigue following exercises. Pt is demonstrating good static standing balance for LE dressing management during standing to urinate with urinal. Pt continues to express eagerness to leave this facility, requiring increased encouragement t/o session to participate. Will continue with current PT POC to improve deficits and promote return to PLOF.   Problem List Decreased strength;Decreased endurance;Impaired balance;Decreased mobility;Decreased cognition;Impaired judgement;Decreased safety awareness;Orthopedic restrictions;Pain;Decreased skin integrity   PT Barriers   Physical Impairment  Decreased strength;Decreased range of motion;Decreased endurance;Impaired balance;Decreased mobility;Decreased coordination;Decreased cognition;Impaired judgement;Decreased safety awareness;Decreased skin integrity;Orthopedic restrictions   Functional Limitation Car transfers;Ramp negotiation;Standing;Transfers;Wheelchair management   Plan   Treatment/Interventions Functional transfer training;LE strengthening/ROM;Therapeutic exercise;Endurance training;Cognitive reorientation;Patient/family training;Equipment eval/education;Bed mobility;Gait training   Progress Progressing toward goals   PT Therapy Minutes   PT Time In 0830   PT Time Out 0955   PT Total Time (minutes) 85   PT Mode of treatment - Individual (minutes) 85   PT Mode of treatment - Concurrent (minutes) 0   PT Mode of treatment - Group (minutes) 0   PT Mode of treatment - Co-treat (minutes) 0   PT Mode of Treatment - Total time(minutes) 85 minutes   PT Cumulative Minutes 2187     Seated LE TE:  3x10, B/L LEs, 2# R LE  March  LAQ (R LE only)  Hip ABd - green T-band  Hip ADd - ball John  GS  HR (R LE only)  TR (R LE only)  HS Curls - green T-band (R LE only)    Patient remains OOB in chair, all needs in reach.  Alarm in place and activated.  Encouraged use of call bell, patient verbalizes understanding.

## 2024-08-14 NOTE — PROGRESS NOTES
"   08/14/24 2420   Pain Assessment   Pain Assessment Tool 0-10   Pain Score No Pain   Restrictions/Precautions   Precautions Aphasia;Bed/chair alarms;Cognitive;Fall Risk;Supervision on toilet/commode   Weight Bearing Restrictions Yes   LLE Weight Bearing Per Order NWB   ROM Restrictions No   Braces or Orthoses   (LLE )   Lifestyle   Autonomy \"Not bad\"   Bed-Chair Transfer   Type of Assistance Needed Supervision   Physical Assistance Level No physical assistance   Comment sit pivot   Chair/Bed-to-Chair Transfer CARE Score 4   Exercise Tools   Exercise Tools Yes   UE Ergometer Seated with supervision, pt utilizes SciFit UBE bilaterally for 5 min prograde, break, 5 min retrograde against 1.0 reisistance to promote strength and endurance for carryover into ADLs/functional transfers.   Other Exercise Tool 2 Seated EOM with supervision, pt completes core strengthening ex while holding 6lb ball for forward trunk flexion, lateral trunk flexion, and trunk rotations unsupported for carryover into ADLs/functional transfers.   Cognition   Overall Cognitive Status Impaired   Arousal/Participation Alert;Responsive   Attention Attends with cues to redirect   Memory Decreased short term memory;Decreased recall of recent events;Decreased recall of precautions   Following Commands Follows one step commands with increased time or repetition   Additional Activities   Additional Activities Other (Comment)   Additional Activities Comments W/C mobility completed utilizing BUEs with w/c gloves overall S with min cuing to attend to L side while negotiating tight spaces within room, within halls on 4th and 9th floor, and within elevator.   Activity Tolerance   Activity Tolerance Patient tolerated treatment well   Assessment   Treatment Assessment Patient engaged in 60 min treatment session with focus on sit pivot functional transfers, w/c mobility, core strengthening, and BUE strengthening ex as tolerated. Pt in lighthearted mood " today and jokes throughout session. Continue OT plan of care with focus on endurance work, BUE and core strengthening, IND donning , repetitive safety training, and w/c mgmt/mobility training.   Prognosis Good   Problem List Decreased strength;Decreased endurance;Impaired balance;Decreased coordination;Decreased cognition;Impaired judgement;Decreased safety awareness;Orthopedic restrictions;Decreased skin integrity   Barriers to Discharge Decreased caregiver support   Plan   Treatment/Interventions ADL retraining;Functional transfer training;Therapeutic exercise;Endurance training;Cognitive reorientation;Patient/family training;Equipment eval/education;Bed mobility;Compensatory technique education   Progress Progressing toward goals   Discharge Recommendation   Rehab Resource Intensity Level, OT   (pending DC planning with guardianship and placement)   OT Therapy Minutes   OT Time In 1330   OT Time Out 1430   OT Total Time (minutes) 60   OT Mode of treatment - Individual (minutes) 60   OT Mode of treatment - Concurrent (minutes) 0   OT Mode of treatment - Group (minutes) 0   OT Mode of treatment - Co-treat (minutes) 0   OT Mode of Treatment - Total time(minutes) 60 minutes   OT Cumulative Minutes 1908   Therapy Time missed   Time missed? No

## 2024-08-14 NOTE — PROGRESS NOTES
08/14/24 1300   Pain Assessment   Pain Assessment Tool 0-10   Pain Score No Pain   Restrictions/Precautions   Precautions Aphasia;Bed/chair alarms;Cognitive;Fall Risk;Supervision on toilet/commode   Comprehension   Comprehension (FIM) 4 - Understands basic info/conversation 75-90% of time   Expression   Expression (FIM) 3 - Expresses basic info/needs 50-74% of time   Social Interaction   Social Interaction (FIM) 5 - Interacts appropriately with others 90% of time   Problem Solving   Problem solving (FIM) 3 - Solves basic problmes 50-74% of time   Memory   Memory (FIM) 3 - Recognizes, recalls/performs 50-74%   Speech/Language/Cognition Assessment   Treatment Assessment Pt was seen in room for skilled ST session targeting expressive-receptive language skills. Upon entering room, pt's friend was present but left by the beginning of the session. Pt was noted to be more frustrated with limited verbalizations this date but noted to be agreeable to session. Due to expressive aphasia, pt was unable to fully elicit orientation information. Hence, when assessing orientation, SLP used gestural and verbal cues w/ pointing to board or using computer in front of pt as visuals for pt to read. Pt was able to recall month, year and current location by SLP providing phonemic and gestural cues. Pt benefiting most from initial phonemic cues as pt perseverating on items. For example, when asked the month of the year, pt initially stating 'January.' When SLP providing initial cue for 'Au' pt began stating 'April' and began perseverating on 'April' and needed verbal and semantic cues w/ phonemic cues to recall 'August.'     Focus of session was continued use of [employer] on SLP laptop to which SLP and pt engaged in both receptive language and expressive language task as well as trialing basic categorization task.     1- -Describe the Picture (Level 1): This task targeted pt's expressive language skills and reading comprehension  skills, to match the picture to the FO2 word choice. Pt was able to complete task in 3/7 trials which increased to 7/7 trials once given verbal cues. Pt noted to have difficulty with verbalizing items as pt was able to point to items correct to describe in 5/7 trials. However, pt relied heavily on phonemic cues to initiate expressive language. Spontaneous language continues to be inhibited due to aphasia and phonemic cues by SLP increase accuracy overall. Written words provided continue to be difficulty for pt to read and only when paired with phonemic cues is pt able to increase accuracy as pt is noted to demonstrate semantic paraphasias.     2- Naming the Category (Level 1): This last task engaged pt's reading comprehension and categorization skills. Three words were provided and then FO3 possible category choices given below. Pt was able to complete task by choosing the correct item by pointing to the correct FO3 word in 3/5 trials. Pt noted to have difficulty expressing names of categories this date, perseverating on items and requiring initial phonemic cues and SLP verbal prompts of WW models for pt to imitate. SLP provided melodic intonation at times by tapping out words and pt noted to have difficulty at times as he would get ahead of the sounds and SLP needed to slow down and hold out each sound to where pt was able to sound out 4 syllable words to increase accuracy. Pt continued to perseverate on words and noted to have difficulty w/ attention during task and required redirection. For example, during one picture, pt began saying the label from the last item previously and noted to need redirection due to decrease comprehension. Overall, pt is beginning to show some improvement with receptive-expressive language but continues to rely on phonemic cues for verbal and receptive language.  Pt will continue to benefit from ongoing skilled SLP services targeting use of tablet as not only extra therapy activity but  use of increasing communication skills in hopes for decreasing burden of care over time.    SLP Therapy Minutes   SLP Time In 1300   SLP Time Out 1330   SLP Total Time (minutes) 30   SLP Mode of treatment - Individual (minutes) 30   SLP Mode of treatment - Concurrent (minutes) 0   SLP Mode of treatment - Group (minutes) 0   SLP Mode of treatment - Co-treat (minutes) 0   SLP Mode of Treatment - Total time(minutes) 30 minutes   SLP Cumulative Minutes 305   Therapy Time missed   Time missed? No

## 2024-08-14 NOTE — ASSESSMENT & PLAN NOTE
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

## 2024-08-14 NOTE — ASSESSMENT & PLAN NOTE
Blood pressure reviewed and stable   Continue with current regimen: Losartan 50 mg daily, Toprol-XL 25 mg daily

## 2024-08-14 NOTE — PROGRESS NOTES
"   08/14/24 1108   Pain Assessment   Pain Assessment Tool 0-10   Pain Score No Pain   Restrictions/Precautions   Precautions Aphasia;Bed/chair alarms;Cognitive;Fall Risk;Supervision on toilet/commode   Weight Bearing Restrictions Yes   LLE Weight Bearing Per Order NWB   ROM Restrictions No   Braces or Orthoses   (LLE , not used this session being washed)   Lifestyle   Autonomy (In regards to meeting with Devon jack) \"It was nice. They were nice people. But it is not for me.\"   Eating   Type of Assistance Needed Independent   Physical Assistance Level No physical assistance   Comment seated in recliner at end of session   Eating CARE Score 6   Sit to Stand   Type of Assistance Needed Supervision   Physical Assistance Level No physical assistance   Comment from EOB to stand to urinate   Sit to Stand CARE Score 4   Bed-Chair Transfer   Type of Assistance Needed Supervision   Physical Assistance Level No physical assistance   Comment stand pivot   Chair/Bed-to-Chair Transfer CARE Score 4   Toileting Hygiene   Type of Assistance Needed Supervision   Physical Assistance Level No physical assistance   Comment CM and manages urinal in stance   Toileting Hygiene CARE Score 4   Therapeutic Excerise-Strength   UE Strength Yes   Right Upper Extremity- Strength   RUE Strength Comment Seated, BUE strengthening ex completed utilizing 4lb dowel bar 3 sets x10 reps for bicep curls, chest presses, OH presses, horizontal abduction, and prograde circles. Ther ex completed to promote strength and activity tolerance for carryover into ADLs/functional transfers.   Left Upper Extremity-Strength   LUE Strength Comment see above   Cognition   Overall Cognitive Status Impaired   Arousal/Participation Alert;Responsive   Attention Attends with cues to redirect   Memory Decreased short term memory;Decreased recall of recent events;Decreased recall of precautions   Following Commands Follows one step commands with increased time or " repetition   Activity Tolerance   Activity Tolerance Patient tolerated treatment well   Assessment   Treatment Assessment First attempt, pt finishing up with meeting with Devon santiago. Then -Patient engaged in quick 30 min treatment session with focus on ADL retraining, functional transfers, and BUE strengthening ex as tolerated. CLOF toileting completed utilizing urinal in stance and transfers without AD S. See above for treatment details. Continue OT plan of care with focus on endurance work, BUE and core strengthening, IND donning , repetitive safety training, and w/c mgmt/mobility training.   Prognosis Good   Problem List Decreased strength;Decreased endurance;Impaired balance;Decreased mobility;Decreased cognition;Impaired judgement;Decreased safety awareness;Orthopedic restrictions;Decreased skin integrity   Barriers to Discharge Decreased caregiver support   Plan   Treatment/Interventions ADL retraining;Functional transfer training;Therapeutic exercise;Endurance training;Cognitive reorientation;Patient/family training;Equipment eval/education;Bed mobility;Compensatory technique education   Progress Progressing toward goals   Discharge Recommendation   Rehab Resource Intensity Level, OT   (pending DC planning with guardianship and placement)   OT Therapy Minutes   OT Time In 1108   OT Time Out 1138   OT Total Time (minutes) 30   OT Mode of treatment - Individual (minutes) 30   OT Mode of treatment - Concurrent (minutes) 0   OT Mode of treatment - Group (minutes) 0   OT Mode of treatment - Co-treat (minutes) 0   OT Mode of Treatment - Total time(minutes) 30 minutes   OT Cumulative Minutes 1848   Therapy Time missed   Time missed? No

## 2024-08-14 NOTE — PLAN OF CARE
Problem: PAIN - ADULT  Goal: Verbalizes/displays adequate comfort level or baseline comfort level  Description: Interventions:  - Encourage patient to monitor pain and request assistance  - Assess pain using appropriate pain scale  - Administer analgesics based on type and severity of pain and evaluate response  - Implement non-pharmacological measures as appropriate and evaluate response  - Consider cultural and social influences on pain and pain management  - Notify physician/advanced practitioner if interventions unsuccessful or patient reports new pain  Outcome: Progressing     Problem: INFECTION - ADULT  Goal: Absence or prevention of progression during hospitalization  Description: INTERVENTIONS:  - Assess and monitor for signs and symptoms of infection  - Monitor lab/diagnostic results  - Monitor all insertion sites, i.e. indwelling lines, tubes, and drains  - Monitor endotracheal if appropriate and nasal secretions for changes in amount and color  - [place] appropriate cooling/warming therapies per order  - Administer medications as ordered  - Instruct and encourage patient and family to use good hand hygiene technique  - Identify and instruct in appropriate isolation precautions for identified infection/condition  Outcome: Progressing  Goal: Absence of fever/infection during neutropenic period  Description: INTERVENTIONS:  - Monitor WBC    Outcome: Progressing     Problem: SAFETY ADULT  Goal: Patient will remain free of falls  Description: INTERVENTIONS:  - Educate patient/family on patient safety including physical limitations  - Instruct patient to call for assistance with activity   - Consult OT/PT to assist with strengthening/mobility   - Keep Call bell within reach  - Keep bed low and locked with side rails adjusted as appropriate  - Keep care items and personal belongings within reach  - Initiate and maintain comfort rounds  - Make Fall Risk Sign visible to staff  - Offer Toileting every 2 Hours,  in advance of need  - Initiate/Maintain alarm  - Obtain necessary fall risk management equipment:   - Apply yellow socks and bracelet for high fall risk patients  - Consider moving patient to room near nurses station  Outcome: Progressing  Goal: Maintain or return to baseline ADL function  Description: INTERVENTIONS:  -  Assess patient's ability to carry out ADLs; assess patient's baseline for ADL function and identify physical deficits which impact ability to perform ADLs (bathing, care of mouth/teeth, toileting, grooming, dressing, etc.)  - Assess/evaluate cause of self-care deficits   - Assess range of motion  - Assess patient's mobility; develop plan if impaired  - Assess patient's need for assistive devices and provide as appropriate  - Encourage maximum independence but intervene and supervise when necessary  - Involve family in performance of ADLs  - Assess for home care needs following discharge   - Consider OT consult to assist with ADL evaluation and planning for discharge  - Provide patient education as appropriate  Outcome: Progressing  Goal: Maintains/Returns to pre admission functional level  Description: INTERVENTIONS:  - Perform AM-PAC 6 Click Basic Mobility/ Daily Activity assessment daily.  - Set and communicate daily mobility goal to care team and patient/family/caregiver.   - Collaborate with rehabilitation services on mobility goals if consulted  - Perform Range of Motion 3 times a day.  - Reposition patient every 2 hours.  - Dangle patient 3 times a day  - Stand patient 3 times a day  - Ambulate patient 3 times a day  - Out of bed to chair 3 times a day   - Out of bed for meals 3 times a day  - Out of bed for toileting  - Record patient progress and toleration of activity level   Outcome: Progressing     Problem: DISCHARGE PLANNING  Goal: Discharge to home or other facility with appropriate resources  Description: INTERVENTIONS:  - Identify barriers to discharge w/patient and caregiver  -  Arrange for needed discharge resources and transportation as appropriate  - Identify discharge learning needs (meds, wound care, etc.)  - Arrange for interpretive services to assist at discharge as needed  - Refer to Case Management Department for coordinating discharge planning if the patient needs post-hospital services based on physician/advanced practitioner order or complex needs related to functional status, cognitive ability, or social support system  Outcome: Progressing     Problem: Prexisting or High Potential for Compromised Skin Integrity  Goal: Skin integrity is maintained or improved  Description: INTERVENTIONS:  - Identify patients at risk for skin breakdown  - Assess and monitor skin integrity  - Assess and monitor nutrition and hydration status  - Monitor labs   - Assess for incontinence   - Turn and reposition patient  - Assist with mobility/ambulation  - Relieve pressure over bony prominences  - Avoid friction and shearing  - Provide appropriate hygiene as needed including keeping skin clean and dry  - Evaluate need for skin moisturizer/barrier cream  - Collaborate with interdisciplinary team   - Patient/family teaching  - Consider wound care consult   Outcome: Progressing     Problem: Nutrition/Hydration-ADULT  Goal: Nutrient/Hydration intake appropriate for improving, restoring or maintaining nutritional needs  Description: Monitor and assess patient's nutrition/hydration status for malnutrition. Collaborate with interdisciplinary team and initiate plan and interventions as ordered.  Monitor patient's weight and dietary intake as ordered or per policy. Utilize nutrition screening tool and intervene as necessary. Determine patient's food preferences and provide high-protein, high-caloric foods as appropriate.     INTERVENTIONS:  - Monitor oral intake, urinary output, labs, and treatment plans  - Assess nutrition and hydration status and recommend course of action  - Evaluate amount of meals  eaten  - Assist patient with eating if necessary   - Allow adequate time for meals  - Recommend/ encourage appropriate diets, oral nutritional supplements, and vitamin/mineral supplements  - Order, calculate, and assess calorie counts as needed  - Recommend, monitor, and adjust tube feedings and TPN/PPN based on assessed needs  - Assess need for intravenous fluids  - Provide specific nutrition/hydration education as appropriate  - Include patient/family/caregiver in decisions related to nutrition  Outcome: Progressing

## 2024-08-14 NOTE — ASSESSMENT & PLAN NOTE
Remote history of TBI, previously residing in a group home prior to retirement sentence.  Evaluated by neuropsychiatry on 6/27/24 and deemed NOT to have medical decision-making capacity  Process for court appointed guardian started on 7/5/24 - CM following   Guardianship hearing 8/27/24

## 2024-08-14 NOTE — CASE MANAGEMENT
Cm was made aware that representatives from [facility] were onsite to meet with pt and requested documentation. Cm made referral via aidin with requested therapy notes and med list.     [id number]  received denial from [facility] for skilled nursing placement.

## 2024-08-14 NOTE — ASSESSMENT & PLAN NOTE
Presented with left lower extremity acute limb ischemia/extensive left iliofemoral and left infrainguinal embolism requiring left femoral thrombectomy and subsequent AKA on 6/7/24 with vascular surgery.  Vascular surgery following, last evaluated on 7/10/24 and removed staples  Continue with local wound care   Continue ASA, Coumadin and statin   Stable for discharge from PMR standpoint   Dispo planning as per case management

## 2024-08-15 LAB
ANION GAP SERPL CALCULATED.3IONS-SCNC: 8 MMOL/L (ref 4–13)
BASOPHILS # BLD AUTO: 0.05 THOUSANDS/ÂΜL (ref 0–0.1)
BASOPHILS NFR BLD AUTO: 1 % (ref 0–1)
BUN SERPL-MCNC: 26 MG/DL (ref 5–25)
CALCIUM SERPL-MCNC: 9.2 MG/DL (ref 8.4–10.2)
CHLORIDE SERPL-SCNC: 103 MMOL/L (ref 96–108)
CO2 SERPL-SCNC: 27 MMOL/L (ref 21–32)
CREAT SERPL-MCNC: 0.88 MG/DL (ref 0.6–1.3)
EOSINOPHIL # BLD AUTO: 0.22 THOUSAND/ÂΜL (ref 0–0.61)
EOSINOPHIL NFR BLD AUTO: 4 % (ref 0–6)
ERYTHROCYTE [DISTWIDTH] IN BLOOD BY AUTOMATED COUNT: 16 % (ref 11.6–15.1)
GFR SERPL CREATININE-BSD FRML MDRD: 92 ML/MIN/1.73SQ M
GLUCOSE P FAST SERPL-MCNC: 96 MG/DL (ref 65–99)
GLUCOSE SERPL-MCNC: 96 MG/DL (ref 65–140)
HCT VFR BLD AUTO: 37 % (ref 36.5–49.3)
HGB BLD-MCNC: 10.9 G/DL (ref 12–17)
IMM GRANULOCYTES # BLD AUTO: 0.04 THOUSAND/UL (ref 0–0.2)
IMM GRANULOCYTES NFR BLD AUTO: 1 % (ref 0–2)
INR PPP: 2.43 (ref 0.85–1.19)
LYMPHOCYTES # BLD AUTO: 1.82 THOUSANDS/ÂΜL (ref 0.6–4.47)
LYMPHOCYTES NFR BLD AUTO: 29 % (ref 14–44)
MCH RBC QN AUTO: 28.3 PG (ref 26.8–34.3)
MCHC RBC AUTO-ENTMCNC: 29.5 G/DL (ref 31.4–37.4)
MCV RBC AUTO: 96 FL (ref 82–98)
MONOCYTES # BLD AUTO: 0.84 THOUSAND/ÂΜL (ref 0.17–1.22)
MONOCYTES NFR BLD AUTO: 13 % (ref 4–12)
NEUTROPHILS # BLD AUTO: 3.33 THOUSANDS/ÂΜL (ref 1.85–7.62)
NEUTS SEG NFR BLD AUTO: 52 % (ref 43–75)
NRBC BLD AUTO-RTO: 0 /100 WBCS
PLATELET # BLD AUTO: 362 THOUSANDS/UL (ref 149–390)
PMV BLD AUTO: 8.1 FL (ref 8.9–12.7)
POTASSIUM SERPL-SCNC: 4.3 MMOL/L (ref 3.5–5.3)
PROTHROMBIN TIME: 26.3 SECONDS (ref 12.3–15)
RBC # BLD AUTO: 3.85 MILLION/UL (ref 3.88–5.62)
SODIUM SERPL-SCNC: 138 MMOL/L (ref 135–147)
WBC # BLD AUTO: 6.3 THOUSAND/UL (ref 4.31–10.16)

## 2024-08-15 PROCEDURE — 97530 THERAPEUTIC ACTIVITIES: CPT

## 2024-08-15 PROCEDURE — 85025 COMPLETE CBC W/AUTO DIFF WBC: CPT | Performed by: PHYSICIAN ASSISTANT

## 2024-08-15 PROCEDURE — 99231 SBSQ HOSP IP/OBS SF/LOW 25: CPT | Performed by: PHYSICIAN ASSISTANT

## 2024-08-15 PROCEDURE — 99232 SBSQ HOSP IP/OBS MODERATE 35: CPT | Performed by: PHYSICAL MEDICINE & REHABILITATION

## 2024-08-15 PROCEDURE — 80048 BASIC METABOLIC PNL TOTAL CA: CPT | Performed by: PHYSICIAN ASSISTANT

## 2024-08-15 PROCEDURE — 85610 PROTHROMBIN TIME: CPT | Performed by: PHYSICIAN ASSISTANT

## 2024-08-15 PROCEDURE — 97110 THERAPEUTIC EXERCISES: CPT

## 2024-08-15 RX ADMIN — TAMSULOSIN HYDROCHLORIDE 0.4 MG: 0.4 CAPSULE ORAL at 16:16

## 2024-08-15 RX ADMIN — METOPROLOL SUCCINATE 25 MG: 25 TABLET, EXTENDED RELEASE ORAL at 08:27

## 2024-08-15 RX ADMIN — GABAPENTIN 100 MG: 100 CAPSULE ORAL at 22:02

## 2024-08-15 RX ADMIN — MIRTAZAPINE 15 MG: 15 TABLET, FILM COATED ORAL at 22:02

## 2024-08-15 RX ADMIN — ZONISAMIDE 400 MG: 100 CAPSULE ORAL at 08:28

## 2024-08-15 RX ADMIN — LEVOCARNITINE 330 MG: 1 SOLUTION ORAL at 08:30

## 2024-08-15 RX ADMIN — ATORVASTATIN CALCIUM 80 MG: 80 TABLET, FILM COATED ORAL at 16:16

## 2024-08-15 RX ADMIN — LAMOTRIGINE 50 MG: 25 TABLET ORAL at 08:27

## 2024-08-15 RX ADMIN — DOLUTEGRAVIR SODIUM 50 MG: 50 TABLET, FILM COATED ORAL at 08:28

## 2024-08-15 RX ADMIN — LEVOCARNITINE 330 MG: 1 SOLUTION ORAL at 18:12

## 2024-08-15 RX ADMIN — SERTRALINE HYDROCHLORIDE 75 MG: 50 TABLET ORAL at 08:27

## 2024-08-15 RX ADMIN — GABAPENTIN 100 MG: 100 CAPSULE ORAL at 14:44

## 2024-08-15 RX ADMIN — DIVALPROEX SODIUM 1250 MG: 500 TABLET, EXTENDED RELEASE ORAL at 08:27

## 2024-08-15 RX ADMIN — Medication 6 MG: at 22:02

## 2024-08-15 RX ADMIN — LOSARTAN POTASSIUM 50 MG: 50 TABLET, FILM COATED ORAL at 08:27

## 2024-08-15 RX ADMIN — LAMOTRIGINE 50 MG: 25 TABLET ORAL at 18:12

## 2024-08-15 RX ADMIN — BICTEGRAVIR SODIUM, EMTRICITABINE, AND TENOFOVIR ALAFENAMIDE FUMARATE 1 TABLET: 50; 200; 25 TABLET ORAL at 08:28

## 2024-08-15 RX ADMIN — GABAPENTIN 100 MG: 100 CAPSULE ORAL at 06:09

## 2024-08-15 RX ADMIN — ASPIRIN 81 MG: 81 TABLET, COATED ORAL at 08:27

## 2024-08-15 RX ADMIN — WARFARIN SODIUM 5 MG: 5 TABLET ORAL at 18:12

## 2024-08-15 RX ADMIN — LEVOCARNITINE 330 MG: 1 SOLUTION ORAL at 14:44

## 2024-08-15 NOTE — PROGRESS NOTES
08/15/24 0830   Pain Assessment   Pain Assessment Tool 0-10   Pain Score No Pain   Restrictions/Precautions   Precautions Aphasia;Bed/chair alarms;Cognitive;Fall Risk;Supervision on toilet/commode   LLE Weight Bearing Per Order NWB   Braces or Orthoses   (LLE )   Subjective   Subjective pt agreeable to perform skilled PT   Roll Left and Right   Type of Assistance Needed Independent   Roll Left and Right CARE Score 6   Sit to Lying   Type of Assistance Needed Independent   Sit to Lying CARE Score 6   Lying to Sitting on Side of Bed   Type of Assistance Needed Independent   Lying to Sitting on Side of Bed CARE Score 6   Sit to Stand   Type of Assistance Needed Supervision   Sit to Stand CARE Score 4   Bed-Chair Transfer   Type of Assistance Needed Supervision   Comment sit pivot   Chair/Bed-to-Chair Transfer CARE Score 4   Walk 10 Feet   Reason if not Attempted Safety concerns   Walk 10 Feet CARE Score 88   Walk 50 Feet with Two Turns   Reason if not Attempted Safety concerns   Walk 50 Feet with Two Turns CARE Score 88   Walk 150 Feet   Reason if not Attempted Safety concerns   Walk 150 Feet CARE Score 88   Walking 10 Feet on Uneven Surfaces   Reason if not Attempted Safety concerns   Walking 10 Feet on Uneven Surfaces CARE Score 88   Ambulation   Does the patient walk? 0. No, and walking goal is not clinically indicated.   Wheel 50 Feet with Two Turns   Type of Assistance Needed Independent   Wheel 50 Feet with Two Turns CARE Score 6   Wheel 150 Feet   Type of Assistance Needed Independent   Wheel 150 Feet CARE Score 6   Wheelchair mobility   Does the patient use a wheelchair? 1. Yes   Type of Wheelchair Used 1. Manual   Method Right upper extremity;Left upper extremity   Curb or Single Stair   Reason if not Attempted Safety concerns   1 Step (Curb) CARE Score 88   4 Steps   Reason if not Attempted Safety concerns   4 Steps CARE Score 88   12 Steps   Reason if not Attempted Safety concerns   12 Steps CARE  Score 88   Toilet Transfer   Type of Assistance Needed Supervision   Toilet Transfer CARE Score 4   Toilet Transfer   Surface Assessed Standard Toilet   Findings urine bottom on 4th floor in his room  and toitel bathroom Stand on 9th floor   Therapeutic Interventions   Flexibility prone 8 min with towels under left thigh and then sidelying hip extension.   Balance seated on mat and in standing   Neuromuscular Re-Education NMR seated and supine , sitting core strengthening using dowel lalitha and ball toss yellow and red ball and with foot ball toss.   Assessment   Treatment Assessment pt progressing  with bed mobility and sit pviot transfers for bed to WC and then to toilet , pt also perform in standing balance  activities during bathroom training . Pt also cont to make gains on WC set up correct with S to being more indep lvl for sit pivot transfers, However ,pt cont to be aphasic, fall risk needs S lvl at time for bathroom and  toiteling . Pt cont work on WC indep lvl, core strengthening , left limb amputee program and overall skin care with RLE support. Cont POC working toward DC goals and cont awaiting guardianship and placement   Barriers to Discharge Decreased caregiver support;Inaccessible home environment   Plan   Progress Progressing toward goals   PT Therapy Minutes   PT Time In 0830   PT Time Out 1000   PT Total Time (minutes) 90   PT Mode of treatment - Individual (minutes) 90   PT Mode of treatment - Concurrent (minutes) 0   PT Mode of treatment - Group (minutes) 0   PT Mode of treatment - Co-treat (minutes) 0   PT Mode of Treatment - Total time(minutes) 90 minutes   PT Cumulative Minutes 2250   Therapy Time missed   Time missed? No

## 2024-08-15 NOTE — PLAN OF CARE
Problem: PAIN - ADULT  Goal: Verbalizes/displays adequate comfort level or baseline comfort level  Description: Interventions:  - Encourage patient to monitor pain and request assistance  - Assess pain using appropriate pain scale  - Administer analgesics based on type and severity of pain and evaluate response  - Implement non-pharmacological measures as appropriate and evaluate response  - Consider cultural and social influences on pain and pain management  - Notify physician/advanced practitioner if interventions unsuccessful or patient reports new pain  Outcome: Progressing     Problem: INFECTION - ADULT  Goal: Absence or prevention of progression during hospitalization  Description: INTERVENTIONS:  - Assess and monitor for signs and symptoms of infection  - Monitor lab/diagnostic results  - Monitor all insertion sites, i.e. indwelling lines, tubes, and drains  - Monitor endotracheal if appropriate and nasal secretions for changes in amount and color  - [place] appropriate cooling/warming therapies per order  - Administer medications as ordered  - Instruct and encourage patient and family to use good hand hygiene technique  - Identify and instruct in appropriate isolation precautions for identified infection/condition  Outcome: Progressing  Goal: Absence of fever/infection during neutropenic period  Description: INTERVENTIONS:  - Monitor WBC    Outcome: Progressing     Problem: SAFETY ADULT  Goal: Patient will remain free of falls  Description: INTERVENTIONS:  - Educate patient/family on patient safety including physical limitations  - Instruct patient to call for assistance with activity   - Consult OT/PT to assist with strengthening/mobility   - Keep Call bell within reach  - Keep bed low and locked with side rails adjusted as appropriate  - Keep care items and personal belongings within reach  - Initiate and maintain comfort rounds  - Make Fall Risk Sign visible to staff  - Offer Toileting every 2 Hours,  in advance of need  - Initiate/Maintain alarm  - Obtain necessary fall risk management equipment:   - Apply yellow socks and bracelet for high fall risk patients  - Consider moving patient to room near nurses station  Outcome: Progressing  Goal: Maintain or return to baseline ADL function  Description: INTERVENTIONS:  -  Assess patient's ability to carry out ADLs; assess patient's baseline for ADL function and identify physical deficits which impact ability to perform ADLs (bathing, care of mouth/teeth, toileting, grooming, dressing, etc.)  - Assess/evaluate cause of self-care deficits   - Assess range of motion  - Assess patient's mobility; develop plan if impaired  - Assess patient's need for assistive devices and provide as appropriate  - Encourage maximum independence but intervene and supervise when necessary  - Involve family in performance of ADLs  - Assess for home care needs following discharge   - Consider OT consult to assist with ADL evaluation and planning for discharge  - Provide patient education as appropriate  Outcome: Progressing  Goal: Maintains/Returns to pre admission functional level  Description: INTERVENTIONS:  - Perform AM-PAC 6 Click Basic Mobility/ Daily Activity assessment daily.  - Set and communicate daily mobility goal to care team and patient/family/caregiver.   - Collaborate with rehabilitation services on mobility goals if consulted  - Perform Range of Motion 3 times a day.  - Reposition patient every 2 hours.  - Dangle patient 3 times a day  - Stand patient 3 times a day  - Ambulate patient 3 times a day  - Out of bed to chair 3 times a day   - Out of bed for meals 3 times a day  - Out of bed for toileting  - Record patient progress and toleration of activity level   Outcome: Progressing     Problem: DISCHARGE PLANNING  Goal: Discharge to home or other facility with appropriate resources  Description: INTERVENTIONS:  - Identify barriers to discharge w/patient and caregiver  -  Arrange for needed discharge resources and transportation as appropriate  - Identify discharge learning needs (meds, wound care, etc.)  - Arrange for interpretive services to assist at discharge as needed  - Refer to Case Management Department for coordinating discharge planning if the patient needs post-hospital services based on physician/advanced practitioner order or complex needs related to functional status, cognitive ability, or social support system  Outcome: Progressing     Problem: Prexisting or High Potential for Compromised Skin Integrity  Goal: Skin integrity is maintained or improved  Description: INTERVENTIONS:  - Identify patients at risk for skin breakdown  - Assess and monitor skin integrity  - Assess and monitor nutrition and hydration status  - Monitor labs   - Assess for incontinence   - Turn and reposition patient  - Assist with mobility/ambulation  - Relieve pressure over bony prominences  - Avoid friction and shearing  - Provide appropriate hygiene as needed including keeping skin clean and dry  - Evaluate need for skin moisturizer/barrier cream  - Collaborate with interdisciplinary team   - Patient/family teaching  - Consider wound care consult   Outcome: Progressing     Problem: Nutrition/Hydration-ADULT  Goal: Nutrient/Hydration intake appropriate for improving, restoring or maintaining nutritional needs  Description: Monitor and assess patient's nutrition/hydration status for malnutrition. Collaborate with interdisciplinary team and initiate plan and interventions as ordered.  Monitor patient's weight and dietary intake as ordered or per policy. Utilize nutrition screening tool and intervene as necessary. Determine patient's food preferences and provide high-protein, high-caloric foods as appropriate.     INTERVENTIONS:  - Monitor oral intake, urinary output, labs, and treatment plans  - Assess nutrition and hydration status and recommend course of action  - Evaluate amount of meals  eaten  - Assist patient with eating if necessary   - Allow adequate time for meals  - Recommend/ encourage appropriate diets, oral nutritional supplements, and vitamin/mineral supplements  - Order, calculate, and assess calorie counts as needed  - Recommend, monitor, and adjust tube feedings and TPN/PPN based on assessed needs  - Assess need for intravenous fluids  - Provide specific nutrition/hydration education as appropriate  - Include patient/family/caregiver in decisions related to nutrition  Outcome: Progressing

## 2024-08-15 NOTE — ASSESSMENT & PLAN NOTE
ECHO 6/10/2024 = LVEF 40-45% with mild global hypokinesis   Status post NM stress test 6/11/2024 = large, mild, fixed defect in the inferior wall, possibly due to diaphragmatic attenuation artifact, there is a small area of partial reversibility in the inferior apical wall suggestive of ischemia    Evaluated by cardiology.  Etiology felt to be possibly secondary to stress-induced cardiomyopathy, with apical thrombus  Cardiac catheterization deferred given the lack of any significant ischemia, and no current cardiac symptoms  Continue with medical management with aspirin, statin, beta-blocker, ARB  Remains euvolemic off of diuretics, continue to monitor volume status   Outpatient follow-up with cardiology, previously followed with Dr. Gumaro Aguila [facility]

## 2024-08-15 NOTE — PROGRESS NOTES
Physical Medicine and Rehabilitation Progress Note  Sunil Patel 63 y.o. male MRN: [id number]  Unit/Bed#: [facility] 451-01 Encounter: [id number]    To Review: Sunil Patel is a 63 y.o. male who  has a past medical history of Acute lower limb ischemia (06/08/2024), Anxiety, Depression, HIV disease (HCC), Substance abuse (HCC), and Suicide attempt (HCC). who presented to the [facility] on 6/7/24 from shelter for increased LLE swelling and pain and was found to have a left external iliac artery occlusion and acute limb ischemia. 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 Patient had TTE on 6/10/24 showing LV apex thrombus. On 6/11/24 patient had pharmacologic nuclear stress test/SPECT scan which did not show any significant areas of ischemia with EF 40-45% and recommended continuing A/C for LV apical thrombus. His course was complicated by b/l buttock unstageable pressure ulcers, urinary and fecal incontinence, pain, and significant decline in ADLs and mobility. Patient has been continued on Xarelto for anticoagulation, as well as ASA and statin. Patient has a history of TBI, with baseline nonfluent aphasia and forgetfulness. He was evaluated by neuropsychology on 6/27/24, and deemed to not have capacity to make fully informed medical decisions. Therefore, the guardianship process was initiated as of 7/5/24. He was admitted to the [facility] on 7/6.     Chief Complaint: wants to go outside    Interval History/Subjective:  Pt seen and assessed at bedside this morning watching Danny Torres on his iPad. No physical concerns. Wanted to go outside, but then later declined outside time with PT. Denies headache.    ROS:  10 point ROS performed and negative unless mentioned in HPI.      Today's Changes:  INR  "2.43, continue 5 mg daily  BMP and CBC reviewed - stable     Assessment/Plan:    Patient incapable of making informed decisions  Assessment & Plan  - History of remote TBI and prior strokes with comorbid psychiatric history.  - Evaluated by neuropsychology, Dr. Dilshad Tatum, PhD on 6/27/24, found to have \"diffuse cognitive dysfunction and on a measure assessing awareness of personal health status and ability to evaluate health problems, handle medical emergencies and take safety precautions, patient performed in the IMPAIRED range of functioning. During this encounter, patient does not appear to have capacity to make fully informed medical decisions.\"  - Court-appointed guardianship process has been initiated by acute care CM Katia Kay.    - We have identified two friends who are interested in being patient's guardians and have been involved and invested in patient's care on the ARC.   - They will need to be interviewed by the  involved in this process.    - He has to undergo his hearing on 8/6/2024 first.  -- now rescheduled to 8/27   - He would ultimately benefit from [facility]/nursing home/memory care unit - he does very well with structure and supervision.    8/2- Ongoing discussions with CM, [facility] admin and social work to dispo patient to stable long-term housing. Process continues, with evaluation by therapy managers from [facility].   8/12- unfortunately have not made much progress with SNF placement per [facility] facilities. Have not heard back from residential community that met with him on 8/6    History of migraine headaches  Assessment & Plan  Chronic issue.  Seemed to worsen with increased irritability after discontinuing gabapentin  Resumed gabapentin  [facility] with middling [facility]  Sleep logs have been good - discontinued.  Headache is on and off    Cardiomyopathy (HCC)  Assessment & Plan  ECHO on 6/10/2024 showed LVEF 40-45% with mild global hypokinesis   Status post " NM stress test on 6/11/2024 which showed: a large, mild, fixed defect in the inferior wall, possibly due to diaphragmatic attenuation artifact, there is a small area of partial reversibility in the inferior apical wall suggestive of ischemia    Elevated by cardiology, etiology felt to be possibly secondary to stress-induced cardiomyopathy, with apical thrombus  Cardiac catheterization deferred given the lack of any significant ischemia, and no current cardiac symptoms  Continue with medical management with aspirin, statin, beta-blocker, ARB  Monitor volume status, remains euvolemic off of diuretics   Outpatient follow-up with cardiology    Urinary incontinence  Assessment & Plan  - Did have some incontinence on acute - improved with timed voids and consistent assistance with his urinal  - Described as urgency  - Marked improvement on timed voids.   - monitor for retention, incontinence (including overflow incontinence), signs/symptoms of UTI  - Timed Voids and PVRs Q4hrs initiated earlier. And PVR <150 x3, so scans discontinued.    - He has some difficulty managing the urinal    - needs assistance but is able to transfer to [facility]    - Still uses condom catheter at night, in part for continence care/to protect his skin given his buttock wounds. However now note that buttocks wound has healed  - Continue timed voids           At risk for constipation  Assessment & Plan  - Stooling adequately recently; did have some incontinence on acute now improved  - Close continent care given buttock wounds  - Last BM 8/14 and continent  - PRN miralax, Senna and suppository    At risk for venous thromboembolism (VTE)  Assessment & Plan  - Fully anticoagulated on warfarin for apical thrombus  - IM managing    Traumatic brain injury (HCC)  Assessment & Plan  See neurocog impairment     Carnitine deficiency (HCC)  Assessment & Plan  - Carnitine replacement  - Appreciate medicine management      History of seizures  Assessment &  Plan  - Depakote ER, lamotrigine, zonisamide  - Outpatient follow-up with [facility] neurology    Left ventricular thrombus  Assessment & Plan  - Visualized on echocardiogram on 6/10/24: spherical 1.5 x 1.3 cm thrombus at the LV apex.   - Was started on rivaroxaban, but patient does not appear to have insurance coverage for prescriptions   - Xarelto, eliquis, and pradaxa likely cost prohibitive per IM   - 7/29 transitioned to warfarin with lovenox bridge.   - Now off lovenox, and fully anticoagulated on warfarin.   - Management as per IM.   - 8/15 INR 2.43. Continue 5mg daily. Recheck per IM  - Outpatient follow-up with cardiology    Benign essential hypertension  Assessment & Plan  - Toprol, losartan  - Appreciate medicine management    History of stroke  Assessment & Plan  - History of old left temporal and parietal lobe cortical infarcts, also involving the insula and left occipital lobe as well as small right posterior parietal lobe. Stable on most recent CT head on 5/16/24.   - ASA, and statin for secondary prevention  - Optimal BP control  - Monitor neuro exam - hx of LV thrombus    - See that entry  - OP neuro follow-up     Human immunodeficiency virus (HIV) infection (HCC)  Assessment & Plan  - Continue anti-retroviral treatment  - Appreciate medicine management during ARC course  - OP ID follow-up       Bipolar affective disorder (HCC)  Assessment & Plan  Mood acceptable; continue meds as outlined   Supportive counseling  NeuroPsychology consult while in ARC if available for support  Psych evaluated on 8/12 and deemed him stable for discharge with current regimen as below  Counseled on and continue to encourage deep breathing/relaxation/behavioral management techniques  - Mirtazapine 15 mg qHs  - Sertraline 75 mg daily   - Lamictal 50mg BID  - Depakote ER 1250mg qday   - Outpatient psychiatry follow-up  - Would consult Psych before changing medications    * Above-knee amputation of left lower extremity  ([facility])  Assessment & Plan  6/7 with acute occlusion of L EIA, and not salvageable. Underwent L femoral thrombectomy and AKA with vascular surgery   - [facility] sx follow-up 7/10 - L AKA incision site well-healed, staples taken out at bedside  - [facility] will be vendor - Patient now has .  - Monitor for hip flexion/abduction tightness. Stretches in therapy  - Now on Coumadin with hx of LV thrombus and PAOD. Checking INR's as above   - PT/OT/SLP (to work on carry over strategies) 3-5 hours/day, 5-7 days/week.    - Goals are Ind-Sup at a wheelchair level.   - Outpatient f/u with [facility] and Vascular  - Continue patient training with  placement  - Continue gabapentin - doesn't do too much for phantom limb sensation, but that doesn't bother him or cause him pain currently.   - Patient still frustrated given circumstances; will continue gabapentin for anxiety    Pressure injury of buttock, stage 3 ([facility])-resolved as of 8/9/2024  Assessment & Plan  Resolved as of 8/7.   - Wound care consulted and following weekly  - POA L buttock pressure injury unstageable has healed.  - POA R buttock pressure injury  evolved from unstageable to Stage 3, and is now resolved.   - Recommend ROHO cushion in chair when out of bed instead   - Preventative hydraguard to bilateral heels BID and PRN.   - P500  - Monitor clinically for breakdown, frequent turns  - reviewed picture of wound today. It is healing well. Continue to monitor        Health Maintenance  #GI Prophylaxis: not indicated  #Code Status: Full code  #FEN: Cardiac diet + Ensure at bfast/dinner  #Dispo: Teams 8/13: ADD TBD. Still working on placement. May need to transfer back to acute care hospital. Guardianship court date is 8/27.    Will need f/u with PMR, PCP, Vascular, Psych      Objective:     Functional Update:  PT: sup transfers, sup bed mobility, 500 ft wheelchair ambulation independent  OT: Ind eating/grooming, Sup bathing, Ind UB dressing,  Sup LB dressing, CGA toileting, CGA tub/shower transfers, Sup toilet transfers  SLP: modA comprehension, modA expression, Sup social interaction, modA problem solving, modA memory     Allergies per EMR    Physical Exam:  Temp:  [97.7 °F (36.5 °C)-98.3 °F (36.8 °C)] 98.3 °F (36.8 °C)  HR:  [88-93] 93  Resp:  [17-20] 18  BP: (116-141)/(67-71) 116/71  Oxygen Therapy  SpO2: 96 %    Gen: No acute distress, Well-nourished, well-appearing. Sitting in bed  HEENT: Moist mucus membranes, Normocephalic/Atraumatic  Cardiovascular: Regular rate, rhythm, S1/S2. Distal pulses palpable  Heme/Extr: No edema  Pulmonary: Non-labored breathing. Lungs CTAB  : No fernandes  GI: Soft, non-tender, non-distended.   MSK: Stable L AKA  Integumentary: Skin is warm, dry.   Neuro: AAOx3,  Appropriate to questioning. Still with significant aphasia and impaired insight/deficits. Appropriate  Psych: Normal mood and affect.     Diagnostic Studies: reviewed, no new imaging  No results found.    Laboratory:    Results from last 7 days   Lab Units 08/15/24  0604   HEMOGLOBIN g/dL 10.9*   HEMATOCRIT % 37.0   WBC Thousand/uL 6.30     Results from last 7 days   Lab Units 08/15/24  0604   BUN mg/dL 26*   POTASSIUM mmol/L 4.3   CHLORIDE mmol/L 103   CREATININE mg/dL 0.88     Results from last 7 days   Lab Units 08/15/24  0604 08/12/24  0520 08/09/24  0515   PROTIME seconds 26.3* 28.8* 25.9*   INR  2.43* 2.74* 2.38*        Patient Active Problem List   Diagnosis    Bipolar affective disorder (HCC)    Substance abuse (HCC)    Human immunodeficiency virus (HIV) infection (HCC)    History of stroke    Benign essential hypertension    Positive laboratory testing for human immunodeficiency virus (HCC)    Hypertension    Unspecified vitamin D deficiency    Tobacco abuse    Cerebrovascular accident (CVA) (HCC)    Left ventricular thrombus    History of seizures    Carnitine deficiency (HCC)    Above-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)    Traumatic brain  injury (HCC)    Impaired mobility and activities of daily living    At risk for venous thromboembolism (VTE)    Acute pain    At risk for constipation    Urinary incontinence    Patient incapable of making informed decisions    Adjustment disorder with mixed anxiety and depressed mood    Cardiomyopathy (HCC)    History of migraine headaches    Postoperative anemia         Medications  Current Facility-Administered Medications   Medication Dose Route Frequency Provider Last Rate    acetaminophen  975 mg Oral TID PRN José Salcido MD      aspirin  81 mg Oral Daily Lorrie Barillas PA-C      atorvastatin  80 mg Oral Daily With Dinner Lorrie Barillas PA-C      bictegravir-emtricitab-tenofovir alafenamide  1 tablet Oral Daily With Breakfast Lorrie Barillas PA-C      bisacodyl  10 mg Rectal Daily PRN Kenny Castro MD      diphenhydrAMINE  25 mg Oral Q6H PRN Lorrie Barillas PA-C      divalproex sodium  1,250 mg Oral Daily Lorrie Barillas PA-C      dolutegravir  50 mg Oral Daily Lorrie Barillas PA-C      gabapentin  100 mg Oral Q8H Ashley Depadua, MD      lamoTRIgine  50 mg Oral BID Lorrie Barillas PA-C      levOCARNitine  1,000 mg/day Oral TID With Meals Lorrie Barillas PA-C      losartan  50 mg Oral Daily Lorrie Barillas PA-C      melatonin  6 mg Oral HS Lorrie Barillas PA-C      metoprolol succinate  25 mg Oral Daily CHARLIE Mcclelland      mirtazapine  15 mg Oral HS Lorriejacky Barillas PA-C      ondansetron  4 mg Oral Q6H PRN Lorrie Barillas PA-C      polyethylene glycol  17 g Oral Daily PRN Lorrie Barillas PA-C      senna  1 tablet Oral HS PRN Kenny Castro MD      sertraline  75 mg Oral Daily Lorrie Barillas PA-C      tamsulosin  0.4 mg Oral Daily With Dinner Lorrie Barillas PA-C      warfarin  5 mg Oral Daily (warfarin) CHARLIE Plata      zonisamide  400 mg Oral Daily Lorrie Barillas PA-C             ** Please Note: Fluency Direct voice to text software may have been used in the creation of this document. **

## 2024-08-15 NOTE — ASSESSMENT & PLAN NOTE
Normocytic  PTA hemoglobin was normal, now has been running 10-11 since admission  No signs or symptoms of active bleeding  Iron panel reviewed, no evidence of TY  Likely postoperative ABLA  Monitor as needed

## 2024-08-15 NOTE — ASSESSMENT & PLAN NOTE
Remote history of TBI, previously residing in a group home prior to [facility] sentence.  Evaluated by neuropsychiatry on 6/27/24 and deemed NOT to have medical decision-making capacity  Process for court appointed guardian started on 7/5/24 - CM following   Guardianship hearing 8/27/24

## 2024-08-15 NOTE — PROGRESS NOTES
08/15/24 1100   Pain Assessment   Pain Assessment Tool 0-10   Pain Score No Pain   Restrictions/Precautions   Precautions Aphasia;Bed/chair alarms;Cognitive;Fall Risk;Supervision on toilet/commode   Weight Bearing Restrictions Yes   LLE Weight Bearing Per Order NWB   ROM Restrictions No   Braces or Orthoses   (L LE  not donned upon entering room)   Cognition   Overall Cognitive Status Impaired   Arousal/Participation Alert;Responsive;Cooperative   Attention Attends with cues to redirect   Orientation Level Oriented X4   Memory Decreased short term memory;Decreased recall of recent events;Decreased recall of precautions   Following Commands Follows one step commands with increased time or repetition   Subjective   Subjective Pt reports frustration on d/c status, does not wish to go outside   Roll Left and Right   Type of Assistance Needed Independent   Physical Assistance Level No physical assistance   Roll Left and Right CARE Score 6   Sit to Lying   Type of Assistance Needed Independent   Physical Assistance Level No physical assistance   Sit to Lying CARE Score 6   Lying to Sitting on Side of Bed   Type of Assistance Needed Independent   Physical Assistance Level No physical assistance   Lying to Sitting on Side of Bed CARE Score 6   Sit to Stand   Type of Assistance Needed Supervision   Physical Assistance Level No physical assistance   Comment from EOB to stand to urinate or abisai/doff pants/pad   Sit to Stand CARE Score 4   Bed-Chair Transfer   Type of Assistance Needed Supervision   Physical Assistance Level No physical assistance   Comment SPT recliner/bed   Chair/Bed-to-Chair Transfer CARE Score 4   Walk 10 Feet   Reason if not Attempted Safety concerns   Walk 10 Feet CARE Score 88   Walk 50 Feet with Two Turns   Reason if not Attempted Safety concerns   Walk 50 Feet with Two Turns CARE Score 88   Walk 150 Feet   Reason if not Attempted Safety concerns   Walk 150 Feet CARE Score 88   Walking 10  Feet on Uneven Surfaces   Reason if not Attempted Safety concerns   Walking 10 Feet on Uneven Surfaces CARE Score 88   Ambulation   Does the patient walk? 0. No, and walking goal is not clinically indicated.   Wheel 50 Feet with Two Turns   Reason if not Attempted Refused to perform   Wheel 50 Feet with Two Turns CARE Score 7   Wheel 150 Feet   Reason if not Attempted Refused to perform   Wheel 150 Feet CARE Score 7   Wheelchair mobility   Does the patient use a wheelchair? 1. Yes   Type of Wheelchair Used 1. Manual   Findings pt refused to perform w/c mobility outside this AM, requested to stay within room   Curb or Single Stair   Reason if not Attempted Safety concerns   1 Step (Curb) CARE Score 88   4 Steps   Reason if not Attempted Safety concerns   4 Steps CARE Score 88   12 Steps   Reason if not Attempted Safety concerns   12 Steps CARE Score 88   Toilet Transfer   Comment Pt did not require during session   Therapeutic Interventions   Balance Standing balance to urinate, abisai/doff pad/pants   Other transfer training, bed mobility   Assessment   Treatment Assessment Pt received sitting upright in recliner with MD. Pt presented emotional and agititated, refused to participate in w/c mobility outside. Pt requested assistance to stand and urinate using urinal, as well as change pad/pants 2/2 spill of urine. Pt required Min for donning pad, able to doff pad/pants and don pants without assist. Pt expressed frustration with ongoing d/c planning. He was also emotional and expressed grief over L LE amputation and use of w/c. PT acknowledged feelings, listened to pt's concerns, and pt's agitation and frustration subsided. Will continue with current PT POC to improve deficits and promote return to PLOF.   Problem List Decreased strength;Decreased endurance;Impaired balance;Decreased coordination;Decreased cognition;Impaired judgement;Decreased safety awareness;Orthopedic restrictions;Decreased skin integrity   PT  "Barriers   Physical Impairment Decreased strength;Decreased range of motion;Decreased endurance;Impaired balance;Decreased mobility;Decreased coordination;Decreased cognition;Impaired judgement;Decreased safety awareness;Decreased skin integrity;Orthopedic restrictions   Functional Limitation Car transfers;Ramp negotiation;Standing;Transfers;Wheelchair management   Plan   Treatment/Interventions Functional transfer training;LE strengthening/ROM;Therapeutic exercise;Endurance training;Cognitive reorientation;Patient/family training;Equipment eval/education;Bed mobility;Gait training   Progress Progressing toward goals   PT Therapy Minutes   PT Time In 1100   PT Time Out 1123   PT Total Time (minutes) 23   PT Mode of treatment - Individual (minutes) 23   PT Mode of treatment - Concurrent (minutes) 0   PT Mode of treatment - Group (minutes) 0   PT Mode of treatment - Co-treat (minutes) 0   PT Mode of Treatment - Total time(minutes) 23 minutes   PT Cumulative Minutes 2273   Therapy Time missed   Time missed? Yes   Amount of time missed 7   Reason for time missed   (Pt refused to participate, \"I want to do nothing\")   Time(s) multiple attempts made 11:00, 11:05, 11:15  (attempts made during session to encourage participation in skilled PT outside of room)     Patient remains OOB in chair, all needs in reach.  Alarm in place and activated.  Encouraged use of call bell, patient verbalizes understanding.    "

## 2024-08-15 NOTE — PROGRESS NOTES
[facility]  Progress Note  Name: Sunil Patel I  MRN: [id number]  Unit/Bed#: -01 I Date of Admission: 7/6/2024   Date of Service: 8/15/2024 I Hospital Day: 40    Assessment & Plan   * Above-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)  Assessment & Plan  Presented with left lower extremity acute limb ischemia/extensive left iliofemoral and left infrainguinal embolism requiring left femoral thrombectomy and subsequent AKA on 6/7/24 with vascular surgery.  Vascular surgery following, last evaluated on 7/10/24 and removed staples  Continue with local wound care   Continue ASA, Coumadin and statin   Stable for discharge from PMR standpoint   Dispo planning as per case management     Postoperative anemia  Assessment & Plan  Normocytic  PTA hemoglobin was normal, now has been running 10-11 since admission  No signs or symptoms of active bleeding  Iron panel reviewed, no evidence of TY  Likely postoperative ABLA  Monitor as needed      Cardiomyopathy (HCC)  Assessment & Plan  ECHO 6/10/2024 = LVEF 40-45% with mild global hypokinesis   Status post NM stress test 6/11/2024 = large, mild, fixed defect in the inferior wall, possibly due to diaphragmatic attenuation artifact, there is a small area of partial reversibility in the inferior apical wall suggestive of ischemia    Evaluated by cardiology.  Etiology felt to be possibly secondary to stress-induced cardiomyopathy, with apical thrombus  Cardiac catheterization deferred given the lack of any significant ischemia, and no current cardiac symptoms  Continue with medical management with aspirin, statin, beta-blocker, ARB  Remains euvolemic off of diuretics, continue to monitor volume status   Outpatient follow-up with cardiology, previously followed with Dr. Gumaro Agu[facility]    Left ventricular thrombus  Assessment & Plan  Noted on echocardiogram 6/10/2024: spherical 1.5 x 1.3 cm thrombus at the LV apex   Initiated on AC with  Xarelto however unable to verify insurance coverage, now on Coumadin at 5 mg daily  INR therapeutic on last check, trend 2x weekly     Benign essential hypertension  Assessment & Plan  Blood pressure reviewed and stable   Continue with current regimen: Losartan 50 mg daily, Toprol-XL 25 mg daily      History of migraine headaches  Assessment & Plan  Continue PTA meds Depakote, gabapentin, zonegran and lamictal all of which can help in prevention  Unable to take triptans due to a history of stroke  PRN Tylenol     Traumatic brain injury (HCC)  Assessment & Plan  Remote history of TBI, previously residing in a group home prior to shelter sentence.  Evaluated by neuropsychiatry on 6/27/24 and deemed NOT to have medical decision-making capacity  Process for court appointed guardian started on 7/5/24 -  following   Guardianship hearing 8/27/24    Bipolar affective disorder (HCC)  Assessment & Plan  Psychiatry evaluated most recently on 8/12 and cleared for discharge to rehab   Continue home meds Zoloft and Remeron  Mood is currently stable    History of seizures  Assessment & Plan  Diagnosed in July 2019, follows with [facility] neurology outpatient  Continue home regimen with Depakote ER, Lamotrigine and Zonegran    History of stroke  Assessment & Plan  History of left MCA CVA in May 2018 with residual expressive aphasia  Continue ASA and atorvastatin    Human immunodeficiency virus (HIV) infection (HCC)  Assessment & Plan  Continue Biktarvy and Tivicay.    Carnitine deficiency (HCC)  Assessment & Plan  Continue levocarnitine 330 mg 3x daily with meals.    Patient incapable of making informed decisions  Assessment & Plan  Seen by neuropsych on 6/27 and was deemed NOT to have medical decision making capacity  Guardianship hearing scheduled for 8/27    At risk for constipation  Assessment & Plan  Management per PMR    At risk for venous thromboembolism (VTE)  Assessment & Plan  On systemic AC in the setting of LV thrombus               The above assessment and plan was reviewed and updated as determined by my evaluation of the patient on 8/15/2024.    Labs:   Results from last 7 days   Lab Units 08/15/24  0604   WBC Thousand/uL 6.30   HEMOGLOBIN g/dL 10.9*   HEMATOCRIT % 37.0   PLATELETS Thousands/uL 362     Results from last 7 days   Lab Units 08/15/24  0604   SODIUM mmol/L 138   POTASSIUM mmol/L 4.3   CHLORIDE mmol/L 103   CO2 mmol/L 27   BUN mg/dL 26*   CREATININE mg/dL 0.88   CALCIUM mg/dL 9.2         Results from last 7 days   Lab Units 08/15/24  0604 08/12/24  0520   INR  2.43* 2.74*           Imaging  No orders to display       Review of Scheduled Meds:  Current Facility-Administered Medications   Medication Dose Route Frequency Provider Last Rate    acetaminophen  975 mg Oral TID PRN José Salcido MD      aspirin  81 mg Oral Daily Lorrie Barillas PA-C      atorvastatin  80 mg Oral Daily With Dinner Lorrie Barillas PA-C      bictegravir-emtricitab-tenofovir alafenamide  1 tablet Oral Daily With Breakfast Lorrie Barillas PA-C      bisacodyl  10 mg Rectal Daily PRN Kenny Castro MD      diphenhydrAMINE  25 mg Oral Q6H PRN Lorrie Barillas PA-C      divalproex sodium  1,250 mg Oral Daily Lorrie Barillas PA-C      dolutegravir  50 mg Oral Daily Lorrie Barillas PA-C      gabapentin  100 mg Oral Q8H Ashley Depadua, MD      lamoTRIgine  50 mg Oral BID Lorrie Barillas PA-C      levOCARNitine  1,000 mg/day Oral TID With Meals Lorrie Bairllas PA-C      losartan  50 mg Oral Daily Lorrie Braillas PA-C      melatonin  6 mg Oral HS Lorrie Barillas PA-C      metoprolol succinate  25 mg Oral Daily CHARLIE Mcclelland      mirtazapine  15 mg Oral HS Lorrie Barillas PA-C      ondansetron  4 mg Oral Q6H PRN Lorrie Barillas PA-C      polyethylene glycol  17 g Oral Daily PRN Lorrie Barillas PA-C      senna  1 tablet Oral HS PRN Kenny Castro MD       sertraline  75 mg Oral Daily Lorrie Barillas PA-C      tamsulosin  0.4 mg Oral Daily With Dinner Lorrie Barillas PA-C      warfarin  5 mg Oral Daily (warfarin) CHARLIE Plata      zonisamide  400 mg Oral Daily Lorrie Barillas PA-C         Subjective/ HPI: Patient seen and examined. Patients overnight issues or events were reviewed with nursing staff. New or overnight issues include the following:     Patient sitting up in chair listening to music upon entering the room.  He offers no new complaints today and tells me he wants to be left alone.  He expresses frustration regarding still being in the hospital, tells me he is constantly crying.  He perseverates regarding his LLE amputation and demonstrates poor insight.    *Labs /Radiology studies Reviewed, no new imaging  *Medications  reviewed and reconciled as needed  *Please refer to order section for additional ordered labs studies      Physical Examination:  Vitals:   Vitals:    08/14/24 1300 08/14/24 2125 08/15/24 0541 08/15/24 0827   BP: 117/70 141/67 138/70 116/71   BP Location: Left arm Right arm Left arm Right arm   Pulse: 92 93 88 93   Resp: 17 20 18    Temp: 97.7 °F (36.5 °C) 98.3 °F (36.8 °C) 98.3 °F (36.8 °C)    TempSrc: Oral Oral Oral    SpO2: 95% 95% 96%    Weight:       Height:           General Appearance: NAD  Lungs: normal respiratory effort, no retractions, expiratory effort normal, on room air  CV: regular rate   ABD: soft non tender  EXT: status post L AKA  Psych: tearful affect, poor insight, poor cognition   Neuro: no focal deficits      The above physical exam was reviewed and updated as determined by my evaluation of the patient on 8/15/2024.    Invasive Devices       Drain  Duration             External Urinary Catheter 14 days                     VTE Pharmacologic Prophylaxis: Warfarin (Coumadin)  Code Status: Level 1 - Full Code  Current Length of Stay: 40 day(s)    Total floor / unit time spent today 15 minutes   Coordination of patient's care was performed in conjunction with consulting services. Time invested included review of patient's labs, vitals, and management of their comorbidities with continued monitoring, examination of patient as well as answering patient questions, documenting her findings and creating progress note in electronic medical record,  ordering appropriate diagnostic testing.       ** Please Note:  voice to text software may have been used in the creation of this document. Although proof errors in transcription or interpretation are a potential of such software**

## 2024-08-15 NOTE — PROGRESS NOTES
"   08/15/24 1300   Pain Assessment   Pain Assessment Tool 0-10   Pain Score No Pain   Toileting Hygiene   Type of Assistance Needed Supervision   Comment DS in stance for urinal use at bedside with use of bedrails.   Toileting Hygiene CARE Score 4   Cognition   Overall Cognitive Status Impaired   Comments today at conclusion of session pt very tearful and angry at his situation. states that he is upset that there \"is no one to talk to about this.\" states frustration with limited psych support. provided with active and enpathetic listening.   Activity Tolerance   Medical Staff Made Aware Pt engages in leisure exploration activity for increased QOL. Pt w/ history of painting as a leisure interest. Pt assists with set up with supplies to complete self directed paint activity and tape cutting. All items strategically placed in right field for inc R attention to desired items. Pt provided with minimal cognitive support to complete appropriate sequencing and management of all items and tools. Pt completes management of bimanual tasks with use of scissors with applicaiton of painters tape on canvas. pt enjoyed placing tape pattern. Pt then engages in functional daily tasks for washing and cleaning up area via WC level to inc func cog for management of activities at WC. Level. management of WC in narrow spaces with cont dec awareness of R with minor collisions in environment with awareness to correct.   Assessment   Treatment Assessment Pt participated in skilled OT treatment session with treatment focus on leisure engagement and functional cognitive participation in activities. Pt tolerated session fair, not as enthusiastic as last time. requires a little more encouragement for participation. pt remains limited by impaired func cog/safety, impaired balance, strength and endurance, warranting continued skilled care to focus on ADL retraining, func transfers, weight shifting for toileting tasks vs standing, UE Strengthening, " sacral offloading, and general phase 1 amp edu, basic life skills and IADL prep at WC level.    Prognosis Good   Plan   Progress Progressing toward goals   OT Therapy Minutes   OT Time In 1300   OT Time Out 1430   OT Total Time (minutes) 90   OT Mode of treatment - Individual (minutes) 90   OT Mode of treatment - Concurrent (minutes) 0   OT Mode of treatment - Group (minutes) 0   OT Mode of treatment - Co-treat (minutes) 0   OT Mode of Treatment - Total time(minutes) 90 minutes   OT Cumulative Minutes 8851

## 2024-08-16 PROBLEM — R32 URINARY INCONTINENCE: Status: RESOLVED | Noted: 2024-07-07 | Resolved: 2024-08-16

## 2024-08-16 PROCEDURE — 99232 SBSQ HOSP IP/OBS MODERATE 35: CPT | Performed by: PHYSICIAN ASSISTANT

## 2024-08-16 PROCEDURE — 97530 THERAPEUTIC ACTIVITIES: CPT

## 2024-08-16 PROCEDURE — 92507 TX SP LANG VOICE COMM INDIV: CPT

## 2024-08-16 PROCEDURE — 97110 THERAPEUTIC EXERCISES: CPT

## 2024-08-16 PROCEDURE — 99233 SBSQ HOSP IP/OBS HIGH 50: CPT | Performed by: PHYSICAL MEDICINE & REHABILITATION

## 2024-08-16 PROCEDURE — 97535 SELF CARE MNGMENT TRAINING: CPT

## 2024-08-16 RX ADMIN — DIVALPROEX SODIUM 1250 MG: 500 TABLET, EXTENDED RELEASE ORAL at 09:26

## 2024-08-16 RX ADMIN — METOPROLOL SUCCINATE 25 MG: 25 TABLET, EXTENDED RELEASE ORAL at 09:26

## 2024-08-16 RX ADMIN — LOSARTAN POTASSIUM 50 MG: 50 TABLET, FILM COATED ORAL at 09:26

## 2024-08-16 RX ADMIN — LEVOCARNITINE 330 MG: 1 SOLUTION ORAL at 18:48

## 2024-08-16 RX ADMIN — LEVOCARNITINE 330 MG: 1 SOLUTION ORAL at 09:17

## 2024-08-16 RX ADMIN — GABAPENTIN 100 MG: 100 CAPSULE ORAL at 14:11

## 2024-08-16 RX ADMIN — DOLUTEGRAVIR SODIUM 50 MG: 50 TABLET, FILM COATED ORAL at 09:18

## 2024-08-16 RX ADMIN — Medication 6 MG: at 21:13

## 2024-08-16 RX ADMIN — ACETAMINOPHEN 975 MG: 325 TABLET, FILM COATED ORAL at 23:15

## 2024-08-16 RX ADMIN — GABAPENTIN 100 MG: 100 CAPSULE ORAL at 06:15

## 2024-08-16 RX ADMIN — TAMSULOSIN HYDROCHLORIDE 0.4 MG: 0.4 CAPSULE ORAL at 16:17

## 2024-08-16 RX ADMIN — LAMOTRIGINE 50 MG: 25 TABLET ORAL at 18:08

## 2024-08-16 RX ADMIN — SERTRALINE HYDROCHLORIDE 75 MG: 50 TABLET ORAL at 09:27

## 2024-08-16 RX ADMIN — MIRTAZAPINE 15 MG: 15 TABLET, FILM COATED ORAL at 21:13

## 2024-08-16 RX ADMIN — GABAPENTIN 100 MG: 100 CAPSULE ORAL at 21:13

## 2024-08-16 RX ADMIN — BICTEGRAVIR SODIUM, EMTRICITABINE, AND TENOFOVIR ALAFENAMIDE FUMARATE 1 TABLET: 50; 200; 25 TABLET ORAL at 09:17

## 2024-08-16 RX ADMIN — ZONISAMIDE 400 MG: 100 CAPSULE ORAL at 09:18

## 2024-08-16 RX ADMIN — LAMOTRIGINE 50 MG: 25 TABLET ORAL at 09:27

## 2024-08-16 RX ADMIN — WARFARIN SODIUM 5 MG: 5 TABLET ORAL at 18:08

## 2024-08-16 RX ADMIN — ASPIRIN 81 MG: 81 TABLET, COATED ORAL at 09:25

## 2024-08-16 RX ADMIN — ATORVASTATIN CALCIUM 80 MG: 80 TABLET, FILM COATED ORAL at 16:17

## 2024-08-16 RX ADMIN — LEVOCARNITINE 330 MG: 1 SOLUTION ORAL at 15:10

## 2024-08-16 NOTE — ASSESSMENT & PLAN NOTE
Noted on echocardiogram 6/10/2024: spherical 1.5 x 1.3 cm thrombus at the LV apex   Initiated on AC with Xarelto however unable to verify insurance coverage, now on Coumadin at 5 mg daily  INR: 2.43 (8/15/24)

## 2024-08-16 NOTE — ASSESSMENT & PLAN NOTE
Remote history of TBI, previously residing in a group home prior to senior care sentence.  Evaluated by neuropsychiatry on 6/27/24 and deemed NOT to have medical decision-making capacity  Process for court appointed guardian started on 7/5/24 - CM following   Guardianship hearing 8/27/24

## 2024-08-16 NOTE — PROGRESS NOTES
[facility]  Progress Note  Name: Sunil Patel I  MRN: [id number]  Unit/Bed#: -01 I Date of Admission: 7/6/2024   Date of Service: 8/16/2024 I Hospital Day: 41    Assessment & Plan   * Above-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)  Assessment & Plan  Presented with left lower extremity acute limb ischemia/extensive left iliofemoral and left infrainguinal embolism requiring left femoral thrombectomy and subsequent AKA on 6/7/24 with vascular surgery.  Continue with local wound care   Continue ASA, Coumadin and statin   Stable for discharge from PMR standpoint.  Dispo planning as per case management.  Patient is awaiting guardianship hearing, which is scheduled for 8/27/24.    Postoperative anemia  Assessment & Plan  Normocytic  PTA hemoglobin was normal, now has been running 10-11 since admission  No signs or symptoms of active bleeding  Iron panel reviewed, no evidence of TY  Likely postoperative ABLA  Monitor as needed      History of migraine headaches  Assessment & Plan  Continue PTA meds Depakote, gabapentin, zonegran and lamictal all of which can help in prevention  Unable to take triptans due to a history of stroke  PRN Tylenol     Cardiomyopathy (HCC)  Assessment & Plan  ECHO 6/10/2024 = LVEF 40-45% with mild global hypokinesis   Status post NM stress test 6/11/2024 = large, mild, fixed defect in the inferior wall, possibly due to diaphragmatic attenuation artifact, there is a small area of partial reversibility in the inferior apical wall suggestive of ischemia    Evaluated by cardiology.  Etiology felt to be possibly secondary to stress-induced cardiomyopathy, with apical thrombus  Cardiac catheterization deferred given the lack of any significant ischemia, and no current cardiac symptoms  Continue with medical management with aspirin, statin, beta-blocker, ARB  Remains euvolemic off of diuretics, continue to monitor volume status   Outpatient follow-up  with cardiology, previously followed with Dr. Gumaro [facility]    Patient incapable of making informed decisions  Assessment & Plan  Seen by neuropsych on 6/27 and was deemed NOT to have medical decision making capacity  Guardianship hearing scheduled for 8/27    Traumatic brain injury (HCC)  Assessment & Plan  Remote history of TBI, previously residing in a group home prior to group home sentence.  Evaluated by neuropsychiatry on 6/27/24 and deemed NOT to have medical decision-making capacity  Process for court appointed guardian started on 7/5/24 - CM following   Guardianship hearing 8/27/24    Carnitine deficiency (HCC)  Assessment & Plan  Continue levocarnitine 330 mg 3x daily with meals.    History of seizures  Assessment & Plan  Diagnosed in July 2019, follows with [facility] neurology outpatient  Continue home regimen with Depakote ER, Lamotrigine and Zonegran    Left ventricular thrombus  Assessment & Plan  Noted on echocardiogram 6/10/2024: spherical 1.5 x 1.3 cm thrombus at the LV apex   Initiated on AC with Xarelto however unable to verify insurance coverage, now on Coumadin at 5 mg daily  INR: 2.43 (8/15/24)    Benign essential hypertension  Assessment & Plan  Blood pressures acceptable on current regimen including Losartan 50 mg daily, Toprol-XL 25 mg daily    History of stroke  Assessment & Plan  History of left MCA CVA in May 2018 with residual expressive aphasia  Continue ASA and atorvastatin    Human immunodeficiency virus (HIV) infection (HCC)  Assessment & Plan  Continue Biktarvy and Tivicay.    Bipolar affective disorder (HCC)  Assessment & Plan  Psychiatry evaluated most recently on 8/12 and cleared for discharge to rehab   Continue home meds Zoloft and Remeron           The above assessment and plan was reviewed and updated as determined by my evaluation of the patient on 8/16/2024.    Labs:   Results from last 7 days   Lab Units 08/15/24  0604   WBC Thousand/uL 6.30   HEMOGLOBIN g/dL 10.9*   HEMATOCRIT %  37.0   PLATELETS Thousands/uL 362     Results from last 7 days   Lab Units 08/15/24  0604   SODIUM mmol/L 138   POTASSIUM mmol/L 4.3   CHLORIDE mmol/L 103   CO2 mmol/L 27   BUN mg/dL 26*   CREATININE mg/dL 0.88   CALCIUM mg/dL 9.2         Results from last 7 days   Lab Units 08/15/24  0604 08/12/24  0520   INR  2.43* 2.74*           Imaging  No orders to display       Review of Scheduled Meds:  Current Facility-Administered Medications   Medication Dose Route Frequency Provider Last Rate    acetaminophen  975 mg Oral TID PRN José Salcido MD      aspirin  81 mg Oral Daily Lorrie Barillas PA-C      atorvastatin  80 mg Oral Daily With Dinner Lorrie Barillas PA-C      bictegravir-emtricitab-tenofovir alafenamide  1 tablet Oral Daily With Breakfast Lorrie Barillas PA-C      bisacodyl  10 mg Rectal Daily PRN Kenny Castro MD      diphenhydrAMINE  25 mg Oral Q6H PRN Lorrie Barillas PA-C      divalproex sodium  1,250 mg Oral Daily Lorrie Barillas PA-C      dolutegravir  50 mg Oral Daily Lorrie Barillas PA-C      gabapentin  100 mg Oral Q8H Ashley Depadua, MD      lamoTRIgine  50 mg Oral BID Lorrie Barillas PA-C      levOCARNitine  1,000 mg/day Oral TID With Meals Lorrie Barillas PA-C      losartan  50 mg Oral Daily Lorrie Barillas PA-C      melatonin  6 mg Oral HS Lorrie Barillas PA-C      metoprolol succinate  25 mg Oral Daily CHARLIE Mcclelland      mirtazapine  15 mg Oral HS Lorrie Barillas PA-C      ondansetron  4 mg Oral Q6H PRN Lorrie Barillas PA-C      polyethylene glycol  17 g Oral Daily PRN Lorrie Barillas PA-C      senna  1 tablet Oral HS PRN Kenny Castro MD      sertraline  75 mg Oral Daily Lorrie Barillas PA-C      tamsulosin  0.4 mg Oral Daily With Dinner Lorrie Barillas PA-C      warfarin  5 mg Oral Daily (warfarin) CHARLIE Plata      zonisamide  400 mg Oral Daily Lorrie Barillas PA-C          Subjective/ HPI: Patient seen and examined. He is frustrated because he wants to be discharged.  Otherwise, he has no current complaints.    ROS:   A 10 point ROS was performed; negative except as noted above.        *Labs /Radiology studies Reviewed, no new imaging  *Medications  reviewed and reconciled as needed  *Please refer to order section for additional ordered labs studies      Physical Examination:  Vitals:   Vitals:    08/15/24 1256 08/15/24 2016 08/16/24 0602 08/16/24 0926   BP: 122/64 104/58 119/67 132/74   BP Location: Right arm Right arm Left arm    Pulse: 92 89 88 91   Resp: 18 17 18    Temp: 98.3 °F (36.8 °C) 98.5 °F (36.9 °C) 98.3 °F (36.8 °C)    TempSrc: Oral Oral Oral    SpO2: 96% 91% 98%    Weight:       Height:           General Appearance: NAD; pleasant  HEENT: PERRLA, conjuctiva normal; mucous membranes moist; face symmetrical  Neck:  Supple  Lungs: clear bilaterally, normal respiratory effort, no retractions, expiratory effort normal, on room air  CV: regular rate and rhythm, no murmurs rubs or gallops noted   ABD: soft non tender, +BS x4  EXT: Left AKA  Skin: No rash  Psych: affect normal, mood normal  Neuro: AAOx3       The above physical exam was reviewed and updated as determined by my evaluation of the patient on 8/16/2024.    Invasive Devices       Drain  Duration             External Urinary Catheter 15 days                     VTE Pharmacologic Prophylaxis: Warfarin (Coumadin)  Code Status: Level 1 - Full Code  Current Length of Stay: 41 day(s)    Total floor / unit time spent today 20 minutes  Coordination of patient's care was performed in conjunction with consulting services. Time invested included review of patient's labs, vitals, and management of their comorbidities with continued monitoring, examination of patient as well as answering patient questions, documenting her findings and creating progress note in electronic medical record,  ordering appropriate diagnostic  testing.       ** Please Note:  voice to text software may have been used in the creation of this document. Although proof errors in transcription or interpretation are a potential of such software**

## 2024-08-16 NOTE — ASSESSMENT & PLAN NOTE
ECHO 6/10/2024 = LVEF 40-45% with mild global hypokinesis   Status post NM stress test 6/11/2024 = large, mild, fixed defect in the inferior wall, possibly due to diaphragmatic attenuation artifact, there is a small area of partial reversibility in the inferior apical wall suggestive of ischemia    Evaluated by cardiology.  Etiology felt to be possibly secondary to stress-induced cardiomyopathy, with apical thrombus  Cardiac catheterization deferred given the lack of any significant ischemia, and no current cardiac symptoms  Continue with medical management with aspirin, statin, beta-blocker, ARB  Remains euvolemic off of diuretics, continue to monitor volume status   Outpatient follow-up with cardiology, previously followed with Dr. Gumaro Aguila [facility]

## 2024-08-16 NOTE — PLAN OF CARE
Problem: INFECTION - ADULT  Goal: Absence or prevention of progression during hospitalization  Description: INTERVENTIONS:  - Assess and monitor for signs and symptoms of infection  - Monitor lab/diagnostic results  - Monitor all insertion sites, i.e. indwelling lines, tubes, and drains  - Monitor endotracheal if appropriate and nasal secretions for changes in amount and color  - [place] appropriate cooling/warming therapies per order  - Administer medications as ordered  - Instruct and encourage patient and family to use good hand hygiene technique  - Identify and instruct in appropriate isolation precautions for identified infection/condition  Outcome: Progressing

## 2024-08-16 NOTE — ASSESSMENT & PLAN NOTE
Psychiatry evaluated most recently on 8/12 and cleared for discharge to rehab   Continue home meds Zoloft and Remeron

## 2024-08-16 NOTE — CASE MANAGEMENT
Cm, LENY CL, Kika from [facility], Karin from &[facility] (group [place]) and pt, met virtually to discuss possible dispo plan. Karin was able to give pt a tour of facility, pt reporting he would really love it there. However, pt still adament about being able to smoke marijuana at wherever he goes, all parties in meeting discussed with Kieran that there are no facilities that will allow smoking marijuana, pt understanding but frustrated. Karin placed pt on waitlist incase he changes his mind.     Cm met with pt after meeting to review meeting, pt again verbalizing his concerns with not being able to go outside and smoke and do things. Cm explained to Kieran that the group home's amenities, services, community might just be more important then the marijuana and also discussed how well he is doing here. Cm explained that he already has gone this long without it. Pt still refusing.

## 2024-08-16 NOTE — PROGRESS NOTES
08/16/24 1420   Pain Assessment   Pain Assessment Tool 0-10   Pain Score No Pain   Restrictions/Precautions   Precautions Aphasia;Bed/chair alarms;Cognitive;Fall Risk;Supervision on toilet/commode   Comprehension   Comprehension (FIM) 4 - Understands basic info/conversation 75-90% of time   Expression   Expression (FIM) 3 - Needs to repeat parts of sentences   Social Interaction   Social Interaction (FIM) 5 - Interacts appropriately with others 90% of time   Problem Solving   Problem solving (FIM) 3 - Solves basic problmes 50-74% of time   Memory   Memory (FIM) 3 - Recognizes, recalls/performs 50-74%   Speech/Language/Cognition Assessment   Treatment Assessment Pt was seen in room for skilled ST session targeting expressive-receptive language skills. Upon entering room, pt was being seen by additional doctors and pt noted feeling fatigued but agreeable to ST session. Due to expressive aphasia, pt was unable to fully elicit orientation information. Attempted to assess how day has been and pt was able to attempt to recall earlier OT session stating 'we did stuff and it was hard' but was unable to elaborate or when SLP provided additional verbal cues, did not comprehend. Additionally, when asked about PT, noted to have significant decrease in recall as pt unable to recall activities in PT even when given verbal cues based on information from PT's note. Pt noted to be fatigued and SLP focused on activities for session.       Focus of session was continued use of [employer] on SLP laptop to which SLP and pt engaged in both receptive language and expressive language task as well as trialing basic categorization task.     1- -Describe the Picture (Level 1): This task targeted pt's expressive language skills and reading comprehension skills, to match the picture to the FO2 word choice. Pt was able to complete task by first choosing the correct word by reading and comprehending based on the picture in 5/5 accuracy trials  with no verbal cues needed. When targeting expression and given 2 options, pt was able to express and read each word orthographically provided in 4/10 trials which increased to 10/10 trials once given initial phonemic cues and providing melotic intonation sounding out each break in the word. Pt was observed to have decrease reading comprehension as pt was noted to state 'sing' for 'sung' and 'washed' for 'showered' noting understanding for concepts but difficulty with expressive language and reading comprehension together. Pt continues to benefit most from initial phonemic cues overall.       2- Naming the Category (Level 1): This task engaged pt's reading comprehension and categorization skills. Three words were provided and then FO3 possible category choices given below. Pt was able to complete task by choosing the correct item by pointing to the correct FO3 word in 9/10. Pt noted significant improvement with reading comprehension, needing increase time for identification with FO3 options and items but overall was noted to have increase understanding w/ choosing the correct category and increase receptive language.     3- What does belong (Level 1): This last task engaged pt's expressive language and comprehension skills. Pt was given FO2 words and asked to choose from a FO4 words below, which words in the FO4 would belong with the FO3 words above. Pt was able to choose the correct word in 4/5 trials. Pt noted to have initial difficulty with comprehension of directions but overall was noted to have improvement in attention with task. Pt attempting to increase expressive language, able to imitate orthographic models in 2/5 trials increasing to 5/5 once given initial phonemic cues. Pt noted to continue to benefit most from phonemic cues requiring increase time for sounding out each phoneme and tapping out each sound with 3+ word syllables.  Overall, pt is beginning to show some improvement with receptive-expressive  language but continues to rely on phonemic cues for verbal and receptive language.  Pt will continue to benefit from ongoing skilled SLP services targeting use of tablet as not only extra therapy activity but use of increasing communication skills in hopes for decreasing burden of care over time.    SLP Therapy Minutes   SLP Time In 1420   SLP Time Out 1455   SLP Total Time (minutes) 35   SLP Mode of treatment - Individual (minutes) 35   SLP Mode of treatment - Concurrent (minutes) 0   SLP Mode of treatment - Group (minutes) 0   SLP Mode of treatment - Co-treat (minutes) 0   SLP Mode of Treatment - Total time(minutes) 35 minutes   SLP Cumulative Minutes 310   Therapy Time missed   Time missed? No

## 2024-08-16 NOTE — PROGRESS NOTES
08/16/24 1230   Pain Assessment   Pain Assessment Tool 0-10   Pain Score No Pain   Restrictions/Precautions   Precautions Aphasia;Bed/chair alarms;Cognitive;Fall Risk;Supervision on toilet/commode   LLE Weight Bearing Per Order NWB   Braces or Orthoses   ()   Roll Left and Right   Type of Assistance Needed Independent   Roll Left and Right CARE Score 6   Sit to Lying   Type of Assistance Needed Independent   Sit to Lying CARE Score 6   Lying to Sitting on Side of Bed   Type of Assistance Needed Independent   Lying to Sitting on Side of Bed CARE Score 6   Bed-Chair Transfer   Type of Assistance Needed Supervision   Comment S for safety- half stand but sit pivot   Chair/Bed-to-Chair Transfer CARE Score 4   Walk 10 Feet   Reason if not Attempted Medical concerns   Walk 10 Feet CARE Score 88   Walk 50 Feet with Two Turns   Reason if not Attempted Medical concerns   Walk 50 Feet with Two Turns CARE Score 88   Walk 150 Feet   Reason if not Attempted Medical concerns   Walk 150 Feet CARE Score 88   Walking 10 Feet on Uneven Surfaces   Reason if not Attempted Medical concerns   Walking 10 Feet on Uneven Surfaces CARE Score 88   Ambulation   Does the patient walk? 0. No, and walking goal is not clinically indicated.   Wheel 50 Feet with Two Turns   Type of Assistance Needed Independent   Wheel 50 Feet with Two Turns CARE Score 6   Wheel 150 Feet   Type of Assistance Needed Independent   Wheel 150 Feet CARE Score 6   Wheelchair mobility   Does the patient use a wheelchair? 1. Yes   Type of Wheelchair Used 1. Manual   Curb or Single Stair   Reason if not Attempted Medical concerns   1 Step (Curb) CARE Score 88   4 Steps   Reason if not Attempted Medical concerns   4 Steps CARE Score 88   12 Steps   Reason if not Attempted Medical concerns   12 Steps CARE Score 88   Picking Up Object   Comment pt WC level   Therapeutic Interventions   Strengthening Supine SLR AROM on each leg (could add wt to L next time),  janes  with L limb on small bolster,  SAQ on R with 4#,  R sidelying with L hip abd AROM,  Prone glute squeeze all 10x3   Flexibility Prone lying with pillow under chest 5 mins   Equipment Use   NuStep For enduracne performed 15mins L 3   Assessment   Treatment Assessment 60 min skilled PT session focused on Nustep for conditioning which pt reports somewhat hard on L3.  Pt remains S level for transfers for safety.  Performed pre prosthetic strengthening supine and prone.  Still awaiting any D/C plan.  Cont skilled PT focus on core strength, residual limb strength, cardio pulmonary, and RLE strength.   Problem List Decreased strength;Decreased endurance;Impaired balance;Decreased coordination;Decreased cognition;Impaired judgement;Decreased safety awareness;Orthopedic restrictions;Decreased skin integrity   PT Barriers   Multiple Barriers?   (Needs D/C plan)   Plan   Progress Progressing toward goals   PT Therapy Minutes   PT Time In 1230   PT Time Out 1330   PT Total Time (minutes) 60   PT Mode of treatment - Individual (minutes) 60   PT Mode of treatment - Concurrent (minutes) 0   PT Mode of treatment - Group (minutes) 0   PT Mode of treatment - Co-treat (minutes) 0   PT Mode of Treatment - Total time(minutes) 60 minutes   PT Cumulative Minutes 2183   Therapy Time missed   Time missed? No

## 2024-08-16 NOTE — PROGRESS NOTES
Physical Medicine and Rehabilitation Progress Note  Sunil Patel 63 y.o. male MRN: [id number]  Unit/Bed#: [facility] 451-01 Encounter: [id number]    To Review: Sunil Patel is a 63 y.o. male who  has a past medical history of Acute lower limb ischemia (06/08/2024), Anxiety, Depression, HIV disease (HCC), Substance abuse (HCC), and Suicide attempt (HCC). who presented to the [facility] on 6/7/24 from care home for increased LLE swelling and pain and was found to have a left external iliac artery occlusion and acute limb ischemia. 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 Patient had TTE on 6/10/24 showing LV apex thrombus. On 6/11/24 patient had pharmacologic nuclear stress test/SPECT scan which did not show any significant areas of ischemia with EF 40-45% and recommended continuing A/C for LV apical thrombus. His course was complicated by b/l buttock unstageable pressure ulcers, urinary and fecal incontinence, pain, and significant decline in ADLs and mobility. Patient has been continued on Xarelto for anticoagulation, as well as ASA and statin. Patient has a history of TBI, with baseline nonfluent aphasia and forgetfulness. He was evaluated by neuropsychology on 6/27/24, and deemed to not have capacity to make fully informed medical decisions. Therefore, the guardianship process was initiated as of 7/5/24. He was admitted to the [facility] on 7/6.     Chief Complaint: wants to leave hospital    Interval History/Subjective:  Pt seen and assessed at bedside this morning watching Danny Waldron blues yet again. Says it relaxes him. Very anxious about leaving as he no longer wants to stay in a hospital room. Says his headache is manageable but worse when he is emotional. No other concerns today.    ROS:  10  "point ROS performed and negative unless mentioned in HPI.        Today's Changes:  Will continue to work on dispo with upper management    Assessment/Plan:    Patient incapable of making informed decisions  Assessment & Plan  - History of remote TBI and prior strokes with comorbid psychiatric history.  - Evaluated by neuropsychology, Dr. Dilshad Tatum, PhD on 6/27/24, found to have \"diffuse cognitive dysfunction and on a measure assessing awareness of personal health status and ability to evaluate health problems, handle medical emergencies and take safety precautions, patient performed in the IMPAIRED range of functioning. During this encounter, patient does not appear to have capacity to make fully informed medical decisions.\"  - Court-appointed guardianship process has been initiated by acute care CM Katia Kay.    - We have identified two friends who are interested in being patient's guardians and have been involved and invested in patient's care on the ARC.   - They will need to be interviewed by the  involved in this process.    - He has to undergo his hearing on 8/6/2024 first.  -- now rescheduled to 8/27   - He would ultimately benefit from [facility]/nursing home/memory care unit - he does very well with structure and supervision.    8/2- Ongoing discussions with CM, ARC admin and social work to dispo patient to stable long-term housing. Process continues, with evaluation by therapy managers from [facility].   8/12- unfortunately have not made much progress with SNF placement per [facility]'s facilities. Have not heard back from residential community that met with him on 8/6    History of migraine headaches  Assessment & Plan  Chronic issue.  Seemed to worsen with increased irritability after discontinuing gabapentin  Resumed gabapentin  Received [employer] once during stay with middling results  Sleep logs have been good - discontinued.  Headache is on and off    Cardiomyopathy (HCC)  Assessment & " Plan  ECHO on 6/10/2024 showed LVEF 40-45% with mild global hypokinesis   Status post NM stress test on 6/11/2024 which showed: a large, mild, fixed defect in the inferior wall, possibly due to diaphragmatic attenuation artifact, there is a small area of partial reversibility in the inferior apical wall suggestive of ischemia    Elevated by cardiology, etiology felt to be possibly secondary to stress-induced cardiomyopathy, with apical thrombus  Cardiac catheterization deferred given the lack of any significant ischemia, and no current cardiac symptoms  Continue with medical management with aspirin, statin, beta-blocker, ARB  Monitor volume status, remains euvolemic off of diuretics   Outpatient follow-up with cardiology    Urinary incontinence  Assessment & Plan  - Did have some incontinence on acute - improved with timed voids and consistent assistance with his urinal  - Described as urgency  - Marked improvement on timed voids.   - monitor for retention, incontinence (including overflow incontinence), signs/symptoms of UTI  - Timed Voids and PVRs Q4hrs initiated earlier. And PVR <150 x3, so scans discontinued.    - He has some difficulty managing the urinal    - needs assistance but is able to transfer to [facility]    - Still uses condom catheter at night, in part for continence care/to protect his skin given his buttock wounds. However now note that buttocks wound has healed  - Continue timed voids           At risk for constipation  Assessment & Plan  - Stooling adequately recently; did have some incontinence on acute now improved  - Close continent care given buttock wounds  - Last BM 8/14 and continent  - PRN miralax, Senna and suppository    At risk for venous thromboembolism (VTE)  Assessment & Plan  - Fully anticoagulated on warfarin for apical thrombus  - IM managing    Traumatic brain injury (HCC)  Assessment & Plan  See neurocog impairment     Carnitine deficiency (HCC)  Assessment & Plan  - Carnitine  replacement  - Appreciate medicine management      History of seizures  Assessment & Plan  - Depakote ER, lamotrigine, zonisamide  - Outpatient follow-up with [facility] neurology    Left ventricular thrombus  Assessment & Plan  - Visualized on echocardiogram on 6/10/24: spherical 1.5 x 1.3 cm thrombus at the LV apex.   - Was started on rivaroxaban, but patient does not appear to have insurance coverage for prescriptions   - Xarelto, eliquis, and pradaxa likely cost prohibitive per IM   - 7/29 transitioned to warfarin with lovenox bridge.   - Now off lovenox, and fully anticoagulated on warfarin.   - Management as per IM.   - 8/15 INR 2.43. Continue 5mg daily. Recheck per IM  - Outpatient follow-up with cardiology    Benign essential hypertension  Assessment & Plan  - Toprol, losartan  - Appreciate medicine management    History of stroke  Assessment & Plan  - History of old left temporal and parietal lobe cortical infarcts, also involving the insula and left occipital lobe as well as small right posterior parietal lobe. Stable on most recent CT head on 5/16/24.   - ASA, and statin for secondary prevention  - Optimal BP control  - Monitor neuro exam - hx of LV thrombus    - See that entry  - OP neuro follow-up     Human immunodeficiency virus (HIV) infection (HCC)  Assessment & Plan  - Continue anti-retroviral treatment  - Appreciate medicine management during ARC course  - OP ID follow-up       Bipolar affective disorder (HCC)  Assessment & Plan  Mood acceptable; continue meds as outlined   Supportive counseling  NeuroPsychology consult while in [facility]C if available for support  Psych evaluated on 8/12 and deemed him stable for discharge with current regimen as below  Counseled on and continue to encourage deep breathing/relaxation/behavioral management techniques  - Mirtazapine 15 mg qHs  - Sertraline 75 mg daily   - Lamictal 50mg BID  - Depakote ER 1250mg qday   - Outpatient psychiatry follow-up  - Would consult Psych  before changing medications    * 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 Underwent L femoral thrombectomy and AKA with vascular surgery   - [facility] sx follow-up 7/10 - L AKA incision site well-healed, staples taken out at bedside  - [facility] will be vendor - Patient now has .  - Monitor for hip flexion/abduction tightness. Stretches in therapy  - Now on Coumadin with hx of LV thrombus and PAOD. Checking INR's as above   - PT/OT/SLP (to work on carry over strategies) 3-5 hours/day, 5-7 days/week.    - Goals are Ind-Sup at a wheelchair level.   - Outpatient f/u with [facility] and Vascular  - Continue patient training with  placement  - Continue gabapentin - doesn't do too much for phantom limb sensation, but that doesn't bother him or cause him pain currently.   - Patient still frustrated given circumstances; will continue gabapentin for anxiety    Pressure injury of buttock, stage 3 (HCC)-resolved as of 8/9/2024  Assessment & Plan  Resolved as of 8/7.   - Wound care consulted and following weekly  - POA L buttock pressure injury unstageable has healed.  - POA R buttock pressure injury  evolved from unstageable to Stage 3, and is now resolved.   - Recommend ROHO cushion in chair when out of bed instead   - Preventative hydraguard to bilateral heels BID and PRN.   - P500  - Monitor clinically for breakdown, frequent turns  - reviewed picture of wound today. It is healing well. Continue to monitor        Health Maintenance  #GI Prophylaxis: not indicated  #Code Status: Full code  #FEN: Cardiac diet + Ensure at bfast/dinner  #Dispo: Teams 8/13: ADD TBD. Still working on placement. May need to transfer back to acute care hospital. Guardianship court date is 8/27.    Will need f/u with PMR, PCP, Vascular, Psych      Objective:     Functional Update:  PT: sup transfers, sup bed mobility, 500 ft wheelchair  ambulation independent  OT: Ind eating/grooming, Sup bathing, Ind UB dressing, Sup LB dressing, CGA toileting, CGA tub/shower transfers, Sup toilet transfers  SLP: modA comprehension, modA expression, Sup social interaction, modA problem solving, modA memory     Allergies per EMR    Physical Exam:  Temp:  [98.3 °F (36.8 °C)-98.5 °F (36.9 °C)] 98.3 °F (36.8 °C)  HR:  [88-93] 88  Resp:  [17-18] 18  BP: (104-122)/(58-71) 119/67  Oxygen Therapy  SpO2: 98 %    Gen: No acute distress, Well-nourished, well-appearing. Sitting in bed  HEENT: Moist mucus membranes, Normocephalic/Atraumatic  Cardiovascular: Regular rate, rhythm, S1/S2. Distal pulses palpable  Heme/Extr: No edema  Pulmonary: Non-labored breathing. No wheezing, rhonchi  : No fernandes  GI: Soft, non-tender, non-distended.   MSK: Stable L AKA, no signs of infection at site  Integumentary: Skin is warm, dry.   Neuro: AAOx3,  Appropriate to questioning. Still with significant aphasia and impaired insight/deficits. Appropriate  Psych: Normal mood and affect.   Diagnostic Studies: reviewed, no new imaging    Laboratory:    Results from last 7 days   Lab Units 08/15/24  0604   HEMOGLOBIN g/dL 10.9*   HEMATOCRIT % 37.0   WBC Thousand/uL 6.30     Results from last 7 days   Lab Units 08/15/24  0604   BUN mg/dL 26*   POTASSIUM mmol/L 4.3   CHLORIDE mmol/L 103   CREATININE mg/dL 0.88     Results from last 7 days   Lab Units 08/15/24  0604 08/12/24  0520   PROTIME seconds 26.3* 28.8*   INR  2.43* 2.74*        Patient Active Problem List   Diagnosis    Bipolar affective disorder (HCC)    Substance abuse (HCC)    Human immunodeficiency virus (HIV) infection ([facility])    History of stroke    Benign essential hypertension    Positive laboratory testing for human immunodeficiency virus (HCC)    Hypertension    Unspecified vitamin D deficiency    Tobacco abuse    Cerebrovascular accident (CVA) ([facility])    Left ventricular thrombus    History of seizures    Carnitine deficiency ([facility])     Above-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)    Traumatic brain injury (HCC)    Impaired mobility and activities of daily living    At risk for venous thromboembolism (VTE)    Acute pain    At risk for constipation    Urinary incontinence    Patient incapable of making informed decisions    Adjustment disorder with mixed anxiety and depressed mood    Cardiomyopathy (HCC)    History of migraine headaches    Postoperative anemia         Medications  Current Facility-Administered Medications   Medication Dose Route Frequency Provider Last Rate    acetaminophen  975 mg Oral TID PRN Joséjulio c Salcido MD      aspirin  81 mg Oral Daily Lorriejacky Barillas PA-C      atorvastatin  80 mg Oral Daily With Dinner LorrieFADUMO Mullen-C      bictegravir-emtricitab-tenofovir alafenamide  1 tablet Oral Daily With Breakfast Lorrie Barillas PA-C      bisacodyl  10 mg Rectal Daily PRN Kenny Castro MD      diphenhydrAMINE  25 mg Oral Q6H PRN FADUMO Black-C      divalproex sodium  1,250 mg Oral Daily LorrieFADUMO Mullen-GERALDO      dolutegravir  50 mg Oral Daily LorrieFADUMO Mullen-C      gabapentin  100 mg Oral Q8H Ashley Depadua, MD      lamoTRIgine  50 mg Oral BID Lorrie Barillas PA-C      levOCARNitine  1,000 mg/day Oral TID With Meals Lorrie Barillas PA-C      losartan  50 mg Oral Daily LorrieFADUMO Mullen-C      melatonin  6 mg Oral HS Lorriejacky Barillas PA-C      metoprolol succinate  25 mg Oral Daily CHARLIE Mcclelland      mirtazapine  15 mg Oral HS LorrieFADUMO Mullen-GERALDO      ondansetron  4 mg Oral Q6H PRN FADUMO Black-C      polyethylene glycol  17 g Oral Daily PRN Lorrie Barillas PA-C      senna  1 tablet Oral HS PRN Kenny Castro MD      sertraline  75 mg Oral Daily Lorriejacky Barillas PA-C      tamsulosin  0.4 mg Oral Daily With Dinner Lorrie Barillas PA-C      warfarin  5 mg Oral Daily (warfarin) CHARLIE Plata       zonisamide  400 mg Oral Daily Lorrie Barillas PA-C            ** Please Note: Fluency Direct voice to text software may have been used in the creation of this document. **

## 2024-08-16 NOTE — ASSESSMENT & PLAN NOTE
Blood pressures acceptable on current regimen including Losartan 50 mg daily, Toprol-XL 25 mg daily

## 2024-08-16 NOTE — ASSESSMENT & PLAN NOTE
Presented with left lower extremity acute limb ischemia/extensive left iliofemoral and left infrainguinal embolism requiring left femoral thrombectomy and subsequent AKA on 6/7/24 with vascular surgery.  Continue with local wound care   Continue ASA, Coumadin and statin   Stable for discharge from PMR standpoint.  Dispo planning as per case management.  Patient is awaiting guardianship hearing, which is scheduled for 8/27/24.
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
## 2024-08-17 RX ADMIN — GABAPENTIN 100 MG: 100 CAPSULE ORAL at 05:35

## 2024-08-17 RX ADMIN — ZONISAMIDE 400 MG: 100 CAPSULE ORAL at 09:00

## 2024-08-17 RX ADMIN — LEVOCARNITINE 330 MG: 1 SOLUTION ORAL at 08:57

## 2024-08-17 RX ADMIN — LAMOTRIGINE 50 MG: 25 TABLET ORAL at 17:04

## 2024-08-17 RX ADMIN — Medication 6 MG: at 22:12

## 2024-08-17 RX ADMIN — SERTRALINE HYDROCHLORIDE 75 MG: 50 TABLET ORAL at 08:58

## 2024-08-17 RX ADMIN — MIRTAZAPINE 15 MG: 15 TABLET, FILM COATED ORAL at 22:12

## 2024-08-17 RX ADMIN — METOPROLOL SUCCINATE 25 MG: 25 TABLET, EXTENDED RELEASE ORAL at 09:00

## 2024-08-17 RX ADMIN — GABAPENTIN 100 MG: 100 CAPSULE ORAL at 13:43

## 2024-08-17 RX ADMIN — LEVOCARNITINE 330 MG: 1 SOLUTION ORAL at 17:05

## 2024-08-17 RX ADMIN — TAMSULOSIN HYDROCHLORIDE 0.4 MG: 0.4 CAPSULE ORAL at 17:05

## 2024-08-17 RX ADMIN — LAMOTRIGINE 50 MG: 25 TABLET ORAL at 08:58

## 2024-08-17 RX ADMIN — BICTEGRAVIR SODIUM, EMTRICITABINE, AND TENOFOVIR ALAFENAMIDE FUMARATE 1 TABLET: 50; 200; 25 TABLET ORAL at 09:01

## 2024-08-17 RX ADMIN — LOSARTAN POTASSIUM 50 MG: 50 TABLET, FILM COATED ORAL at 08:58

## 2024-08-17 RX ADMIN — DIVALPROEX SODIUM 1250 MG: 500 TABLET, EXTENDED RELEASE ORAL at 08:57

## 2024-08-17 RX ADMIN — DOLUTEGRAVIR SODIUM 50 MG: 50 TABLET, FILM COATED ORAL at 09:01

## 2024-08-17 RX ADMIN — ASPIRIN 81 MG: 81 TABLET, COATED ORAL at 08:58

## 2024-08-17 RX ADMIN — GABAPENTIN 100 MG: 100 CAPSULE ORAL at 22:12

## 2024-08-17 RX ADMIN — ATORVASTATIN CALCIUM 80 MG: 80 TABLET, FILM COATED ORAL at 17:04

## 2024-08-17 RX ADMIN — LEVOCARNITINE 330 MG: 1 SOLUTION ORAL at 14:11

## 2024-08-17 RX ADMIN — WARFARIN SODIUM 5 MG: 5 TABLET ORAL at 17:04

## 2024-08-17 RX ADMIN — ACETAMINOPHEN 975 MG: 325 TABLET, FILM COATED ORAL at 08:57

## 2024-08-17 NOTE — PLAN OF CARE
Problem: INFECTION - ADULT  Goal: Absence or prevention of progression during hospitalization  Description: INTERVENTIONS:  - Assess and monitor for signs and symptoms of infection  - Monitor lab/diagnostic results  - Monitor all insertion sites, i.e. indwelling lines, tubes, and drains  - Monitor endotracheal if appropriate and nasal secretions for changes in amount and color  - [place] appropriate cooling/warming therapies per order  - Administer medications as ordered  - Instruct and encourage patient and family to use good hand hygiene technique  - Identify and instruct in appropriate isolation precautions for identified infection/condition  Outcome: Progressing

## 2024-08-18 PROCEDURE — 97110 THERAPEUTIC EXERCISES: CPT

## 2024-08-18 PROCEDURE — 97530 THERAPEUTIC ACTIVITIES: CPT

## 2024-08-18 RX ADMIN — LAMOTRIGINE 50 MG: 25 TABLET ORAL at 08:14

## 2024-08-18 RX ADMIN — DOLUTEGRAVIR SODIUM 50 MG: 50 TABLET, FILM COATED ORAL at 08:17

## 2024-08-18 RX ADMIN — DIVALPROEX SODIUM 1250 MG: 500 TABLET, EXTENDED RELEASE ORAL at 08:14

## 2024-08-18 RX ADMIN — LEVOCARNITINE 330 MG: 1 SOLUTION ORAL at 08:16

## 2024-08-18 RX ADMIN — WARFARIN SODIUM 5 MG: 5 TABLET ORAL at 17:22

## 2024-08-18 RX ADMIN — GABAPENTIN 100 MG: 100 CAPSULE ORAL at 22:08

## 2024-08-18 RX ADMIN — METOPROLOL SUCCINATE 25 MG: 25 TABLET, EXTENDED RELEASE ORAL at 08:13

## 2024-08-18 RX ADMIN — TAMSULOSIN HYDROCHLORIDE 0.4 MG: 0.4 CAPSULE ORAL at 17:22

## 2024-08-18 RX ADMIN — ASPIRIN 81 MG: 81 TABLET, COATED ORAL at 08:14

## 2024-08-18 RX ADMIN — LOSARTAN POTASSIUM 50 MG: 50 TABLET, FILM COATED ORAL at 08:13

## 2024-08-18 RX ADMIN — LAMOTRIGINE 50 MG: 25 TABLET ORAL at 17:22

## 2024-08-18 RX ADMIN — Medication 6 MG: at 22:08

## 2024-08-18 RX ADMIN — ZONISAMIDE 400 MG: 100 CAPSULE ORAL at 08:17

## 2024-08-18 RX ADMIN — LEVOCARNITINE 330 MG: 1 SOLUTION ORAL at 14:41

## 2024-08-18 RX ADMIN — LEVOCARNITINE 330 MG: 1 SOLUTION ORAL at 17:22

## 2024-08-18 RX ADMIN — MIRTAZAPINE 15 MG: 15 TABLET, FILM COATED ORAL at 22:08

## 2024-08-18 RX ADMIN — GABAPENTIN 100 MG: 100 CAPSULE ORAL at 14:41

## 2024-08-18 RX ADMIN — GABAPENTIN 100 MG: 100 CAPSULE ORAL at 06:20

## 2024-08-18 RX ADMIN — SERTRALINE HYDROCHLORIDE 75 MG: 50 TABLET ORAL at 08:14

## 2024-08-18 RX ADMIN — ATORVASTATIN CALCIUM 80 MG: 80 TABLET, FILM COATED ORAL at 17:22

## 2024-08-18 RX ADMIN — BICTEGRAVIR SODIUM, EMTRICITABINE, AND TENOFOVIR ALAFENAMIDE FUMARATE 1 TABLET: 50; 200; 25 TABLET ORAL at 08:16

## 2024-08-18 NOTE — PLAN OF CARE
Problem: SAFETY ADULT  Goal: Patient will remain free of falls  Description: INTERVENTIONS:  - Educate patient/family on patient safety including physical limitations  - Instruct patient to call for assistance with activity   - Consult OT/PT to assist with strengthening/mobility   - Keep Call bell within reach  - Keep bed low and locked with side rails adjusted as appropriate  - Keep care items and personal belongings within reach  - Initiate and maintain comfort rounds  - Make Fall Risk Sign visible to staff  - Offer Toileting every 2 Hours, in advance of need  - Initiate/Maintain alarm  - Obtain necessary fall risk management equipment:   - Apply yellow socks and bracelet for high fall risk patients  - Consider moving patient to room near nurses station  Outcome: Progressing     Problem: Prexisting or High Potential for Compromised Skin Integrity  Goal: Skin integrity is maintained or improved  Description: INTERVENTIONS:  - Identify patients at risk for skin breakdown  - Assess and monitor skin integrity  - Assess and monitor nutrition and hydration status  - Monitor labs   - Assess for incontinence   - Turn and reposition patient  - Assist with mobility/ambulation  - Relieve pressure over bony prominences  - Avoid friction and shearing  - Provide appropriate hygiene as needed including keeping skin clean and dry  - Evaluate need for skin moisturizer/barrier cream  - Collaborate with interdisciplinary team   - Patient/family teaching  - Consider wound care consult   Outcome: Progressing

## 2024-08-18 NOTE — PLAN OF CARE
Problem: PAIN - ADULT  Goal: Verbalizes/displays adequate comfort level or baseline comfort level  Description: Interventions:  - Encourage patient to monitor pain and request assistance  - Assess pain using appropriate pain scale  - Administer analgesics based on type and severity of pain and evaluate response  - Implement non-pharmacological measures as appropriate and evaluate response  - Consider cultural and social influences on pain and pain management  - Notify physician/advanced practitioner if interventions unsuccessful or patient reports new pain  Outcome: Progressing     Problem: INFECTION - ADULT  Goal: Absence or prevention of progression during hospitalization  Description: INTERVENTIONS:  - Assess and monitor for signs and symptoms of infection  - Monitor lab/diagnostic results  - Monitor all insertion sites, i.e. indwelling lines, tubes, and drains  - Monitor endotracheal if appropriate and nasal secretions for changes in amount and color  - [place] appropriate cooling/warming therapies per order  - Administer medications as ordered  - Instruct and encourage patient and family to use good hand hygiene technique  - Identify and instruct in appropriate isolation precautions for identified infection/condition  Outcome: Progressing  Goal: Absence of fever/infection during neutropenic period  Description: INTERVENTIONS:  - Monitor WBC    Outcome: Progressing     Problem: SAFETY ADULT  Goal: Patient will remain free of falls  Description: INTERVENTIONS:  - Educate patient/family on patient safety including physical limitations  - Instruct patient to call for assistance with activity   - Consult OT/PT to assist with strengthening/mobility   - Keep Call bell within reach  - Keep bed low and locked with side rails adjusted as appropriate  - Keep care items and personal belongings within reach  - Initiate and maintain comfort rounds  - Make Fall Risk Sign visible to staff  - Offer Toileting every 2 Hours,  in advance of need  - Initiate/Maintain alarm  - Obtain necessary fall risk management equipment:   - Apply yellow socks and bracelet for high fall risk patients  - Consider moving patient to room near nurses station  Outcome: Progressing  Goal: Maintain or return to baseline ADL function  Description: INTERVENTIONS:  -  Assess patient's ability to carry out ADLs; assess patient's baseline for ADL function and identify physical deficits which impact ability to perform ADLs (bathing, care of mouth/teeth, toileting, grooming, dressing, etc.)  - Assess/evaluate cause of self-care deficits   - Assess range of motion  - Assess patient's mobility; develop plan if impaired  - Assess patient's need for assistive devices and provide as appropriate  - Encourage maximum independence but intervene and supervise when necessary  - Involve family in performance of ADLs  - Assess for home care needs following discharge   - Consider OT consult to assist with ADL evaluation and planning for discharge  - Provide patient education as appropriate  Outcome: Progressing  Goal: Maintains/Returns to pre admission functional level  Description: INTERVENTIONS:  - Perform AM-PAC 6 Click Basic Mobility/ Daily Activity assessment daily.  - Set and communicate daily mobility goal to care team and patient/family/caregiver.   - Collaborate with rehabilitation services on mobility goals if consulted  - Perform Range of Motion 3 times a day.  - Reposition patient every 2 hours.  - Dangle patient 3 times a day  - Stand patient 3 times a day  - Ambulate patient 3 times a day  - Out of bed to chair 3 times a day   - Out of bed for meals 3 times a day  - Out of bed for toileting  - Record patient progress and toleration of activity level   Outcome: Progressing     Problem: DISCHARGE PLANNING  Goal: Discharge to home or other facility with appropriate resources  Description: INTERVENTIONS:  - Identify barriers to discharge w/patient and caregiver  -  Arrange for needed discharge resources and transportation as appropriate  - Identify discharge learning needs (meds, wound care, etc.)  - Arrange for interpretive services to assist at discharge as needed  - Refer to Case Management Department for coordinating discharge planning if the patient needs post-hospital services based on physician/advanced practitioner order or complex needs related to functional status, cognitive ability, or social support system  Outcome: Progressing     Problem: Prexisting or High Potential for Compromised Skin Integrity  Goal: Skin integrity is maintained or improved  Description: INTERVENTIONS:  - Identify patients at risk for skin breakdown  - Assess and monitor skin integrity  - Assess and monitor nutrition and hydration status  - Monitor labs   - Assess for incontinence   - Turn and reposition patient  - Assist with mobility/ambulation  - Relieve pressure over bony prominences  - Avoid friction and shearing  - Provide appropriate hygiene as needed including keeping skin clean and dry  - Evaluate need for skin moisturizer/barrier cream  - Collaborate with interdisciplinary team   - Patient/family teaching  - Consider wound care consult   Outcome: Progressing     Problem: Nutrition/Hydration-ADULT  Goal: Nutrient/Hydration intake appropriate for improving, restoring or maintaining nutritional needs  Description: Monitor and assess patient's nutrition/hydration status for malnutrition. Collaborate with interdisciplinary team and initiate plan and interventions as ordered.  Monitor patient's weight and dietary intake as ordered or per policy. Utilize nutrition screening tool and intervene as necessary. Determine patient's food preferences and provide high-protein, high-caloric foods as appropriate.     INTERVENTIONS:  - Monitor oral intake, urinary output, labs, and treatment plans  - Assess nutrition and hydration status and recommend course of action  - Evaluate amount of meals  eaten  - Assist patient with eating if necessary   - Allow adequate time for meals  - Recommend/ encourage appropriate diets, oral nutritional supplements, and vitamin/mineral supplements  - Order, calculate, and assess calorie counts as needed  - Recommend, monitor, and adjust tube feedings and TPN/PPN based on assessed needs  - Assess need for intravenous fluids  - Provide specific nutrition/hydration education as appropriate  - Include patient/family/caregiver in decisions related to nutrition  Outcome: Progressing

## 2024-08-18 NOTE — PROGRESS NOTES
08/18/24 1230   Pain Assessment   Pain Assessment Tool 0-10   Pain Score No Pain   Restrictions/Precautions   Precautions Aphasia;Bed/chair alarms;Cognitive;Fall Risk;Supervision on toilet/commode;Pain   Weight Bearing Restrictions Yes   LLE Weight Bearing Per Order NWB   ROM Restrictions No   Braces or Orthoses   (LLE residual limb )   Bed-Chair Transfer   Type of Assistance Needed Supervision   Physical Assistance Level No physical assistance   Comment Sup sit pivot xfers to various surfaces, G setup of transfer and w/c without cueing   Chair/Bed-to-Chair Transfer CARE Score 4   Commmunity Re-entry   Community Re-entry Level Wheelchair   Community Re-entry Level of Assistance Distant supervision   Community Re-entry Pt self-propelled w/c using BUEs from 4th floor pt room to 9th floor OT gym, as well as through 9th floor [facility] unit hallways at SUP level, w/focus on increasing endurance, UB strength, w/c mgmt, obstacle negotiation, in anticipation of pt D/Cing w/c level. Pt tolerated well with no rest breaks required. Pt wore biking gloves for improved  and skin integrity protection. Pt cleared all obstacles w/o cueing but does still demo some decreased awareness on R side.   Exercise Tools   Exercise Tools Yes   UE Ergometer Seated w/Sup, SciFit UEB bilaterally for 5min prograde, 5min retrograde, against L 1.0 resistance for increased BUE strength/endurance for improved independence w/ADLs/IADLs. Pt tolerated well with 1 brief rest break to manage UB fatigue. Pt denied pain.   Other Exercise Tool 1 Seated w/Sup, 30x each of fwd trunk flexion, lateral trunk flexion, and trunk rotations while holding 4# ball, for increased core strength, for improved independence with fxl transfers and activity requiring dynamic sitting balance. Pt tolerated well with brief rest breaks between exercises to manage core fatigue.   Cognition   Overall Cognitive Status Impaired   Arousal/Participation Alert;Cooperative  "  Attention Attends with cues to redirect   Orientation Level Oriented X4   Memory Decreased short term memory;Decreased recall of recent events;Decreased recall of precautions   Following Commands Follows one step commands with increased time or repetition   Additional Activities   Additional Activities Comments Pt engaged in peer mentoring session with another pt (August) from [facility] with focus on providing pt with emotional support, advice, and guidance from another pt who has undergone AKA as well. Both patients were agreeable to this peer mentoring conversation. Art provided encouragement, advice on mental/physical outlets for grief/frustration, education on different prosthetics, education on recovery and prosthesis process, and community support. Pt was receptive to all of this, asking Art insightful and appropriate questions regarding AKA recovery process and prosthesis process. Pt repeatedly thanked August and TETE for this peer mentoring session, stating \"that was great\" and \"I needed that.\" Pt with noticably brighter affect after session.   Activity Tolerance   Activity Tolerance Patient tolerated treatment well   Assessment   Treatment Assessment Pt seen for 90min skilled OT session focused on fxl sit-pivot transfers, w/c mgmt and community mobility skills, core/UE strengthening, endurance training, and peer mentoring regarding AKA recovery process, for increased independence w/ADLs/IADLs and decreased caregiver burden. See detailed descriptions of fxl performance above. Pt tolerated session well, continuing to complete sit-pivot transfers at Sup level with G setup of transfer and G carryover of w/c mgmt skills (brake and armrest mgmt) w/o cueing. Pt tolerated TherEx well with brief rest breaks to manage UB fatigue. Pt reported enjoying and benefiting emotionally and mentally from peer mentoring from another AKA pt on [facility] unit. Pt cont to be limited by decreased fxl cognition/safety, balance, strength, and " endurance. Pt would benefit from continued skilled OT focused on ADL retraining, lateral weightshifting for toileting, fxl transfers, UE strengthening, sacral offloading, core strengthening, phase 1 amp edu, and D/C planning.   Prognosis Good   Problem List Decreased strength;Decreased endurance;Impaired balance;Decreased mobility;Decreased cognition;Decreased coordination;Impaired judgement;Decreased safety awareness;Orthopedic restrictions;Pain   Barriers to Discharge Inaccessible home environment;Decreased caregiver support   Barriers to Discharge Comments still awaiting placement and guardianship   Plan   Treatment/Interventions ADL retraining;Functional transfer training;Therapeutic exercise;Endurance training;Patient/family training;Equipment eval/education;Bed mobility;Compensatory technique education   Progress Progressing toward goals   Discharge Recommendation   Rehab Resource Intensity Level, OT   (pending DC planning w/guardianship and placement)   OT Therapy Minutes   OT Time In 1230   OT Time Out 1400   OT Total Time (minutes) 90   OT Mode of treatment - Individual (minutes) 90   OT Mode of treatment - Concurrent (minutes) 0   OT Mode of treatment - Group (minutes) 0   OT Mode of treatment - Co-treat (minutes) 0   OT Mode of Treatment - Total time(minutes) 90 minutes   OT Cumulative Minutes 1938   Therapy Time missed   Time missed? No

## 2024-08-18 NOTE — PROGRESS NOTES
08/18/24 0830   Pain Assessment   Pain Score No Pain   Restrictions/Precautions   Precautions Aphasia;Bed/chair alarms;Cognitive;Fall Risk;Supervision on toilet/commode   LLE Weight Bearing Per Order NWB   Braces or Orthoses   ( which was donned by patient at start of session with min assistance)   General   Change In Medical/Functional Status Pt. was late for session due to still eating breakfast. As per nursing, pt. refused care earlier this AM   Cognition   Arousal/Participation Alert;Cooperative   Attention Attends with cues to redirect   Memory Decreased short term memory;Decreased recall of recent events;Decreased recall of precautions   Following Commands Follows one step commands with increased time or repetition   Subjective   Subjective Pt. has no new complaints, Is ready to participate in therapy but needed assistance to get washed and dress at start of session   Roll Left and Right   Type of Assistance Needed Independent   Roll Left and Right CARE Score 6   Sit to Lying   Type of Assistance Needed Independent   Sit to Lying CARE Score 6   Lying to Sitting on Side of Bed   Type of Assistance Needed Independent   Lying to Sitting on Side of Bed CARE Score 6   Sit to Stand   Type of Assistance Needed Supervision   Comment did standing to use urinal at bedside   Sit to Stand CARE Score 4   Bed-Chair Transfer   Type of Assistance Needed Supervision   Comment sit pivot transfers either way   Chair/Bed-to-Chair Transfer CARE Score 4   Walk 10 Feet   Reason if not Attempted Medical concerns   Walk 10 Feet CARE Score 88   Walk 50 Feet with Two Turns   Reason if not Attempted Medical concerns   Walk 50 Feet with Two Turns CARE Score 88   Walk 150 Feet   Reason if not Attempted Medical concerns   Walk 150 Feet CARE Score 88   Walking 10 Feet on Uneven Surfaces   Reason if not Attempted Medical concerns   Walking 10 Feet on Uneven Surfaces CARE Score 88   Ambulation   Does the patient walk? 0. No, and  walking goal is not clinically indicated.   Wheel 50 Feet with Two Turns   Type of Assistance Needed Independent   Wheel 50 Feet with Two Turns CARE Score 6   Wheel 150 Feet   Type of Assistance Needed Independent   Wheel 150 Feet CARE Score 6   Wheelchair mobility   Does the patient use a wheelchair? 1. Yes   Type of Wheelchair Used 1. Manual   Method Right upper extremity;Left upper extremity   Distance Level Surface (feet) 500 ft   Distance Wheeled 3% Grade 60 ft  (up and down indoor ramp)   Findings S at indoor ramp   Curb or Single Stair   Reason if not Attempted Medical concerns   1 Step (Curb) CARE Score 88   4 Steps   Reason if not Attempted Medical concerns   4 Steps CARE Score 88   12 Steps   Reason if not Attempted Medical concerns   12 Steps CARE Score 88   Therapeutic Interventions   Strengthening seated thera ex : R LAQ and hip flex using 4# wts, hip abd and hamstring curl R LE with blue TB, w/c push ups   Flexibility R hamstring and gastroc stretching   Equipment Use   NuStep Level 3 X 13 mins   Other Comments   Comments 20 mins spent at start of session with pt. performing multiple rolling and bridges to complete washing/dressing UE and LE in bed and sitting EOB, Also provided min A to abisai  especially the strap around the waist.   Assessment   Treatment Assessment Pt. engaged in 90 mins session although was refusing nursing care earlier this AM. Pt. participated in w/c mobility including community w/c propulsion from Cw4> lobby ramp and outside of entrance B. Pt. also participated in R LE thera ex and nu step with no complaints reported. Pt. did not mention anything about his d/c disposition but did express that he likes to smoke  marijuana. Pt. cont to complete sit pivot at CS/ S level with no isses noted. Pt. assisted to recliner at end of session and all needs in place.   Problem List Decreased strength;Decreased endurance;Impaired balance;Decreased mobility;Decreased  coordination;Decreased cognition;Impaired judgement;Decreased safety awareness;Orthopedic restrictions   Barriers to Discharge Inaccessible home environment;Decreased caregiver support   Barriers to Discharge Comments still awaiting placement and guadianship   PT Barriers   Functional Limitation Car transfers;Standing;Transfers;Ramp negotiation;Wheelchair management   Plan   Treatment/Interventions Functional transfer training;LE strengthening/ROM;Therapeutic exercise;Endurance training;Elevations;Patient/family training;Equipment eval/education;Bed mobility   Discharge Recommendation   Equipment Recommended Wheelchair   PT Therapy Minutes   PT Time In 0830   PT Time Out 1000   PT Total Time (minutes) 90   PT Mode of treatment - Individual (minutes) 80   PT Mode of treatment - Concurrent (minutes) 10   PT Mode of treatment - Group (minutes) 0   PT Mode of treatment - Co-treat (minutes) 0   PT Mode of Treatment - Total time(minutes) 90 minutes   PT Cumulative Minutes 2108   Therapy Time missed   Time missed? No

## 2024-08-19 LAB
INR PPP: 2.72 (ref 0.85–1.19)
PROTHROMBIN TIME: 28.6 SECONDS (ref 12.3–15)

## 2024-08-19 PROCEDURE — 99233 SBSQ HOSP IP/OBS HIGH 50: CPT | Performed by: PHYSICAL MEDICINE & REHABILITATION

## 2024-08-19 PROCEDURE — 92507 TX SP LANG VOICE COMM INDIV: CPT

## 2024-08-19 PROCEDURE — 85610 PROTHROMBIN TIME: CPT | Performed by: PHYSICIAN ASSISTANT

## 2024-08-19 RX ADMIN — LEVOCARNITINE 330 MG: 1 SOLUTION ORAL at 17:07

## 2024-08-19 RX ADMIN — GABAPENTIN 100 MG: 100 CAPSULE ORAL at 13:57

## 2024-08-19 RX ADMIN — DOLUTEGRAVIR SODIUM 50 MG: 50 TABLET, FILM COATED ORAL at 08:14

## 2024-08-19 RX ADMIN — ATORVASTATIN CALCIUM 80 MG: 80 TABLET, FILM COATED ORAL at 17:07

## 2024-08-19 RX ADMIN — SERTRALINE HYDROCHLORIDE 75 MG: 50 TABLET ORAL at 08:15

## 2024-08-19 RX ADMIN — BICTEGRAVIR SODIUM, EMTRICITABINE, AND TENOFOVIR ALAFENAMIDE FUMARATE 1 TABLET: 50; 200; 25 TABLET ORAL at 08:14

## 2024-08-19 RX ADMIN — GABAPENTIN 100 MG: 100 CAPSULE ORAL at 21:13

## 2024-08-19 RX ADMIN — Medication 6 MG: at 21:13

## 2024-08-19 RX ADMIN — DIVALPROEX SODIUM 1250 MG: 500 TABLET, EXTENDED RELEASE ORAL at 08:14

## 2024-08-19 RX ADMIN — LAMOTRIGINE 50 MG: 25 TABLET ORAL at 08:15

## 2024-08-19 RX ADMIN — LAMOTRIGINE 50 MG: 25 TABLET ORAL at 17:07

## 2024-08-19 RX ADMIN — WARFARIN SODIUM 5 MG: 5 TABLET ORAL at 17:07

## 2024-08-19 RX ADMIN — GABAPENTIN 100 MG: 100 CAPSULE ORAL at 05:44

## 2024-08-19 RX ADMIN — MIRTAZAPINE 15 MG: 15 TABLET, FILM COATED ORAL at 21:14

## 2024-08-19 RX ADMIN — LOSARTAN POTASSIUM 50 MG: 50 TABLET, FILM COATED ORAL at 08:15

## 2024-08-19 RX ADMIN — ZONISAMIDE 400 MG: 100 CAPSULE ORAL at 08:14

## 2024-08-19 RX ADMIN — LEVOCARNITINE 330 MG: 1 SOLUTION ORAL at 08:14

## 2024-08-19 RX ADMIN — TAMSULOSIN HYDROCHLORIDE 0.4 MG: 0.4 CAPSULE ORAL at 17:07

## 2024-08-19 RX ADMIN — METOPROLOL SUCCINATE 25 MG: 25 TABLET, EXTENDED RELEASE ORAL at 08:15

## 2024-08-19 RX ADMIN — ASPIRIN 81 MG: 81 TABLET, COATED ORAL at 08:15

## 2024-08-19 NOTE — ASSESSMENT & PLAN NOTE
ECHO 6/10/2024 = LVEF 40-45% with mild global hypokinesis   Status post NM stress test 6/11/2024 = large, mild, fixed defect in the inferior wall, possibly due to diaphragmatic attenuation artifact, there is a small area of partial reversibility in the inferior apical wall suggestive of ischemia    Evaluated by cardiology.  Etiology felt to be possibly secondary to stress-induced cardiomyopathy, with apical thrombus  Cardiac catheterization deferred given the lack of any significant ischemia, and no current cardiac symptoms  Continue with medical management with aspirin, statin, beta-blocker, ARB  Remains euvolemic off of diuretics, continue to monitor volume status   Outpatient follow-up with cardiology, previously followed with Dr. Gumaro Aguila [facility]   Subjective   Patient ID: Kassandra Carrasco is a 48 y.o. female who presents for No chief complaint on file..    Virtual or Telephone Consent    An interactive audio and video telecommunication system which permits real time communications between the patient (at the originating site) and provider (at the distant site) was utilized to provide this telehealth service.   Verbal consent was requested and obtained from Kassandra Carrasco on this date, 03/20/24 for a telehealth visit.      Thyroid-multinodular thyroid with known history, has seen endo in the past and was told everything was normal. Recent Ct completed does show multinodular thyroid, ultrasound and NM uptake completed which shows multi-hyperactive thyroid nodules with increased uptake. Did follow up with ENT and decided not to go for FNB.     Continues on vitamin b injections which are helping with fatigue.            Review of Systems   All other systems reviewed and are negative.      Objective   There were no vitals taken for this visit.        Assessment/Plan   Problem List Items Addressed This Visit             ICD-10-CM    Morbid obesity (CMS/HCC) E66.01    Nontoxic multinodular goiter - Primary E04.2    Relevant Orders    Referral to Endocrinology    Thyroid Peroxidase (TPO) Antibody    Thyroglobulin and Antithyroglobulin

## 2024-08-19 NOTE — PROGRESS NOTES
Physical Medicine and Rehabilitation Progress Note  Sunil Patel 63 y.o. male MRN: [id number]  Unit/Bed#: [facility] 451-01 Encounter: [id number]    To Review: Sunil Patel is a 63 y.o. male who  has a past medical history of Acute lower limb ischemia (06/08/2024), Anxiety, Depression, HIV disease (HCC), Substance abuse (HCC), and Suicide attempt (HCC). who presented to the [facility] on 6/7/24 from shelter for increased LLE swelling and pain and was found to have a left external iliac artery occlusion and acute limb ischemia. 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 Patient had TTE on 6/10/24 showing LV apex thrombus. On 6/11/24 patient had pharmacologic nuclear stress test/SPECT scan which did not show any significant areas of ischemia with EF 40-45% and recommended continuing A/C for LV apical thrombus. His course was complicated by b/l buttock unstageable pressure ulcers, urinary and fecal incontinence, pain, and significant decline in ADLs and mobility. Patient has been continued on Xarelto for anticoagulation, as well as ASA and statin. Patient has a history of TBI, with baseline nonfluent aphasia and forgetfulness. He was evaluated by neuropsychology on 6/27/24, and deemed to not have capacity to make fully informed medical decisions. Therefore, the guardianship process was initiated as of 7/5/24. He was admitted to the [facility] on 7/6.     Chief Complaint: none    Interval History/Subjective:  No acute events over the weekend. Pt seen and assessed at bedside this morning. No acute physical concerns. He asked today why he needs a guardian, and we explained his guardian's role in helping him make medical and other life decisions. He was understanding at first but then became perseverative  "about obtaining a  to help him with any further decisions     ROS:  10 point ROS performed and negative unless mentioned in HPI.      Today's Changes:  Will continue to work on dispo with upper management  INR 2.72. Continue warfarin dosing and INR checks per Im  Will re-engage neuropsych today 8/19. He was evaluated on 6/27/24 and felt not to retain capacity. With guardianship set for next week, evaluation is felt to be prudent by our serivice    Assessment/Plan:    Patient incapable of making informed decisions  Assessment & Plan  - History of remote TBI and prior strokes with comorbid psychiatric history.  - Evaluated by neuropsychology, Dr. Dilshad Tatum, PhD on 6/27/24, found to have \"diffuse cognitive dysfunction and on a measure assessing awareness of personal health status and ability to evaluate health problems, handle medical emergencies and take safety precautions, patient performed in the IMPAIRED range of functioning. During this encounter, patient does not appear to have capacity to make fully informed medical decisions.\"  - Court-appointed guardianship process has been initiated by acute care CM Katia Kay.    - We have identified two friends who are interested in being patient's guardians and have been involved and invested in patient's care on the ARC.   - They will need to be interviewed by the  involved in this process.    - He has to undergo his hearing on 8/6/2024 first.  -- now rescheduled to 8/27   - He would ultimately benefit from [facility]/nursing home/memory care unit - he does very well with structure and supervision.    8/2- Ongoing discussions with CM, [facility] admin and social work to dispo patient to stable long-term housing. Process continues, with evaluation by therapy managers from [facility].   8/12- unfortunately have not made much progress with SNF placement per [facility] [place]'s facilities. Have not heard back from residential community that met with him on 8/6 8/19- Will " re-engage neuropsych. He was evaluated on 6/27/24 and felt not to retain capacity. With guardianship set for next week, evaluation is felt to be prudent by our serivice    History of migraine headaches  Assessment & Plan  Chronic issue.  Seemed to worsen with increased irritability after discontinuing gabapentin  Resumed gabapentin  [facility] with middling results  Sleep logs have been good - discontinued.  Headache is on and off    Cardiomyopathy (HCC)  Assessment & Plan  ECHO on 6/10/2024 showed LVEF 40-45% with mild global hypokinesis   Status post NM stress test on 6/11/2024 which showed: a large, mild, fixed defect in the inferior wall, possibly due to diaphragmatic attenuation artifact, there is a small area of partial reversibility in the inferior apical wall suggestive of ischemia    Elevated by cardiology, etiology felt to be possibly secondary to stress-induced cardiomyopathy, with apical thrombus  Cardiac catheterization deferred given the lack of any significant ischemia, and no current cardiac symptoms  Continue with medical management with aspirin, statin, beta-blocker, ARB  Monitor volume status, remains euvolemic off of diuretics   Outpatient follow-up with cardiology    At risk for constipation  Assessment & Plan  - Stooling adequately recently; did have some incontinence on acute now improved  - Close continent care given buttock wounds  - Last BM 8/18 and continent  - PRN miralax, Senna and suppository    At risk for venous thromboembolism (VTE)  Assessment & Plan  - Fully anticoagulated on warfarin for apical thrombus  - IM managing    Traumatic brain injury (HCC)  Assessment & Plan  See neurocog impairment     Carnitine deficiency (HCC)  Assessment & Plan  - Carnitine replacement  - Appreciate medicine management      History of seizures  Assessment & Plan  - Depakote ER, lamotrigine, zonisamide  - Outpatient follow-up with [facility] neurology    Left ventricular  thrombus  Assessment & Plan  - Visualized on echocardiogram on 6/10/24: spherical 1.5 x 1.3 cm thrombus at the LV apex.   - Was started on rivaroxaban, but patient does not appear to have insurance coverage for prescriptions   - Xarelto, eliquis, and pradaxa likely cost prohibitive per IM   - 7/29 transitioned to warfarin with lovenox bridge.   - Now off lovenox, and fully anticoagulated on warfarin.   - Management as per IM.   - 8/19 INR 2.72. Continue 5mg daily. Recheck per IM  - Outpatient follow-up with cardiology    Benign essential hypertension  Assessment & Plan  - Toprol, losartan  - Appreciate medicine management    History of stroke  Assessment & Plan  - History of old left temporal and parietal lobe cortical infarcts, also involving the insula and left occipital lobe as well as small right posterior parietal lobe. Stable on most recent CT head on 5/16/24.   - ASA, and statin for secondary prevention  - Optimal BP control  - Monitor neuro exam - hx of LV thrombus    - See that entry  - OP neuro follow-up     Human immunodeficiency virus (HIV) infection (HCC)  Assessment & Plan  - Continue anti-retroviral treatment  - Appreciate medicine management during ARC course  - OP ID follow-up       Bipolar affective disorder (HCC)  Assessment & Plan  Mood acceptable; continue meds as outlined   Supportive counseling  NeuroPsychology consult while in ARC if available for support  Psych evaluated on 8/12 and deemed him stable for discharge with current regimen as below  Counseled on and continue to encourage deep breathing/relaxation/behavioral management techniques  - Mirtazapine 15 mg qHs  - Sertraline 75 mg daily   - Lamictal 50mg BID  - Depakote ER 1250mg qday   - Outpatient psychiatry follow-up  - Would consult Psych before changing medications    * 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 Underwent L femoral thrombectomy and  AKA with vascular surgery   - [facility] sx follow-up 7/10 - L AKA incision site well-healed, staples taken out at bedside  - [facility] will be vendor - Patient now has .  - Monitor for hip flexion/abduction tightness. Stretches in therapy  - Now on Coumadin with hx of LV thrombus and PAOD. Checking INR's as above   - PT/OT/SLP (to work on carry over strategies) 3-5 hours/day, 5-7 days/week.    - Goals are Ind-Sup at a wheelchair level.   - Outpatient f[facility] with [facility] and Vascular  - Continue patient training with  placement  - Continue gabapentin - doesn't do too much for phantom limb sensation, but that doesn't bother him or cause him pain currently.   - Patient still frustrated given circumstances; will continue gabapentin for anxiety    Pressure injury of buttock, stage 3 (HCC)-resolved as of 8/9/2024  Assessment & Plan  Resolved as of 8/7.   - Wound care consulted and following weekly  - POA L buttock pressure injury unstageable has healed.  - POA R buttock pressure injury  evolved from unstageable to Stage 3, and is now resolved.   - Recommend ROHO cushion in chair when out of bed instead   - Preventative hydraguard to bilateral heels BID and PRN.   - P500  - Monitor clinically for breakdown, frequent turns  - reviewed picture of wound today. It is healing well. Continue to monitor    Urinary incontinence-resolved as of 8/16/2024  Assessment & Plan  - Did have some incontinence on acute - improved with timed voids and consistent assistance with his urinal  - Described as urgency  - Marked improvement on timed voids.   - monitor for retention, incontinence (including overflow incontinence), signs/symptoms of UTI  - Timed Voids and PVRs Q4hrs initiated earlier. And PVR <150 x3, so scans discontinued.    - He has some difficulty managing the urinal    - needs assistance but is able to transfer to [facility]    - Still uses condom catheter at night, in part for continence care/to protect  his skin given his buttock wounds. However now note that buttocks wound has healed  - Continue timed voids               Health Maintenance  #GI Prophylaxis: not indicated  #Code Status: Full code  #FEN: Cardiac diet + Ensure at bfast/dinner  #Dispo: Teams 8/13: ADD TBD. Still working on placement. May need to transfer back to [facility] hospital. Guardianship court date is 8/27.    Will need f/u with PMR, PCP, Vascular, Psych      Objective:     Functional Update:  PT: sup transfers, sup bed mobility, 500 ft wheelchair ambulation independent  OT: Ind eating/grooming, Sup bathing, Ind UB dressing, Sup LB dressing, CGA toileting, CGA tub/shower transfers, Sup toilet transfers  SLP: modA comprehension, modA expression, Sup social interaction, modA problem solving, modA memory     Allergies per EMR    Physical Exam:  Temp:  [97.5 °F (36.4 °C)-98.2 °F (36.8 °C)] 97.5 °F (36.4 °C)  HR:  [77-93] 92  Resp:  [17] 17  BP: (121-127)/(62-73) 124/73  Oxygen Therapy  SpO2: 92 %    Gen: No acute distress, Well-nourished, well-appearing. Sitting in bed  HEENT: Moist mucus membranes, Normocephalic/Atraumatic  Cardiovascular: Warm extremities distally  Heme/Extr: No edema  Pulmonary: Non-labored breathing. Normal chest rise and fall  : No fernandes  GI: Soft, non-tender, non-distended.   MSK: Stable L AKA, no signs of infection at site  Integumentary: Skin is warm, dry.   Neuro: AAOx3,  Appropriate to questioning. Impaired insight, impulsive, perseverative  Psych: Normal mood and affect.     Diagnostic Studies: reviewed, no new imaging    Laboratory:    Results from last 7 days   Lab Units 08/15/24  0604   HEMOGLOBIN g/dL 10.9*   HEMATOCRIT % 37.0   WBC Thousand/uL 6.30     Results from last 7 days   Lab Units 08/15/24  0604   BUN mg/dL 26*   POTASSIUM mmol/L 4.3   CHLORIDE mmol/L 103   CREATININE mg/dL 0.88     Results from last 7 days   Lab Units 08/19/24  0539 08/15/24  0604   PROTIME seconds 28.6* 26.3*   INR  2.72* 2.43*         Patient Active Problem List   Diagnosis    Bipolar affective disorder (HCC)    Substance abuse (HCC)    Human immunodeficiency virus (HIV) infection (HCC)    History of stroke    Benign essential hypertension    Positive laboratory testing for human immunodeficiency virus (HCC)    Hypertension    Unspecified vitamin D deficiency    Tobacco abuse    Cerebrovascular accident (CVA) (HCC)    Left ventricular thrombus    History of seizures    Carnitine deficiency (HCC)    Above-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)    Traumatic brain injury (HCC)    Impaired mobility and activities of daily living    At risk for venous thromboembolism (VTE)    Acute pain    At risk for constipation    Patient incapable of making informed decisions    Adjustment disorder with mixed anxiety and depressed mood    Cardiomyopathy (HCC)    History of migraine headaches    Postoperative anemia         Medications  Current Facility-Administered Medications   Medication Dose Route Frequency Provider Last Rate    acetaminophen  975 mg Oral TID PRN José Salcido MD      aspirin  81 mg Oral Daily Lorrie Barillas PA-C      atorvastatin  80 mg Oral Daily With Dinner Lorrie Barillas PA-C      bictegravir-emtricitab-tenofovir alafenamide  1 tablet Oral Daily With Breakfast Lorrie Barillas PA-C      bisacodyl  10 mg Rectal Daily PRN Kenny Castro MD      diphenhydrAMINE  25 mg Oral Q6H PRN Lorrie Barillas PA-C      divalproex sodium  1,250 mg Oral Daily Lorrie Barillas PA-C      dolutegravir  50 mg Oral Daily Lorrie Barillas PA-C      gabapentin  100 mg Oral Q8H Ashley Depadua, MD      lamoTRIgine  50 mg Oral BID Lorrie Barillas PA-C      levOCARNitine  1,000 mg/day Oral TID With Meals Lorrie Barillas PA-C      losartan  50 mg Oral Daily Lorrie Barillas PA-C      melatonin  6 mg Oral HS Lorrie Barillas PA-C      metoprolol succinate  25 mg Oral Daily Chelita Milan  CHARLIE      mirtazapine  15 mg Oral HS Lorrie Barillas PA-C      ondansetron  4 mg Oral Q6H PRN Lorrie Barillas PA-C      polyethylene glycol  17 g Oral Daily PRN Lorrie Barillas PA-C      senna  1 tablet Oral HS PRN Kenny Castro MD      sertraline  75 mg Oral Daily Lorrie Barillas PA-C      tamsulosin  0.4 mg Oral Daily With Dinner Lorrie Barillas PA-C      warfarin  5 mg Oral Daily (warfarin) CHARLIE Plata      zonisamide  400 mg Oral Daily Lorrie Barillas PA-C            ** Please Note: Fluency Direct voice to text software may have been used in the creation of this document. **

## 2024-08-19 NOTE — PROGRESS NOTES
"   08/19/24 1030   Therapy Time missed   Time missed? Yes   Amount of time missed 60   Reason for time missed   (Pt reported upon PT entry into the room that today \"is not a good day\" and \"I'm tired of this.\" PT made multiple attempts to encourage participation, but pt refused each time. PT listened to and validated all pt concerns, all needs within reach.)   Time(s) multiple attempts made 10:30, 10:35, 10:40, 10:45       "

## 2024-08-19 NOTE — PROGRESS NOTES
"   08/19/24 1004   Pain Assessment   Pain Assessment Tool 0-10   Pain Score No Pain   Restrictions/Precautions   Precautions Aphasia;Cognitive;Bed/chair alarms;Fall Risk;Supervision on toilet/commode;Pain   Comprehension   Comprehension (FIM) 4 - Understands basic info/conversation 75-90% of time   Expression   Expression (FIM) 3 - Expresses basic info/needs 50-74% of time   Social Interaction   Social Interaction (FIM) 5 - Requires redirection but less than 10% of the time.   Problem Solving   Problem solving (FIM) 3 - Solves basic problmes 50-74% of time   Memory   Memory (FIM) 3 - Recognizes, recalls/performs 50-74%   Speech/Language/Cognition Assessment   Treatment Assessment .Pt seen for skilled SLP session focusing on expressive/receptive language skills. At beginning of session, pt initially stating \"I'm not sure about today,\" in reference to feeling down and frustrated today. Pt then agreeable to session as he reported that he remains eager to improve his expression skills. Began utilizing his tablet with use of communication apps. Pt showed SLP that he had completed the free trial of a previously set up communication alejandra. SLP then navigated to another alejandra in which pt reported a desire to more so work on expression skills rather than also listening comprehension and cognitive linguistic skills as well. After SLP provided education about the importance of both language and cognitive linguistic skills, pt in full agreement to continue. However, after engaging in a listening comprehension task which focused on prepositions, pt desired to switch tasks. Switched to a visual attention and problem solving task where pt then initiated convo about how he is feeling. Overall, pt expressing that he is upset about the current plan for him to transition to another facility. 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 Pt then " "began explaining his frustration about the staff's concerns regarding his cognitive linguistic abilities and his ability to make his own decisions. Pt was provided with emotional support during this time and was also assisted in more effectively conveying his messages as SLP requested to to repeat his messages and reword, while also asking questions in order to clarify his messages. During this time, pt able to identify whether or not SLP was accurate in determining his messages. When pt made re-attempts at messages and in general, he frequently used a combination of speech, gestures, facial expressions and tone of voice. At end of session, pt thanks SLP for \"listening to me\" as he feels that he has no one to talk to and is alone. SLP to relay this concern to the team with plan to provide pt with more emotional support and opportunities for socialization during [facility] stay. Due to time constraints, unable to complete any other additional tasks. Discussed plan of both expression and comprehension tasks to be completed next session. At this time, pt will benefit from ongoing skilled SLP services to continue to support pt in building a more functional communication modality to improve effectiveness of communication and to ease frustrations with communication attempts.    SLP Therapy Minutes   SLP Time In 0930   SLP Time Out 1000   SLP Total Time (minutes) 30   SLP Mode of treatment - Individual (minutes) 30   SLP Mode of treatment - Concurrent (minutes) 0   SLP Mode of treatment - Group (minutes) 0   SLP Mode of treatment - Co-treat (minutes) 0   SLP Mode of Treatment - Total time(minutes) 30 minutes   SLP Cumulative Minutes 340   Therapy Time missed   Time missed? No       "

## 2024-08-19 NOTE — PLAN OF CARE
Problem: PAIN - ADULT  Goal: Verbalizes/displays adequate comfort level or baseline comfort level  Description: Interventions:  - Encourage patient to monitor pain and request assistance  - Assess pain using appropriate pain scale  - Administer analgesics based on type and severity of pain and evaluate response  - Implement non-pharmacological measures as appropriate and evaluate response  - Consider cultural and social influences on pain and pain management  - Notify physician/advanced practitioner if interventions unsuccessful or patient reports new pain  Outcome: Progressing     Problem: INFECTION - ADULT  Goal: Absence or prevention of progression during hospitalization  Description: INTERVENTIONS:  - Assess and monitor for signs and symptoms of infection  - Monitor lab/diagnostic results  - Monitor all insertion sites, i.e. indwelling lines, tubes, and drains  - Monitor endotracheal if appropriate and nasal secretions for changes in amount and color  - [place] appropriate cooling/warming therapies per order  - Administer medications as ordered  - Instruct and encourage patient and family to use good hand hygiene technique  - Identify and instruct in appropriate isolation precautions for identified infection/condition  Outcome: Progressing  Goal: Absence of fever/infection during neutropenic period  Description: INTERVENTIONS:  - Monitor WBC    Outcome: Progressing     Problem: SAFETY ADULT  Goal: Patient will remain free of falls  Description: INTERVENTIONS:  - Educate patient/family on patient safety including physical limitations  - Instruct patient to call for assistance with activity   - Consult OT/PT to assist with strengthening/mobility   - Keep Call bell within reach  - Keep bed low and locked with side rails adjusted as appropriate  - Keep care items and personal belongings within reach  - Initiate and maintain comfort rounds  - Make Fall Risk Sign visible to staff  - Offer Toileting every 2 Hours,  in advance of need  - Initiate/Maintain alarm  - Obtain necessary fall risk management equipment:   - Apply yellow socks and bracelet for high fall risk patients  - Consider moving patient to room near nurses station  Outcome: Progressing  Goal: Maintain or return to baseline ADL function  Description: INTERVENTIONS:  -  Assess patient's ability to carry out ADLs; assess patient's baseline for ADL function and identify physical deficits which impact ability to perform ADLs (bathing, care of mouth/teeth, toileting, grooming, dressing, etc.)  - Assess/evaluate cause of self-care deficits   - Assess range of motion  - Assess patient's mobility; develop plan if impaired  - Assess patient's need for assistive devices and provide as appropriate  - Encourage maximum independence but intervene and supervise when necessary  - Involve family in performance of ADLs  - Assess for home care needs following discharge   - Consider OT consult to assist with ADL evaluation and planning for discharge  - Provide patient education as appropriate  Outcome: Progressing  Goal: Maintains/Returns to pre admission functional level  Description: INTERVENTIONS:  - Perform AM-PAC 6 Click Basic Mobility/ Daily Activity assessment daily.  - Set and communicate daily mobility goal to care team and patient/family/caregiver.   - Collaborate with rehabilitation services on mobility goals if consulted  - Perform Range of Motion 3 times a day.  - Reposition patient every 2 hours.  - Dangle patient 3 times a day  - Stand patient 3 times a day  - Ambulate patient 3 times a day  - Out of bed to chair 3 times a day   - Out of bed for meals 3 times a day  - Out of bed for toileting  - Record patient progress and toleration of activity level   Outcome: Progressing     Problem: DISCHARGE PLANNING  Goal: Discharge to home or other facility with appropriate resources  Description: INTERVENTIONS:  - Identify barriers to discharge w/patient and caregiver  -  Arrange for needed discharge resources and transportation as appropriate  - Identify discharge learning needs (meds, wound care, etc.)  - Arrange for interpretive services to assist at discharge as needed  - Refer to Case Management Department for coordinating discharge planning if the patient needs post-hospital services based on physician/advanced practitioner order or complex needs related to functional status, cognitive ability, or social support system  Outcome: Progressing     Problem: Prexisting or High Potential for Compromised Skin Integrity  Goal: Skin integrity is maintained or improved  Description: INTERVENTIONS:  - Identify patients at risk for skin breakdown  - Assess and monitor skin integrity  - Assess and monitor nutrition and hydration status  - Monitor labs   - Assess for incontinence   - Turn and reposition patient  - Assist with mobility/ambulation  - Relieve pressure over bony prominences  - Avoid friction and shearing  - Provide appropriate hygiene as needed including keeping skin clean and dry  - Evaluate need for skin moisturizer/barrier cream  - Collaborate with interdisciplinary team   - Patient/family teaching  - Consider wound care consult   Outcome: Progressing     Problem: Nutrition/Hydration-ADULT  Goal: Nutrient/Hydration intake appropriate for improving, restoring or maintaining nutritional needs  Description: Monitor and assess patient's nutrition/hydration status for malnutrition. Collaborate with interdisciplinary team and initiate plan and interventions as ordered.  Monitor patient's weight and dietary intake as ordered or per policy. Utilize nutrition screening tool and intervene as necessary. Determine patient's food preferences and provide high-protein, high-caloric foods as appropriate.     INTERVENTIONS:  - Monitor oral intake, urinary output, labs, and treatment plans  - Assess nutrition and hydration status and recommend course of action  - Evaluate amount of meals  eaten  - Assist patient with eating if necessary   - Allow adequate time for meals  - Recommend/ encourage appropriate diets, oral nutritional supplements, and vitamin/mineral supplements  - Order, calculate, and assess calorie counts as needed  - Recommend, monitor, and adjust tube feedings and TPN/PPN based on assessed needs  - Assess need for intravenous fluids  - Provide specific nutrition/hydration education as appropriate  - Include patient/family/caregiver in decisions related to nutrition  Outcome: Progressing

## 2024-08-19 NOTE — PROGRESS NOTES
"   08/19/24 0830   Pain Assessment   Pain Assessment Tool 0-10   Pain Score No Pain   Lifestyle   Autonomy \"Not a good day.\" \"I can't do this\"   Therapy Time missed   Time missed? Yes   Amount of time missed 60   Reason for time missed   (Attempted to see patient 3x, pt refuses each time reporting he is not having a good day and would not like to participate with therapy on this date despite therapist's encouragement. RN aware.)   Time(s) multiple attempts made 8:30, 8:45, 9:05       "

## 2024-08-19 NOTE — PROGRESS NOTES
[facility]  Progress Note  Name: Sunil Patel I  MRN: [id number]  Unit/Bed#: -01 I Date of Admission: 7/6/2024   Date of Service: 8/19/2024 I Hospital Day: 44    Assessment & Plan   * Above-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)  Assessment & Plan  Presented with left lower extremity acute limb ischemia/extensive left iliofemoral and left infrainguinal embolism requiring left femoral thrombectomy and subsequent AKA on 6/7/24 with vascular surgery.  Continue with local wound care   Continue ASA, Coumadin and statin   Stable for discharge from PMR and medicine standpoint.  Dispo planning as per case management.  Patient is awaiting guardianship hearing, which is scheduled for 8/27/24.    Postoperative anemia  Assessment & Plan  Normocytic  PTA hemoglobin was normal, now has been running 10-11 since admission  No signs or symptoms of active bleeding  Iron panel reviewed, no evidence of TY  Likely postoperative ABLA  Monitor as needed      History of migraine headaches  Assessment & Plan  Continue PTA meds Depakote, gabapentin, zonegran and lamictal all of which can help in prevention  Unable to take triptans due to a history of stroke  PRN Tylenol     Cardiomyopathy (HCC)  Assessment & Plan  ECHO 6/10/2024 = LVEF 40-45% with mild global hypokinesis   Status post NM stress test 6/11/2024 = large, mild, fixed defect in the inferior wall, possibly due to diaphragmatic attenuation artifact, there is a small area of partial reversibility in the inferior apical wall suggestive of ischemia    Evaluated by cardiology.  Etiology felt to be possibly secondary to stress-induced cardiomyopathy, with apical thrombus  Cardiac catheterization deferred given the lack of any significant ischemia, and no current cardiac symptoms  Continue with medical management with aspirin, statin, beta-blocker, ARB  Remains euvolemic off of diuretics, continue to monitor volume status    Outpatient follow-up with cardiology, previously followed with Dr. Gumaro Agu[facility]    Traumatic brain injury (HCC)  Assessment & Plan  Remote history of TBI, previously residing in a group home prior to [facility] sentence.  Evaluated by neuropsychiatry on 6/27/24 and deemed NOT to have medical decision-making capacity  Process for court appointed guardian started on 7/5/24 - CM following   Guardianship hearing 8/27/24    Carnitine deficiency (HCC)  Assessment & Plan  Continue levocarnitine 330 mg 3x daily with meals.    History of seizures  Assessment & Plan  Diagnosed in July 2019, follows with [facility] neurology outpatient  Continue home regimen with Depakote ER, Lamotrigine and Zonegran    Left ventricular thrombus  Assessment & Plan  Noted on echocardiogram 6/10/2024: spherical 1.5 x 1.3 cm thrombus at the LV apex   Initiated on AC with Xarelto however unable to verify insurance coverage, now on Coumadin at 5 mg daily  INR: 2.72    Benign essential hypertension  Assessment & Plan  Blood pressures acceptable on current regimen including Losartan 50 mg daily, Toprol-XL 25 mg daily    History of stroke  Assessment & Plan  History of left MCA CVA in May 2018 with residual expressive aphasia  Continue ASA and atorvastatin    Human immunodeficiency virus (HIV) infection ([facility])  Assessment & Plan  Continue Biktarvy and Tivicay.    Bipolar affective disorder ([facility])  Assessment & Plan  Psychiatry evaluated most recently on 8/12 and cleared for discharge to rehab   Continue home meds Zoloft and Remeron             The above assessment and plan was reviewed and updated as determined by my evaluation of the patient on 8/19/2024.    Labs:   Results from last 7 days   Lab Units 08/15/24  0604   WBC Thousand/uL 6.30   HEMOGLOBIN g/dL 10.9*   HEMATOCRIT % 37.0   PLATELETS Thousands/uL 362     Results from last 7 days   Lab Units 08/15/24  0604   SODIUM mmol/L 138   POTASSIUM mmol/L 4.3   CHLORIDE mmol/L 103   CO2 mmol/L 27   BUN mg/dL  26*   CREATININE mg/dL 0.88   CALCIUM mg/dL 9.2         Results from last 7 days   Lab Units 08/19/24  0539 08/15/24  0604   INR  2.72* 2.43*           Imaging  No orders to display       Review of Scheduled Meds:  Current Facility-Administered Medications   Medication Dose Route Frequency Provider Last Rate    acetaminophen  975 mg Oral TID PRN José Salcido MD      aspirin  81 mg Oral Daily Lorrie Barillas PA-C      atorvastatin  80 mg Oral Daily With Dinner Lorrie Barillas PA-C      bictegravir-emtricitab-tenofovir alafenamide  1 tablet Oral Daily With Breakfast Lorrie Barillas PA-C      bisacodyl  10 mg Rectal Daily PRN Kenny Castro MD      diphenhydrAMINE  25 mg Oral Q6H PRN Lorrie Barillas PA-C      divalproex sodium  1,250 mg Oral Daily Lorrie Barillas PA-C      dolutegravir  50 mg Oral Daily Lorrie Barillas PA-C      gabapentin  100 mg Oral Q8H Ashley Depadua, MD      lamoTRIgine  50 mg Oral BID Lorrie Barillas PA-C      levOCARNitine  1,000 mg/day Oral TID With Meals Lorrie Barillas PA-C      losartan  50 mg Oral Daily Lorrie Barillas PA-C      melatonin  6 mg Oral HS Lorrie Barillas PA-C      metoprolol succinate  25 mg Oral Daily CHARLIE Mcclelland      mirtazapine  15 mg Oral HS Lorriejacky Barillas PA-C      ondansetron  4 mg Oral Q6H PRN Lorrie Barillas PA-C      polyethylene glycol  17 g Oral Daily PRN Lorrie Barillas PA-C      senna  1 tablet Oral HS PRN Kenny Castro MD      sertraline  75 mg Oral Daily Lorrie Barillas PA-C      tamsulosin  0.4 mg Oral Daily With Dinner Lorrie Barillas PA-C      warfarin  5 mg Oral Daily (warfarin) CHARLIE Plata      zonisamide  400 mg Oral Daily Lorrie Barillas PA-C         Subjective/ HPI: Patient seen and examined. Patients overnight issues or events were reviewed with nursing staff. New or overnight issues include the following:     Pt seen in  his room. He states that he is having a bad day. Reviewed the Coumadin plan with pt. He denies any current complaints.    ROS:   A 10 point ROS was performed; negative except as noted above.        *Labs /Radiology studies Reviewed  *Medications  reviewed and reconciled as needed  *Please refer to order section for additional ordered labs studies      Physical Examination:  Vitals:   Vitals:    08/18/24 0547 08/18/24 1422 08/18/24 2018 08/19/24 0532   BP: 122/73 121/69 127/62 124/73   BP Location: Left arm Left arm Right arm Left arm   Pulse: 85 77 93 92   Resp: 16  17 17   Temp: 97.6 °F (36.4 °C) 98.1 °F (36.7 °C) 98.2 °F (36.8 °C) 97.5 °F (36.4 °C)   TempSrc: Oral Oral Oral Oral   SpO2: 96% 93% 93% 92%   Weight:       Height:           General Appearance: NAD; pleasant  HEENT: PERRLA, conjuctiva normal; mucous membranes moist; face symmetrical  Neck:  Supple  Lungs: clear bilaterally, normal respiratory effort, no retractions, expiratory effort normal, on room air  CV: regular rate and rhythm, no murmurs rubs or gallops noted   ABD: soft non tender, +BS x4  EXT: Lt AKA  Skin: normal turgor, normal texture, no rash  Psych: appears upset.  Neuro: Awake and alert. Expressive aphasia.     The above physical exam was reviewed and updated as determined by my evaluation of the patient on 8/19/2024.    Invasive Devices       Drain  Duration             External Urinary Catheter 18 days                       VTE Pharmacologic Prophylaxis: Warfarin (Coumadin)  Code Status: Level 1 - Full Code  Current Length of Stay: 44 day(s)    Total floor / unit time spent today 30 minutes  Coordination of patient's care was performed in conjunction with consulting services. Time invested included review of patient's labs, vitals, and management of their comorbidities with continued monitoring, examination of patient as well as answering patient questions, documenting her findings and creating progress note in electronic medical record,   ordering appropriate diagnostic testing.       ** Please Note:  voice to text software may have been used in the creation of this document. Although proof errors in transcription or interpretation are a potential of such software**

## 2024-08-19 NOTE — ASSESSMENT & PLAN NOTE
Presented with left lower extremity acute limb ischemia/extensive left iliofemoral and left infrainguinal embolism requiring left femoral thrombectomy and subsequent AKA on 6/7/24 with vascular surgery.  Continue with local wound care   Continue ASA, Coumadin and statin   Stable for discharge from PMR and medicine standpoint.  Dispo planning as per case management.  Patient is awaiting guardianship hearing, which is scheduled for 8/27/24.

## 2024-08-19 NOTE — ASSESSMENT & PLAN NOTE
Remote history of TBI, previously residing in a group home prior to long term sentence.  Evaluated by neuropsychiatry on 6/27/24 and deemed NOT to have medical decision-making capacity  Neuropsychology to re-evaluate pt.  Process for court appointed guardian started on 7/5/24 - CM following   Guardianship hearing 8/27/24

## 2024-08-19 NOTE — ASSESSMENT & PLAN NOTE
Noted on echocardiogram 6/10/2024: spherical 1.5 x 1.3 cm thrombus at the LV apex   Initiated on AC with Xarelto however unable to verify insurance coverage, now on Coumadin at 5 mg daily  INR: 2.72

## 2024-08-19 NOTE — PROGRESS NOTES
"   08/19/24 1500   Therapy Time missed   Time missed? Yes   Amount of time missed 30   Reason for time missed   (Pt shook his head and stated \"no\" upon PT entry into room. Pt apolgized and said \"same stuff, today is no good.\" PT made sure pt was comfortable, made sure all needs were met, and all items within reach.)   Time(s) multiple attempts made 15:00, 15:05       "

## 2024-08-20 PROCEDURE — 97110 THERAPEUTIC EXERCISES: CPT

## 2024-08-20 PROCEDURE — 97535 SELF CARE MNGMENT TRAINING: CPT

## 2024-08-20 PROCEDURE — 99232 SBSQ HOSP IP/OBS MODERATE 35: CPT | Performed by: NURSE PRACTITIONER

## 2024-08-20 PROCEDURE — NC001 PR NO CHARGE: Performed by: NURSE PRACTITIONER

## 2024-08-20 PROCEDURE — 92507 TX SP LANG VOICE COMM INDIV: CPT

## 2024-08-20 PROCEDURE — 99233 SBSQ HOSP IP/OBS HIGH 50: CPT | Performed by: PHYSICAL MEDICINE & REHABILITATION

## 2024-08-20 RX ADMIN — METOPROLOL SUCCINATE 25 MG: 25 TABLET, EXTENDED RELEASE ORAL at 09:52

## 2024-08-20 RX ADMIN — GABAPENTIN 100 MG: 100 CAPSULE ORAL at 21:14

## 2024-08-20 RX ADMIN — LOSARTAN POTASSIUM 50 MG: 50 TABLET, FILM COATED ORAL at 09:53

## 2024-08-20 RX ADMIN — GABAPENTIN 100 MG: 100 CAPSULE ORAL at 05:47

## 2024-08-20 RX ADMIN — BICTEGRAVIR SODIUM, EMTRICITABINE, AND TENOFOVIR ALAFENAMIDE FUMARATE 1 TABLET: 50; 200; 25 TABLET ORAL at 09:51

## 2024-08-20 RX ADMIN — SERTRALINE HYDROCHLORIDE 75 MG: 50 TABLET ORAL at 09:54

## 2024-08-20 RX ADMIN — LEVOCARNITINE 330 MG: 1 SOLUTION ORAL at 10:47

## 2024-08-20 RX ADMIN — TAMSULOSIN HYDROCHLORIDE 0.4 MG: 0.4 CAPSULE ORAL at 16:43

## 2024-08-20 RX ADMIN — DOLUTEGRAVIR SODIUM 50 MG: 50 TABLET, FILM COATED ORAL at 12:00

## 2024-08-20 RX ADMIN — ATORVASTATIN CALCIUM 80 MG: 80 TABLET, FILM COATED ORAL at 16:43

## 2024-08-20 RX ADMIN — MIRTAZAPINE 15 MG: 15 TABLET, FILM COATED ORAL at 21:14

## 2024-08-20 RX ADMIN — LEVOCARNITINE 330 MG: 1 SOLUTION ORAL at 14:12

## 2024-08-20 RX ADMIN — ASPIRIN 81 MG: 81 TABLET, COATED ORAL at 09:52

## 2024-08-20 RX ADMIN — LEVOCARNITINE 330 MG: 1 SOLUTION ORAL at 17:41

## 2024-08-20 RX ADMIN — WARFARIN SODIUM 5 MG: 5 TABLET ORAL at 17:42

## 2024-08-20 RX ADMIN — LAMOTRIGINE 50 MG: 25 TABLET ORAL at 17:42

## 2024-08-20 RX ADMIN — LAMOTRIGINE 50 MG: 25 TABLET ORAL at 09:53

## 2024-08-20 RX ADMIN — DIVALPROEX SODIUM 1250 MG: 500 TABLET, EXTENDED RELEASE ORAL at 09:54

## 2024-08-20 RX ADMIN — ZONISAMIDE 400 MG: 100 CAPSULE ORAL at 12:00

## 2024-08-20 RX ADMIN — Medication 6 MG: at 21:14

## 2024-08-20 RX ADMIN — GABAPENTIN 100 MG: 100 CAPSULE ORAL at 14:12

## 2024-08-20 NOTE — CASE MANAGEMENT
Cm checked in with pt, pt calmer today, awaiting gaurdianship hearing, no dispo plan as of yet. Pt reportedly doing okay.

## 2024-08-20 NOTE — CONSULTS
Consultation - Neuropsychology/Psychology Department  Sunil Patel 63 y.o. male MRN: [id number]  Unit/Bed#: -01 Encounter: [id number]        Reason for Consultation:  Sunil Patel is a 63 y.o. year old male who was referred for a Neuropsychological Exam to assess cognitive functioning and comment on capacity to make informed medical decisions.      History of Present Illness  Past medical history of lower limb ischemia, depression, HIV disease, substance abuse, and suicide attempt. Presented on 6/7/24 from shelter for increasing pain and was found to have a left external iliac artery occlusion and acute limb ischemia. He underwent left femoral artery exploration with thromboembolectomy of the left iliac system, left profunda femoris and left superficial femoral arteries without possibility of limb salvage and ultimately left  trans-femoral amputation on 6/7. Patient has a history of TBI, with non-fluent aphasia. Patient was previously assessed on 6/27/24 as not having capacity to make fully informed medical decisions.    Physician Requesting Consult: Matilde Cifuentes MD    PROBLEM LIST:  Patient Active Problem List   Diagnosis    Bipolar affective disorder (HCC)    Substance abuse (HCC)    Human immunodeficiency virus (HIV) infection (HCC)    History of stroke    Benign essential hypertension    Positive laboratory testing for human immunodeficiency virus (HCC)    Hypertension    Unspecified vitamin D deficiency    Tobacco abuse    Cerebrovascular accident (CVA) (HCC)    Left ventricular thrombus    History of seizures    Carnitine deficiency (HCC)    Above-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)    Traumatic brain injury (HCC)    Impaired mobility and activities of daily living    At risk for venous thromboembolism (VTE)    Acute pain    At risk for constipation    Patient incapable of making informed decisions    Adjustment disorder with mixed anxiety and depressed mood    Cardiomyopathy (HCC)     "History of migraine headaches    Postoperative anemia         Historical Information   Past Medical History:   Diagnosis Date    Acute lower limb ischemia 06/08/2024    Anxiety     Depression     HIV disease (HCC)     Substance abuse (HCC)     Suicide attempt (HCC)      Past Surgical History:   Procedure Laterality Date    AMPUTATION ABOVE KNEE (AKA) Left 6/7/2024    Procedure: AMPUTATION ABOVE KNEE (AKA);  Surgeon: Juan Luis Rdz MD;  Location: [facility];  Service: Vascular    TX TEAEC W/WO PATCH GRAFT COMMON FEMORAL Left 6/7/2024    Procedure: left femoral ileofemoral thromectomy;  Surgeon: Juan Luis Rdz MD;  Location: [facility];  Service: Vascular    US GUIDED THYROID BIOPSY  3/15/2022    US GUIDED THYROID BIOPSY  11/26/2019     Social History   Social History     Substance and Sexual Activity   Alcohol Use Yes     Social History     Substance and Sexual Activity   Drug Use Yes    Types: \"Crack\" cocaine, Marijuana     Social History     Tobacco Use   Smoking Status Some Days    Current packs/day: 1.00    Types: Cigarettes   Smokeless Tobacco Never     Family History:   Family History   Problem Relation Age of Onset    Diabetes Mother     Heart attack Mother     Heart attack Father        Meds/Allergies   current meds:   Current Facility-Administered Medications   Medication Dose Route Frequency    acetaminophen (TYLENOL) tablet 975 mg  975 mg Oral TID PRN    aspirin (ECOTRIN LOW STRENGTH) EC tablet 81 mg  81 mg Oral Daily    atorvastatin (LIPITOR) tablet 80 mg  80 mg Oral Daily With Dinner    bictegravir-emtricitab-tenofovir alafenamide (BIKTARVY) -25 MG tablet 1 tablet  1 tablet Oral Daily With Breakfast    bisacodyl (DULCOLAX) rectal suppository 10 mg  10 mg Rectal Daily PRN    diphenhydrAMINE (BENADRYL) tablet 25 mg  25 mg Oral Q6H PRN    divalproex sodium (DEPAKOTE ER) 24 hr tablet 1,250 mg  1,250 mg Oral Daily    dolutegravir (TIVICAY) tablet 50 mg  50 mg Oral Daily    " "gabapentin (NEURONTIN) capsule 100 mg  100 mg Oral Q8H    lamoTRIgine (LaMICtal) tablet 50 mg  50 mg Oral BID    levOCARNitine (CARNITOR) oral solution 330 mg  1,000 mg/day Oral TID With Meals    losartan (COZAAR) tablet 50 mg  50 mg Oral Daily    melatonin tablet 6 mg  6 mg Oral HS    metoprolol succinate (TOPROL-XL) 24 hr tablet 25 mg  25 mg Oral Daily    mirtazapine (REMERON) tablet 15 mg  15 mg Oral HS    ondansetron (ZOFRAN-ODT) dispersible tablet 4 mg  4 mg Oral Q6H PRN    polyethylene glycol (MIRALAX) packet 17 g  17 g Oral Daily PRN    senna (SENOKOT) tablet 8.6 mg  1 tablet Oral HS PRN    sertraline (ZOLOFT) tablet 75 mg  75 mg Oral Daily    tamsulosin (FLOMAX) capsule 0.4 mg  0.4 mg Oral Daily With Dinner    warfarin (COUMADIN) tablet 5 mg  5 mg Oral Daily (warfarin)    zonisamide (ZONEGRAN) capsule 400 mg  400 mg Oral Daily       Allergies   Allergen Reactions    No Active Allergies          Family and Social Support:   No data recorded    Behavioral Observations: Information was gathered by asking yes/no questions, listening to patient's narrative speech; Patient was alert, UNABLE to accurately state the year, season, month, day/week, day/month and name of city; affect appeared labile, becoming increasing labile as examination proceeded; near end of examination patient was angry and requested examiner to leave room.  Patient admitted to depressed mood and denied suicidal ideation/plan/intent and denied anxiety; patient had difficulty with articulation, and speech was notable for paraphasia, in addition to expressing complete thoughts. Patient reported he wishes to live alone but does not know where he will go upon discharge. He stated, \"I just want you to give me some place to stay, marijuana and money\".     Cognitive Examination    General Cognitive Functioning MMSE = Deficits in orientation, registration of information, recall, working memory, automatized sequences, following simple commands, reading, " writing.    Attention/Concentration Auditory Selective Attention = Impaired;  Auditory Vigilance = Impaired;     Frontal Systems/Executive Functioning Mental Flexibility/Cognitive Control = Impaired; Working Memory = Impaired Abstract Reasoning = Impaired;     Language Functioning Confrontation naming = Within Normal Limits, Comprehension of Complex Ideational Material = Impaired; Repetition = Impaired; Basic Reading = Impaired; Written Expression = Impaired; Following Commands = Impaired    Memory Functioning Three word recall = Impaired 0/3    Visuo-Spatial Abilities Not Assessed    Functional Knowledge  Health & Safety Knowledge = Impaired;     Summary/Impression:  Results of Neuropsychological Exam revealed cognitive deficits in auditory memory, comprehension of complex ideational material, repetition, basic reading, written expression, following commands, mental flexibility/cognitive control, working memory, abstract reasoning ability, auditory selective attention, auditory vigilance, and orientation. On a measure assessing awareness of personal health status and ability to evaluate health problems, handle medical emergencies and take safety precautions, patient performed in the IMPAIRED range of functioning. During this encounter, patient does not appear to have capacity to make fully informed medical decisions.

## 2024-08-20 NOTE — TEAM CONFERENCE
Acute RehabilitationTeam Conference Note  Date: 8/20/2024   Time: 10:02 AM       Patient Name:  Sunil Patel       Medical Record Number: [id number]   YOB: 1960  Sex: Male          Room/Bed:  [place] [address]/[facility] 451-01  Payor Info:  Payor: MEDICARE / Plan: MEDICARE A AND B / Product Type: Medicare A & B Fee for Service /      Admitting Diagnosis: Hx of AKA (above knee amputation) ([facility]) [Z89.619]   Admit Date/Time:  7/6/2024  1:30 PM  Admission Comments: No comment available     Primary Diagnosis:  Above-knee amputation of left lower extremity ([facility])  Principal Problem: Above-knee amputation of left lower extremity ([facility])    Patient Active Problem List    Diagnosis Date Noted    Postoperative anemia 08/11/2024    History of migraine headaches 07/22/2024    Cardiomyopathy ([facility]) 07/09/2024    Adjustment disorder with mixed anxiety and depressed mood 07/08/2024    Impaired mobility and activities of daily living 07/07/2024    At risk for venous thromboembolism (VTE) 07/07/2024    Acute pain 07/07/2024    At risk for constipation 07/07/2024    Patient incapable of making informed decisions 07/07/2024    Above-knee amputation of left lower extremity ([facility]) 07/06/2024    Traumatic brain injury ([facility]) 07/06/2024    Carnitine deficiency ([facility]) 06/09/2024    Left ventricular thrombus 06/08/2024    History of seizures 06/08/2024    Tobacco abuse 10/26/2019    Cerebrovascular accident (CVA) ([facility]) 10/26/2019    Positive laboratory testing for human immunodeficiency virus ([facility]) 07/03/2017    Bipolar affective disorder ([facility]) 07/02/2017    Hypertension 02/27/2017    Human immunodeficiency virus (HIV) infection ([facility]) 02/16/2017    History of stroke 02/16/2017    Substance abuse ([facility]) 12/21/2013    Unspecified vitamin D deficiency 05/28/2010    Benign essential hypertension 02/25/2010       Physical Therapy:    Weight Bearing Status: Non-weight bearing (L LE)  Transfers: Supervision  Bed Mobility: Independent  Amulation  Distance (ft): 0 feet (within parallel bars)  Ambulation:  (only within parallel bars)  Assistive Device for Ambulation:  (parallel bars)  Wheelchair Mobility Distance: 500 ft  Wheelchair Mobility: Independent  Number of Stairs: 0  Assistive Device for Stairs:  (TBA)  Stair Assistance:  (TBA)  Ramp: Independent  Assistive Device for Ramp: Wheelchair  Discharge Recommendations: Other (awaiting placement)    7/15/24  Pt has plateaued functionally for he already met S/CGA goals at w/c level mobilities. Due to baseline cognition, aphasia, impaired safety with STM impairment impacting consistent carry over of new learning so does not anticipate pt to progress beyond S level at this time. Also for the past week pt also demos inconsistent participation with skilled PT interventions. Goals for this week to complete/review Phase 1 AMP education, cont with w/c level transfer training keesha with car transfer, cont with strengthening/flexibility exercises including reviewing HEP packet provided while awaiting placement pending guardianship decision.     8/5/2024    Pt cont at this time with skilled PT and pt overall at S lvl d/t baseline cognition, aphasia, impaired safety with STM impairment. Pt at this time cont awaiting for guarding ship/placement , Dr Depadua speaking with Kieran and his fiend Donna about DC places and overall outcome. Pt will cont to benefit with skilled PT to keep functional mobility, LE /core/UE strengthening and overall better outcome . Cont working on sit pivot transfers setup and overall limb care with safety awareness.    08/13/2024: Pt cont at this time with skilled PT and pt overall at S lvl d/t baseline cognition, aphasia, impaired safety with STM impairment. Pt at this time cont awaiting for guarding ship/placement , Dr Depadua speaking with Kieran and his fiend Donna about DC places and overall outcome. Pt will cont to benefit with skilled PT to keep functional mobility, LE /core/UE strengthening and  overall better outcome . Cont working on sit pivot transfers setup and overall limb care with safety awareness. Pt has been ambulating within parallel bars and progressing well, requiring VC for proper weight shifting and sequencing.    08/20/2024: Pt cont at this time with skilled PT and pt overall at S lvl d/t baseline cognition, aphasia, impaired safety with STM impairment. Pt at this time cont awaiting for guarding ship/placement. Pt is limited by decreased willingness/motivation to participate in skilled PT/OT services, with multiple refusals over the last week. Pt will cont to benefit with skilled PT to keep functional mobility, LE /core/UE strengthening and overall better outcome . Cont working on sit pivot transfers setup and overall limb care with safety awareness. Pt has been ambulating within parallel bars and progressing well, requiring VC for proper weight shifting and sequencing.      Occupational Therapy:  Eating: Independent  Grooming: Independent  Bathing: Supervision  Bathing: Supervision  Upper Body Dressing: Independent  Lower Body Dressing: Supervision  Toileting: Supervision  Tub/Shower Transfer: Supervision  Toilet Transfer: Supervision  Cognition: Exceptions to WNL  Cognition: Decreased Memory, Decreased Safety, Decreased Executive Functions, Decreased Comprehension  Orientation: Person, Place, Situation, Time  Discharge Recommendations: Home with:  DC Home with:: 24 Hour Supervision       Pt continues to present with impairments in endurance, standing balance/tolerance, memory, insight, safety, and judgement. Additional functional barriers include fatigue, LLE NWB status, decreased caregiver support, risk for falls, and home environment. Pt is functioning at overall S for ADLs and S fxnl sit pivot xfers, and DS for w/c mobility. Pt will continue to benefit from skilled OT services to address above mentioned barriers and maximize functional independence in baseline areas of occupation,  utilizing the following interventions: ADL retraining, DME/adaptive equipment assessment, functional transfer training, repetitive safety training, activity tolerance/edurance training, UB/core strengthening, and energy conservation education. OT D/C recommendation is for 24 hour S due to baseline cognitive deficits, pt would benefit from JARRETT.          Speech Therapy:  Mode of Communication: Verbal  Speech/Language: Expressive Aphasia (expressive and receptive aphasia)  Cognition: Exceptions to WNL  Cognition: Decreased Memory, Decreased Executive Functions, Decreased Attention, Decreased Comprehension, Decreased Safety  Orientation: Person, Place, Time, Situation  Discharge Recommendations: Home with: (pending placement)  DC Home with:: 24 Hour Supervision  Pt is currently being followed for skilled SLP services targeting language therapy. Pt with hx stroke, resulting in expressive and receptive aphasia but noting expressive communication skills to be greater impacted. SLP was consulted to support pt's communication skills for improved ability to express his needs with the team. Pt presenting with overall moderately impaired expressive and receptive language deficits, impacting functional communication skills. Pt has trialed written communication, however, this was found to be an ineffective communication modality. Pt has also been introduced to a low tech manual communication board, which he has demonstrated ability to utilize in order to convey basic wants/needs more easily and effectively. Currently, pt is functioning at min A for comprehension, problem solving and memory and mod A for expression. Plan this week to continue to attempt to establish a more functional mode of communication which is both effective and easy for pt to utilize to support his communication attempts with staff. Recommending brief follow up skilled SLP services to continue to support pt in building a more functional communication modality  to improve effectiveness of communication and to ease frustrations with communication attempts.     Update week of 7/22/2024: Pt continues to be seen for skilled SLP services focusing on language and communication skills with primary reason for consultation to support communication needs at this time. Pt remains with deficits in expressive and receptive language skills,which is pt's baseline prior to admission to this unit, but which impact all domains of language (auditory & reading comprehension; verbal & written expression). This week, sessions have focused on introducing a high tech communication device as per pt's request. Pt's friend provided a tablet and recent session has focused on exploring different communication apps which fit pt's needs (function, cost, etc), as well as apps which pt can then complete language tasks in his free time. Pt is demonstrating desire to utilize device, but will benefit from additional training and practice with device, especially due to continued deficits in comprehension, ST memory/recall, problem solving and attention. This week, plan to continue to focus on determining appropriate apps for language drills/activities, as well as identifying an appropriate alejandra to support expressive language/communication, while also programming alejandra for pt's specific needs. Currently, pt is functioning at mod A for expression, comprehension, problem solving and memory and supervision for social interaction. Recommending short term follow up of skilled SLP services to continue to support pt in building a more functional communication modality to improve effectiveness of communication and to ease frustrations with communication attempts during acute rehab stay. Will also benefit from engaging pt's friend in education/training with device.     Update week of 7/29/2024: Pt continues to be followed for language and communication therapy where he is making slow progress, however, ST primarily being  implemented to support current functional language as pt with baseline aphasia. Pt is limited by deficits in expressive and receptive language skills, to include verbal expression, written expression, auditory comprehension and reading comprehension, which impact overall functional communication skills. Additionally, a cognitive linguistic evaluation was completed this week, as per medical team request. Pt completed the SLUMS assessment with a score of 1/30 which as compared to those with high school education correlates with severe neurocognitive disorder, however, this score is likely highly impacted by pt's language deficits and should be interpreted with caution. Cognitive linguistic deficits include decreased: attention, STM recall, problem solving, executive functions, safety awareness, reasoning, and visuospatial skills. The following interventions are used to target these barriers, including visual orientation checklist, verbal problem solving task, verbal working memory tasks, categorization tasks , picture problem solving activities, verbal reasoning tasks, visual attention tasks, and family education/training. basic communication boards, verbal command following activities, written command following activities, biographical yes/no questions, simple yes/no questions, moderate yes/no questions, visual/receptive object ID tasks, picture object ID tasks, automatic speech: counting, automatic speech: COURTNEY, automatic speech: CORRY, object naming tasks, simple phrase completion tasks, reading comprehension at word level, reading comprehension at phrase level , written expression of biographical information, written expression at word level, and word-picture matching.     Plan to target cognitive linguistic skills (basic problem solving, memory, safety) and language skills across all language domains this coming week. Currently, pt is functioning at mod assist for comprehension, expression, problem solving and  memory, as well as supervision for social interaction. Recommending continued skilled SLP services to continue to support pt in building a more functional communication modality to improve effectiveness of communication and to ease frustrations with communication attempts during acute rehab stay, as well as to target cognitive linguistic skills to maximize safety and independence on discharge.     Update week 8/5/2024: Pt continues to be followed for language and communication therapy where is making slow progress, however, ST continues to implement support with a focus on baseline aphasia.In regards to aphasia, pt is limited by deficits in expressive and receptive language skills, to include verbal expression, written expression, auditory comprehension and reading comprehension, which impacts overall functional communication skills. In recent sessions, implementation of constant therapy application on pt's tablet has been completed and discussed w/ pt's friend to help pt practice outside of therapy and during therapy with expressive-receptive language. Pt voiced engagement with application and continued focus with application will be used in future sessions as well as additional interventions to target these barriers such as basic communication boards, verbal command following activities, written command following activities, biographical yes/no questions, simple yes/no questions, moderate yes/no questions, visual/receptive object ID tasks, picture object ID tasks, automatic speech: counting, automatic speech: COURTNEY, automatic speech: CORRY, object naming tasks, simple phrase completion tasks, reading comprehension at word level, reading comprehension at phrase level , written expression of biographical information, written expression at word level, and word-picture matching. In regards to cognition, recent sessions have not been targeting due to pt's signifiant expressive language deficits. The following interventions  are to be used to target these barriers, visual orientation checklist, verbal problem solving task, verbal working memory tasks, categorization tasks , picture problem solving activities, verbal reasoning tasks, visual attention tasks, and family education/training. Plan to target cognitive linguistic skills (basic problem solving, memory, safety) and language skills across all language domains this coming week.     Current level of functioning:    Comprehension: Mod A  Expression: Mod A  Social Interaction: Supervision  Problem Solving: Mod A  Memory: Mod A      Update from week: 8/13/2024 . Pt is being followed for language tx  sessions to where pt is making slower progress this week. Continued language barriers which present include: decreased higher level auditory comprehension skills, expressive language skills including decreased overall communicative intent, reading comprehension skills including lengthier written information, and written language skills including difficulty in basic written expression including biographical information which still impacts pt's overall safety, functional cognitive communication skills as well as functional mobility. However, still incorporating the use of tablet w/ free apps which have been provided by ST team. Of SLP had introduced [employer] website and practive therapy materials for pt. Unfortunately this is not allowed on tablet but is for computer use. SLP did have pt complete tasks w/ use of laptop which pt responded favorably. Will plan to reach out to company to determine if the website can be utilized to pt's personal tablet. Continued cognitive barriers which present include: decreased visual attention, ST memory recall , reasoning, sequencing, judgement, and insight, which still impacts pt's overall safety, functional cognitive communication skills as well as functional mobility. The following interventions are used to target these barriers, including visual  orientation checklist, drawing conclusions activities, categorization tasks , picture problem solving activities, visual retraining activities., visual attention tasks, and functional reading tasks .     Current level of functioning:   Comprehension: min-mod A   Expression: mod A  Social Interaction: supervision  Executive functions: mod A   Memory: mod A    This week will continue to target independence of use of tablet and language/cognitive apps to use as a supplement during pt's downtime. Pt to benefit from skilled language tx  session up to 3x per week maintenance given overall communication skills in attempts to decreased caregiver burden over time.           Update from week: 8/19/2024: Pt continues to be followed for language and communication therapy where he is making slow progress. Pt is limited by deficits in both expressive and receptive language skills, to include verbal expression, written expression, auditory comprehension and reading comprehension, which impact overall functional communication skills.     Continued speech/language barriers which present include: decreased auditory comprehension skill including yes/no questions, command following and information with specific content such as prepositions, expressive language skills including word finding which impacts semantic content, reading comprehension skills including at the word and sentence levels, and written language skills including ability to record his own biographical info or for use as a communication modality. Continued cognitive barriers which present include: decreased attention, visual attention, ST memory recall , problem solving, reasoning, sequencing, organization of thoughts, judgement, and insight, which still impacts pt's overall safety, functional cognitive communication skills as well as functional mobility. These deficits are barriers to pt's overall functional independence and safety with mobility, as well as ability to  effectively communicate.     The following interventions are used to target these barriers, including basic communication boards, yes/no visual sheet, verbal command following activities, written command following activities, biographical yes/no questions, simple yes/no questions, moderate yes/no questions, visual/receptive object ID tasks, picture object ID tasks, object naming tasks, opposite phrase completion tasks, simple phrase completion tasks, reading comprehension at word level, written expression of biographical information, word-picture matching, divergent naming: concrete, and word deduction with phrase. While pt's language deficits are primarily being targeted, the following interventions are also being used to address these cognitive linguistic deficits.     Pt is currently functioning at min A for comprehension, supervision for social interaction and mod A for expression, memory and problem solving. This week, will plan on continuing to incorporate pt's tablet for use of communication modality and for language tasks. Plan to primarily target verbal expression, auditory comprehension and reading comprehension skills. Pt to benefit from skilled language tx session up to 3x per week maintenance for overall communication skills in attempts to decreased caregiver burden over time and to maximize functional communication abilities.      Nursing Notes:  Appetite: Good  Diet Type: Cardiac                      Diet Patient/Family Education Complete: Yes (will need reinforcement)    Type of Wound (LDA): Wound                    Type of Wound Patient/Family Education: No  Bladder: Incontinent     Bladder Patient/Family Education: Yes  Bowel: Continent     Bowel Patient/Family Education: Yes  Pain Location/Orientation: Location: Head  Pain Score: 0                       Hospital Pain Intervention(s): Medication (See MAR), Emotional support, Environmental changes, Rest  Pain Patient/Family Education: No (pt  awaiting guardianship hearing)  Medication Management/Safety  Safe Administration: No  Reason for non-safe administration: will need assist  Medication Patient/Family Education Complete: No    63 y.o. male who  has a past medical history of Acute lower limb ischemia (06/08/2024), Anxiety, Depression, HIV disease (HCC), Substance abuse (HCC), and Suicide attempt (HCC). who [facility] from long-term for increased LLE swelling and pain and was found to have a left external iliac artery occlusion and acute limb ischemia. 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 Patient had TTE on 6/10/24 showing LV apex thrombus. On 6/11/24 patient had pharmacologic nuclear stress test/SPECT scan which did not show any significant areas of ischemia with EF 40-45% and recommended continuing A/C for LV apical thrombus. His course was complicated by b/l buttock unstageable pressure ulcers, urinary and fecal incontinence, pain, and significant decline in ADLs and mobility. Patient has been continued on Xarelto for anticoagulation, as well as ASA and statin. Patient has a history of TBI, with baseline nonfluent aphasia and forgetfulness. He was evaluated by neuropsychology on 6/27/24, and deemed to not have capacity to make fully informed medical decisions. Therefore, the guardianship process was initiated as of 7/5/24. He was admitted to [facility] on 7/6.     7/15: Will encourage independence with ADLs and monitor labs and vital signs.  We will educate pt/family about repositioning to prevent skin breakdown.  We will assist w repositioning and perform routine skin checks.  We will monitor for adequate pain control.  We will monitor for constipation and medicate pt as ordered. We will provide  pt and family DM education. We will increase safety awareness and keep pt free from falls.    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 Noted on echocardiogram 6/10/2024: spherical 1.5 x 1.3 cm thrombus at the LV apex Initiated on AC with Xarelto, continue. Rx sent to [facility] for price check on 7/10/24 - CM spoke with [employer] and pt has rx coverage. Information sent to [facility].  Today on 7/21/24, d/w [facility] and Xarelto is not covered and he would have to pay OOP @$700.00.  CM to further address tomorrow 7/22/24.  He may have to be switched to Coumadin.     Will continue to encourage independence with ADLs and monitor labs and vital signs. We will educate pt/family about repositioning to prevent skin breakdown.  We will assist w repositioning and perform routine skin checks.  We will monitor for adequate pain control.  We will monitor for constipation and medicate pt as ordered. We will provide pt and family DM education. We will increase safety awareness and keep pt free from falls.     Pt is incontinent of bladder, uses male pure wick @ HS. Will encourage independence with ADLs and monitor labs and vital signs.  We will educate pt/family about repositioning to prevent skin breakdown.  We will assist w repositioning and perform routine skin checks.  We will monitor for adequate pain control.  We will monitor for constipation and medicate pt as ordered. We will provide pt and family wound and skin care management. We will increase safety awareness and keep pt free from falls.    8/5: Will continue to encourage independence with ADLs and monitor labs and vital signs. We will educate pt/family about repositioning to prevent skin breakdown.  We will assist w repositioning and perform routine skin checks.  We will monitor for adequate pain control.  We will monitor for constipation and medicate pt as ordered. We will provide pt and family DM education. We will increase safety awareness and keep pt free from falls.     8/13    Remote history of TBI, previously residing in a group home prior to [facility] sentence.. Evaluated by neuropsychiatry on 6/27/24 and deemed NOT to have medical decision-making capacity. Process for court appointed guardian started on 7/5/24 - CM following.Guardianship hearing 8/27/24 8/20- Pt awaiting guardianship hearing.  Remains inc urine w purewick use at night.  Pt remains alarmed for safety.    Will continue to encourage independence with ADLs and monitor labs and vital signs. We will educate pt/family about repositioning to prevent skin breakdown.  We will assist w repositioning and perform routine skin checks.  We will monitor for adequate pain control.  We will monitor for constipation and medicate pt as ordered. We will increase safety awareness and keep pt free from falls.              Case Management:     Discharge Planning  Living Arrangements: Other (Comment)  Support Systems: Son, Family members  Assistance Needed: Family members are currently arranging housing for pt after d/c  Type of Current Residence: Other (Comment)  Current Home Care Services: No  7/23- CM continues to follow with d/c planning needs. Process of pursuing guardianship as well as sending SNF referrals. CM continues to work with acute care CM to assist with d/c planning process.     7/30- Cm continues to follow and search for dispo plan    8/6- Team still working on dispo plan, [facility] to unit and meet with pt this week to determine if they can accept.     8/12- [facility] pt this week, awaiting dispo    8/19- Team continues to search for diso plans for pt, awaiting guardianship trial     Is the patient actively participating in therapies? yes  List any modifications to the treatment plan: None       Barriers Interventions   incontinence Purewick at night   Sacral wounds  Triad paste, wound care following, p500, roho cushion, offloading,    Expressive aphasia Functional communication, use  of ipad, review again for capacity,st memory, problem solving, safety and frustration tolerance    Residual limb care Wrapping education, nursing monitoring for signs of infection    Dispo planning  Several meetings and coordination continues to happen   Social issues Unable to secure placement due to multiple social issues, guardianship occurring 8/27   Mood  Varies, dc frustration tolerance    Afib Coumadin      Is the patient making expected progress toward goals? yes  List any update or changes to goals: None    Medical Goals: Patient will be medically stable for discharge to [facility] upon completion of rehab program and Patient will be able to manage medical conditions and comorbid conditions with medications and follow up upon completion of rehab program    Weekly Team Goals:   Rehab Team Goals  ADL Team Goal: Patient will require supervision with ADLs with least restrictive device upon completion of rehab program  Transfer Team Goal: Patient will require supervision with transfers with least restrictive device upon completion of rehab program  Locomotion Team Goal: Patient will require supervision with locomotion with least restrictive device upon completion of rehab program  Cognitive Team Goal: Patient will return to premorbid level of cognitive activity upon completion of rehab program    Discussion: Pt participating, mod I with ambulation, sup for transfers. PT working on carry over for wc propulsion for future prosthetic in the future. Pt can stand and hop but working on limb. ST focusing on language component, trying to find more functional mode of communication, trailing different apps. Language drills, auditory comprehension. Team offers to go outside and offering paint.     CM continues to search for dispo plan.     Anticipated Discharge Date:  reteam pending dispo plan  [facility] Team Members Present:  The following team members are supervising care for this patient and were present  during this Weekly Team Conference.    Physician: Dr. DePadua, MD  : Berta George MSW  Registered Nurse: Vy Ortiz RN  Physical Therapist: Delfina Renner DPT  Occupational Therapist: Katia Morfin MS, OTR/L  Speech Therapist: Erin Roe MS, CCC-SLP

## 2024-08-20 NOTE — PLAN OF CARE
Problem: PAIN - ADULT  Goal: Verbalizes/displays adequate comfort level or baseline comfort level  Description: Interventions:  - Encourage patient to monitor pain and request assistance  - Assess pain using appropriate pain scale  - Administer analgesics based on type and severity of pain and evaluate response  - Implement non-pharmacological measures as appropriate and evaluate response  - Consider cultural and social influences on pain and pain management  - Notify physician/advanced practitioner if interventions unsuccessful or patient reports new pain  Outcome: Progressing     Problem: INFECTION - ADULT  Goal: Absence or prevention of progression during hospitalization  Description: INTERVENTIONS:  - Assess and monitor for signs and symptoms of infection  - Monitor lab/diagnostic results  - Monitor all insertion sites, i.e. indwelling lines, tubes, and drains  - Monitor endotracheal if appropriate and nasal secretions for changes in amount and color  - [place] appropriate cooling/warming therapies per order  - Administer medications as ordered  - Instruct and encourage patient and family to use good hand hygiene technique  - Identify and instruct in appropriate isolation precautions for identified infection/condition  Outcome: Progressing  Goal: Absence of fever/infection during neutropenic period  Description: INTERVENTIONS:  - Monitor WBC    Outcome: Progressing     Problem: SAFETY ADULT  Goal: Patient will remain free of falls  Description: INTERVENTIONS:  - Educate patient/family on patient safety including physical limitations  - Instruct patient to call for assistance with activity   - Consult OT/PT to assist with strengthening/mobility   - Keep Call bell within reach  - Keep bed low and locked with side rails adjusted as appropriate  - Keep care items and personal belongings within reach  - Initiate and maintain comfort rounds  - Make Fall Risk Sign visible to staff  - Offer Toileting every 2 Hours,  in advance of need  - Initiate/Maintain alarm  - Obtain necessary fall risk management equipment:   - Apply yellow socks and bracelet for high fall risk patients  - Consider moving patient to room near nurses station  Outcome: Progressing  Goal: Maintain or return to baseline ADL function  Description: INTERVENTIONS:  -  Assess patient's ability to carry out ADLs; assess patient's baseline for ADL function and identify physical deficits which impact ability to perform ADLs (bathing, care of mouth/teeth, toileting, grooming, dressing, etc.)  - Assess/evaluate cause of self-care deficits   - Assess range of motion  - Assess patient's mobility; develop plan if impaired  - Assess patient's need for assistive devices and provide as appropriate  - Encourage maximum independence but intervene and supervise when necessary  - Involve family in performance of ADLs  - Assess for home care needs following discharge   - Consider OT consult to assist with ADL evaluation and planning for discharge  - Provide patient education as appropriate  Outcome: Progressing  Goal: Maintains/Returns to pre admission functional level  Description: INTERVENTIONS:  - Perform AM-PAC 6 Click Basic Mobility/ Daily Activity assessment daily.  - Set and communicate daily mobility goal to care team and patient/family/caregiver.   - Collaborate with rehabilitation services on mobility goals if consulted  - Perform Range of Motion 3 times a day.  - Reposition patient every 2 hours.  - Dangle patient 3 times a day  - Stand patient 3 times a day  - Ambulate patient 3 times a day  - Out of bed to chair 3 times a day   - Out of bed for meals 3 times a day  - Out of bed for toileting  - Record patient progress and toleration of activity level   Outcome: Progressing     Problem: DISCHARGE PLANNING  Goal: Discharge to home or other facility with appropriate resources  Description: INTERVENTIONS:  - Identify barriers to discharge w/patient and caregiver  -  Arrange for needed discharge resources and transportation as appropriate  - Identify discharge learning needs (meds, wound care, etc.)  - Arrange for interpretive services to assist at discharge as needed  - Refer to Case Management Department for coordinating discharge planning if the patient needs post-hospital services based on physician/advanced practitioner order or complex needs related to functional status, cognitive ability, or social support system  Outcome: Progressing     Problem: Prexisting or High Potential for Compromised Skin Integrity  Goal: Skin integrity is maintained or improved  Description: INTERVENTIONS:  - Identify patients at risk for skin breakdown  - Assess and monitor skin integrity  - Assess and monitor nutrition and hydration status  - Monitor labs   - Assess for incontinence   - Turn and reposition patient  - Assist with mobility/ambulation  - Relieve pressure over bony prominences  - Avoid friction and shearing  - Provide appropriate hygiene as needed including keeping skin clean and dry  - Evaluate need for skin moisturizer/barrier cream  - Collaborate with interdisciplinary team   - Patient/family teaching  - Consider wound care consult   Outcome: Progressing     Problem: Nutrition/Hydration-ADULT  Goal: Nutrient/Hydration intake appropriate for improving, restoring or maintaining nutritional needs  Description: Monitor and assess patient's nutrition/hydration status for malnutrition. Collaborate with interdisciplinary team and initiate plan and interventions as ordered.  Monitor patient's weight and dietary intake as ordered or per policy. Utilize nutrition screening tool and intervene as necessary. Determine patient's food preferences and provide high-protein, high-caloric foods as appropriate.     INTERVENTIONS:  - Monitor oral intake, urinary output, labs, and treatment plans  - Assess nutrition and hydration status and recommend course of action  - Evaluate amount of meals  eaten  - Assist patient with eating if necessary   - Allow adequate time for meals  - Recommend/ encourage appropriate diets, oral nutritional supplements, and vitamin/mineral supplements  - Order, calculate, and assess calorie counts as needed  - Recommend, monitor, and adjust tube feedings and TPN/PPN based on assessed needs  - Assess need for intravenous fluids  - Provide specific nutrition/hydration education as appropriate  - Include patient/family/caregiver in decisions related to nutrition  Outcome: Progressing

## 2024-08-20 NOTE — ASSESSMENT & PLAN NOTE
Noted on echocardiogram 6/10/2024: spherical 1.5 x 1.3 cm thrombus at the LV apex   Initiated on AC with Xarelto however unable to verify insurance coverage, now on Coumadin at 5 mg daily  INR: 2.72 on  8/18  Rechk Thursday

## 2024-08-20 NOTE — ASSESSMENT & PLAN NOTE
Remote history of TBI, previously residing in a group home prior to alf sentence.  Evaluated by neuropsychiatry on 6/27/24 and deemed NOT to have medical decision-making capacity  Neuropsychology to re-evaluate pt.  Process for court appointed guardian started on 7/5/24 - CM following   Guardianship hearing 8/27/24

## 2024-08-20 NOTE — PROGRESS NOTES
[facility]  Progress Note  Name: Sunil Patel I  MRN: [id number]  Unit/Bed#: -01 I Date of Admission: 7/6/2024   Date of Service: 8/20/2024 I Hospital Day: 45    Assessment & Plan   * Above-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)  Assessment & Plan  Presented with left lower extremity acute limb ischemia/extensive left iliofemoral and left infrainguinal embolism requiring left femoral thrombectomy and subsequent AKA on 6/7/24 with vascular surgery.  Continue with local wound care   Continue ASA, Coumadin and statin   Stable for discharge from PMR and medicine standpoint.  Dispo planning as per case management.  Patient is awaiting guardianship hearing, which is scheduled for 8/27/24.    Postoperative anemia  Assessment & Plan  Normocytic  PTA hemoglobin was normal, now has been running 10-11 since admission  No signs or symptoms of active bleeding  Iron panel reviewed, no evidence of TY  Likely postoperative ABLA  Monitor as needed      History of migraine headaches  Assessment & Plan  Continue PTA meds Depakote, gabapentin, zonegran and lamictal all of which can help in prevention  Unable to take triptans due to a history of stroke  PRN Tylenol     Cardiomyopathy (HCC)  Assessment & Plan  ECHO 6/10/2024 = LVEF 40-45% with mild global hypokinesis   Status post NM stress test 6/11/2024 = large, mild, fixed defect in the inferior wall, possibly due to diaphragmatic attenuation artifact, there is a small area of partial reversibility in the inferior apical wall suggestive of ischemia    Evaluated by cardiology.  Etiology felt to be possibly secondary to stress-induced cardiomyopathy, with apical thrombus  Cardiac catheterization deferred given the lack of any significant ischemia, and no current cardiac symptoms  Continue with medical management with aspirin, statin, beta-blocker, ARB  Remains euvolemic off of diuretics, continue to monitor volume status    Outpatient follow-up with cardiology, previously followed with Dr. Gumaro Aguila [facility]    Patient incapable of making informed decisions  Assessment & Plan  Seen by neuropsych on 6/27 and was deemed NOT to have medical decision making capacity  Guardianship hearing scheduled for 8/27    At risk for constipation  Assessment & Plan  Management per PMR    At risk for venous thromboembolism (VTE)  Assessment & Plan  On systemic AC in the setting of LV thrombus     Traumatic brain injury (HCC)  Assessment & Plan  Remote history of TBI, previously residing in a group home prior to senior living sentence.  Evaluated by neuropsychiatry on 6/27/24 and deemed NOT to have medical decision-making capacity  Neuropsychology to re-evaluate pt.  Process for court appointed guardian started on 7/5/24 - CM following   Guardianship hearing 8/27/24    Carnitine deficiency (HCC)  Assessment & Plan  Continue levocarnitine 330 mg 3x daily with meals.    History of seizures  Assessment & Plan  Diagnosed in July 2019, follows with [facility] neurology outpatient  Continue home regimen with Depakote ER, Lamotrigine and Zonegran    Left ventricular thrombus  Assessment & Plan  Noted on echocardiogram 6/10/2024: spherical 1.5 x 1.3 cm thrombus at the LV apex   Initiated on AC with Xarelto however unable to verify insurance coverage, now on Coumadin at 5 mg daily  INR: 2.72 on  8/18  Rechk Thursday     Benign essential hypertension  Assessment & Plan  Blood pressures acceptable on current regimen including Losartan 50 mg daily, Toprol-XL 25 mg daily    History of stroke  Assessment & Plan  History of left MCA CVA in May 2018 with residual expressive aphasia  Continue ASA and atorvastatin    Human immunodeficiency virus (HIV) infection ([facility])  Assessment & Plan  Continue Biktarvy and Tivicay.    Bipolar affective disorder (HCC)  Assessment & Plan  Psychiatry evaluated most recently on 8/12 and cleared for discharge to rehab   Continue home meds Zoloft and  Remeron    Pressure injury of buttock, stage 3 (HCC)-resolved as of 8/9/2024  Assessment & Plan  Being managed by PMR  Turn every 2 hours for offloading  P500   Continue local care  Right buttock per pressure ulcer care now stage 3   Left buttock per pressure ulcer  care now              The above assessment and plan was reviewed and updated as determined by my evaluation of the patient on 8/20/2024.    Labs:   Results from last 7 days   Lab Units 08/15/24  0604   WBC Thousand/uL 6.30   HEMOGLOBIN g/dL 10.9*   HEMATOCRIT % 37.0   PLATELETS Thousands/uL 362     Results from last 7 days   Lab Units 08/15/24  0604   SODIUM mmol/L 138   POTASSIUM mmol/L 4.3   CHLORIDE mmol/L 103   CO2 mmol/L 27   BUN mg/dL 26*   CREATININE mg/dL 0.88   CALCIUM mg/dL 9.2         Results from last 7 days   Lab Units 08/19/24  0539 08/15/24  0604   INR  2.72* 2.43*           Imaging  No orders to display       Review of Scheduled Meds:  Current Facility-Administered Medications   Medication Dose Route Frequency Provider Last Rate    acetaminophen  975 mg Oral TID PRN José Salcido MD      aspirin  81 mg Oral Daily Lorrie Barillas PA-C      atorvastatin  80 mg Oral Daily With Dinner Lorrie Barillas PA-C      bictegravir-emtricitab-tenofovir alafenamide  1 tablet Oral Daily With Breakfast Lorrie Barillas PA-C      bisacodyl  10 mg Rectal Daily PRN Kenny Castro MD      diphenhydrAMINE  25 mg Oral Q6H PRN Lorrie Barillas PA-C      divalproex sodium  1,250 mg Oral Daily Lorrie Barillas PA-C      dolutegravir  50 mg Oral Daily Lorrie Barillas PA-C      gabapentin  100 mg Oral Q8H Ashley Depadua, MD      lamoTRIgine  50 mg Oral BID Lorrie Barillas PA-C      levOCARNitine  1,000 mg/day Oral TID With Meals Lorrie Barillas PA-C      losartan  50 mg Oral Daily Lorrie Barillas PA-C      melatonin  6 mg Oral HS Lorrie Arcadio[place], PA-C      metoprolol succinate  25 mg Oral Daily Chelita  CHARLIE Valdivia      mirtazapine  15 mg Oral HS Lorrie Barillas PA-C      ondansetron  4 mg Oral Q6H PRN Lorrie Barillas PA-C      polyethylene glycol  17 g Oral Daily PRN Lorrie Barillas PA-C      senna  1 tablet Oral HS PRN Kenny Castro MD      sertraline  75 mg Oral Daily Lorrie Barillas PA-C      tamsulosin  0.4 mg Oral Daily With Dinner Lorrie Barillas PA-C      warfarin  5 mg Oral Daily (warfarin) CHARLIE Plata      zonisamide  400 mg Oral Daily Lorrie Barillas PA-C         Subjective/ HPI: Patient seen and examined. Patients overnight issues or events were reviewed with nursing staff. New or overnight issues include the following:     Patient reports feeling a little depressed today.  States that because of his amputation he does not have any friends.  And feels alone.  He denies any pain or reports that it is under control states he has no problems with bowel movement and he is eating well.  Currently sitting listening to blues music on his iPad.  Denies any headaches today.  Feels like he is doing better with occupational processes    ROS:   A 10 point ROS was performed; negative except as noted above.        *Labs /Radiology studies Reviewed  *Medications  reviewed and reconciled as needed  *Please refer to order section for additional ordered labs studies      Physical Examination:  Vitals:   Vitals:    08/19/24 1422 08/19/24 2045 08/20/24 0553 08/20/24 0949   BP: 138/66 114/60 141/64 145/86   BP Location: Right arm Left arm Left arm Left arm   Pulse: 88 92 94 102   Resp: 16 17 17    Temp: 97.6 °F (36.4 °C) 98.3 °F (36.8 °C) 98 °F (36.7 °C)    TempSrc: Oral Oral Oral    SpO2: 95% 93% 92%    Weight:       Height:           General Appearance: NAD; pleasant  HEENT: PERRLA, conjuctiva normal; mucous membranes moist; face symmetrical  Neck:  Supple  Lungs: clear bilaterally, normal respiratory effort, no retractions, expiratory effort normal, on room  air  CV: regular rate and rhythm, no murmurs rubs or gallops noted   ABD: soft non tender, +BS x4  EXT: , no lymphadenopathy, no edema, left leg AKA intact  Skin: normal turgor, normal texture, no rash  Psych: affect normal, mood normal, depressed  Neuro: AAOx3       The above physical exam was reviewed and updated as determined by my evaluation of the patient on 8/20/2024.    Invasive Devices       Drain  Duration             External Urinary Catheter 19 days                       VTE Pharmacologic Prophylaxis: Warfarin (Coumadin)  Code Status: Level 1 - Full Code  Current Length of Stay: 45 day(s)    Total floor / unit time spent today 20 minutes  Coordination of patient's care was performed in conjunction with consulting services. Time invested included review of patient's labs, vitals, and management of their comorbidities with continued monitoring, examination of patient as well as answering patient questions, documenting her findings and creating progress note in electronic medical record,  ordering appropriate diagnostic testing.       ** Please Note:  voice to text software may have been used in the creation of this document. Although proof errors in transcription or interpretation are a potential of such software**

## 2024-08-20 NOTE — ASSESSMENT & PLAN NOTE
Noted on echocardiogram 6/10/2024: spherical 1.5 x 1.3 cm thrombus at the LV apex   Initiated on AC with Xarelto however unable to verify insurance coverage, now on Coumadin at 5 mg daily  INR: 2.72 on  8/18  Rechk Monday

## 2024-08-20 NOTE — PROGRESS NOTES
Physical Medicine and Rehabilitation Progress Note  Sunil Patel 63 y.o. male MRN: [id number]  Unit/Bed#: [facility] 451-01 Encounter: [id number]    To Review: Sunil Patel is a 63 y.o. male who  has a past medical history of Acute lower limb ischemia (06/08/2024), Anxiety, Depression, HIV disease (HCC), Substance abuse (HCC), and Suicide attempt (HCC). who [facility] the [facility] for increased LLE swelling and pain and was found to have a left external iliac artery occlusion and acute limb ischemia. 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 Patient had TTE on 6/10/24 showing LV apex thrombus. On 6/11/24 patient had pharmacologic nuclear stress test/SPECT scan which did not show any significant areas of ischemia with EF 40-45% and recommended continuing A/C for LV apical thrombus. His course was complicated by b/l buttock unstageable pressure ulcers, urinary and fecal incontinence, pain, and significant decline in ADLs and mobility. Patient has been continued on Xarelto for anticoagulation, as well as ASA and statin. Patient has a history of TBI, with baseline nonfluent aphasia and forgetfulness. He was evaluated by neuropsychology on 6/27/24, and deemed to not have capacity to make fully informed medical decisions. Therefore, the guardianship process was initiated as of 7/5/24. He was admitted to the [facility] on 7/6.     Chief Complaint: No major events.     Interval History/Subjective:  No acute events overnight. Overall course is stable. Intermittently participates in therapy. Limited by frustration tolerance and impaired insight. Did express to speech yesterday feelings of frustration about not being heard. Told nursing he didn't plan on taking his medications once he  leaves the hospital. Last BM 8/18. Continent of bowel/bladder. Uses condom catheter at night.    ROS:  A 10 point review of systems was negative except for what is noted in the HPI.    Today's Changes:  Can stop checking labs with regularity  INR recheck planned for 8/22. Continue current dosing at 5mg daily  I led and participated in Team Conference today. See dispo below and separate conference note for more details. I concur with the plan of care as determined in Team Conference.  Re-engaging Neuropsychology for cognitive evaluation. Still suspect impairments in insight, but behaviorally and psychologically has been better on this unit. Hopefully patient will participate in evaluation.     Total time spent:  50 minutes, with more than 50% spent counseling/coordinating care. Counseling includes discussion with patient re: progress in therapies, functional issues observed by therapy staff, and discussion with patient his/her current medical state/wellbeing. Coordination of patient's care was performed in conjunction with Internal Medicine service to monitor patient's labs, vitals, and management of their comorbidities. In addition, I lead the interdisciplinary team in weekly case conference today and concur with plan as per team meeting. The care of the patient was extensively discussed with all care providers and an appropriate rehabilitation plan was formulated unique for this patient. Barriers were identified preventing progression of therapy and appropriate interventions were discussed with each discipline. Please see the team note for input from all disciplines regarding barriers, intervention, and discharge planning.      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 Underwent L femoral thrombectomy and AKA with vascular surgery   - Vasc sx follow-up 7/10 - L AKA incision site well-healed, staples taken out  "at bedside  - [facility] will be vendor - Patient now has .  - Monitor for hip flexion/abduction tightness. Stretches in therapy  - Now on Coumadin with hx of LV thrombus and PAOD. Checking INR's as above   - PT/OT/SLP (to work on carry over strategies) 3-5 hours/day, 5-7 days/week.    - Goals are Ind-Sup at a wheelchair level.  - Outpatient f/u with [facility] and Vascular  - Continue patient training with  placement  - Continue gabapentin - doesn't do too much for phantom limb sensation, but that doesn't bother him or cause him pain currently.   - Patient still frustrated given circumstances; will continue gabapentin for anxiety    Patient incapable of making informed decisions  Assessment & Plan  - History of remote TBI and prior strokes with comorbid psychiatric history.  - Evaluated by neuropsychology, Dr. Dilshad Tatum, PhD on 6/27/24, found to have \"diffuse cognitive dysfunction and on a measure assessing awareness of personal health status and ability to evaluate health problems, handle medical emergencies and take safety precautions, patient performed in the IMPAIRED range of functioning. During this encounter, patient does not appear to have capacity to make fully informed medical decisions.\"  - Court-appointed guardianship process has been initiated by acute care CM Katia Kay.    - We have identified two friends who are interested in being patient's guardians and have been involved and invested in patient's care on the [facility].   - They will need to be interviewed by the  involved in this process.    - He has to undergo his hearing on 8/6/2024 first.  -- now rescheduled to 8/27   - He would ultimately benefit from [facility]/nursing home/memory care unit - he does very well with structure and supervision.    8/2- Ongoing discussions with CM, [facility] admin and social work to dispo patient to stable long-term housing. Process continues, with evaluation by therapy managers from " [facility].   8/12- unfortunately have not made much progress with SNF placement per [facility] facilities. Have not heard back from residential community that met with him on 8/6 8/19- Will re-engage neuropsych. He was evaluated on 6/27/24 and felt not to retain capacity. With guardianship set for next week, evaluation is felt to be prudent by our serivice    At risk for constipation  Assessment & Plan  - Stooling adequately recently; did have some incontinence on acute now improved  - Close continent care given buttock wounds  - Last BM 8/18 and continent  - PRN miralax, Senna and suppository    At risk for venous thromboembolism (VTE)  Assessment & Plan  - Fully anticoagulated on warfarin for apical thrombus  - IM managing    History of stroke  Assessment & Plan  - History of old left temporal and parietal lobe cortical infarcts, also involving the insula and left occipital lobe as well as small right posterior parietal lobe. Stable on most recent CT head on 5/16/24.   - ASA, and statin for secondary prevention  - Optimal BP control  - Monitor neuro exam - hx of LV thrombus    - See that entry  - OP neuro follow-up     Bipolar affective disorder (HCC)  Assessment & Plan  Mood acceptable; continue meds as outlined   Supportive counseling  NeuroPsychology consult while in [facility] if available for support  Psych evaluated on 8/12 and deemed him stable for discharge with current regimen as below  Counseled on and continue to encourage deep breathing/relaxation/behavioral management techniques  - Mirtazapine 15 mg qHs  - Sertraline 75 mg daily   - Lamictal 50mg BID  - Depakote ER 1250mg qday   - Outpatient psychiatry follow-up  - Would consult Psych before changing medications    Pressure injury of buttock, stage 3 (HCC)-resolved as of 8/9/2024  Assessment & Plan  Resolved as of 8/7.   - Wound care consulted and following weekly  - POA L buttock pressure injury unstageable has healed.  - POA R buttock pressure injury   evolved from unstageable to Stage 3, and is now resolved.   - Recommend ROHO cushion in chair when out of bed instead   - Preventative hydraguard to bilateral heels BID and PRN.   - P500  - Monitor clinically for breakdown, frequent turns  - reviewed picture of wound today. It is healing well. Continue to monitor    Urinary incontinence-resolved as of 8/16/2024  Assessment & Plan  - Did have some incontinence on acute - improved with timed voids and consistent assistance with his urinal  - Described as urgency  - Marked improvement on timed voids.   - monitor for retention, incontinence (including overflow incontinence), signs/symptoms of UTI  - Timed Voids and PVRs Q4hrs initiated earlier. And PVR <150 x3, so scans discontinued.    - He has some difficulty managing the urinal    - needs assistance but is able to transfer to [facility]    - Still uses condom catheter at night, in part for continence care/to protect his skin given his buttock wounds. However now note that buttocks wound has healed  - Continue timed voids           History of migraine headaches  Assessment & Plan  Chronic issue.  Seemed to worsen with increased irritability after discontinuing gabapentin  Resumed gabapentin  Received [facility] once during stay with middling results  Sleep logs have been good - discontinued.  Headache is on and off    Cardiomyopathy (HCC)  Assessment & Plan  ECHO on 6/10/2024 showed LVEF 40-45% with mild global hypokinesis   Status post NM stress test on 6/11/2024 which showed: a large, mild, fixed defect in the inferior wall, possibly due to diaphragmatic attenuation artifact, there is a small area of partial reversibility in the inferior apical wall suggestive of ischemia    Elevated by cardiology, etiology felt to be possibly secondary to stress-induced cardiomyopathy, with apical thrombus  Cardiac catheterization deferred given the lack of any significant ischemia, and no current cardiac symptoms  Continue with medical  management with aspirin, statin, beta-blocker, ARB  Monitor volume status, remains euvolemic off of diuretics   Outpatient follow-up with cardiology    Traumatic brain injury (HCC)  Assessment & Plan  See neurocog impairment     Carnitine deficiency (HCC)  Assessment & Plan  - Carnitine replacement  - Appreciate medicine management      History of seizures  Assessment & Plan  - Depakote ER, lamotrigine, zonisamide  - Outpatient follow-up with [facility] neurology    Left ventricular thrombus  Assessment & Plan  - Visualized on echocardiogram on 6/10/24: spherical 1.5 x 1.3 cm thrombus at the LV apex.   - Was started on rivaroxaban, but patient does not appear to have insurance coverage for prescriptions   - Xarelto, eliquis, and pradaxa likely cost prohibitive per IM   - 7/29 transitioned to warfarin with lovenox bridge.   - Now off lovenox, and fully anticoagulated on warfarin.   - Management as per IM.   - 8/19 INR 2.72. Continue 5mg daily. Recheck per IM  - Outpatient follow-up with cardiology    Benign essential hypertension  Assessment & Plan  - Toprol, losartan  - Appreciate medicine management    Human immunodeficiency virus (HIV) infection ([facility])  Assessment & Plan  - Continue anti-retroviral treatment  - Appreciate medicine management during [facility] course  - OP ID follow-up         Health Maintenance  #GI Prophylaxis: not indicated  #Code Status: Full code  #FEN: Cardiac diet + Ensure at bfast/dinner  #Dispo: Teams 8/20: ADD TBD. Still working on placement. May need to transfer back to acute care hospital. Guardianship court date is 8/27. Suspect placement even after guardianship will be difficult. Repeating Neuropsych evaluation.   Will need f/u with PMR, PCP, Vascular, Psych      Objective:     Functional Update:  PT: Ind bed mobility, Sup transfers, Ind wheelchair mobility 500', Ind ramp  OT: Ind eating, Ind grooming, Sup bathing, Ind UB dressing, Sup LB dressing, Sup toileting, Sup tub/shower transfer, Sup toilet  transfers   SLP: Expressive and receptive language skill impairment - difficulty with verbal expression, written expression, auditory comprehension, reading comprehension. Still impairments cognitively, but difficult to tease through given speech difficulties. Jignesh comprehension, Sup social interaction, modA expression, memory, and problem solving.     Allergies per EMR    Physical Exam:  Temp:  [97.6 °F (36.4 °C)-98.3 °F (36.8 °C)] 98 °F (36.7 °C)  HR:  [] 102  Resp:  [16-17] 17  BP: (114-145)/(60-86) 145/86  Oxygen Therapy  SpO2: 92 %      Gen: No acute distress, Well-nourished, well-appearing.  HEENT: Moist mucus membranes, Normocephalic/Atraumatic  Heme/Extr: No edema  Pulmonary: Non-labored breathing.   : No fernandes  GI: Soft, non-tender, non-distended.  MSK: L AKA  Integumentary: Skin is warm, dry  Neuro: Awake, and alert. Expressive > receptive aphasia. Impaired insight.   Psych: Normal mood and affect.     Diagnostic Studies: Reviewed, no new imaging    Laboratory:  Reviewed   Results from last 7 days   Lab Units 08/15/24  0604   HEMOGLOBIN g/dL 10.9*   HEMATOCRIT % 37.0   WBC Thousand/uL 6.30     Results from last 7 days   Lab Units 08/15/24  0604   BUN mg/dL 26*   POTASSIUM mmol/L 4.3   CHLORIDE mmol/L 103   CREATININE mg/dL 0.88     Results from last 7 days   Lab Units 08/19/24  0539 08/15/24  0604   PROTIME seconds 28.6* 26.3*   INR  2.72* 2.43*        Patient Active Problem List   Diagnosis    Bipolar affective disorder (HCC)    Substance abuse (HCC)    Human immunodeficiency virus (HIV) infection (HCC)    History of stroke    Benign essential hypertension    Positive laboratory testing for human immunodeficiency virus (HCC)    Hypertension    Unspecified vitamin D deficiency    Tobacco abuse    Cerebrovascular accident (CVA) (HCC)    Left ventricular thrombus    History of seizures    Carnitine deficiency (HCC)    Above-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)    Traumatic brain injury (HCC)     Impaired mobility and activities of daily living    At risk for venous thromboembolism (VTE)    Acute pain    At risk for constipation    Patient incapable of making informed decisions    Adjustment disorder with mixed anxiety and depressed mood    Cardiomyopathy (HCC)    History of migraine headaches    Postoperative anemia         Medications  Current Facility-Administered Medications   Medication Dose Route Frequency Provider Last Rate    acetaminophen  975 mg Oral TID PRN José Salcido MD      aspirin  81 mg Oral Daily Lorrie Barillas PA-C      atorvastatin  80 mg Oral Daily With Dinner Lorrie Barillas PA-C      bictegravir-emtricitab-tenofovir alafenamide  1 tablet Oral Daily With Breakfast Lorrie Barillas PA-C      bisacodyl  10 mg Rectal Daily PRN Kenny Castro MD      diphenhydrAMINE  25 mg Oral Q6H PRN Lorrie Barillas PA-C      divalproex sodium  1,250 mg Oral Daily Lorrie Barillas PA-C      dolutegravir  50 mg Oral Daily Lorrie Barillas PA-C      gabapentin  100 mg Oral Q8H Ashley Depadua, MD      lamoTRIgine  50 mg Oral BID Lorrie Barillas PA-C      levOCARNitine  1,000 mg/day Oral TID With Meals Lorrie Barillas PA-C      losartan  50 mg Oral Daily Lorrie Barillas PA-C      melatonin  6 mg Oral HS Lorrie Barillas PA-C      metoprolol succinate  25 mg Oral Daily CHARLIE Mcclelland      mirtazapine  15 mg Oral HS Lorrie Barillas PA-C      ondansetron  4 mg Oral Q6H PRN Lorrie Barillas PA-C      polyethylene glycol  17 g Oral Daily PRN Lorrie Barillas PA-C      senna  1 tablet Oral HS PRN Kenny Castro MD      sertraline  75 mg Oral Daily Lorrie Barillas PA-C      tamsulosin  0.4 mg Oral Daily With Dinner Lorrie Barillas PA-C      warfarin  5 mg Oral Daily (warfarin) CHARLIE Plata      zonisamide  400 mg Oral Daily Lorrie Arcadio-Mariano, PA-C            ** Please Note: Fluency Direct voice  to text software may have been used in the creation of this document. **

## 2024-08-20 NOTE — PROGRESS NOTES
08/20/24 1240   Pain Assessment   Pain Assessment Tool 0-10   Pain Score No Pain   Restrictions/Precautions   Precautions Aphasia;Bed/chair alarms;Cognitive;Fall Risk;Supervision on toilet/commode   Comprehension   Comprehension (FIM) 4 - Understands basic info/conversation 75-90% of time   Expression   Expression (FIM) 3 - Expresses basic info/needs 50-74% of time   Social Interaction   Social Interaction (FIM) 5 - Requires redirection but less than 10% of the time.   Problem Solving   Problem solving (FIM) 3 - Solves basic problmes 50-74% of time   Memory   Memory (FIM) 3 - Recognizes, recalls/performs 50-74%   Speech/Language/Cognition Assessment   Treatment Assessment Pt participated in skilled SLP session focusing on language skills with pt's friend, Joanna, present for session. At beginning of session, pt initiated using tablet for communication. SLP did review current limitations of the apps which are downloaded at this time as pt has completed all free versions and they now require a cost. SLP did encourage that pt could still utilize his tablet to continue current language drills. SLP then introduced semantic feature analysis and provided pt with a visual aid, as well as a description of the therapy technique/strategy and how it supports verbal expression. Presented with pictures of common objects, pt independently named 11/23 pictures. For pictures in which pt was unable to spontaneously name, SLP facilitated use of SFA to increased word retrieval. After trialing across many objects, suspect that pt's listening comprehension skills was a barrier into pt's full understanding of strategy. Pt frequently used semantic and literal paraphasias while also gesturing the use of each object but often unable to express name of object. When changed to types of cuing, pt most significantly benefited from phrase completion cues and initial phonemic cues. Verbal and semantic cues were also utilized but more  consistently, word finding improved with phonemic and phrase completion. During task, pt's friend Donna, also provided support to pt. SLP then educated Donna on the use of the cuing hierarchy from least to greatest support. Educated on types of cues/prompting to include verba, semantic, phrase completion, phonemic, written and direct verbal cues. Provided education on communication strategies such as allowing for wait time for pt to convey messages, use of prompting, encouraging additional language opportunities (reviewed examples), encouraging use of multi-modality expression if needed and using clear, concise language. At end of session pt and pt's friend did not have any questions. Pt presented with overall improvement in mood in comparison to yesterday, noting pt to joke with SLP and friend and engaged in appropriate social turn-taking. However, during informal convo, pt continues to present with decreased insight to overall insight and deficits. At this time, pt will benefit from ongoing skilled SLP services to continue to support pt in building a more functional communication modality to improve effectiveness of communication and to ease frustrations with communication attempts.   SLP Therapy Minutes   SLP Time In 1240   SLP Time Out 1310   SLP Total Time (minutes) 30   SLP Mode of treatment - Individual (minutes) 30   SLP Mode of treatment - Concurrent (minutes) 0   SLP Mode of treatment - Group (minutes) 0   SLP Mode of treatment - Co-treat (minutes) 0   SLP Mode of Treatment - Total time(minutes) 30 minutes   SLP Cumulative Minutes 370   Therapy Time missed   Time missed? No

## 2024-08-20 NOTE — PROGRESS NOTES
08/20/24 1330   Therapy Time missed   Time missed? Yes   Amount of time missed 60   Reason for time missed   (MD consult/assessment)   Time(s) multiple attempts made 13:30, 13:45

## 2024-08-20 NOTE — ASSESSMENT & PLAN NOTE
ECHO 6/10/2024 = LVEF 40-45% with mild global hypokinesis   Status post NM stress test 6/11/2024 = large, mild, fixed defect in the inferior wall, possibly due to diaphragmatic attenuation artifact, there is a small area of partial reversibility in the inferior apical wall suggestive of ischemia    Evaluated by cardiology.  Etiology felt to be possibly secondary to stress-induced cardiomyopathy, with apical thrombus  Cardiac catheterization deferred given the lack of any significant ischemia, and no current cardiac symptoms  Continue with medical management with aspirin, statin, beta-blocker, ARB  Remains euvolemic off of diuretics, continue to monitor volume status   Outpatient follow-up with cardiology, previously followed with Dr. Gumaro Aguila [facility]

## 2024-08-20 NOTE — PROGRESS NOTES
"   08/20/24 0830   Pain Assessment   Pain Assessment Tool 0-10   Pain Score No Pain   Restrictions/Precautions   Precautions Aphasia;Bed/chair alarms;Cognitive;Fall Risk;Supervision on toilet/commode  (LLE residual limb )   Weight Bearing Restrictions Yes   LLE Weight Bearing Per Order NWB   ROM Restrictions No   Braces or Orthoses Other (Comment)  (LLE residual limb )   Lifestyle   Autonomy \"Now? I'm supposed to get a shower.\" Therapist explains OT schedule for the day and pt willing to participate.   Eating   Type of Assistance Needed Independent   Physical Assistance Level No physical assistance   Comment finishing breakfast seated in recliner   Eating CARE Score 6   Grooming   Findings Hand hygiene I after s/u seated in w/c at sink.   Bed-Chair Transfer   Type of Assistance Needed Supervision   Physical Assistance Level No physical assistance   Comment sit pivot transfers   Chair/Bed-to-Chair Transfer CARE Score 4   Toileting Hygiene   Type of Assistance Needed Supervision   Physical Assistance Level No physical assistance   Comment only has hospital gown on, completes buttocks hygiene in partial stance with use of grab bars with rest of toileting completed seated on platform [facility]   Toileting Hygiene CARE Score 4   Toilet Transfer   Type of Assistance Needed Supervision   Physical Assistance Level No physical assistance   Comment sit pivot w/c <-> platform BSC over toilet   Toilet Transfer CARE Score 4   Therapeutic Excerise-Strength   UE Strength Yes   Right Upper Extremity- Strength   RUE Strength Comment Seated, BUE strengthening ex completed utilizing 4lb dowel bar 3 sets x10 reps for bicep curls, chest presses, and prograde/retrograde arm circles. Ther ex completed to promote strength and activity tolerance for carryover into ADLs/functional transfers.   Left Upper Extremity-Strength   LUE Strength Comment see above   Cognition   Overall Cognitive Status Impaired   Arousal/Participation " Alert;Cooperative   Attention Attends with cues to redirect   Memory Decreased short term memory;Decreased recall of recent events;Decreased recall of precautions   Following Commands Follows one step commands with increased time or repetition   Additional Activities   Additional Activities Other (Comment)   Additional Activities Comments Patient completes w/c mobility I to/from bathroom.   Activity Tolerance   Activity Tolerance Patient tolerated treatment well   Medical Staff Made Aware RN Vy made aware that pt had large formed BM and pt reports he feels like he is getting too much stool softener.   Assessment   Treatment Assessment Patient engaged in quick 30 min treatment session with focus on toileting, sit pivot transfers, and BUE strengthening as tolerated. Pt completes toileting and sit pivot transfers with S. Pt more agreeable to participate on this date. Continue OT plan of care with focus on ADL retraining, lateral WSing for toileting, functional transfers, strengthening, sacral offloading, core strengthening, phase 1 amp edu, and d/c planning.   Prognosis Good   Problem List Decreased strength;Decreased endurance;Impaired balance;Decreased mobility;Decreased cognition;Impaired judgement;Decreased safety awareness;Orthopedic restrictions   Barriers to Discharge Inaccessible home environment;Decreased caregiver support   Plan   Treatment/Interventions ADL retraining;Functional transfer training;Therapeutic exercise;Endurance training;Cognitive reorientation;Patient/family training;Equipment eval/education;Bed mobility;Compensatory technique education;Spoke to nursing   Progress Progressing toward goals   Discharge Recommendation   Rehab Resource Intensity Level, OT   (pending DC planning w/guardianship and placement)   OT Therapy Minutes   OT Time In 0830   OT Time Out 0900   OT Total Time (minutes) 30   OT Mode of treatment - Individual (minutes) 30   OT Mode of treatment - Concurrent (minutes) 0   OT  Mode of treatment - Group (minutes) 0   OT Mode of treatment - Co-treat (minutes) 0   OT Mode of Treatment - Total time(minutes) 30 minutes   OT Cumulative Minutes 1878   Therapy Time missed   Time missed? No

## 2024-08-20 NOTE — PLAN OF CARE
Problem: SAFETY ADULT  Goal: Patient will remain free of falls  Description: INTERVENTIONS:  - Educate patient/family on patient safety including physical limitations  - Instruct patient to call for assistance with activity   - Consult OT/PT to assist with strengthening/mobility   - Keep Call bell within reach  - Keep bed low and locked with side rails adjusted as appropriate  - Keep care items and personal belongings within reach  - Initiate and maintain comfort rounds  - Make Fall Risk Sign visible to staff  - Offer Toileting every 2 Hours, in advance of need  - Initiate/Maintain alarm  - Obtain necessary fall risk management equipment:   - Apply yellow socks and bracelet for high fall risk patients  - Consider moving patient to room near nurses station  Outcome: Progressing     Problem: DISCHARGE PLANNING  Goal: Discharge to home or other facility with appropriate resources  Description: INTERVENTIONS:  - Identify barriers to discharge w/patient and caregiver  - Arrange for needed discharge resources and transportation as appropriate  - Identify discharge learning needs (meds, wound care, etc.)  - Arrange for interpretive services to assist at discharge as needed  - Refer to Case Management Department for coordinating discharge planning if the patient needs post-hospital services based on physician/advanced practitioner order or complex needs related to functional status, cognitive ability, or social support system  Outcome: Progressing

## 2024-08-20 NOTE — PROGRESS NOTES
"   08/20/24 1030   Pain Assessment   Pain Assessment Tool 0-10   Pain Score No Pain   Restrictions/Precautions   Precautions Aphasia;Bed/chair alarms;Cognitive;Fall Risk;Pain;Supervision on toilet/commode  (LLE residual limb )   Weight Bearing Restrictions Yes   LLE Weight Bearing Per Order NWB   ROM Restrictions No   Braces or Orthoses Other (Comment)  (LLE residual limb )   Lifestyle   Autonomy \"Hey lady, thank you for that\" referring to shower.   Oral Hygiene   Type of Assistance Needed Independent   Physical Assistance Level No physical assistance   Comment seated in w/c at sink   Oral Hygiene CARE Score 6   Grooming   Findings pt competes grooming tasks of shaving and washing hair using shower cap seated in w/c at sink IND   Shower/Bathe Self   Type of Assistance Needed Supervision   Physical Assistance Level No physical assistance   Comment pt completes full shower routine seated/standing with pt able to bathe all parts. stands with reliance on GB for bathing anthony/rear despite max VCs/edu for sitting and weight shifting. pt noted to become slightly irritable when edu on weight shifting as pt prefers to stand despite fall risk. pt also gets slightly irritated when attempting to perform any phsical assistance and prefers his privacy.   Shower/Bathe Self CARE Score 4   Upper Body Dressing   Type of Assistance Needed Set-up / clean-up   Physical Assistance Level No physical assistance   Comment seated   Upper Body Dressing CARE Score 5   Lower Body Dressing   Type of Assistance Needed Physical assistance   Physical Assistance Level 25% or less   Comment in supine; pt able to don  with slight assist to ensure fully up towards groin region as pt prefers. pt able to then don pants with supervision. A to don tab brief however anticipate that if pt were wearing depends or reg underwear, could complete at supervision level as well.   Lower Body Dressing CARE Score 3   Putting On/Taking Off " Footwear   Type of Assistance Needed Set-up / clean-up   Physical Assistance Level No physical assistance   Comment seated to don R sock and sneaker   Putting On/Taking Off Footwear CARE Score 5   Bed-Chair Transfer   Type of Assistance Needed Supervision;Verbal cues   Physical Assistance Level No physical assistance   Comment sit pivot; one VC provided for locking w/c brakes as pt attempted to transfer with brakes unlocked.   Chair/Bed-to-Chair Transfer CARE Score 4   Toileting Hygiene   Type of Assistance Needed Supervision   Physical Assistance Level No physical assistance   Comment in stance with use of urinal   Toileting Hygiene CARE Score 4   Cognition   Overall Cognitive Status Impaired   Arousal/Participation Alert;Cooperative   Attention Attends with cues to redirect   Orientation Level Oriented X4   Memory Decreased short term memory;Decreased recall of recent events;Decreased recall of precautions   Following Commands Follows one step commands with increased time or repetition   Activity Tolerance   Activity Tolerance Patient tolerated treatment well   Assessment   Treatment Assessment pt engages in 80 minute skilled OT Session focusing on ADL retraining, func transfers. see above for full func details. pt continues to demo overall supervision level for safety during transfers as pt attempted to complete a sit pivot from w/c to bed with brake unlocked requiring VCs for safety. supervision provided when in shower for safety reasons as pt prefers to stand vs weight shifting when bathing anthony/rear. was very cooperative and agreeable to OT Session this date, grateful for shower. continue OT POC to focus on UE NMR, func cog/safety, ADL retraining, func transfers, in order to decrease burden of care at d/c.   Prognosis Good   Problem List Decreased strength;Decreased endurance;Impaired balance;Decreased mobility;Decreased cognition;Impaired judgement;Decreased safety awareness;Orthopedic restrictions   Plan    Treatment/Interventions ADL retraining;Functional transfer training;Therapeutic exercise;Endurance training;Cognitive reorientation;Patient/family training;Equipment eval/education;Compensatory technique education   OT Therapy Minutes   OT Time In 1030   OT Time Out 1150   OT Total Time (minutes) 80   OT Mode of treatment - Individual (minutes) 80   OT Mode of treatment - Concurrent (minutes) 0   OT Mode of treatment - Group (minutes) 0   OT Mode of treatment - Co-treat (minutes) 0   OT Mode of Treatment - Total time(minutes) 80 minutes   OT Cumulative Minutes 1958   Therapy Time missed   Time missed? No

## 2024-08-21 PROCEDURE — 97530 THERAPEUTIC ACTIVITIES: CPT

## 2024-08-21 PROCEDURE — 97535 SELF CARE MNGMENT TRAINING: CPT

## 2024-08-21 PROCEDURE — 97110 THERAPEUTIC EXERCISES: CPT

## 2024-08-21 PROCEDURE — 99232 SBSQ HOSP IP/OBS MODERATE 35: CPT | Performed by: PHYSICAL MEDICINE & REHABILITATION

## 2024-08-21 PROCEDURE — 99231 SBSQ HOSP IP/OBS SF/LOW 25: CPT

## 2024-08-21 RX ADMIN — GABAPENTIN 100 MG: 100 CAPSULE ORAL at 06:22

## 2024-08-21 RX ADMIN — DOLUTEGRAVIR SODIUM 50 MG: 50 TABLET, FILM COATED ORAL at 09:59

## 2024-08-21 RX ADMIN — LOSARTAN POTASSIUM 50 MG: 50 TABLET, FILM COATED ORAL at 09:57

## 2024-08-21 RX ADMIN — ASPIRIN 81 MG: 81 TABLET, COATED ORAL at 09:55

## 2024-08-21 RX ADMIN — MIRTAZAPINE 15 MG: 15 TABLET, FILM COATED ORAL at 22:02

## 2024-08-21 RX ADMIN — LEVOCARNITINE 330 MG: 1 SOLUTION ORAL at 14:02

## 2024-08-21 RX ADMIN — ATORVASTATIN CALCIUM 80 MG: 80 TABLET, FILM COATED ORAL at 17:19

## 2024-08-21 RX ADMIN — DIVALPROEX SODIUM 1250 MG: 500 TABLET, EXTENDED RELEASE ORAL at 09:57

## 2024-08-21 RX ADMIN — WARFARIN SODIUM 5 MG: 5 TABLET ORAL at 17:19

## 2024-08-21 RX ADMIN — LAMOTRIGINE 50 MG: 25 TABLET ORAL at 17:19

## 2024-08-21 RX ADMIN — BICTEGRAVIR SODIUM, EMTRICITABINE, AND TENOFOVIR ALAFENAMIDE FUMARATE 1 TABLET: 50; 200; 25 TABLET ORAL at 09:59

## 2024-08-21 RX ADMIN — LEVOCARNITINE 330 MG: 1 SOLUTION ORAL at 17:19

## 2024-08-21 RX ADMIN — GABAPENTIN 100 MG: 100 CAPSULE ORAL at 14:02

## 2024-08-21 RX ADMIN — LAMOTRIGINE 50 MG: 25 TABLET ORAL at 09:55

## 2024-08-21 RX ADMIN — GABAPENTIN 100 MG: 100 CAPSULE ORAL at 22:02

## 2024-08-21 RX ADMIN — METOPROLOL SUCCINATE 25 MG: 25 TABLET, EXTENDED RELEASE ORAL at 09:56

## 2024-08-21 RX ADMIN — Medication 6 MG: at 22:02

## 2024-08-21 RX ADMIN — TAMSULOSIN HYDROCHLORIDE 0.4 MG: 0.4 CAPSULE ORAL at 17:19

## 2024-08-21 RX ADMIN — SERTRALINE HYDROCHLORIDE 75 MG: 50 TABLET ORAL at 09:57

## 2024-08-21 RX ADMIN — LEVOCARNITINE 330 MG: 1 SOLUTION ORAL at 09:58

## 2024-08-21 RX ADMIN — ZONISAMIDE 400 MG: 100 CAPSULE ORAL at 09:59

## 2024-08-21 NOTE — ASSESSMENT & PLAN NOTE
ECHO 6/10/2024 = LVEF 40-45% with mild global hypokinesis   Status post NM stress test 6/11/2024 = large, mild, fixed defect in the inferior wall, possibly due to diaphragmatic attenuation artifact, there is a small area of partial reversibility in the inferior apical wall suggestive of ischemia    Evaluated by cardiology.  Etiology felt to be possibly secondary to stress-induced cardiomyopathy, with apical thrombus  Cardiac catheterization deferred given the lack of any significant ischemia, and no current cardiac symptoms  Continue with medical management with aspirin, statin, beta-blocker, ARB  Remains euvolemic off of diuretics, continue to monitor volume status   Outpatient follow-up with cardiology, previously followed with Dr. Gumaro [facility]

## 2024-08-21 NOTE — PROGRESS NOTES
[facility]  Progress Note  Name: Sunil Patel I  MRN: [id number]  Unit/Bed#: -01 I Date of Admission: 7/6/2024   Date of Service: 8/21/2024 I Hospital Day: 46    Assessment & Plan   * Above-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)  Assessment & Plan  Presented with left lower extremity acute limb ischemia/extensive left iliofemoral and left infrainguinal embolism requiring left femoral thrombectomy and subsequent AKA on 6/7/24 with vascular surgery.  Continue with local wound care   Continue ASA, Coumadin and statin   Stable for discharge from PMR and medicine standpoint.  Dispo planning as per case management.  Patient is awaiting guardianship hearing, which is scheduled for 8/27/24.    Postoperative anemia  Assessment & Plan  Normocytic  PTA hemoglobin was normal, now has been running 10-11 since admission  No signs or symptoms of active bleeding  Iron panel reviewed, no evidence of TY  Likely postoperative ABLA  Monitor as needed      History of migraine headaches  Assessment & Plan  Continue PTA meds Depakote, gabapentin, zonegran and lamictal all of which can help in prevention  Unable to take triptans due to a history of stroke  PRN Tylenol     Cardiomyopathy (HCC)  Assessment & Plan  ECHO 6/10/2024 = LVEF 40-45% with mild global hypokinesis   Status post NM stress test 6/11/2024 = large, mild, fixed defect in the inferior wall, possibly due to diaphragmatic attenuation artifact, there is a small area of partial reversibility in the inferior apical wall suggestive of ischemia    Evaluated by cardiology.  Etiology felt to be possibly secondary to stress-induced cardiomyopathy, with apical thrombus  Cardiac catheterization deferred given the lack of any significant ischemia, and no current cardiac symptoms  Continue with medical management with aspirin, statin, beta-blocker, ARB  Remains euvolemic off of diuretics, continue to monitor volume status    Outpatient follow-up with cardiology, previously followed with Dr. Gumaro [facility]    Patient incapable of making informed decisions  Assessment & Plan  Seen by neuropsych on 6/27 and was deemed NOT to have medical decision making capacity  Guardianship hearing scheduled for 8/27    At risk for constipation  Assessment & Plan  Management per PMR    At risk for venous thromboembolism (VTE)  Assessment & Plan  On systemic AC in the setting of LV thrombus     Traumatic brain injury (HCC)  Assessment & Plan  Remote history of TBI, previously residing in a group home prior to half-way sentence.  Evaluated by neuropsychiatry on 6/27/24 and deemed NOT to have medical decision-making capacity  Neuropsychology to re-evaluate pt.  Process for court appointed guardian started on 7/5/24 - CM following   Guardianship hearing 8/27/24    Carnitine deficiency (HCC)  Assessment & Plan  Continue levocarnitine 330 mg 3x daily with meals.    History of seizures  Assessment & Plan  Diagnosed in July 2019, follows with [facility] neurology outpatient  Continue home regimen with Depakote ER, Lamotrigine and Zonegran    Left ventricular thrombus  Assessment & Plan  Noted on echocardiogram 6/10/2024: spherical 1.5 x 1.3 cm thrombus at the LV apex   Initiated on AC with Xarelto however unable to verify insurance coverage, now on Coumadin at 5 mg daily  INR: 2.72 on  8/18  Rechk Thursday    Benign essential hypertension  Assessment & Plan  Blood pressures acceptable on current regimen including Losartan 50 mg daily, Toprol-XL 25 mg daily    History of stroke  Assessment & Plan  History of left MCA CVA in May 2018 with residual expressive aphasia  Continue ASA and atorvastatin    Human immunodeficiency virus (HIV) infection ([facility])  Assessment & Plan  Continue Biktarvy and Tivicay.    Bipolar affective disorder (HCC)  Assessment & Plan  Psychiatry evaluated most recently on 8/12 and cleared for discharge to rehab   Continue home meds Zoloft and  Anamika             The above assessment and plan was reviewed and updated as determined by my evaluation of the patient on 8/21/2024.    Labs:   Results from last 7 days   Lab Units 08/15/24  0604   WBC Thousand/uL 6.30   HEMOGLOBIN g/dL 10.9*   HEMATOCRIT % 37.0   PLATELETS Thousands/uL 362     Results from last 7 days   Lab Units 08/15/24  0604   SODIUM mmol/L 138   POTASSIUM mmol/L 4.3   CHLORIDE mmol/L 103   CO2 mmol/L 27   BUN mg/dL 26*   CREATININE mg/dL 0.88   CALCIUM mg/dL 9.2         Results from last 7 days   Lab Units 08/19/24  0539 08/15/24  0604   INR  2.72* 2.43*           Imaging  No orders to display       Review of Scheduled Meds:  Current Facility-Administered Medications   Medication Dose Route Frequency Provider Last Rate    acetaminophen  975 mg Oral TID PRN José Salcido MD      aspirin  81 mg Oral Daily Lorrie Barillas PA-C      atorvastatin  80 mg Oral Daily With Dinner Lorrie Barillas PA-C      bictegravir-emtricitab-tenofovir alafenamide  1 tablet Oral Daily With Breakfast Lorrie Barillas PA-C      bisacodyl  10 mg Rectal Daily PRN Kenny Castro MD      diphenhydrAMINE  25 mg Oral Q6H PRN Lorrie Barillas PA-C      divalproex sodium  1,250 mg Oral Daily Lorrie Barillas PA-C      dolutegravir  50 mg Oral Daily Lorrie Barillas PA-C      gabapentin  100 mg Oral Q8H Ashley Depadua, MD      lamoTRIgine  50 mg Oral BID Lorrie Barillas PA-C      levOCARNitine  1,000 mg/day Oral TID With Meals Lorrie Barillas PA-C      losartan  50 mg Oral Daily Lorrie Barillas PA-C      melatonin  6 mg Oral HS Lorrie Barillas PA-C      metoprolol succinate  25 mg Oral Daily CHARLIE Mcclelland      mirtazapine  15 mg Oral HS Lorrie Barillas PA-C      ondansetron  4 mg Oral Q6H PRN Lorrie Barillas PA-C      polyethylene glycol  17 g Oral Daily PRN Lorrie Barillas PA-C      senna  1 tablet Oral HS PRN Kenny Castro MD       sertraline  75 mg Oral Daily Lorrie Barillas PA-C      tamsulosin  0.4 mg Oral Daily With Dinner Lorrie Barillas PA-C      warfarin  5 mg Oral Daily (warfarin) CHARLIE Plata      zonisamide  400 mg Oral Daily Lorrie Barillas PA-C         Subjective/ HPI: Patient seen and examined. Patients overnight issues or events were reviewed with nursing staff. New or overnight issues include the following:     Patient reports to be feeling well today.  He is in good spirits and offers no complaints at this time.  He is eagerly awaiting therapy as well as listening to his music and watching YouTube on tablet at bedside.  He denies any pain and reports no problems with bowel movements and is overall eating well.    ROS:   A 10 point ROS was performed; negative except as noted above.        *Labs /Radiology studies Reviewed  *Medications  reviewed and reconciled as needed  *Please refer to order section for additional ordered labs studies      Physical Examination:  Vitals:   Vitals:    08/20/24 1422 08/20/24 2030 08/21/24 0609 08/21/24 0954   BP: 136/71 133/70 126/68 127/69   BP Location: Left arm Left arm Right arm Left arm   Pulse: 90 92 88 95   Resp: 14 20 20    Temp:  98.1 °F (36.7 °C) 97.6 °F (36.4 °C)    TempSrc:  Oral Oral    SpO2: 97% 95% 94%    Weight:       Height:           General Appearance: NAD; pleasant  HEENT: PERRLA, conjuctiva normal; mucous membranes moist; face symmetrical  Neck:  Supple  Lungs: clear bilaterally, normal respiratory effort, no retractions, expiratory effort normal, on room air  CV: regular rate and rhythm, no murmurs rubs or gallops noted   ABD: soft non tender, +BS x4  EXT: DP pulses intact, no lymphadenopathy, no edema  Skin: normal turgor, normal texture, no rash  Psych: affect normal, mood normal  Neuro: AAOx3       The above physical exam was reviewed and updated as determined by my evaluation of the patient on 8/21/2024.    Invasive Devices       Drain   Duration             External Urinary Catheter 20 days                       VTE Pharmacologic Prophylaxis: Warfarin (Coumadin)  Code Status: Level 1 - Full Code  Current Length of Stay: 46 day(s)    Total floor / unit time spent today 20 minutes  Coordination of patient's care was performed in conjunction with consulting services. Time invested included review of patient's labs, vitals, and management of their comorbidities with continued monitoring, examination of patient as well as answering patient questions, documenting her findings and creating progress note in electronic medical record,  ordering appropriate diagnostic testing.       ** Please Note:  voice to text software may have been used in the creation of this document. Although proof errors in transcription or interpretation are a potential of such software**

## 2024-08-21 NOTE — PROGRESS NOTES
08/21/24 1000   Pain Assessment   Pain Assessment Tool 0-10   Restrictions/Precautions   Precautions Aphasia;Bed/chair alarms;Cognitive;Fall Risk;Supervision on toilet/commode   LLE Weight Bearing Per Order NWB   Braces or Orthoses Other (Comment)  (LE residual limb )   Subjective   Subjective Pt reports crying a little and needs time this sestion about 30 min   Sit to Stand   Type of Assistance Needed Supervision   Sit to Stand CARE Score 4   Bed-Chair Transfer   Type of Assistance Needed Supervision   Comment sit pivot   Chair/Bed-to-Chair Transfer CARE Score 4   Walk 10 Feet   Reason if not Attempted Medical concerns   Walk 10 Feet CARE Score 88   Walk 50 Feet with Two Turns   Reason if not Attempted Medical concerns   Walk 50 Feet with Two Turns CARE Score 88   Walk 150 Feet   Reason if not Attempted Medical concerns   Walk 150 Feet CARE Score 88   Ambulation   Primary Mode of Locomotion Prior to Admission Walk   Does the patient walk? 0. No, and walking goal is not clinically indicated.   Wheel 50 Feet with Two Turns   Type of Assistance Needed Independent   Wheel 50 Feet with Two Turns CARE Score 6   Wheel 150 Feet   Type of Assistance Needed Independent   Wheel 150 Feet CARE Score 6   Wheelchair mobility   Does the patient use a wheelchair? 1. Yes   Type of Wheelchair Used 1. Manual   Method Right upper extremity;Left upper extremity   Curb or Single Stair   Reason if not Attempted Safety concerns   1 Step (Curb) CARE Score 88   4 Steps   Reason if not Attempted Medical concerns   4 Steps CARE Score 88   Therapeutic Interventions   Strengthening core strengthening with 5 # dowel ball toss   Flexibility extension stretch RLE   Equipment Use   NuStep lvl 3 for 12 min   Assessment   Treatment Assessment pt engaged in skilled PT for 60 min and cont to focus overall safety percaution with WC set up for sit pivot transfres at S to Setup with WC transfers , pt Indep with WC propl  at this time , and d/t  pt poor insight of his deficits pt not fully indep with all transfres . pt will cont t working on these barriers to meet DC goals   Barriers to Discharge Inaccessible home environment;Decreased caregiver support   Plan   Progress Progressing toward goals   PT Therapy Minutes   PT Time In 1030   PT Time Out 1130   PT Total Time (minutes) 60   PT Mode of treatment - Individual (minutes) 60   PT Mode of treatment - Concurrent (minutes) 0   PT Mode of treatment - Group (minutes) 0   PT Mode of treatment - Co-treat (minutes) 0   PT Mode of Treatment - Total time(minutes) 60 minutes   PT Cumulative Minutes 1868   Therapy Time missed   Time missed? Yes   Amount of time missed 30   Reason for time missed Illness  (cyring a lot)   Time(s) multiple attempts made 10,1030

## 2024-08-21 NOTE — PROGRESS NOTES
Physical Medicine and Rehabilitation Progress Note  Sunil Patel 63 y.o. male MRN: [id number]  Unit/Bed#: [facility] 451-01 Encounter: [id number]    To Review: Sunil Patel is a 63 y.o. male who  has a past medical history of Acute lower limb ischemia (06/08/2024), Anxiety, Depression, HIV disease (HCC), Substance abuse (HCC), and Suicide attempt (HCC). who presented to the [facility] on 6/7/24 from [facility] for increased LLE swelling and pain and was found to have a left external iliac artery occlusion and acute limb ischemia. 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 Patient had TTE on 6/10/24 showing LV apex thrombus. On 6/11/24 patient had pharmacologic nuclear stress test/SPECT scan which did not show any significant areas of ischemia with EF 40-45% and recommended continuing A/C for LV apical thrombus. His course was complicated by b/l buttock unstageable pressure ulcers, urinary and fecal incontinence, pain, and significant decline in ADLs and mobility. Patient has been continued on Xarelto for anticoagulation, as well as ASA and statin. Patient has a history of TBI, with baseline nonfluent aphasia and forgetfulness. He was evaluated by neuropsychology on 6/27/24, and deemed to not have capacity to make fully informed medical decisions. Therefore, the guardianship process was initiated as of 7/5/24. He was admitted to the [facility] on 7/6.     Chief Complaint: No major events overnight    Interval History/Subjective:  No acute events overnight. Last BM 8/20. No new pain or headaches today. Sleep is fine. Continent of bowel/bladder.     ROS:  A 10 point review of systems was negative except for what is noted in the HPI.    Today's Changes:  INR in the AM. If stable, would advocate for checking  "maybe once weekly.  Reviewed Neuropsychology re-evaluation. Patient still without capacity to make complex medical decisions. He does lack the attention, frustration tolerance, executive function, recall, problem solving, and reasoning necessary to make medical decisions for himself.  Dispo planning to continue. At this point he is appropriate for skilled nursing facility level of care.    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 Underwent L femoral thrombectomy and AKA with vascular surgery   - [facility] sx follow-up 7/10 - L AKA incision site well-healed, staples taken out at bedside  - [facility] will be vendor - Patient now has .  - Monitor for hip flexion/abduction tightness. Stretches in therapy.  - Phantom limb sensation - not painful, well controlled.  - Now on Coumadin with hx of LV thrombus and PAOD. Checking INR's as above   - PT/OT/SLP (to work on carry over strategies) 3-5 hours/day, 5-7 days/week.    - Patient now refusing at times   - Has met rehab goals at this point.   - Outpatient f/u with [facility] and Vascular      Patient incapable of making informed decisions  Assessment & Plan  - History of remote TBI and prior strokes with comorbid psychiatric history.  - Evaluated by neuropsychology, Dr. Dilshad Tatum, PhD on 6/27/24, found to have \"diffuse cognitive dysfunction and on a measure assessing awareness of personal health status and ability to evaluate health problems, handle medical emergencies and take safety precautions, patient performed in the IMPAIRED range of functioning. During this encounter, patient does not appear to have capacity to make fully informed medical decisions.\"  - Court-appointed guardianship process has been initiated by acute care CM Katia Kay.    - We have identified two friends who are interested in being patient's guardians and have been " involved and invested in patient's care on the ARC.   - They will need to be interviewed by the  involved in this process.    - He has to undergo his hearing on 8/6/2024 first.  -- now rescheduled to 8/27   - He would ultimately benefit from [facility]/nursing home/memory care unit - he does very well with structure and supervision.      At risk for constipation  Assessment & Plan  - Incontinent during acute care hospitalization  - Has improved/resolved with improved mobility.  - Now off scheduled bowel regimen.   - Last BM 8/20 and continent. Not on a regimen  - PRN miralax, Senna and suppository    At risk for venous thromboembolism (VTE)  Assessment & Plan  - Fully anticoagulated on warfarin for apical thrombus    History of stroke  Assessment & Plan  - History of old left temporal and parietal lobe cortical infarcts, also involving the insula and left occipital lobe as well as small right posterior parietal lobe. Stable on most recent CT head on 5/16/24.   - ASA, and statin for secondary prevention  - Optimal BP control  - Monitor neuro exam - hx of LV thrombus    - See that entry  - OP neuro follow-up     Bipolar affective disorder (HCC)  Assessment & Plan  Mood acceptable; continue meds as outlined   Supportive counseling  NeuroPsychology consult while in [facility] if available for support  Psych evaluated on 8/12 and deemed him stable for discharge with current regimen as below  Counseled on and continue to encourage deep breathing/relaxation/behavioral management techniques  - Mirtazapine 15 mg qHs  - Sertraline 75 mg daily   - Lamictal 50mg BID  - Depakote ER 1250mg qday   - Outpatient psychiatry follow-up  - Would consult Psych before changing medications    Pressure injury of buttock, stage 3 (HCC)-resolved as of 8/9/2024  Assessment & Plan  Resolved as of 8/7.   - Wound care consulted and following weekly  - POA L buttock pressure injury unstageable has healed.  - POA R buttock pressure injury  evolved from  unstageable to Stage 3, and is now resolved.   - Recommend ROHO cushion in chair when out of bed instead   - Preventative hydraguard to bilateral heels BID and PRN.   - P500  - Monitor clinically for breakdown, frequent turns  - reviewed picture of wound today. It is healing well. Continue to monitor    Urinary incontinence-resolved as of 8/16/2024  Assessment & Plan  - Did have some incontinence on acute - improved with timed voids and consistent assistance with his urinal  - Described as urgency  - Marked improvement on timed voids.   - monitor for retention, incontinence (including overflow incontinence), signs/symptoms of UTI  - Timed Voids and PVRs Q4hrs initiated earlier. And PVR <150 x3, so scans discontinued.    - He has some difficulty managing the urinal    - needs assistance but is able to transfer to [facility]    - Still uses condom catheter at night, in part for continence care/to protect his skin given his buttock wounds. However now note that buttocks wound has healed  - Continue timed voids           History of migraine headaches  Assessment & Plan  Chronic issue.  Seemed to worsen with increased irritability after discontinuing gabapentin  Resumed gabapentin  Received [facility] once during stay with middling results  Sleep logs have been good - discontinued.  Headache is on and off    Cardiomyopathy (HCC)  Assessment & Plan  ECHO on 6/10/2024 showed LVEF 40-45% with mild global hypokinesis   Status post NM stress test on 6/11/2024 which showed: a large, mild, fixed defect in the inferior wall, possibly due to diaphragmatic attenuation artifact, there is a small area of partial reversibility in the inferior apical wall suggestive of ischemia    Elevated by cardiology, etiology felt to be possibly secondary to stress-induced cardiomyopathy, with apical thrombus  Cardiac catheterization deferred given the lack of any significant ischemia, and no current cardiac symptoms  Continue with medical management  with aspirin, statin, beta-blocker, ARB  Monitor volume status, remains euvolemic off of diuretics   Outpatient follow-up with cardiology    Traumatic brain injury (HCC)  Assessment & Plan  Remote history.  See neurocog impairment     Carnitine deficiency (HCC)  Assessment & Plan  - Carnitine replacement  - Appreciate medicine management      History of seizures  Assessment & Plan  - Depakote ER, lamotrigine, zonisamide  - Outpatient follow-up with [facility] neurology    Left ventricular thrombus  Assessment & Plan  - Visualized on echocardiogram on 6/10/24: spherical 1.5 x 1.3 cm thrombus at the LV apex.   - Was started on rivaroxaban, but patient does not appear to have insurance coverage for prescriptions   - Xarelto, eliquis, and pradaxa likely cost prohibitive per IM   - 7/29 transitioned to warfarin with lovenox bridge.   - Now off lovenox, and fully anticoagulated on warfarin.   - Management as per IM.   - 8/19 INR 2.72. Continue 5mg daily. Recheck 8/22.   - Outpatient follow-up with cardiology    Benign essential hypertension  Assessment & Plan  - Toprol, losartan  - Appreciate medicine management    Human immunodeficiency virus (HIV) infection ([facility])  Assessment & Plan  - Continue anti-retroviral treatment  - Appreciate medicine management during ARC course  - OP ID follow-up         Health Maintenance  #GI Prophylaxis: not indicated  #Code Status: Full code  #FEN: Cardiac diet + Ensure at bfast/dinner  #Dispo: Teams 8/20: ADD TBD. Still working on placement. May need to transfer back to acute care hospital. Guardianship court date is 8/27. Suspect placement even after guardianship will be difficult. Repeating Neuropsych evaluation.   Will need f/u with PMR, PCP, Vascular, Psych      Objective:     Functional Update:  PT: Ind bed mobility, Sup transfers, Ind wheelchair mobility 500', Ind ramp  OT: Ind eating, Ind grooming, Sup bathing, Ind UB dressing, Sup LB dressing, Sup toileting, Sup tub/shower transfer, Sup  toilet transfers   SLP: Expressive and receptive language skill impairment - difficulty with verbal expression, written expression, auditory comprehension, reading comprehension. Still impairments cognitively, but difficult to tease through given speech difficulties. Jignesh comprehension, Sup social interaction, modA expression, memory, and problem solving.     Allergies per EMR    Physical Exam:  Temp:  [97.6 °F (36.4 °C)-98.1 °F (36.7 °C)] 97.6 °F (36.4 °C)  HR:  [] 88  Resp:  [14-20] 20  BP: (126-145)/(68-86) 126/68  Oxygen Therapy  SpO2: 94 %    Gen: No acute distress, Well-nourished, well-appearing.  HEENT: Moist mucus membranes, Normocephalic/Atraumatic  Cardiovascular: Regular rate, rhythm, S1/S2. Distal pulses palpable  Heme/Extr: No edema  Pulmonary: Non-labored breathing. Lungs CTAB  : No fernandes  GI: Soft, non-tender, non-distended.   MSK: Stable L AKA.   Integumentary: Skin is warm, dry. No rashes or ulcers.  Neuro: AA. Answering questions appropriately and respectively. Expressive aphasia > receptive. Still impaired insight, judgment.   Psych: Congruent mood/affect. Appears depressed.     Diagnostic Studies: Reviewed, no new imaging    Laboratory:  Reviewed, no new labs.  Results from last 7 days   Lab Units 08/15/24  0604   HEMOGLOBIN g/dL 10.9*   HEMATOCRIT % 37.0   WBC Thousand/uL 6.30     Results from last 7 days   Lab Units 08/15/24  0604   BUN mg/dL 26*   POTASSIUM mmol/L 4.3   CHLORIDE mmol/L 103   CREATININE mg/dL 0.88     Results from last 7 days   Lab Units 08/19/24  0539 08/15/24  0604   PROTIME seconds 28.6* 26.3*   INR  2.72* 2.43*        Patient Active Problem List   Diagnosis    Bipolar affective disorder (HCC)    Substance abuse (HCC)    Human immunodeficiency virus (HIV) infection (HCC)    History of stroke    Benign essential hypertension    Positive laboratory testing for human immunodeficiency virus (HCC)    Hypertension    Unspecified vitamin D deficiency    Tobacco abuse     Cerebrovascular accident (CVA) (HCC)    Left ventricular thrombus    History of seizures    Carnitine deficiency (HCC)    Above-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)    Traumatic brain injury (HCC)    Impaired mobility and activities of daily living    At risk for venous thromboembolism (VTE)    Acute pain    At risk for constipation    Patient incapable of making informed decisions    Adjustment disorder with mixed anxiety and depressed mood    Cardiomyopathy (HCC)    History of migraine headaches    Postoperative anemia         Medications  Current Facility-Administered Medications   Medication Dose Route Frequency Provider Last Rate    acetaminophen  975 mg Oral TID PRN José Salcido MD      aspirin  81 mg Oral Daily Lorrie Barillas PA-C      atorvastatin  80 mg Oral Daily With Dinner Lorrie Barillas PA-C      bictegravir-emtricitab-tenofovir alafenamide  1 tablet Oral Daily With Breakfast Lorrie Barillas PA-C      bisacodyl  10 mg Rectal Daily PRN Kenny Castro MD      diphenhydrAMINE  25 mg Oral Q6H PRN Lorrie Barillas PA-C      divalproex sodium  1,250 mg Oral Daily Lorrie Barillas PA-C      dolutegravir  50 mg Oral Daily Lorrie Barillas PA-C      gabapentin  100 mg Oral Q8H Ashley Depadua, MD      lamoTRIgine  50 mg Oral BID Lorrie Barillas PA-C      levOCARNitine  1,000 mg/day Oral TID With Meals Lorrie Barillas PA-C      losartan  50 mg Oral Daily Lorrie Barillas PA-C      melatonin  6 mg Oral HS Lorrie Barillas PA-C      metoprolol succinate  25 mg Oral Daily CHARLIE Mcclelland      mirtazapine  15 mg Oral HS Lorrie Barillas PA-C      ondansetron  4 mg Oral Q6H PRN Lorrie Barillas PA-C      polyethylene glycol  17 g Oral Daily PRN Lorrie Barillas PA-C      senna  1 tablet Oral HS PRN Kenny Castro MD      sertraline  75 mg Oral Daily Lorrie Barillas PA-C      tamsulosin  0.4 mg Oral Daily With  Dinner Lorrie Barillas PA-C      warfarin  5 mg Oral Daily (warfarin) CHARLIE Plata      zonisamide  400 mg Oral Daily Lorrie Barillas PA-C            ** Please Note: Fluency Direct voice to text software may have been used in the creation of this document. **

## 2024-08-21 NOTE — CASE MANAGEMENT
Cm discussed pt'd dispo plan with MB. CL TM in the Am and touched base on progress, team feeling guardianship trial next week might open up a few more doors as far as dispo but [facility] still might be the most promising, team still feel the fixation with marijuana and smoking is currently his biggest barrier to dc to [facility] house.    Cm also was on network call discussing pt, cm able to explain progress of pt, a case  reporting she might stop in to review with pt the barrier of marijuana in facilities. Cm explained pt's behaviors and encouraged her to let this cm know when she plans on stopping by.     Moreno and several team members are still trying to figure out a dc plan for this pt.

## 2024-08-21 NOTE — ASSESSMENT & PLAN NOTE
Remote history of TBI, previously residing in a group home prior to [facility] sentence.  Evaluated by neuropsychiatry on 6/27/24 and deemed NOT to have medical decision-making capacity  Neuropsychology to re-evaluate pt.  Process for court appointed guardian started on 7/5/24 - CM following   Guardianship hearing 8/27/24

## 2024-08-21 NOTE — PROGRESS NOTES
08/21/24 1230   Pain Assessment   Pain Assessment Tool 0-10   Pain Score No Pain   Restrictions/Precautions   Precautions Aphasia;Bed/chair alarms;Cognitive;Fall Risk;Pain;Supervision on toilet/commode   Weight Bearing Restrictions Yes   LLE Weight Bearing Per Order NWB   ROM Restrictions No   Braces or Orthoses Other (Comment)  (LE residual limb )   Sit to Stand   Type of Assistance Needed Supervision   Physical Assistance Level No physical assistance   Comment stood at bedside with support from bedrail to use urinal   Sit to Stand CARE Score 4   Bed-Chair Transfer   Type of Assistance Needed Supervision   Physical Assistance Level No physical assistance   Comment sit pivot   Chair/Bed-to-Chair Transfer CARE Score 4   Toileting Hygiene   Type of Assistance Needed Supervision   Physical Assistance Level No physical assistance   Comment in stance for use of urinal   Toileting Hygiene CARE Score 4   Exercise Tools   UE Ergometer pt engages in UE strengthening using SciFit UBE, completing 5 min forward and 5 min backward on level 1.5 with good tolerance to exericse, rest break in between each set to manage fatigue.   Theraband pt engages in light UE strengthening using green theraband, completing 3x10 for each of the following: shoulder flexion, horizontal shoulder abduction, and D2 shoulder flexion. good tolerance to exercises with rest breaks as needed to manage fatigue. strengthening completed in order to increase strength and endurance for aDLs/transfers.   Other Exercise Tool 1 as a way to increase UE strength/endurance and activity tolerance as well as address leisure interest, pt engages in timed workout using speed bag focusing on increasing UE strength. pt tolerates 3 sets of 90 seconds with rest breaks in between each set.   Cognition   Overall Cognitive Status Impaired   Arousal/Participation Alert;Cooperative   Attention Attends with cues to redirect   Orientation Level Oriented X4   Memory  "Decreased short term memory;Decreased recall of recent events;Decreased recall of precautions   Following Commands Follows one step commands with increased time or repetition   Additional Activities   Additional Activities Comments pt voicing frustrations with lack of emotional support since on the ARC regarding amputee recovery and d/c dispo. pt reports that he met with a current patient over the weekend and would like to meet with him again as he found it to boost his \"mood\" as this current pt is also a current amputee. set up scheduled meeting (other pt agreeable to this meeting) for both pts tomorrow from .   Activity Tolerance   Activity Tolerance Patient tolerated treatment well   Assessment   Treatment Assessment pt engages in 90 minute skilled OT session focusing on toileting, UE strengthening and providing emotional support as pt voicing frustrations with lack of support pyschologically since admission. see above for full func details. pt initially agreeable to OT session however immediately voiced frustrations with lack of emotional support. did report that he met with current amp pt on 9th floor and found his interactions to be a boost for his mood and was requesting to meet with pt again. confirmed with current pt who is agreeable and set up time for tomorrow for both pt's to meet. pt remains limited by impaired cog, impaired strength/endurance/activity tolerance and balance, warranting continued skilled care to focus on ADL retraining, func transfers, UE Strengthening, in order to decrease burden of care at d/c.   Prognosis Good   Problem List Decreased strength;Decreased endurance;Impaired balance;Decreased mobility;Decreased cognition;Impaired judgement;Decreased safety awareness;Orthopedic restrictions   Plan   Treatment/Interventions ADL retraining;Functional transfer training;Therapeutic exercise;Endurance training;Cognitive reorientation;Patient/family training;Equipment " eval/education;Compensatory technique education   OT Therapy Minutes   OT Time In 1230   OT Time Out 1400   OT Total Time (minutes) 90   OT Mode of treatment - Individual (minutes) 90   OT Mode of treatment - Concurrent (minutes) 0   OT Mode of treatment - Group (minutes) 0   OT Mode of treatment - Co-treat (minutes) 0   OT Mode of Treatment - Total time(minutes) 90 minutes   OT Cumulative Minutes 1878   Therapy Time missed   Time missed? No

## 2024-08-21 NOTE — CASE MANAGEMENT
Case Management Discharge Planning Note    Patient name Sunil Patel  Location /-01 MRN [id number]  : 1960 Date 2024       Current Admission Date: 2024  Current Admission Diagnosis:Above-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)   Patient Active Problem List    Diagnosis Date Noted Date Diagnosed    Postoperative anemia 2024     History of migraine headaches 2024     Cardiomyopathy (HCC) 2024     Adjustment disorder with mixed anxiety and depressed mood 2024     Impaired mobility and activities of daily living 2024     At risk for venous thromboembolism (VTE) 2024     Acute pain 2024     At risk for constipation 2024     Patient incapable of making informed decisions 2024     Above-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) 2024     Traumatic brain injury ([facility]) 2024     Carnitine deficiency ([facility]) 2024     Left ventricular thrombus 2024     History of seizures 2024     Tobacco abuse 10/26/2019     Cerebrovascular accident (CVA) ([facility]) 10/26/2019     Positive laboratory testing for human immunodeficiency virus ([facility]) 2017     Bipolar affective disorder ([facility]) 2017     Hypertension 2017     Human immunodeficiency virus (HIV) infection ([facility]) 2017     History of stroke 2017     Substance abuse ([facility]) 2013     Unspecified vitamin D deficiency 2010     Benign essential hypertension 2010       LOS (days): 46  Geometric Mean LOS (GMLOS) (days):   Days to GMLOS:     OBJECTIVE:  Risk of Unplanned Readmission Score: 37.89         Current admission status: Inpatient Rehab   Preferred Pharmacy:   [facility] [place] - [place] [address]  [address]  [place] [address]  Phone: [phone] Fax: [phone]    [facility] [place] - [address]  [address]  [place] [address]  Phone: [phone] Fax: [phone]    Primary Care Provider: CHARLIE Trevino    Primary Insurance: MEDICARE  Secondary Insurance: [facility]    DISCHARGE DETAILS:         Additional Comments: CM spoke with Fauzia [place] [phone] to determine if pt is receiving any benefits. Pt is not eligible for healthcare this could be due to income guidlines, negative reporting on DD14, or hasn't served enough time. Last notes are from 2017. CM noted chages in status (AKA, lack of capacity) Fauzia stated CM can have pt complete eligibility form again. CM advised pt will be assigned a guardian on 8/27, CM will pass info on to guardian when assigned

## 2024-08-21 NOTE — PLAN OF CARE
Problem: PAIN - ADULT  Goal: Verbalizes/displays adequate comfort level or baseline comfort level  Description: Interventions:  - Encourage patient to monitor pain and request assistance  - Assess pain using appropriate pain scale  - Administer analgesics based on type and severity of pain and evaluate response  - Implement non-pharmacological measures as appropriate and evaluate response  - Consider cultural and social influences on pain and pain management  - Notify physician/advanced practitioner if interventions unsuccessful or patient reports new pain  Outcome: Progressing     Problem: INFECTION - ADULT  Goal: Absence or prevention of progression during hospitalization  Description: INTERVENTIONS:  - Assess and monitor for signs and symptoms of infection  - Monitor lab/diagnostic results  - Monitor all insertion sites, i.e. indwelling lines, tubes, and drains  - Monitor endotracheal if appropriate and nasal secretions for changes in amount and color  - [place] appropriate cooling/warming therapies per order  - Administer medications as ordered  - Instruct and encourage patient and family to use good hand hygiene technique  - Identify and instruct in appropriate isolation precautions for identified infection/condition  Outcome: Progressing  Goal: Absence of fever/infection during neutropenic period  Description: INTERVENTIONS:  - Monitor WBC    Outcome: Progressing     Problem: SAFETY ADULT  Goal: Patient will remain free of falls  Description: INTERVENTIONS:  - Educate patient/family on patient safety including physical limitations  - Instruct patient to call for assistance with activity   - Consult OT/PT to assist with strengthening/mobility   - Keep Call bell within reach  - Keep bed low and locked with side rails adjusted as appropriate  - Keep care items and personal belongings within reach  - Initiate and maintain comfort rounds  - Make Fall Risk Sign visible to staff  - Offer Toileting every 2 Hours,  in advance of need  - Initiate/Maintain alarm  - Obtain necessary fall risk management equipment:   - Apply yellow socks and bracelet for high fall risk patients  - Consider moving patient to room near nurses station  Outcome: Progressing  Goal: Maintain or return to baseline ADL function  Description: INTERVENTIONS:  -  Assess patient's ability to carry out ADLs; assess patient's baseline for ADL function and identify physical deficits which impact ability to perform ADLs (bathing, care of mouth/teeth, toileting, grooming, dressing, etc.)  - Assess/evaluate cause of self-care deficits   - Assess range of motion  - Assess patient's mobility; develop plan if impaired  - Assess patient's need for assistive devices and provide as appropriate  - Encourage maximum independence but intervene and supervise when necessary  - Involve family in performance of ADLs  - Assess for home care needs following discharge   - Consider OT consult to assist with ADL evaluation and planning for discharge  - Provide patient education as appropriate  Outcome: Progressing  Goal: Maintains/Returns to pre admission functional level  Description: INTERVENTIONS:  - Perform AM-PAC 6 Click Basic Mobility/ Daily Activity assessment daily.  - Set and communicate daily mobility goal to care team and patient/family/caregiver.   - Collaborate with rehabilitation services on mobility goals if consulted  - Perform Range of Motion 3 times a day.  - Reposition patient every 2 hours.  - Dangle patient 3 times a day  - Stand patient 3 times a day  - Ambulate patient 3 times a day  - Out of bed to chair 3 times a day   - Out of bed for meals 3 times a day  - Out of bed for toileting  - Record patient progress and toleration of activity level   Outcome: Progressing     Problem: DISCHARGE PLANNING  Goal: Discharge to home or other facility with appropriate resources  Description: INTERVENTIONS:  - Identify barriers to discharge w/patient and caregiver  -  Arrange for needed discharge resources and transportation as appropriate  - Identify discharge learning needs (meds, wound care, etc.)  - Arrange for interpretive services to assist at discharge as needed  - Refer to Case Management Department for coordinating discharge planning if the patient needs post-hospital services based on physician/advanced practitioner order or complex needs related to functional status, cognitive ability, or social support system  Outcome: Progressing     Problem: Prexisting or High Potential for Compromised Skin Integrity  Goal: Skin integrity is maintained or improved  Description: INTERVENTIONS:  - Identify patients at risk for skin breakdown  - Assess and monitor skin integrity  - Assess and monitor nutrition and hydration status  - Monitor labs   - Assess for incontinence   - Turn and reposition patient  - Assist with mobility/ambulation  - Relieve pressure over bony prominences  - Avoid friction and shearing  - Provide appropriate hygiene as needed including keeping skin clean and dry  - Evaluate need for skin moisturizer/barrier cream  - Collaborate with interdisciplinary team   - Patient/family teaching  - Consider wound care consult   Outcome: Progressing     Problem: Nutrition/Hydration-ADULT  Goal: Nutrient/Hydration intake appropriate for improving, restoring or maintaining nutritional needs  Description: Monitor and assess patient's nutrition/hydration status for malnutrition. Collaborate with interdisciplinary team and initiate plan and interventions as ordered.  Monitor patient's weight and dietary intake as ordered or per policy. Utilize nutrition screening tool and intervene as necessary. Determine patient's food preferences and provide high-protein, high-caloric foods as appropriate.     INTERVENTIONS:  - Monitor oral intake, urinary output, labs, and treatment plans  - Assess nutrition and hydration status and recommend course of action  - Evaluate amount of meals  eaten  - Assist patient with eating if necessary   - Allow adequate time for meals  - Recommend/ encourage appropriate diets, oral nutritional supplements, and vitamin/mineral supplements  - Order, calculate, and assess calorie counts as needed  - Recommend, monitor, and adjust tube feedings and TPN/PPN based on assessed needs  - Assess need for intravenous fluids  - Provide specific nutrition/hydration education as appropriate  - Include patient/family/caregiver in decisions related to nutrition  Outcome: Progressing

## 2024-08-22 LAB
INR PPP: 3.56 (ref 0.85–1.19)
PROTHROMBIN TIME: 35.1 SECONDS (ref 12.3–15)

## 2024-08-22 PROCEDURE — 97116 GAIT TRAINING THERAPY: CPT

## 2024-08-22 PROCEDURE — 99233 SBSQ HOSP IP/OBS HIGH 50: CPT | Performed by: PHYSICAL MEDICINE & REHABILITATION

## 2024-08-22 PROCEDURE — 97110 THERAPEUTIC EXERCISES: CPT

## 2024-08-22 PROCEDURE — 99232 SBSQ HOSP IP/OBS MODERATE 35: CPT | Performed by: PHYSICIAN ASSISTANT

## 2024-08-22 PROCEDURE — 85610 PROTHROMBIN TIME: CPT | Performed by: PHYSICIAN ASSISTANT

## 2024-08-22 PROCEDURE — 97112 NEUROMUSCULAR REEDUCATION: CPT

## 2024-08-22 PROCEDURE — 97530 THERAPEUTIC ACTIVITIES: CPT

## 2024-08-22 PROCEDURE — 97535 SELF CARE MNGMENT TRAINING: CPT

## 2024-08-22 RX ADMIN — LAMOTRIGINE 50 MG: 25 TABLET ORAL at 08:27

## 2024-08-22 RX ADMIN — LEVOCARNITINE 330 MG: 1 SOLUTION ORAL at 18:03

## 2024-08-22 RX ADMIN — METOPROLOL SUCCINATE 25 MG: 25 TABLET, EXTENDED RELEASE ORAL at 08:27

## 2024-08-22 RX ADMIN — GABAPENTIN 100 MG: 100 CAPSULE ORAL at 05:59

## 2024-08-22 RX ADMIN — ACETAMINOPHEN 975 MG: 325 TABLET, FILM COATED ORAL at 08:31

## 2024-08-22 RX ADMIN — TAMSULOSIN HYDROCHLORIDE 0.4 MG: 0.4 CAPSULE ORAL at 18:03

## 2024-08-22 RX ADMIN — ASPIRIN 81 MG: 81 TABLET, COATED ORAL at 08:27

## 2024-08-22 RX ADMIN — SERTRALINE HYDROCHLORIDE 75 MG: 50 TABLET ORAL at 08:27

## 2024-08-22 RX ADMIN — ZONISAMIDE 400 MG: 100 CAPSULE ORAL at 08:27

## 2024-08-22 RX ADMIN — ATORVASTATIN CALCIUM 80 MG: 80 TABLET, FILM COATED ORAL at 18:02

## 2024-08-22 RX ADMIN — LOSARTAN POTASSIUM 50 MG: 50 TABLET, FILM COATED ORAL at 08:27

## 2024-08-22 RX ADMIN — GABAPENTIN 100 MG: 100 CAPSULE ORAL at 14:35

## 2024-08-22 RX ADMIN — LEVOCARNITINE 330 MG: 1 SOLUTION ORAL at 14:35

## 2024-08-22 RX ADMIN — MIRTAZAPINE 15 MG: 15 TABLET, FILM COATED ORAL at 21:49

## 2024-08-22 RX ADMIN — DOLUTEGRAVIR SODIUM 50 MG: 50 TABLET, FILM COATED ORAL at 08:26

## 2024-08-22 RX ADMIN — DIVALPROEX SODIUM 1250 MG: 500 TABLET, EXTENDED RELEASE ORAL at 08:27

## 2024-08-22 RX ADMIN — Medication 6 MG: at 21:49

## 2024-08-22 RX ADMIN — LAMOTRIGINE 50 MG: 25 TABLET ORAL at 18:02

## 2024-08-22 RX ADMIN — GABAPENTIN 100 MG: 100 CAPSULE ORAL at 21:49

## 2024-08-22 RX ADMIN — LEVOCARNITINE 330 MG: 1 SOLUTION ORAL at 08:27

## 2024-08-22 RX ADMIN — BICTEGRAVIR SODIUM, EMTRICITABINE, AND TENOFOVIR ALAFENAMIDE FUMARATE 1 TABLET: 50; 200; 25 TABLET ORAL at 08:27

## 2024-08-22 NOTE — CASE MANAGEMENT
Case Management Discharge Planning Note    Patient name Sunil Patel  Location /-01 MRN [id number]  : 1960 Date 2024       Current Admission Date: 2024  Current Admission Diagnosis:Above-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)   Patient Active Problem List    Diagnosis Date Noted Date Diagnosed    Postoperative anemia 2024     History of migraine headaches 2024     Cardiomyopathy (HCC) 2024     Adjustment disorder with mixed anxiety and depressed mood 2024     Impaired mobility and activities of daily living 2024     At risk for venous thromboembolism (VTE) 2024     Acute pain 2024     At risk for constipation 2024     Patient incapable of making informed decisions 2024     Above-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) 2024     Traumatic brain injury ([facility]) 2024     Carnitine deficiency ([facility]) 2024     Left ventricular thrombus 2024     History of seizures 2024     Tobacco abuse 10/26/2019     Cerebrovascular accident (CVA) ([facility]) 10/26/2019     Positive laboratory testing for human immunodeficiency virus ([facility]) 2017     Bipolar affective disorder ([facility]) 2017     Hypertension 2017     Human immunodeficiency virus (HIV) infection ([facility]) 2017     History of stroke 2017     Substance abuse ([facility]) 2013     Unspecified vitamin D deficiency 2010     Benign essential hypertension 2010       LOS (days): 47  Geometric Mean LOS (GMLOS) (days):   Days to GMLOS:     OBJECTIVE:  Risk of Unplanned Readmission Score: 37.6         Current admission status: Inpatient Rehab   Preferred Pharmacy:   [facility] [place] - [address]  [address]  [place] [address]  Phone: [phone] Fax: [phone]    [facility] [place] - [address]  [address]  [place] [address]  Phone: [phone] Fax: [phone]    Primary Care Provider: CHALRIE Trevino    Primary Insurance: MEDICARE  Secondary Insurance: [facility]    DISCHARGE DETAILS:           Additional Comments: Phone message to Angélica Diallo @ [facility]. Left message for a return call to discuss possible readmission

## 2024-08-22 NOTE — ASSESSMENT & PLAN NOTE
Remote history of TBI, previously residing in a group home prior to [facility] sentence.  Evaluated by neuropsychiatry on 6/27/24 and deemed NOT to have medical decision-making capacity  Neuropsychology re-evaluated pt and he does not have decision making capacity.  Process for court appointed guardian started on 7/5/24 -  following   Guardianship hearing 8/27/24
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
## 2024-08-22 NOTE — ASSESSMENT & PLAN NOTE
Noted on echocardiogram 6/10/2024: spherical 1.5 x 1.3 cm thrombus at the LV apex   Initiated on AC with Xarelto however unable to verify insurance coverage, now on Coumadin at 5 mg daily  INR: 3.56  Hold Coumadin tonight.  INR in AM.

## 2024-08-22 NOTE — PROGRESS NOTES
"   08/22/24 0900   Pain Assessment   Pain Assessment Tool 0-10   Pain Score No Pain   Restrictions/Precautions   Precautions Aphasia;Bed/chair alarms;Fall Risk;Cognitive;Supervision on toilet/commode   LLE Weight Bearing Per Order NWB   Lifestyle   Autonomy \"just 30 more minutes please.\"   Eating   Type of Assistance Needed Independent   Eating CARE Score 6   Oral Hygiene   Type of Assistance Needed Independent   Oral Hygiene CARE Score 6   Shower/Bathe Self   Type of Assistance Needed Set-up / clean-up   Comment bed level sponge bath   Shower/Bathe Self CARE Score 5   Upper Body Dressing   Type of Assistance Needed Set-up / clean-up   Upper Body Dressing CARE Score 5   Lower Body Dressing   Type of Assistance Needed Set-up / clean-up   Lower Body Dressing CARE Score 5   Putting On/Taking Off Footwear   Type of Assistance Needed Set-up / clean-up   Putting On/Taking Off Footwear CARE Score 5   Toileting Hygiene   Type of Assistance Needed Set-up / clean-up   Comment be dpad saturated with pure wick in place. educated pt that he should attempt to not use it to train his bladder rather than dependeing on pure wick which he will not have at time of d/c.   Toileting Hygiene CARE Score 5   Assessment   Treatment Assessment OT session initiated and limited to pt reporting feeling sleepy. agreeable to 60 min session after he gets 30 more minutes of sleep. pt verablizing needs and complete ADL at bed level today 2* time constraints. limited awareness of performance of pure wick in place with entire bed saturated in urine. educated on encouraged pt to utilize urinal and BSc instead of pure wick.   OT Therapy Minutes   OT Time In 0900   OT Time Out 1000   OT Total Time (minutes) 60   OT Mode of treatment - Individual (minutes) 60   OT Mode of treatment - Concurrent (minutes) 0   OT Mode of treatment - Group (minutes) 0   OT Mode of treatment - Co-treat (minutes) 0   OT Mode of Treatment - Total time(minutes) 60 minutes   OT " Cumulative Minutes 1938   Therapy Time missed   Time missed? Yes   Amount of time missed 30   Reason for time missed Extreme fatigue  (pt refusal.)

## 2024-08-22 NOTE — ASSESSMENT & PLAN NOTE
ECHO 6/10/2024 = LVEF 40-45% with mild global hypokinesis   Status post NM stress test 6/11/2024 = large, mild, fixed defect in the inferior wall, possibly due to diaphragmatic attenuation artifact, there is a small area of partial reversibility in the inferior apical wall suggestive of ischemia    Evaluated by cardiology.  Etiology felt to be possibly secondary to stress-induced cardiomyopathy, with apical thrombus  Cardiac catheterization deferred given the lack of any significant ischemia, and no current cardiac symptoms  Continue with medical management with aspirin, statin, beta-blocker, ARB  Remains euvolemic off of diuretics, continue to monitor volume status   Outpatient follow-up with cardiology, previously followed with Dr. Gumaro Aguila [facility]

## 2024-08-22 NOTE — PLAN OF CARE
Problem: DISCHARGE PLANNING  Goal: Discharge to home or other facility with appropriate resources  Description: INTERVENTIONS:  - Identify barriers to discharge w/patient and caregiver  - Arrange for needed discharge resources and transportation as appropriate  - Identify discharge learning needs (meds, wound care, etc.)  - Arrange for interpretive services to assist at discharge as needed  - Refer to Case Management Department for coordinating discharge planning if the patient needs post-hospital services based on physician/advanced practitioner order or complex needs related to functional status, cognitive ability, or social support system  Outcome: Progressing     Problem: SAFETY ADULT  Goal: Patient will remain free of falls  Description: INTERVENTIONS:  - Educate patient/family on patient safety including physical limitations  - Instruct patient to call for assistance with activity   - Consult OT/PT to assist with strengthening/mobility   - Keep Call bell within reach  - Keep bed low and locked with side rails adjusted as appropriate  - Keep care items and personal belongings within reach  - Initiate and maintain comfort rounds  - Make Fall Risk Sign visible to staff  - Offer Toileting every 2 Hours, in advance of need  - Initiate/Maintain alarm  - Obtain necessary fall risk management equipment:   - Apply yellow socks and bracelet for high fall risk patients  - Consider moving patient to room near nurses station  Outcome: Progressing

## 2024-08-22 NOTE — PLAN OF CARE
Problem: PAIN - ADULT  Goal: Verbalizes/displays adequate comfort level or baseline comfort level  Description: Interventions:  - Encourage patient to monitor pain and request assistance  - Assess pain using appropriate pain scale  - Administer analgesics based on type and severity of pain and evaluate response  - Implement non-pharmacological measures as appropriate and evaluate response  - Consider cultural and social influences on pain and pain management  - Notify physician/advanced practitioner if interventions unsuccessful or patient reports new pain  Outcome: Progressing     Problem: INFECTION - ADULT  Goal: Absence or prevention of progression during hospitalization  Description: INTERVENTIONS:  - Assess and monitor for signs and symptoms of infection  - Monitor lab/diagnostic results  - Monitor all insertion sites, i.e. indwelling lines, tubes, and drains  - Monitor endotracheal if appropriate and nasal secretions for changes in amount and color  - [place] appropriate cooling/warming therapies per order  - Administer medications as ordered  - Instruct and encourage patient and family to use good hand hygiene technique  - Identify and instruct in appropriate isolation precautions for identified infection/condition  Outcome: Progressing

## 2024-08-22 NOTE — PROGRESS NOTES
[facility]  Progress Note  Name: Sunil Patel I  MRN: [id number]  Unit/Bed#: -01 I Date of Admission: 7/6/2024   Date of Service: 8/22/2024 I Hospital Day: 47    Assessment & Plan   * Above-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)  Assessment & Plan  Presented with left lower extremity acute limb ischemia/extensive left iliofemoral and left infrainguinal embolism requiring left femoral thrombectomy and subsequent AKA on 6/7/24 with vascular surgery.  Continue with local wound care   Continue ASA, Coumadin and statin   Stable for discharge from PMR and medicine standpoint.  Dispo planning as per case management.  Patient is awaiting guardianship hearing, which is scheduled for 8/27/24.    Postoperative anemia  Assessment & Plan  Normocytic  PTA hemoglobin was normal, now has been running 10-11 since admission  No signs or symptoms of active bleeding  Iron panel reviewed, no evidence of TY  Likely postoperative ABLA  Monitor as needed      History of migraine headaches  Assessment & Plan  Continue PTA meds Depakote, gabapentin, zonegran and lamictal all of which can help in prevention  Unable to take triptans due to a history of stroke  PRN Tylenol     Cardiomyopathy (HCC)  Assessment & Plan  ECHO 6/10/2024 = LVEF 40-45% with mild global hypokinesis   Status post NM stress test 6/11/2024 = large, mild, fixed defect in the inferior wall, possibly due to diaphragmatic attenuation artifact, there is a small area of partial reversibility in the inferior apical wall suggestive of ischemia    Evaluated by cardiology.  Etiology felt to be possibly secondary to stress-induced cardiomyopathy, with apical thrombus  Cardiac catheterization deferred given the lack of any significant ischemia, and no current cardiac symptoms  Continue with medical management with aspirin, statin, beta-blocker, ARB  Remains euvolemic off of diuretics, continue to monitor volume status  "  Outpatient follow-up with cardiology, previously followed with Dr. Gumaro Aguila [facility]    Traumatic brain injury (HCC)  Assessment & Plan  Remote history of TBI, previously residing in a group home prior to [facility] sentence.  Evaluated by neuropsychiatry on 6/27/24 and deemed NOT to have medical decision-making capacity  Neuropsychology re-evaluated pt and he does not have decision making capacity.  Process for court appointed guardian started on 7/5/24 - CM following   Guardianship hearing 8/27/24    Carnitine deficiency (HCC)  Assessment & Plan  Continue levocarnitine 330 mg 3x daily with meals.    History of seizures  Assessment & Plan  Diagnosed in July 2019, follows with [facility] neurology outpatient  Continue home regimen with Depakote ER, Lamotrigine and Zonegran    Left ventricular thrombus  Assessment & Plan  Noted on echocardiogram 6/10/2024: spherical 1.5 x 1.3 cm thrombus at the LV apex   Initiated on AC with Xarelto however unable to verify insurance coverage, now on Coumadin at 5 mg daily  INR: 3.56  Hold Coumadin tonight.  INR in AM.    Benign essential hypertension  Assessment & Plan  Blood pressures acceptable on current regimen including Losartan 50 mg daily, Toprol-XL 25 mg daily    History of stroke  Assessment & Plan  History of left MCA CVA in May 2018 with residual expressive aphasia  Continue ASA and atorvastatin    Human immunodeficiency virus (HIV) infection ([facility])  Assessment & Plan  Continue Biktarvy and Tivicay.    Bipolar affective disorder ([facility])  Assessment & Plan  Psychiatry evaluated most recently on 8/12 and cleared for discharge to rehab   Continue home meds Zoloft and Remeron             The above assessment and plan was reviewed and updated as determined by my evaluation of the patient on 8/22/2024.    Labs:             Invalid input(s): \"LABGLOM\", \"CMP\"      Results from last 7 days   Lab Units 08/22/24  0542 08/19/24  0539   INR  3.56* 2.72*           Imaging  No orders to display "       Review of Scheduled Meds:  Current Facility-Administered Medications   Medication Dose Route Frequency Provider Last Rate    acetaminophen  975 mg Oral TID PRN Joés Salcido MD      aspirin  81 mg Oral Daily Lorrie Barillas PA-C      atorvastatin  80 mg Oral Daily With Dinner Lorrie Barillas PA-C      bictegravir-emtricitab-tenofovir alafenamide  1 tablet Oral Daily With Breakfast Lorrie Barillas PA-C      bisacodyl  10 mg Rectal Daily PRN Kenny Castro MD      diphenhydrAMINE  25 mg Oral Q6H PRN Lorrie Barillas PA-C      divalproex sodium  1,250 mg Oral Daily Lorrie Barillas PA-C      dolutegravir  50 mg Oral Daily Lorrie Barillas PA-C      gabapentin  100 mg Oral Q8H Ashley Depadua, MD      lamoTRIgine  50 mg Oral BID Lorrie Barillas PA-C      levOCARNitine  1,000 mg/day Oral TID With Meals Lorrie Barillas PA-C      losartan  50 mg Oral Daily Lorrie Barillas PA-C      melatonin  6 mg Oral HS Lorrie Barillas PA-C      metoprolol succinate  25 mg Oral Daily CHARLIE Mcclelland      mirtazapine  15 mg Oral HS Lorriejacky Barillas PA-C      ondansetron  4 mg Oral Q6H PRN Lorrie Barillas PA-C      polyethylene glycol  17 g Oral Daily PRN Lorrie Barillas PA-C      senna  1 tablet Oral HS PRN Kenny Castro MD      sertraline  75 mg Oral Daily Lorrie Barillas PA-C      tamsulosin  0.4 mg Oral Daily With Dinner Lorrie Barillas PA-C      zonisamide  400 mg Oral Daily Lorrie Barillas PA-C         Subjective/ HPI: Patient seen and examined. Patients overnight issues or events were reviewed with nursing staff. New or overnight issues include the following:     Pt seen in therapy. He remains frustrated about his situation. He denies any other complaints.    ROS:   A 10 point ROS was performed; negative except as noted above.        *Labs /Radiology studies Reviewed  *Medications  reviewed and reconciled as  needed  *Please refer to order section for additional ordered labs studies      Physical Examination:  Vitals:   Vitals:    08/21/24 1350 08/21/24 2053 08/22/24 0535 08/22/24 0827   BP: 127/66 120/63 123/63 120/65   BP Location: Left arm Left arm Left arm    Pulse: 92 90 77 73   Resp: 14 20 20    Temp: (!) 97.4 °F (36.3 °C) 98 °F (36.7 °C) 97.9 °F (36.6 °C)    TempSrc: Oral Oral Oral    SpO2: 95% 93% 92%    Weight:       Height:           General Appearance: NAD; pleasant  HEENT: PERRLA, conjuctiva normal; mucous membranes moist; face symmetrical  Neck:  Supple  Lungs: clear bilaterally, normal respiratory effort, no retractions, expiratory effort normal, on room air  CV: regular rate and rhythm, no murmurs rubs or gallops noted   ABD: soft non tender, +BS x4  EXT: Lt AKA  Skin: normal turgor, normal texture, no rash  Psych: Tearful  Neuro: Awake and alert.     The above physical exam was reviewed and updated as determined by my evaluation of the patient on 8/22/2024.    Invasive Devices       Drain  Duration             External Urinary Catheter 21 days                       VTE Pharmacologic Prophylaxis: Warfarin (Coumadin)  Code Status: Level 1 - Full Code  Current Length of Stay: 47 day(s)    Total floor / unit time spent today  35 minutes   Coordination of patient's care was performed in conjunction with consulting services. Time invested included review of patient's labs, vitals, and management of their comorbidities with continued monitoring, examination of patient as well as answering patient questions, documenting her findings and creating progress note in electronic medical record,  ordering appropriate diagnostic testing.       ** Please Note:  voice to text software may have been used in the creation of this document. Although proof errors in transcription or interpretation are a potential of such software**

## 2024-08-22 NOTE — PROGRESS NOTES
Physical Medicine and Rehabilitation Progress Note  Sunil Patel 63 y.o. male MRN: [id number]  Unit/Bed#: [facility] 451-01 Encounter: [id number]    To Review: Sunil Patel is a 63 y.o. male who  has a past medical history of Acute lower limb ischemia (06/08/2024), Anxiety, Depression, HIV disease (HCC), Substance abuse (HCC), and Suicide attempt (HCC). who presented to the [facility] on 6/7/24 from [facility] for increased LLE swelling and pain and was found to have a left external iliac artery occlusion and acute limb ischemia. 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 Patient had TTE on 6/10/24 showing LV apex thrombus. On 6/11/24 patient had pharmacologic nuclear stress test/SPECT scan which did not show any significant areas of ischemia with EF 40-45% and recommended continuing A/C for LV apical thrombus. His course was complicated by b/l buttock unstageable pressure ulcers, urinary and fecal incontinence, pain, and significant decline in ADLs and mobility. Patient has been continued on Xarelto for anticoagulation, as well as ASA and statin. Patient has a history of TBI, with baseline nonfluent aphasia and forgetfulness. He was evaluated by neuropsychology on 6/27/24, and deemed to not have capacity to make fully informed medical decisions. Therefore, the guardianship process was initiated as of 7/5/24. He was admitted to the [facility] on 7/6.     Chief Complaint: none    Interval History/Subjective:  No acute events overnight. Seen this morning at therapy and at bedside later in the afternoon. Slept well, feeling well. Enjoyed talking to another patient with AKA on our service, who provided support    Denies fever, chills, headaches, changes in vision, chest pain, shortness of breath, presyncope,  "abdominal pain, nausea, diarrhea, constipation, and insomnia.      ROS:  10 point ROS performed and negative unless mentioned in HPI.      Today's Changes:  INR 3.56. Will hold warfarin tonight per IM and recheck again in the AM  Continue meetings with fellow amputees  Dispo planning to continue. At this point he is appropriate for skilled nursing facility level of care.    Assessment/Plan:    Patient incapable of making informed decisions  Assessment & Plan  - History of remote TBI and prior strokes with comorbid psychiatric history.  - Evaluated by neuropsychology, Dr. Dilshad Tatum, PhD on 6/27/24, found to have \"diffuse cognitive dysfunction and on a measure assessing awareness of personal health status and ability to evaluate health problems, handle medical emergencies and take safety precautions, patient performed in the IMPAIRED range of functioning. During this encounter, patient does not appear to have capacity to make fully informed medical decisions.\"  - Court-appointed guardianship process has been initiated by acute care CM Katia Kay.    - We have identified two friends who are interested in being patient's guardians and have been involved and invested in patient's care on the ARC.   - They will need to be interviewed by the  involved in this process.    - He has to undergo his hearing on 8/6/2024 first.  -- now rescheduled to 8/27   - He would ultimately benefit from [facility]/nursing home/memory care unit - he does very well with structure and supervision.      History of migraine headaches  Assessment & Plan  Chronic issue.  Seemed to worsen with increased irritability after discontinuing gabapentin  Resumed gabapentin  Received nurtec once during stay with middling results  Sleep logs have been good - discontinued.  Headache is on and off    Cardiomyopathy (HCC)  Assessment & Plan  ECHO on 6/10/2024 showed LVEF 40-45% with mild global hypokinesis   Status post NM stress test on 6/11/2024 which " showed: a large, mild, fixed defect in the inferior wall, possibly due to diaphragmatic attenuation artifact, there is a small area of partial reversibility in the inferior apical wall suggestive of ischemia    Elevated by cardiology, etiology felt to be possibly secondary to stress-induced cardiomyopathy, with apical thrombus  Cardiac catheterization deferred given the lack of any significant ischemia, and no current cardiac symptoms  Continue with medical management with aspirin, statin, beta-blocker, ARB  Monitor volume status, remains euvolemic off of diuretics   Outpatient follow-up with cardiology    At risk for constipation  Assessment & Plan  - Incontinent during acute care hospitalization  - Has improved/resolved with improved mobility.  - Now off scheduled bowel regimen.   - Last BM 8/20 and continent. Not on a regimen  - PRN miralax, Senna and suppository    At risk for venous thromboembolism (VTE)  Assessment & Plan  - Fully anticoagulated on warfarin for apical thrombus    Traumatic brain injury (HCC)  Assessment & Plan  Remote history.  See neurocog impairment     Carnitine deficiency (HCC)  Assessment & Plan  - Carnitine replacement  - Appreciate medicine management      History of seizures  Assessment & Plan  - Depakote ER, lamotrigine, zonisamide  - [facility]    Left ventricular thrombus  Assessment & Plan  - Visualized on echocardiogram on 6/10/24: spherical 1.5 x 1.3 cm thrombus at the LV apex.   - Was started on rivaroxaban, but patient does not appear to have insurance coverage for prescriptions   - Xarelto, eliquis, and pradaxa likely cost prohibitive per IM   - 7/29 transitioned to warfarin with lovenox bridge.   - Now off lovenox, and fully anticoagulated on warfarin.   - Management as per IM.   - 8/22 INR 3.56. Will hold warfarin tonight per IM and recheck again in the AM  - Outpatient follow-up with cardiology    Benign essential hypertension  Assessment &  Plan  - Toprol, losartan  - Appreciate medicine management    History of stroke  Assessment & Plan  - History of old left temporal and parietal lobe cortical infarcts, also involving the insula and left occipital lobe as well as small right posterior parietal lobe. Stable on most recent CT head on 5/16/24.   - ASA, and statin for secondary prevention  - Optimal BP control  - Monitor neuro exam - hx of LV thrombus    - See that entry  - OP neuro follow-up     Human immunodeficiency virus (HIV) infection (HCC)  Assessment & Plan  - Continue anti-retroviral treatment  - Appreciate medicine management during ARC course  - OP ID follow-up       Bipolar affective disorder (HCC)  Assessment & Plan  Mood acceptable; continue meds as outlined   Supportive counseling  NeuroPsychology consult while in ARC if available for support  Psych evaluated on 8/12 and deemed him stable for discharge with current regimen as below  Counseled on and continue to encourage deep breathing/relaxation/behavioral management techniques  - Mirtazapine 15 mg qHs  - Sertraline 75 mg daily   - Lamictal 50mg BID  - Depakote ER 1250mg qday   - Outpatient psychiatry follow-up  - Would consult Psych before changing medications    * 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 Underwent L femoral thrombectomy and AKA with vascular surgery   - [facility] sx follow-up 7/10 - L AKA incision site well-healed, staples taken out at bedside  - [facility] will be vendor - Patient now has .  - Monitor for hip flexion/abduction tightness. Stretches in therapy.  - Phantom limb sensation - not painful, well controlled.  - Now on Coumadin with hx of LV thrombus and PAOD. Checking INR's as above   - PT/OT/SLP (to work on carry over strategies) 3-5 hours/day, 5-7 days/week.    - Patient now refusing at times   - Has met rehab goals at this point.   - Outpatient f[facility] with [facility] and Vascular      Pressure injury of buttock, stage 3 (HCC)-resolved as of 8/9/2024  Assessment & Plan  Resolved as of 8/7.   - Wound care consulted and following weekly  - POA L buttock pressure injury unstageable has healed.  - POA R buttock pressure injury  evolved from unstageable to Stage 3, and is now resolved.   - Recommend ROHO cushion in chair when out of bed instead   - Preventative hydraguard to bilateral heels BID and PRN.   - P500  - Monitor clinically for breakdown, frequent turns  - reviewed picture of wound today. It is healing well. Continue to monitor    Urinary incontinence-resolved as of 8/16/2024  Assessment & Plan  - Did have some incontinence on acute - improved with timed voids and consistent assistance with his urinal  - Described as urgency  - Marked improvement on timed voids.   - monitor for retention, incontinence (including overflow incontinence), signs/symptoms of UTI  - Timed Voids and PVRs Q4hrs initiated earlier. And PVR <150 x3, so scans discontinued.    - He has some difficulty managing the urinal    - needs assistance but is able to transfer to [facility]    - Still uses condom catheter at night, in part for continence care/to protect his skin given his buttock wounds. However now note that buttocks wound has healed  - Continue timed voids             Health Maintenance  #GI Prophylaxis: not indicated  #Code Status: Full code  #FEN: Cardiac diet + Ensure at bfast/dinner  #Dispo: Teams 8/20: ADD TBD. Still working on placement. May need to transfer back to acute care hospital. Guardianship court date is 8/27. Suspect placement even after guardianship will be difficult. Still lacks capacity per 8/20 neuropsych evaluation.   Will need f/u with PMR, PCP, Vascular, Psych      Objective:     Functional Update:  PT: Ind bed mobility, Sup transfers, Ind wheelchair mobility 500', Ind ramp  OT: Ind eating, Ind grooming, Sup bathing, Ind UB dressing, Sup LB dressing, Sup toileting,  "Sup tub/shower transfer, Sup toilet transfers   SLP: Expressive and receptive language skill impairment - difficulty with verbal expression, written expression, auditory comprehension, reading comprehension. Still impairments cognitively, but difficult to tease through given speech difficulties. Jignesh comprehension, Sup social interaction, modA expression, memory, and problem solving.     Allergies per EMR    Physical Exam:  Temp:  [97.4 °F (36.3 °C)-98 °F (36.7 °C)] 97.9 °F (36.6 °C)  HR:  [73-92] 73  Resp:  [14-20] 20  BP: (120-127)/(63-66) 120/65  Oxygen Therapy  SpO2: 92 %    Gen: No acute distress, pleasant. Calm  HEENT: Moist mucus membranes, Normocephalic/Atraumatic  Cardiovascular: Regular rate, rhythm, S1/S2. Distal pulses palpable  Heme/Extr: lack of edema in BLE  Pulmonary: Non-labored breathing. Normal chest rise and fall  : No fernandes  GI: Soft, non-tender, non-distended.   MSK: Stable L AKA.   Integumentary: Skin is warm, dry. No rashes or ulcers.  Neuro: AA. Answering questions appropriately and respectively. Expressive aphasia > receptive. Still impaired insight, judgment.   Psych: Congruent mood/affect. Appears depressed.     Diagnostic Studies: Reviewed, no new imaging    Laboratory:  Reviewed, no new labs.        Invalid input(s): \"PLTCT\"          Invalid input(s): \"CA\"    Results from last 7 days   Lab Units 08/22/24  0542 08/19/24  0539   PROTIME seconds 35.1* 28.6*   INR  3.56* 2.72*        Patient Active Problem List   Diagnosis    Bipolar affective disorder (HCC)    Substance abuse (HCC)    Human immunodeficiency virus (HIV) infection (HCC)    History of stroke    Benign essential hypertension    Positive laboratory testing for human immunodeficiency virus (HCC)    Hypertension    Unspecified vitamin D deficiency    Tobacco abuse    Cerebrovascular accident (CVA) (HCC)    Left ventricular thrombus    History of seizures    Carnitine deficiency (HCC)    Above-knee amputation of left lower " extremity (HCC)    Traumatic brain injury (HCC)    Impaired mobility and activities of daily living    At risk for venous thromboembolism (VTE)    Acute pain    At risk for constipation    Patient incapable of making informed decisions    Adjustment disorder with mixed anxiety and depressed mood    Cardiomyopathy (HCC)    History of migraine headaches    Postoperative anemia         Medications  Current Facility-Administered Medications   Medication Dose Route Frequency Provider Last Rate    acetaminophen  975 mg Oral TID PRN José Salciod MD      aspirin  81 mg Oral Daily Lorrie Barillas PA-C      atorvastatin  80 mg Oral Daily With Dinner Lorrie Barillas PA-C      bictegravir-emtricitab-tenofovir alafenamide  1 tablet Oral Daily With Breakfast Lorrie Barillas PA-C      bisacodyl  10 mg Rectal Daily PRN Kenny Castro MD      diphenhydrAMINE  25 mg Oral Q6H PRN Lorrie Barillas PA-C      divalproex sodium  1,250 mg Oral Daily Lorrie Barillas PA-C      dolutegravir  50 mg Oral Daily Lorrie Barillas PA-C      gabapentin  100 mg Oral Q8H Ashley Depadua, MD      lamoTRIgine  50 mg Oral BID Lorrie Barillas PA-C      levOCARNitine  1,000 mg/day Oral TID With Meals Lorrie Barillas PA-C      losartan  50 mg Oral Daily Lorrie Barillas PA-C      melatonin  6 mg Oral HS Lorrie Barillas PA-C      metoprolol succinate  25 mg Oral Daily CHARLIE Mcclelland      mirtazapine  15 mg Oral HS Lorriejacky Barillas PA-C      ondansetron  4 mg Oral Q6H PRN Lorrie Barillas PA-C      polyethylene glycol  17 g Oral Daily PRN Lorrie Barillas PA-C      senna  1 tablet Oral HS PRN Kenny Castro MD      sertraline  75 mg Oral Daily Lorrie Barillas PA-C      tamsulosin  0.4 mg Oral Daily With Dinner Lorrie Barillas PA-C      zonisamide  400 mg Oral Daily Lorrie Barillas PA-C            ** Please Note: Fluency Direct voice to text software  may have been used in the creation of this document. **

## 2024-08-22 NOTE — PROGRESS NOTES
08/22/24 1300   Pain Assessment   Pain Assessment Tool 0-10   Pain Score No Pain   Restrictions/Precautions   Precautions Aphasia;Bed/chair alarms;Cognitive;Fall Risk;Supervision on toilet/commode   Weight Bearing Restrictions Yes   LLE Weight Bearing Per Order NWB   ROM Restrictions No   Braces or Orthoses   (L LE )   Cognition   Overall Cognitive Status Impaired   Arousal/Participation Alert;Responsive;Cooperative   Attention Attends with cues to redirect   Orientation Level Oriented X4   Memory Decreased short term memory;Decreased recall of recent events;Decreased recall of precautions   Following Commands Follows one step commands with increased time or repetition   Subjective   Subjective Pt reports today was a good day, motivated to walk   Roll Left and Right   Comment Pt OOB   Sit to Lying   Comment Pt OOB   Lying to Sitting on Side of Bed   Comment Pt OOB   Sit to Stand   Type of Assistance Needed Supervision   Physical Assistance Level No physical assistance   Comment no AD   Sit to Stand CARE Score 4   Bed-Chair Transfer   Type of Assistance Needed Supervision   Physical Assistance Level No physical assistance   Comment sit pivot vs stand pivot   Chair/Bed-to-Chair Transfer CARE Score 4   Car Transfer   Type of Assistance Needed Supervision   Physical Assistance Level No physical assistance   Comment stand-pivot with use of bar on car door   Car Transfer CARE Score 4   Walk 10 Feet   Type of Assistance Needed Physical assistance;Verbal cues   Physical Assistance Level Total assistance   Comment Min RW + chair follow; increased VC for sequencing and safety, tends to retropulse   Walk 10 Feet CARE Score 1   Walk 50 Feet with Two Turns   Reason if not Attempted Safety concerns   Walk 50 Feet with Two Turns CARE Score 88   Walk 150 Feet   Reason if not Attempted Safety concerns   Walk 150 Feet CARE Score 88   Walking 10 Feet on Uneven Surfaces   Reason if not Attempted Safety concerns   Walking 10  Feet on Uneven Surfaces CARE Score 88   Ambulation   Primary Mode of Locomotion Prior to Admission Walk   Distance Walked (feet) 15 ft  (15' x 1, 10' x 1)   Assist Device Roller Walker   Gait Pattern Hopping   Limitations Noted In Balance;Device Management;Endurance;Heel Strike;Safety;Sequencing;Speed;Strength   Provided Assistance with: Balance;Direction;Trunk Support   Walk Assist Level Minimum Assist;Chair Follow   Findings Min RW + chair follow; increased VC for sequencing and safety, tends to retropulse   Does the patient walk? 2. Yes   Wheel 50 Feet with Two Turns   Type of Assistance Needed Independent   Physical Assistance Level No physical assistance   Wheel 50 Feet with Two Turns CARE Score 6   Wheel 150 Feet   Type of Assistance Needed Independent   Physical Assistance Level No physical assistance   Wheel 150 Feet CARE Score 6   Wheelchair mobility   Does the patient use a wheelchair? 1. Yes   Type of Wheelchair Used 1. Manual   Distance Level Surface (feet) 500 ft   Findings mod I level and unlevel surfaces outside   Curb or Single Stair   Reason if not Attempted Safety concerns   1 Step (Curb) CARE Score 88   4 Steps   Reason if not Attempted Safety concerns   4 Steps CARE Score 88   12 Steps   Reason if not Attempted Safety concerns   12 Steps CARE Score 88   Toilet Transfer   Comment Pt did not require during session   Therapeutic Interventions   Strengthening Seated and Standing LE TE   Balance Static standing balance on cobbelstone outside, no UE support, gentle support against R LE; prolonged standing tolerance while playing checkers, intermittent single UE support ~6 min   Other transfer training, gait training, w/c mobility   Assessment   Treatment Assessment Pt agreeable to PT this PM, received sitting upright in recliner. Pt demonstrated increased motivation to participate in PT today. Pt able to hop using RW for short distances with Min, VC required for safety/sequencing and maintaining  anterior weight shift over RW to improve safety and efficiency. He requires VC for hand placement and safety with RW when performing stand pivot transfers. Pt continuing to improve with standing tolerance and static standing balance. Will continue with current PT POC to improve deficits and promote return to PLOF.   Problem List Decreased strength;Decreased endurance;Impaired balance;Decreased mobility;Decreased cognition;Impaired judgement;Decreased safety awareness;Orthopedic restrictions   PT Barriers   Physical Impairment Decreased strength;Decreased range of motion;Decreased endurance;Impaired balance;Decreased mobility;Decreased coordination;Decreased cognition;Impaired judgement;Decreased safety awareness;Decreased skin integrity;Orthopedic restrictions   Functional Limitation Car transfers;Standing;Transfers;Ramp negotiation;Wheelchair management   Plan   Treatment/Interventions Functional transfer training;LE strengthening/ROM;Therapeutic exercise;Endurance training;Cognitive reorientation;Patient/family training;Equipment eval/education;Bed mobility;Gait training   Progress Progressing toward goals   PT Therapy Minutes   PT Time In 1300   PT Time Out 1430   PT Total Time (minutes) 90   PT Mode of treatment - Individual (minutes) 90   PT Mode of treatment - Concurrent (minutes) 0   PT Mode of treatment - Group (minutes) 0   PT Mode of treatment - Co-treat (minutes) 0   PT Mode of Treatment - Total time(minutes) 90 minutes   PT Cumulative Minutes 1778     Standing LE TE:  3x10, L LE only  March  Hip ABd  Hip Ext  Mini Squats    Seated LE TE:  3x10, R LE only  March  LAQ  GS  HR  TR    Patient remains OOB in chair, all needs in reach.  Alarm in place and activated.  Encouraged use of call bell, patient verbalizes understanding.

## 2024-08-23 LAB
INR PPP: 2.77 (ref 0.85–1.19)
PROTHROMBIN TIME: 29.1 SECONDS (ref 12.3–15)

## 2024-08-23 PROCEDURE — 99232 SBSQ HOSP IP/OBS MODERATE 35: CPT | Performed by: PHYSICIAN ASSISTANT

## 2024-08-23 PROCEDURE — 85610 PROTHROMBIN TIME: CPT

## 2024-08-23 PROCEDURE — 99232 SBSQ HOSP IP/OBS MODERATE 35: CPT | Performed by: PHYSICAL MEDICINE & REHABILITATION

## 2024-08-23 RX ORDER — WARFARIN SODIUM 2.5 MG/1
2.5 TABLET ORAL WEEKLY
Status: DISCONTINUED | OUTPATIENT
Start: 2024-08-23 | End: 2024-08-30 | Stop reason: HOSPADM

## 2024-08-23 RX ORDER — WARFARIN SODIUM 5 MG/1
5 TABLET ORAL
Status: DISCONTINUED | OUTPATIENT
Start: 2024-08-24 | End: 2024-08-30 | Stop reason: HOSPADM

## 2024-08-23 RX ADMIN — METOPROLOL SUCCINATE 25 MG: 25 TABLET, EXTENDED RELEASE ORAL at 08:34

## 2024-08-23 RX ADMIN — GABAPENTIN 100 MG: 100 CAPSULE ORAL at 13:50

## 2024-08-23 RX ADMIN — ACETAMINOPHEN 975 MG: 325 TABLET, FILM COATED ORAL at 08:32

## 2024-08-23 RX ADMIN — DIVALPROEX SODIUM 1250 MG: 500 TABLET, EXTENDED RELEASE ORAL at 08:33

## 2024-08-23 RX ADMIN — GABAPENTIN 100 MG: 100 CAPSULE ORAL at 22:37

## 2024-08-23 RX ADMIN — LEVOCARNITINE 330 MG: 1 SOLUTION ORAL at 13:50

## 2024-08-23 RX ADMIN — LEVOCARNITINE 330 MG: 1 SOLUTION ORAL at 18:00

## 2024-08-23 RX ADMIN — ZONISAMIDE 400 MG: 100 CAPSULE ORAL at 08:35

## 2024-08-23 RX ADMIN — DOLUTEGRAVIR SODIUM 50 MG: 50 TABLET, FILM COATED ORAL at 08:35

## 2024-08-23 RX ADMIN — TAMSULOSIN HYDROCHLORIDE 0.4 MG: 0.4 CAPSULE ORAL at 16:22

## 2024-08-23 RX ADMIN — LOSARTAN POTASSIUM 50 MG: 50 TABLET, FILM COATED ORAL at 08:34

## 2024-08-23 RX ADMIN — ASPIRIN 81 MG: 81 TABLET, COATED ORAL at 08:34

## 2024-08-23 RX ADMIN — LAMOTRIGINE 50 MG: 25 TABLET ORAL at 18:00

## 2024-08-23 RX ADMIN — MIRTAZAPINE 15 MG: 15 TABLET, FILM COATED ORAL at 22:37

## 2024-08-23 RX ADMIN — ATORVASTATIN CALCIUM 80 MG: 80 TABLET, FILM COATED ORAL at 16:22

## 2024-08-23 RX ADMIN — GABAPENTIN 100 MG: 100 CAPSULE ORAL at 06:16

## 2024-08-23 RX ADMIN — WARFARIN SODIUM 2.5 MG: 2.5 TABLET ORAL at 18:00

## 2024-08-23 RX ADMIN — LEVOCARNITINE 330 MG: 1 SOLUTION ORAL at 08:36

## 2024-08-23 RX ADMIN — BICTEGRAVIR SODIUM, EMTRICITABINE, AND TENOFOVIR ALAFENAMIDE FUMARATE 1 TABLET: 50; 200; 25 TABLET ORAL at 08:35

## 2024-08-23 RX ADMIN — Medication 6 MG: at 22:37

## 2024-08-23 RX ADMIN — SERTRALINE HYDROCHLORIDE 75 MG: 50 TABLET ORAL at 08:33

## 2024-08-23 RX ADMIN — LAMOTRIGINE 50 MG: 25 TABLET ORAL at 08:33

## 2024-08-23 NOTE — PROGRESS NOTES
[facility]  Progress Note  Name: Sunil Patel I  MRN: [id number]  Unit/Bed#: -01 I Date of Admission: 7/6/2024   Date of Service: 8/23/2024 I Hospital Day: 48    Assessment & Plan   * Above-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)  Assessment & Plan  Presented with left lower extremity acute limb ischemia/extensive left iliofemoral and left infrainguinal embolism requiring left femoral thrombectomy and subsequent AKA on 6/7/24 with vascular surgery.  Continue with local wound care   Continue ASA, Coumadin and statin   Stable for discharge from PMR and medicine standpoint.  Dispo planning as per case management.  Patient is awaiting guardianship hearing, which is scheduled for 8/27/24.    Postoperative anemia  Assessment & Plan  Normocytic  PTA hemoglobin was normal, now has been running 10-11 since admission  No signs or symptoms of active bleeding  Iron panel reviewed, no evidence of TY  Likely postoperative ABLA  Monitor as needed      History of migraine headaches  Assessment & Plan  Continue PTA meds Depakote, gabapentin, zonegran and lamictal all of which can help in prevention  Unable to take triptans due to a history of stroke  PRN Tylenol     Cardiomyopathy (HCC)  Assessment & Plan  ECHO 6/10/2024 = LVEF 40-45% with mild global hypokinesis   Status post NM stress test 6/11/2024 = large, mild, fixed defect in the inferior wall, possibly due to diaphragmatic attenuation artifact, there is a small area of partial reversibility in the inferior apical wall suggestive of ischemia    Evaluated by cardiology.  Etiology felt to be possibly secondary to stress-induced cardiomyopathy, with apical thrombus  Cardiac catheterization deferred given the lack of any significant ischemia, and no current cardiac symptoms  Continue with medical management with aspirin, statin, beta-blocker, ARB  Remains euvolemic off of diuretics, continue to monitor volume status  "  Outpatient follow-up with cardiology, previously followed with Dr. Gumaro Aguila [facility]    Traumatic brain injury (HCC)  Assessment & Plan  Remote history of TBI, previously residing in a group home prior to residential sentence.  Evaluated by neuropsychiatry on 6/27/24 and deemed NOT to have medical decision-making capacity  Neuropsychology re-evaluated pt and he does not have decision making capacity.  Process for court appointed guardian started on 7/5/24 - CM following   Guardianship hearing 8/27/24    Carnitine deficiency (HCC)  Assessment & Plan  Continue levocarnitine 330 mg 3x daily with meals.    History of seizures  Assessment & Plan  Diagnosed in July 2019, follows with [facility] neurology outpatient  Continue home regimen with Depakote ER, Lamotrigine and Zonegran    Left ventricular thrombus  Assessment & Plan  Noted on echocardiogram 6/10/2024: spherical 1.5 x 1.3 cm thrombus at the LV apex   Initiated on AC with Xarelto however unable to verify insurance coverage, now on Coumadin at 5 mg daily  INR: 2.77  Give Coumadin 2.5mg tonight. Will give 5mg daily except Fridays when he will get 2.5mg.  INR Monday.    Benign essential hypertension  Assessment & Plan  Blood pressures acceptable on current regimen including Losartan 50 mg daily, Toprol-XL 25 mg daily    History of stroke  Assessment & Plan  History of left MCA CVA in May 2018 with residual expressive aphasia  Continue ASA and atorvastatin    Human immunodeficiency virus (HIV) infection ([facility])  Assessment & Plan  Continue Biktarvy and Tivicay.    Bipolar affective disorder ([facility])  Assessment & Plan  Psychiatry evaluated most recently on 8/12 and cleared for discharge to rehab   Continue home meds Zoloft and Remeron             The above assessment and plan was reviewed and updated as determined by my evaluation of the patient on 8/23/2024.    Labs:             Invalid input(s): \"LABGLOM\", \"CMP\"      Results from last 7 days   Lab Units 08/23/24  0616 " 08/22/24  0542   INR  2.77* 3.56*           Imaging  No orders to display       Review of Scheduled Meds:  Current Facility-Administered Medications   Medication Dose Route Frequency Provider Last Rate    acetaminophen  975 mg Oral TID PRN José Salcido MD      aspirin  81 mg Oral Daily Lorrie Barillas PA-C      atorvastatin  80 mg Oral Daily With Dinner Lorrie Barillas PA-C      bictegravir-emtricitab-tenofovir alafenamide  1 tablet Oral Daily With Breakfast Lorrie Barillas PA-C      bisacodyl  10 mg Rectal Daily PRN Kenny Castro MD      diphenhydrAMINE  25 mg Oral Q6H PRN Lorrie Barillas PA-C      divalproex sodium  1,250 mg Oral Daily Lorrie Barillas PA-C      dolutegravir  50 mg Oral Daily Lorrie Barillas PA-C      gabapentin  100 mg Oral Q8H Ashley Depadua, MD      lamoTRIgine  50 mg Oral BID Lorrie Barillas PA-C      levOCARNitine  1,000 mg/day Oral TID With Meals Lorrie Barillas PA-C      losartan  50 mg Oral Daily Lorrie Barillas PA-C      melatonin  6 mg Oral HS Lorrie Barillas PA-C      metoprolol succinate  25 mg Oral Daily CHARLIE Mcclelland      mirtazapine  15 mg Oral HS Lorrie Barillas PA-C      ondansetron  4 mg Oral Q6H PRN Lorrie Barillas PA-C      polyethylene glycol  17 g Oral Daily PRN Lorrie Barillas PA-C      senna  1 tablet Oral HS PRN Kenny Castro MD      sertraline  75 mg Oral Daily Lorrie Barillas PA-C      tamsulosin  0.4 mg Oral Daily With Dinner Lorrie Barillas PA-C      warfarin  2.5 mg Oral Weekly Lorrie Barillas PA-C      [START ON 8/24/2024] warfarin  5 mg Oral Once per day on Sunday Monday Tuesday Wednesday Thursday Saturday Lorrie Barillas PA-C      zonisamide  400 mg Oral Daily Lorrie Barillas PA-C         Subjective/ HPI: Patient seen and examined. Patients overnight issues or events were reviewed with nursing staff. New or overnight issues include the  following:     Pt seen in his room. He does not wish to do therapy today. He denies any specific complaints.    ROS:   A 10 point ROS was performed; negative except as noted above.        *Labs /Radiology studies Reviewed  *Medications  reviewed and reconciled as needed  *Please refer to order section for additional ordered labs studies      Physical Examination:  Vitals:   Vitals:    08/22/24 1300 08/22/24 1929 08/23/24 0600 08/23/24 0834   BP: 116/66 126/69 117/71 121/72   BP Location: Left arm Left arm Left arm    Pulse: 87 91 90 94   Resp: 17 18 20    Temp: 98.3 °F (36.8 °C) 98.6 °F (37 °C) 97.7 °F (36.5 °C)    TempSrc: Oral Oral Oral    SpO2: 95% 91% 92%    Weight:       Height:           General Appearance: NAD; pleasant  HEENT: PERRLA, conjuctiva normal; mucous membranes moist; face symmetrical  Neck:  Supple  Lungs: clear bilaterally, normal respiratory effort, no retractions, expiratory effort normal, on room air  CV: regular rate and rhythm, no murmurs rubs or gallops noted   ABD: soft non tender, +BS x4  EXT: Lt AKA  Skin: normal turgor, normal texture, no rash  Psych: affect normal, mood normal  Neuro: AAOx3. Expressive aphasia       The above physical exam was reviewed and updated as determined by my evaluation of the patient on 8/23/2024.    Invasive Devices       Drain  Duration             External Urinary Catheter 22 days                       VTE Pharmacologic Prophylaxis: Warfarin (Coumadin)  Code Status: Level 1 - Full Code  Current Length of Stay: 48 day(s)    Total floor / unit time spent today  35 minutes   Coordination of patient's care was performed in conjunction with consulting services. Time invested included review of patient's labs, vitals, and management of their comorbidities with continued monitoring, examination of patient as well as answering patient questions, documenting her findings and creating progress note in electronic medical record,  ordering appropriate diagnostic  testing.       ** Please Note:  voice to text software may have been used in the creation of this document. Although proof errors in transcription or interpretation are a potential of such software**

## 2024-08-23 NOTE — ASSESSMENT & PLAN NOTE
ECHO 6/10/2024 = LVEF 40-45% with mild global hypokinesis   Status post NM stress test 6/11/2024 = large, mild, fixed defect in the inferior wall, possibly due to diaphragmatic attenuation artifact, there is a small area of partial reversibility in the inferior apical wall suggestive of ischemia    Evaluated by cardiology.  Etiology felt to be possibly secondary to stress-induced cardiomyopathy, with apical thrombus  Cardiac catheterization deferred given the lack of any significant ischemia, and no current cardiac symptoms  Continue with medical management with aspirin, statin, beta-blocker, ARB  Remains euvolemic off of diuretics, continue to monitor volume status   Outpatient follow-up with cardiology, previously followed with Dr. Gumaro Aguila [facility]

## 2024-08-23 NOTE — PROGRESS NOTES
08/23/24 1230   Pain Assessment   Pain Assessment Tool 0-10   Pain Score 10 - Worst Possible Pain   Pain Location/Orientation Location: Head   PT Therapy Minutes   PT Time In 1230   PT Time Out 1400   PT Total Time (minutes) 90   PT Mode of treatment - Individual (minutes) 0   PT Mode of treatment - Concurrent (minutes) 0   PT Mode of treatment - Group (minutes) 0   PT Mode of treatment - Co-treat (minutes) 0   PT Mode of Treatment - Total time(minutes) 0 minutes   PT Cumulative Minutes 1748   Therapy Time missed   Time missed? Yes   Amount of time missed 90   Reason for time missed Extreme fatigue;Illness   Time(s) multiple attempts made 1230,1245,1300

## 2024-08-23 NOTE — CASE MANAGEMENT
CM checked in with pt at bedside, pt reporting he is feeling down and little off but he is hopeful he will be able to change that by end of the day. CM continues to follow for dispo needs.

## 2024-08-23 NOTE — PLAN OF CARE
Problem: INFECTION - ADULT  Goal: Absence or prevention of progression during hospitalization  Description: INTERVENTIONS:  - Assess and monitor for signs and symptoms of infection  - Monitor lab/diagnostic results  - Monitor all insertion sites, i.e. indwelling lines, tubes, and drains  - Monitor endotracheal if appropriate and nasal secretions for changes in amount and color  - [place] appropriate cooling/warming therapies per order  - Administer medications as ordered  - Instruct and encourage patient and family to use good hand hygiene technique  - Identify and instruct in appropriate isolation precautions for identified infection/condition  Outcome: Progressing

## 2024-08-23 NOTE — ASSESSMENT & PLAN NOTE
Noted on echocardiogram 6/10/2024: spherical 1.5 x 1.3 cm thrombus at the LV apex   Initiated on AC with Xarelto however unable to verify insurance coverage, now on Coumadin at 5 mg daily  INR: 2.77  Give Coumadin 2.5mg tonight. Will give 5mg daily except Fridays when he will get 2.5mg.  INR Monday.

## 2024-08-23 NOTE — PROGRESS NOTES
Physical Medicine and Rehabilitation Progress Note  Sunil Patel 63 y.o. male MRN: [id number]  Unit/Bed#: [facility] 451-01 Encounter: [id number]    To Review: Sunil Patel is a 63 y.o. male who  has a past medical history of Acute lower limb ischemia (06/08/2024), Anxiety, Depression, HIV disease (HCC), Substance abuse (HCC), and Suicide attempt (HCC). who presented to the [facility] on 6/7/24 from custodial for increased LLE swelling and pain and was found to have a left external iliac artery occlusion and acute limb ischemia. 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 Patient had TTE on 6/10/24 showing LV apex thrombus. On 6/11/24 patient had pharmacologic nuclear stress test/SPECT scan which did not show any significant areas of ischemia with EF 40-45% and recommended continuing A/C for LV apical thrombus. His course was complicated by b/l buttock unstageable pressure ulcers, urinary and fecal incontinence, pain, and significant decline in ADLs and mobility. Patient has been continued on Xarelto for anticoagulation, as well as ASA and statin. Patient has a history of TBI, with baseline nonfluent aphasia and forgetfulness. He was evaluated by neuropsychology on 6/27/24, and deemed to not have capacity to make fully informed medical decisions. Therefore, the guardianship process was initiated as of 7/5/24. He was admitted to the [facility] on 7/6.     Chief Complaint: bad mood    Interval History/Subjective:  No acute events overnight. Seen this morning at bedside recliner watching TV. Declined OT this morning. On my interview, did not want to talk and said he was in a bad mood. No acute concerns on his end otherwise. Enjoyed talking to a fellow amputee yesterday as they were able to talk about their  "similar experiences. Last BM 8/23 today    Denies fever, chills, headaches, changes in vision, chest pain, shortness of breath, presyncope, abdominal pain, nausea, diarrhea, constipation, and insomnia.     ROS:  10 point ROS performed and negative unless mentioned in HPI.      Today's Changes:  INR 2.77. IM starting new schedule of warfarin with 2.5mg on Fridays and 5mg the rest of the days. Recheck INR monday  Continue meetings with fellow amputees  Dispo planning to continue. At this point he is appropriate for skilled nursing facility level of care.    Assessment/Plan:    Patient incapable of making informed decisions  Assessment & Plan  - History of remote TBI and prior strokes with comorbid psychiatric history.  - Evaluated by neuropsychology, Dr. Dilshad Tatum, PhD on 6/27/24, found to have \"diffuse cognitive dysfunction and on a measure assessing awareness of personal health status and ability to evaluate health problems, handle medical emergencies and take safety precautions, patient performed in the IMPAIRED range of functioning. During this encounter, patient does not appear to have capacity to make fully informed medical decisions.\"  - Court-appointed guardianship process has been initiated by acute care CM Katia Kay.    - We have identified two friends who are interested in being patient's guardians and have been involved and invested in patient's care on the ARC.   - They will need to be interviewed by the  involved in this process.    - He has to undergo his hearing on 8/6/2024 first.  -- now rescheduled to 8/27   - He would ultimately benefit from assisted/nursing home/memory care unit - he does very well with structure and supervision.      History of migraine headaches  Assessment & Plan  Chronic issue.  Seemed to worsen with increased irritability after discontinuing gabapentin  Resumed gabapentin  [facility] with [facility]  Sleep logs have been good - " discontinued.  Headache is on and off    Cardiomyopathy (HCC)  Assessment & Plan  ECHO on 6/10/2024 showed LVEF 40-45% with mild global hypokinesis   Status post NM stress test on 6/11/2024 which showed: a large, mild, fixed defect in the inferior wall, possibly due to diaphragmatic attenuation artifact, there is a small area of partial reversibility in the inferior apical wall suggestive of ischemia    Elevated by cardiology, etiology felt to be possibly secondary to stress-induced cardiomyopathy, with apical thrombus  Cardiac catheterization deferred given the lack of any significant ischemia, and no current cardiac symptoms  Continue with medical management with aspirin, statin, beta-blocker, ARB  Monitor volume status, remains euvolemic off of diuretics   Outpatient follow-up with cardiology    At risk for constipation  Assessment & Plan  - Incontinent during acute care hospitalization  - Has improved/resolved with improved mobility.  - Now off scheduled bowel regimen.   - Last BM 8/23 and continent. Not on a regimen  - PRN miralax, Senna and suppository    At risk for venous thromboembolism (VTE)  Assessment & Plan  - Fully anticoagulated on warfarin for apical thrombus    Traumatic brain injury (HCC)  Assessment & Plan  Remote history.  See neurocog impairment     Carnitine deficiency (HCC)  Assessment & Plan  - Carnitine replacement  - Appreciate medicine management      History of seizures  Assessment & Plan  - Depakote ER, lamotrigine, zonisamide  - Outpatient follow-up with [facility] neurology    Left ventricular thrombus  Assessment & Plan  - Visualized on echocardiogram on 6/10/24: spherical 1.5 x 1.3 cm thrombus at the LV apex.   - Was started on rivaroxaban, but patient does not appear to have insurance coverage for prescriptions   - Xarelto, eliquis, and pradaxa likely cost prohibitive per IM   - 7/29 transitioned to warfarin with lovenox bridge.   - Now off lovenox, and fully anticoagulated on  warfarin.   - Management as per IM.   - INR 2.77 on 8/23. IM starting new schedule of warfarin with 2.5mg on Fridays and 5mg the rest of the days. Recheck INR monday  - Outpatient follow-up with cardiology    Benign essential hypertension  Assessment & Plan  - Toprol, losartan  - Appreciate medicine management    History of stroke  Assessment & Plan  - History of old left temporal and parietal lobe cortical infarcts, also involving the insula and left occipital lobe as well as small right posterior parietal lobe. Stable on most recent CT head on 5/16/24.   - ASA, and statin for secondary prevention  - Optimal BP control  - Monitor neuro exam - hx of LV thrombus    - See that entry  - OP neuro follow-up     Human immunodeficiency virus (HIV) infection (HCC)  Assessment & Plan  - Continue anti-retroviral treatment  - Appreciate medicine management during ARC course  - OP ID follow-up       Bipolar affective disorder (HCC)  Assessment & Plan  Mood acceptable; continue meds as outlined   Supportive counseling  NeuroPsychology consult while in ARC if available for support  Psych evaluated on 8/12 and deemed him stable for discharge with current regimen as below  Counseled on and continue to encourage deep breathing/relaxation/behavioral management techniques  - Mirtazapine 15 mg qHs  - Sertraline 75 mg daily   - Lamictal 50mg BID  - Depakote ER 1250mg qday   - Outpatient psychiatry follow-up  - Would consult Psych before changing medications    * 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 Underwent L femoral thrombectomy and AKA with vascular surgery   - Vas sx follow-up 7/10 - L AKA incision site well-healed, staples taken out at bedside  - [facility] will be vendor - Patient now has .  - Monitor for hip flexion/abduction tightness. Stretches in therapy.  - Phantom limb sensation - not painful, well controlled.  - Now  on Coumadin with hx of LV thrombus and PAOD. Checking INR's as above   - PT/OT/SLP (to work on carry over strategies) 3-5 hours/day, 5-7 days/week.    - Patient now refusing at times   - Has met rehab goals at this point.   - Outpatient f/u with [facility] and Vascular      Pressure injury of buttock, stage 3 (HCC)-resolved as of 8/9/2024  Assessment & Plan  Resolved as of 8/7.   - Wound care consulted and following weekly  - POA L buttock pressure injury unstageable has healed.  - POA R buttock pressure injury  evolved from unstageable to Stage 3, and is now resolved.   - Recommend ROHO cushion in chair when out of bed instead   - Preventative hydraguard to bilateral heels BID and PRN.   - P500  - Monitor clinically for breakdown, frequent turns  - reviewed picture of wound today. It is healing well. Continue to monitor    Urinary incontinence-resolved as of 8/16/2024  Assessment & Plan  - Did have some incontinence on acute - improved with timed voids and consistent assistance with his urinal  - Described as urgency  - Marked improvement on timed voids.   - monitor for retention, incontinence (including overflow incontinence), signs/symptoms of UTI  - Timed Voids and PVRs Q4hrs initiated earlier. And PVR <150 x3, so scans discontinued.    - He has some difficulty managing the urinal    [facility]    - Still uses condom catheter at night, in part for continence care/to protect his skin given his buttock wounds. However now note that buttocks wound has healed  - Continue timed voids             Health Maintenance  #GI Prophylaxis: not indicated  #Code Status: Full code  #FEN: Cardiac diet + Ensure at bfast/dinner  #Dispo: Teams 8/20: ADD TBD. Still working on placement. May need to transfer back to acute care hospital. Guardianship court date is 8/27. Suspect placement even after guardianship will be difficult. Still lacks capacity per 8/20 neuropsych evaluation.  "  Will need f/u with PMR, PCP, Vascular, Psych      Objective:     Functional Update:  PT: Ind bed mobility, Sup transfers, Ind wheelchair mobility 500', Ind ramp  OT: Ind eating, Ind grooming, Sup bathing, Ind UB dressing, Sup LB dressing, Sup toileting, Sup tub/shower transfer, Sup toilet transfers   SLP: Expressive and receptive language skill impairment - difficulty with verbal expression, written expression, auditory comprehension, reading comprehension. Still impairments cognitively, but difficult to tease through given speech difficulties. Jignesh comprehension, Sup social interaction, modA expression, memory, and problem solving.     Allergies per EMR    Physical Exam:  Temp:  [97.7 °F (36.5 °C)-98.6 °F (37 °C)] 98.5 °F (36.9 °C)  HR:  [81-94] 81  Resp:  [18-20] 19  BP: (108-126)/(65-72) 108/65  Oxygen Therapy  SpO2: 92 %      Gen: No acute distress, Well-nourished, well-appearing.   HEENT: Normocephalic/Atraumatic  Cardiovascular: warm extremities  Heme/Extr: No edema/clubbing/cyanosis  Pulmonary: Non-labored breathing. Normal chest rise and fall   : No fernandes  GI: non-distended  MSK: moves BUE and RLE spontaneously. L AKA. No effusions or deformities. Bulk is symmetric.   Integumentary: Skin is dry. No rashes or ulcers.  Neuro: alert, appropriate to questioning, expressive aphasia  Psych: guarded mood and affect.      Diagnostic Studies: Reviewed, no new imaging    Laboratory:  Reviewed, no new labs.        Invalid input(s): \"PLTCT\"          Invalid input(s): \"CA\"    Results from last 7 days   Lab Units 08/23/24  0616 08/22/24  0542 08/19/24  0539   PROTIME seconds 29.1* 35.1* 28.6*   INR  2.77* 3.56* 2.72*        Patient Active Problem List   Diagnosis    Bipolar affective disorder (HCC)    Substance abuse (HCC)    Human immunodeficiency virus (HIV) infection (HCC)    History of stroke    Benign essential hypertension    Positive laboratory testing for human immunodeficiency virus (HCC)    Hypertension    " Unspecified vitamin D deficiency    Tobacco abuse    Cerebrovascular accident (CVA) (HCC)    Left ventricular thrombus    History of seizures    Carnitine deficiency (HCC)    Above-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)    Traumatic brain injury (HCC)    Impaired mobility and activities of daily living    At risk for venous thromboembolism (VTE)    Acute pain    At risk for constipation    Patient incapable of making informed decisions    Adjustment disorder with mixed anxiety and depressed mood    Cardiomyopathy (HCC)    History of migraine headaches    Postoperative anemia         Medications  Current Facility-Administered Medications   Medication Dose Route Frequency Provider Last Rate    acetaminophen  975 mg Oral TID PRN José Salcido MD      aspirin  81 mg Oral Daily Lorrie Barillas PA-C      atorvastatin  80 mg Oral Daily With Dinner Lorrie Barillas PA-C      bictegravir-emtricitab-tenofovir alafenamide  1 tablet Oral Daily With Breakfast Lorrie Barillas PA-C      bisacodyl  10 mg Rectal Daily PRN Kenny Castro MD      diphenhydrAMINE  25 mg Oral Q6H PRN Lorrie Barillas PA-C      divalproex sodium  1,250 mg Oral Daily Lorrie Barillas PA-C      dolutegravir  50 mg Oral Daily Lorrie Barillas PA-C      gabapentin  100 mg Oral Q8H Ashley Depadua, MD      lamoTRIgine  50 mg Oral BID Lorrie Barillas PA-C      levOCARNitine  1,000 mg/day Oral TID With Meals Lorrie Barillas PA-C      losartan  50 mg Oral Daily Lorrie Barillas PA-C      melatonin  6 mg Oral HS Lorrie Barillas PA-C      metoprolol succinate  25 mg Oral Daily CHARLIE Mcclelland      mirtazapine  15 mg Oral HS Lorrie Barillas PA-C      ondansetron  4 mg Oral Q6H PRN Lorrie Barillas PA-C      polyethylene glycol  17 g Oral Daily PRN Lorrie Barillas PA-C      senna  1 tablet Oral HS PRN Kenny Castro MD      sertraline  75 mg Oral Daily Lorrie  EJ Barillas      tamsulosin  0.4 mg Oral Daily With Dinner Lorrie Barillas PA-C      warfarin  2.5 mg Oral Weekly Lorrie Barillas PA-C      [START ON 8/24/2024] warfarin  5 mg Oral Once per day on Sunday Monday Tuesday Wednesday Thursday Saturday Lorrie Barillas PA-C      zonisamide  400 mg Oral Daily Lorrie Barillas PA-C            ** Please Note: Fluency Direct voice to text software may have been used in the creation of this document. **

## 2024-08-23 NOTE — PROGRESS NOTES
"   08/23/24 0900   Assessment   Treatment Assessment Pt unagreeable to scheduled 90 minutes of OT despite encouragement and edu provided. Pt expressed \"bad day\" and reporting fatigue. Cont OT POC.   Therapy Time missed   Time missed? Yes   Amount of time missed 90   Reason for time missed Extreme fatigue  (refused)   Time(s) multiple attempts made 900, 930, 1100     Katia Gallagher, MS, OTR/L    "

## 2024-08-24 RX ADMIN — TAMSULOSIN HYDROCHLORIDE 0.4 MG: 0.4 CAPSULE ORAL at 16:38

## 2024-08-24 RX ADMIN — GABAPENTIN 100 MG: 100 CAPSULE ORAL at 21:54

## 2024-08-24 RX ADMIN — LAMOTRIGINE 50 MG: 25 TABLET ORAL at 09:49

## 2024-08-24 RX ADMIN — LEVOCARNITINE 330 MG: 1 SOLUTION ORAL at 14:36

## 2024-08-24 RX ADMIN — GABAPENTIN 100 MG: 100 CAPSULE ORAL at 06:26

## 2024-08-24 RX ADMIN — LEVOCARNITINE 330 MG: 1 SOLUTION ORAL at 09:51

## 2024-08-24 RX ADMIN — ACETAMINOPHEN 975 MG: 325 TABLET, FILM COATED ORAL at 09:50

## 2024-08-24 RX ADMIN — Medication 6 MG: at 21:54

## 2024-08-24 RX ADMIN — ATORVASTATIN CALCIUM 80 MG: 80 TABLET, FILM COATED ORAL at 16:38

## 2024-08-24 RX ADMIN — DOLUTEGRAVIR SODIUM 50 MG: 50 TABLET, FILM COATED ORAL at 09:51

## 2024-08-24 RX ADMIN — LEVOCARNITINE 330 MG: 1 SOLUTION ORAL at 17:19

## 2024-08-24 RX ADMIN — DIVALPROEX SODIUM 1250 MG: 500 TABLET, EXTENDED RELEASE ORAL at 09:48

## 2024-08-24 RX ADMIN — MIRTAZAPINE 15 MG: 15 TABLET, FILM COATED ORAL at 21:54

## 2024-08-24 RX ADMIN — LOSARTAN POTASSIUM 50 MG: 50 TABLET, FILM COATED ORAL at 09:49

## 2024-08-24 RX ADMIN — ZONISAMIDE 400 MG: 100 CAPSULE ORAL at 09:52

## 2024-08-24 RX ADMIN — BICTEGRAVIR SODIUM, EMTRICITABINE, AND TENOFOVIR ALAFENAMIDE FUMARATE 1 TABLET: 50; 200; 25 TABLET ORAL at 09:53

## 2024-08-24 RX ADMIN — SERTRALINE HYDROCHLORIDE 75 MG: 50 TABLET ORAL at 09:48

## 2024-08-24 RX ADMIN — ASPIRIN 81 MG: 81 TABLET, COATED ORAL at 09:48

## 2024-08-24 RX ADMIN — LAMOTRIGINE 50 MG: 25 TABLET ORAL at 17:18

## 2024-08-24 RX ADMIN — GABAPENTIN 100 MG: 100 CAPSULE ORAL at 14:36

## 2024-08-24 RX ADMIN — WARFARIN SODIUM 5 MG: 5 TABLET ORAL at 21:53

## 2024-08-24 RX ADMIN — METOPROLOL SUCCINATE 25 MG: 25 TABLET, EXTENDED RELEASE ORAL at 09:49

## 2024-08-25 ENCOUNTER — APPOINTMENT (INPATIENT)
Dept: RADIOLOGY | Facility: HOSPITAL | Age: 64
DRG: 559 | End: 2024-08-25
Payer: MEDICARE

## 2024-08-25 PROBLEM — R10.84 GENERALIZED ABDOMINAL PAIN: Status: ACTIVE | Noted: 2024-08-25

## 2024-08-25 PROBLEM — Z87.820 HISTORY OF TRAUMATIC BRAIN INJURY: Status: ACTIVE | Noted: 2024-07-06

## 2024-08-25 LAB
ALBUMIN SERPL BCG-MCNC: 3.5 G/DL (ref 3.5–5)
ALP SERPL-CCNC: 144 U/L (ref 34–104)
ALT SERPL W P-5'-P-CCNC: 10 U/L (ref 7–52)
ANION GAP SERPL CALCULATED.3IONS-SCNC: 6 MMOL/L (ref 4–13)
AST SERPL W P-5'-P-CCNC: 10 U/L (ref 13–39)
BASOPHILS # BLD AUTO: 0.04 THOUSANDS/ÂΜL (ref 0–0.1)
BASOPHILS NFR BLD AUTO: 1 % (ref 0–1)
BILIRUB SERPL-MCNC: 0.18 MG/DL (ref 0.2–1)
BUN SERPL-MCNC: 18 MG/DL (ref 5–25)
CALCIUM SERPL-MCNC: 9.5 MG/DL (ref 8.4–10.2)
CHLORIDE SERPL-SCNC: 106 MMOL/L (ref 96–108)
CO2 SERPL-SCNC: 27 MMOL/L (ref 21–32)
CREAT SERPL-MCNC: 0.94 MG/DL (ref 0.6–1.3)
EOSINOPHIL # BLD AUTO: 0.24 THOUSAND/ÂΜL (ref 0–0.61)
EOSINOPHIL NFR BLD AUTO: 4 % (ref 0–6)
ERYTHROCYTE [DISTWIDTH] IN BLOOD BY AUTOMATED COUNT: 16.1 % (ref 11.6–15.1)
GFR SERPL CREATININE-BSD FRML MDRD: 86 ML/MIN/1.73SQ M
GLUCOSE P FAST SERPL-MCNC: 97 MG/DL (ref 65–99)
GLUCOSE SERPL-MCNC: 97 MG/DL (ref 65–140)
HCT VFR BLD AUTO: 37.2 % (ref 36.5–49.3)
HGB BLD-MCNC: 10.9 G/DL (ref 12–17)
IMM GRANULOCYTES # BLD AUTO: 0.03 THOUSAND/UL (ref 0–0.2)
IMM GRANULOCYTES NFR BLD AUTO: 1 % (ref 0–2)
INR PPP: 1.87 (ref 0.85–1.19)
LYMPHOCYTES # BLD AUTO: 1.88 THOUSANDS/ÂΜL (ref 0.6–4.47)
LYMPHOCYTES NFR BLD AUTO: 30 % (ref 14–44)
MCH RBC QN AUTO: 28.2 PG (ref 26.8–34.3)
MCHC RBC AUTO-ENTMCNC: 29.3 G/DL (ref 31.4–37.4)
MCV RBC AUTO: 96 FL (ref 82–98)
MONOCYTES # BLD AUTO: 0.79 THOUSAND/ÂΜL (ref 0.17–1.22)
MONOCYTES NFR BLD AUTO: 13 % (ref 4–12)
NEUTROPHILS # BLD AUTO: 3.36 THOUSANDS/ÂΜL (ref 1.85–7.62)
NEUTS SEG NFR BLD AUTO: 51 % (ref 43–75)
NRBC BLD AUTO-RTO: 0 /100 WBCS
PLATELET # BLD AUTO: 320 THOUSANDS/UL (ref 149–390)
PMV BLD AUTO: 8 FL (ref 8.9–12.7)
POTASSIUM SERPL-SCNC: 4.7 MMOL/L (ref 3.5–5.3)
PROT SERPL-MCNC: 7.7 G/DL (ref 6.4–8.4)
PROTHROMBIN TIME: 21.6 SECONDS (ref 12.3–15)
RBC # BLD AUTO: 3.86 MILLION/UL (ref 3.88–5.62)
SODIUM SERPL-SCNC: 139 MMOL/L (ref 135–147)
WBC # BLD AUTO: 6.34 THOUSAND/UL (ref 4.31–10.16)

## 2024-08-25 PROCEDURE — 80053 COMPREHEN METABOLIC PANEL: CPT | Performed by: PHYSICIAN ASSISTANT

## 2024-08-25 PROCEDURE — 85610 PROTHROMBIN TIME: CPT | Performed by: PHYSICIAN ASSISTANT

## 2024-08-25 PROCEDURE — 74018 RADEX ABDOMEN 1 VIEW: CPT

## 2024-08-25 PROCEDURE — 85025 COMPLETE CBC W/AUTO DIFF WBC: CPT | Performed by: PHYSICIAN ASSISTANT

## 2024-08-25 RX ADMIN — MIRTAZAPINE 15 MG: 15 TABLET, FILM COATED ORAL at 21:56

## 2024-08-25 RX ADMIN — LAMOTRIGINE 50 MG: 25 TABLET ORAL at 09:09

## 2024-08-25 RX ADMIN — WARFARIN SODIUM 5 MG: 5 TABLET ORAL at 22:00

## 2024-08-25 RX ADMIN — LEVOCARNITINE 330 MG: 1 SOLUTION ORAL at 17:13

## 2024-08-25 RX ADMIN — LEVOCARNITINE 330 MG: 1 SOLUTION ORAL at 09:10

## 2024-08-25 RX ADMIN — TAMSULOSIN HYDROCHLORIDE 0.4 MG: 0.4 CAPSULE ORAL at 17:12

## 2024-08-25 RX ADMIN — LAMOTRIGINE 50 MG: 25 TABLET ORAL at 17:12

## 2024-08-25 RX ADMIN — GABAPENTIN 100 MG: 100 CAPSULE ORAL at 15:32

## 2024-08-25 RX ADMIN — SERTRALINE HYDROCHLORIDE 75 MG: 50 TABLET ORAL at 09:09

## 2024-08-25 RX ADMIN — Medication 6 MG: at 21:56

## 2024-08-25 RX ADMIN — ASPIRIN 81 MG: 81 TABLET, COATED ORAL at 09:10

## 2024-08-25 RX ADMIN — GABAPENTIN 100 MG: 100 CAPSULE ORAL at 05:01

## 2024-08-25 RX ADMIN — ATORVASTATIN CALCIUM 80 MG: 80 TABLET, FILM COATED ORAL at 17:12

## 2024-08-25 RX ADMIN — DOLUTEGRAVIR SODIUM 50 MG: 50 TABLET, FILM COATED ORAL at 09:10

## 2024-08-25 RX ADMIN — LEVOCARNITINE 330 MG: 1 SOLUTION ORAL at 15:32

## 2024-08-25 RX ADMIN — DIVALPROEX SODIUM 1250 MG: 500 TABLET, EXTENDED RELEASE ORAL at 09:09

## 2024-08-25 RX ADMIN — BICTEGRAVIR SODIUM, EMTRICITABINE, AND TENOFOVIR ALAFENAMIDE FUMARATE 1 TABLET: 50; 200; 25 TABLET ORAL at 09:10

## 2024-08-25 RX ADMIN — GABAPENTIN 100 MG: 100 CAPSULE ORAL at 21:56

## 2024-08-25 RX ADMIN — LOSARTAN POTASSIUM 50 MG: 50 TABLET, FILM COATED ORAL at 09:10

## 2024-08-25 RX ADMIN — ZONISAMIDE 400 MG: 100 CAPSULE ORAL at 09:10

## 2024-08-25 RX ADMIN — METOPROLOL SUCCINATE 25 MG: 25 TABLET, EXTENDED RELEASE ORAL at 09:10

## 2024-08-25 NOTE — PROGRESS NOTES
"   08/25/24 1000   Pain Assessment   Pain Score 5   Pain Location/Orientation Orientation: Left;Location: Abdomen   Pain Onset/Description Onset: Ongoing   Hospital Pain Intervention(s) Rest   Assessment   Treatment Assessment attempted to see pt at 10 AM but he was sleeping although easily arousable.  He declined PT citing still not feeling good due to ongoing L abdominal pain \" I already talked to the lady\" referring to FADUMO Andrew. RN notified and confirmed pt been complaining of abdominal pain this morning.   Therapy Time missed   Time missed? Yes   Amount of time missed 60   Reason for time missed Illness   Time(s) multiple attempts made 1000       "

## 2024-08-25 NOTE — PROGRESS NOTES
"   08/25/24 0830   Assessment   Treatment Assessment attempted to see pt at 8:30 for PT session but upon entering room pt still asleep and was gently woken up. He was pleasant but declined therapy stating \" no ma'am I don't feel good at all\". Pt agreeable for PT to re-attempt later. RN notified of therapy session refusal.   Therapy Time missed   Time missed? Yes   Amount of time missed 60   Reason for time missed Extreme fatigue   Time(s) multiple attempts made 0830       "

## 2024-08-25 NOTE — ASSESSMENT & PLAN NOTE
ECHO 6/10/2024 = LVEF 40-45% with mild global hypokinesis   Status post NM stress test 6/11/2024 = large, mild, fixed defect in the inferior wall, possibly due to diaphragmatic attenuation artifact, there is a small area of partial reversibility in the inferior apical wall suggestive of ischemia    Evaluated by cardiology.  Etiology felt to be possibly secondary to stress-induced cardiomyopathy, with apical thrombus  Cardiac catheterization deferred given the lack of any significant ischemia, and no current cardiac symptoms  Continue with medical management with aspirin, statin, beta-blocker, ARB  Remains euvolemic off of diuretics, continue to monitor volume status   Outpatient follow-up with cardiology, previously followed with Dr. Gumaro Aguila [facility]

## 2024-08-25 NOTE — ASSESSMENT & PLAN NOTE
Pt refused therapy this AM which is not uncommon. However, today he reports abdominal pain in the Rt > Lt LQ.  He states that is has been present for a long time, about 2 weeks.  He denies nausea or vomiting. He has been having a bowel movement daily.  Labs unremarkable.  KUB formal read pending.

## 2024-08-25 NOTE — PLAN OF CARE
Problem: PAIN - ADULT  Goal: Verbalizes/displays adequate comfort level or baseline comfort level  Description: Interventions:  - Encourage patient to monitor pain and request assistance  - Assess pain using appropriate pain scale  - Administer analgesics based on type and severity of pain and evaluate response  - Implement non-pharmacological measures as appropriate and evaluate response  - Consider cultural and social influences on pain and pain management  - Notify physician/advanced practitioner if interventions unsuccessful or patient reports new pain  Outcome: Progressing     Problem: INFECTION - ADULT  Goal: Absence or prevention of progression during hospitalization  Description: INTERVENTIONS:  - Assess and monitor for signs and symptoms of infection  - Monitor lab/diagnostic results  - Monitor all insertion sites, i.e. indwelling lines, tubes, and drains  - Monitor endotracheal if appropriate and nasal secretions for changes in amount and color  - [place] appropriate cooling/warming therapies per order  - Administer medications as ordered  - Instruct and encourage patient and family to use good hand hygiene technique  - Identify and instruct in appropriate isolation precautions for identified infection/condition  Outcome: Progressing  Goal: Absence of fever/infection during neutropenic period  Description: INTERVENTIONS:  - Monitor WBC    Outcome: Progressing     Problem: SAFETY ADULT  Goal: Patient will remain free of falls  Description: INTERVENTIONS:  - Educate patient/family on patient safety including physical limitations  - Instruct patient to call for assistance with activity   - Consult OT/PT to assist with strengthening/mobility   - Keep Call bell within reach  - Keep bed low and locked with side rails adjusted as appropriate  - Keep care items and personal belongings within reach  - Initiate and maintain comfort rounds  - Make Fall Risk Sign visible to staff  - Offer Toileting every 2 Hours,  in advance of need  - Initiate/Maintain alarm  - Obtain necessary fall risk management equipment:   - Apply yellow socks and bracelet for high fall risk patients  - Consider moving patient to room near nurses station  Outcome: Progressing  Goal: Maintain or return to baseline ADL function  Description: INTERVENTIONS:  -  Assess patient's ability to carry out ADLs; assess patient's baseline for ADL function and identify physical deficits which impact ability to perform ADLs (bathing, care of mouth/teeth, toileting, grooming, dressing, etc.)  - Assess/evaluate cause of self-care deficits   - Assess range of motion  - Assess patient's mobility; develop plan if impaired  - Assess patient's need for assistive devices and provide as appropriate  - Encourage maximum independence but intervene and supervise when necessary  - Involve family in performance of ADLs  - Assess for home care needs following discharge   - Consider OT consult to assist with ADL evaluation and planning for discharge  - Provide patient education as appropriate  Outcome: Progressing  Goal: Maintains/Returns to pre admission functional level  Description: INTERVENTIONS:  - Perform AM-PAC 6 Click Basic Mobility/ Daily Activity assessment daily.  - Set and communicate daily mobility goal to care team and patient/family/caregiver.   - Collaborate with rehabilitation services on mobility goals if consulted  - Perform Range of Motion 3 times a day.  - Reposition patient every 2 hours.  - Dangle patient 3 times a day  - Stand patient 3 times a day  - Ambulate patient 3 times a day  - Out of bed to chair 3 times a day   - Out of bed for meals 3 times a day  - Out of bed for toileting  - Record patient progress and toleration of activity level   Outcome: Progressing     Problem: DISCHARGE PLANNING  Goal: Discharge to home or other facility with appropriate resources  Description: INTERVENTIONS:  - Identify barriers to discharge w/patient and caregiver  -  Arrange for needed discharge resources and transportation as appropriate  - Identify discharge learning needs (meds, wound care, etc.)  - Arrange for interpretive services to assist at discharge as needed  - Refer to Case Management Department for coordinating discharge planning if the patient needs post-hospital services based on physician/advanced practitioner order or complex needs related to functional status, cognitive ability, or social support system  Outcome: Progressing     Problem: Prexisting or High Potential for Compromised Skin Integrity  Goal: Skin integrity is maintained or improved  Description: INTERVENTIONS:  - Identify patients at risk for skin breakdown  - Assess and monitor skin integrity  - Assess and monitor nutrition and hydration status  - Monitor labs   - Assess for incontinence   - Turn and reposition patient  - Assist with mobility/ambulation  - Relieve pressure over bony prominences  - Avoid friction and shearing  - Provide appropriate hygiene as needed including keeping skin clean and dry  - Evaluate need for skin moisturizer/barrier cream  - Collaborate with interdisciplinary team   - Patient/family teaching  - Consider wound care consult   Outcome: Progressing     Problem: Nutrition/Hydration-ADULT  Goal: Nutrient/Hydration intake appropriate for improving, restoring or maintaining nutritional needs  Description: Monitor and assess patient's nutrition/hydration status for malnutrition. Collaborate with interdisciplinary team and initiate plan and interventions as ordered.  Monitor patient's weight and dietary intake as ordered or per policy. Utilize nutrition screening tool and intervene as necessary. Determine patient's food preferences and provide high-protein, high-caloric foods as appropriate.     INTERVENTIONS:  - Monitor oral intake, urinary output, labs, and treatment plans  - Assess nutrition and hydration status and recommend course of action  - Evaluate amount of meals  eaten  - Assist patient with eating if necessary   - Allow adequate time for meals  - Recommend/ encourage appropriate diets, oral nutritional supplements, and vitamin/mineral supplements  - Order, calculate, and assess calorie counts as needed  - Recommend, monitor, and adjust tube feedings and TPN/PPN based on assessed needs  - Assess need for intravenous fluids  - Provide specific nutrition/hydration education as appropriate  - Include patient/family/caregiver in decisions related to nutrition  Outcome: Progressing

## 2024-08-25 NOTE — PROGRESS NOTES
08/25/24 1115   Assessment   Treatment Assessment pt declines to participate in therapy for today 2* c/o B/L hip pain, offered other times available and pt declining all therapy participation for today   Therapy Time missed   Time missed? Yes   Amount of time missed 60

## 2024-08-25 NOTE — ASSESSMENT & PLAN NOTE
Remote history of TBI, previously residing in a group home prior to [facility] sentence.  Evaluated by neuropsychiatry on 6/27/24 and deemed NOT to have medical decision-making capacity  Neuropsychology re-evaluated pt and he does not have decision making capacity.  Process for court appointed guardian started on 7/5/24 -  following   Guardianship hearing 8/27/24

## 2024-08-25 NOTE — ASSESSMENT & PLAN NOTE
Noted on echocardiogram 6/10/2024: spherical 1.5 x 1.3 cm thrombus at the LV apex   Initiated on AC with Xarelto however unable to verify insurance coverage, now on Coumadin at 5 mg daily  INR: 1.87  Coumadin 2.5mg Fridays and 5mg every other night of the week.  INR in AM.

## 2024-08-25 NOTE — PROGRESS NOTES
[facility]  Progress Note  Name: Sunil Patel I  MRN: [id number]  Unit/Bed#: -01 I Date of Admission: 7/6/2024   Date of Service: 8/25/2024 I Hospital Day: 50    Assessment & Plan   * Above-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)  Assessment & Plan  Presented with left lower extremity acute limb ischemia/extensive left iliofemoral and left infrainguinal embolism requiring left femoral thrombectomy and subsequent AKA on 6/7/24 with vascular surgery.  Continue with local wound care   Continue ASA, Coumadin and statin   Stable for discharge from PMR and medicine standpoint.  Dispo planning as per case management.  Patient is awaiting guardianship hearing, which is scheduled for 8/27/24.    Generalized abdominal pain  Assessment & Plan  Pt refused therapy this AM which is not uncommon. However, today he reports abdominal pain in the Rt > Lt LQ.  He states that is has been present for a long time, about 2 weeks.  He denies nausea or vomiting. He has been having a bowel movement daily.  KUB ordered.  Labs ordered.    Postoperative anemia  Assessment & Plan  Normocytic  PTA hemoglobin was normal, now has been running 10-11 since admission  No signs or symptoms of active bleeding  Iron panel reviewed, no evidence of TY  Likely postoperative ABLA  Monitor as needed      History of migraine headaches  Assessment & Plan  Continue PTA meds Depakote, gabapentin, zonegran and lamictal all of which can help in prevention  Unable to take triptans due to a history of stroke  PRN Tylenol     Cardiomyopathy (HCC)  Assessment & Plan  ECHO 6/10/2024 = LVEF 40-45% with mild global hypokinesis   Status post NM stress test 6/11/2024 = large, mild, fixed defect in the inferior wall, possibly due to diaphragmatic attenuation artifact, there is a small area of partial reversibility in the inferior apical wall suggestive of ischemia    Evaluated by cardiology.  Etiology felt to be possibly  secondary to stress-induced cardiomyopathy, with apical thrombus  Cardiac catheterization deferred given the lack of any significant ischemia, and no current cardiac symptoms  Continue with medical management with aspirin, statin, beta-blocker, ARB  Remains euvolemic off of diuretics, continue to monitor volume status   Outpatient follow-up with cardiology, previously followed with Dr. Gumaro Aguila [facility]    Traumatic brain injury (HCC)  Assessment & Plan  Remote history of TBI, previously residing in a group home prior to [facility] sentence.  Evaluated by neuropsychiatry on 6/27/24 and deemed NOT to have medical decision-making capacity  Neuropsychology re-evaluated pt and he does not have decision making capacity.  Process for court appointed guardian started on 7/5/24 - CM following   Guardianship hearing 8/27/24    Carnitine deficiency ([facility])  Assessment & Plan  Continue levocarnitine 330 mg 3x daily with meals.    History of seizures  Assessment & Plan  Diagnosed in July 2019, follows with [facility] neurology outpatient  Continue home regimen with Depakote ER, Lamotrigine and Zonegran    Left ventricular thrombus  Assessment & Plan  Noted on echocardiogram 6/10/2024: spherical 1.5 x 1.3 cm thrombus at the LV apex   Initiated on AC with Xarelto however unable to verify insurance coverage, now on Coumadin at 5 mg daily  INR: 2.77  Coumadin 2.5mg Fridays and 5mg every other night of the week.  INR Monday.    Benign essential hypertension  Assessment & Plan  Blood pressures acceptable on current regimen including Losartan 50 mg daily, Toprol-XL 25 mg daily    History of stroke  Assessment & Plan  History of left MCA CVA in May 2018 with residual expressive aphasia  Continue ASA and atorvastatin    Human immunodeficiency virus (HIV) infection ([facility])  Assessment & Plan  Continue Biktarvy and Tivicay.    Bipolar affective disorder ([facility])  Assessment & Plan  Psychiatry evaluated most recently on 8/12 and cleared for discharge to  "rehab   Continue home meds Zoloft and Remeron             The above assessment and plan was reviewed and updated as determined by my evaluation of the patient on 8/25/2024.    Labs:             Invalid input(s): \"LABGLOM\", \"CMP\"      Results from last 7 days   Lab Units 08/23/24  0616 08/22/24  0542   INR  2.77* 3.56*           Imaging  XR abdomen 1 view kub    (Results Pending)       Review of Scheduled Meds:  Current Facility-Administered Medications   Medication Dose Route Frequency Provider Last Rate    acetaminophen  975 mg Oral TID PRN José Salcido MD      aspirin  81 mg Oral Daily Lorrie Barillas PA-C      atorvastatin  80 mg Oral Daily With Dinner Lorrie Barillas PA-C      bictegravir-emtricitab-tenofovir alafenamide  1 tablet Oral Daily With Breakfast Lorrie Barillas PA-C      bisacodyl  10 mg Rectal Daily PRN Kenny Castro MD      diphenhydrAMINE  25 mg Oral Q6H PRN Lorrie Barillas PA-C      divalproex sodium  1,250 mg Oral Daily Lorrie Barillas PA-C      dolutegravir  50 mg Oral Daily oLrrie Barillas PA-C      gabapentin  100 mg Oral Q8H Ashley Depadua, MD      lamoTRIgine  50 mg Oral BID Lorrie Barillas PA-C      levOCARNitine  1,000 mg/day Oral TID With Meals Lorrie Barillas PA-C      losartan  50 mg Oral Daily Lorrie Barillas PA-C      melatonin  6 mg Oral HS Lorrie Barillas PA-C      metoprolol succinate  25 mg Oral Daily CHARLIE Mcclelland      mirtazapine  15 mg Oral HS Lorrie Barillas PA-C      ondansetron  4 mg Oral Q6H PRN Lorrie Barillas PA-C      polyethylene glycol  17 g Oral Daily PRN Lorrie Barillas PA-C      senna  1 tablet Oral HS PRN Kenny Castro MD      sertraline  75 mg Oral Daily Lorrie Barillas PA-C      tamsulosin  0.4 mg Oral Daily With Dinner Lorrie Barillas PA-C      warfarin  2.5 mg Oral Weekly Lorrie Barillas PA-C      warfarin  5 mg Oral Once per day on Sunday " Monday Tuesday Wednesday Thursday Saturday Lorrie Barillas PA-C      zonisamide  400 mg Oral Daily Lorrie Barillas PA-C         Subjective/ HPI: Patient seen and examined. Patients overnight issues or events were reviewed with nursing staff. New or overnight issues include the following:     Pt seen in his room. He refused therapy this AM citing abdominal pain which has been present for the past 2 weeks. He states it happens more when pressure is applied to his abdomen or when he is moving. He rates it 3/10. He has been having formed BMs daily. He denies nausea or vomiting. He denies any other complaints.    ROS:   A 10 point ROS was performed; negative except as noted above.        *Labs /Radiology studies Reviewed  *Medications  reviewed and reconciled as needed  *Please refer to order section for additional ordered labs studies      Physical Examination:  Vitals:   Vitals:    08/24/24 0949 08/24/24 1331 08/24/24 2014 08/25/24 0503   BP: 129/76 121/68 123/63 119/67   BP Location:  Left arm Left arm Left arm   Pulse: 88 86 88 73   Resp:  18 17 20   Temp:  98.4 °F (36.9 °C) 98.8 °F (37.1 °C) 97.7 °F (36.5 °C)   TempSrc:  Oral Oral Oral   SpO2:  95% 95% 92%   Weight:       Height:           General Appearance: NAD  HEENT: PERRLA, conjuctiva normal; mucous membranes moist; face symmetrical  Neck:  Supple  Lungs: clear bilaterally, normal respiratory effort, no retractions, expiratory effort normal, on room air  CV: regular rate and rhythm, no murmurs rubs or gallops noted   ABD: Soft, BS x 4. +discomfort to palpation lateral Rt LQ > Lt LQ. No rebound tenderness.  EXT: Lt AKA  Skin: normal turgor, normal texture, no rash  Psych: affect normal, mood normal  Neuro: Sleeping but arousable. Expressive aphasia.     The above physical exam was reviewed and updated as determined by my evaluation of the patient on 8/25/2024.    Invasive Devices       Drain  Duration             External Urinary Catheter 24 days                        VTE Pharmacologic Prophylaxis: Warfarin (Coumadin)  Code Status: Level 1 - Full Code  Current Length of Stay: 50 day(s)    Total floor / unit time spent today  40 minutes   Coordination of patient's care was performed in conjunction with consulting services. Time invested included review of patient's labs, vitals, and management of their comorbidities with continued monitoring, examination of patient as well as answering patient questions, documenting her findings and creating progress note in electronic medical record,  ordering appropriate diagnostic testing.       ** Please Note:  voice to text software may have been used in the creation of this document. Although proof errors in transcription or interpretation are a potential of such software**

## 2024-08-26 LAB
INR PPP: 1.94 (ref 0.85–1.19)
PROTHROMBIN TIME: 22.3 SECONDS (ref 12.3–15)

## 2024-08-26 PROCEDURE — 97530 THERAPEUTIC ACTIVITIES: CPT | Performed by: PHYSICAL THERAPIST

## 2024-08-26 PROCEDURE — 85610 PROTHROMBIN TIME: CPT | Performed by: PHYSICIAN ASSISTANT

## 2024-08-26 PROCEDURE — 99233 SBSQ HOSP IP/OBS HIGH 50: CPT | Performed by: PHYSICIAN ASSISTANT

## 2024-08-26 PROCEDURE — 97110 THERAPEUTIC EXERCISES: CPT | Performed by: PHYSICAL THERAPIST

## 2024-08-26 PROCEDURE — 99233 SBSQ HOSP IP/OBS HIGH 50: CPT | Performed by: PHYSICAL MEDICINE & REHABILITATION

## 2024-08-26 PROCEDURE — 92507 TX SP LANG VOICE COMM INDIV: CPT

## 2024-08-26 PROCEDURE — 97112 NEUROMUSCULAR REEDUCATION: CPT | Performed by: PHYSICAL THERAPIST

## 2024-08-26 RX ADMIN — LEVOCARNITINE 330 MG: 1 SOLUTION ORAL at 17:11

## 2024-08-26 RX ADMIN — LEVOCARNITINE 330 MG: 1 SOLUTION ORAL at 15:00

## 2024-08-26 RX ADMIN — DIVALPROEX SODIUM 1250 MG: 500 TABLET, EXTENDED RELEASE ORAL at 12:12

## 2024-08-26 RX ADMIN — LAMOTRIGINE 50 MG: 25 TABLET ORAL at 12:13

## 2024-08-26 RX ADMIN — LAMOTRIGINE 50 MG: 25 TABLET ORAL at 17:11

## 2024-08-26 RX ADMIN — LEVOCARNITINE 330 MG: 1 SOLUTION ORAL at 12:12

## 2024-08-26 RX ADMIN — WARFARIN SODIUM 5 MG: 5 TABLET ORAL at 19:37

## 2024-08-26 RX ADMIN — GABAPENTIN 100 MG: 100 CAPSULE ORAL at 21:39

## 2024-08-26 RX ADMIN — BICTEGRAVIR SODIUM, EMTRICITABINE, AND TENOFOVIR ALAFENAMIDE FUMARATE 1 TABLET: 50; 200; 25 TABLET ORAL at 12:12

## 2024-08-26 RX ADMIN — GABAPENTIN 100 MG: 100 CAPSULE ORAL at 05:37

## 2024-08-26 RX ADMIN — ZONISAMIDE 400 MG: 100 CAPSULE ORAL at 12:12

## 2024-08-26 RX ADMIN — SERTRALINE HYDROCHLORIDE 75 MG: 50 TABLET ORAL at 12:13

## 2024-08-26 RX ADMIN — GABAPENTIN 100 MG: 100 CAPSULE ORAL at 15:00

## 2024-08-26 RX ADMIN — TAMSULOSIN HYDROCHLORIDE 0.4 MG: 0.4 CAPSULE ORAL at 17:11

## 2024-08-26 RX ADMIN — LOSARTAN POTASSIUM 50 MG: 50 TABLET, FILM COATED ORAL at 12:13

## 2024-08-26 RX ADMIN — METOPROLOL SUCCINATE 25 MG: 25 TABLET, EXTENDED RELEASE ORAL at 12:13

## 2024-08-26 RX ADMIN — Medication 6 MG: at 21:39

## 2024-08-26 RX ADMIN — MIRTAZAPINE 15 MG: 15 TABLET, FILM COATED ORAL at 21:39

## 2024-08-26 RX ADMIN — ATORVASTATIN CALCIUM 80 MG: 80 TABLET, FILM COATED ORAL at 17:11

## 2024-08-26 RX ADMIN — DOLUTEGRAVIR SODIUM 50 MG: 50 TABLET, FILM COATED ORAL at 12:12

## 2024-08-26 RX ADMIN — ASPIRIN 81 MG: 81 TABLET, COATED ORAL at 12:12

## 2024-08-26 NOTE — PROGRESS NOTES
[facility]  Progress Note  Name: Sunil Patel I  MRN: [id number]  Unit/Bed#: -01 I Date of Admission: 7/6/2024   Date of Service: 8/26/2024 I Hospital Day: 51    Assessment & Plan   * Above-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)  Assessment & Plan  Presented with left lower extremity acute limb ischemia/extensive left iliofemoral and left infrainguinal embolism requiring left femoral thrombectomy and subsequent AKA on 6/7/24 with vascular surgery.  Continue with local wound care   Continue ASA, Coumadin and statin   Stable for discharge from PMR and medicine standpoint.  Dispo planning as per case management.  Patient is awaiting guardianship hearing, which is scheduled for 8/27/24.    Generalized abdominal pain  Assessment & Plan  Pt refused therapy 8/25/24 due to abdominal pain in the Rt > Lt LQ.  He states that is has been present for a long time, about 2 weeks.  He denies nausea or vomiting. He has been having a bowel movement daily.  Labs unremarkable/stable.  KUB unremarkable.  Suspect musculoskeletal.    Postoperative anemia  Assessment & Plan  Normocytic  PTA hemoglobin was normal, now has been running 10-11 since admission  No signs or symptoms of active bleeding  Iron panel reviewed, no evidence of TY  Likely postoperative ABLA  Monitor as needed      History of migraine headaches  Assessment & Plan  Continue PTA meds Depakote, gabapentin, zonegran and lamictal all of which can help in prevention  Unable to take triptans due to a history of stroke  PRN Tylenol     Cardiomyopathy (HCC)  Assessment & Plan  ECHO 6/10/2024 = LVEF 40-45% with mild global hypokinesis   Status post NM stress test 6/11/2024 = large, mild, fixed defect in the inferior wall, possibly due to diaphragmatic attenuation artifact, there is a small area of partial reversibility in the inferior apical wall suggestive of ischemia    Evaluated by cardiology.  Etiology felt to be possibly  secondary to stress-induced cardiomyopathy, with apical thrombus  Cardiac catheterization deferred given the lack of any significant ischemia, and no current cardiac symptoms  Continue with medical management with aspirin, statin, beta-blocker, ARB  Remains euvolemic off of diuretics, continue to monitor volume status   Outpatient follow-up with cardiology, previously followed with Dr. Gumaro Agu[facility]    History of traumatic brain injury  Assessment & Plan  Remote history of TBI, previously residing in a group home prior to [facility] sentence.  Evaluated by neuropsychiatry on 6/27/24 and deemed NOT to have medical decision-making capacity  Neuropsychology re-evaluated pt and he does not have decision making capacity.  Process for court appointed guardian started on 7/5/24 - CM following   Guardianship hearing 8/27/24    Carnitine deficiency (HCC)  Assessment & Plan  Continue levocarnitine 330 mg 3x daily with meals.    History of seizures  Assessment & Plan  Diagnosed in July 2019, follows with [facility] neurology outpatient  Continue home regimen with Depakote ER, Lamotrigine and Zonegran    Left ventricular thrombus  Assessment & Plan  Noted on echocardiogram 6/10/2024: spherical 1.5 x 1.3 cm thrombus at the LV apex   Initiated on AC with Xarelto however unable to verify insurance coverage, now on Coumadin at 5 mg daily  INR: 1.94  Coumadin 2.5mg Fridays and 5mg every other night of the week.  INR Thursday.    Benign essential hypertension  Assessment & Plan  Blood pressures acceptable on current regimen including Losartan 50 mg daily, Toprol-XL 25 mg daily    History of stroke  Assessment & Plan  History of left MCA CVA in May 2018 with residual expressive aphasia  Continue ASA and atorvastatin    Human immunodeficiency virus (HIV) infection (HCC)  Assessment & Plan  Continue Biktarvy and Tivicay.    Bipolar affective disorder (HCC)  Assessment & Plan  Psychiatry evaluated most recently on 8/12 and cleared for discharge  to rehab   Continue home meds Zoloft and Remeron             The above assessment and plan was reviewed and updated as determined by my evaluation of the patient on 8/26/2024.    Labs:   Results from last 7 days   Lab Units 08/25/24  1035   WBC Thousand/uL 6.34   HEMOGLOBIN g/dL 10.9*   HEMATOCRIT % 37.2   PLATELETS Thousands/uL 320     Results from last 7 days   Lab Units 08/25/24  1035   SODIUM mmol/L 139   POTASSIUM mmol/L 4.7   CHLORIDE mmol/L 106   CO2 mmol/L 27   BUN mg/dL 18   CREATININE mg/dL 0.94   CALCIUM mg/dL 9.5         Results from last 7 days   Lab Units 08/26/24  0539 08/25/24  1035   INR  1.94* 1.87*           Imaging  XR abdomen 1 view kub   Final Result by Toni Kahn MD (08/26 0801)      Unremarkable abdomen.               Workstation performed: [id number]             Review of Scheduled Meds:  Current Facility-Administered Medications   Medication Dose Route Frequency Provider Last Rate    acetaminophen  975 mg Oral TID PRN José Salcido MD      aspirin  81 mg Oral Daily Lorrie Barillas PA-C      atorvastatin  80 mg Oral Daily With Dinner Lorrie Barillas PA-C      bictegravir-emtricitab-tenofovir alafenamide  1 tablet Oral Daily With Breakfast Lorrie Barillas PA-C      bisacodyl  10 mg Rectal Daily PRN Kenny Castro MD      diphenhydrAMINE  25 mg Oral Q6H PRN Lorrie Barillas PA-C      divalproex sodium  1,250 mg Oral Daily Lorrie Barillas PA-C      dolutegravir  50 mg Oral Daily Lorrie Barillas PA-C      gabapentin  100 mg Oral Q8H Ashley Depadua, MD      lamoTRIgine  50 mg Oral BID Lorrie Barillas PA-C      levOCARNitine  1,000 mg/day Oral TID With Meals Lorrie Barillas PA-C      losartan  50 mg Oral Daily Lorrie Barillas PA-C      melatonin  6 mg Oral HS Lorrie Barillas PA-C      metoprolol succinate  25 mg Oral Daily CHARLIE Mcclelland      mirtazapine  15 mg Oral HS Lorrie Barillas PA-C      ondansetron  4  mg Oral Q6H PRN Lorrie Barillas PA-C      polyethylene glycol  17 g Oral Daily PRN Lorrie Barillas PA-C      senna  1 tablet Oral HS PRN Kenny Castro MD      sertraline  75 mg Oral Daily Lorrie Barillas PA-C      tamsulosin  0.4 mg Oral Daily With Dinner Lorrie Barillas PA-C      warfarin  2.5 mg Oral Weekly Lorrie Barillas PA-C      warfarin  5 mg Oral Once per day on Sunday Monday Tuesday Wednesday Thursday Saturday Lorrie Barillas PA-C      zonisamide  400 mg Oral Daily Lorrie Barillas PA-C         Subjective/ HPI: Patient seen and examined. Patients overnight issues or events were reviewed with nursing staff. New or overnight issues include the following:     Pt seen in his room with his friend present at the bedside. He refused all morning medications today. Long discussion with pt re the importance of taking medications to prevent seizures as having a seizure could lead to cardiac arrest, respiratory arrest, worsening brain injury, or death. Reviewed when he was diagnosed, when his last seizure was, and who he saw in Neurology. Pt then agreeable to taking medications. Pt admits to anxiety re the hearing tomorrow. He denies any other complaints.    ROS:   A 10 point ROS was performed; negative except as noted above.        *Labs /Radiology studies Reviewed  *Medications  reviewed and reconciled as needed  *Please refer to order section for additional ordered labs studies      Physical Examination:  Vitals:   Vitals:    08/25/24 0503 08/25/24 1439 08/25/24 2019 08/26/24 0531   BP: 119/67 142/77 144/63 125/60   BP Location: Left arm Left arm Left arm Left arm   Pulse: 73 81 95 79   Resp: 20 16 17 20   Temp: 97.7 °F (36.5 °C) 98.1 °F (36.7 °C) 98.7 °F (37.1 °C) 98.1 °F (36.7 °C)   TempSrc: Oral Oral Oral Oral   SpO2: 92% 94% 94% 92%   Weight:       Height:           General Appearance: NAD; pleasant  HEENT: PERRLA, conjuctiva normal; mucous membranes moist; face  symmetrical  Neck:  Supple  Lungs: clear bilaterally, normal respiratory effort, no retractions, expiratory effort normal, on room air  CV: regular rate and rhythm, no murmurs rubs or gallops noted   ABD: soft non tender, +BS x4  EXT: DP pulses intact, no lymphadenopathy, no edema  Skin: normal turgor, normal texture, no rash  Psych: affect normal, mood normal  Neuro: AAOx3       The above physical exam was reviewed and updated as determined by my evaluation of the patient on 8/26/2024.    Invasive Devices       Drain  Duration             External Urinary Catheter 25 days                       VTE Pharmacologic Prophylaxis: Warfarin (Coumadin)  Code Status: Level 1 - Full Code  Current Length of Stay: 51 day(s)    I have spent a total time of 45 minutes in caring for this patient on the day of the visit/encounter including Diagnostic results, Prognosis, Risks and benefits of tx options, Instructions for management, Patient and family education, Importance of tx compliance, Risk factor reductions, Impressions, Counseling / Coordination of care, Documenting in the medical record, Reviewing / ordering tests, medicine, procedures  , and Obtaining or reviewing history  .         ** Please Note:  voice to text software may have been used in the creation of this document. Although proof errors in transcription or interpretation are a potential of such software**

## 2024-08-26 NOTE — ASSESSMENT & PLAN NOTE
Remote history of TBI, previously residing in a group home prior to [facility].  Evaluated by neuropsychiatry on 6/27/24 and deemed NOT to have medical decision-making capacity  Neuropsychology re-evaluated pt and he does not have decision making capacity.  Process for court appointed guardian started on 7/5/24 -  following   Guardianship hearing 8/27/24

## 2024-08-26 NOTE — PROGRESS NOTES
08/26/24 1230   Pain Assessment   Pain Assessment Tool 0-10   Pain Score No Pain   Restrictions/Precautions   Precautions Aphasia;Bed/chair alarms;Cognitive;Fall Risk;Supervision on toilet/commode   LLE Weight Bearing Per Order NWB   Cognition   Arousal/Participation Alert;Cooperative   Subjective   Subjective Pt initially reporting he did not want to participate, but his friend helped convince him to try to participate and he was more willing when promised to go outside for a little bit during session   Roll Left and Right   Type of Assistance Needed Independent   Physical Assistance Level No physical assistance   Roll Left and Right CARE Score 6   Sit to Lying   Type of Assistance Needed Independent   Physical Assistance Level No physical assistance   Sit to Lying CARE Score 6   Lying to Sitting on Side of Bed   Type of Assistance Needed Independent   Physical Assistance Level No physical assistance   Lying to Sitting on Side of Bed CARE Score 6   Sit to Stand   Type of Assistance Needed Supervision   Physical Assistance Level No physical assistance   Sit to Stand CARE Score 4   Bed-Chair Transfer   Type of Assistance Needed Supervision   Physical Assistance Level No physical assistance   Chair/Bed-to-Chair Transfer CARE Score 4   Wheel 50 Feet with Two Turns   Type of Assistance Needed Independent   Physical Assistance Level No physical assistance   Wheel 50 Feet with Two Turns CARE Score 6   Wheel 150 Feet   Type of Assistance Needed Independent   Physical Assistance Level No physical assistance   Wheel 150 Feet CARE Score 6   Wheelchair mobility   Does the patient use a wheelchair? 1. Yes   Type of Wheelchair Used 1. Manual   Toilet Transfer   Type of Assistance Needed Supervision   Physical Assistance Level No physical assistance   Toilet Transfer CARE Score 4   Toilet Transfer   Surface Assessed Raised Toilet   Findings required assistance with clothing/brief management   Therapeutic Interventions    Flexibility pronelying x 8 min for hip flexor stretching   Neuromuscular Re-Education sitting on dynadisc on elevated mat table (LE's off floor): chest press with 5 lb dowel, shoulder press with 5 lb dowel, trunk rotation with 5 lb dowel, russian twist with 6 lb med ball   Other sit to stands from bench outside using R handrail 5 reps x 2 sets. w/c mobility indoor/outdoor for endurance training.   Equipment Use   NuStep L5 x 12 min   Assessment   Treatment Assessment Pt initially requiring motivation to participate, but successfully participated in 90 min session focusing on transfers, endurance, core strength. He is consistently performing sit pivots at S level and becoming more independent with toileting although did require assistance for clothing management (mainly managing brief) but he was able to participate in set-up and instructing therapist where to place things and how to set him up. He really enjoys being outside and this helps increase participation and salience during session. Practiced sit to stands from bench, which is a lower height and difference surface than he is used to. He relies on RLE against bench to stabilize himself when standing which is appropriate given L AKA. He is mostly limited by decreased endurance which was addressed with w/c mobility indoor/outdoor and interval training used for core strengthening circuit above. He is progressing as expected and will benefit from continued skilled PT to further maximize independence and safety with all functional mobility.   Barriers to Discharge Inaccessible home environment;Decreased caregiver support   PT Barriers   Physical Impairment Decreased strength;Decreased range of motion;Decreased endurance;Impaired balance;Decreased mobility;Decreased coordination;Decreased cognition;Decreased safety awareness;Orthopedic restrictions   Functional Limitation Car transfers;Ramp negotiation;Stair negotiation;Standing;Walking   Plan    Treatment/Interventions Functional transfer training;LE strengthening/ROM;Elevations;Therapeutic exercise;Endurance training;Patient/family training;Equipment eval/education;Bed mobility;Gait training   Progress Improving as expected   Discharge Recommendation   Equipment Recommended Wheelchair   PT Therapy Minutes   PT Time In 1230   PT Time Out 1400   PT Total Time (minutes) 90   PT Mode of treatment - Individual (minutes) 90   PT Mode of treatment - Concurrent (minutes) 0   PT Mode of treatment - Group (minutes) 0   PT Mode of treatment - Co-treat (minutes) 0   PT Mode of Treatment - Total time(minutes) 90 minutes   PT Cumulative Minutes 1635   Therapy Time missed   Time missed? No

## 2024-08-26 NOTE — PROGRESS NOTES
08/26/24 0830   Pain Assessment   Pain Assessment Tool 0-10   Pain Score No Pain   Therapy Time missed   Time missed? Yes   Amount of time missed 90   Reason for time missed Extreme fatigue  (pt refusal despite education on benefits of therapy and encouragement)   Time(s) multiple attempts made 8:30, 8:45, 9:00

## 2024-08-26 NOTE — ASSESSMENT & PLAN NOTE
Pt refused therapy 8/25/24 due to abdominal pain in the Rt > Lt LQ.  He states that is has been present for a long time, about 2 weeks.  He denies nausea or vomiting. He has been having a bowel movement daily.  Labs unremarkable/stable.  KUB unremarkable.  Suspect musculoskeletal.

## 2024-08-26 NOTE — ASSESSMENT & PLAN NOTE
ECHO 6/10/2024 = LVEF 40-45% with mild global hypokinesis   Status post NM stress test 6/11/2024 = large, mild, fixed defect in the inferior wall, possibly due to diaphragmatic attenuation artifact, there is a small area of partial reversibility in the inferior apical wall suggestive of ischemia    Evaluated by cardiology.  Etiology felt to be possibly secondary to stress-induced cardiomyopathy, with apical thrombus  Cardiac catheterization deferred given the lack of any significant ischemia, and no current cardiac symptoms  Continue with medical management with aspirin, statin, beta-blocker, ARB  Remains euvolemic off of diuretics, continue to monitor volume status   Outpatient follow-up with cardiology, previously followed with Dr. Gumaro Aguila [facility]

## 2024-08-26 NOTE — CASE MANAGEMENT
Cm met with pt at bedside, pt a little frustrated this AM due to not being able to attend trial tomorrow. Pt requested  or someone from legal explain process to him, cm sent email to  and legal to pass this message along. Cm to attend trial tomorrow and relay message to team after.    Awilda, , able to call pt's room today at 430 to explain process.

## 2024-08-26 NOTE — PROGRESS NOTES
08/26/24 1115   Pain Assessment   Pain Assessment Tool 0-10   Pain Score No Pain   Restrictions/Precautions   Precautions Aphasia;Bed/chair alarms;Cognitive;Fall Risk;Supervision on toilet/commode   Comprehension   Comprehension (FIM) 4 - Understands basic info/conversation 75-90% of time   Expression   Expression (FIM) 3 - Expresses basic info/needs 50-74% of time   Social Interaction   Social Interaction (FIM) 5 - Interacts appropriately with others 90% of time   Problem Solving   Problem solving (FIM) 3 - Solves basic problmes 50-74% of time   Memory   Memory (FIM) 3 - Recognizes, recalls/performs 50-74%   Speech/Language/Cognition Assessment   Treatment Assessment Pt participated in skilled SLP session focusing on expressive and receptive language skills. Pt was initially somewhat frustrated at beginning of session with current situation regarding his placement and guardianship trial tomorrow. Pt was able to be redirected and verbalized desire to engage in therapy session. Pt's friend, Joanna, was present for session and actively participated in supporting pt through cuing and prompting, which she learned last week as she demo appropriate carryover this week.     Targeting verbal expression and auditory comprehension, pt engaged in various phrase completion tasks. In a verbally presented phrase completion task, pt provided semantically appropriate responses in 8/12 trials, improving to 12/12 trials when given moderate level cuing consisting of verbal, semantic and initial phonemic cues. Completing the same type of task but using related word pairs, pt provided semantically appropriate responses for 8/13 trials, improving again to 13/13 given moderate to occasional max assist with use of verbal and semantic cues with more phonemic cues needed. Engaged in an opposites phrase completion task, pt was 5/11 accurate in verbal responses, however, pt appeared to fatigue somewhat as he began eliciting more  "perseverations of his previous responses. At times, pt was aware of this but other times, unable to self monitor or correct, requiring direct verbal cues. Attempted strategies to minimize perseverations but pt continued to benefit more so from a greater amount of initial phonemic cues.     Focusing on listening comprehension, pt was presented with Fo2 pictures and a verbally presented sentence. Pt accurately identified the picture that matched the sentence with 12/16 accuracy, noting more errors to occur when prepositions were used such as \"behind\" and \"next to.\" After reviewing errors, pt indicated understanding. At this time, pt will benefit from ongoing skilled SLP services up to 3x/week to continue to support pt in building a more functional communication modality to improve effectiveness of communication and to ease frustrations with communication attempts.    SLP Therapy Minutes   SLP Time In 1115   SLP Time Out 1145   SLP Total Time (minutes) 30   SLP Mode of treatment - Individual (minutes) 30   SLP Mode of treatment - Concurrent (minutes) 0   SLP Mode of treatment - Group (minutes) 0   SLP Mode of treatment - Co-treat (minutes) 0   SLP Mode of Treatment - Total time(minutes) 30 minutes   SLP Cumulative Minutes 300   Therapy Time missed   Time missed? No       "

## 2024-08-26 NOTE — ASSESSMENT & PLAN NOTE
Noted on echocardiogram 6/10/2024: spherical 1.5 x 1.3 cm thrombus at the LV apex   Initiated on AC with Xarelto however unable to verify insurance coverage, now on Coumadin at 5 mg daily  INR: 1.94  Coumadin 2.5mg Fridays and 5mg every other night of the week.  INR Thursday.

## 2024-08-26 NOTE — PROGRESS NOTES
Physical Medicine and Rehabilitation Progress Note  Sunil Patel 63 y.o. male MRN: [id number]  Unit/Bed#: [facility] 451-01 Encounter: [id number]    To Review: Sunil Patel is a 63 y.o. male who  has a past medical history of Acute lower limb ischemia (06/08/2024), Anxiety, Depression, HIV disease (HCC), Substance abuse (HCC), and Suicide attempt (HCC). who presented to the [facility] on 6/7/24 from assisted for increased LLE swelling and pain and was found to have a left external iliac artery occlusion and acute limb ischemia. 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 Patient had TTE on 6/10/24 showing LV apex thrombus. On 6/11/24 patient had pharmacologic nuclear stress test/SPECT scan which did not show any significant areas of ischemia with EF 40-45% and recommended continuing A/C for LV apical thrombus. His course was complicated by b/l buttock unstageable pressure ulcers, urinary and fecal incontinence, pain, and significant decline in ADLs and mobility. Patient has been continued on Xarelto for anticoagulation, as well as ASA and statin. Patient has a history of TBI, with baseline nonfluent aphasia and forgetfulness. He was evaluated by neuropsychology on 6/27/24, and deemed to not have capacity to make fully informed medical decisions. Therefore, the guardianship process was initiated as of 7/5/24. He was admitted to the [facility] on 7/6.     Chief Complaint: frustration    Interval History/Subjective:  No acute events over the weekend. Had abdominal pain in his left side, and ALP slightly elevated at 144 but otherwise unremarkable lab workup. KUB with mild stool burden.     Seen this morning at bedside. Slept okay, feeling frustrated due to multiple reasons, including not having access to his  "money, guardianship hearing tomorrow, and unclear dispo after the hearing. He said that he wants to be involved with deciding his dispo, but when asked if he has a plan, he says he hasn't started a plan yet, but will. Refusing therapy today.     Denies fever, chills, headaches, changes in vision, chest pain, shortness of breath, presyncope, abdominal pain, nausea, diarrhea, constipation, and insomnia.     ROS:  10 point ROS performed and negative unless mentioned in HPI.      Today's Changes:  INR 1.94. Continue new schedule of warfarin with 2.5mg on Fridays and 5mg the rest of the days. Recheck INR per IM  Labs reviewed from yesterday- ALP elevated at 144 but otherwise unremarkable CMP and CBC  KUB reviewed- no pneumoperitoneum although note this was not an upright study. Mild/moderate stool burden  Discussed guardianship details with CM (Berta). While we hear Kieran's concern that he would like to attend the hearing, we worry that his presence may aggravate him and result in a worsening in his situation. We have offered to have him call in from Zoom, but he does not want to do this. In compromise, we have told Kieran that we would contact his  and hopefully they are able to meet him and talk about the guardianship process and help with details of his dispo. Please see Berta's note from today (to be written) for full details of contacting his legal team.  Dispo planning to continue. At this point he is appropriate for skilled nursing facility level of care.    Assessment/Plan:    Patient incapable of making informed decisions  Assessment & Plan  - History of remote TBI and prior strokes with comorbid psychiatric history.  - Evaluated by neuropsychology, Dr. Dilshad Tatum, PhD on 6/27/24, found to have \"diffuse cognitive dysfunction and on a measure assessing awareness of personal health status and ability to evaluate health problems, handle medical emergencies and take safety precautions, patient performed in " "the IMPAIRED range of functioning. During this encounter, patient does not appear to have capacity to make fully informed medical decisions.\"  - Court-appointed guardianship process has been initiated by acute care CM Katia Kay.    - We have identified two friends who are interested in being patient's guardians and have been involved and invested in patient's care on the ARC.   - They will need to be interviewed by the  involved in this process.    - He has to undergo his hearing on 8/6/2024 first.  -- now rescheduled to 8/27   - He would ultimately benefit from [facility]/nursing home/memory care unit - he does very well with structure and supervision.      History of migraine headaches  Assessment & Plan  Chronic issue.  Seemed to worsen with increased irritability after discontinuing gabapentin  Resumed gabapentin  Received [facility] once during stay with middling results  Sleep logs have been good - discontinued.  Headache is on and off    Cardiomyopathy (HCC)  Assessment & Plan  ECHO on 6/10/2024 showed LVEF 40-45% with mild global hypokinesis   Status post NM stress test on 6/11/2024 which showed: a large, mild, fixed defect in the inferior wall, possibly due to diaphragmatic attenuation artifact, there is a small area of partial reversibility in the inferior apical wall suggestive of ischemia    Elevated by cardiology, etiology felt to be possibly secondary to stress-induced cardiomyopathy, with apical thrombus  Cardiac catheterization deferred given the lack of any significant ischemia, and no current cardiac symptoms  Continue with medical management with aspirin, statin, beta-blocker, ARB  Monitor volume status, remains euvolemic off of diuretics   Outpatient follow-up with cardiology    At risk for constipation  Assessment & Plan  - Incontinent during acute care hospitalization  - Has improved/resolved with improved mobility.  - Now off scheduled bowel regimen.   - Last BM 8/24 and continent. Not on a " regimen  - PRN miralax, Senna and suppository    At risk for venous thromboembolism (VTE)  Assessment & Plan  - Fully anticoagulated on warfarin for apical thrombus    History of traumatic brain injury  Assessment & Plan  Remote history.  See neurocog impairment     Carnitine deficiency (HCC)  Assessment & Plan  - Carnitine replacement  - Appreciate medicine management      History of seizures  Assessment & Plan  - Depakote ER, lamotrigine, zonisamide  - Outpatient follow-up with [facility] neurology    Left ventricular thrombus  Assessment & Plan  - Visualized on echocardiogram on 6/10/24: spherical 1.5 x 1.3 cm thrombus at the LV apex.   - Was started on rivaroxaban, but patient does not appear to have insurance coverage for prescriptions   - Xarelto, eliquis, and pradaxa likely cost prohibitive per IM   - 7/29 transitioned to warfarin with lovenox bridge.   - Now off lovenox, and fully anticoagulated on warfarin.   - Management as per IM.   - INR 2.77 on 8/23. IM starting new schedule of warfarin with 2.5mg on Fridays and 5mg the rest of the days. Recheck INR monday  - Outpatient follow-up with cardiology    Benign essential hypertension  Assessment & Plan  - Toprol, losartan  - Appreciate medicine management    History of stroke  Assessment & Plan  - History of old left temporal and parietal lobe cortical infarcts, also involving the insula and left occipital lobe as well as small right posterior parietal lobe. Stable on most recent CT head on 5/16/24.   - ASA, and statin for secondary prevention  - Optimal BP control  - Monitor neuro exam - hx of LV thrombus    - See that entry  - OP neuro follow-up     Human immunodeficiency virus (HIV) infection (HCC)  Assessment & Plan  - Continue anti-retroviral treatment  - Appreciate medicine management during ARC course  - OP ID follow-up       Bipolar affective disorder (HCC)  Assessment & Plan  Mood acceptable; continue meds as outlined   Supportive  counseling  NeuroPsychology consult while in ARC if available for support  Psych evaluated on 8/12 and deemed him stable for discharge with current regimen as below  Counseled on and continue to encourage deep breathing/relaxation/behavioral management techniques  - Mirtazapine 15 mg qHs  - Sertraline 75 mg daily   - Lamictal 50mg BID  - Depakote ER 1250mg qday   - Outpatient psychiatry follow-up  - Would consult Psych before changing medications    * 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 Underwent L femoral thrombectomy and AKA with vascular surgery   - [facility] sx follow-up 7/10 - L AKA incision site well-healed, staples taken out at bedside  - [facility] will be vendor - Patient now has .  - Monitor for hip flexion/abduction tightness. Stretches in therapy.  - Phantom limb sensation - not painful, well controlled.  - Now on Coumadin with hx of LV thrombus and PAOD. Checking INR's as above   - PT/OT/SLP (to work on carry over strategies) 3-5 hours/day, 5-7 days/week.    - Patient now refusing at times   - Has met rehab goals at this point.   - [facility]patient [facility] and Vascular      Pressure injury of buttock, stage 3 (HCC)-resolved as of 8/9/2024  Assessment & Plan  Resolved as of 8/7.   - Wound care consulted and following weekly  - POA L buttock pressure injury unstageable has healed.  - POA R buttock pressure injury  evolved from unstageable to Stage 3, and is now resolved.   - Recommend ROHO cushion in chair when out of bed instead   - Preventative hydraguard to bilateral heels BID and PRN.   - P500  - Monitor clinically for breakdown, frequent turns  - reviewed picture of wound today. It is healing well. Continue to monitor    Urinary incontinence-resolved as of 8/16/2024  Assessment & Plan  - Did have some incontinence on acute - improved with timed voids and consistent assistance with his  urinal  - Described as urgency  - Marked improvement on timed voids.   - monitor for retention, incontinence (including overflow incontinence), signs/symptoms of UTI  - Timed Voids and PVRs Q4hrs initiated earlier. And PVR <150 x3, so scans discontinued.    - He has some difficulty managing the urinal    - needs assistance but is able to transfer to [facility]    - Still uses condom catheter at night, in part for continence care/to protect his skin given his buttock wounds. However now note that buttocks wound has healed  - Continue timed voids             Health Maintenance  #GI Prophylaxis: not indicated  #Code Status: Full code  #FEN: Cardiac diet + Ensure at bfast/dinner  #Dispo: Teams 8/20: ADD TBD. Still working on placement. May need to transfer back to acute care hospital. Guardianship court date is 8/27. Suspect placement even after guardianship will be difficult. Still lacks capacity per 8/20 neuropsych evaluation.   Will need f/u with PMR, PCP, Vascular, Psych      Objective:     Functional Update:  PT: Ind bed mobility, Sup transfers, Ind wheelchair mobility 500', Ind ramp  OT: Ind eating, Ind grooming, Sup bathing, Ind UB dressing, Sup LB dressing, Sup toileting, Sup tub/shower transfer, Sup toilet transfers   SLP: Expressive and receptive language skill impairment - difficulty with verbal expression, written expression, auditory comprehension, reading comprehension. Still impairments cognitively, but difficult to tease through given speech difficulties. Jignesh comprehension, Sup social interaction, modA expression, memory, and problem solving.     Allergies per EMR    Physical Exam:  Temp:  [98.1 °F (36.7 °C)-98.7 °F (37.1 °C)] 98.1 °F (36.7 °C)  HR:  [79-95] 79  Resp:  [16-20] 20  BP: (125-144)/(60-77) 125/60  Oxygen Therapy  SpO2: 92 %      Gen: No acute distress, Well-nourished, well-appearing.  HEENT: Moist mucus membranes, Normocephalic/Atraumatic  Heme/Extr: No edema/clubbing/cyanosis. L  AKA  Pulmonary: Non-labored breathing. Normal chest rise and fall  : No fernandes  GI: no guarding or distention  MSK: moves all four extremities spontaneously. No effusions or deformities. Bulk is symmetric.   Integumentary: Skin is dry. No rashes or ulcers.  Neuro: AAOx3,   Psych: aggravated affect but redirectable.    Diagnostic Studies: Reviewed, no new imaging    Laboratory:  Reviewed, no new labs.  Results from last 7 days   Lab Units 08/25/24  1035   HEMOGLOBIN g/dL 10.9*   HEMATOCRIT % 37.2   WBC Thousand/uL 6.34       Results from last 7 days   Lab Units 08/25/24  1035   BUN mg/dL 18   POTASSIUM mmol/L 4.7   CHLORIDE mmol/L 106   CREATININE mg/dL 0.94   AST U/L 10*   ALT U/L 10       Results from last 7 days   Lab Units 08/26/24  0539 08/25/24  1035 08/23/24  0616   PROTIME seconds 22.3* 21.6* 29.1*   INR  1.94* 1.87* 2.77*        Patient Active Problem List   Diagnosis    Bipolar affective disorder (HCC)    Substance abuse (HCC)    Human immunodeficiency virus (HIV) infection (HCC)    History of stroke    Benign essential hypertension    Positive laboratory testing for human immunodeficiency virus (HCC)    Hypertension    Unspecified vitamin D deficiency    Tobacco abuse    Cerebrovascular accident (CVA) (HCC)    Left ventricular thrombus    History of seizures    Carnitine deficiency (HCC)    Above-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)    History of traumatic brain injury    Impaired mobility and activities of daily living    At risk for venous thromboembolism (VTE)    Acute pain    At risk for constipation    Patient incapable of making informed decisions    Adjustment disorder with mixed anxiety and depressed mood    Cardiomyopathy (HCC)    History of migraine headaches    Postoperative anemia    Generalized abdominal pain         Medications  Current Facility-Administered Medications   Medication Dose Route Frequency Provider Last Rate    acetaminophen  975 mg Oral TID PRN José Salcido MD       aspirin  81 mg Oral Daily Lorrie Barillas PA-C      atorvastatin  80 mg Oral Daily With Dinner Lorrie Barillas PA-C      bictegravir-emtricitab-tenofovir alafenamide  1 tablet Oral Daily With Breakfast Lorrie Barillas PA-C      bisacodyl  10 mg Rectal Daily PRN Kenny Castro MD      diphenhydrAMINE  25 mg Oral Q6H PRN Lorrie Barillas PA-C      divalproex sodium  1,250 mg Oral Daily Lorrie Barillas PA-C      dolutegravir  50 mg Oral Daily Lorrie Barillas PA-C      gabapentin  100 mg Oral Q8H Ashley Depadua, MD      lamoTRIgine  50 mg Oral BID Lorrie Barillas PA-C      levOCARNitine  1,000 mg/day Oral TID With Meals Lorrie Barillas PA-C      losartan  50 mg Oral Daily Lorrie Barillas PA-C      melatonin  6 mg Oral HS Lorrie Barillas PA-C      metoprolol succinate  25 mg Oral Daily CHARLIE Mcclelland      mirtazapine  15 mg Oral HS Lorrie Barillas PA-C      ondansetron  4 mg Oral Q6H PRN Lorrie Barillas PA-C      polyethylene glycol  17 g Oral Daily PRN Lorrie Barillas PA-C      senna  1 tablet Oral HS PRN Kenny Castro MD      sertraline  75 mg Oral Daily Lorrie Barillas PA-C      tamsulosin  0.4 mg Oral Daily With Dinner Lorrie Barillas PA-C      warfarin  2.5 mg Oral Weekly Lorrie Barillas PA-C      warfarin  5 mg Oral Once per day on Sunday Monday Tuesday Wednesday Thursday Saturday Lorrie Barillas PA-C      zonisamide  400 mg Oral Daily Lorrie Barillas PA-C            ** Please Note: Fluency Direct voice to text software may have been used in the creation of this document. **

## 2024-08-26 NOTE — PLAN OF CARE
Problem: PAIN - ADULT  Goal: Verbalizes/displays adequate comfort level or baseline comfort level  Description: Interventions:  - Encourage patient to monitor pain and request assistance  - Assess pain using appropriate pain scale  - Administer analgesics based on type and severity of pain and evaluate response  - Implement non-pharmacological measures as appropriate and evaluate response  - Consider cultural and social influences on pain and pain management  - Notify physician/advanced practitioner if interventions unsuccessful or patient reports new pain  Outcome: Progressing     Problem: INFECTION - ADULT  Goal: Absence or prevention of progression during hospitalization  Description: INTERVENTIONS:  - Assess and monitor for signs and symptoms of infection  - Monitor lab/diagnostic results  - Monitor all insertion sites, i.e. indwelling lines, tubes, and drains  - Monitor endotracheal if appropriate and nasal secretions for changes in amount and color  - [place] appropriate cooling/warming therapies per order  - Administer medications as ordered  - Instruct and encourage patient and family to use good hand hygiene technique  - Identify and instruct in appropriate isolation precautions for identified infection/condition  Outcome: Progressing  Goal: Absence of fever/infection during neutropenic period  Description: INTERVENTIONS:  - Monitor WBC    Outcome: Progressing     Problem: SAFETY ADULT  Goal: Patient will remain free of falls  Description: INTERVENTIONS:  - Educate patient/family on patient safety including physical limitations  - Instruct patient to call for assistance with activity   - Consult OT/PT to assist with strengthening/mobility   - Keep Call bell within reach  - Keep bed low and locked with side rails adjusted as appropriate  - Keep care items and personal belongings within reach  - Initiate and maintain comfort rounds  - Make Fall Risk Sign visible to staff  - Offer Toileting every 2 Hours,  in advance of need  - Initiate/Maintain alarm  - Obtain necessary fall risk management equipment:   - Apply yellow socks and bracelet for high fall risk patients  - Consider moving patient to room near nurses station  Outcome: Progressing  Goal: Maintain or return to baseline ADL function  Description: INTERVENTIONS:  -  Assess patient's ability to carry out ADLs; assess patient's baseline for ADL function and identify physical deficits which impact ability to perform ADLs (bathing, care of mouth/teeth, toileting, grooming, dressing, etc.)  - Assess/evaluate cause of self-care deficits   - Assess range of motion  - Assess patient's mobility; develop plan if impaired  - Assess patient's need for assistive devices and provide as appropriate  - Encourage maximum independence but intervene and supervise when necessary  - Involve family in performance of ADLs  - Assess for home care needs following discharge   - Consider OT consult to assist with ADL evaluation and planning for discharge  - Provide patient education as appropriate  Outcome: Progressing  Goal: Maintains/Returns to pre admission functional level  Description: INTERVENTIONS:  - Perform AM-PAC 6 Click Basic Mobility/ Daily Activity assessment daily.  - Set and communicate daily mobility goal to care team and patient/family/caregiver.   - Collaborate with rehabilitation services on mobility goals if consulted  - Perform Range of Motion 3 times a day.  - Reposition patient every 2 hours.  - Dangle patient 3 times a day  - Stand patient 3 times a day  - Ambulate patient 3 times a day  - Out of bed to chair 3 times a day   - Out of bed for meals 3 times a day  - Out of bed for toileting  - Record patient progress and toleration of activity level   Outcome: Progressing     Problem: DISCHARGE PLANNING  Goal: Discharge to home or other facility with appropriate resources  Description: INTERVENTIONS:  - Identify barriers to discharge w/patient and caregiver  -  Arrange for needed discharge resources and transportation as appropriate  - Identify discharge learning needs (meds, wound care, etc.)  - Arrange for interpretive services to assist at discharge as needed  - Refer to Case Management Department for coordinating discharge planning if the patient needs post-hospital services based on physician/advanced practitioner order or complex needs related to functional status, cognitive ability, or social support system  Outcome: Progressing     Problem: Prexisting or High Potential for Compromised Skin Integrity  Goal: Skin integrity is maintained or improved  Description: INTERVENTIONS:  - Identify patients at risk for skin breakdown  - Assess and monitor skin integrity  - Assess and monitor nutrition and hydration status  - Monitor labs   - Assess for incontinence   - Turn and reposition patient  - Assist with mobility/ambulation  - Relieve pressure over bony prominences  - Avoid friction and shearing  - Provide appropriate hygiene as needed including keeping skin clean and dry  - Evaluate need for skin moisturizer/barrier cream  - Collaborate with interdisciplinary team   - Patient/family teaching  - Consider wound care consult   Outcome: Progressing     Problem: Nutrition/Hydration-ADULT  Goal: Nutrient/Hydration intake appropriate for improving, restoring or maintaining nutritional needs  Description: Monitor and assess patient's nutrition/hydration status for malnutrition. Collaborate with interdisciplinary team and initiate plan and interventions as ordered.  Monitor patient's weight and dietary intake as ordered or per policy. Utilize nutrition screening tool and intervene as necessary. Determine patient's food preferences and provide high-protein, high-caloric foods as appropriate.     INTERVENTIONS:  - Monitor oral intake, urinary output, labs, and treatment plans  - Assess nutrition and hydration status and recommend course of action  - Evaluate amount of meals  eaten  - Assist patient with eating if necessary   - Allow adequate time for meals  - Recommend/ encourage appropriate diets, oral nutritional supplements, and vitamin/mineral supplements  - Order, calculate, and assess calorie counts as needed  - Recommend, monitor, and adjust tube feedings and TPN/PPN based on assessed needs  - Assess need for intravenous fluids  - Provide specific nutrition/hydration education as appropriate  - Include patient/family/caregiver in decisions related to nutrition  Outcome: Progressing

## 2024-08-27 PROCEDURE — 97110 THERAPEUTIC EXERCISES: CPT

## 2024-08-27 PROCEDURE — 92507 TX SP LANG VOICE COMM INDIV: CPT

## 2024-08-27 PROCEDURE — 99231 SBSQ HOSP IP/OBS SF/LOW 25: CPT | Performed by: NURSE PRACTITIONER

## 2024-08-27 PROCEDURE — 97530 THERAPEUTIC ACTIVITIES: CPT

## 2024-08-27 RX ADMIN — MIRTAZAPINE 15 MG: 15 TABLET, FILM COATED ORAL at 21:23

## 2024-08-27 RX ADMIN — LOSARTAN POTASSIUM 50 MG: 50 TABLET, FILM COATED ORAL at 08:31

## 2024-08-27 RX ADMIN — ATORVASTATIN CALCIUM 80 MG: 80 TABLET, FILM COATED ORAL at 17:50

## 2024-08-27 RX ADMIN — BICTEGRAVIR SODIUM, EMTRICITABINE, AND TENOFOVIR ALAFENAMIDE FUMARATE 1 TABLET: 50; 200; 25 TABLET ORAL at 07:02

## 2024-08-27 RX ADMIN — GABAPENTIN 100 MG: 100 CAPSULE ORAL at 05:46

## 2024-08-27 RX ADMIN — LEVOCARNITINE 330 MG: 1 SOLUTION ORAL at 08:34

## 2024-08-27 RX ADMIN — ASPIRIN 81 MG: 81 TABLET, COATED ORAL at 08:33

## 2024-08-27 RX ADMIN — LEVOCARNITINE 330 MG: 1 SOLUTION ORAL at 15:09

## 2024-08-27 RX ADMIN — WARFARIN SODIUM 5 MG: 5 TABLET ORAL at 20:21

## 2024-08-27 RX ADMIN — ZONISAMIDE 400 MG: 100 CAPSULE ORAL at 08:37

## 2024-08-27 RX ADMIN — Medication 6 MG: at 21:22

## 2024-08-27 RX ADMIN — LAMOTRIGINE 50 MG: 25 TABLET ORAL at 08:31

## 2024-08-27 RX ADMIN — GABAPENTIN 100 MG: 100 CAPSULE ORAL at 21:23

## 2024-08-27 RX ADMIN — LEVOCARNITINE 330 MG: 1 SOLUTION ORAL at 17:52

## 2024-08-27 RX ADMIN — GABAPENTIN 100 MG: 100 CAPSULE ORAL at 13:44

## 2024-08-27 RX ADMIN — LAMOTRIGINE 50 MG: 25 TABLET ORAL at 17:50

## 2024-08-27 RX ADMIN — SERTRALINE HYDROCHLORIDE 75 MG: 50 TABLET ORAL at 08:33

## 2024-08-27 RX ADMIN — METOPROLOL SUCCINATE 25 MG: 25 TABLET, EXTENDED RELEASE ORAL at 08:32

## 2024-08-27 RX ADMIN — DOLUTEGRAVIR SODIUM 50 MG: 50 TABLET, FILM COATED ORAL at 08:36

## 2024-08-27 RX ADMIN — TAMSULOSIN HYDROCHLORIDE 0.4 MG: 0.4 CAPSULE ORAL at 17:50

## 2024-08-27 RX ADMIN — DIVALPROEX SODIUM 1250 MG: 500 TABLET, EXTENDED RELEASE ORAL at 08:31

## 2024-08-27 NOTE — CASE MANAGEMENT
Case Management Discharge Planning Note    Patient name Sunil Patel  Location /-01 MRN [id number]  : 1960 Date 2024       Current Admission Date: 2024  Current Admission Diagnosis:Above-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)   Patient Active Problem List    Diagnosis Date Noted Date Diagnosed    Generalized abdominal pain 2024     Postoperative anemia 2024     History of migraine headaches 2024     Cardiomyopathy (HCC) 2024     Adjustment disorder with mixed anxiety and depressed mood 2024     Impaired mobility and activities of daily living 2024     At risk for venous thromboembolism (VTE) 2024     Acute pain 2024     At risk for constipation 2024     Patient incapable of making informed decisions 2024     Above-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) 2024     History of traumatic brain injury 2024     Carnitine deficiency ([facility]) 2024     Left ventricular thrombus 2024     History of seizures 2024     Tobacco abuse 10/26/2019     Cerebrovascular accident (CVA) ([facility]) 10/26/2019     Positive laboratory testing for human immunodeficiency virus ([facility]) 2017     Bipolar affective disorder ([facility]) 2017     Hypertension 2017     Human immunodeficiency virus (HIV) infection ([facility]) 2017     History of stroke 2017     Substance abuse ([facility]) 2013     Unspecified vitamin D deficiency 2010     Benign essential hypertension 2010       LOS (days): 52  Geometric Mean LOS (GMLOS) (days):   Days to GMLOS:     OBJECTIVE:  Risk of Unplanned Readmission Score: 40.79         Current admission status: Inpatient Rehab   Preferred Pharmacy:   [facility] [place] [facility] & [address]  [place] [address]  Phone: [phone] Fax: [phone]    [facility] [place] -  [address] [facility] [address]  [address] [facility] [address]  [place] [address]  Phone: [phone] Fax: [phone]    Primary Care Provider: CHARLIE Trevino    Primary Insurance: MEDICARE  Secondary Insurance: [facility]    DISCHARGE DETAILS:            Additional Comments: Guardianship hearing held today. Pt was not in attendance and was told he did not need to be present. In attendance was JANELL SHORT, Mireya Nash and Lisa (pt friend). CM testified to reason for guardian and barriers currently faced in placing pt. Mireya testified and appeared to want pt to discharge home and live in Lisa's basement. Nain Dereck [facility] was assigned as Guardian. CM spoke with Nain crawford: pt move to either SH or LE locations. Nain preferance is SH since it is close to his support system, he staed if there is no other option then LE is fine. CM will advise when pt is moved and continue to seek out a placement. CM will await copy of court docs and upload when received

## 2024-08-27 NOTE — ASSESSMENT & PLAN NOTE
Remote history of TBI, previously residing in a group home prior to [facility] sentence.  Evaluated by neuropsychiatry on 6/27/24 and deemed NOT to have medical decision-making capacity  Neuropsychology re-evaluated pt and he does not have decision making capacity.  Process for court appointed guardian started on 7/5/24 -  following   Guardianship hearing 8/27/24

## 2024-08-27 NOTE — ASSESSMENT & PLAN NOTE
ECHO 6/10/2024 = LVEF 40-45% with mild global hypokinesis   Status post NM stress test 6/11/2024 = large, mild, fixed defect in the inferior wall, possibly due to diaphragmatic attenuation artifact, there is a small area of partial reversibility in the inferior apical wall suggestive of ischemia    Evaluated by cardiology.  Etiology felt to be possibly secondary to stress-induced cardiomyopathy, with apical thrombus  Cardiac catheterization deferred given the lack of any significant ischemia, and no current cardiac symptoms  Continue with medical management with aspirin, statin, beta-blocker, ARB  Remains euvolemic off of diuretics, continue to monitor volume status   Outpatient follow-up with cardiology, previously followed with Dr. Gumaro Aguila [facility]  Very stable on exam today (8/27/24)

## 2024-08-27 NOTE — PROGRESS NOTES
[facility]  Progress Note  Name: Sunil Patel I  MRN: [id number]  Unit/Bed#: -01 I Date of Admission: 7/6/2024   Date of Service: 8/27/2024 I Hospital Day: 52    Assessment & Plan   * Above-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)  Assessment & Plan  Presented with left lower extremity acute limb ischemia/extensive left iliofemoral and left infrainguinal embolism requiring left femoral thrombectomy and subsequent AKA on 6/7/24 with vascular surgery.  Continue with local wound care   Continue ASA, Coumadin and statin   Stable for discharge from PMR and medicine standpoint.  Dispo planning as per case management.  Patient is awaiting guardianship hearing, which is scheduled for today, 8/27/24.    Benign essential hypertension  Assessment & Plan  Blood pressures acceptable on current regimen including Losartan 50 mg daily, Toprol-XL 25 mg daily  /68 today     Bipolar affective disorder (HCC)  Assessment & Plan  Psychiatry evaluated most recently on 8/12 and cleared for discharge to rehab   Continue home meds Zoloft and Remeron    Cardiomyopathy (HCC)  Assessment & Plan  ECHO 6/10/2024 = LVEF 40-45% with mild global hypokinesis   Status post NM stress test 6/11/2024 = large, mild, fixed defect in the inferior wall, possibly due to diaphragmatic attenuation artifact, there is a small area of partial reversibility in the inferior apical wall suggestive of ischemia    Evaluated by cardiology.  Etiology felt to be possibly secondary to stress-induced cardiomyopathy, with apical thrombus  Cardiac catheterization deferred given the lack of any significant ischemia, and no current cardiac symptoms  Continue with medical management with aspirin, statin, beta-blocker, ARB  Remains euvolemic off of diuretics, continue to monitor volume status   Outpatient follow-up with cardiology, previously followed with Dr. Gumaro Agu[facility]  Very stable on exam today  (8/27/24)    Carnitine deficiency (HCC)  Assessment & Plan  Continue levocarnitine 330 mg 3x daily with meals.    History of traumatic brain injury  Assessment & Plan  Remote history of TBI, previously residing in a group home prior to longterm sentence.  Evaluated by neuropsychiatry on 6/27/24 and deemed NOT to have medical decision-making capacity  Neuropsychology re-evaluated pt and he does not have decision making capacity.  Process for court appointed guardian started on 7/5/24 - CM following   Guardianship hearing 8/27/24    Left ventricular thrombus  Assessment & Plan  Noted on echocardiogram 6/10/2024: spherical 1.5 x 1.3 cm thrombus at the LV apex   Initiated on AC with Xarelto however unable to verify insurance coverage, now on Coumadin at 5 mg daily  INR: 1.94 at last check on 8/26/24  Coumadin 2.5mg Fridays and 5mg every other night of the week.  INR Thursday.    Human immunodeficiency virus (HIV) infection (HCC)  Assessment & Plan  Continue Biktarvy and Tivicay.    Generalized abdominal pain  Assessment & Plan  Pt refused therapy 8/25/24 due to abdominal pain in the Rt > Lt LQ.  He states that is has been present for a long time, about 2 weeks.  He denies nausea or vomiting. He has been having a bowel movement daily.  Labs unremarkable/stable.  KUB unremarkable.  Suspect musculoskeletal.  Resolved as of today (8/27/24)             The above assessment and plan was reviewed and updated as determined by my evaluation of the patient on 8/27/2024.    Labs:   Results from last 7 days   Lab Units 08/25/24  1035   WBC Thousand/uL 6.34   HEMOGLOBIN g/dL 10.9*   HEMATOCRIT % 37.2   PLATELETS Thousands/uL 320     Results from last 7 days   Lab Units 08/25/24  1035   SODIUM mmol/L 139   POTASSIUM mmol/L 4.7   CHLORIDE mmol/L 106   CO2 mmol/L 27   BUN mg/dL 18   CREATININE mg/dL 0.94   CALCIUM mg/dL 9.5         Results from last 7 days   Lab Units 08/26/24  0539 08/25/24  1035   INR  1.94* 1.87*           Imaging  XR  abdomen 1 view kub   Final Result by Toni Kahn MD (08/26 0801)      Unremarkable abdomen.               Workstation performed: [id number]             Review of Scheduled Meds:  Current Facility-Administered Medications   Medication Dose Route Frequency Provider Last Rate    acetaminophen  975 mg Oral TID PRN José Salcido MD      aspirin  81 mg Oral Daily Lorrie Barillas PA-C      atorvastatin  80 mg Oral Daily With Dinner Lorrie Barillas PA-C      bictegravir-emtricitab-tenofovir alafenamide  1 tablet Oral Daily With Breakfast Lorrie Barillas PA-C      bisacodyl  10 mg Rectal Daily PRN Kenny Castro MD      diphenhydrAMINE  25 mg Oral Q6H PRN Lorrie Barillas PA-C      divalproex sodium  1,250 mg Oral Daily Lorrie Barillas PA-C      dolutegravir  50 mg Oral Daily Lorrie Barillas PA-C      gabapentin  100 mg Oral Q8H Ashley Depadua, MD      lamoTRIgine  50 mg Oral BID Lorrie Barillas PA-C      levOCARNitine  1,000 mg/day Oral TID With Meals Lorrie Barillas PA-C      losartan  50 mg Oral Daily Lorrie Barillas PA-C      melatonin  6 mg Oral HS Lorrie Barillas PA-C      metoprolol succinate  25 mg Oral Daily CHARLIE Mcclelland      mirtazapine  15 mg Oral HS Lorrie Barillas PA-C      ondansetron  4 mg Oral Q6H PRN Lorrie Barillas PA-C      polyethylene glycol  17 g Oral Daily PRN Lorrie Barillas PA-C      senna  1 tablet Oral HS PRN Kenny Castro MD      sertraline  75 mg Oral Daily Lorrie Barillas PA-C      tamsulosin  0.4 mg Oral Daily With Dinner Lorrie Barillas PA-C      warfarin  2.5 mg Oral Weekly Lorrie Barillas PA-C      warfarin  5 mg Oral Once per day on Sunday Monday Tuesday Wednesday Thursday Saturday Lorrie Barillas PA-C      zonisamide  400 mg Oral Daily Lorrie Barillas PA-C         Subjective/ HPI: Patient seen and examined. Patients overnight issues or events were reviewed  "with nursing staff. New or overnight issues include the following:     He is just mad about still being here and not having his money.   He has no more abdominal pains and is \"fine\" when asked.     ROS:   A 10 point ROS was performed; negative except as noted above.        *Labs /Radiology studies Reviewed  *Medications  reviewed and reconciled as needed  *Please refer to order section for additional ordered labs studies      Physical Examination:  Vitals:   Vitals:    08/26/24 1355 08/26/24 2019 08/27/24 0552 08/27/24 0832   BP: 116/61 114/68 134/70 140/68   BP Location: Left arm Left arm Left arm Left arm   Pulse: 86 79 79 87   Resp: 19 18 18 18   Temp: (!) 97.4 °F (36.3 °C) 98.4 °F (36.9 °C) 97.9 °F (36.6 °C)    TempSrc: Oral Oral Oral    SpO2: 94% 94% 91%    Weight:       Height:           General Appearance: NAD; in chair, verbalized his frustration with still being in the hopsital  HEENT: conjuctiva normal; mucous membranes moist; face symmetrical  Neck:  Supple  Lungs: clear bilaterally, normal respiratory effort, no retractions, expiratory effort normal, on room air  CV: regular rate and rhythm, no murmurs rubs or gallops noted   ABD: soft non tender, +BS x4  EXT: DP pulses intact, no lymphadenopathy, no edema, Left AKA  Skin: normal turgor, normal texture, no rash  Psych: affect normal, not agitated   Neuro: Alert and conversive but repetitive speech noted      The above physical exam was reviewed and updated as determined by my evaluation of the patient on 8/27/2024.    Invasive Devices       Drain  Duration             External Urinary Catheter 26 days                       VTE Pharmacologic Prophylaxis: Warfarin (Coumadin)  Code Status: Level 1 - Full Code  Current Length of Stay: 52 day(s)    Coordination of patient's care was performed in conjunction with consulting services. Time invested included review of patient's labs, vitals, and management of their comorbidities with continued monitoring, " examination of patient as well as answering patient questions, documenting her findings and creating progress note in electronic medical record,  ordering appropriate diagnostic testing.       ** Please Note:  voice to text software may have been used in the creation of this document. Although proof errors in transcription or interpretation are a potential of such software**

## 2024-08-27 NOTE — ASSESSMENT & PLAN NOTE
Presented with left lower extremity acute limb ischemia/extensive left iliofemoral and left infrainguinal embolism requiring left femoral thrombectomy and subsequent AKA on 6/7/24 with vascular surgery.  Continue with local wound care   Continue ASA, Coumadin and statin   Stable for discharge from PMR and medicine standpoint.  Dispo planning as per case management.  Patient is awaiting guardianship hearing, which is scheduled for today, 8/27/24.

## 2024-08-27 NOTE — PROGRESS NOTES
08/27/24 1400   Pain Assessment   Pain Assessment Tool 0-10   Pain Score No Pain   Restrictions/Precautions   Precautions Aphasia;Bed/chair alarms;Cognitive;Fall Risk;Supervision on toilet/commode   LLE Weight Bearing Per Order NWB   Braces or Orthoses   (L LE )   Subjective   Subjective pt agreeable to perform skilled PT   Sit to Stand   Type of Assistance Needed Physical assistance;Adaptive equipment   Physical Assistance Level 25% or less   Comment STS to RW   Sit to Stand CARE Score 3   Bed-Chair Transfer   Type of Assistance Needed Set-up / clean-up   Comment WC sit pivot and Jignesh / CgA if with RW pt Max to ModA keesha SPT with RW   Chair/Bed-to-Chair Transfer CARE Score 5   Walk 10 Feet   Type of Assistance Needed Physical assistance;Adaptive equipment   Physical Assistance Level 51%-75%   Comment RW hopping RLE   Walk 10 Feet CARE Score 2   Walk 50 Feet with Two Turns   Reason if not Attempted Safety concerns   Walk 50 Feet with Two Turns CARE Score 88   Walk 150 Feet   Reason if not Attempted Safety concerns   Walk 150 Feet CARE Score 88   Walking 10 Feet on Uneven Surfaces   Reason if not Attempted Safety concerns   Walking 10 Feet on Uneven Surfaces CARE Score 88   Ambulation   Primary Mode of Locomotion Prior to Admission Walk   Distance Walked (feet) 15 ft   Assist Device Roller Walker   Gait Pattern Hopping   Does the patient walk? 2. Yes   Wheel 50 Feet with Two Turns   Type of Assistance Needed Independent   Wheel 50 Feet with Two Turns CARE Score 6   Wheel 150 Feet   Type of Assistance Needed Independent   Wheel 150 Feet CARE Score 6   Wheelchair mobility   Does the patient use a wheelchair? 1. Yes   Type of Wheelchair Used 1. Manual   Method Right upper extremity;Left upper extremity   Curb or Single Stair   Reason if not Attempted Safety concerns   1 Step (Curb) CARE Score 88   4 Steps   Reason if not Attempted Safety concerns   4 Steps CARE Score 88   12 Steps   Reason if not Attempted  Safety concerns   12 Steps CARE Score 88   Equipment Use   NuStep LVL 4 FOR 12 MIN   Assessment   Treatment Assessment Pt cont working on SPT with RW only in  skilled PT , pt will cont to perform sit pivot transfers in his room and with nsging at this time . Pt perform core strengtheing and upper arm and overall conditioning taisha inc endurance . Cont POC   Barriers to Discharge Inaccessible home environment;Decreased caregiver support   Plan   Progress Improving as expected   PT Therapy Minutes   PT Time In 1400   PT Time Out 1515   PT Total Time (minutes) 75   PT Mode of treatment - Individual (minutes) 75   PT Mode of treatment - Concurrent (minutes) 0   PT Mode of treatment - Group (minutes) 0   PT Mode of treatment - Co-treat (minutes) 0   PT Mode of Treatment - Total time(minutes) 75 minutes   PT Cumulative Minutes 1650   Therapy Time missed   Time missed? Yes   Amount of time missed 15   Reason for time missed Extreme fatigue

## 2024-08-27 NOTE — TEAM CONFERENCE
Acute RehabilitationTeam Conference Note  Date: 8/27/2024   Time: 10:16 AM       Patient Name:  Sunil Patel       Medical Record Number: [id number]   YOB: 1960  Sex: Male          Room/Bed:  [place] [address]/[address]01  Payor Info:  Payor: MEDICARE / Plan: MEDICARE A AND B / Product Type: Medicare A & B Fee for Service /      Admitting Diagnosis: Hx of AKA (above knee amputation) ([facility]) [Z89.619]   Admit Date/Time:  7/6/2024  1:30 PM  Admission Comments: No comment available     Primary Diagnosis:  Above-knee amputation of left lower extremity ([facility])  Principal Problem: Above-knee amputation of left lower extremity ([facility])    Patient Active Problem List    Diagnosis Date Noted    Generalized abdominal pain 08/25/2024    Postoperative anemia 08/11/2024    History of migraine headaches 07/22/2024    Cardiomyopathy ([facility]) 07/09/2024    Adjustment disorder with mixed anxiety and depressed mood 07/08/2024    Impaired mobility and activities of daily living 07/07/2024    At risk for venous thromboembolism (VTE) 07/07/2024    Acute pain 07/07/2024    At risk for constipation 07/07/2024    Patient incapable of making informed decisions 07/07/2024    Above-knee amputation of left lower extremity ([facility]) 07/06/2024    History of traumatic brain injury 07/06/2024    Carnitine deficiency ([facility]) 06/09/2024    Left ventricular thrombus 06/08/2024    History of seizures 06/08/2024    Tobacco abuse 10/26/2019    Cerebrovascular accident (CVA) ([facility]) 10/26/2019    Positive laboratory testing for human immunodeficiency virus ([facility]) 07/03/2017    Bipolar affective disorder ([facility]) 07/02/2017    Hypertension 02/27/2017    Human immunodeficiency virus (HIV) infection ([facility]) 02/16/2017    History of stroke 02/16/2017    Substance abuse ([facility]) 12/21/2013    Unspecified vitamin D deficiency 05/28/2010    Benign essential hypertension 02/25/2010       Physical Therapy:    Weight Bearing Status: Non-weight bearing (LLE)  Transfers:  Supervision  Bed Mobility: Independent  Amulation Distance (ft): 10 feet  Ambulation: Assist of 2  Assistive Device for Ambulation: Roller Walker  Wheelchair Mobility Distance: 500 ft  Wheelchair Mobility: Independent  Number of Stairs: 0  Assistive Device for Stairs:  (TBA)  Stair Assistance:  (TBA)  Ramp: Independent  Assistive Device for Ramp: Wheelchair  Discharge Recommendations: Skilled Nursing Facility    7/15/24  Pt has plateaued functionally for he already met S/CGA goals at w/c level mobilities. Due to baseline cognition, aphasia, impaired safety with STM impairment impacting consistent carry over of new learning so does not anticipate pt to progress beyond S level at this time. Also for the past week pt also demos inconsistent participation with skilled PT interventions. Goals for this week to complete/review Phase 1 AMP education, cont with w/c level transfer training keesha with car transfer, cont with strengthening/flexibility exercises including reviewing HEP packet provided while awaiting placement pending guardianship decision.     8/5/2024    Pt cont at this time with skilled PT and pt overall at S lvl d/t baseline cognition, aphasia, impaired safety with STM impairment. Pt at this time cont awaiting for guarding ship/placement , Dr Depadua speaking with Kieran and his fiend Donna about DC places and overall outcome. Pt will cont to benefit with skilled PT to keep functional mobility, LE /core/UE strengthening and overall better outcome . Cont working on sit pivot transfers setup and overall limb care with safety awareness.    08/13/2024: Pt cont at this time with skilled PT and pt overall at S lvl d/t baseline cognition, aphasia, impaired safety with STM impairment. Pt at this time cont awaiting for guarding ship/placement , Dr Depadua speaking with Kieran and his fiend Donna about DC places and overall outcome. Pt will cont to benefit with skilled PT to keep functional mobility, LE /core/UE strengthening  and overall better outcome . Cont working on sit pivot transfers setup and overall limb care with safety awareness. Pt has been ambulating within parallel bars and progressing well, requiring VC for proper weight shifting and sequencing.    08/20/2024: Pt cont at this time with skilled PT and pt overall at S lvl d/t baseline cognition, aphasia, impaired safety with STM impairment. Pt at this time cont awaiting for guarding ship/placement. Pt is limited by decreased willingness/motivation to participate in skilled PT/OT services, with multiple refusals over the last week. Pt will cont to benefit with skilled PT to keep functional mobility, LE /core/UE strengthening and overall better outcome . Cont working on sit pivot transfers setup and overall limb care with safety awareness. Pt has been ambulating within parallel bars and progressing well, requiring VC for proper weight shifting and sequencing.    8/26/2024:Pt cont at this time with skilled PT and pt overall at S lvl d/t baseline cognition, aphasia, impaired safety with STM impairment. Pt at this time cont awaiting for guarding ship/placement. Pt is limited by decreased willingness/motivation to participate in skilled PT/OT services, with multiple refusals over the weekend. Pt will cont to benefit with skilled PT to keep functional mobility, LE /core/UE strengthening and overall better outcome. He is becoming more consistent with setting up transfers independently and communicating set-up needs to staff. Sit pivot transfers are consistent and safe. He has practiced hopping with RW for about 10-15 ft although this is not functional for him and he will primarily be w/c level.       Occupational Therapy:  Eating: Independent  Grooming: Independent  Bathing: Supervision  Bathing: Supervision  Upper Body Dressing: Independent  Lower Body Dressing: Supervision  Toileting: Supervision  Tub/Shower Transfer: Supervision  Toilet Transfer: Supervision  Cognition: Exceptions  to WNL  Cognition: Decreased Memory, Decreased Safety, Decreased Executive Functions  Orientation: Person, Place, Time, Situation  Discharge Recommendations: Home with:  DC Home with:: 24 Hour Supervision, Family Support, First Floor Setup       Pt continues to present with impairments in endurance, standing balance/tolerance, memory, insight, safety, and judgement. Additional functional barriers include fatigue, decreased caregiver support, risk for falls, and home environment. Pt is functioning at overall S for ADLs and S fxnl sit pivot xfers, and independent for w/c mobility. Pt will continue to benefit from skilled OT services to address above mentioned barriers and maximize functional independence in baseline areas of occupation, utilizing the following interventions: ADL retraining, DME/adaptive equipment assessment, functional transfer training, repetitive safety training, activity tolerance/edurance training, UB/core strengthening, phase 1 amputee education, and energy conservation education. OT D/C recommendation is for 24 hour S due to baseline cognitive deficits, pt would benefit from JARRETT.          Speech Therapy:  Mode of Communication: Verbal  Speech/Language: Expressive Aphasia (expressive and receptive)  Cognition: Exceptions to WNL  Cognition: Decreased Memory, Decreased Executive Functions, Decreased Attention, Decreased Comprehension, Decreased Safety, Behavioral Considerations  Orientation: Person, Place, Time, Situation  Discharge Recommendations: Home with:  DC Home with:: 24 Hour Supervision, Family Support, Outpatient Speech Therapy  Pt is currently being followed for skilled SLP services targeting language therapy. Pt with hx stroke, resulting in expressive and receptive aphasia but noting expressive communication skills to be greater impacted. SLP was consulted to support pt's communication skills for improved ability to express his needs with the team. Pt presenting with overall moderately  impaired expressive and receptive language deficits, impacting functional communication skills. Pt has trialed written communication, however, this was found to be an ineffective communication modality. Pt has also been introduced to a low tech manual communication board, which he has demonstrated ability to utilize in order to convey basic wants/needs more easily and effectively. Currently, pt is functioning at min A for comprehension, problem solving and memory and mod A for expression. Plan this week to continue to attempt to establish a more functional mode of communication which is both effective and easy for pt to utilize to support his communication attempts with staff. Recommending brief follow up skilled SLP services to continue to support pt in building a more functional communication modality to improve effectiveness of communication and to ease frustrations with communication attempts.     Update week of 7/22/2024: Pt continues to be seen for skilled SLP services focusing on language and communication skills with primary reason for consultation to support communication needs at this time. Pt remains with deficits in expressive and receptive language skills,which is pt's baseline prior to admission to this unit, but which impact all domains of language (auditory & reading comprehension; verbal & written expression). This week, sessions have focused on introducing a high tech communication device as per pt's request. Pt's friend provided a tablet and recent session has focused on exploring different communication apps which fit pt's needs (function, cost, etc), as well as apps which pt can then complete language tasks in his free time. Pt is demonstrating desire to utilize device, but will benefit from additional training and practice with device, especially due to continued deficits in comprehension, ST memory/recall, problem solving and attention. This week, plan to continue to focus on determining  appropriate apps for language drills/activities, as well as identifying an appropriate alejandra to support expressive language/communication, while also programming alejandra for pt's specific needs. Currently, pt is functioning at mod A for expression, comprehension, problem solving and memory and supervision for social interaction. Recommending short term follow up of skilled SLP services to continue to support pt in building a more functional communication modality to improve effectiveness of communication and to ease frustrations with communication attempts during acute rehab stay. Will also benefit from engaging pt's friend in education/training with device.     Update week of 7/29/2024: Pt continues to be followed for language and communication therapy where he is making slow progress, however, ST primarily being implemented to support current functional language as pt with baseline aphasia. Pt is limited by deficits in expressive and receptive language skills, to include verbal expression, written expression, auditory comprehension and reading comprehension, which impact overall functional communication skills. Additionally, a cognitive linguistic evaluation was completed this week, as per medical team request. Pt completed the SLUMS assessment with a score of 1/30 which as compared to those with high school education correlates with severe neurocognitive disorder, however, this score is likely highly impacted by pt's language deficits and should be interpreted with caution. Cognitive linguistic deficits include decreased: attention, STM recall, problem solving, executive functions, safety awareness, reasoning, and visuospatial skills. The following interventions are used to target these barriers, including visual orientation checklist, verbal problem solving task, verbal working memory tasks, categorization tasks , picture problem solving activities, verbal reasoning tasks, visual attention tasks, and family  education/training. basic communication boards, verbal command following activities, written command following activities, biographical yes/no questions, simple yes/no questions, moderate yes/no questions, visual/receptive object ID tasks, picture object ID tasks, automatic speech: counting, automatic speech: COURTNEY, automatic speech: CORRY, object naming tasks, simple phrase completion tasks, reading comprehension at word level, reading comprehension at phrase level , written expression of biographical information, written expression at word level, and word-picture matching.     Plan to target cognitive linguistic skills (basic problem solving, memory, safety) and language skills across all language domains this coming week. Currently, pt is functioning at mod assist for comprehension, expression, problem solving and memory, as well as supervision for social interaction. Recommending continued skilled SLP services to continue to support pt in building a more functional communication modality to improve effectiveness of communication and to ease frustrations with communication attempts during acute rehab stay, as well as to target cognitive linguistic skills to maximize safety and independence on discharge.     Update week 8/5/2024: Pt continues to be followed for language and communication therapy where is making slow progress, however, ST continues to implement support with a focus on baseline aphasia.In regards to aphasia, pt is limited by deficits in expressive and receptive language skills, to include verbal expression, written expression, auditory comprehension and reading comprehension, which impacts overall functional communication skills. In recent sessions, implementation of constant therapy application on pt's tablet has been completed and discussed w/ pt's friend to help pt practice outside of therapy and during therapy with expressive-receptive language. Pt voiced engagement with application and continued  focus with application will be used in future sessions as well as additional interventions to target these barriers such as basic communication boards, verbal command following activities, written command following activities, biographical yes/no questions, simple yes/no questions, moderate yes/no questions, visual/receptive object ID tasks, picture object ID tasks, automatic speech: counting, automatic speech: COURTNEY, automatic speech: CORRY, object naming tasks, simple phrase completion tasks, reading comprehension at word level, reading comprehension at phrase level , written expression of biographical information, written expression at word level, and word-picture matching. In regards to cognition, recent sessions have not been targeting due to pt's signifiant expressive language deficits. The following interventions are to be used to target these barriers, visual orientation checklist, verbal problem solving task, verbal working memory tasks, categorization tasks , picture problem solving activities, verbal reasoning tasks, visual attention tasks, and family education/training. Plan to target cognitive linguistic skills (basic problem solving, memory, safety) and language skills across all language domains this coming week.     Current level of functioning:    Comprehension: Mod A  Expression: Mod A  Social Interaction: Supervision  Problem Solving: Mod A  Memory: Mod A      Update from week: 8/13/2024 . Pt is being followed for language tx  sessions to where pt is making slower progress this week. Continued language barriers which present include: decreased higher level auditory comprehension skills, expressive language skills including decreased overall communicative intent, reading comprehension skills including lengthier written information, and written language skills including difficulty in basic written expression including biographical information which still impacts pt's overall safety, functional cognitive  communication skills as well as functional mobility. However, still incorporating the use of tablet w/ free apps which have been provided by ST team. Of SLP had introduced [employer] website and practive therapy materials for pt. Unfortunately this is not allowed on tablet but is for computer use. SLP did have pt complete tasks w/ use of laptop which pt responded favorably. Will plan to reach out to company to determine if the website can be utilized to pt's personal tablet. Continued cognitive barriers which present include: decreased visual attention, ST memory recall , reasoning, sequencing, judgement, and insight, which still impacts pt's overall safety, functional cognitive communication skills as well as functional mobility. The following interventions are used to target these barriers, including visual orientation checklist, drawing conclusions activities, categorization tasks , picture problem solving activities, visual retraining activities., visual attention tasks, and functional reading tasks .     Current level of functioning:   Comprehension: min-mod A   Expression: mod A  Social Interaction: supervision  Executive functions: mod A   Memory: mod A    This week will continue to target independence of use of tablet and language/cognitive apps to use as a supplement during pt's downtime. Pt to benefit from skilled language tx  session up to 3x per week maintenance given overall communication skills in attempts to decreased caregiver burden over time.           Update from week: 8/19/2024: Pt continues to be followed for language and communication therapy where he is making slow progress. Pt is limited by deficits in both expressive and receptive language skills, to include verbal expression, written expression, auditory comprehension and reading comprehension, which impact overall functional communication skills.     Continued speech/language barriers which present include: decreased auditory  comprehension skill including yes/no questions, command following and information with specific content such as prepositions, expressive language skills including word finding which impacts semantic content, reading comprehension skills including at the word and sentence levels, and written language skills including ability to record his own biographical info or for use as a communication modality. Continued cognitive barriers which present include: decreased attention, visual attention, ST memory recall , problem solving, reasoning, sequencing, organization of thoughts, judgement, and insight, which still impacts pt's overall safety, functional cognitive communication skills as well as functional mobility. These deficits are barriers to pt's overall functional independence and safety with mobility, as well as ability to effectively communicate.     The following interventions are used to target these barriers, including basic communication boards, yes/no visual sheet, verbal command following activities, written command following activities, biographical yes/no questions, simple yes/no questions, moderate yes/no questions, visual/receptive object ID tasks, picture object ID tasks, object naming tasks, opposite phrase completion tasks, simple phrase completion tasks, reading comprehension at word level, written expression of biographical information, word-picture matching, divergent naming: concrete, and word deduction with phrase. While pt's language deficits are primarily being targeted, the following interventions are also being used to address these cognitive linguistic deficits.     Pt is currently functioning at min A for comprehension, supervision for social interaction and mod A for expression, memory and problem solving. This week, will plan on continuing to incorporate pt's tablet for use of communication modality and for language tasks. Plan to primarily target verbal expression, auditory comprehension  and reading comprehension skills. Pt to benefit from skilled language tx session up to 3x per week maintenance for overall communication skills in attempts to decreased caregiver burden over time and to maximize functional communication abilities.    Update from week: 8/26/2024: Pt continues to be followed for skilled ST focusing on expressive and receptive language skills where pt is making slow progress but continues to participate. Pt is limited by deficits in both expressive and receptive language skills, which impact all domains of communication including verbal and written expression and auditory and reading comprehension. These deficits continue to impact pt's overall functional communication skills at the conversational level for expression and at the sentence level for comprehension.     Continued speech/language barriers which present include: decreased auditory comprehension skills of more complex ideas, reading comprehension at the word and sentence levels, verbal expression at the sentence and conversational levels and written expression.  Continued cognitive barriers which present include: decreased attention, visual attention, ST memory recall , problem solving, reasoning, sequencing, organization of thoughts, judgement, and insight, which still impacts pt's overall safety, functional cognitive communication skills as well as functional mobility. These deficits are barriers to pt's overall functional independence and safety with mobility, as well as ability to effectively communicate.     The following interventions are used to target these barriers, including basic communication boards, verbal command following activities, biographical yes/no questions, simple yes/no questions, moderate yes/no questions, visual/receptive object ID tasks, picture object ID tasks, object naming tasks, opposite phrase completion tasks, simple phrase completion tasks, reading comprehension at word level, written  expression of biographical information, word-picture matching, divergent naming: concrete, and word deduction with phrase, along with listening comprehension tasks. Though pt continues to present with cognitive linguistic deficits, the main focus remains language skills.     Pt is currently functioning at min A for comprehension, supervision for social interaction and mod A for expression, memory and problem solving. This week, will plan on increasing pt's knowledge and use of word retrieval strategies to facilitate improved expression. Plan to primarily target verbal expression and auditory comprehension. Pt to benefit from skilled language tx session up to 3x per week maintenance for overall communication skills in attempts to decreased caregiver burden over time and to maximize functional communication abilities.      Nursing Notes:  Appetite: Fair  Diet Type: Cardiac                      Diet Patient/Family Education Complete: Yes    Type of Wound (LDA): Wound                    Type of Wound Patient/Family Education: Yes  Bladder: Incontinent     Bladder Patient/Family Education: Yes  Bowel: Continent     Bowel Patient/Family Education: Yes  Pain Location/Orientation: Orientation: Left, Location: Abdomen  Pain Score: 0                       Hospital Pain Intervention(s): Rest  Pain Patient/Family Education: Yes  Medication Management/Safety  Safe Administration: No  Reason for non-safe administration: cogntive impairment  Medication Patient/Family Education Complete: No    63 y.o. male who  has a past medical history of Acute lower limb ischemia (06/08/2024), Anxiety, Depression, HIV disease ([facility]), Substance abuse ([facility]), and Suicide attempt ([facility]). who presented to the [facility] on 6/7/24 from longterm for increased LLE swelling and pain and was found to have a left external iliac artery occlusion and acute limb ischemia. He underwent left femoral artery exploration with  thromboembolectomy of the left iliac system, left profunda femoris and left superficial femoral arteries without possibility of limb salvage and ultimately left trans-femoral amputation on 6/7/24. Patient had TTE on 6/10/24 showing LV apex thrombus. On 6/11/24 patient had pharmacologic nuclear stress test/SPECT scan which did not show any significant areas of ischemia with EF 40-45% and recommended continuing A/C for LV apical thrombus. His course was complicated by b/l buttock unstageable pressure ulcers, urinary and fecal incontinence, pain, and significant decline in ADLs and mobility. Patient has been continued on Xarelto for anticoagulation, as well as ASA and statin. Patient has a history of TBI, with baseline nonfluent aphasia and forgetfulness. He was evaluated by neuropsychology on 6/27/24, and deemed to not have capacity to make fully informed medical decisions. Therefore, the guardianship process was initiated as of 7/5/24. He [facility] 7/6.     7/15: Will encourage independence with ADLs and monitor labs and vital signs.  We will educate pt/family about repositioning to prevent skin breakdown.  We will assist w repositioning and perform routine skin checks.  We will monitor for adequate pain control.  We will monitor for constipation and medicate pt as ordered. We will provide pt and family DM education. We will increase safety awareness and keep pt free from falls.    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 Noted on echocardiogram 6/10/2024: spherical 1.5 x 1.3 cm thrombus at the LV apex Initiated on AC with Xarelto, continue. Rx sent to [facility] for price check on 7/10/24 - LENY spoke with [employer] and pt has rx coverage. Information sent to [employer] [facility].  Today on 7/21/24, d/w [facility] and Xarelto is not covered and he would have to pay OOP @$700.00.  CM to further address tomorrow 7/22/24.  He may have to be switched to Coumadin.     Will continue to encourage independence with ADLs and monitor  labs and vital signs. We will educate pt/family about repositioning to prevent skin breakdown.  We will assist w repositioning and perform routine skin checks.  We will monitor for adequate pain control.  We will monitor for constipation and medicate pt as ordered. We will provide pt and family DM education. We will increase safety awareness and keep pt free from falls.     Pt is incontinent of bladder, uses male pure wick @ HS. Will encourage independence with ADLs and monitor labs and vital signs.  We will educate pt/family about repositioning to prevent skin breakdown.  We will assist w repositioning and perform routine skin checks.  We will monitor for adequate pain control.  We will monitor for constipation and medicate pt as ordered. We will provide pt and family wound and skin care management. We will increase safety awareness and keep pt free from falls.    8/5: Will continue to encourage independence with ADLs and monitor labs and vital signs. We will educate pt/family about repositioning to prevent skin breakdown.  We will assist w repositioning and perform routine skin checks.  We will monitor for adequate pain control.  We will monitor for constipation and medicate pt as ordered. We will provide pt and family DM education. We will increase safety awareness and keep pt free from falls.     8/13   Remote history of TBI, previously residing in a group home prior to long term sentence.. Evaluated by neuropsychiatry on 6/27/24 and deemed NOT to have medical decision-making capacity. Process for court appointed guardian started on 7/5/24 - CM following.Guardianship hearing 8/27/24 8/20- Pt awaiting guardianship hearing.  Remains inc urine w purewick use at night.  Pt remains alarmed for safety.    8/26- guardianship hearing tomorrow. KUB done, mild/mod stool burden reported; continue PRN bowel meds due to constipation.  Pt continues to use purewick at HS due to incontinence.  Pt remains on alarms for continued  safety.      Goal: Will continue to encourage independence with ADLs and monitor labs and vital signs. We will educate pt/family about repositioning to prevent skin breakdown.  We will assist w repositioning and perform routine skin checks.  We will monitor for adequate pain control.  We will monitor for constipation and medicate pt as ordered. We will increase safety awareness and keep pt free from falls.           Case Management:     Discharge Planning  Living Arrangements: Other (Comment)  Support Systems: Son, Family members  Assistance Needed: Family members are currently arranging housing for pt after d/c  Type of Current Residence: Other (Comment)  Current Home Care Services: No  7/23- CM continues to follow with d/c planning needs. Process of pursuing guardianship as well as sending SNF referrals. CM continues to work with acute care CM to assist with d/c planning process.     7/30- Cm continues to follow and search for dispo plan    8/6- Team still working on dispo plan, [facility] to [facility] and meet with pt this week to determine if they can accept.     8/12- [facility] pt this week, awaiting dispo    8/19- Team continues to search for diso plans for pt, awaiting guardianship trial     8/26- Guardianship trial today 8/27 at 10am, awaiting determination and continue to explore dispo plans    Is the patient actively participating in therapies? no  List any modifications to the treatment plan:     Barriers Interventions   Does not have capacity Guardianship hearing occurring today   Placement issue Pursue facilities once guardianship is in place                 Is the patient making expected progress toward goals? no  List any update or changes to goals:     Medical Goals: Patient will be medically stable for discharge to [facility] upon completion of rehab program and Patient will be able to manage medical conditions and comorbid conditions with  medications and follow up upon completion of rehab program    Weekly Team Goals:   Rehab Team Goals  ADL Team Goal: Patient will require supervision with ADLs with least restrictive device upon completion of rehab program  Transfer Team Goal: Patient will require supervision with transfers with least restrictive device upon completion of rehab program  Locomotion Team Goal: Patient will require supervision with locomotion with least restrictive device upon completion of rehab program  Cognitive Team Goal: Patient will return to premorbid level of cognitive activity upon completion of rehab program    Discussion: pt is not able to make his own decisions and the hearing is occurring today. Pt is not consistent with participating with therapy. Pt can be easily agitated and the team provides de escalation strategies to maintain behaviors. Pt is independent w/c mobility, supervision with transfers. Adls are completed at supervision with occasional assist for  mgmt. Placement to occur once guardianship is in place.     Anticipated Discharge Date:  tbd  [facility] Team Members Present:The following team members are supervising care for this patient and were present during this Weekly Team Conference.    Physician: Dr. DePadua, MD  : Berta George MSW  Registered Nurse: Susana Mitchell RN  Physical Therapist: Delfina Renner DPT  Occupational Therapist: Katia Morfin MS, OTR/L  Speech Therapist: Bertha Trujillo MA, CCC-SLP

## 2024-08-27 NOTE — PROGRESS NOTES
08/27/24 1030   Therapy Time missed   Time missed? Yes   Amount of time missed 90   Reason for time missed   (refusal)   Time(s) multiple attempts made 9280

## 2024-08-27 NOTE — ASSESSMENT & PLAN NOTE
Pt refused therapy 8/25/24 due to abdominal pain in the Rt > Lt LQ.  He states that is has been present for a long time, about 2 weeks.  He denies nausea or vomiting. He has been having a bowel movement daily.  Labs unremarkable/stable.  KUB unremarkable.  Suspect musculoskeletal.  Resolved as of today (8/27/24)

## 2024-08-27 NOTE — PROGRESS NOTES
"   08/27/24 0800   Pain Assessment   Pain Assessment Tool 0-10   Pain Score No Pain   Comprehension   Comprehension (FIM) 3 - Understands basic info/conversation 50-74% of time   Expression   Expression (FIM) 3 - Expresses basic info/needs 50-74% of time   Social Interaction   Social Interaction (FIM) 2 - May be physically or verbally inappropriate and require use of medication for control   Problem Solving   Problem solving (FIM) 2 - Solves basic problems 25-49% of time   Memory   Memory (FIM) 2 - Recognizes, recalls/performs 25-49%   Speech/Language/Cognition Assessmetn   Treatment Assessment Pt sitting in recliner chair, dressed as if ready to leave. Pt's first response to SLP entering room \"you know I'm supposed to be leaving, right?\" SLP not confirming if pt is leaving, but informed patient that more information would be obtained. SLP asking therapy team regarding pt potentially leaving for something, but team stating pt was NOT leaving today. SLP entering pt's room again to state that we would engage in therapy for about an hour, and pt stating \"ok but I'm leaving and won't be able to do it.\" SLP redirecting to task and pt attempting to engage in first couple word finding/expressive language sets where he was given description of a food or drink item and asked to identify the thing being described. Pt looking into the air as if trying to identify the word, and repeating some of the words in the description given. SLP broke down the descriptions even smaller, but pt unable to identify 0/4 sets even given additional semantic and gesture cues. Pt then stopping SLP to ask if he can make a phone call. To keep pt from escalating, SLP allowed pt to make call. He called someone unknown to the SLP and the person on the phone demonstrated difficulty understanding pt, and pt demonstrated difficulty understanding the person on the line. SLP overhearing person on the phone call telling the pt someone was picking him up, " "but she didn't know what time. Pt becoming observably anxious scrambling to write down times. Once patient completed the phone call, pt stating \"we can't do this, I have to go!\" SLP attempting to calm patient, but pt's lack of understanding spoken speech decreasing effectiveness of attempts. SLP inquiring again with therapy team to assist with de-escalation. Attempts also unsuccessful and therapy team discussing with resident on patient's case who advised pt did not engage in therapy today as is best for now. Therefore, pt missing 45 minutes of time. Pt would continue to benefit from SLP services while at the [facility] in order to further optimize his use of expressive and receptive language strategies in order to improve communication and comprehension in patient's functional settings.   SLP Therapy Minutes   SLP Time In 0800   SLP Time Out 0815   SLP Total Time (minutes) 15   SLP Mode of treatment - Individual (minutes) 15   SLP Mode of treatment - Concurrent (minutes) 0   SLP Mode of treatment - Group (minutes) 0   SLP Mode of treatment - Co-treat (minutes) 0   SLP Mode of Treatment - Total time(minutes) 15 minutes   SLP Cumulative Minutes 315   Therapy Time missed   Time missed? Yes   Amount of time missed 45   Reason for time missed   (Declining therapy)   Time(s) multiple attempts made 8:15, 8:20       "

## 2024-08-27 NOTE — ASSESSMENT & PLAN NOTE
Blood pressures acceptable on current regimen including Losartan 50 mg daily, Toprol-XL 25 mg daily  /68 today

## 2024-08-27 NOTE — PROGRESS NOTES
08/27/24 1100   Therapy Time missed   Time missed? Yes   Amount of time missed 30   Reason for time missed   (pt refused therapy at this time)   Time(s) multiple attempts made 1100

## 2024-08-27 NOTE — ASSESSMENT & PLAN NOTE
Noted on echocardiogram 6/10/2024: spherical 1.5 x 1.3 cm thrombus at the LV apex   Initiated on AC with Xarelto however unable to verify insurance coverage, now on Coumadin at 5 mg daily  INR: 1.94 at last check on 8/26/24  Coumadin 2.5mg Fridays and 5mg every other night of the week.  INR Thursday.

## 2024-08-27 NOTE — PLAN OF CARE
Problem: PAIN - ADULT  Goal: Verbalizes/displays adequate comfort level or baseline comfort level  Description: Interventions:  - Encourage patient to monitor pain and request assistance  - Assess pain using appropriate pain scale  - Administer analgesics based on type and severity of pain and evaluate response  - Implement non-pharmacological measures as appropriate and evaluate response  - Consider cultural and social influences on pain and pain management  - Notify physician/advanced practitioner if interventions unsuccessful or patient reports new pain  Outcome: Progressing     Problem: INFECTION - ADULT  Goal: Absence or prevention of progression during hospitalization  Description: INTERVENTIONS:  - Assess and monitor for signs and symptoms of infection  - Monitor lab/diagnostic results  - Monitor all insertion sites, i.e. indwelling lines, tubes, and drains  - Monitor endotracheal if appropriate and nasal secretions for changes in amount and color  - [place] appropriate cooling/warming therapies per order  - Administer medications as ordered  - Instruct and encourage patient and family to use good hand hygiene technique  - Identify and instruct in appropriate isolation precautions for identified infection/condition  Outcome: Progressing  Goal: Absence of fever/infection during neutropenic period  Description: INTERVENTIONS:  - Monitor WBC    Outcome: Progressing     Problem: SAFETY ADULT  Goal: Patient will remain free of falls  Description: INTERVENTIONS:  - Educate patient/family on patient safety including physical limitations  - Instruct patient to call for assistance with activity   - Consult OT/PT to assist with strengthening/mobility   - Keep Call bell within reach  - Keep bed low and locked with side rails adjusted as appropriate  - Keep care items and personal belongings within reach  - Initiate and maintain comfort rounds  - Make Fall Risk Sign visible to staff  - Offer Toileting every 2 Hours,  in advance of need  - Initiate/Maintain alarm  - Obtain necessary fall risk management equipment:   - Apply yellow socks and bracelet for high fall risk patients  - Consider moving patient to room near nurses station  Outcome: Progressing  Goal: Maintain or return to baseline ADL function  Description: INTERVENTIONS:  -  Assess patient's ability to carry out ADLs; assess patient's baseline for ADL function and identify physical deficits which impact ability to perform ADLs (bathing, care of mouth/teeth, toileting, grooming, dressing, etc.)  - Assess/evaluate cause of self-care deficits   - Assess range of motion  - Assess patient's mobility; develop plan if impaired  - Assess patient's need for assistive devices and provide as appropriate  - Encourage maximum independence but intervene and supervise when necessary  - Involve family in performance of ADLs  - Assess for home care needs following discharge   - Consider OT consult to assist with ADL evaluation and planning for discharge  - Provide patient education as appropriate  Outcome: Progressing  Goal: Maintains/Returns to pre admission functional level  Description: INTERVENTIONS:  - Perform AM-PAC 6 Click Basic Mobility/ Daily Activity assessment daily.  - Set and communicate daily mobility goal to care team and patient/family/caregiver.   - Collaborate with rehabilitation services on mobility goals if consulted  - Perform Range of Motion 3 times a day.  - Reposition patient every 2 hours.  - Dangle patient 3 times a day  - Stand patient 3 times a day  - Ambulate patient 3 times a day  - Out of bed to chair 3 times a day   - Out of bed for meals 3 times a day  - Out of bed for toileting  - Record patient progress and toleration of activity level   Outcome: Progressing     Problem: DISCHARGE PLANNING  Goal: Discharge to home or other facility with appropriate resources  Description: INTERVENTIONS:  - Identify barriers to discharge w/patient and caregiver  -  Arrange for needed discharge resources and transportation as appropriate  - Identify discharge learning needs (meds, wound care, etc.)  - Arrange for interpretive services to assist at discharge as needed  - Refer to Case Management Department for coordinating discharge planning if the patient needs post-hospital services based on physician/advanced practitioner order or complex needs related to functional status, cognitive ability, or social support system  Outcome: Progressing     Problem: Prexisting or High Potential for Compromised Skin Integrity  Goal: Skin integrity is maintained or improved  Description: INTERVENTIONS:  - Identify patients at risk for skin breakdown  - Assess and monitor skin integrity  - Assess and monitor nutrition and hydration status  - Monitor labs   - Assess for incontinence   - Turn and reposition patient  - Assist with mobility/ambulation  - Relieve pressure over bony prominences  - Avoid friction and shearing  - Provide appropriate hygiene as needed including keeping skin clean and dry  - Evaluate need for skin moisturizer/barrier cream  - Collaborate with interdisciplinary team   - Patient/family teaching  - Consider wound care consult   Outcome: Progressing     Problem: Nutrition/Hydration-ADULT  Goal: Nutrient/Hydration intake appropriate for improving, restoring or maintaining nutritional needs  Description: Monitor and assess patient's nutrition/hydration status for malnutrition. Collaborate with interdisciplinary team and initiate plan and interventions as ordered.  Monitor patient's weight and dietary intake as ordered or per policy. Utilize nutrition screening tool and intervene as necessary. Determine patient's food preferences and provide high-protein, high-caloric foods as appropriate.     INTERVENTIONS:  - Monitor oral intake, urinary output, labs, and treatment plans  - Assess nutrition and hydration status and recommend course of action  - Evaluate amount of meals  eaten  - Assist patient with eating if necessary   - Allow adequate time for meals  - Recommend/ encourage appropriate diets, oral nutritional supplements, and vitamin/mineral supplements  - Order, calculate, and assess calorie counts as needed  - Recommend, monitor, and adjust tube feedings and TPN/PPN based on assessed needs  - Assess need for intravenous fluids  - Provide specific nutrition/hydration education as appropriate  - Include patient/family/caregiver in decisions related to nutrition  Outcome: Progressing

## 2024-08-28 PROCEDURE — 97535 SELF CARE MNGMENT TRAINING: CPT

## 2024-08-28 PROCEDURE — 97530 THERAPEUTIC ACTIVITIES: CPT

## 2024-08-28 PROCEDURE — 92507 TX SP LANG VOICE COMM INDIV: CPT

## 2024-08-28 PROCEDURE — 99231 SBSQ HOSP IP/OBS SF/LOW 25: CPT | Performed by: NURSE PRACTITIONER

## 2024-08-28 PROCEDURE — 97116 GAIT TRAINING THERAPY: CPT

## 2024-08-28 PROCEDURE — 97110 THERAPEUTIC EXERCISES: CPT

## 2024-08-28 PROCEDURE — 99232 SBSQ HOSP IP/OBS MODERATE 35: CPT | Performed by: PHYSICAL MEDICINE & REHABILITATION

## 2024-08-28 RX ADMIN — WARFARIN SODIUM 5 MG: 5 TABLET ORAL at 21:12

## 2024-08-28 RX ADMIN — LAMOTRIGINE 50 MG: 25 TABLET ORAL at 19:16

## 2024-08-28 RX ADMIN — SERTRALINE HYDROCHLORIDE 75 MG: 50 TABLET ORAL at 08:44

## 2024-08-28 RX ADMIN — GABAPENTIN 100 MG: 100 CAPSULE ORAL at 21:12

## 2024-08-28 RX ADMIN — MIRTAZAPINE 15 MG: 15 TABLET, FILM COATED ORAL at 21:12

## 2024-08-28 RX ADMIN — LEVOCARNITINE 330 MG: 1 SOLUTION ORAL at 13:29

## 2024-08-28 RX ADMIN — GABAPENTIN 100 MG: 100 CAPSULE ORAL at 06:31

## 2024-08-28 RX ADMIN — LOSARTAN POTASSIUM 50 MG: 50 TABLET, FILM COATED ORAL at 08:44

## 2024-08-28 RX ADMIN — LAMOTRIGINE 50 MG: 25 TABLET ORAL at 08:44

## 2024-08-28 RX ADMIN — BICTEGRAVIR SODIUM, EMTRICITABINE, AND TENOFOVIR ALAFENAMIDE FUMARATE 1 TABLET: 50; 200; 25 TABLET ORAL at 07:01

## 2024-08-28 RX ADMIN — DIVALPROEX SODIUM 1250 MG: 500 TABLET, EXTENDED RELEASE ORAL at 08:43

## 2024-08-28 RX ADMIN — ASPIRIN 81 MG: 81 TABLET, COATED ORAL at 08:43

## 2024-08-28 RX ADMIN — LEVOCARNITINE 330 MG: 1 SOLUTION ORAL at 19:16

## 2024-08-28 RX ADMIN — METOPROLOL SUCCINATE 25 MG: 25 TABLET, EXTENDED RELEASE ORAL at 08:44

## 2024-08-28 RX ADMIN — LEVOCARNITINE 330 MG: 1 SOLUTION ORAL at 08:45

## 2024-08-28 RX ADMIN — ZONISAMIDE 400 MG: 100 CAPSULE ORAL at 08:45

## 2024-08-28 RX ADMIN — DOLUTEGRAVIR SODIUM 50 MG: 50 TABLET, FILM COATED ORAL at 08:45

## 2024-08-28 RX ADMIN — ATORVASTATIN CALCIUM 80 MG: 80 TABLET, FILM COATED ORAL at 16:26

## 2024-08-28 RX ADMIN — TAMSULOSIN HYDROCHLORIDE 0.4 MG: 0.4 CAPSULE ORAL at 16:26

## 2024-08-28 RX ADMIN — Medication 6 MG: at 21:12

## 2024-08-28 RX ADMIN — GABAPENTIN 100 MG: 100 CAPSULE ORAL at 13:29

## 2024-08-28 NOTE — ASSESSMENT & PLAN NOTE
Presented with left lower extremity acute limb ischemia/extensive left iliofemoral and left infrainguinal embolism requiring left femoral thrombectomy and subsequent AKA on 6/7/24 with vascular surgery.  Continue with local wound care   Continue ASA, Coumadin and statin   Stable for discharge from PMR and medicine standpoint.  Dispo planning as per case management.  Patient was awaiting guardianship hearing, which occurred on 8/27/24 and will be awarded a guardian. CM awaiting those details and then can plan for discharge.

## 2024-08-28 NOTE — PROGRESS NOTES
Physical Medicine and Rehabilitation Progress Note  Sunil Patel 63 y.o. male MRN: [id number]  Unit/Bed#: [facility] 451-01 Encounter: [id number]    To Review: Sunil Patel is a 63 y.o. male who  has a past medical history of Acute lower limb ischemia (06/08/2024), Anxiety, Depression, HIV disease (HCC), Substance abuse (HCC), and Suicide attempt (HCC). who presented to the [facility] on 6/7/24 from shelter for increased LLE swelling and pain and was found to have a left external iliac artery occlusion and acute limb ischemia. 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 Patient had TTE on 6/10/24 showing LV apex thrombus. On 6/11/24 patient had pharmacologic nuclear stress test/SPECT scan which did not show any significant areas of ischemia with EF 40-45% and recommended continuing A/C for LV apical thrombus. His course was complicated by b/l buttock unstageable pressure ulcers, urinary and fecal incontinence, pain, and significant decline in ADLs and mobility. Patient has been continued on Xarelto for anticoagulation, as well as ASA and statin. Patient has a history of TBI, with baseline nonfluent aphasia and forgetfulness. He was evaluated by neuropsychology on 6/27/24, and deemed to not have capacity to make fully informed medical decisions. Therefore, the guardianship process was initiated as of 7/5/24. He was admitted to the [facility] on 7/6.     Chief Complaint:  In a better mood    Interval History/Subjective:  Friend Mireya is present. She and Kieran have spoken and he is agreeable to transfer to another unit at [facility] until he finds his final home/placement. She has been counseling him and redirecting him. He is in a better mood today. No new concerns. No new CP, SOB, fevers,  "chills, N/V, abdominal pain. Last BM 8/28.     ROS:  A 10 point review of systems was negative except for what is noted in the HPI.    Today's Changes:  Guardianship hearing occurred and he has been assigned a guardian.  Working on transferring patient to next appropriate unit temporarily until final residential plan is in place.   3.  Discussed case with admin/case management.     Total visit time: 35 minutes, with more than 50% spent counseling/coordinating care. Counseling includes discussion with patient re: progress in therapies, functional issues observed by therapy staff, and discussion with patient regarding their current medical state and wellbeing. Coordination of patient's care was performed in conjunction with Internal Medicine service to monitor patient's labs, vitals, and management of their comorbidities.      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 Underwent L femoral thrombectomy and AKA with vascular surgery   - [facility] sx follow-up 7/10 - L AKA incision site well-healed, staples taken out at bedside  - [facility] will be vendor - Patient now has .  - Monitor for hip flexion/abduction tightness. Stretches in therapy.  - Phantom limb sensation - not painful, well controlled.  - Now on Coumadin with hx of LV thrombus and PAOD. Checking INR's as above   - PT/OT/SLP (to work on carry over strategies) 3-5 hours/day, 5-7 days/week.    - Patient now refusing at times   - Has met rehab goals at this point.   - Outpatient f/u with [facility] and Vascular      Patient incapable of making informed decisions  Assessment & Plan  - History of remote TBI and prior strokes with comorbid psychiatric history.  - Evaluated by neuropsychology, Dr. Dilshad Tatum, PhD on 6/27/24, found to have \"diffuse cognitive dysfunction and on a measure assessing awareness of personal health status and " "ability to evaluate health problems, handle medical emergencies and take safety precautions, patient performed in the IMPAIRED range of functioning. During this encounter, patient does not appear to have capacity to make fully informed medical decisions.\"  - 8/27 Patient assigned a Guardian   - Continue dispo planning.      At risk for constipation  Assessment & Plan  - Incontinent during acute care hospitalization  - Has improved/resolved with improved mobility.  - Now off scheduled bowel regimen.   - Last BM 8/28 and continent. Not on a regimen  - PRN miralax, Senna and suppository    At risk for venous thromboembolism (VTE)  Assessment & Plan  - Fully anticoagulated on warfarin for apical thrombus    History of stroke  Assessment & Plan  - History of old left temporal and parietal lobe cortical infarcts, also involving the insula and left occipital lobe as well as small right posterior parietal lobe. Stable on most recent CT head on 5/16/24.   - ASA, and statin for secondary prevention  - Optimal BP control  - Monitor neuro exam - hx of LV thrombus    - See that entry  - OP neuro follow-up     Bipolar affective disorder (HCC)  Assessment & Plan  Mood acceptable; continue meds as outlined   Supportive counseling  NeuroPsychology consult while in [facility] if available for support  Psych evaluated on 8/12 and deemed him stable for discharge with current regimen as below  Counseled on and continue to encourage deep breathing/relaxation/behavioral management techniques  - Mirtazapine 15 mg qHs  - Sertraline 75 mg daily   - Lamictal 50mg BID  - Depakote ER 1250mg qday   - Outpatient psychiatry follow-up  - Would consult Psych before changing medications    Pressure injury of buttock, stage 3 (HCC)-resolved as of 8/9/2024  Assessment & Plan  Resolved as of 8/7.   - Wound care consulted and following weekly  - POA L buttock pressure injury unstageable has healed.  - POA R buttock pressure injury  evolved from unstageable to " Stage 3, and is now resolved.   - Recommend ROHO cushion in chair when out of bed instead   - Preventative hydraguard to bilateral heels BID and PRN.   - P500  - Monitor clinically for breakdown, frequent turns  - reviewed picture of wound today. It is healing well. Continue to monitor    Urinary incontinence-resolved as of 8/16/2024  Assessment & Plan  - Did have some incontinence on acute - improved with timed voids and consistent assistance with his urinal  - Described as urgency  - Marked improvement on timed voids.   - monitor for retention, incontinence (including overflow incontinence), signs/symptoms of UTI  - Timed Voids and PVRs Q4hrs initiated earlier. And PVR <150 x3, so scans discontinued.    - He has some difficulty managing the urinal    - needs assistance but is able to transfer to [facility]    - Still uses condom catheter at night, in part for continence care/to protect his skin given his buttock wounds. However now note that buttocks wound has healed  - Continue timed voids           History of migraine headaches  Assessment & Plan  Chronic issue.  Seemed to worsen with increased irritability after discontinuing gabapentin  Resumed gabapentin  Received [facility] once during stay with middling results  Sleep logs have been good - discontinued.  Headache is on and off    Cardiomyopathy (HCC)  Assessment & Plan  ECHO on 6/10/2024 showed LVEF 40-45% with mild global hypokinesis   Status post NM stress test on 6/11/2024 which showed: a large, mild, fixed defect in the inferior wall, possibly due to diaphragmatic attenuation artifact, there is a small area of partial reversibility in the inferior apical wall suggestive of ischemia    Elevated by cardiology, etiology felt to be possibly secondary to stress-induced cardiomyopathy, with apical thrombus  Cardiac catheterization deferred given the lack of any significant ischemia, and no current cardiac symptoms  Continue with medical management with aspirin,  statin, beta-blocker, ARB  Monitor volume status, remains euvolemic off of diuretics   Outpatient follow-up with cardiology    History of traumatic brain injury  Assessment & Plan  Remote history.  See neurocog impairment     Carnitine deficiency (HCC)  Assessment & Plan  - Carnitine replacement  - Appreciate medicine management      History of seizures  Assessment & Plan  - Depakote ER, lamotrigine, zonisamide  - Outpatient follow-up with [facility] neurology    Left ventricular thrombus  Assessment & Plan  - Visualized on echocardiogram on 6/10/24: spherical 1.5 x 1.3 cm thrombus at the LV apex.   - Was started on rivaroxaban, but patient does not appear to have insurance coverage for prescriptions   - Xarelto, eliquis, and pradaxa likely cost prohibitive per IM   - 7/29 transitioned to warfarin with lovenox bridge.   - Now off lovenox, and fully anticoagulated on warfarin.   - Management as per IM.   - INR 2.77 on 8/23. IM starting new schedule of warfarin with 2.5mg on Fridays and 5mg the rest of the days. Recheck INR monday  - Outpatient follow-up with cardiology    Benign essential hypertension  Assessment & Plan  - Toprol, losartan  - Appreciate medicine management    Human immunodeficiency virus (HIV) infection (HCC)  Assessment & Plan  - Continue anti-retroviral treatment  - Appreciate medicine management during [facility] course  - OP ID follow-up         Health Maintenance  #GI Prophylaxis: not indicated  #Code Status: Full code  #FEN: Cardiac diet + Ensure at bfast/dinner  #Dispo: Teams 8/27: Has since been assigned a guardian. Possible transfer to another unit to await final residential placement. Possibly end of this week, early next week.  Will need f/u with PMR, PCP, Vascular, Psych      Objective:     Functional Update:  PT: Ind bed mobility, Sup transfers, Ind wheelchair mobility 500', Ind ramp  OT: Ind eating, Ind grooming, Sup bathing, Ind UB dressing, Sup LB dressing, Sup toileting, Sup tub/shower transfer,  Sup toilet transfers   SLP: Expressive and receptive language skill impairment - difficulty with verbal expression, written expression, auditory comprehension, reading comprehension. Still impairments cognitively, but difficult to tease through given speech difficulties. Jignesh comprehension, Sup social interaction, modA expression, memory, and problem solving.     Allergies per EMR    Physical Exam:  Temp:  [98 °F (36.7 °C)-98.6 °F (37 °C)] 98.1 °F (36.7 °C)  HR:  [81-94] 81  Resp:  [16-18] 17  BP: (121-140)/(61-68) 131/61  Oxygen Therapy  SpO2: 93 %    Gen: No acute distress, Well-nourished, well-appearing.  HEENT: Moist mucus membranes, Normocephalic/Atraumatic  Cardiovascular: Regular rate, rhythm, S1/S2. Distal pulses palpable  Heme/Extr: No edema  Pulmonary: Non-labored breathing. Lungs CTAB  : No fernandes  GI: Soft, non-tender, non-distended.   MSK: Stable L AKA.  Integumentary: Skin is warm, dry.  Neuro: Awake, alert. Speech is aphasic. Appropriate and cooperative.  Psych: Normal mood and affect.       Diagnostic Studies: Reviewed, no new imaging    Laboratory:  Reviewed, no new labs.   Results from last 7 days   Lab Units 08/25/24  1035   HEMOGLOBIN g/dL 10.9*   HEMATOCRIT % 37.2   WBC Thousand/uL 6.34       Results from last 7 days   Lab Units 08/25/24  1035   BUN mg/dL 18   POTASSIUM mmol/L 4.7   CHLORIDE mmol/L 106   CREATININE mg/dL 0.94   AST U/L 10*   ALT U/L 10       Results from last 7 days   Lab Units 08/26/24  0539 08/25/24  1035 08/23/24  0616   PROTIME seconds 22.3* 21.6* 29.1*   INR  1.94* 1.87* 2.77*        Patient Active Problem List   Diagnosis    Bipolar affective disorder (HCC)    Substance abuse (HCC)    Human immunodeficiency virus (HIV) infection (HCC)    History of stroke    Benign essential hypertension    Positive laboratory testing for human immunodeficiency virus (HCC)    Hypertension    Unspecified vitamin D deficiency    Tobacco abuse    Cerebrovascular accident (CVA) ([facility])    Left  ventricular thrombus    History of seizures    Carnitine deficiency (HCC)    Above-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)    History of traumatic brain injury    Impaired mobility and activities of daily living    At risk for venous thromboembolism (VTE)    Acute pain    At risk for constipation    Patient incapable of making informed decisions    Adjustment disorder with mixed anxiety and depressed mood    Cardiomyopathy (HCC)    History of migraine headaches    Postoperative anemia    Generalized abdominal pain         Medications  Current Facility-Administered Medications   Medication Dose Route Frequency Provider Last Rate    acetaminophen  975 mg Oral TID PRN José Salcido MD      aspirin  81 mg Oral Daily Lorrie Barillas PA-C      atorvastatin  80 mg Oral Daily With Dinner Lorrie Barillas PA-C      bictegravir-emtricitab-tenofovir alafenamide  1 tablet Oral Daily With Breakfast Lorrie Barillas PA-C      bisacodyl  10 mg Rectal Daily PRN Kenny Castro MD      diphenhydrAMINE  25 mg Oral Q6H PRN Lorrie Barillas PA-C      divalproex sodium  1,250 mg Oral Daily Lorrie Barillas PA-C      dolutegravir  50 mg Oral Daily Lorrie Barillas PA-C      gabapentin  100 mg Oral Q8H Ashley Depadua, MD      lamoTRIgine  50 mg Oral BID Lorrie Barillas PA-C      levOCARNitine  1,000 mg/day Oral TID With Meals oLrrie Barillas PA-C      losartan  50 mg Oral Daily Lorrie Barillas PA-C      melatonin  6 mg Oral HS Lorrie Barillas PA-C      metoprolol succinate  25 mg Oral Daily CHARLIE Mcclelland      mirtazapine  15 mg Oral HS Lorriejacky Barillas PA-C      ondansetron  4 mg Oral Q6H PRN Lorrie Barillas PA-C      polyethylene glycol  17 g Oral Daily PRN Lorrie Barillas PA-C      senna  1 tablet Oral HS PRN Kenny Castro MD      sertraline  75 mg Oral Daily Lorrie Barillas PA-C      tamsulosin  0.4 mg Oral Daily With Dinner  Lorrie Barillas PA-C      warfarin  2.5 mg Oral Weekly Lorrie Barillas PA-C      warfarin  5 mg Oral Once per day on Sunday Monday Tuesday Wednesday Thursday Saturday Lorrie Barillas PA-C      zonisamide  400 mg Oral Daily Lorrie Barillas PA-C            ** Please Note: Fluency Direct voice to text software may have been used in the creation of this document. **

## 2024-08-28 NOTE — ASSESSMENT & PLAN NOTE
Blood pressures acceptable on current regimen including Losartan 50 mg daily, Toprol-XL 25 mg daily  /61 today

## 2024-08-28 NOTE — ASSESSMENT & PLAN NOTE
Pt refused therapy 8/25/24 due to abdominal pain in the Rt > Lt LQ.  He states that is has been present for a long time, about 2 weeks.  He denies nausea or vomiting. He has been having a bowel movement daily.  Labs unremarkable/stable.  KUB unremarkable.  Suspect musculoskeletal.  Resolved as of today (8/28/24)

## 2024-08-28 NOTE — PROGRESS NOTES
[facility]  Progress Note  Name: Sunil Patel I  MRN: [id number]  Unit/Bed#: -01 I Date of Admission: 7/6/2024   Date of Service: 8/28/2024 I Hospital Day: 53    Assessment & Plan   * Above-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)  Assessment & Plan  Presented with left lower extremity acute limb ischemia/extensive left iliofemoral and left infrainguinal embolism requiring left femoral thrombectomy and subsequent AKA on 6/7/24 with vascular surgery.  Continue with local wound care   Continue ASA, Coumadin and statin   Stable for discharge from PMR and medicine standpoint.  Dispo planning as per case management.  Patient was awaiting guardianship hearing, which occurred on 8/27/24 and will be awarded a guardian. CM awaiting those details and then can plan for discharge.     Benign essential hypertension  Assessment & Plan  Blood pressures acceptable on current regimen including Losartan 50 mg daily, Toprol-XL 25 mg daily  /61 today     Bipolar affective disorder (HCC)  Assessment & Plan  Psychiatry evaluated most recently on 8/12 and cleared for discharge to rehab   Continue home meds Zoloft and Remeron    Cardiomyopathy (HCC)  Assessment & Plan  ECHO 6/10/2024 = LVEF 40-45% with mild global hypokinesis   Status post NM stress test 6/11/2024 = large, mild, fixed defect in the inferior wall, possibly due to diaphragmatic attenuation artifact, there is a small area of partial reversibility in the inferior apical wall suggestive of ischemia    Evaluated by cardiology.  Etiology felt to be possibly secondary to stress-induced cardiomyopathy, with apical thrombus  Cardiac catheterization deferred given the lack of any significant ischemia, and no current cardiac symptoms  Continue with medical management with aspirin, statin, beta-blocker, ARB  Remains euvolemic off of diuretics, continue to monitor volume status   Outpatient follow-up with cardiology, previously  followed with Dr[facility] Gum[facility]  Very stable on exam today (8/28/24)    Carnitine deficiency (HCC)  Assessment & Plan  Continue levocarnitine 330 mg 3x daily with meals.    History of traumatic brain injury  Assessment & Plan  Remote history of TBI, previously residing in a group home prior to halfway sentence.  Evaluated by neuropsychiatry on 6/27/24 and deemed NOT to have medical decision-making capacity  Neuropsychology re-evaluated pt and he does not have decision making capacity.  Process for court appointed guardian started on 7/5/24 - CM following   Guardianship hearing 8/28/24- will be awarded a guardian, LENY to receive details    Left ventricular thrombus  Assessment & Plan  Noted on echocardiogram 6/10/2024: spherical 1.5 x 1.3 cm thrombus at the LV apex   Initiated on AC with Xarelto however unable to verify insurance coverage, now on Coumadin at 5 mg daily  INR: 1.94 at last check on 8/26/24  Coumadin 2.5mg Fridays and 5mg every other night of the week.  INR Thursday.    Human immunodeficiency virus (HIV) infection (HCC)  Assessment & Plan  Continue Biktarvy and Tivicay.    Generalized abdominal pain  Assessment & Plan  Pt refused therapy 8/25/24 due to abdominal pain in the Rt > Lt LQ.  He states that is has been present for a long time, about 2 weeks.  He denies nausea or vomiting. He has been having a bowel movement daily.  Labs unremarkable/stable.  KUB unremarkable.  Suspect musculoskeletal.  Resolved as of today (8/28/24)             The above assessment and plan was reviewed and updated as determined by my evaluation of the patient on 8/28/2024.    Labs:   Results from last 7 days   Lab Units 08/25/24  1035   WBC Thousand/uL 6.34   HEMOGLOBIN g/dL 10.9*   HEMATOCRIT % 37.2   PLATELETS Thousands/uL 320     Results from last 7 days   Lab Units 08/25/24  1035   SODIUM mmol/L 139   POTASSIUM mmol/L 4.7   CHLORIDE mmol/L 106   CO2 mmol/L 27   BUN mg/dL 18   CREATININE mg/dL 0.94   CALCIUM mg/dL 9.5          Results from last 7 days   Lab Units 08/26/24  0539 08/25/24  1035   INR  1.94* 1.87*           Imaging  XR abdomen 1 view kub   Final Result by Toni Kahn MD (08/26 0801)      Unremarkable abdomen.               Workstation performed: [id number]             Review of Scheduled Meds:  Current Facility-Administered Medications   Medication Dose Route Frequency Provider Last Rate    acetaminophen  975 mg Oral TID PRN José Salcido MD      aspirin  81 mg Oral Daily Lorrie Barillas PA-C      atorvastatin  80 mg Oral Daily With Dinner Lorrie Barillas PA-C      bictegravir-emtricitab-tenofovir alafenamide  1 tablet Oral Daily With Breakfast Lorrie Barillas PA-C      bisacodyl  10 mg Rectal Daily PRN Kenny Castro MD      diphenhydrAMINE  25 mg Oral Q6H PRN Lorrie Barillas PA-C      divalproex sodium  1,250 mg Oral Daily Lorrie Barillas PA-C      dolutegravir  50 mg Oral Daily FADUMO Black-C      gabapentin  100 mg Oral Q8H Ashley Depadua, MD      lamoTRIgine  50 mg Oral BID Lorrie Barillas PA-C      levOCARNitine  1,000 mg/day Oral TID With Meals Lorrie Barillas PA-C      losartan  50 mg Oral Daily Lorrie Barillas PA-C      melatonin  6 mg Oral HS Lorrie Barillas PA-C      metoprolol succinate  25 mg Oral Daily CHARLIE Mcclelland      mirtazapine  15 mg Oral HS Lorriejacky Barillas PA-C      ondansetron  4 mg Oral Q6H PRN Lorrie Barillas PA-C      polyethylene glycol  17 g Oral Daily PRN Lorrie Barillas PA-C      senna  1 tablet Oral HS PRN Kenny Castro MD      sertraline  75 mg Oral Daily Lorrie Barillas PA-C      tamsulosin  0.4 mg Oral Daily With Dinner Lorrie Barillas PA-C      warfarin  2.5 mg Oral Weekly Lorrie Barillas PA-C      warfarin  5 mg Oral Once per day on Sunday Monday Tuesday Wednesday Thursday Saturday Lorrie Barillas PA-C      zonisamide  400 mg Oral Daily Lorrie  "EJ Barillas         Subjective/ HPI: Patient seen and examined. Patients overnight issues or events were reviewed with nursing staff. New or overnight issues include the following:     Feels well today. Is hoping \"he can leave this place soon.\"    ROS:   A 10 point ROS was performed; negative except as noted above.        *Labs /Radiology studies Reviewed  *Medications  reviewed and reconciled as needed  *Please refer to order section for additional ordered labs studies      Physical Examination:  Vitals:   Vitals:    08/27/24 1500 08/27/24 2111 08/28/24 0639 08/28/24 0844   BP: 128/63 121/62 131/61 124/70   BP Location: Left arm Left arm Left arm    Pulse: 94 90 81 82   Resp: 16 17 17    Temp: 98 °F (36.7 °C) 98.6 °F (37 °C) 98.1 °F (36.7 °C)    TempSrc: Oral Oral Oral    SpO2: 95% 92% 93%    Weight:       Height:           General Appearance: NAD; pleasant today and seen while doing PT  HEENT: PERRLA, conjuctiva normal; mucous membranes moist; face symmetrical  Neck:  Supple  Lungs: clear bilaterally, normal respiratory effort, no retractions, expiratory effort normal, on room air  CV: regular rate and rhythm, no murmurs rubs or gallops noted   ABD: soft non tender, +BS x4  EXT: left AKA, DP pulses intact, no lymphadenopathy, no edema on RLE  Skin: normal turgor, normal texture, no rash  Psych: affect normal, mood normal  Neuro: at his baseline with forgetfulness      The above physical exam was reviewed and updated as determined by my evaluation of the patient on 8/28/2024.    Invasive Devices       Drain  Duration             External Urinary Catheter 27 days                       VTE Pharmacologic Prophylaxis: Warfarin (Coumadin)  Code Status: Level 1 - Full Code  Current Length of Stay: 53 day(s)  Coordination of patient's care was performed in conjunction with consulting services. Time invested included review of patient's labs, vitals, and management of their comorbidities with continued monitoring, " examination of patient as well as answering patient questions, documenting her findings and creating progress note in electronic medical record,  ordering appropriate diagnostic testing.       ** Please Note:  voice to text software may have been used in the creation of this document. Although proof errors in transcription or interpretation are a potential of such software**

## 2024-08-28 NOTE — PROGRESS NOTES
08/28/24 1300   Pain Assessment   Pain Assessment Tool 0-10   Pain Score No Pain   Comprehension   Comprehension (FIM) 3 - Needs parts of sentences repeated   Expression   Expression (FIM) 2 - Uses only simple expressions or gestures (waves, hello)   Social Interaction   Social Interaction (FIM) 5 - Requires redirection but less than 10% of the time.   Problem Solving   Problem solving (FIM) 2 - Solves basic problems 25-49% of time   Memory   Memory (FIM) 2 - Recognizes, recalls/performs 25-49%   Speech/Language/Cognition Assessmetn   Treatment Assessment Patient sitting with friend as SLP entered, agreeable to skilled SLP session targeting auditory comprehension and word finding. Pt's friend leaving at this time. Pt was provided with general description of food and drink items via spoken speech (requiring pt's auditory comprehension skills) and then asked to state the item being described. Pt able to comprehend and retreive the word on 3/22 opportunities independently. This increased to 15/22 when given maxA via multisensory cues: tactile via touching objects in room to elicit words and increase comprehension, visual via gestures from SLP, seeing the first letter of the answer/word, hearing the initial phoneme, further semantic cues, phrase completion, and seeing the written form of the description to increase comprehension. He demonstrated pausing, perseveration, literal paraphasias, part word reductions, facial grimaces, and use of articles as opposed to content words when attempting to retreive words. This task took a majority of the session as pt required significant processing time and was given several multisensory cues as aforementioned. He did engage in another task, however, targeting reading comprehension on a phrase completion task where choosing from f3 for final word to complete phrase. He could not complete via reading alone, but when paired with SLP reading aloud and tapping along the words, able  to complete 8/10 accurately, increasing to 9/10 when given second attempt. Patient demonstrates great fatigue by end of session, showing less tolerance to skilled therapy. Patient would continue to benefit from skilled SLP services, specifically targeting education and modeling for use of language strategies necessary for improving patient's communicative exchanges to reduce risk of communication breakdown at time of discharge.   SLP Therapy Minutes   SLP Time In 1300   SLP Time Out 1400   SLP Total Time (minutes) 60   SLP Mode of treatment - Individual (minutes) 60   SLP Mode of treatment - Concurrent (minutes) 0   SLP Mode of treatment - Group (minutes) 0   SLP Mode of treatment - Co-treat (minutes) 0   SLP Mode of Treatment - Total time(minutes) 60 minutes   SLP Cumulative Minutes 350   Therapy Time missed   Time missed? No

## 2024-08-28 NOTE — ASSESSMENT & PLAN NOTE
ECHO 6/10/2024 = LVEF 40-45% with mild global hypokinesis   Status post NM stress test 6/11/2024 = large, mild, fixed defect in the inferior wall, possibly due to diaphragmatic attenuation artifact, there is a small area of partial reversibility in the inferior apical wall suggestive of ischemia    Evaluated by cardiology.  Etiology felt to be possibly secondary to stress-induced cardiomyopathy, with apical thrombus  Cardiac catheterization deferred given the lack of any significant ischemia, and no current cardiac symptoms  Continue with medical management with aspirin, statin, beta-blocker, ARB  Remains euvolemic off of diuretics, continue to monitor volume status   Outpatient follow-up with cardiology, previously followed with Dr. Gumaro Aguila [facility]  Very stable on exam today (8/28/24)

## 2024-08-28 NOTE — ASSESSMENT & PLAN NOTE
Remote history of TBI, previously residing in a group home prior to senior living sentence.  Evaluated by neuropsychiatry on 6/27/24 and deemed NOT to have medical decision-making capacity  Neuropsychology re-evaluated pt and he does not have decision making capacity.  Process for court appointed guardian started on 7/5/24 - CM following   Guardianship hearing 8/28/24- will be awarded a guardian, LENY to receive details

## 2024-08-28 NOTE — PROGRESS NOTES
08/28/24 1440   Pain Assessment   Pain Assessment Tool 0-10   Pain Score No Pain   Restrictions/Precautions   Precautions Aphasia;Bed/chair alarms;Cognitive;Fall Risk  (skin integrity)   LLE Weight Bearing Per Order NWB   Subjective   Subjective pt agreeable to perform skileld PT and would like to work on SPT and hopping with RW   Sit to Stand   Type of Assistance Needed Incidental touching;Adaptive equipment   Comment STS with RW VC for hand and body positioning   Sit to Stand CARE Score 4   Bed-Chair Transfer   Type of Assistance Needed Incidental touching;Adaptive equipment   Comment SPT with RW and VC needed for modified hand and body positioning   Chair/Bed-to-Chair Transfer CARE Score 4   Transfer Bed/Chair/Wheelchair   Adaptive Equipment Roller Walker   Walk 10 Feet   Type of Assistance Needed Physical assistance;Adaptive equipment   Physical Assistance Level 51%-75%   Comment RW hopping RLE   Walk 10 Feet CARE Score 2   Walk 50 Feet with Two Turns   Reason if not Attempted Safety concerns   Walk 50 Feet with Two Turns CARE Score 88   Walk 150 Feet   Reason if not Attempted Safety concerns   Walk 150 Feet CARE Score 88   Walking 10 Feet on Uneven Surfaces   Reason if not Attempted Safety concerns   Walking 10 Feet on Uneven Surfaces CARE Score 88   Ambulation   Primary Mode of Locomotion Prior to Admission Walk   Distance Walked (feet) 15 ft  (x2)   Assist Device Roller Walker   Gait Pattern Hopping   Does the patient walk? 2. Yes   Wheel 50 Feet with Two Turns   Type of Assistance Needed Independent   Wheel 50 Feet with Two Turns CARE Score 6   Wheel 150 Feet   Type of Assistance Needed Independent   Wheel 150 Feet CARE Score 6   Wheelchair mobility   Does the patient use a wheelchair? 1. Yes   Type of Wheelchair Used 1. Manual   Method Left upper extremity;Right upper extremity   Curb or Single Stair   Reason if not Attempted Safety concerns   1 Step (Curb) CARE Score 88   4 Steps   Reason if not  Attempted Safety concerns   4 Steps CARE Score 88   12 Steps   Reason if not Attempted Safety concerns   12 Steps CARE Score 88   Assessment   Treatment Assessment Pt engaged in skilled PT and at his requested about using RW for SPT and hopping short distance with core and upper arm strengthening as following , core strengthening on mat with 4 # dowel denice with bal toss and smal;; red ball and foot ball . pt also perform WC propl for upper arm strengthening .Pt perform RLE hopping with RW for 15 feet short distance d/t safety percaution .Pt seem to be better with new goals and using RW willness to come to PT more.Pt also will need to cont perform RW and all core strengthening transfer training to meet DC goals . Please cont to work on safety with RW SPT and short hopping with RW to improve come out . Barries cont with limited safety and cognitiong  insight in rall risk   Barriers to Discharge Inaccessible home environment;Decreased caregiver support   Plan   Progress Progressing toward goals   PT Therapy Minutes   PT Time In 1440   PT Time Out 1525   PT Total Time (minutes) 45   PT Mode of treatment - Individual (minutes) 45   PT Mode of treatment - Concurrent (minutes) 0   PT Mode of treatment - Group (minutes) 0   PT Mode of treatment - Co-treat (minutes) 0   PT Mode of Treatment - Total time(minutes) 45 minutes   PT Cumulative Minutes 1605   Therapy Time missed   Time missed? Yes   Amount of time missed 15   Reason for time missed Extreme fatigue

## 2024-08-28 NOTE — CASE MANAGEMENT
Cm checked in with pt during therapy. Cm was present at guardianships case, guardian was appointed. Maribel Gibson sent email this AM to admin and guardian to discuss process thus far as we continue to search for dispo plan. Possible [place] [facility] or [facility] transfer pending admin decision.

## 2024-08-28 NOTE — PROGRESS NOTES
"   08/28/24 1000   Pain Assessment   Pain Assessment Tool 0-10   Pain Score No Pain   Restrictions/Precautions   Precautions Aphasia;Bed/chair alarms;Cognitive;Fall Risk  (skin integrity)   LLE Weight Bearing Per Order NWB   Braces or Orthoses   (has L AKA  but refused to wear this date)   Lifestyle   Autonomy \"That would be great\" referring to starting OT w/ ADL   Oral Hygiene   Type of Assistance Needed Independent   Comment seated at sink   Oral Hygiene CARE Score 6   Grooming   Findings IND seated at isnk   Shower/Bathe Self   Type of Assistance Needed Set-up / clean-up   Comment bed level SB   Shower/Bathe Self CARE Score 5   Bathing   Assessed Bath Style Sponge Bath   Upper Body Dressing   Type of Assistance Needed Set-up / clean-up   Comment seated   Upper Body Dressing CARE Score 5   Lower Body Dressing   Type of Assistance Needed Set-up / clean-up   Comment bed level   Lower Body Dressing CARE Score 5   Putting On/Taking Off Footwear   Type of Assistance Needed Set-up / clean-up   Putting On/Taking Off Footwear CARE Score 5   Roll Left and Right   Type of Assistance Needed Independent   Roll Left and Right CARE Score 6   Sit to Lying   Type of Assistance Needed Independent   Sit to Lying CARE Score 6   Lying to Sitting on Side of Bed   Type of Assistance Needed Independent   Lying to Sitting on Side of Bed CARE Score 6   Sit to Stand   Type of Assistance Needed Incidental touching   Comment CGA at GB in BR   Sit to Stand CARE Score 4   Bed-Chair Transfer   Type of Assistance Needed Set-up / clean-up   Comment sit pivot to wC   Chair/Bed-to-Chair Transfer CARE Score 5   Toileting Hygiene   Type of Assistance Needed Supervision   Comment cues for safety to complete seated vs in stance, pts first attempt was to stand for wiping and then again to reach for pants at ankle   Toileting Hygiene CARE Score 4   Toilet Transfer   Type of Assistance Needed Supervision   Comment sit pivot WC<>platform bsc, pt " first attempts SPT so cues for safe technique   Toilet Transfer CARE Score 4   Transfers   Additional Comments sup WC mob to 9th floor gym and back to his room   Exercise Tools   UE Ergometer UE strengthening using SciFit UBE, completing 5 min forward and 5 min backward on level 1.5 with good tolerance to exericse, rest break in between each set to manage fatigue.   Theraband pt engages in light UE strengthening using green theraband, completing 3x10 for each of the following: shoulder flexion, horizontal shoulder abduction, and D2 shoulder flexion. good tolerance to exercises with rest breaks as needed to manage fatigue. strengthening completed in order to increase strength and endurance for aDLs/transfers.   Cognition   Overall Cognitive Status Impaired   Arousal/Participation Alert;Cooperative   Attention Attends with cues to redirect   Orientation Level Oriented X4   Memory Decreased short term memory;Decreased recall of recent events;Decreased recall of precautions   Following Commands Follows one step commands with increased time or repetition   Activity Tolerance   Activity Tolerance Patient tolerated treatment well   Assessment   Treatment Assessment OT session focusing on ADL retraining, wc mob for community level distances, UE TE to improve strength and endurance. Pt has achieved OT goals, has since been appointed guardian, DC plan still developing.   Prognosis Good   Problem List Decreased strength;Decreased range of motion;Decreased endurance;Impaired balance;Decreased mobility   Plan   Treatment/Interventions ADL retraining;Functional transfer training;Therapeutic exercise;Endurance training;Cognitive reorientation;Patient/family training;Equipment eval/education;Compensatory technique education;Continued evaluation   Progress Progressing toward goals   OT Therapy Minutes   OT Time In 1000   OT Time Out 1130   OT Total Time (minutes) 90   OT Mode of treatment - Individual (minutes) 90   OT Mode of  treatment - Concurrent (minutes) 0   OT Mode of treatment - Group (minutes) 0   OT Mode of treatment - Co-treat (minutes) 0   OT Mode of Treatment - Total time(minutes) 90 minutes   OT Cumulative Minutes 1615   Therapy Time missed   Time missed? No

## 2024-08-28 NOTE — PLAN OF CARE
Problem: PAIN - ADULT  Goal: Verbalizes/displays adequate comfort level or baseline comfort level  Description: Interventions:  - Encourage patient to monitor pain and request assistance  - Assess pain using appropriate pain scale  - Administer analgesics based on type and severity of pain and evaluate response  - Implement non-pharmacological measures as appropriate and evaluate response  - Consider cultural and social influences on pain and pain management  - Notify physician/advanced practitioner if interventions unsuccessful or patient reports new pain  Outcome: Progressing     Problem: INFECTION - ADULT  Goal: Absence or prevention of progression during hospitalization  Description: INTERVENTIONS:  - Assess and monitor for signs and symptoms of infection  - Monitor lab/diagnostic results  - Monitor all insertion sites, i.e. indwelling lines, tubes, and drains  - Monitor endotracheal if appropriate and nasal secretions for changes in amount and color  - [place] appropriate cooling/warming therapies per order  - Administer medications as ordered  - Instruct and encourage patient and family to use good hand hygiene technique  - Identify and instruct in appropriate isolation precautions for identified infection/condition  Outcome: Progressing  Goal: Absence of fever/infection during neutropenic period  Description: INTERVENTIONS:  - Monitor WBC    Outcome: Progressing     Problem: SAFETY ADULT  Goal: Patient will remain free of falls  Description: INTERVENTIONS:  - Educate patient/family on patient safety including physical limitations  - Instruct patient to call for assistance with activity   - Consult OT/PT to assist with strengthening/mobility   - Keep Call bell within reach  - Keep bed low and locked with side rails adjusted as appropriate  - Keep care items and personal belongings within reach  - Initiate and maintain comfort rounds  - Make Fall Risk Sign visible to staff  - Offer Toileting every 2 Hours,  in advance of need  - Initiate/Maintain alarm  - Obtain necessary fall risk management equipment:   - Apply yellow socks and bracelet for high fall risk patients  - Consider moving patient to room near nurses station  Outcome: Progressing  Goal: Maintain or return to baseline ADL function  Description: INTERVENTIONS:  -  Assess patient's ability to carry out ADLs; assess patient's baseline for ADL function and identify physical deficits which impact ability to perform ADLs (bathing, care of mouth/teeth, toileting, grooming, dressing, etc.)  - Assess/evaluate cause of self-care deficits   - Assess range of motion  - Assess patient's mobility; develop plan if impaired  - Assess patient's need for assistive devices and provide as appropriate  - Encourage maximum independence but intervene and supervise when necessary  - Involve family in performance of ADLs  - Assess for home care needs following discharge   - Consider OT consult to assist with ADL evaluation and planning for discharge  - Provide patient education as appropriate  Outcome: Progressing  Goal: Maintains/Returns to pre admission functional level  Description: INTERVENTIONS:  - Perform AM-PAC 6 Click Basic Mobility/ Daily Activity assessment daily.  - Set and communicate daily mobility goal to care team and patient/family/caregiver.   - Collaborate with rehabilitation services on mobility goals if consulted  - Perform Range of Motion 3 times a day.  - Reposition patient every 2 hours.  - Dangle patient 3 times a day  - Stand patient 3 times a day  - Ambulate patient 3 times a day  - Out of bed to chair 3 times a day   - Out of bed for meals 3 times a day  - Out of bed for toileting  - Record patient progress and toleration of activity level   Outcome: Progressing     Problem: DISCHARGE PLANNING  Goal: Discharge to home or other facility with appropriate resources  Description: INTERVENTIONS:  - Identify barriers to discharge w/patient and caregiver  -  Arrange for needed discharge resources and transportation as appropriate  - Identify discharge learning needs (meds, wound care, etc.)  - Arrange for interpretive services to assist at discharge as needed  - Refer to Case Management Department for coordinating discharge planning if the patient needs post-hospital services based on physician/advanced practitioner order or complex needs related to functional status, cognitive ability, or social support system  Outcome: Progressing     Problem: Prexisting or High Potential for Compromised Skin Integrity  Goal: Skin integrity is maintained or improved  Description: INTERVENTIONS:  - Identify patients at risk for skin breakdown  - Assess and monitor skin integrity  - Assess and monitor nutrition and hydration status  - Monitor labs   - Assess for incontinence   - Turn and reposition patient  - Assist with mobility/ambulation  - Relieve pressure over bony prominences  - Avoid friction and shearing  - Provide appropriate hygiene as needed including keeping skin clean and dry  - Evaluate need for skin moisturizer/barrier cream  - Collaborate with interdisciplinary team   - Patient/family teaching  - Consider wound care consult   Outcome: Progressing     Problem: Nutrition/Hydration-ADULT  Goal: Nutrient/Hydration intake appropriate for improving, restoring or maintaining nutritional needs  Description: Monitor and assess patient's nutrition/hydration status for malnutrition. Collaborate with interdisciplinary team and initiate plan and interventions as ordered.  Monitor patient's weight and dietary intake as ordered or per policy. Utilize nutrition screening tool and intervene as necessary. Determine patient's food preferences and provide high-protein, high-caloric foods as appropriate.     INTERVENTIONS:  - Monitor oral intake, urinary output, labs, and treatment plans  - Assess nutrition and hydration status and recommend course of action  - Evaluate amount of meals  eaten  - Assist patient with eating if necessary   - Allow adequate time for meals  - Recommend/ encourage appropriate diets, oral nutritional supplements, and vitamin/mineral supplements  - Order, calculate, and assess calorie counts as needed  - Recommend, monitor, and adjust tube feedings and TPN/PPN based on assessed needs  - Assess need for intravenous fluids  - Provide specific nutrition/hydration education as appropriate  - Include patient/family/caregiver in decisions related to nutrition  Outcome: Progressing

## 2024-08-29 LAB
INR PPP: 2.63 (ref 0.85–1.19)
PROTHROMBIN TIME: 27.9 SECONDS (ref 12.3–15)

## 2024-08-29 PROCEDURE — 85610 PROTHROMBIN TIME: CPT | Performed by: PHYSICIAN ASSISTANT

## 2024-08-29 PROCEDURE — 99232 SBSQ HOSP IP/OBS MODERATE 35: CPT | Performed by: PHYSICIAN ASSISTANT

## 2024-08-29 PROCEDURE — 99233 SBSQ HOSP IP/OBS HIGH 50: CPT | Performed by: PHYSICAL MEDICINE & REHABILITATION

## 2024-08-29 RX ADMIN — SERTRALINE HYDROCHLORIDE 75 MG: 50 TABLET ORAL at 08:47

## 2024-08-29 RX ADMIN — LAMOTRIGINE 50 MG: 25 TABLET ORAL at 17:10

## 2024-08-29 RX ADMIN — ASPIRIN 81 MG: 81 TABLET, COATED ORAL at 08:47

## 2024-08-29 RX ADMIN — TAMSULOSIN HYDROCHLORIDE 0.4 MG: 0.4 CAPSULE ORAL at 17:10

## 2024-08-29 RX ADMIN — ATORVASTATIN CALCIUM 80 MG: 80 TABLET, FILM COATED ORAL at 17:10

## 2024-08-29 RX ADMIN — LEVOCARNITINE 330 MG: 1 SOLUTION ORAL at 14:46

## 2024-08-29 RX ADMIN — BICTEGRAVIR SODIUM, EMTRICITABINE, AND TENOFOVIR ALAFENAMIDE FUMARATE 1 TABLET: 50; 200; 25 TABLET ORAL at 08:47

## 2024-08-29 RX ADMIN — ZONISAMIDE 400 MG: 100 CAPSULE ORAL at 08:47

## 2024-08-29 RX ADMIN — GABAPENTIN 100 MG: 100 CAPSULE ORAL at 21:45

## 2024-08-29 RX ADMIN — LEVOCARNITINE 330 MG: 1 SOLUTION ORAL at 08:47

## 2024-08-29 RX ADMIN — METOPROLOL SUCCINATE 25 MG: 25 TABLET, EXTENDED RELEASE ORAL at 08:47

## 2024-08-29 RX ADMIN — DIVALPROEX SODIUM 1250 MG: 500 TABLET, EXTENDED RELEASE ORAL at 08:47

## 2024-08-29 RX ADMIN — WARFARIN SODIUM 5 MG: 5 TABLET ORAL at 21:44

## 2024-08-29 RX ADMIN — GABAPENTIN 100 MG: 100 CAPSULE ORAL at 05:21

## 2024-08-29 RX ADMIN — LAMOTRIGINE 50 MG: 25 TABLET ORAL at 08:47

## 2024-08-29 RX ADMIN — GABAPENTIN 100 MG: 100 CAPSULE ORAL at 14:46

## 2024-08-29 RX ADMIN — LEVOCARNITINE 330 MG: 1 SOLUTION ORAL at 17:10

## 2024-08-29 RX ADMIN — Medication 6 MG: at 21:44

## 2024-08-29 RX ADMIN — MIRTAZAPINE 15 MG: 15 TABLET, FILM COATED ORAL at 21:45

## 2024-08-29 RX ADMIN — LOSARTAN POTASSIUM 50 MG: 50 TABLET, FILM COATED ORAL at 08:47

## 2024-08-29 RX ADMIN — DOLUTEGRAVIR SODIUM 50 MG: 50 TABLET, FILM COATED ORAL at 08:47

## 2024-08-29 NOTE — PROGRESS NOTES
[facility]  Progress Note  Name: Sunil Patel I  MRN: [id number]  Unit/Bed#: -01 I Date of Admission: 7/6/2024   Date of Service: 8/29/2024 I Hospital Day: 54    Assessment & Plan   * Above-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)  Assessment & Plan  Presented with left lower extremity acute limb ischemia/extensive left iliofemoral and left infrainguinal embolism requiring left femoral thrombectomy and subsequent AKA on 6/7/24 with vascular surgery.  Continue with local wound care   Continue ASA, Coumadin and statin   Stable for discharge from PMR and medicine standpoint.  Dispo planning as per case management.  Patient was awaiting guardianship hearing, which occurred on 8/27/24 and will be awarded a guardian. CM awaiting those details and then can plan for discharge.     Generalized abdominal pain  Assessment & Plan  Pt refused therapy 8/25/24 due to abdominal pain in the Rt > Lt LQ.  He states that is has been present for a long time, about 2 weeks.  He denies nausea or vomiting. He has been having a bowel movement daily.  Labs unremarkable/stable.  KUB unremarkable.  Suspect musculoskeletal.  Resolved as of 8/28/24 but now pt complains of abd pain again.  Exam benign.    Postoperative anemia  Assessment & Plan  Normocytic  PTA hemoglobin was normal, now has been running 10-11 since admission  No signs or symptoms of active bleeding  Iron panel reviewed, no evidence of TY  Likely postoperative ABLA  Monitor as needed      History of migraine headaches  Assessment & Plan  Continue PTA meds Depakote, gabapentin, zonegran and lamictal all of which can help in prevention  Unable to take triptans due to a history of stroke  PRN Tylenol     Cardiomyopathy (HCC)  Assessment & Plan  ECHO 6/10/2024 = LVEF 40-45% with mild global hypokinesis   Status post NM stress test 6/11/2024 = large, mild, fixed defect in the inferior wall, possibly due to diaphragmatic attenuation  artifact, there is a small area of partial reversibility in the inferior apical wall suggestive of ischemia    Evaluated by cardiology.  Etiology felt to be possibly secondary to stress-induced cardiomyopathy, with apical thrombus  Cardiac catheterization deferred given the lack of any significant ischemia, and no current cardiac symptoms  Continue with medical management with aspirin, statin, beta-blocker, ARB  Remains euvolemic off of diuretics, continue to monitor volume status   Outpatient follow-up with cardiology, previously followed with Dr. Gumaro Aguila [facility]  Stable.    History of traumatic brain injury  Assessment & Plan  Remote history of TBI, previously residing in a group home prior to [facility] sentence.  Evaluated by neuropsychiatry on 6/27/24 and deemed NOT to have medical decision-making capacity  Neuropsychology re-evaluated pt and he does not have decision making capacity.  Process for court appointed guardian started on 7/5/24 - CM following   Guardianship hearing 8/27/24 and guardian appointed.    Carnitine deficiency (HCC)  Assessment & Plan  Continue levocarnitine 330 mg 3x daily with meals.    History of seizures  Assessment & Plan  Diagnosed in July 2019, follows with [facility] neurology outpatient  Continue home regimen with Depakote ER, Lamotrigine and Zonegran    Left ventricular thrombus  Assessment & Plan  Noted on echocardiogram 6/10/2024: spherical 1.5 x 1.3 cm thrombus at the LV apex   Initiated on AC with Xarelto however unable to verify insurance coverage, now on Coumadin at 5 mg daily  INR 2.63  Coumadin 2.5mg Fridays and 5mg every other night of the week.  Repeat INR Monday.    Benign essential hypertension  Assessment & Plan  Blood pressures acceptable on current regimen including Losartan 50 mg daily, Toprol-XL 25 mg daily  BP stable.    History of stroke  Assessment & Plan  History of left MCA CVA in May 2018 with residual expressive aphasia  Continue ASA and atorvastatin    Human  immunodeficiency virus (HIV) infection (HCC)  Assessment & Plan  Continue Biktarvy and Tivicay.    Bipolar affective disorder (HCC)  Assessment & Plan  Psychiatry evaluated most recently on 8/12 and cleared for discharge to rehab   Continue home meds Zoloft and Remeron             The above assessment and plan was reviewed and updated as determined by my evaluation of the patient on 8/29/2024.    Labs:   Results from last 7 days   Lab Units 08/25/24  1035   WBC Thousand/uL 6.34   HEMOGLOBIN g/dL 10.9*   HEMATOCRIT % 37.2   PLATELETS Thousands/uL 320     Results from last 7 days   Lab Units 08/25/24  1035   SODIUM mmol/L 139   POTASSIUM mmol/L 4.7   CHLORIDE mmol/L 106   CO2 mmol/L 27   BUN mg/dL 18   CREATININE mg/dL 0.94   CALCIUM mg/dL 9.5         Results from last 7 days   Lab Units 08/29/24  0514 08/26/24  0539   INR  2.63* 1.94*           Imaging  XR abdomen 1 view kub   Final Result by Toni Kahn MD (08/26 0801)      Unremarkable abdomen.               Workstation performed: [id number]             Review of Scheduled Meds:  Current Facility-Administered Medications   Medication Dose Route Frequency Provider Last Rate    acetaminophen  975 mg Oral TID PRN José Salcido MD      aspirin  81 mg Oral Daily Lorrie Barillas PA-C      atorvastatin  80 mg Oral Daily With Dinner Lorrie Barillas PA-C      bictegravir-emtricitab-tenofovir alafenamide  1 tablet Oral Daily With Breakfast Lorrie Barillas PA-C      bisacodyl  10 mg Rectal Daily PRN Kenny Castro MD      diphenhydrAMINE  25 mg Oral Q6H PRN Lorrie Barillas PA-C      divalproex sodium  1,250 mg Oral Daily Lorrie Barillas PA-C      dolutegravir  50 mg Oral Daily Lorrie Barillas PA-C      gabapentin  100 mg Oral Q8H Ashley Depadua, MD      lamoTRIgine  50 mg Oral BID Lorriejacky Barillas PA-C      levOCARNitine  1,000 mg/day Oral TID With Meals Lorrie Barillas PA-C      losartan  50 mg Oral Daily Lorrie  EJ Barillas      melatonin  6 mg Oral HS Lorriejacky Barillas PA-C      metoprolol succinate  25 mg Oral Daily CHARLIE Mcclelland      mirtazapine  15 mg Oral HS Lorriejacky Barillas PA-C      ondansetron  4 mg Oral Q6H PRN Lorrie Barillas PA-C      polyethylene glycol  17 g Oral Daily PRN Lorrie Barillas PA-C      senna  1 tablet Oral HS PRN Kenny Castro MD      sertraline  75 mg Oral Daily Lorrie Barillas PA-C      tamsulosin  0.4 mg Oral Daily With Dinner Lorrie Barillas PA-C      warfarin  2.5 mg Oral Weekly Lorrie Barillas PA-C      warfarin  5 mg Oral Once per day on Sunday Monday Tuesday Wednesday Thursday Saturday Lorrie Barillas PA-C      zonisamide  400 mg Oral Daily Lorrie Barillas PA-C         Subjective/ HPI: Patient seen and examined. Patients overnight issues or events were reviewed with nursing staff. New or overnight issues include the following:     Pt seen in his room. He reports not feeling well today citing a headache and abdominal pain. He does not want to participate in therapy. He denies any other complaints.    ROS:   A 10 point ROS was performed; negative except as noted above.        *Labs /Radiology studies Reviewed  *Medications  reviewed and reconciled as needed  *Please refer to order section for additional ordered labs studies      Physical Examination:  Vitals:   Vitals:    08/28/24 0639 08/28/24 0844 08/28/24 2127 08/29/24 0502   BP: 131/61 124/70 104/59 117/64   BP Location: Left arm  Left arm Left arm   Pulse: 81 82 89 87   Resp: 17  20 20   Temp: 98.1 °F (36.7 °C)  98.5 °F (36.9 °C) 98.7 °F (37.1 °C)   TempSrc: Oral  Oral Oral   SpO2: 93%  90% 91%   Weight:       Height:           General Appearance: NAD; pleasant  HEENT: PERRLA, conjuctiva normal; mucous membranes moist; face symmetrical  Neck:  Supple  Lungs: clear bilaterally, normal respiratory effort, no retractions, expiratory effort normal, on room air  CV:  regular rate and rhythm, no murmurs rubs or gallops noted   ABD: soft non tender, +BS x4  EXT: Lt AKA. Rt LE with DP pulse intact, no edema.  Skin: normal turgor, normal texture, no rash  Psych: affect normal, mood normal  Neuro: Sleeping but arousable. Expressive aphasia.     The above physical exam was reviewed and updated as determined by my evaluation of the patient on 8/29/2024.    Invasive Devices       Drain  Duration             External Urinary Catheter 28 days                       VTE Pharmacologic Prophylaxis: Warfarin (Coumadin)  Code Status: Level 1 - Full Code  Current Length of Stay: 54 day(s)    Total floor / unit time spent today  35 minutes   Coordination of patient's care was performed in conjunction with consulting services. Time invested included review of patient's labs, vitals, and management of their comorbidities with continued monitoring, examination of patient as well as answering patient questions, documenting her findings and creating progress note in electronic medical record,  ordering appropriate diagnostic testing.       ** Please Note:  voice to text software may have been used in the creation of this document. Although proof errors in transcription or interpretation are a potential of such software**

## 2024-08-29 NOTE — CASE MANAGEMENT
Admin approved pt to transfer to [facility], cm scheduled wcv transport for 11am 8/30 to [facility].

## 2024-08-29 NOTE — PROGRESS NOTES
"   08/29/24 0915   Assessment   Treatment Assessment Pt in bed, lights off, upon entering room. Pt lethargic and c/o low back pain on left side. RN is aware and was in earlier to offer pian medication, but pt declined. Pt declining therapy this morning due to \"not feeling well\". Will let pt rest and check back again later in day.   Therapy Time missed   Time missed? Yes   Amount of time missed 90   Reason for time missed Extreme fatigue  (back pain)   Time(s) multiple attempts made will check back later today       "

## 2024-08-29 NOTE — PROGRESS NOTES
Physical Medicine and Rehabilitation Progress Note  Sunil Patel 63 y.o. male MRN: [id number]  Unit/Bed#: [facility] 451-01 Encounter: [id number]    To Review: Sunil Patel is a 63 y.o. male who  has a past medical history of Acute lower limb ischemia (06/08/2024), Anxiety, Depression, HIV disease (HCC), Substance abuse (HCC), and Suicide attempt (HCC). who presented to the [facility] on 6/7/24 from residential for increased LLE swelling and pain and was found to have a left external iliac artery occlusion and acute limb ischemia. 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 Patient had TTE on 6/10/24 showing LV apex thrombus. On 6/11/24 patient had pharmacologic nuclear stress test/SPECT scan which did not show any significant areas of ischemia with EF 40-45% and recommended continuing A/C for LV apical thrombus. His course was complicated by b/l buttock unstageable pressure ulcers, urinary and fecal incontinence, pain, and significant decline in ADLs and mobility. Patient has been continued on Xarelto for anticoagulation, as well as ASA and statin. Patient has a history of TBI, with baseline nonfluent aphasia and forgetfulness. He was evaluated by neuropsychology on 6/27/24, and deemed to not have capacity to make fully informed medical decisions. Therefore, the guardianship process was initiated as of 7/5/24. He was admitted to the [facility] on 7/6.     Chief Complaint:  headache and stomach pain    Interval History/Subjective:  No acute events overnight. Had some problems with attitude and throwing things around the room overnight. Seen this morning at bedside sleeping but arousable to voice. Slept okay, feeling okay. Looking forward to new place. Having headache and stomach pain that are chronic for  "him, but declining Tylenol which usually helps. Says he wants to sleep it off tonight    Denies fever, chills, headaches, changes in vision, chest pain, shortness of breath, presyncope, abdominal pain, nausea, diarrhea, constipation, and insomnia.     ROS:  10 point ROS performed and negative unless mentioned in HPI.      Today's Changes:  Guardianship hearing occurred and he has been assigned a guardian.  Likely to transfer to [facility] today or tomorrow, will have orders ready  3.  INR 2.63. Continue current warfarin regimen as below      Assessment/Plan:    Patient incapable of making informed decisions  Assessment & Plan  - History of remote TBI and prior strokes with comorbid psychiatric history.  - Evaluated by neuropsychology, Dr. Dilshad Tatum, PhD on 6/27/24, found to have \"diffuse cognitive dysfunction and on a measure assessing awareness of personal health status and ability to evaluate health problems, handle medical emergencies and take safety precautions, patient performed in the IMPAIRED range of functioning. During this encounter, patient does not appear to have capacity to make fully informed medical decisions.\"  - 8/27 Patient assigned a Guardian   - Continue dispo planning.      History of migraine headaches  Assessment & Plan  Chronic issue.  Seemed to worsen with increased irritability after discontinuing gabapentin  Resumed gabapentin  Received [facility] once during stay with middling results  Sleep logs have been good - discontinued.  Headache is on and off    Cardiomyopathy (HCC)  Assessment & Plan  ECHO on 6/10/2024 showed LVEF 40-45% with mild global hypokinesis   Status post NM stress test on 6/11/2024 which showed: a large, mild, fixed defect in the inferior wall, possibly due to diaphragmatic attenuation artifact, there is a small area of partial reversibility in the inferior apical wall suggestive of ischemia    Elevated by cardiology, etiology felt to be possibly secondary to " stress-induced cardiomyopathy, with apical thrombus  Cardiac catheterization deferred given the lack of any significant ischemia, and no current cardiac symptoms  Continue with medical management with aspirin, statin, beta-blocker, ARB  Monitor volume status, remains euvolemic off of diuretics   Outpatient follow-up with cardiology    At risk for constipation  Assessment & Plan  - Incontinent during acute care hospitalization  - Has improved/resolved with improved mobility.  - Now off scheduled bowel regimen.   - Last BM 8/28 and continent. Not on a regimen  - PRN miralax, Senna and suppository    At risk for venous thromboembolism (VTE)  Assessment & Plan  - Fully anticoagulated on warfarin for apical thrombus    History of traumatic brain injury  Assessment & Plan  Remote history.  See neurocog impairment     Carnitine deficiency (HCC)  Assessment & Plan  - Carnitine replacement  - Appreciate medicine management      History of seizures  Assessment & Plan  - Depakote ER, lamotrigine, zonisamide  - [facility]    Left ventricular thrombus  Assessment & Plan  - Visualized on echocardiogram on 6/10/24: spherical 1.5 x 1.3 cm thrombus at the LV apex.   - Was started on rivaroxaban, but patient does not appear to have insurance coverage for prescriptions   - Xarelto, eliquis, and pradaxa likely cost prohibitive per IM   - 7/29 transitioned to warfarin with lovenox bridge.   - Now off lovenox, and fully anticoagulated on warfarin.   - Management as per IM.   - INR 2.77 on 8/23. IM starting new schedule of warfarin with 2.5mg on Fridays and 5mg the rest of the days. Recheck INR monday  - Outpatient follow-up with cardiology    Benign essential hypertension  Assessment & Plan  - Toprol, losartan  - Appreciate medicine management    History of stroke  Assessment & Plan  - History of old left temporal and parietal lobe cortical infarcts, also involving the insula and left occipital lobe as well  as small right posterior parietal lobe. Stable on most recent CT head on 5/16/24.   - ASA, and statin for secondary prevention  - Optimal BP control  - Monitor neuro exam - hx of LV thrombus    - See that entry  - OP neuro follow-up     Human immunodeficiency virus (HIV) infection (HCC)  Assessment & Plan  - Continue anti-retroviral treatment  - Appreciate medicine management during ARC course  - OP ID follow-up       Bipolar affective disorder (HCC)  Assessment & Plan  Mood acceptable; continue meds as outlined   Supportive counseling  NeuroPsychology consult while in ARC if available for support  Psych evaluated on 8/12 and deemed him stable for discharge with current regimen as below  Counseled on and continue to encourage deep breathing/relaxation/behavioral management techniques  - Mirtazapine 15 mg qHs  - Sertraline 75 mg daily   - Lamictal 50mg BID  - Depakote ER 1250mg qday   - Outpatient psychiatry follow-up  - Would consult Psych before changing medications    * 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 Underwent L femoral thrombectomy and AKA with vascular surgery   - [facility] sx follow-up 7/10 - L AKA incision site well-healed, staples taken out at bedside  - [facility] will be vendor - Patient now has .  - Monitor for hip flexion/abduction tightness. Stretches in therapy.  - Phantom limb sensation - not painful, well controlled.  - Now on Coumadin with hx of LV thrombus and PAOD. Checking INR's as above   - PT/OT/SLP (to work on carry over strategies) 3-5 hours/day, 5-7 days/week.    - Patient now refusing at times   - Has met rehab goals at this point.   - Outpatient f[facility] with [facility]      Pressure injury of buttock, stage 3 ([facility])-resolved as of 8/9/2024  Assessment & Plan  Resolved as of 8/7.   - Wound care consulted and following weekly  - POA L buttock pressure injury unstageable  has healed.  - POA R buttock pressure injury  evolved from unstageable to Stage 3, and is now resolved.   - Recommend ROHO cushion in chair when out of bed instead   - Preventative hydraguard to bilateral heels BID and PRN.   - P500  - Monitor clinically for breakdown, frequent turns  - reviewed picture of wound today. It is healing well. Continue to monitor    Urinary incontinence-resolved as of 8/16/2024  Assessment & Plan  - Did have some incontinence on acute - improved with timed voids and consistent assistance with his urinal  - Described as urgency  - Marked improvement on timed voids.   - monitor for retention, incontinence (including overflow incontinence), signs/symptoms of UTI  - Timed Voids and PVRs Q4hrs initiated earlier. And PVR <150 x3, so scans discontinued.    - He has some difficulty managing the urinal    - needs assistance but is able to transfer to [facility]    - Still uses condom catheter at night, in part for continence care/to protect his skin given his buttock wounds. However now note that buttocks wound has healed  - Continue timed voids             Health Maintenance  #GI Prophylaxis: not indicated  #Code Status: Full code  #FEN: Cardiac diet + Ensure at bfast/dinner  #Dispo: Teams 8/27: Has since been assigned a guardian. Transfer to [facility] today vs. tomorrow     Objective:     Functional Update:  PT: Ind bed mobility, incidental touch transfers, Ind wheelchair mobility 500', Ind ramp  OT: Ind eating, Ind grooming, Sup bathing, Ind UB dressing, Sup LB dressing, Sup toileting, Sup tub/shower transfer, Sup toilet transfers   SLP: Expressive and receptive language skill impairment - difficulty with verbal expression, written expression, auditory comprehension, reading comprehension. Still impairments cognitively, but difficult to tease through given speech difficulties. Jignesh comprehension, Sup social interaction, modA expression, memory, and problem solving.     Allergies per EMR    Physical  Exam:  Temp:  [98.5 °F (36.9 °C)-98.7 °F (37.1 °C)] 98.7 °F (37.1 °C)  HR:  [82-89] 87  Resp:  [20] 20  BP: (104-124)/(59-70) 117/64  Oxygen Therapy  SpO2: 91 %      Gen: sitting in bedside recline, pleasant and calm  HEENT: Moist mucus membranes, Normocephalic/Atraumatic  Cardiovascular: Regular rate, rhythm, S1/S2. Distal pulses palpable  Heme/Extr: No edema/clubbing/cyanosis  Pulmonary: Non-labored breathing. Lungs CTAB  : No fernandes  GI: Soft, mildly tender in left flank, non-distended. BS+  MSK: L AKA  Integumentary: Skin is warm, dry. No rashes or ulcers.  Neuro: AAOx3, Speech is intact. Appropriate to questioning. Expressive aphasia  Psych: Normal mood and affect.        Diagnostic Studies: Reviewed, no new imaging    Laboratory:  Reviewed, no new labs.   Results from last 7 days   Lab Units 08/25/24  1035   HEMOGLOBIN g/dL 10.9*   HEMATOCRIT % 37.2   WBC Thousand/uL 6.34       Results from last 7 days   Lab Units 08/25/24  1035   BUN mg/dL 18   POTASSIUM mmol/L 4.7   CHLORIDE mmol/L 106   CREATININE mg/dL 0.94   AST U/L 10*   ALT U/L 10       Results from last 7 days   Lab Units 08/29/24  0514 08/26/24  0539 08/25/24  1035   PROTIME seconds 27.9* 22.3* 21.6*   INR  2.63* 1.94* 1.87*        Patient Active Problem List   Diagnosis    Bipolar affective disorder (HCC)    Substance abuse (HCC)    Human immunodeficiency virus (HIV) infection (HCC)    History of stroke    Benign essential hypertension    Positive laboratory testing for human immunodeficiency virus (HCC)    Hypertension    Unspecified vitamin D deficiency    Tobacco abuse    Cerebrovascular accident (CVA) (HCC)    Left ventricular thrombus    History of seizures    Carnitine deficiency (HCC)    Above-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)    History of traumatic brain injury    Impaired mobility and activities of daily living    At risk for venous thromboembolism (VTE)    Acute pain    At risk for constipation    Patient incapable of making  informed decisions    Adjustment disorder with mixed anxiety and depressed mood    Cardiomyopathy (HCC)    History of migraine headaches    Postoperative anemia    Generalized abdominal pain         Medications  Current Facility-Administered Medications   Medication Dose Route Frequency Provider Last Rate    acetaminophen  975 mg Oral TID PRN José Salcido MD      aspirin  81 mg Oral Daily Lorrie Barillas PA-C      atorvastatin  80 mg Oral Daily With Dinner Lorrie Barillas PA-C      bictegravir-emtricitab-tenofovir alafenamide  1 tablet Oral Daily With Breakfast Lorrie Barillas PA-C      bisacodyl  10 mg Rectal Daily PRN Kenny Castro MD      diphenhydrAMINE  25 mg Oral Q6H PRN Lorrie Barillas PA-C      divalproex sodium  1,250 mg Oral Daily Lorrie Barillas PA-C      dolutegravir  50 mg Oral Daily Lorrie Barillas PA-C      gabapentin  100 mg Oral Q8H Ashley Depadua, MD      lamoTRIgine  50 mg Oral BID Lorrie Barillas PA-C      levOCARNitine  1,000 mg/day Oral TID With Meals Lorrie Barillas PA-C      losartan  50 mg Oral Daily Lorrie Barillas PA-C      melatonin  6 mg Oral HS Lorrie Barillas PA-C      metoprolol succinate  25 mg Oral Daily CHARLIE Mcclelland      mirtazapine  15 mg Oral HS Lorrie Barillas PA-C      ondansetron  4 mg Oral Q6H PRN Lorrie Barillas PA-C      polyethylene glycol  17 g Oral Daily PRN Lorrie Barillas PA-C      senna  1 tablet Oral HS PRN Kenny Castro MD      sertraline  75 mg Oral Daily Lorrie Barillas PA-C      tamsulosin  0.4 mg Oral Daily With Dinner Lorrie Barillas PA-C      warfarin  2.5 mg Oral Weekly Lorrie Barillas PA-C      warfarin  5 mg Oral Once per day on Sunday Monday Tuesday Wednesday Thursday Saturday Lorrie Arcadio-Arabi, PA-C      zonisamide  400 mg Oral Daily Lorrie Barillas PA-C            ** Please Note: Fluency Direct voice to text software may have  been used in the creation of this document. **

## 2024-08-29 NOTE — ASSESSMENT & PLAN NOTE
Blood pressures acceptable on current regimen including Losartan 50 mg daily, Toprol-XL 25 mg daily  BP stable.

## 2024-08-29 NOTE — ASSESSMENT & PLAN NOTE
Remote history of TBI, previously residing in a group home prior to half-way sentence.  Evaluated by neuropsychiatry on 6/27/24 and deemed NOT to have medical decision-making capacity  Neuropsychology re-evaluated pt and he does not have decision making capacity.  Process for court appointed guardian started on 7/5/24 - CM following   Guardianship hearing 8/27/24 and guardian appointed.

## 2024-08-29 NOTE — PROGRESS NOTES
08/29/24 1230   Pain Assessment   Pain Assessment Tool 0-10   Pain Score 8   Pain Location/Orientation Orientation: Mid;Orientation: Lower;Location: Back   Assessment   Treatment Assessment pt awake and getting ready to eat lunch; declining all therapy this session reporting increased back pain and now feels like he is getting a head cold. assisted pt in transfer OOB to recliner chair to eat lunch. vitals taken and are WNL: BP- 131/76, HR 76, temp 97.6. will attempt to see pt at later time as schedule allows.   Therapy Time missed   Time missed? Yes   Amount of time missed 90   Reason for time missed Extreme fatigue  (back pain, sore throat)

## 2024-08-29 NOTE — CASE MANAGEMENT
Case Management Discharge Planning Note    Patient name Sunil Patel  Location /-01 MRN [id number]  : 1960 Date 2024       Current Admission Date: 2024  Current Admission Diagnosis:Above-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)   Patient Active Problem List    Diagnosis Date Noted Date Diagnosed    Generalized abdominal pain 2024     Postoperative anemia 2024     History of migraine headaches 2024     Cardiomyopathy (HCC) 2024     Adjustment disorder with mixed anxiety and depressed mood 2024     Impaired mobility and activities of daily living 2024     At risk for venous thromboembolism (VTE) 2024     Acute pain 2024     At risk for constipation 2024     Patient incapable of making informed decisions 2024     Above-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) 2024     History of traumatic brain injury 2024     Carnitine deficiency ([facility]) 2024     Left ventricular thrombus 2024     History of seizures 2024     Tobacco abuse 10/26/2019     Cerebrovascular accident (CVA) ([facility]) 10/26/2019     Positive laboratory testing for human immunodeficiency virus ([facility]) 2017     Bipolar affective disorder ([facility]) 2017     Hypertension 2017     Human immunodeficiency virus (HIV) infection ([facility]) 2017     History of stroke 2017     Substance abuse ([facility]) 2013     Unspecified vitamin D deficiency 2010     Benign essential hypertension 2010       LOS (days): 54  Geometric Mean LOS (GMLOS) (days):   Days to GMLOS:     OBJECTIVE:  Risk of Unplanned Readmission Score: 38.19         Current admission status: Inpatient Rehab   Preferred Pharmacy:   [facility] - [place] [facility] & [address]  [place] [address]  Phone: [phone] Fax: [phone]    [facility] [place] -  [address]  [address]  [place] [address]  Phone: [phone] Fax: [phone]    Primary Care Provider: CHARLIE Trevino    Primary Insurance: MEDICARE  Secondary Insurance: [facility]    DISCHARGE DETAILS:    Other Referral/Resources/Interventions Provided:  Referral Comments: Court Order faxed to [facility] [phone]     Additional Comments: Weekly meeting CM to f/u with [facility] to determine if able to accept pr. CM to reach out to [facility] and see if they are able to come and evaluate pt in person. Search for placement will continue, pt will transfer to [facility] will set up transport, CM [facility] will notify guardian

## 2024-08-29 NOTE — ASSESSMENT & PLAN NOTE
ECHO 6/10/2024 = LVEF 40-45% with mild global hypokinesis   Status post NM stress test 6/11/2024 = large, mild, fixed defect in the inferior wall, possibly due to diaphragmatic attenuation artifact, there is a small area of partial reversibility in the inferior apical wall suggestive of ischemia    Evaluated by cardiology.  Etiology felt to be possibly secondary to stress-induced cardiomyopathy, with apical thrombus  Cardiac catheterization deferred given the lack of any significant ischemia, and no current cardiac symptoms  Continue with medical management with aspirin, statin, beta-blocker, ARB  Remains euvolemic off of diuretics, continue to monitor volume status   Outpatient follow-up with cardiology, previously followed with Dr. Gumaro Aguila [facility]  Stable.

## 2024-08-29 NOTE — ASSESSMENT & PLAN NOTE
Noted on echocardiogram 6/10/2024: spherical 1.5 x 1.3 cm thrombus at the LV apex   Initiated on AC with Xarelto however unable to verify insurance coverage, now on Coumadin at 5 mg daily  INR 2.63  Coumadin 2.5mg Fridays and 5mg every other night of the week.  Repeat INR Monday.

## 2024-08-29 NOTE — PLAN OF CARE
Problem: PAIN - ADULT  Goal: Verbalizes/displays adequate comfort level or baseline comfort level  Description: Interventions:  - Encourage patient to monitor pain and request assistance  - Assess pain using appropriate pain scale  - Administer analgesics based on type and severity of pain and evaluate response  - Implement non-pharmacological measures as appropriate and evaluate response  - Consider cultural and social influences on pain and pain management  - Notify physician/advanced practitioner if interventions unsuccessful or patient reports new pain  Outcome: Progressing     Problem: INFECTION - ADULT  Goal: Absence or prevention of progression during hospitalization  Description: INTERVENTIONS:  - Assess and monitor for signs and symptoms of infection  - Monitor lab/diagnostic results  - Monitor all insertion sites, i.e. indwelling lines, tubes, and drains  - Monitor endotracheal if appropriate and nasal secretions for changes in amount and color  - [place] appropriate cooling/warming therapies per order  - Administer medications as ordered  - Instruct and encourage patient and family to use good hand hygiene technique  - Identify and instruct in appropriate isolation precautions for identified infection/condition  Outcome: Progressing     Problem: INFECTION - ADULT  Goal: Absence of fever/infection during neutropenic period  Description: INTERVENTIONS:  - Monitor WBC    Outcome: Progressing     Problem: SAFETY ADULT  Goal: Maintain or return to baseline ADL function  Description: INTERVENTIONS:  -  Assess patient's ability to carry out ADLs; assess patient's baseline for ADL function and identify physical deficits which impact ability to perform ADLs (bathing, care of mouth/teeth, toileting, grooming, dressing, etc.)  - Assess/evaluate cause of self-care deficits   - Assess range of motion  - Assess patient's mobility; develop plan if impaired  - Assess patient's need for assistive devices and provide  as appropriate  - Encourage maximum independence but intervene and supervise when necessary  - Involve family in performance of ADLs  - Assess for home care needs following discharge   - Consider OT consult to assist with ADL evaluation and planning for discharge  - Provide patient education as appropriate  Outcome: Progressing

## 2024-08-30 ENCOUNTER — HOSPITAL ENCOUNTER (INPATIENT)
Facility: HOSPITAL | Age: 64
LOS: 5 days | Discharge: NURSING, REHAB OR LONG-TERM CARE | DRG: 560 | End: 2024-09-04
Attending: STUDENT IN AN ORGANIZED HEALTH CARE EDUCATION/TRAINING PROGRAM | Admitting: STUDENT IN AN ORGANIZED HEALTH CARE EDUCATION/TRAINING PROGRAM
Payer: MEDICARE

## 2024-08-30 VITALS
WEIGHT: 168.43 LBS | BODY MASS INDEX: 24.11 KG/M2 | HEIGHT: 70 IN | OXYGEN SATURATION: 96 % | HEART RATE: 86 BPM | TEMPERATURE: 98.4 F | RESPIRATION RATE: 20 BRPM | SYSTOLIC BLOOD PRESSURE: 160 MMHG | DIASTOLIC BLOOD PRESSURE: 75 MMHG

## 2024-08-30 DIAGNOSIS — I51.81 STRESS-INDUCED CARDIOMYOPATHY: ICD-10-CM

## 2024-08-30 DIAGNOSIS — R56.9 SEIZURES (HCC): ICD-10-CM

## 2024-08-30 DIAGNOSIS — K59.00 CONSTIPATION, UNSPECIFIED CONSTIPATION TYPE: ICD-10-CM

## 2024-08-30 DIAGNOSIS — G47.00 INSOMNIA, UNSPECIFIED TYPE: ICD-10-CM

## 2024-08-30 DIAGNOSIS — F31.9 BIPOLAR AFFECTIVE DISORDER, REMISSION STATUS UNSPECIFIED (HCC): ICD-10-CM

## 2024-08-30 DIAGNOSIS — Z86.73 HISTORY OF STROKE: ICD-10-CM

## 2024-08-30 DIAGNOSIS — Z21 HUMAN IMMUNODEFICIENCY VIRUS (HIV) INFECTION (HCC): ICD-10-CM

## 2024-08-30 DIAGNOSIS — E71.40 CARNITINE DEFICIENCY (HCC): ICD-10-CM

## 2024-08-30 DIAGNOSIS — R10.84 GENERALIZED ABDOMINAL PAIN: ICD-10-CM

## 2024-08-30 DIAGNOSIS — I51.3 LEFT VENTRICULAR THROMBUS: ICD-10-CM

## 2024-08-30 DIAGNOSIS — I10 BENIGN ESSENTIAL HYPERTENSION: ICD-10-CM

## 2024-08-30 DIAGNOSIS — R39.15 URINARY URGENCY: ICD-10-CM

## 2024-08-30 DIAGNOSIS — Z87.898 HISTORY OF SEIZURES: ICD-10-CM

## 2024-08-30 DIAGNOSIS — Z21 ASYMPTOMATIC HIV INFECTION, WITH NO HISTORY OF HIV-RELATED ILLNESS (HCC): ICD-10-CM

## 2024-08-30 DIAGNOSIS — Z72.0 TOBACCO ABUSE: ICD-10-CM

## 2024-08-30 DIAGNOSIS — S78.112A ABOVE-KNEE AMPUTATION OF LEFT LOWER EXTREMITY (HCC): Primary | ICD-10-CM

## 2024-08-30 PROCEDURE — 97535 SELF CARE MNGMENT TRAINING: CPT

## 2024-08-30 PROCEDURE — 99239 HOSP IP/OBS DSCHRG MGMT >30: CPT | Performed by: PHYSICIAN ASSISTANT

## 2024-08-30 PROCEDURE — 87081 CULTURE SCREEN ONLY: CPT | Performed by: STUDENT IN AN ORGANIZED HEALTH CARE EDUCATION/TRAINING PROGRAM

## 2024-08-30 PROCEDURE — 97162 PT EVAL MOD COMPLEX 30 MIN: CPT

## 2024-08-30 PROCEDURE — 99232 SBSQ HOSP IP/OBS MODERATE 35: CPT | Performed by: PHYSICAL MEDICINE & REHABILITATION

## 2024-08-30 PROCEDURE — 99222 1ST HOSP IP/OBS MODERATE 55: CPT | Performed by: INTERNAL MEDICINE

## 2024-08-30 PROCEDURE — 87147 CULTURE TYPE IMMUNOLOGIC: CPT | Performed by: STUDENT IN AN ORGANIZED HEALTH CARE EDUCATION/TRAINING PROGRAM

## 2024-08-30 PROCEDURE — G0316 PR PROLONG INPT EVAL ADD15 M: HCPCS | Performed by: STUDENT IN AN ORGANIZED HEALTH CARE EDUCATION/TRAINING PROGRAM

## 2024-08-30 PROCEDURE — 99223 1ST HOSP IP/OBS HIGH 75: CPT | Performed by: STUDENT IN AN ORGANIZED HEALTH CARE EDUCATION/TRAINING PROGRAM

## 2024-08-30 PROCEDURE — 97530 THERAPEUTIC ACTIVITIES: CPT

## 2024-08-30 PROCEDURE — 97167 OT EVAL HIGH COMPLEX 60 MIN: CPT

## 2024-08-30 RX ORDER — WARFARIN SODIUM 5 MG/1
5 TABLET ORAL
Status: DISCONTINUED | OUTPATIENT
Start: 2024-08-31 | End: 2024-09-04 | Stop reason: HOSPADM

## 2024-08-30 RX ORDER — ATORVASTATIN CALCIUM 80 MG/1
80 TABLET, FILM COATED ORAL
Status: DISCONTINUED | OUTPATIENT
Start: 2024-08-30 | End: 2024-09-04 | Stop reason: HOSPADM

## 2024-08-30 RX ORDER — BISACODYL 10 MG
10 SUPPOSITORY, RECTAL RECTAL DAILY PRN
Status: DISCONTINUED | OUTPATIENT
Start: 2024-08-30 | End: 2024-09-04 | Stop reason: HOSPADM

## 2024-08-30 RX ORDER — TAMSULOSIN HYDROCHLORIDE 0.4 MG/1
0.4 CAPSULE ORAL
Status: DISCONTINUED | OUTPATIENT
Start: 2024-08-30 | End: 2024-09-04 | Stop reason: HOSPADM

## 2024-08-30 RX ORDER — BICTEGRAVIR SODIUM, EMTRICITABINE, AND TENOFOVIR ALAFENAMIDE FUMARATE 50; 200; 25 MG/1; MG/1; MG/1
1 TABLET ORAL
Qty: 30 TABLET | Refills: 0 | Status: SHIPPED | OUTPATIENT
Start: 2024-08-30 | End: 2024-09-03

## 2024-08-30 RX ORDER — LEVOCARNITINE 330 MG/1
990 TABLET ORAL
Status: DISCONTINUED | OUTPATIENT
Start: 2024-08-30 | End: 2024-09-04 | Stop reason: HOSPADM

## 2024-08-30 RX ORDER — SENNOSIDES 8.6 MG
1 TABLET ORAL
Status: DISCONTINUED | OUTPATIENT
Start: 2024-08-30 | End: 2024-09-04 | Stop reason: HOSPADM

## 2024-08-30 RX ORDER — WARFARIN SODIUM 5 MG/1
TABLET ORAL
Start: 2024-08-31 | End: 2024-09-04

## 2024-08-30 RX ORDER — ACETAMINOPHEN 325 MG/1
975 TABLET ORAL 3 TIMES DAILY PRN
Status: ON HOLD
Start: 2024-08-30 | End: 2024-09-03

## 2024-08-30 RX ORDER — LAMOTRIGINE 25 MG/1
50 TABLET ORAL 2 TIMES DAILY
Status: DISCONTINUED | OUTPATIENT
Start: 2024-08-30 | End: 2024-09-04 | Stop reason: HOSPADM

## 2024-08-30 RX ORDER — LOSARTAN POTASSIUM 50 MG/1
50 TABLET ORAL DAILY
Status: DISCONTINUED | OUTPATIENT
Start: 2024-08-31 | End: 2024-09-04 | Stop reason: HOSPADM

## 2024-08-30 RX ORDER — METOPROLOL SUCCINATE 25 MG/1
25 TABLET, EXTENDED RELEASE ORAL DAILY
Status: DISCONTINUED | OUTPATIENT
Start: 2024-08-31 | End: 2024-09-04 | Stop reason: HOSPADM

## 2024-08-30 RX ORDER — ACETAMINOPHEN 325 MG/1
975 TABLET ORAL 3 TIMES DAILY PRN
Status: DISCONTINUED | OUTPATIENT
Start: 2024-08-30 | End: 2024-09-04 | Stop reason: HOSPADM

## 2024-08-30 RX ORDER — GABAPENTIN 100 MG/1
100 CAPSULE ORAL EVERY 8 HOURS
Status: DISCONTINUED | OUTPATIENT
Start: 2024-08-30 | End: 2024-09-04 | Stop reason: HOSPADM

## 2024-08-30 RX ORDER — ZONISAMIDE 100 MG/1
400 CAPSULE ORAL DAILY
Status: DISCONTINUED | OUTPATIENT
Start: 2024-08-31 | End: 2024-09-04 | Stop reason: HOSPADM

## 2024-08-30 RX ORDER — WARFARIN SODIUM 2.5 MG/1
2.5 TABLET ORAL WEEKLY
Status: DISCONTINUED | OUTPATIENT
Start: 2024-08-30 | End: 2024-09-04 | Stop reason: HOSPADM

## 2024-08-30 RX ORDER — DIPHENHYDRAMINE HCL 25 MG
25 TABLET ORAL EVERY 6 HOURS PRN
Status: DISCONTINUED | OUTPATIENT
Start: 2024-08-30 | End: 2024-09-04 | Stop reason: HOSPADM

## 2024-08-30 RX ORDER — MIRTAZAPINE 15 MG/1
15 TABLET, FILM COATED ORAL
Status: DISCONTINUED | OUTPATIENT
Start: 2024-08-30 | End: 2024-09-04 | Stop reason: HOSPADM

## 2024-08-30 RX ORDER — BISACODYL 10 MG
10 SUPPOSITORY, RECTAL RECTAL DAILY PRN
Start: 2024-08-30 | End: 2024-09-04

## 2024-08-30 RX ORDER — DIPHENHYDRAMINE HCL 25 MG
25 TABLET ORAL EVERY 6 HOURS PRN
Start: 2024-08-30 | End: 2024-09-04

## 2024-08-30 RX ORDER — POLYETHYLENE GLYCOL 3350 17 G/17G
17 POWDER, FOR SOLUTION ORAL DAILY PRN
Status: DISCONTINUED | OUTPATIENT
Start: 2024-08-30 | End: 2024-09-04 | Stop reason: HOSPADM

## 2024-08-30 RX ORDER — ONDANSETRON 4 MG/1
4 TABLET, ORALLY DISINTEGRATING ORAL EVERY 6 HOURS PRN
Status: DISCONTINUED | OUTPATIENT
Start: 2024-08-30 | End: 2024-09-04 | Stop reason: HOSPADM

## 2024-08-30 RX ORDER — WARFARIN SODIUM 2.5 MG/1
TABLET ORAL
Start: 2024-08-30 | End: 2024-09-04

## 2024-08-30 RX ORDER — METOPROLOL SUCCINATE 25 MG/1
25 TABLET, EXTENDED RELEASE ORAL DAILY
Start: 2024-08-30 | End: 2024-09-04

## 2024-08-30 RX ORDER — ONDANSETRON 4 MG/1
4 TABLET, ORALLY DISINTEGRATING ORAL EVERY 6 HOURS PRN
Start: 2024-08-30 | End: 2024-09-04

## 2024-08-30 RX ADMIN — LEVOCARNITINE 330 MG: 1 SOLUTION ORAL at 09:17

## 2024-08-30 RX ADMIN — MIRTAZAPINE 15 MG: 15 TABLET, FILM COATED ORAL at 21:06

## 2024-08-30 RX ADMIN — LAMOTRIGINE 50 MG: 25 TABLET ORAL at 09:16

## 2024-08-30 RX ADMIN — METOPROLOL SUCCINATE 25 MG: 25 TABLET, EXTENDED RELEASE ORAL at 09:16

## 2024-08-30 RX ADMIN — GABAPENTIN 100 MG: 100 CAPSULE ORAL at 23:00

## 2024-08-30 RX ADMIN — SERTRALINE HYDROCHLORIDE 75 MG: 50 TABLET ORAL at 09:16

## 2024-08-30 RX ADMIN — WARFARIN SODIUM 2.5 MG: 2.5 TABLET ORAL at 18:19

## 2024-08-30 RX ADMIN — GABAPENTIN 100 MG: 100 CAPSULE ORAL at 06:11

## 2024-08-30 RX ADMIN — DOLUTEGRAVIR SODIUM 50 MG: 50 TABLET, FILM COATED ORAL at 09:18

## 2024-08-30 RX ADMIN — TAMSULOSIN HYDROCHLORIDE 0.4 MG: 0.4 CAPSULE ORAL at 18:18

## 2024-08-30 RX ADMIN — BICTEGRAVIR SODIUM, EMTRICITABINE, AND TENOFOVIR ALAFENAMIDE FUMARATE 1 TABLET: 50; 200; 25 TABLET ORAL at 09:18

## 2024-08-30 RX ADMIN — LEVOCARNITINE 330 MG: 330 TABLET ORAL at 18:18

## 2024-08-30 RX ADMIN — DIVALPROEX SODIUM 1250 MG: 500 TABLET, EXTENDED RELEASE ORAL at 09:15

## 2024-08-30 RX ADMIN — LOSARTAN POTASSIUM 50 MG: 50 TABLET, FILM COATED ORAL at 09:16

## 2024-08-30 RX ADMIN — ZONISAMIDE 400 MG: 100 CAPSULE ORAL at 09:19

## 2024-08-30 RX ADMIN — LAMOTRIGINE 50 MG: 25 TABLET ORAL at 18:18

## 2024-08-30 RX ADMIN — GABAPENTIN 100 MG: 100 CAPSULE ORAL at 15:29

## 2024-08-30 RX ADMIN — ATORVASTATIN CALCIUM 80 MG: 80 TABLET, FILM COATED ORAL at 18:18

## 2024-08-30 RX ADMIN — Medication 6 MG: at 21:05

## 2024-08-30 RX ADMIN — LEVOCARNITINE 330 MG: 330 TABLET ORAL at 15:29

## 2024-08-30 RX ADMIN — ASPIRIN 81 MG: 81 TABLET, COATED ORAL at 09:15

## 2024-08-30 NOTE — ASSESSMENT & PLAN NOTE
"Remote history of a TBI and prior strokes with comorbid psychiatric history  Evaluated by neuropsychology Dr. Yung in [place], [place] on 6/27/2024 and found to have \"diffuse cognitive dysfunction and on a measure assessing awareness of personal health status and ability to evaluate health problems, handle medical emergencies and take safety precautions, the patient performed in the impaired range of functioning.  During this encounter patient does not appear to have capacity to make fully informed medical decisions\"  8/27 patient assigned a guardian, Nain  Continue with disposition planning  "

## 2024-08-30 NOTE — ASSESSMENT & PLAN NOTE
Continue current antiretroviral treatments-currently on Biktarvy  Consultation to internal medicine for any recommendations and will need to follow-up with ID as an outpatient  Medications have been managed through primary care/correctional facility in the past and has not formally seen ID previously-hold his ID office, he was seen Marianna Martin but I spoke with her nurse and verify that he was originally on New injections but lost to follow-up sometime ago.  Will continue with current regimen and will follow-up.  Appointment made on his behalf on September 24 at 1:35 PM at [facility]

## 2024-08-30 NOTE — ASSESSMENT & PLAN NOTE
- Presented with left lower extremity acute limb ischemia/extensive left iliofemoral and left infrainguinal embolism requiring left femoral thrombectomy and subsequent AKA on 6/7/24 with vascular surgery  Continue with local wound care   Continue ASA, Coumadin and statin   Gabapentin 100mg tid   Pain control per primary team  Rehab therapy per primary team

## 2024-08-30 NOTE — ASSESSMENT & PLAN NOTE
Continue to monitor postoperative anemia which has been stable but still in the low category  Most recent hemoglobin of 10.9 as of 5 days prior

## 2024-08-30 NOTE — ASSESSMENT & PLAN NOTE
Chronic migraines  Seem to be worse with increased irritability after discontinuing his gabapentin which was eventually resumed  Received Nurtec once during his stay with some effect  Sleep logs have been good in the rehab setting and discontinued

## 2024-08-30 NOTE — ASSESSMENT & PLAN NOTE
History of CVA in the left MCA territory May 2018 with residual expressive aphasia  Continue ASA and atorvastatin

## 2024-08-30 NOTE — PLAN OF CARE
Problem: Potential for Falls  Goal: Patient will remain free of falls  Description: INTERVENTIONS:  - Educate patient/family on patient safety including physical limitations  - Instruct patient to call for assistance with activity   - Consult OT/PT to assist with strengthening/mobility   - Keep Call bell within reach  - Keep bed low and locked with side rails adjusted as appropriate  - Keep care items and personal belongings within reach  - Initiate and maintain comfort rounds  - Make Fall Risk Sign visible to staff  - Offer Toileting every 4 Hours, in advance of need  - Initiate/Maintain  alarm  - Obtain necessary fall risk management equipment: wc  - Apply yellow socks and bracelet for high fall risk patients  - Consider moving patient to room near nurses station  Outcome: Progressing

## 2024-08-30 NOTE — ASSESSMENT & PLAN NOTE
Echo on 6/10/2024 showing an LVEF of 40 to 45% with mild global hypokinesis  Status post NM stress test on 6/11/2024 showing a large mild fixed defect in the inferior wall possibly due to diaphragmatic attenuation artifact, there is a small area of partial reversibility in the inferior apical wall suggestive of ischemia  Evaluated by cardiology and etiology felt to be secondary to stress-induced cardiomyopathy with apical thrombus  Cardiac catheterization deferred given the lack of any significant ischemia and no current cardiac symptoms  Continue medical management aspirin, statin, beta-blocker and ARB  Monitor volume status  Follow-up with outpatient cardiology

## 2024-08-30 NOTE — ASSESSMENT & PLAN NOTE
Pt refused therapy 8/25/24 due to abdominal pain in the Rt > Lt LQ.  He states that is has been present for a long time, about 2 weeks.  He denies nausea or vomiting. He has been having a bowel movement daily.  Labs unremarkable/stable.  KUB unremarkable.  Suspect musculoskeletal.  Comes and goes  Exam benign.

## 2024-08-30 NOTE — ASSESSMENT & PLAN NOTE
Seen by neuropsych on 6/27 and was deemed NOT to have medical decision making capacity  Re-evalauted on 8/20 and again deemed NOT to have medical decision making capacity   Guardian (Nain Harden) assigned on 8/27

## 2024-08-30 NOTE — ASSESSMENT & PLAN NOTE
Noted on echocardiogram 6/10/2024: spherical 1.5 x 1.3 cm thrombus at the LV apex   Initiated on AC with Xarelto however unable to verify insurance coverage, now on Coumadin at 5 mg daily  INR 2.63  Coumadin 2.5mg Fridays and 5mg every other night of the week.  Repeat INR Monday

## 2024-08-30 NOTE — PROGRESS NOTES
[facility] OT GOALS   08/30/24 1245   Rehab Team Goals   ADL Team Goal Patient will be independent with ADLs with least restrictive device upon completion of rehab program   Rehab Team Interventions   OT Interventions Self Care;Home Management;Community Reintegration;Therapeutic Exercise;Cognitive Reintegration;Cognitive Retraining;Energy Conservation;Patient/Family Education   Eating Goal   Eating Goal 06. Independent - Patient completes the activity by him/herself with no assistance from a helper.   Status Target goal - two weeks   Interventions Compensation Strategies;Optimal Position   Grooming Goal   Oral Hygiene Goal 06. Independent - Patient completes the activity by him/herself with no assistance from a helper.   Status Target goal - two weeks   Tub/Shower Transfer Goal   Method   (MOD I but set up unkown)   Status Ongoing;Target goal - two weeks   Interventions ADL Training;Assistive Device   Bathing Goal   Shower/bathe self Goal 06. Independent - Patient completes the activity by him/herself with no assistance from a helper.   Status Ongoing;Target goal - two weeks   Intervention ADL Training;Assistive Device;Therapeutic Exercise   Upper Body Dressing Goal   Upper body dressing Goal 06. Independent - Patient completes the activity by him/herself with no assistance from a helper.   Status Ongoing;Target goal - two weeks   Intervention Assistive Device;Balance Work;Therapeutic Exercise;Tolerance Work   Lower Body Dressing Goal   Lower body dressing Goal 06. Independent - Patient completes the activity by him/herself with no assistance from a helper.   Status Ongoing;Target goal - two weeks   Intervention Assistive Device;Balance Work;Therapeutic Exercise;Tolerance Work   Toileting Transfer Goal   Toilet transfer Goal 06. Independent - Patient completes the activity by him/herself with no assistance from a helper.   Status Ongoing;Target goal - two weeks   Intervention ADL Training;Balance Work;Assistive Device    Toileting Goal   Toileting hygiene Goal 06. Independent - Patient completes the activity by him/herself with no assistance from a helper.   Status Ongoing;Target goal - two weeks   Intervention ADL Training;Balance Work;Assistive Device

## 2024-08-30 NOTE — ASSESSMENT & PLAN NOTE
Patient was evaluated by the rehabilitation team MD and an appropriate candidate for acute inpatient rehabilitation program at this time.  The patient will tolerate 3 hours/day 5 to 7 days/week of intensive physical, occupational and speech therapy in order to obtain goals for community discharge  Due to the patient's functional Compared to their baseline level of function in addition to their ongoing medical needs, the patient would benefit from daily supervision from a rehabilitation physician as well as rehabilitation nursing to implement and adjust the medical as well as functional plan of care in order to meet the patient's goals.

## 2024-08-30 NOTE — ASSESSMENT & PLAN NOTE
History of an old left temporal and parietal lobe cortical infarcts also involving the insula and left occipital lobe as well as a small right posterior parietal lobe stroke  Stable on the most recent head imaging back in May 2024  Continue aspirin and statin for secondary stroke prophylaxis  Optimal blood pressure control  Continue to monitor overall neurostatus follow-up with neurology as an outpatient

## 2024-08-30 NOTE — ASSESSMENT & PLAN NOTE
Hgb 10-11 since admission  Iron panel normal  Likely postoperative ABLA  Monitor with routine labs

## 2024-08-30 NOTE — H&P
PHYSICAL MEDICINE AND REHABILITATION H&P/ADMISSION NOTE  Sunil Patel 63 y.o. adult MRN: [id number]  Unit/Bed#: [facility] 267-01 Encounter: [id number]     Rehab Diagnosis: Impairment of mobility, safety, Activities of Daily Living (ADLs), and cognitive/communication skills due to Amputation:  05.3  Unilateral Lower Limb Above the Knee    Etiologic Dx: LLE Acute Limb Ischemia   Date of Onset: 6/7/2024   Date of surgery: 6/7/2024 Left femoral artery exploration, Thromboembolectomy of the left iliac system, Thromboembolectomy of the left profunda femoris, Thromboembolectomy of the left superficial femoral artery, Left above knee amputation    History of Present Illness:   Sunil Patel is a 63 y.o. male who  has a past medical history of Acute lower limb ischemia (06/08/2024), Anxiety, Depression, HIV disease (HCC), Substance abuse (HCC), and Suicide attempt (HCC). who presented to the [facility] on 6/7/24 from [facility] for increased LLE swelling and pain and was found to have a left external iliac artery occlusion and acute limb ischemia. 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 Patient had TTE on 6/10/24 showing LV apex thrombus. On 6/11/24 patient had pharmacologic nuclear stress test/SPECT scan which did not show any significant areas of ischemia with EF 40-45% and recommended continuing A/C for LV apical thrombus. His course was complicated by b/l buttock unstageable pressure ulcers, urinary and fecal incontinence, pain, and significant decline in ADLs and mobility. Patient has been continued on Xarelto for anticoagulation, as well as ASA and statin. Patient has a history of TBI, with baseline nonfluent aphasia and forgetfulness. He was evaluated by neuropsychology on 6/27/24, and deemed to  "not have capacity to make fully informed medical decisions. Therefore, the guardianship process was initiated as of 7/5/24. He was admitted to the [facility] on 7/6.  During his stay, he was formally evaluated and deemed not able to make his own medical decisions and was appointed a guardian officially on 8/27.  He was transferred to the [facility] on 8/30/2024 for ongoing care, therapies and disposition planning      Plan:     Rehabilitation  Functional deficits: impaired mobility, self care  Begin PT/OT/SLP.  Rehabilitation goals are to achieve a modified independent to supervision level with mobility and self care.  Prognosis is good.  ELOS is 10 to 14 days.  Estimated discharge is home.     DVT prophylaxis  Patient is currently on warfarin    Pain  Neurontin 100 mg every 8 hours  Acetaminophen 975 mg 3 times a day as needed    Bladder plan  Continent    Bowel plan  Continent  Last BM on 8/28    Code Status  Level 1 full code    Above-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)  Assessment & Plan  Patient presents with an acute occlusion of the left EIA that was deemed nonsalvageable on the 6/7 and underwent a left femoral thrombectomy with AKA with vascular surgery\  As a follow-up on 7/10 the left AKA site was well-healed and staples were removed  Utilization of [employer] for his equipment,  is currently available  Currently with phantom limb sensation but not painful and overall has been well-controlled  On warfarin now for the history of the LV thrombus that was the inciting event  PT, OT  Outpatient follow-up with amputee clinic with PM&R and vascular surgery    Patient incapable of making informed decisions  Assessment & Plan  Remote history of a TBI and prior strokes with comorbid psychiatric history  Evaluated by neuropsychology Dr. Yung in [place], [place] on 6/27/2024 and found to have \"diffuse cognitive dysfunction and on a measure assessing awareness of personal health status and " "ability to evaluate health problems, handle medical emergencies and take safety precautions, the patient performed in the impaired range of functioning.  During this encounter patient does not appear to have capacity to make fully informed medical decisions\"  8/27 patient assigned a guardian, Nain  Continue with disposition planning    Generalized abdominal pain  Assessment & Plan  Pt refused therapy 8/25/24 due to abdominal pain in the Rt > Lt LQ.  He states that is has been present for a long time, about 2 weeks.  He denies nausea or vomiting. He has been having a bowel movement daily.  Labs unremarkable/stable.  KUB unremarkable.  Suspect musculoskeletal.  Comes and goes  Exam benign.    Postoperative anemia  Assessment & Plan  Continue to monitor postoperative anemia which has been stable but still in the low category  Most recent hemoglobin of 10.9 as of 5 days prior    History of migraine headaches  Assessment & Plan  Chronic migraines  Seem to be worse with increased irritability after discontinuing his gabapentin which was eventually resumed  Received Nurtec once during his stay with some effect  Sleep logs have been good in the rehab setting and discontinued    Cardiomyopathy (HCC)  Assessment & Plan  Echo on 6/10/2024 showing an LVEF of 40 to 45% with mild global hypokinesis  Status post NM stress test on 6/11/2024 showing a large mild fixed defect in the inferior wall possibly due to diaphragmatic attenuation artifact, there is a small area of partial reversibility in the inferior apical wall suggestive of ischemia  Evaluated by cardiology and etiology felt to be secondary to stress-induced cardiomyopathy with apical thrombus  Cardiac catheterization deferred given the lack of any significant ischemia and no current cardiac symptoms  Continue medical management aspirin, statin, beta-blocker and ARB  Monitor volume status  Follow-up with outpatient cardiology    At risk for venous thromboembolism " (VTE)  Assessment & Plan  Fully anticoagulated on warfarin at this time for apical thrombus and history of embolic event in the leg leading to amputation    Impaired mobility and activities of daily living  Assessment & Plan  Patient was evaluated by the rehabilitation team MD and an appropriate candidate for acute inpatient rehabilitation program at this time.  The patient will tolerate 3 hours/day 5 to 7 days/week of intensive physical, occupational and speech therapy in order to obtain goals for community discharge  Due to the patient's functional Compared to their baseline level of function in addition to their ongoing medical needs, the patient would benefit from daily supervision from a rehabilitation physician as well as rehabilitation nursing to implement and adjust the medical as well as functional plan of care in order to meet the patient's goals.      History of traumatic brain injury  Assessment & Plan  Remote history with neurocognitive impairments, please see separate section    Carnitine deficiency (HCC)  Assessment & Plan  Continue carnitine replacement therapy    History of seizures  Assessment & Plan  Currently on Depakote ER, lamotrigine and zonisamide  Follow-up outpatient with [facility] neurology    Left ventricular thrombus  Assessment & Plan  Visualized on echocardiogram on 6/10/2024 showing a spherical 1.5 cm x 1.3 cm thrombus at the LV apex  Started on Xarelto but not covered and other agents like Eliquis and Pradaxa likely because prohibitive  Currently on regimen of warfarin and was transitioned at the end of July 2024  Continue INR as per internal medicine recommendations with most recent INR of 2.63 as of 8/29  Current regimen is warfarin 2.5 mg on Fridays and 5 mg on the remainder of the week  Outpatient follow-up with cardiology    Benign essential hypertension  Assessment & Plan  Currently on Cozaar 50 mg daily as well as metoprolol succinate 25 mg daily  Monitor pressures during therapy  sessions and on routine vitals    History of stroke  Assessment & Plan  History of an old left temporal and parietal lobe cortical infarcts also involving the insula and left occipital lobe as well as a small right posterior parietal lobe stroke  Stable on the most recent head imaging back in May 2024  Continue aspirin and statin for secondary stroke prophylaxis  Optimal blood pressure control  Continue to monitor overall neurostatus follow-up with neurology as an outpatient    Human immunodeficiency virus (HIV) infection (HCC)  Assessment & Plan  Continue current antiretroviral treatments  Consultation to internal medicine for any recommendations and will need to follow-up with ID as an outpatient  Medications have been managed through primary care/correctional facility in the past and has not formally seen ID previously    Bipolar affective disorder (HCC)  Assessment & Plan  Mood has been stable without any behavioral issues  Supportive counseling  Neuropsychology consultation placed while in [facility]  Psych evaluation completed on 8/12 and deemed stable for discharge with current medication regimen as below.  It also determined through neuropsychiatric evaluation not to have capacity to make medical decisions.  Guardian has been appointed  Currently on regimen of mirtazapine 15 mg nightly  Sertraline 75 mg daily  Lamictal 50 mg twice daily  Depakote ER 1250 mg daily  Follow-up with psychiatry as an outpatient or consider consultation if changing any medications        Subjective/Interval Events:     Review of Systems   Constitutional:  Negative for chills and fever.   HENT:  Negative for hearing loss and trouble swallowing.    Eyes:  Negative for photophobia and visual disturbance.   Respiratory:  Negative for cough and shortness of breath.    Cardiovascular:  Negative for chest pain and leg swelling.   Gastrointestinal:  Negative for constipation, diarrhea, nausea and vomiting.   Endocrine: Negative for cold  "intolerance and heat intolerance.   Genitourinary:  Negative for dysuria and hematuria.   Musculoskeletal:  Positive for gait problem. Negative for back pain and joint swelling.   Skin:  Negative for rash and wound.   Allergic/Immunologic: Negative for environmental allergies and food allergies.   Neurological:  Negative for weakness, light-headedness and headaches.   Hematological:  Negative for adenopathy. Does not bruise/bleed easily.   Psychiatric/Behavioral:  Negative for dysphoric mood and sleep disturbance.          Function:  PRIOR LEVEL OF FUNCTION:  Prior to patient's admission, patient was incarcerated at [facility]. Prior to that he was in a group home and required some degree of assistance at least for iADLs. He states he was able to walk and do ADLs, mostly Independently until a few days prior to admission.     HOME ENVIRONMENT:  Patient has been released by corrections (as of 6/21). Patient is unable to return to previous TBI facility d/t history of drug abuse, behavior. Patient's brother lives out of state, and is unable to assist due to his down health issues/needs.     Current level of function:  PT: Ind bed mobility, incidental touch transfers, Ind wheelchair mobility 500', Ind ramp  OT: Ind eating, Ind grooming, Sup bathing, Ind UB dressing, Sup LB dressing, Sup toileting, Sup tub/shower transfer, Sup toilet transfers   SLP: Expressive and receptive language skill impairment - difficulty with verbal expression, written expression, auditory comprehension, reading comprehension. Still impairments cognitively, but difficult to tease through given speech difficulties. Jignesh comprehension, Sup social interaction, modA expression, memory, and problem solving.     Physical Exam:  /70 (BP Location: Right arm)   Pulse 82   Temp (!) 97 °F (36.1 °C) (Tympanic)   Resp 18   Ht 5' 10\" (1.778 m)   SpO2 95%   BMI 24.17 kg/m²      No intake or output data in the 24 hours " ending 08/30/24 1458    Body mass index is 24.17 kg/m².      Physical Exam  Vitals reviewed.   Constitutional:       General: He is not in acute distress.  HENT:      Head: Normocephalic and atraumatic.      Right Ear: External ear normal.      Left Ear: External ear normal.      Nose: Nose normal. No rhinorrhea.      Mouth/Throat:      Mouth: Mucous membranes are moist.      Pharynx: Oropharynx is clear.   Eyes:      General: No scleral icterus.  Cardiovascular:      Rate and Rhythm: Normal rate.   Pulmonary:      Effort: Pulmonary effort is normal. No respiratory distress.   Abdominal:      General: There is no distension.      Palpations: Abdomen is soft.   Musculoskeletal:      Right lower leg: No edema.      Left lower leg: No edema.      Comments: Left AKA site healed without any significant edema   Skin:     General: Skin is warm and dry.   Neurological:      General: No focal deficit present.      Mental Status: He is alert and oriented to person, place, and time.      Sensory: No sensory deficit.   Psychiatric:         Mood and Affect: Mood normal.         Behavior: Behavior normal.            Labs, medications, and imaging personally reviewed.    Laboratory:    Lab Results   Component Value Date    SODIUM 139 08/25/2024    K 4.7 08/25/2024     08/25/2024    CO2 27 08/25/2024    BUN 18 08/25/2024    CREATININE 0.94 08/25/2024    GLUC 97 08/25/2024    CALCIUM 9.5 08/25/2024     Lab Results   Component Value Date    WBC 6.34 08/25/2024    HGB 10.9 (L) 08/25/2024    HCT 37.2 08/25/2024    MCV 96 08/25/2024     08/25/2024     Lab Results   Component Value Date    INR 2.63 (H) 08/29/2024    INR 1.94 (H) 08/26/2024    INR 1.87 (H) 08/25/2024    PROTIME 27.9 (H) 08/29/2024    PROTIME 22.3 (H) 08/26/2024    PROTIME 21.6 (H) 08/25/2024         Current Facility-Administered Medications:     acetaminophen (TYLENOL) tablet 975 mg, 975 mg, Oral, TID FERNANDEZN, Grant Parks DO    [START ON 8/31/2024] aspirin  (ECOTRIN LOW STRENGTH) EC tablet 81 mg, 81 mg, Oral, Daily, Grant Parks DO    atorvastatin (LIPITOR) tablet 80 mg, 80 mg, Oral, Daily With Dinner, Grant Parks DO    bisacodyl (DULCOLAX) rectal suppository 10 mg, 10 mg, Rectal, Daily PRN, Grant Parks DO    diphenhydrAMINE (BENADRYL) tablet 25 mg, 25 mg, Oral, Q6H PRN, Grant Parks DO    [START ON 8/31/2024] divalproex sodium (DEPAKOTE ER) 24 hr tablet 1,250 mg, 1,250 mg, Oral, Daily, Grant Parks DO    [START ON 8/31/2024] dolutegravir (TIVICAY) tablet 50 mg, 50 mg, Oral, Daily, Grant Parks DO    gabapentin (NEURONTIN) capsule 100 mg, 100 mg, Oral, Q8H, Grant Parks DO    lamoTRIgine (LaMICtal) tablet 50 mg, 50 mg, Oral, BID, Grant Parks DO    levOCARNitine (CARNITOR) tablet 330 mg, 990 mg/day, Oral, TID With Meals, Grant Parks DO    [START ON 8/31/2024] losartan (COZAAR) tablet 50 mg, 50 mg, Oral, Daily, Grant Parks DO    melatonin tablet 6 mg, 6 mg, Oral, HS, Grant Parks DO    [START ON 8/31/2024] metoprolol succinate (TOPROL-XL) 24 hr tablet 25 mg, 25 mg, Oral, Daily, Grant Parks DO    mirtazapine (REMERON) tablet 15 mg, 15 mg, Oral, HS, Grant Parks DO    ondansetron (ZOFRAN-ODT) dispersible tablet 4 mg, 4 mg, Oral, Q6H PRN, Grant Parks DO    polyethylene glycol (MIRALAX) packet 17 g, 17 g, Oral, Daily PRN, Grant Parks DO    senna (SENOKOT) tablet 8.6 mg, 1 tablet, Oral, HS PRN, Grant Parks DO    [START ON 8/31/2024] sertraline (ZOLOFT) tablet 75 mg, 75 mg, Oral, Daily, Grant Parks DO    tamsulosin (FLOMAX) capsule 0.4 mg, 0.4 mg, Oral, Daily With Dinner, Grant Parks DO    warfarin (COUMADIN) tablet 2.5 mg, 2.5 mg, Oral, Weekly, Grant Parks DO    [START ON 8/31/2024] warfarin (COUMADIN) tablet 5 mg, 5 mg, Oral, Once per day on Sunday Monday Tuesday Wednesday Thursday Saturday, Grant Parks DO    [START ON 8/31/2024] zonisamide (ZONEGRAN) capsule 400 mg, 400 mg, Oral, Daily, Grant Parks DO  Allergies    Allergen Reactions    No Active Allergies       Patient Active Problem List    Diagnosis Date Noted    Above-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) 07/06/2024    Patient incapable of making informed decisions 07/07/2024    Generalized abdominal pain 08/25/2024    Postoperative anemia 08/11/2024    History of migraine headaches 07/22/2024    Cardiomyopathy (HCC) 07/09/2024    Adjustment disorder with mixed anxiety and depressed mood 07/08/2024    Impaired mobility and activities of daily living 07/07/2024    At risk for venous thromboembolism (VTE) 07/07/2024    Acute pain 07/07/2024    At risk for constipation 07/07/2024    History of traumatic brain injury 07/06/2024    Carnitine deficiency ([facility]) 06/09/2024    Left ventricular thrombus 06/08/2024    History of seizures 06/08/2024    Tobacco abuse 10/26/2019    Cerebrovascular accident (CVA) ([facility]) 10/26/2019    Positive laboratory testing for human immunodeficiency virus ([facility]) 07/03/2017    Bipolar affective disorder ([facility]) 07/02/2017    Hypertension 02/27/2017    Human immunodeficiency virus (HIV) infection ([facility]) 02/16/2017    History of stroke 02/16/2017    Substance abuse ([facility]) 12/21/2013    Unspecified vitamin D deficiency 05/28/2010    Benign essential hypertension 02/25/2010     Past Medical History:   Diagnosis Date    Acute lower limb ischemia 06/08/2024    Anxiety     Depression     HIV disease ([facility])     Substance abuse ([facility])     Suicide attempt ([facility])      Past Surgical History:   Procedure Laterality Date    AMPUTATION ABOVE KNEE (AKA) Left 6/7/2024    Procedure: AMPUTATION ABOVE KNEE (AKA);  Surgeon: Juan Luis Rdz MD;  Location: [facility];  Service: Vascular    WA TEAEC W/WO PATCH GRAFT COMMON FEMORAL Left 6/7/2024    Procedure: left femoral ileofemoral thromectomy;  Surgeon: Juan Luis Rdz MD;  Location: [facility];  Service: Vascular    US GUIDED THYROID BIOPSY  3/15/2022    US GUIDED THYROID BIOPSY  11/26/2019  "    Social History     Socioeconomic History    Marital status: /Civil Union     Spouse name: Not on file    Number of children: Not on file    Years of education: Not on file    Highest education level: Not on file   Occupational History    Not on file   Tobacco Use    Smoking status: Some Days     Current packs/day: 1.00     Types: Cigarettes    Smokeless tobacco: Never   Substance and Sexual Activity    Alcohol use: Yes    Drug use: Yes     Types: \"Crack\" cocaine, Marijuana    Sexual activity: Not Currently   Other Topics Concern    Not on file   Social History Narrative    Not on file     Social Determinants of Health     Financial Resource Strain: Not on file   Food Insecurity: Patient Declined (8/30/2024)    Hunger Vital Sign     Worried About Running Out of Food in the Last Year: Patient declined     Ran Out of Food in the Last Year: Patient declined   Transportation Needs: Patient Declined (8/30/2024)    PRAPARE - Transportation     Lack of Transportation (Medical): Patient declined     Lack of Transportation (Non-Medical): Patient declined   Physical Activity: Not on file   Stress: Not on file   Social Connections: Not on file   Intimate Partner Violence: Not on file   Housing Stability: Patient Declined (8/30/2024)    Housing Stability Vital Sign     Unable to Pay for Housing in the Last Year: Patient declined     Number of Times Moved in the Last Year: 0     Homeless in the Last Year: Patient declined     Social History     Tobacco Use   Smoking Status Some Days    Current packs/day: 1.00    Types: Cigarettes   Smokeless Tobacco Never     Social History     Substance and Sexual Activity   Alcohol Use Yes     Family History   Problem Relation Age of Onset    Diabetes Mother     Heart attack Mother     Heart attack Father          Medical Necessity Criteria for [facility] Admission: Anemia, with the following plan: monitor H/H, Hypertension, Bowel/Bladder Management, and Incision/Wound care. In addition, " the preadmission screen, post-admission physical evaluation, overall plan of care and admissions order demonstrate a reasonable expectation that the following criteria were met at the time of admission to the [facility].  The patient requires active and ongoing therapeutic intervention of multiple therapy disciplines (physical therapy, occupational therapy, speech-language pathology, or prosthetics/orthotics), one of which is physical or occupational therapy.    Patient requires an intensive rehabilitation therapy program, as defined in Chapter 1, section 110.2.2 of the CMS Medicare Policy Manual. This intensive rehabilitation therapy program will consist of at least 3 hours of therapy per day at least 5 days per week or at least 15 hours of intensive rehabilitation therapy within a 7 consecutive day period, beginning with the date of admission to the [facility].    The patient is reasonably expected to actively participate in, and benefit significantly from, the intensive rehabilitation therapy program as defined in Chapter 1, section 110.2.2 of the CMS Medicare Policy Manual at this time of admission to the [facility]. He can reasonably be expected to make measurable improvement (that will be of practical value to improve the patient’s functional capacity or adaptation to impairments) as a result of the rehabilitation treatment, as defined in section 110.3, and such improvement can be expected to be made within the prescribed period of time. As noted in the CMS Medicare Policy Manual, the patient need not be expected to achieve complete independence in the domain of self-care nor be expected to return to his or her prior level of functioning in order to meet this standard.  The patient must require physician supervision by a rehabilitation physician. As such, a rehabilitation physician will conduct face-to-face visits with the patient at least 3 days per week throughout the patient’s stay in the [facility] to assess the patient both  medically and functionally, as well as to modify the course of treatment as needed to maximize the patient’s capacity to benefit from the rehabilitation process.  The patient requires an intensive and coordinated interdisciplinary approach to providing rehabilitation, as defined in Chapter 1, section 110.2.5 of the CMS Medicare Policy Manual. This will be achieved through periodic team conferences, conducted at least once in a 7-day period, and comprising of an interdisciplinary team of medical professionals consisting of: a rehabilitation physician, registered nurse,  and/or , and a licensed/certified therapist from each therapy discipline involved in treating the patient.     Changes Since Pre-admission Assessment: None -This patient's participation in rehab continues to be reasonable, necessary and appropriate.    CMS Required Post-Admission Physician Evaluation Elements  History and Physical, including medical history, functional history and active comorbidities as in above text.     Post-Admission Physician Evaluation:  The patient has the potential to make improvement and is in need of physical, occupational, and/or therapy services. The patient may also need nutritional services. Given the patient's complex medical condition and risk of further medical complications, rehabilitative services cannot be safely provided at a lower level of care, such as a skilled nursing facility. I have reviewed the patient's functional and medical status at the time of the preadmission screening and they are the same as on the day of this admission. I acknowledge that I have personally performed a full physical examination on this patient within 24 hours of admission. The patient and/or family demonstrated understanding the rehabilitation program and the discharge process after we discussed them.     Agree in entirety: yes  Minor adaptions: none    Major changes: none    Grant Parks,   Physical  Medicine and Rehabilitation  [facility]    I have spent a total time of 110 minutes in caring for this patient on the day of the visit/encounter including Instructions for management, Importance of tx compliance, Risk factor reductions, Impressions, Counseling / Coordination of care, Documenting in the medical record, Reviewing / ordering tests, medicine, procedures  , Obtaining or reviewing history  , and Communicating with other healthcare professionals .  Additional time spent during thorough chart review as the patient has been admitted for close to 3 months.  Additional time spent meeting with staff for behavioral plan, education and updates.      ** Please Note: Fluency Direct voice to text software may have been used in the creation of this document. **

## 2024-08-30 NOTE — DISCHARGE SUMMARY
Discharge Summary   Sunil Patel 63 y.o. male MRN: [id number]  Unit/Bed#: [facility] 451-01 Encounter: [id number]  Admission Date: 7/6/2024     Discharge Date: 8/30/2024    Discharging Provider: Lorrie Ervin PA-C    PCP:  [phone]      HPI: Refer to previous hospitalization for complete record    Summary of [facility] Course: Admitted 7/6 s/p L AKA in the setting of LV apical thrombus. During his rehab course, Kieran progressed to independent-supervision with most of his transfers, wheelchair mobility, and ADL's. He is not a candidate for prosthetic at this time but certainly should be considered in the outpatient setting. In this hospitalization, he was determined to not have medical decision making capacity through another consult by neuropsychology on 8/20. For that reason, he was assigned a guardian (Nain Harden) on 8/27. Otherwise, his acute rehab course was notable for healed sacral pressure injury as of 8/9, headaches controlled by Tylenol, therapeutic warfarin schedule (see below), and redirectable frustrations. He will transfer to [facility] for further discussion of placement, and CM, his friends Autumn and Mireya, and staff at  will be instrumental.    Problem List/Comorbidities:    * Above-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)  Assessment & Plan  Presented with left lower extremity acute limb ischemia/extensive left iliofemoral and left infrainguinal embolism requiring left femoral thrombectomy and subsequent AKA on 6/7/24 with vascular surgery.  Continue with local wound care   Continue ASA, Coumadin and statin   Stable for discharge from PMR and medicine standpoint.  Dispo planning as per case management.  Patient was awaiting guardianship hearing, which occurred on 8/27/24 and will be awarded a guardian. CM awaiting those details and then can plan for discharge.  DC to [facility] today until a more permanent location accepts pt.    Generalized abdominal pain  Assessment & Plan  Pt refused therapy  8/25/24 due to abdominal pain in the Rt > Lt LQ.  He states that is has been present for a long time, about 2 weeks.  He denies nausea or vomiting. He has been having a bowel movement daily.  Labs unremarkable/stable.  KUB unremarkable.  Suspect musculoskeletal.  Comes and goes  Exam benign.    Postoperative anemia  Assessment & Plan  Normocytic  PTA hemoglobin was normal, now has been running 10-11 since admission  No signs or symptoms of active bleeding  Iron panel reviewed, no evidence of TY  Likely postoperative ABLA  Monitor as needed      History of migraine headaches  Assessment & Plan  Continue PTA meds Depakote, gabapentin, zonegran and lamictal all of which can help in prevention  Unable to take triptans due to a history of stroke  PRN Tylenol     Cardiomyopathy (HCC)  Assessment & Plan  ECHO 6/10/2024 = LVEF 40-45% with mild global hypokinesis   Status post NM stress test 6/11/2024 = large, mild, fixed defect in the inferior wall, possibly due to diaphragmatic attenuation artifact, there is a small area of partial reversibility in the inferior apical wall suggestive of ischemia    Evaluated by cardiology.  Etiology felt to be possibly secondary to stress-induced cardiomyopathy, with apical thrombus  Cardiac catheterization deferred given the lack of any significant ischemia, and no current cardiac symptoms  Continue with medical management with aspirin, statin, beta-blocker, ARB  Remains euvolemic off of diuretics, continue to monitor volume status   Outpatient follow-up with cardiology, previously followed with Dr. Gumaro Aguila [facility]  Stable.    History of traumatic brain injury  Assessment & Plan  Remote history of TBI, previously residing in a group home prior to prison sentence.  Evaluated by neuropsychiatry on 6/27/24 and deemed NOT to have medical decision-making capacity  Neuropsychology re-evaluated pt and he does not have decision making capacity.  Process for court appointed guardian started on  "7/5/24 - CM following   Guardianship hearing 8/27/24 and guardian appointed.    Carnitine deficiency (HCC)  Assessment & Plan  Continue levocarnitine 330 mg 3x daily with meals.    History of seizures  Assessment & Plan  Diagnosed in July 2019, follows with [facility] neurology outpatient  Continue home regimen with Depakote ER, Lamotrigine and Zonegran    Left ventricular thrombus  Assessment & Plan  Noted on echocardiogram 6/10/2024: spherical 1.5 x 1.3 cm thrombus at the LV apex   Initiated on AC with Xarelto however unable to verify insurance coverage, now on Coumadin at 5 mg daily  INR 2.63  Coumadin 2.5mg Fridays and 5mg every other night of the week.  Repeat INR Monday.    Benign essential hypertension  Assessment & Plan  Blood pressures acceptable on current regimen including Losartan 50 mg daily, Toprol-XL 25 mg daily  BP stable.    History of stroke  Assessment & Plan  History of left MCA CVA in May 2018 with residual expressive aphasia  Continue ASA and atorvastatin    Human immunodeficiency virus (HIV) infection (HCC)  Assessment & Plan  Continue Biktarvy and Tivicay.    Bipolar affective disorder (HCC)  Assessment & Plan  Psychiatry evaluated most recently on 8/12 and cleared for discharge to rehab   Continue home meds Zoloft and Remeron        Discharge Physical Examination:  /75   Pulse 86   Temp 98.4 °F (36.9 °C) (Oral)   Resp 20   Ht 5' 10\" (1.778 m)   Wt 76.4 kg (168 lb 6.9 oz)   SpO2 96%   BMI 24.17 kg/m²     General Appearance: no distress, conversive  HEENT: PERRLA, conjuctiva normal; oropharynx clear; mucous membranes moist;   Neck:  Supple, no lymphadenopathy or thyromegaly  Lungs: CTA, normal respiratory effort, no retractions, expiratory effort normal  CV: regular rate and rhythm , PMI normal   ABD: soft non tender, no masses , no hepatic or splenomegaly  EXT: Lt AKA  Skin: normal turgor, normal texture, no rash  Psych: affect normal, mood normal  Neuro: AAOx3      Significant " "Findings, Care, Treatment and Services Provided: Acute comprehensive interdisciplinary inpatient rehabilitation including PT, OT, SLP, RN, CM, SW, dietary, psychology, etc.      Physiatry Additions/Comorbidities:  * 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 Underwent L femoral thrombectomy and AKA with vascular surgery   - Vasc sx follow-up 7/10 - L AKA incision site well-healed, staples taken out at bedside  - [facility] will be vendor - Patient now has .  - Monitor for hip flexion/abduction tightness. Stretches in therapy.  - Phantom limb sensation - not painful, well controlled.  - Now on Coumadin with hx of LV thrombus and PAOD. Checking INR's as above   - PT/OT/SLP (to work on carry over strategies) 3-5 hours/day, 5-7 days/week.          - Patient now refusing at times         - Has met rehab goals at this point.   - Outpatient f/u with [facility] and Vascular    Patient incapable of making informed decisions  Assessment & Plan  - History of remote TBI and prior strokes with comorbid psychiatric history.  - Evaluated by neuropsychology, Dr. Dilshad Tatum, PhD on 6/27/24, found to have \"diffuse cognitive dysfunction and on a measure assessing awareness of personal health status and ability to evaluate health problems, handle medical emergencies and take safety precautions, patient performed in the IMPAIRED range of functioning. During this encounter, patient does not appear to have capacity to make fully informed medical decisions.\"  - 8/27 Patient assigned a Guardian      History of migraine headaches  Assessment & Plan  Chronic issue.  Seemed to worsen with increased irritability after discontinuing gabapentin  Resumed gabapentin  [facility] with middling [facility]  Sleep logs have been good - discontinued.  Headache is on and off  At risk for constipation  Assessment & Plan  - " Incontinent during acute care hospitalization  - Has improved/resolved with improved mobility.  - Now off scheduled bowel regimen.   - Last BM  and continent. Not on a regimen  - PRN miralax, Senna and suppository    Pressure injury of buttock, stage 3 (HCC)-resolved as of 2024  Assessment & Plan  Resolved as of .   - Wound care consulted and following weekly  - POA L buttock pressure injury unstageable has healed.  - POA R buttock pressure injury  evolved from unstageable to Stage 3, and is now resolved.   - Recommend ROHO cushion in chair when out of bed instead   - Preventative hydraguard to bilateral heels BID and PRN.   - P500  - Monitor clinically for breakdown, frequent turns  - reviewed picture of wound today. It is healing well. Continue to monitor     Urinary incontinence-resolved as of 2024  Assessment & Plan  - Did have some incontinence on acute - improved with timed voids and consistent assistance with his urinal  - Described as urgency  - Marked improvement on timed voids.   - monitor for retention, incontinence (including overflow incontinence), signs/symptoms of UTI  - Timed Voids and PVRs Q4hrs initiated earlier. And PVR <150 x3, so scans discontinued.          - He has some difficulty managing the urinal         - needs assistance but is able to transfer to [facility] Course: Patient participated in a comprehensive interdisciplinary inpatient rehabilitation program which included involvment of Rehab MD, therapies (PT, OT, and/or SLP), RN, CM, SW, dietary, and psychology services.     Etiologic/Rehabilitation Diagnosis: Impairment of mobility, safety and Activities of Daily Living (ADLs) due to Amputation:  05.3  Unilateral Lower Limb Above the Knee    Functional Status Upon Admission to [facility]:  Self Care:  Eatin: Not applicable  Oral hygiene: 04: Supervision or touching  assistance  Toilet hygiene: 09: Not applicable  Shower/bathing self: 03:  Partial/moderate assistance  Upper body dressin: Supervision or touching  assistance  Lower body dressin: Substantial/maximal assistance  Putting on/taking off footwear: 02: Substantial/maximal assistance  Transfers:  Roll left and right: 09: Not applicable  Sit to lyin: Not applicable  Lying to sitting on side of bed: 03: Partial/moderate assistance  Sit to stand: 03: Partial/moderate assistance  Chair/bed to chair transfer: 03: Partial/moderate assistance  Toilet transfer: 09: Not applicable  Mobility:  Walk 10 ft: 03: Partial/moderate assistance  Walk 50 ft with two turns: 10: Not attempted due to environmental limitations  Walk 150ft: 88: Not attempted due to medical conditions or safety concerns       Functional Status Upon Discharge from [facility]:   PT: Ind bed mobility, incidental touch transfers, Ind wheelchair mobility 500', Ind ramp  OT: Ind eating, Ind grooming, Sup bathing, Ind UB dressing, Sup LB dressing, Sup toileting, Sup tub/shower transfer, Sup toilet transfers   SLP: Expressive and receptive language skill impairment - difficulty with verbal expression, written expression, auditory comprehension, reading comprehension. Still impairments cognitively, but difficult to tease through given speech difficulties. Jignesh comprehension, Sup social interaction, modA expression, memory, and problem solving.        Condition at Discharge: good     Discharge instructions/Information to patient and family:   See after visit summary for information provided to patient and family.      Provisions for Follow-Up Care:  See after visit summary for information related to follow-up care and any pertinent home health orders.      No future appointments.        Disposition:  Transfer to [facility] to continue to await placement.     Planned Readmission: No    Discharge Statement   I spent 60 minutes or more discharging the patient.  I had direct contact with the patient on the day of discharge. Greater than 50% of  the total time was spent examining patient, answering all patient questions, arranging and discussing plan of care with patient and care team as well as directly providing post-discharge instructions. Additional time then spent on discharge activities.    Discharge Medications:  See after visit summary for reconciled discharge medications provided to patient and family.

## 2024-08-30 NOTE — ASSESSMENT & PLAN NOTE
ECHO 6/10/2024 = LVEF 40-45% with mild global hypokinesis   Status post NM stress test 6/11/2024 = large, mild, fixed defect in the inferior wall, possibly due to diaphragmatic attenuation artifact, there is a small area of partial reversibility in the inferior apical wall suggestive of ischemia    Evaluated by cardiology.  Etiology felt to be possibly secondary to stress-induced cardiomyopathy, with apical thrombus  Cardiac catheterization deferred given the lack of any significant ischemia, and no current cardiac symptoms  Continue with medical management with aspirin, statin, beta-blocker, ARB  Remains euvolemic off of diuretics, continue to monitor volume status   Outpatient follow-up with cardiology, previously followed with Dr. Gumaro Aguila [facility]  Stable.

## 2024-08-30 NOTE — ASSESSMENT & PLAN NOTE
Visualized on echocardiogram on 6/10/2024 showing a spherical 1.5 cm x 1.3 cm thrombus at the LV apex  Started on Xarelto but not covered and other agents like Eliquis and Pradaxa likely because prohibitive  Currently on regimen of warfarin and was transitioned at the end of July 2024  Continue INR as per internal medicine recommendations with most recent INR of 2.63 as of 8/29  Current regimen is warfarin 2.5 mg on Fridays and 5 mg on the remainder of the week  Outpatient follow-up with cardiology

## 2024-08-30 NOTE — ASSESSMENT & PLAN NOTE
ECHO 6/10/2024 = LVEF 40-45% with apical hypokinesis   Nuclear stress test 6/11, per cardiology did not show any significant areas of ischemia  Per cardiology, etiology felt to be stress-induced cardiomyopathy  Cardiac catheterization deferred given the lack of any significant ischemia, and no cardiac symptoms  Continue with medical management with aspirin, statin, beta-blocker, ARB  Remains euvolemic off of diuretics, continue to monitor volume status   Outpatient follow-up with cardiology

## 2024-08-30 NOTE — ASSESSMENT & PLAN NOTE
Mood has been stable without any behavioral issues  Supportive counseling  Neuropsychology consultation placed while in [facility]  Psych evaluation completed on 8/12 and deemed stable for discharge with current medication regimen as below.  It also determined through neuropsychiatric evaluation not to have capacity to make medical decisions.  Guardian has been appointed  Currently on regimen of mirtazapine 15 mg nightly  Sertraline 75 mg daily  Lamictal 50 mg twice daily  Depakote ER 1250 mg daily  Follow-up with psychiatry as an outpatient or consider consultation if changing any medications

## 2024-08-30 NOTE — PROGRESS NOTES
Physical Medicine and Rehabilitation Progress Note  Sunil Patel 63 y.o. male MRN: [id number]  Unit/Bed#: [facility] Encounter: [id number]    To Review: Sunil Patel is a 63 y.o. male who  has a past medical history of Acute lower limb ischemia (06/08/2024), Anxiety, Depression, HIV disease (HCC), Substance abuse (HCC), and Suicide attempt (HCC). who presented to the [facility] on 6/7/24 from [facility] for increased LLE swelling and pain and was found to have a left external iliac artery occlusion and acute limb ischemia. 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 Patient had TTE on 6/10/24 showing LV apex thrombus. On 6/11/24 patient had pharmacologic nuclear stress test/SPECT scan which did not show any significant areas of ischemia with EF 40-45% and recommended continuing A/C for LV apical thrombus. His course was complicated by b/l buttock unstageable pressure ulcers, urinary and fecal incontinence, pain, and significant decline in ADLs and mobility. Patient has been continued on Xarelto for anticoagulation, as well as ASA and statin. Patient has a history of TBI, with baseline nonfluent aphasia and forgetfulness. He was evaluated by neuropsychology on 6/27/24, and deemed to not have capacity to make fully informed medical decisions. Therefore, the guardianship process was initiated as of 7/5/24. He [facility].     Chief Complaint:  ready to go to     Interval History/Subjective:  No acute events overnight. Seen this morning at bedside. Slept well, feeling well. Looking forward to discharge     Denies fever, chills, headaches, changes in vision, chest pain, shortness of breath, presyncope, abdominal pain, nausea, diarrhea, constipation, and insomnia.     ROS:  10  "point ROS performed and negative unless mentioned in HPI.      Today's Changes:  Transferring to [place] today. Please see discharge summary      Assessment/Plan:    Patient incapable of making informed decisions  Assessment & Plan  - History of remote TBI and prior strokes with comorbid psychiatric history.  - Evaluated by neuropsychology, Dr. Dilshad Tatum, PhD on 6/27/24, found to have \"diffuse cognitive dysfunction and on a measure assessing awareness of personal health status and ability to evaluate health problems, handle medical emergencies and take safety precautions, patient performed in the IMPAIRED range of functioning. During this encounter, patient does not appear to have capacity to make fully informed medical decisions.\"  - 8/27 Patient assigned a Guardian   - Continue dispo planning.      History of migraine headaches  Assessment & Plan  Chronic issue.  Seemed to worsen with increased irritability after discontinuing gabapentin  Resumed gabapentin  Received [facility] once during stay with middling results  Sleep logs have been good - discontinued.  Headache is on and off    Cardiomyopathy (HCC)  Assessment & Plan  ECHO on 6/10/2024 showed LVEF 40-45% with mild global hypokinesis   Status post NM stress test on 6/11/2024 which showed: a large, mild, fixed defect in the inferior wall, possibly due to diaphragmatic attenuation artifact, there is a small area of partial reversibility in the inferior apical wall suggestive of ischemia    Elevated by cardiology, etiology felt to be possibly secondary to stress-induced cardiomyopathy, with apical thrombus  Cardiac catheterization deferred given the lack of any significant ischemia, and no current cardiac symptoms  Continue with medical management with aspirin, statin, beta-blocker, ARB  Monitor volume status, remains euvolemic off of diuretics   Outpatient follow-up with cardiology    At risk for constipation  Assessment & Plan  - Incontinent during " acute care hospitalization  - Has improved/resolved with improved mobility.  - Now off scheduled bowel regimen.   - Last BM 8/28 and continent. Not on a regimen  - PRN miralax, Senna and suppository    At risk for venous thromboembolism (VTE)  Assessment & Plan  - Fully anticoagulated on warfarin for apical thrombus    History of traumatic brain injury  Assessment & Plan  Remote history.  See neurocog impairment     Carnitine deficiency (HCC)  Assessment & Plan  - Carnitine replacement  - Appreciate medicine management      History of seizures  Assessment & Plan  - Depakote ER, lamotrigine, zonisamide  - Outpatient follow-up with [facility] neurology    Left ventricular thrombus  Assessment & Plan  - Visualized on echocardiogram on 6/10/24: spherical 1.5 x 1.3 cm thrombus at the LV apex.   - Was started on rivaroxaban, but patient does not appear to have insurance coverage for prescriptions   - Xarelto, eliquis, and pradaxa likely cost prohibitive per IM   - 7/29 transitioned to warfarin with lovenox bridge.   - Now off lovenox, and fully anticoagulated on warfarin.   - Management as per IM.   - INR 2.63 on 8/29. continue warfarin with 2.5mg on Fridays and 5mg the rest of the days. Recheck INR per IM  - Outpatient follow-up with cardiology    Benign essential hypertension  Assessment & Plan  - Toprol, losartan  - Appreciate medicine management    History of stroke  Assessment & Plan  - History of old left temporal and parietal lobe cortical infarcts, also involving the insula and left occipital lobe as well as small right posterior parietal lobe. Stable on most recent CT head on 5/16/24.   - ASA, and statin for secondary prevention  - Optimal BP control  - Monitor neuro exam - hx of LV thrombus    - See that entry  - OP neuro follow-up     Human immunodeficiency virus (HIV) infection (HCC)  Assessment & Plan  - Continue anti-retroviral treatment  - Appreciate medicine management during [facility] course  - OP ID follow-up        Bipolar affective disorder (HCC)  Assessment & Plan  Mood acceptable; continue meds as outlined   Supportive counseling  NeuroPsychology consult while in [facility] if available for support  Psych evaluated on 8/12 and deemed him stable for discharge with current regimen as below  Counseled on and continue to encourage deep breathing/relaxation/behavioral management techniques  - Mirtazapine 15 mg qHs  - Sertraline 75 mg daily   - Lamictal 50mg BID  - Depakote ER 1250mg qday   - Outpatient psychiatry follow-up  - Would consult Psych before changing medications    * 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 Underwent L femoral thrombectomy and AKA with vascular surgery   - [facility] sx follow-up 7/10 - L AKA incision site well-healed, staples taken out at bedside  - [facility] will be vendor - Patient now has .  - Monitor for hip flexion/abduction tightness. Stretches in therapy.  - Phantom limb sensation - not painful, well controlled.  - Now on Coumadin with hx of LV thrombus and PAOD. Checking INR's as above   - PT/OT/SLP (to work on carry over strategies) 3-5 hours/day, 5-7 days/week.    - Patient now refusing at times   - Has met rehab goals at this point.   - Outpatient f/u with [facility]      Pressure injury of buttock, stage 3 ([facility])-resolved as of 8/9/2024  Assessment & Plan  Resolved as of 8/7.   - Wound care consulted and following weekly  - POA L buttock pressure injury unstageable has healed.  - POA R buttock pressure injury  evolved from unstageable to Stage 3, and is now resolved.   - Recommend ROHO cushion in chair when out of bed instead   - Preventative hydraguard to bilateral heels BID and PRN.   - P500  - Monitor clinically for breakdown, frequent turns  - reviewed picture of wound today. It is healing well. Continue to monitor    Urinary incontinence-resolved as of  8/16/2024  Assessment & Plan  - Did have some incontinence on acute - improved with timed voids and consistent assistance with his urinal  - Described as urgency  - Marked improvement on timed voids.   - monitor for retention, incontinence (including overflow incontinence), signs/symptoms of UTI  - Timed Voids and PVRs Q4hrs initiated earlier. And PVR <150 x3, so scans discontinued.    - He has some difficulty managing the urinal    - needs assistance but is able to transfer to [facility]    - Still uses condom catheter at night, in part for continence care/to protect his skin given his buttock wounds. However now note that buttocks wound has healed  - Continue timed voids             Health Maintenance  #GI Prophylaxis: not indicated  #Code Status: Full code  #FEN: Cardiac diet + Ensure at bfast/dinner  #Dispo: Teams 8/27: Has since been assigned a guardian. Transfer to [facility] today vs. tomorrow     Objective:     Functional Update:  PT: Ind bed mobility, incidental touch transfers, Ind wheelchair mobility 500', Ind ramp  OT: Ind eating, Ind grooming, Sup bathing, Ind UB dressing, Sup LB dressing, Sup toileting, Sup tub/shower transfer, Sup toilet transfers   SLP: Expressive and receptive language skill impairment - difficulty with verbal expression, written expression, auditory comprehension, reading comprehension. Still impairments cognitively, but difficult to tease through given speech difficulties. Jignesh comprehension, Sup social interaction, modA expression, memory, and problem solving.     Allergies per EMR    Physical Exam:  Temp:  [98.1 °F (36.7 °C)-98.4 °F (36.9 °C)] 98.4 °F (36.9 °C)  HR:  [65-87] 86  Resp:  [16-20] 20  BP: (110-160)/(61-82) 160/75  Oxygen Therapy  SpO2: 96 %        Gen: No acute distress, Well-nourished, well-appearing.  HEENT: Moist mucus membranes, Normocephalic/Atraumatic  Cardiovascular: Regular rate, rhythm, S1/S2. Distal pulses palpable  Heme/Extr: No  edema/clubbing/cyanosis  Pulmonary: Non-labored breathing. Lungs CTAB  : No fenrandes  GI: Soft, non-tender, non-distended. BS+  MSK: ROM is WFL in all extremities. No effusions or deformities. Bulk is symmetric. See below for MMT scores. L AKA stable  Integumentary: Skin is warm, dry. No rashes or ulcers.  Neuro: AAOx3, CN 2-12 intact. Sensation intact to light touch throughout. Speech is intact. Appropriate to questioning. Tone is normal.    Coord: arm roll without catch, FTN without dysmetria or tremor, GINO without fatiguing     MMT:   Strength:   Right  Left  Site  Right  Left  Site    5 5  S Ab: Shoulder Abductors  5  5  HF: Hip Flexors    5 5  EF: Elbow Flexors  5  NT KF: Knee Flexors    5  5  EE: Elbow Extensors  5  NT  KE: Knee Extensors    5  5  WE: Wrist Extensors  5  NT DR: Dorsi Flexors    5  5  FF: Finger Flexors  5  NT PF: Plantar Flexors    5  5  HI: Hand Intrinsics  5  NT  EHL: Extensor Hallucis Longus   Psych: Normal mood and affect.        Diagnostic Studies: Reviewed, no new imaging    Laboratory:  Reviewed, no new labs.   Results from last 7 days   Lab Units 08/25/24  1035   HEMOGLOBIN g/dL 10.9*   HEMATOCRIT % 37.2   WBC Thousand/uL 6.34       Results from last 7 days   Lab Units 08/25/24  1035   BUN mg/dL 18   POTASSIUM mmol/L 4.7   CHLORIDE mmol/L 106   CREATININE mg/dL 0.94   AST U/L 10*   ALT U/L 10       Results from last 7 days   Lab Units 08/29/24  0514 08/26/24  0539 08/25/24  1035   PROTIME seconds 27.9* 22.3* 21.6*   INR  2.63* 1.94* 1.87*        Patient Active Problem List   Diagnosis    Bipolar affective disorder (HCC)    Substance abuse (HCC)    Human immunodeficiency virus (HIV) infection (HCC)    History of stroke    Benign essential hypertension    Positive laboratory testing for human immunodeficiency virus (HCC)    Hypertension    Unspecified vitamin D deficiency    Tobacco abuse    Cerebrovascular accident (CVA) (HCC)    Left ventricular thrombus    History of seizures     Carnitine deficiency (HCC)    Above-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)    History of traumatic brain injury    Impaired mobility and activities of daily living    At risk for venous thromboembolism (VTE)    Acute pain    At risk for constipation    Patient incapable of making informed decisions    Adjustment disorder with mixed anxiety and depressed mood    Cardiomyopathy (HCC)    History of migraine headaches    Postoperative anemia    Generalized abdominal pain         Medications  Current Facility-Administered Medications   Medication Dose Route Frequency Provider Last Rate    acetaminophen  975 mg Oral TID PRN José Salcido MD      aspirin  81 mg Oral Daily Lorrie Barillas PA-C      atorvastatin  80 mg Oral Daily With Dinner Lorrie Barillas PA-C      bictegravir-emtricitab-tenofovir alafenamide  1 tablet Oral Daily With Breakfast Lorrie Barillas PA-C      bisacodyl  10 mg Rectal Daily PRN Kenny Castro MD      diphenhydrAMINE  25 mg Oral Q6H PRN Lorrie Barillas PA-C      divalproex sodium  1,250 mg Oral Daily Lorrie Barillas PA-C      dolutegravir  50 mg Oral Daily Lorrie Barillas PA-C      gabapentin  100 mg Oral Q8H Ashley Depadua, MD      lamoTRIgine  50 mg Oral BID Lorrie Barillas PA-C      levOCARNitine  1,000 mg/day Oral TID With Meals Lorrie Barillas PA-C      losartan  50 mg Oral Daily Lorrie Barillas PA-C      melatonin  6 mg Oral HS Lorrie Barillas PA-C      metoprolol succinate  25 mg Oral Daily CHARLIE Mcclelland      mirtazapine  15 mg Oral HS Lorriejacky Barillas PA-C      ondansetron  4 mg Oral Q6H PRN Lorrie Barillas PA-C      polyethylene glycol  17 g Oral Daily PRN Lorrie Barillas PA-C      senna  1 tablet Oral HS PRN Kenny Catsro MD      sertraline  75 mg Oral Daily Lorrie Barillas PA-C      tamsulosin  0.4 mg Oral Daily With Dinner Lorrie Barillas PA-C      warfarin  2.5 mg  Oral Weekly Lorrie Barillas PA-C      warfarin  5 mg Oral Once per day on Sunday Monday Tuesday Wednesday Thursday Saturday Lorrie Barillas PA-C      zonisamide  400 mg Oral Daily Lorrie Barillas PA-C            ** Please Note: Fluency Direct voice to text software may have been used in the creation of this document. **

## 2024-08-30 NOTE — ASSESSMENT & PLAN NOTE
Remote history of TBI, previously residing in a group home prior to group home sentence.  Evaluated by neuropsychiatry on 6/27/24 and deemed NOT to have medical decision-making capacity  Neuropsychology re-evaluated pt and he does not have decision making capacity.  Process for court appointed guardian started on 7/5/24 - CM following   Guardianship hearing 8/27/24 and guardian appointed.

## 2024-08-30 NOTE — ASSESSMENT & PLAN NOTE
Fully anticoagulated on warfarin at this time for apical thrombus and history of embolic event in the leg leading to amputation

## 2024-08-30 NOTE — CASE MANAGEMENT
Case Management Discharge Planning Note    Patient name Sunil Patel  Location /-01 MRN [id number]  : 1960 Date 2024       Current Admission Date: 2024  Current Admission Diagnosis:Above-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)   Patient Active Problem List    Diagnosis Date Noted Date Diagnosed    Generalized abdominal pain 2024     Postoperative anemia 2024     History of migraine headaches 2024     Cardiomyopathy (HCC) 2024     Adjustment disorder with mixed anxiety and depressed mood 2024     Impaired mobility and activities of daily living 2024     At risk for venous thromboembolism (VTE) 2024     Acute pain 2024     At risk for constipation 2024     Patient incapable of making informed decisions 2024     Above-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) 2024     History of traumatic brain injury 2024     Carnitine deficiency ([facility]) 2024     Left ventricular thrombus 2024     History of seizures 2024     Tobacco abuse 10/26/2019     Cerebrovascular accident (CVA) ([facility]) 10/26/2019     Positive laboratory testing for human immunodeficiency virus ([facility]) 2017     Bipolar affective disorder ([facility]) 2017     Hypertension 2017     Human immunodeficiency virus (HIV) infection ([facility]) 2017     History of stroke 2017     Substance abuse ([facility]) 2013     Unspecified vitamin D deficiency 2010     Benign essential hypertension 2010       LOS (days): 55  Geometric Mean LOS (GMLOS) (days):   Days to GMLOS:     OBJECTIVE:  Risk of Unplanned Readmission Score: 39.68         Current admission status: Inpatient Rehab   Preferred Pharmacy:   [facility] - [place] [facility] & [address]  [place] [address]  Phone: [phone] Fax: [phone]    [facility] - [place] -  [address]  [address] [facility] [address] [facility]  [place] [address]  Phone: [phone] Fax: [phone]    Primary Care Provider: CHARLIE Trevino    Primary Insurance: MEDICARE  Secondary Insurance: [facility]    DISCHARGE DETAILS:         Additional Comments: CM heard back from [facility] asking if someone would be bale to provide HIV meds while under Medicare. CM will reach out o leadership. LENY spoke with Gagandeep Harden-Guardian and asked if he would be bale to obtain his HIV meds while under Medicare, as the financial guardian. Gagandeep stated that he would need a cost, a few hundred dollars would be ok any thing over and above he would need approval for. CM will f/u

## 2024-08-30 NOTE — PROGRESS NOTES
[facility] OT EVAL   08/30/24 1245   Patient Data   Rehab Impairment Impairment of mobility, safety, Activities of Daily Living (ADLs), and cognitive/communication skills due to Amputation:  05.3  Unilateral Lower Limb Above the Knee   Etiologic Diagnosis LLE Acute Limb Ischemia   Date of Onset 06/07/24  (DOS: 6/7/2024 Left femoral artery exploration, Thromboembolectomy of the left iliac system, Thromboembolectomy of the left profunda femoris, Thromboembolectomy of the left superficial femoral artery, Left above knee amputation)   Support System   Name Pts friend, Joanna, and his newly appointed guardian.   Able to provide 24 hour supervision No   Able to provide physical help? No   Home Setup   Type of Home   (No curent DC plan. pt previously incarcerated)   Prior IADL Participation   Money Management   (previously incarcerated)   Meal Preparation   (previous incarcerated)   Laundry   (previously incarcerated)   Home Cleaning   (previoulsy incarcerated)   Prior Level of Function   Self-Care 3. Independent - Patient completed the activities by him/herself, with or without an assistive device, with no assistance from a helper.  (prior to AKA, reportedly)   Indoor-Mobility (Ambulation) 3. Independent - Patient completed the activities by him/herself, with or without an assistive device, with no assistance from a helper.  (prior to AKA, reportedly)   Stairs 3. Independent - Patient completed the activities by him/herself, with or without an assistive device, with no assistance from a helper.  (prior to AKA, reportedly)   Functional Cognition 2. Needed Some Help - Patient needed a partial assistance from another person to complete activities.  (prior to AKA, reportedly. hx TBI)   Prior Device Used Z. None of the above  (prior to AKA, reportedly)   Falls in the Last Year   Number of falls in the past 12 months 0  (per pt)   Psychosocial   Psychosocial (WDL) X   Patient Behaviors/Mood Cooperative;Calm    Restrictions/Precautions   Precautions Aphasia;Bed/chair alarms;Cognitive;Fall Risk;Pain;Supervision on toilet/commode   Weight Bearing Restrictions Yes   LLE Weight Bearing Per Order NWB  (AKA but incision now healed)   Pain Assessment   Pain Assessment Tool 0-10   Pain Score No Pain   Eating Assessment   Type of Assistance Needed Independent   Eating CARE Score 6   Oral Hygiene   Type of Assistance Needed Independent   Comment seated at sink   Oral Hygiene CARE Score 6   Grooming   Findings IND seated   Tub/Shower Transfer   Findings 'may shower order' provided following OT eval, pt scheduled for shower ADL tomorrow   Shower/Bathe Self   Type of Assistance Needed Supervision;Set-up / clean-up   Comment Pt is currently sup-set up for modified ADL completing SB supine and EOB. Bathing goal will be for showers.   Shower/Bathe Self CARE Score 4   Dressing/Undressing Clothing   Type of Assistance Needed Set-up / clean-up   Comment seated   Upper Body Dressing CARE Score 5   Type of Assistance Needed Physical assistance   Physical Assistance Level 26%-50%   Comment bed level w/ extra time set up for all items but does requrie assist w/ AKA  when pt compliant w/ wear. His current  is bulky, cumbersome, would benefit from assessment for trial of different style.   Lower Body Dressing CARE Score 3   Putting On/Taking Off Footwear   Type of Assistance Needed Set-up / clean-up   Comment able to don/doff sock   Putting On/Taking Off Footwear CARE Score 5   Toileting Hygiene   Type of Assistance Needed Supervision   Comment overall CS w/ BSC over toilet, cues for safety.   Toileting Hygiene CARE Score 4   Toilet Transfer   Type of Assistance Needed Supervision   Comment CS w/ sit pivot WC<>BSC over toilet, cues for safety   Toilet Transfer CARE Score 4   Sit to Stand   Type of Assistance Needed Incidental touching   Comment CGA at rail   Sit to Stand CARE Score 4   Comprehension   QI: Comprehension 2.  Sometimes Understands: Understands only basic conversations or simple, direct phrases. Frequently requires cues to understand   Expression   QI: Expression 2. Frequently exhibits difficulty with expressing needs and ideas   RUE Assessment   RUE Assessment WFL   LUE Assessment   LUE Assessment WFL   Cognition   Overall Cognitive Status Impaired   Arousal/Participation Alert;Cooperative   Attention Attends with cues to redirect   Orientation Level Oriented X4   Memory Decreased short term memory;Decreased recall of recent events;Decreased recall of precautions   Following Commands Follows one step commands without difficulty   Comments baseline aphasia and cog deficits. Was recently deemed not capable of making medical decisions and appointed guardian.   Discharge Information   Impressions Sunil Patel is a 63 y.o. male who  has a PMH of Acute lower limb ischemia (06/2024), Anxiety, Depression, HIV disease ([facility]), Substance abuse ([facility]), and Suicide attempt ([facility]), who presented to the [facility] on 6/7/24 from skilled nursing for increased LLE swelling and pain and was found to have a left external iliac artery occlusion and acute limb ischemia. 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 His course was complicated by b/l buttock unstageable pressure ulcers, urinary and fecal incontinence, pain, and significant decline in ADLs and mobility. Pt has a history of TBI, with baseline nonfluent aphasia and forgetfulness. He was evaluated by neuropsychology on 6/27/24, and deemed to not have capacity to make fully informed medical decisions. Therefore, the guardianship process was initiated as of 7/5/24. He was admitted to the [facility] on 7/6.  During his stay, he was formally evaluated and again deemed not able to make his own medical decisions and was appointed a guardian officially on 8/27. He was transferred to the [facility] on 8/30/2024 for ongoing care, therapies and  disposition planning. The pt now presents at [facility] for OT eval on 8/30/24. Overall pt presents in good mood for evaluation. Formal ADL eval not completed 2' PM eval and pt already washed and dressed, he is also awaiting his lunch tray. OT to complete ADL shower tomorrow morning. Simulated transfers and ADLs, this OT also familiar w/ pt from [facility] admission, the pt last performed an ADL w/ this OT 2 days prior using modified techniques at overall sup-set up level. From therapy doc review from [facility] pt frequently refused therapy intervention citing pain and agitation, pt also is believed to have achieved his highest level of independence at overall sup 2' to his impaired cognition. It is yet to be determined if pt can achieve any level higher than sup-set up, but if pt is to DC to a group home setting, it will likely be required that he achieves a level of mod I for basic ADL, w/ appointed guardian to manage his finances, anticipate to need assist w/ all IADLs. OT to specifically focus on repetition and trial written directions posted in room/bathroom for ADLs in effort to inc his safety d/t dec cognition. Goals set for 2 wks, WC level, mod I for ADLs, as pt is already sup-set for ADLs, d/t dec cognition, which is baseline, remains pts greatest barrier. Disposition plan remains undetermined.   OT Therapy Minutes   OT Time In 1245   OT Time Out 1330   OT Total Time (minutes) 45   OT Mode of treatment - Individual (minutes) 45   OT Mode of treatment - Concurrent (minutes) 0   OT Mode of treatment - Group (minutes) 0   OT Mode of treatment - Co-treat (minutes) 0   OT Mode of Treatment - Total time(minutes) 45 minutes   OT Cumulative Minutes 45   Cumulative Minutes   Cumulative therapy minutes 45

## 2024-08-30 NOTE — CONSULTS
[facility]  Consult  Name: Sunil Patel 63 y.o. adult I MRN: [id number]  Unit/Bed#: -01 I Date of Admission: 8/30/2024   Date of Service: 8/30/2024 I Hospital Day: 0    Inpatient consult to Internal Medicine  Consult performed by: CHARLIE Guerrero  Consult ordered by: Grant Parks, DO          Assessment & Plan   Postoperative anemia  Assessment & Plan  Hgb 10-11 since admission  Iron panel normal  Likely postoperative ABLA  Monitor with routine labs     Cardiomyopathy (HCC)  Assessment & Plan  ECHO 6/10/2024 = LVEF 40-45% with apical hypokinesis   Nuclear stress test 6/11, per cardiology did not show any significant areas of ischemia  Per cardiology, etiology felt to be stress-induced cardiomyopathy  Cardiac catheterization deferred given the lack of any significant ischemia, and no cardiac symptoms  Continue with medical management with aspirin, statin, beta-blocker, ARB  Remains euvolemic off of diuretics, continue to monitor volume status   Outpatient follow-up with cardiology      Patient incapable of making informed decisions  Assessment & Plan  Seen by neuropsych on 6/27 and was deemed NOT to have medical decision making capacity  Re-evalauted on 8/20 and again deemed NOT to have medical decision making capacity   Guardian (Nain Harden) assigned on 8/27    History of traumatic brain injury  Assessment & Plan  Remote history of TBI, previously residing in a group home prior to [facility] sentence.  Evaluated by neuropsychiatry on 6/27/24 and deemed NOT to have medical decision-making capacity  Neuropsychology re-evaluated pt on 8/20 and he does NOT have decision making capacity.   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    History of seizures  Assessment & Plan  Diagnosed in July 2019, follows with [facility] neurology outpatient  Continue home regimen with Depakote ER, Lamotrigine and Zonegran    Left ventricular thrombus  Assessment & Plan  Noted on echocardiogram 6/10/2024: spherical 1.5 x 1.3 cm thrombus at the LV apex   Initiated on AC with Xarelto however unable to verify insurance coverage, now on Coumadin at 5 mg daily  INR 2.63  Coumadin 2.5mg Fridays and 5mg every other night of the week.  Repeat INR Monday    Benign essential hypertension  Assessment & Plan  Continue current regimen Losartan 50 mg daily, Toprol-XL 25 mg daily  Monitor BP with routine VS     History of stroke  Assessment & Plan  History of CVA in the left MCA territory May 2018 with residual expressive aphasia  Continue ASA and atorvastatin    Human immunodeficiency virus (HIV) infection (HCC)  Assessment & Plan  Continue Biktarvy and Tivicay  Cabenuva monthly injections (per facility records) - per acute inpatient notes     Bipolar affective disorder (HCC)  Assessment & Plan  - managed on zoloft 75mg daily and remeron 15mg qhs            VTE Prophylaxis: Warfarin (Coumadin)  / sequential compression device     Recommendations for Discharge:  Per primary team       History of Present Illness:    Sunil Patel is a 63 y.o. adult who [facility] due to left AKA.   Patient initially presented to [facility] from shelter on 6/7 with complaints of LLE pain and swelling. He was found to have a left external iliac artery occlusion and acute limb ischemia. He underwent left femoral artery thromboembolectomy of the left iliac system,  "left profunda femoris and left superficial femoral arteries without possibility of limb salvage and ultimately underwent left above knee amputation on 6/7/24. He had a TTE on 6/10 which revealed a thrombus at the LV apex, EF 40-45% with apical hypokinesis. Nuclear stress test on 6/11 reviewed by cardiology and did not show any significant areas of ischemia. Cardiology recommended to continue xarelto for the LV apical thrombus. Due to his low EF he was thought to likely have stress-induced cardiomyopathy for which cardiology started metoprolol and continued on losartan. He was admitted to [facility] on 7/6. He was not a candidate for a prosthetic at that time but recommended to be considered in the outpatient setting. He was seen by neuropsychology on 8/20 and determined to not have medical decision making capacity and therefor a guardian (Nain Harden) was assigned to him on 8/27. His sacral wound was healed as of 8/9. He was transitioned from xarelto to coumadin. He has episodes of \"redirectable frustrations\" and headaches controlled by tylenol. He is being admitted to [facility] for further discussion of placement.     We are consulted for medical management:  PMH significant for acute lower limb ischemia, Bipolar, anxiety, depression, seizures dx July 2019 (followed by  neurology), history of TBI with asphasia and forgetfulness, HX of CVA left MCA territory in May 2018 - residual expressive aphasia, HIV, and substance abuse.    Patient seen in his room sitting in his wheelchair. He denies any pain. His ROS is negative and he tells me several times,\"everything is good, everything is good\" he reports eating and drinking well. He states he had a bowel movement earlier today.     Review of Systems:    Review of Systems   Constitutional:  Negative for chills and fever.   Respiratory:  Negative for shortness of breath.    Cardiovascular:  Negative for chest pain.   Gastrointestinal:  " "Negative for abdominal pain, constipation and diarrhea.   Genitourinary:  Negative for difficulty urinating.   Musculoskeletal:  Negative for arthralgias.       Past Medical and Surgical History:     Past Medical History:   Diagnosis Date    Acute lower limb ischemia 06/08/2024    Anxiety     Depression     HIV disease (HCC)     Substance abuse (HCC)     Suicide attempt (HCC)        Past Surgical History:   Procedure Laterality Date    AMPUTATION ABOVE KNEE (AKA) Left 6/7/2024    Procedure: AMPUTATION ABOVE KNEE (AKA);  Surgeon: Juan Luis Rdz MD;  Location: [facility];  Service: Vascular    WV TEAEC W/WO PATCH GRAFT COMMON FEMORAL Left 6/7/2024    Procedure: left femoral ileofemoral thromectomy;  Surgeon: Juan Luis Rdz MD;  Location: [facility];  Service: Vascular    US GUIDED THYROID BIOPSY  3/15/2022    US GUIDED THYROID BIOPSY  11/26/2019       Meds/Allergies:    all medications and allergies reviewed    Allergies:   Allergies   Allergen Reactions    No Active Allergies        Social History:     Marital Status: /Civil Union    Substance Use History:   Social History     Substance and Sexual Activity   Alcohol Use Yes     Social History     Tobacco Use   Smoking Status Some Days    Current packs/day: 1.00    Types: Cigarettes   Smokeless Tobacco Never     Social History     Substance and Sexual Activity   Drug Use Yes    Types: \"Crack\" cocaine, Marijuana       Family History:    non-contributory    Physical Exam:     Vitals:        Physical Exam  Vitals reviewed.   Constitutional:       General: He is not in acute distress.     Appearance: He is not diaphoretic.   HENT:      Head: Normocephalic and atraumatic.   Cardiovascular:      Rate and Rhythm: Normal rate and regular rhythm.      Heart sounds: Normal heart sounds.   Pulmonary:      Effort: Pulmonary effort is normal. No respiratory distress.      Breath sounds: Normal breath sounds. No wheezing, rhonchi or rales. " "  Abdominal:      General: Bowel sounds are normal. There is no distension.      Palpations: Abdomen is soft.      Tenderness: There is no abdominal tenderness.   Musculoskeletal:      Right lower leg: No edema.      Left Lower Extremity: Left leg is amputated above knee.   Neurological:      Mental Status: He is alert. Mental status is at baseline.      Comments: Expressive aphasia    Psychiatric:         Mood and Affect: Mood normal.         Behavior: Behavior normal.         Additional Data:     Lab Results: I have personally reviewed pertinent reports.      Results from last 7 days   Lab Units 08/25/24  1035   WBC Thousand/uL 6.34   HEMOGLOBIN g/dL 10.9*   HEMATOCRIT % 37.2   PLATELETS Thousands/uL 320   SEGS PCT % 51   LYMPHO PCT % 30   MONO PCT % 13*   EOS PCT % 4     Results from last 7 days   Lab Units 08/25/24  1035   POTASSIUM mmol/L 4.7   CHLORIDE mmol/L 106   CO2 mmol/L 27   BUN mg/dL 18   CREATININE mg/dL 0.94   CALCIUM mg/dL 9.5   ALK PHOS U/L 144*   ALT U/L 10   AST U/L 10*     Results from last 7 days   Lab Units 08/29/24  0514   INR  2.63*       Imaging: I have personally reviewed pertinent reports.      XR abdomen 1 view kub    Result Date: 8/26/2024  Narrative: XR ABDOMEN 1 VIEW KUB INDICATION: abdominal pain Rt LQ > Lt LQ. COMPARISON: CTA abdomen/pelvis 6/7/2024 FINDINGS: Nonobstructive bowel gas pattern. No evidence of pneumoperitoneum on this supine study. Upright or left lateral decubitus imaging is more sensitive to detect subtle free air in the appropriate setting. No pathologic calcification or soft tissue mass. Clear lung bases. Bones are unremarkable for the patient's age.     Impression: Unremarkable abdomen. Workstation performed: [id number]       EKG, Pathology, and Other Studies Reviewed on Admission:   EKG: reviewed from 6/7/2024  \"Normal sinus rhythm  ST & T wave abnormality, consider inferior ischemia  ST & T wave abnormality, consider anterolateral ischemia  Prolonged QT  Abnormal " "ECG  When compared with ECG of 16-MAY-2024 12:08,  Vent. rate has decreased BY  62 BPM  T wave inversion now evident in Anterolateral leads  Normal sinus rhythm  ST & T wave abnormality, consider inferior ischemia  ST & T wave abnormality, consider anterolateral ischemia  Prolonged QT  Abnormal ECG  When compared with ECG of 16-MAY-2024 12:08,  Vent. rate has decreased BY  62 BPM  T wave inversion now evident in Anterolateral leads  Confirmed by Zaid Yung ([id number]) on 6/7/2024 2:06:32 PM\"    [employer] software was used to dictate this note.  It may contain errors with dictating incorrect words or incorrect spelling. Please contact the provider directly with any questions.    "

## 2024-08-30 NOTE — ASSESSMENT & PLAN NOTE
Presented with left lower extremity acute limb ischemia/extensive left iliofemoral and left infrainguinal embolism requiring left femoral thrombectomy and subsequent AKA on 6/7/24 with vascular surgery.  Continue with local wound care   Continue ASA, Coumadin and statin   Stable for discharge from PMR and medicine standpoint.  Dispo planning as per case management.  Patient was awaiting guardianship hearing, which occurred on 8/27/24 and will be awarded a guardian. CM awaiting those details and then can plan for discharge.  DC to  [facility] today until a more permanent location accepts pt.

## 2024-08-30 NOTE — TREATMENT PLAN
Individualized Plan of Care - [facility], [facility] [place]  Sunil Patel 63 y.o. adult MRN: [id number]  Unit/Bed#: [facility] 267-01 Encounter: [id number]     PATIENT INFORMATION  ADMISSION DATE: 8/30/2024 12:14 PM YANE CATEGORY:Amputation:  05.3  Unilateral Lower Limb Above the Knee   ADMISSION DIAGNOSIS: S/P AKA (above knee amputation), left (HCC) [Z89.612]  EXPECTED LOS: 10 to 14 days     MEDICAL/FUNCTIONAL PROGNOSIS  Based on my assessment of the patient's medical conditions and current functional status, the prognosis for attaining medical and functional goals or the IRF stay is:  Good    Medical Goals: Patient will be medically stable for discharge to [facility] upon completion of rehab program and Patient will be able to manage medical conditions and comorbid conditions with medications and follow up upon completion of rehab program    Cardiopulmonary function/Anemia: Ensure cardiopulmonary stability and optimize cardiopulmonary function not only at rest but with activity as patient's activity level significantly increases in acute rehab compared with prior to transfer in preparation for safe discharge from [facility].  Must closely and frequently monitor blood pressure, HR, anemia to ensure adequate cardiac output during ADLs and ambulation as patient is at increased risk for orthostatic hypotension/syncope and potential injury if not monitored for and managed adequately.    Blood pressure management:    Frequent monitoring of blood pressure with appropriate adjustments in blood pressure medication management to optimize blood pressure control and prevent/limit renal complications.   Monitoring impact of blood pressure and side-effects of blood pressure medications at rest and with activity.  Hypoxia prevention: Ensure appropriate level of oxygenation at rest and with activity to avoid symptomatic hypoxia, maximize functional performance, and decrease risk of atelectasis/pneumonia through close and  frequent monitoring, providing appropriate respiratory treatments (such as incentive spirometry), and when necessary provide/adjust respiratory medications.    Pain management:  Pain will improve with frequent evaluation of pain, careful adjustments in medications, frequent re-evaluation of patient's pain and medical/neurologic status to ensure optimal pain control, avoidance of potential serious and even life-threatening side-effects and drug interactions, as well as weaning pain medications as soon as possible to decrease risk of short and long-term use.     Brain injury:  Patient's cognitive status is significantly impaired and neurologic status can fluctuate particularly early in rehabilitation process.  Patient requires frequent rehab physician and rehab nursing neurologic monitoring for subtle and more significant changes in mentation and neurologic function.  Labs must be monitored closely along with hydration and nutritional status.  Low threshold to obtain brain imaging.  Medications must be carefully monitored and adjusted to optimize functional recovery while limit cognitive impairments.  Goal to improve cognitive and neurologic function, provide appropriate patient (and/or family education), and to ensure appropriate optimal discharge plan.    Cognitive impairment: intensive skilled therapies including speech language pathology and occupation therapy to evaluate and treat deficits in cognition.  Close oversight and management by rehab specialized physician of cognitive deficits and potential confounding factors (prevention of, monitoring for, and if found treatment of possible complications such as infections, as well as optimally managing sleep, mood, and medications which can negatively impact cognition and functional recovery).  Orthopedic Disorder: Left trans femoral amputation causing impaired mobility, ADLs, and gait:  intensive skilled therapies with physical therapy and occupational therapy with  close oversight and management by rehab specialized physician in acute rehabilitation setting to most expeditiously and effectively improve functional mobility, transfers, upper and lower body strengthening, conditioning, balance, and gait training with appropriate assistive device.  Patient will have optimal supervision and management of patient's underlying orthopedic disorder with specialized rehabilitation physician during this period of recovery to ensure most expeditious and optimal recovery with decreased risks of fall/injury and other complications including acute worsening of ortho disorder, decrease risk of VTE, PNA, and skin ulceration.  Inpatient rehabilitation education/teaching:  To be provided to patient and typically family/caregiver (if able to be identified) by all skilled therapists, rehab nursing, case management, and rehab specialized physician to ensure optimal recovery and decrease risks of complications in both acute rehabilitation setting as well as after discharge.   Anxiety (and/or Depression): Patient's mood and it's impact on therapy participation and functional recovery will improve during course with supportive counseling, relaxation/breathing techniques and if necessary medication management.  Requires frequent re-assessment and close management to ensure anxiety/depression management during acute rehab course with planning for appropriate outpatient management to ensure optimal mental health and functional recovery.    Bladder dysfunction:  Appropriate bladder management with appropriate toileting program from rehab nursing and staff with oversight management by rehabilitation physicians which include appropriate monitoring and possible adjustments in medications to with goals to optimize bladder function and decrease risk of bladder retention, incontinence, and urinary tract infection.   Bowel dysfunction: Appropriate bowel management with appropriate toileting program from rehab  nursing and staff with oversight management by rehabilitation physicians which include adjustments in medications to optimize appropriate bowel function and prevent/decrease risk of constipation and bowel obstruction.        ANTICIPATED DISCHARGE DISPOSITION AND SERVICES  COMMUNITY SETTING: Home - independent/modified independent    ANTICIPATED FOLLOW-UP SERVICE:   Outpatient Therapy Services: PT, OT, and SLP        DISCIPLINE SPECIFIC PLANS:  Required Disciplines & Services: Rehabillitation Nursing, Case Management, and Psychology    REQUIRED THERAPY:  Therapy Hours per Day Days per Week   Physical Therapy 1-2 5-6   Occupational Therapy 1-2 5-6   Speech/Language Therapy 1-2 3-5   NOTE: Additional therapy time(s) or changes to allocation of therapies as appropriate to meet patient needs and to achieve functional goals.      REQUIRED THERAPY:   900 min/7 days a week  NOTE: Additional therapy time(s) or changes to allocation of therapies as appropriate to meet patient needs and to achieve functional goals.    Patient will participate in above therapy regimen consisting of PT, OT, and SLP due to the following medical procedure/condition:Amputation:  05.3  Unilateral Lower Limb Above the Knee    ANTICIPATED FUNCTIONAL OUTCOMES:  ADL:  Modified independent to independent   Bladder/Bowel:  Modified independent to independent   Transfers:  Modified independent   Locomotion:  Modified independent with wheelchair   Cognitive:  Supervision     DISCHARGE PLANNING NEEDS  Equipment needs: Discharge needs to be reviewed with team      REHAB ANTICIPATED PARTICIPATION RESTRICTIONS:  Amubulate Short Distances Only, Assist with Bathing in Tub, Assist with Mobility, Decreased Insight to Deficits, Decreaed Safety Awareness, Inability to Drive, Rquires Assist with ADLS, Requires Assit with Homemaking, Requires Assit with Steps, and Weight Bearing none to the left lower extremity    Grant Parks, DO  Physical Medicine and  Rehabilitation  [facility]

## 2024-08-30 NOTE — PROGRESS NOTES
Physical Therapy Initial Eval       08/30/24 1430   Patient Data   Rehab Impairment mpairment of mobility, safety and Activities of Daily Living (ADLs) due to Amputation: 05.3 Unilateral Lower Limb Above the Knee   Etiologic Diagnosis LLE Acute Limb Ischemia   Date of Onset 06/07/24   Support System   Name Joanna   Relationship friend   Multiple Support Systems (S)    [facility]   Home Setup   Type of Home (S)    (Does not currently have a residence, awaiting placement)   Baseline Information   Prior Device(s) Used (S)    (Independent prior to amputation in June, has been using W/c since)   Prior Level of Function   Self-Care 3. Independent - Patient completed the activities by him/herself, with or without an assistive device, with no assistance from a helper.   Indoor-Mobility (Ambulation) 3. Independent - Patient completed the activities by him/herself, with or without an assistive device, with no assistance from a helper.  (setup with w/c level)   Stairs 3. Independent - Patient completed the activities by him/herself, with or without an assistive device, with no assistance from a helper.   Functional Cognition 2. Needed Some Help - Patient needed a partial assistance from another person to complete activities.   Prior Device Used Z. None of the above   Falls in the Last Year   Number of falls in the past 12 months 0   Patient Preference   Nickname (Patient Preference) Kieran   Restrictions/Precautions   Precautions Aphasia;Cognitive;Fall Risk;Bed/chair alarms;Supervision on toilet/commode   Pain Assessment   Pain Assessment Tool 0-10   Pain Score No Pain   Transfer Bed/Chair/Wheelchair   Limitations Noted In Balance;Problem Solving   Adaptive Equipment None   Type of Assistance Needed Supervision   Physical Assistance Level No physical assistance   Comment performs sit or stand pivot no device   Chair/Bed-to-Chair Transfer CARE Score 4   Roll Left and Right   Type of Assistance Needed Independent    Roll Left and Right CARE Score 6   Sit to Lying   Type of Assistance Needed Independent   Sit to Lying CARE Score 6   Lying to Sitting on Side of Bed   Type of Assistance Needed Independent   Lying to Sitting on Side of Bed CARE Score 6   Sit to Stand   Type of Assistance Needed Incidental touching   Physical Assistance Level No physical assistance   Sit to Stand CARE Score 4   Picking Up Object   Comment unsafe to attempt form standing, pt functioning at w/c level   Reason if not Attempted Activity not applicable   Picking Up Object CARE Score 9   Car Transfer   Type of Assistance Needed Supervision   Physical Assistance Level No physical assistance   Car Transfer CARE Score 4   Ambulation   Primary Mode of Locomotion Prior to Admission Walk   Distance Walked (feet) 10 ft   Assist Device Roller Walker   Gait Pattern Hopping   Limitations Noted In Balance;Endurance   Findings one LOB with turn to sit, but able to self correct with RW, sneaker on R foot   Walk 10 Feet   Type of Assistance Needed Physical assistance;Adaptive equipment   Physical Assistance Level 26%-50%   Comment Hopping with RW   Walk 10 Feet CARE Score 3   Walk 50 Feet with Two Turns   Reason if not Attempted Safety concerns   Walk 50 Feet with Two Turns CARE Score 88   Walk 150 Feet   Reason if not Attempted Safety concerns   Walk 150 Feet CARE Score 88   Walking 10 Feet on Uneven Surfaces   Reason if not Attempted Safety concerns   Walking 10 Feet on Uneven Surfaces CARE Score 88   Wheelchair mobility   Type of Wheelchair Used 1. Manual   Method Right upper extremity;Left upper extremity;Right lower extremity   Distance Level Surface (feet) 250 ft   Wheel 50 Feet with Two Turns   Type of Assistance Needed Independent   Wheel 50 Feet with Two Turns CARE Score 6   Wheel 150 Feet   Type of Assistance Needed Independent   Wheel 150 Feet CARE Score 6   Curb or Single Stair   Reason if not Attempted Safety concerns   1 Step (Curb) CARE Score 88   4  Steps   Reason if not Attempted Safety concerns   4 Steps CARE Score 88   12 Steps   Reason if not Attempted Safety concerns   12 Steps CARE Score 88   Comprehension   QI: Comprehension 3. Usually Understands: Understands most conversations, but misses some part/intent of message. Requires cues at times to understand.   Expression   QI: Expression 2. Frequently exhibits difficulty with expressing needs and ideas   RLE Assessment   RLE Assessment WFL   Strength RLE   RLE Overall Strength 5/5   RUE Assessment   RUE Assessment WFL   LUE Assessment   LUE Assessment WFL   Sensation   Light Touch No apparent deficits   Cognition   Overall Cognitive Status Impaired   Comments (S)  Hx of CVA and TBI   Objective Measure   PT Measure(s) (S)  Please perform new AMPNoPro   Therapeutic Exercise   Therapeutic Exercise/Activity Prone Lying on mat for 10 min, W/c pushups 2 x 10, standing at bedside to urinate and beginning and end of session   Discharge Information   Patient's Discharge Plan TBD based on awaiting housing setup with Guardian of [facility] state   Patient's Rehab Expectations To eventually get a prosthesis   Barriers to Discharge Home No Family Support;Safety Considerations   Impressions Pt is a 63 y.o. male who has a PMH of Acute lower limb ischemia (06/2024), Anxiety, Depression, HIV disease ([facility]), Substance abuse ([facility]), and Suicide attempt ([facility]), who [facility] to the [facility] on 6/7/24 from [facility] for increased LLE swelling and pain and was found to have a left external iliac artery occlusion and acute limb ischemia. 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 His course was complicated by b/l buttock unstageable pressure ulcers, urinary and fecal incontinence, pain, and significant decline in ADLs and mobility. Pt has a history of TBI, with baseline nonfluent aphasia and forgetfulness. He was evaluated by neuropsychology on 6/27/24, and deemed to not have capacity  to make fully informed medical decisions. Therefore, the guardianship process was initiated as of 7/5/24. He was admitted to the [facility] on 7/6. During his stay, he was formally evaluated and again deemed not able to make his own medical decisions and was appointed a guardian officially on 8/27. He is now admitted to [facility] for contineud skille dtherapy while awaiting housing arrangements, which will most likely be in a group home setting. Pt has been functioning at w/c level. He demonstrates very good strength overall, however safety can be inconsistent with his choices of how to do things now with his Left AKA. Plan to work on challenging pt to work on safety with conststency with goal to work to Ind with transfers for all settings. Will also help with prosthesis process as needed.   PT Therapy Minutes   PT Time In 1430   PT Time Out 1530   PT Total Time (minutes) 60   PT Mode of treatment - Individual (minutes) 60   PT Mode of treatment - Concurrent (minutes) 0   PT Mode of treatment - Group (minutes) 0   PT Mode of treatment - Co-treat (minutes) 0   PT Mode of Treatment - Total time(minutes) 60 minutes   PT Cumulative Minutes 60   Cumulative Minutes   Cumulative therapy minutes 105

## 2024-08-30 NOTE — ASSESSMENT & PLAN NOTE
Remote history of TBI, previously residing in a group home prior to long-term sentence.  Evaluated by neuropsychiatry on 6/27/24 and deemed NOT to have medical decision-making capacity  Neuropsychology re-evaluated pt on 8/20 and he does NOT have decision making capacity.   Guardian appointed on 8/27, Nain Harden

## 2024-08-30 NOTE — ASSESSMENT & PLAN NOTE
Currently on Cozaar 50 mg daily as well as metoprolol succinate 25 mg daily  Monitor pressures during therapy sessions and on routine vitals

## 2024-08-30 NOTE — ASSESSMENT & PLAN NOTE
Continue Biktarvregan and Behzad Leigh monthly injections (per facility records) - per acute inpatient notes

## 2024-08-30 NOTE — CASE MANAGEMENT
Cm transferred to [facility], cm checked in with pt at bedside in new unit, pt reportedly doing well no concerns.

## 2024-08-31 PROCEDURE — 99232 SBSQ HOSP IP/OBS MODERATE 35: CPT | Performed by: PHYSICAL MEDICINE & REHABILITATION

## 2024-08-31 RX ORDER — WARFARIN SODIUM 5 MG/1
5 TABLET ORAL
Status: DISCONTINUED | OUTPATIENT
Start: 2024-08-31 | End: 2024-09-04 | Stop reason: HOSPADM

## 2024-08-31 RX ADMIN — METOPROLOL SUCCINATE 25 MG: 25 TABLET, FILM COATED, EXTENDED RELEASE ORAL at 08:47

## 2024-08-31 RX ADMIN — WARFARIN SODIUM 5 MG: 5 TABLET ORAL at 17:44

## 2024-08-31 RX ADMIN — DIVALPROEX SODIUM 1250 MG: 500 TABLET, EXTENDED RELEASE ORAL at 08:47

## 2024-08-31 RX ADMIN — LAMOTRIGINE 50 MG: 25 TABLET ORAL at 08:47

## 2024-08-31 RX ADMIN — ASPIRIN 81 MG: 81 TABLET, COATED ORAL at 08:47

## 2024-08-31 RX ADMIN — GABAPENTIN 100 MG: 100 CAPSULE ORAL at 06:47

## 2024-08-31 RX ADMIN — LEVOCARNITINE 330 MG: 330 TABLET ORAL at 11:47

## 2024-08-31 RX ADMIN — DOLUTEGRAVIR SODIUM 50 MG: 50 TABLET, FILM COATED ORAL at 08:47

## 2024-08-31 RX ADMIN — LOSARTAN POTASSIUM 50 MG: 50 TABLET, FILM COATED ORAL at 08:47

## 2024-08-31 RX ADMIN — ACETAMINOPHEN 975 MG: 325 TABLET ORAL at 11:23

## 2024-08-31 RX ADMIN — Medication 6 MG: at 22:13

## 2024-08-31 RX ADMIN — MIRTAZAPINE 15 MG: 15 TABLET, FILM COATED ORAL at 22:12

## 2024-08-31 RX ADMIN — ATORVASTATIN CALCIUM 80 MG: 80 TABLET, FILM COATED ORAL at 17:43

## 2024-08-31 RX ADMIN — TAMSULOSIN HYDROCHLORIDE 0.4 MG: 0.4 CAPSULE ORAL at 17:43

## 2024-08-31 RX ADMIN — SERTRALINE HYDROCHLORIDE 75 MG: 50 TABLET ORAL at 08:46

## 2024-08-31 RX ADMIN — GABAPENTIN 100 MG: 100 CAPSULE ORAL at 13:42

## 2024-08-31 RX ADMIN — GABAPENTIN 100 MG: 100 CAPSULE ORAL at 22:14

## 2024-08-31 RX ADMIN — ZONISAMIDE 400 MG: 100 CAPSULE ORAL at 08:47

## 2024-08-31 RX ADMIN — LEVOCARNITINE 330 MG: 330 TABLET ORAL at 17:43

## 2024-08-31 RX ADMIN — LAMOTRIGINE 50 MG: 25 TABLET ORAL at 17:43

## 2024-08-31 NOTE — PROGRESS NOTES
08/31/24 0830   Therapy Time missed   Time missed? Yes   Amount of time missed 60   Reason for time missed   (Pt scheduled for 7AM OT ADL. Pt sleeping and requesitng therapist to come back. Therapist returned at 0830, pt cont to decline. Will Lovelock back if schedule allows. OF NOTE, PT CAN BENEFIT FROM LATER START TIME)   Time(s) multiple attempts made x2 attempts: 7am; 8:30am

## 2024-08-31 NOTE — PROGRESS NOTES
Pt. is being discharged today to [facility]. Pt. is alert and oriented 3-4. He is continent of bowel and incontinent of urine at times. Pt. currently is at supervision level for his transfers and WC mobility. No new skin breakdowns noted at the day of discharge from [facility]. Pt.'s pain is controlled with current pain medication regimen.

## 2024-08-31 NOTE — PROGRESS NOTES
08/31/24 0931   Therapy Time missed   Time missed? Yes   Amount of time missed 60   Time(s) multiple attempts made x3 (930, 945, 1000 am) pt snoring upon each entry, arousable breifly, refusing OOB.

## 2024-08-31 NOTE — PROGRESS NOTES
"Physical Medicine and Rehabilitation Progress Note  Sunil Patel 63 y.o. adult MRN: [id number]  Unit/Bed#: [facility] 270-02 Encounter: [id number]    Chief Complaint:  Patiently waiting     Interval History: no acute events overnight. Settling in to . Patiently awaiting and hopeful for placement soon. Watching \"Once Upon a [facility] in [facility]\". No new complaints or concerns. Has been asking for help appropriately. Last BM was 8/30. Still refusing therapy on occasion.     Assessment/Plan - Continue plan of care as per primary attending, unless otherwise stated below:      S/p L AKA: Continue , contracture prevention. He refuses  at times, and currently limb appears bulbous. Has some phantom limb sensation, but overall stable. PT/OT to continue. Working on dispo still. At rehab goals/realistic goals for short term and possibly long term given baseline neuro deficits, cog/ling impairments. Peer mentorship with other amputees on the unit has been beneficial for his emotional adaptation to his amputation. His initial prosthetic may end up delayed unless he starts to wear his  more consistently to better shape his limb.   Neurocognitive disorder 2/2 remote TBI, CVA, Bipolar affective disorder: Continue current regimen. Has a component of adjustment difficulties. Would benefit from rehab psychology but limited availability, team is currently providing support. If need to make adjustments to regimen, would engage Psychiatry. He has been very even here, at times refuses therapy, but never disruptive.   Ok to go outside with friend at a wheelchair level with rules in place. Must have staff chaperone  Reinforced no smoking marijuana or cigarettes on hospital grounds, no leaving hospital grounds.  JARRETT/SNF requested repeat psych eval - Ordered consult, Psych has cleared patient. Much appreciated.  Re-eval with Dr. Tatum - remains without capacity to make complex medical decisions.  Continue peer mentorship on " unit when available. He responds very well to this.   His frustration tolerance and linguistic difficulties make comprehension and complex problem solving/reasoning very difficult for him.   Headache: Chronic headaches. Resumed gabapentin - can escalate if necessary. Continue current plan of care.   Tobacco Use Disorder: Offered nicotine patch and gum, he declined. He wants to smoke. Reviewed that we don't allow smoking on hospital grounds.  HIV: Continue Biktarvy/Tivicay. AT different points was on these two meds and on Cabenuva monthly injections. Here has only been on Biktarvy/Tivicay. Consider c/s with ID to evaluate his regimen to make sure it is still appropriate. Sent price check to [facility] to assist with his dispo planning.   Apical Thrombus: Transitioning to coumadin due to lack of coverage for DOAC/cost prohibitive. INR 8/29: INR 2.63. Warfarin 5mg every day except 2.5mg on Fridays. Recheck INR after the weekend.   DVT Ppx: Fully anticoagulated on warfarin.   Acute Pain: Tylenol PRN.  Bowel/bladder: Urinary incontinence improved with timed void/assistance with urinal. Bowels are regular, continue current regimen. With scheduled docusate and PRNs.    Ashley de Padua, MD  Physical Medicine and Rehabilitation      Review of Systems: A 10 point review of systems was negative except for what is noted in the HPI.        Current Facility-Administered Medications:     acetaminophen (TYLENOL) tablet 975 mg, 975 mg, Oral, TID PRN, Grant Parks, DO, 975 mg at 08/31/24 1123    aspirin (ECOTRIN LOW STRENGTH) EC tablet 81 mg, 81 mg, Oral, Daily, Grant Parks, DO, 81 mg at 08/31/24 0847    atorvastatin (LIPITOR) tablet 80 mg, 80 mg, Oral, Daily With Dinner, Grant Parks, DO, 80 mg at 08/30/24 1818    bisacodyl (DULCOLAX) rectal suppository 10 mg, 10 mg, Rectal, Daily PRN, Grant Parks, DO    diphenhydrAMINE (BENADRYL) tablet 25 mg, 25 mg, Oral, Q6H PRN, Grant Parks, DO    divalproex sodium (DEPAKOTE ER) 24 hr  tablet 1,250 mg, 1,250 mg, Oral, Daily, Grant S Parks, DO, 1,250 mg at 08/31/24 0847    dolutegravir (TIVICAY) tablet 50 mg, 50 mg, Oral, Daily, Grant S Parks, DO, 50 mg at 08/31/24 0847    gabapentin (NEURONTIN) capsule 100 mg, 100 mg, Oral, Q8H, Grant S Parks, DO, 100 mg at 08/31/24 1342    lamoTRIgine (LaMICtal) tablet 50 mg, 50 mg, Oral, BID, Grant S Parks, DO, 50 mg at 08/31/24 0847    levOCARNitine (CARNITOR) tablet 330 mg, 990 mg/day, Oral, TID With Meals, Grant S Parks, DO, 330 mg at 08/31/24 1147    losartan (COZAAR) tablet 50 mg, 50 mg, Oral, Daily, Grant S Kasey, DO, 50 mg at 08/31/24 0847    melatonin tablet 6 mg, 6 mg, Oral, HS, Grant S Kasey, DO, 6 mg at 08/30/24 2105    metoprolol succinate (TOPROL-XL) 24 hr tablet 25 mg, 25 mg, Oral, Daily, Grant S Kasey, DO, 25 mg at 08/31/24 0847    mirtazapine (REMERON) tablet 15 mg, 15 mg, Oral, HS, Grant S Parks, DO, 15 mg at 08/30/24 2106    ondansetron (ZOFRAN-ODT) dispersible tablet 4 mg, 4 mg, Oral, Q6H PRN, Grant Parks DO    polyethylene glycol (MIRALAX) packet 17 g, 17 g, Oral, Daily PRN, Grant Parks DO    senna (SENOKOT) tablet 8.6 mg, 1 tablet, Oral, HS PRN, Grant S Kasey, DO    sertraline (ZOLOFT) tablet 75 mg, 75 mg, Oral, Daily, Grant S Parks, DO, 75 mg at 08/31/24 0846    tamsulosin (FLOMAX) capsule 0.4 mg, 0.4 mg, Oral, Daily With Dinner, Grant Parks DO, 0.4 mg at 08/30/24 1818    warfarin (COUMADIN) tablet 2.5 mg, 2.5 mg, Oral, Weekly, Grant Parks DO, 2.5 mg at 08/30/24 1819    warfarin (COUMADIN) tablet 5 mg, 5 mg, Oral, Once per day on Sunday Monday Tuesday Wednesday Thursday Saturday, Grant Parks DO    zonisamide (ZONEGRAN) capsule 400 mg, 400 mg, Oral, Daily, Grant Parks DO, 400 mg at 08/31/24 0847    Physical Exam:  Temp:  [96 °F (35.6 °C)-96.7 °F (35.9 °C)] 96 °F (35.6 °C)  HR:  [69-84] 69  Resp:  [20] 20  BP: (119-151)/(57-65) 151/65  SpO2:  [94 %] 94 %      Gen: No acute distress, Well-nourished, well-appearing.  HEENT:  Moist mucus membranes, Normocephalic/Atraumatic  Cardiovascular: Regular rate, rhythm, S1/S2. Distal pulses palpable  Heme/Extr: No edema  Pulmonary: Non-labored breathing. Lungs CTAB  : No fernandes  GI: Soft, non-tender, non-distended. BS+  MSK: Stable L AKA  Integumentary: Skin is warm, dry.   Neuro: AAOx3, Speech aphasic, but pleasant and cooperative.  Psych: Normal mood and affect.     Laboratory:  Labs reviewed  Results from last 7 days   Lab Units 08/25/24  1035   HEMOGLOBIN g/dL 10.9*   HEMATOCRIT % 37.2   WBC Thousand/uL 6.34     Results from last 7 days   Lab Units 08/25/24  1035   BUN mg/dL 18   SODIUM mmol/L 139   POTASSIUM mmol/L 4.7   CHLORIDE mmol/L 106   CREATININE mg/dL 0.94   AST U/L 10*   ALT U/L 10     Results from last 7 days   Lab Units 08/29/24  0514 08/26/24  0539 08/25/24  1035   PROTIME seconds 27.9* 22.3* 21.6*   INR  2.63* 1.94* 1.87*        Diagnostic Studies: Reviewed, no new imaging   No orders to display         ** Please Note: Fluency Direct voice to text software may have been used in the creation of this document. **

## 2024-08-31 NOTE — PROGRESS NOTES
08/31/24 0930   Pain Assessment   Pain Assessment Tool 0-10   Pain Score No Pain   Therapy Time missed   Time missed? Yes   Amount of time missed 30   Reason for time missed Extreme fatigue  (pt refusal)   Time(s) multiple attempts made X2 at 8:00 and then 9:30--> however at 9:30, pt was more arousable vs earlier and pt declining participation for ST re-assessment. Will attempt as able as appropriate.

## 2024-09-01 LAB
INR PPP: 2.47 (ref 0.85–1.19)
PROTHROMBIN TIME: 26.7 SECONDS (ref 12.3–15)

## 2024-09-01 PROCEDURE — 92507 TX SP LANG VOICE COMM INDIV: CPT

## 2024-09-01 PROCEDURE — 92523 SPEECH SOUND LANG COMPREHEN: CPT

## 2024-09-01 PROCEDURE — 97116 GAIT TRAINING THERAPY: CPT

## 2024-09-01 PROCEDURE — 97530 THERAPEUTIC ACTIVITIES: CPT

## 2024-09-01 PROCEDURE — 85610 PROTHROMBIN TIME: CPT | Performed by: STUDENT IN AN ORGANIZED HEALTH CARE EDUCATION/TRAINING PROGRAM

## 2024-09-01 RX ADMIN — ATORVASTATIN CALCIUM 80 MG: 80 TABLET, FILM COATED ORAL at 17:26

## 2024-09-01 RX ADMIN — METOPROLOL SUCCINATE 25 MG: 25 TABLET, FILM COATED, EXTENDED RELEASE ORAL at 08:42

## 2024-09-01 RX ADMIN — ASPIRIN 81 MG: 81 TABLET, COATED ORAL at 08:41

## 2024-09-01 RX ADMIN — TAMSULOSIN HYDROCHLORIDE 0.4 MG: 0.4 CAPSULE ORAL at 17:26

## 2024-09-01 RX ADMIN — DIVALPROEX SODIUM 1250 MG: 500 TABLET, EXTENDED RELEASE ORAL at 08:42

## 2024-09-01 RX ADMIN — DOLUTEGRAVIR SODIUM 50 MG: 50 TABLET, FILM COATED ORAL at 08:42

## 2024-09-01 RX ADMIN — GABAPENTIN 100 MG: 100 CAPSULE ORAL at 21:23

## 2024-09-01 RX ADMIN — LAMOTRIGINE 50 MG: 25 TABLET ORAL at 08:41

## 2024-09-01 RX ADMIN — Medication 6 MG: at 21:23

## 2024-09-01 RX ADMIN — LEVOCARNITINE 330 MG: 330 TABLET ORAL at 08:43

## 2024-09-01 RX ADMIN — LEVOCARNITINE 330 MG: 330 TABLET ORAL at 13:32

## 2024-09-01 RX ADMIN — ZONISAMIDE 400 MG: 100 CAPSULE ORAL at 08:42

## 2024-09-01 RX ADMIN — LEVOCARNITINE 330 MG: 330 TABLET ORAL at 17:26

## 2024-09-01 RX ADMIN — GABAPENTIN 100 MG: 100 CAPSULE ORAL at 13:32

## 2024-09-01 RX ADMIN — GABAPENTIN 100 MG: 100 CAPSULE ORAL at 06:27

## 2024-09-01 RX ADMIN — BICTEGRAVIR SODIUM, EMTRICITABINE, AND TENOFOVIR ALAFENAMIDE FUMARATE 1 TABLET: 50; 200; 25 TABLET ORAL at 13:32

## 2024-09-01 RX ADMIN — MIRTAZAPINE 15 MG: 15 TABLET, FILM COATED ORAL at 21:23

## 2024-09-01 RX ADMIN — LAMOTRIGINE 50 MG: 25 TABLET ORAL at 17:25

## 2024-09-01 RX ADMIN — LOSARTAN POTASSIUM 50 MG: 50 TABLET, FILM COATED ORAL at 08:42

## 2024-09-01 RX ADMIN — SERTRALINE HYDROCHLORIDE 75 MG: 50 TABLET ORAL at 08:41

## 2024-09-01 RX ADMIN — WARFARIN SODIUM 5 MG: 5 TABLET ORAL at 17:26

## 2024-09-01 NOTE — PROGRESS NOTES
SLP Language Assessment       09/01/24 0900   Pain Assessment   Pain Assessment Tool 0-10   Pain Score No Pain   Restrictions/Precautions   Precautions Aphasia;Bed/chair alarms;Cognitive;Fall Risk;Supervision on toilet/commode   Cognitive Linguisitic Assessments   Cognitive Linguistic Quick Test (CLQT) Refer to below for full details   Comprehension   Auditory Basic   Visual Basic   Findings Pt completing portions of the informal language assessment. Refer to SLP Rehab note for full details.   QI: Comprehension 3. Usually Understands: Understands most conversations, but misses some part/intent of message. Requires cues at times to understand.   Comprehension (FIM) 3 - Understands basic info/conversation 50-74% of time   Expression   Verbal Basic   Non-Verbal Basic   Intelligibility Phrase   Findings Pt completing portions of the informal language assessment. Refer to SLP Rehab note for full details.   QI: Expression 3. Exhibits some difficulty with expressing needs and ideas (e.g., some words or finishing thoughts) or speech is not clear   Expression (FIM) 3 - Expresses basic info/needs 50-74% of time   Social Interaction   Cooperation with staff   Participation Individual   Behaviors observed Appropriate   Findings Pt completing portions of the informal language assessment. Refer to SLP Rehab note for full details.   Social Interaction (FIM) 5 - Interacts appropriately with others 90% of time   Problem Solving   Complex Manages finances;Manages discharge planning;Manages medications   Routine Manages call bell   Findings Pt completing portions of the informal language assessment. Refer to SLP Rehab note for full details.   Problem solving (FIM) 3 - Solves basic problmes 50-74% of time   Memory   Recognize People Yes   Remember Routine Yes   Initiates Tasks Yes   Short-Term Impaired   Long Term Impaired   Findings Pt completing portions of the informal language assessment. Refer to SLP Rehab note for full details.  "  Memory (FIM) 3 - Recognizes, recalls/performs 50-74%   Language Assessments   Informal Speech Language Assessment Refer to below for details.   Speech/Language/Cognition Assessmetn   Treatment Assessment Pt completed portions of the Informal Language Assessment. Results are as follows with the below sections addressed:      Orientation Information:    Person: +   Place: -   Time: -   Situation: -  It was noted that pt continues to demonstrate difficulty given spontaneous stating given orientation information, to where SLP used a written sheet which provided this information when pt was at [place]. Pt does not recall where that sheet which current SLP had provided at [facility] and could have been missplaced since transition to [facility] Biographical Information:              : +              Age: - but able to elicit given phonemic cues                 Auditory Comprehension Tasks:     Yes/No Questions:   Simple Yes/No Questions: 7/10  Moderate Yes/No Questions: 5/10     Verbal Command Followin-step: 5/5  2-step: 10 (broken out into each step)      Verbal Expression Tasks:     Automatic Speech:   Counting: + 1-10 w/o cues  COURTNEY: needed initial day (Monday) to where pt spontaneously stating Tues-Fri, increased time to complete w/ Sat and Sun  CORRY: continued to need the initial cue of , but pt spontaneously going to \"Oct\" for each attempt. SLP trialing phonemic cues and written cues w/o success    Responsive Namin/10; increasing to 6/10 w/ use of gesture cues but able to elicit words w/ phonemic cues    Confrontation Namin/5; increasing to 5/5 w/ phonemic cues    Overall pt continues to demonstrate moderately impaired language skills, both receptive and expressive. Pt was noted to be more engaged and participatory for completing more structured questions vs from prior initiation of assessment upon his initial admission to the unit. However, pt is familiar w/ current SLP from prior ST " sessions completed at [facility]. Pt remains limited by deficits in both expressive and receptive language skills, which impact all domains of communication including verbal and written expression and auditory and reading comprehension. These deficits continue to impact pt's overall functional communication skills at the conversational level for expression and at the sentence level for comprehension. At this time, focus of ST services will plan on increasing pt's knowledge and use of word retrieval strategies to facilitate improved expression. Plan to primarily target verbal expression and auditory comprehension. Pt to benefit from skilled language tx session up to 3x per week maintenance for overall communication skills in attempts to decreased caregiver burden over time and to maximize functional communication abilities.   Eating   Type of Assistance Needed Independent   Physical Assistance Level No physical assistance   Eating CARE Score 6   SLP Therapy Minutes   SLP Time In 0900   SLP Time Out 0930   SLP Total Time (minutes) 30   SLP Mode of treatment - Individual (minutes) 30   SLP Mode of treatment - Concurrent (minutes) 0   SLP Mode of treatment - Group (minutes) 0   SLP Mode of treatment - Co-treat (minutes) 0   SLP Mode of Treatment - Total time(minutes) 30 minutes   SLP Cumulative Minutes 30   Therapy Time missed   Time missed? No

## 2024-09-01 NOTE — PLAN OF CARE
Problem: SAFETY ADULT  Goal: Patient will remain free of falls  Description: INTERVENTIONS:  - Educate patient/family on patient safety including physical limitations  - Instruct patient to call for assistance with activity   - Consult OT/PT to assist with strengthening/mobility   - Keep Call bell within reach  - Keep bed low and locked with side rails adjusted as appropriate  - Keep care items and personal belongings within reach  - Initiate and maintain comfort rounds  - Make Fall Risk Sign visible to staff  - Offer Toileting every 4 Hours, in advance of need  - Initiate/Maintain  alarm  - Obtain necessary fall risk management equipment: wc  - Apply yellow socks and bracelet for high fall risk patients  - Consider moving patient to room near nurses station  Outcome: Progressing

## 2024-09-01 NOTE — QUICK NOTE
PMR On Call    Discussed case with ID on call. Dr. Garcia stated that it is not typical to give both biktarvy and tivicay as this is redundant treatment - they are in the same class of medication. Similarly, it is not common to get both biktarvy and cabenuva. She recommended stopping the Tivicay, and reaching out to patient's outpatient provider (Marianna Martin at [facility]) after the holiday to clarify his regimen. Discontinued Tivicay, will sign out to his primary team after the holiday to f/u his HIV regimen.    Ashley de Padua, MD  Physical Medicine and Rehabilitation

## 2024-09-01 NOTE — PROGRESS NOTES
SLP TAA       09/01/24 0900   Patient Data   Rehab Impairment Impairment of mobility, safety and Activities of Daily Living (ADLs) due to Amputation: 05.3 Unilateral Lower Limb Above the Knee   Etiologic Diagnosis LLE Acute Limb Ischemia   Date of Onset 06/07/24   Support System   Name Friend, Mireya who is supportive for pt   Patient Preference   Nickname (Patient Preference) Kieran   Restrictions/Precautions   Precautions Aphasia;Bed/chair alarms;Cognitive;Fall Risk;Supervision on toilet/commode   Pain Assessment   Pain Assessment Tool 0-10   Pain Score No Pain   Eating Assessment   Type of Assistance Needed Independent   Physical Assistance Level No physical assistance   Eating CARE Score 6   Comprehension   Auditory Basic   Visual Basic   Findings Pt completing portions of the informal language assessment. Refer to SLP Rehab note for full details.   QI: Comprehension 3. Usually Understands: Understands most conversations, but misses some part/intent of message. Requires cues at times to understand.   Comprehension (FIM) 3 - Understands basic info/conversation 50-74% of time   Expression   Verbal Basic   Non-Verbal Basic   Intelligibility Phrase   Findings Pt completing portions of the informal language assessment. Refer to SLP Rehab note for full details.   QI: Expression 3. Exhibits some difficulty with expressing needs and ideas (e.g., some words or finishing thoughts) or speech is not clear   Expression (FIM) 3 - Expresses basic info/needs 50-74% of time   Social Interaction   Cooperation with staff   Participation Individual   Behaviors observed Appropriate   Findings Pt completing portions of the informal language assessment. Refer to SLP Rehab note for full details.   Social Interaction (FIM) 5 - Interacts appropriately with others 90% of time   Problem Solving   Complex Manages finances;Manages discharge planning;Manages medications   Routine Manages call bell   Findings Pt completing portions of the  "informal language assessment. Refer to SLP Rehab note for full details.   Problem solving (FIM) 3 - Solves basic problmes 50-74% of time   Memory   Recognize People Yes   Remember Routine Yes   Initiates Tasks Yes   Short-Term Impaired   Long Term Impaired   Findings Pt completing portions of the informal language assessment. Refer to SLP Rehab note for full details.   Memory (FIM) 3 - Recognizes, recalls/performs 50-74%   Discharge Information   Patient's Discharge Plan home w/ family support vs placement to group home   Patient's Rehab Expectations \"get this right\" pointing to head/mouth   Barriers to Discharge Home Unsafe Home Setup;Decreased Cognitive Function;Decreased Strength;Decreased Endurance;Safety Considerations;Limited Family Support   Impressions Pt is a 63 y.o. male who  has a past medical history of Acute lower limb ischemia (06/08/2024), Anxiety, Depression, HIV disease ([facility]), Substance abuse ([facility]), and Suicide attempt ([facility]). who presented to the [facility] on 6/7/24 from [facility] for increased LLE swelling and pain and was found to have a left external iliac artery occlusion and acute limb ischemia. 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  His course was complicated by b/l buttock unstageable pressure ulcers, urinary and fecal incontinence, pain, and significant decline in ADLs and mobility. Patient has been continued on Xarelto for anticoagulation, as well as ASA and statin. Patient has a history of TBI, with baseline nonfluent aphasia and forgetfulness. He was evaluated by neuropsychology on 6/27/24, and deemed to not have capacity to make fully informed medical decisions. Therefore, the guardianship process was initiated as of 7/5/24. He was admitted to the [facility] on 7/6. " Pt has been followed for ongoing skilled SLP services given maintenance of language and cognitive linguistic skills while on the [facility] [place]. Pt has now been transitioned to [facility] for continued skilled services in which recommendations are for continued maintenance of cognitive and language skills.     Currently SLP services were consulted for assessment of speech/language/cognitive skills. Pt engaged in portions of formalized language assessment in which pt is demonstrating speech/language skills to be moderately impaired at this time. Pt is a a good rehab candidate to achieve min A level for functional speech/langauge skills upon anticipated discharge home w/ family support/supervision. Language barriers which present include: decreased auditory comprehension skills of more complex ideas, reading comprehension at the word and sentence levels, verbal expression at the sentence and conversational levels and written expression. Cognitive barriers which present include: decreased attention, visual attention, ST memory recall , problem solving, reasoning, sequencing, organization of thoughts, judgement, and insight, which still impacts pt's overall safety, functional cognitive communication skills as well as functional mobility. These deficits are barriers to pt's overall functional independence and safety with mobility, as well as ability to effectively communicate. In order to address the noted barriers, skilled SLP services will address this by targeting the following interventions: including basic communication boards, verbal command following activities, biographical yes/no questions, simple yes/no questions, moderate yes/no questions, visual/receptive object ID tasks, picture object ID tasks, object naming tasks, opposite phrase completion tasks, simple phrase completion tasks, reading comprehension at word level, written expression of biographical information, word-picture matching, divergent naming:  concrete, and word deduction with phrase, along with listening comprehension tasks. Though pt continues to present with cognitive linguistic deficits, the main focus remains language skills.   ELOS ~ 10-14 days. At this time, pt will benefit from skilled SLP services targeting functional independence of speech/langauge skills in attempts to decrease need for caregiver burden over time.   SLP Therapy Minutes   SLP Time In 0900   SLP Time Out 0930   SLP Total Time (minutes) 30   SLP Mode of treatment - Individual (minutes) 30   SLP Mode of treatment - Concurrent (minutes) 0   SLP Mode of treatment - Group (minutes) 0   SLP Mode of treatment - Co-treat (minutes) 0   SLP Mode of Treatment - Total time(minutes) 30 minutes   SLP Cumulative Minutes 30   Cumulative Minutes   Cumulative therapy minutes 135

## 2024-09-01 NOTE — PROGRESS NOTES
09/01/24 1300   Pain Assessment   Pain Assessment Tool 0-10   Pain Score No Pain   Restrictions/Precautions   Precautions Aphasia;Bed/chair alarms;Cognitive;Fall Risk;Supervision on toilet/commode   Weight Bearing Restrictions Yes   LLE Weight Bearing Per Order NWB  (AKA incision healed)   Cognition   Overall Cognitive Status Impaired   Arousal/Participation Alert;Cooperative   Attention Attends with cues to redirect   Orientation Level Oriented X4   Memory Decreased short term memory;Decreased recall of recent events;Decreased recall of precautions   Following Commands Follows one step commands with increased time or repetition   Comments Hx of CVA and TBI   Subjective   Subjective Patient presents in WC wanting to go outside  (spoke with nursing, patient instructed not to go outside)   Sit to Stand   Type of Assistance Needed Incidental touching   Physical Assistance Level No physical assistance   Comment Gait belt and RW   Sit to Stand CARE Score 4   Bed-Chair Transfer   Type of Assistance Needed Supervision   Physical Assistance Level No physical assistance   Comment Stand pivot with RW and GB   Chair/Bed-to-Chair Transfer CARE Score 4   Walk 10 Feet   Type of Assistance Needed Physical assistance;Adaptive equipment   Physical Assistance Level 25% or less   Comment RW and GB; hopping   Walk 10 Feet CARE Score 3   Ambulation   Distance Walked (feet) 40 ft  (10'x4)   Assist Device Roller Walker   Gait Pattern Hopping   Does the patient walk? 2. Yes   Wheel 50 Feet with Two Turns   Type of Assistance Needed Independent   Wheel 50 Feet with Two Turns CARE Score 6   Wheel 150 Feet   Type of Assistance Needed Independent   Wheel 150 Feet CARE Score 6   Wheelchair mobility   Does the patient use a wheelchair? 1. Yes   Type of Wheelchair Used 1. Manual   Method Left upper extremity;Right upper extremity   Distance Level Surface (feet) 200 ft   Toilet Transfer   Type of Assistance Needed Supervision   Physical  Assistance Level No physical assistance   Comment Standing using urinal   Toilet Transfer CARE Score 4   Therapeutic Interventions   Other Functional activities, ambulation, transfers, mobility per objective   Assessment   Treatment Assessment Patient tolerated 30 minute session well today, patient improving with independence and standing today well with multiple bouts of walking. Increased standing time for urinal usage today. Patient challenged with donning and doffing pants in standing, required increased attempts to perform successfully. Patient fatigued post appointment. Patient requires skilled PT to maximize function.   Family/Caregiver Present no   PT Family training done with: no   Barriers to Discharge Inaccessible home environment   Plan   Progress Progressing toward goals   PT Therapy Minutes   PT Time In 1430   PT Time Out 1500   PT Total Time (minutes) 30   PT Mode of treatment - Individual (minutes) 30   PT Mode of treatment - Concurrent (minutes) 0   PT Mode of treatment - Group (minutes) 0   PT Mode of treatment - Co-treat (minutes) 0   PT Mode of Treatment - Total time(minutes) 30 minutes   PT Cumulative Minutes 90   Therapy Time missed   Time missed? No

## 2024-09-02 LAB
INR PPP: 2.49 (ref 0.85–1.19)
PROTHROMBIN TIME: 26.8 SECONDS (ref 12.3–15)

## 2024-09-02 PROCEDURE — 97535 SELF CARE MNGMENT TRAINING: CPT

## 2024-09-02 PROCEDURE — 97530 THERAPEUTIC ACTIVITIES: CPT

## 2024-09-02 PROCEDURE — 85610 PROTHROMBIN TIME: CPT | Performed by: NURSE PRACTITIONER

## 2024-09-02 PROCEDURE — 97110 THERAPEUTIC EXERCISES: CPT

## 2024-09-02 RX ADMIN — WARFARIN SODIUM 5 MG: 5 TABLET ORAL at 17:07

## 2024-09-02 RX ADMIN — GABAPENTIN 100 MG: 100 CAPSULE ORAL at 06:07

## 2024-09-02 RX ADMIN — ASPIRIN 81 MG: 81 TABLET, COATED ORAL at 09:24

## 2024-09-02 RX ADMIN — ZONISAMIDE 400 MG: 100 CAPSULE ORAL at 09:24

## 2024-09-02 RX ADMIN — Medication 6 MG: at 21:52

## 2024-09-02 RX ADMIN — DIVALPROEX SODIUM 1250 MG: 500 TABLET, EXTENDED RELEASE ORAL at 09:23

## 2024-09-02 RX ADMIN — LAMOTRIGINE 50 MG: 25 TABLET ORAL at 17:08

## 2024-09-02 RX ADMIN — MIRTAZAPINE 15 MG: 15 TABLET, FILM COATED ORAL at 22:01

## 2024-09-02 RX ADMIN — GABAPENTIN 100 MG: 100 CAPSULE ORAL at 22:01

## 2024-09-02 RX ADMIN — ATORVASTATIN CALCIUM 80 MG: 80 TABLET, FILM COATED ORAL at 17:08

## 2024-09-02 RX ADMIN — SERTRALINE HYDROCHLORIDE 75 MG: 50 TABLET ORAL at 09:24

## 2024-09-02 RX ADMIN — LEVOCARNITINE 330 MG: 330 TABLET ORAL at 17:07

## 2024-09-02 RX ADMIN — LEVOCARNITINE 330 MG: 330 TABLET ORAL at 13:00

## 2024-09-02 RX ADMIN — GABAPENTIN 100 MG: 100 CAPSULE ORAL at 13:00

## 2024-09-02 RX ADMIN — TAMSULOSIN HYDROCHLORIDE 0.4 MG: 0.4 CAPSULE ORAL at 17:08

## 2024-09-02 RX ADMIN — LEVOCARNITINE 330 MG: 330 TABLET ORAL at 09:26

## 2024-09-02 RX ADMIN — LOSARTAN POTASSIUM 50 MG: 50 TABLET, FILM COATED ORAL at 09:23

## 2024-09-02 RX ADMIN — METOPROLOL SUCCINATE 25 MG: 25 TABLET, FILM COATED, EXTENDED RELEASE ORAL at 09:24

## 2024-09-02 RX ADMIN — LAMOTRIGINE 50 MG: 25 TABLET ORAL at 09:23

## 2024-09-02 RX ADMIN — BICTEGRAVIR SODIUM, EMTRICITABINE, AND TENOFOVIR ALAFENAMIDE FUMARATE 1 TABLET: 50; 200; 25 TABLET ORAL at 09:26

## 2024-09-02 NOTE — PROGRESS NOTES
"   09/02/24 1000   Pain Assessment   Pain Assessment Tool 0-10   Pain Score No Pain   Restrictions/Precautions   Precautions Aphasia;Bed/chair alarms;Cognitive;Fall Risk;Supervision on toilet/commode   LLE Weight Bearing Per Order NWB   Braces or Orthoses   (refused L AKA 2' to them needing cleaned)   Lifestyle   Autonomy \"Its different...It's not good...its harder\" referring to room set up at  vs [facility]   Oral Hygiene   Type of Assistance Needed Independent   Comment seated   Oral Hygiene CARE Score 6   Shower/Bathe Self   Type of Assistance Needed Set-up / clean-up   Comment Pt showered primarily seated on tub bench. Briefly in partial stance using GB to wash and rinse buttocks. Despite this not being the safest method for AKA shower routine, pt completes w/ set up w/ safety concerns, but did not requrie cues.   Shower/Bathe Self CARE Score 5   Bathing   Assessed Bath Style Shower   Tub/Shower Transfer   Findings Sup for sit pivot shower transfer tub bench<>WC. First shower on this unit, pt was able to position WC correctly and use brakes appropriately. Extensive time for problem solving.   Upper Body Dressing   Type of Assistance Needed Set-up / clean-up   Comment seated   Upper Body Dressing CARE Score 5   Lower Body Dressing   Type of Assistance Needed Supervision   Comment CS as pt taking extensive time to problem solve set up in new environment. In partial stance using bed for balance support during unilateral release for CM over hips.   Lower Body Dressing CARE Score 4   Putting On/Taking Off Footwear   Type of Assistance Needed Independent   Comment IND don sock and sneaker via propping foot on side of bed. appropriately locks WC for task.   Putting On/Taking Off Footwear CARE Score 6   Dressing/Undressing Clothing   Findings Pt did not wear AKA  stating they need to be washed. OT washed both of his shrinkers in the sink and hung to dry in BR. These shrinkers are cumbersome, would potentially benefit " from consult for differnt  options   Lying to Sitting on Side of Bed   Type of Assistance Needed Independent   Lying to Sitting on Side of Bed CARE Score 6   Sit to Stand   Comment multiple partial stands completed to unilateral release at GB or side of bed, no total STS completed in session.   Bed-Chair Transfer   Type of Assistance Needed Set-up / clean-up   Comment SEGAL sit pivot   Chair/Bed-to-Chair Transfer CARE Score 5   Toileting Hygiene   Type of Assistance Needed Supervision   Comment In partial stance at side of bed for CM up/down. Appropriately locks brakes   Toileting Hygiene CARE Score 4   Toileting   Findings Pt reported need to use urnial, but did not void, stating the feeling went away   Toilet Transfer   Comment used urinal at bedside   Transfers   Additional Comments Pt katherine's good safety awareness w/ WC use t/o session, extended time for positioning, he used brakes appropriately t/o. No cues required for WC management this session.   Cognition   Overall Cognitive Status Impaired   Arousal/Participation Alert;Cooperative   Attention Attends with cues to redirect   Memory Decreased recall of precautions;Decreased short term memory   Following Commands Follows one step commands with increased time or repetition   Comments hx CVA and TBI   Activity Tolerance   Activity Tolerance Patient tolerated treatment well   Assessment   Treatment Assessment OT session focusing on ADL retraining including shower routine. Pt katherine's CS-S/U for all tasks. He demo'd good tech and safety awareness w/ WC use this session. Overall, very pleasant and cooperative. He endorses that he is still adjusting to a new room on a new unit, states that all the staff he has met here so far is nice. He further endorses that how this room is set up makes things harder for him (compared to room at [facility]) extensive time for problem solving w/ pt what is more difficult here, as his aphasia limited the conversation. Ultimately,  "determined that if the room was rearranged it would make set up more similiar to [facility], but pt stated he did not want staff to do this because he, \"wants to get used to it this way.\" Despite cog deficits pt demo'd improved insight regarding safety set up, [facility] as [facility]. OT to continue POC at this time.   Prognosis Good   Problem List Decreased endurance;Decreased mobility;Impaired balance;Decreased cognition;Impaired judgement   Plan   Treatment/Interventions ADL retraining;Functional transfer training;Therapeutic exercise;Endurance training;Cognitive reorientation;Patient/family training;Equipment eval/education;Compensatory technique education;Continued evaluation   Progress Progressing toward goals   OT Therapy Minutes   OT Time In 1000   OT Time Out 1130   OT Total Time (minutes) 90   OT Mode of treatment - Individual (minutes) 90   OT Mode of treatment - Concurrent (minutes) 0   OT Mode of treatment - Group (minutes) 0   OT Mode of treatment - Co-treat (minutes) 0   OT Mode of Treatment - Total time(minutes) 90 minutes   OT Cumulative Minutes 135   Therapy Time missed   Time missed? No       "

## 2024-09-02 NOTE — PROGRESS NOTES
"   09/02/24 1400   Pain Assessment   Pain Assessment Tool 0-10   Pain Score No Pain   Restrictions/Precautions   Precautions Aphasia;Bed/chair alarms;Cognitive;Fall Risk;Supervision on toilet/commode   LLE Weight Bearing Per Order NWB   Cognition   Overall Cognitive Status Impaired   Arousal/Participation Alert;Cooperative   Attention Attends with cues to redirect   Orientation Level Oriented X4   Memory Decreased recall of precautions;Decreased short term memory   Following Commands Follows one step commands with increased time or repetition   Subjective   Subjective \"can we please go outside for a little?\"   Roll Left and Right   Type of Assistance Needed Independent   Physical Assistance Level No physical assistance   Comment on mat with inc time   Roll Left and Right CARE Score 6   Sit to Lying   Type of Assistance Needed Independent   Physical Assistance Level No physical assistance   Comment on mat   Sit to Lying CARE Score 6   Lying to Sitting on Side of Bed   Type of Assistance Needed Independent   Physical Assistance Level No physical assistance   Comment use of  on R side of mat for sup to sit.   Lying to Sitting on Side of Bed CARE Score 6   Sit to Stand   Type of Assistance Needed Incidental touching   Physical Assistance Level No physical assistance   Comment stood with RW, and with bed rail to use urinal.   Sit to Stand CARE Score 4   Bed-Chair Transfer   Type of Assistance Needed Set-up / clean-up   Physical Assistance Level No physical assistance   Comment pt self checked brakes on 2 occations.   Chair/Bed-to-Chair Transfer CARE Score 5   Walk 10 Feet   Type of Assistance Needed Physical assistance   Physical Assistance Level 25% or less   Comment CG for hopping, min A for efforts to turn and sit.   Walk 10 Feet CARE Score 3   Walk 50 Feet with Two Turns   Reason if not Attempted Safety concerns   Walk 50 Feet with Two Turns CARE Score 88   Walk 150 Feet   Reason if not Attempted Safety concerns " "  Walk 150 Feet CARE Score 88   Walking 10 Feet on Uneven Surfaces   Reason if not Attempted Safety concerns   Walking 10 Feet on Uneven Surfaces CARE Score 88   Ambulation   Primary Mode of Locomotion Prior to Admission Walk   Distance Walked (feet) 15 ft   Gait Pattern Hopping   Walk Assist Level Contact Guard;Minimum Assist   Findings + LOB when turning to sit with RW, required min A for controlled sit.   Does the patient walk? 2. Yes   Wheel 50 Feet with Two Turns   Type of Assistance Needed Independent   Physical Assistance Level No physical assistance   Wheel 50 Feet with Two Turns CARE Score 6   Wheel 150 Feet   Type of Assistance Needed Independent   Physical Assistance Level No physical assistance   Wheel 150 Feet CARE Score 6   Wheelchair mobility   Does the patient use a wheelchair? 1. Yes   Type of Wheelchair Used 1. Manual   Method Left upper extremity;Right upper extremity   Distance Level Surface (feet) 150 ft  (x2)   Findings mod I   Picking Up Object   Comment (S)  to DC WC level.   Reason if not Attempted Safety concerns   Picking Up Object CARE Score 88   Toilet Transfer   Positioning Concerns Cognition;Safety;Arm Rest   Findings stood to void at bed rail with use of urinal. +urine onto pants, assistance for pant change.   Therapeutic Interventions   Strengthening Prone lying L hip ext, R sidelying L hip abd, L sidelying R hip abd, supine bridges 3x10 each. 5# R LE LAQ and seated hip flexion 3x10   Flexibility pronelying x10 min; manual passive R LE DF/knee ext stretch 5x20 sec.   Assessment   Treatment Assessment Pt engaged in 60 min skilled PT intervention, self initiated wc propulsion to PT gym for session. Eager to get outside, efforts to redirect pt made. Cues for breathing t/o mat TE as pt puts forth good effort and reports \"feeling the burn\". X1 LOB when turning to Pts L with RW after hopping to turn to sit, min A for safe placement into chair. End of session focus on use of urinal in room, " pt prefers to stand with bed rail. Soiled pants, requiring pant change. pt left in WC with chair alarm in place, RN aware. Continue per POC pending participation and cooperation.   Barriers to Discharge Comments DC placement issues persist.   Plan   Progress Progressing toward goals   Discharge Recommendation   Equipment Recommended Wheelchair   PT Therapy Minutes   PT Time In 1400   PT Time Out 1500   PT Total Time (minutes) 60   PT Mode of treatment - Individual (minutes) 60   PT Mode of treatment - Concurrent (minutes) 0   PT Mode of treatment - Group (minutes) 0   PT Mode of treatment - Co-treat (minutes) 0   PT Mode of Treatment - Total time(minutes) 60 minutes   PT Cumulative Minutes 150   Therapy Time missed   Time missed? No

## 2024-09-02 NOTE — PLAN OF CARE
Problem: SAFETY ADULT  Goal: Patient will remain free of falls  Description: INTERVENTIONS:  - Educate patient/family on patient safety including physical limitations  - Instruct patient to call for assistance with activity   - Consult OT/PT to assist with strengthening/mobility   - Keep Call bell within reach  - Keep bed low and locked with side rails adjusted as appropriate  - Keep care items and personal belongings within reach  - Initiate and maintain comfort rounds  - Make Fall Risk Sign visible to staff  - Offer Toileting every 2-4 Hours, in advance of need  - Initiate/Maintain bed/chair alarms  - Apply yellow socks and bracelet for high fall risk patients  - Consider moving patient to room near nurses station  Outcome: Progressing

## 2024-09-02 NOTE — PLAN OF CARE
Problem: PAIN - ADULT  Goal: Verbalizes/displays adequate comfort level or baseline comfort level  Description: Interventions:  - Encourage patient to monitor pain and request assistance  - Assess pain using appropriate pain scale  - Administer analgesics based on type and severity of pain and evaluate response  - Implement non-pharmacological measures as appropriate and evaluate response  - Consider cultural and social influences on pain and pain management  - Notify physician/advanced practitioner if interventions unsuccessful or patient reports new pain  Outcome: Progressing     Problem: SKIN/TISSUE INTEGRITY - ADULT  Goal: Skin Integrity remains intact(Skin Breakdown Prevention)  Description: Assess:  -Perform Reyes assessment every shift  -Clean and moisturize skin every day  -Inspect skin when repositioning, toileting, and assisting with ADLS  -Assess extremities for adequate circulation and sensation     Bed Management:  -Have minimal linens on bed & keep smooth, unwrinkled  -Change linens as needed when moist or perspiring  -Avoid sitting or lying in one position for more than 2 hours while in bed  -Keep HOB at 30 degrees     Toileting:  -Offer bedside commode  -Assess for incontinence every shift  -Use incontinent care products after each incontinent episode such as depends; male purwick at hs    Activity:  -Mobilize patient 3 times a day  -Encourage activity and walks on unit  -Encourage or provide ROM exercises   -Turn and reposition patient every 2 Hours  -Use appropriate equipment to lift or move patient in bed  -Instruct/ Assist with weight shifting every 15 min  when out of bed in chair  -Consider limitation of chair time 2 hour intervals    Skin Care:  -Avoid use of baby powder, tape, friction and shearing, hot water or constrictive clothing  -Relieve pressure over bony prominences using pillows  -Do not massage red bony areas    Next Steps:  -Teach patient strategies to minimize risks such as  turning and repositioning   -Consider consults to  interdisciplinary teams such as OT/PT  Outcome: Progressing

## 2024-09-03 PROBLEM — R39.15 URINARY URGENCY: Status: ACTIVE | Noted: 2024-09-03

## 2024-09-03 LAB
MRSA NOSE QL CULT: ABNORMAL
MRSA NOSE QL CULT: ABNORMAL

## 2024-09-03 PROCEDURE — 99233 SBSQ HOSP IP/OBS HIGH 50: CPT | Performed by: STUDENT IN AN ORGANIZED HEALTH CARE EDUCATION/TRAINING PROGRAM

## 2024-09-03 PROCEDURE — 99232 SBSQ HOSP IP/OBS MODERATE 35: CPT | Performed by: INTERNAL MEDICINE

## 2024-09-03 PROCEDURE — 97130 THER IVNTJ EA ADDL 15 MIN: CPT

## 2024-09-03 PROCEDURE — 97129 THER IVNTJ 1ST 15 MIN: CPT

## 2024-09-03 PROCEDURE — 97535 SELF CARE MNGMENT TRAINING: CPT

## 2024-09-03 RX ORDER — ZONISAMIDE 100 MG/1
400 CAPSULE ORAL DAILY
Qty: 240 CAPSULE | Refills: 0 | Status: SHIPPED | OUTPATIENT
Start: 2024-09-04

## 2024-09-03 RX ORDER — WARFARIN SODIUM 5 MG/1
TABLET ORAL
Qty: 60 TABLET | Refills: 0 | Status: SHIPPED | OUTPATIENT
Start: 2024-09-03

## 2024-09-03 RX ORDER — SERTRALINE HYDROCHLORIDE 25 MG/1
75 TABLET, FILM COATED ORAL DAILY
Qty: 180 TABLET | Refills: 0 | Status: SHIPPED | OUTPATIENT
Start: 2024-09-04

## 2024-09-03 RX ORDER — ATORVASTATIN CALCIUM 80 MG/1
80 TABLET, FILM COATED ORAL
Qty: 60 TABLET | Refills: 0 | Status: SHIPPED | OUTPATIENT
Start: 2024-09-03

## 2024-09-03 RX ORDER — ASPIRIN 81 MG/1
81 TABLET ORAL DAILY
Qty: 60 TABLET | Refills: 0 | Status: SHIPPED | OUTPATIENT
Start: 2024-09-03

## 2024-09-03 RX ORDER — DIVALPROEX SODIUM 250 MG/1
1250 TABLET, FILM COATED, EXTENDED RELEASE ORAL DAILY
Qty: 300 TABLET | Refills: 0 | Status: SHIPPED | OUTPATIENT
Start: 2024-09-04

## 2024-09-03 RX ORDER — METOPROLOL SUCCINATE 25 MG/1
25 TABLET, EXTENDED RELEASE ORAL DAILY
Qty: 60 TABLET | Refills: 0 | Status: SHIPPED | OUTPATIENT
Start: 2024-09-04

## 2024-09-03 RX ORDER — MIRTAZAPINE 15 MG/1
15 TABLET, FILM COATED ORAL
Qty: 60 TABLET | Refills: 0 | Status: SHIPPED | OUTPATIENT
Start: 2024-09-03

## 2024-09-03 RX ORDER — TAMSULOSIN HYDROCHLORIDE 0.4 MG/1
0.4 CAPSULE ORAL
Qty: 60 CAPSULE | Refills: 0 | Status: SHIPPED | OUTPATIENT
Start: 2024-09-03

## 2024-09-03 RX ORDER — GABAPENTIN 100 MG/1
100 CAPSULE ORAL EVERY 8 HOURS
Qty: 180 CAPSULE | Refills: 0 | Status: SHIPPED | OUTPATIENT
Start: 2024-09-03

## 2024-09-03 RX ORDER — BICTEGRAVIR SODIUM, EMTRICITABINE, AND TENOFOVIR ALAFENAMIDE FUMARATE 50; 200; 25 MG/1; MG/1; MG/1
1 TABLET ORAL
Qty: 60 TABLET | Refills: 0 | Status: SHIPPED | OUTPATIENT
Start: 2024-09-03

## 2024-09-03 RX ORDER — LEVOCARNITINE 330 MG/1
330 TABLET ORAL
Qty: 180 TABLET | Refills: 0 | Status: SHIPPED | OUTPATIENT
Start: 2024-09-03

## 2024-09-03 RX ORDER — ACETAMINOPHEN 325 MG/1
650 TABLET ORAL EVERY 6 HOURS PRN
Qty: 120 TABLET | Refills: 0 | Status: SHIPPED | OUTPATIENT
Start: 2024-09-03

## 2024-09-03 RX ORDER — LOSARTAN POTASSIUM 50 MG/1
50 TABLET ORAL DAILY
Qty: 60 TABLET | Refills: 0 | Status: SHIPPED | OUTPATIENT
Start: 2024-09-03

## 2024-09-03 RX ORDER — LAMOTRIGINE 25 MG/1
50 TABLET ORAL 2 TIMES DAILY
Qty: 240 TABLET | Refills: 0 | Status: SHIPPED | OUTPATIENT
Start: 2024-09-03

## 2024-09-03 RX ORDER — SENNOSIDES 8.6 MG
8.6 TABLET ORAL
Qty: 120 TABLET | Refills: 0 | Status: SHIPPED | OUTPATIENT
Start: 2024-09-03

## 2024-09-03 RX ORDER — MUSCLE RUB CREAM 100; 150 MG/G; MG/G
CREAM TOPICAL 4 TIMES DAILY PRN
Status: DISCONTINUED | OUTPATIENT
Start: 2024-09-03 | End: 2024-09-04 | Stop reason: HOSPADM

## 2024-09-03 RX ADMIN — SERTRALINE HYDROCHLORIDE 75 MG: 50 TABLET ORAL at 09:16

## 2024-09-03 RX ADMIN — LAMOTRIGINE 50 MG: 25 TABLET ORAL at 09:16

## 2024-09-03 RX ADMIN — LAMOTRIGINE 50 MG: 25 TABLET ORAL at 17:21

## 2024-09-03 RX ADMIN — WARFARIN SODIUM 5 MG: 5 TABLET ORAL at 17:20

## 2024-09-03 RX ADMIN — LOSARTAN POTASSIUM 50 MG: 50 TABLET, FILM COATED ORAL at 10:08

## 2024-09-03 RX ADMIN — TAMSULOSIN HYDROCHLORIDE 0.4 MG: 0.4 CAPSULE ORAL at 17:21

## 2024-09-03 RX ADMIN — METOPROLOL SUCCINATE 25 MG: 25 TABLET, FILM COATED, EXTENDED RELEASE ORAL at 09:16

## 2024-09-03 RX ADMIN — DIVALPROEX SODIUM 1250 MG: 500 TABLET, EXTENDED RELEASE ORAL at 09:16

## 2024-09-03 RX ADMIN — MIRTAZAPINE 15 MG: 15 TABLET, FILM COATED ORAL at 21:29

## 2024-09-03 RX ADMIN — BICTEGRAVIR SODIUM, EMTRICITABINE, AND TENOFOVIR ALAFENAMIDE FUMARATE 1 TABLET: 50; 200; 25 TABLET ORAL at 09:25

## 2024-09-03 RX ADMIN — Medication 6 MG: at 21:29

## 2024-09-03 RX ADMIN — GABAPENTIN 100 MG: 100 CAPSULE ORAL at 21:29

## 2024-09-03 RX ADMIN — GABAPENTIN 100 MG: 100 CAPSULE ORAL at 14:02

## 2024-09-03 RX ADMIN — GABAPENTIN 100 MG: 100 CAPSULE ORAL at 06:24

## 2024-09-03 RX ADMIN — ATORVASTATIN CALCIUM 80 MG: 80 TABLET, FILM COATED ORAL at 17:21

## 2024-09-03 RX ADMIN — ZONISAMIDE 400 MG: 100 CAPSULE ORAL at 09:15

## 2024-09-03 RX ADMIN — LEVOCARNITINE 330 MG: 330 TABLET ORAL at 14:02

## 2024-09-03 RX ADMIN — LEVOCARNITINE 330 MG: 330 TABLET ORAL at 17:21

## 2024-09-03 RX ADMIN — ASPIRIN 81 MG: 81 TABLET, COATED ORAL at 09:16

## 2024-09-03 RX ADMIN — ACETAMINOPHEN 975 MG: 325 TABLET ORAL at 09:15

## 2024-09-03 RX ADMIN — LEVOCARNITINE 330 MG: 330 TABLET ORAL at 09:26

## 2024-09-03 RX ADMIN — MENTHOL, UNSPECIFIED FORM AND METHYL SALICYLATE 1 APPLICATION: 10; 150 CREAM TOPICAL at 11:58

## 2024-09-03 NOTE — ASSESSMENT & PLAN NOTE
Remote history of TBI, previously residing in a group home prior to jail sentence.  Evaluated by neuropsychiatry on 6/27/24 and deemed NOT to have medical decision-making capacity  Neuropsychology re-evaluated pt on 8/20 and he does NOT have decision making capacity.   Guardian appointed on 8/27, Nain Harden

## 2024-09-03 NOTE — PROGRESS NOTES
[facility]  Progress Note  Name: Sunil Patel I  MRN: [id number]  Unit/Bed#: -02 I Date of Admission: 8/30/2024   Date of Service: 9/3/2024 I Hospital Day: 4    Assessment & Plan   Urinary urgency  Assessment & Plan  Complaints of urgency.  Obtain bladder scan once today.  Hx of prostate cancer with prostatectomy.  Consider obtaining UA.  Follow-up with Urology as outpatient.    Postoperative anemia  Assessment & Plan  Hgb currently 10.9.  Hgb 10-11 since admission  Iron panel normal  Likely postoperative ABLA  Monitor with routine labs     Cardiomyopathy (HCC)  Assessment & Plan  ECHO 6/10/2024 = LVEF 40-45% with apical hypokinesis   Nuclear stress test 6/11, per Cardiology did not show any significant areas of ischemia.  Per Cardiology, etiology felt to be stress-induced cardiomyopathy.  Cardiac catheterization deferred given the lack of any significant ischemia, and no cardiac symptoms  Continue with medical management with aspirin, statin, beta-blocker, ARB  Remains euvolemic off of diuretics, continue to monitor volume status   Outpatient follow-up with Cardiology.    Patient incapable of making informed decisions  Assessment & Plan  Seen by neuropsych on 6/27 and was deemed NOT to have medical decision making capacity  Re-evalauted on 8/20 and again deemed NOT to have medical decision making capacity   Guardian (Nain Harden) assigned on 8/27    History of traumatic brain injury  Assessment & Plan  Remote history of TBI, previously residing in a group home prior to [facility] sentence.  Evaluated by neuropsychiatry on 6/27/24 and deemed NOT to have medical decision-making capacity  Neuropsychology re-evaluated pt on 8/20 and he does NOT have decision making capacity.   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  Continue ASA, Coumadin and statin.  Continue gabapentin 100mg TID.  Primary team following.  Continue PT/OT.    Carnitine deficiency (HCC)  Assessment & Plan  Continue levocarnitine 330 mg 3x daily with meals.    History of seizures  Assessment & Plan  Diagnosed in July 2019, follows with [facility] neurology outpatient  Continue home regimen with Depakote ER, Lamotrigine, and Zonegran.    Left ventricular thrombus  Assessment & Plan  Noted on echocardiogram 6/10/2024: spherical 1.5 x 1.3 cm thrombus at the LV apex   Initiated on AC with Xarelto however unable to verify insurance coverage - currently on Coumadin.  INR 2.49 on 9/2.  Continue Coumadin 2.5mg Fridays and 5mg all other days.  Repeat PT/INR in 1 week.    Benign essential hypertension  Assessment & Plan  Continue current regimen Losartan 50 mg daily and Toprol-XL 25 mg daily.  Monitor BP with routine VS.    History of stroke  Assessment & Plan  History of CVA in the left MCA territory May 2018 with residual expressive aphasia  Continue ASA and atorvastatin    Human immunodeficiency virus (HIV) infection (HCC)  Assessment & Plan  Had been on Biktarvy and Tivicay.  Primary discussed with ID over the weekend - Tivicay discontinued due to duplicate treatment.  Will need to follow-up with Marianna Martin at [facility] to determine treatment plan.    Bipolar affective disorder (HCC)  Assessment & Plan  Continue Zoloft 75mg daily and Remeron 15mg HS.         VTE Pharmacologic Prophylaxis:   Pharmacologic: Warfarin (Coumadin)  Mechanical VTE Prophylaxis in Place: Yes - sequential compression devices.    Current Length of Stay: 4 day(s)    Current Patient Status: Inpatient Rehab     Discharge Plan: As per primary team.    Code Status: Level 1 - Full Code    Subjective:   Pt examined while pt sitting in bed in pt room.  Friend is at  "bedside.  Complaints of feeling \"sad\" and per friend, she is also noticing a change in mood.  Asking to have someone come to the room to talk to him about his feelings.  May consider consulting Psychiatry if continues to have a dysphoric mood.  Agreeable to going outside and feels that it may help with his feelings.  Complaints of frequent phantom limb pain.  Currently has no pain while sitting in the bed.  Complaints of decreased urine production and frequency.  Feels that he is urinating more often but with smaller amounts.  This has been ongoing for months.  Admits that it improves when he stands to urinate but states that he has been having some difficulty with getting into the correct position.  Denies any dysuria or fevers.  Will obtain bladder scan later today.  Discussed importance of follow-up with Urology on discharge.    Objective:     Vitals:   Temp (24hrs), Av.4 °F (36.3 °C), Min:96.9 °F (36.1 °C), Max:97.8 °F (36.6 °C)    Temp:  [96.9 °F (36.1 °C)-97.8 °F (36.6 °C)] 97.8 °F (36.6 °C)  HR:  [84-90] 89  Resp:  [16-20] 20  BP: (106-165)/(62-85) 106/62  SpO2:  [94 %-96 %] 96 %  Body mass index is 28.07 kg/m².     Review of Systems   Constitutional:  Negative for appetite change, chills, fatigue and fever.   HENT:  Negative for trouble swallowing.    Eyes:  Negative for visual disturbance.   Respiratory:  Negative for cough, shortness of breath, wheezing and stridor.    Cardiovascular:  Negative for chest pain, palpitations and leg swelling.   Gastrointestinal:  Negative for abdominal distention, abdominal pain, constipation, diarrhea, nausea and vomiting.   Genitourinary:  Positive for decreased urine volume and frequency (complaints of urinating frequently with small amounts of urine, started months ago). Negative for difficulty urinating.   Musculoskeletal:  Positive for gait problem. Negative for arthralgias and back pain.   Neurological:  Negative for dizziness, weakness, light-headedness, numbness " "and headaches.        Frequent phantom limb pain   Psychiatric/Behavioral:  Positive for dysphoric mood (feels \"sad\" today, per friend at bedside feels that mood is worse than baseline). Negative for sleep disturbance. The patient is not nervous/anxious.    All other systems reviewed and are negative.       Input and Output Summary (last 24 hours):       Intake/Output Summary (Last 24 hours) at 9/3/2024 1328  Last data filed at 9/3/2024 0630  Gross per 24 hour   Intake 240 ml   Output 550 ml   Net -310 ml       Physical Exam:     Physical Exam  Vitals and nursing note reviewed.   Constitutional:       General: He is not in acute distress.     Appearance: Normal appearance. He is not ill-appearing.   HENT:      Head: Normocephalic and atraumatic.   Cardiovascular:      Rate and Rhythm: Normal rate and regular rhythm.      Pulses: Normal pulses.      Heart sounds: Normal heart sounds. No murmur heard.     No friction rub.   Pulmonary:      Effort: Pulmonary effort is normal. No respiratory distress.      Breath sounds: Normal breath sounds. No wheezing or rhonchi.   Abdominal:      General: Abdomen is flat. Bowel sounds are normal. There is no distension.      Palpations: Abdomen is soft. There is no mass.      Tenderness: There is no abdominal tenderness. There is no guarding or rebound.      Hernia: No hernia is present.   Musculoskeletal:      Cervical back: Normal range of motion and neck supple. No rigidity or tenderness.      Right lower leg: No edema.      Left lower leg: No edema.      Left Lower Extremity: Left leg is amputated above knee.   Skin:     General: Skin is warm and dry.   Neurological:      Mental Status: He is alert and oriented to person, place, and time.   Psychiatric:         Mood and Affect: Mood normal.         Behavior: Behavior normal.         Additional Data:     Labs:            Results from last 7 days   Lab Units 09/02/24  0606   INR  2.49*                   Labs " reviewed    Imaging:    Imaging reviewed    Recent Cultures (last 7 days):           Last 24 Hours Medication List:   Current Facility-Administered Medications   Medication Dose Route Frequency Provider Last Rate    acetaminophen  975 mg Oral TID PRN Grantliz Parks, DO      aspirin  81 mg Oral Daily Grantliz Parks, DO      atorvastatin  80 mg Oral Daily With Dinner Grantliz Parks, DO      bictegravir-emtricitab-tenofovir alafenamide  1 tablet Oral Daily With Breakfast Ashley Depadua, MD      bisacodyl  10 mg Rectal Daily PRN Grantliz Parks, DO      diphenhydrAMINE  25 mg Oral Q6H PRN Grant S Parks, DO      divalproex sodium  1,250 mg Oral Daily Grant TIM Parks, DO      gabapentin  100 mg Oral Q8H Grant TIM Parks, DO      lamoTRIgine  50 mg Oral BID Grant S Parks, DO      levOCARNitine  990 mg/day Oral TID With Meals Grant TIM Edwarden, DO      losartan  50 mg Oral Daily Grant TIM Edwarden, DO      melatonin  6 mg Oral HS Grant TMI Parks, DO      menthol-methyl salicylate   Apply externally 4x Daily PRN CHARLIE Giles      metoprolol succinate  25 mg Oral Daily Grantliz Parks, DO      mirtazapine  15 mg Oral HS Grant TIM Parks, DO      ondansetron  4 mg Oral Q6H PRN Grant S Parks, DO      polyethylene glycol  17 g Oral Daily PRN Grant S Parks, DO      senna  1 tablet Oral HS PRN Grantliz Parks, DO      sertraline  75 mg Oral Daily Grant TIM Parks, DO      tamsulosin  0.4 mg Oral Daily With Dinner Grant Parks, DO      warfarin  2.5 mg Oral Weekly Grant S Parks, DO      warfarin  5 mg Oral Once per day on Sunday Monday Tuesday Wednesday Thursday Saturday Grant S Parks, DO      warfarin  5 mg Oral Once (warfarin) Grantliz Edwarden, DO      zonisamide  400 mg Oral Daily Grant S Parks, DO          M*Modal software was used to dictate this note.  It may contain errors with dictating incorrect words or incorrect spelling. Please contact the provider directly with any questions.

## 2024-09-03 NOTE — PROGRESS NOTES
09/03/24 1445   Therapy Time missed   Time missed? Yes   Amount of time missed 60   Reason for time missed   (refused-  pt stated he has too much going on in his mind and might be leaving anyway)

## 2024-09-03 NOTE — ASSESSMENT & PLAN NOTE
Presented with left lower extremity acute limb ischemia/extensive left iliofemoral and left infrainguinal embolism requiring left femoral thrombectomy and subsequent AKA on 6/7/24 with Vascular Surgery  Continue with local wound care.  Continue ASA, Coumadin and statin.  Continue gabapentin 100mg TID.  Primary team following.  Continue PT/OT.

## 2024-09-03 NOTE — ASSESSMENT & PLAN NOTE
Continue current regimen Losartan 50 mg daily and Toprol-XL 25 mg daily.  Monitor BP with routine VS.

## 2024-09-03 NOTE — ASSESSMENT & PLAN NOTE
Hgb currently 10.9.  Hgb 10-11 since admission  Iron panel normal  Likely postoperative ABLA  Monitor with routine labs

## 2024-09-03 NOTE — PROGRESS NOTES
PM&R PROGRESS NOTE:  Sunil Patel 63 y.o. adult MRN: [id number]  Unit/Bed#: -02 Encounter: [id number]      Rehabilitation Diagnosis: Impairment of mobility, safety and Activities of Daily Living (ADLs) due to Amputation:  05.3  Unilateral Lower Limb Above the Knee    HPI: Sunil Patel is a 63 y.o. male who  has a past medical history of Acute lower limb ischemia (06/08/2024), Anxiety, Depression, HIV disease ([facility]), Substance abuse (HCC), and Suicide attempt ([facility]). who presented to the [facility] on 6/7/24 from halfway for increased LLE swelling and pain and was found to have a left external iliac artery occlusion and acute limb ischemia. 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 Patient had TTE on 6/10/24 showing LV apex thrombus. On 6/11/24 patient had pharmacologic nuclear stress test/SPECT scan which did not show any significant areas of ischemia with EF 40-45% and recommended continuing A/C for LV apical thrombus. His course was complicated by b/l buttock unstageable pressure ulcers, urinary and fecal incontinence, pain, and significant decline in ADLs and mobility. Patient has been continued on Xarelto for anticoagulation, as well as ASA and statin. Patient has a history of TBI, with baseline nonfluent aphasia and forgetfulness. He was evaluated by neuropsychology on 6/27/24, and deemed to not have capacity to make fully informed medical decisions. Therefore, the guardianship process was initiated as of 7/5/24. He was admitted to the [facility] on 7/6.  During his stay, he was formally evaluated and deemed not able to make his own medical decisions and was appointed a guardian officially on 8/27.  He was transferred to the [facility] on 8/30/2024 for ongoing care,  therapies and disposition planning     SUBJECTIVE: Patient seen face to face.  No acute issues.  With the patient today as well as with Nain his guardian hide discussing the overall discharge planning and medications as well as follow-up appointments.  All questions were answered.  Patient perseverating on some of the things that he is able to do or not do at the next facility and all questions were answered by either myself or the guardian.  Some questions will require further follow-up including his ability to smoke outside as well as his ability to pay for things on his own.  Nain would get back to him to answer some of these questions that do not have an absolutely clear answer.  I also called and spoke with the infectious disease team over at [facility] specifically with Marianna Martin's nurse Marii in order to verify medications that he was on.  He was on the New injections at some point in the past but had missed several appointments and most recently was placed on Biktarvy and will discharge on that medication however a follow-up with ID for a longer appointment time was made for September 24 in order to potentially restart him on the Maneuver.  He will have enough medication for the Biktarvy to last him until he is able to follow-up with infectious disease.  Additional time spent discussing with case management for arranging the transportation and overall disposition planning, review of records in order to make sure he is continuity of care postdischarge. Patient denies fever, chills, nausea, emesis, cough, shortness of breath, diarrhea, or constipation. Sleep was fine, mood stable. Pain is controlled.      ASSESSMENT: Stable, progressing      PLAN:    Rehabilitation  Functional deficits: Self-care, mobility, cognition  Continue current rehabilitation plan of care to maximize function.    Functional update:   PT: Pending to supervision for bed mobility and transfers with set up and contact-guard to  "min assist for short distance hopping  OT: Supervision for lower body ADLs up to min assist at times, set up for upper body ADLs and set up for bathing  SLP: Deficits in comprehension, expression and memory as well as problem solving    Estimated Discharge: Discharge potentially to [facility]    DVT prophylaxis  Patient is currently on warfarin     Pain  Neurontin 100 mg every 8 hours  Acetaminophen 975 mg 3 times a day as needed     Bladder plan  Continent     Bowel plan  Continent  Last BM on 9/2     Code Status  Level 1 full code      Above-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)  Assessment & Plan  Patient presents with an acute occlusion of the left EIA that was deemed nonsalvageable on the 6/7 and underwent a left femoral thrombectomy with AKA with vascular surgery\  As a follow-up on 7/10 the left AKA site was well-healed and staples were removed  Utilization of [employer] prosthetics for his equipment,  is currently available  Currently with phantom limb sensation but not painful and overall has been well-controlled  On warfarin now for the history of the LV thrombus that was the inciting event  PT, OT  Outpatient follow-up with amputee clinic with PM&R and vascular surgery    Patient incapable of making informed decisions  Assessment & Plan  Remote history of a TBI and prior strokes with comorbid psychiatric history  Evaluated by neuropsychology Dr. Yung in [place], PhD on 6/27/2024 and found to have \"diffuse cognitive dysfunction and on a measure assessing awareness of personal health status and ability to evaluate health problems, handle medical emergencies and take safety precautions, the patient performed in the impaired range of functioning.  During this encounter patient does not appear to have capacity to make fully informed medical decisions\"  8/27 patient assigned a guardianNain  Continue with disposition planning    Generalized abdominal pain  Assessment & Plan  Pt " refused therapy 8/25/24 due to abdominal pain in the Rt > Lt LQ.  He states that is has been present for a long time, about 2 weeks.  He denies nausea or vomiting. He has been having a bowel movement daily.  Labs unremarkable/stable.  KUB unremarkable.  Suspect musculoskeletal.  Comes and goes  Exam benign.    Postoperative anemia  Assessment & Plan  Continue to monitor postoperative anemia which has been stable but still in the low category  Most recent hemoglobin of 10.9 as of 5 days prior    History of migraine headaches  Assessment & Plan  Chronic migraines  Seem to be worse with increased irritability after discontinuing his gabapentin which was eventually resumed  Received Nurtec once during his stay with some effect  Sleep logs have been good in the rehab setting and discontinued    Cardiomyopathy (HCC)  Assessment & Plan  Echo on 6/10/2024 showing an LVEF of 40 to 45% with mild global hypokinesis  Status post NM stress test on 6/11/2024 showing a large mild fixed defect in the inferior wall possibly due to diaphragmatic attenuation artifact, there is a small area of partial reversibility in the inferior apical wall suggestive of ischemia  Evaluated by cardiology and etiology felt to be secondary to stress-induced cardiomyopathy with apical thrombus  Cardiac catheterization deferred given the lack of any significant ischemia and no current cardiac symptoms  Continue medical management aspirin, statin, beta-blocker and ARB  Monitor volume status  Follow-up with outpatient cardiology    At risk for venous thromboembolism (VTE)  Assessment & Plan  Fully anticoagulated on warfarin at this time for apical thrombus and history of embolic event in the leg leading to amputation    Impaired mobility and activities of daily living  Assessment & Plan  Patient was evaluated by the rehabilitation team MD and an appropriate candidate for acute inpatient rehabilitation program at this time.  The patient will tolerate 3  hours/day 5 to 7 days/week of intensive physical, occupational and speech therapy in order to obtain goals for community discharge  Due to the patient's functional Compared to their baseline level of function in addition to their ongoing medical needs, the patient would benefit from daily supervision from a rehabilitation physician as well as rehabilitation nursing to implement and adjust the medical as well as functional plan of care in order to meet the patient's goals.      History of traumatic brain injury  Assessment & Plan  Remote history with neurocognitive impairments, please see separate section    Carnitine deficiency (HCC)  Assessment & Plan  Continue carnitine replacement therapy    History of seizures  Assessment & Plan  Currently on Depakote ER, lamotrigine and zonisamide  [facility]    Left ventricular thrombus  Assessment & Plan  Visualized on echocardiogram on 6/10/2024 showing a spherical 1.5 cm x 1.3 cm thrombus at the LV apex  Started on Xarelto but not covered and other agents like Eliquis and Pradaxa likely because prohibitive  Currently on regimen of warfarin and was transitioned at the end of July 2024  Continue INR as per internal medicine recommendations with most recent INR of 2.63 as of 8/29  Current regimen is warfarin 2.5 mg on Fridays and 5 mg on the remainder of the week  Outpatient follow-up with cardiology    Benign essential hypertension  Assessment & Plan  Currently on Cozaar 50 mg daily as well as metoprolol succinate 25 mg daily  Monitor pressures during therapy sessions and on routine vitals    History of stroke  Assessment & Plan  History of an old left temporal and parietal lobe cortical infarcts also involving the insula and left occipital lobe as well as a small right posterior parietal lobe stroke  Stable on the most recent head imaging back in May 2024  Continue aspirin and statin for secondary stroke prophylaxis  Optimal blood pressure  "control  Continue to monitor overall neurostatus follow-up with neurology as an outpatient    Human immunodeficiency virus (HIV) infection (HCC)  Assessment & Plan  Continue current antiretroviral treatments-currently on Biktarvy  Consultation to internal medicine for any recommendations and will need to follow-up with ID as an outpatient  Medications have been managed through primary care/correctional facility in the past and has not formally seen ID previously-hold his ID office, he was seen Marianna Martin but I spoke with her nurse and verify that he was originally on New injections but lost to follow-up sometime ago.  Will continue with current regimen and will follow-up.  Appointment made on his behalf on September 24 at 1:35 PM at [facility] services    Bipolar affective disorder ([facility])  Assessment & Plan  Mood has been stable without any behavioral issues  Supportive counseling  Neuropsychology consultation placed while in [facility]  Psych evaluation completed on 8/12 and deemed stable for discharge with current medication regimen as below.  It also determined through neuropsychiatric evaluation not to have capacity to make medical decisions.  Guardian has been appointed  Currently on regimen of mirtazapine 15 mg nightly  Sertraline 75 mg daily  Lamictal 50 mg twice daily  Depakote ER 1250 mg daily  Follow-up with psychiatry as an outpatient or consider consultation if changing any medications          Appreciate IM consultants medical co-management.  Labs, medications, and imaging personally reviewed.      ROS:  A ten point review of systems was completed on 09/03/24 and pertinent positives are listed in subjective section. All other systems reviewed were negative.     OBJECTIVE:   /62 (BP Location: Right arm)   Pulse 89   Temp 97.8 °F (36.6 °C) (Tympanic)   Resp 20   Ht 5' 10\" (1.778 m)   Wt 88.7 kg (195 lb 9.6 oz)   SpO2 96%   BMI 28.07 kg/m²     Physical Exam  Vitals reviewed. "   Constitutional:       General: He is not in acute distress.  HENT:      Head: Normocephalic and atraumatic.      Right Ear: External ear normal.      Left Ear: External ear normal.      Nose: Nose normal. No rhinorrhea.      Mouth/Throat:      Mouth: Mucous membranes are moist.      Pharynx: Oropharynx is clear.   Eyes:      General: No scleral icterus.  Cardiovascular:      Rate and Rhythm: Normal rate.   Pulmonary:      Effort: Pulmonary effort is normal. No respiratory distress.   Abdominal:      General: There is no distension.      Palpations: Abdomen is soft.   Musculoskeletal:      Right lower leg: No edema.      Left lower leg: No edema.      Comments: Left AKA site healed no significant edema   Skin:     General: Skin is warm and dry.   Neurological:      General: No focal deficit present.      Mental Status: He is alert and oriented to person, place, and time.      Sensory: No sensory deficit.   Psychiatric:         Mood and Affect: Mood normal.         Behavior: Behavior normal.          Lab Results   Component Value Date    WBC 6.34 08/25/2024    HGB 10.9 (L) 08/25/2024    HCT 37.2 08/25/2024    MCV 96 08/25/2024     08/25/2024     Lab Results   Component Value Date    SODIUM 139 08/25/2024    K 4.7 08/25/2024     08/25/2024    CO2 27 08/25/2024    BUN 18 08/25/2024    CREATININE 0.94 08/25/2024    GLUC 97 08/25/2024    CALCIUM 9.5 08/25/2024     Lab Results   Component Value Date    INR 2.49 (H) 09/02/2024    INR 2.47 (H) 09/01/2024    INR 2.63 (H) 08/29/2024    PROTIME 26.8 (H) 09/02/2024    PROTIME 26.7 (H) 09/01/2024    PROTIME 27.9 (H) 08/29/2024           Current Facility-Administered Medications:     acetaminophen (TYLENOL) tablet 975 mg, 975 mg, Oral, TID PRN, Grant Parks DO, 975 mg at 09/03/24 0915    aspirin (ECOTRIN LOW STRENGTH) EC tablet 81 mg, 81 mg, Oral, Daily, Grant Parks DO, 81 mg at 09/03/24 0916    atorvastatin (LIPITOR) tablet 80 mg, 80 mg, Oral, Daily With  Dinner, Grant Parks DO, 80 mg at 09/02/24 1708    bictegravir-emtricitab-tenofovir alafenamide (BIKTARVY) -25 MG tablet 1 tablet, 1 tablet, Oral, Daily With Breakfast, Ashley Depadua, MD, 1 tablet at 09/03/24 0925    bisacodyl (DULCOLAX) rectal suppository 10 mg, 10 mg, Rectal, Daily PRN, Grant Parks DO    diphenhydrAMINE (BENADRYL) tablet 25 mg, 25 mg, Oral, Q6H PRN, Grant Parks DO    divalproex sodium (DEPAKOTE ER) 24 hr tablet 1,250 mg, 1,250 mg, Oral, Daily, Grant Parks DO, 1,250 mg at 09/03/24 0916    gabapentin (NEURONTIN) capsule 100 mg, 100 mg, Oral, Q8H, Grant Parks DO, 100 mg at 09/03/24 1402    lamoTRIgine (LaMICtal) tablet 50 mg, 50 mg, Oral, BID, Grant Parks DO, 50 mg at 09/03/24 0916    levOCARNitine (CARNITOR) tablet 330 mg, 990 mg/day, Oral, TID With Meals, Grant Parks DO, 330 mg at 09/03/24 1402    losartan (COZAAR) tablet 50 mg, 50 mg, Oral, Daily, Grant Parks, DO, 50 mg at 09/03/24 1008    melatonin tablet 6 mg, 6 mg, Oral, HS, Grant Parks DO, 6 mg at 09/02/24 2152    menthol-methyl salicylate (BENGAY) 10-15 % cream, , Apply externally, 4x Daily PRN, CHARLIE Giles, 1 Application at 09/03/24 1158    metoprolol succinate (TOPROL-XL) 24 hr tablet 25 mg, 25 mg, Oral, Daily, Grant Parks DO, 25 mg at 09/03/24 0916    mirtazapine (REMERON) tablet 15 mg, 15 mg, Oral, HS, Grant Parks DO, 15 mg at 09/02/24 2201    ondansetron (ZOFRAN-ODT) dispersible tablet 4 mg, 4 mg, Oral, Q6H PRN, Grant Parks DO    polyethylene glycol (MIRALAX) packet 17 g, 17 g, Oral, Daily PRN, Grant Parks DO    senna (SENOKOT) tablet 8.6 mg, 1 tablet, Oral, HS PRN, Grant Parks DO    sertraline (ZOLOFT) tablet 75 mg, 75 mg, Oral, Daily, Grant Parks DO, 75 mg at 09/03/24 0916    tamsulosin (FLOMAX) capsule 0.4 mg, 0.4 mg, Oral, Daily With Dinner, Grant Parks DO, 0.4 mg at 09/02/24 1708    warfarin (COUMADIN) tablet 2.5 mg, 2.5 mg, Oral, Weekly, Grant Parks DO, 2.5 mg  at 08/30/24 1819    warfarin (COUMADIN) tablet 5 mg, 5 mg, Oral, Once per day on Sunday Monday Tuesday Wednesday Thursday Saturday, Grant Parks DO, 5 mg at 09/02/24 1707    warfarin (COUMADIN) tablet 5 mg, 5 mg, Oral, Once (warfarin), Grant Parks DO    zonisamide (ZONEGRAN) capsule 400 mg, 400 mg, Oral, Daily, Grant Parks DO, 400 mg at 09/03/24 0915    Past Medical History:   Diagnosis Date    Acute lower limb ischemia 06/08/2024    Anxiety     Depression     HIV disease ([facility])     Substance abuse ([facility])     Suicide attempt ([facility])        Patient Active Problem List    Diagnosis Date Noted    Above-knee amputation of left lower extremity ([facility]) 07/06/2024    Patient incapable of making informed decisions 07/07/2024    Urinary urgency 09/03/2024    Generalized abdominal pain 08/25/2024    Postoperative anemia 08/11/2024    History of migraine headaches 07/22/2024    Cardiomyopathy ([facility]) 07/09/2024    Adjustment disorder with mixed anxiety and depressed mood 07/08/2024    Impaired mobility and activities of daily living 07/07/2024    At risk for venous thromboembolism (VTE) 07/07/2024    Acute pain 07/07/2024    At risk for constipation 07/07/2024    History of traumatic brain injury 07/06/2024    Carnitine deficiency ([facility]) 06/09/2024    Left ventricular thrombus 06/08/2024    History of seizures 06/08/2024    Tobacco abuse 10/26/2019    Cerebrovascular accident (CVA) ([facility]) 10/26/2019    Positive laboratory testing for human immunodeficiency virus ([facility]) 07/03/2017    Bipolar affective disorder ([facility]) 07/02/2017    Hypertension 02/27/2017    Human immunodeficiency virus (HIV) infection ([facility]) 02/16/2017    History of stroke 02/16/2017    Substance abuse ([facility]) 12/21/2013    Unspecified vitamin D deficiency 05/28/2010    Benign essential hypertension 02/25/2010          Grant Parks DO  Physical Medicine and Rehabilitation  [facility]    I have spent a total time of 55 minutes in caring for  this patient on the day of the visit/encounter including Instructions for management, Patient and family education, Importance of tx compliance, Counseling / Coordination of care, Documenting in the medical record, Reviewing / ordering tests, medicine, procedures  , and Communicating with other healthcare professionals .      ** Please Note:  voice to text software may have been used in the creation of this document. Although proof errors in transcription or interpretation are a potential of such software**

## 2024-09-03 NOTE — ASSESSMENT & PLAN NOTE
Complaints of urgency.  Obtain bladder scan once today.  Hx of prostate cancer with prostatectomy.  Consider obtaining UA.  Follow-up with Urology as outpatient.

## 2024-09-03 NOTE — ASSESSMENT & PLAN NOTE
Diagnosed in July 2019, follows with [facility] neurology outpatient  Continue home regimen with Depakote ER, Lamotrigine, and Zonegran.

## 2024-09-03 NOTE — ASSESSMENT & PLAN NOTE
ECHO 6/10/2024 = LVEF 40-45% with apical hypokinesis   Nuclear stress test 6/11, per Cardiology did not show any significant areas of ischemia.  Per Cardiology, etiology felt to be stress-induced cardiomyopathy.  Cardiac catheterization deferred given the lack of any significant ischemia, and no cardiac symptoms  Continue with medical management with aspirin, statin, beta-blocker, ARB  Remains euvolemic off of diuretics, continue to monitor volume status   Outpatient follow-up with Cardiology.

## 2024-09-03 NOTE — ASSESSMENT & PLAN NOTE
Had been on Biktarvy and Tivicay.  Primary discussed with ID over the weekend - Tivicay discontinued due to duplicate treatment.  Will need to follow-up with Marianna Martin at [facility] to determine treatment plan.

## 2024-09-03 NOTE — CASE MANAGEMENT
Case Management Discharge Planning Note    Patient name Sunil Patel  Location /-02 MRN [id number]  : 1960 Date 9/3/2024       Current Admission Date: 2024  Current Admission Diagnosis:Above-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)   Patient Active Problem List    Diagnosis Date Noted Date Diagnosed    Urinary urgency 2024     Generalized abdominal pain 2024     Postoperative anemia 2024     History of migraine headaches 2024     Cardiomyopathy (HCC) 2024     Adjustment disorder with mixed anxiety and depressed mood 2024     Impaired mobility and activities of daily living 2024     At risk for venous thromboembolism (VTE) 2024     Acute pain 2024     At risk for constipation 2024     Patient incapable of making informed decisions 2024     Above-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) 2024     History of traumatic brain injury 2024     Carnitine deficiency ([facility]) 2024     Left ventricular thrombus 2024     History of seizures 2024     Tobacco abuse 10/26/2019     Cerebrovascular accident (CVA) ([facility]) 10/26/2019     Positive laboratory testing for human immunodeficiency virus ([facility]) 2017     Bipolar affective disorder ([facility]) 2017     Hypertension 2017     Human immunodeficiency virus (HIV) infection ([facility]) 2017     History of stroke 2017     Substance abuse ([facility]) 2013     Unspecified vitamin D deficiency 2010     Benign essential hypertension 2010       LOS (days): 4  Geometric Mean LOS (GMLOS) (days):   Days to GMLOS:     OBJECTIVE:  Risk of Unplanned Readmission Score: 33.01         Current admission status: Inpatient Rehab   Preferred Pharmacy:    [facility] - [address] [place] [address] STREET  [address] [place] [id number]  Phone: [phone] Fax: [phone]    Primary Care Provider: CHARLIE Trevino    Primary  Insurance: MEDICARE  Secondary Insurance: [facility]    DISCHARGE DETAILS:                           Contacts  Patient Contacts: Nain Harden  Relationship to Patient:: Other (Comment) (Legal Guardian)  Contact Method: Phone  Phone Number: [phone]  Reason/Outcome: Continuity of Care, Emergency Contact, Discharge Planning    Requested Home Health Care         Is the patient interested in [facility] at discharge?: No    DME Referral Provided  Referral made for DME?: No    Other Referral/Resources/Interventions Provided:  Interventions: Transportation, [place] Issues  Referral Comments: Guardianship  Government Services:: Guardianship    Would you like to participate in our [facility] service program?  : Yes    Treatment Team Recommendation: Short Term Rehab  Discharge Destination Plan:: Short Term Rehab  Transport at Discharge : Wheelchair van        Transported by (Company and Unit #): [facility] [phone]  ETA of Transport (Date): 09/04/24  ETA of Transport (Time): 1030     Transfer Mode: Wheelchair  Accompanied by: Alone              Additional Comments: CM scheduled transport to [facility] for 10:30am 9/4. CM spoke with team re:DC and medications. Pt will be transferred with 60 days worth of meds. CM notified guardian of DC. Guardian met with pt earlier today and advised him that he would transfer tomorrow. Guardian will meet with pt at [facility] next week. CM updated team and leadership

## 2024-09-03 NOTE — PROGRESS NOTES
"   09/03/24 1230   Pain Assessment   Pain Assessment Tool 0-10   Pain Score No Pain   Restrictions/Precautions   Precautions Aphasia;Bed/chair alarms;Cognitive;Fall Risk;Supervision on toilet/commode   LLE Weight Bearing Per Order NWB  (AKA)   Braces or Orthoses   (refused L AKA )   Lifestyle   Autonomy \"It's like...shoo...shoo shoo\" pt states regarding potential DC tomorrow, overall he communicates that he feels that he wishes he was able to make DC arrangements himself   Oral Hygiene   Type of Assistance Needed Independent   Comment seated at sink   Oral Hygiene CARE Score 6   Grooming   Findings IND seated at sink   Shower/Bathe Self   Type of Assistance Needed Set-up / clean-up   Comment SB bed level   Shower/Bathe Self CARE Score 5   Upper Body Dressing   Type of Assistance Needed Set-up / clean-up   Comment WC   Upper Body Dressing CARE Score 5   Lower Body Dressing   Type of Assistance Needed Supervision   Comment Sup bed level for don pull up and scrub pants. Min cues for orientation of pants   Lower Body Dressing CARE Score 4   Putting On/Taking Off Footwear   Type of Assistance Needed Independent   Comment don sock supine and sneaker EOB   Putting On/Taking Off Footwear CARE Score 6   Roll Left and Right   Type of Assistance Needed Independent   Roll Left and Right CARE Score 6   Lying to Sitting on Side of Bed   Type of Assistance Needed Independent   Comment bed rail   Lying to Sitting on Side of Bed CARE Score 6   Bed-Chair Transfer   Type of Assistance Needed Set-up / clean-up   Comment sit pivot bed>WC   Chair/Bed-to-Chair Transfer CARE Score 5   Toileting   Findings Pt removed male pure wick to then complete washing, he refused to have purewick removed t/o the morning.   Cognition   Overall Cognitive Status Impaired   Arousal/Participation Alert;Cooperative   Attention Attends with cues to redirect   Orientation Level Oriented X4   Memory Decreased short term memory;Decreased recall of " precautions   Following Commands Follows one step commands with increased time or repetition   Comments flat/somewhat sad appearing at beginning of session, affect improved t/o session. pt finds intrinsic value from completing ADLs.   Activity Tolerance   Activity Tolerance Patient tolerated treatment well   Assessment   Treatment Assessment OT session focusing on ADL routine. Pts friend Mireya present t/o session which did seem to help pts mood, in addition to him finding intrinsic gratification from completing ADLs. There is potential for pt to DC to subacute tomorrow, will wait to hear from CM before discussing w/ pt. At end of session his guardian is present in room w/ pt and Mireya. OT to continue POC if he remains on unit.   Prognosis Good   Problem List Decreased endurance;Decreased mobility;Impaired balance;Decreased cognition;Impaired judgement;Decreased safety awareness;Pain   Plan   Treatment/Interventions ADL retraining;Functional transfer training;Therapeutic exercise;Endurance training;Cognitive reorientation;Patient/family training;Equipment eval/education;Compensatory technique education;Continued evaluation;Spoke to nursing   Progress Progressing toward goals   OT Therapy Minutes   OT Time In 1230   OT Time Out 1330   OT Total Time (minutes) 60   OT Mode of treatment - Individual (minutes) 60   OT Mode of treatment - Concurrent (minutes) 0   OT Mode of treatment - Group (minutes) 0   OT Mode of treatment - Co-treat (minutes) 0   OT Mode of Treatment - Total time(minutes) 60 minutes   OT Cumulative Minutes 195   Therapy Time missed   Time missed? No

## 2024-09-03 NOTE — ASSESSMENT & PLAN NOTE
Noted on echocardiogram 6/10/2024: spherical 1.5 x 1.3 cm thrombus at the LV apex   Initiated on AC with Xarelto however unable to verify insurance coverage - currently on Coumadin.  INR 2.49 on 9/2.  Continue Coumadin 2.5mg Fridays and 5mg all other days.  Repeat PT/INR in 1 week.

## 2024-09-03 NOTE — PROGRESS NOTES
"   09/03/24 1058   Therapy Time missed   Time missed? Yes   Amount of time missed 60   Time(s) multiple attempts made   (will reattempt after lunch)     Upon OT arrival pt in bed, appearing down. He is refusing further therapy participation at this time reporting, \"a bad day (mentally)\" in addition to hip pain. OT will re-attempt at 1230.  "

## 2024-09-03 NOTE — PROGRESS NOTES
24 0206   Pain Assessment   Pain Assessment Tool 0-10   Pain Score 10 - Worst Possible Pain   Pain Location/Orientation Orientation: Bilateral;Location: Hip;Location: Back;Location: Head   Effect of Pain on Daily Activities Tolerated ST tasks.   Patient's Stated Pain Goal No pain   Hospital Pain Intervention(s) Medication (See MAR);Emotional support   Restrictions/Precautions   Precautions Aphasia;Bed/chair alarms;Cognitive;Fall Risk;Supervision on toilet/commode   Weight Bearing Restrictions Yes   LLE Weight Bearing Per Order NWB   ROM Restrictions No   Comprehension   Comprehension (FIM) 3 - Understands basic info/conversation 50-74% of time   Expression   Expression (FIM) 3 - Expresses basic info/needs 50-74% of time   Social Interaction   Social Interaction (FIM) 5 - Interacts appropriately with others 90% of time   Problem Solving   Problem solving (FIM) 3 - Solves basic problmes 50-74% of time   Memory   Memory (FIM) 3 - Recognizes, recalls/performs 50-74%   Speech/Language/Cognition Assessmetn   Treatment Assessment Pt was awake and just receiving his breakfast tray as SLP entered. He requested to wait to eat breakfast and was agreeable to SLP session. He reported 10/10 pain level (initially stating  when asked pain level) for headache, hip and back pain. SLP discussed iPad for communication, but pt reported he did not want to use iPad this date. As SLP is new to pt's care, reviewed Ox and bio info. Pt demonstrated tangential speech and had to be redirected. Pt was oriented to name, , but min difficulty expressing age. Given binary choice, he was able to state his age. He was not oriented to time, despite independently looking at white board in room. SLP reviewed date, year, and time, but pt was unable to recall or provide after review. He was able to provide month, COURTNEY, and year given binary choices following review. He was also oriented to situation and city, but unable to recall place-  "improved given binary choices. Please note, pt became teary when discussing current deficits and difficulty with communication. Pt quickly calmed and asked what we were going to do during session. Pt participated in phrase completion and was able to verbally provide word to complete phrase in 17/20 trials independently, increasing to 20/20 given min-mod contextual cues or phonemic cues. He then participated in written phrase completion with multiple choice answers- he was able ID correct word in 6/10 trials independently, increasing to 10/10 given min-mod verbal cues. Of note, pt occasionally verbalized a correct response, but the word was not included in options (e.g. put the dress in the drawer, but drawer was not listed as an option). Reduced insight to errors, often stating \"that's what I said.\" Of note, pt demonstrated improved word finding and appropriate responses and reduced paraphasias as session progressed. Pt began to eat his breakfast. He demonstrated difficulty with expressing that he wanted more cream/sugar and coffee warmed up. He pointed at the coffee and stated \"hold it.\" He was able to answer yes/no question until SLP determined he wanted coffee warmed. Lastly, pt participated in divergent naming task. He was asked to name breakfast food and initially stated \"lasagna, italian food.\" SLP redirected him and he was able to name at least one item independently, increasing to 6 items given mod contextual cues and phrase or phonemic cues. He was able to name 4 animals independently, but min difficulty increasing this number despite contextual cues. Suspect increased fatigue towards end of session. Recommend continued SLP services to target cognitive linguistic skills and improve overall receptive/expressive language skills in order to functionally communicate wants/needs with a variety of communication partners and reduce caregiver burden.   SLP Therapy Minutes   SLP Time In 0930   SLP Time Out 1030 "   SLP Total Time (minutes) 60   SLP Mode of treatment - Individual (minutes) 60   SLP Mode of treatment - Concurrent (minutes) 0   SLP Mode of treatment - Group (minutes) 0   SLP Mode of treatment - Co-treat (minutes) 0   SLP Mode of Treatment - Total time(minutes) 60 minutes   SLP Cumulative Minutes 90   Therapy Time missed   Time missed? No

## 2024-09-03 NOTE — PLAN OF CARE
Problem: PAIN - ADULT  Goal: Verbalizes/displays adequate comfort level or baseline comfort level  Description: Interventions:  - Encourage patient to monitor pain and request assistance  - Assess pain using appropriate pain scale  - Administer analgesics based on type and severity of pain and evaluate response  - Implement non-pharmacological measures as appropriate and evaluate response  - Consider cultural and social influences on pain and pain management  - Notify physician/advanced practitioner if interventions unsuccessful or patient reports new pain  Outcome: Progressing     Problem: SKIN/TISSUE INTEGRITY - ADULT  Goal: Pressure injury heals and does not worsen  Description: Interventions:  - Implement low air loss mattress or specialty surface (Criteria met)  - Apply silicone foam dressing  - Instruct/assist with weight shifting every 15 min minutes when in chair   - Limit chair time to 2 hour intervals  - Use special pressure reducing interventions such as cushion when in chair   - Apply fecal or urinary incontinence containment device   - Perform passive or active ROM every day  - Turn and reposition patient & offload bony prominences every 2 hours   - Utilize friction reducing device or surface for transfers   - Consider consults to  interdisciplinary teams such as OT/PT  - Use incontinent care products after each incontinent episode such as depends; male purwick at hs  - Consider nutrition services referral as needed  Outcome: Progressing

## 2024-09-03 NOTE — CASE MANAGEMENT
Case Management Discharge Planning Note    Patient name Sunil Patel  Location /-02 MRN [id number]  : 1960 Date 9/3/2024       Current Admission Date: 2024  Current Admission Diagnosis:Above-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)   Patient Active Problem List    Diagnosis Date Noted Date Diagnosed    Urinary urgency 2024     Generalized abdominal pain 2024     Postoperative anemia 2024     History of migraine headaches 2024     Cardiomyopathy (HCC) 2024     Adjustment disorder with mixed anxiety and depressed mood 2024     Impaired mobility and activities of daily living 2024     At risk for venous thromboembolism (VTE) 2024     Acute pain 2024     At risk for constipation 2024     Patient incapable of making informed decisions 2024     Above-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) 2024     History of traumatic brain injury 2024     Carnitine deficiency ([facility]) 2024     Left ventricular thrombus 2024     History of seizures 2024     Tobacco abuse 10/26/2019     Cerebrovascular accident (CVA) ([facility]) 10/26/2019     Positive laboratory testing for human immunodeficiency virus ([facility]) 2017     Bipolar affective disorder ([facility]) 2017     Hypertension 2017     Human immunodeficiency virus (HIV) infection ([facility]) 2017     History of stroke 2017     Substance abuse ([facility]) 2013     Unspecified vitamin D deficiency 2010     Benign essential hypertension 2010       LOS (days): 4  Geometric Mean LOS (GMLOS) (days):   Days to GMLOS:     OBJECTIVE:  Risk of Unplanned Readmission Score: 33.01         Current admission status: Inpatient Rehab   Preferred Pharmacy:    [facility] - [address] [place] [address] STREET  [address] [place] [address]  Phone: [phone] Fax: [phone]    Primary Care Provider: CHARLIE Trevino    Primary  Insurance: MEDICARE  Secondary Insurance: [facility]    DISCHARGE DETAILS:       Contacts  Patient Contacts: Nain Harden  Relationship to Patient:: Other (Comment) (Legal Guardian)  Contact Method: Phone  Phone Number: [phone]  Reason/Outcome: Continuity of Care, Emergency Contact, Discharge Planning    Requested Home Health Care         Is the patient interested in [facility] at discharge?: No    DME Referral Provided  Referral made for DME?: No    Other Referral/Resources/Interventions Provided:  Interventions: Transportation,  Issues  Referral Comments: Guardianship  Government Services:: Guardianship    Would you like to participate in our [facility] service program?  : Yes    Treatment Team Recommendation: Short Term Rehab  Discharge Destination Plan:: Short Term Rehab  Transport at Discharge : Wheelchair van        Transported by (Company and Unit #): Bernice [phone]  ETA of Transport (Date): 09/04/24  ETA of Transport (Time): 1030     Transfer Mode: Wheelchair  Accompanied by: Alone              Additional Comments: CM scheduled transport to [facility] for 10:30am 9/4. CM spoke with team re:DC and medications. Pt will be transferred with 60 days worth of meds. CM notified guardian of DC. Guardian met with pt earlier today and advised him that he would transfer tomorrow. Guardian will meet with pt at [facility] next week. CM updated team and leadership    Accepting Facility Name, City & State : [facility]  Bqypk-[phone]  Obx-[phone]

## 2024-09-04 VITALS
WEIGHT: 195.6 LBS | HEIGHT: 70 IN | DIASTOLIC BLOOD PRESSURE: 71 MMHG | OXYGEN SATURATION: 95 % | HEART RATE: 71 BPM | TEMPERATURE: 97.5 F | BODY MASS INDEX: 28 KG/M2 | RESPIRATION RATE: 18 BRPM | SYSTOLIC BLOOD PRESSURE: 144 MMHG

## 2024-09-04 PROCEDURE — 99239 HOSP IP/OBS DSCHRG MGMT >30: CPT | Performed by: STUDENT IN AN ORGANIZED HEALTH CARE EDUCATION/TRAINING PROGRAM

## 2024-09-04 PROCEDURE — 99232 SBSQ HOSP IP/OBS MODERATE 35: CPT | Performed by: NURSE PRACTITIONER

## 2024-09-04 RX ADMIN — LOSARTAN POTASSIUM 50 MG: 50 TABLET, FILM COATED ORAL at 08:51

## 2024-09-04 RX ADMIN — ASPIRIN 81 MG: 81 TABLET, COATED ORAL at 08:52

## 2024-09-04 RX ADMIN — SERTRALINE HYDROCHLORIDE 75 MG: 50 TABLET ORAL at 08:51

## 2024-09-04 RX ADMIN — LAMOTRIGINE 50 MG: 25 TABLET ORAL at 08:49

## 2024-09-04 RX ADMIN — ZONISAMIDE 400 MG: 100 CAPSULE ORAL at 08:50

## 2024-09-04 RX ADMIN — BICTEGRAVIR SODIUM, EMTRICITABINE, AND TENOFOVIR ALAFENAMIDE FUMARATE 1 TABLET: 50; 200; 25 TABLET ORAL at 08:50

## 2024-09-04 RX ADMIN — METOPROLOL SUCCINATE 25 MG: 25 TABLET, FILM COATED, EXTENDED RELEASE ORAL at 08:50

## 2024-09-04 RX ADMIN — GABAPENTIN 100 MG: 100 CAPSULE ORAL at 06:20

## 2024-09-04 RX ADMIN — LEVOCARNITINE 330 MG: 330 TABLET ORAL at 08:56

## 2024-09-04 RX ADMIN — DIVALPROEX SODIUM 1250 MG: 500 TABLET, EXTENDED RELEASE ORAL at 08:49

## 2024-09-04 NOTE — PROGRESS NOTES
[facility]  Progress Note  Name: Sunil Patel I  MRN: [id number]  Unit/Bed#: -02 I Date of Admission: 8/30/2024   Date of Service: 9/4/2024 I Hospital Day: 5    Assessment & Plan   Urinary urgency  Assessment & Plan  Complaints of urgency/voiding small amounts.  Bladder scan of 37 on 9/3.  Hx of prostate cancer with prostatectomy.  Notices improvement of symptoms while standing to void.  Follow-up with Urology as outpatient.    Postoperative anemia  Assessment & Plan  Hgb currently 10.9.  Hgb 10-11 since admission  Iron panel normal  Likely postoperative ABLA  Monitor with routine labs     Cardiomyopathy (HCC)  Assessment & Plan  ECHO 6/10/2024 = LVEF 40-45% with apical hypokinesis   Nuclear stress test 6/11, per Cardiology did not show any significant areas of ischemia.  Per Cardiology, etiology felt to be stress-induced cardiomyopathy.  Cardiac catheterization deferred given the lack of any significant ischemia, and no cardiac symptoms  Continue with medical management with aspirin, statin, beta-blocker, ARB  Remains euvolemic off of diuretics, continue to monitor volume status   Outpatient follow-up with Cardiology.    Patient incapable of making informed decisions  Assessment & Plan  Seen by neuropsych on 6/27 and was deemed NOT to have medical decision making capacity  Re-evalauted on 8/20 and again deemed NOT to have medical decision making capacity   Guardian (Nain Harden) assigned on 8/27    History of traumatic brain injury  Assessment & Plan  Remote history of TBI, previously residing in a group home prior to retirement sentence.  Evaluated by neuropsychiatry on 6/27/24 and deemed NOT to have medical decision-making capacity  Neuropsychology re-evaluated pt on 8/20 and he does NOT have decision making capacity.   Guardian appointed on 8/27, Nain Harden    Carnitine deficiency (HCC)  Assessment & Plan  Continue levocarnitine 330 mg 3x daily with meals.    History of  seizures  Assessment & Plan  Diagnosed in July 2019, follows with [facility] neurology outpatient  Continue home regimen with Depakote ER, Lamotrigine, and Zonegran.    Left ventricular thrombus  Assessment & Plan  Noted on echocardiogram 6/10/2024: spherical 1.5 x 1.3 cm thrombus at the LV apex   Initiated on AC with Xarelto however unable to verify insurance coverage - currently on Coumadin.  INR 2.49 on 9/2.  Continue Coumadin 2.5mg Fridays and 5mg all other days.  Repeat PT/INR in 1 week.    Benign essential hypertension  Assessment & Plan  Continue current regimen Losartan 50 mg daily and Toprol-XL 25 mg daily.  Monitor BP with routine VS.    History of stroke  Assessment & Plan  History of CVA in the left MCA territory May 2018 with residual expressive aphasia  Continue ASA and atorvastatin    Human immunodeficiency virus (HIV) infection (HCC)  Assessment & Plan  Had been on Biktarvy and Tivicay.  Primary discussed with ID over the weekend - Tivicay discontinued due to duplicate treatment.  Primary team discussed with Marianna Martin at [facility] - continue Biktarvy and follow-up in next 2-3 weeks.    Bipolar affective disorder (HCC)  Assessment & Plan  Continue Zoloft 75mg daily and Remeron 15mg HS.    * Above-knee amputation of left lower extremity ([facility])  Assessment & Plan  Presented with left lower extremity acute limb ischemia/extensive left iliofemoral and left infrainguinal embolism requiring left femoral thrombectomy and subsequent AKA on 6/7/24 with Vascular Surgery  Continue with local wound care.  Continue ASA, Coumadin and statin.  Continue gabapentin 100mg TID.  Primary team following.  Continue PT/OT.         VTE Pharmacologic Prophylaxis:   Pharmacologic: Warfarin (Coumadin)  Mechanical VTE Prophylaxis in Place: Yes - sequential compression devices.    Current Length of Stay: 5 day(s)    Current Patient Status: Inpatient Rehab     Discharge Plan: As per primary team.    Code Status: Level 1 - Full  Code    Subjective:   Pt examined while pt sitting in bed in pt room.  Currently has no complaints.  Denies any pain.  Denies any headaches, lightheadedness, dizziness, SOB, palpitations, or CP.  Feels that his mood is better today.  Denies any urinary complaints and slept well last night.  Plans for discharge to SNF later this morning.  Has no concerns in regards to his discharge.    Objective:     Vitals:   Temp (24hrs), Av.7 °F (36.5 °C), Min:97.5 °F (36.4 °C), Max:97.9 °F (36.6 °C)    Temp:  [97.5 °F (36.4 °C)-97.9 °F (36.6 °C)] 97.5 °F (36.4 °C)  HR:  [71-89] 71  Resp:  [18-20] 18  BP: (106-144)/(61-71) 144/71  SpO2:  [95 %-98 %] 95 %  Body mass index is 28.07 kg/m².     Review of Systems   Constitutional:  Negative for appetite change, chills, fatigue and fever.   HENT:  Negative for trouble swallowing.    Eyes:  Negative for visual disturbance.   Respiratory:  Negative for cough, shortness of breath, wheezing and stridor.    Cardiovascular:  Negative for chest pain, palpitations and leg swelling.   Gastrointestinal:  Negative for abdominal distention, abdominal pain, constipation, diarrhea, nausea and vomiting.        LBM 9/3   Genitourinary:  Negative for difficulty urinating, dysuria, frequency and urgency.   Musculoskeletal:  Negative for arthralgias, back pain and gait problem.   Neurological:  Negative for dizziness, weakness, light-headedness and headaches.   Psychiatric/Behavioral:  Negative for dysphoric mood and sleep disturbance. The patient is not nervous/anxious.    All other systems reviewed and are negative.       Input and Output Summary (last 24 hours):       Intake/Output Summary (Last 24 hours) at 2024 0858  Last data filed at 2024 0629  Gross per 24 hour   Intake 480 ml   Output 1200 ml   Net -720 ml       Physical Exam:     Physical Exam  Vitals and nursing note reviewed.   Constitutional:       General: He is not in acute distress.     Appearance: Normal appearance. He is not  ill-appearing.   HENT:      Head: Normocephalic and atraumatic.   Cardiovascular:      Rate and Rhythm: Normal rate and regular rhythm.      Pulses: Normal pulses.      Heart sounds: Normal heart sounds. No murmur heard.     No friction rub.   Pulmonary:      Effort: Pulmonary effort is normal. No respiratory distress.      Breath sounds: Normal breath sounds. No wheezing or rhonchi.   Abdominal:      General: Abdomen is flat. Bowel sounds are normal. There is no distension.      Palpations: Abdomen is soft. There is no mass.      Tenderness: There is no abdominal tenderness. There is no guarding or rebound.      Hernia: No hernia is present.   Musculoskeletal:      Cervical back: Normal range of motion and neck supple. No tenderness.      Right lower leg: No edema.      Left lower leg: No edema.      Left Lower Extremity: Left leg is amputated above knee.   Skin:     General: Skin is warm and dry.      Capillary Refill: Capillary refill takes less than 2 seconds.   Neurological:      Mental Status: He is alert and oriented to person, place, and time.   Psychiatric:         Mood and Affect: Mood normal.         Behavior: Behavior normal.         Additional Data:     Labs:            Results from last 7 days   Lab Units 09/02/24  0606   INR  2.49*                   Labs reviewed    Imaging:    Imaging reviewed    Recent Cultures (last 7 days):           Last 24 Hours Medication List:   Current Facility-Administered Medications   Medication Dose Route Frequency Provider Last Rate    acetaminophen  975 mg Oral TID PRN Grant Parks, DO      aspirin  81 mg Oral Daily Grant Parks, DO      atorvastatin  80 mg Oral Daily With Dinner Grant Parks, DO      bictegravir-emtricitab-tenofovir alafenamide  1 tablet Oral Daily With Breakfast Ashley Depadua, MD      bisacodyl  10 mg Rectal Daily PRN Grant Parks, DO      diphenhydrAMINE  25 mg Oral Q6H PRN Grant Parks, DO      divalproex sodium  1,250 mg Oral Daily Grant DUMONT  Kasey, DO      gabapentin  100 mg Oral Q8H Grant TIM Edwarden, DO      lamoTRIgine  50 mg Oral BID Grant TIM Edwarden, DO      levOCARNitine  990 mg/day Oral TID With Meals Grnat Edwarden, DO      losartan  50 mg Oral Daily Grant TIM Edwarden, DO      melatonin  6 mg Oral HS Grant TIM Parks, DO      menthol-methyl salicylate   Apply externally 4x Daily PRN CHARLIE Giles      metoprolol succinate  25 mg Oral Daily Grant TIM Edwarden, DO      mirtazapine  15 mg Oral HS Grant TIM Edwarden, DO      ondansetron  4 mg Oral Q6H PRN Grant TIM Edwarden, DO      polyethylene glycol  17 g Oral Daily PRN Grant TIM Edwarden, DO      senna  1 tablet Oral HS PRN Grant TIM Edwarden, DO      sertraline  75 mg Oral Daily Grant TIM Edwarden, DO      tamsulosin  0.4 mg Oral Daily With Dinner Grant TIM Edwarden, DO      warfarin  2.5 mg Oral Weekly Grant Parks, DO      warfarin  5 mg Oral Once per day on Sunday Monday Tuesday Wednesday Thursday Saturday Grant Parks, DO      warfarin  5 mg Oral Once (warfarin) Grant S Parks, DO      zonisamide  400 mg Oral Daily Grant S Kasey, DO          M*Modal software was used to dictate this note.  It may contain errors with dictating incorrect words or incorrect spelling. Please contact the provider directly with any questions.

## 2024-09-04 NOTE — PLAN OF CARE
Problem: Prexisting or High Potential for Compromised Skin Integrity  Goal: Skin integrity is maintained or improved  Description: INTERVENTIONS:  - Identify patients at risk for skin breakdown  - Assess and monitor skin integrity  - Assess and monitor nutrition and hydration status  - Monitor labs   - Assess for incontinence   - Turn and reposition patient  - Assist with mobility/ambulation  - Relieve pressure over bony prominences  - Avoid friction and shearing  - Provide appropriate hygiene as needed including keeping skin clean and dry  - Evaluate need for skin moisturizer/barrier cream  - Collaborate with interdisciplinary team   - Patient/family teaching  - Consider wound care consult   Outcome: Completed     Problem: PAIN - ADULT  Goal: Verbalizes/displays adequate comfort level or baseline comfort level  Description: Interventions:  - Encourage patient to monitor pain and request assistance  - Assess pain using appropriate pain scale  - Administer analgesics based on type and severity of pain and evaluate response  - Implement non-pharmacological measures as appropriate and evaluate response  - Consider cultural and social influences on pain and pain management  - Notify physician/advanced practitioner if interventions unsuccessful or patient reports new pain  Outcome: Completed     Problem: INFECTION - ADULT  Goal: Absence or prevention of progression during hospitalization  Description: INTERVENTIONS:  - Assess and monitor for signs and symptoms of infection  - Monitor lab/diagnostic results  - Monitor all insertion sites, i.e. indwelling lines, tubes, and drains  - Monitor endotracheal if appropriate and nasal secretions for changes in amount and color  - [place] appropriate cooling/warming therapies per order  - Administer medications as ordered  - Instruct and encourage patient and family to use good hand hygiene technique  - Identify and instruct in appropriate isolation precautions for identified  infection/condition  Outcome: Completed  Goal: Absence of fever/infection during neutropenic period  Description: INTERVENTIONS:  - Monitor WBC    Outcome: Completed     Problem: SAFETY ADULT  Goal: Patient will remain free of falls  Description: INTERVENTIONS:  - Educate patient/family on patient safety including physical limitations  - Instruct patient to call for assistance with activity   - Consult OT/PT to assist with strengthening/mobility   - Keep Call bell within reach  - Keep bed low and locked with side rails adjusted as appropriate  - Keep care items and personal belongings within reach  - Initiate and maintain comfort rounds  - Make Fall Risk Sign visible to staff  - Offer Toileting every  Hours, in advance of need  - Initiate/Maintain alarm  - Obtain necessary fall risk management equipment:   - Apply yellow socks and bracelet for high fall risk patients  - Consider moving patient to room near nurses station  Outcome: Completed  Goal: Maintain or return to baseline ADL function  Description: INTERVENTIONS:  -  Assess patient's ability to carry out ADLs; assess patient's baseline for ADL function and identify physical deficits which impact ability to perform ADLs (bathing, care of mouth/teeth, toileting, grooming, dressing, etc.)  - Assess/evaluate cause of self-care deficits   - Assess range of motion  - Assess patient's mobility; develop plan if impaired  - Assess patient's need for assistive devices and provide as appropriate  - Encourage maximum independence but intervene and supervise when necessary  - Involve family in performance of ADLs  - Assess for home care needs following discharge   - Consider OT consult to assist with ADL evaluation and planning for discharge  - Provide patient education as appropriate  Outcome: Completed  Goal: Maintains/Returns to pre admission functional level  Description: INTERVENTIONS:  - Perform AM-PAC 6 Click Basic Mobility/ Daily Activity assessment daily.  - Set  and communicate daily mobility goal to care team and patient/family/caregiver.   - Collaborate with rehabilitation services on mobility goals if consulted  - Perform Range of Motion  times a day.  - Reposition patient every  hours.  - Dangle patient  times a day  - Stand patient  times a day  - Ambulate patient  times a day  - Out of bed to chair  times a day   - Out of bed for meals  times a day  - Out of bed for toileting  - Record patient progress and toleration of activity level   Outcome: Completed     Problem: DISCHARGE PLANNING  Goal: Discharge to home or other facility with appropriate resources  Description: INTERVENTIONS:  - Identify barriers to discharge w/patient and caregiver  - Arrange for needed discharge resources and transportation as appropriate  - Identify discharge learning needs (meds, wound care, etc.)  - Arrange for interpretive services to assist at discharge as needed  - Refer to Case Management Department for coordinating discharge planning if the patient needs post-hospital services based on physician/advanced practitioner order or complex needs related to functional status, cognitive ability, or social support system  Outcome: Completed     Problem: Knowledge Deficit  Goal: Patient/family/caregiver demonstrates understanding of disease process, treatment plan, medications, and discharge instructions  Description: Complete learning assessment and assess knowledge base.  Interventions:  - Provide teaching at level of understanding  - Provide teaching via preferred learning methods  Outcome: Completed     Problem: SKIN/TISSUE INTEGRITY - ADULT  Goal: Skin Integrity remains intact(Skin Breakdown Prevention)  Description: Assess:  -Perform Reyes assessment every   -Clean and moisturize skin every   -Inspect skin when repositioning, toileting, and assisting with ADLS  -Assess under medical devices such as  every   -Assess extremities for adequate circulation and sensation     Bed  Management:  -Have minimal linens on bed & keep smooth, unwrinkled  -Change linens as needed when moist or perspiring  -Avoid sitting or lying in one position for more than  hours while in bed  -Keep HOB at degrees     Toileting:  -Offer bedside commode  -Assess for incontinence every   -Use incontinent care products after each incontinent episode such as     Activity:  -Mobilize patient  times a day  -Encourage activity and walks on unit  -Encourage or provide ROM exercises   -Turn and reposition patient every  Hours  -Use appropriate equipment to lift or move patient in bed  -Instruct/ Assist with weight shifting every  when out of bed in chair  -Consider limitation of chair time  hour intervals    Skin Care:  -Avoid use of baby powder, tape, friction and shearing, hot water or constrictive clothing  -Relieve pressure over bony prominences using   -Do not massage red bony areas    Next Steps:  -Teach patient strategies to minimize risks such as    -Consider consults to  interdisciplinary teams such as   Outcome: Completed  Goal: Incision(s), wounds(s) or drain site(s) healing without S/S of infection  Description: INTERVENTIONS  - Assess and document dressing, incision, wound bed, drain sites and surrounding tissue  - Provide patient and family education  - Perform skin care/dressing changes every   Outcome: Completed  Goal: Pressure injury heals and does not worsen  Description: Interventions:  - Implement low air loss mattress or specialty surface (Criteria met)  - Apply silicone foam dressing  - Instruct/assist with weight shifting every  minutes when in chair   - Limit chair time to  hour intervals  - Use special pressure reducing interventions such as  when in chair   - Apply fecal or urinary incontinence containment device   - Perform passive or active ROM every   - Turn and reposition patient & offload bony prominences every  hours   - Utilize friction reducing device or surface for transfers   - Consider  consults to  interdisciplinary teams such as   - Use incontinent care products after each incontinent episode such as   - Consider nutrition services referral as needed  Outcome: Completed     Problem: MUSCULOSKELETAL - ADULT  Goal: Maintain or return mobility to safest level of function  Description: INTERVENTIONS:  - Assess patient's ability to carry out ADLs; assess patient's baseline for ADL function and identify physical deficits which impact ability to perform ADLs (bathing, care of mouth/teeth, toileting, grooming, dressing, etc.)  - Assess/evaluate cause of self-care deficits   - Assess range of motion  - Assess patient's mobility  - Assess patient's need for assistive devices and provide as appropriate  - Encourage maximum independence but intervene and supervise when necessary  - Involve family in performance of ADLs  - Assess for home care needs following discharge   - Consider OT consult to assist with ADL evaluation and planning for discharge  - Provide patient education as appropriate  Outcome: Completed  Goal: Maintain proper alignment of affected body part  Description: INTERVENTIONS:  - Support, maintain and protect limb and body alignment  - Provide patient/ family with appropriate education  Outcome: Completed     Problem: Potential for Falls  Goal: Patient will remain free of falls  Description: INTERVENTIONS:  - Educate patient/family on patient safety including physical limitations  - Instruct patient to call for assistance with activity   - Consult OT/PT to assist with strengthening/mobility   - Keep Call bell within reach  - Keep bed low and locked with side rails adjusted as appropriate  - Keep care items and personal belongings within reach  - Initiate and maintain comfort rounds  - Make Fall Risk Sign visible to staff  - Offer Toileting every  Hours, in advance of need  - Initiate/Maintain alarm  - Obtain necessary fall risk management equipment:   - Apply yellow socks and bracelet for  high fall risk patients  - Consider moving patient to room near nurses station  Outcome: Completed     Problem: MOBILITY - ADULT  Goal: Maintain or return to baseline ADL function  Description: INTERVENTIONS:  -  Assess patient's ability to carry out ADLs; assess patient's baseline for ADL function and identify physical deficits which impact ability to perform ADLs (bathing, care of mouth/teeth, toileting, grooming, dressing, etc.)  - Assess/evaluate cause of self-care deficits   - Assess range of motion  - Assess patient's mobility; develop plan if impaired  - Assess patient's need for assistive devices and provide as appropriate  - Encourage maximum independence but intervene and supervise when necessary  - Involve family in performance of ADLs  - Assess for home care needs following discharge   - Consider OT consult to assist with ADL evaluation and planning for discharge  - Provide patient education as appropriate  Outcome: Completed  Goal: Maintains/Returns to pre admission functional level  Description: INTERVENTIONS:  - Perform AM-PAC 6 Click Basic Mobility/ Daily Activity assessment daily.  - Set and communicate daily mobility goal to care team and patient/family/caregiver.   - Collaborate with rehabilitation services on mobility goals if consulted  - Perform Range of Motion  times a day.  - Reposition patient every  hours.  - Dangle patient  times a day  - Stand patient  times a day  - Ambulate patient  times a day  - Out of bed to chair  times a day   - Out of bed for meals times a day  - Out of bed for toileting  - Record patient progress and toleration of activity level   Outcome: Completed     Problem: GENITOURINARY - ADULT  Goal: Maintains or returns to baseline urinary function  Description: INTERVENTIONS:  - Assess urinary function  - Encourage oral fluids to ensure adequate hydration if ordered  - Administer IV fluids as ordered to ensure adequate hydration  - Administer ordered medications as  needed  - Offer frequent toileting  - Follow urinary retention protocol if ordered  Outcome: Completed

## 2024-09-04 NOTE — DISCHARGE INSTR - AVS FIRST PAGE
DISCHARGE INSTRUCTIONS: [facility] [place]    Bring these instructions with you to your Skilled Nursing Facility Provider so they can order and follow-up any additional lab work or imaging recommended at time of discharge.    Resume follow-up with all your prior providers that you have established care prior to this hospitalization.  Discuss with primary care physician (PCP) if you have additional questions.     You have been determined to have some cognitive impairments. - It is YOUR CAREGIVER'S RESPONSIBILITY to ensure appropriate follow-up which includes:  - APPOINTMENTS are scheduled and safe transportation is arranged  - LABS and IMAGING are completed  - MEDICATION MANAGEMENT at home is carried out appropriately     You remain a fall and injury risk which could be severe.  - Your risk of fall has decreased however since admission to acute rehab.  Caregiver training has been completed with our staff.  - Appropriate supervision +/- assistance as instructed during your rehab course is recommended to decrease risk of fall and injury.    - Continue skilled therapy as discussed after discharge to further decrease this risk    If you (or your health care proxy) have any questions or concerns regarding your acute rehabilitation stay including issues with medications, rehabilitation, and follow-up plan, please call:          [facility] at [place] at [phone]    Should you develop fevers, chills, new weakness, changes in sensation, difficulty speaking, facial weakness, confusion, shortness of breath, chest pain, or other concerning symptoms please call 911 and/or obtain transportation to nearest ER immediately.    Should you develop worsening pain, swelling, or drainage notify your surgeon right away or obtain transportation to nearest ER for evaluation.    Should you develop feelings of significant hopelessness, severe depression, severe  "anxiety, or suicide you should call 911 or obtain transportation to nearest ER.    24-7 Nationwide suicide and crisis lifeline call \"023\"  National Suicide Prevention Lifeline is 1-169.723.1792 and is available 24 hrs/7 days a week.   [facility] Crisis [phone] is available 24 hrs/7 days for mental health  [facility] Crisis [phone] is available 24 hrs/7 days for mental health   [facility] Crisis [phone]    PHYSICIANS to see:  Please see your doctors listed in the follow up providers section of your discharge paperwork, and take the discharge paperwork with you to your appointments.    LAB WORK recommended after discharge:  Follow-up lab work at discretion of your outpatient physicians to be determined at time of your future appointments.    IMAGING to follow-up:  Follow-up imaging as discussed with your recent physicians or at discretion of your outpatient physicians to be determined at time of your appointments.    SKIN CARE INSTRUCTIONS to follow:  Monitor incision(s) for increased redness, swelling, pain/tenderness, discharge/pus and promptly notify your surgeon should these develop.  - Should you develop significant pain, swelling, or drainage obtain transportation to nearest emergency room for immediate evaluation if unable to reach your surgeon promptly   - Should you develop uncontrolled pain, fever, chills, sweats, changes in strength, sensation, or color of this area obtain transportation to nearest emergency room for immediate evaluation.      Monitor skin for increased redness or breadown and promptly notify your physician should these develop    If instructed while in [facility] - be sure you stand +/- walk every 1-2 hours and if advised use appropriate supervision/assistance to optimally offload your buttock/sacral region.  While seated or lying in bed shift positions and from side to side often.  Can use barrier type cream such as Hydragaurd 2 times per " day and as needed.    Turn patient (yourself) fully every 2 hours while in bed.      WOUND CARE INSTRUCTIONS to follow:  Home health nursing will assist you with wound management.  You may contact them with issues as well once this service is set-up.    If [facility] is your provider their phone number is [phone].    If you are unable to get in touch with home health you may contact your  at [phone].      Due to the following you are at increased risk of skin breakdown/wounds/worsening wounds:  - Impaired mobility  - Medical co-morbidities    Buttocks/Sacrum  Turn as full as possible off sacrum/buttocks every 2 hours  Use Cushion while in chair or wheelchair  Weight shift every 10-15 minutes while in chair  Keep skin clean and dry as possible.  Remove wet or soiled clothing/linens promptly  Use barrier cream or similar 2 times per day as needed  Monitor skin for increased breakdown which you are at risk of and notify nursing, PCP, or other physician providers should this occur right away      WEIGHTBEARING/ACTIVITY PRECAUTIONS to follow:  Maintain NONWEIGHTBEARING in left leg/lower extremity    Driving restrictions:  You are recommended against driving until cleared by an outpatient physician and cleared through a formal driving evaluation.  Driving at current time can increase your risk of injury and can increase risk of injury of others.      **As outlined in [place] state law, healthcare personnel are required to report every person over 15 years of age diagnosed as having a condition that could impair their ability to drive, with the exception of medical condition expected to last less than 90 days (most commonly orthopedic fractures and post orthopedic surgery conditions without other co-morbidities).  Your medical condition hopefully will have adequately improved by that time but at this time it is not certain.      **You should NOT operate a motor vehicle while under the  influence of an opiate medication, muscle relaxant, or other sedative.  Doing so may lead to an accident resulting in serious injury or death to yourself or others.  You have agreed to avoid driving when under the influence of this medication.      Work restrictions:  You should NOT return to work (if working) until cleared by an outpatient physician.    You should not operate heavy machinery (if applicable) until cleared by an outpatient physician.      Alcohol restrictions:  You are recommended to not drink alcohol at this time unless cleared by an outpatient physician.  Drinking alcohol in your current functional condition can increase your risk of injury which could be severe.  Drinking alcohol given your current health problems can lead to increased medical complications which could be severe.    Combining alcohol with your current medications can increase your risk of injury which could be severe.      Smoking restrictions:  You are recommended to not smoke nicotine.  Smoking increases your risk of heart attack, stroke, emphysema/COPD, and lung cancer.      MEDICATIONS:  Please see a full list of your medications outlined in the After Visit Summary that is attached to these Discharge Instructions.  Please note changes may have been made to your medications please refer to your discharge paperwork for your current medications and take this list with you to all your doctors appointments for your doctors to review.  Please do not resume a home medication unless the medication reconciliation sheet indicates to do so, please do not assume that a medication that you were given a prescription for is the same as a medication you have at home based on both medications having the same name as dosages and frequency may have changed.      Unless specifically noted in your medication list provided to you in your discharge paper work do not resume prior vitamins, minerals, or supplements you may have been taking prior to  your hospitalization unless instructed by an outpatient physician in the future.  Discuss with your primary care at next visit if applicable.      Blood thinners prescribed to optimize long and short term health and decrease risk of complications but with risks related to bleeding and other complications.    Coumadin/Warfarin instructions:  You have been prescribed warfarin (coumadin).  This medication is used to prevent clots which can cause severe disability and even death.      Follow-up with cardiology and PCP after discharge from the hospital within next business day to ensure appropriate follow-up management of this medication.      You primary care physician will need to manage your warfarin dosing after discharge from the hospital.  Contact them within next business day to ensure appropriate follow-up management.       Call [facility] to locate the closest open outpatient lab center where you can have your lab work drawn.  Contact them at [phone]    Discharge prescription ordered by rehab attending physician based on recommendations by ***internal medicine team who has been managing patient's anticoagulation (warfarin) throughout entire acute rehab course is:    Warfarin 5mg every Monday, Tuesday, Wednesday, Thursday, Saturday and Sunday.  Please take this at 1800 each day  Warfarin 2.5 mg every Friday.  Please take this at 1800.    Warfarin is a strong anticoagulant (blood thinner).  It is being recommended for use by you (or your family) to decrease the risk of clotting which can be severely disabling and even life-threatening.  Even when provided as recommended it can cause severely disabling and even life-threatening bleeding.  Too much or too little warfarin further increases your risk of this medication causing serious and even life-threatening complications such as severe bleeding, clots, strokes and death.  With that said, at this time based on available evidence and  consensus agreement, your physicians recommend you take warfarin medication based on your overall risks and benefits in your specific medical situation.      Warfarin levels are measured by lab work called PT/INR.  Too high number or too low number further increases your risks.      Your current PT/INR lab goal is 2.0-3.0.         Aspirin instructions  TAKE aspirin daily unless instructed otherwise by a doctor.    This medication will need to be managed by your outpatient physician(s) after discharge.  Follow-up with the appropriate provider as soon as possible to ensure appropriate use.    This is a blood thinner.  It is being recommended for use by you (or your family) to decrease the risk of clotting which can be severely disabling and even life-threatening.  Even when provided as recommended it can cause severe disabling and even life-threatening bleeding.  Too much or too little of this blood thinner further increases your risk of this medication causing serious and even life-threatening complications such as severe bleeding, clots, strokes, and death.  With that said, at this time based on best available evidence and consensus agreement, your physicians recommend you take aspirin based on your overall risks and benefits in your specific medical situation.      If you (or your family/caregiver) notice black stools, bloody stools, vomit blood, develop new weakness, slurred speech, confusion or have any other concerning symptoms call 911 or obtain transportation to nearest emergency room immediately.     ---------------------------------------------------------------------------------------------    Sedating Medications with increased risk of complications:    This(these) medication(s) was(were) started prior to your acute rehabilitation course as recommended by your prior physicians.  It was continued during your acute rehabilitation course.  This(these) medication(s) will need to be managed by your outpatient  physician(s) after discharge.  Follow-up with the appropriate provider as soon as possible to ensure appropriate use.          Gabapentin has been used to help pain.  You tolerated this medication adequately during your recent hospital stay.     - Take 100 mg 3 times per day.  - Do not stop this medication abruptly as this can cause seizures.  - If stopping medication I would decrease by 1 pill every 3 days  - This medication can increase risk of confusion, fall, and injury although you did tolerate adequately during rehab course.    Psychotropic Medications (Medications intended to improve mental, cognitive, and functional health)  You were evaluated recently and found to have signs and symptoms of depression anxiety labile (unstable) mood insomnia (impaired sleep) that can negatively impact your function and quality of life.      Antidepressants and suicide risk  Prior to beginning of treatment new psychotropic medications risks and benefits and possible side effects including risk of suicidality related to treatment with antidepressants were reviewed with the patient. The patient verbalized understanding and agreement for treatment.     Depakote (Divalproex Sodium)/Valproate Management:  This medication has helped to improve your mood, function, and sleep. It is a mood stabilizer.  It can rarely cause serious liver failure as discussed with you.  You will need to obtain intermittent follow-up lab work to ensure your liver function enzymes are stable/normal.  Should you develop confusion, significant abdominal pain, significant increased swelling, or other concerning signs or symptoms obtain transportation to hospital for evaluation.  It can also rarely cause serious pancreatitis - seek prompt medical attention should you develop abdominal pain, nausea, vomiting, or other significant concerns.      MEDICAL MANAGEMENT AT HOME specific to you:    Hypertension Management:  Only take the medications prescribed for you  at time of discharge - overly high or low blood pressure increases your risk for health complications    Follow-up with PCP/family doctor regularly to ensure blood pressure remains adequately controlled.      Please check your blood pressure prior to taking your blood pressure medications and keep a log that you will bring with you to your follow-up doctors' appointments.    >Please contact your family doctor or cardiologist immediately for a blood pressure below 100/50 and do not take your blood pressure medications until speaking with them.  >Please contact your family doctor or cardiologist as soon as possible for blood pressure greater than 160/100.    Hx of elevated PSA and prostate cancer s/p Prostatectomy in 2020:  It has been sometime since he followed up with urology and you are recommended to follow-up with them after discharge from the hospital.  You have a history of prostate cancer status post prostatectomy back in 2020 and your last follow-up was in June 2022 and are overdue for a visit.    Acetaminophen (Tylenol) Dosing Warning:    You may have up to 3000 mg of acetaminophen (Tylenol) from all sources spread out over a 24 hours period.  Do not have more than that, as this can increase your risk of liver injury which can be serious.      Bowel management (constipation risk):  - Ideally you would have 1 well formed BM every 1-2 days.  Adjust bowel regimen based on that goal or as close to that as possible  - Start with taking miralax 1 time per day and senna twice daily if you become constipated   - If still constipated you can increase miralax to twice per day and if needed take either oral or by rectum dulcolax (but not at the same time)  - If unable to go for more than 4 days notify your physician for additional recommendations    - If you develop significant abdominal pain, nausea/vomiting, or other concerns seek medical attention right away.      For pain - try to use over the counter topicals  (creams/gels) as discussed or acetaminophen 1-2 regular or extra-strength Tylenol up to 2-3 times per day.  Could consider heat or ice as well maximum 20 minutes at a time.  Do not use heat or ice if using topicals at the same time.  Notify primary care and/or orthopedics for poorly controlled pain for additional recommendations.      NSAID Warning:  Please avoid NSAID (including but not limited to advil, aleve, motrin, naproxen, ibuprofen, mobic, meloxicam, diclofenac etc) medications as NSAID medications may increase your risk of bleeding (which can be life-threatening), stroke, worsening kidney disease, heart attack, developing/worsening GI ulcers, worsening heart failure, worsening liver (cirrhosis) disease, potentially delay bone healing.      Please note a summary of your hospital stay with relevant information for your doctors will try to be sent to them.  Please confirm with your doctors at your follow up visits that they have received this summary and have them contact [facility] Medical Records if they have not received them along with any other medical records they may require.     [facility] Phone Number:  [phone]

## 2024-09-04 NOTE — ASSESSMENT & PLAN NOTE
Remote history of TBI, previously residing in a group home prior to nursing home sentence.  Evaluated by neuropsychiatry on 6/27/24 and deemed NOT to have medical decision-making capacity  Neuropsychology re-evaluated pt on 8/20 and he does NOT have decision making capacity.   Guardian appointed on 8/27, Nain Harden

## 2024-09-04 NOTE — PCC NURSING
HPI: Sunil Patel is a 63 y.o. male who  has a past medical history of Acute lower limb ischemia (06/08/2024), Anxiety, Depression, HIV disease (HCC), Substance abuse (HCC), and Suicide attempt (HCC). who [facility] the [facility] for increased LLE swelling and pain and was found to have a left external iliac artery occlusion and acute limb ischemia. 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 Patient had TTE on 6/10/24 showing LV apex thrombus. On 6/11/24 patient had pharmacologic nuclear stress test/SPECT scan which did not show any significant areas of ischemia with EF 40-45% and recommended continuing A/C for LV apical thrombus. His course was complicated by b/l buttock unstageable pressure ulcers, urinary and fecal incontinence, pain, and significant decline in ADLs and mobility. Patient has been continued on Xarelto for anticoagulation, as well as ASA and statin. Patient has a history of TBI, with baseline nonfluent aphasia and forgetfulness. He was evaluated by neuropsychology on 6/27/24, and deemed to not have capacity to make fully informed medical decisions. Therefore, the guardianship process was initiated as of 7/5/24. He was admitted to the [facility] on 7/6.  During his stay, he was formally evaluated and deemed not able to make his own medical decisions and was appointed a guardian officially on 8/27.  He was transferred to the [facility] on 8/30/2024 for ongoing care, therapies and disposition planning.    Pain is managed with Tylenol 975 mg TID prn, Neurontin 100 mg every 8 hours, and Bengay cream 4x daily prn. HIV managed with Biktarvy -25 mg daily. HTN managed with Cozaar 50 mg daily and Metoprolol XL 25 mg daily. Insomnia managed with  Melatonin 6 mg at bedtime. Left AKA managed with ASA 81 mg, Coumadin 5 mg daily, Lipitor 80 mg daily, and Neurontin 100 mg every 8 hours. Urinary urgency managed with Male Purewick at HS and Flomax 0.4 mg daily. Cardiomyopathy managed with ASA 81 mg, Lipitor 80 mg daily, Cozaar 50 mg daily and Metoprolol XL 25 mg daily. Carnitine deficiency managed with Carnitor 330 mg TID. History of seizures managed with Depakote Er 1,250 mg daily, Lamictal 50 mg BID, and Zonegran 400 mg daily. 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 Bipolar affective disorder managed with Zoloft 75 mg daily and Remeron 15 mg at bedtime.     This week we will monitor vital signs and lab values. We will manage pain so that patient may perform optimally in therapy sessions. We will educate patient on importance of turning and repositioning to off load pressure to prevent skin break down. We will keep patient safe from falls by continuing with hourly rounding and making sure call bell is within reach. We will teach patient energy conservation and promote independence of ADLs.

## 2024-09-04 NOTE — DISCHARGE SUMMARY
Discharge Summary   Sunil Patel 63 y.o. adult MRN: [id number]  Unit/Bed#: -02 Encounter: [id number]    Admission Date: 8/30/2024     Discharge Date: 9/04/2024    Rehabilitation Diagnosis: Impairment of mobility, safety and Activities of Daily Living (ADLs) due to Amputation:  05.3  Unilateral Lower Limb Above the Knee     HPI: Sunil Patel is a 63 y.o. male who  has a past medical history of Acute lower limb ischemia (06/08/2024), Anxiety, Depression, HIV disease (HCC), Substance abuse (HCC), and Suicide attempt (HCC). who presented to the [facility] on 6/7/24 from residential for increased LLE swelling and pain and was found to have a left external iliac artery occlusion and acute limb ischemia. 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 Patient had TTE on 6/10/24 showing LV apex thrombus. On 6/11/24 patient had pharmacologic nuclear stress test/SPECT scan which did not show any significant areas of ischemia with EF 40-45% and recommended continuing A/C for LV apical thrombus. His course was complicated by b/l buttock unstageable pressure ulcers, urinary and fecal incontinence, pain, and significant decline in ADLs and mobility. Patient has been continued on Xarelto for anticoagulation, as well as ASA and statin. Patient has a history of TBI, with baseline nonfluent aphasia and forgetfulness. He was evaluated by neuropsychology on 6/27/24, and deemed to not have capacity to make fully informed medical decisions. Therefore, the guardianship process was initiated as of 7/5/24. He was admitted to the [facility] on 7/6.  During his stay, he was formally evaluated and deemed not able to make his own medical decisions and was appointed a guardian officially on 8/27.  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)  Assessment & Plan  Patient presents with an acute occlusion of the left EIA that was deemed nonsalvageable on the 6/7 and underwent a left femoral thrombectomy with AKA with vascular surgery\  As a follow-up on 7/10 the left AKA site was well-healed and staples were removed  Utilization of [employer] prosthetics for his equipment,  is currently available  Currently with phantom limb sensation but not painful and overall has been well-controlled  On warfarin now for the history of the LV thrombus that was the inciting event  PT, OT  Outpatient follow-up with amputee clinic with PM&R and vascular surgery    Patient incapable of making informed decisions  Assessment & Plan  Remote history of a TBI and prior strokes with comorbid psychiatric history  Evaluated by neuropsychology Dr. Yung in [place], PhD on 6/27/2024 and found to have \"diffuse cognitive dysfunction and on a measure assessing awareness of personal health status and ability to evaluate health problems, handle medical emergencies and take safety precautions, the patient performed in the impaired range of functioning.  During this encounter patient does not appear to have capacity to make fully informed medical decisions\"  8/27 patient assigned a guardianNain  Continue with disposition planning    Generalized abdominal pain  Assessment & Plan  Pt refused therapy 8/25/24 due to abdominal pain in the Rt > Lt LQ.  He states that is has been present for a long time, about 2 weeks.  He denies nausea or vomiting. He has been having a bowel movement daily.  Labs unremarkable/stable.  KUB unremarkable.  Suspect musculoskeletal.  Comes and goes  Exam benign.    Postoperative anemia  Assessment & Plan  Continue to " monitor postoperative anemia which has been stable but still in the low category  Most recent hemoglobin of 10.9 as of 5 days prior    History of migraine headaches  Assessment & Plan  Chronic migraines  Seem to be worse with increased irritability after discontinuing his gabapentin which was eventually resumed  Received Nurtec once during his stay with some effect  Sleep logs have been good in the rehab setting and discontinued    Cardiomyopathy (HCC)  Assessment & Plan  Echo on 6/10/2024 showing an LVEF of 40 to 45% with mild global hypokinesis  Status post NM stress test on 6/11/2024 showing a large mild fixed defect in the inferior wall possibly due to diaphragmatic attenuation artifact, there is a small area of partial reversibility in the inferior apical wall suggestive of ischemia  Evaluated by cardiology and etiology felt to be secondary to stress-induced cardiomyopathy with apical thrombus  Cardiac catheterization deferred given the lack of any significant ischemia and no current cardiac symptoms  Continue medical management aspirin, statin, beta-blocker and ARB  Monitor volume status  Follow-up with outpatient cardiology    At risk for venous thromboembolism (VTE)  Assessment & Plan  Fully anticoagulated on warfarin at this time for apical thrombus and history of embolic event in the leg leading to amputation    Impaired mobility and activities of daily living  Assessment & Plan  Patient was evaluated by the rehabilitation team MD and an appropriate candidate for acute inpatient rehabilitation program at this time.  The patient will tolerate 3 hours/day 5 to 7 days/week of intensive physical, occupational and speech therapy in order to obtain goals for community discharge  Due to the patient's functional Compared to their baseline level of function in addition to their ongoing medical needs, the patient would benefit from daily supervision from a rehabilitation physician as well as rehabilitation nursing  to implement and adjust the medical as well as functional plan of care in order to meet the patient's goals.      History of traumatic brain injury  Assessment & Plan  Remote history with neurocognitive impairments, please see separate section    Carnitine deficiency (HCC)  Assessment & Plan  Continue carnitine replacement therapy    History of seizures  Assessment & Plan  Currently on Depakote ER, lamotrigine and zonisamide  Follow-up outpatient with [facility] neurology    Left ventricular thrombus  Assessment & Plan  Visualized on echocardiogram on 6/10/2024 showing a spherical 1.5 cm x 1.3 cm thrombus at the LV apex  Started on Xarelto but not covered and other agents like Eliquis and Pradaxa likely because prohibitive  Currently on regimen of warfarin and was transitioned at the end of July 2024  Continue INR as per internal medicine recommendations with most recent INR of 2.63 as of 8/29  Current regimen is warfarin 2.5 mg on Fridays and 5 mg on the remainder of the week  Outpatient follow-up with cardiology    Benign essential hypertension  Assessment & Plan  Currently on Cozaar 50 mg daily as well as metoprolol succinate 25 mg daily  Monitor pressures during therapy sessions and on routine vitals    History of stroke  Assessment & Plan  History of an old left temporal and parietal lobe cortical infarcts also involving the insula and left occipital lobe as well as a small right posterior parietal lobe stroke  Stable on the most recent head imaging back in May 2024  Continue aspirin and statin for secondary stroke prophylaxis  Optimal blood pressure control  Continue to monitor overall neurostatus follow-up with neurology as an outpatient    Human immunodeficiency virus (HIV) infection (HCC)  Assessment & Plan  Continue current antiretroviral treatments-currently on Biktarvy  Consultation to internal medicine for any recommendations and will need to follow-up with ID as an outpatient  Medications have been managed  through primary care/correctional facility in the past and has not formally seen ID previously-hold his ID office, he was seen Marianna Martin but I spoke with her nurse and verify that he was originally on New injections but lost to follow-up sometime ago.  Will continue with current regimen and will follow-up.  Appointment made on his behalf on September 24 at 1:35 PM at [facility]    Bipolar affective disorder ([facility])  Assessment & Plan  Mood has been stable without any behavioral issues  Supportive counseling  Neuropsychology consultation placed while in [facility]  Psych evaluation completed on 8/12 and deemed stable for discharge with current medication regimen as below.  It also determined through neuropsychiatric evaluation not to have capacity to make medical decisions.  Guardian has been appointed  Currently on regimen of mirtazapine 15 mg nightly  Sertraline 75 mg daily  Lamictal 50 mg twice daily  Depakote ER 1250 mg daily  Follow-up with psychiatry as an outpatient or consider consultation if changing any medications        Discharge Physical Examination:    Vitals:    09/03/24 1526 09/03/24 2001 09/04/24 0629 09/04/24 0847   BP: 112/64 126/61 119/70 144/71   BP Location: Left arm Right arm Left arm    Pulse: 85 89 85 71   Resp: 20 18 18    Temp: 97.6 °F (36.4 °C) 97.9 °F (36.6 °C) 97.5 °F (36.4 °C)    TempSrc: Tympanic Tympanic Tympanic    SpO2: 97% 98% 95%    Weight:       Height:         Physical Exam  Vitals reviewed.   Constitutional:       General: He is not in acute distress.  HENT:      Head: Normocephalic and atraumatic.      Right Ear: External ear normal.      Left Ear: External ear normal.      Nose: Nose normal. No rhinorrhea.      Mouth/Throat:      Mouth: Mucous membranes are moist.      Pharynx: Oropharynx is clear.   Eyes:      General: No scleral icterus.  Cardiovascular:      Rate and Rhythm: Normal rate.   Pulmonary:      Effort: Pulmonary effort is normal. No respiratory  distress.   Abdominal:      General: There is no distension.      Palpations: Abdomen is soft.   Musculoskeletal:      Right lower leg: No edema.      Left lower leg: No edema.      Comments: Left AKA site healed with no edema   Skin:     General: Skin is warm and dry.   Neurological:      General: No focal deficit present.      Mental Status: He is alert and oriented to person, place, and time.      Sensory: No sensory deficit.      Comments: Some difficulty with speech and cognitive deficits overt at times, perseverates   Psychiatric:         Mood and Affect: Mood normal.         Behavior: Behavior normal.           Acute Rehabilitation Center Course: Patient participated in a comprehensive interdisciplinary inpatient rehabilitation program which included involvment of Rehab MD, therapies (PT, OT, and/or SLP), RN, CM    Significant Findings, Care, Treatment and Services Provided: Acute comprehensive interdisciplinary inpatient rehabilitation including PT, OT, SLP, RN, CM.    Functional Status Upon Admission to [facility]:  PT: Ind bed mobility, incidental touch transfers, Ind wheelchair mobility 500', Ind ramp  OT: Ind eating, Ind grooming, Sup bathing, Ind UB dressing, Sup LB dressing, Sup toileting, Sup tub/shower transfer, Sup toilet transfers   SLP: Expressive and receptive language skill impairment - difficulty with verbal expression, written expression, auditory comprehension, reading comprehension. Still impairments cognitively, but difficult to tease through given speech difficulties. Jignesh comprehension, Sup social interaction, modA expression, memory, and problem solving.     Functional Status Upon Discharge from [facility]:  Independent for bed mobility and supervision for transfers, independent with wheelchair mobility 500 feet plus  Independent for eating and grooming supervision for bathing, independent for upper body ADLs and supervision to set up for lower body ADLs and toileting  Expressive and receptive  language skill impairments some difficulty with expression, auditory comprehension and reading comprehension.  Cognitive impairments with speech deficits as well, moderate assistance for expression and memory and problem-solving    Discharge Diagnosis: Impairment of mobility, safety and Activities of Daily Living (ADLs) due to Amputation:  05.3  Unilateral Lower Limb Above the Knee    Discharge Medications:   See after visit summary for reconciled discharge medications provided to patient and family.      Condition at Discharge: good     Discharge instructions/Information to patient and family:   See after visit summary for information provided to patient and family.      Provisions for Follow-Up Care:  See after visit summary for information related to follow-up care and any pertinent home health orders.      No future appointments.    Disposition:  [facility]    Planned Readmission: No    Discharge Statement   I spent 65 minutes discharging the patient. This time was spent on the day of discharge. I had direct contact with the patient on the day of discharge. Greater than 50% of the total time was spent examining patient, answering all patient questions, arranging and discussing plan of care with patient as well as directly providing post-discharge instructions. Additional time then spent on discharge activities.    Discharge Medications:  See after visit summary for reconciled discharge medications provided to patient and family.      Facility Administered Medications Prior to Discharge:    Current Facility-Administered Medications   Medication Dose Route Frequency Provider Last Rate    acetaminophen  975 mg Oral TID PRN Grant Parks, DO      aspirin  81 mg Oral Daily Grant aPrks, DO      atorvastatin  80 mg Oral Daily With Dinner Grant Parks, DO      bictegravir-emtricitab-tenofovir alafenamide  1 tablet Oral Daily With Breakfast Ashley Depadua, MD      bisacodyl  10 mg Rectal  Daily PRN Grant TIM Parks, DO      diphenhydrAMINE  25 mg Oral Q6H PRN Grant S Parks, DO      divalproex sodium  1,250 mg Oral Daily Grant TIM Parks, DO      gabapentin  100 mg Oral Q8H Grant TIM Parks, DO      lamoTRIgine  50 mg Oral BID Grant S Parks, DO      levOCARNitine  990 mg/day Oral TID With Meals Grant TIM Parks, DO      losartan  50 mg Oral Daily Grant TIM Parks, DO      melatonin  6 mg Oral HS Grant S Parks, DO      menthol-methyl salicylate   Apply externally 4x Daily PRN Herman Olea CRZAFAR      metoprolol succinate  25 mg Oral Daily Grant S Parks, DO      mirtazapine  15 mg Oral HS Grant S Parks, DO      ondansetron  4 mg Oral Q6H PRN Grant TIM Parks, DO      polyethylene glycol  17 g Oral Daily PRN Grant TIM Parks, DO      senna  1 tablet Oral HS PRN Grant S Parks, DO      sertraline  75 mg Oral Daily Grant TIM Parks, DO      tamsulosin  0.4 mg Oral Daily With Dinner Grant Parks, DO      warfarin  2.5 mg Oral Weekly Grant Parks, DO      warfarin  5 mg Oral Once per day on Sunday Monday Tuesday Wednesday Thursday Saturday Grant Parks, DO      warfarin  5 mg Oral Once (warfarin) Grant Parks, DO      zonisamide  400 mg Oral Daily Grant Parks, DO       Grantliz Parks, DO  Physical Medicine and Rehabilitation  [facility]

## 2024-09-04 NOTE — ASSESSMENT & PLAN NOTE
Had been on Biktarvy and Tivicay.  Primary discussed with ID over the weekend - Tivicay discontinued due to duplicate treatment.  Primary team discussed with Marianna Martin at [facility] - continue Biktarvy and follow-up in next 2-3 weeks.

## 2024-09-04 NOTE — NURSING NOTE
Pt transferred to [facility], Nursing report called to Donna RN at the facility. PT has all belongings with him, is transported via wheelchair van. Chart was copied and sent along with the pt.

## 2024-09-04 NOTE — ASSESSMENT & PLAN NOTE
Complaints of urgency/voiding small amounts.  Bladder scan of 37 on 9/3.  Hx of prostate cancer with prostatectomy.  Notices improvement of symptoms while standing to void.  Follow-up with Urology as outpatient.

## 2024-09-05 NOTE — SPEECH THERAPY NOTE
SLP Discharge Summary    Pt was discharged to subacute level of care for continued therapies on 9/4/2024. It was noted that pt did have prolonged course while on the acute rehab center where the purpose of ST services was to target pt's overall language and at time cognitive linguistic skills. Pt with a history of TBI, with baseline nonfluent aphasia and forgetfulness. He was admitted to the [facility] on 7/6. Pt has been followed for ongoing skilled SLP services given maintenance of language and cognitive linguistic skills while on the [facility]. Pt has now been transitioned to [facility] for continued skilled services in which recommendations are for continued maintenance of cognitive and language skills. Overall pt's language and cognitive skills were mod A overall, improving to min A at times.    Summarization of stay is as follows:    Pt with hx stroke which resulted in expressive and receptive aphasia but noting expressive communication skills to be impacted. SLP was consulted to support pt's communication skills for improved ability to express his needs with the team. Pt presented with overall moderately impaired expressive and receptive language deficits, impacting functional communication skills. Pt had trialed written communication, however, this was found to be an ineffective communication modality. Pt had also been introduced to a low tech manual communication board, which he had demonstrated ability to utilize in order to convey basic wants/needs more easily and effectively. Pt remained with deficits in expressive and receptive language skills,which is pt's baseline prior to admission to this unit, but which impact all domains of language (auditory & reading comprehension; verbal & written expression). However sessions had focused on introducing a high tech communication device as per pt's request. Pt's friend provided a tablet and recent session has focused on exploring different communication apps which  fit pt's needs (function, cost, etc), as well as apps which pt would complete language tasks in his free time. Pt had demonstrated desire to utilize device, but will benefit from additional training and practice with device, especially due to continued deficits in comprehension, ST memory/recall, problem solving and attention. This week, plan to continue to focus on determining appropriate apps for language drills/activities, as well as identifying an appropriate alejandra to support expressive language/communication, while also programming alejandra for pt's specific needs.  Pt continued to be followed for language and communication therapy where he is making slow progress, however, ST primarily being implemented to support current functional language as pt with baseline aphasia. Pt remained limited by deficits in expressive and receptive language skills, to include verbal expression, written expression, auditory comprehension and reading comprehension, which impact overall functional communication skills. Additionally, a cognitive linguistic evaluation was completed this week, as per medical team request. Pt completed the SLUMS assessment with a score of 1/30 which as compared to those with high school education correlates with severe neurocognitive disorder, however, this score is likely highly impacted by pt's language deficits and should be interpreted with caution. Cognitive linguistic deficits include decreased: attention, STM recall, problem solving, executive functions, safety awareness, reasoning, and visuospatial skills. The following interventions are used to target these barriers, including visual orientation checklist, verbal problem solving task, verbal working memory tasks, categorization tasks , picture problem solving activities, verbal reasoning tasks, visual attention tasks, and family education/training. basic communication boards, verbal command following activities, written command following activities,  biographical yes/no questions, simple yes/no questions, moderate yes/no questions, visual/receptive object ID tasks, picture object ID tasks, automatic speech: counting, automatic speech: COURTNEY, automatic speech: CORRY, object naming tasks, simple phrase completion tasks, reading comprehension at word level, reading comprehension at phrase level , written expression of biographical information, written expression at word level, and word-picture matching. Plan to target cognitive linguistic skills (basic problem solving, memory, safety) and language skills across all language domains this coming week. Continued language barriers which present include: decreased higher level auditory comprehension skills, expressive language skills including decreased overall communicative intent, reading comprehension skills including lengthier written information, and written language skills including difficulty in basic written expression including biographical information which still impacts pt's overall safety, functional cognitive communication skills as well as functional mobility. However, still incorporating the use of tablet w/ free apps which have been provided by ST team. Of SLP had introduced [employer] website and practive therapy materials for pt. Unfortunately this is not allowed on tablet but is for computer use. SLP did have pt complete tasks w/ use of laptop which pt responded favorably. Will plan to reach out to company to determine if the website can be utilized to pt's personal tablet. Continued cognitive barriers which present include: decreased visual attention, ST memory recall , reasoning, sequencing, judgement, and insight, which still impacts pt's overall safety, functional cognitive communication skills as well as functional mobility. The following interventions are used to target these barriers, including visual orientation checklist, drawing conclusions activities, categorization tasks , picture problem  solving activities, visual retraining activities., visual attention tasks, and functional reading tasks . Plan on continuing to incorporate pt's tablet for use of communication modality and for language tasks. Plan to primarily target verbal expression, auditory comprehension and reading comprehension skills. Additionally targeted increasing pt's knowledge and use of word retrieval strategies to facilitate improved expression. Plan to primarily target verbal expression and auditory comprehension.    Now since ot has transitioned to subacute level of care, pt would benefit from maintenance of speech services to continue to target above skills which were being targeted while on the acute rehab center for maximizing overall functional communication skills.         The following interventions are used to target these barriers, including basic communication boards, yes/no visual sheet, verbal command following activities, written command following activities, biographical yes/no questions, simple yes/no questions, moderate yes/no questions, visual/receptive object ID tasks, picture object ID tasks, object naming tasks, opposite phrase completion tasks, simple phrase completion tasks, reading comprehension at word level, written expression of biographical information, word-picture matching, divergent naming: concrete, and word deduction with phrase. While pt's language deficits are primarily being targeted, the following interventions are also being used to address these cognitive linguistic deficits.     Pt is currently functioning at min A for comprehension, supervision for social interaction and mod A for expression, memory and problem solving. This week, will plan on continuing to incorporate pt's tablet for use of communication modality and for language tasks. Plan to primarily target verbal expression, auditory comprehension and reading comprehension skills. Pt to benefit from skilled language tx session up to 3x  per week maintenance for overall communication skills in attempts to decreased caregiver burden over time and to maximize functional communication abilities.

## 2024-09-05 NOTE — OCCUPATIONAL THERAPY NOTE
OT DISCHARGE SUMMARY    Pt made fair progress during stay on the [facility] following admission for AKA of LLE.  Pt presented upon IE with generalized weakness, decreased endurance, activity tolerance, and functional mobility. On evaluation, pt required Min to complete all ADLs and functional transfers. Pt was discharged to SNF facility. Pt was at [facility] on 7/6/24-8/30/24. Goal for admission to [facility] to [facility] to work on fx tasks and set with placement  Pt currently functioning at CGA/CS level for ADL, CGA/CS level for sit pivot transfers.OT recommended pt continue to receive OT services at SNF and [facility] to follow up for eligibility on prosthetic.      -Yola Dykes MS, OTR/L, CSRS

## 2024-09-13 NOTE — PHYSICAL THERAPY NOTE
[facility] PT DC SUMMARY    Pt is a 63 y.o. male who has a PMH of Acute lower limb ischemia (06/2024), Anxiety, Depression, HIV disease (HCC), Substance abuse (HCC), and Suicide attempt ([facility]), who presented to the [facility] on 6/7/24 from intermediate for increased LLE swelling and pain and was found to have a left external iliac artery occlusion and acute limb ischemia. 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 His course was complicated by b/l buttock unstageable pressure ulcers, urinary and fecal incontinence, pain, and significant decline in ADLs and mobility. Pt has a history of TBI, with baseline nonfluent aphasia and forgetfulness. He was evaluated by neuropsychology on 6/27/24, and deemed to not have capacity to make fully informed medical decisions. Therefore, the guardianship process was initiated as of 7/5/24. He was admitted to the [facility] on 7/6. During his stay, he was formally evaluated and again deemed not able to make his own medical decisions and was appointed a guardian officially on 8/27.     Admitted to [facility] for ongoing therapy on 8/30/24. Pt ultimately found placement at SNF. DC d generally with CG/CS A due to cognition deficits at  level.

## 2024-09-17 NOTE — CASE MANAGEMENT
Team dc summary: Pt had lengthy rehab stay due to complex dispo. Pt had losn appointed during stay and hundreds of SNF and res hab referral made. [facility] acceptnig, transport arranged.

## 2024-12-04 ENCOUNTER — TELEPHONE (OUTPATIENT)
Age: 64
End: 2024-12-04

## 2024-12-04 NOTE — TELEPHONE ENCOUNTER
Darlene from [facility] called in to schedule an appointment for the patient. I advised her that we require a referral in order to schedule appointments. She said she will have a referral and face sheet faxed to our office (number provided). Please call Darlene at [phone] ext [phone] for questions and scheduling for the patient.

## 2025-01-19 ENCOUNTER — TELEPHONE (OUTPATIENT)
Dept: CARDIOLOGY CLINIC | Facility: CLINIC | Age: 65
End: 2025-01-19

## 2025-01-20 NOTE — TELEPHONE ENCOUNTER
Called [facility] to cancel appt 1/20 due to weather. Per Bailey in Medical patient is not longer an inmate there. Cancelled appt

## 2025-02-27 NOTE — ASSESSMENT & PLAN NOTE
Goal Outcome Evaluation:              Outcome Evaluation: Pt is alert and orient x4.  VS WNl for pt norm.  Pt denies any pain/discomfort.                              Seen by neuropsych on 6/27 and was deemed NOT to have medical decision making capacity

## 2025-07-24 NOTE — PLAN OF CARE
Problem: PAIN - ADULT  Goal: Verbalizes/displays adequate comfort level or baseline comfort level  Description: Interventions:  - Encourage patient to monitor pain and request assistance  - Assess pain using appropriate pain scale  - Administer analgesics based on type and severity of pain and evaluate response  - Implement non-pharmacological measures as appropriate and evaluate response  - Consider cultural and social influences on pain and pain management  - Notify physician/advanced practitioner if interventions unsuccessful or patient reports new pain  Outcome: Progressing     Problem: INFECTION - ADULT  Goal: Absence or prevention of progression during hospitalization  Description: INTERVENTIONS:  - Assess and monitor for signs and symptoms of infection  - Monitor lab/diagnostic results  - Monitor all insertion sites, i.e. indwelling lines, tubes, and drains  - Monitor endotracheal if appropriate and nasal secretions for changes in amount and color  - [place] appropriate cooling/warming therapies per order  - Administer medications as ordered  - Instruct and encourage patient and family to use good hand hygiene technique  - Identify and instruct in appropriate isolation precautions for identified infection/condition  Outcome: Progressing  Goal: Absence of fever/infection during neutropenic period  Description: INTERVENTIONS:  - Monitor WBC    Outcome: Progressing     Problem: SAFETY ADULT  Goal: Patient will remain free of falls  Description: INTERVENTIONS:  - Educate patient/family on patient safety including physical limitations  - Instruct patient to call for assistance with activity   - Consult OT/PT to assist with strengthening/mobility   - Keep Call bell within reach  - Keep bed low and locked with side rails adjusted as appropriate  - Keep care items and personal belongings within reach  - Initiate and maintain comfort rounds  - Make Fall Risk Sign visible to staff  - Offer Toileting every 2 Hours,  Orders Placed This Encounter   Procedures    Wound cleansing and dressings     Wash your hands with soap and water.  Remove old dressing, discard into plastic bag and place in trash.    Cleanse the wounds with Dakin's solution if there is an odor, IF NO ODOR, cleanse with normal saline or wound wash prior to applying a clean dressing.   Do not use tissue or cotton balls. Do not scrub the wound. Pat dry using gauze.   Shower no; do not get dressing wet.        Left Hip Wound, Sacral, Left ischium, and perineal wounds:   Cleanse with Dakin's solution  Apply saline moist to dry gauze  Cover with  ABD over top   Secure with Medfix tape.   Change dressing daily and top dressing PRN for breakthrough drainage (visiting nurses to do twice per week and family in between)            Continue using Clinitron bed      Continue NO PRESSURE TO ALL WOUNDS AS MUCH AS POSSIBLE, ESPECIALLY PERINEAL WOUND   LIMIT SITTING UPRIGHT IN WHEELCHAIR ON THIS WOUND       Continue 3-4 servings of protein daily in your diet.         Continue visiting nurse skilled 2 x per week for wound care dressing changes.                            Follow up at the wound center in 4 weeks with Estela OROZCO.     Standing Status:   Future     Expiration Date:   7/31/2025       in advance of need  - Initiate/Maintain alarm  - Obtain necessary fall risk management equipment:   - Apply yellow socks and bracelet for high fall risk patients  - Consider moving patient to room near nurses station  Outcome: Progressing  Goal: Maintain or return to baseline ADL function  Description: INTERVENTIONS:  -  Assess patient's ability to carry out ADLs; assess patient's baseline for ADL function and identify physical deficits which impact ability to perform ADLs (bathing, care of mouth/teeth, toileting, grooming, dressing, etc.)  - Assess/evaluate cause of self-care deficits   - Assess range of motion  - Assess patient's mobility; develop plan if impaired  - Assess patient's need for assistive devices and provide as appropriate  - Encourage maximum independence but intervene and supervise when necessary  - Involve family in performance of ADLs  - Assess for home care needs following discharge   - Consider OT consult to assist with ADL evaluation and planning for discharge  - Provide patient education as appropriate  Outcome: Progressing  Goal: Maintains/Returns to pre admission functional level  Description: INTERVENTIONS:  - Perform AM-PAC 6 Click Basic Mobility/ Daily Activity assessment daily.  - Set and communicate daily mobility goal to care team and patient/family/caregiver.   - Collaborate with rehabilitation services on mobility goals if consulted  - Perform Range of Motion 3 times a day.  - Reposition patient every 2 hours.  - Dangle patient 3 times a day  - Stand patient 3 times a day  - Ambulate patient 3 times a day  - Out of bed to chair 3 times a day   - Out of bed for meals 3 times a day  - Out of bed for toileting  - Record patient progress and toleration of activity level   Outcome: Progressing     Problem: DISCHARGE PLANNING  Goal: Discharge to home or other facility with appropriate resources  Description: INTERVENTIONS:  - Identify barriers to discharge w/patient and caregiver  -  Arrange for needed discharge resources and transportation as appropriate  - Identify discharge learning needs (meds, wound care, etc.)  - Arrange for interpretive services to assist at discharge as needed  - Refer to Case Management Department for coordinating discharge planning if the patient needs post-hospital services based on physician/advanced practitioner order or complex needs related to functional status, cognitive ability, or social support system  Outcome: Progressing     Problem: Prexisting or High Potential for Compromised Skin Integrity  Goal: Skin integrity is maintained or improved  Description: INTERVENTIONS:  - Identify patients at risk for skin breakdown  - Assess and monitor skin integrity  - Assess and monitor nutrition and hydration status  - Monitor labs   - Assess for incontinence   - Turn and reposition patient  - Assist with mobility/ambulation  - Relieve pressure over bony prominences  - Avoid friction and shearing  - Provide appropriate hygiene as needed including keeping skin clean and dry  - Evaluate need for skin moisturizer/barrier cream  - Collaborate with interdisciplinary team   - Patient/family teaching  - Consider wound care consult   Outcome: Progressing

## (undated) DEVICE — ABDOMINAL PAD: Brand: DERMACEA

## (undated) DEVICE — KERLIX BANDAGE ROLL: Brand: KERLIX

## (undated) DEVICE — OCCLUSIVE GAUZE STRIP,3% BISMUTH TRIBROMOPHENATE IN PETROLATUM BLEND: Brand: XEROFORM

## (undated) DEVICE — BULB SYRINGE,IRRIGATION WITH PROTECTIVE CAP: Brand: DOVER

## (undated) DEVICE — SPONGE LAP 18 X 18 IN STRL RFD

## (undated) DEVICE — SUT SILK 4-0 18 IN A183H

## (undated) DEVICE — TRAY FOLEY 16FR URIMETER SURESTEP

## (undated) DEVICE — GLOVE SRG BIOGEL 7.5

## (undated) DEVICE — DRESSING MEPILEX AG BORDER 4 X 4 IN

## (undated) DEVICE — 40601 PROLONGED POSITIONING SYSTEM: Brand: 40601 PROLONGED POSITIONING SYSTEM

## (undated) DEVICE — FOGARTY ARTERIAL EMBOLECTOMY CATHETER 3F 80CM: Brand: FOGARTY

## (undated) DEVICE — PAD GROUNDING DUAL ADULT

## (undated) DEVICE — ACE WRAP 6 IN UNSTERILE

## (undated) DEVICE — EXOFIN PRECISION PEN HIGH VISCOSITY TOPICAL SKIN ADHESIVE: Brand: EXOFIN PRECISION PEN, 1G

## (undated) DEVICE — SUT VICRYL 0 CT-1 CR/8 27IN JJ31G

## (undated) DEVICE — 1840 FOAM BLOCK NEEDLE COUNTER: Brand: DEVON

## (undated) DEVICE — PENCIL ELECTROSURG E-Z CLEAN -0035H

## (undated) DEVICE — INTENDED FOR TISSUE SEPARATION, AND OTHER PROCEDURES THAT REQUIRE A SHARP SURGICAL BLADE TO PUNCTURE OR CUT.: Brand: BARD-PARKER SAFETY BLADES SIZE 10, STERILE

## (undated) DEVICE — PROXIMATE PLUS MD MULTI-DIRECTIONAL RELEASE SKIN STAPLERS CONTAINS 35 STAINLESS STEEL STAPLES APPROXIMATE CLOSED DIMENSIONS: 6.9MM X 3.9MM WIDE: Brand: PROXIMATE

## (undated) DEVICE — DRAPE SURGIKIT SADDLE BAG

## (undated) DEVICE — DECANTER: Brand: UNBRANDED

## (undated) DEVICE — GAUZE SPONGES,16 PLY: Brand: CURITY

## (undated) DEVICE — PETRI DISH STERILE

## (undated) DEVICE — SUT SILK 2-0 18 IN A185H

## (undated) DEVICE — GLOVE INDICATOR PI UNDERGLOVE SZ 8 BLUE

## (undated) DEVICE — SUT MONOCRYL 3-0 SH 27 IN Y416H

## (undated) DEVICE — SUT MONOCRYL 4-0 PS-2 18 IN Y496G

## (undated) DEVICE — BASIC SINGLE BASIN 2-LF: Brand: MEDLINE INDUSTRIES, INC.

## (undated) DEVICE — BETHLEHEM UNIV MAJ EXT ,KIT: Brand: CARDINAL HEALTH

## (undated) DEVICE — SUT PROLENE 5-0 C-1/C-1 24 IN 8725H

## (undated) DEVICE — VESSEL CANNULA

## (undated) DEVICE — STERILE BETHLEHEM FEM POP PACK: Brand: CARDINAL HEALTH

## (undated) DEVICE — IMPERVIOUS STOCKINETTE: Brand: DEROYAL

## (undated) DEVICE — INTENDED FOR TISSUE SEPARATION, AND OTHER PROCEDURES THAT REQUIRE A SHARP SURGICAL BLADE TO PUNCTURE OR CUT.: Brand: BARD-PARKER SAFETY BLADES SIZE 15, STERILE

## (undated) DEVICE — 2108 SERIES SAGITTAL BLADE (18.6 X 0.64 X 61.1MM)

## (undated) DEVICE — FOGARTY ARTERIAL EMBOLECTOMY CATHETER 5F 80CM: Brand: FOGARTY

## (undated) DEVICE — MEDI-VAC YANK SUCT HNDL W/TPRD BULBOUS TIP: Brand: CARDINAL HEALTH

## (undated) DEVICE — FOGARTY ARTERIAL EMBOLECTOMY CATHETER 4F 80CM: Brand: FOGARTY

## (undated) DEVICE — SUT SILK 2-0 SH 30 IN K833H